# Patient Record
Sex: FEMALE | Race: BLACK OR AFRICAN AMERICAN | NOT HISPANIC OR LATINO | Employment: FULL TIME | URBAN - METROPOLITAN AREA
[De-identification: names, ages, dates, MRNs, and addresses within clinical notes are randomized per-mention and may not be internally consistent; named-entity substitution may affect disease eponyms.]

---

## 2017-01-07 ENCOUNTER — HOSPITAL ENCOUNTER (EMERGENCY)
Facility: HOSPITAL | Age: 42
Discharge: HOME | End: 2017-01-07
Attending: EMERGENCY MEDICINE
Payer: MEDICAID

## 2017-01-07 VITALS
DIASTOLIC BLOOD PRESSURE: 97 MMHG | RESPIRATION RATE: 20 BRPM | HEART RATE: 95 BPM | OXYGEN SATURATION: 98 % | TEMPERATURE: 99 F | SYSTOLIC BLOOD PRESSURE: 154 MMHG

## 2017-01-07 DIAGNOSIS — H66.002 ACUTE SUPPURATIVE OTITIS MEDIA OF LEFT EAR WITHOUT SPONTANEOUS RUPTURE OF TYMPANIC MEMBRANE, RECURRENCE NOT SPECIFIED: Primary | ICD-10-CM

## 2017-01-07 LAB
FLUAV AG SPEC QL IA: NEGATIVE
FLUBV AG SPEC QL IA: NEGATIVE
SPECIMEN SOURCE: NORMAL

## 2017-01-07 PROCEDURE — 25000003 PHARM REV CODE 250: Performed by: EMERGENCY MEDICINE

## 2017-01-07 PROCEDURE — 99284 EMERGENCY DEPT VISIT MOD MDM: CPT

## 2017-01-07 PROCEDURE — 87400 INFLUENZA A/B EACH AG IA: CPT

## 2017-01-07 RX ORDER — IBUPROFEN 200 MG
400 TABLET ORAL EVERY 6 HOURS PRN
Qty: 30 TABLET | Refills: 0
Start: 2017-01-07 | End: 2017-11-03

## 2017-01-07 RX ORDER — AMOXICILLIN AND CLAVULANATE POTASSIUM 875; 125 MG/1; MG/1
1 TABLET, FILM COATED ORAL EVERY 12 HOURS
Qty: 14 TABLET | Refills: 0 | Status: SHIPPED | OUTPATIENT
Start: 2017-01-07 | End: 2017-01-17

## 2017-01-07 RX ORDER — HYDROCODONE BITARTRATE AND ACETAMINOPHEN 5; 325 MG/1; MG/1
1 TABLET ORAL
Status: COMPLETED | OUTPATIENT
Start: 2017-01-07 | End: 2017-01-07

## 2017-01-07 RX ORDER — HYDROCODONE BITARTRATE AND ACETAMINOPHEN 5; 325 MG/1; MG/1
1 TABLET ORAL EVERY 6 HOURS PRN
Qty: 12 TABLET | Refills: 0 | Status: SHIPPED | OUTPATIENT
Start: 2017-01-07 | End: 2017-03-06

## 2017-01-07 RX ADMIN — HYDROCODONE BITARTRATE AND ACETAMINOPHEN 1 TABLET: 5; 325 TABLET ORAL at 12:01

## 2017-01-07 NOTE — DISCHARGE INSTRUCTIONS
Middle Ear Infection (Adult)  You have an infection of the middle ear, the space behind the eardrum. This is also called acute otitis media (AOM). Sometimes it is caused by the common cold. This is because congestion can block the internal passage (eustachian tube) that drains fluid from the middle ear. When the middle ear fills with fluid, bacteria can grow there and cause an infection. Oral antibiotics are used to treat this illness, not ear drops. Symptoms usually start to improve within 1 to 2 days of treatment.    Home care  The following are general care guidelines:  · Finish all of the antibiotic medicine given, even though you may feel better after the first few days.  · You may use over-the-counter medicine, such as acetaminophen or ibuprofen, to control pain and fever, unless something else was prescribed. If you have chronic liver or kidney disease or have ever had a stomach ulcer or gastrointestinal bleeding, talk with your healthcare provider before using these medicines. Do not give aspirin to anyone under 18 years of age who has a fever. It may cause severe illness or death.  Follow-up care  Follow up with your healthcare provider, or as advised, in 2 weeks if all symptoms have not gotten better, or if hearing doesn't go back to normal within 1 month.  When to seek medical advice  Call your healthcare provider right away if any of these occur:  · Ear pain gets worse or does not improve after 3 days of treatment  · Unusual drowsiness or confusion  · Neck pain, stiff neck, or headache  · Fluid or blood draining from the ear canal  · Fever of 100.4°F (38°C) or as advised   · Seizure  © 7501-0043 [employer]. [address] [place] [address], [place] [address]. All rights reserved. This information is not intended as a substitute for professional medical care. Always follow your healthcare professional's instructions.

## 2017-01-07 NOTE — ED PROVIDER NOTES
Encounter Date: 1/7/2017       History     Chief Complaint   Patient presents with    Generalized Body Aches     body aches and chills x 1 day      Review of patient's allergies indicates:   Allergen Reactions    Bactrim [sulfamethoxazole-trimethoprim] Rash     The history is provided by the patient. No  was used.    Patient complains of generalized body aches and chills for one day.  Patient also has some slight nasal congestion.  Patient states it started last night.  Patient also has earache in the right ear which is 8 out of 10.  Patient states the pain is a throbbing type pain.  Patient denies any cough or sore throat nausea vomiting or diarrhea.  Patient denies any fever.  Patient denies any urinary frequency.  She complains of decreased hearing out of the right ear.    Past Medical History   Diagnosis Date    Hypertension      No past medical history pertinent negatives.  Past Surgical History   Procedure Laterality Date    Carotid stent       No family history on file.  Social History   Substance Use Topics    Smoking status: Former Smoker     Packs/day: 1.00     Types: Cigarettes     Quit date: 2/16/2016    Smokeless tobacco: Not on file    Alcohol use Yes      Comment: occassionally     Review of Systems   All other systems reviewed and are negative.      Physical Exam   Initial Vitals   BP Pulse Resp Temp SpO2   01/07/17 1101 01/07/17 1101 01/07/17 1101 01/07/17 1101 01/07/17 1101   154/97 95 20 98.5 °F (36.9 °C) 98 %     Physical Exam    Nursing note and vitals reviewed.  Constitutional: She appears well-developed and well-nourished.   HENT:   Head: Normocephalic and atraumatic.   Right Ear: External ear normal.   Left Ear: External ear normal.   Nose: Nose normal.   Mouth/Throat: Oropharynx is clear and moist.   Right TM is dull and opacified.   Eyes: EOM are normal. Pupils are equal, round, and reactive to light.   Neck: Normal range of motion.   Cardiovascular: Normal rate,  regular rhythm and normal heart sounds. Exam reveals no gallop and no friction rub.    No murmur heard.  Pulmonary/Chest: Breath sounds normal. No respiratory distress. She has no wheezes. She has no rhonchi. She has no rales.   Abdominal: Soft. She exhibits no distension. There is no tenderness.   Musculoskeletal: Normal range of motion.   Neurological: She is alert and oriented to person, place, and time. She has normal strength. No sensory deficit.   Skin: Skin is warm and dry.   Psychiatric: She has a normal mood and affect.         ED Course   Procedures  Labs Reviewed   INFLUENZA A AND B ANTIGEN                               ED Course     Clinical Impression:   The encounter diagnosis was Acute suppurative otitis media of left ear without spontaneous rupture of tympanic membrane, recurrence not specified.          Mark Thompson MD  01/07/17 4063

## 2017-01-07 NOTE — ED AVS SNAPSHOT
[facility]  [address]  [place] [address]               Gina Jenkins   2017 10:58 AM   ED    Description:  Female : 1975   Department:  [facility]           Your Care was Coordinated By:     Provider Role From To    Mark Thompson MD Attending Provider 17 1057 --      Reason for Visit     Generalized Body Aches           Diagnoses this Visit        Comments    Acute suppurative otitis media of left ear without spontaneous rupture of tympanic membrane, recurrence not specified    -  Primary       ED Disposition     ED Disposition Condition Comment    Discharge             To Do List           Follow-up Information     Follow up with Jeffery Humphrey MD In 1 week(s).    Specialty:  Cardiology    Contact information:    [address] [place] [address]  2ND FLOOR  [facility]  [place] [id number] [phone]         These Medications        Disp Refills Start End    amoxicillin-clavulanate 875-125mg (AUGMENTIN) 875-125 mg per tablet 14 tablet 0 2017    Take 1 tablet by mouth every 12 (twelve) hours. - Oral    ibuprofen (ADVIL,MOTRIN) 200 MG tablet 30 tablet 0 2017     Take 2 tablets (400 mg total) by mouth every 6 (six) hours as needed for Pain. - Oral    hydrocodone-acetaminophen 5-325mg (NORCO) 5-325 mg per tablet 12 tablet 0 2017     Take 1 tablet by mouth every 6 (six) hours as needed for Pain. - Oral      [facility]     [facility] On Call Nurse Care Line -  Assistance  Registered nurses in the [facility] On Call Center provide clinical advisement, health education, appointment booking, and other advisory services.  Call for this free service at [phone].             Medications           Message regarding Medications     Verify the changes and/or additions to your medication regime listed below are the same as discussed with your clinician today.  If any of these changes or additions are incorrect,  please notify your healthcare provider.        START taking these NEW medications        Refills    amoxicillin-clavulanate 875-125mg (AUGMENTIN) 875-125 mg per tablet 0    Sig: Take 1 tablet by mouth every 12 (twelve) hours.    Class: Print    Route: Oral    ibuprofen (ADVIL,MOTRIN) 200 MG tablet 0    Sig: Take 2 tablets (400 mg total) by mouth every 6 (six) hours as needed for Pain.    Class: No Print    Route: Oral    hydrocodone-acetaminophen 5-325mg (NORCO) 5-325 mg per tablet 0    Sig: Take 1 tablet by mouth every 6 (six) hours as needed for Pain.    Class: Print    Route: Oral      These medications were administered today        Dose Freq    hydrocodone-acetaminophen 5-325mg per tablet 1 tablet 1 tablet ED 1 Time    Sig: Take 1 tablet by mouth ED 1 Time.    Class: Normal    Route: Oral           Verify that the below list of medications is an accurate representation of the medications you are currently taking.  If none reported, the list may be blank. If incorrect, please contact your healthcare provider. Carry this list with you in case of emergency.           Current Medications     aspirin (ECOTRIN) 81 MG EC tablet Take 81 mg by mouth as needed for Pain.    carvedilol (COREG) 12.5 MG tablet Take 1 tablet (12.5 mg total) by mouth 2 (two) times daily.    lisinopril (PRINIVIL,ZESTRIL) 40 MG tablet Take 1 tablet (40 mg total) by mouth once daily.    ondansetron (ZOFRAN) 4 MG tablet Take 1 tablet (4 mg total) by mouth every 8 (eight) hours as needed for Nausea.    promethazine (PHENERGAN) 25 MG tablet Take 1 tablet (25 mg total) by mouth every 6 (six) hours as needed for Nausea.    amlodipine (NORVASC) 10 MG tablet Take 1 tablet (10 mg total) by mouth once daily.    amoxicillin-clavulanate 875-125mg (AUGMENTIN) 875-125 mg per tablet Take 1 tablet by mouth every 12 (twelve) hours.    hydrochlorothiazide (HYDRODIURIL) 25 MG tablet Take 0.5 tablets (12.5 mg total) by mouth once daily.    hydrocodone-acetaminophen  5-325mg (NORCO) 5-325 mg per tablet Take 1 tablet by mouth every 6 (six) hours as needed for Pain.    hydrocodone-acetaminophen 5-325mg per tablet 1 tablet Take 1 tablet by mouth ED 1 Time.    hydrOXYzine pamoate (VISTARIL) 50 MG Cap Take 1 capsule (50 mg total) by mouth every 6 (six) hours as needed.    ibuprofen (ADVIL,MOTRIN) 200 MG tablet Take 2 tablets (400 mg total) by mouth every 6 (six) hours as needed for Pain.    potassium chloride SA (K-DUR,KLOR-CON) 20 MEQ tablet Take 1 tablet (20 mEq total) by mouth 3 (three) times daily.           Clinical Reference Information           Your Vitals Were     BP Pulse Temp Resp Last Period SpO2    154/97 95 98.5 °F (36.9 °C) (Oral) 20 12/31/2016 98%      Allergies as of 1/7/2017        Reactions    Bactrim [Sulfamethoxazole-trimethoprim] Rash      Immunizations Administered on Date of Encounter - 1/7/2017     None      ED Micro, Lab, POCT     Start Ordered       Status Ordering Provider    01/07/17 1105 01/07/17 1104  Influenza antigen Nasopharyngeal Swab  STAT      Final result       ED Imaging Orders     None        Discharge Instructions           Middle Ear Infection (Adult)  You have an infection of the middle ear, the space behind the eardrum. This is also called acute otitis media (AOM). Sometimes it is caused by the common cold. This is because congestion can block the internal passage (eustachian tube) that drains fluid from the middle ear. When the middle ear fills with fluid, bacteria can grow there and cause an infection. Oral antibiotics are used to treat this illness, not ear drops. Symptoms usually start to improve within 1 to 2 days of treatment.    Home care  The following are general care guidelines:  · Finish all of the antibiotic medicine given, even though you may feel better after the first few days.  · You may use over-the-counter medicine, such as acetaminophen or ibuprofen, to control pain and fever, unless something else was prescribed. If you  have chronic liver or kidney disease or have ever had a stomach ulcer or gastrointestinal bleeding, talk with your healthcare provider before using these medicines. Do not give aspirin to anyone under 18 years of age who has a fever. It may cause severe illness or death.  Follow-up care  Follow up with your healthcare provider, or as advised, in 2 weeks if all symptoms have not gotten better, or if hearing doesn't go back to normal within 1 month.  When to seek medical advice  Call your healthcare provider right away if any of these occur:  · Ear pain gets worse or does not improve after 3 days of treatment  · Unusual drowsiness or confusion  · Neck pain, stiff neck, or headache  · Fluid or blood draining from the ear canal  · Fever of 100.4°F (38°C) or as advised   · Seizure  © 8335-4774 [employer]. [address] [place] [address]. All rights reserved. This information is not intended as a substitute for professional medical care. Always follow your healthcare professional's instructions.          [facility] Sign-Up     Activating your [facility] account is as easy as 1-2-3!     1) Visit [email or web address], select Sign Up Now, enter this activation code and your date of birth, then select Next.  [email or web address]  Expires: 2/21/2017 12:51 PM      2) Create a username and password to use when you visit [facility] in the future and select a security question in case you lose your password and select Next.    3) Enter your e-mail address and click Sign Up!    Additional Information  If you have questions, please e-mail [email or web address] or call [phone] to talk to our [facility] staff. Remember, [facility] is NOT to be used for urgent needs. For medical emergencies, dial 911.         Smoking Cessation     If you would like to quit smoking:   You may be eligible for free services if you are a [place] resident and started smoking cigarettes before September 1, 1988.  Call the Smoking Cessation  Trust ([facility]) toll free at (258) 110-9128 or (330) [phone].   Call 1-800-QUIT-NOW if you do not meet the above criteria.             [facility] complies with applicable Federal civil rights laws and does not discriminate on the basis of race, color, national origin, age, disability, or sex.        Language Assistance Services     ATTENTION: Language assistance services are available, free of charge. Please call [phone].      ATENCIÓN: Si habla aissatou, tiene a barnes disposición servicios gratuitos de asistencia lingüística. Llame al [phone].     BLAYNE Ý: N?u b?n nói Ti?ng Vi?t, có các d?ch v? h? tr? ngôn ng? mi?n phí dành cho b?n. G?i s? [phone].

## 2017-03-06 ENCOUNTER — HOSPITAL ENCOUNTER (EMERGENCY)
Facility: HOSPITAL | Age: 42
Discharge: HOME | End: 2017-03-06
Attending: EMERGENCY MEDICINE
Payer: MEDICAID

## 2017-03-06 VITALS
RESPIRATION RATE: 18 BRPM | WEIGHT: 165 LBS | SYSTOLIC BLOOD PRESSURE: 187 MMHG | HEART RATE: 67 BPM | BODY MASS INDEX: 25.01 KG/M2 | HEIGHT: 68 IN | DIASTOLIC BLOOD PRESSURE: 78 MMHG | OXYGEN SATURATION: 100 % | TEMPERATURE: 99 F

## 2017-03-06 DIAGNOSIS — R55 SYNCOPE, UNSPECIFIED SYNCOPE TYPE: ICD-10-CM

## 2017-03-06 DIAGNOSIS — I10 MALIGNANT HYPERTENSION: ICD-10-CM

## 2017-03-06 DIAGNOSIS — I67.4 HYPERTENSIVE ENCEPHALOPATHY: ICD-10-CM

## 2017-03-06 DIAGNOSIS — I16.0 HYPERTENSIVE URGENCY: Primary | ICD-10-CM

## 2017-03-06 LAB
ALBUMIN SERPL BCP-MCNC: 3.5 G/DL
ALP SERPL-CCNC: 106 U/L
ALT SERPL W/O P-5'-P-CCNC: 19 U/L
ANION GAP SERPL CALC-SCNC: 11 MMOL/L
AST SERPL-CCNC: 23 U/L
B-HCG UR QL: NEGATIVE
BASOPHILS # BLD AUTO: 0.04 K/UL
BASOPHILS NFR BLD: 0.6 %
BILIRUB SERPL-MCNC: 0.3 MG/DL
BNP SERPL-MCNC: 189 PG/ML
BUN SERPL-MCNC: 16 MG/DL
CALCIUM SERPL-MCNC: 9 MG/DL
CHLORIDE SERPL-SCNC: 103 MMOL/L
CO2 SERPL-SCNC: 26 MMOL/L
CREAT SERPL-MCNC: 1.1 MG/DL
CTP QC/QA: YES
DIFFERENTIAL METHOD: NORMAL
EOSINOPHIL # BLD AUTO: 0.4 K/UL
EOSINOPHIL NFR BLD: 5.5 %
ERYTHROCYTE [DISTWIDTH] IN BLOOD BY AUTOMATED COUNT: 13.7 %
EST. GFR  (AFRICAN AMERICAN): >60 ML/MIN/1.73 M^2
EST. GFR  (NON AFRICAN AMERICAN): >60 ML/MIN/1.73 M^2
GLUCOSE SERPL-MCNC: 79 MG/DL (ref 70–110)
GLUCOSE SERPL-MCNC: 93 MG/DL
HCT VFR BLD AUTO: 39.9 %
HGB BLD-MCNC: 13.3 G/DL
INR PPP: 1
LYMPHOCYTES # BLD AUTO: 1.7 K/UL
LYMPHOCYTES NFR BLD: 26 %
MCH RBC QN AUTO: 30.6 PG
MCHC RBC AUTO-ENTMCNC: 33.3 %
MCV RBC AUTO: 92 FL
MONOCYTES # BLD AUTO: 0.6 K/UL
MONOCYTES NFR BLD: 8.7 %
NEUTROPHILS # BLD AUTO: 3.9 K/UL
NEUTROPHILS NFR BLD: 59 %
PLATELET # BLD AUTO: 237 K/UL
PMV BLD AUTO: 11.3 FL
POCT GLUCOSE: 79 MG/DL (ref 70–110)
POTASSIUM SERPL-SCNC: 3.2 MMOL/L
PROT SERPL-MCNC: 7.1 G/DL
PROTHROMBIN TIME: 10.4 SEC
RBC # BLD AUTO: 4.35 M/UL
SODIUM SERPL-SCNC: 140 MMOL/L
TROPONIN I SERPL DL<=0.01 NG/ML-MCNC: 0.01 NG/ML
TSH SERPL DL<=0.005 MIU/L-ACNC: 1.99 UIU/ML
WBC # BLD AUTO: 6.58 K/UL

## 2017-03-06 PROCEDURE — 85025 COMPLETE CBC W/AUTO DIFF WBC: CPT

## 2017-03-06 PROCEDURE — 99285 EMERGENCY DEPT VISIT HI MDM: CPT | Mod: 25

## 2017-03-06 PROCEDURE — 96375 TX/PRO/DX INJ NEW DRUG ADDON: CPT

## 2017-03-06 PROCEDURE — 82962 GLUCOSE BLOOD TEST: CPT

## 2017-03-06 PROCEDURE — 93010 ELECTROCARDIOGRAM REPORT: CPT | Mod: ,,, | Performed by: INTERNAL MEDICINE

## 2017-03-06 PROCEDURE — 99203 OFFICE O/P NEW LOW 30 MIN: CPT | Mod: GT,,, | Performed by: PSYCHIATRY & NEUROLOGY

## 2017-03-06 PROCEDURE — 80053 COMPREHEN METABOLIC PANEL: CPT

## 2017-03-06 PROCEDURE — 83880 ASSAY OF NATRIURETIC PEPTIDE: CPT

## 2017-03-06 PROCEDURE — 85610 PROTHROMBIN TIME: CPT

## 2017-03-06 PROCEDURE — 63600175 PHARM REV CODE 636 W HCPCS: Performed by: EMERGENCY MEDICINE

## 2017-03-06 PROCEDURE — 84484 ASSAY OF TROPONIN QUANT: CPT

## 2017-03-06 PROCEDURE — 25000003 PHARM REV CODE 250: Performed by: EMERGENCY MEDICINE

## 2017-03-06 PROCEDURE — 93005 ELECTROCARDIOGRAM TRACING: CPT

## 2017-03-06 PROCEDURE — 84443 ASSAY THYROID STIM HORMONE: CPT

## 2017-03-06 PROCEDURE — 81025 URINE PREGNANCY TEST: CPT | Performed by: EMERGENCY MEDICINE

## 2017-03-06 PROCEDURE — 96374 THER/PROPH/DIAG INJ IV PUSH: CPT

## 2017-03-06 RX ORDER — PROCHLORPERAZINE EDISYLATE 5 MG/ML
10 INJECTION INTRAMUSCULAR; INTRAVENOUS
Status: COMPLETED | OUTPATIENT
Start: 2017-03-06 | End: 2017-03-06

## 2017-03-06 RX ORDER — LISINOPRIL 10 MG/1
40 TABLET ORAL DAILY
Qty: 30 TABLET | Refills: 0 | Status: SHIPPED | OUTPATIENT
Start: 2017-03-06 | End: 2017-04-02 | Stop reason: SDUPTHER

## 2017-03-06 RX ORDER — AMLODIPINE BESYLATE 10 MG/1
10 TABLET ORAL DAILY
Qty: 30 TABLET | Refills: 0 | Status: SHIPPED | OUTPATIENT
Start: 2017-03-06 | End: 2017-04-02

## 2017-03-06 RX ORDER — CARVEDILOL 12.5 MG/1
12.5 TABLET ORAL
Status: COMPLETED | OUTPATIENT
Start: 2017-03-06 | End: 2017-03-06

## 2017-03-06 RX ORDER — HYDROCHLOROTHIAZIDE 25 MG/1
25 TABLET ORAL DAILY
Qty: 30 TABLET | Refills: 0 | Status: SHIPPED | OUTPATIENT
Start: 2017-03-06 | End: 2017-04-02

## 2017-03-06 RX ORDER — AMLODIPINE BESYLATE 5 MG/1
10 TABLET ORAL
Status: COMPLETED | OUTPATIENT
Start: 2017-03-06 | End: 2017-03-06

## 2017-03-06 RX ORDER — HYDRALAZINE HYDROCHLORIDE 20 MG/ML
10 INJECTION INTRAMUSCULAR; INTRAVENOUS
Status: COMPLETED | OUTPATIENT
Start: 2017-03-06 | End: 2017-03-06

## 2017-03-06 RX ORDER — KETOROLAC TROMETHAMINE 30 MG/ML
15 INJECTION, SOLUTION INTRAMUSCULAR; INTRAVENOUS
Status: COMPLETED | OUTPATIENT
Start: 2017-03-06 | End: 2017-03-06

## 2017-03-06 RX ORDER — ASPIRIN 325 MG
325 TABLET ORAL
Status: COMPLETED | OUTPATIENT
Start: 2017-03-06 | End: 2017-03-06

## 2017-03-06 RX ORDER — POTASSIUM CHLORIDE 20 MEQ/1
40 TABLET, EXTENDED RELEASE ORAL
Status: COMPLETED | OUTPATIENT
Start: 2017-03-06 | End: 2017-03-06

## 2017-03-06 RX ORDER — CARVEDILOL 12.5 MG/1
12.5 TABLET ORAL 2 TIMES DAILY WITH MEALS
Qty: 60 TABLET | Refills: 11 | Status: SHIPPED | OUTPATIENT
Start: 2017-03-06 | End: 2017-04-02

## 2017-03-06 RX ADMIN — PROCHLORPERAZINE EDISYLATE 10 MG: 5 INJECTION INTRAMUSCULAR; INTRAVENOUS at 02:03

## 2017-03-06 RX ADMIN — POTASSIUM CHLORIDE 40 MEQ: 20 TABLET, EXTENDED RELEASE ORAL at 02:03

## 2017-03-06 RX ADMIN — ASPIRIN 325 MG ORAL TABLET 325 MG: 325 PILL ORAL at 01:03

## 2017-03-06 RX ADMIN — HYDRALAZINE HYDROCHLORIDE 10 MG: 20 INJECTION INTRAMUSCULAR; INTRAVENOUS at 12:03

## 2017-03-06 RX ADMIN — AMLODIPINE BESYLATE 10 MG: 5 TABLET ORAL at 01:03

## 2017-03-06 RX ADMIN — CARVEDILOL 12.5 MG: 12.5 TABLET, FILM COATED ORAL at 02:03

## 2017-03-06 RX ADMIN — KETOROLAC TROMETHAMINE 15 MG: 30 INJECTION, SOLUTION INTRAMUSCULAR at 02:03

## 2017-03-06 NOTE — CONSULTS
[facility]  [facility]Consult    Consultation started: 3/6/2017 at 11:54 AM   Consulting Provider:  Prieto  The patient location is [facility]  Spoke hospital nurse at bedside with patient assisting consultant.     Subjective:   Subjective   History of Present Illness:  Gina Jenkins is a 42 y.o. female who presents with HA, dizziness, LOC , triage /133.    At work, felt dizzy, took a seat, and then had LOC, next thing she remembers was her being woken up by coworkers asking her if she was ok. When she woke up she noticed that her head was hurting her and she denies any weakness or numbness anywhere. These symptoms have never happened before. There are no identified triggers or modifying factors. There have been no recurrent events. There are no other associated symptoms.    Off of her blood pressure medications x 3 days, 235/133 triage BP.    Wake up Stroke?: no  Last known normal:     Recent bleeding noted: no  Does the patient take any Blood Thinners? no     H&P was obtained from patient.  Past Medical History: HTN    Medications: No current facility-administered medications for this encounter.     Current Outpatient Prescriptions:     amlodipine (NORVASC) 10 MG tablet, Take 1 tablet (10 mg total) by mouth once daily., Disp: 30 tablet, Rfl: 1    aspirin (ECOTRIN) 81 MG EC tablet, Take 81 mg by mouth as needed for Pain., Disp: , Rfl:     carvedilol (COREG) 12.5 MG tablet, Take 1 tablet (12.5 mg total) by mouth 2 (two) times daily., Disp: 60 tablet, Rfl: 1    hydrochlorothiazide (HYDRODIURIL) 25 MG tablet, Take 0.5 tablets (12.5 mg total) by mouth once daily., Disp: 30 tablet, Rfl: 1    hydrocodone-acetaminophen 5-325mg (NORCO) 5-325 mg per tablet, Take 1 tablet by mouth every 6 (six) hours as needed for Pain., Disp: 12 tablet, Rfl: 0    hydrOXYzine pamoate (VISTARIL) 50 MG Cap, Take 1 capsule (50 mg total) by mouth every 6 (six) hours as needed., Disp: 20  capsule, Rfl: 0    ibuprofen (ADVIL,MOTRIN) 200 MG tablet, Take 2 tablets (400 mg total) by mouth every 6 (six) hours as needed for Pain., Disp: 30 tablet, Rfl: 0    lisinopril (PRINIVIL,ZESTRIL) 40 MG tablet, Take 1 tablet (40 mg total) by mouth once daily., Disp: 30 tablet, Rfl: 1    ondansetron (ZOFRAN) 4 MG tablet, Take 1 tablet (4 mg total) by mouth every 8 (eight) hours as needed for Nausea., Disp: 8 tablet, Rfl: 0    potassium chloride SA (K-DUR,KLOR-CON) 20 MEQ tablet, Take 1 tablet (20 mEq total) by mouth 3 (three) times daily., Disp: 30 tablet, Rfl: 0    promethazine (PHENERGAN) 25 MG tablet, Take 1 tablet (25 mg total) by mouth every 6 (six) hours as needed for Nausea., Disp: 15 tablet, Rfl: 0    Allergies:   Review of patient's allergies indicates:   Allergen Reactions    Bactrim [sulfamethoxazole-trimethoprim] Rash       Objective:     Vitals:   Vitals:    03/06/17 1146   BP: (!) 235/133   Pulse: 66   Resp: 18   Temp: 98.8 °F (37.1 °C)        Objective   Physical Exam:  General: well developed, well nourished   HENT: Head:normocephalic and atraumatic   HENT: Ears: right ear normal and left ear normal  Nose: normal nares and no discharge  Eyes:conjunctivae/corneas clear. PERRL.  Neck: normal, no obvious masses and trachea to midline  Lungs: normal respiratory effort  Cardiovascular: Heart: regular rate and rhythm   Cardiovascular: Extremities: no cyanosis, no edema and no clubbing  Abdomen: appears normal and not distended  Skin:  skin color and texture normal, no rash and no bruises  Musculoskeletal: normal range of motion in all extremities       Imaging Notes: No hemmorhage. No mass effect. No early infarct signs. Impression performed at: 1200    Stroke Scales    Assessment - Diagnosis - Goals:     Plan     Diagnosis/Impression: hypertensive encephalopathy w/ HA, off of BP meds x 3 days; no clear e/o focality to suggest ischemic stroke at this point    TPA Recommendation: TPA not recommended due  to findings more suggestive of hypertensive emergency w/ HA, dizziness; no clear neurovascular syndrome    Recommendation: NPO until after pass dysphagia screen. Diagnostic studies: MRI head without contrast to assess brain parenchyma; recommend mgmt as hypertensive emergency w/ neurological symptoms    Disposition Recommendation:  admit to inpatient       Possible Interventional Revascularization Candidate? No; No significant neurological deficit    Recommended the emergency room physician to have a brief discussion with the patient and/or family if available regarding the risks and benefits of treatment, and to briefly document the occurrence of that discussion in his clinical encounter note.     The attending portion of this evaluation, treatment, and documentation was performed per Mike Warren via audiovisual.      Billing code:  (non-stroke, some mimics)    · This patient has neurological symptom(s)/condition/illness, with minimal potential for morbidity and mortality.  · There is a low probability for acute neurological change leading to clinical and possibly life-threatening deterioration requiring highest level of physician preparedness for urgent intervention.  · Care was coordinated with other physicians involved in the patient's care.  · Radiologic studies and laboratory data were reviewed and interpreted, and plan of care was re-assessed based on the results.  Diagnosis, treatment options and prognosis may have been discussed with the patient and/or family members or caregiver.      Consultation ended: 3/6/2017 at 1210    Lauro Warren MD  Vascular Neurology

## 2017-03-06 NOTE — ED PROVIDER NOTES
"Encounter Date: 3/6/2017       History     Chief Complaint   Patient presents with    Facial Droop     left sided facial droop x 40 min, slurred speech. syncope     Review of patient's allergies indicates:   Allergen Reactions    Bactrim [sulfamethoxazole-trimethoprim] Rash     HPI Comments: 22-year-old female brought to the emergency department by EMS.  Patient reports that she was at work and bent over and felt dizzy, describing "the room spinning around me."  Reports she then lost consciousness, and woke up with people standing around her.  EMS reports the patient is having somewhat slurred speech.  Patient reports feeling "kind out of it."  Reports a mild, generalized, throbbing headache.  Denies any chest pain or shortness of breath.  Admits to not taking her blood pressure medicine because she is out of it.  Eyes any  Visual changes, focal numbness or weakness, fever, chills, chest pain, shorts breath, abdominal pain, constipation, diarrhea, dysuria, hematuria.  Reports resolution of dizziness at this time    The history is provided by the patient and the EMS personnel.     Past Medical History:   Diagnosis Date    Hypertension      Past Surgical History:   Procedure Laterality Date    CAROTID STENT       History reviewed. No pertinent family history.  Social History   Substance Use Topics    Smoking status: Former Smoker     Packs/day: 1.00     Types: Cigarettes     Quit date: 2/16/2016    Smokeless tobacco: None    Alcohol use Yes      Comment: occassionally     Review of Systems   Constitutional: Negative for chills, fatigue and fever.   HENT: Negative for congestion, rhinorrhea, sore throat and voice change.    Eyes: Negative for photophobia, pain and redness.   Respiratory: Negative for cough, choking and shortness of breath.    Cardiovascular: Negative for chest pain, palpitations and leg swelling.   Gastrointestinal: Negative for abdominal pain, diarrhea, nausea and vomiting.   Genitourinary: " Negative for dysuria, frequency and urgency.   Musculoskeletal: Negative for back pain, neck pain and neck stiffness.   Neurological: Positive for dizziness and headaches. Negative for seizures and numbness.   All other systems reviewed and are negative.      Physical Exam   Initial Vitals   BP Pulse Resp Temp SpO2   03/06/17 1146 03/06/17 1146 03/06/17 1146 03/06/17 1146 03/06/17 1146   235/133 66 18 98.8 °F (37.1 °C) 100 %     Physical Exam    Nursing note and vitals reviewed.  Constitutional: She appears well-developed and well-nourished. No distress.   HENT:   Head: Normocephalic and atraumatic.   Mouth/Throat: Oropharynx is clear and moist.   Eyes: Conjunctivae and EOM are normal. Pupils are equal, round, and reactive to light.   Neck: Normal range of motion. Neck supple. No tracheal deviation present.   Cardiovascular: Normal rate, regular rhythm, normal heart sounds and intact distal pulses.   Pulmonary/Chest: Breath sounds normal. No respiratory distress. She has no wheezes. She has no rhonchi. She has no rales.   Abdominal: Soft. Bowel sounds are normal. She exhibits no distension. There is no tenderness. There is no rebound and no guarding.   Musculoskeletal: Normal range of motion. She exhibits no edema or tenderness.   Neurological: She is alert and oriented to person, place, and time. She has normal strength. No cranial nerve deficit or sensory deficit. She displays a negative Romberg sign. Coordination and gait normal. GCS eye subscore is 4. GCS verbal subscore is 5. GCS motor subscore is 6.   NIH stroke scale would be 0 at this time   Skin: Skin is warm and dry.         ED Course   Procedures  Labs Reviewed   COMPREHENSIVE METABOLIC PANEL - Abnormal; Notable for the following:        Result Value    Potassium 3.2 (*)     All other components within normal limits   CBC W/ AUTO DIFFERENTIAL   PROTIME-INR   TSH   TROPONIN I   B-TYPE NATRIURETIC PEPTIDE   POCT GLUCOSE   POCT URINE PREGNANCY   POCT GLUCOSE      EKG Readings: (Independently Interpreted)   Initial Reading: No STEMI. Rhythm: Normal Sinus Rhythm. Heart Rate: 67. Ectopy: No Ectopy. Conduction: Normal. ST Segments: Normal ST Segments. T Waves: Normal. Axis: Normal.       X-Rays:   Independently Interpreted Readings:   Chest X-Ray: Interpreted by radiologist and visualized by me   Head CT: Interpreted by radiologist and visualized by me     Imaging Results         X-Ray Chest AP Portable (Final result) Result time:  03/06/17 13:14:06    Final result by Baron Rich MD (03/06/17 13:14:06)    Impression:        No acute cardiopulmonary disease.      Electronically signed by: BARON RICH MD  Date:     03/06/17  Time:    13:14     Narrative:    History: CVA.    Procedure: Portable chest one view    Findings:    There is again nonspecific elevation of the right hemidiaphragm, without change from the study of 5/11/16.    Cardiomediastinal silhouette is within normal limits there no tracheal abnormalities.  Lungs are clear.  Pulmonary vasculature within normal limits.  There are cardiac monitoring leads over the chest.            CT Head Without Contrast (Final result) Result time:  03/06/17 12:13:54    Final result by Laureano Venegas DO (03/06/17 12:13:54)    Impression:     Unremarkable motion limited noncontrast CT head specifically without evidence for acute intracranial hemorrhage. Further evaluation as warranted clinically.    Incidental moderate patchy paranasal sinus opacities      Electronically signed by: LAUREANO VENEGAS DO  Date:     03/06/17  Time:    12:13     Narrative:    CT brain without contrast.    Comparison: None     Technique: Multiple 5 mm axial images of the head were obtained without intravenous contrast.    Findings: Study is slightly limited by patient motion within limits of the study there is no evidence for acute intracranial hemorrhage or sulcal effacement. The ventricles are normal in size and configuration without evidence for  hydrocephalus.  There is no midline shift or mass effect. There is moderate lobular opacities in the maxillary antra with patchy ethmoid air cell and mild sphenoid sinus opacities.              Medical Decision Making:   Initial Assessment:   42-year-old female presents the ED with reported slurred speech, although her father has arrived afterward and reports her speech is at baseline as well as syncopal episode and hypertension, reports noncompliance with her high blood pressure medicine  Differential Diagnosis:   CVA, ICH, hypertensive encephalopathy versus urgency, ACS  Independently Interpreted Test(s):   I have ordered and independently interpreted X-rays - see prior notes.  I have ordered and independently interpreted EKG Reading(s) - see prior notes  Clinical Tests:   Lab Tests: Reviewed       <> Summary of Lab: Benign  ED Management:  Patient in her baseline per family.  No complaints at this time.  Instructed her on importance of filling the blood pressure medication, which will fill, as well as follow-up with her primary care physician for reevaluation, return to emergency room with recurrent or new symptoms.  Patient expressed good understanding and is comfortable with discharge at this time.                   ED Course     Clinical Impression:   The primary encounter diagnosis was Hypertensive urgency. Diagnoses of Hypertensive encephalopathy, Malignant hypertension, and Syncope, unspecified syncope type were also pertinent to this visit.    Disposition:   Disposition: Discharged  Condition: Stable       Yuriy Moreau MD  03/06/17 1288

## 2017-03-06 NOTE — ED NOTES
Pt states she became dizzy when standing up after bending over today, then had syncopal episode. Hit her head on the ground. Arrives with skin tear to right forehead and abrasion under nose. Pt began having slurred speech, a left facial droop, and a sensation of heaviness to entire body after waking up, about 45 minutes pta. Face is symmetrical when smiling. PERRLA. Skin is warm, dry. Pt is hypertensive, and states she has been out of all of her prescribed bp medications for about 1 week. Denies any chest pain. Normal sinus rhythm on monitor. Respirations unlabored.

## 2017-03-06 NOTE — ED AVS SNAPSHOT
[facility]  [address]  [place] [address]               Gina Jenkins   3/6/2017 11:47 AM   ED    Description:  Female : 1975   Department:  [facility]           Your Care was Coordinated By:     Provider Role From To    Yuriy Moreau MD Attending Provider 17 1147 --      Reason for Visit     Facial Droop           Diagnoses this Visit        Comments    Hypertensive urgency    -  Primary     Hypertensive encephalopathy         Malignant hypertension         Syncope, unspecified syncope type           ED Disposition     None           To Do List           Follow-up Information     Follow up with Jeffery Humphrey MD.    Specialty:  Cardiology    Contact information:    [address]  [address]ND FLOOR  [facility]  [place] [address]  [phone]         These Medications        Disp Refills Start End    lisinopril 10 MG tablet 30 tablet 0 3/6/2017 3/6/2018    Take 4 tablets (40 mg total) by mouth once daily. - Oral    amlodipine (NORVASC) 10 MG tablet 30 tablet 0 3/6/2017 3/6/2018    Take 1 tablet (10 mg total) by mouth once daily. - Oral    carvedilol (COREG) 12.5 MG tablet 60 tablet 11 3/6/2017 3/6/2018    Take 1 tablet (12.5 mg total) by mouth 2 (two) times daily with meals. - Oral    hydrochlorothiazide (HYDRODIURIL) 25 MG tablet 30 tablet 0 3/6/2017 3/6/2018    Take 1 tablet (25 mg total) by mouth once daily. - Oral      [facility] -  Assistance  Registered nurses in the [facility] provide clinical advisement, health education, appointment booking, and other advisory services.  Call for this free service at [phone].             Medications           Message regarding Medications     Verify the changes and/or additions to your medication regime listed below are the same as discussed with your clinician today.  If any of these changes or additions are  incorrect, please notify your healthcare provider.        START taking these NEW medications        Refills    lisinopril 10 MG tablet 0    Sig: Take 4 tablets (40 mg total) by mouth once daily.    Class: Print    Route: Oral    amlodipine (NORVASC) 10 MG tablet 0    Sig: Take 1 tablet (10 mg total) by mouth once daily.    Class: Print    Route: Oral    carvedilol (COREG) 12.5 MG tablet 11    Sig: Take 1 tablet (12.5 mg total) by mouth 2 (two) times daily with meals.    Class: Print    Route: Oral    hydrochlorothiazide (HYDRODIURIL) 25 MG tablet 0    Sig: Take 1 tablet (25 mg total) by mouth once daily.    Class: Print    Route: Oral      These medications were administered today        Dose Freq    hydrALAZINE injection 10 mg 10 mg ED 1 Time    Sig: Inject 0.5 mLs (10 mg total) into the vein ED 1 Time.    Class: Normal    Route: Intravenous    aspirin tablet 325 mg 325 mg ED 1 Time    Sig: Take 1 tablet (325 mg total) by mouth ED 1 Time.    Class: Normal    Route: Oral    amlodipine tablet 10 mg 10 mg ED 1 Time    Sig: Take 2 tablets (10 mg total) by mouth ED 1 Time.    Class: Normal    Route: Oral    carvedilol tablet 12.5 mg 12.5 mg ED 1 Time    Sig: Take 1 tablet (12.5 mg total) by mouth ED 1 Time.    Class: Normal    Route: Oral    ketorolac injection 15 mg 15 mg ED 1 Time    Sig: Inject 15 mg into the vein ED 1 Time.    Class: Normal    Route: Intravenous    prochlorperazine injection Soln 10 mg 10 mg ED 1 Time    Sig: Inject 2 mLs (10 mg total) into the vein ED 1 Time.    Class: Normal    Route: Intravenous    potassium chloride SA CR tablet 40 mEq 40 mEq ED 1 Time    Sig: Take 2 tablets (40 mEq total) by mouth ED 1 Time.    Class: Normal    Route: Oral      STOP taking these medications     hydrocodone-acetaminophen 5-325mg (NORCO) 5-325 mg per tablet Take 1 tablet by mouth every 6 (six) hours as needed for Pain.    hydrOXYzine pamoate (VISTARIL) 50 MG Cap Take 1 capsule (50 mg total) by mouth every 6 (six)  "hours as needed.           Verify that the below list of medications is an accurate representation of the medications you are currently taking.  If none reported, the list may be blank. If incorrect, please contact your healthcare provider. Carry this list with you in case of emergency.           Current Medications     amlodipine (NORVASC) 10 MG tablet Take 1 tablet (10 mg total) by mouth once daily.    aspirin (ECOTRIN) 81 MG EC tablet Take 81 mg by mouth as needed for Pain.    carvedilol (COREG) 12.5 MG tablet Take 1 tablet (12.5 mg total) by mouth 2 (two) times daily.    hydrochlorothiazide (HYDRODIURIL) 25 MG tablet Take 0.5 tablets (12.5 mg total) by mouth once daily.    ibuprofen (ADVIL,MOTRIN) 200 MG tablet Take 2 tablets (400 mg total) by mouth every 6 (six) hours as needed for Pain.    lisinopril (PRINIVIL,ZESTRIL) 40 MG tablet Take 1 tablet (40 mg total) by mouth once daily.    ondansetron (ZOFRAN) 4 MG tablet Take 1 tablet (4 mg total) by mouth every 8 (eight) hours as needed for Nausea.    potassium chloride SA (K-DUR,KLOR-CON) 20 MEQ tablet Take 1 tablet (20 mEq total) by mouth 3 (three) times daily.    promethazine (PHENERGAN) 25 MG tablet Take 1 tablet (25 mg total) by mouth every 6 (six) hours as needed for Nausea.    amlodipine (NORVASC) 10 MG tablet Take 1 tablet (10 mg total) by mouth once daily.    amlodipine tablet 10 mg Take 2 tablets (10 mg total) by mouth ED 1 Time.    carvedilol (COREG) 12.5 MG tablet Take 1 tablet (12.5 mg total) by mouth 2 (two) times daily with meals.    carvedilol tablet 12.5 mg Take 1 tablet (12.5 mg total) by mouth ED 1 Time.    hydrochlorothiazide (HYDRODIURIL) 25 MG tablet Take 1 tablet (25 mg total) by mouth once daily.    lisinopril 10 MG tablet Take 4 tablets (40 mg total) by mouth once daily.           Clinical Reference Information           Your Vitals Were     BP Pulse Temp Resp Height Weight    198/97 76 98.8 °F (37.1 °C) (Oral) 18 5' 8" (1.727 m) 74.8 kg " (165 lb)    SpO2 BMI             100% 25.09 kg/m2         Allergies as of 3/6/2017        Reactions    Bactrim [Sulfamethoxazole-trimethoprim] Rash      Immunizations Administered on Date of Encounter - 3/6/2017     None      ED Micro, Lab, POCT     Start Ordered       Status Ordering Provider    03/06/17 1158 03/06/17 1158  POCT glucose  Once      Final result     03/06/17 1147 03/06/17 1146  Troponin I  Once      Final result     03/06/17 1147 03/06/17 1146  B-Type natriuretic peptide  Once      Final result     03/06/17 1147 03/06/17 1146  POCT urine pregnancy  Once      Final result     03/06/17 1146 03/06/17 1146  CBC W/ AUTO DIFFERENTIAL  Once      Final result     03/06/17 1146 03/06/17 1146  Comprehensive metabolic panel  Once      Final result     03/06/17 1146 03/06/17 1146  Protime-INR  Once      Final result     03/06/17 1146 03/06/17 1146  TSH  Once      In process     03/06/17 1146 03/06/17 1146  POCT glucose  Once      Final result       ED Imaging Orders     Start Ordered       Status Ordering Provider    03/06/17 1146 03/06/17 1146  CT Head Without Contrast  1 time imaging      Final result     03/06/17 1146 03/06/17 1146  X-Ray Chest AP Portable  1 time imaging      Final result       Discharge References/Attachments     BLOOD PRESSURE, DISCHARGE INSTRUCTIONS: TAKING YOUR (ENGLISH)    SYNCOPE, UNK CAUSE (ENGLISH)      [facility] Sign-Up     Activating your [facility] account is as easy as 1-2-3!     1) Visit [email or web address], select Sign Up Now, enter this activation code and your date of birth, then select Next.  [email or web address]  Expires: 4/20/2017  3:41 PM      2) Create a username and password to use when you visit [facility] in the future and select a security question in case you lose your password and select Next.    3) Enter your e-mail address and click Sign Up!    Additional Information  If you have questions, please e-mail [email or web address] or call [phone] to talk to our [facility]  staff. Remember, [facility] is NOT to be used for urgent needs. For medical emergencies, dial 911.         Smoking Cessation     If you would like to quit smoking:   You may be eligible for free services if you are a [place] resident and started smoking cigarettes before September 1, 1988.  Call the [employer]oking [employer]essation [employer] ([employer]) toll free at (130) 824-4005 or (867) 785-1528.   Call 1-800-QUIT-NOW if you do not meet the above criteria.             [facility] complies with applicable Federal civil rights laws and does not discriminate on the basis of race, color, national origin, age, disability, or sex.        Language Assistance Services     ATTENTION: Language assistance services are available, free of charge. Please call [phone].      ATENCIÓN: Si habla español, tiene a barnes disposición servicios gratuitos de asistencia lingüística. Llame al [phone].     CHÚ Ý: N?u b?n nói Ti?ng Vi?t, có các d?ch v? h? tr? ngôn ng? mi?n phí dành cho b?n. G?i s? [phone].

## 2017-03-06 NOTE — ED NOTES
Pt taken to the restroom in a wheelchair. Ambulatory to and from wheelchair with a steady gait. Facial droop is improving

## 2017-04-02 ENCOUNTER — HOSPITAL ENCOUNTER (EMERGENCY)
Facility: HOSPITAL | Age: 42
Discharge: HOME | End: 2017-04-02
Attending: EMERGENCY MEDICINE
Payer: MEDICAID

## 2017-04-02 VITALS
BODY MASS INDEX: 28.52 KG/M2 | OXYGEN SATURATION: 98 % | HEART RATE: 97 BPM | DIASTOLIC BLOOD PRESSURE: 98 MMHG | HEIGHT: 62 IN | WEIGHT: 155 LBS | RESPIRATION RATE: 15 BRPM | TEMPERATURE: 98 F | SYSTOLIC BLOOD PRESSURE: 149 MMHG

## 2017-04-02 DIAGNOSIS — R11.10 VOMITING IN ADULT: Primary | ICD-10-CM

## 2017-04-02 DIAGNOSIS — R42 DIZZY: ICD-10-CM

## 2017-04-02 LAB — B-HCG UR QL: NEGATIVE

## 2017-04-02 PROCEDURE — 25000003 PHARM REV CODE 250: Performed by: EMERGENCY MEDICINE

## 2017-04-02 PROCEDURE — 81025 URINE PREGNANCY TEST: CPT

## 2017-04-02 PROCEDURE — 99284 EMERGENCY DEPT VISIT MOD MDM: CPT | Mod: 25

## 2017-04-02 PROCEDURE — 93005 ELECTROCARDIOGRAM TRACING: CPT

## 2017-04-02 RX ORDER — CARVEDILOL 12.5 MG/1
12.5 TABLET ORAL 2 TIMES DAILY WITH MEALS
Qty: 60 TABLET | Refills: 11 | Status: SHIPPED | OUTPATIENT
Start: 2017-04-02 | End: 2020-05-16

## 2017-04-02 RX ORDER — HYDROCODONE BITARTRATE AND ACETAMINOPHEN 5; 325 MG/1; MG/1
1 TABLET ORAL
Status: COMPLETED | OUTPATIENT
Start: 2017-04-02 | End: 2017-04-02

## 2017-04-02 RX ORDER — HYDROCHLOROTHIAZIDE 25 MG/1
25 TABLET ORAL DAILY
Qty: 30 TABLET | Refills: 0 | Status: ON HOLD | OUTPATIENT
Start: 2017-04-02 | End: 2022-09-02 | Stop reason: HOSPADM

## 2017-04-02 RX ORDER — AMLODIPINE BESYLATE 10 MG/1
10 TABLET ORAL DAILY
Qty: 30 TABLET | Refills: 0 | Status: SHIPPED | OUTPATIENT
Start: 2017-04-02 | End: 2023-11-20

## 2017-04-02 RX ORDER — LISINOPRIL 10 MG/1
10 TABLET ORAL DAILY
Qty: 30 TABLET | Refills: 0 | Status: SHIPPED | OUTPATIENT
Start: 2017-04-02 | End: 2017-11-03 | Stop reason: DRUGHIGH

## 2017-04-02 RX ADMIN — HYDROCODONE BITARTRATE AND ACETAMINOPHEN 1 TABLET: 5; 325 TABLET ORAL at 03:04

## 2017-04-02 NOTE — ED AVS SNAPSHOT
[facility]  [address]  [place] [address]               Gina Jenkins   2017 12:11 PM   ED    Description:  Female : 1975   Department:  [facility]           Your Care was Coordinated By:     Provider Role From To    Yuval Caldwell MD Attending Provider 17 1215 --      Reason for Visit     Headache           Diagnoses this Visit        Comments    Vomiting in adult    -  Primary     Dizzy           ED Disposition     ED Disposition Condition Comment    Discharge             To Do List           Follow-up Information     Follow up with Jeffery Humphrey MD In 3 days.    Specialty:  Cardiology    Contact information:    [address]  2ND FLO[address]  [facility]  [place] [address]  [phone]         These Medications        Disp Refills Start End    lisinopril 10 MG tablet 30 tablet 0 2017    Take 1 tablet (10 mg total) by mouth once daily. - Oral    hydrochlorothiazide (HYDRODIURIL) 25 MG tablet 30 tablet 0 2017    Take 1 tablet (25 mg total) by mouth once daily. - Oral    carvedilol (COREG) 12.5 MG tablet 60 tablet 11 2017    Take 1 tablet (12.5 mg total) by mouth 2 (two) times daily with meals. - Oral    amlodipine (NORVASC) 10 MG tablet 30 tablet 0 2017    Take 1 tablet (10 mg total) by mouth once daily. - Oral      [facility]     [facility] Nurse Care Line -  Assistance  Unless otherwise directed by your provider, please contact [facility] On[facility], our nurse care line that is available for  assistance.     Registered nurses in the [facility] provide: appointment scheduling, clinical advisement, health education, and other advisory services.  Call: [phone] (toll free)               Medications           Message regarding Medications     Verify the changes and/or additions to your medication regime listed below are the same as  discussed with your clinician today.  If any of these changes or additions are incorrect, please notify your healthcare provider.        START taking these NEW medications        Refills    lisinopril 10 MG tablet 0    Sig: Take 1 tablet (10 mg total) by mouth once daily.    Class: Print    Route: Oral    hydrochlorothiazide (HYDRODIURIL) 25 MG tablet 0    Sig: Take 1 tablet (25 mg total) by mouth once daily.    Class: Print    Route: Oral    carvedilol (COREG) 12.5 MG tablet 11    Sig: Take 1 tablet (12.5 mg total) by mouth 2 (two) times daily with meals.    Class: Print    Route: Oral    amlodipine (NORVASC) 10 MG tablet 0    Sig: Take 1 tablet (10 mg total) by mouth once daily.    Class: Print    Route: Oral      These medications were administered today        Dose Freq    hydrocodone-acetaminophen 5-325mg per tablet 1 tablet 1 tablet ED 1 Time    Sig: Take 1 tablet by mouth ED 1 Time.    Class: Normal    Route: Oral           Verify that the below list of medications is an accurate representation of the medications you are currently taking.  If none reported, the list may be blank. If incorrect, please contact your healthcare provider. Carry this list with you in case of emergency.           Current Medications     amlodipine (NORVASC) 10 MG tablet Take 1 tablet (10 mg total) by mouth once daily.    aspirin (ECOTRIN) 81 MG EC tablet Take 81 mg by mouth as needed for Pain.    carvedilol (COREG) 12.5 MG tablet Take 1 tablet (12.5 mg total) by mouth 2 (two) times daily with meals.    hydrochlorothiazide (HYDRODIURIL) 25 MG tablet Take 1 tablet (25 mg total) by mouth once daily.    ibuprofen (ADVIL,MOTRIN) 200 MG tablet Take 2 tablets (400 mg total) by mouth every 6 (six) hours as needed for Pain.    lisinopril 10 MG tablet Take 1 tablet (10 mg total) by mouth once daily.    ondansetron (ZOFRAN) 4 MG tablet Take 1 tablet (4 mg total) by mouth every 8 (eight) hours as needed for Nausea.    potassium chloride SA  "(K-DUR,KLOR-CON) 20 MEQ tablet Take 1 tablet (20 mEq total) by mouth 3 (three) times daily.    promethazine (PHENERGAN) 25 MG tablet Take 1 tablet (25 mg total) by mouth every 6 (six) hours as needed for Nausea.           Clinical Reference Information           Your Vitals Were     BP Pulse Temp Resp Height Weight    149/98 (Patient Position: Standing) 97 98 °F (36.7 °C) (Oral) 15 5' 2" (1.575 m) 70.3 kg (155 lb)    SpO2 BMI             98% 28.35 kg/m2         Allergies as of 4/2/2017        Reactions    Bactrim [Sulfamethoxazole-trimethoprim] Rash      Immunizations Administered on Date of Encounter - 4/2/2017     None      ED Micro, Lab, POCT     Start Ordered       Status Ordering Provider    04/02/17 1606 04/02/17 1605  UPT (Pregnancy, urine rapid)  STAT      Final result     04/02/17 1509 04/02/17 1509  POCT urine pregnancy  Once      Acknowledged       ED Imaging Orders     None        Discharge Instructions       Return if chest discomfort, short of breath, vomiting worsens, other concerns.    Discharge References/Attachments     VOMITING (ADULT) (ENGLISH)      [facility] Sign-Up     Activating your [facility] account is as easy as 1-2-3!     1) Visit [email or web address], select Sign Up Now, enter this activation code and your date of birth, then select Next.  [email or web address]  Expires: 4/20/2017  4:41 PM      2) Create a username and password to use when you visit [facility] in the future and select a security question in case you lose your password and select Next.    3) Enter your e-mail address and click Sign Up!    Additional Information  If you have questions, please e-mail [email or web address] or call [phone] to talk to our [facility] staff. Remember, [facility] is NOT to be used for urgent needs. For medical emergencies, dial 911.          [facility] complies with applicable Federal civil rights laws and does not discriminate on the basis of race, color, national origin, age, " disability, or sex.        Language Assistance Services     ATTENTION: Language assistance services are available, free of charge. Please call [phone].      ATENCIÓN: Si habla marjanañol, tiene a barnes disposición servicios gratuitos de asistencia lingüística. Llame al [phone].     CHÚ Ý: N?u b?n nói Ti?ng Vi?t, có các d?ch v? h? tr? ngôn ng? mi?n phí dành cho b?n. G?i s? [phone].

## 2017-04-15 NOTE — ED PROVIDER NOTES
Encounter Date: 4/2/2017       History     Chief Complaint   Patient presents with    Headache     I woke up with a HA this morning I suffer with high blood pressure i dont have my medicine i ran out two weeks ago.      Review of patient's allergies indicates:   Allergen Reactions    Bactrim [sulfamethoxazole-trimethoprim] Rash     HPI Comments: 42 YOF presents with headache since this am. She states she also ran out of her medications.  The headache was not maximal or exertional at onset. There is no neck stiffness or fever.    Past Medical History:   Diagnosis Date    Hypertension      Past Surgical History:   Procedure Laterality Date    CAROTID STENT       History reviewed. No pertinent family history.  Social History   Substance Use Topics    Smoking status: Former Smoker     Packs/day: 1.00     Types: Cigarettes     Quit date: 2/16/2016    Smokeless tobacco: None    Alcohol use Yes      Comment: occassionally     Review of Systems   negative except as stated below.patient was questions as to the following symptoms:  GEN  appetite change., diaphoresis, fatigue, fever, irritability, unexpected weight change  HEENT, dental problems, drooling, and ear discharge, ear pain, facial swelling, mouth sores, nosebleeds, rhinorrhea, sneezing, sore throat, tinnitus, trouble swallowing, voice change  EYES, itching, pain, redness, photophobia, visual disturbance  CARDIO-chest pain, cyanosis, leg swelling, palpitations  RESPIRATORY-choking, cough, stridor, wheezing  GI-distention, abdominal pain, bleeding, blood in stools, constipation, diarrhea, nausea, rectal pain, vomiting   difficulty urinating, dysuria, enuresis,  flank pain, frequency, genital sores, hematuria, urgency, decreased urination,   MUSC- back pain, gait problem, joint swelling, myalgias, neck pain or stiffness  NEURO-symmetry,seizures, speech difficulty, syncope, tremors, weakness  HEME, bruising, adenopathy  BEH, agitation, sleep  disturbance  SKINchange, pallor, rash, wound    +headache    Physical Exam   Initial Vitals   BP Pulse Resp Temp SpO2   04/02/17 1214 04/02/17 1214 04/02/17 1214 04/02/17 1214 04/02/17 1214   144/96 99 15 98 °F (36.7 °C) 98 %     Physical Exam    Nursing note and vitals reviewed.  Constitutional: appears well-developed and well-nourished.  not diaphoretic. No distress.   Head: Normocephalic and atraumatic.   HEENT:  Right Ear: External ear normal.   Left Ear: External ear normal.   Nose: Nose normal.   Mouth/Throat: Oropharynx is clear and moist. No oropharyngeal exudate.   Eyes: Conjunctivae and EOM are normal. Pupils are equal, round, and reactive to light. Right eye exhibits no discharge. Left eye exhibits no discharge.   Neck: Normal range of motion. Neck supple.   Cardiovascular: Normal rate, regular rhythm, normal heart sounds and intact distal pulses.   Pulmonary/Chest: Breath sounds normal. No respiratory distress.  no wheezes.  no rhonchi.  no rales.no tenderness.   Abdominal: Soft. Bowel sounds are normal.  no distension and no mass. There is no tenderness. There is no rebound and no guarding.   Musculoskeletal: Normal range of motion.   Neurological:  alert and oriented to person, place, and time. motor 5/5, sensory 5/5  Skin: Skin is warm.   Psychiatric: normal mood and affect. Thought content normal.       ED Course   Procedures  Labs Reviewed   PREGNANCY TEST, URINE RAPID        ECG Results         EKG 12-LEAD (Final result) Result time:  04/03/17 12:45:06    Final result by Interface, Lab In [facility] (04/03/17 12:45:06)    Narrative:      Vent. Rate : 083 BPM     Atrial Rate : 083 BPM     P-R Int : 144 ms          QRS Dur : 082 ms      QT Int : 400 ms       P-R-T Axes : 032 008 026 degrees     QTc Int : 470 ms    Normal sinus rhythm  Nonspecific ST abnormality  Prolonged QT  Abnormal ECG  When compared with ECG of 06-MAR-2017 12:03,  Nonspecific T wave abnormality now evident in Inferior leads  T wave  inversion now evident in Anterior-lateral leads  Confirmed by Gurdeep Pena MD ([id number]) on 4/3/2017 12:45:00 PM    Referred By: [email or web address]   SELF           Confirmed By:Gurdeep Pena MD                Feels better.  I don't suspect SAH, meningitis,glaucoma or GCA                 ED Course     Clinical Impression:   The primary encounter diagnosis was Vomiting in adult. A diagnosis of Dizzy was also pertinent to this visit.          Yuval Caldwell MD  04/15/17 3817

## 2017-04-26 ENCOUNTER — HOSPITAL ENCOUNTER (EMERGENCY)
Facility: HOSPITAL | Age: 42
Discharge: LEFT BEFORE DISCHARGE | End: 2017-04-26
Attending: EMERGENCY MEDICINE
Payer: MEDICAID

## 2017-04-26 VITALS
BODY MASS INDEX: 28.52 KG/M2 | OXYGEN SATURATION: 98 % | RESPIRATION RATE: 16 BRPM | HEART RATE: 63 BPM | DIASTOLIC BLOOD PRESSURE: 71 MMHG | HEIGHT: 62 IN | SYSTOLIC BLOOD PRESSURE: 126 MMHG | TEMPERATURE: 99 F | WEIGHT: 155 LBS

## 2017-04-26 DIAGNOSIS — Z53.29 LEFT AGAINST MEDICAL ADVICE: Primary | ICD-10-CM

## 2017-04-26 PROCEDURE — 99282 EMERGENCY DEPT VISIT SF MDM: CPT

## 2017-04-26 NOTE — ED PROVIDER NOTES
Encounter Date: 4/26/2017       History     Chief Complaint   Patient presents with    Weakness     patient arrived ej ems with chief complaint of generalized weakness/ dizziness/ headache that began at 0900. No episodes of n/v no neurological deficits     Review of patient's allergies indicates:   Allergen Reactions    Bactrim [sulfamethoxazole-trimethoprim] Rash     HPI Comments: PATIENT LEFT AMA- I DID NOT EVALUATE OR SEE PATIENT EXCEPT TO ASSESS MEDICAL DECISION MAKING CAPACITY    Past Medical History:   Diagnosis Date    Hypertension      Past Surgical History:   Procedure Laterality Date    CAROTID STENT       History reviewed. No pertinent family history.  Social History   Substance Use Topics    Smoking status: Former Smoker     Packs/day: 1.00     Types: Cigarettes     Quit date: 2/16/2016    Smokeless tobacco: None    Alcohol use Yes      Comment: occassionally     Review of Systems   Reason unable to perform ROS: PATIENT LEFT AMA- I DID NOT EVALUATE OR SEE PATIENT EXCEPT TO ASSESS MEDICAL DECISION MAKING CAPACITY.       Physical Exam   Initial Vitals   BP Pulse Resp Temp SpO2   04/26/17 1208 04/26/17 1208 04/26/17 1208 04/26/17 1208 04/26/17 1208   126/71 63 16 98.6 °F (37 °C) 98 %     Physical Exam    Constitutional:   PATIENT LEFT AMA- I DID NOT EVALUATE OR SEE PATIENT EXCEPT TO ASSESS MEDICAL DECISION MAKING CAPACITY         ED Course   Procedures  Labs Reviewed - No data to display          Medical Decision Making:   Initial Assessment:   PATIENT LEFT AMA- I DID NOT EVALUATE OR SEE PATIENT EXCEPT TO ASSESS MEDICAL DECISION MAKING CAPACITY  Differential Diagnosis:   PATIENT LEFT AMA- I DID NOT EVALUATE OR SEE PATIENT EXCEPT TO ASSESS MEDICAL DECISION MAKING CAPACITY  ED Management:  PATIENT LEFT AMA- I DID NOT EVALUATE OR SEE PATIENT EXCEPT TO ASSESS MEDICAL DECISION MAKING CAPACITY                   ED Course     Clinical Impression:   The encounter diagnosis was Left against medical  advice.          Mary Bush MD  04/26/17 8640

## 2017-04-26 NOTE — ED NOTES
Patient is signing out AMA with Dr Buhs at bed explaining risk and getting AMA paper signed, patient states she feels better and doesn't want treatment; see AMA form in physical file

## 2017-04-26 NOTE — PROVIDER PROGRESS NOTES - EMERGENCY DEPT.
Encounter Date: 4/26/2017    ED Physician Progress Notes         12:29 Patient arrived to the emergency department by EMS, and indicated that in the nurse that she wanted to leave AGAINST MEDICAL ADVICE before medical evaluation.  Her nurse, Subha Dobbins, came to get me to discuss AMA paperwork.  Patient is alert and oriented and capable of refusing care.  She explicitly repeated to me what the possible complications are refusing medical care including death disability a permanent damage.  She continued to wants to leave.  She was there with her father, who also understood the risks.  Patient signed AMA paperwork, and was given reasons to return to the emergency department.

## 2017-04-26 NOTE — ED NOTES
Patient has verified the spelling of their name and  on armband  LOC: The patient is awake, alert, and aware of environment with an appropriate affect, the patient is oriented x 3 and speaking appropriately.   APPEARANCE: Patient resting comfortably and in no acute distress, patient is clean and well groomed, patient's clothing is properly fastened.   SKIN: The skin is warm and dry, color consistent with ethnicity, patient has normal skin turgor and moist mucus membranes, skin intact, no breakdown or bruising noted.   :   Voids without difficulty  MUSCULOSKELETAL: Patient moving all extremities spontaneously, no obvious swelling or deformities noted.   RESPIRATORY: Airway is open and patent, respirations are spontaneous, patient has a normal effort and rate, no accessory muscle use noted, bilateral breath sounds clear, denies SOB   ABDOMEN: Soft and non tender to palpation, no distention noted, normoactive bowel sounds present in all four quadrants.   CARDIAC:  Normal rate and rhythm, no peripheral edema noted, less then 3 second capillary refill, denies chest pain  COMPLAINT: dizziness, headache, generalized weakness all began today at work, denies fall or injury - rates pain 5 out of 10; facial features are symmetrical, no drift to all four limbs when held on own for 10 seconds; denies visual disturbance

## 2017-04-27 ENCOUNTER — HOSPITAL ENCOUNTER (OUTPATIENT)
Dept: RADIOLOGY | Facility: HOSPITAL | Age: 42
Discharge: HOME | End: 2017-04-27
Attending: NURSE PRACTITIONER
Payer: MEDICAID

## 2017-04-27 DIAGNOSIS — M25.512 LEFT SHOULDER PAIN: Primary | ICD-10-CM

## 2017-04-27 DIAGNOSIS — M25.512 LEFT SHOULDER PAIN: ICD-10-CM

## 2017-04-27 PROCEDURE — 73030 X-RAY EXAM OF SHOULDER: CPT | Mod: TC,PO,LT

## 2017-07-10 ENCOUNTER — HOSPITAL ENCOUNTER (EMERGENCY)
Facility: HOSPITAL | Age: 42
Discharge: HOME | End: 2017-07-10
Attending: FAMILY MEDICINE
Payer: MEDICAID

## 2017-07-10 VITALS
WEIGHT: 160 LBS | DIASTOLIC BLOOD PRESSURE: 87 MMHG | RESPIRATION RATE: 20 BRPM | HEIGHT: 62 IN | BODY MASS INDEX: 29.44 KG/M2 | OXYGEN SATURATION: 100 % | HEART RATE: 82 BPM | SYSTOLIC BLOOD PRESSURE: 139 MMHG | TEMPERATURE: 99 F

## 2017-07-10 DIAGNOSIS — M54.2 NECK PAIN: ICD-10-CM

## 2017-07-10 DIAGNOSIS — S13.9XXA NECK SPRAIN, INITIAL ENCOUNTER: Primary | ICD-10-CM

## 2017-07-10 LAB
B-HCG UR QL: NEGATIVE
BILIRUB UR QL STRIP: NEGATIVE
CLARITY UR REFRACT.AUTO: CLEAR
COLOR UR AUTO: YELLOW
GLUCOSE UR QL STRIP: NEGATIVE
HGB UR QL STRIP: ABNORMAL
KETONES UR QL STRIP: NEGATIVE
LEUKOCYTE ESTERASE UR QL STRIP: NEGATIVE
NITRITE UR QL STRIP: NEGATIVE
PH UR STRIP: 8 [PH] (ref 5–8)
PROT UR QL STRIP: NEGATIVE
SP GR UR STRIP: 1.01 (ref 1–1.03)
URN SPEC COLLECT METH UR: ABNORMAL
UROBILINOGEN UR STRIP-ACNC: NEGATIVE EU/DL

## 2017-07-10 PROCEDURE — 81025 URINE PREGNANCY TEST: CPT

## 2017-07-10 PROCEDURE — 63600175 PHARM REV CODE 636 W HCPCS: Performed by: FAMILY MEDICINE

## 2017-07-10 PROCEDURE — 99284 EMERGENCY DEPT VISIT MOD MDM: CPT | Mod: 25

## 2017-07-10 PROCEDURE — 96372 THER/PROPH/DIAG INJ SC/IM: CPT

## 2017-07-10 PROCEDURE — 81003 URINALYSIS AUTO W/O SCOPE: CPT

## 2017-07-10 RX ORDER — KETOROLAC TROMETHAMINE 30 MG/ML
60 INJECTION, SOLUTION INTRAMUSCULAR; INTRAVENOUS
Status: DISCONTINUED | OUTPATIENT
Start: 2017-07-10 | End: 2017-07-10

## 2017-07-10 RX ORDER — HYDROCODONE BITARTRATE AND ACETAMINOPHEN 5; 325 MG/1; MG/1
1 TABLET ORAL EVERY 8 HOURS PRN
Qty: 12 TABLET | Refills: 0 | Status: SHIPPED | OUTPATIENT
Start: 2017-07-10 | End: 2017-11-03

## 2017-07-10 RX ORDER — KETOROLAC TROMETHAMINE 30 MG/ML
60 INJECTION, SOLUTION INTRAMUSCULAR; INTRAVENOUS
Status: COMPLETED | OUTPATIENT
Start: 2017-07-10 | End: 2017-07-10

## 2017-07-10 RX ORDER — CYCLOBENZAPRINE HCL 10 MG
10 TABLET ORAL 3 TIMES DAILY PRN
Qty: 30 TABLET | Refills: 0 | Status: SHIPPED | OUTPATIENT
Start: 2017-07-10 | End: 2017-07-15

## 2017-07-10 RX ADMIN — KETOROLAC TROMETHAMINE 60 MG: 60 INJECTION, SOLUTION INTRAMUSCULAR at 09:07

## 2017-07-10 NOTE — ED PROVIDER NOTES
Encounter Date: 7/10/2017       History     Chief Complaint   Patient presents with    Neck Pain     left neck pain- denies injury for 5 days. denies radiating. Also, i have lower stomach pain. denies vaginal dc/bleeding/ or burning with urination/ vomiting/diarrhea.      Patient complains of neck pain started about 5 days ago.  Since this took ibuprofen yesterday but did not make it any better.  Today she says the pain is radiating down to her left shoulder level.  She has restrictive range of motion woman in her neck due to pain.  She denies any injury.  No fever.,  No nausea on vomiting.  No chest pain or shortness of breath.  No cough or cold.      The history is provided by the patient.     Review of patient's allergies indicates:   Allergen Reactions    Bactrim [sulfamethoxazole-trimethoprim] Rash     Past Medical History:   Diagnosis Date    Hypertension      Past Surgical History:   Procedure Laterality Date    CAROTID STENT       History reviewed. No pertinent family history.  Social History   Substance Use Topics    Smoking status: Current Every Day Smoker     Packs/day: 0.50     Types: Cigarettes     Last attempt to quit: 2/16/2016    Smokeless tobacco: Never Used    Alcohol use Yes      Comment: occassionally     Review of Systems   Constitutional: Negative for activity change, appetite change, chills and fever.   HENT: Negative for congestion, rhinorrhea and sore throat.    Eyes: Negative for pain, discharge, redness and itching.   Respiratory: Negative for choking, chest tightness, shortness of breath and wheezing.    Cardiovascular: Negative for chest pain and palpitations.   Gastrointestinal: Negative for abdominal distention, abdominal pain, nausea and vomiting.   Endocrine: Negative for cold intolerance and heat intolerance.   Genitourinary: Negative for difficulty urinating.   Musculoskeletal: Positive for neck pain. Negative for back pain, gait problem and joint swelling.   Skin: Negative  for rash.   Neurological: Negative for dizziness, light-headedness, numbness and headaches.   All other systems reviewed and are negative.      Physical Exam     Initial Vitals [07/10/17 0848]   BP Pulse Resp Temp SpO2   139/87 82 20 99.2 °F (37.3 °C) 100 %      MAP       104.33         Physical Exam    Nursing note and vitals reviewed.  Constitutional: She appears well-developed and well-nourished.   HENT:   Head: Normocephalic.       Right Ear: External ear normal.   Left Ear: External ear normal.   Mouth/Throat: Oropharynx is clear and moist.   Eyes: Conjunctivae and EOM are normal. Pupils are equal, round, and reactive to light.   Neck: Normal range of motion.   Cardiovascular: Normal rate, regular rhythm, normal heart sounds and intact distal pulses.   Pulmonary/Chest: Breath sounds normal. No respiratory distress. She has no rhonchi. She has no rales. She exhibits no tenderness.   Abdominal: Soft. Bowel sounds are normal.   Musculoskeletal: Normal range of motion.        Cervical back: She exhibits tenderness and pain.   Neurological: She is alert and oriented to person, place, and time. She has normal strength and normal reflexes. No cranial nerve deficit or sensory deficit.   Skin: Skin is warm. Capillary refill takes less than 2 seconds. No rash noted. No erythema.         ED Course   Procedures  Labs Reviewed   URINALYSIS   PREGNANCY TEST, URINE RAPID                               ED Course     Clinical Impression:   The primary encounter diagnosis was Neck sprain, initial encounter. A diagnosis of Neck pain was also pertinent to this visit.                           Deep Gutierrez MD  07/11/17 1074

## 2017-10-01 ENCOUNTER — HOSPITAL ENCOUNTER (EMERGENCY)
Facility: HOSPITAL | Age: 42
End: 2017-10-02
Attending: EMERGENCY MEDICINE
Payer: MEDICAID

## 2017-10-01 DIAGNOSIS — N10 PYELONEPHRITIS, ACUTE: Primary | ICD-10-CM

## 2017-10-01 DIAGNOSIS — E87.6 HYPOKALEMIA: ICD-10-CM

## 2017-10-01 DIAGNOSIS — R07.9 CHEST PAIN: ICD-10-CM

## 2017-10-01 DIAGNOSIS — R11.11 INTRACTABLE VOMITING WITHOUT NAUSEA, UNSPECIFIED VOMITING TYPE: ICD-10-CM

## 2017-10-01 DIAGNOSIS — E83.42 HYPOMAGNESEMIA: ICD-10-CM

## 2017-10-01 LAB
ALBUMIN SERPL BCP-MCNC: 4.3 G/DL
ALP SERPL-CCNC: 101 U/L
ALT SERPL W/O P-5'-P-CCNC: 25 U/L
ANION GAP SERPL CALC-SCNC: 16 MMOL/L
AST SERPL-CCNC: 30 U/L
B-HCG UR QL: NEGATIVE
BACTERIA #/AREA URNS AUTO: ABNORMAL /HPF
BASOPHILS # BLD AUTO: 0.05 K/UL
BASOPHILS NFR BLD: 0.6 %
BILIRUB SERPL-MCNC: 0.6 MG/DL
BILIRUB UR QL STRIP: NEGATIVE
BUN SERPL-MCNC: 12 MG/DL
CALCIUM SERPL-MCNC: 8.9 MG/DL
CHLORIDE SERPL-SCNC: 102 MMOL/L
CLARITY UR REFRACT.AUTO: ABNORMAL
CO2 SERPL-SCNC: 23 MMOL/L
COLOR UR AUTO: YELLOW
CREAT SERPL-MCNC: 0.92 MG/DL
D DIMER PPP FEU-MCNC: 208 NG/ML
DIFFERENTIAL METHOD: NORMAL
EOSINOPHIL # BLD AUTO: 0.3 K/UL
EOSINOPHIL NFR BLD: 3.3 %
ERYTHROCYTE [DISTWIDTH] IN BLOOD BY AUTOMATED COUNT: 13.8 %
EST. GFR  (AFRICAN AMERICAN): >60 ML/MIN/1.73 M^2
EST. GFR  (NON AFRICAN AMERICAN): >60 ML/MIN/1.73 M^2
GLUCOSE SERPL-MCNC: 86 MG/DL
GLUCOSE UR QL STRIP: NEGATIVE
HCT VFR BLD AUTO: 37.8 %
HGB BLD-MCNC: 13.4 G/DL
HGB UR QL STRIP: ABNORMAL
HYALINE CASTS UR QL AUTO: 0 /LPF
KETONES UR QL STRIP: NEGATIVE
LEUKOCYTE ESTERASE UR QL STRIP: ABNORMAL
LYMPHOCYTES # BLD AUTO: 2.6 K/UL
LYMPHOCYTES NFR BLD: 33.1 %
MCH RBC QN AUTO: 30.7 PG
MCHC RBC AUTO-ENTMCNC: 35.4 G/DL
MCV RBC AUTO: 87 FL
MICROSCOPIC COMMENT: ABNORMAL
MONOCYTES # BLD AUTO: 0.8 K/UL
MONOCYTES NFR BLD: 10 %
NEUTROPHILS # BLD AUTO: 4.2 K/UL
NEUTROPHILS NFR BLD: 52.7 %
NITRITE UR QL STRIP: NEGATIVE
NT-PROBNP: 171 PG/ML
PH UR STRIP: 7 [PH] (ref 5–8)
PLATELET # BLD AUTO: 290 K/UL
PMV BLD AUTO: 10.7 FL
POTASSIUM SERPL-SCNC: 2.4 MMOL/L
PROT SERPL-MCNC: 7.7 G/DL
PROT UR QL STRIP: ABNORMAL
RBC # BLD AUTO: 4.37 M/UL
RBC #/AREA URNS AUTO: 2 /HPF (ref 0–4)
SODIUM SERPL-SCNC: 141 MMOL/L
SP GR UR STRIP: 1.01 (ref 1–1.03)
TROPONIN I SERPL DL<=0.01 NG/ML-MCNC: <0.012 NG/ML
URN SPEC COLLECT METH UR: ABNORMAL
UROBILINOGEN UR STRIP-ACNC: NEGATIVE EU/DL
WBC # BLD AUTO: 7.92 K/UL
WBC #/AREA URNS AUTO: 30 /HPF (ref 0–5)

## 2017-10-01 PROCEDURE — 85025 COMPLETE CBC W/AUTO DIFF WBC: CPT

## 2017-10-01 PROCEDURE — 93010 ELECTROCARDIOGRAM REPORT: CPT | Mod: ,,, | Performed by: INTERNAL MEDICINE

## 2017-10-01 PROCEDURE — 96366 THER/PROPH/DIAG IV INF ADDON: CPT

## 2017-10-01 PROCEDURE — 99285 EMERGENCY DEPT VISIT HI MDM: CPT | Mod: 25

## 2017-10-01 PROCEDURE — 81000 URINALYSIS NONAUTO W/SCOPE: CPT

## 2017-10-01 PROCEDURE — 80053 COMPREHEN METABOLIC PANEL: CPT

## 2017-10-01 PROCEDURE — 96375 TX/PRO/DX INJ NEW DRUG ADDON: CPT

## 2017-10-01 PROCEDURE — 63600175 PHARM REV CODE 636 W HCPCS: Performed by: EMERGENCY MEDICINE

## 2017-10-01 PROCEDURE — 93005 ELECTROCARDIOGRAM TRACING: CPT

## 2017-10-01 PROCEDURE — 84484 ASSAY OF TROPONIN QUANT: CPT

## 2017-10-01 PROCEDURE — 96367 TX/PROPH/DG ADDL SEQ IV INF: CPT

## 2017-10-01 PROCEDURE — 83735 ASSAY OF MAGNESIUM: CPT

## 2017-10-01 PROCEDURE — 96368 THER/DIAG CONCURRENT INF: CPT

## 2017-10-01 PROCEDURE — 85379 FIBRIN DEGRADATION QUANT: CPT

## 2017-10-01 PROCEDURE — 96365 THER/PROPH/DIAG IV INF INIT: CPT

## 2017-10-01 PROCEDURE — 81025 URINE PREGNANCY TEST: CPT

## 2017-10-01 PROCEDURE — 25000003 PHARM REV CODE 250: Performed by: EMERGENCY MEDICINE

## 2017-10-01 PROCEDURE — 96376 TX/PRO/DX INJ SAME DRUG ADON: CPT

## 2017-10-01 PROCEDURE — 83880 ASSAY OF NATRIURETIC PEPTIDE: CPT

## 2017-10-01 RX ORDER — ASPIRIN 325 MG
325 TABLET ORAL
Status: COMPLETED | OUTPATIENT
Start: 2017-10-01 | End: 2017-10-01

## 2017-10-01 RX ORDER — ONDANSETRON 2 MG/ML
4 INJECTION INTRAMUSCULAR; INTRAVENOUS
Status: COMPLETED | OUTPATIENT
Start: 2017-10-01 | End: 2017-10-01

## 2017-10-01 RX ORDER — POTASSIUM CHLORIDE 20 MEQ/15ML
60 SOLUTION ORAL
Status: COMPLETED | OUTPATIENT
Start: 2017-10-01 | End: 2017-10-01

## 2017-10-01 RX ORDER — SODIUM CHLORIDE AND POTASSIUM CHLORIDE 150; 900 MG/100ML; MG/100ML
1000 INJECTION, SOLUTION INTRAVENOUS
Status: COMPLETED | OUTPATIENT
Start: 2017-10-01 | End: 2017-10-02

## 2017-10-01 RX ORDER — MORPHINE SULFATE 2 MG/ML
4 INJECTION, SOLUTION INTRAMUSCULAR; INTRAVENOUS
Status: COMPLETED | OUTPATIENT
Start: 2017-10-01 | End: 2017-10-01

## 2017-10-01 RX ADMIN — POTASSIUM CHLORIDE 60 MEQ: 20 SOLUTION ORAL at 11:10

## 2017-10-01 RX ADMIN — CEFTRIAXONE 1 G: 1 INJECTION, SOLUTION INTRAVENOUS at 11:10

## 2017-10-01 RX ADMIN — SODIUM CHLORIDE 1000 ML: 0.9 INJECTION, SOLUTION INTRAVENOUS at 11:10

## 2017-10-01 RX ADMIN — ONDANSETRON 4 MG: 2 INJECTION INTRAMUSCULAR; INTRAVENOUS at 11:10

## 2017-10-01 RX ADMIN — ASPIRIN 325 MG ORAL TABLET 325 MG: 325 PILL ORAL at 10:10

## 2017-10-01 RX ADMIN — MORPHINE SULFATE 4 MG: 2 INJECTION, SOLUTION INTRAMUSCULAR; INTRAVENOUS at 11:10

## 2017-10-02 VITALS
TEMPERATURE: 98 F | HEIGHT: 62 IN | HEART RATE: 88 BPM | WEIGHT: 150 LBS | SYSTOLIC BLOOD PRESSURE: 157 MMHG | DIASTOLIC BLOOD PRESSURE: 86 MMHG | BODY MASS INDEX: 27.6 KG/M2 | RESPIRATION RATE: 20 BRPM | OXYGEN SATURATION: 100 %

## 2017-10-02 LAB
ANION GAP SERPL CALC-SCNC: 9 MMOL/L
BUN SERPL-MCNC: 8 MG/DL
CALCIUM SERPL-MCNC: 7.8 MG/DL
CHLORIDE SERPL-SCNC: 105 MMOL/L
CO2 SERPL-SCNC: 26 MMOL/L
CREAT SERPL-MCNC: 0.78 MG/DL
EST. GFR  (AFRICAN AMERICAN): >60 ML/MIN/1.73 M^2
EST. GFR  (NON AFRICAN AMERICAN): >60 ML/MIN/1.73 M^2
GLUCOSE SERPL-MCNC: 110 MG/DL
LACTATE SERPL-SCNC: 2.1 MMOL/L
MAGNESIUM SERPL-MCNC: 1.7 MG/DL
POTASSIUM SERPL-SCNC: 3 MMOL/L
SODIUM SERPL-SCNC: 140 MMOL/L
TROPONIN I SERPL DL<=0.01 NG/ML-MCNC: <0.012 NG/ML

## 2017-10-02 PROCEDURE — 83605 ASSAY OF LACTIC ACID: CPT

## 2017-10-02 PROCEDURE — 80048 BASIC METABOLIC PNL TOTAL CA: CPT

## 2017-10-02 PROCEDURE — 87040 BLOOD CULTURE FOR BACTERIA: CPT

## 2017-10-02 PROCEDURE — 25000003 PHARM REV CODE 250: Performed by: EMERGENCY MEDICINE

## 2017-10-02 PROCEDURE — 63600175 PHARM REV CODE 636 W HCPCS: Performed by: EMERGENCY MEDICINE

## 2017-10-02 PROCEDURE — 84484 ASSAY OF TROPONIN QUANT: CPT

## 2017-10-02 PROCEDURE — 25500020 PHARM REV CODE 255: Performed by: EMERGENCY MEDICINE

## 2017-10-02 RX ORDER — POTASSIUM CHLORIDE 20 MEQ/15ML
40 SOLUTION ORAL
Status: COMPLETED | OUTPATIENT
Start: 2017-10-02 | End: 2017-10-02

## 2017-10-02 RX ORDER — MAGNESIUM SULFATE HEPTAHYDRATE 40 MG/ML
2 INJECTION, SOLUTION INTRAVENOUS
Status: COMPLETED | OUTPATIENT
Start: 2017-10-02 | End: 2017-10-02

## 2017-10-02 RX ORDER — ASPIRIN 325 MG
325 TABLET ORAL
Status: DISCONTINUED | OUTPATIENT
Start: 2017-10-02 | End: 2017-10-02

## 2017-10-02 RX ORDER — HYDROMORPHONE HYDROCHLORIDE 1 MG/ML
1 INJECTION, SOLUTION INTRAMUSCULAR; INTRAVENOUS; SUBCUTANEOUS
Status: COMPLETED | OUTPATIENT
Start: 2017-10-02 | End: 2017-10-02

## 2017-10-02 RX ORDER — MORPHINE SULFATE 2 MG/ML
4 INJECTION, SOLUTION INTRAMUSCULAR; INTRAVENOUS
Status: COMPLETED | OUTPATIENT
Start: 2017-10-02 | End: 2017-10-02

## 2017-10-02 RX ORDER — HYDRALAZINE HYDROCHLORIDE 20 MG/ML
10 INJECTION INTRAMUSCULAR; INTRAVENOUS
Status: COMPLETED | OUTPATIENT
Start: 2017-10-02 | End: 2017-10-02

## 2017-10-02 RX ADMIN — MAGNESIUM SULFATE IN WATER 2 G: 40 INJECTION, SOLUTION INTRAVENOUS at 01:10

## 2017-10-02 RX ADMIN — POTASSIUM CHLORIDE AND SODIUM CHLORIDE 1000 ML: 900; 150 INJECTION, SOLUTION INTRAVENOUS at 12:10

## 2017-10-02 RX ADMIN — IOHEXOL 100 ML: 350 INJECTION, SOLUTION INTRAVENOUS at 12:10

## 2017-10-02 RX ADMIN — MORPHINE SULFATE 4 MG: 2 INJECTION, SOLUTION INTRAMUSCULAR; INTRAVENOUS at 01:10

## 2017-10-02 RX ADMIN — POTASSIUM CHLORIDE 40 MEQ: 20 SOLUTION ORAL at 06:10

## 2017-10-02 RX ADMIN — HYDROMORPHONE HYDROCHLORIDE 1 MG: 1 INJECTION, SOLUTION INTRAMUSCULAR; INTRAVENOUS; SUBCUTANEOUS at 01:10

## 2017-10-02 RX ADMIN — HYDRALAZINE HYDROCHLORIDE 10 MG: 20 INJECTION INTRAMUSCULAR; INTRAVENOUS at 05:10

## 2017-10-02 NOTE — ED NOTES
Called and spoke with Dr. Teran at [facility] ED, notified of repeat K of 3.0 and that patient was medicated with KCL 40 meq po prior to departing ED.

## 2017-10-02 NOTE — ED PROVIDER NOTES
Encounter Date: 10/1/2017       History     Chief Complaint   Patient presents with    Chest Pain     PT c/o chest pain intermittently for the past few days.  PT began with chest pain again tonight approx 3hrs PTA.  PT locates pain underneath LT breast and radiates into back.  Pt describes pain as sharp.  Pain worse with deep breathing.  PT denies any recent cold symptoms.  PT denies any recent injury.  PT denies any SOB.  Pt had episode of vomiting tonight.  Pt had a few alcoholic beverages tonight.       Pt is a 41 yo woman with a h/o HTN, CAD s/p stenting and CHF who comes to the ED with 2 days of N/V,  left sided chest pain, and right CVA tenderness. Chest pain is sharp 10/10 and exacerbated with breathing. She denies cough or fevers or abdominal pain. She reports nml bowel habits. Pt denies dysuria, vaginal bleeding or discharge.           Review of patient's allergies indicates:   Allergen Reactions    Bactrim [sulfamethoxazole-trimethoprim] Rash     Past Medical History:   Diagnosis Date    CHF (congestive heart failure)     H/O coronary angioplasty     Hypertension      Past Surgical History:   Procedure Laterality Date    CORONARY ANGIOPLASTY WITH STENT PLACEMENT       History reviewed. No pertinent family history.  Social History   Substance Use Topics    Smoking status: Current Every Day Smoker     Packs/day: 0.50     Types: Cigarettes     Last attempt to quit: 2/16/2016    Smokeless tobacco: Never Used    Alcohol use Yes      Comment: occassionally     Review of Systems   Constitutional: Positive for chills and fatigue. Negative for fever.   HENT: Negative for sore throat.    Respiratory: Negative for cough, choking, chest tightness, shortness of breath, wheezing and stridor.    Cardiovascular: Positive for chest pain. Negative for palpitations and leg swelling.   Gastrointestinal: Positive for nausea and vomiting. Negative for abdominal pain and diarrhea.   Genitourinary: Positive for flank  pain. Negative for decreased urine volume, difficulty urinating, dyspareunia, dysuria, pelvic pain, urgency, vaginal bleeding, vaginal discharge and vaginal pain.   Musculoskeletal: Negative for back pain.   Skin: Negative for rash.   Neurological: Negative for weakness.   Hematological: Does not bruise/bleed easily.   All other systems reviewed and are negative.      Physical Exam     Initial Vitals   BP Pulse Resp Temp SpO2   10/01/17 2203 10/01/17 2203 10/01/17 2203 10/02/17 0136 10/01/17 2203   (!) 179/103 85 19 97.7 °F (36.5 °C) 100 %      MAP       10/01/17 2203       128.33         Physical Exam    Nursing note and vitals reviewed.  Constitutional: She appears well-developed and well-nourished. She is not diaphoretic. She appears distressed.   HENT:   Head: Normocephalic and atraumatic.   Eyes: EOM are normal. Pupils are equal, round, and reactive to light.   Neck: Normal range of motion. Neck supple.   Cardiovascular: Normal rate, regular rhythm, normal heart sounds and intact distal pulses.   No murmur heard.  Pulmonary/Chest: No respiratory distress. She has no wheezes. She has no rhonchi. She has no rales. She exhibits no tenderness.   Abdominal: Soft. She exhibits no distension. There is no tenderness. There is no rebound and no guarding.   Musculoskeletal: She exhibits no edema or tenderness.   Neurological: She is alert and oriented to person, place, and time.   Skin: Skin is warm.         ED Course   Procedures  Labs Reviewed   COMPREHENSIVE METABOLIC PANEL - Abnormal; Notable for the following:        Result Value    Potassium 2.4 (*)     All other components within normal limits    Narrative:      K+  critical result(s) called and verbal readback obtained from   tae 10/01/2017 23:21   URINALYSIS - Abnormal; Notable for the following:     Appearance, UA Hazy (*)     Protein, UA 1+ (*)     Occult Blood UA 3+ (*)     Leukocytes, UA 3+ (*)     All other components within normal limits   URINALYSIS  MICROSCOPIC - Abnormal; Notable for the following:     WBC, UA 30 (*)     All other components within normal limits   CULTURE, BLOOD   CULTURE, BLOOD   CBC W/ AUTO DIFFERENTIAL   TROPONIN I   D DIMER   NT-PRO NATRIURETIC PEPTIDE   PREGNANCY TEST, URINE RAPID   MAGNESIUM   MAGNESIUM   LACTIC ACID, PLASMA                             Imaging Results          CTA Chest Non-Coronary (PE Study) (In process)                X-Ray Chest AP Portable (Final result)  Result time 10/01/17 22:41:12    Final result by Amee Ashford MD (10/01/17 22:41:12)                 Impression:      No acute findings.      Electronically signed by: AMEE ASHFORD MD  Date:     10/01/17  Time:    22:41              Narrative:    EXAM: XR CHEST AP PORTABLE    CLINICAL HISTORY: Chest Pain.    COMPARISON STUDIES: March 6, 2017    FINDINGS:  The cardiomediastinal silhouette is within normal limits.  The lungs are clear.  Osseous structures unremarkable.                            Labs Reviewed   COMPREHENSIVE METABOLIC PANEL - Abnormal; Notable for the following:        Result Value    Potassium 2.4 (*)     All other components within normal limits    Narrative:      K+  critical result(s) called and verbal readback obtained from   tae 10/01/2017 23:21   URINALYSIS - Abnormal; Notable for the following:     Appearance, UA Hazy (*)     Protein, UA 1+ (*)     Occult Blood UA 3+ (*)     Leukocytes, UA 3+ (*)     All other components within normal limits   URINALYSIS MICROSCOPIC - Abnormal; Notable for the following:     WBC, UA 30 (*)     All other components within normal limits   CULTURE, BLOOD   CULTURE, BLOOD   CBC W/ AUTO DIFFERENTIAL   TROPONIN I   D DIMER   NT-PRO NATRIURETIC PEPTIDE   PREGNANCY TEST, URINE RAPID   MAGNESIUM   MAGNESIUM   LACTIC ACID, PLASMA          ED Course      Clinical Impression:   The primary encounter diagnosis was Pyelonephritis, acute. Diagnoses of Chest pain, Intractable vomiting without nausea, unspecified  vomiting type, Hypokalemia, and Hypomagnesemia were also pertinent to this visit.                           Bob Hollins MD  10/02/17 0611

## 2017-10-02 NOTE — ED NOTES
Pt accepted to [facility] by Dr Dodd-call report to [phone]-spoke to Krupa at [facility]

## 2017-10-02 NOTE — ED NOTES
Patient medicated as ordered, last set of VS as charted, CM showing SR no ectopy noted.  [facility] at bedside and given report/patient transfer paperwork.  Patient ambulatory to stretcher with steady gait, all questions answered.

## 2017-10-02 NOTE — ED NOTES
Report given to ALEXANDER Ashley at [facility].  All questions answered, [facility] called for transport, awaiting unit for transfer.  Patient axox3, NAD noted, respirations even/unlabored, denies any c/o pain or discomfort at this time.  Remains SR no ectopy noted on monitor, will reassess VS post Hydralazine.

## 2017-10-02 NOTE — ED NOTES
1st encounter, report received from ENRIQUE Hugo RN.  Mag Sulfate finished infusing, restarted KCL infusion, site benign.  Patient states pain is much better, 3/10, appears comfortable, NAD noted, respirations even/unlabored.  Patient notified of plan of care and need to wait for CT results before dispo decision is made.  Patient stated verbal understanding, provided with [employer] phone to make call home.  Will continue to closely monitor.

## 2017-10-02 NOTE — ED NOTES
Patient VSS as charted post Hydralazine; up ambulatory to restroom with steady gait, no c/o at this time.  Remains in SR on CM, no ectopy noted, will continue to closely monitor.

## 2017-10-03 ENCOUNTER — HOSPITAL ENCOUNTER (INPATIENT)
Facility: HOSPITAL | Age: 42
LOS: 2 days | Discharge: HOME | DRG: 065 | End: 2017-10-05
Attending: EMERGENCY MEDICINE | Admitting: ANESTHESIOLOGY
Payer: MEDICAID

## 2017-10-03 DIAGNOSIS — I63.312 THROMBOTIC STROKE INVOLVING LEFT MIDDLE CEREBRAL ARTERY: ICD-10-CM

## 2017-10-03 DIAGNOSIS — I63.512 ACUTE ISCHEMIC LEFT MCA STROKE: Primary | ICD-10-CM

## 2017-10-03 DIAGNOSIS — I10 ESSENTIAL HYPERTENSION: ICD-10-CM

## 2017-10-03 DIAGNOSIS — I50.32 CHRONIC DIASTOLIC HEART FAILURE: ICD-10-CM

## 2017-10-03 DIAGNOSIS — R26.9 GAIT ABNORMALITY: ICD-10-CM

## 2017-10-03 DIAGNOSIS — I67.1 ANEURYSM OF MIDDLE CEREBRAL ARTERY: ICD-10-CM

## 2017-10-03 PROBLEM — E87.6 HYPOKALEMIA: Status: RESOLVED | Noted: 2017-10-03 | Resolved: 2017-10-03

## 2017-10-03 PROBLEM — E87.6 HYPOKALEMIA: Status: ACTIVE | Noted: 2017-10-03

## 2017-10-03 PROBLEM — E04.1 THYROID NODULE: Status: ACTIVE | Noted: 2017-10-03

## 2017-10-03 PROBLEM — E83.51 HYPOCALCEMIA: Status: ACTIVE | Noted: 2017-10-03

## 2017-10-03 PROBLEM — R10.9 FLANK PAIN: Status: ACTIVE | Noted: 2017-10-03

## 2017-10-03 PROBLEM — E83.51 HYPOCALCEMIA: Status: RESOLVED | Noted: 2017-10-03 | Resolved: 2017-10-03

## 2017-10-03 PROBLEM — N39.0 UTI (URINARY TRACT INFECTION): Status: ACTIVE | Noted: 2017-10-03

## 2017-10-03 LAB
ABO + RH BLD: NORMAL
AMPHET+METHAMPHET UR QL: NEGATIVE
B-HCG UR QL: NEGATIVE
BACTERIA #/AREA URNS AUTO: ABNORMAL /HPF
BARBITURATES UR QL SCN>200 NG/ML: NEGATIVE
BASOPHILS # BLD AUTO: 0.04 K/UL
BASOPHILS NFR BLD: 0.5 %
BENZODIAZ UR QL SCN>200 NG/ML: NEGATIVE
BILIRUB UR QL STRIP: NEGATIVE
BLD GP AB SCN CELLS X3 SERPL QL: NORMAL
BZE UR QL SCN: NEGATIVE
CANNABINOIDS UR QL SCN: NEGATIVE
CHOLEST SERPL-MCNC: 187 MG/DL
CHOLEST/HDLC SERPL: 3.2 {RATIO}
CLARITY UR REFRACT.AUTO: ABNORMAL
COLOR UR AUTO: YELLOW
CREAT UR-MCNC: 36 MG/DL
CTP QC/QA: YES
DIFFERENTIAL METHOD: NORMAL
EOSINOPHIL # BLD AUTO: 0.3 K/UL
EOSINOPHIL NFR BLD: 3.4 %
ERYTHROCYTE [DISTWIDTH] IN BLOOD BY AUTOMATED COUNT: 14.5 %
ESTIMATED AVG GLUCOSE: 94 MG/DL
GLUCOSE UR QL STRIP: NEGATIVE
HBA1C MFR BLD HPLC: 4.9 %
HCT VFR BLD AUTO: 37.4 %
HDLC SERPL-MCNC: 58 MG/DL
HDLC SERPL: 31 %
HGB BLD-MCNC: 13.3 G/DL
HGB UR QL STRIP: ABNORMAL
KETONES UR QL STRIP: NEGATIVE
LDLC SERPL CALC-MCNC: 69.4 MG/DL
LEUKOCYTE ESTERASE UR QL STRIP: ABNORMAL
LYMPHOCYTES # BLD AUTO: 2 K/UL
LYMPHOCYTES NFR BLD: 27.9 %
MCH RBC QN AUTO: 31 PG
MCHC RBC AUTO-ENTMCNC: 35.6 G/DL
MCV RBC AUTO: 87 FL
METHADONE UR QL SCN>300 NG/ML: NEGATIVE
MICROSCOPIC COMMENT: ABNORMAL
MONOCYTES # BLD AUTO: 0.5 K/UL
MONOCYTES NFR BLD: 7 %
NEUTROPHILS # BLD AUTO: 4.4 K/UL
NEUTROPHILS NFR BLD: 60.9 %
NITRITE UR QL STRIP: NEGATIVE
NONHDLC SERPL-MCNC: 129 MG/DL
OPIATES UR QL SCN: NEGATIVE
PCP UR QL SCN>25 NG/ML: NEGATIVE
PH UR STRIP: 6 [PH] (ref 5–8)
PLATELET # BLD AUTO: 297 K/UL
PMV BLD AUTO: 11.7 FL
POCT GLUCOSE: 112 MG/DL (ref 70–110)
PROT UR QL STRIP: NEGATIVE
RBC # BLD AUTO: 4.29 M/UL
RBC #/AREA URNS AUTO: 4 /HPF (ref 0–4)
SP GR UR STRIP: 1.02 (ref 1–1.03)
SQUAMOUS #/AREA URNS AUTO: 18 /HPF
TOXICOLOGY INFORMATION: NORMAL
TRIGL SERPL-MCNC: 298 MG/DL
TSH SERPL DL<=0.005 MIU/L-ACNC: 1.58 UIU/ML
URN SPEC COLLECT METH UR: ABNORMAL
UROBILINOGEN UR STRIP-ACNC: NEGATIVE EU/DL
WBC # BLD AUTO: 7.3 K/UL
WBC #/AREA URNS AUTO: 9 /HPF (ref 0–5)

## 2017-10-03 PROCEDURE — G8997 SWALLOW GOAL STATUS: HCPCS | Mod: CH

## 2017-10-03 PROCEDURE — 86901 BLOOD TYPING SEROLOGIC RH(D): CPT

## 2017-10-03 PROCEDURE — 25000003 PHARM REV CODE 250: Performed by: STUDENT IN AN ORGANIZED HEALTH CARE EDUCATION/TRAINING PROGRAM

## 2017-10-03 PROCEDURE — 92610 EVALUATE SWALLOWING FUNCTION: CPT

## 2017-10-03 PROCEDURE — 99223 1ST HOSP IP/OBS HIGH 75: CPT | Mod: ,,, | Performed by: ANESTHESIOLOGY

## 2017-10-03 PROCEDURE — 99285 EMERGENCY DEPT VISIT HI MDM: CPT | Mod: ,,, | Performed by: EMERGENCY MEDICINE

## 2017-10-03 PROCEDURE — 94761 N-INVAS EAR/PLS OXIMETRY MLT: CPT

## 2017-10-03 PROCEDURE — 83036 HEMOGLOBIN GLYCOSYLATED A1C: CPT

## 2017-10-03 PROCEDURE — 20000000 HC ICU ROOM

## 2017-10-03 PROCEDURE — 80307 DRUG TEST PRSMV CHEM ANLYZR: CPT

## 2017-10-03 PROCEDURE — A4216 STERILE WATER/SALINE, 10 ML: HCPCS | Performed by: STUDENT IN AN ORGANIZED HEALTH CARE EDUCATION/TRAINING PROGRAM

## 2017-10-03 PROCEDURE — 81001 URINALYSIS AUTO W/SCOPE: CPT

## 2017-10-03 PROCEDURE — 99285 EMERGENCY DEPT VISIT HI MDM: CPT | Mod: 25

## 2017-10-03 PROCEDURE — 99233 SBSQ HOSP IP/OBS HIGH 50: CPT | Mod: ,,, | Performed by: PSYCHIATRY & NEUROLOGY

## 2017-10-03 PROCEDURE — 85025 COMPLETE CBC W/AUTO DIFF WBC: CPT | Mod: 91

## 2017-10-03 PROCEDURE — 84443 ASSAY THYROID STIM HORMONE: CPT

## 2017-10-03 PROCEDURE — 25500020 PHARM REV CODE 255: Performed by: EMERGENCY MEDICINE

## 2017-10-03 PROCEDURE — 93010 ELECTROCARDIOGRAM REPORT: CPT | Mod: ,,, | Performed by: INTERNAL MEDICINE

## 2017-10-03 PROCEDURE — G8996 SWALLOW CURRENT STATUS: HCPCS | Mod: CH

## 2017-10-03 PROCEDURE — 87086 URINE CULTURE/COLONY COUNT: CPT

## 2017-10-03 PROCEDURE — 63600175 PHARM REV CODE 636 W HCPCS

## 2017-10-03 PROCEDURE — G8998 SWALLOW D/C STATUS: HCPCS | Mod: CH

## 2017-10-03 PROCEDURE — 81025 URINE PREGNANCY TEST: CPT | Performed by: EMERGENCY MEDICINE

## 2017-10-03 PROCEDURE — 86900 BLOOD TYPING SEROLOGIC ABO: CPT

## 2017-10-03 PROCEDURE — 80061 LIPID PANEL: CPT

## 2017-10-03 RX ORDER — SODIUM CHLORIDE 0.9 % (FLUSH) 0.9 %
3 SYRINGE (ML) INJECTION EVERY 8 HOURS
Status: DISCONTINUED | OUTPATIENT
Start: 2017-10-03 | End: 2017-10-05 | Stop reason: HOSPADM

## 2017-10-03 RX ORDER — CARVEDILOL 12.5 MG/1
12.5 TABLET ORAL 2 TIMES DAILY WITH MEALS
Status: DISCONTINUED | OUTPATIENT
Start: 2017-10-03 | End: 2017-10-05 | Stop reason: HOSPADM

## 2017-10-03 RX ORDER — NICARDIPINE HYDROCHLORIDE 0.2 MG/ML
5 INJECTION INTRAVENOUS CONTINUOUS
Status: DISCONTINUED | OUTPATIENT
Start: 2017-10-03 | End: 2017-10-04

## 2017-10-03 RX ORDER — ONDANSETRON 2 MG/ML
4 INJECTION INTRAMUSCULAR; INTRAVENOUS EVERY 6 HOURS PRN
Status: DISCONTINUED | OUTPATIENT
Start: 2017-10-03 | End: 2017-10-05 | Stop reason: HOSPADM

## 2017-10-03 RX ORDER — ONDANSETRON 2 MG/ML
INJECTION INTRAMUSCULAR; INTRAVENOUS
Status: COMPLETED
Start: 2017-10-03 | End: 2017-10-03

## 2017-10-03 RX ORDER — AMLODIPINE BESYLATE 10 MG/1
10 TABLET ORAL DAILY
Status: DISCONTINUED | OUTPATIENT
Start: 2017-10-03 | End: 2017-10-05 | Stop reason: HOSPADM

## 2017-10-03 RX ORDER — ACETAMINOPHEN 325 MG/1
650 TABLET ORAL EVERY 6 HOURS PRN
Status: DISCONTINUED | OUTPATIENT
Start: 2017-10-03 | End: 2017-10-05 | Stop reason: HOSPADM

## 2017-10-03 RX ORDER — LISINOPRIL 10 MG/1
10 TABLET ORAL DAILY
Status: DISCONTINUED | OUTPATIENT
Start: 2017-10-03 | End: 2017-10-05 | Stop reason: HOSPADM

## 2017-10-03 RX ORDER — ATORVASTATIN CALCIUM 20 MG/1
40 TABLET, FILM COATED ORAL DAILY
Status: DISCONTINUED | OUTPATIENT
Start: 2017-10-03 | End: 2017-10-05 | Stop reason: HOSPADM

## 2017-10-03 RX ORDER — HYDROCHLOROTHIAZIDE 25 MG/1
25 TABLET ORAL DAILY
Status: DISCONTINUED | OUTPATIENT
Start: 2017-10-03 | End: 2017-10-05 | Stop reason: HOSPADM

## 2017-10-03 RX ORDER — LABETALOL HYDROCHLORIDE 5 MG/ML
10 INJECTION, SOLUTION INTRAVENOUS
Status: DISCONTINUED | OUTPATIENT
Start: 2017-10-03 | End: 2017-10-05 | Stop reason: HOSPADM

## 2017-10-03 RX ORDER — AMOXICILLIN 250 MG
1 CAPSULE ORAL 2 TIMES DAILY
Status: DISCONTINUED | OUTPATIENT
Start: 2017-10-03 | End: 2017-10-05 | Stop reason: HOSPADM

## 2017-10-03 RX ADMIN — ATORVASTATIN CALCIUM 40 MG: 20 TABLET, FILM COATED ORAL at 01:10

## 2017-10-03 RX ADMIN — ONDANSETRON 4 MG: 2 INJECTION INTRAMUSCULAR; INTRAVENOUS at 06:10

## 2017-10-03 RX ADMIN — ACETAMINOPHEN 650 MG: 325 TABLET ORAL at 05:10

## 2017-10-03 RX ADMIN — HYDROCHLOROTHIAZIDE 25 MG: 25 TABLET ORAL at 01:10

## 2017-10-03 RX ADMIN — AMLODIPINE BESYLATE 10 MG: 10 TABLET ORAL at 01:10

## 2017-10-03 RX ADMIN — LISINOPRIL 10 MG: 10 TABLET ORAL at 01:10

## 2017-10-03 RX ADMIN — Medication 3 ML: at 01:10

## 2017-10-03 RX ADMIN — CARVEDILOL 12.5 MG: 12.5 TABLET, FILM COATED ORAL at 04:10

## 2017-10-03 NOTE — ASSESSMENT & PLAN NOTE
Gina Jenkins is a 42 y.o. female with PMHx of HTN who presented to OSH with RSW and was treated with tPA. CTA negative for LVO, patient will be admitted to neuro ICU for post tPA care.      Antiplatelets: start 24 hours post tPA  Statins: atorvastatin 40  SBP <180 post tPA  DVT ppx: SCDs, start sq heparin 24 hours post tPA  PT/OT and speech to evaluate and treat  Neuro checks q1h    Recommend MRI, echo, A1C, lipid panel, TSH

## 2017-10-03 NOTE — PT/OT/SLP EVAL
"Speech Language Pathology  Evaluation    Gina Jenkins   MRN: [id number]   Admitting Diagnosis: left mca  Diet recommendations: Solid Diet Level: Regular  Liquid Diet Level: Thin     SLP Treatment Date: 10/03/17  Speech Start Time: 1329     Speech Stop Time: 1337     Speech Total (min): 8 min       TREATMENT BILLABLE MINUTES:  Eval Swallow and Oral Function 8    Diagnosis: left mca s/p tPA    Past Medical History:   Diagnosis Date    CHF (congestive heart failure)     H/O coronary angioplasty     Hypertension      Past Surgical History:   Procedure Laterality Date    CORONARY ANGIOPLASTY WITH STENT PLACEMENT         Has the patient been evaluated by SLP for swallowing? : Yes  Keep patient NPO?: No   General Precautions: Standard,          Prior diet: regular/thin      Subjective:  " It all started with a UTI"  Patient goals: to eat    Pain/Comfort  Pain Rating 1: 0/10  Pain Rating Post-Intervention 1: 0/10    Objective:        Oral Musculature Evaluation  Oral Musculature: right weakness  Dentition: present and adequate  Mucosal Quality: good  Mandibular Strength and Mobility: WFL  Oral Labial Strength and Mobility: WFL  Lingual Strength and Mobility: WFL  Buccal Strength and Mobility: WFL  Volitional Cough: strong  Voice Prior to PO Intake: clear     Bedside Swallow Eval:  Consistencies Assessed: Thin liquids 2 cups of water via cup and straw and Solids 2 crackers  Oral Phase: WFL  Pharyngeal Phase: no overt clinical signs/symptoms of pharyngeal dysphagia    Additional Treatment:    Reviewed universal swallowing precautions and s/s of aspiration. Pt expressed understanding of all information.     FIM:  Social Interaction: 7 Complete [place]--The patient interacts appropriately with staff, other patients, and family members (e.g., controls temper, accepts criticism, is aware that words and actions have an impact on others), and does not require medication for                        "     Assessment:  Gina Jenkins is a 42 y.o. female with a medical diagnosis of left MCA and presents with no deficits in oral pharyngeal phase of swallowing.           Discharge recommendations: Discharge Facility/Level Of Care Needs: other (see comments) (tbd)     Goals:    SLP Goals        Problem: SLP Goal    Goal Priority Disciplines Outcome   SLP Goal     SLP                     Plan:   Patient to be seen Therapy Frequency: 5 x/week   Plan of Care expires:    Plan of Care reviewed with: patient                 Sparkle RAFIQ Funes, CCC-SLP  10/03/2017

## 2017-10-03 NOTE — H&P
[facility]  Neurocritical Care  History & Physical    Admit Date: 10/3/2017  Service Date: 10/03/2017  Length of Stay: 0    Subjective:     Chief Complaint: <principal problem not specified>    History of Present Illness: Ms. Jenkins is a 42 y.o. AAF with h/o HTN, CHF, CAD initially presented to [place] with chest pain and flank pain. Admitted for chest pain workup - negative for PE or ACS - and UTI with questionable pyelonephritis. While inpatient, patient developed right-sided weakness and right facial droop around 5:30. Telestroke consulted and administered tPA at 5:45 and infusion at 0855. Patient reported improvement in symptoms and she was transferred to [facility] for further management.    Past Medical History:   Diagnosis Date    CHF (congestive heart failure)     H/O coronary angioplasty     Hypertension      Past Surgical History:   Procedure Laterality Date    CORONARY ANGIOPLASTY WITH STENT PLACEMENT        Current Facility-Administered Medications on File Prior to Encounter   Medication Dose Route Frequency Provider Last Rate Last Dose    [COMPLETED] alteplase (ACTIVASE) 100 mg injection 61 mg  0.81 mg/kg Intravenous ED 1 Time Ventura Mendez MD 61 mL/hr at 10/03/17 0855 61 mg at 10/03/17 0855    [COMPLETED] ALTEPLASE IV BOLUS bolus from vial 7 mg  0.09 mg/kg Intravenous ED 1 Time Ventura Mendez MD   7 mg at 10/03/17 0848    [COMPLETED] calcium gluconate 1g in dextrose 5% 100mL (ready to mix system)  1,000 mg Intravenous Once Rosa Holland  mL/hr at 10/03/17 0203 1,000 mg at 10/03/17 0203    [COMPLETED] niCARdipine (CARDENE) 40 mg/200 mL infusion        5 mg at 10/03/17 0644    [COMPLETED] omnipaque 350 iohexol 100 mL  100 mL Intravenous ONCE PRN Ventura Rouse MD   100 mL at 10/03/17 1043    [COMPLETED] potassium chloride SA CR tablet 40 mEq  40 mEq Oral Q4H Rosa Holland MD   40 mEq at 10/02/17 1358    [COMPLETED] potassium chloride SA CR  tablet 60 mEq  60 mEq Oral Once Rosa Holland MD   60 mEq at 10/02/17 1703    [DISCONTINUED] acetaminophen tablet 650 mg  650 mg Oral Q6H PRN Rosa Holland MD   650 mg at 10/03/17 0643    [DISCONTINUED] alprazolam tablet 0.25 mg  0.25 mg Oral Once Pallavi Sunkara, MD        [DISCONTINUED] alteplase (ACTIVASE) 100 mg injection             [DISCONTINUED] amlodipine tablet 10 mg  10 mg Oral Daily Blake Mantilla MD   10 mg at 10/02/17 0817    [DISCONTINUED] aspirin EC tablet 81 mg  81 mg Oral Daily Rosa Holland MD   81 mg at 10/02/17 0914    [DISCONTINUED] carvedilol tablet 25 mg  25 mg Oral BID WM Rosa Holland MD   25 mg at 10/02/17 1702    [DISCONTINUED] cefTRIAXone (ROCEPHIN) 1 g in dextrose 5 % 50 mL IVPB  1 g Intravenous Q24H Blake Mantilla  mL/hr at 10/02/17 2227 1 g at 10/02/17 2227    [DISCONTINUED] hydrALAZINE injection 10 mg  10 mg Intravenous Q6H PRN Rosa Holland MD   10 mg at 10/03/17 0427    [DISCONTINUED] hydrochlorothiazide tablet 25 mg  25 mg Oral Daily Blake Mantilla MD   25 mg at 10/02/17 0817    [DISCONTINUED] ibuprofen tablet 800 mg  800 mg Oral Q6H PRN Rosa Holland MD   800 mg at 10/03/17 0429    [DISCONTINUED] lisinopril tablet 10 mg  10 mg Oral Daily Blake Mantilla MD   10 mg at 10/02/17 0817    [DISCONTINUED] niCARdipine 40 mg/200 mL infusion  5 mg/hr Intravenous Continuous Ajay Mccloud MD        [DISCONTINUED] niCARdipine 40 mg/200 mL infusion  2 mg/hr Intravenous Continuous Ajay Mccloud MD   Stopped at 10/03/17 0905    [DISCONTINUED] omnipaque 350 iohexol 100 mL  100 mL Intravenous ONCE PRN Pallavi Sunkara, MD        [DISCONTINUED] phenazopyridine tablet 100 mg  100 mg Oral TID WM Pallavi Sunkara, MD   100 mg at 10/02/17 2038     Current Outpatient Prescriptions on File Prior to Encounter   Medication Sig Dispense Refill    amlodipine (NORVASC) 10 MG tablet Take 1 tablet (10 mg total) by mouth once daily. 30 tablet 0    aspirin  (ECOTRIN) 81 MG EC tablet Take 81 mg by mouth as needed for Pain.      carvedilol (COREG) 12.5 MG tablet Take 1 tablet (12.5 mg total) by mouth 2 (two) times daily with meals. 60 tablet 11    hydrochlorothiazide (HYDRODIURIL) 25 MG tablet Take 1 tablet (25 mg total) by mouth once daily. 30 tablet 0    hydrocodone-acetaminophen 5-325mg (NORCO) 5-325 mg per tablet Take 1 tablet by mouth every 8 (eight) hours as needed. (Patient not taking: Reported on 10/1/2017) 12 tablet 0    ibuprofen (ADVIL,MOTRIN) 200 MG tablet Take 2 tablets (400 mg total) by mouth every 6 (six) hours as needed for Pain. 30 tablet 0    lisinopril 10 MG tablet Take 1 tablet (10 mg total) by mouth once daily. (Patient taking differently: Take 10 mg by mouth once daily. ) 30 tablet 0    ondansetron (ZOFRAN) 4 MG tablet Take 1 tablet (4 mg total) by mouth every 8 (eight) hours as needed for Nausea. (Patient not taking: Reported on 10/1/2017) 8 tablet 0    potassium chloride SA (K-DUR,KLOR-CON) 20 MEQ tablet Take 1 tablet (20 mEq total) by mouth 3 (three) times daily. 30 tablet 0    promethazine (PHENERGAN) 25 MG tablet Take 1 tablet (25 mg total) by mouth every 6 (six) hours as needed for Nausea. (Patient not taking: Reported on 10/1/2017) 15 tablet 0      Allergies: Bactrim [sulfamethoxazole-trimethoprim]    No family history on file.  Social History   Substance Use Topics    Smoking status: Current Every Day Smoker     Packs/day: 0.50     Types: Cigarettes     Last attempt to quit: 2/16/2016    Smokeless tobacco: Never Used    Alcohol use Yes      Comment: occassionally     Review of Systems   Constitutional: Negative for chills and fever.   HENT: Negative for sinus pressure and trouble swallowing.    Eyes: Negative for pain and visual disturbance.   Respiratory: Negative for cough, chest tightness and shortness of breath.    Cardiovascular: Negative for chest pain, palpitations and leg swelling.   Gastrointestinal: Negative for  abdominal distention and abdominal pain.   Genitourinary: Positive for flank pain.   Musculoskeletal: Negative for arthralgias and myalgias.   Neurological: Positive for weakness. Negative for facial asymmetry and speech difficulty.   Psychiatric/Behavioral: Negative for confusion.     Objective:     Vitals:  Temp: 98.7 °F (37.1 °C) (10/03/17 1034)  Pulse: 76 (10/03/17 1123)  Resp: 16 (10/03/17 1123)  BP: (!) 148/79 (10/03/17 1123)  SpO2: 100 % (10/03/17 1123)    Temp:  [98.2 °F (36.8 °C)-98.8 °F (37.1 °C)] 98.7 °F (37.1 °C)  Pulse:  [66-90] 76  Resp:  [14-32] 16  SpO2:  [94 %-100 %] 100 %  BP: (123-219)/() 148/79              No intake/output data recorded.    Physical Exam   Constitutional: She appears well-developed and well-nourished.   HENT:   Head: Normocephalic and atraumatic.   Eyes: EOM are normal. Pupils are equal, round, and reactive to light.   Neck: Normal range of motion. Neck supple.   Cardiovascular: Normal rate, regular rhythm, normal heart sounds and intact distal pulses.    No murmur heard.  Pulmonary/Chest: Effort normal and breath sounds normal. No respiratory distress.   Abdominal: Soft. Bowel sounds are normal. She exhibits no distension.   Musculoskeletal: Normal range of motion. She exhibits no edema.   Neurological:   Mental Status: Oriented x3. GCS 15. Following commands  Speech: Intelligible  Cranial nerves: Grossly intact, slight right-sided facial droop  Motor: 4/5 RUE and RLE, 5/5 LUE and LLE  Reflexes: 2+  Sensation: Intact to pinprick and vibration   Skin: Skin is warm and dry.       Today I personally reviewed pertinent medications, imaging, cardiology, lab results, notably:    CTA, electrolytes    Assessment/Plan:     Neuro   Thrombotic stroke involving left middle cerebral artery    S/p tPA, infusion finished at 0855 (10/3)  Antiplatelets: start 24 hours post tPA  Statins: atorvastatin 40  SBP <180 post tPA  DVT ppx: SCDs, start sq heparin 24 hours post tPA  PT/OT and speech  to evaluate and treat  Neuro checks q1h  Imaging: MRI/echo, MRI tomorrow 0600        Cardiac/Vascular   Essential hypertension    Home amlodipine and lisinopril  SBP <180        GI   Flank pain    CT renal stone negative  UA negative  Renal function wnl        Other   Chest pain    CTA negative for PE  Troponin negative  Continue to monitor            Prophylaxis:  Venous Thromboembolism: mechanical  Stress Ulcer: None  Ventilator Pneumonia: not applicable     Activity Orders          None        Full Code    Rom Blair MD  Neurocritical Care  [facility]

## 2017-10-03 NOTE — SUBJECTIVE & OBJECTIVE
Past Medical History:   Diagnosis Date    CHF (congestive heart failure)     H/O coronary angioplasty     Hypertension      Past Surgical History:   Procedure Laterality Date    CORONARY ANGIOPLASTY WITH STENT PLACEMENT       No family history on file.  Social History   Substance Use Topics    Smoking status: Current Every Day Smoker     Packs/day: 0.50     Types: Cigarettes     Last attempt to quit: 2/16/2016    Smokeless tobacco: Never Used    Alcohol use Yes      Comment: occassionally     Review of patient's allergies indicates:   Allergen Reactions    Bactrim [sulfamethoxazole-trimethoprim] Rash     Medications: I have reviewed the current medication administration record.      (Not in a hospital admission)    Review of Systems   Constitutional: Negative for chills and fever.   Eyes: Negative for visual disturbance.   Respiratory: Negative for cough and shortness of breath.    Cardiovascular: Negative for chest pain and palpitations.   Gastrointestinal: Negative for nausea and vomiting.   Skin: Negative for rash.   Neurological: Positive for facial asymmetry, speech difficulty, weakness and numbness.   Psychiatric/Behavioral: Negative for agitation.     Objective:     Vital Signs (Most Recent):  Temp: 98.8 °F (37.1 °C) (10/03/17 1000)  Pulse: 76 (10/03/17 1000)  Resp: 16 (10/03/17 1000)  BP: 123/72 (10/03/17 1000)  SpO2: 100 % (10/03/17 1000)    Vital Signs Range (Last 24H):  Temp:  [98.2 °F (36.8 °C)-98.8 °F (37.1 °C)]   Pulse:  [68-90]   Resp:  [14-32]   BP: (123-219)/()   SpO2:  [94 %-100 %]     Physical Exam   Constitutional: She is oriented to person, place, and time. She appears well-developed and well-nourished. No distress.   HENT:   Head: Normocephalic and atraumatic.   Eyes: EOM are normal. Pupils are equal, round, and reactive to light.   Cardiovascular: Normal rate.    Pulmonary/Chest: Effort normal. No respiratory distress.   Neurological: She is alert and oriented to person,  place, and time.   Skin: Skin is warm and dry.   Vitals reviewed.      Neurological Exam:   LOC: alert and follows requests  Language: No aphasia  Speech: Dysarthria  Orientation: Person, Place, Time  Memory: Recent memory intact, Remote memory intact, Age correct, Month correct  Visual Fields (CN II): Full  EOM (CN III, IV, VI): Full/intact  Pupils (CN III, IV, VI): PERRL  Facial Sensation (CN V): Symmetric  Facial Movement (CN VII): lower weakness right lower  Motor*: Arm Left:  Normal (5/5), Leg Left:   Normal (5/5), Arm Right:   Paretic:  4/5, Leg Right:   Paretic:  4/5  Sensation: mariam-anesthesia right  Tone: Arm-Left: normal; Leg-Left: normal; Arm-Right: normal; Leg-Right: normal    NIH Stroke Scale:  Interval: 1 hour post tPA or endovascular procedure completion  Level of Consciousness: 0 - alert  LOC Questions: 0 - answers both correctly  LOC Commands: 0 - performs both correctly  Best Gaze: 0 - normal  Visual: 0 - no visual loss  Facial Palsy: 2 - partial  Motor Left Arm: 0 - no drift  Motor Right Arm: 1 - drift  Motor Left Le - no drift  Motor Right Le - drift  Limb Ataxia: 0 - absent  Sensory: 0 - normal  Best Language: 0 - no aphasia  Dysarthria: 1 - mild to moderate dysarthria  Extinction and Inattention: 0 - no neglect  NIH Stroke Scale Total: 5  Modified Alfalfa Scale:   Timeline: Prior to symptoms onset  Modified Alfalfa Score: 0 - no symptoms        Laboratory:  CMP:   Recent Labs  Lab 10/03/17  0551   CALCIUM 9.1   ALBUMIN 3.5   PROT 6.5      K 4.0   CO2 25      BUN 9   CREATININE 0.76   ALKPHOS 125   ALT 85*   AST 93*   BILITOT 0.5     CBC:   Recent Labs  Lab 10/03/17  0451   WBC 5.17   RBC 3.96*   HGB 11.9*   HCT 35.6*      MCV 90   MCH 30.1   MCHC 33.4     Lipid Panel: No results for input(s): CHOL, LDLCALC, HDL, TRIG in the last 168 hours.  Coagulation:   Recent Labs  Lab 10/03/17  0551   INR 0.9     Platelet Aggregation Study: No results for input(s): PLTAGG,  PLTAGINTERP, PLTAGREGLACO, ADPPLTAGGREG in the last 168 hours.  Hgb A1C: No results for input(s): HGBA1C in the last 168 hours.  TSH:   Recent Labs  Lab 10/02/17  1518   TSH 3.020       Diagnostic Results:    CTA Multiphase. Date: 10/3/17  -no acute changes  -no LVO

## 2017-10-03 NOTE — NURSING
Pt sitting on side of bed eating dinner, bp 152/87, HR 73, coreg 12.5mg po given as ordered, after dinner pt c/o HA 7/10, tylenol 650mg given, NCC MD notified, no new orders, pt sitting up in bed talking with family, HOB>90 degrees, will recheck pts HA in 30-45 min

## 2017-10-03 NOTE — PROGRESS NOTES
Patient arrived to [facility] from [facility] -> [facility] -> [facility] via [facility]    Type of Stroke: L MCA    TPA start time: 10/3/17 0855 and end time 10/3/17 0958    Patients current symptoms include right facial droop, slurred speech, right sided weakness.

## 2017-10-03 NOTE — ED PROVIDER NOTES
Encounter Date: 10/3/2017       History     Chief Complaint   Patient presents with    TPA Transfer     CVA transfer from [place]; TPA completed PTA     HPI     43 yo woman with HTN and diastolic dysfunction transferred from [place] for acute ischemic stroke with right-sided facial droop and weakness s/p TPA. Patient presented to [facility] overnight with left-sided chest pain and right flank pain, was transferred to [place] for chest pain work-up, while in their ED had new onset right-sided facial droop and weakness at 5:15 am, CT head negative for ICH, TPA administered and transferred to [facility]. While there, CTA PE negative, CT renal stone study negative, troponin negative x 2. At this time, patient complains of right facial droop, RUE and RLE weakness, and bleeding in her mouth. Denies chest pain, SOB, flank pain, back pain.    Review of patient's allergies indicates:   Allergen Reactions    Bactrim [sulfamethoxazole-trimethoprim] Rash     Past Medical History:   Diagnosis Date    CHF (congestive heart failure)     H/O coronary angioplasty     Hypertension      Past Surgical History:   Procedure Laterality Date    CORONARY ANGIOPLASTY WITH STENT PLACEMENT       No family history on file.  Social History   Substance Use Topics    Smoking status: Current Every Day Smoker     Packs/day: 0.50     Types: Cigarettes     Last attempt to quit: 2/16/2016    Smokeless tobacco: Never Used    Alcohol use Yes      Comment: occassionally     Review of Systems   Constitutional: Negative for diaphoresis and fever.   HENT: Negative for drooling and facial swelling.    Eyes: Negative for discharge and redness.   Respiratory: Negative for shortness of breath and stridor.    Cardiovascular: Negative for chest pain and leg swelling.   Gastrointestinal: Negative for abdominal pain, nausea and vomiting.   Genitourinary: Negative for dysuria and hematuria.    Musculoskeletal: Negative for joint swelling and neck stiffness.   Skin: Negative for rash and wound.   Neurological: Positive for facial asymmetry and weakness. Negative for speech difficulty and numbness.   Hematological: Bruises/bleeds easily.   Psychiatric/Behavioral: Negative for agitation and confusion.       Physical Exam     Initial Vitals [10/03/17 1000]   BP Pulse Resp Temp SpO2   123/72 76 16 98.8 °F (37.1 °C) 100 %      MAP       89         Physical Exam    Nursing note and vitals reviewed.  Constitutional: She appears well-developed and well-nourished. She is not diaphoretic. No distress.   HENT:   Head: Normocephalic and atraumatic.   Right Ear: External ear normal.   Left Ear: External ear normal.   Nose: Nose normal.   Venous oozing from right mandibular gums.   Eyes: Conjunctivae are normal. Pupils are equal, round, and reactive to light. Right eye exhibits no discharge. Left eye exhibits no discharge. No scleral icterus.   Neck: Neck supple. No thyromegaly present. No tracheal deviation present. No JVD present.   Cardiovascular: Normal rate, regular rhythm and intact distal pulses. Exam reveals no gallop and no friction rub.    Murmur heard.   Systolic murmur is present with a grade of 1/6   Pulses:       Radial pulses are 2+ on the right side, and 2+ on the left side.        Dorsalis pedis pulses are 2+ on the right side, and 2+ on the left side.   Pulmonary/Chest: Breath sounds normal. No stridor. No respiratory distress. She has no wheezes. She has no rhonchi. She has no rales. She exhibits no tenderness.   Abdominal: Soft. Bowel sounds are normal. She exhibits no distension. There is no tenderness. There is no rebound and no guarding.   Musculoskeletal: Normal range of motion. She exhibits no edema.   Lymphadenopathy:     She has no cervical adenopathy.   Neurological: She is alert and oriented to person, place, and time. A cranial nerve deficit is present. No sensory deficit. She exhibits  abnormal muscle tone.   Right facial droop.  No nystagmus.  Strength: 4/5 RUE , biceps, triceps; 5/5 LUE , biceps, triceps; 4/5 RLE hip flexion, extension, dorsiflexion, plantarflexion; 5/5 LLE hip flexion, extension, dorsiflexion, plantarflexion.   Skin: Skin is warm and dry.   Psychiatric: She has a normal mood and affect. Her behavior is normal.         ED Course   Procedures  Labs Reviewed - No data to display  EKG Readings: (Independently Interpreted)   NSR, rate 80, normal QRS axis, normal intervals, no ischemic ST changes.     HO-III MDM:  Ddx: ischemic stroke, hemorrhagic stroke, STEMI, NSTEMI, unstable angina, UTI, pyelonephritis, nephrolithiasis.    CT stroke multi-phase pending. Consulted vascular neuro and neuro critical care, anticipate admit.    Ventura Rouse, PGY3 10:30 AM 10/03/2017    CT results:  No evidence of ischemic changes, intracranial hemorrhage, or major vascular occlusion.  4mm left ICA aneurysm at the origin of left anterior choroidal artery.  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    Neuro critical care admitted patient.    Ventura Rouse, PGY3                             ED Course      Clinical Impression:   The primary encounter diagnosis was Acute ischemic left MCA stroke. Diagnoses of Chronic diastolic heart failure and Essential hypertension were also pertinent to this visit.    Disposition:   Disposition: Admitted  Condition: Stable                        Ventura Rouse MD  Resident  10/04/17 8058

## 2017-10-03 NOTE — PROGRESS NOTES
Patient transferred for care following left MCA stroke + tPA administration. Not a candidate for DMHFP.    Removed from hf list.

## 2017-10-03 NOTE — HOSPITAL COURSE
10/3: 1-hr post tPA NIHSS 5. Admitted to [facility]. CTA head.  10/4: MRI brain showed no acute infarction. Patient reported she continues to have mild right leg heaviness, but otherwise asymptomatic. Denies chest pain or SOB.  10/5:  Patient continues to have right leg weakness. Ambulation improved today but continues to require walker. Reported flank pain overnight that resolved with oxycodone.

## 2017-10-03 NOTE — SUBJECTIVE & OBJECTIVE
Past Medical History:   Diagnosis Date    CHF (congestive heart failure)     H/O coronary angioplasty     Hypertension      Past Surgical History:   Procedure Laterality Date    CORONARY ANGIOPLASTY WITH STENT PLACEMENT        Current Facility-Administered Medications on File Prior to Encounter   Medication Dose Route Frequency Provider Last Rate Last Dose    [COMPLETED] alteplase (ACTIVASE) 100 mg injection 61 mg  0.81 mg/kg Intravenous ED 1 Time Ventura Mendez MD 61 mL/hr at 10/03/17 0855 61 mg at 10/03/17 0855    [COMPLETED] ALTEPLASE IV BOLUS bolus from vial 7 mg  0.09 mg/kg Intravenous ED 1 Time Ventura Mendez MD   7 mg at 10/03/17 0848    [COMPLETED] calcium gluconate 1g in dextrose 5% 100mL (ready to mix system)  1,000 mg Intravenous Once Rosa Holland  mL/hr at 10/03/17 0203 1,000 mg at 10/03/17 0203    [COMPLETED] niCARdipine (CARDENE) 40 mg/200 mL infusion        5 mg at 10/03/17 0644    [COMPLETED] omnipaque 350 iohexol 100 mL  100 mL Intravenous ONCE PRN Ventura Rouse MD   100 mL at 10/03/17 1043    [COMPLETED] potassium chloride SA CR tablet 40 mEq  40 mEq Oral Q4H Rosa Holland MD   40 mEq at 10/02/17 1358    [COMPLETED] potassium chloride SA CR tablet 60 mEq  60 mEq Oral Once Rosa Holland MD   60 mEq at 10/02/17 1703    [DISCONTINUED] acetaminophen tablet 650 mg  650 mg Oral Q6H PRN Rosa Holland MD   650 mg at 10/03/17 0643    [DISCONTINUED] alprazolam tablet 0.25 mg  0.25 mg Oral Once Pallavi Sunkara, MD        [DISCONTINUED] alteplase (ACTIVASE) 100 mg injection             [DISCONTINUED] amlodipine tablet 10 mg  10 mg Oral Daily Blake Mantilla MD   10 mg at 10/02/17 0817    [DISCONTINUED] aspirin EC tablet 81 mg  81 mg Oral Daily Rosa Holland MD   81 mg at 10/02/17 0914    [DISCONTINUED] carvedilol tablet 25 mg  25 mg Oral BID WM Rosa Holland MD   25 mg at 10/02/17 1702    [DISCONTINUED] cefTRIAXone (ROCEPHIN) 1 g in dextrose 5 %  50 mL IVPB  1 g Intravenous Q24H Blake Mantilla  mL/hr at 10/02/17 2227 1 g at 10/02/17 2227    [DISCONTINUED] hydrALAZINE injection 10 mg  10 mg Intravenous Q6H PRN Rosa Holland MD   10 mg at 10/03/17 0427    [DISCONTINUED] hydrochlorothiazide tablet 25 mg  25 mg Oral Daily Blake Mantilla MD   25 mg at 10/02/17 0817    [DISCONTINUED] ibuprofen tablet 800 mg  800 mg Oral Q6H PRN Rosa Holland MD   800 mg at 10/03/17 0429    [DISCONTINUED] lisinopril tablet 10 mg  10 mg Oral Daily Blake Mantilla MD   10 mg at 10/02/17 0817    [DISCONTINUED] niCARdipine 40 mg/200 mL infusion  5 mg/hr Intravenous Continuous Ajay Mccloud MD        [DISCONTINUED] niCARdipine 40 mg/200 mL infusion  2 mg/hr Intravenous Continuous Ajay Mccloud MD   Stopped at 10/03/17 0905    [DISCONTINUED] omnipaque 350 iohexol 100 mL  100 mL Intravenous ONCE PRN Pallavi Sunkara, MD        [DISCONTINUED] phenazopyridine tablet 100 mg  100 mg Oral TID WM Pallavi Sunkara, MD   100 mg at 10/02/17 2038     Current Outpatient Prescriptions on File Prior to Encounter   Medication Sig Dispense Refill    amlodipine (NORVASC) 10 MG tablet Take 1 tablet (10 mg total) by mouth once daily. 30 tablet 0    aspirin (ECOTRIN) 81 MG EC tablet Take 81 mg by mouth as needed for Pain.      carvedilol (COREG) 12.5 MG tablet Take 1 tablet (12.5 mg total) by mouth 2 (two) times daily with meals. 60 tablet 11    hydrochlorothiazide (HYDRODIURIL) 25 MG tablet Take 1 tablet (25 mg total) by mouth once daily. 30 tablet 0    hydrocodone-acetaminophen 5-325mg (NORCO) 5-325 mg per tablet Take 1 tablet by mouth every 8 (eight) hours as needed. (Patient not taking: Reported on 10/1/2017) 12 tablet 0    ibuprofen (ADVIL,MOTRIN) 200 MG tablet Take 2 tablets (400 mg total) by mouth every 6 (six) hours as needed for Pain. 30 tablet 0    lisinopril 10 MG tablet Take 1 tablet (10 mg total) by mouth once daily. (Patient taking differently: Take 10 mg  by mouth once daily. ) 30 tablet 0    ondansetron (ZOFRAN) 4 MG tablet Take 1 tablet (4 mg total) by mouth every 8 (eight) hours as needed for Nausea. (Patient not taking: Reported on 10/1/2017) 8 tablet 0    potassium chloride SA (K-DUR,KLOR-CON) 20 MEQ tablet Take 1 tablet (20 mEq total) by mouth 3 (three) times daily. 30 tablet 0    promethazine (PHENERGAN) 25 MG tablet Take 1 tablet (25 mg total) by mouth every 6 (six) hours as needed for Nausea. (Patient not taking: Reported on 10/1/2017) 15 tablet 0      Allergies: Bactrim [sulfamethoxazole-trimethoprim]    No family history on file.  Social History   Substance Use Topics    Smoking status: Current Every Day Smoker     Packs/day: 0.50     Types: Cigarettes     Last attempt to quit: 2/16/2016    Smokeless tobacco: Never Used    Alcohol use Yes      Comment: occassionally     Review of Systems   Constitutional: Negative for chills and fever.   HENT: Negative for sinus pressure and trouble swallowing.    Eyes: Negative for pain and visual disturbance.   Respiratory: Negative for cough, chest tightness and shortness of breath.    Cardiovascular: Negative for chest pain, palpitations and leg swelling.   Gastrointestinal: Negative for abdominal distention and abdominal pain.   Genitourinary: Positive for flank pain.   Musculoskeletal: Negative for arthralgias and myalgias.   Neurological: Positive for weakness. Negative for facial asymmetry and speech difficulty.   Psychiatric/Behavioral: Negative for confusion.     Objective:     Vitals:  Temp: 98.7 °F (37.1 °C) (10/03/17 1034)  Pulse: 76 (10/03/17 1123)  Resp: 16 (10/03/17 1123)  BP: (!) 148/79 (10/03/17 1123)  SpO2: 100 % (10/03/17 1123)    Temp:  [98.2 °F (36.8 °C)-98.8 °F (37.1 °C)] 98.7 °F (37.1 °C)  Pulse:  [66-90] 76  Resp:  [14-32] 16  SpO2:  [94 %-100 %] 100 %  BP: (123-219)/() 148/79              No intake/output data recorded.    Physical Exam   Constitutional: She appears well-developed and  well-nourished.   HENT:   Head: Normocephalic and atraumatic.   Eyes: EOM are normal. Pupils are equal, round, and reactive to light.   Neck: Normal range of motion. Neck supple.   Cardiovascular: Normal rate, regular rhythm, normal heart sounds and intact distal pulses.    No murmur heard.  Pulmonary/Chest: Effort normal and breath sounds normal. No respiratory distress.   Abdominal: Soft. Bowel sounds are normal. She exhibits no distension.   Musculoskeletal: Normal range of motion. She exhibits no edema.   Neurological:   Mental Status: Oriented x3. GCS 15. Following commands  Speech: Intelligible  Cranial nerves: Grossly intact, slight right-sided facial droop  Motor: 4/5 RUE and RLE, 5/5 LUE and LLE  Reflexes: 2+  Sensation: Intact to pinprick and vibration   Skin: Skin is warm and dry.       Today I personally reviewed pertinent medications, imaging, cardiology, lab results, notably:    CTA, electrolytes

## 2017-10-03 NOTE — ASSESSMENT & PLAN NOTE
S/p tPA, infusion finished at 0855 (10/3)  Antiplatelets: start 24 hours post tPA  Statins: atorvastatin 40  SBP <180 post tPA  DVT ppx: SCDs, start sq heparin 24 hours post tPA  PT/OT and speech to evaluate and treat  Neuro checks q1h  Imaging: MRI/echo, MRI tomorrow 0600

## 2017-10-03 NOTE — PLAN OF CARE
PLAN OF CARE: reviewed w/pt & pts , stroke education packet given, keep SBP <180, pt evaluated by speech & diet advanced to cardiac 2gm Na, pt tolerates liquids well, HOB>90 degrees with po intake, BSC ordered, see assess. For more info

## 2017-10-03 NOTE — CONSULTS
[facility]  Vascular Neurology  Comprehensive Stroke Center  Consult Note    Inpatient consult to Vascular (Stroke) Neurology  Consult performed by: PREETI SHEPARD  Consult ordered by: ALIYA DEL CASTILLO        Assessment/Plan:     Patient is a 42 y.o. year old female with:    Thrombotic stroke involving left middle cerebral artery    Gina Jenkins is a 42 y.o. female with PMHx of HTN who presented to OSH with RSW and was treated with tPA. CTA negative for LVO, patient will be admitted to neuro ICU for post tPA care.      Antiplatelets: start 24 hours post tPA  Statins: atorvastatin 40  SBP <180 post tPA  DVT ppx: SCDs, start sq heparin 24 hours post tPA  PT/OT and speech to evaluate and treat  Neuro checks q1h    Recommend MRI, echo, A1C, lipid panel, TSH                                            Essential hypertension    Stroke risk factor  SBP <180 post tPA  Management per primary team            Thrombolysis Candidate? Yes. administered at OSH via telemedicine    Interventional Revascularization Candidate?  No; No large vessel occlusion    Research Candidate? Yes:  Other: Stroke Navigator    Subjective:     History of Present Illness:  Gina Jenkins is a 42 y.o. female with PMHx of HTN who presented to [facility] with CP and flank pain. Patient was admitted to OSH hospital medicine service for further workup. Found to have UTI with ?pyelonephritis. In addition, patient had hypocalcemia and hypokalemia on admission. Around 0530, the patient developed RSW with facial droop. She was seen via telemedicine and treated with tPA. Patient was transferred to [facility] [place] for further care. On arrival, patient reports improvement in her RSW but endorses that her R arm and leg still feel heavy.         Past Medical History:   Diagnosis Date    CHF (congestive heart failure)     H/O coronary angioplasty     Hypertension      Past Surgical History:   Procedure Laterality Date    CORONARY  ANGIOPLASTY WITH STENT PLACEMENT       No family history on file.  Social History   Substance Use Topics    Smoking status: Current Every Day Smoker     Packs/day: 0.50     Types: Cigarettes     Last attempt to quit: 2/16/2016    Smokeless tobacco: Never Used    Alcohol use Yes      Comment: occassionally     Review of patient's allergies indicates:   Allergen Reactions    Bactrim [sulfamethoxazole-trimethoprim] Rash     Medications: I have reviewed the current medication administration record.      (Not in a hospital admission)    Review of Systems   Constitutional: Negative for chills and fever.   Eyes: Negative for visual disturbance.   Respiratory: Negative for cough and shortness of breath.    Cardiovascular: Negative for chest pain and palpitations.   Gastrointestinal: Negative for nausea and vomiting.   Skin: Negative for rash.   Neurological: Positive for facial asymmetry, speech difficulty, weakness and numbness.   Psychiatric/Behavioral: Negative for agitation.     Objective:     Vital Signs (Most Recent):  Temp: 98.8 °F (37.1 °C) (10/03/17 1000)  Pulse: 76 (10/03/17 1000)  Resp: 16 (10/03/17 1000)  BP: 123/72 (10/03/17 1000)  SpO2: 100 % (10/03/17 1000)    Vital Signs Range (Last 24H):  Temp:  [98.2 °F (36.8 °C)-98.8 °F (37.1 °C)]   Pulse:  [68-90]   Resp:  [14-32]   BP: (123-219)/()   SpO2:  [94 %-100 %]     Physical Exam   Constitutional: She is oriented to person, place, and time. She appears well-developed and well-nourished. No distress.   HENT:   Head: Normocephalic and atraumatic.   Eyes: EOM are normal. Pupils are equal, round, and reactive to light.   Cardiovascular: Normal rate.    Pulmonary/Chest: Effort normal. No respiratory distress.   Neurological: She is alert and oriented to person, place, and time.   Skin: Skin is warm and dry.   Vitals reviewed.      Neurological Exam:   LOC: alert and follows requests  Language: No aphasia  Speech: Dysarthria  Orientation: Person, Place,  Time  Memory: Recent memory intact, Remote memory intact, Age correct, Month correct  Visual Fields (CN II): Full  EOM (CN III, IV, VI): Full/intact  Pupils (CN III, IV, VI): PERRL  Facial Sensation (CN V): Symmetric  Facial Movement (CN VII): lower weakness right lower  Motor*: Arm Left:  Normal (5/5), Leg Left:   Normal (5/5), Arm Right:   Paretic:  4/5, Leg Right:   Paretic:  4/5  Sensation: mariam-anesthesia right  Tone: Arm-Left: normal; Leg-Left: normal; Arm-Right: normal; Leg-Right: normal    NIH Stroke Scale:  Interval: 1 hour post tPA or endovascular procedure completion  Level of Consciousness: 0 - alert  LOC Questions: 0 - answers both correctly  LOC Commands: 0 - performs both correctly  Best Gaze: 0 - normal  Visual: 0 - no visual loss  Facial Palsy: 2 - partial  Motor Left Arm: 0 - no drift  Motor Right Arm: 1 - drift  Motor Left Le - no drift  Motor Right Le - drift  Limb Ataxia: 0 - absent  Sensory: 0 - normal  Best Language: 0 - no aphasia  Dysarthria: 1 - mild to moderate dysarthria  Extinction and Inattention: 0 - no neglect  NIH Stroke Scale Total: 5  Modified [place] Scale:   Timeline: Prior to symptoms onset  Modified [place] Score: 0 - no symptoms        Laboratory:  CMP:   Recent Labs  Lab 10/03/17  0551   CALCIUM 9.1   ALBUMIN 3.5   PROT 6.5      K 4.0   CO2 25      BUN 9   CREATININE 0.76   ALKPHOS 125   ALT 85*   AST 93*   BILITOT 0.5     CBC:   Recent Labs  Lab 10/03/17  0451   WBC 5.17   RBC 3.96*   HGB 11.9*   HCT 35.6*      MCV 90   MCH 30.1   MCHC 33.4     Lipid Panel: No results for input(s): CHOL, LDLCALC, HDL, TRIG in the last 168 hours.  Coagulation:   Recent Labs  Lab 10/03/17  0551   INR 0.9     Platelet Aggregation Study: No results for input(s): PLTAGG, PLTAGINTERP, PLTAGREGLACO, ADPPLTAGGREG in the last 168 hours.  Hgb A1C: No results for input(s): HGBA1C in the last 168 hours.  TSH:   Recent Labs  Lab 10/02/17  1518   TSH 3.020       Diagnostic  Results:    CTA Multiphase. Date: 10/3/17  -no acute changes  -no LVO              Lilli Olmos PA-C  Comprehensive Stroke Center  Department of Vascular Neurology   [facility]

## 2017-10-03 NOTE — ED NOTES
Bed: 04  Expected date: 10/3/17  Expected time: 9:17 AM  Means of arrival:   Comments:  TPA TRANSFER

## 2017-10-03 NOTE — ED NOTES
Patient identifiers verified and correct for Gina Jenkins.    LOC: The patient is awake, alert and aware of environment with an appropriate affect, the patient is oriented x 3.  APPEARANCE: Patient resting comfortably and in no acute distress, patient is clean and well groomed, patient's clothing is properly fastened.  SKIN: The skin is warm and dry, color consistent with ethnicity, patient has normal skin turgor and moist mucus membranes, skin intact, no breakdown or bruising noted.  MUSCULOSKELETAL: Patient moving all extremities spontaneously, no obvious swelling or deformities noted.  RESPIRATORY: Airway is open and patent, respirations are spontaneous, patient has a normal effort and rate, no accessory muscle use noted, clear bilateral breath sounds noted through out the chest.  CARDIAC: Patient has a normal rate and regular rhythm, no periphreal edema noted, capillary refill < 3 seconds.  ABDOMEN: Soft and non tender to palpation, no distention noted, normoactive bowel sounds present in all four quadrants.

## 2017-10-03 NOTE — CONSULTS
Inpatient consult to Physical Medicine Rehab  Consult performed by: JC FRANCOIS  Consult ordered by: ANNE JIMÉNEZ  Reason for consult: assess rehab needs      Reviewed patient history and current admission.  Rehab team following.  Full consult to follow.    MAYTE Jacinto, FNP-C  Physical Medicine & Rehabilitation   10/03/2017  Spectralink: [id number]

## 2017-10-03 NOTE — HPI
Gina Jenkins is a 42 y.o. female with PMHx of HTN who presented to [facility] with CP and flank pain. Patient was admitted to [facility] hospital medicine service for further workup. Found to have UTI at OSH with ?pyelonephritis and received one dose of rocephin.  Underwent CTA chest and CT renal stone study that were negative for acute abnormalities.In addition, patient had hypocalcemia and hypokalemia on admission. Around 0530, the patient developed RSW with facial droop. She was seen via telemedicine and treated with tPA. Patient was transferred to [facility] [place] 10/3 for further care. On arrival, patient reports improvement in her RSW but endorses that her R arm and leg still feel heavy.    Upon admission to [facility] [place], CTA without evidence of ischemic changes, intracranial hemorrhage, or major vascular occlusion.  MRI also negative for acute pathology.     [place] mother and 3 children in a single story home without steps to enter.  Prior to admission, she was independent with ADLs, including driving, and mobility.  She is right handed.

## 2017-10-03 NOTE — HPI
Ms. Jenkins is a 42 y.o. AAF with h/o HTN, CHF, CAD initially presented to [place] with chest pain and flank pain. Admitted for chest pain workup - negative for PE or ACS - and UTI with questionable pyelonephritis. While inpatient, patient developed right-sided weakness and right facial droop around 5:30. Telestroke consulted and administered tPA at 5:45 and infusion at 0855. Patient reported improvement in symptoms and she was transferred to [facility] for further management.

## 2017-10-04 PROBLEM — R10.9 FLANK PAIN: Status: RESOLVED | Noted: 2017-10-03 | Resolved: 2017-10-04

## 2017-10-04 PROBLEM — Z72.0 NICOTINE ABUSE: Status: ACTIVE | Noted: 2017-10-04

## 2017-10-04 PROBLEM — I67.1 ANEURYSM OF MIDDLE CEREBRAL ARTERY: Status: ACTIVE | Noted: 2017-10-04

## 2017-10-04 LAB
ALBUMIN SERPL BCP-MCNC: 2.8 G/DL
ALP SERPL-CCNC: 124 U/L
ALT SERPL W/O P-5'-P-CCNC: 48 U/L
ANION GAP SERPL CALC-SCNC: 13 MMOL/L
AST SERPL-CCNC: 35 U/L
BACTERIA UR CULT: NORMAL
BACTERIA UR CULT: NORMAL
BASOPHILS # BLD AUTO: 0.04 K/UL
BASOPHILS NFR BLD: 0.6 %
BILIRUB SERPL-MCNC: 0.3 MG/DL
BUN SERPL-MCNC: 13 MG/DL
CALCIUM SERPL-MCNC: 8.7 MG/DL
CHLORIDE SERPL-SCNC: 104 MMOL/L
CO2 SERPL-SCNC: 22 MMOL/L
CREAT SERPL-MCNC: 1 MG/DL
DIFFERENTIAL METHOD: ABNORMAL
EOSINOPHIL # BLD AUTO: 0.3 K/UL
EOSINOPHIL NFR BLD: 4.2 %
ERYTHROCYTE [DISTWIDTH] IN BLOOD BY AUTOMATED COUNT: 14 %
EST. GFR  (AFRICAN AMERICAN): >60 ML/MIN/1.73 M^2
EST. GFR  (NON AFRICAN AMERICAN): >60 ML/MIN/1.73 M^2
GLUCOSE SERPL-MCNC: 113 MG/DL
HCT VFR BLD AUTO: 36.5 %
HGB BLD-MCNC: 12.4 G/DL
LYMPHOCYTES # BLD AUTO: 1.9 K/UL
LYMPHOCYTES NFR BLD: 30.1 %
MAGNESIUM SERPL-MCNC: 1.7 MG/DL
MCH RBC QN AUTO: 30.2 PG
MCHC RBC AUTO-ENTMCNC: 34 G/DL
MCV RBC AUTO: 89 FL
MONOCYTES # BLD AUTO: 0.5 K/UL
MONOCYTES NFR BLD: 8.7 %
NEUTROPHILS # BLD AUTO: 3.5 K/UL
NEUTROPHILS NFR BLD: 56.1 %
PHOSPHATE SERPL-MCNC: 3 MG/DL
PLATELET # BLD AUTO: 225 K/UL
PMV BLD AUTO: 10.7 FL
POCT GLUCOSE: 110 MG/DL (ref 70–110)
POTASSIUM SERPL-SCNC: 3.3 MMOL/L
PROT SERPL-MCNC: 6.4 G/DL
RBC # BLD AUTO: 4.11 M/UL
SODIUM SERPL-SCNC: 139 MMOL/L
WBC # BLD AUTO: 6.22 K/UL

## 2017-10-04 PROCEDURE — 83735 ASSAY OF MAGNESIUM: CPT

## 2017-10-04 PROCEDURE — 80053 COMPREHEN METABOLIC PANEL: CPT

## 2017-10-04 PROCEDURE — 99233 SBSQ HOSP IP/OBS HIGH 50: CPT | Mod: ,,, | Performed by: PSYCHIATRY & NEUROLOGY

## 2017-10-04 PROCEDURE — 97530 THERAPEUTIC ACTIVITIES: CPT

## 2017-10-04 PROCEDURE — A4216 STERILE WATER/SALINE, 10 ML: HCPCS | Performed by: STUDENT IN AN ORGANIZED HEALTH CARE EDUCATION/TRAINING PROGRAM

## 2017-10-04 PROCEDURE — A9585 GADOBUTROL INJECTION: HCPCS | Performed by: ANESTHESIOLOGY

## 2017-10-04 PROCEDURE — 97802 MEDICAL NUTRITION INDIV IN: CPT

## 2017-10-04 PROCEDURE — 25000003 PHARM REV CODE 250: Performed by: STUDENT IN AN ORGANIZED HEALTH CARE EDUCATION/TRAINING PROGRAM

## 2017-10-04 PROCEDURE — 25500020 PHARM REV CODE 255: Performed by: ANESTHESIOLOGY

## 2017-10-04 PROCEDURE — 99232 SBSQ HOSP IP/OBS MODERATE 35: CPT | Mod: ,,, | Performed by: NURSE PRACTITIONER

## 2017-10-04 PROCEDURE — 81001 URINALYSIS AUTO W/SCOPE: CPT

## 2017-10-04 PROCEDURE — 97161 PT EVAL LOW COMPLEX 20 MIN: CPT

## 2017-10-04 PROCEDURE — 97166 OT EVAL MOD COMPLEX 45 MIN: CPT

## 2017-10-04 PROCEDURE — 84100 ASSAY OF PHOSPHORUS: CPT

## 2017-10-04 PROCEDURE — 92523 SPEECH SOUND LANG COMPREHEN: CPT

## 2017-10-04 PROCEDURE — 20000000 HC ICU ROOM

## 2017-10-04 PROCEDURE — 85025 COMPLETE CBC W/AUTO DIFF WBC: CPT

## 2017-10-04 PROCEDURE — 63600175 PHARM REV CODE 636 W HCPCS: Performed by: PSYCHIATRY & NEUROLOGY

## 2017-10-04 RX ORDER — SODIUM,POTASSIUM PHOSPHATES 280-250MG
2 POWDER IN PACKET (EA) ORAL
Status: DISCONTINUED | OUTPATIENT
Start: 2017-10-04 | End: 2017-10-04

## 2017-10-04 RX ORDER — SODIUM CHLORIDE 9 MG/ML
INJECTION, SOLUTION INTRAVENOUS CONTINUOUS
Status: DISCONTINUED | OUTPATIENT
Start: 2017-10-04 | End: 2017-10-04

## 2017-10-04 RX ORDER — POTASSIUM CHLORIDE 20 MEQ/15ML
40 SOLUTION ORAL
Status: DISCONTINUED | OUTPATIENT
Start: 2017-10-04 | End: 2017-10-04

## 2017-10-04 RX ORDER — HEPARIN SODIUM 5000 [USP'U]/ML
5000 INJECTION, SOLUTION INTRAVENOUS; SUBCUTANEOUS EVERY 8 HOURS
Status: DISCONTINUED | OUTPATIENT
Start: 2017-10-04 | End: 2017-10-05 | Stop reason: HOSPADM

## 2017-10-04 RX ORDER — OXYCODONE HYDROCHLORIDE 5 MG/1
5 TABLET ORAL ONCE
Status: COMPLETED | OUTPATIENT
Start: 2017-10-04 | End: 2017-10-05

## 2017-10-04 RX ORDER — NAPROXEN SODIUM 220 MG/1
81 TABLET, FILM COATED ORAL DAILY
Status: DISCONTINUED | OUTPATIENT
Start: 2017-10-04 | End: 2017-10-05 | Stop reason: HOSPADM

## 2017-10-04 RX ORDER — LANOLIN ALCOHOL/MO/W.PET/CERES
800 CREAM (GRAM) TOPICAL
Status: DISCONTINUED | OUTPATIENT
Start: 2017-10-04 | End: 2017-10-04

## 2017-10-04 RX ORDER — POTASSIUM CHLORIDE 20 MEQ/15ML
60 SOLUTION ORAL
Status: DISCONTINUED | OUTPATIENT
Start: 2017-10-04 | End: 2017-10-04

## 2017-10-04 RX ORDER — GADOBUTROL 604.72 MG/ML
7 INJECTION INTRAVENOUS
Status: COMPLETED | OUTPATIENT
Start: 2017-10-04 | End: 2017-10-04

## 2017-10-04 RX ADMIN — CARVEDILOL 12.5 MG: 12.5 TABLET, FILM COATED ORAL at 06:10

## 2017-10-04 RX ADMIN — ACETAMINOPHEN 650 MG: 325 TABLET ORAL at 09:10

## 2017-10-04 RX ADMIN — AMLODIPINE BESYLATE 10 MG: 10 TABLET ORAL at 08:10

## 2017-10-04 RX ADMIN — Medication 3 ML: at 01:10

## 2017-10-04 RX ADMIN — Medication 3 ML: at 09:10

## 2017-10-04 RX ADMIN — GADOBUTROL 7 ML: 604.72 INJECTION INTRAVENOUS at 05:10

## 2017-10-04 RX ADMIN — HEPARIN SODIUM 5000 UNITS: 5000 INJECTION, SOLUTION INTRAVENOUS; SUBCUTANEOUS at 09:10

## 2017-10-04 RX ADMIN — STANDARDIZED SENNA CONCENTRATE AND DOCUSATE SODIUM 1 TABLET: 8.6; 5 TABLET, FILM COATED ORAL at 09:10

## 2017-10-04 RX ADMIN — ASPIRIN 81 MG CHEWABLE TABLET 81 MG: 81 TABLET CHEWABLE at 04:10

## 2017-10-04 RX ADMIN — HEPARIN SODIUM 5000 UNITS: 5000 INJECTION, SOLUTION INTRAVENOUS; SUBCUTANEOUS at 01:10

## 2017-10-04 RX ADMIN — CARVEDILOL 12.5 MG: 12.5 TABLET, FILM COATED ORAL at 08:10

## 2017-10-04 RX ADMIN — HYDROCHLOROTHIAZIDE 25 MG: 25 TABLET ORAL at 08:10

## 2017-10-04 RX ADMIN — LISINOPRIL 10 MG: 10 TABLET ORAL at 08:10

## 2017-10-04 RX ADMIN — ATORVASTATIN CALCIUM 40 MG: 20 TABLET, FILM COATED ORAL at 08:10

## 2017-10-04 NOTE — PLAN OF CARE
Problem: SLP Goal  Goal: SLP Goal  Pt at baseline for speech,language and cognition. No further speech tx recommended.     RAFIQ Taylor, CCC-SLP  10/4/2017

## 2017-10-04 NOTE — PLAN OF CARE
Problem: Physical Therapy Goal  Goal: Physical Therapy Goal  Goals to be met by: 10/20/2017     Patient will increase functional independence with mobility by performin. Supine to sit with Stand-by Assistance  2. Sit to supine with Stand-by Assistance  3. Sit to stand transfer with Stand-by Assistance using RW as needed  4. Bed to chair transfer with Stand-by Assistance using RW as needed   5. Gait  x 100 feet with Stand-by Assistance using Rolling Walker.     Outcome: Ongoing (interventions implemented as appropriate)  Initial eval completed. Results, POC, and goals discussed with patient.      Pt safe to transfer with nursing assist, min assist for balance.  Gait with therapy only at this time.  Complete eval to follow.     Chrystal Mcintosh, PT  10/4/2017  [phone] (pager)

## 2017-10-04 NOTE — PT/OT/SLP EVAL
Occupational Therapy  Evaluation    Gina Jenkins   MRN: [id number]   Admitting Diagnosis: left MCA embolic stroke s/p tPA    OT Date of Treatment: 10/04/17   OT Start Time: 0755  OT Stop Time: 0826  OT Total Time (min): 31 min    Billable Minutes:  Evaluation 20  Therapeutic Activity 11    Extensive chart review & review of social history & PLOF, multiple tests performed, deficits in multiple areas (see problem list in assessment)    Diagnosis:  left MCA embolic stroke s/p tPA      Past Medical History:   Diagnosis Date    CHF (congestive heart failure)     H/O coronary angioplasty     Hypertension       Past Surgical History:   Procedure Laterality Date    CORONARY ANGIOPLASTY WITH STENT PLACEMENT         Referring physician: Rom Blair MD  Date referred to OT: 10/3/17    General Precautions: Standard, aspiration, fall (cardiac)    Do you have any cultural, spiritual, Mormonism conflicts, given your current situation?: Voodoo     Patient History:  Living Environment  Living Environment Comment: Pt resides with her mother, father & 3 sons (9, 6, & 5 y.o.) in 1 story house with ramp access in [facility].  Pt assists her mother with the care of her father who has had a stroke in the past.  Pt performs cooking, cleaning, assisting with the care of her father, taking care of her sons and all their needs, & paying bills.  Pt has no DME currently.    Prior level of function:   Bed Mobility/Transfers: independent  Grooming: independent  Bathing: independent  Upper Body Dressing: independent  Lower Body Dressing: independent  Toileting: independent  Home Management Skills: independent  Driving License: Yes  Occupation: Unemployed  Type of Occupation: housekeeping  Leisure and Hobbies: being around boyfriend  IADL Comments: Pt has a bathtub for bathing.     Dominant hand: right    Subjective:  Communicated with RN prior to session.  Pt reported that she is nervous about standing because she tried to walk to the  bathroom by herself earlier & was unable to physically.  Chief Complaint: weakness  Patient/Family stated goals: to get stronger    Pain/Comfort  Pain Rating 1: 0/10  Pain Rating Post-Intervention 1: 0/10    Objective:  Patient found with: blood pressure cuff, telemetry, pulse ox (continuous)    Cognitive Exam:  Oriented to: Person, Place, Time and Situation  Follows Commands/attention: Follows multistep  commands  Communication: clear/fluent  Memory:  No Deficits noted  Safety awareness/insight to disability: intact  Coping skills/emotional control: Appropriate to situation    Visual/perceptual:  Intact    Physical Exam:  Postural examination/scapula alignment: Rounded shoulder  Skin integrity: Visible skin intact  Edema: None noted     Sensation:   Impaired  light/touch RUE, intact for proprioception for RUE    Upper Extremity Range of Motion:  Right Upper Extremity: WFL except 130* shoulder flexion, 50* wrist extension  Left Upper Extremity: WFL    Upper Extremity Strength:  Right Upper Extremity: 3-/5 shoulder flexion, 3-/5 wrist extension, all others 3+/5  Left Upper Extremity: WFL   Strength: WFL LUE, moderate RUE, good  for holding pen    Fine motor coordination:   Intact  Left hand thumb/finger opposition skills and Right hand thumb/finger opposition skills    Gross motor coordination: WFL    Functional Mobility:  Bed Mobility:       Transfers:  Sit <> Stand Assistance: Minimum Assistance (from EOB; Min (A) with taking steps forward & backward with pt noted to hold onto table for support)  Sit <> Stand Assistive Device: No Assistive Device    Activities of Daily Living:     UE Dressing Level of Assistance: Stand by assistance (donning gown around back while seated EOB)  LE Dressing Level of Assistance: Stand by assistance (donning socks while seated EOB)  Grooming Position: Standing  Grooming Level of Assistance: Minimum assistance     Therapeutic Activities and Exercises:  Pt performed clock drawing  "assessment with no impairments noted.  Pt performed handwriting & reading assessments with no deficits noted.  Provided education on role of OT, POC, signs of stroke, discharge recommendations, safety, need for (A) with OOB activities, sensory deficits, & strength deficits with pt verbalizing understanding.  Pt had no further questions & when asked whether there were any concerns pt reported none.      AM-PAC 6 CLICK ADL  How much help from another person does this patient currently need?  1 = Unable, Total/Dependent Assistance  2 = A lot, Maximum/Moderate Assistance  3 = A little, Minimum/Contact Guard/Supervision  4 = None, Modified [place]/Independent    Putting on and taking off regular lower body clothing? : 3  Bathing (including washing, rinsing, drying)?: 3  Toileting, which includes using toilet, bedpan, or urinal? : 3  Putting on and taking off regular upper body clothing?: 3  Taking care of personal grooming such as brushing teeth?: 3  Eating meals?: 3  Total Score: 18    AM-PAC Raw Score CMS "G-Code Modifier Level of Impairment Assistance   6 % Total / Unable   7 - 9 CM 80 - 100% Maximal Assist   10-14 CL 60 - 80% Moderate Assist   15 - 19 CK 40 - 60% Moderate Assist   20 - 22 CJ 20 - 40% Minimal Assist   23 CI 1-20% SBA / CGA   24 CH 0% Independent/ Mod I       Patient left seated on EOB with all lines intact, call button in reach, RN notified, boyfriend present and white board updated.    Assessment:  Gina Jenkins is a 42 y.o. female with a medical diagnosis of left MCA embolic stroke s/p tPA and presents with below mentioned deficits affecting ability to perform ADL's, IADL's, roles & responsibilities.  Pt would benefit from OT to address independence with ADL's.    Rehab identified problem list/impairments: Rehab identified problem list/impairments: weakness, impaired sensation, impaired self care skills, impaired functional mobilty, decreased coordination, impaired balance, decreased " upper extremity function, decreased lower extremity function    Rehab potential is good.    Activity tolerance: Good    Discharge recommendations: Discharge Facility/Level Of Care Needs: home health OT     GOALS:    Occupational Therapy Goals        Problem: Occupational Therapy Goal    Goal Priority Disciplines Outcome Interventions   Occupational Therapy Goal     OT, PT/OT Ongoing (interventions implemented as appropriate)    Description:  Goals to be met by: 10/11/17     Patient will increase functional independence with ADLs by performing:    UE Dressing with Modified [place].  LE Dressing with Modified [place].  Grooming while standing with Modified [place].  Toileting from toilet with Modified [place] for hygiene and clothing management.   Rolling to Bilateral with Modified [place].   Supine to sit with Modified [place].  Stand pivot transfers with Modified [place].                      PLAN:  Patient to be seen 5 x/week to address the above listed problems via self-care/home management, therapeutic activities, therapeutic exercises, neuromuscular re-education, cognitive retraining, sensory integration  Plan of Care expires: 11/03/17  Plan of Care reviewed with: patient         TRINO Velázquez  10/04/2017

## 2017-10-04 NOTE — PT/OT/SLP EVAL
"Speech Language Pathology Evaluation/Discharge Note    Gina Jenkins   MRN: [id number]   Admitting Diagnosis: stroke  Diet recommendations: Solid Diet Level: Regular  Liquid Diet Level: Thin     SLP Treatment Date: 10/04/17  Speech Start Time: 0940     Speech Stop Time: 0956     Speech Total (min): 16 min       TREATMENT BILLABLE MINUTES:  Eval 16     Diagnosis: stroke    Past Medical History:   Diagnosis Date    CHF (congestive heart failure)     H/O coronary angioplasty     Hypertension      Past Surgical History:   Procedure Laterality Date    CORONARY ANGIOPLASTY WITH STENT PLACEMENT         Has the patient been evaluated by SLP for swallowing? : Yes  Keep patient NPO?: No   General Precautions: Standard,            Social Hx: Patient lives with her , children and mother    Occupational/hobbies/homemaking:     Subjective:  "I feel great"  Patient goals: go home    Pain/Comfort  Pain Rating 1: 0/10  Pain Rating Post-Intervention 1: 0/10     Objective:        Oral Musculature Evaluation  Oral Musculature: right weakness  Dentition: present and adequate  Mucosal Quality: good  Mandibular Strength and Mobility: WFL  Oral Labial Strength and Mobility: WFL  Lingual Strength and Mobility: WFL  Buccal Strength and Mobility: WFL  Volitional Cough: strong  Voice Prior to PO Intake: clear     Cognitive Status:  Behavioral Observations: alert and appropriate-  Memory and Orientation: ox4; good recall of recent/remote information; she recalled up to 5 digits/words; after a delay, she recalled 2/3 objects and with cues 3/3  Attention: good  Problem Solving: wfl and able to generate multiple solutions; she compared/contrasted items and completed categorizational task with 100% acc. Sequenced up to 4 items. She named 17 items during word fluency which is wnl  Pragmatics: wfl  Executive Function: wfl    Language: yes    Auditory Comprehension: wfl for complex questions/commands    Verbal Expression: wfl; " named 6/6 pictured objects    Motor Speech: wfl    Voice: wfl    Augmentative Alternative Communication: no    Reading: wfl    Writing: wfl    Visual-Spatial: no deficits    Additional Treatment:    White board updated. Pt at baseline    FIM:  Social Interaction: 7 Complete Yates--The patient interacts appropriately with staff, other patients, and family members (e.g., controls temper, accepts criticism, is aware that words and actions have an impact on others), and does not require medication for   Problem Solvin Complete Yates--The patient consistently recognizes problems when present, makes appropriate decisions, initiates and carries out a sequence of steps to solve complex problems until the task is completed, and self-corrects if errors are made.   Comprehension: 7 Complete Yates--The patient understand complex or abstract directions and conversation, and understand either spoken or written language (not necessarily English).   Expression: 7 Complete Yates--The patient expresses complex or abstract ideas clearly and fluently (not necessarily in English).   Memory: 7 Complete Yates--The patient recognizes people frequently encountered, remebers daily routines, and executes requests of others without need for repetition.     Assessment:  Gina Jenkins is a 42 y.o. female with a diagnosis of stroke. She present with no deficits in speech,lang, or cognition. No further speech tx needed.          Discharge recommendations: Discharge Facility/Level Of Care Needs: home     Goals:    SLP Goals        Problem: SLP Goal    Goal Priority Disciplines Outcome   SLP Goal     SLP                     Plan:   Patient to be seen Therapy Frequency: 5 x/week   Plan of Care expires:    Plan of Care reviewed with: patient  SLP Follow-up?: No              RAFIQ Taylor, CCC-SLP  10/04/2017

## 2017-10-04 NOTE — PLAN OF CARE
Problem: Patient Care Overview  Goal: Plan of Care Review  Outcome: Ongoing (interventions implemented as appropriate)  POC reviewed with pt and family at 1400. Pt verbalized understanding. Questions and concerns addressed. No acute events today. Pt progressing toward goals. Will continue to monitor. See flowsheets for full assessment and VS info. Patient to be transferred to stepdown unit once bed comes available.

## 2017-10-04 NOTE — PLAN OF CARE
SW reviewed chart for discharge planning needs. Pt has Medicaid. Therapy recs are [facility] vs. HH with DME. He cannot have HH due to the Medicaid coverage, only outPt therapy. He would only get SNF at [facility]. EH will discuss this option with the Pt and family at bedside.    Maria Teresa Reyes, LEIF  Neurocritical Care   [facility]  [id number]

## 2017-10-04 NOTE — ASSESSMENT & PLAN NOTE
Stroke risk factor  Goal SBP <140 (normal)  -coreg 12.5mg BID  -lisinopril 10mg qd  -Amlodipine 10mg qd  -HCTZ 25mg qd

## 2017-10-04 NOTE — ASSESSMENT & PLAN NOTE
S/p tPA, infusion finished at 0855 (10/3)  Antiplatelets: start aspirin 81mg   Statins: atorvastatin 40  SBP <180 post tPA  DVT ppx: SCDs, sq heparin started 24 hours post tPA  PT/OT and speech to evaluate and treat  Neuro checks q1h while in ICU  Imaging: MRI brain 10/4 negative for acute infarction.   Dispo: boarded for stroke service

## 2017-10-04 NOTE — SUBJECTIVE & OBJECTIVE
Interval History:   MRI brain showed no acute infarction. Patient reported she continues to have mild right leg heaviness, but denied right arm weakness, otherwise asymptomatic. Denies chest pain or SOB.    Review of Systems    Review of symptoms  Constitutional: Denies fevers or chills.  Pulmonary: Denies shortness of breath or cough.  Cardiology: Denies chest pain or palpitations.  GI: Denies abdominal pain or constipation.  Neurologic: Denies new weakness,  headache, or paresthesias.    Objective:     Vitals:  Temp: 98.5 °F (36.9 °C) (10/04/17 0702)  Pulse: 73 (10/04/17 1000)  Resp: (!) 26 (10/04/17 1000)  BP: (!) 143/81 (10/04/17 1000)  SpO2: 100 % (10/04/17 1000)    Temp:  [98.5 °F (36.9 °C)-99 °F (37.2 °C)] 98.5 °F (36.9 °C)  Pulse:  [66-82] 73  Resp:  [15-32] 26  SpO2:  [98 %-100 %] 100 %  BP: (123-165)/() 143/81        10/03 0701 - 10/04 0700  In: 800 [P.O.:800]  Out: 1000 [Urine:1000]    Physical Exam     General appearance: Not in acute distress  HENT: Normocephalic, atraumatic, tracheal midline, nasopharynx clear.   Nose: Nares normal, septum midline, mucosa normal, no drainage or sinus tenderness   Resp: CTA bilaterally. No rales, or rhonci  CVS: RRR. Normal S1/S2. No murmurs, rubs or gallops.  Abd: Soft, ND,NT,BS + in all 4 quadrants.  MS: no joint tenderness, deformity or swelling. Full ROM   Skin: normal coloration and turgor, no rashes, no suspicious skin lesions noted.  Psychiatric: Has a normal mood and affect. Behavior is normal.   Extremities: warm and well perfused    Neurological Exam   Mental Status:  Alert and oriented to self, place, and time.     Language:  No aphasia, no dysarthria.     Cranial Nerves:  Visual fields were intact to confrontation, no RAPD, no anisocoria, EOM intact bilaterally, V1-V3 with good sensation to light touch, no facial asymmetry, hearing grossly intact, palate, and tongue midline. Good shoulder rug.     Motor:  Strength: 4/5 in RLE and R write extension;   otherwise 5/5  Tone: Good muscle tone.    No pronator drift    DTRs:  Symmetric and 2+ through out.     Sensation:  Intact to light touch through out.    Cerebellar:  Good finger to nose.  Good heal to shin    Gait and Stand:  deferred    Medications:  Continuous  sodium chloride 0.9%   Scheduled  amlodipine 10 mg Daily   aspirin 81 mg Daily   atorvastatin 40 mg Daily   carvedilol 12.5 mg BID WM   heparin (porcine) 5,000 Units Q8H   hydrochlorothiazide 25 mg Daily   lisinopril 10 mg Daily   senna-docusate 8.6-50 mg 1 tablet BID   sodium chloride 0.9% 3 mL Q8H   PRN  acetaminophen 650 mg Q6H PRN   influenza 0.5 mL vaccine x 1 dose   labetalol 10 mg Q15 Min PRN   magnesium oxide 800 mg PRN   magnesium oxide 800 mg PRN   ondansetron 4 mg Q6H PRN   potassium chloride 10% 40 mEq PRN   potassium chloride 10% 40 mEq PRN   potassium chloride 10% 60 mEq PRN   potassium, sodium phosphates 2 packet PRN   potassium, sodium phosphates 2 packet PRN   potassium, sodium phosphates 2 packet PRN     Labs:  Component      Latest Ref Rng & Units 10/4/2017   WBC      3.90 - 12.70 K/uL 6.22   RBC      4.00 - 5.40 M/uL 4.11   Hemoglobin      12.0 - 16.0 g/dL 12.4   Hematocrit      37.0 - 48.5 % 36.5 (L)   MCV      82 - 98 fL 89   MCH      27.0 - 31.0 pg 30.2   MCHC      32.0 - 36.0 g/dL 34.0   RDW      11.5 - 14.5 % 14.0   Platelets      150 - 350 K/uL 225   MPV      9.2 - 12.9 fL 10.7   Gran #      1.8 - 7.7 K/uL 3.5   Lymph #      1.0 - 4.8 K/uL 1.9   Mono #      0.3 - 1.0 K/uL 0.5   Eos #      0.0 - 0.5 K/uL 0.3   Baso #      0.00 - 0.20 K/uL 0.04   Gran%      38.0 - 73.0 % 56.1   Lymph%      18.0 - 48.0 % 30.1   Mono%      4.0 - 15.0 % 8.7   Eosinophil%      0.0 - 8.0 % 4.2   Basophil%      0.0 - 1.9 % 0.6   Differential Method       Automated   Sodium      136 - 145 mmol/L 139   Potassium      3.5 - 5.1 mmol/L 3.3 (L)   Chloride      95 - 110 mmol/L 104   CO2      23 - 29 mmol/L 22 (L)   Glucose      70 - 110 mg/dL 113 (H)   BUN,  Bld      6 - 20 mg/dL 13   Creatinine      0.5 - 1.4 mg/dL 1.0   Calcium      8.7 - 10.5 mg/dL 8.7   Total Protein      6.0 - 8.4 g/dL 6.4   Albumin      3.5 - 5.2 g/dL 2.8 (L)   Total Bilirubin      0.1 - 1.0 mg/dL 0.3   Alkaline Phosphatase      55 - 135 U/L 124   AST      10 - 40 U/L 35   ALT      10 - 44 U/L 48 (H)   Anion Gap      8 - 16 mmol/L 13   eGFR if African American      >60 mL/min/1.73 m:2 >60.0   eGFR if non African American      >60 mL/min/1.73 m:2 >60.0   Magnesium      1.6 - 2.6 mg/dL 1.7   Phosphorus      2.7 - 4.5 mg/dL 3.0     Imaging:     MRI brain w/ and w/o contrast 10/4:  1. No evidence of abnormal restricted diffusion to suggest acute infarct.    2. Few scattered punctate foci of supratentorial T2/FLAIR hyperintensity. These lesions are nonspecific in appearance and may be seen with accelerated small vessel ischemic disease, vasculopathies, migraine headaches, and as the residua from inflammatory and traumatic insults to the brain.    3. Small 4-mm outpouching arising from the proximal left MCA corresponding small aneurysm better evaluated on prior dedicated CT examination.    4. Mucous retention cyst or polyps within the bilateral maxillary sinuses.

## 2017-10-04 NOTE — ASSESSMENT & PLAN NOTE
Gina Jenkins is a 42 y.o. female with PMHx of HTN who presented to OSH with right sided numbness. s/p tPA and transferred to [facility] for post tPA monitoring. CTA negative for LVO. Imaging negative for acute infarct. ECHO unremarkable for LAE or wall motion abnormalities. Symptoms improving post tpa. Possible tpa averted stroke.     Antiplatelets: aspirin 81mg qd  Statins: Not indicated. Untreated LDL 67.   SBP <180   DVT ppx: SCDs, heparin ppx  OT recommending home health. PT recommending home with 24 hour assist and home health  Neuro checks q1h

## 2017-10-04 NOTE — ASSESSMENT & PLAN NOTE
-  While in OSH, patient developed right sided weakness and right facial droop.  Tele-stroke consult completed.  CTH without acute pathology.  S/p tPA.  -  CTA without evidence of ischemic changes, intracranial hemorrhage, or major vascular occlusion.    -  MRI also negative for acute pathology.    Functional status: see hospital course  Cognitive/Speech/Language status:  SLP evaluation pending  Nutrition/Swallow Status:  Passed bedside swallow evaluation.  SLP recommending regular diet and thin liquids.    Recommendations  -  Encourage mobility, OOB in chair at least 3 hours per day, and early ambulation as appropriate   -  PT/OT evaluate and treat  -  SLP speech and cognitive evaluate and treat  -  Monitor sleep disturbances and establish consistent sleep-wake cycle  -  Monitor for bowel and bladder dysfunction  -  Monitor for shoulder pain and subluxation  -  Monitor for spasticity  -  Monitor for and prevent skin breakdown and pressure ulcers  · Early mobility, repositioning/weight shifting every 20-30 minutes when sitting, turn patient every 2 hours, proper mattress/overlay and chair cushioning, pressure relief/heel protector boots  -  DVT prophylaxis:  HAIM, SCD  -  Reviewed discharge options (IP rehab, SNF, HH therapy, and OP therapy)

## 2017-10-04 NOTE — PLAN OF CARE
EH met with Pt and Pt family member at bedside. Discussed discharge planning and therapy recs for SS[facility] vs OutPt therapy (OutPt due to insurance). She is fine with OSNF if it is needed or OutPt therapy.     EH sent Facesheet to OS[facility] via [employer].    Maria Teresa Reyes, LEIF  Neurocritical Care   [facility]  [phone]

## 2017-10-04 NOTE — CONSULTS
[facility]  Physical Medicine & Rehab  Consult Note    Patient Name: Gina Jenkins  MRN: [id number]  Admission Date: 10/3/2017  Hospital Length of Stay: 1 days  Attending Physician: Brian Shaikh MD   Primary Care Provider: Jeffery Humphrey MD     Inpatient consult to Physical Medicine & Rehabilitation  Consult performed by: Karla Harvey NP  Consult requested by:  Brian Shaikh MD    Collaborating Physician: Rosio Noel MD  Reason for Consult:  assess rehabilitation needs    Consults  Subjective:     Principal Problem: Right sided weakness    HPI: Gina Jenkins is a 42-year-old female with PMHx of HTN, CAD s/p angioplasty, CHF, and tobacco use.  Patient presented to [facility] on 10/2/17 with left chest pain and flank pain worsened by breathing x 3 days with associated N/V and dysuria.  CTA chest and renal CT unremarkable.  Troponin x 3 negative.  Started on Rocephin for UTI with possible pyelonephritis.  She was then transferred to [facility] for further evaluation and care.  Following admission, patient developed right sided weakness and right facial droop.  Tele-stroke consult completed.  CTH without acute pathology, and IV tPA administered OR ; however, not tPA candidate 2/2 outside of treatment window.  Transferred to [facility] on 10/3/17 for further evaluation and management.  Upon admission, CTA without evidence of ischemic changes, intracranial hemorrhage, or major vascular occlusion.  MRI also negative for acute pathology.    Functional History: [facility] [place] and 3 children in a single story home without steps to enter.  Prior to admission, she was independent with ADLs, including driving, and mobility.  She is right handed.  DME: none.    Hospital Course: 10/3/17:  Passed bedside swallow evaluation.  SLP recommending regular diet and thin liquids.  PT and OT evaluations pending.     Past Medical History:   Diagnosis Date     CHF (congestive heart failure)     H/O coronary angioplasty     Hypertension      Past Surgical History:   Procedure Laterality Date    CORONARY ANGIOPLASTY WITH STENT PLACEMENT       Review of patient's allergies indicates:   Allergen Reactions    Bactrim [sulfamethoxazole-trimethoprim] Rash       Scheduled Medications:    amlodipine  10 mg Oral Daily    atorvastatin  40 mg Oral Daily    carvedilol  12.5 mg Oral BID WM    heparin (porcine)  5,000 Units Subcutaneous Q8H    hydrochlorothiazide  25 mg Oral Daily    lisinopril  10 mg Oral Daily    senna-docusate 8.6-50 mg  1 tablet Oral BID    sodium chloride 0.9%  3 mL Intravenous Q8H       PRN Medications: acetaminophen, labetalol, magnesium oxide, magnesium oxide, ondansetron, potassium chloride 10%, potassium chloride 10%, potassium chloride 10%, potassium, sodium phosphates, potassium, sodium phosphates, potassium, sodium phosphates    Family History     None        Social History Main Topics    Smoking status: Current Every Day Smoker     Packs/day: 0.50     Types: Cigarettes     Last attempt to quit: 2/16/2016    Smokeless tobacco: Never Used    Alcohol use Yes      Comment: occassionally    Drug use: No    Sexual activity: Yes     Partners: Male     Birth control/ protection: None     Review of Systems   Constitutional: Negative for chills, fatigue and fever.   HENT: Negative for drooling, hearing loss, trouble swallowing and voice change.    Eyes: Negative for pain and visual disturbance.   Respiratory: Negative for cough, shortness of breath and wheezing.    Cardiovascular: Negative for chest pain and palpitations.   Gastrointestinal: Negative for abdominal distention, nausea and vomiting.   Genitourinary: Negative for difficulty urinating and flank pain.   Musculoskeletal: Positive for gait problem. Negative for arthralgias, back pain, myalgias and neck pain.   Skin: Negative for rash and wound.   Neurological: Positive for weakness and  numbness. Negative for dizziness and headaches.   Psychiatric/Behavioral: Negative for agitation and hallucinations. The patient is not nervous/anxious.      Objective:     Vital Signs (Most Recent):  Temp: 98.5 °F (36.9 °C) (10/04/17 0702)  Pulse: 73 (10/04/17 1000)  Resp: (!) 26 (10/04/17 1000)  BP: (!) 143/81 (10/04/17 1000)  SpO2: 100 % (10/04/17 1000)    Vital Signs (24h Range):  Temp:  [98.5 °F (36.9 °C)-99 °F (37.2 °C)] 98.5 °F (36.9 °C)  Pulse:  [66-82] 73  Resp:  [15-32] 26  SpO2:  [98 %-100 %] 100 %  BP: (122-165)/() 143/81     Body mass index is 24.95 kg/m².    Physical Exam   Constitutional: She is oriented to person, place, and time. She appears well-developed and well-nourished. No distress.   HENT:   Head: Normocephalic and atraumatic.   Right Ear: External ear normal.   Left Ear: External ear normal.   Nose: Nose normal.   Eyes: Right eye exhibits no discharge. Left eye exhibits no discharge. No scleral icterus.   Neck: Normal range of motion.   Cardiovascular: Normal rate, regular rhythm and intact distal pulses.    Pulmonary/Chest: Effort normal. No respiratory distress. She has no wheezes.   Abdominal: Soft. She exhibits no distension. There is no tenderness.   Musculoskeletal: Normal range of motion. She exhibits no edema or tenderness.   Neurological: She is alert and oriented to person, place, and time.   -  Mental Status:  AAOx3.  Follows commands.  Answers correct age and .  Recent and remote memory intact.  Recalls 3/3 objects.  No neglect.    -  Speech and language:  no aphasia or dysarthria.    -  Facial movement (CN VII): mild facial droop  -  Coordination:  Finger to nose exam:  RUE normal, LUE normal.  -  Motor:  Mild R pronator drift. RUE: 4/5.  LUE: 5/5.  RLE: 4/5.  LLE: 5/5.  -  Tone:  Normal.   -  Sensory:  Intact to light touch and pin prick on left, decreased on right.   Skin: Skin is warm and dry. No rash noted.   Psychiatric: She has a normal mood and affect. Her  behavior is normal. Thought content normal.   Vitals reviewed.    Diagnostic Results:   Labs: Reviewed  X-Ray: Reviewed  CT: Reviewed  MRI: Reviewed  CTA: Reviewed    Assessment/Plan:     Acute ischemic left MCA stroke    -  While in OSH, patient developed right sided weakness and right facial droop.  Tele-stroke consult completed.  CTH without acute pathology.  S/p tPA.  -  CTA without evidence of ischemic changes, intracranial hemorrhage, or major vascular occlusion.    -  MRI also negative for acute pathology.    Functional status: see hospital course  Cognitive/Speech/Language status:  SLP evaluation pending  Nutrition/Swallow Status:  Passed bedside swallow evaluation.  SLP recommending regular diet and thin liquids.    Recommendations  -  Encourage mobility, OOB in chair at least 3 hours per day, and early ambulation as appropriate   -  PT/OT evaluate and treat  -  SLP speech and cognitive evaluate and treat  -  Monitor sleep disturbances and establish consistent sleep-wake cycle  -  Monitor for bowel and bladder dysfunction  -  Monitor for shoulder pain and subluxation  -  Monitor for spasticity  -  Monitor for and prevent skin breakdown and pressure ulcers  · Early mobility, repositioning/weight shifting every 20-30 minutes when sitting, turn patient every 2 hours, proper mattress/overlay and chair cushioning, pressure relief/heel protector boots  -  DVT prophylaxis:  HAIM, SCD  -  Reviewed discharge options (IP rehab, SNF, HH therapy, and OP therapy)        Therapy evaluations pending.  Imaging negative for stroke.  Will follow and discuss with rehab team for final rehab recommendation.    Thank you for your consult.    JOSE LUIS Jacinto  Department of Physical Medicine & Rehab  [facility]

## 2017-10-04 NOTE — PROGRESS NOTES
[facility]  Vascular Neurology  Comprehensive Stroke Center  Progress Note    Assessment/Plan:     * Thrombotic stroke involving left middle cerebral artery    Gina Jenkins is a 42 y.o. female with PMHx of HTN who presented to OSH with right sided numbness. s/p tPA and transferred to [facility] for post tPA monitoring. CTA negative for LVO. Imaging negative for acute infarct. ECHO unremarkable for LAE or wall motion abnormalities. Symptoms improving post tpa. Possible tpa averted or MRI negative stroke.     Antiplatelets: aspirin 81mg qd  Statins: Not indicated. Untreated LDL 67.   SBP <180   DVT ppx: SCDs, heparin ppx  OT recommending home health. PT recommending home with 24 hour assist and home health  Neuro checks q1h          Nicotine abuse    Stroke risk factor  - smoking cessation advised.         Aneurysm of left middle cerebral artery    Follow up with Dr. Jeffries in NSGY clinic on discharge        Essential hypertension    Stroke risk factor  Goal SBP <140 (normal)  -coreg 12.5mg BID  -lisinopril 10mg qd  -Amlodipine 10mg qd  -HCTZ 25mg qd          Dispo: PT recommending home with 24 hr assist; discussed with family and at this time do not have 24 hour assistance if discharged today. Family states will arrange for this. Will continue to monitor progress with PT.     Stepdown to floor today    Neurologic Chief Complaint: right sided numbness    Subjective:     Interval History: Patient is seen for follow-up neurological assessment and treatment recommendations:   No acute events overnight.     HPI, Past Medical, Family, and Social History remains the same as documented in the initial encounter.     Review of Systems   Constitutional: Negative for fatigue.   Eyes: Negative for visual disturbance.   Neurological: Negative for weakness and numbness.     Scheduled Meds:   amlodipine  10 mg Oral Daily    aspirin  81 mg Oral Daily    atorvastatin  40 mg Oral Daily    carvedilol  12.5 mg Oral  BID WM    heparin (porcine)  5,000 Units Subcutaneous Q8H    hydrochlorothiazide  25 mg Oral Daily    lisinopril  10 mg Oral Daily    senna-docusate 8.6-50 mg  1 tablet Oral BID    sodium chloride 0.9%  3 mL Intravenous Q8H     Continuous Infusions:   sodium chloride 0.9%       PRN Meds:acetaminophen, influenza, labetalol, magnesium oxide, magnesium oxide, ondansetron, potassium chloride 10%, potassium chloride 10%, potassium chloride 10%, potassium, sodium phosphates, potassium, sodium phosphates, potassium, sodium phosphates    Objective:     Vital Signs (Most Recent):  Temp: 98.6 °F (37 °C) (10/04/17 1500)  Pulse: 71 (10/04/17 1600)  Resp: 16 (10/04/17 1600)  BP: 128/81 (10/04/17 1600)  SpO2: 100 % (10/04/17 1600)  BP Location: Left arm    Vital Signs Range (Last 24H):  Temp:  [98.5 °F (36.9 °C)-98.6 °F (37 °C)]   Pulse:  [64-82]   Resp:  [15-32]   BP: (123-165)/()   SpO2:  [96 %-100 %]   BP Location: Left arm    Physical Exam   Constitutional: She appears well-developed and well-nourished.   HENT:   Head: Normocephalic and atraumatic.   Eyes: EOM are normal. Pupils are equal, round, and reactive to light.   Cardiovascular: Normal rate.    Pulmonary/Chest: Effort normal.     Neurological Exam:   LOC: alert and follows requests  Language: No aphasia  Speech: No dysarthria  EOM (CN III, IV, VI): Full/intact  Pupils (CN III, IV, VI): PERRL  Facial Sensation (CN V): Symmetric  Facial Movement (CN VII): symmetric facial expression  Shoulder/Neck (CN XI): SCM-Left: Normal ; SCM-Right: Normal ; Shoulder Shrug: Normal/Symetric  Tongue (CN XII): to midline  Motor*: Arm Left:  Normal (5/5), Leg Left:   Normal (5/5), Arm Right:   Normal (5/5), Leg Right:   Normal (5/5)  Cerebellar*: Normal limb  Sensation: intact to light touch, temperature and vibration  Tone: Arm-Left: normal; Leg-Left: normal; Arm-Right: normal; Leg-Right: normal    NIH Stroke Scale:    Level of Consciousness: 0 - alert  LOC Questions: 0 -  answers both correctly  LOC Commands: 0 - performs both correctly  Best Gaze: 0 - normal  Visual: 0 - no visual loss  Facial Palsy: 0 - normal  Motor Left Arm: 0 - no drift  Motor Right Arm: 0 - no drift  Motor Left Le - no drift  Motor Right Le - no drift  Limb Ataxia: 0 - absent  Sensory: 0 - normal  Best Language: 0 - no aphasia  Dysarthria: 0 - normal articulation  Extinction and Inattention: 0 - no neglect  NIH Stroke Scale Total: 0      Laboratory:  CMP:   Recent Labs  Lab 10/04/17  0359   CALCIUM 8.7   ALBUMIN 2.8*   PROT 6.4      K 3.3*   CO2 22*      BUN 13   CREATININE 1.0   ALKPHOS 124   ALT 48*   AST 35   BILITOT 0.3     BMP:   Recent Labs  Lab 10/04/17  0359      K 3.3*      CO2 22*   BUN 13   CREATININE 1.0   CALCIUM 8.7     CBC:   Recent Labs  Lab 10/04/17  0245   WBC 6.22   RBC 4.11   HGB 12.4   HCT 36.5*      MCV 89   MCH 30.2   MCHC 34.0     Lipid Panel:   Recent Labs  Lab 10/03/17  1230   CHOL 187   LDLCALC 69.4   HDL 58   TRIG 298*     Coagulation:   Recent Labs  Lab 10/03/17  0551   INR 0.9     Platelet Aggregation Study: No results for input(s): PLTAGG, PLTAGINTERP, PLTAGREGLACO, ADPPLTAGGREG in the last 168 hours.  Hgb A1C:   Recent Labs  Lab 10/03/17  1230   HGBA1C 4.9     TSH:   Recent Labs  Lab 10/03/17  1230   TSH 1.576       Diagnostic Results:  I have personally reviewed:   Findings:     MRI Brain W WO contrast (10/4/2017)  No acute intracranial abnormality.     CTA MP (10/3/2017)  4mm left ICA aneurysm at the origin of left anterior choroidal artery   1-2mm left MCA bifurcation aneurysm  No evidence of LVO      Adriel Beth MD  [facility] St[facility]  Department of Vascular Neurology   [facility]

## 2017-10-04 NOTE — ASSESSMENT & PLAN NOTE
MRI and CTA showed 4mm left MCA aneurysm, asymptomatic.   - continue to monitor clinically  - current BP goal < 180, consider gradually reducing to < 140

## 2017-10-04 NOTE — CONSULTS
"  [facility]  Adult Nutrition  Consult Note    SUMMARY     Recommendations    Recommendation/Intervention:   1. Continue Cardiac diet with texture changes per SLP recommendations.   2. Encourage and promote po intake for optimal nutrition.     RD to monitor.      Goals: Pt to continue tolerating >75% of meals.  Nutrition Goal Status: new  Communication of RD Recs: reviewed with RN    Reason for Assessment    Reason for Assessment: physician consult  Diagnosis: stroke/CVA  Relevent Medical History: HTN, CHF, CAD         General Information Comments: Pt's diet advanced per SLP recommendations.    Nutrition Discharge Planning: adequate po intake for optimal nutrition    Nutrition Prescription Ordered    Current Diet Order: Cardiac, Low Sodium     Evaluation of Received Nutrients/Fluid Intake        I/O: -I/O       % Intake of Estimated Energy Needs: 75 - 100 %  % Meal Intake: 100%     Nutrition Risk Screen          Nutrition/Diet History       Typical Food/Fluid Intake: Pt consuming 100% of meals. Weight stable.  Food Preferences: No Anabaptist/cultural preferences identified at this time.        Factors Affecting Nutritional Intake: other (see comments) (None at this time.)                Labs/Tests/Procedures/Meds       Pertinent Labs Reviewed: reviewed  Pertinent Labs Comments: HgbA1c 4.9, K 3.3  Pertinent Medications Reviewed: reviewed  Pertinent Medications Comments: statin, IVF, cardene    Physical Findings    Overall Physical Appearance: nourished, overweight        Skin: intact    Anthropometrics    Temp: 98.5 °F (36.9 °C)     Height: 5' 2" (157.5 cm)  Weight Method: Stated  Weight: 70.3 kg (155 lb)     Ideal Body Weight (IBW), Female: 110 lb     % Ideal Body Weight, Female (lb): 140.91 lb  BMI (Calculated): 28.4  BMI Grade: 25 - 29.9 - overweight                            Estimated/Assessed Needs    Weight Used For Calorie Calculations: 70.3 kg (154 lb 15.7 oz)      Energy Calorie " Requirements (kcal): 1651  Energy Need Method: Big Stone-St Jeor (PAL 1.25)        RMR (Big Stone-St. Jeor Equation): 1321.25        Weight Used For Protein Calculations: 70.3 kg (154 lb 15.7 oz)  Protein Requirements: 70-84g (1.0-1.2g/kg)    Fluid Requirements (mL): 1mL/kcal or per MD  RDA Method (mL): 1651               Assessment and Plan    No nutrition diagnosis at this time.      Monitor and Evaluation    Food and Nutrient Intake: energy intake, food and beverage intake  Food and Nutrient Adminstration: diet order        Anthropometric Measurements: weight, weight change, body mass index  Biochemical Data, Medical Tests and Procedures: electrolyte and renal panel, gastrointestinal profile, glucose/endocrine profile, inflammatory profile, lipid profile  Nutrition-Focused Physical Findings: overall appearance    Nutrition Risk    Level of Risk: other (see comments) (f/u 1x/week)    Nutrition Follow-Up    RD Follow-up?: Yes

## 2017-10-04 NOTE — PROGRESS NOTES
Pt transported to MRI via wheelchair on continuous monitoring. Scan completed without complication. Returned to room and placed back on room monitor. Will continue to monitor pt.

## 2017-10-04 NOTE — HPI
Gina Jenkins is a 42-year-old female with PMHx of HTN, CAD s/p angioplasty, CHF, and tobacco use.  Patient presented to [facility] on 10/2/17 with left chest pain and flank pain worsened by breathing x 3 days with associated N/V and dysuria.  CTA chest and renal CT unremarkable.  Troponin x 3 negative.  Started on Rocephin for UTI with possible pyelonephritis.  She was then transferred to [facility] for further evaluation and care.  Following admission, patient developed right sided weakness and right facial droop.  Tele-stroke consult completed.  CTH without acute pathology, and IV tPA administered.  Transferred to [facility] [place] 10/3/17 for further evaluation and management.  Upon admission, CTA revealed small left ICA & MCA aneurysms with no evidence of LVO.  MRI also negative for acute pathology.  Per VN, etiology of stroke may be from tPA aborted stroke or MRI negative stroke.     Functional History: [facility] and 3 children in a single story home without steps to enter.  Prior to admission, she was independent with ADLs, including driving, and mobility.  She is right handed.  DME: none.

## 2017-10-04 NOTE — PROGRESS NOTES
[facility]  Neurocritical Care  Progress Note    Admit Date: 10/3/2017  Service Date: 10/04/2017  Length of Stay: 1    Subjective:     Chief Complaint: Thrombotic stroke involving left middle cerebral artery    History of Present Illness: Ms. Jenkins is a 42 y.o. AAF with h/o HTN, CHF, CAD initially presented to [place] with chest pain and flank pain. Admitted for chest pain workup - negative for PE or ACS - and UTI with questionable pyelonephritis. While inpatient, patient developed right-sided weakness and right facial droop around 5:30. Telestroke consulted and administered tPA at 5:45 and infusion at 0855. Patient reported improvement in symptoms and she was transferred to [facility] for further management.    Hospital Course: 10/3: 1-hr post tPA NIHSS 5. Admitted to [facility]. CTA head.  10/4: MRI brain showed no acute infarction. Patient reported she continues to have mild right leg heaviness, but otherwise asymptomatic. Denies chest pain or SOB.    Interval History:   MRI brain showed no acute infarction. Patient reported she continues to have mild right leg heaviness, but denied right arm weakness, otherwise asymptomatic. Denies chest pain or SOB.    Review of Systems    Review of symptoms  Constitutional: Denies fevers or chills.  Pulmonary: Denies shortness of breath or cough.  Cardiology: Denies chest pain or palpitations.  GI: Denies abdominal pain or constipation.  Neurologic: Denies new weakness,  headache, or paresthesias.    Objective:     Vitals:  Temp: 98.5 °F (36.9 °C) (10/04/17 0702)  Pulse: 73 (10/04/17 1000)  Resp: (!) 26 (10/04/17 1000)  BP: (!) 143/81 (10/04/17 1000)  SpO2: 100 % (10/04/17 1000)    Temp:  [98.5 °F (36.9 °C)-99 °F (37.2 °C)] 98.5 °F (36.9 °C)  Pulse:  [66-82] 73  Resp:  [15-32] 26  SpO2:  [98 %-100 %] 100 %  BP: (123-165)/() 143/81        10/03 0701 - 10/04 0700  In: 800 [P.O.:800]  Out: 1000 [Urine:1000]    Physical Exam     General appearance: Not in  acute distress  HENT: Normocephalic, atraumatic, tracheal midline, nasopharynx clear.   Nose: Nares normal, septum midline, mucosa normal, no drainage or sinus tenderness   Resp: CTA bilaterally. No rales, or rhonci  CVS: RRR. Normal S1/S2. No murmurs, rubs or gallops.  Abd: Soft, ND,NT,BS + in all 4 quadrants.  MS: no joint tenderness, deformity or swelling. Full ROM   Skin: normal coloration and turgor, no rashes, no suspicious skin lesions noted.  Psychiatric: Has a normal mood and affect. Behavior is normal.   Extremities: warm and well perfused    Neurological Exam   Mental Status:  Alert and oriented to self, place, and time.     Language:  No aphasia, no dysarthria.     Cranial Nerves:  Visual fields were intact to confrontation, no RAPD, no anisocoria, EOM intact bilaterally, V1-V3 with good sensation to light touch, no facial asymmetry, hearing grossly intact, palate, and tongue midline. Good shoulder rug.     Motor:  Strength: 4/5 in RLE and R write extension;  otherwise 5/5  Tone: Good muscle tone.    No pronator drift    DTRs:  Symmetric and 2+ through out.     Sensation:  Intact to light touch through out.    Cerebellar:  Good finger to nose.  Good heal to shin    Gait and Stand:  deferred    Medications:  Continuous  sodium chloride 0.9%   Scheduled  amlodipine 10 mg Daily   aspirin 81 mg Daily   atorvastatin 40 mg Daily   carvedilol 12.5 mg BID WM   heparin (porcine) 5,000 Units Q8H   hydrochlorothiazide 25 mg Daily   lisinopril 10 mg Daily   senna-docusate 8.6-50 mg 1 tablet BID   sodium chloride 0.9% 3 mL Q8H   PRN  acetaminophen 650 mg Q6H PRN   influenza 0.5 mL vaccine x 1 dose   labetalol 10 mg Q15 Min PRN   magnesium oxide 800 mg PRN   magnesium oxide 800 mg PRN   ondansetron 4 mg Q6H PRN   potassium chloride 10% 40 mEq PRN   potassium chloride 10% 40 mEq PRN   potassium chloride 10% 60 mEq PRN   potassium, sodium phosphates 2 packet PRN   potassium, sodium phosphates 2 packet PRN   potassium,  sodium phosphates 2 packet PRN     Labs:  Component      Latest Ref Rng & Units 10/4/2017   WBC      3.90 - 12.70 K/uL 6.22   RBC      4.00 - 5.40 M/uL 4.11   Hemoglobin      12.0 - 16.0 g/dL 12.4   Hematocrit      37.0 - 48.5 % 36.5 (L)   MCV      82 - 98 fL 89   MCH      27.0 - 31.0 pg 30.2   MCHC      32.0 - 36.0 g/dL 34.0   RDW      11.5 - 14.5 % 14.0   Platelets      150 - 350 K/uL 225   MPV      9.2 - 12.9 fL 10.7   Gran #      1.8 - 7.7 K/uL 3.5   Lymph #      1.0 - 4.8 K/uL 1.9   Mono #      0.3 - 1.0 K/uL 0.5   Eos #      0.0 - 0.5 K/uL 0.3   Baso #      0.00 - 0.20 K/uL 0.04   Gran%      38.0 - 73.0 % 56.1   Lymph%      18.0 - 48.0 % 30.1   Mono%      4.0 - 15.0 % 8.7   Eosinophil%      0.0 - 8.0 % 4.2   Basophil%      0.0 - 1.9 % 0.6   Differential Method       Automated   Sodium      136 - 145 mmol/L 139   Potassium      3.5 - 5.1 mmol/L 3.3 (L)   Chloride      95 - 110 mmol/L 104   CO2      23 - 29 mmol/L 22 (L)   Glucose      70 - 110 mg/dL 113 (H)   BUN, Bld      6 - 20 mg/dL 13   Creatinine      0.5 - 1.4 mg/dL 1.0   Calcium      8.7 - 10.5 mg/dL 8.7   Total Protein      6.0 - 8.4 g/dL 6.4   Albumin      3.5 - 5.2 g/dL 2.8 (L)   Total Bilirubin      0.1 - 1.0 mg/dL 0.3   Alkaline Phosphatase      55 - 135 U/L 124   AST      10 - 40 U/L 35   ALT      10 - 44 U/L 48 (H)   Anion Gap      8 - 16 mmol/L 13   eGFR if African American      >60 mL/min/1.73 m:2 >60.0   eGFR if non African American      >60 mL/min/1.73 m:2 >60.0   Magnesium      1.6 - 2.6 mg/dL 1.7   Phosphorus      2.7 - 4.5 mg/dL 3.0     Imaging:     MRI brain w/ and w/o contrast 10/4:  1. No evidence of abnormal restricted diffusion to suggest acute infarct.    2. Few scattered punctate foci of supratentorial T2/FLAIR hyperintensity. These lesions are nonspecific in appearance and may be seen with accelerated small vessel ischemic disease, vasculopathies, migraine headaches, and as the residua from inflammatory and traumatic insults to the  brain.    3. Small 4-mm outpouching arising from the proximal left MCA corresponding small aneurysm better evaluated on prior dedicated CT examination.    4. Mucous retention cyst or polyps within the bilateral maxillary sinuses.    Assessment/Plan:     Neuro   Aneurysm of left middle cerebral artery    MRI and CTA showed 4mm left MCA aneurysm, asymptomatic.   - continue to monitor clinically  - current BP goal < 180, consider gradually reducing to < 140        Thrombotic stroke involving left middle cerebral artery    S/p tPA, infusion finished at 0855 (10/3)  Antiplatelets: start aspirin 81mg   Statins: atorvastatin 40  SBP <180 post tPA  DVT ppx: SCDs, sq heparin started 24 hours post tPA  PT/OT and speech to evaluate and treat  Neuro checks q1h while in ICU  Imaging: MRI brain 10/4 negative for acute infarction.   Dispo: boarded for stroke service        Acute ischemic left MCA stroke    S/p TPA 10/3 0900; MRI brain 10/4 negative for acute infarction. Continues to have residual RLE weakness  - start aspirin 81mg daily        Cardiac/Vascular   Essential hypertension    Home amlodipine and lisinopril  SBP <180        GI   Flank pain-resolved as of 10/4/2017    CT renal stone negative  UA negative  Renal function wnl        Other   Chest pain-resolved as of 10/4/2017    CTA negative for PE,Troponin negative. Chest pain resolved.  -Continue to monitor            Prophylaxis:  Venous Thromboembolism: chemical  Stress Ulcer: None  Ventilator Pneumonia: not applicable     Activity Orders          None        Full Code    Christy Cárdenas MD  Neurocritical Care  [facility]

## 2017-10-04 NOTE — PT/OT/SLP EVAL
"Physical Therapy  Evaluation and Treatment     Gina Jenkins   MRN: [id number]   Admitting Diagnosis:  L MCA stroke s/p tPA  PT Received On: 10/04/17  PT Start Time: 1009     PT Stop Time: 1033    PT Total Time (min): 24 min       Billable Minutes:  Evaluation 15 and Therapeutic Activity 9    Diagnosis: L MCA stroke  Comorbidities and personal factors that affect the PT plan of care or the patient's ability to participate or progress with therapy:  1. CHF  2. Elevated troponin    Clinical Presentation: stable and/or uncomplicated  Level of Complexity:   Low Complexity  · No personal factors or comorbidities that impact the plan of care  · Examination addressing 1-2 body structures and functions, activity limitations, and/or participation restrictions  · Clinical presentation with stable or uncomplicated characteristics    Past Medical History:   Diagnosis Date    CHF (congestive heart failure)     H/O coronary angioplasty     Hypertension       Past Surgical History:   Procedure Laterality Date    CORONARY ANGIOPLASTY WITH STENT PLACEMENT         Referring physician: Rom Taylor MD  Date referred to PT: 10/3/2017    General Precautions: Standard, aspiration, fall    Patient History:  Living Environment Comment: Patient lives with her mother and father and 3 small children in a 1 story home with threshold step (ramp in place).  She helped her mother care for her father who has had a stroke.  Patient was completely independent prior to admit and driving.  She has ramp entrance into home and access to her father's shower seat, otherwise no personal DME.    Equipment Currently Used at Home: none    Subjective:  Communicated with RN prior to session.  "Am I going to be able to run in the yard and play with my kinds again?  I didn't know this could happen."  Chief Complaint: fear of walking  Patient goals: be able to walk and run in order to play with children    Pain/Comfort  Pain Rating 1: 0/10  Pain Rating " Post-Intervention 1: 0/10      Objective:   Patient found with: pulse ox (continuous), telemetry, blood pressure cuff   Patient found sitting up in chair in NAD with no family present. She agreed to therapy evaluation.     EXAMINATION    Cognitive Function:  Oriented to: Person, Place, Time and Situation  Level of Alertness: awake and alert  Follows Commands/attention: Follows multistep  commands  Communication: clear/fluent  Safety awareness/insight to disability: intact    Musculoskeletal System  Lower Extremities:  Range of Motion:  Right Lower Extremity: WFL  Left Lower Extremity: WFL    Strength:  Right Lower Extremity: hip flexion 3/5, knee ext 3/5, knee flexion 3/5, ankle DF 3/5  Left Lower Extremity: grossly 5/5 in all major muscle groups     Muscular Tone: normal      Integumentary System:  Skin integrity: Visible skin intact    Cardiopulmonary System:  Edema: None noted     Neuromuscular System:  Sensation:   Light Touch (LT): impaired; diminished R cheek, R UE and R LE  Deep Pressure (DP): intact   Proprioception: NT    Coordination: intact heel shin and finger to nose    Balance:   Static Sit: GOOD: Takes MODERATE challenges from all directions; good midline orientation   Dynamic Sit: GOOD: Maintains balance through MODERATE excursions of active trunk movement;   Static Stand: POOR: Needs MODERATE assist to maintain; without AD  Dynamic stand: POOR: N/A; and uses AD    Posture and gross symmetry: Rounded shoulder and Head forward     FUNCTIONAL MOBILITY ASSESSMENT:  Bed Mobility:  NT     Transfers:  Sit to stand transfer: min assist with poor initial standing balance, improved with RW   Bed to chair transfer: min assist with RW    Gait:   Patient ambulated 25 feet min assist with RW and significant gait deviations.   ·  Patient demonstrated very slow gait speed, step to gait pattern, forward flexed posture 2* R hip weakness, tremulous R knee in stance with no buckling present .   ·  Therapist provided  skilled assistance for balance, safety, sequencing, posture and encouragement.     THERAPEUTIC ACTIVITIES AND EXERCISES:  Therapist educated patient on the following:  · Role of PT, POC  · Stroke education   · Therapy recs  · Importance of OOBTC  · R LE hip flexion and knee extension 10x each, TID to improve functional strength  Therapist answered many questions from the patient regarding mobility, level of assistance, expected progress and return to activity.     AM-PAC 6 CLICK MOBILITY  How much help from another person does this patient currently need?   1 = Unable, Total/Dependent Assistance  2 = A lot, Maximum/Moderate Assistance  3 = A little, Minimum/Contact Guard/Supervision  4 = None, Modified [place]/Independent    Turning over in bed (including adjusting bedclothes, sheets and blankets)?: 3  Sitting down on and standing up from a chair with arms (e.g., wheelchair, bedside commode, etc.): 3  Moving from lying on back to sitting on the side of the bed?: 3  Moving to and from a bed to a chair (including a wheelchair)?: 3  Need to walk in hospital room?: 3  Climbing 3-5 steps with a railing?: 2  Total Score: 17     AM-PAC Raw Score CMS G-Code Modifier Level of Impairment Assistance   6 % Total / Unable   7 - 9 CM 80 - 100% Maximal Assist   10 - 14 CL 60 - 80% Moderate Assist   15 - 19 CK 40 - 60% Moderate Assist   20 - 22 CJ 20 - 40% Minimal Assist   23 CI 1-20% SBA / CGA   24 CH 0% Independent/ Mod I     Patient left up in chair with all lines intact, call button in reach and rn notified.    Assessment:   Gina Jenkins is a 42 y.o. female with a medical diagnosis of L MCA stroke.  Patient was completely independent prior to admit and helped care for her father who is disabled from a stroke.  She now requires assistance with bed mobility, transfers, standing balance and gait.  Pt demonstrates decreased strength, impaired sensation, decreased functional mobility and below deficits.  Patient  appears very motivated to progress and return to caring for her 3 children.  Patient is interested in discharging home.  She will require continued skilled therapy services to progress mobility and gait prior to returning home.     Rehab identified problem list/impairments: Rehab identified problem list/impairments: weakness, gait instability, decreased upper extremity function, decreased lower extremity function, impaired balance, impaired sensation, impaired self care skills, impaired functional mobilty    Rehab potential is good.    Activity tolerance: Good    Discharge recommendations: Discharge Facility/Level Of Care Needs:  (SS SNF vs home with 24 hour assist and HH pending progress with gait. )     Barriers to discharge: Barriers to Discharge: None    Equipment recommendations: Equipment Needed After Discharge:  (RW)     GOALS:    Physical Therapy Goals        Problem: Physical Therapy Goal    Goal Priority Disciplines Outcome Goal Variances Interventions   Physical Therapy Goal     PT/OT, PT Ongoing (interventions implemented as appropriate)     Description:  Goals to be met by: 10/20/2017     Patient will increase functional independence with mobility by performin. Supine to sit with Stand-by Assistance  2. Sit to supine with Stand-by Assistance  3. Sit to stand transfer with Stand-by Assistance using RW as needed  4. Bed to chair transfer with Stand-by Assistance using RW as needed   5. Gait  x 100 feet with Stand-by Assistance using Rolling Walker.                       PLAN:    Patient to be seen 6 x/week to address the above listed problems via gait training, therapeutic activities, therapeutic exercises, neuromuscular re-education  Plan of Care expires: 17  Plan of Care reviewed with: patient          Chrystal Mcintosh, PT  10/04/2017

## 2017-10-04 NOTE — HOSPITAL COURSE
10/3/17:  Passed bedside swallow evaluation.  SLP recommending regular diet and thin liquids.  PT and OT evaluations pending.   10/4/17: Evaluated by therapy.  Sit to stand Princess & RW.  Ambulated 25 ft. Princess & RW with significant gait deviations.  UBD SBA and LBD SBA.  Grooming: Princess.

## 2017-10-04 NOTE — PLAN OF CARE
[id number] - [place], [place] - [address] AT [facility] [address]  [address]  [place] [address]  Phone: [phone] Fax: [phone]    This Cm spoke with patient at bedside.       10/04/17 1348   Discharge Assessment   Assessment Type Discharge Planning Assessment   Confirmed/corrected address and phone number on facesheet? Yes   Assessment information obtained from? Patient   Expected Length of Stay (days) 3   Communicated expected length of stay with patient/caregiver yes   Prior to hospitilization cognitive status: Alert/Oriented   Prior to hospitalization functional status: Independent   Current cognitive status: Alert/Oriented   Current Functional Status: Independent   Facility Arrived From: (via ambulance from [facility])   Lives With parent(s)   Able to Return to Prior Arrangements yes   Is patient able to care for self after discharge? Yes   Who are your caregiver(s) and their phone number(s)? (mother Miladys Davies [phone])   Patient's perception of discharge disposition home or selfcare   Readmission Within The Last 30 Days no previous admission in last 30 days   Patient currently being followed by outpatient case management? No   Patient currently receives any other outside agency services? No   Equipment Currently Used at Home none   Do you have any problems affording any of your prescribed medications? No   Is the patient taking medications as prescribed? yes   Does the patient have transportation home? Yes   Transportation Available family or friend will provide   Does the patient receive services at the [facility]? No   Discharge Plan A Home with family   Discharge Plan B Home   Patient/Family In Agreement With Plan yes   Does the patient have transportation to healthcare appointments? Yes   Readmission Questionnaire   Have you felt down, depressed, or hopeless? 1   Have you felt little interest or pleasure in doing things? 0        Nicole Peterson RN/DARREL  [phone]  [facility]

## 2017-10-04 NOTE — PLAN OF CARE
Problem: Occupational Therapy Goal  Goal: Occupational Therapy Goal  Goals to be met by: 10/11/17     Patient will increase functional independence with ADLs by performing:    UE Dressing with Modified [place].  LE Dressing with Modified [place].  Grooming while standing with Modified [place].  Toileting from toilet with Modified [place] for hygiene and clothing management.   Rolling to Bilateral with Modified [place].   Supine to sit with Modified [place].  Stand pivot transfers with Modified [place].    Outcome: Ongoing (interventions implemented as appropriate)  OT eval completed.

## 2017-10-04 NOTE — SUBJECTIVE & OBJECTIVE
Past Medical History:   Diagnosis Date    CHF (congestive heart failure)     H/O coronary angioplasty     Hypertension      Past Surgical History:   Procedure Laterality Date    CORONARY ANGIOPLASTY WITH STENT PLACEMENT       Review of patient's allergies indicates:   Allergen Reactions    Bactrim [sulfamethoxazole-trimethoprim] Rash       Scheduled Medications:    amlodipine  10 mg Oral Daily    atorvastatin  40 mg Oral Daily    carvedilol  12.5 mg Oral BID WM    heparin (porcine)  5,000 Units Subcutaneous Q8H    hydrochlorothiazide  25 mg Oral Daily    lisinopril  10 mg Oral Daily    senna-docusate 8.6-50 mg  1 tablet Oral BID    sodium chloride 0.9%  3 mL Intravenous Q8H       PRN Medications: acetaminophen, labetalol, magnesium oxide, magnesium oxide, ondansetron, potassium chloride 10%, potassium chloride 10%, potassium chloride 10%, potassium, sodium phosphates, potassium, sodium phosphates, potassium, sodium phosphates    Family History     None        Social History Main Topics    Smoking status: Current Every Day Smoker     Packs/day: 0.50     Types: Cigarettes     Last attempt to quit: 2/16/2016    Smokeless tobacco: Never Used    Alcohol use Yes      Comment: occassionally    Drug use: No    Sexual activity: Yes     Partners: Male     Birth control/ protection: None     Review of Systems   Constitutional: Negative for chills, fatigue and fever.   HENT: Negative for drooling, hearing loss, trouble swallowing and voice change.    Eyes: Negative for pain and visual disturbance.   Respiratory: Negative for cough, shortness of breath and wheezing.    Cardiovascular: Negative for chest pain and palpitations.   Gastrointestinal: Negative for abdominal distention, nausea and vomiting.   Genitourinary: Negative for difficulty urinating and flank pain.   Musculoskeletal: Positive for gait problem. Negative for arthralgias, back pain, myalgias and neck pain.   Skin: Negative for rash and wound.    Neurological: Positive for weakness and numbness. Negative for dizziness and headaches.   Psychiatric/Behavioral: Negative for agitation and hallucinations. The patient is not nervous/anxious.      Objective:     Vital Signs (Most Recent):  Temp: 98.5 °F (36.9 °C) (10/04/17 0702)  Pulse: 73 (10/04/17 1000)  Resp: (!) 26 (10/04/17 1000)  BP: (!) 143/81 (10/04/17 1000)  SpO2: 100 % (10/04/17 1000)    Vital Signs (24h Range):  Temp:  [98.5 °F (36.9 °C)-99 °F (37.2 °C)] 98.5 °F (36.9 °C)  Pulse:  [66-82] 73  Resp:  [15-32] 26  SpO2:  [98 %-100 %] 100 %  BP: (122-165)/() 143/81     Body mass index is 24.95 kg/m².    Physical Exam   Constitutional: She is oriented to person, place, and time. She appears well-developed and well-nourished. No distress.   HENT:   Head: Normocephalic and atraumatic.   Right Ear: External ear normal.   Left Ear: External ear normal.   Nose: Nose normal.   Eyes: Right eye exhibits no discharge. Left eye exhibits no discharge. No scleral icterus.   Neck: Normal range of motion.   Cardiovascular: Normal rate, regular rhythm and intact distal pulses.    Pulmonary/Chest: Effort normal. No respiratory distress. She has no wheezes.   Abdominal: Soft. She exhibits no distension. There is no tenderness.   Musculoskeletal: Normal range of motion. She exhibits no edema or tenderness.   Neurological: She is alert and oriented to person, place, and time.   -  Mental Status:  AAOx3.  Follows commands.  Answers correct age and .  Recent and remote memory intact.  Recalls 3/3 objects.  No neglect.    -  Speech and language:  no aphasia or dysarthria.    -  Facial movement (CN VII): mild facial droop  -  Coordination:  Finger to nose exam:  RUE normal, LUE normal.  -  Motor:  Mild R pronator drift. RUE: 4/5.  LUE: 5/5.  RLE: 4/5.  LLE: 5/5.  -  Tone:  Normal.   -  Sensory:  Intact to light touch and pin prick on left, decreased on right.   Skin: Skin is warm and dry. No rash noted.   Psychiatric:  She has a normal mood and affect. Her behavior is normal. Thought content normal.   Vitals reviewed.    NEUROLOGICAL EXAMINATION:     MENTAL STATUS   Oriented to person, place, and time.       Diagnostic Results:   Labs: Reviewed  X-Ray: Reviewed  CT: Reviewed  MRI: Reviewed  CTA: Reviewed

## 2017-10-04 NOTE — ASSESSMENT & PLAN NOTE
S/p TPA 10/3 0900; MRI brain 10/4 negative for acute infarction. Continues to have residual RLE weakness  - start aspirin 81mg daily

## 2017-10-04 NOTE — PLAN OF CARE
Problem: Patient Care Overview  Goal: Plan of Care Review  Outcome: Ongoing (interventions implemented as appropriate)  POC reviewed with pt and  at 0430. Pt and  verbalized understanding. Questions and concerns addressed. No acute events overnight. Post tPA MRI complete. Pt progressing toward goals. Will continue to monitor. See flowsheets for full assessment and VS info

## 2017-10-04 NOTE — PLAN OF CARE
Problem: Patient Care Overview  Goal: Plan of Care Review  Outcome: Ongoing (interventions implemented as appropriate)  Nutrition assessment completed. Please see RD note for details.

## 2017-10-05 VITALS
SYSTOLIC BLOOD PRESSURE: 111 MMHG | WEIGHT: 136.38 LBS | HEART RATE: 75 BPM | HEIGHT: 62 IN | RESPIRATION RATE: 18 BRPM | BODY MASS INDEX: 25.1 KG/M2 | DIASTOLIC BLOOD PRESSURE: 75 MMHG | TEMPERATURE: 98 F | OXYGEN SATURATION: 100 %

## 2017-10-05 PROBLEM — R30.0 DYSURIA: Status: ACTIVE | Noted: 2017-10-05

## 2017-10-05 PROBLEM — I50.32 CHRONIC DIASTOLIC HEART FAILURE: Chronic | Status: ACTIVE | Noted: 2017-10-03

## 2017-10-05 PROBLEM — R26.9 GAIT ABNORMALITY: Status: ACTIVE | Noted: 2017-10-05

## 2017-10-05 LAB
ALBUMIN SERPL BCP-MCNC: 3 G/DL
ALP SERPL-CCNC: 121 U/L
ALT SERPL W/O P-5'-P-CCNC: 47 U/L
AMPHETAMINES SERPL QL: NEGATIVE
ANION GAP SERPL CALC-SCNC: 11 MMOL/L
ANISOCYTOSIS BLD QL SMEAR: SLIGHT
AST SERPL-CCNC: 41 U/L
BARBITURATES SERPL QL SCN: NEGATIVE
BASOPHILS # BLD AUTO: 0.05 K/UL
BASOPHILS NFR BLD: 0.9 %
BENZODIAZ SERPL QL: NEGATIVE
BILIRUB SERPL-MCNC: 0.2 MG/DL
BILIRUB UR QL STRIP: NEGATIVE
BUN SERPL-MCNC: 13 MG/DL
CALCIUM SERPL-MCNC: 9.6 MG/DL
CANNABINOIDS SERPL QL: NEGATIVE
CHLORIDE SERPL-SCNC: 102 MMOL/L
CLARITY UR REFRACT.AUTO: ABNORMAL
CO2 SERPL-SCNC: 27 MMOL/L
COCAINE, BLOOD: NEGATIVE
COLOR UR AUTO: YELLOW
CREAT SERPL-MCNC: 0.9 MG/DL
DIFFERENTIAL METHOD: NORMAL
EOSINOPHIL # BLD AUTO: 0.3 K/UL
EOSINOPHIL NFR BLD: 4.5 %
ERYTHROCYTE [DISTWIDTH] IN BLOOD BY AUTOMATED COUNT: 14 %
EST. GFR  (AFRICAN AMERICAN): >60 ML/MIN/1.73 M^2
EST. GFR  (NON AFRICAN AMERICAN): >60 ML/MIN/1.73 M^2
ETHANOL SERPL-MCNC: NEGATIVE MG/DL
GLUCOSE SERPL-MCNC: 120 MG/DL
GLUCOSE UR QL STRIP: NEGATIVE
HCT VFR BLD AUTO: 37.9 %
HGB BLD-MCNC: 13 G/DL
HGB UR QL STRIP: ABNORMAL
HYPOCHROMIA BLD QL SMEAR: NORMAL
KETONES UR QL STRIP: NEGATIVE
LEUKOCYTE ESTERASE UR QL STRIP: ABNORMAL
LYMPHOCYTES # BLD AUTO: 2 K/UL
LYMPHOCYTES NFR BLD: 34.4 %
MAGNESIUM SERPL-MCNC: 1.5 MG/DL
MCH RBC QN AUTO: 30.4 PG
MCHC RBC AUTO-ENTMCNC: 34.3 G/DL
MCV RBC AUTO: 89 FL
METHADONE SERPL QL SCN: NEGATIVE
MICROSCOPIC COMMENT: ABNORMAL
MONOCYTES # BLD AUTO: 0.6 K/UL
MONOCYTES NFR BLD: 10.1 %
NEUTROPHILS # BLD AUTO: 2.9 K/UL
NEUTROPHILS NFR BLD: 50.1 %
NITRITE UR QL STRIP: NEGATIVE
OPIATES SERPL QL SCN: NEGATIVE
OVALOCYTES BLD QL SMEAR: NORMAL
PCP SERPL QL SCN: NEGATIVE
PH UR STRIP: 6 [PH] (ref 5–8)
PHOSPHATE SERPL-MCNC: 4.2 MG/DL
PLATELET # BLD AUTO: 220 K/UL
PMV BLD AUTO: 10.7 FL
POIKILOCYTOSIS BLD QL SMEAR: SLIGHT
POLYCHROMASIA BLD QL SMEAR: NORMAL
POTASSIUM SERPL-SCNC: 3.2 MMOL/L
PROPOXYPH SERPL QL: NEGATIVE
PROT SERPL-MCNC: 6.7 G/DL
PROT UR QL STRIP: NEGATIVE
RBC # BLD AUTO: 4.27 M/UL
RBC #/AREA URNS AUTO: 2 /HPF (ref 0–4)
SODIUM SERPL-SCNC: 140 MMOL/L
SP GR UR STRIP: 1.01 (ref 1–1.03)
SQUAMOUS #/AREA URNS AUTO: 9 /HPF
URN SPEC COLLECT METH UR: ABNORMAL
UROBILINOGEN UR STRIP-ACNC: NEGATIVE EU/DL
WBC # BLD AUTO: 5.82 K/UL
WBC #/AREA URNS AUTO: 13 /HPF (ref 0–5)

## 2017-10-05 PROCEDURE — 99232 SBSQ HOSP IP/OBS MODERATE 35: CPT | Mod: ,,, | Performed by: NURSE PRACTITIONER

## 2017-10-05 PROCEDURE — 80053 COMPREHEN METABOLIC PANEL: CPT

## 2017-10-05 PROCEDURE — 97116 GAIT TRAINING THERAPY: CPT

## 2017-10-05 PROCEDURE — 83735 ASSAY OF MAGNESIUM: CPT

## 2017-10-05 PROCEDURE — 25000003 PHARM REV CODE 250: Performed by: STUDENT IN AN ORGANIZED HEALTH CARE EDUCATION/TRAINING PROGRAM

## 2017-10-05 PROCEDURE — 90686 IIV4 VACC NO PRSV 0.5 ML IM: CPT | Performed by: PSYCHIATRY & NEUROLOGY

## 2017-10-05 PROCEDURE — 63600175 PHARM REV CODE 636 W HCPCS: Performed by: PSYCHIATRY & NEUROLOGY

## 2017-10-05 PROCEDURE — 25500020 PHARM REV CODE 255: Performed by: ANESTHESIOLOGY

## 2017-10-05 PROCEDURE — 85025 COMPLETE CBC W/AUTO DIFF WBC: CPT

## 2017-10-05 PROCEDURE — 63600175 PHARM REV CODE 636 W HCPCS: Performed by: STUDENT IN AN ORGANIZED HEALTH CARE EDUCATION/TRAINING PROGRAM

## 2017-10-05 PROCEDURE — 99233 SBSQ HOSP IP/OBS HIGH 50: CPT | Mod: ,,, | Performed by: PSYCHIATRY & NEUROLOGY

## 2017-10-05 PROCEDURE — A9585 GADOBUTROL INJECTION: HCPCS | Performed by: ANESTHESIOLOGY

## 2017-10-05 PROCEDURE — 25000003 PHARM REV CODE 250: Performed by: PHYSICIAN ASSISTANT

## 2017-10-05 PROCEDURE — 84100 ASSAY OF PHOSPHORUS: CPT

## 2017-10-05 PROCEDURE — 90471 IMMUNIZATION ADMIN: CPT | Performed by: PSYCHIATRY & NEUROLOGY

## 2017-10-05 PROCEDURE — 97535 SELF CARE MNGMENT TRAINING: CPT

## 2017-10-05 PROCEDURE — 3E0234Z INTRODUCTION OF SERUM, TOXOID AND VACCINE INTO MUSCLE, PERCUTANEOUS APPROACH: ICD-10-PCS | Performed by: ANESTHESIOLOGY

## 2017-10-05 PROCEDURE — A4216 STERILE WATER/SALINE, 10 ML: HCPCS | Performed by: STUDENT IN AN ORGANIZED HEALTH CARE EDUCATION/TRAINING PROGRAM

## 2017-10-05 PROCEDURE — 97110 THERAPEUTIC EXERCISES: CPT

## 2017-10-05 PROCEDURE — 97530 THERAPEUTIC ACTIVITIES: CPT

## 2017-10-05 RX ORDER — POTASSIUM CHLORIDE 20 MEQ/1
40 TABLET, EXTENDED RELEASE ORAL 2 TIMES DAILY
Status: DISCONTINUED | OUTPATIENT
Start: 2017-10-05 | End: 2017-10-05 | Stop reason: HOSPADM

## 2017-10-05 RX ORDER — ATORVASTATIN CALCIUM 40 MG/1
40 TABLET, FILM COATED ORAL DAILY
Qty: 90 TABLET | Refills: 3 | Status: SHIPPED | OUTPATIENT
Start: 2017-10-06 | End: 2023-11-20

## 2017-10-05 RX ORDER — CIPROFLOXACIN 250 MG/1
250 TABLET, FILM COATED ORAL EVERY 12 HOURS
Status: DISCONTINUED | OUTPATIENT
Start: 2017-10-05 | End: 2017-10-05 | Stop reason: HOSPADM

## 2017-10-05 RX ORDER — MAGNESIUM SULFATE HEPTAHYDRATE 40 MG/ML
2 INJECTION, SOLUTION INTRAVENOUS ONCE
Status: COMPLETED | OUTPATIENT
Start: 2017-10-05 | End: 2017-10-05

## 2017-10-05 RX ORDER — CIPROFLOXACIN 250 MG/1
250 TABLET, FILM COATED ORAL EVERY 12 HOURS
Qty: 6 TABLET | Refills: 0 | Status: SHIPPED | OUTPATIENT
Start: 2017-10-05 | End: 2017-11-03

## 2017-10-05 RX ORDER — GADOBUTROL 604.72 MG/ML
6 INJECTION INTRAVENOUS
Status: COMPLETED | OUTPATIENT
Start: 2017-10-05 | End: 2017-10-05

## 2017-10-05 RX ADMIN — CIPROFLOXACIN HYDROCHLORIDE 250 MG: 250 TABLET, FILM COATED ORAL at 01:10

## 2017-10-05 RX ADMIN — ASPIRIN 81 MG CHEWABLE TABLET 81 MG: 81 TABLET CHEWABLE at 08:10

## 2017-10-05 RX ADMIN — AMLODIPINE BESYLATE 10 MG: 10 TABLET ORAL at 08:10

## 2017-10-05 RX ADMIN — OXYCODONE HYDROCHLORIDE 5 MG: 5 TABLET ORAL at 12:10

## 2017-10-05 RX ADMIN — STANDARDIZED SENNA CONCENTRATE AND DOCUSATE SODIUM 1 TABLET: 8.6; 5 TABLET, FILM COATED ORAL at 08:10

## 2017-10-05 RX ADMIN — HEPARIN SODIUM 5000 UNITS: 5000 INJECTION, SOLUTION INTRAVENOUS; SUBCUTANEOUS at 01:10

## 2017-10-05 RX ADMIN — POTASSIUM CHLORIDE 40 MEQ: 1500 TABLET, EXTENDED RELEASE ORAL at 08:10

## 2017-10-05 RX ADMIN — ATORVASTATIN CALCIUM 40 MG: 20 TABLET, FILM COATED ORAL at 08:10

## 2017-10-05 RX ADMIN — INFLUENZA A VIRUS A/MICHIGAN/45/2015 X-275 (H1N1) ANTIGEN (FORMALDEHYDE INACTIVATED), INFLUENZA A VIRUS A/HONG KONG/4801/2014 X-263B (H3N2) ANTIGEN (FORMALDEHYDE INACTIVATED), INFLUENZA B VIRUS B/PHUKET/3073/2013 ANTIGEN (FORMALDEHYDE INACTIVATED), AND INFLUENZA B VIRUS B/BRISBANE/60/2008 ANTIGEN (FORMALDEHYDE INACTIVATED) 0.5 ML: 15; 15; 15; 15 INJECTION, SUSPENSION INTRAMUSCULAR at 01:10

## 2017-10-05 RX ADMIN — LISINOPRIL 10 MG: 10 TABLET ORAL at 08:10

## 2017-10-05 RX ADMIN — Medication 3 ML: at 05:10

## 2017-10-05 RX ADMIN — GADOBUTROL 6 ML: 604.72 INJECTION INTRAVENOUS at 12:10

## 2017-10-05 RX ADMIN — ACETAMINOPHEN 650 MG: 325 TABLET ORAL at 08:10

## 2017-10-05 RX ADMIN — CARVEDILOL 12.5 MG: 12.5 TABLET, FILM COATED ORAL at 08:10

## 2017-10-05 RX ADMIN — MAGNESIUM SULFATE IN WATER 2 G: 40 INJECTION, SOLUTION INTRAVENOUS at 09:10

## 2017-10-05 RX ADMIN — HYDROCHLOROTHIAZIDE 25 MG: 25 TABLET ORAL at 08:10

## 2017-10-05 RX ADMIN — HEPARIN SODIUM 5000 UNITS: 5000 INJECTION, SOLUTION INTRAVENOUS; SUBCUTANEOUS at 05:10

## 2017-10-05 NOTE — PT/OT/SLP PROGRESS
Occupational Therapy  Treatment    Gina Jenkins   MRN: [id number]   Admitting Diagnosis: Thrombotic stroke involving left middle cerebral artery    OT Date of Treatment: 10/05/17   OT Start Time: 0806  OT Stop Time: 0849  OT Total Time (min): 43 min    Billable Minutes:  Self Care/Home Management 15, Therapeutic Activity 15 and Therapeutic Exercise 13    General Precautions: Standard, aspiration, fall (cardiac)    Do you have any cultural, spiritual, Taoist conflicts, given your current situation?: Mandaeism    Subjective:  Communicated with RN prior to session.  Pt reported that she did not realize that her face looked so different.  Pain/Comfort  Pain Rating 1: 0/10  Pain Rating Post-Intervention 1: 0/10    Objective:  Patient found with: telemetry     Functional Mobility:  Bed Mobility:  Supine to Sit: Modified Independent    Transfers:   Sit <> Stand Assistance:  (SBA-CGA from EOB & toilet)  Sit <> Stand Assistive Device: Rolling Walker  Bed <> Chair Technique: Stand Pivot  Bed <> Chair Transfer Assistance: Contact Guard Assistance  Bed <> Chair Assistive Device: Rolling Walker  Toilet Transfer Technique: Stand Pivot  Toilet Transfer Assistance: Stand By Assistance  Toilet Transfer Assistive Device: Rolling Walker    Functional Ambulation: Pt required SBA-Min (A) with functional mobility to bathroom & back using RW (pt required increased (A) when emotionally upset by how her face looked (droop).).    Activities of Daily Living:     UE Dressing Level of Assistance: Stand by assistance (donning gown around back while seated EOB)  LE Dressing Level of Assistance: Stand by assistance (donning socks seated EOB)  Grooming Position: Standing at sink  Grooming Level of Assistance: Stand by assistance  Toileting Where Assessed: Toilet  Toileting Level of Assistance: Stand by assistance     Therapeutic Activities and Exercises:  Pt performed RUE AROM in all planes x 10 reps each while seated EOB.  Provided education  "regarding facial exercises with pt performing suggested exercises during session.  Provided education regarding neuromuscular re-education in relation to face, UE & LE with pt verbalizing understanding.  Provided encouragement due to pt emotionally tearful on this date after seeing her facial droop for the first time on this date.  Provided education on need for (A) with ambulation and bathing upon discharge home as well as education via both verbal & demonstrated instruction on proper handling of RW during transfers with pt verbalizing understanding. Pt had no further questions & when asked whether there were any concerns pt reported none.      AM-PAC 6 CLICK ADL   How much help from another person does this patient currently need?   1 = Unable, Total/Dependent Assistance  2 = A lot, Maximum/Moderate Assistance  3 = A little, Minimum/Contact Guard/Supervision  4 = None, Modified [place]/Independent    Putting on and taking off regular lower body clothing? : 3  Bathing (including washing, rinsing, drying)?: 3  Toileting, which includes using toilet, bedpan, or urinal? : 3  Putting on and taking off regular upper body clothing?: 3  Taking care of personal grooming such as brushing teeth?: 3  Eating meals?: 3  Total Score: 18     AM-PAC Raw Score CMS "G-Code Modifier Level of Impairment Assistance   6 % Total / Unable   7 - 8 CM 80 - 100% Maximal Assist   9-13 CL 60 - 80% Moderate Assist   14 - 19 CK 40 - 60% Moderate Assist   20 - 22 CJ 20 - 40% Minimal Assist   23 CI 1-20% SBA / CGA   24 CH 0% Independent/ Mod I       Patient left up in chair with all lines intact, call button in reach, RN notified and white board updated.    ASSESSMENT:  Gina Jenkins is a 42 y.o. female with a medical diagnosis of Thrombotic stroke involving left middle cerebral artery and presents with good participation and motivation.  Pt continues to require (A) with activities in standing & safety.    Rehab identified problem " list/impairments: Rehab identified problem list/impairments: weakness, impaired sensation, impaired self care skills, impaired functional mobilty, impaired balance, decreased upper extremity function, decreased lower extremity function    Rehab potential is good.    Activity tolerance: Good    Discharge recommendations: Discharge Facility/Level Of Care Needs: home health OT     GOALS:    Occupational Therapy Goals        Problem: Occupational Therapy Goal    Goal Priority Disciplines Outcome Interventions   Occupational Therapy Goal     OT, PT/OT Ongoing (interventions implemented as appropriate)    Description:  Goals to be met by: 10/11/17     Patient will increase functional independence with ADLs by performing:    UE Dressing with Modified [place].  LE Dressing with Modified [place].  Grooming while standing with Modified [place].  Toileting from toilet with Modified [place] for hygiene and clothing management.   Rolling to Bilateral with Modified [place].   Supine to sit with Modified [place].  Stand pivot transfers with Modified [place].                      Plan:  Patient to be seen 5 x/week to address the above listed problems via self-care/home management, neuromuscular re-education, cognitive retraining, sensory integration, therapeutic activities, therapeutic exercises  Plan of Care expires: 11/03/17  Plan of Care reviewed with: patient         TRINO Velázquez  10/05/2017

## 2017-10-05 NOTE — NURSING
Patient has discharge orders. Per case management, patient is to stay until walker is available for patient to take home.

## 2017-10-05 NOTE — HOSPITAL COURSE
10/3/17:  Passed bedside swallow evaluation.  SLP recommending regular diet and thin liquids.    10/4/17: Imaging remains negative  10/5/17:  Pt reports her right sided numbness has resolved. Complaining of dysuria, frequency, and L sided flank pain this AM. Started 3 day course of cipro. PT/OT recommending discharge home. Pt ios ready for discharge home today.

## 2017-10-05 NOTE — PLAN OF CARE
10/05/2017      Gina Jenkins  [address] [address]          [facility] Medicine Dept.  [facility] [address] [place] [address]  [phone] after hours  [phone] fax Diagnosis:  Thrombotic stroke involving left middle cerebral artery                         Debility    PT/OT/SLP treat and evaluate         __________________________  Marva Damon MD  10/05/2017

## 2017-10-05 NOTE — PROGRESS NOTES
[facility]  Physical Medicine & Rehab  Progress Note    Patient Name: Gina Jenkins  MRN: [id number]  Admission Date: 10/3/2017  Length of Stay: 2 days  Attending Physician: Brian Shaikh MD  Primary Care Provider: Jeffery Humphrey MD    Subjective:     Principal Problem:Thrombotic stroke involving left middle cerebral artery      (10/05/2017): Patient is seen for follow-up rehab evaluation and recommendations.  No acute events over night.  Participating with therapy.     HPI, Past Medical, Surgical, Family, and Social History remains the same as documented in the initial encounter.      Hospital Course:   10/3/17:  Passed bedside swallow evaluation.  SLP recommending regular diet and thin liquids.  PT and OT evaluations pending.   10/4/17: Evaluated by therapy.  Sit to stand Princess & RW.  Ambulated 25 ft. Princess & RW with significant gait deviations.  UBD SBA and LBD SBA.  Grooming: Princess.       Past Medical History:   Diagnosis Date    CHF (congestive heart failure)     H/O coronary angioplasty     Hypertension      Past Surgical History:   Procedure Laterality Date    CORONARY ANGIOPLASTY WITH STENT PLACEMENT       Review of patient's allergies indicates:   Allergen Reactions    Bactrim [sulfamethoxazole-trimethoprim] Rash       Scheduled Medications:    amlodipine  10 mg Oral Daily    aspirin  81 mg Oral Daily    atorvastatin  40 mg Oral Daily    carvedilol  12.5 mg Oral BID WM    ciprofloxacin HCl  250 mg Oral Q12H    heparin (porcine)  5,000 Units Subcutaneous Q8H    hydrochlorothiazide  25 mg Oral Daily    lisinopril  10 mg Oral Daily    potassium chloride  40 mEq Oral BID    senna-docusate 8.6-50 mg  1 tablet Oral BID    sodium chloride 0.9%  3 mL Intravenous Q8H       PRN Medications: acetaminophen, labetalol, ondansetron    Family History     None        Social History Main Topics    Smoking status: Current Every Day Smoker     Packs/day: 0.50     Types: Cigarettes      Last attempt to quit: 2/16/2016    Smokeless tobacco: Never Used    Alcohol use Yes      Comment: occassionally    Drug use: No    Sexual activity: Yes     Partners: Male     Birth control/ protection: None     Review of Systems   Constitutional: Negative for chills, fatigue and fever.   HENT: Negative for trouble swallowing and voice change.    Eyes: Negative for pain and visual disturbance.   Respiratory: Negative for cough, shortness of breath and wheezing.    Cardiovascular: Negative for chest pain and palpitations.   Gastrointestinal: Negative for abdominal distention and nausea.   Genitourinary: Negative for difficulty urinating and flank pain.   Musculoskeletal: Positive for gait problem. Negative for arthralgias, back pain and neck pain.   Skin: Negative for rash and wound.   Neurological: Positive for weakness and numbness. Negative for dizziness and headaches.   Psychiatric/Behavioral: Negative for agitation and hallucinations.     Objective:     Vital Signs (Most Recent):  Temp: 98 °F (36.7 °C) (10/05/17 1100)  Pulse: 74 (10/05/17 1100)  Resp: (!) 22 (10/05/17 1100)  BP: (!) 145/98 (10/05/17 1100)  SpO2: 100 % (10/05/17 1100)    Vital Signs (24h Range):  Temp:  [98 °F (36.7 °C)-98.6 °F (37 °C)] 98 °F (36.7 °C)  Pulse:  [63-75] 74  Resp:  [15-32] 22  SpO2:  [100 %] 100 %  BP: (127-161)/(74-98) 145/98     Body mass index is 24.95 kg/m².    Physical Exam   Constitutional: She is oriented to person, place, and time. She appears well-developed and well-nourished. No distress.   HENT:   Head: Normocephalic and atraumatic.   Eyes: Right eye exhibits no discharge. Left eye exhibits no discharge. No scleral icterus.   Neck: Normal range of motion.   Cardiovascular: Normal rate, regular rhythm and intact distal pulses.    Pulmonary/Chest: Effort normal. No respiratory distress. She has no wheezes.   Abdominal: Soft. She exhibits no distension. There is no tenderness.   Musculoskeletal: Normal range of motion.  She exhibits no edema or tenderness.   Neurological: She is alert and oriented to person, place, and time.   -  Mental Status:  AAOx3.  Follows commands.  Answers correct age and .  Recent and remote memory intact.  Recalls 3/3 objects.  No neglect.    -  Speech and language:  no aphasia or dysarthria.    -  Facial movement (CN VII): mild facial droop  -  Coordination:  Finger to nose exam:  RUE normal, LUE normal.  -  Motor:  Mild R pronator drift. RUE: 4/5.  LUE: 5/5.  RLE: 4/5.  LLE: 5/5.  -  Tone:  Normal.   -  Sensory:  Intact to light touch and pin prick on left, decreased on right.   Skin: Skin is warm and dry. No rash noted.   Psychiatric: She has a normal mood and affect. Her behavior is normal. Thought content normal.   Vitals reviewed.        Diagnostic Results:   Labs: Reviewed  US: Reviewed  CT: Reviewed  MRI: Reviewed    Assessment/Plan:      Gait abnormality    Recommendations  -  Encourage mobility, OOB in chair at least 3 hours per day, and early ambulation as appropriate  -  PT/OT evaluate and treat  -  Pain management  -  Monitor for and prevent skin breakdown and pressure ulcers  · Early mobility, repositioning/weight shifting every 20-30 minutes when sitting, turn patient every 2 hours, proper mattress/overlay and chair cushioning, pressure relief/heel protector boots  -  DVT prophylaxis:  [facility]  -  Reviewed discharge options (IP rehab, SNF, HH therapy, and OP therapy)          Aneurysm of left middle cerebral artery    -seen on CTA        Acute ischemic left MCA stroke    -  While in OSH, patient developed right sided weakness and right facial droop.  Tele-stroke consult completed.  CTH without acute pathology.  S/p tPA.  -  CTA without evidence of ischemic changes, intracranial hemorrhage, or major vascular occlusion.    -  MRI also negative for acute pathology.    Functional status: see hospital course  Cognitive/Speech/Language status:  SLP evaluation pending  Nutrition/Swallow Status:   Passed bedside swallow evaluation.  SLP recommending regular diet and thin liquids.    Recommendations  -  Encourage mobility, OOB in chair at least 3 hours per day, and early ambulation as appropriate   -  PT/OT evaluate and treat  -  SLP speech and cognitive evaluate and treat  -  Monitor sleep disturbances and establish consistent sleep-wake cycle  -  Monitor for bowel and bladder dysfunction  -  Monitor for shoulder pain and subluxation  -  Monitor for spasticity  -  Monitor for and prevent skin breakdown and pressure ulcers  · Early mobility, repositioning/weight shifting every 20-30 minutes when sitting, turn patient every 2 hours, proper mattress/overlay and chair cushioning, pressure relief/heel protector boots  -  DVT prophylaxis:  HAIM, SCD  -  Reviewed discharge options (IP rehab, SNF, HH therapy, and OP therapy)        Patient is progressing well with therapy. Recommend OTP therapy. Will sign off.  Please call with questions/concerns or re-consult if situation changes.      Bernadine Hoyt NP  Department of Physical Medicine & Rehab   [facility]

## 2017-10-05 NOTE — PT/OT/SLP PROGRESS
"Physical Therapy  Treatment    Gina Jenkins   MRN: [id number]   Admitting Diagnosis: Thrombotic stroke involving left middle cerebral artery    PT Received On: 10/05/17  PT Start Time: 1010     PT Stop Time: 1035    PT Total Time (min): 25 min       Billable Minutes:  Gait [id number]    Treatment Type: Treatment  PT/PTA: PT             General Precautions: Standard, aspiration, fall       Subjective:  Communicated with RN prior to session.  "You said I could go home.  Why are they telling me I have to go to a NH?"    Pain/Comfort  Pain Rating 1: 0/10  Pain Rating Post-Intervention 1: 0/10    Objective:   Patient found with: peripheral IV, telemetry  Patient found sitting up in chair with resident present.  She was visibly upset about confusion with d/c plan.  Therapist listened to patient's concerns, reminded her of conversation yesterday about the need for additional gait training, and redirected her to participate in therapy in order to expedite d.c plan.  Therapist educated patient on proper hand placement on RW during transfers. Gait training performed with RW multiple times to maximize retention of learning.      Functional Mobility:  Gait:    Patient ambulated 15 feet x 2 trials f/b 30 feet x 3 trials with RW and contact guard assist.    · Multiple trials performed in order for patient to practice managing RW when changing directions  · She demonstrated slow gait pattern with improved R knee control and no buckling.  R quad showing sings of fatigue toward the end of the gait trial but patient remained safe with AD.    · Therapist educated patient on curbs, ramps, and car transfers.     Therapeutic Activities and Exercises:  Therapist educated patient on d/c rec for OP PT, need for RW, car transfers, and curb training with RW.       AM-PAC 6 CLICK MOBILITY  How much help from another person does this patient currently need?   1 = Unable, Total/Dependent Assistance  2 = A lot, Maximum/Moderate Assistance  3 = A " little, Minimum/Contact Guard/Supervision  4 = None, Modified [place]/Independent    Turning over in bed (including adjusting bedclothes, sheets and blankets)?: 4  Sitting down on and standing up from a chair with arms (e.g., wheelchair, bedside commode, etc.): 4  Moving from lying on back to sitting on the side of the bed?: 4  Moving to and from a bed to a chair (including a wheelchair)?: 3  Need to walk in hospital room?: 3  Climbing 3-5 steps with a railing?: 3  Total Score: 21    AM-PAC Raw Score CMS G-Code Modifier Level of Impairment Assistance   6 % Total / Unable   7 - 9 CM 80 - 100% Maximal Assist   10 - 14 CL 60 - 80% Moderate Assist   15 - 19 CK 40 - 60% Moderate Assist   20 - 22 CJ 20 - 40% Minimal Assist   23 CI 1-20% SBA / CGA   24 CH 0% Independent/ Mod I     Patient left sitting up on couch with family present with all lines intact and rn present.    Assessment:  Gina Jenkins is a 42 y.o. female with a medical diagnosis of Thrombotic stroke involving left middle cerebral artery and presents with significant progress with gait.  She performed multiple trials of gait with RW and contact guard assist.  She demonstrated safety with balance, gait, and RW management.  Pt is safe to d/c home using RW and f/u with OP therapy. She has family support to assist her at time of d/c.     Rehab identified problem list/impairments: Rehab identified problem list/impairments: weakness, impaired self care skills, impaired functional mobilty, gait instability, decreased lower extremity function, impaired sensation, impaired balance    Rehab potential is excellent.    Activity tolerance: Excellent    Discharge recommendations: Discharge Facility/Level Of Care Needs:  (OP PT )     Barriers to discharge: Barriers to Discharge: None    Equipment recommendations: Equipment Needed After Discharge:  (RW)     GOALS:    Physical Therapy Goals        Problem: Physical Therapy Goal    Goal Priority Disciplines  Outcome Goal Variances Interventions   Physical Therapy Goal     PT/OT, PT Ongoing (interventions implemented as appropriate)     Description:  Goals to be met by: 10/20/2017     Patient will increase functional independence with mobility by performin. Supine to sit with Stand-by Assistance  2. Sit to supine with Stand-by Assistance  3. Sit to stand transfer with Stand-by Assistance using RW as needed - MET  4. Bed to chair transfer with Stand-by Assistance using RW as needed   5. Gait  x 100 feet with Stand-by Assistance using Rolling Walker.                        PLAN:    Patient to be seen 6 x/week  to address the above listed problems via gait training, therapeutic activities, therapeutic exercises, neuromuscular re-education  Plan of Care expires: 17  Plan of Care reviewed with: patient         Chrystal Mcintosh, PT  10/05/2017

## 2017-10-05 NOTE — ASSESSMENT & PLAN NOTE
Gina Jenkins is a 42 y.o. female with PMHx of HTN who presented to OSH with right sided numbness. s/p tPA and transferred to [facility] for post tPA monitoring. CTA negative for LVO. Imaging negative for acute infarct. ECHO unremarkable for LAE or wall motion abnormalities. Symptoms now resolved post tpa. Possible tpa averted or MRI negative stroke.     Antiplatelets: aspirin 81mg qd  Statins:lipitor 40 mg, LDL 67.   SBP <160  DVT ppx: SCDs, heparin ppx  OT and PT recommending discharge home  SLP - cardiac diet

## 2017-10-05 NOTE — PLAN OF CARE
Problem: Physical Therapy Goal  Goal: Physical Therapy Goal  Goals to be met by: 10/20/2017     Patient will increase functional independence with mobility by performin. Supine to sit with Stand-by Assistance  2. Sit to supine with Stand-by Assistance  3. Sit to stand transfer with Stand-by Assistance using RW as needed - MET  4. Bed to chair transfer with Stand-by Assistance using RW as needed   5. Gait  x 100 feet with Stand-by Assistance using Rolling Walker.      Outcome: Ongoing (interventions implemented as appropriate)  Patient progressing well.  She has met 1/5 initial PT goals.      Patient safe to d/c home with RW and f/u OP PT.     Chrystal Mcintosh, PT  10/5/2017  [phone] (pager)

## 2017-10-05 NOTE — ASSESSMENT & PLAN NOTE
Recommendations  -  Encourage mobility, OOB in chair at least 3 hours per day, and early ambulation as appropriate  -  PT/OT evaluate and treat  -  Pain management  -  Monitor for and prevent skin breakdown and pressure ulcers  · Early mobility, repositioning/weight shifting every 20-30 minutes when sitting, turn patient every 2 hours, proper mattress/overlay and chair cushioning, pressure relief/heel protector boots  -  DVT prophylaxis:  [facility]  -  Reviewed discharge options (IP rehab, SNF, HH therapy, and OP therapy)

## 2017-10-05 NOTE — PLAN OF CARE
Problem: Occupational Therapy Goal  Goal: Occupational Therapy Goal  Goals to be met by: 10/11/17     Patient will increase functional independence with ADLs by performing:    UE Dressing with Modified [place].  LE Dressing with Modified [place].  Grooming while standing with Modified [place].  Toileting from toilet with Modified [place] for hygiene and clothing management.   Rolling to Bilateral with Modified [place].   Supine to sit with Modified [place].  Stand pivot transfers with Modified [place].     Outcome: Ongoing (interventions implemented as appropriate)  Goals remain appropriate

## 2017-10-05 NOTE — ASSESSMENT & PLAN NOTE
S/p TPA 10/3 0900; MRI brain 10/4 negative for acute infarction. Continues to have residual RLE weakness  - continue aspirin 81mg daily

## 2017-10-05 NOTE — DISCHARGE SUMMARY
[facility]  Vascular Neurology  Comprehensive Stroke Center  Discharge Summary     Summary:     Admit Date: 10/3/2017 10:27 AM    Discharge Date and Time:  10/05/2017 11:16 AM    Attending Physician: Brian Shaikh MD     Discharge Provider: Marva Damon MD    History of Present Illness: Gina Jenkins is a 42 y.o. female with PMHx of HTN who presented to [facility] with CP and flank pain. Patient was admitted to [facility] hospital medicine service for further workup. Found to have UTI at OSH with ?pyelonephritis and received one dose of rocephin.  Underwent CTA chest and CT renal stone study that were negative for acute abnormalities.In addition, patient had hypocalcemia and hypokalemia on admission. Around 0530, the patient developed RSW with facial droop. She was seen via telemedicine and treated with tPA. Patient was transferred to [facility] 10/3 for further care. On arrival, patient reports improvement in her RSW but endorses that her R arm and leg still feel heavy.    Upon admission to [facility], CTA without evidence of ischemic changes, intracranial hemorrhage, or major vascular occlusion.  MRI also negative for acute pathology.     [place] mother and 3 children in a single story home without steps to enter.  Prior to admission, she was independent with ADLs, including driving, and mobility.  She is right handed.     Hospital Course (synopsis of major diagnoses, care, treatment, and services provided during the course of the hospital stay): 10/3/17:  Passed bedside swallow evaluation.  SLP recommending regular diet and thin liquids.    10/4/17: Imaging remains negative  10/5/17:  Pt reports her right sided numbness has resolved. Complaining of dysuria, frequency, and L sided flank pain this AM. Started 3 day course of cipro. PT/OT recommending discharge home. Pt ios ready for discharge home today.    Modified [place] Scale:   Timeline: At discharge  Modified  Grady Score: 0 - no symptoms        NIH Stroke Scale  Interval: Discharge  Level of Consciousness: 0 - alert  LOC Questions: 0 - answers both correctly  LOC Commands: 0 - performs both correctly  Best Gaze: 0 - normal  Visual: 0 - no visual loss  Facial Palsy: 0 - partial  Motor Left Arm: 0 - no drift  Motor Right Arm: 1 - drift  Motor Left Le - no drift  Motor Right Le - drift  Limb Ataxia: 0 - absent  Sensory: 0 - normal  Best Language: 0 - no aphasia  Dysarthria: 0 - mild to moderate dysarthria  Extinction and Inattention: 0 - no neglect  NIH Stroke Scale Total: 0    Assessment/Plan:     Interventions: IV t-PA    Complications: None    Neurological deficit at discharge: None     Disposition: Home or Self Care    Final Active Diagnoses:    Diagnosis Date Noted POA    PRINCIPAL PROBLEM:  Thrombotic stroke involving left middle cerebral artery [I63.312] 10/03/2017 Unknown    Dysuria [R30.0] 10/05/2017 Unknown    Aneurysm of left middle cerebral artery [I67.1] 10/04/2017 Unknown    Nicotine abuse [Z72.0] 10/04/2017 Unknown    Acute ischemic left MCA stroke [I63.512] 10/03/2017 Yes    Essential hypertension [I10] 2016 Yes      Problems Resolved During this Admission:    Diagnosis Date Noted Date Resolved POA    Flank pain [R10.9] 10/03/2017 10/04/2017 Unknown    Chest pain [R07.9] 2016 10/04/2017 Yes     * Thrombotic stroke involving left middle cerebral artery    Gina Jenkins is a 42 y.o. female with PMHx of HTN who presented to OSH with right sided numbness. s/p tPA and transferred to [facility] for post tPA monitoring. CTA negative for LVO. Imaging negative for acute infarct. ECHO unremarkable for LAE or wall motion abnormalities. Symptoms now resolved post tpa. Possible tpa averted or MRI negative stroke.     Antiplatelets: aspirin 81mg qd  Statins:lipitor 40 mg, LDL 67.   SBP <140  DVT ppx: SCDs, heparin ppx  OT and PT recommending discharge home  SLP - cardiac diet        Dysuria    -pt  treated for UTI with 1 day of rocephin at OSH  -pt today complaining of dysuria, frequncy, and L sided flank pain  -UA 13 WBC, 2+ leuk, UCx ordered  -cipro         Nicotine abuse    Stroke risk factor  - smoking cessation advised.         Aneurysm of left middle cerebral artery    Follow up with Dr. Jeffries in NSGY clinic on discharge        Essential hypertension    Stroke risk factor  Goal SBP <140 (normal)  -coreg 12.5mg BID  -lisinopril 10 mg qd  -Amlodipine 10mg qd  -HCTZ 25mg qd            Recommendations:     Post-discharge complication risks: Falls    Stroke Education given to: patient      Follow Up:  Follow-up Information     Jose G Spears MD In 1 month.    Specialty:  Neurosurgery  Contact information:  [facility] [address] [phone]                 Patient Instructions:     Ambulatory Referral to Physical/Occupational Therapy   Referral Priority: Routine Referral Type: Physical Medicine   Referral Reason: Specialty Services Required    Number of Visits Requested: 1        Medications:  Reconciled Home Medications:   Current Discharge Medication List      START taking these medications    Details   atorvastatin (LIPITOR) 40 MG tablet Take 1 tablet (40 mg total) by mouth once daily.  Qty: 90 tablet, Refills: 3      ciprofloxacin HCl (CIPRO) 250 MG tablet Take 1 tablet (250 mg total) by mouth every 12 (twelve) hours.  Qty: 6 tablet, Refills: 0         CONTINUE these medications which have NOT CHANGED    Details   amlodipine (NORVASC) 10 MG tablet Take 1 tablet (10 mg total) by mouth once daily.  Qty: 30 tablet, Refills: 0      aspirin (ECOTRIN) 81 MG EC tablet Take 81 mg by mouth as needed for Pain.      carvedilol (COREG) 12.5 MG tablet Take 1 tablet (12.5 mg total) by mouth 2 (two) times daily with meals.  Qty: 60 tablet, Refills: 11      hydrochlorothiazide (HYDRODIURIL) 25 MG tablet Take 1 tablet (25 mg total) by mouth once daily.  Qty: 30 tablet, Refills: 0       hydrocodone-acetaminophen 5-325mg (NORCO) 5-325 mg per tablet Take 1 tablet by mouth every 8 (eight) hours as needed.  Qty: 12 tablet, Refills: 0      ibuprofen (ADVIL,MOTRIN) 200 MG tablet Take 2 tablets (400 mg total) by mouth every 6 (six) hours as needed for Pain.  Qty: 30 tablet, Refills: 0      lisinopril 10 MG tablet Take 1 tablet (10 mg total) by mouth once daily.  Qty: 30 tablet, Refills: 0      ondansetron (ZOFRAN) 4 MG tablet Take 1 tablet (4 mg total) by mouth every 8 (eight) hours as needed for Nausea.  Qty: 8 tablet, Refills: 0      potassium chloride SA (K-DUR,KLOR-CON) 20 MEQ tablet Take 1 tablet (20 mEq total) by mouth 3 (three) times daily.  Qty: 30 tablet, Refills: 0      promethazine (PHENERGAN) 25 MG tablet Take 1 tablet (25 mg total) by mouth every 6 (six) hours as needed for Nausea.  Qty: 15 tablet, Refills: 0             Marva Damon MD  Comprehensive Stroke Center  Department of Vascular Neurology   [facility]

## 2017-10-05 NOTE — SUBJECTIVE & OBJECTIVE
Past Medical History:   Diagnosis Date    CHF (congestive heart failure)     H/O coronary angioplasty     Hypertension      Past Surgical History:   Procedure Laterality Date    CORONARY ANGIOPLASTY WITH STENT PLACEMENT       Review of patient's allergies indicates:   Allergen Reactions    Bactrim [sulfamethoxazole-trimethoprim] Rash       Scheduled Medications:    amlodipine  10 mg Oral Daily    aspirin  81 mg Oral Daily    atorvastatin  40 mg Oral Daily    carvedilol  12.5 mg Oral BID WM    ciprofloxacin HCl  250 mg Oral Q12H    heparin (porcine)  5,000 Units Subcutaneous Q8H    hydrochlorothiazide  25 mg Oral Daily    lisinopril  10 mg Oral Daily    potassium chloride  40 mEq Oral BID    senna-docusate 8.6-50 mg  1 tablet Oral BID    sodium chloride 0.9%  3 mL Intravenous Q8H       PRN Medications: acetaminophen, labetalol, ondansetron    Family History     None        Social History Main Topics    Smoking status: Current Every Day Smoker     Packs/day: 0.50     Types: Cigarettes     Last attempt to quit: 2/16/2016    Smokeless tobacco: Never Used    Alcohol use Yes      Comment: occassionally    Drug use: No    Sexual activity: Yes     Partners: Male     Birth control/ protection: None     Review of Systems   Constitutional: Negative for chills, fatigue and fever.   HENT: Negative for trouble swallowing and voice change.    Eyes: Negative for pain and visual disturbance.   Respiratory: Negative for cough, shortness of breath and wheezing.    Cardiovascular: Negative for chest pain and palpitations.   Gastrointestinal: Negative for abdominal distention and nausea.   Genitourinary: Negative for difficulty urinating and flank pain.   Musculoskeletal: Positive for gait problem. Negative for arthralgias, back pain and neck pain.   Skin: Negative for rash and wound.   Neurological: Positive for weakness and numbness. Negative for dizziness and headaches.   Psychiatric/Behavioral: Negative for  agitation and hallucinations.     Objective:     Vital Signs (Most Recent):  Temp: 98 °F (36.7 °C) (10/05/17 1100)  Pulse: 74 (10/05/17 1100)  Resp: (!) 22 (10/05/17 1100)  BP: (!) 145/98 (10/05/17 1100)  SpO2: 100 % (10/05/17 1100)    Vital Signs (24h Range):  Temp:  [98 °F (36.7 °C)-98.6 °F (37 °C)] 98 °F (36.7 °C)  Pulse:  [63-75] 74  Resp:  [15-32] 22  SpO2:  [100 %] 100 %  BP: (127-161)/(74-98) 145/98     Body mass index is 24.95 kg/m².    Physical Exam   Constitutional: She is oriented to person, place, and time. She appears well-developed and well-nourished. No distress.   HENT:   Head: Normocephalic and atraumatic.   Right Ear: External ear normal.   Left Ear: External ear normal.   Nose: Nose normal.   Eyes: Right eye exhibits no discharge. Left eye exhibits no discharge. No scleral icterus.   Neck: Normal range of motion.   Cardiovascular: Normal rate, regular rhythm and intact distal pulses.    Pulmonary/Chest: Effort normal. No respiratory distress. She has no wheezes.   Abdominal: Soft. She exhibits no distension. There is no tenderness.   Musculoskeletal: Normal range of motion. She exhibits no edema or tenderness.   Neurological: She is alert and oriented to person, place, and time.   -  Mental Status:  AAOx3.  Follows commands.  Answers correct age and .  Recent and remote memory intact.  Recalls 3/3 objects.  No neglect.    -  Speech and language:  no aphasia or dysarthria.    -  Facial movement (CN VII): mild facial droop  -  Coordination:  Finger to nose exam:  RUE normal, LUE normal.  -  Motor:  Mild R pronator drift. RUE: 4/5.  LUE: 5/5.  RLE: 4/5.  LLE: 5/5.  -  Tone:  Normal.   -  Sensory:  Intact to light touch and pin prick on left, decreased on right.   Skin: Skin is warm and dry. No rash noted.   Psychiatric: She has a normal mood and affect. Her behavior is normal. Thought content normal.   Vitals reviewed.    NEUROLOGICAL EXAMINATION:     MENTAL STATUS   Oriented to person, place, and  time.       Diagnostic Results:   Labs: Reviewed  US: Reviewed  CT: Reviewed  MRI: Reviewed

## 2017-10-05 NOTE — ASSESSMENT & PLAN NOTE
Continues to have RLE weakness and unable to ambulate without assistance 2 days s/p TPA and MRI brain showed no acute infarction. Residual RLE weakness concerning for spinal cord abnormality e.g. Demyelination.  - MRI cervical and thoracic spine with and without contrast

## 2017-10-05 NOTE — PLAN OF CARE
Problem: Patient Care Overview  Goal: Plan of Care Review  Outcome: Ongoing (interventions implemented as appropriate)  POC reviewed with pt at 0500. Pt verbalized understanding. Questions and concerns addressed. No acute events overnight. Awaiting transfer to step down. Pt progressing toward goals. Will continue to monitor. See flowsheets for full assessment and VS info

## 2017-10-05 NOTE — PLAN OF CARE
SW advised by PT that she is changing the rec to Outpt PT with a RW. SW cancelled referral to OSNF with note via [employer].    Maria Teresa Reyes, LEIF  Neurocritical Care   [facility]  [id number]

## 2017-10-05 NOTE — ASSESSMENT & PLAN NOTE
Stroke risk factor  Goal SBP <140 (normal)  -coreg 12.5mg BID  -lisinopril 10 mg qd  -Amlodipine 10mg qd  -HCTZ 25mg qd

## 2017-10-05 NOTE — SUBJECTIVE & OBJECTIVE
Past Medical History:   Diagnosis Date    CHF (congestive heart failure)     H/O coronary angioplasty     Hypertension      Past Surgical History:   Procedure Laterality Date    CORONARY ANGIOPLASTY WITH STENT PLACEMENT       History reviewed. No pertinent family history.  Social History   Substance Use Topics    Smoking status: Current Every Day Smoker     Packs/day: 0.50     Types: Cigarettes     Last attempt to quit: 2/16/2016    Smokeless tobacco: Never Used    Alcohol use Yes      Comment: occassionally     Review of patient's allergies indicates:   Allergen Reactions    Bactrim [sulfamethoxazole-trimethoprim] Rash     Medications: I have reviewed the current medication administration record.    Prescriptions Prior to Admission   Medication Sig Dispense Refill Last Dose    amlodipine (NORVASC) 10 MG tablet Take 1 tablet (10 mg total) by mouth once daily. 30 tablet 0     aspirin (ECOTRIN) 81 MG EC tablet Take 81 mg by mouth as needed for Pain.       carvedilol (COREG) 12.5 MG tablet Take 1 tablet (12.5 mg total) by mouth 2 (two) times daily with meals. 60 tablet 11     hydrochlorothiazide (HYDRODIURIL) 25 MG tablet Take 1 tablet (25 mg total) by mouth once daily. 30 tablet 0     hydrocodone-acetaminophen 5-325mg (NORCO) 5-325 mg per tablet Take 1 tablet by mouth every 8 (eight) hours as needed. (Patient not taking: Reported on 10/1/2017) 12 tablet 0 Not Taking at Unknown time    ibuprofen (ADVIL,MOTRIN) 200 MG tablet Take 2 tablets (400 mg total) by mouth every 6 (six) hours as needed for Pain. 30 tablet 0     lisinopril 10 MG tablet Take 1 tablet (10 mg total) by mouth once daily. (Patient taking differently: Take 10 mg by mouth once daily. ) 30 tablet 0     ondansetron (ZOFRAN) 4 MG tablet Take 1 tablet (4 mg total) by mouth every 8 (eight) hours as needed for Nausea. (Patient not taking: Reported on 10/1/2017) 8 tablet 0 Not Taking at Unknown time    potassium chloride SA  (K-DUR,KLOR-CON) 20 MEQ tablet Take 1 tablet (20 mEq total) by mouth 3 (three) times daily. 30 tablet 0     promethazine (PHENERGAN) 25 MG tablet Take 1 tablet (25 mg total) by mouth every 6 (six) hours as needed for Nausea. (Patient not taking: Reported on 10/1/2017) 15 tablet 0 Not Taking at Unknown time       Review of Systems   Constitutional: Negative for chills and fever.   Eyes: Negative for visual disturbance.   Respiratory: Negative for cough and shortness of breath.    Cardiovascular: Negative for chest pain and palpitations.   Gastrointestinal: Negative for nausea and vomiting.   Skin: Negative for rash.   Neurological: Negative for facial asymmetry, speech difficulty, weakness and numbness.   Psychiatric/Behavioral: Negative for agitation.     Objective:     Vital Signs (Most Recent):  Temp: 98.6 °F (37 °C) (10/05/17 0700)  Pulse: 67 (10/05/17 0700)  Resp: 20 (10/05/17 0700)  BP: (!) 160/84 (10/05/17 0700)  SpO2: 100 % (10/05/17 0700)    Vital Signs Range (Last 24H):  Temp:  [98.5 °F (36.9 °C)-98.6 °F (37 °C)]   Pulse:  [63-78]   Resp:  [15-32]   BP: (127-161)/(74-93)   SpO2:  [96 %-100 %]     Physical Exam   Constitutional: She is oriented to person, place, and time. She appears well-developed and well-nourished. No distress.   HENT:   Head: Normocephalic and atraumatic.   Eyes: EOM are normal. Pupils are equal, round, and reactive to light.   Cardiovascular: Normal rate.    Pulmonary/Chest: Effort normal. No respiratory distress.   Neurological: She is alert and oriented to person, place, and time.   Skin: Skin is warm and dry.   Vitals reviewed.      Neurological Exam:   LOC: alert and follows requests  Language: No aphasia  Speech: Dysarthria  Orientation: Person, Place, Time  Memory: Recent memory intact, Remote memory intact, Age correct, Month correct  Visual Fields (CN II): Full  EOM (CN III, IV, VI): Full/intact  Pupils (CN III, IV, VI): PERRL  Facial Sensation (CN V): Symmetric  Facial  Movement (CN VII): No weakness, Symmetric  Motor*: Arm Left:  Normal (5/5), Leg Left:   Normal (5/5), Arm Right:   Paretic:  5/5, Leg Right:   Paretic:  5/5  Sensation: mariam-anesthesia right  Tone: Arm-Left: normal; Leg-Left: normal; Arm-Right: normal; Leg-Right: normal    NIH Stroke Scale:  Interval: 1 hour post tPA or endovascular procedure completion  Level of Consciousness: 0 - alert  LOC Questions: 0 - answers both correctly  LOC Commands: 0 - performs both correctly  Best Gaze: 0 - normal  Visual: 0 - no visual loss  Facial Palsy: 2 - partial  Motor Left Arm: 0 - no drift  Motor Right Arm: 1 - drift  Motor Left Le - no drift  Motor Right Le - drift  Limb Ataxia: 0 - absent  Sensory: 0 - normal  Best Language: 0 - no aphasia  Dysarthria: 1 - mild to moderate dysarthria  Extinction and Inattention: 0 - no neglect  NIH Stroke Scale Total: 5  Modified [place] Scale:   Timeline: Prior to symptoms onset  Modified [place]re Score: 0 - no symptoms        Laboratory:  CMP:     Recent Labs  Lab 10/05/17  0508   CALCIUM 9.6   ALBUMIN 3.0*   PROT 6.7      K 3.2*   CO2 27      BUN 13   CREATININE 0.9   ALKPHOS 121   ALT 47*   AST 41*   BILITOT 0.2     CBC:     Recent Labs  Lab 10/05/17  0508   WBC 5.82   RBC 4.27   HGB 13.0   HCT 37.9      MCV 89   MCH 30.4   MCHC 34.3     Lipid Panel:     Recent Labs  Lab 10/03/17  1230   CHOL 187   LDLCALC 69.4   HDL 58   TRIG 298*     Coagulation:     Recent Labs  Lab 10/03/17  0551   INR 0.9     Platelet Aggregation Study: No results for input(s): PLTAGG, PLTAGINTERP, PLTAGREGLACO, ADPPLTAGGREG in the last 168 hours.  Hgb A1C:     Recent Labs  Lab 10/03/17  1230   HGBA1C 4.9     TSH:     Recent Labs  Lab 10/03/17  1230   TSH 1.576       Diagnostic Results:    CTA Multiphase. Date: 10/3/17  -no acute changes  -no LVO

## 2017-10-05 NOTE — PROGRESS NOTES
[facility]  Vascular Neurology  Comprehensive Stroke Center  Progress Note    Assessment/Plan:     10/3/17:  Passed bedside swallow evaluation.  SLP recommending regular diet and thin liquids.    10/4/17: Imaging remains negative  10/5/17:  Pt reports her right sided numbness has resolved. Complaining of dysuria, frequency, and L sided flank pain this AM. PT/OT recommending discharge home    * Thrombotic stroke involving left middle cerebral artery    Gina Jenkins is a 42 y.o. female with PMHx of HTN who presented to OSH with right sided numbness. s/p tPA and transferred to [facility] for post tPA monitoring. CTA negative for LVO. Imaging negative for acute infarct. ECHO unremarkable for LAE or wall motion abnormalities. Symptoms now resolved post tpa. Possible tpa averted or MRI negative stroke.     Antiplatelets: aspirin 81mg qd  Statins:lipitor 40 mg, LDL 67.   SBP <160  DVT ppx: SCDs, heparin ppx  OT and PT recommending discharge home  SLP - cardiac diet        Dysuria    -pt treated for UTI with 1 day of rocephin at OSH  -pt today complaining of dysuria, frequncy, and L sided flank pain  -UA 13 WBC, 2+ leuk, UCx ordered  -cipro         Nicotine abuse    Stroke risk factor  - smoking cessation advised.         Aneurysm of left middle cerebral artery    Follow up with Dr. Jeffries in NSGY clinic on discharge        Essential hypertension    Stroke risk factor  Goal SBP <140 (normal)  -coreg 12.5mg BID  -lisinopril 10mg qd  -Amlodipine 10mg qd  -HCTZ 25mg qd            Past Medical History:   Diagnosis Date    CHF (congestive heart failure)     H/O coronary angioplasty     Hypertension      Past Surgical History:   Procedure Laterality Date    CORONARY ANGIOPLASTY WITH STENT PLACEMENT       History reviewed. No pertinent family history.  Social History   Substance Use Topics    Smoking status: Current Every Day Smoker     Packs/day: 0.50     Types: Cigarettes     Last attempt to quit: 2/16/2016     Smokeless tobacco: Never Used    Alcohol use Yes      Comment: occassionally     Review of patient's allergies indicates:   Allergen Reactions    Bactrim [sulfamethoxazole-trimethoprim] Rash     Medications: I have reviewed the current medication administration record.    Prescriptions Prior to Admission   Medication Sig Dispense Refill Last Dose    amlodipine (NORVASC) 10 MG tablet Take 1 tablet (10 mg total) by mouth once daily. 30 tablet 0     aspirin (ECOTRIN) 81 MG EC tablet Take 81 mg by mouth as needed for Pain.       carvedilol (COREG) 12.5 MG tablet Take 1 tablet (12.5 mg total) by mouth 2 (two) times daily with meals. 60 tablet 11     hydrochlorothiazide (HYDRODIURIL) 25 MG tablet Take 1 tablet (25 mg total) by mouth once daily. 30 tablet 0     hydrocodone-acetaminophen 5-325mg (NORCO) 5-325 mg per tablet Take 1 tablet by mouth every 8 (eight) hours as needed. (Patient not taking: Reported on 10/1/2017) 12 tablet 0 Not Taking at Unknown time    ibuprofen (ADVIL,MOTRIN) 200 MG tablet Take 2 tablets (400 mg total) by mouth every 6 (six) hours as needed for Pain. 30 tablet 0     lisinopril 10 MG tablet Take 1 tablet (10 mg total) by mouth once daily. (Patient taking differently: Take 10 mg by mouth once daily. ) 30 tablet 0     ondansetron (ZOFRAN) 4 MG tablet Take 1 tablet (4 mg total) by mouth every 8 (eight) hours as needed for Nausea. (Patient not taking: Reported on 10/1/2017) 8 tablet 0 Not Taking at Unknown time    potassium chloride SA (K-DUR,KLOR-CON) 20 MEQ tablet Take 1 tablet (20 mEq total) by mouth 3 (three) times daily. 30 tablet 0     promethazine (PHENERGAN) 25 MG tablet Take 1 tablet (25 mg total) by mouth every 6 (six) hours as needed for Nausea. (Patient not taking: Reported on 10/1/2017) 15 tablet 0 Not Taking at Unknown time       Review of Systems   Constitutional: Negative for chills and fever.   Eyes: Negative for visual disturbance.   Respiratory: Negative for cough  and shortness of breath.    Cardiovascular: Negative for chest pain and palpitations.   Gastrointestinal: Negative for nausea and vomiting.   Skin: Negative for rash.   Neurological: Negative for facial asymmetry, speech difficulty, weakness and numbness.   Psychiatric/Behavioral: Negative for agitation.     Objective:     Vital Signs (Most Recent):  Temp: 98.6 °F (37 °C) (10/05/17 0700)  Pulse: 67 (10/05/17 0700)  Resp: 20 (10/05/17 0700)  BP: (!) 160/84 (10/05/17 0700)  SpO2: 100 % (10/05/17 0700)    Vital Signs Range (Last 24H):  Temp:  [98.5 °F (36.9 °C)-98.6 °F (37 °C)]   Pulse:  [63-78]   Resp:  [15-32]   BP: (127-161)/(74-93)   SpO2:  [96 %-100 %]     Physical Exam   Constitutional: She is oriented to person, place, and time. She appears well-developed and well-nourished. No distress.   HENT:   Head: Normocephalic and atraumatic.   Eyes: EOM are normal. Pupils are equal, round, and reactive to light.   Cardiovascular: Normal rate.    Pulmonary/Chest: Effort normal. No respiratory distress.   Neurological: She is alert and oriented to person, place, and time.   Skin: Skin is warm and dry.   Vitals reviewed.      Neurological Exam:   LOC: alert and follows requests  Language: No aphasia  Speech: Dysarthria  Orientation: Person, Place, Time  Memory: Recent memory intact, Remote memory intact, Age correct, Month correct  Visual Fields (CN II): Full  EOM (CN III, IV, VI): Full/intact  Pupils (CN III, IV, VI): PERRL  Facial Sensation (CN V): Symmetric  Facial Movement (CN VII): No weakness, Symmetric  Motor*: Arm Left:  Normal (5/5), Leg Left:   Normal (5/5), Arm Right:   Paretic:  5/5, Leg Right:   Paretic:  5/5  Sensation: mariam-anesthesia right  Tone: Arm-Left: normal; Leg-Left: normal; Arm-Right: normal; Leg-Right: normal    NIH Stroke Scale:  Interval: 1 hour post tPA or endovascular procedure completion  Level of Consciousness: 0 - alert  LOC Questions: 0 - answers both correctly  LOC Commands: 0 - performs  both correctly  Best Gaze: 0 - normal  Visual: 0 - no visual loss  Facial Palsy: 2 - partial  Motor Left Arm: 0 - no drift  Motor Right Arm: 1 - drift  Motor Left Le - no drift  Motor Right Le - drift  Limb Ataxia: 0 - absent  Sensory: 0 - normal  Best Language: 0 - no aphasia  Dysarthria: 1 - mild to moderate dysarthria  Extinction and Inattention: 0 - no neglect  NIH Stroke Scale Total: 5  Modified [place] Scale:   Timeline: Prior to symptoms onset  Modified Erika Score: 0 - no symptoms        Laboratory:  CMP:     Recent Labs  Lab 10/05/17  0508   CALCIUM 9.6   ALBUMIN 3.0*   PROT 6.7      K 3.2*   CO2 27      BUN 13   CREATININE 0.9   ALKPHOS 121   ALT 47*   AST 41*   BILITOT 0.2     CBC:     Recent Labs  Lab 10/05/17  0508   WBC 5.82   RBC 4.27   HGB 13.0   HCT 37.9      MCV 89   MCH 30.4   MCHC 34.3     Lipid Panel:     Recent Labs  Lab 10/03/17  1230   CHOL 187   LDLCALC 69.4   HDL 58   TRIG 298*     Coagulation:     Recent Labs  Lab 10/03/17  0551   INR 0.9     Platelet Aggregation Study: No results for input(s): PLTAGG, PLTAGINTERP, PLTAGREGLACO, ADPPLTAGGREG in the last 168 hours.  Hgb A1C:     Recent Labs  Lab 10/03/17  1230   HGBA1C 4.9     TSH:     Recent Labs  Lab 10/03/17  1230   TSH 1.576       Diagnostic Results:    CTA Multiphase. Date: 10/3/17  -no acute changes  -no LVO          Marva Damon MD  Comprehensive Stroke Center  Department of Vascular Neurology   [facility]

## 2017-10-05 NOTE — ASSESSMENT & PLAN NOTE
-pt treated for UTI with 1 day of rocephin at OSH  -pt today complaining of dysuria, frequncy, and L sided flank pain  -UA 13 WBC, 2+ leuk, UCx ordered  -cipro

## 2017-10-05 NOTE — SUBJECTIVE & OBJECTIVE
Interval History:  No acute events. Patient continues to have right leg weakness. Ambulation improved today. Reported flank pain overnight that resolved with oxycodone.    Review of Systems    Review of symptoms  Constitutional: Denies fevers or chills.  Pulmonary: Denies shortness of breath or cough.  Cardiology: Denies chest pain or palpitations.  GI: Denies abdominal pain or constipation.  Neurologic: Denies new weakness,  headache, or paresthesias.    Objective:     Vitals:  Temp: 98.6 °F (37 °C) (10/05/17 0700)  Pulse: 74 (10/05/17 1100)  Resp: 20 (10/05/17 0700)  BP: (!) 160/84 (10/05/17 0700)  SpO2: 100 % (10/05/17 0700)    Temp:  [98.5 °F (36.9 °C)-98.6 °F (37 °C)] 98.6 °F (37 °C)  Pulse:  [63-78] 74  Resp:  [15-32] 20  SpO2:  [96 %-100 %] 100 %  BP: (127-161)/(74-93) 160/84              10/04 0701 - 10/05 0700  In: -   Out: 1400 [Urine:1400]    Physical Exam  General appearance: Not in acute distress  HENT: Normocephalic, atraumatic, tracheal midline, nasopharynx clear.   Nose: Nares normal, septum midline, mucosa normal, no drainage or sinus tenderness   Resp: CTA bilaterally. No rales, or rhonci  CVS: RRR. Normal S1/S2.   Abd: Soft, ND,NT,BS + in all 4 quadrants.  MS: no joint tenderness, deformity or swelling. Full ROM   Skin: normal coloration and turgor, no rashes, no suspicious skin lesions noted.  Extremities: warm and well perfused     Neurological Exam   Mental Status:  Alert and oriented to self, place, and time.      Language:  No aphasia, no dysarthria.      Cranial Nerves:  Visual fields were intact to confrontation, no RAPD, no anisocoria, EOM intact bilaterally, V1-V3 with good sensation to light touch, no facial asymmetry, hearing grossly intact, palate, and tongue midline. Good shoulder rug.      Motor:  Strength: 4/5 in RLE and R write extension;  otherwise 5/5  Tone: Good muscle tone.    No pronator drift     DTRs:  Symmetric and 2+ through out.      Sensation:  Intact to light touch  through out.     Cerebellar:  Good finger to nose.  Good heal to shin     Gait and Stand:  Requires walker to ambulate.    Medications:  Continuous Scheduled  amlodipine 10 mg Daily   aspirin 81 mg Daily   atorvastatin 40 mg Daily   carvedilol 12.5 mg BID WM   ciprofloxacin HCl 250 mg Q12H   heparin (porcine) 5,000 Units Q8H   hydrochlorothiazide 25 mg Daily   lisinopril 10 mg Daily   potassium chloride 40 mEq BID   senna-docusate 8.6-50 mg 1 tablet BID   sodium chloride 0.9% 3 mL Q8H   PRN  acetaminophen 650 mg Q6H PRN   influenza 0.5 mL vaccine x 1 dose   labetalol 10 mg Q15 Min PRN   ondansetron 4 mg Q6H PRN     Labs:    Component      Latest Ref Rng & Units 10/5/2017   WBC      3.90 - 12.70 K/uL 5.82   RBC      4.00 - 5.40 M/uL 4.27   Hemoglobin      12.0 - 16.0 g/dL 13.0   Hematocrit      37.0 - 48.5 % 37.9   MCV      82 - 98 fL 89   MCH      27.0 - 31.0 pg 30.4   MCHC      32.0 - 36.0 g/dL 34.3   RDW      11.5 - 14.5 % 14.0   Platelets      150 - 350 K/uL 220   MPV      9.2 - 12.9 fL 10.7   Gran #      1.8 - 7.7 K/uL 2.9   Lymph #      1.0 - 4.8 K/uL 2.0   Mono #      0.3 - 1.0 K/uL 0.6   Eos #      0.0 - 0.5 K/uL 0.3   Baso #      0.00 - 0.20 K/uL 0.05   Gran%      38.0 - 73.0 % 50.1   Lymph%      18.0 - 48.0 % 34.4   Mono%      4.0 - 15.0 % 10.1   Eosinophil%      0.0 - 8.0 % 4.5   Basophil%      0.0 - 1.9 % 0.9   Aniso       Slight   Poik       Slight   Poly       Occasional   Hypo       Occasional   Ovalocytes       Occasional   Differential Method       Automated   Sodium      136 - 145 mmol/L 140   Potassium      3.5 - 5.1 mmol/L 3.2 (L)   Chloride      95 - 110 mmol/L 102   CO2      23 - 29 mmol/L 27   Glucose      70 - 110 mg/dL 120 (H)   BUN, Bld      6 - 20 mg/dL 13   Creatinine      0.5 - 1.4 mg/dL 0.9   Calcium      8.7 - 10.5 mg/dL 9.6   Total Protein      6.0 - 8.4 g/dL 6.7   Albumin      3.5 - 5.2 g/dL 3.0 (L)   Total Bilirubin      0.1 - 1.0 mg/dL 0.2   Alkaline Phosphatase      55 - 135 U/L  121   AST      10 - 40 U/L 41 (H)   ALT      10 - 44 U/L 47 (H)   Anion Gap      8 - 16 mmol/L 11   eGFR if African American      >60 mL/min/1.73 m:2 >60.0   eGFR if non African American      >60 mL/min/1.73 m:2 >60.0   Magnesium      1.6 - 2.6 mg/dL 1.5 (L)   Phosphorus      2.7 - 4.5 mg/dL 4.2

## 2017-10-05 NOTE — PROGRESS NOTES
[facility]  Neurocritical Care  Progress Note    Admit Date: 10/3/2017  Service Date: 10/05/2017  Length of Stay: 2    Subjective:     Chief Complaint: Thrombotic stroke involving left middle cerebral artery    History of Present Illness: Ms. Jenkins is a 42 y.o. AAF with h/o HTN, CHF, CAD initially presented to [facility] with chest pain and flank pain. Admitted for chest pain workup - negative for PE or ACS - and UTI with questionable pyelonephritis. While inpatient, patient developed right-sided weakness and right facial droop around 5:30. Telestroke consulted and administered tPA at 5:45 and infusion at 0855. Patient reported improvement in symptoms and she was transferred to [facility] for further management.    Hospital Course: 10/3: 1-hr post tPA NIHSS 5. Admitted to [facility]. CTA head.  10/4: MRI brain showed no acute infarction. Patient reported she continues to have mild right leg heaviness, but otherwise asymptomatic. Denies chest pain or SOB.  10/5:  Patient continues to have right leg weakness. Ambulation improved today but continues to require walker. Reported flank pain overnight that resolved with oxycodone.    Interval History:  No acute events. Patient continues to have right leg weakness. Ambulation improved today. Reported flank pain overnight that resolved with oxycodone.    Review of Systems    Review of symptoms  Constitutional: Denies fevers or chills.  Pulmonary: Denies shortness of breath or cough.  Cardiology: Denies chest pain or palpitations.  GI: Denies abdominal pain or constipation.  Neurologic: Denies new weakness,  headache, or paresthesias.    Objective:     Vitals:  Temp: 98.6 °F (37 °C) (10/05/17 0700)  Pulse: 74 (10/05/17 1100)  Resp: 20 (10/05/17 0700)  BP: (!) 160/84 (10/05/17 0700)  SpO2: 100 % (10/05/17 0700)    Temp:  [98.5 °F (36.9 °C)-98.6 °F (37 °C)] 98.6 °F (37 °C)  Pulse:  [63-78] 74  Resp:  [15-32] 20  SpO2:  [96 %-100 %] 100 %  BP: (127-161)/(74-93)  160/84              10/04 0701 - 10/05 0700  In: -   Out: 1400 [Urine:1400]    Physical Exam  General appearance: Not in acute distress  HENT: Normocephalic, atraumatic, tracheal midline, nasopharynx clear.   Nose: Nares normal, septum midline, mucosa normal, no drainage or sinus tenderness   Resp: CTA bilaterally. No rales, or rhonci  CVS: RRR. Normal S1/S2.   Abd: Soft, ND,NT,BS + in all 4 quadrants.  MS: no joint tenderness, deformity or swelling. Full ROM   Skin: normal coloration and turgor, no rashes, no suspicious skin lesions noted.  Extremities: warm and well perfused     Neurological Exam   Mental Status:  Alert and oriented to self, place, and time.      Language:  No aphasia, no dysarthria.      Cranial Nerves:  Visual fields were intact to confrontation, no RAPD, no anisocoria, EOM intact bilaterally, V1-V3 with good sensation to light touch, no facial asymmetry, hearing grossly intact, palate, and tongue midline. Good shoulder rug.      Motor:  Strength: 4/5 in RLE and R write extension;  otherwise 5/5  Tone: Good muscle tone.    No pronator drift     DTRs:  Symmetric and 2+ through out.      Sensation:  Intact to light touch through out.     Cerebellar:  Good finger to nose.  Good heal to shin     Gait and Stand:  Requires walker to ambulate.    Medications:  Continuous Scheduled  amlodipine 10 mg Daily   aspirin 81 mg Daily   atorvastatin 40 mg Daily   carvedilol 12.5 mg BID WM   ciprofloxacin HCl 250 mg Q12H   heparin (porcine) 5,000 Units Q8H   hydrochlorothiazide 25 mg Daily   lisinopril 10 mg Daily   potassium chloride 40 mEq BID   senna-docusate 8.6-50 mg 1 tablet BID   sodium chloride 0.9% 3 mL Q8H   PRN  acetaminophen 650 mg Q6H PRN   influenza 0.5 mL vaccine x 1 dose   labetalol 10 mg Q15 Min PRN   ondansetron 4 mg Q6H PRN     Labs:    Component      Latest Ref Rng & Units 10/5/2017   WBC      3.90 - 12.70 K/uL 5.82   RBC      4.00 - 5.40 M/uL 4.27   Hemoglobin      12.0 - 16.0 g/dL 13.0    Hematocrit      37.0 - 48.5 % 37.9   MCV      82 - 98 fL 89   MCH      27.0 - 31.0 pg 30.4   MCHC      32.0 - 36.0 g/dL 34.3   RDW      11.5 - 14.5 % 14.0   Platelets      150 - 350 K/uL 220   MPV      9.2 - 12.9 fL 10.7   Gran #      1.8 - 7.7 K/uL 2.9   Lymph #      1.0 - 4.8 K/uL 2.0   Mono #      0.3 - 1.0 K/uL 0.6   Eos #      0.0 - 0.5 K/uL 0.3   Baso #      0.00 - 0.20 K/uL 0.05   Gran%      38.0 - 73.0 % 50.1   Lymph%      18.0 - 48.0 % 34.4   Mono%      4.0 - 15.0 % 10.1   Eosinophil%      0.0 - 8.0 % 4.5   Basophil%      0.0 - 1.9 % 0.9   Aniso       Slight   Poik       Slight   Poly       Occasional   Hypo       Occasional   Ovalocytes       Occasional   Differential Method       Automated   Sodium      136 - 145 mmol/L 140   Potassium      3.5 - 5.1 mmol/L 3.2 (L)   Chloride      95 - 110 mmol/L 102   CO2      23 - 29 mmol/L 27   Glucose      70 - 110 mg/dL 120 (H)   BUN, Bld      6 - 20 mg/dL 13   Creatinine      0.5 - 1.4 mg/dL 0.9   Calcium      8.7 - 10.5 mg/dL 9.6   Total Protein      6.0 - 8.4 g/dL 6.7   Albumin      3.5 - 5.2 g/dL 3.0 (L)   Total Bilirubin      0.1 - 1.0 mg/dL 0.2   Alkaline Phosphatase      55 - 135 U/L 121   AST      10 - 40 U/L 41 (H)   ALT      10 - 44 U/L 47 (H)   Anion Gap      8 - 16 mmol/L 11   eGFR if African American      >60 mL/min/1.73 m:2 >60.0   eGFR if non African American      >60 mL/min/1.73 m:2 >60.0   Magnesium      1.6 - 2.6 mg/dL 1.5 (L)   Phosphorus      2.7 - 4.5 mg/dL 4.2     Assessment/Plan:     Neuro   * Thrombotic stroke involving left middle cerebral artery    S/p tPA, infusion finished at 0855 (10/3)  Antiplatelets: start aspirin 81mg   Statins: atorvastatin 40  SBP <180 post tPA  DVT ppx: SCDs, sq heparin started 24 hours post tPA  PT/OT and speech to evaluate and treat  Neuro checks q1h while in ICU  Imaging: MRI brain 10/4 negative for acute infarction.   Dispo: boarded for stroke service        Aneurysm of left middle cerebral artery    MRI and  CTA showed 4mm left MCA aneurysm, asymptomatic.   - continue to monitor clinically  - current BP goal < 180, consider gradually reducing to < 140        Acute ischemic left MCA stroke    S/p TPA 10/3 0900; MRI brain 10/4 negative for acute infarction. Continues to have residual RLE weakness  - continue aspirin 81mg daily        Cardiac/Vascular   Essential hypertension    Home amlodipine and lisinopril  SBP <180        Other   Gait abnormality    Continues to have RLE weakness and unable to ambulate without assistance 2 days s/p TPA and MRI brain showed no acute infarction. Residual RLE weakness concerning for spinal cord abnormality e.g. Demyelination.  - MRI cervical and thoracic spine with and without contrast            Prophylaxis:  Venous Thromboembolism: chemical  Stress Ulcer: None  Ventilator Pneumonia: not applicable     Activity Orders          None        Full Code    Christy Cárdenas MD  Neurocritical Care  [facility]

## 2017-10-05 NOTE — PLAN OF CARE
Problem: Patient Care Overview  Goal: Plan of Care Review  Outcome: Ongoing (interventions implemented as appropriate)  POC reviewed with Gina Jenkins and family at 1500. Pt verbalized understanding. AVS, medications, and follow up appointments reviewed with patient. Questions and concerns addressed. MRI obtained. Walker obtained for discharge. Pt received flu vaccine. Will continue to monitor. See flowsheets for full assessment and VS info.

## 2017-10-05 NOTE — NURSING TRANSFER
Nursing Transfer Note      10/5/2017     Pt discharged    Transfer via wheelchair     Transfer with belongings, walker, AVS, and physical therapy orders.    Transported by patient escort and spouse    Medicines sent: none.

## 2017-10-05 NOTE — ASSESSMENT & PLAN NOTE
Gina Jenkins is a 42 y.o. female with PMHx of HTN who presented to OSH with right sided numbness. s/p tPA and transferred to [facility] for post tPA monitoring. CTA negative for LVO. Imaging negative for acute infarct. ECHO unremarkable for LAE or wall motion abnormalities. Symptoms now resolved post tpa. Possible tpa averted or MRI negative stroke.     Antiplatelets: aspirin 81mg qd  Statins:lipitor 40 mg, LDL 67.   SBP <140  DVT ppx: SCDs, heparin ppx  OT and PT recommending discharge home  SLP - cardiac diet

## 2017-10-06 ENCOUNTER — TELEPHONE (OUTPATIENT)
Dept: NEUROSURGERY | Facility: HOSPITAL | Age: 42
End: 2017-10-06

## 2017-10-06 ENCOUNTER — PATIENT OUTREACH (OUTPATIENT)
Dept: ADMINISTRATIVE | Facility: CLINIC | Age: 42
End: 2017-10-06

## 2017-10-06 LAB — POCT GLUCOSE: 100 MG/DL (ref 70–110)

## 2017-10-06 NOTE — SIGNIFICANT EVENT
7:57 PM    Ms Jenkins called to notify our team that she did not receive any of her blood pressure refills as promised when she went to her pharmacy.     I contacted the pharmacy and refilled the amlodipine, coreg, lisinopril, hctz.   I contacted her to let her know that they would be ready for her and reviewed that per the discharge summary by Dr. Damon, that she should be taking otc Aspirin 81 mg. Also informed her that an abx was prescribed as well.     Patient stated understanding. All questions answered.       Graciela Meade PA-C  Vascular Neurology  [phone]

## 2017-10-06 NOTE — PLAN OF CARE
Patient discharge home with family        10/06/17 0655   Final Note   Assessment Type Final Discharge Note   Discharge Disposition Home   What phone number can be called within the next 1-3 days to see how you are doing after discharge? [phone]   Hospital Follow Up  Appt(s) scheduled? Yes   Discharge plans and expectations educations in teach back method with documentation complete? Yes   Right Care Referral Info   Post Acute Recommendation No Care     Nicole Peterson RN/DARREL  [phone]  [facility]

## 2017-10-06 NOTE — PLAN OF CARE
Problem: Occupational Therapy Goal  Goal: Occupational Therapy Goal  Goals to be met by: 10/11/17     Patient will increase functional independence with ADLs by performing:    UE Dressing with Modified [place]. - not met  LE Dressing with Modified [place]. - not met  Grooming while standing with Modified [place]. - not met  Toileting from toilet with Modified [place] for hygiene and clothing management. - not met  Rolling to Bilateral with Modified [place]. - not met  Supine to sit with Modified [place]. - not met  Stand pivot transfers with Modified [place]. - not met     Outcome: Outcome(s) achieved Date Met: 10/06/17  No goals met.  Hospital discharge.

## 2017-10-06 NOTE — PT/OT/SLP DISCHARGE
Occupational Therapy Discharge Summary    Gina Jenkins  MRN: [id number]   Thrombotic stroke involving left middle cerebral artery   Patient Discharged from acute Occupational Therapy on 10/6/17.  Please refer to prior OT note dated on 10/5/17 for functional status.     Assessment:   Patient has not met goals.  GOALS:    Occupational Therapy Goals     Not on file          Multidisciplinary Problems (Resolved)        Problem: Occupational Therapy Goal    Goal Priority Disciplines Outcome Interventions   Occupational Therapy Goal   (Resolved)     OT, PT/OT Outcome(s) achieved    Description:  Goals to be met by: 10/11/17     Patient will increase functional independence with ADLs by performing:    UE Dressing with Modified [place]. - not met  LE Dressing with Modified [place]. - not met  Grooming while standing with Modified [place]. - not met  Toileting from toilet with Modified [place] for hygiene and clothing management. - not met  Rolling to Bilateral with Modified [place]. - not met  Supine to sit with Modified [place]. - not met  Stand pivot transfers with Modified [place]. - not met                     Reasons for Discontinuation of Therapy Services  Transfer to alternate level of care.      Plan:  Patient Discharged to: Home with Home Health Service.    TRINO Velázquez 10/6/2017

## 2017-10-06 NOTE — PATIENT INSTRUCTIONS
Discharge Instructions for Stroke  You have been diagnosed with a stroke, or with a TIA (transient ischemic attack). Or you have been identified as having a high risk for stroke. During a stroke, blood stops flowing to part of your brain. This can damage areas in the brain that control other parts of the body. Symptoms after a stroke depend on which part of the brain has been affected.  Stroke risk factors  Once youve had a stroke, youre at greater risk for another one. Listed below are some other factors that can increase your risk for a stroke:  · High blood pressure  · High cholesterol  · Cigarette or cigar smoking  · Diabetes  · Carotid or other artery disease  · Atrial fibrillation, atrial flutter, or other heart disease  · Not being physically active  · Obesity  · Certain blood disorders (such as sickle cell anemia)  · Excessive alcohol use  · Abuse of street drugs  · Race  · Gender  · Family history of stroke  · Diet high in salty, fried, or greasy foods  Changes in daily living  Doing your regular tasks may be difficult after youve had a stroke, but you can learn new ways to manage your daily activities. In fact, doing daily activities may help you to regain muscle strength and bring back function to affected limbs. Be patient, give yourself time to adjust, and appreciate the progress you make.  Daily activities  You may be at risk of falling. Make changes to your home to help you walk more easily. A therapist will decide if you need an assistive device to walk safely.  You may need to see an occupational therapist or physical therapist to learn new ways of doing things. For example, you may need to make adjustments when bathing or dressing:  Tips for showering or bathing  · Test the water temperature with a hand or foot that was not affected by the stroke.  · Use grab bars, a shower seat, a hand-held showerhead, and a long-handled brush.  Tips for getting dressed  · Dress while sitting, starting with  the affected side or limb.  · Wear shirts that pull easily over your head. Wear pants or skirts with elastic waistbands.  · Use zippers with loops attached to the pull tabs.  Lifestyle changes  · Take your medicines exactly as directed. Dont skip doses.  · Begin an exercise program. Ask your provider how to get started. Also ask how much activity you should try to get on a daily or weekly basis. You can benefit from simple activities such as walking or gardening.  · Limit alcohol intake. Men should have no more than 2 alcoholic drinks a day. Women should limit themselves to 1 alcoholic drink per day.  · Know your cholesterol level. Follow your providers recommendations about how to keep cholesterol under control.  · If you are a smoker, quit now. Join a stop-smoking program to improve your chances of success. Ask your provider about medicines or other methods to help you quit.  · Learn stress management techniques to help you deal with stress in your home and work life.  Diet  Your healthcare provider will give you information on dietary changes that you may need to make, based on your situation. Your provider may recommend that you see a registered dietitian for help with diet changes. Changes may include:  · Reducing fat and cholesterol intake  · Reducing salt (sodium) intake, especially if you have high blood pressure  · Eating more fresh vegetables and fruits  · Eating more lean proteins, such as fish, poultry, and beans and peas (legumes)  · Eating less red meat and processed meats  · Using low-fat dairy products  · Limiting vegetable oils and nut oils  · Limiting sweets and processed foods such as chips, cookies, and baked goods  Follow-up care  · Keep your medical appointments. Close follow-up is important to stroke rehabilitation and recovery.  · Some medicines require blood tests to check for progress or problems. Keep follow-up appointments for any blood tests ordered by your providers.  When to call  911  Call 911 right away if you have any of the following symptoms of stroke:  · Weakness, tingling, or loss of feeling on one side of your face or body  · Sudden double vision or trouble seeing in one or both eyes  · Sudden trouble talking or slurred speech  · Trouble understanding others  · Sudden, severe headache  · Dizziness, loss of balance, or a sense of falling  · Blackouts or seizures      F.A.S.T. is an easy way to remember the signs of stroke. When you see these signs, you know that you need to call 911 fast.  F.A.S.T. stands for:  · F is for face drooping. One side of the face is drooping or numb. When the person smiles, the smile is uneven.  · A is for arm weakness. One arm is weak or numb. When the person lifts both arms at the same time, one arm may drift downward.  · S is for speech difficulty. You may notice slurred speech or trouble speaking. The person can't repeat a simple sentence correctly when asked.  · T is for time to call 911. If someone shows any of these symptoms, even if they go away, call 911 immediately. Make note of the time the symptoms first appeared.  Date Last Reviewed: 8/26/2015 © 2000-2017 [employer]. [address] [place] [address], [place] [address]. All rights reserved. This information is not intended as a substitute for professional medical care. Always follow your healthcare professional's instructions.

## 2017-10-07 LAB
BACTERIA BLD CULT: NORMAL
BACTERIA BLD CULT: NORMAL

## 2017-10-09 DIAGNOSIS — E07.9 THYROID MASS: Primary | ICD-10-CM

## 2017-10-11 ENCOUNTER — HOSPITAL ENCOUNTER (OUTPATIENT)
Dept: RADIOLOGY | Facility: HOSPITAL | Age: 42
Discharge: HOME | End: 2017-10-11
Attending: NURSE PRACTITIONER
Payer: MEDICAID

## 2017-10-11 DIAGNOSIS — E07.9 THYROID MASS: ICD-10-CM

## 2017-10-11 PROCEDURE — 76536 US EXAM OF HEAD AND NECK: CPT | Mod: TC,PO

## 2017-10-12 NOTE — PT/OT/SLP DISCHARGE
Physical Therapy Discharge Summary    Gina Jenkins  MRN: [id number]   Thrombotic stroke involving left middle cerebral artery     Patient Discharged from acute Physical Therapy on 10/5/2017.  Please refer to prior PT noted date on 10/7/2017 for functional status.     Assessment:   Patient appropriate for care in another setting.  GOALS:    Physical Therapy Goals        Problem: Physical Therapy Goal    Goal Priority Disciplines Outcome Goal Variances Interventions   Physical Therapy Goal     PT/OT, PT Ongoing (interventions implemented as appropriate)     Description:  Goals to be met by: 10/20/2017     Patient will increase functional independence with mobility by performin. Supine to sit with Stand-by Assistance  2. Sit to supine with Stand-by Assistance  3. Sit to stand transfer with Stand-by Assistance using RW as needed - MET  4. Bed to chair transfer with Stand-by Assistance using RW as needed   5. Gait  x 100 feet with Stand-by Assistance using Rolling Walker.                      Reasons for Discontinuation of Therapy Services  Transfer to alternate level of care.      Plan:  Patient Discharged to: Outpatient Therapy Services.    Chrystal Mcintosh, PT  10/12/2017  [phone] (pager)

## 2017-10-25 ENCOUNTER — HOSPITAL ENCOUNTER (EMERGENCY)
Facility: HOSPITAL | Age: 42
Discharge: HOME | End: 2017-10-26
Attending: EMERGENCY MEDICINE
Payer: MEDICAID

## 2017-10-25 DIAGNOSIS — R51.9 NONINTRACTABLE HEADACHE, UNSPECIFIED CHRONICITY PATTERN, UNSPECIFIED HEADACHE TYPE: Primary | ICD-10-CM

## 2017-10-25 LAB
ANION GAP SERPL CALC-SCNC: 10 MMOL/L
BASOPHILS # BLD AUTO: 0.06 K/UL
BASOPHILS NFR BLD: 0.9 %
BUN SERPL-MCNC: 18 MG/DL
CALCIUM SERPL-MCNC: 9 MG/DL
CHLORIDE SERPL-SCNC: 101 MMOL/L
CO2 SERPL-SCNC: 26 MMOL/L
CREAT SERPL-MCNC: 0.98 MG/DL
DIFFERENTIAL METHOD: NORMAL
EOSINOPHIL # BLD AUTO: 0.4 K/UL
EOSINOPHIL NFR BLD: 6 %
ERYTHROCYTE [DISTWIDTH] IN BLOOD BY AUTOMATED COUNT: 13.5 %
EST. GFR  (AFRICAN AMERICAN): >60 ML/MIN/1.73 M^2
EST. GFR  (NON AFRICAN AMERICAN): >60 ML/MIN/1.73 M^2
GLUCOSE SERPL-MCNC: 104 MG/DL
HCT VFR BLD AUTO: 37 %
HGB BLD-MCNC: 12.7 G/DL
LYMPHOCYTES # BLD AUTO: 2 K/UL
LYMPHOCYTES NFR BLD: 30.8 %
MAGNESIUM SERPL-MCNC: 1.7 MG/DL
MCH RBC QN AUTO: 30.4 PG
MCHC RBC AUTO-ENTMCNC: 34.3 G/DL
MCV RBC AUTO: 89 FL
MONOCYTES # BLD AUTO: 0.8 K/UL
MONOCYTES NFR BLD: 12.7 %
NEUTROPHILS # BLD AUTO: 3.3 K/UL
NEUTROPHILS NFR BLD: 49.3 %
PLATELET # BLD AUTO: 331 K/UL
PMV BLD AUTO: 10.3 FL
POTASSIUM SERPL-SCNC: 3.7 MMOL/L
RBC # BLD AUTO: 4.18 M/UL
SODIUM SERPL-SCNC: 137 MMOL/L
WBC # BLD AUTO: 6.62 K/UL

## 2017-10-25 PROCEDURE — 80048 BASIC METABOLIC PNL TOTAL CA: CPT

## 2017-10-25 PROCEDURE — 83735 ASSAY OF MAGNESIUM: CPT

## 2017-10-25 PROCEDURE — 85025 COMPLETE CBC W/AUTO DIFF WBC: CPT

## 2017-10-25 PROCEDURE — 99284 EMERGENCY DEPT VISIT MOD MDM: CPT | Mod: 25

## 2017-10-25 PROCEDURE — 99214 OFFICE O/P EST MOD 30 MIN: CPT | Mod: GT,,, | Performed by: PSYCHIATRY & NEUROLOGY

## 2017-10-25 PROCEDURE — 62270 DX LMBR SPI PNXR: CPT

## 2017-10-26 VITALS
RESPIRATION RATE: 18 BRPM | DIASTOLIC BLOOD PRESSURE: 93 MMHG | SYSTOLIC BLOOD PRESSURE: 144 MMHG | OXYGEN SATURATION: 99 % | BODY MASS INDEX: 27.6 KG/M2 | TEMPERATURE: 98 F | HEART RATE: 84 BPM | HEIGHT: 62 IN | WEIGHT: 150 LBS

## 2017-10-26 LAB
CLARITY CSF: CLEAR
CLARITY CSF: CLEAR
COLOR CSF: COLORLESS
COLOR CSF: COLORLESS
CSF, COMMENT: NORMAL
CSF, COMMENT: NORMAL
GLUCOSE CSF-MCNC: 60 MG/DL
PROT CSF-MCNC: 32 MG/DL
RBC # CSF: 0 /CU MM
RBC # CSF: 0 /CU MM
SPECIMEN VOL CSF: 2 ML
SPECIMEN VOL CSF: 2 ML
WBC # CSF: 2 /CU MM
WBC # CSF: 3 /CU MM

## 2017-10-26 PROCEDURE — 84157 ASSAY OF PROTEIN OTHER: CPT

## 2017-10-26 PROCEDURE — 82945 GLUCOSE OTHER FLUID: CPT

## 2017-10-26 PROCEDURE — 87205 SMEAR GRAM STAIN: CPT

## 2017-10-26 PROCEDURE — 87070 CULTURE OTHR SPECIMN AEROBIC: CPT

## 2017-10-26 PROCEDURE — 89051 BODY FLUID CELL COUNT: CPT

## 2017-10-26 PROCEDURE — 25000003 PHARM REV CODE 250: Performed by: EMERGENCY MEDICINE

## 2017-10-26 RX ORDER — BUTALBITAL, ACETAMINOPHEN AND CAFFEINE 50; 325; 40 MG/1; MG/1; MG/1
1 TABLET ORAL
Status: COMPLETED | OUTPATIENT
Start: 2017-10-26 | End: 2017-10-26

## 2017-10-26 RX ORDER — BUTALBITAL, ACETAMINOPHEN AND CAFFEINE 50; 325; 40 MG/1; MG/1; MG/1
1 TABLET ORAL EVERY 6 HOURS PRN
Qty: 12 TABLET | Refills: 0 | Status: SHIPPED | OUTPATIENT
Start: 2017-10-26 | End: 2017-11-03 | Stop reason: SDUPTHER

## 2017-10-26 RX ADMIN — BUTALBITAL, ACETAMINOPHEN, AND CAFFEINE 1 TABLET: 50; 325; 40 TABLET ORAL at 12:10

## 2017-10-26 NOTE — ASSESSMENT & PLAN NOTE
Patient describes as sudden maximal intensity 10/10 but that this is typical of her daily headaches. The only difference is that this headache is not clearing like it typically does. Despite her having 10/10 worst headache of her life, her blood pressure and heart rate are within normal limits and she appears quite comfortable during exam and interview and does not appear in distress or to have any photophobia. 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 She has been very compliant with her blood pressure medications which has been an issue in the past and she has stopped smoking as well.     Given the known aneurysm presence and the headache, would move to lumbar puncture to definitively rule out subarachnoid hemorrhage. If lumbar puncture unavailable, MRI w/ FLAIR imaging with high sensitivity for subarachnoid blood can also be used. If transfer needed to complete workup, or if lumbar puncture reveals findings consistent with SAH, please transfer to main campus to [facility] w/ NSG notification.

## 2017-10-26 NOTE — CONSULTS
[facility]  Vascular Neurology  Comprehensive Stroke Center  Tele-Consultation Note    Consult Start Time: 10/25/2017 21:45  Consult End Time: 10/25/2017 10:15  CT READ TIME: 22:01       Inpatient consult to Stroke Telemedicine  Consult performed by: LILI MORENO  Consult ordered by: TAHIRA FIGUEREDO  Reason for consult: headache          Consulting Provider: Janel Physician:: jes    Patient Location: [facility]  Spoke hospital nurse at bedside with patient assisting consultant.         Patient information was obtained from patient.       Subjective:     History of Present Illness:  42F w/ severe headache started this AM, blood pressure was normal all day despite it being out of control in the past. Headache is behind her right eye. Took ibuprofen and tylenol without much relief. This is the worst headache she has ever had because of the duration of her headache. She has has daily headaches that start as a 10/10 maximal intensity, but what is different about this headache is that is is not going away.She denies any symptoms such as weakness, numbness or slurring of her speech.    Consult Start Time: 10/25/2017 21:45    CT READ TIME: 22:01        Woke up with symptoms?: no  Last known normal:  1000  Is the most recent last known normal correct? Yes      Does the patient take any Blood Thinners? 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   HENT: Negative for  "congestion.    Eyes: Negative for discharge.   Respiratory: Negative for shortness of breath.    Cardiovascular: Negative for chest pain.   Gastrointestinal: Negative for abdominal pain.   Endocrine: Negative for cold intolerance.   Genitourinary: Negative for difficulty urinating.   Musculoskeletal: Negative for joint swelling.   Skin: Negative for color change.   Neurological: Positive for headaches.     Objective:   Vitals: Blood pressure 131/68, pulse 83, temperature 98.4 °F (36.9 °C), temperature source Oral, resp. rate 18, height 5' 2" (1.575 m), weight 68 kg (150 lb), last menstrual period 2017, SpO2 97 %, not currently breastfeeding.     CT READ: Yes  No hemmorhage. No mass effect. No early infarct signs.     Physical Exam   Constitutional: No distress.   HENT:   Head: Normocephalic.   Right Ear: External ear normal.   Left Ear: External ear normal.   Eyes: Conjunctivae are normal.   Neck: Normal range of motion.   Pulmonary/Chest: Effort normal. No stridor.   Abdominal: There is no tenderness.   Skin: Skin is dry. No rash noted.       NIH Stroke Scale:  Interval: baseline (upon arrival/admit)  Level of Consciousness: 0 - alert  LOC Questions: 0 - answers both correctly  LOC Commands: 0 - performs both correctly  Best Gaze: 0 - normal  Visual: 0 - no visual loss  Facial Palsy: 1 - minor (left decreased NLF)  Motor Left Arm: 0 - no drift  Motor Right Arm: 0 - no drift  Motor Left Le - no drift  Motor Right Le - no drift  Limb Ataxia: 0 - absent  Sensory: 0 - normal  Best Language: 0 - no aphasia  Dysarthria: 0 - normal articulation  Extinction and Inattention: 0 - no neglect  NIH Stroke Scale Total: 1          Assessment/Plan:     Diagnoses:   Severe headache    Patient describes as sudden maximal intensity 10/10 but that this is typical of her daily headaches. The only difference is that this headache is not clearing like it typically does. Despite her having 10/10 worst headache of her life, " "her blood pressure and heart rate are within normal limits and she appears quite comfortable during exam and interview and does not appear in distress or to have any photophobia. 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 She has been very compliant with her blood pressure medications which has been an issue in the past and she has stopped smoking as well.     Given the known aneurysm presence and the headache, would move to lumbar puncture to definitively rule out subarachnoid hemorrhage. If lumbar puncture unavailable, MRI w/ FLAIR imaging with high sensitivity for subarachnoid blood can also be used. If transfer needed to complete workup, or if lumbar puncture reveals findings consistent with SAH, please transfer to main campus to [facility] w/ NSG notification.            Blood pressure 131/68, pulse 83, temperature 98.4 °F (36.9 °C), temperature source Oral, resp. rate 18, height 5' 2" (1.575 m), weight 68 kg (150 lb), last menstrual period 09/25/2017, SpO2 97 %, not currently breastfeeding.  Alteplase Eligible?: No  Alteplase Recommendation: Alteplase not recommended due to Suspicion of subarachnoid hemorrhage and no focal neurological deficit / no concern for ischemic stroke  Possible Interventional Revascularization Candidate? No; No significant neurological deficit    Disposition Recommendation: pending availability of workup at [facility] ED; currently planning on LP to r/o presence of SAH despite negative CT (sypmtoms >12 hours)    Recommended the emergency room physician to have a brief discussion with the patient and/or family if available regarding the risks and benefits of treatment, and to briefly document the occurrence of that discussion in his clinical " encounter note.     The attending portion of this evaluation, treatment, and documentation was performed per Mike Warren MD via audiovisual.    Billing code:  (moderate to severe stroke, large areas of edema, some mimics)    · This patient has a critical neurological condition/illness, with high morbidity and mortality.  · There is a high probability for acute neurological change leading to clinical and possibly life-threatening deterioration requiring highest level of physician preparedness for urgent intervention.  · Care was coordinated with other physicians involved in the patient's care.  · Radiologic studies and laboratory data were reviewed and interpreted, and plan of care was re-assessed based on the results.  · Diagnosis, treatment options and prognosis may have been discussed with the patient and/or family members or caregiver.  · Further advanced medical management and further evaluation is warranted for his care.        Mike Warren MD  [facility]  Vascular Neurology   [facility]

## 2017-10-26 NOTE — ED PROVIDER NOTES
Encounter Date: 10/25/2017       History     Chief Complaint   Patient presents with    Headache     headache started today states sharp pain behind her eyes     This patient is a 42-year-old female.  Presents to the emergency department complaining of a headache.  She had a stroke at the beginning of this month.  Apparently she was admitted to the hospital with chest pain, abdominal pain, suspected pyelonephritis.  While she was in the hospital at [place], she was given hydralazine for hypertension, and then developed right-sided weakness.  She was evaluated by neurology, received tPA, and and was treated at [facility] main [facility].  She underwent an initial CT scan prior to TPA administration, that was unremarkable.  Subsequently MRI did not demonstrate an acute territorial CVA, but she was noted to have 2 cerebral aneurysms.    After the stroke, she was left with right-sided facial arm and leg weakness, which have progressively been improving.  She has been ambulating with a walker.    Today she developed a sudden onset of a severe right-sided headache.  Associated with nausea but no vomiting.  She had mild photophobia.  The weakness in the right side is no worse than her baseline, and she does not have any other acute focal neurologic symptoms.  The patient reports that she has not had problems with headaches in the past.          Review of patient's allergies indicates:   Allergen Reactions    Bactrim [sulfamethoxazole-trimethoprim] Rash     Past Medical History:   Diagnosis Date    CHF (congestive heart failure)     H/O coronary angioplasty     Hypertension      Past Surgical History:   Procedure Laterality Date    CORONARY ANGIOPLASTY WITH STENT PLACEMENT       History reviewed. No pertinent family history.  Social History   Substance Use Topics    Smoking status: Former Smoker     Packs/day: 0.50     Quit date: 10/4/2017    Smokeless tobacco: Never Used    Alcohol use Yes      Comment: occassionally      Review of Systems   Constitutional: Negative for chills and fever.   HENT: Negative for congestion, rhinorrhea and sore throat.    Eyes: Positive for photophobia.   Respiratory: Negative for cough and wheezing.    Cardiovascular: Positive for palpitations (had palpitations yesterday). Negative for chest pain and leg swelling.        Chronic chest pain, none in the last few days   Gastrointestinal: Positive for nausea. Negative for abdominal pain, diarrhea and vomiting.   Endocrine: Negative for polyuria.        No history of diabetes   Genitourinary: Negative for difficulty urinating and dysuria.   Musculoskeletal: Negative for arthralgias and myalgias.   Skin: Negative for rash.   Allergic/Immunologic: Negative for immunocompromised state.   Neurological: Positive for weakness and headaches. Negative for seizures, syncope and numbness.   Hematological: Does not bruise/bleed easily.        No history of malignancy DVT or PE   Psychiatric/Behavioral: Negative for confusion.       Physical Exam     Initial Vitals [10/25/17 2029]   BP Pulse Resp Temp SpO2   131/68 83 18 98.4 °F (36.9 °C) 97 %      MAP       89         Physical Exam    Nursing note and vitals reviewed.  Constitutional: She appears well-developed and well-nourished. She is not diaphoretic. No distress.   HENT:   Head: Normocephalic and atraumatic.   Right Ear: External ear normal.   Left Ear: External ear normal.   Nose: Nose normal.   Mouth/Throat: Oropharynx is clear and moist.   Eyes: Conjunctivae and EOM are normal. Pupils are equal, round, and reactive to light.   Neck: No JVD present.   Cardiovascular: Normal rate, regular rhythm, normal heart sounds and intact distal pulses.   No murmur heard.  Pulmonary/Chest: Breath sounds normal. No stridor. No respiratory distress. She has no wheezes.   Abdominal: Soft. She exhibits no distension. There is no tenderness.   Musculoskeletal: She exhibits no edema or tenderness.   Neurological: She is alert  and oriented to person, place, and time. No sensory deficit.   Minor right-sided facial droop at rest, but symmetric smile, rest of the cranial nerves are intact.  Mild decreased  strength on the right compared to the left, and slight weakness in the right leg compared to the left   Skin: Skin is dry. No rash noted.   Psychiatric: She has a normal mood and affect.         ED Course   Lumbar Puncture  Date/Time: 10/25/2017 11:55 PM  Performed by: TAHIRA FIGUEREDO  Authorized by: TAHIRA FIGUEREDO   Consent Done: Yes  Indications: evaluation for subarachnoid hemorrhage  Anesthesia: local infiltration    Anesthesia:  Local Anesthetic: lidocaine 1% with epinephrine  Anesthetic total: 3 mL  Patient sedated: no  Preparation: Patient was prepped and draped in the usual sterile fashion.  Lumbar space: L4-L5 interspace  Patient's position: sitting  Needle gauge: 20  Needle type: spinal needle - Quincke tip  Needle length: 3.5 in  Number of attempts: 1  Fluid appearance: clear  Tubes of fluid: 4  Total volume: 8 ml  Post-procedure: site cleaned and adhesive bandage applied  Complications: No  Patient tolerance: Patient tolerated the procedure well with no immediate complications        Labs Reviewed   BASIC METABOLIC PANEL - Abnormal; Notable for the following:        Result Value    BUN, Bld 18 (*)     All other components within normal limits   GRAM STAIN   CULTURE, CSF  (INCLUDES STAIN)   CBC W/ AUTO DIFFERENTIAL   MAGNESIUM   GLUCOSE, CSF   PROTEIN, CSF    Narrative:     Specimen Tube Number->1   CSF CELL COUNT WITH DIFFERENTIAL   CSF CELL COUNT WITH DIFFERENTIAL        Results for orders placed or performed during the hospital encounter of 10/25/17   CBC auto differential   Result Value Ref Range    WBC 6.62 3.90 - 12.70 K/uL    RBC 4.18 4.00 - 5.40 M/uL    Hemoglobin 12.7 12.0 - 16.0 g/dL    Hematocrit 37.0 37.0 - 48.5 %    MCV 89 82 - 98 fL    MCH 30.4 27.0 - 31.0 pg    MCHC 34.3 32.0 - 36.0 g/dL    RDW 13.5  11.5 - 14.5 %    Platelets 331 150 - 350 K/uL    MPV 10.3 9.2 - 12.9 fL    Gran # 3.3 1.8 - 7.7 K/uL    Lymph # 2.0 1.0 - 4.8 K/uL    Mono # 0.8 0.3 - 1.0 K/uL    Eos # 0.4 0.0 - 0.5 K/uL    Baso # 0.06 0.00 - 0.20 K/uL    Gran% 49.3 38.0 - 73.0 %    Lymph% 30.8 18.0 - 48.0 %    Mono% 12.7 4.0 - 15.0 %    Eosinophil% 6.0 0.0 - 8.0 %    Basophil% 0.9 0.0 - 1.9 %    Differential Method Automated    Basic metabolic panel   Result Value Ref Range    Sodium 137 136 - 145 mmol/L    Potassium 3.7 3.5 - 5.1 mmol/L    Chloride 101 95 - 110 mmol/L    CO2 26 23 - 29 mmol/L    Glucose 104 70 - 110 mg/dL    BUN, Bld 18 (H) 7 - 17 mg/dL    Creatinine 0.98 0.50 - 1.40 mg/dL    Calcium 9.0 8.7 - 10.5 mg/dL    Anion Gap 10 8 - 16 mmol/L    eGFR if African American >60.0 >60 mL/min/1.73 m^2    eGFR if non African American >60.0 >60 mL/min/1.73 m^2   Magnesium   Result Value Ref Range    Magnesium 1.7 1.6 - 2.6 mg/dL   Glucose, CSF (Tube 1)   Result Value Ref Range    Glucose, CSF 60 40 - 70 mg/dL   Protein, CSF (Tube 1)   Result Value Ref Range    Protein, CSF 32 12 - 60 mg/dL   CSF cell count with differential (Tube 4)   Result Value Ref Range    Heme Aliquot 2.0 mL    Appearance, CSF Clear Clear    Color, CSF Colorless Colorless    WBC, CSF 2 0 - 5 /cu mm    RBC, CSF 0 0 /cu mm    CSF, Comment Diff not performed    CSF cell count with differential   Result Value Ref Range    Heme Aliquot 2.0 mL    Appearance, CSF Clear Clear    Color, CSF Colorless Colorless    WBC, CSF 3 0 - 5 /cu mm    RBC, CSF 0 0 /cu mm    CSF, Comment Diff not performed      Imaging Results          CT Head Without Contrast (Final result)  Result time 10/25/17 22:05:37    Final result by Anthony Quinones MD (10/25/17 22:05:37)                 Impression:         Normal CT of the head.  No aneurysm identified.  No evidence of intracerebral hemorrhage.      All CT scans at this facility use dose modulation, iterative reconstruction, and/or weight based dosing when  appropriate to reduce radiation dose to as low as reasonably achievable.       Electronically signed by: ELIAS OSORIO MD  Date:     10/25/17  Time:    22:05              Narrative:    Exam: CT scan of the head without contrast    Clinical History:  recent cva, severe headache, prior mri showed aneurysms.      Findings:    No hydrocephalus, midline shift, mass effect, or acute intracranial hemorrhage. The gray-white matter differentiation is normal. CSF spaces are normal. The visualized sinuses and mastoid air cells are clear. No osseous abnormality.                               Medical Decision Making:   Initial Assessment:   Headache.  The patient recently suffered a neurologic event that his left her with right sided weakness in the arm and leg.  She did receive TPA for that event, but MRI did not demonstrate a territorial infarct.  She does have 2 cerebral aneurysms previously demonstrated on MRI, which raises the question of whether she may have had a subarachnoid hemorrhage today.  CT scan today showed no bleeding, so this study was followed by lumbar puncture, which shows no red cells or xanthochromia, so subarachnoid hemorrhage is very unlikely.  She was given Fioricet, and her headache is improved.  I will place her on Fioricet and she should follow-up with her primary care physician if not better in a week.                   ED Course      Clinical Impression:   The encounter diagnosis was Nonintractable headache, unspecified chronicity pattern, unspecified headache type.    Disposition:   Disposition: Discharged  Condition: Stable                        Jose Melgar MD  10/26/17 0150

## 2017-10-26 NOTE — ED NOTES
"Pt assisted into bed and pt identifiers verified.     Appearance: Pt is calm and cooperative, clothing fastened appropriately, no distress noted.   Neuro: Pt is A&Ox3. Speech is clear. Pt able to move all extremities freely. Weakness noted to right leg and arm. Slight right sided facial droop noted. Pt states symptoms are no worse today than usual.   Respiratory: Airway is open and patent. Respirations are even and unlabored on room air. No accessory muscle use noted.   Cardiac: Peripheral pulses palpated +2 in all extremities. No peripheral edema noted. Cap refill is <3 seconds.   HEENT: Eyes are clear of drainage with no visual loss per pt. No drainage noted to ears or nose. Mouth and teeth are intact with no abnormalities noted. Throat and neck appear normal with no abnormalities noted.   Gastrointestinal: Pt denies abdominal pain, states BM today and "normal".   : Pt denies burning, pain, frequency, or rentention with urination. Denies vaginal/penile discharge.   Skin: Skin is intact.   Musculoskeletal: No abnormalities noted.     "

## 2017-10-26 NOTE — HPI
42F w/ severe headache started this AM, blood pressure was normal all day despite it being out of control in the past. Headache is behind her right eye. Took ibuprofen and tylenol without much relief. This is the worst headache she has ever had because of the duration of her headache. She has has daily headaches that start as a 10/10 maximal intensity, but what is different about this headache is that is is not going away.She denies any symptoms such as weakness, numbness or slurring of her speech.

## 2017-10-26 NOTE — ED NOTES
"Pt sitting up in bed quietly. Respirations even and unlabored. Skin warm and dry. Pt reports pain remains unchanged. Pt denies needs at this time, states "I think the medication just needs more time to kick in." Pt instructed to notify staff should need arise, understanding verbalized.  "

## 2017-10-26 NOTE — SUBJECTIVE & OBJECTIVE
"Consult Start Time: 10/25/2017 21:45    CT READ TIME: 22:01        Woke up with symptoms?: no  Last known normal:  1000  Is the most recent last known normal correct? Yes      Does the patient take any Blood Thinners? 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   HENT: Negative for congestion.    Eyes: Negative for discharge.   Respiratory: Negative for shortness of breath.    Cardiovascular: Negative for chest pain.   Gastrointestinal: Negative for abdominal pain.   Endocrine: Negative for cold intolerance.   Genitourinary: Negative for difficulty urinating.   Musculoskeletal: Negative for joint swelling.   Skin: Negative for color change.   Neurological: Positive for headaches.     Objective:   Vitals: Blood pressure 131/68, pulse 83, temperature 98.4 °F (36.9 °C), temperature source Oral, resp. rate 18, height 5' 2" (1.575 m), weight 68 kg (150 lb), last menstrual period 09/25/2017, SpO2 97 %, not currently breastfeeding.     CT READ: Yes  No hemmorhage. No mass effect. No early infarct signs.     Physical Exam   Constitutional: No distress.   HENT:   Head: Normocephalic.   Right Ear: External ear normal.   Left Ear: External ear normal.   Eyes: Conjunctivae are normal.   Neck: Normal range of motion.   Pulmonary/Chest: Effort normal. No stridor.   Abdominal: There is no tenderness.   Skin: Skin is dry. No rash noted.       NIH Stroke Scale:  Interval: baseline (upon arrival/admit)  Level of Consciousness: 0 - alert  LOC Questions: 0 - answers " both correctly  LOC Commands: 0 - performs both correctly  Best Gaze: 0 - normal  Visual: 0 - no visual loss  Facial Palsy: 1 - minor (left decreased NLF)  Motor Left Arm: 0 - no drift  Motor Right Arm: 0 - no drift  Motor Left Le - no drift  Motor Right Le - no drift  Limb Ataxia: 0 - absent  Sensory: 0 - normal  Best Language: 0 - no aphasia  Dysarthria: 0 - normal articulation  Extinction and Inattention: 0 - no neglect  NIH Stroke Scale Total: 1

## 2017-10-31 LAB
CMV SPEC QL SHELL VIAL CULT: NO GROWTH
GRAM STN SPEC: NORMAL

## 2017-11-02 ENCOUNTER — LAB VISIT (OUTPATIENT)
Dept: LAB | Facility: HOSPITAL | Age: 42
End: 2017-11-02
Attending: NURSE PRACTITIONER
Payer: MEDICAID

## 2017-11-02 DIAGNOSIS — E07.9 DISORDER OF THYROID GLAND: Primary | ICD-10-CM

## 2017-11-02 LAB
ANION GAP SERPL CALC-SCNC: 9 MMOL/L
BUN SERPL-MCNC: 19 MG/DL
CALCIUM SERPL-MCNC: 8.7 MG/DL
CHLORIDE SERPL-SCNC: 103 MMOL/L
CO2 SERPL-SCNC: 29 MMOL/L
CREAT SERPL-MCNC: 0.99 MG/DL
EST. GFR  (AFRICAN AMERICAN): >60 ML/MIN/1.73 M^2
EST. GFR  (NON AFRICAN AMERICAN): >60 ML/MIN/1.73 M^2
GLUCOSE SERPL-MCNC: 86 MG/DL
POTASSIUM SERPL-SCNC: 3.7 MMOL/L
SODIUM SERPL-SCNC: 141 MMOL/L
T3FREE SERPL-MCNC: 3.1 PG/ML
T4 SERPL-MCNC: 7.1 UG/DL
TSH SERPL DL<=0.005 MIU/L-ACNC: 3.52 UIU/ML

## 2017-11-02 PROCEDURE — 84481 FREE ASSAY (FT-3): CPT | Mod: PO

## 2017-11-02 PROCEDURE — 84443 ASSAY THYROID STIM HORMONE: CPT | Mod: PO

## 2017-11-02 PROCEDURE — 80048 BASIC METABOLIC PNL TOTAL CA: CPT | Mod: PO

## 2017-11-02 PROCEDURE — 36415 COLL VENOUS BLD VENIPUNCTURE: CPT | Mod: PO

## 2017-11-02 PROCEDURE — 84436 ASSAY OF TOTAL THYROXINE: CPT

## 2017-11-03 ENCOUNTER — OFFICE VISIT (OUTPATIENT)
Dept: SURGERY | Facility: CLINIC | Age: 42
End: 2017-11-03
Payer: MEDICAID

## 2017-11-03 VITALS
HEART RATE: 83 BPM | HEIGHT: 62 IN | TEMPERATURE: 99 F | DIASTOLIC BLOOD PRESSURE: 82 MMHG | WEIGHT: 182.38 LBS | BODY MASS INDEX: 33.56 KG/M2 | OXYGEN SATURATION: 98 % | SYSTOLIC BLOOD PRESSURE: 130 MMHG

## 2017-11-03 DIAGNOSIS — Z86.73 H/O: CVA (CEREBROVASCULAR ACCIDENT): ICD-10-CM

## 2017-11-03 DIAGNOSIS — E66.9 OBESITY (BMI 30.0-34.9): ICD-10-CM

## 2017-11-03 DIAGNOSIS — E04.1 THYROID CYST: Primary | ICD-10-CM

## 2017-11-03 DIAGNOSIS — I10 MALIGNANT HYPERTENSION: ICD-10-CM

## 2017-11-03 PROCEDURE — 99204 OFFICE O/P NEW MOD 45 MIN: CPT | Mod: S$GLB,,, | Performed by: SURGERY

## 2017-11-03 RX ORDER — LISINOPRIL 40 MG/1
TABLET ORAL
Refills: 5 | COMMUNITY
Start: 2017-10-31 | End: 2018-03-15

## 2017-11-03 RX ORDER — TRAMADOL HYDROCHLORIDE 50 MG/1
50 TABLET ORAL EVERY 8 HOURS PRN
Refills: 0 | COMMUNITY
Start: 2017-10-09 | End: 2017-11-03

## 2017-11-03 RX ORDER — BUTALBITAL, ACETAMINOPHEN, CAFFEINE AND CODEINE PHOSPHATE 50; 325; 40; 30 MG/1; MG/1; MG/1; MG/1
CAPSULE ORAL
Refills: 0 | COMMUNITY
Start: 2017-10-31 | End: 2018-07-16

## 2017-11-03 RX ORDER — MIRTAZAPINE 15 MG/1
TABLET, FILM COATED ORAL
Refills: 1 | COMMUNITY
Start: 2017-10-09 | End: 2017-11-03

## 2017-11-03 NOTE — PROGRESS NOTES
Subjective:      Patient ID: Gina Jenkins is a 42 y.o. female.    Chief Complaint: Thyroid Problem  Pt with recent complicated PMH, had CVA in early October 2017, transferred from [facility] to [facility]. Deficits include right-sided weakness (improving), uses walker now for safety. Deficits are improving. She also c/o waking in the am with a sore throat. U/S of the neck was ordered by her PCP which reviewed several cysts.  She occasionally c/o superior ant neck pain, which began several months ago. No dysphagia. No change in voice. No stridor.  No FH of thyroid CA.  No radiation exposure. Takes a low salt diet.  She presents alone today. No other c/o.    Past Medical History:   Diagnosis Date    Brain aneurysm     CHF (congestive heart failure)     H/O coronary angioplasty     Hypercholesteremia     Hypertension     Stroke 10/2017     Past Surgical History:   Procedure Laterality Date    CORONARY ANGIOPLASTY WITH STENT PLACEMENT       History reviewed. No pertinent family history.  Social History     Social History    Marital status:      Spouse name: N/A    Number of children: N/A    Years of education: N/A     Social History Main Topics    Smoking status: Former Smoker     Packs/day: 0.50     Quit date: 10/4/2017    Smokeless tobacco: Never Used    Alcohol use Yes      Comment: occassionally    Drug use: No    Sexual activity: Yes     Partners: Male     Birth control/ protection: None     Other Topics Concern    None     Social History Narrative    None       Current Outpatient Prescriptions   Medication Sig Dispense Refill    amlodipine (NORVASC) 10 MG tablet Take 1 tablet (10 mg total) by mouth once daily. 30 tablet 0    aspirin (ECOTRIN) 81 MG EC tablet Take 81 mg by mouth as needed for Pain.      atorvastatin (LIPITOR) 40 MG tablet Take 1 tablet (40 mg total) by mouth once daily. 90 tablet 3    butalbital-acetaminop-caf-cod -50-30 mg Cap TK ONE C PO BID PRF HA  0     "carvedilol (COREG) 12.5 MG tablet Take 1 tablet (12.5 mg total) by mouth 2 (two) times daily with meals. 60 tablet 11    hydrochlorothiazide (HYDRODIURIL) 25 MG tablet Take 1 tablet (25 mg total) by mouth once daily. 30 tablet 0    lisinopril (PRINIVIL,ZESTRIL) 40 MG tablet TK 1 T PO D  5     No current facility-administered medications for this visit.      Review of patient's allergies indicates:   Allergen Reactions    Bactrim [sulfamethoxazole-trimethoprim] Rash       ROS:  All systems were reviewed and are negative, except that mentioned in the HPI.    Objective:     Vitals:    11/03/17 1045   BP: 130/82   BP Location: Left arm   Patient Position: Sitting   BP Method: Large (Manual)   Pulse: 83   Temp: 98.6 °F (37 °C)   SpO2: 98%   Weight: 82.7 kg (182 lb 6.4 oz)   Height: 5' 2" (1.575 m)     Physical Exam   Constitutional: She is oriented to person, place, and time. She appears well-developed and well-nourished. No distress.   HENT:   Head: Normocephalic and atraumatic.   Eyes: EOM are normal. Pupils are equal, round, and reactive to light. No scleral icterus.   Neck: Normal range of motion. Neck supple. No JVD present. No tracheal deviation present. No thyromegaly present.   Cardiovascular: Normal rate and regular rhythm.    Pulmonary/Chest: Effort normal and breath sounds normal. No stridor. No respiratory distress.   Abdominal: Soft. She exhibits no distension.   Musculoskeletal: Normal range of motion.   Lymphadenopathy:     She has no cervical adenopathy.   Neurological: She is alert and oriented to person, place, and time.   Right sided weakness   Skin: Skin is warm and dry. She is not diaphoretic.   Psychiatric: She has a normal mood and affect.       Lab Review: CBC:   Lab Results   Component Value Date    WBC 6.62 10/25/2017    RBC 4.18 10/25/2017    HGB 12.7 10/25/2017    HCT 37.0 10/25/2017     10/25/2017     BMP:   Lab Results   Component Value Date    GLU 86 11/02/2017     " 11/02/2017    K 3.7 11/02/2017     11/02/2017    CO2 29 11/02/2017    BUN 19 (H) 11/02/2017    CREATININE 0.99 11/02/2017    CALCIUM 8.7 11/02/2017     Lab Results   Component Value Date    ALT 47 (H) 10/05/2017    AST 41 (H) 10/05/2017    ALKPHOS 121 10/05/2017    BILITOT 0.2 10/05/2017     T4 - 7.1  TSH - 3.5  T3 - 3.1    Diagnostics Review:  U/S 10/11/17 images and reports were personally reviewed.  The report states:  The right lobe of the thyroid measures 4.4 x 2.0 x 1.9 cm.  The left lobe of the thyroid measures 4.3 x 1.2 x 1.1 cm.  The isthmus measures 4 mm in thickness. There are 2 colloid cysts in the mid right thyroid lobe measuring 2.6 cm and 0.7 cm. There is a 1.2 cm colloid cyst in the right isthmus.  There is a 5 mm hypoechoic nodule in the mid left thyroid lobe.  No other thyroid cysts or nodules identified    Assessment:     1. Thyroid cyst    2. H/O: CVA (cerebrovascular accident)    3. Obesity (BMI 30.0-34.9)    4. Malignant hypertension      Plan:   I do not think that the patient's superior anterior neck pain and morning sore throat are related to her thyroid cysts.  I will review this study with IR to see if any cysts meet criteria for FNA at this time.  Repeat U/S and f/u appt in 6mos was ordered. Thyroid function tests were w/in normal limits. Cont medical management of her htn and CVA.  Thyroid handout explained and given to pt. All questions were answered.

## 2017-11-06 ENCOUNTER — TELEPHONE (OUTPATIENT)
Dept: SURGERY | Facility: CLINIC | Age: 42
End: 2017-11-06

## 2017-11-06 NOTE — TELEPHONE ENCOUNTER
----- Message from Katie Vega LPN sent at 11/6/2017 10:33 AM CST -----      ----- Message -----  From: Donna Daly DO  Sent: 11/6/2017  10:13 AM  To: Katie Vega LPN    Please let pt know that I have reviewed her U/S with radiology, no biopsy is indicated at this time of her cysts. We will see her after he repeat U/S in 6 months, or sooner with any concerns. Thx    11/06/2017 @ 1038    Per Dr. Daly.Patient notified above results and recommendations. Patient voice understanding. Will schedule in [place] per patients request.

## 2017-11-08 ENCOUNTER — CLINICAL SUPPORT (OUTPATIENT)
Dept: REHABILITATION | Facility: HOSPITAL | Age: 42
End: 2017-11-08
Payer: MEDICAID

## 2017-11-08 DIAGNOSIS — R29.898 WEAKNESS OF RIGHT LOWER EXTREMITY: ICD-10-CM

## 2017-11-08 DIAGNOSIS — I69.398 IMPAIRED BALANCE AS LATE EFFECT OF CEREBROVASCULAR ACCIDENT: ICD-10-CM

## 2017-11-08 DIAGNOSIS — I69.398 ABNORMALITY OF GAIT AS LATE EFFECT OF CEREBROVASCULAR ACCIDENT (CVA): ICD-10-CM

## 2017-11-08 DIAGNOSIS — R26.89 DECREASED FUNCTIONAL MOBILITY: ICD-10-CM

## 2017-11-08 DIAGNOSIS — R26.9 ABNORMALITY OF GAIT AS LATE EFFECT OF CEREBROVASCULAR ACCIDENT (CVA): ICD-10-CM

## 2017-11-08 DIAGNOSIS — R26.89 IMPAIRED BALANCE AS LATE EFFECT OF CEREBROVASCULAR ACCIDENT: ICD-10-CM

## 2017-11-08 PROCEDURE — 97162 PT EVAL MOD COMPLEX 30 MIN: CPT

## 2017-11-14 ENCOUNTER — CLINICAL SUPPORT (OUTPATIENT)
Dept: REHABILITATION | Facility: HOSPITAL | Age: 42
End: 2017-11-14
Payer: MEDICAID

## 2017-11-14 DIAGNOSIS — R29.898 WEAKNESS OF RIGHT UPPER EXTREMITY: ICD-10-CM

## 2017-11-14 DIAGNOSIS — R29.898 DECREASED GRIP STRENGTH OF RIGHT HAND: ICD-10-CM

## 2017-11-14 DIAGNOSIS — M25.611 DECREASED RANGE OF MOTION OF RIGHT SHOULDER: ICD-10-CM

## 2017-11-14 PROCEDURE — 97166 OT EVAL MOD COMPLEX 45 MIN: CPT

## 2017-11-14 NOTE — PLAN OF CARE
TIME RECORD    Date: 11/14/2017    Start Time:  9:55  Stop Time:  10:55  Visit: 1    PROCEDURES:    TIMED  Procedure Min.                 UNTIMED  Procedure Min.   IE 60         Total Timed Minutes:  0  Total Timed Units:  0  Total Untimed Units:  1  Charges Billed/# of units:  1IE    OCCUPATIONAL THERAPY INITIAL EVALUATION & PLAN OF TREATMENT    Patient Name: Gina Jenkins  Physician Name:  Clair Johnson NP  Primary Diagnosis:  CVA  Treatment Diagnosis:  R UE weakness,  weakness, decreased pinch strength, impaired FM coordination, decreased shoulder ROM  Onset Date:  10/03/17  Eval Date:  11/14/17  Certification Period:  11/14/17 to 1/14/18  Past Medical History:   Past Medical History:   Diagnosis Date    Brain aneurysm     CHF (congestive heart failure)     H/O coronary angioplasty     Hypercholesteremia     Hypertension     Stroke 10/2017     Precautions:  Standard, fall risk  Prior Therapy:  Acute OT, PT, ST after CVA, no prior therapy  Signs of Abuse: no  Medications: Gina Jenkins has a current medication list which includes the following prescription(s): amlodipine, aspirin, atorvastatin, butalbital-acetaminop-caf-cod, carvedilol, hydrochlorothiazide, and lisinopril.  Nutrition:  WDWN  Prior Level of Function: Independent  Social History:  Pt reports she had been driving prior to CVA. Pt moved in with her mother after the CVA, but would like to go back home. She was living alone. Pt was working full time in housekeeping.  Pt has 3 boys (9, 6, and 4 y/o).   Place of Residence (steps/adaptations/DME):  Ramp to enter current house, 1 story house, tub shower with grab bars, owns [facility] but does not use anymore; ambulates with RW  Functional Deficits Leading to Referral/Nature of Injury:  CVA  Patient Therapy Goals:  To be able to hold on to things, and to stop dropping things  Hand dominance: Right  X-Rays/Tests:   CTA on 10/3/17 indicates:4mm left ICA aneurysm at the origin of left anterior  "choroidal artery   1-2mm left MCA bifurcation aneurysm     MRI without contrast on 10/3/17 indicates: 1. No evidence of abnormal restricted diffusion to suggest acute infarct.  2. Few scattered punctate foci of supratentorial T2/FLAIR hyperintensity. These lesions are nonspecific in appearance and may be seen with accelerated small vessel ischemic disease, vasculopathies, migraine headaches, and as the residua from inflammatory and traumatic insults to the brain.  3. Small 4-mm outpouching arising from the proximal left MCA corresponding small aneurysm better evaluated on prior dedicated CT examination.  4. Mucous retention cyst or polyps within the bilateral maxillary sinuses.    Subjective:  Pt states "I drop things all the time. I try to pour things for the boys, or make some oatmeal but then I drop it. My arm is weak, and my shoulder feels tight."    Pain:  During no work: 010  While working: reported mild pain with shoulder movement  Sleepin/10  Location of pain: R shoulder    Objective:    Cognitive Exam  Oriented: Person, Place, Time and Situation  Behaviors: Follows two-step commands  Follows Commands/attention: Follows two-step commands  Communication: mild slurring, word finding difficulties at times  Memory: reports impaired STM  Safety awareness/insight to disability: good  Coping skills/emotional control: Appropriate to situation    Visual/perceptual:  Tracking: intact  Saccades: intact  Acuity: impaired, reports has difficulty reading thing, and that she may need glasses  R/L discrimination: intact  Visual field: intact  Motor Planning Praxis: intact  Comments:     Physical Exam:    Postural examination/scapula alignment: Affected scapula depressed, Affected scapula abducted and Slouched posture  Joint integrity: no subluxation noted  Skin integrity: warm, intact  Edema: no edema noted    Palpation: denied pain upon palpation    Sensation: Gina denies numbness and tingling; sensation grossly " "intact    Range of Motion:   Shoulder Right  Left Pain/Dysfunction with Movement    AROM MMT AROM    flexion 135 3/5 WFL Mild pain at end range   extension 50 3/5 WFL    abduction 100 3-/5 WFL Pain at end range   adduction WFL NT WFL    Internal rotation To L2 3/5 WFL    ER at 90° abd WFL 3+/5 WFL    ER at 0° abd WFL 3+/5 WFL    Elbow Flex WFL 3+/5 WFL    Elbow Ext WFL 4/5 WFL    Pronation WFL 4/5 WFL    Supination WFL 5/5 WFL    Wrist Flex WFL 3+/5 WFL    Wrist Ext WFL 3/5 WFL    RD WFL 5/5 WFL    UD WFL 5/5 WFL      ROM Comments:   Pain at end range with FF/ABD    Fist: WFL  Opposition: WFL     Strength in lbs (position II):  R) 25  L) 45    Pinch strength in psi (R/L):  Lateral Pinch strength: 5 / 14  3 point pinch strength: 6 /17  Tip pinch strength: 10 / 9    Fine motor coordination: able to button buttons, tie shoes; delayed with opposition  9 hole peg test:  (all in hand)  R) 59" with 3 drops   L) 35" with 0 drops    Gross motor coordination: delayed on R side; delayed finger to nose test     Tone:  Modified Shayy Scale:   1-  Slight increase in muscle tone, manifested by a catch and release or by minimal resistance at the end of the range of motino when the affected part(s) is moved in flexion or extension; in elbow      Functional Mobility:  Supervision/mod I with RW    ADL's:  Feeding: set-up, min spillage  Grooming: I  Hygiene: I  UB Dressing: Mod I  LB Dressing: Mod I  Toileting: Mod I  Bathing: Mod I    IADL's:  Homecare: D  Cooking: D  Laundry: D  Yard work: D  Use of telephone: I  Money management: I  Medication management: I  Comments: pt reports her mother does not let her perform some of the home care activities she would like to assist with.    Test: Patient scored 77% limitation on FOTO demonstrating Pt's functional ability with upper extremity.     Treatment included: OT evaluation, the following exercises (HEP) were instructed and Gina was able to demonstrate them prior to the end " of the session. HEP are as follows: see patient instructions. Pt given wall climbs, supine FF with dowel, shoulder shrugs and rotations, scapular retractions, hand and finger strengthening with red theraputty. Pt given handouts and red theraputty for HEP.       Assessment  This 42 y.o. female referred to Outpatient Occupational Therapy with diagnosis of CVA  Encounter Diagnoses   Name Primary?    Decreased  strength of right hand     Weakness of right upper extremity     Lack of coordination due to stroke     Decreased range of motion of right shoulder     presents with limitations as described in problem list. Patient can benefit from Occupational Therapy services for Ultrasound, moist heat, PROM, AAROM, AROM, Theraputic exercises, joint mobs, home exercise program provied with written instructions, ice, Ice massage, strengthening, Theraband Ex, UBE and pulley ex, and neuro re-ed in order to maximize painfree functional use of  right UE. . The following goals were discussed with the patient and she is in agreement with them as to be addressed in the treatment plan.     Problem list:   Decreased shoulder ROM, pain, decreased independence with ADLs/IADLs, UE weakness, decreased  and pinch strength, and impaired FM and gross motor coordination      History Examination Decision Making Complexity Score   Occupational Profile: Did an expanded chart review. Pt is staying with mother and 3 young children (9, 6, and 6 y/o boys). She typically lives alone with the boys. Typically drives, but not since CVA. Pt was working full time as a . She is R hand dominant        Medical and Therapy History:     Comorbidities that may affect POC include: HTN, decreased mobility, memory issues    Prior OT, PT, and ST in acute care    Moderate   Performance Deficits   Dominant UE affected    Physical  Decreased function of Right UE, Decreased ROM, Increased pain, Decreased strength, Inability to perform work/tasks,  Inability to perform leisure activiites and Inability to perform self care tasks     Cognitive  Mild memory issues, difficulty with some word finding      Psychosocial:    Pt unable to perform the following : housecare, cooking, assisting with her sons, all of which were a part of pt's habits, roles, routines, interpersonal interactions prior to injury     Moderate Foto score:77% limitation    Pt has several treatment options including IASTM, cupping, soft tissue mobs, joint mobs, therex, therapeutic activity, education, endurance training, modalities, neuro re-ed, etc     Discussed goals and Pt in agreement with goals.    Issued HEP at eval and pt able to perform all with minimal verbal and tactile cues provided by OT. Pt required min assist during eval. Pt able to complete all without c/o and demonstrating good technique    Moderate Moderate      Rehab Potential: excellent    Goals to be met in 4 weeks: (12/14/17)  1) Initiate Hep   2) Pt will demonstrate improved R Ue strength by at least 1/2 grade MMT for improved function during IADLs  3) Pt will demonstrate improved R shoulder ROM by at least 5-10* for improved reach of items  4) Pt will demonstrate increased  strength by at least 3-5# for activities such as cooking or holding a gallon of juice.    Goals to be met by discharge:  1) Independent with HEP  2) Pt will demonstrate R UE strength WFL for increase   3) Pt will require no more than min A for house work  4) Pt will require no more than supervision with  Cooking  5) Pt will be Independent with feeding self  6) Patient will be able to achieve less than or equal to 45% limitation on FOTO  demonstrating overall improved functional ability with upper extremity.     Plan  Recommended Treatment Plan (2 times per week for 8 weeks): Therapeutic Exercise, Functional Activities, Patient Education, Home Exercise Program, ADL Training, Paraffin, Ultrasound/Phonophoresis, Edema Control, Electrical  Stimulation/TENS/Interferential, Moist Heat/Ice, Sensory/Neuromuscular Reeducation and Manual Therapy  Other Recommendations:  KT tape, ISMAEL prn    Therapist's Name: TRINO Boyce   Date: 11/14/2017    I CERTIFY THE NEED FOR THESE SERVICES FURNISHED UNDER THIS PLAN OF TREATMENT AND WHILE UNDER MY CARE    Physician's comments: ________________________________________________________________________________________________________________________________________________      Physician's Name: ___________________________________

## 2017-11-14 NOTE — PATIENT INSTRUCTIONS
"Walk Up Exercise (Active/Assistive)        With elbow straight, use fingers to "crawl" up wall or door frame as far as possible. Hold 5 seconds.  Repeat _10_ times. Do _2_ sessions per day.    Copyright © I. All rights reserved.     Elevation (Active)        Shrug shoulders up, breathing in. Relax, breathing out.  Repeat _10_ times. Do _2_ sessions per day.    Copyright © I. All rights reserved.     Scapular Retraction: Elbow Flexion (Standing)        With elbows bent to 90°, pinch shoulder blades together and rotate arms out, keeping elbows bent.  Repeat _10_ times per set. Do _2_ sets per session. Do _2_ sessions per day.     [email or web address]     Copyright © I. All rights reserved.      Strengthening (Resistive Putty)        Squeeze putty using thumb and all fingers.  Repeat __10__ times. Do _2___ sessions per day.    Copyright © I. All rights reserved.   Lateral Pinch Strengthening (Resistive Putty)        Squeeze between thumb and side of each finger in turn.  Repeat __10__ times. Do __2__ sessions per day.    Copyright © I. All rights reserved.   Palmar Pinch Strengthening (Resistive Putty)        Pinch putty between thumb and each fingertip in turn.  Repeat __10__ times. Do __2__ sessions per day.    Copyright © I. All rights reserved.   Three Jaw Mendoza Pinch Strengthening (Resistive Putty)        Pull putty, using thumb, index and middle fingers.  Repeat __10__ times. Do __2__ sessions per day.    Copyright © I. All rights reserved.     "

## 2017-11-28 ENCOUNTER — CLINICAL SUPPORT (OUTPATIENT)
Dept: REHABILITATION | Facility: HOSPITAL | Age: 42
End: 2017-11-28
Payer: MEDICAID

## 2017-11-28 DIAGNOSIS — M25.611 DECREASED RANGE OF MOTION OF RIGHT SHOULDER: ICD-10-CM

## 2017-11-28 DIAGNOSIS — R29.898 WEAKNESS OF RIGHT UPPER EXTREMITY: ICD-10-CM

## 2017-11-28 DIAGNOSIS — R41.841 COGNITIVE COMMUNICATION DEFICIT: ICD-10-CM

## 2017-11-28 DIAGNOSIS — R47.1 DYSARTHRIA: ICD-10-CM

## 2017-11-28 DIAGNOSIS — R29.898 DECREASED GRIP STRENGTH OF RIGHT HAND: ICD-10-CM

## 2017-11-28 PROCEDURE — 97530 THERAPEUTIC ACTIVITIES: CPT | Mod: PO

## 2017-11-28 PROCEDURE — 92523 SPEECH SOUND LANG COMPREHEN: CPT | Mod: PO

## 2017-11-28 NOTE — PROGRESS NOTES
"TIME RECORD    Date:  11/28/2017    Start Time:  9:00  Stop Time:  10:00  Visit: 2/17, expires 1/31/18    PROCEDURES:    TIMED  Procedure Min.   TA 60                     UNTIMED  Procedure Min.             Total Timed Minutes:  60  Total Timed Units:  4  Total Untimed Units:  0  Charges Billed/# of units:  4TA      Progress/Current Status    Subjective:     Patient ID: Gina Jenkins is a 42 y.o. female.  Diagnosis:   1. Decreased  strength of right hand     2. Weakness of right upper extremity     3. Lack of coordination due to stroke     4. Decreased range of motion of right shoulder       Pain: 0 /10  Pt states "my arm has been doing a little better. I've been using the putty a lot. My arm is still weak though." Pt reports compliance with HEP.     Objective:     Pt seen by OT this session. Treatment consisted of the following:     Date: 11/28/17    Visit: 2/17; expires 1/31/18   Pulleys, FF 3'   UBE, L2.5 2' forward, 2' back   Supine dowel FF 10 reps   Snow angels 15 reps   theraputty - red    -molding 2 min   -gross  10 reps   -log rolls with 3 point pinch 2 sets   -lateral key pinch 10 reps   Wrist dexerciser 6 reps   9 hole pegs, addressing FM coordination and in-hand manipulation 3 min   Grooved pegboard; addressing FM skills, in-hand manipulation, manual dexterity 5 min   PHG, 25# 12 reps         Pt performed neuromuscular WB activity while standing, with R UE in WB while multidirectional reaching for objects x 10 min.    Assessment:     Pt tolerated treatment fairly well this date. Pt c/o fatigue in R UE after tx.  Improved FM coordination noted today with 9 hole pegs and grooved pegboard.  Pt with good participation and motivation. Pt reports she wants to become independent again.  Pt cont to present with R UE weakness, decreased ROM, R shoulder stiffness, impaired gross motor and FM coordination, and decreased independence with ADLs/IADLs.  Pt progressing fairly well towards goals.  OT services " are medically necessary to improve deficits and maximize functional independence.    Patient Education/Response:     Cont HEP and functional FM coordination activities. Pt educated on supine shoulder FF dowel ex for at home. Pt verbalized understanding of education.    Plans and Goals:     Cont OT poc 2x/week for 8 weeks during certification period 11/14/17 to 1/14/18 in pursuit of established goals.    Goals to be met in 4 weeks: (12/14/17)  1) Initiate Hep   2) Pt will demonstrate improved R Ue strength by at least 1/2 grade MMT for improved function during IADLs  3) Pt will demonstrate improved R shoulder ROM by at least 5-10* for improved reach of items  4) Pt will demonstrate increased  strength by at least 3-5# for activities such as cooking or holding a gallon of juice.     Goals to be met by discharge:  1) Independent with HEP  2) Pt will demonstrate R UE strength WFL for increase   3) Pt will require no more than min A for house work  4) Pt will require no more than supervision with  Cooking  5) Pt will be Independent with feeding self  6) Patient will be able to achieve less than or equal to 45% limitation on TRINO [place]

## 2017-11-29 DIAGNOSIS — E04.1 THYROID CYST: Primary | ICD-10-CM

## 2017-11-29 NOTE — ASSESSMENT & PLAN NOTE
From: Aparna Garay  To: Felix Wilde MD  Sent: 11/29/2017 9:19 AM CST  Subject: PSA blood test    I have not received the test results from my PSA blood test. I have received the other test results from my blood test. Can you please send me the results from the PSA blood test. THANK YOU Aparna Garay   MRI and CTA showed 4mm left MCA aneurysm, asymptomatic.   - continue to monitor clinically  - current BP goal < 180, consider gradually reducing to < 140

## 2017-11-29 NOTE — PLAN OF CARE
Date: 11/28/2017     Start Time:  1000  Stop Time:  1100      OUTPATIENT NEUROLOGICAL REHABILITATION  SPEECH THERAPY EVALUATION    Subjective/History  Onset Date:  10/03/2017  Primary Diagnosis:  CVA  Treatment Diagnosis:    1. Dysarthria     2. Cognitive communication deficit        Referring Provider:  Clair Johnson NP  Orders:  for evaluation and treat  Current Medical History:  Gina Jenkins presents to the [facility] secondary to the diagnosis of CVA.   1. Dysarthria     2. Cognitive communication deficit        Past Medical History:   Past Medical History:   Diagnosis Date    Brain aneurysm     CHF (congestive heart failure)     H/O coronary angioplasty     Hypercholesteremia     Hypertension     Stroke 10/2017     Precautions:  General  Prior Therapy:  Pt seen for speech therapy while admitted in [facility] following CVA.  Pain: 0 /10  Nutrition:  Regular diet, thin liquids  Environmental Concerns/Cultural/Spiritual/Developmental/Educational Needs: None  Prior Level of Function: Independent  Social History:  Pt lives with her mom and was a  for [employer] prior to CVA.  Signs of Abuse: No  Functional Deficits Leading to Referral/Nature of Injury:  High Level Cognitive Deficits and moderate dysarthria.   Patient Therapy Goals:  To improve muscle strength due to right facial droop.     Objective   Scales of Cognitive and Communicative Ability for Neurorehabilitation (SCCAN) was administered to assess cognitive and communicative functioning.        Raw Scores  Percentage Score  Oral Expression (OE)  14/19   74   Orientation (OR)   12/12   100  Memory (ME)    10/19   53  Speech Comprehension (SP)  11/13   85  Reading Comprehension (RD)  12/12   100  Writing (WR)    6/7   86  Attention (AT)    12/16   75  Problem Solving (PS)   18/23   78    Total Raw Score 74 %ile Rank <1 SCCAN Index 50 Degree of Severity Mild Impairment    Auditory Comprehension:   Pt was able to to follow simple and moderate level commands, however, demonstrated difficulty with complex higher level commands. Pt answered simple, moderate, and complex yes/no questions with 100% accuracy.     Reading Comprehension: Pt completed simple, moderate, and complex reading comprehension tasks with 100% accuracy. [facility]    Verbal Expression: Pt demonstrated difficulty with word finding by naming 11 items in a concrete category given 60 seconds (the norm is 15) and 11 items in an abstract category given 60 seconds (norm is 15). Pt had mild difficulty naming items given characteristics. Pt labeled common objects with 100% accuracy. Pt was able to explain how items were similar, but demonstrated difficulty explaining how items were different. Pt verbally generated an appropriate sentence regarding a picture scene and sequenced a functional scenario with 100% accuracy.     Written Expression:  Pt completed simple, moderate, and complex writing tasks with 86% accuracy. [facility]    Cognition: Pt was oriented x4. Pt exhibited difficulty with short term memory tasks such as immediate and delayed memory activities (recalling short message, recalling medications to take, recalling words given a delay). Increased difficulty noted with delayed memory skills. Pt's attention skills were mildly delayed at the divided and selective level. Pt sustained attention during evaluation without any redirections needed. Occasionally, pt would require a repetition of instruction or prompt. Pt's complex problem solving and reasoning skills were also mildly impaired. She demonstrated difficulty explaining how items are different.     Motor Speech/Fluency/Voice: Oral motor exam revealed:  Diadochokinetic (DDK) rates for rapid repetition of 1 - 3 syllable utterances was not WNL. DDK rates were slow and labored. Pt presented with a right facial droop c/b weakness of right orofacial muscles. Pt's tongue strength appeared to be adequate. Pt's  speech was 80% intelligible during unknown conversations and 90% intelligible during known conversations with unfamiliar listener.     Swallowing: Pt reported no difficulty with swallowing at home. No s/s of aspiration present during trials of water during therapy.     Hearing: Pt's hearing judged to WF.     Assessment/Impressions  Gina Jenkins presented to the [facility] due to diagnosis of CVA. Her deficits were characterized by high level cognitive deficits such as, moderate immediate and delayed memory skills, mild problem solving skills, and mild attention skills. Pt also presents with moderate dysarthria c/b slurred speech, right orofacial droop, and imprecise consonants.   Patient will benefit from outpatient neurological rehabilitation speech therapy.    Functional Communication Measure (FCM):   Severity Modifier for Medicare G-Code:  Memory  Current status: FCM:  - CJ CJ at least 20% < 40% impaired, limited or restricted  Projected status:  FCM:   CI CI at least 1% but less than 20% impaired, limited or restricted  Discharge status:  FCM: n/a    Spoken Language Expression  Current status: FCM:     -  CI CI at least 1% but less than 20% impaired, limited or restricted  Projected status:  FCM:     -  CH CH 0% impaired, limited or restricted    Motor Speech  Current status: FCM:     - CI CI at least 1% but less than 20% impaired, limited or restricted  Projected status:  FCM:     - CH CH 0% impaired, limited or restricted    Rehab Potential: good    Short Term Goals (4 weeks):   1. Pt will participate in oral motor exercises 15x each 2-3x per session/per day as part of home program ind'ly to improve oral motor strength and coordination.   2. Pt will repeat 5-7 word sentences with 90% accuracy across 3 consecutive sessions.   3. Pt will recall clear speech strategies ind'ly across 3 consecutive sessions.   4. Pt will complete short term  memory tasks with 90% accuracy ind'ly across 3 consecutive sessions.   5. Pt will provide differences between two words with 85% accuracy given min cues across 3 consecutive sessions.   6. Pt will complete convergent naming tasks with 85% accuracy given min cues across 3 consecutive sessions.   7. Pt will complete verbal expression tasks with 90% accuracy ind'ly across 3 consecutive sessions.   8. Pt will complete complex reasoning and problem solving skills with 90% accuracy ind'ly across 3 consecutive sessions.   9. Pt will complete divided and selective attention tasks with 90% accuracy ind'ly across 3 consecutive sessions.     Long Term Goals (8 weeks):   1. Pt will use appropriate memory strategies to schedule and recall weekly activities, express needs and recall names to maintain safety and participate socially in functional living environment.   2. Pt will develop functional cognitive-linguistic based skills and utilize compensatory strategies to communicate wants and needs effectively to different conversational partners, maintain safety and participate socially in functional living environment.    3. Client will develop functional and intelligible speech and utilize compensatory strategies through the use of adequate labial and lingual function, increased articulatory precision and speech prosody.    Education: Patient was educated on the plan of care, therapy recommendations, and assessment results. No family present for education. Pt verbalized and demonstrated understanding and agreed with the plan of care.     Plan  Certification Period: 11/27/2017 to 12/31/2017  Plan of Care Certification Period: 11/27/2017- 01/22/2018    Recommended Treatment Plan:  Patient will participate in the [facility] neurological rehabilitation program for speech therapy # times per week to address her cognitive/speech deficits, educate patient/family, and home exercise program.    Other Recommendations: None at this  time.      Therapist's Name: TYSON Rodriguez, CCC-SLP  Speech-Language Pathologist     Date: 11/28/2017    I certify the need for these services furnished under this plan of treatment and while under my care.      ____________________________________   _________________________  Physician/Referring Practitioner      Date of Signature

## 2017-11-30 ENCOUNTER — CLINICAL SUPPORT (OUTPATIENT)
Dept: REHABILITATION | Facility: HOSPITAL | Age: 42
End: 2017-11-30
Payer: MEDICAID

## 2017-11-30 ENCOUNTER — DOCUMENTATION ONLY (OUTPATIENT)
Dept: REHABILITATION | Facility: HOSPITAL | Age: 42
End: 2017-11-30

## 2017-11-30 DIAGNOSIS — R29.898 WEAKNESS OF RIGHT UPPER EXTREMITY: ICD-10-CM

## 2017-11-30 DIAGNOSIS — R29.898 WEAKNESS OF RIGHT LOWER EXTREMITY: ICD-10-CM

## 2017-11-30 DIAGNOSIS — I69.398 IMPAIRED BALANCE AS LATE EFFECT OF CEREBROVASCULAR ACCIDENT: ICD-10-CM

## 2017-11-30 DIAGNOSIS — R26.9 ABNORMALITY OF GAIT AS LATE EFFECT OF CEREBROVASCULAR ACCIDENT (CVA): ICD-10-CM

## 2017-11-30 DIAGNOSIS — R26.89 IMPAIRED BALANCE AS LATE EFFECT OF CEREBROVASCULAR ACCIDENT: ICD-10-CM

## 2017-11-30 DIAGNOSIS — R26.89 DECREASED FUNCTIONAL MOBILITY: ICD-10-CM

## 2017-11-30 DIAGNOSIS — M25.611 DECREASED RANGE OF MOTION OF RIGHT SHOULDER: ICD-10-CM

## 2017-11-30 DIAGNOSIS — I69.398 ABNORMALITY OF GAIT AS LATE EFFECT OF CEREBROVASCULAR ACCIDENT (CVA): ICD-10-CM

## 2017-11-30 DIAGNOSIS — R29.898 DECREASED GRIP STRENGTH OF RIGHT HAND: ICD-10-CM

## 2017-11-30 PROCEDURE — 97110 THERAPEUTIC EXERCISES: CPT | Mod: PO

## 2017-11-30 NOTE — PROGRESS NOTES
"TIME RECORD    Date:  11/30/2017    Start Time:  9:00  Stop Time:  10:00  Visit: 2/17, expires 1/31/18    PROCEDURES:    TIMED  Procedure Min.   TA 60                     UNTIMED  Procedure Min.             Total Timed Minutes:  60  Total Timed Units:  4  Total Untimed Units:  0  Charges Billed/# of units:  4TA      Progress/Current Status    Subjective:     Patient ID: Gina Jenkins is a 42 y.o. female.  Diagnosis:   1. Decreased  strength of right hand     2. Weakness of right upper extremity     3. Lack of coordination due to stroke     4. Decreased range of motion of right shoulder       Pain: 0 /10  Pt states "I'm tired." Pt reports compliance with HEP.     Objective:     Pt seen by OT this session. Treatment consisted of the following:     Date: 11/30/17    Visit: 3/17; expires 1/31/18   Pulleys, FF 3'   UBE, L3 2' forward, 2' back   Supine dowel FF 10 reps   Snow angels 15 reps   theraputty - red    -molding 2 min   -gross  10 reps   -log rolls with 3 point pinch 2 sets   -lateral key pinch 10 reps   Wrist dexerciser 6 reps   9 hole pegs, addressing FM coordination and in-hand manipulation 3 min   Grooved pegboard; addressing FM skills, in-hand manipulation, manual dexterity 5 min   PHG, 25# 12 reps         Pt performed neuromuscular WB activity while standing, with R UE in WB while multidirectional reaching for objects x 10 min.    Assessment:     Pt tolerated treatment fairly well this date. Pt c/o fatigue in R UE after tx.  Improved FM coordination noted today with 9 hole pegs and grooved pegboard.  Pt with good participation and motivation. Pt reports she wants to become independent again.  Pt cont to present with R UE weakness, decreased ROM, R shoulder stiffness, impaired gross motor and FM coordination, and decreased independence with ADLs/IADLs.  Pt progressing fairly well towards goals.  OT services are medically necessary to improve deficits and maximize functional " independence.    Patient Education/Response:     Cont HEP and functional FM coordination activities. Pt educated on supine shoulder FF dowel ex for at home. Pt verbalized understanding of education.    Plans and Goals:     Cont OT poc 2x/week for 8 weeks during certification period 11/14/17 to 1/14/18 in pursuit of established goals.    Goals to be met in 4 weeks: (12/14/17)  1) Initiate Hep   2) Pt will demonstrate improved R Ue strength by at least 1/2 grade MMT for improved function during IADLs  3) Pt will demonstrate improved R shoulder ROM by at least 5-10* for improved reach of items  4) Pt will demonstrate increased  strength by at least 3-5# for activities such as cooking or holding a gallon of juice.     Goals to be met by discharge:  1) Independent with HEP  2) Pt will demonstrate R UE strength WFL for increase   3) Pt will require no more than min A for house work  4) Pt will require no more than supervision with  Cooking  5) Pt will be Independent with feeding self  6) Patient will be able to achieve less than or equal to 45% limitation on TRINO [place]

## 2017-11-30 NOTE — PROGRESS NOTES
"TIME RECORD    Date:  11/30/2017    Start Time:  10:00  Stop Time:  10:45    PROCEDURES:    TIMED  Procedure Min.   TE 45                     UNTIMED  Procedure Min.             Total Timed Minutes:  45  Total Timed Units:  3  Total Untimed Units:  0  Charges Billed/# of units:  3 (TE-3)      Progress/Current Status    Subjective:     Patient ID: Gina Jenkins is a 42 y.o. female.  Diagnosis:   1. Weakness of right lower extremity     2. Abnormality of gait as late effect of cerebrovascular accident (CVA)     3. Impaired balance as late effect of cerebrovascular accident     4. Decreased functional mobility       Pain: 0 /10  Patient reports she really doesn't have any pain.    Objective:     Patient was educated and performed therapeutic exercises as per log below 1:1 with PTA x 45 minutes to improve ROM, flexibility, strength, gait and balance.  Patient declined a cold pack at the end of today's session.    Date  11/30/17   VISIT  INS. AUTH. EXP. 2/17 01/31/18   FOTO 2/5   POC EXP. DATE 01/08/18   FACE-TO-FACE 12/08/17       TABLE:    HSS w/ strap 10 x 10"   Piriformis stretch 5 x 10"   TA's 10 x 5"   Glut Sets 10 x 5"   Bridge w/TA 1 x 10   LTR --   Marching  1 x 10   Quad sets 10 x 5"   SLR 1 x 10   Hip abduction - supine 1 x 10 YTB   Hip adduction - supine 10 x 3" w/ ball   SAQ --   Heel Slides --   SEATED:    LAQs --   HS curls --   Seated Hip Flex --   STANDING:    Wall slides --   Hip Abd --   Hip Flex --   Hip Ext --   HS Curls --   Step Ups --           Initials GWA 1/6       Assessment:     Patient requires AA with SLR and with bending her right leg.    Patient Education/Response:     Patient was given a written copy of today's exercises for her HEP and instructed to perform to her tolerance.  Patient verbalized understanding instructions.     Plans and Goals:     Continue with Plan Of Care and progress toward PT goals as patient tolerates.    Short Term Goals (4 Weeks):   1. This patient will be " independent with a basic HEP.  2. This patient will increase R LE strength 1 grade in order to be able to perform tub transfer independently.  3. This patient will be able to ambulate greater than 200 feet with least restrictive AD on even ground.  4. This patient will score less than 25 seconds on TUG decreasing her fall risk with household ambulation.   5. Patient will be able to achieve greater than or equal to 40 on the FOTO CVA placing patient in 60%<80% impaired, limited, or restricted category demonstrating overall improved functional ability with lower extremity.     Long Term Goals (8 Weeks):   1. This patient will be independent with an updated HEP.  2. This patient will increase R LE strength to 5/5 in order to be able to stand unsupported for greater than 5 minutes.   3. This patient will be able to ambulate greater than 400 feet with least restrictive AD on even ground.  4. This patient will score less than 15 seconds on TUG decreasing her fall risk with community ambulation.   5. Patient will be able to achieve greater than or equal to 60 on the FOTO CVA placing patient in 40%<60% impaired, limited, or restricted category demonstrating overall improved functional ability with lower extremity.

## 2017-11-30 NOTE — PROGRESS NOTES
OT Not Seen Note    Date: 11/30/17    Pt attempted OT this date, beginning with UBE on L3 for 4 min. However, pt requested to cancel OT today due to fatigue. Pt reported that she was too tired after PT the hour before, and that she wanted to go home to rest.  Cancelled OT session this date.  Will encourage activity as tolerated at next scheduled OT session.    TRINO Boyce

## 2017-12-12 ENCOUNTER — TELEPHONE (OUTPATIENT)
Dept: REHABILITATION | Facility: HOSPITAL | Age: 42
End: 2017-12-12

## 2018-01-03 ENCOUNTER — TELEPHONE (OUTPATIENT)
Dept: ADMINISTRATIVE | Facility: OTHER | Age: 43
End: 2018-01-03

## 2018-02-08 ENCOUNTER — DOCUMENTATION ONLY (OUTPATIENT)
Dept: REHABILITATION | Facility: HOSPITAL | Age: 43
End: 2018-02-08

## 2018-02-08 NOTE — PROGRESS NOTES
REHAB SERVICES OUTPATIENT DISCHARGE SUMMARY  Speech Therapy      Name:  Gina Jenkins  Date:  02/08/218  Date of Evaluation:  11/28/2017  Physician:  Clair Johnson NP  Total # Of Visits:  1  Cancelled:  0  No Shows:  0  Diagnosis:  Dysarthria, Cognitive Communication deficit  Physical/Functional Status:  Unknown due to pt not coming for therapy for f/u sessions after evaluation.     The patient is to be discharged from our Therapy service for the following reason(s):  Patient has not attended therapy since 11/28/2017.    Degree of Goal Achievement:  Patient has not met goals secondary to:  Not returning for f/u visits after evaluation. SLP attempted to call pt to establish appts, however, message was unable to be left.     Patient Education:  Pt educated day of evaluation on plan of care.     Discharge Plan:  D/C due to pt not reutnring for f/u visits.     JOELLEN Mosley., CCC-SLP  Speech-Language Pathologist  02/08/2018

## 2018-02-09 ENCOUNTER — HOSPITAL ENCOUNTER (EMERGENCY)
Facility: HOSPITAL | Age: 43
Discharge: HOME | End: 2018-02-10
Attending: EMERGENCY MEDICINE
Payer: MEDICAID

## 2018-02-09 DIAGNOSIS — I10 UNCONTROLLED HYPERTENSION: ICD-10-CM

## 2018-02-09 DIAGNOSIS — G44.1 OTHER VASCULAR HEADACHE: Primary | ICD-10-CM

## 2018-02-09 DIAGNOSIS — I67.1 CEREBRAL ANEURYSM WITHOUT RUPTURE: ICD-10-CM

## 2018-02-09 DIAGNOSIS — N39.0 URINARY TRACT INFECTION WITHOUT HEMATURIA, SITE UNSPECIFIED: ICD-10-CM

## 2018-02-09 DIAGNOSIS — Z91.119 NONCOMPLIANCE WITH DIETARY RESTRICTION: ICD-10-CM

## 2018-02-09 LAB
ALBUMIN SERPL BCP-MCNC: 3.7 G/DL
ALP SERPL-CCNC: 94 U/L
ALT SERPL W/O P-5'-P-CCNC: 21 U/L
ANION GAP SERPL CALC-SCNC: 10 MMOL/L
AST SERPL-CCNC: 23 U/L
B-HCG UR QL: NEGATIVE
BACTERIA #/AREA URNS AUTO: ABNORMAL /HPF
BASOPHILS # BLD AUTO: 0.04 K/UL
BASOPHILS NFR BLD: 0.7 %
BILIRUB SERPL-MCNC: 0.1 MG/DL
BILIRUB UR QL STRIP: ABNORMAL
BUN SERPL-MCNC: 10 MG/DL
CALCIUM SERPL-MCNC: 9.1 MG/DL
CAOX CRY UR QL COMP ASSIST: ABNORMAL
CHLORIDE SERPL-SCNC: 105 MMOL/L
CLARITY UR REFRACT.AUTO: ABNORMAL
CO2 SERPL-SCNC: 28 MMOL/L
COLOR UR AUTO: ABNORMAL
CREAT SERPL-MCNC: 0.98 MG/DL
DIFFERENTIAL METHOD: NORMAL
EOSINOPHIL # BLD AUTO: 0.4 K/UL
EOSINOPHIL NFR BLD: 6.4 %
ERYTHROCYTE [DISTWIDTH] IN BLOOD BY AUTOMATED COUNT: 13.8 %
EST. GFR  (AFRICAN AMERICAN): >60 ML/MIN/1.73 M^2
EST. GFR  (NON AFRICAN AMERICAN): >60 ML/MIN/1.73 M^2
GLUCOSE SERPL-MCNC: 108 MG/DL
GLUCOSE UR QL STRIP: NEGATIVE
HCT VFR BLD AUTO: 37.8 %
HGB BLD-MCNC: 12.8 G/DL
HGB UR QL STRIP: ABNORMAL
HYALINE CASTS UR QL AUTO: 0 /LPF
INR PPP: 1.1
KETONES UR QL STRIP: ABNORMAL
LEUKOCYTE ESTERASE UR QL STRIP: ABNORMAL
LYMPHOCYTES # BLD AUTO: 1.7 K/UL
LYMPHOCYTES NFR BLD: 28.3 %
MCH RBC QN AUTO: 30.6 PG
MCHC RBC AUTO-ENTMCNC: 33.9 G/DL
MCV RBC AUTO: 90 FL
MICROSCOPIC COMMENT: ABNORMAL
MONOCYTES # BLD AUTO: 0.7 K/UL
MONOCYTES NFR BLD: 11.2 %
NEUTROPHILS # BLD AUTO: 3.2 K/UL
NEUTROPHILS NFR BLD: 53.4 %
NITRITE UR QL STRIP: NEGATIVE
PH UR STRIP: 5 [PH] (ref 5–8)
PLATELET # BLD AUTO: 246 K/UL
PMV BLD AUTO: 11.2 FL
POTASSIUM SERPL-SCNC: 3.1 MMOL/L
PROT SERPL-MCNC: 6.7 G/DL
PROT UR QL STRIP: ABNORMAL
PROTHROMBIN TIME: 11.8 SEC
RBC # BLD AUTO: 4.18 M/UL
RBC #/AREA URNS AUTO: 2 /HPF (ref 0–4)
SODIUM SERPL-SCNC: 143 MMOL/L
SP GR UR STRIP: 1.02 (ref 1–1.03)
URN SPEC COLLECT METH UR: ABNORMAL
UROBILINOGEN UR STRIP-ACNC: ABNORMAL EU/DL
WBC # BLD AUTO: 5.98 K/UL
WBC #/AREA URNS AUTO: 5 /HPF (ref 0–5)
WBC CLUMPS UR QL AUTO: ABNORMAL

## 2018-02-09 PROCEDURE — 96374 THER/PROPH/DIAG INJ IV PUSH: CPT | Mod: 59

## 2018-02-09 PROCEDURE — 96372 THER/PROPH/DIAG INJ SC/IM: CPT | Mod: 59

## 2018-02-09 PROCEDURE — 63600175 PHARM REV CODE 636 W HCPCS: Performed by: EMERGENCY MEDICINE

## 2018-02-09 PROCEDURE — 85025 COMPLETE CBC W/AUTO DIFF WBC: CPT

## 2018-02-09 PROCEDURE — 25000003 PHARM REV CODE 250: Performed by: EMERGENCY MEDICINE

## 2018-02-09 PROCEDURE — 96375 TX/PRO/DX INJ NEW DRUG ADDON: CPT

## 2018-02-09 PROCEDURE — 85610 PROTHROMBIN TIME: CPT

## 2018-02-09 PROCEDURE — 80053 COMPREHEN METABOLIC PANEL: CPT

## 2018-02-09 PROCEDURE — 81025 URINE PREGNANCY TEST: CPT

## 2018-02-09 PROCEDURE — 25500020 PHARM REV CODE 255: Performed by: EMERGENCY MEDICINE

## 2018-02-09 PROCEDURE — 81000 URINALYSIS NONAUTO W/SCOPE: CPT

## 2018-02-09 PROCEDURE — 99284 EMERGENCY DEPT VISIT MOD MDM: CPT | Mod: 25

## 2018-02-09 RX ORDER — LABETALOL HYDROCHLORIDE 5 MG/ML
10 INJECTION, SOLUTION INTRAVENOUS ONCE
Status: COMPLETED | OUTPATIENT
Start: 2018-02-09 | End: 2018-02-09

## 2018-02-09 RX ORDER — KETOROLAC TROMETHAMINE 30 MG/ML
60 INJECTION, SOLUTION INTRAMUSCULAR; INTRAVENOUS
Status: COMPLETED | OUTPATIENT
Start: 2018-02-09 | End: 2018-02-09

## 2018-02-09 RX ORDER — CLONIDINE HYDROCHLORIDE 0.2 MG/1
0.2 TABLET ORAL
Status: COMPLETED | OUTPATIENT
Start: 2018-02-09 | End: 2018-02-09

## 2018-02-09 RX ADMIN — KETOROLAC TROMETHAMINE 60 MG: 30 INJECTION, SOLUTION INTRAMUSCULAR at 08:02

## 2018-02-09 RX ADMIN — IOHEXOL 100 ML: 350 INJECTION, SOLUTION INTRAVENOUS at 10:02

## 2018-02-09 RX ADMIN — CLONIDINE HYDROCHLORIDE 0.2 MG: 0.2 TABLET ORAL at 10:02

## 2018-02-09 RX ADMIN — LABETALOL HYDROCHLORIDE 10 MG: 5 INJECTION, SOLUTION INTRAVENOUS at 09:02

## 2018-02-10 VITALS
HEART RATE: 87 BPM | DIASTOLIC BLOOD PRESSURE: 75 MMHG | OXYGEN SATURATION: 100 % | BODY MASS INDEX: 30.18 KG/M2 | WEIGHT: 165 LBS | RESPIRATION RATE: 21 BRPM | SYSTOLIC BLOOD PRESSURE: 135 MMHG | TEMPERATURE: 99 F

## 2018-02-10 PROCEDURE — 25000003 PHARM REV CODE 250: Performed by: EMERGENCY MEDICINE

## 2018-02-10 PROCEDURE — 63600175 PHARM REV CODE 636 W HCPCS: Performed by: EMERGENCY MEDICINE

## 2018-02-10 RX ORDER — POTASSIUM CHLORIDE 20 MEQ/1
40 TABLET, EXTENDED RELEASE ORAL
Status: COMPLETED | OUTPATIENT
Start: 2018-02-10 | End: 2018-02-10

## 2018-02-10 RX ORDER — CEFUROXIME AXETIL 250 MG/1
250 TABLET ORAL EVERY 12 HOURS
Qty: 14 TABLET | Refills: 0 | Status: SHIPPED | OUTPATIENT
Start: 2018-02-10 | End: 2018-02-17

## 2018-02-10 RX ORDER — CEFTRIAXONE 1 G/1
1 INJECTION, POWDER, FOR SOLUTION INTRAMUSCULAR; INTRAVENOUS
Status: COMPLETED | OUTPATIENT
Start: 2018-02-10 | End: 2018-02-10

## 2018-02-10 RX ORDER — HYDRALAZINE HYDROCHLORIDE 20 MG/ML
20 INJECTION INTRAMUSCULAR; INTRAVENOUS
Status: COMPLETED | OUTPATIENT
Start: 2018-02-10 | End: 2018-02-10

## 2018-02-10 RX ADMIN — POTASSIUM CHLORIDE 40 MEQ: 20 TABLET, EXTENDED RELEASE ORAL at 12:02

## 2018-02-10 RX ADMIN — CEFTRIAXONE SODIUM 1 G: 1 INJECTION, POWDER, FOR SOLUTION INTRAMUSCULAR; INTRAVENOUS at 12:02

## 2018-02-10 RX ADMIN — HYDRALAZINE HYDROCHLORIDE 20 MG: 20 INJECTION INTRAMUSCULAR; INTRAVENOUS at 12:02

## 2018-02-10 NOTE — ED PROVIDER NOTES
Encounter Date: 2/9/2018       History     Chief Complaint   Patient presents with    Headache     headache and back pain for 2 days    Back Pain     Headache and back pain for 2 days that feels similar to the headache she had when she suffered a stoke from a cerebral aneurysm.  No visual changes, no neurological complaints               Review of patient's allergies indicates:   Allergen Reactions    Bactrim [sulfamethoxazole-trimethoprim] Rash     Past Medical History:   Diagnosis Date    Brain aneurysm     CHF (congestive heart failure)     H/O coronary angioplasty     Hypercholesteremia     Hypertension     Stroke 10/2017     Past Surgical History:   Procedure Laterality Date    CORONARY ANGIOPLASTY WITH STENT PLACEMENT       History reviewed. No pertinent family history.  Social History   Substance Use Topics    Smoking status: Former Smoker     Packs/day: 0.50     Quit date: 10/4/2017    Smokeless tobacco: Never Used    Alcohol use Yes      Comment: occassionally     Review of Systems   Constitutional: Negative for fever.   HENT: Negative for sore throat.    Respiratory: Negative for shortness of breath.    Cardiovascular: Negative for chest pain.   Gastrointestinal: Negative for nausea.   Genitourinary: Negative for dysuria.   Musculoskeletal: Negative for back pain.   Skin: Negative for rash.   Neurological: Negative for weakness.   Hematological: Does not bruise/bleed easily.   All other systems reviewed and are negative.      Physical Exam     Initial Vitals [02/09/18 2012]   BP Pulse Resp Temp SpO2   (!) 178/87 72 20 98.9 °F (37.2 °C) 100 %      MAP       117.33         Physical Exam    Nursing note and vitals reviewed.  Constitutional: Vital signs are normal. She appears well-developed and well-nourished. She is cooperative.  Non-toxic appearance.   HENT:   Head: Normocephalic and atraumatic.   Eyes: Conjunctivae, EOM and lids are normal.   Neck: Trachea normal and normal range of motion.  Neck supple. No stridor present. No tracheal deviation present. No neck rigidity. No Brudzinski's sign and no Kernig's sign noted.   Cardiovascular: Normal rate, regular rhythm, normal heart sounds and normal pulses.   Abdominal: Soft. Normal appearance and bowel sounds are normal. She exhibits no abdominal bruit. There is no tenderness. There is no rebound.   Neurological: She is alert and oriented to person, place, and time. She has normal strength. GCS eye subscore is 4. GCS verbal subscore is 5. GCS motor subscore is 6.   Skin: Skin is warm, dry and intact. Capillary refill takes less than 2 seconds.   Psychiatric: She has a normal mood and affect. Her behavior is normal. Judgment normal. Thought content is not delusional. She expresses no homicidal and no suicidal ideation.         ED Course   Procedures  Labs Reviewed   URINALYSIS - Abnormal; Notable for the following:        Result Value    Appearance, UA Hazy (*)     Protein, UA 1+ (*)     Ketones, UA 1+ (*)     Bilirubin (UA) 2+ (*)     Occult Blood UA 1+ (*)     Urobilinogen, UA 4.0-6.0 (*)     Leukocytes, UA 3+ (*)     All other components within normal limits   COMPREHENSIVE METABOLIC PANEL - Abnormal; Notable for the following:     Potassium 3.1 (*)     All other components within normal limits   URINALYSIS MICROSCOPIC - Abnormal; Notable for the following:     WBC Clumps, UA Occasional (*)     Ca Oxalate [place], UA Many (*)     All other components within normal limits   PREGNANCY TEST, URINE RAPID   CBC W/ AUTO DIFFERENTIAL   PROTIME-INR              - none    Vitals:    02/09/18 2012 02/09/18 2132 02/09/18 2149 02/09/18 2203   BP: (!) 178/87 (!) 178/85  (!) 184/88   Pulse: 72 71 60 74   Resp: 20  (!) 21    Temp: 98.9 °F (37.2 °C)      TempSrc: Oral      SpO2: 100% 100% 100% 100%   Weight: 74.8 kg (165 lb)       02/09/18 2240 02/09/18 2245 02/09/18 2301 02/09/18 2354   BP:  (!) 204/95 (!) 198/97 (!) 192/92   Pulse:  65 63 63   Resp:       Temp: 98.7 °F  (37.1 °C)      TempSrc:       SpO2:  100% 100% 100%   Weight:           Results for orders placed or performed during the hospital encounter of 02/09/18   Urinalysis   Result Value Ref Range    Specimen UA Urine, Clean Catch     Color, UA Anne Yellow, Straw, Anne    Appearance, UA Hazy (A) Clear    pH, UA 5.0 5.0 - 8.0    Specific Gravity, UA 1.020 1.005 - 1.030    Protein, UA 1+ (A) Negative    Glucose, UA Negative Negative    Ketones, UA 1+ (A) Negative    Bilirubin (UA) 2+ (A) Negative    Occult Blood UA 1+ (A) Negative    Nitrite, UA Negative Negative    Urobilinogen, UA 4.0-6.0 (A) <2.0 EU/dL    Leukocytes, UA 3+ (A) Negative   UPT (Pregnancy, urine rapid)   Result Value Ref Range    Preg Test, Ur Negative    Comprehensive metabolic panel   Result Value Ref Range    Sodium 143 136 - 145 mmol/L    Potassium 3.1 (L) 3.5 - 5.1 mmol/L    Chloride 105 95 - 110 mmol/L    CO2 28 23 - 29 mmol/L    Glucose 108 70 - 110 mg/dL    BUN, Bld 10 7 - 17 mg/dL    Creatinine 0.98 0.50 - 1.40 mg/dL    Calcium 9.1 8.7 - 10.5 mg/dL    Total Protein 6.7 6.0 - 8.4 g/dL    Albumin 3.7 3.5 - 5.2 g/dL    Total Bilirubin 0.1 0.1 - 1.0 mg/dL    Alkaline Phosphatase 94 38 - 126 U/L    AST 23 15 - 46 U/L    ALT 21 10 - 44 U/L    Anion Gap 10 8 - 16 mmol/L    eGFR if African American >60.0 >60 mL/min/1.73 m^2    eGFR if non African American >60.0 >60 mL/min/1.73 m^2   CBC auto differential   Result Value Ref Range    WBC 5.98 3.90 - 12.70 K/uL    RBC 4.18 4.00 - 5.40 M/uL    Hemoglobin 12.8 12.0 - 16.0 g/dL    Hematocrit 37.8 37.0 - 48.5 %    MCV 90 82 - 98 fL    MCH 30.6 27.0 - 31.0 pg    MCHC 33.9 32.0 - 36.0 g/dL    RDW 13.8 11.5 - 14.5 %    Platelets 246 150 - 350 K/uL    MPV 11.2 9.2 - 12.9 fL    Gran # (ANC) 3.2 1.8 - 7.7 K/uL    Lymph # 1.7 1.0 - 4.8 K/uL    Mono # 0.7 0.3 - 1.0 K/uL    Eos # 0.4 0.0 - 0.5 K/uL    Baso # 0.04 0.00 - 0.20 K/uL    Gran% 53.4 38.0 - 73.0 %    Lymph% 28.3 18.0 - 48.0 %    Mono% 11.2 4.0 - 15.0 %     Eosinophil% 6.4 0.0 - 8.0 %    Basophil% 0.7 0.0 - 1.9 %    Differential Method Automated    Protime-INR   Result Value Ref Range    Prothrombin Time 11.8 9.0 - 12.5 sec    INR 1.1 0.8 - 1.2   Urinalysis Microscopic   Result Value Ref Range    RBC, UA 2 0 - 4 /hpf    WBC, UA 5 0 - 5 /hpf    WBC Clumps, UA Occasional (A) None-Rare    Bacteria, UA Occasional None-Occ /hpf    Hyaline Casts, UA 0 0-1/lpf /lpf    Ca Oxalate [place], UA Many (A) None-Moderate    Microscopic Comment SEE COMMENT         Imaging Results          CTA Brain (Final result)  Result time 02/09/18 23:07:12    Final result by Amee Hurley Jr., MD (02/09/18 23:07:12)                 Impression:        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  Previously mentioned 1-2 mm left MCA bifurcation aneurysm is not visualized.  No evidence of a stroke or intracranial hemorrhage.      All CT scan at this facility use dose modulation, iterative reconstruction, and/or weight base dosing when appropriate to reduce radiation dose to as low as reasonably achievable.      Electronically signed by: AMEE HURLEY  Date:     02/09/18  Time:    23:07              Narrative:    CT ANGIOGRAM OF THE HEAD:    Technique: Initial noncontrast head CT with axial sagittal and coronal reformats at 5-mm increments was performed.  Following bolus injection of 100 mL Omnipaque 350 IV contrast from the head was scanned.  Axial, sagittal and coronal MIP and MPR images were produced from original data.    Comparison: 10/25/2017.    Findings:   No evidence of occlusion.  The visualized internal carotid arteries at the base of the skull and intracranially are patent.  There is a stable 4 mm aneurysm near the origin of the left MCA projecting from the internal carotid artery.  Previously mentioned small aneurysm at the bifurcation the left MCA is not visualized.  The anterior, middle and posterior cerebral arteries are patent.  Basal artery  is patent.    Noncontrast images of the head demonstrate maintenance of gray white matter differentiation throughout the brain without evidence for infarction or hemorrhage.  There is no enhancing parenchymal or extra-axial mass.  The ventricles are normal in size and configuration and there are no findings to indicate extra-axial hemorrhage.  The pituitary gland is unremarkable.  Visualized paranasal sinuses and mastoid air cells are clear and the orbits are unremarkable.                                 The above test results and vital signs have been reviewed by the physician.      Patient's headache is either consistent with previous headache and/or lacks features concerning for emergent or life threatening condition.  I do not suspect SAH, meningitis, increased IC pressure, infectious, toxic, vascular hemorrhage, CNS, or other EMC.  I have discussed this at length with patient and/or family/caretaker.  She is planning for aneurysm repair in the next few months.  She is not yet scheduled for the procedure.  She reports significant non-compliance with her diet.                      ED Course      Clinical Impression:   The primary encounter diagnosis was Other vascular headache. Diagnoses of Cerebral aneurysm without rupture, Uncontrolled hypertension, Urinary tract infection without hematuria, site unspecified, and Noncompliance with dietary restriction were also pertinent to this visit.    Disposition:   Disposition: Discharged  Condition: Stable                        Rashad Garcias MD  02/10/18 0028

## 2018-02-10 NOTE — ED NOTES
Pt sitting up in bed  watching TV, AAOX4, NADN.  Pt denies any further needs at this time.  Will continue to monitor.

## 2018-03-15 ENCOUNTER — HOSPITAL ENCOUNTER (EMERGENCY)
Facility: HOSPITAL | Age: 43
Discharge: HOME | End: 2018-03-15
Attending: FAMILY MEDICINE
Payer: MEDICAID

## 2018-03-15 VITALS
DIASTOLIC BLOOD PRESSURE: 98 MMHG | SYSTOLIC BLOOD PRESSURE: 170 MMHG | HEIGHT: 62 IN | OXYGEN SATURATION: 98 % | RESPIRATION RATE: 18 BRPM | WEIGHT: 175 LBS | HEART RATE: 75 BPM | BODY MASS INDEX: 32.2 KG/M2 | TEMPERATURE: 98 F

## 2018-03-15 DIAGNOSIS — R22.0 FACIAL SWELLING: Primary | ICD-10-CM

## 2018-03-15 PROCEDURE — 63600175 PHARM REV CODE 636 W HCPCS: Performed by: FAMILY MEDICINE

## 2018-03-15 PROCEDURE — 25000003 PHARM REV CODE 250: Performed by: FAMILY MEDICINE

## 2018-03-15 PROCEDURE — 99283 EMERGENCY DEPT VISIT LOW MDM: CPT

## 2018-03-15 RX ORDER — PREDNISONE 20 MG/1
40 TABLET ORAL DAILY
Qty: 10 TABLET | Refills: 0 | Status: SHIPPED | OUTPATIENT
Start: 2018-03-15 | End: 2018-03-20

## 2018-03-15 RX ORDER — DIPHENHYDRAMINE HCL 25 MG
25 CAPSULE ORAL
Status: COMPLETED | OUTPATIENT
Start: 2018-03-15 | End: 2018-03-15

## 2018-03-15 RX ORDER — PREDNISONE 20 MG/1
60 TABLET ORAL
Status: COMPLETED | OUTPATIENT
Start: 2018-03-15 | End: 2018-03-15

## 2018-03-15 RX ORDER — FAMOTIDINE 20 MG/1
40 TABLET, FILM COATED ORAL
Status: COMPLETED | OUTPATIENT
Start: 2018-03-15 | End: 2018-03-15

## 2018-03-15 RX ORDER — DIPHENHYDRAMINE HCL 25 MG
25 CAPSULE ORAL EVERY 6 HOURS PRN
Qty: 20 CAPSULE | Refills: 0 | Status: ON HOLD | COMMUNITY
Start: 2018-03-15 | End: 2022-09-02 | Stop reason: HOSPADM

## 2018-03-15 RX ADMIN — FAMOTIDINE 40 MG: 20 TABLET ORAL at 02:03

## 2018-03-15 RX ADMIN — PREDNISONE 60 MG: 20 TABLET ORAL at 02:03

## 2018-03-15 RX ADMIN — DIPHENHYDRAMINE HYDROCHLORIDE 25 MG: 25 CAPSULE ORAL at 02:03

## 2018-03-15 NOTE — ED PROVIDER NOTES
Encounter Date: 3/15/2018       History     Chief Complaint   Patient presents with    Facial Swelling     Pt states woke up yesterday from nap with swelling to lips and face, pt states took benadryl and swelling was better, states woke up this am with facial swelling, denies SOB.       43-year-old female presents with chief complaint of facial swelling.  Patient reports woke up yesterday from a nap at which time noted swelling to her lips and face.  Patient took Benadryl and swelling was better this morning woke up and noted that the swelling was actually worse is currently on lisinopril which is been on for quite some time.  Still admits to smoking a half a pack of cigarettes daily.          Review of patient's allergies indicates:   Allergen Reactions    Bactrim [sulfamethoxazole-trimethoprim] Rash     Past Medical History:   Diagnosis Date    Brain aneurysm     CHF (congestive heart failure)     H/O coronary angioplasty     Hypercholesteremia     Hypertension     Stroke 10/2017     Past Surgical History:   Procedure Laterality Date    CORONARY ANGIOPLASTY WITH STENT PLACEMENT       History reviewed. No pertinent family history.  Social History   Substance Use Topics    Smoking status: Former Smoker     Packs/day: 0.50     Quit date: 10/4/2017    Smokeless tobacco: Never Used    Alcohol use Yes      Comment: occassionally     Review of Systems   Constitutional: Negative for chills and fever.   Respiratory: Negative for chest tightness and shortness of breath.    Cardiovascular: Negative for leg swelling.   All other systems reviewed and are negative.      Physical Exam     Initial Vitals [03/15/18 1341]   BP Pulse Resp Temp SpO2   (!) 170/98 98 18 98.2 °F (36.8 °C) 100 %      MAP       122         Physical Exam    Nursing note and vitals reviewed.  Constitutional: She appears well-developed and well-nourished.   HENT:   Head: Normocephalic and atraumatic.   Nose: No rhinorrhea. No epistaxis.   Facial  swelling noted to the upper lip, no uvular swelling noted   Eyes: EOM are normal. Pupils are equal, round, and reactive to light.   Neck: Normal range of motion. Neck supple.   Cardiovascular: Normal rate, regular rhythm and normal heart sounds.   Pulmonary/Chest: Breath sounds normal.   Abdominal: Soft.   Musculoskeletal: Normal range of motion.   Neurological: She is alert and oriented to person, place, and time. She has normal reflexes.   Skin: Skin is warm. Capillary refill takes less than 2 seconds.   Psychiatric: She has a normal mood and affect. Her behavior is normal.         ED Course   Procedures  Labs Reviewed - No data to display                               Clinical Impression:   The encounter diagnosis was Facial swelling.                           Gilberto Buchanan MD  03/15/18 2809

## 2018-04-09 ENCOUNTER — HOSPITAL ENCOUNTER (OUTPATIENT)
Facility: HOSPITAL | Age: 43
Discharge: HOME | End: 2018-04-10
Attending: SURGERY | Admitting: FAMILY MEDICINE
Payer: MEDICAID

## 2018-04-09 DIAGNOSIS — I69.398 ABNORMALITY OF GAIT AS LATE EFFECT OF CEREBROVASCULAR ACCIDENT (CVA): ICD-10-CM

## 2018-04-09 DIAGNOSIS — R26.9 GAIT ABNORMALITY: ICD-10-CM

## 2018-04-09 DIAGNOSIS — R53.1 WEAKNESS GENERALIZED: ICD-10-CM

## 2018-04-09 DIAGNOSIS — E87.6 HYPOKALEMIA: ICD-10-CM

## 2018-04-09 DIAGNOSIS — E83.51 HYPOCALCEMIA: ICD-10-CM

## 2018-04-09 DIAGNOSIS — G45.9 TIA (TRANSIENT ISCHEMIC ATTACK): ICD-10-CM

## 2018-04-09 DIAGNOSIS — I63.9 CEREBROVASCULAR ACCIDENT (CVA), UNSPECIFIED MECHANISM: Primary | ICD-10-CM

## 2018-04-09 DIAGNOSIS — I10 ESSENTIAL HYPERTENSION: ICD-10-CM

## 2018-04-09 DIAGNOSIS — Z86.73 H/O: CVA (CEREBROVASCULAR ACCIDENT): ICD-10-CM

## 2018-04-09 DIAGNOSIS — M25.611 DECREASED RANGE OF MOTION OF RIGHT SHOULDER: ICD-10-CM

## 2018-04-09 DIAGNOSIS — I63.9 STROKE: ICD-10-CM

## 2018-04-09 DIAGNOSIS — R26.9 ABNORMALITY OF GAIT AS LATE EFFECT OF CEREBROVASCULAR ACCIDENT (CVA): ICD-10-CM

## 2018-04-09 LAB
ALBUMIN SERPL BCP-MCNC: 3.9 G/DL
ALP SERPL-CCNC: 107 U/L
ALT SERPL W/O P-5'-P-CCNC: 37 U/L
ANION GAP SERPL CALC-SCNC: 12 MMOL/L
APTT BLDCRRT: 25.4 SEC
AST SERPL-CCNC: 41 U/L
B-HCG UR QL: NEGATIVE
BACTERIA #/AREA URNS HPF: ABNORMAL /HPF
BASOPHILS # BLD AUTO: 0.05 K/UL
BASOPHILS NFR BLD: 0.7 %
BILIRUB SERPL-MCNC: 0.2 MG/DL
BILIRUB UR QL STRIP: NEGATIVE
BUN SERPL-MCNC: 16 MG/DL
CALCIUM SERPL-MCNC: 7.7 MG/DL
CHLORIDE SERPL-SCNC: 103 MMOL/L
CHOLEST SERPL-MCNC: 148 MG/DL
CHOLEST/HDLC SERPL: 2.6 {RATIO}
CLARITY UR: CLEAR
CO2 SERPL-SCNC: 27 MMOL/L
COLOR UR: YELLOW
CREAT SERPL-MCNC: 0.95 MG/DL
DIFFERENTIAL METHOD: NORMAL
EOSINOPHIL # BLD AUTO: 0.3 K/UL
EOSINOPHIL NFR BLD: 4.6 %
ERYTHROCYTE [DISTWIDTH] IN BLOOD BY AUTOMATED COUNT: 13.5 %
EST. GFR  (AFRICAN AMERICAN): >60 ML/MIN/1.73 M^2
EST. GFR  (NON AFRICAN AMERICAN): >60 ML/MIN/1.73 M^2
ESTIMATED AVG GLUCOSE: 100 MG/DL
GLUCOSE SERPL-MCNC: 92 MG/DL
GLUCOSE UR QL STRIP: NEGATIVE
HBA1C MFR BLD HPLC: 5.1 %
HCT VFR BLD AUTO: 37.3 %
HDLC SERPL-MCNC: 57 MG/DL
HDLC SERPL: 38.5 %
HGB BLD-MCNC: 12.8 G/DL
HGB UR QL STRIP: NEGATIVE
INR PPP: 1.1
KETONES UR QL STRIP: NEGATIVE
LDLC SERPL CALC-MCNC: 68 MG/DL
LEUKOCYTE ESTERASE UR QL STRIP: ABNORMAL
LYMPHOCYTES # BLD AUTO: 1.7 K/UL
LYMPHOCYTES NFR BLD: 24.4 %
MCH RBC QN AUTO: 30.4 PG
MCHC RBC AUTO-ENTMCNC: 34.3 G/DL
MCV RBC AUTO: 89 FL
MICROSCOPIC COMMENT: ABNORMAL
MONOCYTES # BLD AUTO: 0.6 K/UL
MONOCYTES NFR BLD: 7.8 %
NEUTROPHILS # BLD AUTO: 4.4 K/UL
NEUTROPHILS NFR BLD: 62.4 %
NITRITE UR QL STRIP: NEGATIVE
NONHDLC SERPL-MCNC: 91 MG/DL
PH UR STRIP: 6 [PH] (ref 5–8)
PLATELET # BLD AUTO: 310 K/UL
PMV BLD AUTO: 10.5 FL
POTASSIUM SERPL-SCNC: 3.1 MMOL/L
PROT SERPL-MCNC: 6.9 G/DL
PROT UR QL STRIP: NEGATIVE
PROTHROMBIN TIME: 11.7 SEC
RBC # BLD AUTO: 4.21 M/UL
RBC #/AREA URNS HPF: 2 /HPF (ref 0–4)
SODIUM SERPL-SCNC: 142 MMOL/L
SP GR UR STRIP: 1.01 (ref 1–1.03)
SQUAMOUS #/AREA URNS HPF: 2 /HPF
TRIGL SERPL-MCNC: 115 MG/DL
TSH SERPL DL<=0.005 MIU/L-ACNC: 1.05 UIU/ML
URN SPEC COLLECT METH UR: ABNORMAL
UROBILINOGEN UR STRIP-ACNC: NEGATIVE EU/DL
WBC # BLD AUTO: 7.01 K/UL
WBC #/AREA URNS HPF: 7 /HPF (ref 0–5)

## 2018-04-09 PROCEDURE — 94761 N-INVAS EAR/PLS OXIMETRY MLT: CPT

## 2018-04-09 PROCEDURE — 96376 TX/PRO/DX INJ SAME DRUG ADON: CPT

## 2018-04-09 PROCEDURE — 80061 LIPID PANEL: CPT

## 2018-04-09 PROCEDURE — 81025 URINE PREGNANCY TEST: CPT

## 2018-04-09 PROCEDURE — 25000003 PHARM REV CODE 250: Performed by: SURGERY

## 2018-04-09 PROCEDURE — 86703 HIV-1/HIV-2 1 RESULT ANTBDY: CPT

## 2018-04-09 PROCEDURE — 25000003 PHARM REV CODE 250: Performed by: STUDENT IN AN ORGANIZED HEALTH CARE EDUCATION/TRAINING PROGRAM

## 2018-04-09 PROCEDURE — 83970 ASSAY OF PARATHORMONE: CPT

## 2018-04-09 PROCEDURE — 82746 ASSAY OF FOLIC ACID SERUM: CPT

## 2018-04-09 PROCEDURE — 99285 EMERGENCY DEPT VISIT HI MDM: CPT | Mod: 25

## 2018-04-09 PROCEDURE — 93010 ELECTROCARDIOGRAM REPORT: CPT | Mod: ,,, | Performed by: INTERNAL MEDICINE

## 2018-04-09 PROCEDURE — 85730 THROMBOPLASTIN TIME PARTIAL: CPT

## 2018-04-09 PROCEDURE — 96374 THER/PROPH/DIAG INJ IV PUSH: CPT

## 2018-04-09 PROCEDURE — 80074 ACUTE HEPATITIS PANEL: CPT

## 2018-04-09 PROCEDURE — 80053 COMPREHEN METABOLIC PANEL: CPT

## 2018-04-09 PROCEDURE — 83036 HEMOGLOBIN GLYCOSYLATED A1C: CPT

## 2018-04-09 PROCEDURE — 86592 SYPHILIS TEST NON-TREP QUAL: CPT

## 2018-04-09 PROCEDURE — 85610 PROTHROMBIN TIME: CPT

## 2018-04-09 PROCEDURE — 81000 URINALYSIS NONAUTO W/SCOPE: CPT

## 2018-04-09 PROCEDURE — 93005 ELECTROCARDIOGRAM TRACING: CPT

## 2018-04-09 PROCEDURE — 36415 COLL VENOUS BLD VENIPUNCTURE: CPT

## 2018-04-09 PROCEDURE — 85025 COMPLETE CBC W/AUTO DIFF WBC: CPT

## 2018-04-09 PROCEDURE — G0378 HOSPITAL OBSERVATION PER HR: HCPCS

## 2018-04-09 PROCEDURE — 84443 ASSAY THYROID STIM HORMONE: CPT

## 2018-04-09 PROCEDURE — 99214 OFFICE O/P EST MOD 30 MIN: CPT | Mod: GT,,, | Performed by: PSYCHIATRY & NEUROLOGY

## 2018-04-09 PROCEDURE — 96375 TX/PRO/DX INJ NEW DRUG ADDON: CPT

## 2018-04-09 PROCEDURE — 63600175 PHARM REV CODE 636 W HCPCS: Performed by: SURGERY

## 2018-04-09 PROCEDURE — 82306 VITAMIN D 25 HYDROXY: CPT

## 2018-04-09 RX ORDER — FAMOTIDINE 20 MG/1
20 TABLET, FILM COATED ORAL 2 TIMES DAILY
Status: DISCONTINUED | OUTPATIENT
Start: 2018-04-09 | End: 2018-04-10 | Stop reason: HOSPADM

## 2018-04-09 RX ORDER — SODIUM CHLORIDE 9 MG/ML
INJECTION, SOLUTION INTRAVENOUS CONTINUOUS
Status: DISCONTINUED | OUTPATIENT
Start: 2018-04-09 | End: 2018-04-10 | Stop reason: HOSPADM

## 2018-04-09 RX ORDER — POTASSIUM CHLORIDE 20 MEQ/15ML
50 SOLUTION ORAL ONCE
Status: COMPLETED | OUTPATIENT
Start: 2018-04-09 | End: 2018-04-09

## 2018-04-09 RX ORDER — CLOPIDOGREL BISULFATE 75 MG/1
75 TABLET ORAL DAILY
Status: DISCONTINUED | OUTPATIENT
Start: 2018-04-09 | End: 2018-04-10 | Stop reason: HOSPADM

## 2018-04-09 RX ORDER — ATORVASTATIN CALCIUM 40 MG/1
40 TABLET, FILM COATED ORAL DAILY
Status: DISCONTINUED | OUTPATIENT
Start: 2018-04-10 | End: 2018-04-10 | Stop reason: HOSPADM

## 2018-04-09 RX ORDER — SODIUM CHLORIDE 0.9 % (FLUSH) 0.9 %
3 SYRINGE (ML) INJECTION EVERY 8 HOURS
Status: DISCONTINUED | OUTPATIENT
Start: 2018-04-09 | End: 2018-04-10 | Stop reason: HOSPADM

## 2018-04-09 RX ORDER — ASPIRIN 81 MG/1
81 TABLET ORAL
Status: DISCONTINUED | OUTPATIENT
Start: 2018-04-09 | End: 2018-04-10

## 2018-04-09 RX ORDER — HYDRALAZINE HYDROCHLORIDE 20 MG/ML
10 INJECTION INTRAMUSCULAR; INTRAVENOUS
Status: COMPLETED | OUTPATIENT
Start: 2018-04-09 | End: 2018-04-09

## 2018-04-09 RX ORDER — ATORVASTATIN CALCIUM 40 MG/1
40 TABLET, FILM COATED ORAL DAILY
Status: DISCONTINUED | OUTPATIENT
Start: 2018-04-10 | End: 2018-04-09

## 2018-04-09 RX ADMIN — NITROGLYCERIN 2 INCH: 20 OINTMENT TOPICAL at 06:04

## 2018-04-09 RX ADMIN — SODIUM CHLORIDE: 0.9 INJECTION, SOLUTION INTRAVENOUS at 08:04

## 2018-04-09 RX ADMIN — HYDRALAZINE HYDROCHLORIDE 10 MG: 20 INJECTION INTRAMUSCULAR; INTRAVENOUS at 05:04

## 2018-04-09 RX ADMIN — FAMOTIDINE 20 MG: 20 TABLET, FILM COATED ORAL at 08:04

## 2018-04-09 RX ADMIN — CLOPIDOGREL BISULFATE 75 MG: 75 TABLET ORAL at 08:04

## 2018-04-09 RX ADMIN — POTASSIUM CHLORIDE 50 MEQ: 20 SOLUTION ORAL at 10:04

## 2018-04-09 RX ADMIN — ASPIRIN 81 MG: 81 TABLET, COATED ORAL at 09:04

## 2018-04-09 NOTE — CONSULTS
"      [facility]  Vascular Neurology  Comprehensive Stroke Center  Tele-Consultation Note      Consults    Consulting Provider: Spoke Physician:: DR DE SOUZA  Current Providers  No providers found    Patient Location: [facility]  Spoke hospital nurse at bedside with patient assisting consultant.     Patient information was obtained from patient and ED staff.     Subjective:     History of Present Illness: 44 y/o with HTN, CAD s/p stenting, CHF, brain aneurysm, presents with complains of slurred speech, left sided weakness, left face droop. Verito had similar symptoms before.  Said that she hasn't been feeling well the whole day and then this afternoon was noted to have slurred speech with left face droop as well as weakness left side.  Symptoms improving but stated that " I can not think" and then started to cry during my assessment.  No notes on file      Woke up with symptoms?: no  Last known normal: Last Known Normal Date: 04/09/18 Last Known Normal Time: 1545    Does the patient take any Blood Thinners? no  Recent bleeding noted: no  Medications: ASA      Past Medical History: no relevant history, hypertension, MI/CAD, CHF and brain aneurysm    Past Surgical History: no major surgeries within the last 2 weeks    Family History: no relevant history    Social History: no smoking, no drinking, no drugs    Allergies: Bactrim [Sulfamethoxazole-Trimethoprim] No known drug allergies    Review of Systems   Constitutional: Negative for chills, diaphoresis and fever.   HENT: Negative for hearing loss, tinnitus and trouble swallowing.    Eyes: Negative for photophobia and visual disturbance.   Respiratory: Negative for chest tightness and shortness of breath.    Cardiovascular: Negative for chest pain and palpitations.   Gastrointestinal: Negative for abdominal pain and vomiting.   Endocrine: Negative for cold intolerance and polyuria.   Genitourinary: " "Negative for hematuria.   Musculoskeletal: Negative for neck pain and neck stiffness.   Allergic/Immunologic: Negative for immunocompromised state.   Neurological: Positive for facial asymmetry, speech difficulty and weakness. Negative for dizziness, numbness and headaches.   Hematological: Does not bruise/bleed easily.   Psychiatric/Behavioral: Negative for agitation, behavioral problems and confusion.     Objective:   Vitals: Blood pressure (!) 167/96, pulse 99, temperature 98.6 °F (37 °C), temperature source Oral, resp. rate 18, height 5' 2" (1.575 m), weight 68 kg (150 lb), SpO2 100 %, not currently breastfeeding. BP: 167/96, Respiratory Rate: 16 and Heart Rate: 118    CT READ: Yes  No hemmorhage. No mass effect. No early infarct signs.  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   HENT:   Head: Normocephalic and atraumatic.   Eyes: EOM are normal. Pupils are equal, round, and reactive to light.   Neck: Normal range of motion. Neck supple.   Cardiovascular: Normal rate and regular rhythm.    Pulmonary/Chest: Effort normal. No respiratory distress.   Abdominal: Soft. She exhibits no mass.   Genitourinary:   Genitourinary Comments: No performed     Musculoskeletal: Normal range of motion. She exhibits no edema or deformity.   Neurological: She is alert. A cranial nerve deficit is present. No sensory deficit. She exhibits normal muscle tone. Coordination normal.   Disoriented to year-month   Skin: No rash noted. No erythema.   Psychiatric: She has a normal mood and affect. Her behavior is normal.             Assessment/Plan:   42 y/o with HTN, CAD s/p stenting, CHF, brain aneurysm, presents with complains of slurred speech, left sided weakness, left face droop.  NIHSS 5, CTH without acute abnormality.  Very emotional and crying in the ED.  Has several risk factors for stroke and therefore an acute right subcortical infarct is likely. " However, she has known brain aneurysm and thus will not administer iv alteplase. Doubt LVO.  Admit to the hospital and get MRI/MRA brain, CUS,TTE-Bubble, lipid profile.  ASA. PT/OT/speech and neurology consults.        STROKE DOCUMENTATION     Acute Stroke Times:   Acute Stroke Times   Last Known Normal Date: 04/09/18  Last Known Normal Time: 1545  Symptom Onset Date: 04/09/18  Symptom Onset Time: 1545  Stroke Team Called Date: 04/09/18  Stroke Team Called Time: 1735  Stroke Team Arrival Date: 04/09/18  Stroke Team Arrival Time: 1735  CT Interpretation Time: 1735  Decision to Treat Time for Alteplase:  (No iv alteplase candidate)  Decision to Treat Time for IR:  (No IR candidate)    NIH Scale:  Interval: baseline (upon arrival/admit)  1a. Level Of Consciousness: 0-->Alert: keenly responsive  1b. LOC Questions: 1-->Answers one question correctly  1c. LOC Commands: 0-->Performs both tasks correctly  2. Best Gaze: 0-->Normal  3. Visual: 0-->No visual loss  4. Facial Palsy: 1-->Minor paralysis (flattened nasolabial fold, asymmetry on smiling)  5a. Motor Arm, Left: 1-->Drift: limb holds 90 (or 45) degrees, but drifts down before full 10 seconds: does not hit bed or other support  5b. Motor Arm, Right: 0-->No drift: limb holds 90 (or 45) degrees for full 10 secs  6a. Motor Leg, Left: 0-->No drift: leg holds 30 degree position for full 5 secs  6b. Motor Leg, Right: 1-->Drift: leg falls by the end of the 5-sec period but does not hit bed  7. Limb Ataxia: 0-->Absent  8. Sensory: 0-->Normal: no sensory loss  9. Best Language: 0-->No aphasia: normal  10. Dysarthria: 1-->Mild-to-moderate dysarthria: patient slurs at least some words and, at worst, can be understood with some difficulty  11. Extinction and Inattention (formerly Neglect): 0-->No abnormality  Total (NIH Stroke Scale): 5     Modified Erika Score: 0  Danielle Coma Scale:15   ABCD2 Score:    [id number] Score:   HAS -BLED Score:   ICH Score:   [employer]  "Classification:       Diagnoses: acute right subcortical infarct.  No new Assessment & Plan notes have been filed under this hospital service since the last note was generated.  Service: Vascular Neurology      Blood pressure (!) 155/90, pulse 108, temperature 98.6 °F (37 °C), temperature source Oral, resp. rate 20, height 5' 2" (1.575 m), weight 68 kg (150 lb), SpO2 100 %, not currently breastfeeding.  Alteplase Eligible?: No  Alteplase Recommendation: Alteplase not recommended due to brain aneurysm. Some inconsistencies on exam  Possible Interventional Revascularization Candidate? No; No significant neurological deficit    Disposition Recommendation: admit to inpatient    Recommended the emergency room physician to have a brief discussion with the patient and/or family if available regarding the risks and benefits of treatment, and to briefly document the occurrence of that discussion in his clinical encounter note.     The attending portion of this evaluation, treatment, and documentation was performed per Deangelo Darby MD via audiovisual.    Billing code:  (non-intervention mild to moderate stroke, TIA, some mimics)    · This patient has a critical neurological condition/illness, with some potential for high morbidity and mortality.  · There is a moderate probability for acute neurological change leading to clinical and possibly life-threatening deterioration requiring highest level of physician preparedness for urgent intervention.  · Care was coordinated with other physicians involved in the patient's care.  · Radiologic studies and laboratory data were reviewed and interpreted, and plan of care was re-assessed based on the results.  · Diagnosis, treatment options and prognosis may have been discussed with the patient and/or family members or caregiver.      Consult End Time: 5:50 pm    Deangelo Darby MD  [facility]  Vascular Neurology   [facility]  "

## 2018-04-09 NOTE — SUBJECTIVE & OBJECTIVE
"  Woke up with symptoms?: no  Last known normal: Last Known Normal Date: 04/09/18 Last Known Normal Time: 1545    Does the patient take any Blood Thinners? no  Recent bleeding noted: no  Medications: ASA      Past Medical History: no relevant history, hypertension, MI/CAD, CHF and brain aneurysm    Past Surgical History: no major surgeries within the last 2 weeks    Family History: no relevant history    Social History: no smoking, no drinking, no drugs    Allergies: Bactrim [Sulfamethoxazole-Trimethoprim] No known drug allergies    Review of Systems   Constitutional: Negative for chills, diaphoresis and fever.   HENT: Negative for hearing loss, tinnitus and trouble swallowing.    Eyes: Negative for photophobia and visual disturbance.   Respiratory: Negative for chest tightness and shortness of breath.    Cardiovascular: Negative for chest pain and palpitations.   Gastrointestinal: Negative for abdominal pain and vomiting.   Endocrine: Negative for cold intolerance and polyuria.   Genitourinary: Negative for hematuria.   Musculoskeletal: Negative for neck pain and neck stiffness.   Allergic/Immunologic: Negative for immunocompromised state.   Neurological: Positive for facial asymmetry, speech difficulty and weakness. Negative for dizziness, numbness and headaches.   Hematological: Does not bruise/bleed easily.   Psychiatric/Behavioral: Negative for agitation, behavioral problems and confusion.     Objective:   Vitals: Blood pressure (!) 167/96, pulse 99, temperature 98.6 °F (37 °C), temperature source Oral, resp. rate 18, height 5' 2" (1.575 m), weight 68 kg (150 lb), SpO2 100 %, not currently breastfeeding. BP: 167/96, Respiratory Rate: 16 and Heart Rate: 118    CT READ: Yes  No hemmorhage. No mass effect. No early infarct signs.  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   HENT:   Head: Normocephalic and atraumatic.   Eyes: EOM are " normal. Pupils are equal, round, and reactive to light.   Neck: Normal range of motion. Neck supple.   Cardiovascular: Normal rate and regular rhythm.    Pulmonary/Chest: Effort normal. No respiratory distress.   Abdominal: Soft. She exhibits no mass.   Genitourinary:   Genitourinary Comments: No performed     Musculoskeletal: Normal range of motion. She exhibits no edema or deformity.   Neurological: She is alert. A cranial nerve deficit is present. No sensory deficit. She exhibits normal muscle tone. Coordination normal.   Disoriented to year-month   Skin: No rash noted. No erythema.   Psychiatric: She has a normal mood and affect. Her behavior is normal.

## 2018-04-09 NOTE — ED PROVIDER NOTES
Encounter Date: 4/9/2018       History     Chief Complaint   Patient presents with    Cerebrovascular Accident     Around 1545, slurred speech, left sided weakness and left sided facial.     Stroke protocol activated.  Patient started at 3:45 PM with onset of slurred speech left-sided facial weakness and left sided hemiparesis.  She has a history of a 4 mm left internal carotid artery aneurysm she has a history of coronary artery disease as well as hypertension her blood pressure was 160/110, arrival she was sent to CAT scan on physical examination her tongue deviates to the right      The history is provided by the patient.   Neurologic Problem   The primary symptoms include focal weakness and speech change. Primary symptoms do not include headaches, loss of consciousness, altered mental status, seizures, visual change, paresthesias, memory loss, fever or nausea. The symptoms began just prior to arrival. The symptoms are unchanged. The neurological symptoms are focal.   Weakness began 1 - 3 hours ago. The weakness is unchanged. There is impairment of the following actions: articulating words and holding things.     Review of patient's allergies indicates:   Allergen Reactions    Bactrim [sulfamethoxazole-trimethoprim] Rash     Past Medical History:   Diagnosis Date    Brain aneurysm     CHF (congestive heart failure)     H/O coronary angioplasty     Hypercholesteremia     Hypertension     Stroke 10/2017     Past Surgical History:   Procedure Laterality Date    CORONARY ANGIOPLASTY WITH STENT PLACEMENT       History reviewed. No pertinent family history.  Social History   Substance Use Topics    Smoking status: Former Smoker     Packs/day: 0.50     Quit date: 10/4/2017    Smokeless tobacco: Never Used    Alcohol use Yes      Comment: occassionally     Review of Systems   Constitutional: Negative.  Negative for fever.   Eyes: Negative.    Respiratory: Negative.    Cardiovascular: Negative.     Gastrointestinal: Negative.  Negative for nausea.   Endocrine: Negative.    Genitourinary: Negative.    Musculoskeletal: Negative.    Skin: Negative.    Allergic/Immunologic: Negative.    Neurological: Positive for speech change, focal weakness, facial asymmetry and speech difficulty. Negative for seizures, loss of consciousness, headaches and paresthesias.   Hematological: Negative.    Psychiatric/Behavioral: Negative.  Negative for memory loss.       Physical Exam     Initial Vitals   BP Pulse Resp Temp SpO2   -- -- -- -- --      MAP       --         Physical Exam    Nursing note and vitals reviewed.  Constitutional: She appears well-developed and well-nourished.   HENT:   Left facial swelling   Eyes: Conjunctivae are normal.   Neck: Normal range of motion. Neck supple.   Cardiovascular: Normal rate and regular rhythm.   Pulmonary/Chest: Breath sounds normal.   Abdominal: Soft.   Neurological: She displays normal reflexes. No cranial nerve deficit or sensory deficit.   Dysarthria left sided hemiparesis tongue deviation to right left facial droop   Skin: Skin is warm and dry.         ED Course   Procedures  Labs Reviewed   CBC W/ AUTO DIFFERENTIAL   COMPREHENSIVE METABOLIC PANEL   PROTIME-INR   APTT     EKG Readings: (Independently Interpreted)   Rhythm: Normal Sinus Rhythm. Clinical Impression: Non-specific ST Depression   Prolonged QT no acute ischemic changes          Medical Decision Making:   Initial Assessment:   Acute stroke protocol activated for left-sided weakness  ED Management:  Discussion with Dr. Rowell who did the telemetry stroke evaluation he recommended MRI and outpatient observation at the hospital he did not recommend TPA secondary to presence of a cerebral artery aneurysm                      Clinical Impression:   The primary encounter diagnosis was Cerebrovascular accident (CVA), unspecified mechanism. Diagnoses of Stroke and Essential hypertension were also pertinent to this  visit.    Disposition:   Disposition: Admitted                        SNEHA Simmons III, MD  04/09/18 0867

## 2018-04-10 VITALS
HEART RATE: 83 BPM | RESPIRATION RATE: 18 BRPM | DIASTOLIC BLOOD PRESSURE: 78 MMHG | WEIGHT: 173.88 LBS | OXYGEN SATURATION: 100 % | HEIGHT: 62 IN | TEMPERATURE: 99 F | BODY MASS INDEX: 32 KG/M2 | SYSTOLIC BLOOD PRESSURE: 140 MMHG

## 2018-04-10 LAB
25(OH)D3+25(OH)D2 SERPL-MCNC: 12 NG/ML
ALBUMIN SERPL BCP-MCNC: 3.2 G/DL
ALP SERPL-CCNC: 87 U/L
ALT SERPL W/O P-5'-P-CCNC: 21 U/L
ANION GAP SERPL CALC-SCNC: 11 MMOL/L
APTT BLDCRRT: 25.9 SEC
AST SERPL-CCNC: 26 U/L
BASOPHILS # BLD AUTO: 0.04 K/UL
BASOPHILS NFR BLD: 0.6 %
BILIRUB SERPL-MCNC: 0.3 MG/DL
BUN SERPL-MCNC: 9 MG/DL
CALCIUM SERPL-MCNC: 8 MG/DL
CHLORIDE SERPL-SCNC: 106 MMOL/L
CK MB SERPL-MCNC: 0.8 NG/ML
CK MB SERPL-RTO: 0.7 %
CK SERPL-CCNC: 116 U/L
CO2 SERPL-SCNC: 24 MMOL/L
CREAT SERPL-MCNC: 0.8 MG/DL
DIASTOLIC DYSFUNCTION: NO
DIFFERENTIAL METHOD: ABNORMAL
EOSINOPHIL # BLD AUTO: 0.3 K/UL
EOSINOPHIL NFR BLD: 3.7 %
ERYTHROCYTE [DISTWIDTH] IN BLOOD BY AUTOMATED COUNT: 13.6 %
EST. GFR  (AFRICAN AMERICAN): >60 ML/MIN/1.73 M^2
EST. GFR  (NON AFRICAN AMERICAN): >60 ML/MIN/1.73 M^2
ESTIMATED PA SYSTOLIC PRESSURE: 26.01
FOLATE SERPL-MCNC: 12.9 NG/ML
GLUCOSE SERPL-MCNC: 99 MG/DL
HAV IGM SERPL QL IA: NEGATIVE
HBV CORE IGM SERPL QL IA: NEGATIVE
HBV SURFACE AG SERPL QL IA: NEGATIVE
HCT VFR BLD AUTO: 34.8 %
HCV AB SERPL QL IA: NEGATIVE
HGB BLD-MCNC: 11.9 G/DL
HIV 1+2 AB+HIV1 P24 AG SERPL QL IA: NEGATIVE
INR PPP: 1
LYMPHOCYTES # BLD AUTO: 1.8 K/UL
LYMPHOCYTES NFR BLD: 26.3 %
MAGNESIUM SERPL-MCNC: 1.1 MG/DL
MCH RBC QN AUTO: 29.8 PG
MCHC RBC AUTO-ENTMCNC: 34.2 G/DL
MCV RBC AUTO: 87 FL
MITRAL VALVE MOBILITY: NORMAL
MONOCYTES # BLD AUTO: 0.6 K/UL
MONOCYTES NFR BLD: 8.2 %
NEUTROPHILS # BLD AUTO: 4.2 K/UL
NEUTROPHILS NFR BLD: 61.1 %
PHOSPHATE SERPL-MCNC: 1.9 MG/DL
PLATELET # BLD AUTO: 287 K/UL
PMV BLD AUTO: 10.5 FL
POTASSIUM SERPL-SCNC: 3.1 MMOL/L
PROT SERPL-MCNC: 6.5 G/DL
PROTHROMBIN TIME: 10.3 SEC
PTH-INTACT SERPL-MCNC: 116 PG/ML
RBC # BLD AUTO: 3.99 M/UL
RETIRED EF AND QEF - SEE NOTES: 65 (ref 55–65)
RPR SER QL: NORMAL
SODIUM SERPL-SCNC: 141 MMOL/L
TRICUSPID VALVE REGURGITATION: NORMAL
TROPONIN I SERPL DL<=0.01 NG/ML-MCNC: 0.01 NG/ML
VIT B12 SERPL-MCNC: 343 PG/ML
WBC # BLD AUTO: 6.81 K/UL

## 2018-04-10 PROCEDURE — G8979 MOBILITY GOAL STATUS: HCPCS | Mod: CJ

## 2018-04-10 PROCEDURE — 84425 ASSAY OF VITAMIN B-1: CPT

## 2018-04-10 PROCEDURE — 97530 THERAPEUTIC ACTIVITIES: CPT

## 2018-04-10 PROCEDURE — 99900039 HC SLP GENERIC THERAPY SCREENING (STAT)

## 2018-04-10 PROCEDURE — 82553 CREATINE MB FRACTION: CPT

## 2018-04-10 PROCEDURE — A4216 STERILE WATER/SALINE, 10 ML: HCPCS | Performed by: STUDENT IN AN ORGANIZED HEALTH CARE EDUCATION/TRAINING PROGRAM

## 2018-04-10 PROCEDURE — 97116 GAIT TRAINING THERAPY: CPT

## 2018-04-10 PROCEDURE — 85610 PROTHROMBIN TIME: CPT

## 2018-04-10 PROCEDURE — 94761 N-INVAS EAR/PLS OXIMETRY MLT: CPT

## 2018-04-10 PROCEDURE — 80053 COMPREHEN METABOLIC PANEL: CPT

## 2018-04-10 PROCEDURE — 84484 ASSAY OF TROPONIN QUANT: CPT

## 2018-04-10 PROCEDURE — 96374 THER/PROPH/DIAG INJ IV PUSH: CPT

## 2018-04-10 PROCEDURE — 25000003 PHARM REV CODE 250: Performed by: STUDENT IN AN ORGANIZED HEALTH CARE EDUCATION/TRAINING PROGRAM

## 2018-04-10 PROCEDURE — 82550 ASSAY OF CK (CPK): CPT

## 2018-04-10 PROCEDURE — G9163 LANG EXPRESS GOAL STATUS: HCPCS | Mod: CH

## 2018-04-10 PROCEDURE — 97162 PT EVAL MOD COMPLEX 30 MIN: CPT

## 2018-04-10 PROCEDURE — 83735 ASSAY OF MAGNESIUM: CPT

## 2018-04-10 PROCEDURE — 97166 OT EVAL MOD COMPLEX 45 MIN: CPT

## 2018-04-10 PROCEDURE — 92610 EVALUATE SWALLOWING FUNCTION: CPT

## 2018-04-10 PROCEDURE — 36415 COLL VENOUS BLD VENIPUNCTURE: CPT

## 2018-04-10 PROCEDURE — 96375 TX/PRO/DX INJ NEW DRUG ADDON: CPT | Mod: 59

## 2018-04-10 PROCEDURE — G9162 LANG EXPRESS CURRENT STATUS: HCPCS | Mod: CH

## 2018-04-10 PROCEDURE — 84100 ASSAY OF PHOSPHORUS: CPT

## 2018-04-10 PROCEDURE — G8978 MOBILITY CURRENT STATUS: HCPCS | Mod: CK

## 2018-04-10 PROCEDURE — G9164 LANG EXPRESS D/C STATUS: HCPCS | Mod: CH

## 2018-04-10 PROCEDURE — 93306 TTE W/DOPPLER COMPLETE: CPT | Mod: 26,,, | Performed by: INTERNAL MEDICINE

## 2018-04-10 PROCEDURE — G0378 HOSPITAL OBSERVATION PER HR: HCPCS

## 2018-04-10 PROCEDURE — 85730 THROMBOPLASTIN TIME PARTIAL: CPT

## 2018-04-10 PROCEDURE — 82607 VITAMIN B-12: CPT

## 2018-04-10 PROCEDURE — 85025 COMPLETE CBC W/AUTO DIFF WBC: CPT

## 2018-04-10 PROCEDURE — G8988 SELF CARE GOAL STATUS: HCPCS | Mod: CI

## 2018-04-10 PROCEDURE — 63600175 PHARM REV CODE 636 W HCPCS: Performed by: FAMILY MEDICINE

## 2018-04-10 PROCEDURE — G8987 SELF CARE CURRENT STATUS: HCPCS | Mod: CK

## 2018-04-10 RX ORDER — LANOLIN ALCOHOL/MO/W.PET/CERES
1000 CREAM (GRAM) TOPICAL DAILY
Status: ON HOLD | COMMUNITY
Start: 2018-04-10 | End: 2022-09-02 | Stop reason: HOSPADM

## 2018-04-10 RX ORDER — ERGOCALCIFEROL 1.25 MG/1
50000 CAPSULE ORAL
Status: DISCONTINUED | OUTPATIENT
Start: 2018-04-10 | End: 2018-04-10 | Stop reason: HOSPADM

## 2018-04-10 RX ORDER — NAPROXEN SODIUM 220 MG/1
81 TABLET, FILM COATED ORAL DAILY
Status: DISCONTINUED | OUTPATIENT
Start: 2018-04-10 | End: 2018-04-10

## 2018-04-10 RX ORDER — CYANOCOBALAMIN (VITAMIN B-12) 250 MCG
500 TABLET ORAL DAILY
Status: DISCONTINUED | OUTPATIENT
Start: 2018-04-10 | End: 2018-04-10

## 2018-04-10 RX ORDER — LANOLIN ALCOHOL/MO/W.PET/CERES
1000 CREAM (GRAM) TOPICAL DAILY
Status: DISCONTINUED | OUTPATIENT
Start: 2018-04-10 | End: 2018-04-10 | Stop reason: HOSPADM

## 2018-04-10 RX ORDER — ERGOCALCIFEROL 1.25 MG/1
50000 CAPSULE ORAL
Qty: 4 CAPSULE | Refills: 1 | Status: SHIPPED | OUTPATIENT
Start: 2018-04-10 | End: 2018-06-05

## 2018-04-10 RX ORDER — POTASSIUM CHLORIDE 7.45 MG/ML
10 INJECTION INTRAVENOUS
Status: DISCONTINUED | OUTPATIENT
Start: 2018-04-10 | End: 2018-04-10

## 2018-04-10 RX ORDER — NAPROXEN SODIUM 220 MG/1
81 TABLET, FILM COATED ORAL DAILY
Refills: 0 | COMMUNITY
Start: 2018-04-10 | End: 2022-07-20

## 2018-04-10 RX ORDER — NAPROXEN SODIUM 220 MG/1
81 TABLET, FILM COATED ORAL DAILY
Status: DISCONTINUED | OUTPATIENT
Start: 2018-04-10 | End: 2018-04-10 | Stop reason: HOSPADM

## 2018-04-10 RX ORDER — MAGNESIUM SULFATE 1 G/100ML
1 INJECTION INTRAVENOUS
Status: COMPLETED | OUTPATIENT
Start: 2018-04-10 | End: 2018-04-10

## 2018-04-10 RX ADMIN — ERGOCALCIFEROL 50000 UNITS: 1.25 CAPSULE ORAL at 03:04

## 2018-04-10 RX ADMIN — ATORVASTATIN CALCIUM 40 MG: 40 TABLET, FILM COATED ORAL at 11:04

## 2018-04-10 RX ADMIN — SODIUM CHLORIDE, PRESERVATIVE FREE 3 ML: 5 INJECTION INTRAVENOUS at 05:04

## 2018-04-10 RX ADMIN — MAGNESIUM SULFATE 1 G: 1 INJECTION INTRAVENOUS at 11:04

## 2018-04-10 RX ADMIN — CYANOCOBALAMIN TAB 1000 MCG 1000 MCG: 1000 TAB at 03:04

## 2018-04-10 RX ADMIN — FAMOTIDINE 20 MG: 20 TABLET, FILM COATED ORAL at 11:04

## 2018-04-10 RX ADMIN — MAGNESIUM SULFATE 1 G: 1 INJECTION INTRAVENOUS at 05:04

## 2018-04-10 RX ADMIN — ASPIRIN 81 MG 81 MG: 81 TABLET ORAL at 03:04

## 2018-04-10 RX ADMIN — CLOPIDOGREL BISULFATE 75 MG: 75 TABLET ORAL at 11:04

## 2018-04-10 NOTE — PLAN OF CARE
Problem: Patient Care Overview  Goal: Plan of Care Review  Outcome: Ongoing (interventions implemented as appropriate)  Plan of care discussed with patient. Safety measures maintained. Call bell in reach and instructed to call for assist. Patient remains with right arm and right leg weakness but severity has improved throughout shift. Patient no longer has any right face tingling Gait remains unsteady when ambulating to bedside commode, patient compliant with call for assistance. Patient passed ANAIS screen but NPO since midnight, AM MRI of brain scheduled. NS at 75cc/hr.  VSS. NSR on tele with no ectopy. Will continue to monitor

## 2018-04-10 NOTE — PLAN OF CARE
Future Appointments  Date Time Provider Department Center   4/17/2018 11:00 AM Vianney Pedraza MD [facility]   5/30/2018 9:45 AM [facility]     Patient Independent with ADL's.   Has rolling walker but doesn't currently use.    Lives with  and he helps her at home. Patient states she currently drives.    This  put name on white board and explained blue discharge folder to patient. Discharge planning brochure and/or business card given to card to patient. Patient verbalized understanding.           04/10/18 1056   Discharge Assessment   Assessment Type Discharge Planning Assessment   Confirmed/corrected address and phone number on facesheet? Yes   Assessment information obtained from? Patient   Communicated expected length of stay with patient/caregiver yes   Prior to hospitilization cognitive status: Alert/Oriented   Prior to hospitalization functional status: Independent   Current cognitive status: Alert/Oriented   Current Functional Status: Independent   Lives With spouse   Able to Return to Prior Arrangements yes   Is patient able to care for self after discharge? Yes   Readmission Within The Last 30 Days no previous admission in last 30 days   Patient currently being followed by outpatient case management? No   Equipment Currently Used at Home walker, rolling   Do you have any problems affording any of your prescribed medications? No   Is the patient taking medications as prescribed? yes   Does the patient have transportation home? Yes   Transportation Available family or friend will provide   Does the patient receive services at the [facility]? No   Discharge Plan A Home;Home with family   Discharge Plan B Home;Home with family   Patient/Family In Agreement With Plan yes     Jenny German RN, CCM, CMSRN  RN Transition Navigator  [phone]

## 2018-04-10 NOTE — PLAN OF CARE
met with patient at bedside to assess for discharge needs. Patient stated her father would be providing transportation at discharge.  informed patient's nurse will go over d/c summary and encouraged to ask her nurse if she had questions concerning her care.     provided patient with her business card and contact info on white board.

## 2018-04-10 NOTE — PT/OT/SLP EVAL
Occupational Therapy   Evaluation    Name: Gina Jenkins  MRN: [id number]  Admitting Diagnosis:  Cerebrovascular accident (CVA)      Recommendations:     Discharge Recommendations: outpatient OT, outpatient PT  Discharge Equipment Recommendations:   (use current DME)  Barriers to discharge:  None    History:     Occupational Profile:  Living Environment: w/ mult fly members in [place] w/ 0 MARLENE; has WIS w/ built in seat  Previous level of function: (I) w/o DME   Roles and Routines: IADLs & driving  Equipment Owned:  bedside commode, walker, rolling, grab bar (built in shower seat)  Assistance upon Discharge: 24 hr S/A    Past Medical History:   Diagnosis Date    Brain aneurysm     CHF (congestive heart failure)     H/O coronary angioplasty     Hypercholesteremia     Hypertension     Stroke 10/2017       Past Surgical History:   Procedure Laterality Date    CORONARY ANGIOPLASTY WITH STENT PLACEMENT         Subjective     Chief Complaint: decreased sensation & increased weakness on R  Patient/Family stated goals: return home  Communicated with: nsg prior to session.  Pain/Comfort:  · Pain Rating 1: 0/10  · Pain Rating Post-Intervention 1: 0/10    Patients cultural, spiritual, Orthodoxy conflicts given the current situation:      Objective:     Patient found with: telemetry, bed alarm    General Precautions: Standard, fall   Orthopedic Precautions:N/A   Braces: N/A     Occupational Performance:    Bed Mobility:    · Patient completed Supine to Sit with stand by assistance  · Patient completed Sit to Supine with stand by assistance    Functional Mobility/Transfers:  · Patient completed Sit <> Stand Transfer with contact guard assistance  with  rolling walker   · Functional Mobility: via RW around room for attempted BSC t/f w/ CGA & ataxic like R LE mvmts/imbalance    Activities of Daily Living:  · LB Dressing: stand by assistance don/doff socks via Webymaster at EOB    Cognitive/Visual  Perceptual:  A+Ox4    Physical Exam:  L UE WFL at 4+/5  R UE WFL at 4/5 w/ decreased G/FMC & proprioception    Static stand w/o DME: F to F+  Dyn stand w/ RW: F to F+    Patient left HOB elevated with all lines intact, call button in reach, bed alarm on and MD present    AMPA 6 Click:  [facility] Total Score: 18    Treatment & Education:  Pt agreeable to OT eval this date. Pt reports living w/ mult fly members in [facility] w/ 0 MARLENE. Repts  Performing all I/BADLs (I)ly w/o DME & driving. Currently pt presents w/ decreased coordination/sensation at R L>UE; EOB-->sup w/ SBA; don/doff socks via LE cross tech at EOB; stand via RW w/ CGA; amb via RW short dist around room for attempted BSC t/f, however pt refused & quickly pivoted to return to EOB. Noted decreased safety & LE ataxic mvmts during amb. Returned BTB w/ SBA. Pt's became more introverted & ceased eye contact after amb, but kept stating that she was fine. Edu/tx re: general safety techs, DME use & HEP. Pt verbalized understanding.      Education:    Assessment:   Pt presents w/ decreased overall endurance/conditioning, balance/mobility & coordination/sensation w/ subsequent decline in (I)/safety w/ BADLs, fxnl mobility & fxnl t/f's. OT 5x/wk to increase phys/fxnl status & maximize potential to achieve established goals for d/c home w/ out pt OT svcs w/ use of currently owned DME.    Gina Jenkins is a 43 y.o. female with a medical diagnosis of Cerebrovascular accident (CVA).  She presents with ..  Performance deficits affecting function are weakness, impaired endurance, impaired sensation, gait instability, impaired functional mobilty, impaired self care skills, impaired balance, decreased lower extremity function, decreased upper extremity function, decreased coordination, decreased safety awareness, impaired fine motor.      Rehab Prognosis:  good; patient would benefit from acute skilled OT services to address these deficits and reach maximum level of function.    "      Clinical Decision Makin.  OT Mod:  "Pt evaluation falls under moderate complexity for evaluation coding due to identification of 3-5 performance deficits noted as stated above. Eval required Min/Mod assistance to complete on this date and detailed assessment(s) were utilized. Moreover, an expanded review of history and occupational profile obtained with additional review of cognitive, physical and psychosocial hx."     Plan:     Patient to be seen 5 x/week to address the above listed problems via self-care/home management, therapeutic activities, therapeutic exercises, neuromuscular re-education  · Plan of Care Expires: 04/10/18  · Plan of Care Reviewed with: patient    This Plan of care has been discussed with the patient who was involved in its development and understands and is in agreement with the identified goals and treatment plan    GOALS:    Occupational Therapy Goals        Problem: Occupational Therapy Goal    Goal Priority Disciplines Outcome Interventions   Occupational Therapy Goal     OT, PT/OT Ongoing (interventions implemented as appropriate)    Description:  Goals to be met by: 05/10/18     Patient will increase functional independence with ADLs by performing:    UE Dressing with Modified [place].  LE Dressing with Modified [place].  Grooming while standing at sink with Stand-by Assistance.  Toileting from toilet with Supervision for hygiene and clothing management.   Toilet transfer to toilet with Supervision.  Increased functional strength to WFL for ADLs.                      Time Tracking:     OT Date of Treatment: 04/10/18  OT Start Time: 1203  OT Stop Time: 1220  OT Total Time (min): 17 min    Billable Minutes:Evaluation 17    TRINO Stanton  4/10/2018    "

## 2018-04-10 NOTE — DISCHARGE INSTRUCTIONS
Stroke, Effects on the Brain and Body (English)      Stroke, What is Ischemic (English)      Heart Failure, Discharge Instructions for (English)      Heart Failure: Tracking Your Weight (English)      Stroke, Preparing for Home Care After: For Caregivers (English)      Stroke, Preventing Recurrent: Eating Healthy (English)

## 2018-04-10 NOTE — H&P
[facility] Medicine  History & Physical    Patient Name: Gina Jenkins  MRN: [id number]  Admission Date: 4/9/2018  Attending Physician: Dr. Sumit Davies III  Primary Care Provider: Clair Johnson NP         Patient information was obtained from patient, EMS personnel, past medical records and ER records.     Subjective:     Principal Problem:Cerebrovascular accident (CVA)    Chief Complaint:   Chief Complaint   Patient presents with    Cerebrovascular Accident     Around 1545, slurred speech, left sided weakness and left sided facial.        HPI: 42 y/o with HTN, CHF, 0 hx of seizure, brain aneurysm (not coiled), CVA (oct 2017, risidual right side weakness, walker, slight aphasia, crooked mouth), Said that she hasn't been feeling well the whole day and then this afternoon 4:30pm she started to have some mouth crooked chronic, whole body feel weak. The symptom are about the same. No LOS, no loss of memory. Feel tired throughout the whole day. No body jerking. She is only on aspirin. She said her generalized weakness remains and not improving. See a neurologist at [facility].             Past Medical History:   Diagnosis Date    Brain aneurysm     CHF (congestive heart failure)     H/O coronary angioplasty     Hypercholesteremia     Hypertension     Stroke 10/2017       Past Surgical History:   Procedure Laterality Date    CORONARY ANGIOPLASTY WITH STENT PLACEMENT         Review of patient's allergies indicates:   Allergen Reactions    Lisinopril Swelling    Bactrim [sulfamethoxazole-trimethoprim] Rash       No current facility-administered medications on file prior to encounter.      Current Outpatient Prescriptions on File Prior to Encounter   Medication Sig    amlodipine (NORVASC) 10 MG tablet Take 1 tablet (10 mg total) by mouth once daily.    aspirin (ECOTRIN) 81 MG EC tablet Take 81 mg by mouth as needed for Pain.    atorvastatin (LIPITOR) 40 MG tablet Take 1 tablet (40 mg  total) by mouth once daily.    hydrochlorothiazide (HYDRODIURIL) 25 MG tablet Take 1 tablet (25 mg total) by mouth once daily.    losartan (COZAAR) 100 MG Tab Take 1 tablet (100 mg total) by mouth once daily.    butalbital-acetaminop-caf-cod -91-30 mg Cap TK ONE C PO BID PRF HA    carvedilol (COREG) 12.5 MG tablet Take 1 tablet (12.5 mg total) by mouth 2 (two) times daily with meals.    diphenhydrAMINE (BENADRYL) 25 mg capsule Take 1 each (25 mg total) by mouth every 6 (six) hours as needed for Itching or Allergies.    ranitidine (ZANTAC) 150 MG capsule Take 1 capsule (150 mg total) by mouth once daily.     Family History     None        Social History Main Topics    Smoking status: Former Smoker     Packs/day: 0.50     Quit date: 10/4/2017    Smokeless tobacco: Never Used    Alcohol use Yes      Comment: occassionally    Drug use: No    Sexual activity: Yes     Partners: Male     Birth control/ protection: None     Review of Systems   Constitutional: Positive for activity change and fatigue. Negative for appetite change, chills, diaphoresis and fever.   HENT: Negative for congestion, hearing loss, sinus pain, sinus pressure and sneezing.    Eyes: Negative for visual disturbance.   Respiratory: Negative for apnea, cough, choking, chest tightness, shortness of breath and wheezing.    Cardiovascular: Negative for chest pain, palpitations and leg swelling.   Gastrointestinal: Negative for abdominal distention, abdominal pain, anal bleeding, blood in stool, constipation, diarrhea, nausea and vomiting.   Neurological: Positive for dizziness and weakness. Negative for speech difficulty and headaches.   Psychiatric/Behavioral: Negative for confusion and decreased concentration.     Objective:     Vital Signs (Most Recent):  Temp: 99 °F (37.2 °C) (04/09/18 2030)  Pulse: 99 (04/09/18 2030)  Resp: 18 (04/09/18 2030)  BP: (!) 144/88 (04/09/18 2030)  SpO2: 99 % (04/09/18 2051) Vital Signs (24h Range):  Temp:   [98.6 °F (37 °C)-99 °F (37.2 °C)] 99 °F (37.2 °C)  Pulse:  [] 99  Resp:  [18-20] 18  SpO2:  [99 %-100 %] 99 %  BP: (138-183)/() 144/88     Weight: 78.9 kg (173 lb 14.4 oz)  Body mass index is 31.81 kg/m².    Physical Exam   Constitutional: She is oriented to person, place, and time. She appears well-developed and well-nourished. No distress.   HENT:   Head: Normocephalic and atraumatic.   Nose: Nose normal.   Eyes: Conjunctivae and EOM are normal. Right eye exhibits no discharge. Left eye exhibits no discharge. No scleral icterus.   Neck: Normal range of motion. Neck supple. No JVD present.   Cardiovascular: Normal rate and intact distal pulses.    Pulmonary/Chest: Effort normal and breath sounds normal. No respiratory distress. She has no wheezes. She has no rales. She exhibits no tenderness.   Abdominal: Soft. Bowel sounds are normal. She exhibits no distension and no mass. There is no tenderness. There is no rebound and no guarding.   Musculoskeletal: She exhibits no edema, tenderness or deformity.   Right sided weakness, chronic due to residual stroke  In the past.  Gripping strength is dec on the right side.  Mouth is slightly crooked to right, but also chronic     Neurological: She is alert and oriented to person, place, and time. Coordination abnormal.   AOx4  Bedside romberg +  Pronator neg  No ocular dysmetria   Skin: Skin is warm. No rash noted. She is not diaphoretic. No erythema. No pallor.   Psychiatric: She has a normal mood and affect. Her behavior is normal. Judgment and thought content normal.   Nursing note and vitals reviewed.        CRANIAL NERVES     CN III, IV, VI   Extraocular motions are normal.       Recent Labs      04/09/18   1707   WBC  7.01   HGB  12.8   HCT  37.3   PLT  310   MCV  89   RDW  13.5       Recent Labs      04/09/18   1707   NA  142   K  3.1*   CL  103   CO2  27   GLU  92   BUN  16   CREATININE  0.95   CALCIUM  7.7*   PROT  6.9   ALBUMIN  3.9   BILITOT  0.2    ALKPHOS  107   AST  41   ALT  37   ANIONGAP  12   ESTGFRAFRICA  >60.0   EGFRNONAA  >60.0       No results for input(s): MG, PHOS in the last 72 hours.    Coags  Lab Results   Component Value Date    INR 1.1 04/09/2018    INR 1.1 02/09/2018    INR 0.9 10/03/2017    APTT 25.4 04/09/2018    APTT 29.9 03/02/2016       Recent Labs  Lab 04/09/18  1707   INR 1.1   APTT 25.4       A1c:   Lab Results   Component Value Date    HGBA1C 4.9 10/03/2017   , Last Gluc: No results for input(s): POCTGLUCOSE in the last 168 hours.    TSH:   Lab Results   Component Value Date    TSH 3.520 11/02/2017         Cardiac Enzymes  No results for input(s): TROPONINI, CPKMB, CPK in the last 72 hours.      Urinalysis  Urinalysis    Recent Labs  Lab 04/09/18 2029   COLORU Yellow   SPECGRAV 1.010   PHUR 6.0   PROTEINUA Negative   BACTERIA Occasional   NITRITE Negative   LEUKOCYTESUR Trace*   UROBILINOGEN Negative       Recent Labs  Lab 04/09/18 2029   COLORU Yellow   SPECGRAV 1.010   PHUR 6.0   PROTEINUA Negative   BACTERIA Occasional       Micro  Microbiology Results (last 7 days)     ** No results found for the last 168 hours. **             ABG  No results for input(s): PH, PCO2, PO2, HCO3, POCSATURATED, BE in the last 168 hours.      Imaging   Imaging Results          CT Head Without Contrast (Final result)  Result time 04/09/18 17:14:33    Final result by Richardson Troncoso MD (Timothy) (04/09/18 17:14:33)                 Impression:         Normal study    All Ct exams at this facility use dose modulation, iterative reconstruction, and weight based dosing to reduce radiation dose to low as reasonably achievable.      Electronically signed by: RICHARDSON TRONCOSO MD  Date:     04/09/18  Time:    17:14              Narrative:    Exam: Noncontrast CT head    History:    Focal neural deficit new    Findings: No intracranial acute hemorrhage or acute focal brain parenchymal abnormality is identified.  Calvarium is intact.   Comparison 02/09/2018.                                 Assessment/Plan:   *CVA   CVA or TIA  Symptoms seem resolving  CT head neg in Ed, could be manifestation of old stroke 2/2 metabolic imbalance (hypoK and hypoCa)  MRI brain am, 2DE with bubble, carotid u/s  Folate, b12, rpr, hiv, hep panel, vit d., tsh  NPOm, aspiration precaution, on tele, high-intensity statin, dual-antiplatelet  Will consult Neuro am, pt/ot on board.    HTN  Holding bp meds  Allowing for permissive HTN  Will monitor    Hx of CHF  2DE, strict in and out  No sign of acute exacerbation at the present.  Will monitor.    Hypokemia  k 3.1, will replete with 50meq kcl    HypoCalcemia  Corrected Ca 7.8  Will order PTH and vit D      Ppx: famotidine and SCD    Dispo: f/u labs, MRI brain, carotid u/s edis, 2DE w/ bubble, neurology am    Ana Maria Baez MD  Department of Hospital Medicine   [facility]

## 2018-04-10 NOTE — PT/OT/SLP EVAL
Speech Language Pathology Evaluation  Bedside Swallow and Speech Screening    Patient Name:  Gina Jenkins   MRN:  [id number]  Admitting Diagnosis: Cerebrovascular accident (CVA)    Recommendations:                 General Recommendations:  Follow-up not indicated  Diet recommendations:  Regular, Thin   Aspiration Precautions: 1 bite/sip at a time, Alternating bites/sips, Eliminate distractions, Feed only when awake/alert, Frequent oral care, HOB to 90 degrees, Meds whole 1 at a time, Remain upright 30 minutes post meal and Small bites/sips   General Precautions: Standard, fall  Communication strategies:  none    History:     Past Medical History:   Diagnosis Date    Brain aneurysm     CHF (congestive heart failure)     H/O coronary angioplasty     Hypercholesteremia     Hypertension     Stroke 10/2017       Past Surgical History:   Procedure Laterality Date    CORONARY ANGIOPLASTY WITH STENT PLACEMENT       HPI: 42 y/o with HTN, CHF, 0 hx of seizure, brain aneurysm (not coiled), CVA (oct 2017, risidual right side weakness, walker, slight aphasia, crooked mouth), Said that she hasn't been feeling well the whole day and then this afternoon 4:30pm she started to have some mouth crooked chronic, whole body feel weak. The symptom are about the same. No LOS, no loss of memory. Feel tired throughout the whole day. No body jerking. She is only on aspirin. She said her generalized weakness remains and not improving. [facility] a neurologist at [facility].     Social History: Patient lives with spouse at home. Pt's mother also lives with them.    Prior Intubation HX:  N/A    Modified Barium Swallow: None on file    Chest X-Rays: None on file    MRI Results: No acute intracranial abnormality.  No new abnormality.    Prior diet: Per pt, regular/thin liquids.    Subjective     SLP consulted for clinical swallow eval and eval of cognitive-linguistic skills. SLP confirmed with RN prior to entry. Pt was found in bed awake and alert upon  "SLP entry. Pt oriented x4 and agreed to participate in evaluations.     Patient goals: "I'm hungry, I've been waiting to see you!" per pt       Objective:     Oral Musculature Evaluation  · Oral Musculature: WFL  · Dentition: present and adequate  · Mucosal Quality: good  · Mandibular Strength and Mobility: WFL  · Oral Labial Strength and Mobility: WFL  · Lingual Strength and Mobility: WFL  · Buccal Strength and Mobility: WFL  · Volitional Swallow:  (timely upon palpation)  · Voice Prior to PO Intake:  (clear)    Bedside Swallow Eval:   Pt participated in clinical swallow eval and speech screening this AM. Pt tolerated thin liquids, puree, and hard solid textures with no overt s/s of aspiration, at bedside.     Consistencies Assessed:  · Thin liquids -via cup x4, straw x4  · Puree -(pudding) x2  · Solids -(karo cracker) x2     Oral Phase:   · WFL    Pharyngeal Phase:   · no overt clinical signs/symptoms of aspiration  · no overt clinical signs/symptoms of pharyngeal dysphagia    Compensatory Strategies  · None    Speech-Language Screening:  -Pt oriented to name, , situation and time  -Pt demonstrated good STM skills, as pt able to independently recall 3/3 unrelated words given by SLP with 2 min delay during delayed memory recall task  -Pt demonstrated receptive and expressive language skills to be judged WFL-- pt able to follow 2-3 step commands, identify items within room, and demonstrated appropriate sentence formulation during conversations.  -Pt also able to name 10/10 items within in room and given body parts  -Pt able to recall PmHx and state reasoning for current admission here at [facility]  -Pt also participated in diadochokinetics task (/pa/, /ta/, /ka/) and demonstrated motor speech skills to be judged WFL   -Overall, pt demonstrates baseline speech-language and cognitive skills.      Treatment: SLP educated pt on role of SLP, BSS, diet recs/swallow precautions, speech screening/results, and POC. Pt " acknowledged and verbally confirmed understanding.       Assessment:     Gina Jenkins is a 43 y.o. female admitted with a CVA.  She presents with no oropharyngeal dysphagia. Pt also presents with baseline cognitive-linguisitc skills. SLP recs: pt is safe for a regular/thin liquids po diet with universal swallow precautions. ST services no longer required as pt has met all ST goals. SLP notified ALEXANDER Russo and MD team of results/recs. Please re-consult should pt experience any change in status.    Goals:    SLP Goals     Not on file          Multidisciplinary Problems (Resolved)        Problem: SLP Goal    Goal Priority Disciplines Outcome   SLP Goal   (Resolved)     SLP Outcome(s) achieved   Description:  Short Term Goals:  1. Pt will participate in BSS to determine least restrictive diet.- MET 4/10  2. Pt will participate in speech screening to determine if further evaluation is required to r/o cognitive-linguistic deficits.- MET 4/10                      Plan:     · Plan of Care expires:  05/10/18  · Plan of Care reviewed with:  patient (ALEXANDER Russo and MD team)   · SLP Follow-Up:  No       Discharge recommendations:   (Outpatient OT/PT services. However, ST services are no longer required.)   Barriers to Discharge:  None    Time Tracking:     SLP Treatment Date:   04/10/18  Speech Start Time:  1144  Speech Stop Time:  1200     Speech Total Time (min):  16 min    Billable Minutes: Eval Swallow and Oral Function and Speech Screening 8    JOELLEN Guzmán., CF-SLP  Speech-Language Pathologist   4/10/2018

## 2018-04-10 NOTE — PLAN OF CARE
Problem: Patient Care Overview  Goal: Plan of Care Review  Outcome: Ongoing (interventions implemented as appropriate)  Plan of care reviewed with patient. Patient verbalized understanding. Fall precautions maintained. Bed in lowest position, call light within reach, 2x bed rails, pt wearing slip resistant socks. Patient notified to ask staff for assistance and pt verbalized complete understanding. Pt on telemetry with no ectopy noted. Will continue to monitor.

## 2018-04-10 NOTE — PT/OT/SLP EVAL
Physical Therapy Evaluation    Patient Name:  Gina Jenkins   MRN:  [id number]    Recommendations:     Discharge Recommendations:  outpatient OT, outpatient PT   Discharge Equipment Recommendations:  (return to using home equipment such as Rolling Walker w mobility)   Barriers to discharge: None    Assessment:     Gina Jenkins is a 43 y.o. female admitted with a medical diagnosis of Cerebrovascular accident (CVA).  She presents with the following impairments/functional limitations:  weakness, impaired endurance, impaired sensation, gait instability, impaired functional mobilty, impaired self care skills, impaired balance, decreased lower extremity function, decreased upper extremity function, decreased coordination, decreased safety awareness, impaired fine motor.   Recommend continued acute therapy and outpatient PT and OT upon discharge from hospital.  Pt requires min assist with RW for safety with balance during functional mobility activities.  Recommend pt use her Rolling Walker at home for safety and have 24 hr assistance with mobility at this time.    Rehab Prognosis:  excellent; patient would benefit from acute skilled PT services to address these deficits and reach maximum level of function.      Recent Surgery: * No surgery found *      Plan:     During this hospitalization, patient to be seen 6 x/week to address the above listed problems via gait training, therapeutic activities, therapeutic exercises, neuromuscular re-education  · Plan of Care Expires:  05/10/18   Plan of Care Reviewed with: patient    Subjective     Initial attempt to see pt was was missed as pt off floor in MRI.  Upon return later in morning, Communicated with nsg prior to session.  Patient found supine upon PT entry to room, agreeable to evaluation.      Chief Complaint: Pt reports no nausea or pain.  Reports no dizziness at this time.  Patient comments/goals: return to home and PLOF   Pt expressing that she is upset to have  balance and mobility issues as she has recently completed therapy and was showing significant improvement and no longer needing to use her AD.  Ed for importance of RW use despite her frustration with situation.    Pain/Comfort:  · Pain Rating 1: 0/10  · Pain Rating Post-Intervention 1: 0/10    Patients cultural, spiritual, Holiness conflicts given the current situation: none reported    Living Environment:  Pt lives with her  and 3 sons and her sister.  She reports her mother stayed with her last time when she required assistance.  Pt lives in [facility] with no [place]E and was independent with ADL and ambulation without AD use during past month after OP therapy course following stroke in October 2017.    Patient has the following equipment: walker, rolling, grab bar, bedside commode (pt reporting she was able to stop using her RW ~1 month ago).   Upon discharge, patient will have assistance from family (, sister, mother).    Objective:     Patient found with: telemetry, bed alarm     General Precautions: Standard, fall   Orthopedic Precautions:N/A   Braces: N/A     Exams:  · Cognitive:  A & O x 3            Safety awareness deficits as pt reporting a lack of current limitation awareness in her determination not to return to using her RW after progressing to not needing it anymore approx 1 month ago.  · Coordination:  Fine motor and Gross motor deficits demo  RLE and RUE     Ataxic movements with open chain activity demo'd and with F to nose and heel to shin testing  · BUE ROM and strength - see OT detailed report  · LLE AROM WFL and MMT 5/5  · RLE AROM WFL  And MMT ankle df 4+/5, knee ext and flex 3+/5, hip flex 3+/5, abd/add 4-/5  · Sensory:  Pt reports tingling B sides of face/cheeks and had R hand numbness but it has been resolving since yesterday    Functional Mobility:  · Bed mobility:  SBA with increased time and effort required and UE assist for abd of RLE to exit bed  · Transfers:  Sup<>sit SBA with  increased time and effort, sit<>stand with min assist with RW and VC;      · Gait:  amb ~8' x 2 reps with RW with min/mod assist req'd for balance with ataxic like movement and decreased strength, motor control and balance demo'd  · Static sit = Fair        Dynamic sit = Fair-      Static stance = Poor          Dynamic Stance = Poor    AM-PAC 6 CLICK MOBILITY  Total Score:15       Therapeutic Activities and Exercises:   safety ed provided, txf training, bed mobility training, RW ed use, gait training, sit posture re ed and balance, stance balance, toileting safety min assist BSC ed, MD present during part of session and viewed pt's balance deficits and safety issues, bedding assist    Patient left supine with all lines intact, call button in reach, bed alarm on and notified notified.    GOALS:    Physical Therapy Goals        Problem: Physical Therapy Goal    Goal Priority Disciplines Outcome Goal Variances Interventions   Physical Therapy Goal     PT/OT, PT Ongoing (interventions implemented as appropriate)     Description:  Goals to be met by: 18     Patient will increase functional independence with mobility by performin. Supine to sit with Modified [place]  2. Sit to supine with Modified [place]  3. Sit to stand transfer with Stand-by Assistance  4. Bed to chair transfer with Contact Guard Assistance using Rolling Walker  5. Gait  x 150 feet with Contact Guard Assistance using Rolling Walker.   6. Lower extremity exercise program x15 reps                       History:     Past Medical History:   Diagnosis Date    Brain aneurysm     CHF (congestive heart failure)     H/O coronary angioplasty     Hypercholesteremia     Hypertension     Stroke 10/2017       Past Surgical History:   Procedure Laterality Date    CORONARY ANGIOPLASTY WITH STENT PLACEMENT         Clinical Decision Making:     History  Co-morbidities and personal factors that may impact the plan of care Examination  Body  Structures and Functions, activity limitations and participation restrictions that may impact the plan of care Clinical Presentation   Decision Making/ Complexity Score   Co-morbidities:   [] Time since onset of injury / illness / exacerbation  [x] Status of current condition  [x]Patient's cognitive status and safety concerns    [x] Multiple Medical Problems (see med hx)  Personal Factors:   [] Patient's age  [x] Prior Level of function   [] Patient's home situation (environment and family support)  [] Patient's level of motivation  [] Expected progression of patient      HISTORY:(criteria)    [] 14108 - no personal factors/history    [x] 73740 - has 1-2 personal factor/comorbidity     [] 71962 - has >3 personal factor/comorbidity     Body Regions:  [] Objective examination findings  [] Head     []  Neck  [x] Trunk   [x] Upper Extremity  [x] Lower Extremity    Body Systems:  [x] For communication ability, affect, cognition, language, and learning style: the assessment of the ability to make needs known, consciousness, orientation (person, place, and time), expected emotional /behavioral responses, and learning preferences (eg, learning barriers, education  needs)  [x] For the neuromuscular system: a general assessment of gross coordinated movement (eg, balance, gait, locomotion, transfers, and transitions) and motor function  (motor control and motor learning)  [x] For the musculoskeletal system: the assessment of gross symmetry, gross range of motion, gross strength, height, and weight  [] For the integumentary system: the assessment of pliability(texture), presence of scar formation, skin color, and skin integrity  [] For cardiovascular/pulmonary system: the assessment of heart rate, respiratory rate, blood pressure, and edema     Activity limitations:    [x] Patient's cognitive status and saf ety concerns          [x] Status of current condition      [] Weight bearing restriction  [] Cardiopulmunary  Restriction    Participation Restrictions:   [] Goals and goal agreement with the patient     [] Rehab potential (prognosis) and probable outcome      Examination of Body System: (criteria)    [] 71069 - addressing 1-2 elements    [x] 82688 - addressing a total of 3 or more elements     [] 72401 -  Addressing a total of 4 or more elements         Clinical Presentation: (criteria)  Evolving - 71854     On examination of body system using standardized tests and measures patient presents with 3 or more elements from any of the following: body structures and functions, activity limitations, and/or participation restrictions.  Leading to a clinical presentation that is considered evolving with changing characteristics                              Clinical Decision Making  (Eval Complexity):  Choose One     Time Tracking:     PT Received On: 04/10/18  PT Start Time: 1055     PT Stop Time: 1150  PT Total Time (min): 55 min     Billable Minutes: Evaluation 15, Gait Training 15 and Therapeutic Activity 25      Sheeba Camargo, BOOGIE  04/10/2018

## 2018-04-10 NOTE — PLAN OF CARE
Problem: SLP Goal  Goal: SLP Goal  Short Term Goals:  1. Pt will participate in BSS to determine least restrictive diet.- MET 4/10  2. Pt will participate in speech screening to determine if further evaluation is required to r/o cognitive-linguistic deficits.- MET 4/10    Outcome: Outcome(s) achieved Date Met: 04/10/18  4/10:  Pt participated in clinical swallow eval and speech screening this AM. Pt tolerated thin liquids, puree, and hard solid textures with no overt s/s of aspiration, at bedside. Pt also demonstrated baseline cognitive-linguistic skills during speech screening. See note for details. SLP recs: pt is safe for a regular/thin liquids po diet with universal swallow precautions. ST services no longer required as pt has met all ST goals. SLP notified ALEXANDER Russo and MD team of results/recs. Please re-consult should pt experience any change in status.   JOELLEN Guzmán., CF-SLP  Speech-Language Pathologist  b1

## 2018-04-10 NOTE — PROGRESS NOTES
"DAILY PROGRESS NOTE  Admission Date: 4/9/2018    Subjective:    The patient was seen and examined this morning at the bedside. Patient reports no acute issues overnight. Patient reports that pain is adequately controlled. Patient is getting MRI this morning and will see neurology    Review of Systems   Cardiovascular: Negative for chest pain.   Respiratory: Negative for shortness of breath. Negative for cough .    Gastrointestinal: Negative for nausea and vomiting.    Objective:   /76 (BP Location: Left arm, Patient Position: Lying)   Pulse 89   Temp 98.5 °F (36.9 °C) (Oral)   Resp 20   Ht 5' 2" (1.575 m)   Wt 78.9 kg (173 lb 14.4 oz)   LMP  (LMP Unknown)   SpO2 96%   Breastfeeding? No   BMI 31.81 kg/m²   No intake/output data recorded.    Physical Exam     GEN - AAOx4, NAD  Oral - MMM; no exudate  CHEST - CTA B ;equal expansion  HEART - rrr; no m/r/s3/s4  ABD - soft; nonttp  EXTR - warm; no edema  NEURO - right sided weakness, right  decreased, right side of mouth weaker.   SKIN - no obvious skin breakdown or rash    I/O:    Intake/Output Summary (Last 24 hours) at 04/10/18 1053  Last data filed at 04/10/18 0619   Gross per 24 hour   Intake          1103.75 ml   Output             1719 ml   Net          -615.25 ml       04/09 0701 - 04/10 0700  In: 1103.8 [P.O.:410; I.V.:693.8]  Out: 1719 [Urine:1719]   Labs    Recent Labs  Lab 04/09/18  1707 04/10/18  0330   WBC 7.01 6.81   HGB 12.8 11.9*   HCT 37.3 34.8*    287       Recent Labs  Lab 04/09/18  1707 04/10/18  0329    141   K 3.1* 3.1*    106   CO2 27 24   BUN 16 9   CREATININE 0.95 0.8   CALCIUM 7.7* 8.0*   PROT 6.9 6.5   BILITOT 0.2 0.3   ALKPHOS 107 87   ALT 37 21   AST 41 26     No results for input(s): POCTGLUCOSE in the last 168 hours.    Recent Labs  Lab 04/10/18  0329   TROPONINI 0.013     Lab Results   Component Value Date    TSH 1.050 04/09/2018    TSH 3.520 11/02/2017    TSH 1.576 10/03/2017 "     @BNP@      Medications  Scheduled Meds:   atorvastatin  40 mg Oral Daily    clopidogrel  75 mg Oral Daily    famotidine  20 mg Oral BID    magnesium sulfate IVPB  1 g Intravenous Q2H    sodium chloride 0.9%  3 mL Intravenous Q8H     Continuous Infusions:   sodium chloride 0.9% 75 mL/hr at 04/09/18 2020     PRN Meds:.aspirin  Assessment:  Gina Jenkins is a 43 y.o. female with PMH: who was admitted on 4/9/2018 for Cerebrovascular accident (CVA)   ?  Plan:  CVA or TIA  Symptoms seem resolving  CT head neg in Ed, could be manifestation of old stroke 2/2 metabolic imbalance (hypoK and hypoCa)  MRI brain am, 2DE with bubble, carotid u/s  Folate, b12, rpr, hiv, hep panel, vit d., tsh  NPOm, aspiration precaution, on tele, high-intensity statin, dual-antiplatelet  Neuro contacted , pt/ot on board.     HTN  Holding bp meds  Allowing for permissive HTN  Will monitor     Hx of CHF  2DE, strict in and out  No sign of acute exacerbation at the present.  Will monitor.       Ppx: famotidine and SCD     Dispo: f/u labs, MRI brain, carotid u/s edis, 2DE w/ bubble, neurology am    Consults: Neuro   Follow up:       ?  Patient discussed with team on rounds.  William Rosales MD  4/10/2018

## 2018-04-10 NOTE — PLAN OF CARE
Problem: Occupational Therapy Goal  Goal: Occupational Therapy Goal  Goals to be met by: 05/10/18     Patient will increase functional independence with ADLs by performing:    UE Dressing with Modified [place].  LE Dressing with Modified [place].  Grooming while standing at sink with Stand-by Assistance.  Toileting from toilet with Supervision for hygiene and clothing management.   Toilet transfer to toilet with Supervision.  Increased functional strength to WFL for ADLs.    Outcome: Ongoing (interventions implemented as appropriate)  Pt agreeable to OT eval this date. Pt reports living w/ mult fly members in [facility] w/ 0 MARLENE. Repts  Performing all I/BADLs (I)ly w/o DME & driving. Currently pt presents w/ decreased coordination/sensation at R L>UE; EOB-->sup w/ SBA; don/doff socks via LE cross tech at EOB; stand via RW w/ CGA; amb via RW short dist around room for attempted BSC t/f, however pt refused & quickly pivoted to return to EOB. Noted decreased safety & LE ataxic mvmts during amb. Returned BTB w/ SBA. Pt's became more introverted & ceased eye contact after amb, but kept stating that she was fine. Edu/tx re: general safety techs, DME use & HEP. Pt verbalized understanding.    Pt presents w/ decreased overall endurance/conditioning, balance/mobility & coordination/sensation w/ subsequent decline in (I)/safety w/ BADLs, fxnl mobility & fxnl t/f's. OT 5x/wk to increase phys/fxnl status & maximize potential to achieve established goals for d/c home w/ out pt OT svcs w/ use of currently owned DME.

## 2018-04-10 NOTE — PLAN OF CARE
Future Appointments  Date Time Provider Department Center   4/17/2018 11:00 AM Vianney Pedraza MD [facility] RAM[facility] [place]   5[facility]30/2018 9:45 [facility]     followup with Dr. Fonseca on 8/7 11am. They will call patient if earlier appointment becomes available.    Called [facility] outpatient therapy regarding referral for PT- they will call patient to schedule.    Patient already has rolling walker. [facility]- patient would like it to be delivered to her home tomorrow. Patient understands shower chair may not be covered by insurance.    [facility] Informed patient's nurse to coordinate.       04/10/18 2668   Final Note   Assessment Type Final Discharge Note   Discharge Disposition Home   Hospital Follow Up  Appt(s) scheduled? Yes   Discharge plans and expectations educations in teach back method with documentation complete? Yes   Right Care Referral Info   Post Acute Recommendation No Care     Jenny German, RN, [facility], CMSRN  RN Transition Navigator  [phone]

## 2018-04-10 NOTE — PLAN OF CARE
Patient arrived to unit via stretcher with EMS. Assisted into bed. Fully aaox4. Patient crying and stated that she is scared. Assured patient that she will be taken care of.  Tele monitor placed on patient. VSS.  [place] screening preformed.  NS infusing at 75cc/hr.  Neuro assessment preformed. Patient with slight right facial droop and right side facial tingling.  Right upper and lower extremity weaker than left. Able to follow commands to participate in neuro assessment. Safety measures initiated. Call bell in reach and instructed to call for assistance.

## 2018-04-10 NOTE — CONSULTS
"NEUROLOGY FLOOR CONSULT    Assessment/Plan:     43 y.o. year old right handed female with PMHx of brain aneurysm, CVA 10/2017 s/p TPA with residual right sided weakness, and right sided facial droop, that was admitted due to worsening right sided weakness and RT facial droop, and new left sided weakness. Patient currently back to her baseline, MRI without acute ischemia, CTA head showed known aneurysm but no hemorrhage, and carotid US was WNL. Symptoms could have been secondary to re-expression of old ischemia vs unknown transient neurological deficit.     Transient Neurological Deficit  - Currently back to baseline  - As above negative work up for acute ischemia.   - Recommend continuing ASA 81 daily and Atorvastatin 40 for secondary stroke prevention.  - B12 in 300, recommend start supplementation for goal of >400.   - Vit D deficient, recommend replacement accordingly   - PT/OT/ST efforts.     Left ICA and MCA aneurysm  - No acute issues.   - Continue BP control  - Will need to follow up with neurology as an outpatient at least once a year  - Avoid DATP or anticoagulation.     Case discussed with Dr. Fonseca    Appreciate the consult. Neurology signs off.     R. Kishore Mccloud MD  [employer] - Neurology PGY3  Pager: [phone]       Reason for consult:  Facial droop  CC:  "Facial droop, worsening weakness"    HPI:   Gina Jenkins is a 43 y.o. year old right handed female with PMHx of brain aneurysm, CVA 10/2017 s/p TPA with residual right sided weakness, and right sided facial droop. Patient came to the ED yesterday after noticing worsening of her right sided weakness, new onset left sided weakness, and worsening of right sided facial droop, she was evaluated via tele-stroke however due to history of aneurysm TPA was not given, she was admitted, MRI didn't show acute ischemic stroke, and MRA didn't show any LVO, CTA confirmed the presence of aneurysms.Patient admits compliance with ASA, and BP " medications. Currently patient feels much better and reports she is almost back to her baseline.     ROS:   Pertinent as above.     Histories:     Allergies:  Lisinopril and Bactrim [sulfamethoxazole-trimethoprim]    Current Medications:    Current Facility-Administered Medications   Medication Dose Route Frequency Provider Last Rate Last Dose    0.9%  NaCl infusion   Intravenous Continuous SNEHA Simmons III, MD 75 mL/hr at 04/09/18 2020      aspirin EC tablet 81 mg  81 mg Oral PRN Melodie Baez MD   81 mg at 04/09/18 2157    atorvastatin tablet 40 mg  40 mg Oral Daily Melodie Baez MD   40 mg at 04/10/18 1109    clopidogrel tablet 75 mg  75 mg Oral Daily Melodie Baez MD   75 mg at 04/10/18 1108    famotidine tablet 20 mg  20 mg Oral BID Melodie Baez MD   20 mg at 04/10/18 1109    magnesium sulfate in dextrose IVPB (premix) 1 g  1 g Intravenous Q2H Zulema Decker  mL/hr at 04/10/18 1148 1 g at 04/10/18 1148    sodium chloride 0.9% flush 3 mL  3 mL Intravenous Q8H Melodie Baez MD           Past Medical/Surgical/Family History:  Medical:   Past Medical History:   Diagnosis Date    Brain aneurysm     CHF (congestive heart failure)     H/O coronary angioplasty     Hypercholesteremia     Hypertension     Stroke 10/2017      Surgeries:   Past Surgical History:   Procedure Laterality Date    CORONARY ANGIOPLASTY WITH STENT PLACEMENT        Family: History reviewed. No pertinent family history.      Current Evaluation:     Vital Signs:   Vitals:    04/10/18 1239   BP: (!) 154/86   Pulse: 101   Resp: 20   Temp: 98.5 °F (36.9 °C)      NIH Stroke Scale     1a. Level of consciousness 0=alert; keenly responsive   1b. LOC questions 0=Answers both tasks correctly   1c. LOC commands 0=Answers both tasks correctly   2. Best gaze 0=normal   3. Visual 0=No visual loss   4.  Facial palsy 1=Minor paralysis (flattened nasolabial fold, asymmetric on smiling)   5.  Motor left arm  0=No drift, limb holds 90 (or 45) degrees for full 10 seconds   5b. Motor right arm 0=No drift, limb holds 90 (or 45) degrees for full 10 seconds   6a. Motor left leg 0=No drift, limb holds 90 (or 45) degrees for full 10 seconds   6b. Motor right leg 1=Drift, limb holds 90 (or 45) degrees but drifts down before full 10 seconds: does not hit bed   7. Limb ataxia 0=Absent   8. Sensory 0=Normal; no sensory loss   9. Best language 0=No aphasia, normal   10. Dysarthria 0=Normal   11.  Extinction and inattention 0=No abnormality                                TOTAL: 2             Neurological Exam   Mental Status:  Alert and oriented to self, place, and time.     Language:  No aphasia, no dysarthria.     Cranial Nerves:  Visual fields were intact to confrontation, no RAPD, no anisocoria, EOM intact bilaterally, V1-V3 with good sensation to light touch, right facial droop, hearing grossly intact, palate, and tongue midline. Good shoulder rug.     Motor:  Normal tone and bulk  4/5 on RLE, rest of muscle groups 5/5    DTRs:  Symmetric and 2+ through out.     Sensation:  Intact to light touch through out.  Vibration and proprioception intact    Cerebellar:  Good finger to nose.  Good heal to shin    Gait and Stand:  Deferred gait and stand        LABORATORY STUDIES:  Recent Results (from the past 24 hour(s))   CBC auto differential    Collection Time: 04/09/18  5:07 PM   Result Value Ref Range    WBC 7.01 3.90 - 12.70 K/uL    RBC 4.21 4.00 - 5.40 M/uL    Hemoglobin 12.8 12.0 - 16.0 g/dL    Hematocrit 37.3 37.0 - 48.5 %    MCV 89 82 - 98 fL    MCH 30.4 27.0 - 31.0 pg    MCHC 34.3 32.0 - 36.0 g/dL    RDW 13.5 11.5 - 14.5 %    Platelets 310 150 - 350 K/uL    MPV 10.5 9.2 - 12.9 fL    Gran # (ANC) 4.4 1.8 - 7.7 K/uL    Lymph # 1.7 1.0 - 4.8 K/uL    Mono # 0.6 0.3 - 1.0 K/uL    Eos # 0.3 0.0 - 0.5 K/uL    Baso # 0.05 0.00 - 0.20 K/uL    Gran% 62.4 38.0 - 73.0 %    Lymph% 24.4 18.0 - 48.0 %    Mono% 7.8 4.0 - 15.0 %    Eosinophil%  4.6 0.0 - 8.0 %    Basophil% 0.7 0.0 - 1.9 %    Differential Method Automated    Comprehensive metabolic panel    Collection Time: 04/09/18  5:07 PM   Result Value Ref Range    Sodium 142 136 - 145 mmol/L    Potassium 3.1 (L) 3.5 - 5.1 mmol/L    Chloride 103 95 - 110 mmol/L    CO2 27 23 - 29 mmol/L    Glucose 92 70 - 110 mg/dL    BUN, Bld 16 7 - 17 mg/dL    Creatinine 0.95 0.50 - 1.40 mg/dL    Calcium 7.7 (L) 8.7 - 10.5 mg/dL    Total Protein 6.9 6.0 - 8.4 g/dL    Albumin 3.9 3.5 - 5.2 g/dL    Total Bilirubin 0.2 0.1 - 1.0 mg/dL    Alkaline Phosphatase 107 38 - 126 U/L    AST 41 15 - 46 U/L    ALT 37 10 - 44 U/L    Anion Gap 12 8 - 16 mmol/L    eGFR if African American >60.0 >60 mL/min/1.73 m^2    eGFR if non African American >60.0 >60 mL/min/1.73 m^2   Protime-INR    Collection Time: 04/09/18  5:07 PM   Result Value Ref Range    Prothrombin Time 11.7 9.0 - 12.5 sec    INR 1.1 0.8 - 1.2   APTT    Collection Time: 04/09/18  5:07 PM   Result Value Ref Range    aPTT 25.4 21.0 - 32.0 sec   Urinalysis    Collection Time: 04/09/18  8:29 PM   Result Value Ref Range    Specimen UA Urine, Clean Catch     Color, UA Yellow Yellow, Straw, Anne    Appearance, UA Clear Clear    pH, UA 6.0 5.0 - 8.0    Specific Gravity, UA 1.010 1.005 - 1.030    Protein, UA Negative Negative    Glucose, UA Negative Negative    Ketones, UA Negative Negative    Bilirubin (UA) Negative Negative    Occult Blood UA Negative Negative    Nitrite, UA Negative Negative    Urobilinogen, UA Negative <2.0 EU/dL    Leukocytes, UA Trace (A) Negative   UPT (Pregnancy, urine rapid)    Collection Time: 04/09/18  8:29 PM   Result Value Ref Range    Preg Test, Ur Negative    Urinalysis Microscopic    Collection Time: 04/09/18  8:29 PM   Result Value Ref Range    RBC, UA 2 0 - 4 /hpf    WBC, UA 7 (H) 0 - 5 /hpf    Bacteria, UA Occasional None-Occ /hpf    Squam Epithel, UA 2 /hpf    Microscopic Comment SEE COMMENT    Lipid panel    Collection Time: 04/09/18 10:02 PM    Result Value Ref Range    Cholesterol 148 120 - 199 mg/dL    Triglycerides 115 30 - 150 mg/dL    HDL 57 40 - 75 mg/dL    LDL Cholesterol 68.0 63.0 - 159.0 mg/dL    HDL/Chol Ratio 38.5 20.0 - 50.0 %    Total Cholesterol/HDL Ratio 2.6 2.0 - 5.0    Non-HDL Cholesterol 91 mg/dL   Hemoglobin A1c    Collection Time: 04/09/18 10:02 PM   Result Value Ref Range    Hemoglobin A1C 5.1 4.0 - 5.6 %    Estimated Avg Glucose 100 68 - 131 mg/dL   TSH    Collection Time: 04/09/18 10:02 PM   Result Value Ref Range    TSH 1.050 0.400 - 4.000 uIU/mL   Folate    Collection Time: 04/09/18 10:02 PM   Result Value Ref Range    Folate 12.9 4.0 - 24.0 ng/mL   Vitamin D 25 hydroxy    Collection Time: 04/09/18 10:02 PM   Result Value Ref Range    Vit D, 25-Hydroxy 12 (L) 30 - 96 ng/mL   RPR    Collection Time: 04/09/18 10:02 PM   Result Value Ref Range    RPR Non-reactive Non-reactive   HIV-1 and HIV-2 antibodies    Collection Time: 04/09/18 10:02 PM   Result Value Ref Range    HIV 1/2 Ag/Ab Negative Negative   Hepatitis panel, acute    Collection Time: 04/09/18 10:02 PM   Result Value Ref Range    Hepatitis B Surface Ag Negative     Hep B C IgM Negative     Hep A IgM Negative     Hepatitis C Ab Negative    PTH, intact    Collection Time: 04/09/18 11:53 PM   Result Value Ref Range    PTH, Intact 116.0 (H) 9.0 - 77.0 pg/mL   Comprehensive metabolic panel    Collection Time: 04/10/18  3:29 AM   Result Value Ref Range    Sodium 141 136 - 145 mmol/L    Potassium 3.1 (L) 3.5 - 5.1 mmol/L    Chloride 106 95 - 110 mmol/L    CO2 24 23 - 29 mmol/L    Glucose 99 70 - 110 mg/dL    BUN, Bld 9 6 - 20 mg/dL    Creatinine 0.8 0.5 - 1.4 mg/dL    Calcium 8.0 (L) 8.7 - 10.5 mg/dL    Total Protein 6.5 6.0 - 8.4 g/dL    Albumin 3.2 (L) 3.5 - 5.2 g/dL    Total Bilirubin 0.3 0.1 - 1.0 mg/dL    Alkaline Phosphatase 87 55 - 135 U/L    AST 26 10 - 40 U/L    ALT 21 10 - 44 U/L    Anion Gap 11 8 - 16 mmol/L    eGFR if African American >60 >60 mL/min/1.73 m^2    eGFR  if non African American >60 >60 mL/min/1.73 m^2   Magnesium    Collection Time: 04/10/18  3:29 AM   Result Value Ref Range    Magnesium 1.1 (L) 1.6 - 2.6 mg/dL   Phosphorus    Collection Time: 04/10/18  3:29 AM   Result Value Ref Range    Phosphorus 1.9 (L) 2.7 - 4.5 mg/dL   Troponin I    Collection Time: 04/10/18  3:29 AM   Result Value Ref Range    Troponin I 0.013 0.000 - 0.026 ng/mL   CK-MB    Collection Time: 04/10/18  3:29 AM   Result Value Ref Range     20 - 180 U/L    CPK MB 0.8 0.1 - 6.5 ng/mL    MB% 0.7 0.0 - 5.0 %   CBC auto differential    Collection Time: 04/10/18  3:30 AM   Result Value Ref Range    WBC 6.81 3.90 - 12.70 K/uL    RBC 3.99 (L) 4.00 - 5.40 M/uL    Hemoglobin 11.9 (L) 12.0 - 16.0 g/dL    Hematocrit 34.8 (L) 37.0 - 48.5 %    MCV 87 82 - 98 fL    MCH 29.8 27.0 - 31.0 pg    MCHC 34.2 32.0 - 36.0 g/dL    RDW 13.6 11.5 - 14.5 %    Platelets 287 150 - 350 K/uL    MPV 10.5 9.2 - 12.9 fL    Gran # (ANC) 4.2 1.8 - 7.7 K/uL    Lymph # 1.8 1.0 - 4.8 K/uL    Mono # 0.6 0.3 - 1.0 K/uL    Eos # 0.3 0.0 - 0.5 K/uL    Baso # 0.04 0.00 - 0.20 K/uL    Gran% 61.1 38.0 - 73.0 %    Lymph% 26.3 18.0 - 48.0 %    Mono% 8.2 4.0 - 15.0 %    Eosinophil% 3.7 0.0 - 8.0 %    Basophil% 0.6 0.0 - 1.9 %    Differential Method Automated    APTT    Collection Time: 04/10/18  3:30 AM   Result Value Ref Range    aPTT 25.9 21.0 - 32.0 sec   Protime-INR    Collection Time: 04/10/18  3:30 AM   Result Value Ref Range    Prothrombin Time 10.3 9.0 - 12.5 sec    INR 1.0 0.8 - 1.2   Vitamin B12    Collection Time: 04/10/18  3:30 AM   Result Value Ref Range    Vitamin B-12 343 210 - 950 pg/mL   2D echo with color flow doppler and bubble contrast    Collection Time: 04/10/18 10:00 AM   Result Value Ref Range    EF 65 55 - 65    Diastolic Dysfunction No     Est. PA Systolic Pressure 26.01     Mitral Valve Mobility NORMAL     Tricuspid Valve Regurgitation TRIVIAL        RADIOLOGY STUDIES:  I have personally reviewed the images  performed.     Brain MRI:  No acute intracranial abnormality.  No new abnormality.    CTA head:  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  Previously mentioned 1-2 mm left MCA bifurcation aneurysm is not visualized.  No evidence of a stroke or intracranial hemorrhage    Carotid US  Normal

## 2018-04-10 NOTE — PLAN OF CARE
Problem: Physical Therapy Goal  Goal: Physical Therapy Goal  Goals to be met by: 18     Patient will increase functional independence with mobility by performin. Supine to sit with Modified [place]  2. Sit to supine with Modified [place]  3. Sit to stand transfer with Stand-by Assistance  4. Bed to chair transfer with Contact Guard Assistance using Rolling Walker  5. Gait  x 150 feet with Contact Guard Assistance using Rolling Walker.   6. Lower extremity exercise program x15 reps     Outcome: Ongoing (interventions implemented as appropriate)  PT eval performed.  Recommend continued acute therapy and outpatient PT and OT upon discharge from hospital.  Pt requires min assist with RW for safety with balance during functional mobility activities.  Recommend pt use her Rolling Walker at home for safety and have 24 hr assistance with mobility at this time.

## 2018-04-10 NOTE — DISCHARGE SUMMARY
[facility]  Discharge Summary     Patient ID:  Gina Jenkins  [id number]  43 y.o.  1975    Admit date: 4/9/2018    Discharge Date and Time:  04/10/2018 4:39 PM    Admitting Physician: Sumit Davies III, MD     Discharge Provider: William Rosales    Reason for Admission: Stroke [I63.9]  Essential hypertension [I10]  Cerebrovascular accident (CVA), unspecified mechanism [I63.9]  Cerebrovascular accident (CVA), unspecified mechanism [I63.9]    Admission Condition: stable    Procedures Performed: * No surgery found *    Admission HPI 44 y/o with HTN, CAD s/p stenting, CHF, 0 hx of seizure, brain aneurysm (not coiled), CVA (oct 2017, risidual right side weakness, walker, slight aphasia, crooked mouth), Said that she hasn't been feeling well the whole day and then this afternoon 4:30pm she started to have some mouth crooked chronic, whole body feel weak. The symptom are about the same. No LOS, no loss of memory. Feel tired throughout the whole day. No body jerking. She is only on aspirin. She said her generalized weakness remains and not improving. See a neurologist at [facility].     Hospital Course CT head neg in ED, noting that symptoms could be manifestation of old stroke 2/2 metabolic imbalance (hypoK and hypoCa). Vascular Neuro consulted. No alteplase administered due to known brain aneurysm. Admit to [facility] for management. Vasc rec MRI/MRA brain, Carotid US,TTE-Bubble, lipid profile. Pt NPO for aspiration precaution, BP meds held, on tele, high-intensity statin, dual-antiplatelet. PT/OT/speech and neurology consulted. Brain MRI showed no acute abnormalities. Carotid US had no abnormalities. 2D echo normal. Negative for acute ischemia work up. Pts symptoms continued to improve throughout stay and returned to baseline. Pt also noted to be Vit D deficient. Labs corrected within acceptable limits prior to discharge. Neuro signed off with instruction to f/u as oupt and continue ASA 81 daily  and Atorvastatin 40 for secondary stroke prevention. PT/OT cleared with recs for outpt PT/OT. Cleared by Speech. Pt medically cleared for discharge.     Consults: Vascular Neurology and Neurology    Significant Diagnostic Studies:     As discussed in Hospital Course    CT HEAD WITHOUT CONTRAST  Impression        Normal study     BRAIN MRI  Impression:       No acute intracranial abnormality.  No new abnormality.     US CAROTID BILATERAL  Impression     Unremarkable carotid ultrasound.     2D ECHO WITH COLOR FLOW DOPPLER  CONCLUSIONS     1 - Normal left ventricular systolic function (EF 60-65%).     2 - Normal left ventricular diastolic function.     3 - Normal right ventricular systolic function .     4 - The estimated PA systolic pressure is greater than 26 mmHg.     5 - No evidence of intracardiac shunt.    Final Diagnoses:    Principal Problem: Cerebrovascular accident (CVA)   Secondary Diagnoses:   Active Hospital Problems    Diagnosis  POA    *Cerebrovascular accident (CVA) [I63.9]  Yes    Aneurysm of left middle cerebral artery [I67.1]  Yes    Chronic diastolic heart failure [I50.32]  Yes     Chronic    Essential hypertension [I10]  Yes      Resolved Hospital Problems    Diagnosis Date Resolved POA   No resolved problems to display.       Discharged Condition: stable    Discharge Exam:  GEN - AAOx4, NAD  Oral - MMM; no exudate  CHEST - CTA B ;equal expansion  HEART - rrr; no m/r/s3/s4  ABD - soft; nonttp  EXTR - warm; no edema  NEURO - baseline: right sided weakness, right  decreased, right side of mouth weaker.   SKIN - no obvious skin breakdown or rash    Disposition: Home or Self Care    Follow Up/Patient Instructions: Neuro f/u as oupt and continuing ASA 81 daily and Atorvastatin 40 for secondary stroke prevention. PT/OT cleared with recs for outpt PT/OT. Close f/u with PCP    Medications:  Reconciled Home Medications:      Medication List      START taking these medications    aspirin 81 MG  "Chew  Take 1 tablet (81 mg total) by mouth once daily.  Replaces:  aspirin 81 MG EC tablet     cyanocobalamin 1000 MCG tablet  Commonly known as:  VITAMIN B-12  Take 1 tablet (1,000 mcg total) by mouth once daily.     ergocalciferol 50,000 unit Cap  Commonly known as:  ERGOCALCIFEROL  Take 1 capsule (50,000 Units total) by mouth every 7 days.        CONTINUE taking these medications    amLODIPine 10 MG tablet  Commonly known as:  NORVASC  Take 1 tablet (10 mg total) by mouth once daily.     atorvastatin 40 MG tablet  Commonly known as:  LIPITOR  Take 1 tablet (40 mg total) by mouth once daily.     butalbital-acetaminop-caf-cod -78-30 mg Cap  TK ONE C PO BID PRF HA     carvedilol 12.5 MG tablet  Commonly known as:  COREG  Take 1 tablet (12.5 mg total) by mouth 2 (two) times daily with meals.     diphenhydrAMINE 25 mg capsule  Commonly known as:  BENADRYL  Take 1 each (25 mg total) by mouth every 6 (six) hours as needed for Itching or Allergies.     hydroCHLOROthiazide 25 MG tablet  Commonly known as:  HYDRODIURIL  Take 1 tablet (25 mg total) by mouth once daily.     INV losartan 100 MG Tab  Commonly known as:  COZAAR  Take 1 tablet (100 mg total) by mouth once daily.     ranitidine 150 MG capsule  Commonly known as:  ZANTAC  Take 1 capsule (150 mg total) by mouth once daily.        STOP taking these medications    aspirin 81 MG EC tablet  Commonly known as:  ECOTRIN  Replaced by:  aspirin 81 MG Chew            Discharge Procedure Orders  WALKER FOR HOME USE   Order Specific Question Answer Comments   Type of Walker: Adult (5'4"-6'6")    With wheels? Yes    Height: 5' 2" (1.575 m)    Weight: 78.9 kg (173 lb 14.4 oz)    Length of need (1-99 months): 99    Does patient have medical equipment at home? bedside commode built in shower seat / built in shower seat / built in shower seat   Does patient have medical equipment at home? walker, rolling    Does patient have medical equipment at home? grab bar    Please " "check all that apply: Patient's condition impairs ambulation.      3 IN 1 COMMODE FOR HOME USE   Order Specific Question Answer Comments   Type: Standard    Height: 5' 2" (1.575 m)    Weight: 78.9 kg (173 lb 14.4 oz)    Does patient have medical equipment at home? bedside commode built in shower seat / built in shower seat / built in shower seat   Does patient have medical equipment at home? walker, rolling    Does patient have medical equipment at home? grab bar    Length of need (1-99 months): 99      BATH/SHOWER CHAIR FOR HOME USE   Order Specific Question Answer Comments   Height: 5' 2" (1.575 m)    Weight: 78.9 kg (173 lb 14.4 oz)    Does patient have medical equipment at home? bedside commode built in shower seat / built in shower seat / built in shower seat   Does patient have medical equipment at home? walker, rolling    Does patient have medical equipment at home? grab bar    Length of need (1-99 months): 99    Type: With back      Ambulatory Referral to Physical/Occupational Therapy   Referral Priority: Routine Referral Type: Physical Medicine   Referral Reason: Specialty Services Required    Number of Visits Requested: 1      Activity as tolerated       Follow-up Information     Clair Johnson NP.    Specialty:  Family Medicine  Contact information:  [address]  [address]  [facility]  [facility] [address] [phone]             Laura Fonseca MD In 1 week.    Specialty:  Neurology  Contact information:  [address]  [address]  [facility] [address] [phone]                 Activity: activity as tolerated  Diet: cardiac diet  Wound Care: keep wound clean and dry and none needed    Time spent on discharge summary greater than 30 min    Signed:  William Rosales  4/10/2018  4:39 PM  "

## 2018-04-16 LAB — VIT B1 SERPL-MCNC: 34 UG/L (ref 38–122)

## 2018-04-19 ENCOUNTER — CLINICAL SUPPORT (OUTPATIENT)
Dept: REHABILITATION | Facility: HOSPITAL | Age: 43
End: 2018-04-19
Payer: MEDICAID

## 2018-04-19 DIAGNOSIS — R29.898 WEAKNESS OF BOTH LOWER EXTREMITIES: ICD-10-CM

## 2018-04-19 DIAGNOSIS — I69.398 ABNORMALITY OF GAIT AS LATE EFFECT OF CEREBROVASCULAR ACCIDENT (CVA): ICD-10-CM

## 2018-04-19 DIAGNOSIS — R26.89 IMPAIRED BALANCE AS LATE EFFECT OF CEREBROVASCULAR ACCIDENT: ICD-10-CM

## 2018-04-19 DIAGNOSIS — R26.9 ABNORMALITY OF GAIT AS LATE EFFECT OF CEREBROVASCULAR ACCIDENT (CVA): ICD-10-CM

## 2018-04-19 DIAGNOSIS — I69.398 IMPAIRED BALANCE AS LATE EFFECT OF CEREBROVASCULAR ACCIDENT: ICD-10-CM

## 2018-04-19 PROCEDURE — 97162 PT EVAL MOD COMPLEX 30 MIN: CPT | Mod: PO

## 2018-04-19 PROCEDURE — 97110 THERAPEUTIC EXERCISES: CPT | Mod: PO

## 2018-04-19 NOTE — PLAN OF CARE
TIME RECORD    Date: 4/19/2018    Start Time:  10:08  Stop Time:  11:00    PROCEDURES:    TIMED  Procedure Min.   TE 10                     UNTIMED  Procedure Min.   PT eval 42         Total Timed Minutes:  0  Total Timed Units:  0  Total Untimed Units:  1  Charges Billed/# of units:  1    [facility]    PHYSICAL THERAPY EVALUATION  Onset Date: two weeks ago  Primary Diagnosis: weakness, abnormality of gait, balance issues  Treatment Diagnosis:   1. Weakness of both lower extremities     2. Impaired balance as late effect of cerebrovascular accident     3. Abnormality of gait as late effect of cerebrovascular accident (CVA)       Past Medical History:   Diagnosis Date    Brain aneurysm     CHF (congestive heart failure)     H/O coronary angioplasty     Hypercholesteremia     Hypertension     Stroke 10/2017     Precautions: Standard, falls  Prior Therapy: Patient previously seen in this facility for outpatient therapy, inpatient rehab following CVA  Medications: Gina Jenkins has a current medication list which includes the following prescription(s): amlodipine, aspirin, atorvastatin, butalbital-acetaminop-caf-cod, carvedilol, cyanocobalamin, diphenhydramine, ergocalciferol, hydrochlorothiazide, inv losartan, and ranitidine.  Nutrition:  Overweight  History of Present Illness: CVA two weeks ago  Prior Level of Function: Independent  Social History: Lives with mother and children  Place of Residence (Steps/Adaptations): single story home, no steps has a ramp  Functional Deficits Leading to Referral/Nature of Injury: difficulty walking without AD, vehicle transfers, dressing lower body, shower transfers, transfers from low surfaces, prolonged standing, prolonged ambulation, ascending/descending ramp  Patient Therapy Goals: Be able to walk on my own    Subjective     Gina Jenkins states prior to her second stroke two weeks ago she was doing everything for herself and was not using the  walker. She reports having a fall on Tuesday when she tried to go without it. She lost her balance when she reached back to try to close a door. She fell on her R shoulder and it has been hurting every since. She is going to her doctor later to get it checked out. Since her most recent stroke she is having trouble getting into a high vehicle, putting on socks, has to sit to shower as she gets tired easily with standing. She is able to transfer on/off her BSC and shower chair, but requires assistance stepping into the shower due to her balance. She has trouble getting up from low surfaces, playing with kids, cooking, and has difficulty walking more than just inside the home due to fatigue. When climbing up/down the ramp at home she gets tired easily.      Pain:  Location: R shoulder   Description: Throbbing  Activities Which Increase Pain: it just hurts, nothing in particular  Activities Which Decrease Pain: nothing  Pain Scale: 7/10 at best 8/10 now  10/10 at worst    Objective     Posture: forward shoulders R arm adduct across lap, B knees in hyperextension in standing  Palpation: diffuse TTP to R shoulder  Sensation: decreased sensation R LE  DTRs: absent R LE, 2+ L LE  Range of Motion/Strength:     L/E MMT Right Left Pain/Dysfunction with Movement   Hip Flexion 3-/5 3+/5    Hip Extension NT NT Unable to lie prone   Hip Abduction 3-/5 4/5    Knee Flexion 3/5 5/5    Knee Extension 3-/5 5/5    Ankle DF 3-/5 5/5    Ankle PF 3/5 5/5      Flexibility: SLR L 65*, R 60*  Gait: With AD.  Device Used -  Rolling walker  Analysis: Assistance none, decreased heel strike and toe off R LE, decreased lam, wide CHERELLE  Bed Mobility:Independent  Transfers: Independent  Special Tests: TUG 33.01 seconds  Other: no clonus R LE    Functional Limitation Reports: G codes  Tool: FOTO CVA SURVEY  Category: Mobility ()  Limitation: 66%  Current: CL = least 60% but < 80% impaired, limited or restricted  Goal: CJ = at least 20% but <  40% impaired, limited or restricted    TREATMENT     Time In: 10:50  Time Out: 11:00    PT Evaluation Completed? Yes  Discussed Plan of Care with patient: Yes    Gina received 10 minutes of therapeutic exercise & instruction including:  Seated knee ext, seated hip flexion, ankle DF, PF; Kinesiotape applied to improve ankle DF  Patient was screened for use of kinesiotape and was cleared for use.  1. Has the patient ever had a reaction to the adhesive in bandaids? No  2. Has the patient ever uses athletic/kinesiotape in the past? No  3. Is the patient hemodynamically impaired (PE, DVT, CHF, Kidney failure)? No  4. Can the PT/PTA apply the tape to your skin? Yes    Patient was instructed on duration to wear the tape, proper drying techniques for the tape, and to remove the tape if he/she had any issues with it.    Patient verbalized understanding of these instructions.    Gina received 0 minutes of manual therapy including:      Written Home Exercises Provided: Handouts of seated hip flexion, knee ext, ankle DF, PF  Gina demo good understanding of the education provided. Patient demo good return demo of skill of exercises.    See EMR in Patient Instructions for HEP given: Yes      Assessment       Initial Assessment (Pertinent finding, problem list and factors affecting outcome): Ms. Jenkins is a 43 year old female, who presents to the clinic with complaints of LE weakness, difficulty walking, and balance issues s/p CVA. She demonstrates AROM of B LE WNL, but has B LE weakness R>L that limtis her ability to perform her usual ADLs, ambulate with a normal gait pattern, and balance. When ambulating with the RW she has decreased heel strike and toe off on R LE, decreased lam, and a wide CHERELLE. Her score on TUG indicates she is at risk for falls. Her current score on FOTO CVA places her in the 60%<80% impaired, limited, or restricted category. She would benefit from physical therapy to improve her strength,  balance, and gait in order to improve her functional mobility.     History  Co-morbidities and personal factors that may impact the plan of care Examination  Body Structures and Functions, activity limitations and participation restrictions that may impact the plan of care    Clinical Presentation   Co-morbidities:   level of undertstanding of current condition, poor medication/medical compliance and prior CVA        Personal Factors:   no deficits Body Regions:   lower extremities    Body Systems:    strength  balance  gait  transfers            Participation Restrictions:   Difficulty with transfers from low surfaces, vehicle transfers, shower transfer, ambulating without an AD, prolonged standing, prolonged walking.     Activity limitations:   Learning and applying knowledge  solving problems    General Tasks and Commands  no deficits    Communication  no deficits    Mobility  lifting and carrying objects  walking  using transportation (bus, train, plane, car)    Self care  dressing  shower transfers    Domestic Life  shopping  cooking  doing house work (cleaning house, washing dishes, laundry)    Interactions/Relationships  no deficits    Life Areas  no deficits    Community and Social Life  recreation and leisure         evolving clinical presentation with changing clinical characteristics                      moderate   high  moderate Decision Making/ Complexity Score:  FOTO 66%       Rehab Potiential: fair  Spiritual/Cultural Needs: None  Barriers to Rehab: None  Short Term Goals (4 Weeks):   1. This patient will be independent with a basic HEP.  2. This patient will increase B LE strength by 1 grade in order to be able to ambulate with a normal gait pattern.  3. This patient will be able to ambulate to greater than 100 feet with a normal gait pattern on even surfaces with no LOB.  4. This patient will score less than or equal to 20 seconds on TUG in order to decrease her fall risk with ADLs.  5. Patient will  be able to achieve greater than or equal to 50 on the FOTO CVA placing patient in 40%<60% impaired, limited, or restricted category demonstrating overall improved functional ability with lower extremity.   Long Term Goals (8 Weeks):   1. This patient will be independent with an updated HEP.  2. This patient will increase B LE strength to 5/5 in order to be able to perform sit to stand from low surfaces with no difficulty.  3. This patient will be able to ambulate to greater than 300 feet with a normal gait pattern on even surfaces with no LOB and no AD.  4. This patient will score less than or equal to 11 seconds on TUG in order to decrease her fall risk with ADLs.  5. Patient will be able to achieve greater than or equal to 65 on the FOTO CVA placing patient in 20%<40% impaired, limited, or restricted category demonstrating overall improved functional ability with lower extremity.       Plan     Certification Period: 04/19/18 to 06/19/18  Recommended Treatment Plan: 2 times per week for 8 weeks: Gait Training, Group Therapy, Manual Therapy, Moist Heat/ Ice, Neuromuscular Re-ed, Patient Education and Therapeutic Exercise  Other Recommendations: modalities prn, IASTM prn, kinesiotape prn, Functional Dry Needling prn       Sharon Sanon, PT  4/19/2018      I CERTIFY THE NEED FOR THESE SERVICES FURNISHED UNDER THIS PLAN OF TREATMENT AND WHILE UNDER MY CARE    Physician's comments: ________________________________________________________________________________________________________________________________________________      Physician's Name: ___________________________________

## 2018-04-19 NOTE — PATIENT INSTRUCTIONS
Sitting Extension        Straighten knee as far as possible. Slowly bend it slightly, and then bring it back up to full extension. Repeat with other leg.  Repeat 10 times. Do 2 sessions per day.     [email or web address]     Copyright © [employer]. All rights reserved.   Sitting Chair Flexion        Bring knee up toward chest. Repeat with other leg.  Repeat 10 times. Do 2 sessions per day.     [email or web address]     Copyright © [employer]. All rights reserved.   Ankle Bend (Dorsiflexion and Plantar Flexion)        Sitting or lying down, point toes up, keeping both heels on floor. Then press toes to floor, raising heels.  Repeat 10 times. Do 2 sessions per day.     [email or web address]     Copyright © [employer]. All rights reserved.

## 2018-04-22 PROBLEM — I69.398 ABNORMALITY OF GAIT AS LATE EFFECT OF CEREBROVASCULAR ACCIDENT (CVA): Status: ACTIVE | Noted: 2018-04-22

## 2018-04-22 PROBLEM — R26.89 IMPAIRED BALANCE AS LATE EFFECT OF CEREBROVASCULAR ACCIDENT: Status: ACTIVE | Noted: 2018-04-22

## 2018-04-22 PROBLEM — R26.9 ABNORMALITY OF GAIT AS LATE EFFECT OF CEREBROVASCULAR ACCIDENT (CVA): Status: ACTIVE | Noted: 2018-04-22

## 2018-04-22 PROBLEM — I69.398 IMPAIRED BALANCE AS LATE EFFECT OF CEREBROVASCULAR ACCIDENT: Status: ACTIVE | Noted: 2018-04-22

## 2018-04-22 PROBLEM — R29.898 WEAKNESS OF BOTH LOWER EXTREMITIES: Status: ACTIVE | Noted: 2018-04-22

## 2018-04-24 ENCOUNTER — TELEPHONE (OUTPATIENT)
Dept: REHABILITATION | Facility: HOSPITAL | Age: 43
End: 2018-04-24

## 2018-04-25 PROBLEM — R53.1 WEAKNESS GENERALIZED: Status: ACTIVE | Noted: 2018-04-25

## 2018-04-26 ENCOUNTER — CLINICAL SUPPORT (OUTPATIENT)
Dept: REHABILITATION | Facility: HOSPITAL | Age: 43
End: 2018-04-26
Payer: MEDICAID

## 2018-04-26 DIAGNOSIS — I69.398 IMPAIRED BALANCE AS LATE EFFECT OF CEREBROVASCULAR ACCIDENT: ICD-10-CM

## 2018-04-26 DIAGNOSIS — R26.9 ABNORMALITY OF GAIT AS LATE EFFECT OF CEREBROVASCULAR ACCIDENT (CVA): ICD-10-CM

## 2018-04-26 DIAGNOSIS — R29.898 WEAKNESS OF BOTH LOWER EXTREMITIES: ICD-10-CM

## 2018-04-26 DIAGNOSIS — R26.89 IMPAIRED BALANCE AS LATE EFFECT OF CEREBROVASCULAR ACCIDENT: ICD-10-CM

## 2018-04-26 DIAGNOSIS — I69.398 ABNORMALITY OF GAIT AS LATE EFFECT OF CEREBROVASCULAR ACCIDENT (CVA): ICD-10-CM

## 2018-04-26 PROCEDURE — 97110 THERAPEUTIC EXERCISES: CPT | Mod: PO

## 2018-04-26 NOTE — PROGRESS NOTES
"DAILY TREATMENT NOTE    DATE: 4/26/2018    Start Time:  11:00  Stop Time:  11:45    PROCEDURES:    TIMED  Procedure Min.   TE 45                     UNTIMED  Procedure Min.             Total Timed Minutes:  45  Total Timed Units:  3  Total Untimed Units:  0  Charges Billed/# of units:  3      Progress/Current Status    Subjective:     Patient ID: Gina Jenkins is a 43 y.o. female.  Diagnosis:   1. Weakness of both lower extremities     2. Impaired balance as late effect of cerebrovascular accident     3. Abnormality of gait as late effect of cerebrovascular accident (CVA)       Pain: 0 /10  Patient reports no pain this morning, she just stays tired..     Objective:     Patient ambulated into the clinic with RW, decreased toe off and heel strike on R LE. She was educated and performed therapeutic exercises as per log, 1:1 with PT x 45 minutes to improve her strength, gait, and balance. She declined a cold pack at the end of the session.     Date  04/26/18   VISIT 2/20   FTF 05/19/18   FOTO 2/5   POC exp 06/19/18   MT --           Ankle  DF  PF  Inv  Ever   1 x 10 RTB  1 x 10 RTB  1 x 10 RTB  1 x 10 RTB       SAQ 2 x 5   SLR 2 x 5   Hip Abd 1 x 10 RTB   Heel Slides 1 x 10   Jazmin Hip Add 10 x 3" w/ ball   LAQs 2 x 5   Seated Hip Flex Next?   Seated DF 2 x 5   TKE --   Hip Abd --   Hip Flex --   Hip Ext --   HS Curls --       Step Ups --   Step Downs --   Gait --   CP declined   Initials DG       Assessment:     Patient was able to begin making progress towards her goals as she was able to perform all of today's activities with no increase in symptoms prior to leaving the clinic. She required tactile cues to move through her full ROM with all exercises and to rest when fatigued. She would benefit from continued physical therapy to improve her strength, gait, and balance.     Patient Education/Response:     Patient given handouts of today's new exercises and red resistance band for HEP. She was instructed to perform " her HEP twice a day to her tolerance. She verbalized understanding instructions.     Plans and Goals:     Continue with the plan of care and progress as the patient tolerates.     Short Term Goals (4 Weeks):   1. This patient will be independent with a basic HEP.  2. This patient will increase B LE strength by 1 grade in order to be able to ambulate with a normal gait pattern.  3. This patient will be able to ambulate to greater than 100 feet with a normal gait pattern on even surfaces with no LOB.  4. This patient will score less than or equal to 20 seconds on TUG in order to decrease her fall risk with ADLs.  5. Patient will be able to achieve greater than or equal to 50 on the FOTO CVA placing patient in 40%<60% impaired, limited, or restricted category demonstrating overall improved functional ability with lower extremity.   Long Term Goals (8 Weeks):   1. This patient will be independent with an updated HEP.  2. This patient will increase B LE strength to 5/5 in order to be able to perform sit to stand from low surfaces with no difficulty.  3. This patient will be able to ambulate to greater than 300 feet with a normal gait pattern on even surfaces with no LOB and no AD.  4. This patient will score less than or equal to 11 seconds on TUG in order to decrease her fall risk with ADLs.  5. Patient will be able to achieve greater than or equal to 65 on the FOTO CVA placing patient in 20%<40% impaired, limited, or restricted category demonstrating overall improved functional ability with lower extremity.

## 2018-04-26 NOTE — PATIENT INSTRUCTIONS
Dorsiflexion: Resisted        Facing anchor, tubing around left foot, pull toward face.   Repeat 10 times per set. Do 3 sets per session. Do 2 sessions per day.     [email or web address]     Copyright © [employer]. All rights reserved.   Plantar Flexion: Resisted        [place] behind, tubing around left foot, press down.  Repeat 10 times per set. Do 3 sets per session. Do 2 sessions per day.      [email or web address]     Copyright © [employer]. All rights reserved.   Inversion: Resisted        Cross legs with right leg underneath, foot in tubing loop. Hold tubing around other foot to resist and turn foot in.  Repeat 10 times per set. Do 3 sets per session. Do 2 sessions per day.     [email or web address]     Copyright © [employer]. All rights reserved.   Eversion: Resisted        With right foot in tubing loop, hold tubing around other foot to resist and turn foot out.  Repeat 10 times per set. Do 3 sets per session. Do 2 sessions per day.     [email or web address]     Copyright © [employer]. All rights reserved.     EXTENSION: Over Bolster (Active)        Lie on back, legs over bolster. Straighten right knee. Complete 3 sets of 10 repetitions. Perform 2 sessions per day.    Copyright © [employer]. All rights reserved.   Heel Slide        Bend right knee and pull heel toward buttocks.  Repeat 10 times. Do 2 sessions per day.     [email or web address]     Copyright © [employer]. All rights reserved.   Strengthening: Hip Adduction - Isometric        With ball or folded pillow between knees, squeeze knees together. Hold 3 seconds.  Repeat 10 times per set. Do 3 sets per session. Do 2 sessions per day.     [email or web address]     Copyright © [employer]. All rights reserved.   Strengthening: Hip Abductor - Resisted        With band looped around both legs above knees, push thighs apart.  Repeat 10 times per set. Do 3 sets per session. Do 2 sessions per day.     [email or web address]     Copyright © [employer]. All rights reserved.   Strengthening: Straight  Leg Raise (Phase 1)        Tighten muscles on front of right thigh, then lift leg no higher than opposite knee, keeping knee locked.   Repeat 10 times per set. Do 3 sets per session. Do 2 sessions per day.      [email or web address]     Copyright © [employer]. All rights reserved.

## 2018-05-01 ENCOUNTER — CLINICAL SUPPORT (OUTPATIENT)
Dept: REHABILITATION | Facility: HOSPITAL | Age: 43
End: 2018-05-01
Payer: MEDICAID

## 2018-05-01 DIAGNOSIS — R29.898 WEAKNESS OF BOTH LOWER EXTREMITIES: ICD-10-CM

## 2018-05-01 DIAGNOSIS — R26.9 ABNORMALITY OF GAIT AS LATE EFFECT OF CEREBROVASCULAR ACCIDENT (CVA): ICD-10-CM

## 2018-05-01 DIAGNOSIS — I69.398 ABNORMALITY OF GAIT AS LATE EFFECT OF CEREBROVASCULAR ACCIDENT (CVA): ICD-10-CM

## 2018-05-01 DIAGNOSIS — R26.89 IMPAIRED BALANCE AS LATE EFFECT OF CEREBROVASCULAR ACCIDENT: ICD-10-CM

## 2018-05-01 DIAGNOSIS — I69.398 IMPAIRED BALANCE AS LATE EFFECT OF CEREBROVASCULAR ACCIDENT: ICD-10-CM

## 2018-05-01 PROCEDURE — 97110 THERAPEUTIC EXERCISES: CPT | Mod: PO

## 2018-05-01 NOTE — PROGRESS NOTES
"DAILY TREATMENT NOTE    DATE: 5/1/2018    Start Time:  10:00  Stop Time:  10:50    PROCEDURES:    TIMED  Procedure Min.   TE 25   TE sup 25 NC                 UNTIMED  Procedure Min.             Total Timed Minutes:  25  Total Timed Units:  2  Total Untimed Units:  0  Charges Billed/# of units:  2 (TE-2)      Progress/Current Status    Subjective:     Patient ID: Gina Jenkins is a 43 y.o. female.  Diagnosis:   1. Weakness of both lower extremities     2. Impaired balance as late effect of cerebrovascular accident     3. Abnormality of gait as late effect of cerebrovascular accident (CVA)       Pain: 0 /10  Patient reports having no pain.    Objective:     Patient ambulated into the clinic with RW, decreased toe off and heel strike on R LE.  She was educated and performed therapeutic exercises as per log below, along with new exercise added and today's progressions, 1:1 with PTA x 25 minutes and supervilsed exercises by PTA x 25 minutes to improve her strength, gait, and balance. She declined a cold pack at the end of the session.     Date  05/01/18 04/26/18   VISIT 3/20 2/20   FTF 05/19/18 05/19/18   FOTO 3/5 2/5   POC exp 06/19/18 06/19/18             Ankle  DF  PF  Inv  Ever   2 x 10 RTB  2 x 10 RTB  2 x 10 RTB  2 x 10 RTB   1 x 10 RTB  1 x 10 RTB  1 x 10 RTB  1 x 10 RTB        SAQ 3 x 5 2 x 5   SLR 3 x 5 2 x 5   Hip Abd 3 x 5 RTB 1 x 10 RTB   Heel Slides 1 x 10 1 x 10   Jazmin Hip Add 10 x 3" w/ ball 10 x 3" w/ ball   LAQs 3 x 5 2 x 5   Seated Hip Flex 1 x 5 Next?   Seated DF 2 x 5 2 x 5   TKE -- --   Hip Abd -- --   Hip Flex -- --   Hip Ext -- --   HS Curls -- --        Step Ups -- --   Step Downs -- --   Gait -- --   CP Declined declined   Initials GWA 1/6 DG       Assessment:     Patient required tactile cues to move through her full ROM with all exercises and to rest when fatigued.  Patient completed her therapy along with new exercise added and today's progressions, with no increase in symptoms prior to " leaving the clinic.  She would benefit from continued physical therapy to improve her strength, gait, and balance.     Patient Education/Response:     Patient given handouts of today's new exercise for HEP.  She was instructed to perform her HEP twice a day to her tolerance. She verbalized understanding instructions.     Plans and Goals:     Continue with the plan of care and progress as the patient tolerates.     Short Term Goals (4 Weeks):   1. This patient will be independent with a basic HEP.  2. This patient will increase B LE strength by 1 grade in order to be able to ambulate with a normal gait pattern.  3. This patient will be able to ambulate to greater than 100 feet with a normal gait pattern on even surfaces with no LOB.  4. This patient will score less than or equal to 20 seconds on TUG in order to decrease her fall risk with ADLs.  5. Patient will be able to achieve greater than or equal to 50 on the FOTO CVA placing patient in 40%<60% impaired, limited, or restricted category demonstrating overall improved functional ability with lower extremity.     Long Term Goals (8 Weeks):   1. This patient will be independent with an updated HEP.  2. This patient will increase B LE strength to 5/5 in order to be able to perform sit to stand from low surfaces with no difficulty.  3. This patient will be able to ambulate to greater than 300 feet with a normal gait pattern on even surfaces with no LOB and no AD.  4. This patient will score less than or equal to 11 seconds on TUG in order to decrease her fall risk with ADLs.  5. Patient will be able to achieve greater than or equal to 65 on the FOTO CVA placing patient in 20%<40% impaired, limited, or restricted category demonstrating overall improved functional ability with lower extremity.

## 2018-05-03 ENCOUNTER — CLINICAL SUPPORT (OUTPATIENT)
Dept: REHABILITATION | Facility: HOSPITAL | Age: 43
End: 2018-05-03
Payer: MEDICAID

## 2018-05-03 DIAGNOSIS — R26.89 IMPAIRED BALANCE AS LATE EFFECT OF CEREBROVASCULAR ACCIDENT: ICD-10-CM

## 2018-05-03 DIAGNOSIS — R26.9 ABNORMALITY OF GAIT AS LATE EFFECT OF CEREBROVASCULAR ACCIDENT (CVA): ICD-10-CM

## 2018-05-03 DIAGNOSIS — I69.398 IMPAIRED BALANCE AS LATE EFFECT OF CEREBROVASCULAR ACCIDENT: ICD-10-CM

## 2018-05-03 DIAGNOSIS — R29.898 WEAKNESS OF BOTH LOWER EXTREMITIES: ICD-10-CM

## 2018-05-03 DIAGNOSIS — I69.398 ABNORMALITY OF GAIT AS LATE EFFECT OF CEREBROVASCULAR ACCIDENT (CVA): ICD-10-CM

## 2018-05-03 PROCEDURE — 97110 THERAPEUTIC EXERCISES: CPT | Mod: PO

## 2018-05-03 NOTE — PROGRESS NOTES
"DAILY TREATMENT NOTE    DATE: 5/3/2018    Start Time:  10:00  Stop Time:  10:55    PROCEDURES:    TIMED  Procedure Min.   TE 55                     UNTIMED  Procedure Min.             Total Timed Minutes:  55  Total Timed Units:  4  Total Untimed Units:  0  Charges Billed/# of units:  4 (TE-4)      Progress/Current Status    Subjective:     Patient ID: Gina Jenkins is a 43 y.o. female.  Diagnosis:   1. Weakness of both lower extremities     2. Impaired balance as late effect of cerebrovascular accident     3. Abnormality of gait as late effect of cerebrovascular accident (CVA)       Pain: 0 /10  Patient reports having no pain but was very tired after her last visit.     Objective:     Patient ambulated into the clinic with RW, decreased toe off and heel strike on R LE.  She was educated and performed therapeutic exercises as per log below 1:1 with PTA x 55 minutes to improve her strength, gait, and balance. She declined a cold pack at the end of the session.     Date  05/03/18 05/01/18 04/26/18   VISIT 4/20 3/20 2/20   FTF 05/19/18 05/19/18 05/19/18   FOTO 4/5 3/5 2/5   POC exp 06/19/18 06/19/18 06/19/18               Ankle  DF  PF  Inv  Ever   2 x 10 RTB  2 x 10 RTB  2 x 10 RTB  2 x 10 RTB    2 x 10 RTB  2 x 10 RTB  2 x 10 RTB  2 x 10 RTB   1 x 10 RTB  1 x 10 RTB  1 x 10 RTB  1 x 10 RTB         SAQ - AA 3 x 5 3 x 5 2 x 5   SLR - AA 3 x 5 3 x 5 2 x 5   Hip Abd 3 x 5 RTB 3 x 5 RTB 1 x 10 RTB   Heel Slides 2 x 5 1 x 10 1 x 10   Jazmin Hip Add 10 x 3" w/ ball 10 x 3" w/ ball 10 x 3" w/ ball   LAQs - AA 3 x 5 3 x 5 2 x 5   Seated Hip Flex - AA 1 x 5 1 x 5 Next?   Seated DF 2 x 5 2 x 5 2 x 5   TKE -- -- --   Hip Abd -- -- --   Hip Flex -- -- --   Hip Ext -- -- --   HS Curls -- -- --         Step Ups -- -- --   Step Downs -- -- --   Gait -- -- --   CP declined Declined declined         Initials GWA 2/6 GWA 1/6 DG       Assessment:     Patient required tactile cues to move through her full ROM with all exercises and to " rest when fatigued.  Patient requires AA with SAQ, SLR, LAQ along with very minimal AA with heel slides and seated hip flex.  Patient completed her therapy with no increase in pain although patient reported feeling very fatigued.  She would benefit from continued physical therapy to improve her strength, gait, and balance.     Patient Education/Response:     Patient was instructed to continue with her HEP.  She was instructed to perform her HEP twice a day to her tolerance. She verbalized understanding instructions.     Plans and Goals:     Continue with the plan of care and progress as the patient tolerates.     Short Term Goals (4 Weeks):   1. This patient will be independent with a basic HEP.  2. This patient will increase B LE strength by 1 grade in order to be able to ambulate with a normal gait pattern.  3. This patient will be able to ambulate to greater than 100 feet with a normal gait pattern on even surfaces with no LOB.  4. This patient will score less than or equal to 20 seconds on TUG in order to decrease her fall risk with ADLs.  5. Patient will be able to achieve greater than or equal to 50 on the FOTO CVA placing patient in 40%<60% impaired, limited, or restricted category demonstrating overall improved functional ability with lower extremity.     Long Term Goals (8 Weeks):   1. This patient will be independent with an updated HEP.  2. This patient will increase B LE strength to 5/5 in order to be able to perform sit to stand from low surfaces with no difficulty.  3. This patient will be able to ambulate to greater than 300 feet with a normal gait pattern on even surfaces with no LOB and no AD.  4. This patient will score less than or equal to 11 seconds on TUG in order to decrease her fall risk with ADLs.  5. Patient will be able to achieve greater than or equal to 65 on the FOTO CVA placing patient in 20%<40% impaired, limited, or restricted category demonstrating overall improved functional  ability with lower extremity.

## 2018-05-07 ENCOUNTER — TELEPHONE (OUTPATIENT)
Dept: REHABILITATION | Facility: HOSPITAL | Age: 43
End: 2018-05-07

## 2018-05-10 ENCOUNTER — CLINICAL SUPPORT (OUTPATIENT)
Dept: REHABILITATION | Facility: HOSPITAL | Age: 43
End: 2018-05-10
Payer: MEDICAID

## 2018-05-10 DIAGNOSIS — I69.398 IMPAIRED BALANCE AS LATE EFFECT OF CEREBROVASCULAR ACCIDENT: ICD-10-CM

## 2018-05-10 DIAGNOSIS — R29.898 WEAKNESS OF BOTH LOWER EXTREMITIES: ICD-10-CM

## 2018-05-10 DIAGNOSIS — R26.9 ABNORMALITY OF GAIT AS LATE EFFECT OF CEREBROVASCULAR ACCIDENT (CVA): ICD-10-CM

## 2018-05-10 DIAGNOSIS — I69.398 ABNORMALITY OF GAIT AS LATE EFFECT OF CEREBROVASCULAR ACCIDENT (CVA): ICD-10-CM

## 2018-05-10 DIAGNOSIS — R26.89 IMPAIRED BALANCE AS LATE EFFECT OF CEREBROVASCULAR ACCIDENT: ICD-10-CM

## 2018-05-10 PROCEDURE — 97110 THERAPEUTIC EXERCISES: CPT | Mod: PO

## 2018-05-10 NOTE — PROGRESS NOTES
DAILY TREATMENT NOTE    DATE: 5/10/2018    Start Time:  10:00  Stop Time:  10:30    PROCEDURES:    TIMED  Procedure Min.   TE 15   TE sup 15 NC                 UNTIMED  Procedure Min.             Total Timed Minutes:  15  Total Timed Units:  1  Total Untimed Units:  0  Charges Billed/# of units:  1 (TE-1)      Progress/Current Status    Subjective:     Patient ID: Gina Jenkins is a 43 y.o. female.  Diagnosis:   1. Weakness of both lower extremities     2. Impaired balance as late effect of cerebrovascular accident     3. Abnormality of gait as late effect of cerebrovascular accident (CVA)       Pain: 8 /10  Patient reports hurting a lot today in her right arm and leg.  Patient stopped her therapy today due to complaint of increased pain and stated that she was going to the emergency room, her pain was too much to deal with.    Objective:     Patient ambulated into the clinic with RW, decreased toe off and heel strike on R LE.  She was educated and performed therapeutic exercises as per log below 1:1 with PTA x 15 minutes and supervised exercises by PTA x 15 minutes to improve her strength, gait, and balance. She declined a cold pack at the end of the session.   Patient stopped her therapy today due to complaint of increased pain and stated that she was going to the emergency room, her pain was too much to deal with.    Date  05/10/18 05/03/18 05/01/18 04/26/18   VISIT 5/20 4/20 3/20 2/20   FTF 05/19/18 05/19/18 05/19/18 05/19/18   FOTO 5/5 4/5 3/5 2/5   POC exp 06/19/18 06/19/18 06/19/18 06/19/18                 Ankle  DF  PF  Inv  Ever   2 x 10 RTB  2 x 10 RTB  2 x 10 RTB  2 x 10 RTB    2 x 10 RTB  2 x 10 RTB  2 x 10 RTB  2 x 10 RTB    2 x 10 RTB  2 x 10 RTB  2 x 10 RTB  2 x 10 RTB   1 x 10 RTB  1 x 10 RTB  1 x 10 RTB  1 x 10 RTB          SAQ - AA 3 x 5 3 x 5 3 x 5 2 x 5   SLR - AA Not today 3 x 5 3 x 5 2 x 5   Hip Abd Not today 3 x 5 RTB 3 x 5 RTB 1 x 10 RTB   Heel Slides Not today 2 x 5 1 x 10 1 x 10   Jazmin  "Hip Add Not today 10 x 3" w/ ball 10 x 3" w/ ball 10 x 3" w/ ball   LAQs - AA Not today 3 x 5 3 x 5 2 x 5   Seated Hip Flex - AA Not today 1 x 5 1 x 5 Next?   Seated DF Not today 2 x 5 2 x 5 2 x 5   TKE -- -- -- --   Hip Abd -- -- -- --   Hip Flex -- -- -- --   Hip Ext -- -- -- --   HS Curls -- -- -- --    --      Step Ups -- -- -- --   Step Downs -- -- -- --   Gait -- -- -- --   CP declined declined Declined declined          Initials GWA 3/6 GWA 2/6 GWA 1/6 DG       Assessment:      Patient stopped her therapy today due to complaint of increased pain and stated that she was going to the emergency room, her pain was too much to deal with.      Patient Education/Response:     Patient was instructed to continue with her HEP.  She was instructed to perform her HEP twice a day to her tolerance. She verbalized understanding instructions.     Plans and Goals:     Continue with the plan of care and progress as the patient tolerates.     Short Term Goals (4 Weeks):   1. This patient will be independent with a basic HEP.  2. This patient will increase B LE strength by 1 grade in order to be able to ambulate with a normal gait pattern.  3. This patient will be able to ambulate to greater than 100 feet with a normal gait pattern on even surfaces with no LOB.  4. This patient will score less than or equal to 20 seconds on TUG in order to decrease her fall risk with ADLs.  5. Patient will be able to achieve greater than or equal to 50 on the FOTO CVA placing patient in 40%<60% impaired, limited, or restricted category demonstrating overall improved functional ability with lower extremity.     Long Term Goals (8 Weeks):   1. This patient will be independent with an updated HEP.  2. This patient will increase B LE strength to 5/5 in order to be able to perform sit to stand from low surfaces with no difficulty.  3. This patient will be able to ambulate to greater than 300 feet with a normal gait pattern on even surfaces with no " LOB and no AD.  4. This patient will score less than or equal to 11 seconds on TUG in order to decrease her fall risk with ADLs.  5. Patient will be able to achieve greater than or equal to 65 on the FOTO CVA placing patient in 20%<40% impaired, limited, or restricted category demonstrating overall improved functional ability with lower extremity.

## 2018-06-06 ENCOUNTER — PATIENT MESSAGE (OUTPATIENT)
Dept: SURGERY | Facility: CLINIC | Age: 43
End: 2018-06-06

## 2018-07-16 ENCOUNTER — HOSPITAL ENCOUNTER (EMERGENCY)
Facility: HOSPITAL | Age: 43
Discharge: HOME | End: 2018-07-16
Attending: EMERGENCY MEDICINE
Payer: MEDICAID

## 2018-07-16 VITALS
BODY MASS INDEX: 34.04 KG/M2 | TEMPERATURE: 98 F | HEIGHT: 62 IN | RESPIRATION RATE: 47 BRPM | HEART RATE: 100 BPM | OXYGEN SATURATION: 100 % | DIASTOLIC BLOOD PRESSURE: 76 MMHG | SYSTOLIC BLOOD PRESSURE: 147 MMHG | WEIGHT: 185 LBS

## 2018-07-16 DIAGNOSIS — R51.9 ACUTE NONINTRACTABLE HEADACHE, UNSPECIFIED HEADACHE TYPE: Primary | ICD-10-CM

## 2018-07-16 PROCEDURE — 96372 THER/PROPH/DIAG INJ SC/IM: CPT

## 2018-07-16 PROCEDURE — 63600175 PHARM REV CODE 636 W HCPCS: Performed by: EMERGENCY MEDICINE

## 2018-07-16 PROCEDURE — 99284 EMERGENCY DEPT VISIT MOD MDM: CPT | Mod: 25

## 2018-07-16 RX ORDER — METOCLOPRAMIDE HYDROCHLORIDE 5 MG/ML
10 INJECTION INTRAMUSCULAR; INTRAVENOUS
Status: COMPLETED | OUTPATIENT
Start: 2018-07-16 | End: 2018-07-16

## 2018-07-16 RX ORDER — DEXAMETHASONE SODIUM PHOSPHATE 4 MG/ML
8 INJECTION, SOLUTION INTRA-ARTICULAR; INTRALESIONAL; INTRAMUSCULAR; INTRAVENOUS; SOFT TISSUE
Status: COMPLETED | OUTPATIENT
Start: 2018-07-16 | End: 2018-07-16

## 2018-07-16 RX ORDER — KETOROLAC TROMETHAMINE 30 MG/ML
60 INJECTION, SOLUTION INTRAMUSCULAR; INTRAVENOUS
Status: COMPLETED | OUTPATIENT
Start: 2018-07-16 | End: 2018-07-16

## 2018-07-16 RX ORDER — BUTALBITAL, ACETAMINOPHEN AND CAFFEINE 50; 325; 40 MG/1; MG/1; MG/1
1 TABLET ORAL EVERY 4 HOURS PRN
Qty: 20 TABLET | Refills: 2 | Status: SHIPPED | OUTPATIENT
Start: 2018-07-16 | End: 2018-08-15

## 2018-07-16 RX ADMIN — METOCLOPRAMIDE 10 MG: 5 INJECTION, SOLUTION INTRAMUSCULAR; INTRAVENOUS at 04:07

## 2018-07-16 RX ADMIN — DEXAMETHASONE SODIUM PHOSPHATE 8 MG: 4 INJECTION, SOLUTION INTRAMUSCULAR; INTRAVENOUS at 04:07

## 2018-07-16 RX ADMIN — KETOROLAC TROMETHAMINE 60 MG: 30 INJECTION, SOLUTION INTRAMUSCULAR at 04:07

## 2018-07-16 NOTE — ED PROVIDER NOTES
Encounter Date: 7/16/2018       History     Chief Complaint   Patient presents with    Dizziness     Patient stated symptoms started 15 min PTA. Patient complaining of Temporal Headache on both right and left sides, Dizziness, nausea, and SOB.      The history is provided by the patient.   Headache    This is a new problem. The current episode started today. The problem occurs constantly. The problem has been unchanged. The pain is located in the bilateral, temporal and parietal region. The pain does not radiate. The pain quality is not similar to prior headaches. The quality of the pain is described as throbbing. The pain is at a severity of 10/10. Associated symptoms include dizziness and nausea. Pertinent negatives include no abdominal pain, back pain, blurred vision, eye redness, eye watering, facial sweating, fever, neck pain, photophobia, rhinorrhea, scalp tenderness, seizures or sinus pressure. Nothing aggravates the symptoms. She has tried nothing for the symptoms.     Review of patient's allergies indicates:   Allergen Reactions    Lisinopril Swelling    Bactrim [sulfamethoxazole-trimethoprim] Rash     Past Medical History:   Diagnosis Date    Brain aneurysm     CHF (congestive heart failure)     H/O coronary angioplasty     Hypercholesteremia     Hypertension     Stroke 10/2017     Past Surgical History:   Procedure Laterality Date    CORONARY ANGIOPLASTY WITH STENT PLACEMENT       No family history on file.  Social History   Substance Use Topics    Smoking status: Former Smoker     Packs/day: 0.50     Quit date: 10/4/2017    Smokeless tobacco: Never Used    Alcohol use Yes      Comment: occassionally     Review of Systems   Constitutional: Negative for fever.   HENT: Negative for rhinorrhea and sinus pressure.    Eyes: Negative for blurred vision, photophobia and redness.   Gastrointestinal: Positive for nausea. Negative for abdominal pain.   Musculoskeletal: Negative for back pain and neck  pain.   Neurological: Positive for dizziness and headaches. Negative for seizures.   All other systems reviewed and are negative.      Physical Exam     Initial Vitals [07/16/18 0323]   BP Pulse Resp Temp SpO2   (!) 85/50 93 (!) 22 98 °F (36.7 °C) 100 %      MAP       --         Physical Exam    Nursing note and vitals reviewed.  Constitutional: She appears well-developed and well-nourished.   HENT:   Head: Normocephalic and atraumatic.   Eyes: Conjunctivae and EOM are normal.   Neck: Normal range of motion. Neck supple.   Cardiovascular: Normal rate, regular rhythm and normal heart sounds.   Pulmonary/Chest: Breath sounds normal. She has no wheezes. She has no rhonchi. She has no rales.   Abdominal: Soft. There is no tenderness. There is no rebound and no guarding.   Musculoskeletal: Normal range of motion.   Neurological: She is alert and oriented to person, place, and time.   Skin: Skin is warm and dry. Capillary refill takes less than 2 seconds.   Psychiatric: She has a normal mood and affect. Her behavior is normal. Judgment and thought content normal.         ED Course   Procedures  Labs Reviewed - No data to display       Imaging Results          CT Head Without Contrast (In process)               X-Rays:   Independently Interpreted Readings:   Head CT: No hemorrhage.  No skull fracture.  No acute stroke.     Medical Decision Making:   Clinical Tests:   Radiological Study: Ordered and Reviewed                      Clinical Impression:   The encounter diagnosis was Acute nonintractable headache, unspecified headache type.      Disposition:   Disposition: Discharged  Condition: Stable                        Holly Pham MD  07/16/18 2396

## 2018-09-20 ENCOUNTER — HOSPITAL ENCOUNTER (EMERGENCY)
Facility: HOSPITAL | Age: 43
Discharge: HOME | End: 2018-09-20
Attending: SURGERY
Payer: MEDICAID

## 2018-09-20 VITALS
OXYGEN SATURATION: 98 % | WEIGHT: 185 LBS | TEMPERATURE: 98 F | DIASTOLIC BLOOD PRESSURE: 96 MMHG | BODY MASS INDEX: 33.84 KG/M2 | HEART RATE: 89 BPM | RESPIRATION RATE: 20 BRPM | SYSTOLIC BLOOD PRESSURE: 176 MMHG

## 2018-09-20 DIAGNOSIS — H65.192 ACUTE MIDDLE EAR EFFUSION, LEFT: Primary | ICD-10-CM

## 2018-09-20 DIAGNOSIS — N30.91 CYSTITIS WITH HEMATURIA: ICD-10-CM

## 2018-09-20 LAB
B-HCG UR QL: NEGATIVE
BACTERIA #/AREA URNS AUTO: ABNORMAL /HPF
BILIRUB UR QL STRIP: NEGATIVE
CLARITY UR REFRACT.AUTO: ABNORMAL
COLOR UR AUTO: YELLOW
GLUCOSE UR QL STRIP: NEGATIVE
HGB UR QL STRIP: ABNORMAL
KETONES UR QL STRIP: NEGATIVE
LEUKOCYTE ESTERASE UR QL STRIP: ABNORMAL
MICROSCOPIC COMMENT: ABNORMAL
NITRITE UR QL STRIP: NEGATIVE
PH UR STRIP: 5 [PH] (ref 5–8)
PROT UR QL STRIP: ABNORMAL
RBC #/AREA URNS AUTO: 70 /HPF (ref 0–4)
SP GR UR STRIP: 1.02 (ref 1–1.03)
URN SPEC COLLECT METH UR: ABNORMAL
UROBILINOGEN UR STRIP-ACNC: NEGATIVE EU/DL
WBC #/AREA URNS AUTO: 10 /HPF (ref 0–5)

## 2018-09-20 PROCEDURE — 81000 URINALYSIS NONAUTO W/SCOPE: CPT

## 2018-09-20 PROCEDURE — 25000003 PHARM REV CODE 250: Performed by: SURGERY

## 2018-09-20 PROCEDURE — 81025 URINE PREGNANCY TEST: CPT

## 2018-09-20 PROCEDURE — 99284 EMERGENCY DEPT VISIT MOD MDM: CPT

## 2018-09-20 RX ORDER — NEOMYCIN SULFATE, POLYMYXIN B SULFATE AND HYDROCORTISONE 10; 3.5; 1 MG/ML; MG/ML; [USP'U]/ML
4 SUSPENSION/ DROPS AURICULAR (OTIC) 3 TIMES DAILY
Qty: 10 ML | Refills: 0 | Status: ON HOLD | OUTPATIENT
Start: 2018-09-20 | End: 2022-07-20 | Stop reason: HOSPADM

## 2018-09-20 RX ORDER — OXYCODONE AND ACETAMINOPHEN 5; 325 MG/1; MG/1
2 TABLET ORAL
Status: COMPLETED | OUTPATIENT
Start: 2018-09-20 | End: 2018-09-20

## 2018-09-20 RX ORDER — CIPROFLOXACIN 500 MG/1
500 TABLET ORAL 2 TIMES DAILY
Qty: 10 TABLET | Refills: 0 | Status: SHIPPED | OUTPATIENT
Start: 2018-09-20 | End: 2018-09-30

## 2018-09-20 RX ORDER — CIPROFLOXACIN 500 MG/1
500 TABLET ORAL 2 TIMES DAILY
Qty: 10 TABLET | Refills: 0 | Status: SHIPPED | OUTPATIENT
Start: 2018-09-20 | End: 2018-09-20 | Stop reason: SDUPTHER

## 2018-09-20 RX ORDER — TRAMADOL HYDROCHLORIDE 50 MG/1
50 TABLET ORAL EVERY 6 HOURS PRN
Qty: 12 TABLET | Refills: 0 | Status: SHIPPED | OUTPATIENT
Start: 2018-09-20 | End: 2018-09-30

## 2018-09-20 RX ADMIN — OXYCODONE HYDROCHLORIDE AND ACETAMINOPHEN 2 TABLET: 5; 325 TABLET ORAL at 09:09

## 2018-09-20 NOTE — ED PROVIDER NOTES
Encounter Date: 2018       History     Chief Complaint   Patient presents with    Back Pain     lower back pain and left ear pain that started last night    Otalgia     Patient complains of left earache since last night lower back pain for several days and mild dysuria      The history is provided by the patient.   Otalgia   This is a new problem. The current episode started yesterday. There is pain in the left ear. The problem occurs 2 - 4 times per day. The problem has been unchanged. The pain is at a severity of 10/10. Pertinent negatives include no ear discharge and no headaches.     Review of patient's allergies indicates:   Allergen Reactions    Lisinopril Swelling    Bactrim [sulfamethoxazole-trimethoprim] Rash     Past Medical History:   Diagnosis Date    Brain aneurysm     CHF (congestive heart failure)     H/O coronary angioplasty     Hypercholesteremia     Hypertension     Stroke 10/2017     Past Surgical History:   Procedure Laterality Date    CORONARY ANGIOPLASTY WITH STENT PLACEMENT       History reviewed. No pertinent family history.  Social History     Tobacco Use    Smoking status: Former Smoker     Packs/day: 0.50     Last attempt to quit: 10/4/2017     Years since quittin.9    Smokeless tobacco: Never Used   Substance Use Topics    Alcohol use: Yes     Comment: occassionally    Drug use: No     Review of Systems   Constitutional: Negative.    HENT: Positive for ear pain. Negative for ear discharge.    Eyes: Negative.    Respiratory: Negative.    Cardiovascular: Negative.    Gastrointestinal: Negative.    Endocrine: Negative.    Genitourinary: Negative.    Musculoskeletal: Positive for back pain.   Skin: Negative.    Allergic/Immunologic: Negative.    Neurological: Negative.  Negative for headaches.   Hematological: Negative.    Psychiatric/Behavioral: Negative.        Physical Exam     Initial Vitals [18 0815]   BP Pulse Resp Temp SpO2   (!) 163/102 91 18 98 °F (36.7  °C) 100 %      MAP       --         Physical Exam    Nursing note and vitals reviewed.  Constitutional: She appears well-developed and well-nourished.   HENT:   Head: Normocephalic.   Left middle ear effusion   Eyes: Conjunctivae are normal.   Neck: Normal range of motion.   Cardiovascular: Normal rate, regular rhythm and normal heart sounds.   Pulmonary/Chest: Breath sounds normal.   Abdominal: Soft.   Musculoskeletal: Normal range of motion.        Arms:  Neurological: She is alert.   Psychiatric: She has a normal mood and affect.         ED Course   Procedures  Labs Reviewed   URINALYSIS   PREGNANCY TEST, URINE RAPID          Imaging Results    None          Medical Decision Making:   Initial Assessment:   0talgia left ear with effusion and back pain withUTI  ED Management:  Will treat antibiotic ear drops for left-sided otalgia and short course of antibiotics for UTI                      Clinical Impression:   The primary encounter diagnosis was Acute middle ear effusion, left. A diagnosis of Cystitis with hematuria was also pertinent to this visit.                             SNEHA Simmons III, MD  09/20/18 0916

## 2018-09-24 ENCOUNTER — HOSPITAL ENCOUNTER (EMERGENCY)
Facility: HOSPITAL | Age: 43
Discharge: HOME | End: 2018-09-25
Attending: EMERGENCY MEDICINE
Payer: MEDICAID

## 2018-09-24 VITALS
BODY MASS INDEX: 34.04 KG/M2 | HEART RATE: 86 BPM | OXYGEN SATURATION: 100 % | HEIGHT: 62 IN | SYSTOLIC BLOOD PRESSURE: 168 MMHG | WEIGHT: 185 LBS | RESPIRATION RATE: 18 BRPM | TEMPERATURE: 98 F | DIASTOLIC BLOOD PRESSURE: 86 MMHG

## 2018-09-24 DIAGNOSIS — R51.9 NONINTRACTABLE EPISODIC HEADACHE, UNSPECIFIED HEADACHE TYPE: Primary | ICD-10-CM

## 2018-09-24 LAB
ANION GAP SERPL CALC-SCNC: 10 MMOL/L
B-HCG UR QL: NEGATIVE
BUN SERPL-MCNC: 24 MG/DL
CALCIUM SERPL-MCNC: 9.4 MG/DL
CHLORIDE SERPL-SCNC: 101 MMOL/L
CO2 SERPL-SCNC: 29 MMOL/L
CREAT SERPL-MCNC: 1.3 MG/DL
EST. GFR  (AFRICAN AMERICAN): 58.5 ML/MIN/1.73 M^2
EST. GFR  (NON AFRICAN AMERICAN): 50.7 ML/MIN/1.73 M^2
GLUCOSE SERPL-MCNC: 94 MG/DL
POTASSIUM SERPL-SCNC: 2.9 MMOL/L
SODIUM SERPL-SCNC: 140 MMOL/L

## 2018-09-24 PROCEDURE — 99284 EMERGENCY DEPT VISIT MOD MDM: CPT | Mod: 25

## 2018-09-24 PROCEDURE — 25000003 PHARM REV CODE 250: Performed by: NURSE PRACTITIONER

## 2018-09-24 PROCEDURE — 96360 HYDRATION IV INFUSION INIT: CPT

## 2018-09-24 PROCEDURE — 80048 BASIC METABOLIC PNL TOTAL CA: CPT

## 2018-09-24 PROCEDURE — 81025 URINE PREGNANCY TEST: CPT

## 2018-09-24 RX ORDER — POTASSIUM CHLORIDE 20 MEQ/15ML
40 SOLUTION ORAL
Status: COMPLETED | OUTPATIENT
Start: 2018-09-24 | End: 2018-09-24

## 2018-09-24 RX ORDER — HYDROCODONE BITARTRATE AND ACETAMINOPHEN 5; 325 MG/1; MG/1
2 TABLET ORAL
Status: COMPLETED | OUTPATIENT
Start: 2018-09-24 | End: 2018-09-24

## 2018-09-24 RX ADMIN — POTASSIUM CHLORIDE 40 MEQ: 20 SOLUTION ORAL at 10:09

## 2018-09-24 RX ADMIN — HYDROCODONE BITARTRATE AND ACETAMINOPHEN 2 TABLET: 5; 325 TABLET ORAL at 09:09

## 2018-09-24 RX ADMIN — SODIUM CHLORIDE 1000 ML: 0.9 INJECTION, SOLUTION INTRAVENOUS at 10:09

## 2018-09-25 RX ORDER — PREDNISONE 20 MG/1
40 TABLET ORAL DAILY
Qty: 10 TABLET | Refills: 0 | Status: SHIPPED | OUTPATIENT
Start: 2018-09-25 | End: 2018-09-30

## 2018-09-25 RX ORDER — AMOXICILLIN 500 MG/1
500 CAPSULE ORAL 3 TIMES DAILY
Qty: 21 CAPSULE | Refills: 0 | Status: SHIPPED | OUTPATIENT
Start: 2018-09-25 | End: 2018-10-02

## 2018-09-25 RX ORDER — BUTALBITAL, ACETAMINOPHEN AND CAFFEINE 50; 325; 40 MG/1; MG/1; MG/1
1 TABLET ORAL EVERY 4 HOURS PRN
Qty: 20 TABLET | Refills: 0 | Status: SHIPPED | OUTPATIENT
Start: 2018-09-25 | End: 2018-10-25

## 2018-09-25 NOTE — ED PROVIDER NOTES
Encounter Date: 2018       History     Chief Complaint   Patient presents with    Otalgia     L ear pain and HA x 4 days; reports being seen here for same on 18 and dx with ear infection; states pain meds ineffective; denies vision changes    Headache     43-year-old female here today complaining of left ear pain as well as headache x4 days.  Patient was seen here recently for same complaint and diagnosed with otitis externa and started on Cortisporin otic. Patient reports that she has been using her drops as prescribed as well as taking Aleve, Tylenol, and tramadol without any symptom relief.  Patient reports that she is concerned since she has a history of cerebral aneurysms as well as hemorrhagic stroke which occurred earlier this year.  Patient reports that she never received any neurology follow-up after stroke.  She reports right-sided weakness/deficits from CVA.          Review of patient's allergies indicates:   Allergen Reactions    Lisinopril Swelling    Bactrim [sulfamethoxazole-trimethoprim] Rash     Past Medical History:   Diagnosis Date    Brain aneurysm     CHF (congestive heart failure)     H/O coronary angioplasty     Hypercholesteremia     Hypertension     Stroke 10/2017     Past Surgical History:   Procedure Laterality Date    CORONARY ANGIOPLASTY WITH STENT PLACEMENT       History reviewed. No pertinent family history.  Social History     Tobacco Use    Smoking status: Former Smoker     Packs/day: 0.50     Last attempt to quit: 10/4/2017     Years since quittin.9    Smokeless tobacco: Never Used   Substance Use Topics    Alcohol use: Yes     Comment: occassionally    Drug use: No     Review of Systems   Constitutional: Negative for chills and fever.   HENT: Positive for ear pain. Negative for congestion, ear discharge, sinus pressure, sinus pain and sore throat.    Eyes: Negative for photophobia and visual disturbance.   Respiratory: Negative for shortness of breath.     Cardiovascular: Negative for chest pain.   Gastrointestinal: Negative for abdominal pain and vomiting.   Neurological: Positive for weakness ( Positive for right-sided deficit related to previous CVA) and headaches.   All other systems reviewed and are negative.      Physical Exam     Initial Vitals [09/24/18 2002]   BP Pulse Resp Temp SpO2   (!) 168/86 86 18 98.2 °F (36.8 °C) 100 %      MAP       --         Physical Exam    Nursing note and vitals reviewed.  Constitutional: She appears well-developed and well-nourished. She is not diaphoretic. No distress.   Uncomfortable appearing.   HENT:   Head: Normocephalic and atraumatic.   Right Ear: External ear normal.   Left Ear: External ear normal.   Nose: Nose normal.   Mouth/Throat: Oropharynx is clear and moist.   Bilateral TMs with effusion but no erythema.  Bilateral canals clear.  Right-sided facial droop.   Eyes: Conjunctivae and EOM are normal. Pupils are equal, round, and reactive to light.   Neck: Normal range of motion. Neck supple.   Cardiovascular: Normal rate, regular rhythm and normal heart sounds. Exam reveals no gallop and no friction rub.    No murmur heard.  Pulmonary/Chest: Breath sounds normal. No respiratory distress. She has no wheezes. She has no rhonchi. She has no rales.   Musculoskeletal:   No gross/acute abnormalities.   Neurological: She is alert and oriented to person, place, and time. GCS score is 15. GCS eye subscore is 4. GCS verbal subscore is 5. GCS motor subscore is 6.   Mild right-sided weakness-no changes from baseline.   Skin: Skin is warm and dry.   Psychiatric: She has a normal mood and affect.         ED Course    Care of patient transferred to Dr. Pham at 0000 9/25/18 due change of shift. CT of head pending.                                Heavenly Wesley NP  09/24/18 5629

## 2018-12-27 ENCOUNTER — HOSPITAL ENCOUNTER (EMERGENCY)
Facility: HOSPITAL | Age: 43
Discharge: HOME | End: 2018-12-27
Attending: EMERGENCY MEDICINE
Payer: MEDICAID

## 2018-12-27 VITALS
SYSTOLIC BLOOD PRESSURE: 164 MMHG | HEART RATE: 84 BPM | TEMPERATURE: 98 F | RESPIRATION RATE: 18 BRPM | OXYGEN SATURATION: 100 % | BODY MASS INDEX: 33.13 KG/M2 | DIASTOLIC BLOOD PRESSURE: 97 MMHG | HEIGHT: 62 IN | WEIGHT: 180 LBS

## 2018-12-27 DIAGNOSIS — K04.7 DENTAL INFECTION: Primary | ICD-10-CM

## 2018-12-27 PROCEDURE — 99284 EMERGENCY DEPT VISIT MOD MDM: CPT

## 2018-12-27 PROCEDURE — 25000003 PHARM REV CODE 250: Performed by: PHYSICIAN ASSISTANT

## 2018-12-27 RX ORDER — AMOXICILLIN AND CLAVULANATE POTASSIUM 875; 125 MG/1; MG/1
1 TABLET, FILM COATED ORAL 2 TIMES DAILY
Qty: 14 TABLET | Refills: 0 | OUTPATIENT
Start: 2018-12-27 | End: 2021-05-03

## 2018-12-27 RX ORDER — HYDROCODONE BITARTRATE AND ACETAMINOPHEN 5; 325 MG/1; MG/1
1 TABLET ORAL EVERY 4 HOURS PRN
Qty: 10 TABLET | Refills: 0 | OUTPATIENT
Start: 2018-12-27 | End: 2021-05-03

## 2018-12-27 RX ORDER — OXYCODONE AND ACETAMINOPHEN 5; 325 MG/1; MG/1
2 TABLET ORAL
Status: COMPLETED | OUTPATIENT
Start: 2018-12-27 | End: 2018-12-27

## 2018-12-27 RX ADMIN — OXYCODONE HYDROCHLORIDE AND ACETAMINOPHEN 2 TABLET: 5; 325 TABLET ORAL at 06:12

## 2018-12-28 DIAGNOSIS — E04.1 THYROID CYST: Primary | ICD-10-CM

## 2018-12-28 NOTE — ED PROVIDER NOTES
Encounter Date: 2018       History     Chief Complaint   Patient presents with    Dental Pain     left side, bottom jaw pain started at 7am. +facial swelling and left ear dicomfort.      Patient presents with constant severe throbbing pain in the left lower jaw secondary to a dental infection that began today.  The pain radiates to the ear.  No facial swelling fever or chills.  She tried to call and get in with her dentist but could not get an appointment until after the 1st of the year.  She has been taking over-the-counter medications without improvement          Review of patient's allergies indicates:   Allergen Reactions    Lisinopril Swelling    Bactrim [sulfamethoxazole-trimethoprim] Rash     Past Medical History:   Diagnosis Date    Brain aneurysm     CHF (congestive heart failure)     H/O coronary angioplasty     Hypercholesteremia     Hypertension     Migraine headache     Stroke 10/2017     Past Surgical History:   Procedure Laterality Date    CORONARY ANGIOPLASTY WITH STENT PLACEMENT       History reviewed. No pertinent family history.  Social History     Tobacco Use    Smoking status: Former Smoker     Packs/day: 0.50     Last attempt to quit: 10/4/2017     Years since quittin.2    Smokeless tobacco: Never Used   Substance Use Topics    Alcohol use: Yes     Comment: occassionally    Drug use: No     Review of Systems   Constitutional: Negative for activity change, appetite change, fatigue and fever.   HENT: Positive for dental problem. Negative for congestion, ear pain, sore throat, trouble swallowing and voice change.    Respiratory: Negative for cough, shortness of breath and wheezing.    Cardiovascular: Negative for chest pain, palpitations and leg swelling.   Musculoskeletal: Negative for back pain, neck pain and neck stiffness.   Skin: Negative for rash.   Neurological: Negative for dizziness, weakness, numbness and headaches.   All other systems reviewed and are  negative.      Physical Exam     Initial Vitals [12/27/18 1753]   BP Pulse Resp Temp SpO2   (!) 164/97 84 18 97.8 °F (36.6 °C) 100 %      MAP       --         Physical Exam    Nursing note and vitals reviewed.  Constitutional: She appears well-developed and well-nourished. She appears distressed (Uncomfortable).   HENT:   Head: Normocephalic and atraumatic.   Nose: Nose normal.   Mouth/Throat: Oropharynx is clear and moist.   Multiple teeth are carious.  Mild swelling behind the last molar on the left lower jaw and tenderness to palpation.  No purulent drainage. No facial swelling   Eyes: Conjunctivae and EOM are normal. Pupils are equal, round, and reactive to light.   Neck: Normal range of motion. Neck supple.   Cardiovascular: Normal rate, regular rhythm, normal heart sounds and intact distal pulses.   Pulmonary/Chest: Breath sounds normal. No respiratory distress.   Lymphadenopathy:     She has no cervical adenopathy.   Neurological: She is alert and oriented to person, place, and time.   Skin: Skin is warm and dry. No rash noted.   Psychiatric: She has a normal mood and affect. Her behavior is normal. Judgment and thought content normal.         ED Course   Procedures  Labs Reviewed - No data to display       Imaging Results    None                               Clinical Impression:   The encounter diagnosis was Dental infection.      Disposition:   Disposition: Discharged                        SARAVANAN Marinelli  12/27/18 4962

## 2018-12-28 NOTE — ED NOTES
Severe dental pain - attempted to make dental appointment but can not get an appointment until after the 1st on [address]. Pain is 10/10

## 2018-12-28 NOTE — DISCHARGE INSTRUCTIONS
Follow-up with your dentist as scheduled.  Return to the ED for severe pain, fever, facial swelling or worsen anyway

## 2019-01-06 ENCOUNTER — HOSPITAL ENCOUNTER (EMERGENCY)
Facility: HOSPITAL | Age: 44
Discharge: HOME | End: 2019-01-07
Attending: EMERGENCY MEDICINE
Payer: MEDICAID

## 2019-01-06 DIAGNOSIS — S86.812A STRAIN OF CALF MUSCLE, LEFT, INITIAL ENCOUNTER: Primary | ICD-10-CM

## 2019-01-06 PROCEDURE — 96372 THER/PROPH/DIAG INJ SC/IM: CPT | Mod: ER

## 2019-01-06 PROCEDURE — 99284 EMERGENCY DEPT VISIT MOD MDM: CPT | Mod: 25,ER

## 2019-01-07 VITALS
DIASTOLIC BLOOD PRESSURE: 89 MMHG | TEMPERATURE: 98 F | RESPIRATION RATE: 18 BRPM | HEART RATE: 92 BPM | BODY MASS INDEX: 34.04 KG/M2 | HEIGHT: 62 IN | OXYGEN SATURATION: 100 % | WEIGHT: 185 LBS | SYSTOLIC BLOOD PRESSURE: 175 MMHG

## 2019-01-07 PROCEDURE — 63600175 PHARM REV CODE 636 W HCPCS: Mod: ER | Performed by: EMERGENCY MEDICINE

## 2019-01-07 RX ORDER — TRAMADOL HYDROCHLORIDE AND ACETAMINOPHEN 37.5; 325 MG/1; MG/1
1 TABLET, FILM COATED ORAL EVERY 4 HOURS PRN
Qty: 18 TABLET | Refills: 0 | Status: SHIPPED | OUTPATIENT
Start: 2019-01-07 | End: 2021-01-13 | Stop reason: CLARIF

## 2019-01-07 RX ORDER — KETOROLAC TROMETHAMINE 30 MG/ML
60 INJECTION, SOLUTION INTRAMUSCULAR; INTRAVENOUS
Status: COMPLETED | OUTPATIENT
Start: 2019-01-07 | End: 2019-01-07

## 2019-01-07 RX ORDER — CYCLOBENZAPRINE HCL 5 MG
5 TABLET ORAL 3 TIMES DAILY PRN
Qty: 20 TABLET | Refills: 0 | Status: SHIPPED | OUTPATIENT
Start: 2019-01-07 | End: 2019-01-12

## 2019-01-07 RX ADMIN — KETOROLAC TROMETHAMINE 60 MG: 30 INJECTION, SOLUTION INTRAMUSCULAR at 01:01
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
## 2019-01-16 DIAGNOSIS — E04.9 THYROID ENLARGED: Primary | ICD-10-CM

## 2019-01-16 DIAGNOSIS — M79.605 LEFT LEG PAIN: ICD-10-CM

## 2019-01-23 ENCOUNTER — HOSPITAL ENCOUNTER (OUTPATIENT)
Dept: RADIOLOGY | Facility: HOSPITAL | Age: 44
Discharge: HOME | End: 2019-01-23
Attending: NURSE PRACTITIONER
Payer: MEDICAID

## 2019-01-23 DIAGNOSIS — E04.9 THYROID ENLARGED: ICD-10-CM

## 2019-01-23 DIAGNOSIS — M79.605 LEFT LEG PAIN: ICD-10-CM

## 2019-01-23 PROCEDURE — 76536 US EXAM OF HEAD AND NECK: CPT | Mod: TC,PO,ER

## 2019-01-23 PROCEDURE — 76882 US LMTD JT/FCL EVL NVASC XTR: CPT | Mod: TC,PO,LT,ER

## 2019-01-31 ENCOUNTER — TELEPHONE (OUTPATIENT)
Dept: OBSTETRICS AND GYNECOLOGY | Facility: CLINIC | Age: 44
End: 2019-01-31

## 2019-02-14 ENCOUNTER — HOSPITAL ENCOUNTER (EMERGENCY)
Facility: HOSPITAL | Age: 44
Discharge: HOME | End: 2019-02-14
Attending: EMERGENCY MEDICINE
Payer: MEDICAID

## 2019-02-14 VITALS
SYSTOLIC BLOOD PRESSURE: 118 MMHG | RESPIRATION RATE: 20 BRPM | OXYGEN SATURATION: 100 % | HEIGHT: 62 IN | HEART RATE: 90 BPM | TEMPERATURE: 102 F | WEIGHT: 191 LBS | BODY MASS INDEX: 35.15 KG/M2 | DIASTOLIC BLOOD PRESSURE: 74 MMHG

## 2019-02-14 DIAGNOSIS — R50.9 FEVER, UNSPECIFIED FEVER CAUSE: ICD-10-CM

## 2019-02-14 DIAGNOSIS — J10.1 INFLUENZA A: Primary | ICD-10-CM

## 2019-02-14 LAB
DEPRECATED S PYO AG THROAT QL EIA: NEGATIVE
INFLUENZA A, MOLECULAR: POSITIVE
INFLUENZA B, MOLECULAR: NEGATIVE
SPECIMEN SOURCE: ABNORMAL

## 2019-02-14 PROCEDURE — 99283 EMERGENCY DEPT VISIT LOW MDM: CPT | Mod: ER

## 2019-02-14 PROCEDURE — 87081 CULTURE SCREEN ONLY: CPT | Mod: ER

## 2019-02-14 PROCEDURE — 87502 INFLUENZA DNA AMP PROBE: CPT | Mod: ER

## 2019-02-14 PROCEDURE — 87880 STREP A ASSAY W/OPTIC: CPT | Mod: ER

## 2019-02-14 RX ORDER — OSELTAMIVIR PHOSPHATE 75 MG/1
75 CAPSULE ORAL 2 TIMES DAILY
Qty: 10 CAPSULE | Refills: 0 | Status: SHIPPED | OUTPATIENT
Start: 2019-02-14 | End: 2019-02-19

## 2019-02-14 NOTE — LETTER
[address] [place] [address]  Phone: [phone]  Fax: [phone] February 14, 2019    Patient: Gina Jenkins   Patient ID [id number]   YOB: 1975   Date of Visit: 2/14/2019       To Whom It May Concern:    Gina Jenkins was seen and treated in our emergency department on 2/14/2019. She may return to work on 2/20/19.    Sincerely,       Tiago Toussaint MD

## 2019-02-15 NOTE — ED PROVIDER NOTES
Encounter Date: 2019       History     Chief Complaint   Patient presents with    Fever     c/o body aches with fever and HA today; son with same symptoms    Headache    Generalized Body Aches     Patient currently presents with concerns regarding flulike symptoms.  Symptoms include nasal congestion, fever, and myalgias.  Symptoms reportedly began yesterday.  Patient denies vomiting. Patient denies diarrhea.  There has been no relief with over-the-counter medications at home.  Patient reports recent ill contacts describing her son is here with similar symptoms.            Review of patient's allergies indicates:   Allergen Reactions    Lisinopril Swelling    Bactrim [sulfamethoxazole-trimethoprim] Rash     Past Medical History:   Diagnosis Date    Brain aneurysm     CHF (congestive heart failure)     H/O coronary angioplasty     Hypercholesteremia     Hypertension     Migraine headache     Stroke 10/2017     Past Surgical History:   Procedure Laterality Date    CORONARY ANGIOPLASTY WITH STENT PLACEMENT       History reviewed. No pertinent family history.  Social History     Tobacco Use    Smoking status: Former Smoker     Packs/day: 0.50     Last attempt to quit: 10/4/2017     Years since quittin.3    Smokeless tobacco: Never Used   Substance Use Topics    Alcohol use: Yes     Comment: occassionally    Drug use: No     Review of Systems   Constitutional: Positive for appetite change, chills, fatigue and fever.   HENT: Positive for congestion, postnasal drip and rhinorrhea.    Eyes: Negative for discharge and redness.   Respiratory: Positive for cough. Negative for chest tightness, shortness of breath and wheezing.    Cardiovascular: Negative for chest pain, palpitations and leg swelling.   Gastrointestinal: Negative for abdominal pain, constipation, diarrhea, nausea and vomiting.   Genitourinary: Negative for dysuria, frequency, urgency, vaginal bleeding and vaginal discharge.  "  Musculoskeletal: Positive for myalgias.   Skin: Negative for color change and rash.   Allergic/Immunologic: Negative for immunocompromised state.   Neurological: Negative for dizziness, weakness and numbness.   Hematological: Negative for adenopathy. Does not bruise/bleed easily.   All other systems reviewed and are negative.      Physical Exam     Initial Vitals [02/14/19 2102]   BP Pulse Resp Temp SpO2   130/66 95 18 (!) 102.2 °F (39 °C) 100 %      MAP       --         Vitals:    02/14/19 2102 02/14/19 2147   BP: 130/66 118/74   Pulse: 95 90   Resp: 18 20   Temp: (!) 102.2 °F (39 °C) (!) 102.2 °F (39 °C)   TempSrc: Oral Oral   SpO2: 100% 100%   Weight: 86.6 kg (191 lb)    Height: 5' 2" (1.575 m)          Physical Exam    Nursing note and vitals reviewed.  Constitutional: She appears well-developed and well-nourished. She is not diaphoretic. No distress.   HENT:   Head: Normocephalic and atraumatic.   Right Ear: External ear normal.   Left Ear: External ear normal.   Nose: Mucosal edema and rhinorrhea present.   Mouth/Throat: Oropharynx is clear and moist.   Eyes: Conjunctivae and EOM are normal. Pupils are equal, round, and reactive to light. No scleral icterus.   Neck: Neck supple. No tracheal deviation present. No JVD present.   Cardiovascular: Normal rate, regular rhythm, normal heart sounds and intact distal pulses. Exam reveals no gallop and no friction rub.    No murmur heard.  Pulmonary/Chest: Breath sounds normal. No respiratory distress. She has no wheezes. She has no rhonchi. She has no rales.   Abdominal: Soft. Bowel sounds are normal. She exhibits no distension. There is no tenderness.   Musculoskeletal: Normal range of motion. She exhibits no edema.   Neurological: She is alert and oriented to person, place, and time. She has normal strength. No cranial nerve deficit or sensory deficit.   Skin: Skin is warm and dry. No rash noted.   Psychiatric: She has a normal mood and affect. Her behavior is " normal.         ED Course   Procedures  Labs Reviewed   INFLUENZA A & B BY MOLECULAR - Abnormal; Notable for the following components:       Result Value    Influenza A, Molecular Positive (*)     All other components within normal limits   THROAT SCREEN, RAPID   CULTURE, STREP A,  THROAT          Imaging Results    None          Medical Decision Making:   ED Management:  All findings were reviewed with the patient/family in detail along with the diagnosis of confirmed influenza.  Patient has been advised of the indications for prompt Tamiflu treatment  I see no indication of an emergent process beyond that addressed during our encounter but have duly counseled the patient/family regarding the need for prompt follow-up as well as the indications that should prompt immediate return to the emergency room should new or worrisome developments occur.  The patient/family communicates understanding of all this information and all remaining questions and concerns were addressed at this time.                        Clinical Impression:   The primary encounter diagnosis was Influenza A. A diagnosis of Fever, unspecified fever cause was also pertinent to this visit.                             Tiago Toussaint MD  02/15/19 0566

## 2019-02-18 LAB — BACTERIA THROAT CULT: NORMAL

## 2019-08-06 ENCOUNTER — HOSPITAL ENCOUNTER (EMERGENCY)
Facility: HOSPITAL | Age: 44
Discharge: LEFT BEFORE DISCHARGE | End: 2019-08-06
Attending: SURGERY
Payer: MEDICAID

## 2019-08-06 VITALS
TEMPERATURE: 100 F | SYSTOLIC BLOOD PRESSURE: 152 MMHG | HEART RATE: 106 BPM | OXYGEN SATURATION: 96 % | HEIGHT: 62 IN | BODY MASS INDEX: 36.31 KG/M2 | RESPIRATION RATE: 22 BRPM | DIASTOLIC BLOOD PRESSURE: 87 MMHG | WEIGHT: 197.31 LBS

## 2019-08-06 DIAGNOSIS — B34.9 VIRAL SYNDROME: Primary | ICD-10-CM

## 2019-08-06 DIAGNOSIS — I63.9 STROKE: ICD-10-CM

## 2019-08-06 DIAGNOSIS — R11.0 NAUSEA: ICD-10-CM

## 2019-08-06 DIAGNOSIS — E87.6 HYPOKALEMIA: ICD-10-CM

## 2019-08-06 DIAGNOSIS — G43.409 HEMIPLEGIC MIGRAINE WITHOUT STATUS MIGRAINOSUS, NOT INTRACTABLE: ICD-10-CM

## 2019-08-06 LAB
ALBUMIN SERPL BCP-MCNC: 3.9 G/DL (ref 3.5–5.2)
ALP SERPL-CCNC: 107 U/L (ref 38–126)
ALT SERPL W/O P-5'-P-CCNC: 23 U/L (ref 10–44)
ANION GAP SERPL CALC-SCNC: 8 MMOL/L (ref 8–16)
AST SERPL-CCNC: 37 U/L (ref 15–46)
BASOPHILS # BLD AUTO: 0.01 K/UL (ref 0–0.2)
BASOPHILS NFR BLD: 0.1 % (ref 0–1.9)
BILIRUB SERPL-MCNC: 0.5 MG/DL (ref 0.1–1)
BUN SERPL-MCNC: 14 MG/DL (ref 7–17)
CALCIUM SERPL-MCNC: 8.3 MG/DL (ref 8.7–10.5)
CHLORIDE SERPL-SCNC: 103 MMOL/L (ref 95–110)
CHOLEST SERPL-MCNC: 102 MG/DL (ref 120–199)
CHOLEST/HDLC SERPL: 2.7 {RATIO} (ref 2–5)
CO2 SERPL-SCNC: 26 MMOL/L (ref 23–29)
CREAT SERPL-MCNC: 1.05 MG/DL (ref 0.5–1.4)
DIFFERENTIAL METHOD: ABNORMAL
EOSINOPHIL # BLD AUTO: 0.3 K/UL (ref 0–0.5)
EOSINOPHIL NFR BLD: 3.1 % (ref 0–8)
ERYTHROCYTE [DISTWIDTH] IN BLOOD BY AUTOMATED COUNT: 13.8 % (ref 11.5–14.5)
EST. GFR  (AFRICAN AMERICAN): >60 ML/MIN/1.73 M^2
EST. GFR  (NON AFRICAN AMERICAN): >60 ML/MIN/1.73 M^2
GLUCOSE SERPL-MCNC: 131 MG/DL (ref 70–110)
HCT VFR BLD AUTO: 36.9 % (ref 37–48.5)
HDLC SERPL-MCNC: 38 MG/DL (ref 40–75)
HDLC SERPL: 37.3 % (ref 20–50)
HGB BLD-MCNC: 12.5 G/DL (ref 12–16)
INR PPP: 1.1 (ref 0.8–1.2)
LDLC SERPL CALC-MCNC: 29.6 MG/DL (ref 63–159)
LYMPHOCYTES # BLD AUTO: 0.9 K/UL (ref 1–4.8)
LYMPHOCYTES NFR BLD: 11.5 % (ref 18–48)
MCH RBC QN AUTO: 29.6 PG (ref 27–31)
MCHC RBC AUTO-ENTMCNC: 33.9 G/DL (ref 32–36)
MCV RBC AUTO: 87 FL (ref 82–98)
MONOCYTES # BLD AUTO: 0.3 K/UL (ref 0.3–1)
MONOCYTES NFR BLD: 3.7 % (ref 4–15)
NEUTROPHILS # BLD AUTO: 6.7 K/UL (ref 1.8–7.7)
NEUTROPHILS NFR BLD: 81.6 % (ref 38–73)
NONHDLC SERPL-MCNC: 64 MG/DL
PLATELET # BLD AUTO: 251 K/UL (ref 150–350)
PMV BLD AUTO: 11 FL (ref 9.2–12.9)
POCT GLUCOSE: 144 MG/DL (ref 70–110)
POTASSIUM SERPL-SCNC: 2.9 MMOL/L (ref 3.5–5.1)
PROT SERPL-MCNC: 7.2 G/DL (ref 6–8.4)
PROTHROMBIN TIME: 11.4 SEC (ref 9–12.5)
RBC # BLD AUTO: 4.22 M/UL (ref 4–5.4)
SODIUM SERPL-SCNC: 137 MMOL/L (ref 136–145)
TRIGL SERPL-MCNC: 172 MG/DL (ref 30–150)
TROPONIN I SERPL DL<=0.01 NG/ML-MCNC: <0.012 NG/ML (ref 0.01–0.03)
TSH SERPL DL<=0.005 MIU/L-ACNC: 1.77 UIU/ML (ref 0.4–4)
WBC # BLD AUTO: 8.18 K/UL (ref 3.9–12.7)

## 2019-08-06 PROCEDURE — 80053 COMPREHEN METABOLIC PANEL: CPT | Mod: ER

## 2019-08-06 PROCEDURE — 94760 N-INVAS EAR/PLS OXIMETRY 1: CPT | Mod: ER

## 2019-08-06 PROCEDURE — 84443 ASSAY THYROID STIM HORMONE: CPT | Mod: ER

## 2019-08-06 PROCEDURE — 85610 PROTHROMBIN TIME: CPT | Mod: ER

## 2019-08-06 PROCEDURE — 99214 PR OFFICE/OUTPT VISIT, EST, LEVL IV, 30-39 MIN: ICD-10-PCS | Mod: 95,,, | Performed by: PSYCHIATRY & NEUROLOGY

## 2019-08-06 PROCEDURE — 25000003 PHARM REV CODE 250: Mod: ER | Performed by: EMERGENCY MEDICINE

## 2019-08-06 PROCEDURE — 93005 ELECTROCARDIOGRAM TRACING: CPT | Mod: ER

## 2019-08-06 PROCEDURE — 80061 LIPID PANEL: CPT

## 2019-08-06 PROCEDURE — 85025 COMPLETE CBC W/AUTO DIFF WBC: CPT | Mod: ER

## 2019-08-06 PROCEDURE — 84484 ASSAY OF TROPONIN QUANT: CPT | Mod: ER

## 2019-08-06 PROCEDURE — 99285 EMERGENCY DEPT VISIT HI MDM: CPT | Mod: 25,ER

## 2019-08-06 PROCEDURE — 36000 PLACE NEEDLE IN VEIN: CPT | Mod: ER

## 2019-08-06 PROCEDURE — 99214 OFFICE O/P EST MOD 30 MIN: CPT | Mod: 95,,, | Performed by: PSYCHIATRY & NEUROLOGY

## 2019-08-06 PROCEDURE — 93010 EKG 12-LEAD: ICD-10-PCS | Mod: ,,, | Performed by: INTERNAL MEDICINE

## 2019-08-06 PROCEDURE — 25500020 PHARM REV CODE 255: Mod: ER | Performed by: SURGERY

## 2019-08-06 PROCEDURE — 93010 ELECTROCARDIOGRAM REPORT: CPT | Mod: ,,, | Performed by: INTERNAL MEDICINE

## 2019-08-06 RX ORDER — BUTALBITAL, ACETAMINOPHEN AND CAFFEINE 50; 325; 40 MG/1; MG/1; MG/1
1 TABLET ORAL EVERY 6 HOURS PRN
Qty: 20 TABLET | Refills: 0 | OUTPATIENT
Start: 2019-08-06 | End: 2021-05-03

## 2019-08-06 RX ORDER — BUTALBITAL, ACETAMINOPHEN AND CAFFEINE 50; 325; 40 MG/1; MG/1; MG/1
1 TABLET ORAL
Status: COMPLETED | OUTPATIENT
Start: 2019-08-06 | End: 2019-08-06

## 2019-08-06 RX ORDER — POTASSIUM CHLORIDE 20 MEQ/15ML
40 SOLUTION ORAL
Status: COMPLETED | OUTPATIENT
Start: 2019-08-06 | End: 2019-08-06

## 2019-08-06 RX ADMIN — IOHEXOL 100 ML: 350 INJECTION, SOLUTION INTRAVENOUS at 06:08

## 2019-08-06 RX ADMIN — POTASSIUM CHLORIDE 40 MEQ: 20 SOLUTION ORAL at 06:08

## 2019-08-06 RX ADMIN — BUTALBITAL, ACETAMINOPHEN, AND CAFFEINE 1 TABLET: 50; 325; 40 TABLET ORAL at 06:08

## 2019-08-06 NOTE — ED PROVIDER NOTES
Encounter Date: 2019       History     Chief Complaint   Patient presents with    Fatigue     Pt states she was getting her hair done when she became weak all over and started with a really bad headache. Pt reports numbness and tingling to left arm, intermittent. Pt has hx of stroke and brain aneurysm.      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  The onset of symptoms was 2:00 p.m. she says that the left arm tingling is better but she feels generalized fatigue weakness of all her extremities as well as a severe headache The code stroke was called by the nurses and the Patient was in CT scan when I went in the room and did not do a complete exam    The history is provided by the patient.   Fatigue   This is a new problem. The current episode started less than 1 hour ago. The problem occurs constantly. The problem has not changed since onset.Associated symptoms include headaches. Nothing aggravates the symptoms. Nothing relieves the symptoms.     Review of patient's allergies indicates:   Allergen Reactions    Lisinopril Swelling    Bactrim [sulfamethoxazole-trimethoprim] Rash     Past Medical History:   Diagnosis Date    Brain aneurysm     CHF (congestive heart failure)     H/O coronary angioplasty     Hypercholesteremia     Hypertension     Migraine headache     Stroke 10/2017     Past Surgical History:   Procedure Laterality Date    CORONARY ANGIOPLASTY WITH STENT PLACEMENT       No family history on file.  Social History     Tobacco Use    Smoking status: Former Smoker     Packs/day: 0.50     Last attempt to quit: 10/4/2017     Years since quittin.8    Smokeless tobacco: Never Used   Substance Use Topics    Alcohol use: Yes      Comment: occassionally    Drug use: No     Review of Systems   Constitutional: Positive for fatigue.   HENT: Negative.    Eyes: Negative.    Respiratory: Negative.    Cardiovascular: Negative.    Gastrointestinal: Negative.    Endocrine: Negative.    Genitourinary: Negative.    Musculoskeletal: Negative.    Skin: Negative.    Allergic/Immunologic: Negative.    Neurological: Positive for headaches.   Hematological: Negative.        Physical Exam     Initial Vitals [08/06/19 1739]   BP Pulse Resp Temp SpO2   (!) 142/86 96 18 (!) 100.4 °F (38 °C) 98 %      MAP       --         Physical Exam    Nursing note and vitals reviewed.  Constitutional: She appears well-developed and well-nourished.   HENT:   Head: Normocephalic.   Neck: Normal range of motion.   Cardiovascular: Normal rate.   Pulmonary/Chest: Breath sounds normal.   Abdominal: Soft.   Neurological: She is alert and oriented to person, place, and time.   No aphasia or dysarthria.  Visual fields intact noaniscoria good shoulder shrug  NIH stroke scale 2   Skin: Skin is warm.         ED Course   Procedures  Labs Reviewed   CBC W/ AUTO DIFFERENTIAL - Abnormal; Notable for the following components:       Result Value    Hematocrit 36.9 (*)     Lymph # 0.9 (*)     Gran% 81.6 (*)     Lymph% 11.5 (*)     Mono% 3.7 (*)     All other components within normal limits   COMPREHENSIVE METABOLIC PANEL - Abnormal; Notable for the following components:    Potassium 2.9 (*)     Glucose 131 (*)     Calcium 8.3 (*)     All other components within normal limits   LIPID PANEL - Abnormal; Notable for the following components:    Cholesterol 102 (*)     Triglycerides 172 (*)     HDL 38 (*)     LDL Cholesterol 29.6 (*)     All other components within normal limits   POCT GLUCOSE - Abnormal; Notable for the following components:    POCT Glucose 144 (*)     All other components within normal limits   PROTIME-INR   TSH   TROPONIN I     Results for orders placed or performed during the  hospital encounter of 08/06/19   CBC W/ AUTO DIFFERENTIAL   Result Value Ref Range    WBC 8.18 3.90 - 12.70 K/uL    RBC 4.22 4.00 - 5.40 M/uL    Hemoglobin 12.5 12.0 - 16.0 g/dL    Hematocrit 36.9 (L) 37.0 - 48.5 %    Mean Corpuscular Volume 87 82 - 98 fL    Mean Corpuscular Hemoglobin 29.6 27.0 - 31.0 pg    Mean Corpuscular Hemoglobin Conc 33.9 32.0 - 36.0 g/dL    RDW 13.8 11.5 - 14.5 %    Platelets 251 150 - 350 K/uL    MPV 11.0 9.2 - 12.9 fL    Gran # (ANC) 6.7 1.8 - 7.7 K/uL    Lymph # 0.9 (L) 1.0 - 4.8 K/uL    Mono # 0.3 0.3 - 1.0 K/uL    Eos # 0.3 0.0 - 0.5 K/uL    Baso # 0.01 0.00 - 0.20 K/uL    Gran% 81.6 (H) 38.0 - 73.0 %    Lymph% 11.5 (L) 18.0 - 48.0 %    Mono% 3.7 (L) 4.0 - 15.0 %    Eosinophil% 3.1 0.0 - 8.0 %    Basophil% 0.1 0.0 - 1.9 %    Differential Method Automated    Comprehensive metabolic panel   Result Value Ref Range    Sodium 137 136 - 145 mmol/L    Potassium 2.9 (L) 3.5 - 5.1 mmol/L    Chloride 103 95 - 110 mmol/L    CO2 26 23 - 29 mmol/L    Glucose 131 (H) 70 - 110 mg/dL    BUN, Bld 14 7 - 17 mg/dL    Creatinine 1.05 0.50 - 1.40 mg/dL    Calcium 8.3 (L) 8.7 - 10.5 mg/dL    Total Protein 7.2 6.0 - 8.4 g/dL    Albumin 3.9 3.5 - 5.2 g/dL    Total Bilirubin 0.5 0.1 - 1.0 mg/dL    Alkaline Phosphatase 107 38 - 126 U/L    AST 37 15 - 46 U/L    ALT 23 10 - 44 U/L    Anion Gap 8 8 - 16 mmol/L    eGFR if African American >60.0 >60 mL/min/1.73 m^2    eGFR if non African American >60.0 >60 mL/min/1.73 m^2   Protime-INR   Result Value Ref Range    Prothrombin Time 11.4 9.0 - 12.5 sec    INR 1.1 0.8 - 1.2   TSH   Result Value Ref Range    TSH 1.770 0.400 - 4.000 uIU/mL   LDL - Lipid Panel   Result Value Ref Range    Cholesterol 102 (L) 120 - 199 mg/dL    Triglycerides 172 (H) 30 - 150 mg/dL    HDL 38 (L) 40 - 75 mg/dL    LDL Cholesterol 29.6 (L) 63.0 - 159.0 mg/dL    Hdl/Cholesterol Ratio 37.3 20.0 - 50.0 %    Total Cholesterol/HDL Ratio 2.7 2.0 - 5.0    Non-HDL Cholesterol 64 mg/dL   Troponin I    Result Value Ref Range    Troponin I <0.012 0.012 - 0.034 ng/mL   POCT glucose   Result Value Ref Range    POCT Glucose 144 (H) 70 - 110 mg/dL       EKG Readings: (Independently Interpreted)   Rhythm: Normal Sinus Rhythm. Heart Rate: 89. Ectopy: No Ectopy. Conduction: Normal. ST Segments: Non-Specific ST Segment Depression. T Waves: Normal. Axis: Normal. Other Impression: Abnormal EKG   Anterior infarct       Imaging Results          CTA Brain (In process)                X-Ray Chest AP Portable (Final result)  Result time 08/06/19 18:30:19    Final result by Sheeba Israel MD (08/06/19 18:30:19)                 Impression:      No cardiopulmonary process noted.      Electronically signed by: Sheeba Israel  Date:    08/06/2019  Time:    18:30             Narrative:    EXAMINATION:  XR CHEST AP PORTABLE    CLINICAL HISTORY:  Stroke;    COMPARISON:  December 2017    FINDINGS:  No infiltrate or effusion identified.  Cardiomediastinal silhouette is within normal limits.                              X-Rays:   Independently Interpreted Readings:   Other Readings:  Chest x-ray no acute      Medical Decision Making:   Other:   I have discussed this case with another health care provider.       <> Summary of the Discussion: Tele neurology Dr. Cage who felt patient was not having an acute stroke or aneurysm problem.  Dr. Cage advise treat patient for headache and send her home to follow up with PCP.  Dr. Cage felt symptoms were from hemiplegic migraine            Patient was informed of Dr. Cage is findings and she seemed upset with the normal results the Dr. Cage had concluded.  Patient wanted to know why she had of facial droop and the patient was informed that Dr Cage felt it was a hemiplegic migraine.  Patient also demanded to know why she was hurting so much and I informed her that she probably had a viral illness since the white cell count was normal and she had a low-grade fever.  Patient  had mistakenly thought the nurse told her she had a temperature of a 104° F but was and reality 100.4° F.  patient demanded to sign out and be discharged.  Patient had an episode of nausea/vomiting afterwards but refused to let me treat her.  Patient insisted on leaving immediately.           Clinical Impression:       ICD-10-CM ICD-9-CM   1. Viral syndrome B34.9 079.99   2. Stroke I63.9 434.91   3. Nausea R11.0 787.02   4. Hypokalemia E87.6 276.8   5. Hemiplegic migraine without status migrainosus, not intractable G43.409 346.30                                Jeff Bauman MD  08/07/19 0129

## 2019-08-06 NOTE — SUBJECTIVE & OBJECTIVE
"  Woke up with symptoms?: no    Recent bleeding noted: no  Does the patient take any Blood Thinners? no  Medications: No relevant medications      Past Medical History: hypertension and cerebral aneurysm    Past Surgical History: no major surgeries within the last 2 weeks    Family History: no relevant history    Social History: smoker (active)    Allergies: Lisinopril  Bactrim [Sulfamethoxazole-Trimethoprim] No known drug allergies    Review of Systems   Neurological: Positive for facial asymmetry, speech difficulty, weakness and numbness. Negative for headaches.   All other systems reviewed and are negative.    Objective:   Vitals: Blood pressure (!) 142/86, pulse 96, temperature (!) 100.4 °F (38 °C), temperature source Oral, resp. rate 18, height 5' 2" (1.575 m), weight 89.5 kg (197 lb 5 oz), SpO2 98 %.     CT READ: Yes  No hemmorhage. No mass effect. No early infarct signs.     Physical Exam   Constitutional: She is oriented to person, place, and time. She appears well-developed and well-nourished.   HENT:   Head: Normocephalic and atraumatic.   Eyes: Pupils are equal, round, and reactive to light. EOM are normal.   Cardiovascular: Normal rate and regular rhythm.   Pulmonary/Chest: Effort normal.   Neurological: She is alert and oriented to person, place, and time.   Vitals reviewed.        "

## 2019-08-07 NOTE — CONSULTS
"      [facility]  Vascular Neurology  Comprehensive Stroke Center  Tele-Consultation Note      Consults    Consulting Provider: SNEHA DE SOUZA III  Current Providers  No providers found    Patient Location:  [facility] EMERGENCY DEPARTMENT Emergency Department  Spoke hospital nurse at bedside with patient assisting consultant.     Patient information was obtained from patient and past medical records.         Assessment/Plan:     STROKE DOCUMENTATION     Acute Stroke Times:   Acute Stroke Times   Last Known Normal Date: 08/06/19  Last Known Normal Time: 1400  Symptom Onset Date: 08/06/19  Symptom Onset Time: 1400  Stroke Team Called Date: 08/06/19  Stroke Team Called Time: 1747  Stroke Team Arrival Date: 08/06/19  Stroke Team Arrival Time: 1750  CT Interpretation Time: 1813    NIH Scale:        Modified [place] Score: 0  Danielle Coma Scale:    ABCD2 Score:    [id number] Score:   HAS -BLED Score:   ICH Score:   Hunt & Britton Classification:       Diagnoses:   Hemiplegic migraine without status migrainosus, not intractable  Hemiplegic migraine without status migrainosus, not intractable      Blood pressure (!) 152/87, pulse 106, temperature (!) 100.4 °F (38 °C), temperature source Oral, resp. rate (!) 22, height 5' 2" (1.575 m), weight 89.5 kg (197 lb 5 oz), SpO2 96 %.  Alteplase Eligible?: No  Alteplase Recommendation: Alteplase not recommended due to Suspected stroke mimic   Possible Interventional Revascularization Candidate? No; No ischemic penumbra    Disposition Recommendation: pending further studies  discharge from ED    Subjective:     History of Present Illness:  43 y/o female LKN 1400 who c/o severe HA with  right facial weakness and numbness.      Woke up with symptoms?: no    Recent bleeding noted: no  Does the patient take any Blood Thinners? no  Medications: No relevant medications      Past Medical History: hypertension, MI/CAD, CHF, stroke and cerebral aneurysms    Past " "Surgical History: no major surgeries within the last 2 weeks    Family History: no relevant history    Social History: no smoking, no drinking, no drugs    Allergies: Lisinopril  Bactrim [Sulfamethoxazole-Trimethoprim]     Review of Systems   Neurological: Positive for speech difficulty, weakness, numbness and headaches.   All other systems reviewed and are negative.    Objective:   Vitals: Blood pressure (!) 152/87, pulse 106, temperature (!) 100.4 °F (38 °C), temperature source Oral, resp. rate (!) 22, height 5' 2" (1.575 m), weight 89.5 kg (197 lb 5 oz), SpO2 96 %.     CT READ: Yes  No hemmorhage. No mass effect. No early infarct signs.     Physical Exam   Constitutional: She is oriented to person, place, and time. She appears well-developed and well-nourished.   HENT:   Head: Normocephalic and atraumatic.   Eyes: Pupils are equal, round, and reactive to light. EOM are normal.   Cardiovascular: Normal rate and regular rhythm.   Pulmonary/Chest: Effort normal.   Neurological: She is alert and oriented to person, place, and time. A sensory deficit is present.   Vitals reviewed.            Recommended the emergency room physician to have a brief discussion with the patient and/or family if available regarding the risks and benefits of treatment, and to briefly document the occurrence of that discussion in his clinical encounter note.     The attending portion of this evaluation, treatment, and documentation was performed per Eliz Cage MD via audiovisual.    Billing code:  (non-intervention mild to moderate stroke, TIA, some mimics)    · This patient has a critical neurological condition/illness, with some potential for high morbidity and mortality.  · There is a moderate probability for acute neurological change leading to clinical and possibly life-threatening deterioration requiring highest level of physician preparedness for urgent intervention.  · Care was coordinated with other physicians involved " in the patient's care.  · Radiologic studies and laboratory data were reviewed and interpreted, and plan of care was re-assessed based on the results.  · Diagnosis, treatment options and prognosis may have been discussed with the patient and/or family members or caregiver.      In your opinion, this was a: Tier 1 Van Negative    Consult End Time: 1530     Eliz Cage MD  [facility] Stroke Center  Vascular Neurology   [facility]

## 2019-08-07 NOTE — ED NOTES
Pt brought to car via wheelchair. Informed pt that I was sorry for all the trouble. Pt states that she was going to another hospital to be seen. Pt stated that she did not want her discharge papers or her prescriptions before leaving.

## 2019-08-07 NOTE — CONSULTS
[facility]  Vascular Neurology  Comprehensive Stroke Center  Tele-Consultation Note      Consults    Consulting Provider: SNEHA DE SOUZA III  Current Providers  No providers found    Patient Location:  [facility] EMERGENCY DEPARTMENT Emergency Department  Spoke hospital nurse at bedside with patient assisting consultant.     Patient information was obtained from patient.         Assessment/Plan:     STROKE DOCUMENTATION     Acute Stroke Times:   Acute Stroke Times   Last Known Normal Date: 08/06/19  Last Known Normal Time: 1400  Symptom Onset Date: 08/06/19  Symptom Onset Time: 1400  Stroke Team Called Date: 08/06/19  Stroke Team Called Time: 1747  Stroke Team Arrival Date: 08/06/19  Stroke Team Arrival Time: 1750  CT Interpretation Time: 1813    NIH Scale:  Interval: baseline  1a. Level of Consciousness: 0-->Alert, keenly responsive  1b. LOC Questions: 0-->Answers both questions correctly  1c. LOC Commands: 0-->Performs both tasks correctly  2. Best Gaze: 0-->Normal  3. Visual: 0-->No visual loss  4. Facial Palsy: 0-->Normal symmetrical movements  5a. Motor Arm, Left: 0-->No drift, limb holds 90 (or 45) degrees for full 10 secs  5b. Motor Arm, Right: 0-->No drift, limb holds 90 (or 45) degrees for full 10 secs  6a. Motor Leg, Left: 0-->No drift, leg holds 30 degree position for full 5 secs  6b. Motor Leg, Right: 0-->No drift, leg holds 30 degree position for full 5 secs  7. Limb Ataxia: 0-->Absent  8. Sensory: 0-->Normal, no sensory loss  9. Best Language: 0-->No aphasia, normal  10. Dysarthria: 0-->Normal  11. Extinction and Inattention (formerly Neglect): 0-->No abnormality  Total (NIH Stroke Scale): 0     Modified [place] Score: 0  Danielle Coma Scale:    ABCD2 Score:    [id number] Score:   HAS -BLED Score:   ICH Score:   Hunt & Britton Classification:       Diagnoses:   Hemiplegic migraine without status migrainosus, not intractable  Hemiplegic migraine without status  "migrainosus, not intractable        Blood pressure (!) 142/86, pulse 96, temperature (!) 100.4 °F (38 °C), temperature source Oral, resp. rate 18, height 5' 2" (1.575 m), weight 89.5 kg (197 lb 5 oz), SpO2 98 %.  Alteplase Eligible?: No  Alteplase Recommendation: Alteplase not recommended due to Suspected stroke mimic   Possible Interventional Revascularization Candidate? No; No large vessel occlusion    Disposition Recommendation: pending further studies  discharge from ED    Subjective:     History of Present Illness:  45 y/o female LKN 1400 who c/o severe HA with  right facial weakness and numbness.      Woke up with symptoms?: no    Recent bleeding noted: no  Does the patient take any Blood Thinners? no  Medications: No relevant medications      Past Medical History: hypertension and cerebral aneurysm    Past Surgical History: no major surgeries within the last 2 weeks    Family History: no relevant history    Social History: smoker (active)    Allergies: Lisinopril  Bactrim [Sulfamethoxazole-Trimethoprim] No known drug allergies    Review of Systems   Neurological: Positive for facial asymmetry, speech difficulty, weakness and numbness. Negative for headaches.   All other systems reviewed and are negative.    Objective:   Vitals: Blood pressure (!) 142/86, pulse 96, temperature (!) 100.4 °F (38 °C), temperature source Oral, resp. rate 18, height 5' 2" (1.575 m), weight 89.5 kg (197 lb 5 oz), SpO2 98 %.     CT READ: Yes  No hemmorhage. No mass effect. No early infarct signs.     Physical Exam   Constitutional: She is oriented to person, place, and time. She appears well-developed and well-nourished.   HENT:   Head: Normocephalic and atraumatic.   Eyes: Pupils are equal, round, and reactive to light. EOM are normal.   Cardiovascular: Normal rate and regular rhythm.   Pulmonary/Chest: Effort normal.   Neurological: She is alert and oriented to person, place, and time.   Vitals reviewed.            Recommended the " emergency room physician to have a brief discussion with the patient and/or family if available regarding the risks and benefits of treatment, and to briefly document the occurrence of that discussion in his clinical encounter note.     The attending portion of this evaluation, treatment, and documentation was performed per Eliz Cage MD via audiovisual.    Billing code:  (non-stroke, some mimics)    · This patient has neurological symptom(s)/condition/illness, with minimal potential for morbidity and mortality.  · There is a low probability for acute neurological change leading to clinical and possibly life-threatening deterioration requiring highest level of physician preparedness for urgent intervention.  · Care was coordinated with other physicians involved in the patient's care.  · Radiologic studies and laboratory data were reviewed and interpreted, and plan of care was re-assessed based on the results.  · Diagnosis, treatment options and prognosis may have been discussed with the patient and/or family members or caregiver.      In your opinion, this was a: Tier 2 Van Negative    Consult End Time: 1818     Eliz Cage MD  [facility] Stroke Center  Vascular Neurology   [facility]

## 2019-08-07 NOTE — ED NOTES
Pt is actively vomiting and states that she has diarrhea. Dr. Bauman arrived at the bedside. States that he could give the pt medicine for nausea. Pt getting out of bed and refusing medication for vomiting. Asked pt if she was sure that she wanted to leave and pt urged that she did want to leave.

## 2019-08-07 NOTE — ED NOTES
Pt is even more upset because she feels like her symptoms were not properly addressed. Asked if pt wuld at least like me to give her the potassium and fircet that was ordered. Pt agreed. Stated that I would seek out the physician again.

## 2019-08-07 NOTE — ED NOTES
"Pt is upset because she feels like something is still wrong. Pt states "I feel like I have a virus or something because my whole body is weak and I know my body." I think I am going to go somewhere else because something is wrong." Informed pt that I agree with her concerns and would inform the physician.  "

## 2019-08-07 NOTE — ED NOTES
Pt presents to the ED with c/o generalized weakness and headache. Pt activated as a code stroke due to right sided facial droop. Pt states that she has had a stroke before which caused her to have which caused her to have some weakness while walking. Pt states that her symptoms started around 1400 today when she was at the [employer] shop with her children. Pt states that she fell asleep and when she woke up her left arm felt like it was asleep and her face felt weak. Pt presents with some slurring of speech. Pt was ambulatory to the room with a little unsteady gait. Pt states with her last stroke she was unable to walk until he had a lot of therapy. Asked pt was her gait worse and she states that it is the same as it has always been. Pt very anxious at this time.

## 2020-01-10 ENCOUNTER — HOSPITAL ENCOUNTER (OUTPATIENT)
Dept: RADIOLOGY | Facility: HOSPITAL | Age: 45
Discharge: HOME | End: 2020-01-10
Attending: NURSE PRACTITIONER
Payer: MEDICAID

## 2020-01-10 DIAGNOSIS — M25.512 LEFT SHOULDER PAIN: ICD-10-CM

## 2020-01-10 PROCEDURE — 73030 X-RAY EXAM OF SHOULDER: CPT | Mod: TC,FY,PO,LT

## 2020-02-06 DIAGNOSIS — M25.512 LEFT SHOULDER PAIN: Primary | ICD-10-CM

## 2020-02-17 ENCOUNTER — OFFICE VISIT (OUTPATIENT)
Dept: OBSTETRICS AND GYNECOLOGY | Facility: CLINIC | Age: 45
End: 2020-02-17
Payer: MEDICAID

## 2020-02-17 VITALS — BODY MASS INDEX: 36.29 KG/M2 | WEIGHT: 198.38 LBS

## 2020-02-17 DIAGNOSIS — Z01.419 WELL WOMAN EXAM: Primary | ICD-10-CM

## 2020-02-17 DIAGNOSIS — D21.9 FIBROIDS: ICD-10-CM

## 2020-02-17 DIAGNOSIS — N93.9 ABNORMAL UTERINE BLEEDING (AUB): ICD-10-CM

## 2020-02-17 DIAGNOSIS — Z12.31 BREAST CANCER SCREENING BY MAMMOGRAM: ICD-10-CM

## 2020-02-17 PROCEDURE — 88175 CYTOPATH C/V AUTO FLUID REDO: CPT

## 2020-02-17 PROCEDURE — 99999 PR PBB SHADOW E&M-EST. PATIENT-LVL III: ICD-10-PCS | Mod: PBBFAC,,, | Performed by: OBSTETRICS & GYNECOLOGY

## 2020-02-17 PROCEDURE — 99213 OFFICE O/P EST LOW 20 MIN: CPT | Mod: PBBFAC,PN | Performed by: OBSTETRICS & GYNECOLOGY

## 2020-02-17 PROCEDURE — 99386 PR PREVENTIVE VISIT,NEW,40-64: ICD-10-PCS | Mod: S$PBB,,, | Performed by: OBSTETRICS & GYNECOLOGY

## 2020-02-17 PROCEDURE — 99999 PR PBB SHADOW E&M-EST. PATIENT-LVL III: CPT | Mod: PBBFAC,,, | Performed by: OBSTETRICS & GYNECOLOGY

## 2020-02-17 PROCEDURE — 99386 PREV VISIT NEW AGE 40-64: CPT | Mod: S$PBB,,, | Performed by: OBSTETRICS & GYNECOLOGY

## 2020-02-17 NOTE — PROGRESS NOTES
CC: Annual check-up    SUBJECTIVE:   45 y.o. female   for annual routine Pap and checkup. Patient's last menstrual period was 2019..  She complains of AUB.        Past Medical History:   Diagnosis Date    Brain aneurysm     CHF (congestive heart failure)     H/O coronary angioplasty     Hypercholesteremia     Hypertension     Migraine headache     Stroke 10/2017     Past Surgical History:   Procedure Laterality Date    CORONARY ANGIOPLASTY WITH STENT PLACEMENT       Social History     Socioeconomic History    Marital status:      Spouse name: Not on file    Number of children: Not on file    Years of education: Not on file    Highest education level: Not on file   Occupational History    Not on file   Social Needs    Financial resource strain: Not on file    Food insecurity:     Worry: Not on file     Inability: Not on file    Transportation needs:     Medical: Not on file     Non-medical: Not on file   Tobacco Use    Smoking status: Current Every Day Smoker     Packs/day: 0.50    Smokeless tobacco: Never Used   Substance and Sexual Activity    Alcohol use: Yes     Comment: occassionally    Drug use: No    Sexual activity: Not Currently     Partners: Male     Birth control/protection: None   Lifestyle    Physical activity:     Days per week: Not on file     Minutes per session: Not on file    Stress: Not on file   Relationships    Social connections:     Talks on phone: Not on file     Gets together: Not on file     Attends Oriental orthodox service: Not on file     Active member of club or organization: Not on file     Attends meetings of clubs or organizations: Not on file     Relationship status: Not on file   Other Topics Concern    Not on file   Social History Narrative    Not on file     History reviewed. No pertinent family history.  OB History    Para Term  AB Living   5 3 2 1 2 3   SAB TAB Ectopic Multiple Live Births   1 1            # Outcome Date GA Lbr  Robson/2nd Weight Sex Delivery Anes PTL Lv   5 TAB            4 SAB            3             2 Term            1 Term                  Current Outpatient Medications   Medication Sig Dispense Refill    butalbital-acetaminophen-caffeine -40 mg (FIORICET, ESGIC) -40 mg per tablet Take 1 tablet by mouth every 6 (six) hours as needed for Headaches. Label with sedative precautions 20 tablet 0    cyanocobalamin (VITAMIN B-12) 1000 MCG tablet Take 1 tablet (1,000 mcg total) by mouth once daily.      diphenhydrAMINE (BENADRYL) 25 mg capsule Take 1 each (25 mg total) by mouth every 6 (six) hours as needed for Itching or Allergies. 20 capsule 0    HYDROcodone-acetaminophen (NORCO) 5-325 mg per tablet Take 1 tablet by mouth every 4 (four) hours as needed for Pain. 10 tablet 0    tramadol-acetaminophen 37.5-325 mg (ULTRACET) 37.5-325 mg Tab Take 1 tablet by mouth every 4 (four) hours as needed for Pain. 18 tablet 0    amlodipine (NORVASC) 10 MG tablet Take 1 tablet (10 mg total) by mouth once daily. 30 tablet 0    amoxicillin-clavulanate 875-125mg (AUGMENTIN) 875-125 mg per tablet Take 1 tablet by mouth 2 (two) times daily. (Patient not taking: Reported on 2020) 14 tablet 0    aspirin 81 MG Chew Take 1 tablet (81 mg total) by mouth once daily.  0    atorvastatin (LIPITOR) 40 MG tablet Take 1 tablet (40 mg total) by mouth once daily. 90 tablet 3    carvedilol (COREG) 12.5 MG tablet Take 1 tablet (12.5 mg total) by mouth 2 (two) times daily with meals. 60 tablet 11    hydrochlorothiazide (HYDRODIURIL) 25 MG tablet Take 1 tablet (25 mg total) by mouth once daily. 30 tablet 0    losartan (COZAAR) 100 MG Tab Take 1 tablet (100 mg total) by mouth once daily. 30 tablet 0    neomycin-polymyxin-hydrocortisone (CORTISPORIN) 3.5-10,000-1 mg/mL-unit/mL-% otic suspension Place 4 drops into the left ear 3 (three) times daily. (Patient not taking: Reported on 2020) 10 mL 0    ranitidine (ZANTAC) 150 MG  capsule Take 1 capsule (150 mg total) by mouth once daily. 15 capsule 0     No current facility-administered medications for this visit.      Allergies: Lisinopril and Bactrim [sulfamethoxazole-trimethoprim]     ROS:  Constitutional: no weight loss, weight gain, fever, fatigue  Eyes:  No vision changes, glasses/contacts  ENT/Mouth: No ulcers, sinus problems, ears ringing, headache  Cardiovascular: No inability to lie flat, chest pain, exercise intolerance, swelling, heart palpitations  Respiratory: No wheezing, coughing blood, shortness of breath, or cough  Gastrointestinal: No diarrhea, bloody stool, nausea/vomiting, constipation, gas, hemorrhoids  Genitourinary: No blood in urine, painful urination, urgency of urination, frequency of urination, incomplete emptying, incontinence, abnormal bleeding, painful periods, heavy periods, vaginal discharge, vaginal odor, painful intercourse, sexual problems, bleeding after intercourse.  Musculoskeletal: No muscle weakness  Skin/Breast: No painful breasts, nipple discharge, masses, rash, ulcers  Neurological: No passing out, seizures, numbness, headache  Endocrine: No diabetes, hypothyroid, hyperthyroid, hot flashes, hair loss, abnormal hair growth, ance  Psychiatric: No depression, crying  Hematologic: No bruises, bleeding, swollen lymph nodes, anemia.      OBJECTIVE:   The patient appears well, alert, oriented x 3, in no distress.  Wt 90 kg (198 lb 6.4 oz)   LMP 12/01/2019   BMI 36.29 kg/m²   NECK: no thyromegaly, trachea midline  SKIN: no acne, striae, hirsutism  BREAST EXAM: breasts appear normal, no suspicious masses, no skin or nipple changes or axillary nodes  ABDOMEN: no hernias, masses, or hepatosplenomegaly  GENITALIA: normal external genitalia, no erythema, no discharge  URETHRA: normal urethra, normal urethral meatus  VAGINA: Normal  CERVIX: no lesions or cervical motion tenderness  UTERUS: enlarged.   ADNEXA: no mass, fullness, tenderness      ASSESSMENT:    well woman  1. Well woman exam    2. Breast cancer screening by mammogram    3. Fibroids    4. Abnormal uterine bleeding (AUB)        PLAN:   mammogram  pap smear  Transvag US for uterine fibroids.   RTC after transvag US w/ possible EMBX      Orders Placed This Encounter    Mammo Digital Screening Bilat    US Transvaginal Non OB    CBC auto differential    TSH    Liquid-Based Pap Smear, Screening

## 2020-02-17 NOTE — LETTER
February 17, 2020      No Recipients           [facility]  [address], [address]  [place] [address]  Phone: [phone]  Fax: [phone]          Patient: Gina Jenkins   MR Number: [id number]   YOB: 1975   Date of Visit: 2/17/2020       Dear :    Thank you for referring Gina Jenkins to me for evaluation. Attached you will find relevant portions of my assessment and plan of care.    If you have questions, please do not hesitate to call me. I look forward to following Gina Jenkins along with you.    Sincerely,    Richard Cotto MD    Enclosure  CC:  No Recipients    If you would like to receive this communication electronically, please contact [email or web address] or [phone] to request more information on [employer] access.    For providers and/or their staff who would like to refer a patient to [facility], please contact us through our one-stop-shop provider referral line, [facility], at [phone].    If you feel you have received this communication in error or would no longer like to receive these types of communications, please e-mail [email or web address]

## 2020-02-20 ENCOUNTER — HOSPITAL ENCOUNTER (OUTPATIENT)
Dept: RADIOLOGY | Facility: HOSPITAL | Age: 45
Discharge: HOME | End: 2020-02-20
Attending: OBSTETRICS & GYNECOLOGY
Payer: MEDICAID

## 2020-02-20 DIAGNOSIS — N93.9 ABNORMAL UTERINE BLEEDING (AUB): ICD-10-CM

## 2020-02-20 DIAGNOSIS — Z12.31 BREAST CANCER SCREENING BY MAMMOGRAM: ICD-10-CM

## 2020-02-20 DIAGNOSIS — D21.9 FIBROIDS: ICD-10-CM

## 2020-02-20 PROCEDURE — 77067 SCR MAMMO BI INCL CAD: CPT | Mod: TC,PO

## 2020-02-20 PROCEDURE — 76830 TRANSVAGINAL US NON-OB: CPT | Mod: TC,PO

## 2020-02-24 ENCOUNTER — TELEPHONE (OUTPATIENT)
Dept: OBSTETRICS AND GYNECOLOGY | Facility: CLINIC | Age: 45
End: 2020-02-24

## 2020-02-27 ENCOUNTER — TELEPHONE (OUTPATIENT)
Dept: OBSTETRICS AND GYNECOLOGY | Facility: CLINIC | Age: 45
End: 2020-02-27

## 2020-02-27 NOTE — TELEPHONE ENCOUNTER
Called patient, no answer, left messaging informing patient I see she had an appointment on 02/24/2020 at the [place] office that she missed and I was calling to see if patient wanted to come to the [place] office on 02/28/2020.

## 2020-03-20 ENCOUNTER — TELEPHONE (OUTPATIENT)
Dept: OBSTETRICS AND GYNECOLOGY | Facility: CLINIC | Age: 45
End: 2020-03-20

## 2020-03-20 LAB
FINAL PATHOLOGIC DIAGNOSIS: NORMAL
Lab: NORMAL

## 2020-03-20 RX ORDER — METRONIDAZOLE 500 MG/1
TABLET ORAL
Qty: 4 TABLET | Refills: 1 | OUTPATIENT
Start: 2020-03-20 | End: 2021-05-03

## 2020-03-20 NOTE — TELEPHONE ENCOUNTER
Notify pt has trich on pap & that needs to take Flagyl as prescribed and f/u in 3 weeks to recheck if STD is resolved. Partner needs to get Tx'd for Trich. And abstain from intercourse until infxn tx'd

## 2020-05-16 ENCOUNTER — HOSPITAL ENCOUNTER (EMERGENCY)
Facility: HOSPITAL | Age: 45
Discharge: HOME | End: 2020-05-16
Attending: EMERGENCY MEDICINE
Payer: MEDICAID

## 2020-05-16 VITALS
RESPIRATION RATE: 16 BRPM | HEART RATE: 85 BPM | SYSTOLIC BLOOD PRESSURE: 149 MMHG | DIASTOLIC BLOOD PRESSURE: 85 MMHG | WEIGHT: 180 LBS | OXYGEN SATURATION: 99 % | BODY MASS INDEX: 33.13 KG/M2 | TEMPERATURE: 98 F | HEIGHT: 62 IN

## 2020-05-16 DIAGNOSIS — E87.6 HYPOKALEMIA: ICD-10-CM

## 2020-05-16 DIAGNOSIS — N30.01 ACUTE CYSTITIS WITH HEMATURIA: Primary | ICD-10-CM

## 2020-05-16 DIAGNOSIS — R06.02 SHORTNESS OF BREATH: ICD-10-CM

## 2020-05-16 DIAGNOSIS — A59.9 TRICHOMONAS INFECTION: ICD-10-CM

## 2020-05-16 LAB
ALBUMIN SERPL BCP-MCNC: 4.1 G/DL (ref 3.5–5.2)
ALP SERPL-CCNC: 106 U/L (ref 38–126)
ALT SERPL W/O P-5'-P-CCNC: 21 U/L (ref 10–44)
ANION GAP SERPL CALC-SCNC: 9 MMOL/L (ref 8–16)
AST SERPL-CCNC: 32 U/L (ref 15–46)
B-HCG UR QL: NEGATIVE
BASOPHILS # BLD AUTO: 0.07 K/UL (ref 0–0.2)
BASOPHILS NFR BLD: 0.8 % (ref 0–1.9)
BILIRUB SERPL-MCNC: 0.4 MG/DL (ref 0.1–1)
BILIRUB UR QL STRIP: NEGATIVE
BUN SERPL-MCNC: 13 MG/DL (ref 7–17)
CALCIUM SERPL-MCNC: 9.5 MG/DL (ref 8.7–10.5)
CHLORIDE SERPL-SCNC: 101 MMOL/L (ref 95–110)
CLARITY UR REFRACT.AUTO: CLEAR
CO2 SERPL-SCNC: 29 MMOL/L (ref 23–29)
COLOR UR AUTO: YELLOW
CREAT SERPL-MCNC: 0.82 MG/DL (ref 0.5–1.4)
DIFFERENTIAL METHOD: ABNORMAL
EOSINOPHIL # BLD AUTO: 0.6 K/UL (ref 0–0.5)
EOSINOPHIL NFR BLD: 7 % (ref 0–8)
ERYTHROCYTE [DISTWIDTH] IN BLOOD BY AUTOMATED COUNT: 14.8 % (ref 11.5–14.5)
EST. GFR  (AFRICAN AMERICAN): >60 ML/MIN/1.73 M^2
EST. GFR  (NON AFRICAN AMERICAN): >60 ML/MIN/1.73 M^2
GLUCOSE SERPL-MCNC: 130 MG/DL (ref 70–110)
GLUCOSE UR QL STRIP: NEGATIVE
HCT VFR BLD AUTO: 37.9 % (ref 37–48.5)
HGB BLD-MCNC: 12.7 G/DL (ref 12–16)
HGB UR QL STRIP: ABNORMAL
IMM GRANULOCYTES # BLD AUTO: 0.04 K/UL (ref 0–0.04)
IMM GRANULOCYTES NFR BLD AUTO: 0.5 % (ref 0–0.5)
INR PPP: 1 (ref 0.8–1.2)
KETONES UR QL STRIP: NEGATIVE
LEUKOCYTE ESTERASE UR QL STRIP: ABNORMAL
LYMPHOCYTES # BLD AUTO: 2.3 K/UL (ref 1–4.8)
LYMPHOCYTES NFR BLD: 27.6 % (ref 18–48)
MCH RBC QN AUTO: 29 PG (ref 27–31)
MCHC RBC AUTO-ENTMCNC: 33.5 G/DL (ref 32–36)
MCV RBC AUTO: 87 FL (ref 82–98)
MICROSCOPIC COMMENT: ABNORMAL
MONOCYTES # BLD AUTO: 0.6 K/UL (ref 0.3–1)
MONOCYTES NFR BLD: 7.3 % (ref 4–15)
NEUTROPHILS # BLD AUTO: 4.7 K/UL (ref 1.8–7.7)
NEUTROPHILS NFR BLD: 56.8 % (ref 38–73)
NITRITE UR QL STRIP: NEGATIVE
NRBC BLD-RTO: 0 /100 WBC
NT-PROBNP: 184 PG/ML (ref 5–450)
PH UR STRIP: 6 [PH] (ref 5–8)
PLATELET # BLD AUTO: 314 K/UL (ref 150–350)
PMV BLD AUTO: 10.8 FL (ref 9.2–12.9)
POTASSIUM SERPL-SCNC: 3 MMOL/L (ref 3.5–5.1)
PROT SERPL-MCNC: 7.4 G/DL (ref 6–8.4)
PROT UR QL STRIP: NEGATIVE
PROTHROMBIN TIME: 11.2 SEC (ref 9–12.5)
RBC # BLD AUTO: 4.38 M/UL (ref 4–5.4)
RBC #/AREA URNS AUTO: 4 /HPF (ref 0–4)
SARS-COV-2 RDRP RESP QL NAA+PROBE: NEGATIVE
SODIUM SERPL-SCNC: 139 MMOL/L (ref 136–145)
SP GR UR STRIP: 1.01 (ref 1–1.03)
TRICHOMONAS UR QL COMP ASSIST: ABNORMAL
TROPONIN I SERPL DL<=0.01 NG/ML-MCNC: <0.012 NG/ML (ref 0.01–0.03)
URN SPEC COLLECT METH UR: ABNORMAL
UROBILINOGEN UR STRIP-ACNC: NEGATIVE EU/DL
WBC # BLD AUTO: 8.3 K/UL (ref 3.9–12.7)
WBC #/AREA URNS AUTO: 35 /HPF (ref 0–5)

## 2020-05-16 PROCEDURE — 80053 COMPREHEN METABOLIC PANEL: CPT | Mod: ER

## 2020-05-16 PROCEDURE — U0002 COVID-19 LAB TEST NON-CDC: HCPCS | Mod: ER

## 2020-05-16 PROCEDURE — 83880 ASSAY OF NATRIURETIC PEPTIDE: CPT | Mod: ER

## 2020-05-16 PROCEDURE — 93010 EKG 12-LEAD: ICD-10-PCS | Mod: ,,, | Performed by: INTERNAL MEDICINE

## 2020-05-16 PROCEDURE — 85610 PROTHROMBIN TIME: CPT | Mod: ER

## 2020-05-16 PROCEDURE — 99284 EMERGENCY DEPT VISIT MOD MDM: CPT | Mod: 25,ER

## 2020-05-16 PROCEDURE — 25000003 PHARM REV CODE 250: Mod: ER | Performed by: FAMILY MEDICINE

## 2020-05-16 PROCEDURE — 84484 ASSAY OF TROPONIN QUANT: CPT | Mod: ER

## 2020-05-16 PROCEDURE — 93005 ELECTROCARDIOGRAM TRACING: CPT | Mod: ER

## 2020-05-16 PROCEDURE — 85025 COMPLETE CBC W/AUTO DIFF WBC: CPT | Mod: ER

## 2020-05-16 PROCEDURE — 87086 URINE CULTURE/COLONY COUNT: CPT | Mod: ER

## 2020-05-16 PROCEDURE — 81000 URINALYSIS NONAUTO W/SCOPE: CPT | Mod: ER

## 2020-05-16 PROCEDURE — 93010 ELECTROCARDIOGRAM REPORT: CPT | Mod: ,,, | Performed by: INTERNAL MEDICINE

## 2020-05-16 PROCEDURE — 81025 URINE PREGNANCY TEST: CPT | Mod: ER

## 2020-05-16 RX ORDER — METRONIDAZOLE 500 MG/1
500 TABLET ORAL 3 TIMES DAILY
Qty: 21 TABLET | Refills: 0 | Status: SHIPPED | OUTPATIENT
Start: 2020-05-16 | End: 2020-05-23

## 2020-05-16 RX ORDER — CIPROFLOXACIN 500 MG/1
500 TABLET ORAL
Status: COMPLETED | OUTPATIENT
Start: 2020-05-16 | End: 2020-05-16

## 2020-05-16 RX ORDER — CIPROFLOXACIN 500 MG/1
500 TABLET ORAL 2 TIMES DAILY
Qty: 20 TABLET | Refills: 0 | Status: SHIPPED | OUTPATIENT
Start: 2020-05-16 | End: 2020-05-26

## 2020-05-16 RX ORDER — METRONIDAZOLE 500 MG/1
500 TABLET ORAL
Status: COMPLETED | OUTPATIENT
Start: 2020-05-16 | End: 2020-05-16

## 2020-05-16 RX ORDER — FUROSEMIDE 20 MG/1
40 TABLET ORAL
Status: COMPLETED | OUTPATIENT
Start: 2020-05-16 | End: 2020-05-16

## 2020-05-16 RX ORDER — POTASSIUM CHLORIDE 20 MEQ/1
40 TABLET, EXTENDED RELEASE ORAL
Status: COMPLETED | OUTPATIENT
Start: 2020-05-16 | End: 2020-05-16

## 2020-05-16 RX ADMIN — METRONIDAZOLE 500 MG: 500 TABLET, FILM COATED ORAL at 06:05

## 2020-05-16 RX ADMIN — FUROSEMIDE 40 MG: 20 TABLET ORAL at 06:05

## 2020-05-16 RX ADMIN — POTASSIUM CHLORIDE 40 MEQ: 1500 TABLET, EXTENDED RELEASE ORAL at 06:05

## 2020-05-16 RX ADMIN — CIPROFLOXACIN 500 MG: 500 TABLET, FILM COATED ORAL at 06:05

## 2020-05-16 NOTE — ED PROVIDER NOTES
Encounter Date: 5/16/2020       History     Chief Complaint   Patient presents with    Weakness     Pt reports weakness and dyspnea on exertion since Wednesday. PA speaking to pt. Denies cough, fever, NVD, pain or chest pain.     Shortness of Breath     Patient is a 45 year old female with history of HTN, CHF, aneurysm and CVA. She is presenting with complaint of generalized weakness and shortness of breath for 3 days. She has been taking all medications as prescribed including potassium. She states she thinks her potassium is low. No edema. She denies cough, chest pain, fever, chills, abdominal pain, nausea, vomiting or diarrhea. No known exposure to illness.         Review of patient's allergies indicates:   Allergen Reactions    Lisinopril Swelling    Bactrim [sulfamethoxazole-trimethoprim] Rash     Past Medical History:   Diagnosis Date    Brain aneurysm     CHF (congestive heart failure)     H/O coronary angioplasty     Hypercholesteremia     Hypertension     Migraine headache     Stroke 10/2017     Past Surgical History:   Procedure Laterality Date    CORONARY ANGIOPLASTY WITH STENT PLACEMENT       History reviewed. No pertinent family history.  Social History     Tobacco Use    Smoking status: Current Every Day Smoker     Packs/day: 1.00    Smokeless tobacco: Never Used   Substance Use Topics    Alcohol use: Yes     Comment: occassionally    Drug use: No     Review of Systems   Constitutional: Negative for activity change, appetite change, chills, fatigue and fever.        + generalized weakness   HENT: Negative for congestion, ear pain, rhinorrhea, sore throat and voice change.    Respiratory: Positive for shortness of breath. Negative for cough and wheezing.    Cardiovascular: Negative for chest pain, palpitations and leg swelling.   Gastrointestinal: Negative for abdominal pain, diarrhea, nausea and vomiting.   Genitourinary: Negative for dysuria, flank pain and frequency.    Musculoskeletal: Negative for back pain, neck pain and neck stiffness.   Skin: Negative for rash.   Neurological: Negative for dizziness, weakness, numbness and headaches.   All other systems reviewed and are negative.      Physical Exam     Initial Vitals [05/16/20 1705]   BP Pulse Resp Temp SpO2   121/83 84 18 98.2 °F (36.8 °C) 99 %      MAP       --         Physical Exam    Nursing note and vitals reviewed.  Constitutional: She appears well-developed and well-nourished. She appears distressed (malaise).   Non-toxic appearing   HENT:   Head: Normocephalic and atraumatic.   Nose: Nose normal.   Mouth/Throat: Oropharynx is clear and moist.   Eyes: Conjunctivae and EOM are normal. Pupils are equal, round, and reactive to light.   Neck: Normal range of motion. Neck supple.   Cardiovascular: Normal rate, regular rhythm, normal heart sounds and intact distal pulses.   Pulmonary/Chest: Breath sounds normal. No respiratory distress.   Abdominal: Soft. Bowel sounds are normal. There is no tenderness.   Musculoskeletal: Normal range of motion. She exhibits no edema.   No posterior calf tenderness bilaterally.    Lymphadenopathy:     She has no cervical adenopathy.   Neurological: She is alert and oriented to person, place, and time.   Skin: Skin is warm and dry. No rash noted.   Psychiatric: She has a normal mood and affect. Her behavior is normal. Judgment and thought content normal.         ED Course   Procedures  Labs Reviewed   CBC W/ AUTO DIFFERENTIAL   COMPREHENSIVE METABOLIC PANEL   TROPONIN I   B-TYPE NATRIURETIC PEPTIDE   PROTIME-INR   SARS-COV-2 RNA AMPLIFICATION, QUAL   PREGNANCY TEST, URINE RAPID   URINALYSIS, REFLEX TO URINE CULTURE     EKG Readings: (Independently Interpreted)   Initial Reading: No STEMI. Rhythm: Normal Sinus Rhythm. Heart Rate: 79.       Imaging Results          X-Ray Chest AP Portable (Final result)  Result time 05/16/20 17:22:41    Final result by Richard Hurley Jr., MD (05/16/20  17:22:41)                 Impression:      No acute cardiopulmonary disease.      Electronically signed by: Richard Hurley Jr., MD  Date:    05/16/2020  Time:    17:22             Narrative:    EXAMINATION:  XR CHEST AP PORTABLE    CLINICAL HISTORY:  CHF;    COMPARISON:  08/06/2019    FINDINGS:  The lungs are clear. The cardiac silhouette and mediastinum are within normal limits. The remaining osseous structures and soft tissues are within normal limits.                                 Medical Decision Making:   Clinical Tests:   Radiological Study: Reviewed and Ordered  Medical Tests: Ordered and Reviewed                   ED Course as of May 17 1433   Sat May 16, 2020   1834 No obvious shortnessOf breath.  Patient is comfortable in bed without any oxygen and saturations around 98% on room air.  Bilateral good air entry and expansion.  Patient says she is short of breath only with exertion.  She has been taking her medications.  She has been given Lasix p.o. 40 mg. advised to continue home medication.  Chest x-ray is normal.  COVID-19 is negative.  Mild urinary tract infection along with Trichomonas treated with Cipro and Flagyl.  Patient has history of chronic hypokalemia for which she has been given potassium 40 mEq in ER and advised to continue home medications.  Follow-up ED with any worsening symptoms.    [SP]      ED Course User Index  [SP] Deep Gutierrez MD                Clinical Impression:       ICD-10-CM ICD-9-CM   1. Acute cystitis with hematuria N30.01 595.0   2. Shortness of breath R06.02 786.05   3. Trichomonas infection A59.9 131.9   4. Hypokalemia E87.6 276.8         Disposition:   Disposition: Discharged                        SARAVANAN Marinelli  05/17/20 1433

## 2020-05-19 LAB
BACTERIA UR CULT: NORMAL
BACTERIA UR CULT: NORMAL

## 2020-06-30 ENCOUNTER — LAB VISIT (OUTPATIENT)
Dept: LAB | Facility: HOSPITAL | Age: 45
End: 2020-06-30
Attending: NURSE PRACTITIONER
Payer: MEDICAID

## 2020-06-30 DIAGNOSIS — D64.9 ANEMIA, UNSPECIFIED: Primary | ICD-10-CM

## 2020-06-30 DIAGNOSIS — I10 ESSENTIAL (PRIMARY) HYPERTENSION: ICD-10-CM

## 2020-06-30 DIAGNOSIS — E07.9 DISORDER OF THYROID, UNSPECIFIED: ICD-10-CM

## 2020-06-30 DIAGNOSIS — E55.9 VITAMIN D DEFICIENCY, UNSPECIFIED: ICD-10-CM

## 2020-06-30 LAB
25(OH)D3+25(OH)D2 SERPL-MCNC: 24 NG/ML (ref 30–96)
ALBUMIN SERPL BCP-MCNC: 3.8 G/DL (ref 3.5–5.2)
ALP SERPL-CCNC: 133 U/L (ref 38–126)
ALT SERPL W/O P-5'-P-CCNC: 23 U/L (ref 10–44)
ANION GAP SERPL CALC-SCNC: 12 MMOL/L (ref 8–16)
AST SERPL-CCNC: 27 U/L (ref 15–46)
BASOPHILS # BLD AUTO: 0.06 K/UL (ref 0–0.2)
BASOPHILS NFR BLD: 0.7 % (ref 0–1.9)
BILIRUB SERPL-MCNC: 0.1 MG/DL (ref 0.1–1)
BUN SERPL-MCNC: 18 MG/DL (ref 7–17)
CALCIUM SERPL-MCNC: 9.5 MG/DL (ref 8.7–10.5)
CHLORIDE SERPL-SCNC: 102 MMOL/L (ref 95–110)
CO2 SERPL-SCNC: 27 MMOL/L (ref 23–29)
CREAT SERPL-MCNC: 0.96 MG/DL (ref 0.5–1.4)
DIFFERENTIAL METHOD: ABNORMAL
EOSINOPHIL # BLD AUTO: 0.5 K/UL (ref 0–0.5)
EOSINOPHIL NFR BLD: 6.4 % (ref 0–8)
ERYTHROCYTE [DISTWIDTH] IN BLOOD BY AUTOMATED COUNT: 13.7 % (ref 11.5–14.5)
EST. GFR  (AFRICAN AMERICAN): >60 ML/MIN/1.73 M^2
EST. GFR  (NON AFRICAN AMERICAN): >60 ML/MIN/1.73 M^2
ESTIMATED AVG GLUCOSE: 108 MG/DL (ref 68–131)
FERRITIN SERPL-MCNC: 63 NG/ML (ref 20–300)
GLUCOSE SERPL-MCNC: 165 MG/DL (ref 70–110)
HBA1C MFR BLD HPLC: 5.4 % (ref 4–5.6)
HCT VFR BLD AUTO: 36.2 % (ref 37–48.5)
HGB BLD-MCNC: 12.4 G/DL (ref 12–16)
IMM GRANULOCYTES # BLD AUTO: 0.03 K/UL (ref 0–0.04)
IMM GRANULOCYTES NFR BLD AUTO: 0.4 % (ref 0–0.5)
IRON SERPL-MCNC: 52 UG/DL (ref 30–160)
LYMPHOCYTES # BLD AUTO: 2.7 K/UL (ref 1–4.8)
LYMPHOCYTES NFR BLD: 32.8 % (ref 18–48)
MCH RBC QN AUTO: 30.1 PG (ref 27–31)
MCHC RBC AUTO-ENTMCNC: 34.3 G/DL (ref 32–36)
MCV RBC AUTO: 88 FL (ref 82–98)
MONOCYTES # BLD AUTO: 0.7 K/UL (ref 0.3–1)
MONOCYTES NFR BLD: 8 % (ref 4–15)
NEUTROPHILS # BLD AUTO: 4.2 K/UL (ref 1.8–7.7)
NEUTROPHILS NFR BLD: 51.7 % (ref 38–73)
NRBC BLD-RTO: 0 /100 WBC
PLATELET # BLD AUTO: 345 K/UL (ref 150–350)
PMV BLD AUTO: 10.7 FL (ref 9.2–12.9)
POTASSIUM SERPL-SCNC: 2.7 MMOL/L (ref 3.5–5.1)
PROT SERPL-MCNC: 7.1 G/DL (ref 6–8.4)
RBC # BLD AUTO: 4.12 M/UL (ref 4–5.4)
SATURATED IRON: 17 % (ref 20–50)
SODIUM SERPL-SCNC: 141 MMOL/L (ref 136–145)
T3FREE SERPL-MCNC: 3.6 PG/ML (ref 2.3–4.2)
T4 FREE SERPL-MCNC: 0.89 NG/DL (ref 0.71–1.51)
TOTAL IRON BINDING CAPACITY: 314 UG/DL (ref 250–450)
TRANSFERRIN SERPL-MCNC: 212 MG/DL (ref 200–375)
TSH SERPL DL<=0.005 MIU/L-ACNC: 3.7 UIU/ML (ref 0.4–4)
WBC # BLD AUTO: 8.17 K/UL (ref 3.9–12.7)

## 2020-06-30 PROCEDURE — 85025 COMPLETE CBC W/AUTO DIFF WBC: CPT | Mod: PO

## 2020-06-30 PROCEDURE — 82728 ASSAY OF FERRITIN: CPT

## 2020-06-30 PROCEDURE — 36415 COLL VENOUS BLD VENIPUNCTURE: CPT | Mod: PO

## 2020-06-30 PROCEDURE — 83036 HEMOGLOBIN GLYCOSYLATED A1C: CPT

## 2020-06-30 PROCEDURE — 80053 COMPREHEN METABOLIC PANEL: CPT | Mod: PO

## 2020-06-30 PROCEDURE — 83540 ASSAY OF IRON: CPT | Mod: PO

## 2020-06-30 PROCEDURE — 82306 VITAMIN D 25 HYDROXY: CPT | Mod: PO

## 2020-06-30 PROCEDURE — 84443 ASSAY THYROID STIM HORMONE: CPT | Mod: PO

## 2020-06-30 PROCEDURE — 84481 FREE ASSAY (FT-3): CPT | Mod: PO

## 2020-06-30 PROCEDURE — 84439 ASSAY OF FREE THYROXINE: CPT

## 2021-01-07 ENCOUNTER — HOSPITAL ENCOUNTER (EMERGENCY)
Facility: HOSPITAL | Age: 46
Discharge: HOME | End: 2021-01-07
Attending: FAMILY MEDICINE
Payer: MEDICAID

## 2021-01-07 VITALS
HEART RATE: 91 BPM | RESPIRATION RATE: 18 BRPM | BODY MASS INDEX: 37.73 KG/M2 | HEIGHT: 62 IN | DIASTOLIC BLOOD PRESSURE: 93 MMHG | OXYGEN SATURATION: 100 % | SYSTOLIC BLOOD PRESSURE: 131 MMHG | TEMPERATURE: 99 F | WEIGHT: 205 LBS

## 2021-01-07 DIAGNOSIS — R05.9 COUGH: ICD-10-CM

## 2021-01-07 DIAGNOSIS — N30.00 ACUTE CYSTITIS WITHOUT HEMATURIA: ICD-10-CM

## 2021-01-07 DIAGNOSIS — J18.9 PNEUMONIA OF LEFT LOWER LOBE DUE TO INFECTIOUS ORGANISM: Primary | ICD-10-CM

## 2021-01-07 LAB
B-HCG UR QL: NEGATIVE
BACTERIA #/AREA URNS AUTO: ABNORMAL /HPF
BILIRUB UR QL STRIP: NEGATIVE
CLARITY UR REFRACT.AUTO: ABNORMAL
COLOR UR AUTO: YELLOW
GLUCOSE UR QL STRIP: NEGATIVE
GROUP A STREP, MOLECULAR: NEGATIVE
HGB UR QL STRIP: ABNORMAL
HYALINE CASTS UR QL AUTO: 0 /LPF
INFLUENZA A, MOLECULAR: NEGATIVE
INFLUENZA B, MOLECULAR: NEGATIVE
KETONES UR QL STRIP: ABNORMAL
LEUKOCYTE ESTERASE UR QL STRIP: ABNORMAL
MICROSCOPIC COMMENT: ABNORMAL
NITRITE UR QL STRIP: NEGATIVE
PH UR STRIP: 5 [PH] (ref 5–8)
PROT UR QL STRIP: ABNORMAL
RBC #/AREA URNS AUTO: 4 /HPF (ref 0–4)
SARS-COV-2 RDRP RESP QL NAA+PROBE: NEGATIVE
SP GR UR STRIP: 1.02 (ref 1–1.03)
SPECIMEN SOURCE: NORMAL
URN SPEC COLLECT METH UR: ABNORMAL
UROBILINOGEN UR STRIP-ACNC: NEGATIVE EU/DL
WBC #/AREA URNS AUTO: 12 /HPF (ref 0–5)
YEAST UR QL AUTO: ABNORMAL

## 2021-01-07 PROCEDURE — U0002 COVID-19 LAB TEST NON-CDC: HCPCS | Mod: ER

## 2021-01-07 PROCEDURE — 81000 URINALYSIS NONAUTO W/SCOPE: CPT | Mod: ER

## 2021-01-07 PROCEDURE — 99283 EMERGENCY DEPT VISIT LOW MDM: CPT | Mod: 25,ER

## 2021-01-07 PROCEDURE — 87502 INFLUENZA DNA AMP PROBE: CPT | Mod: ER

## 2021-01-07 PROCEDURE — 25000003 PHARM REV CODE 250: Mod: ER | Performed by: PHYSICIAN ASSISTANT

## 2021-01-07 PROCEDURE — 81025 URINE PREGNANCY TEST: CPT | Mod: ER

## 2021-01-07 PROCEDURE — 87086 URINE CULTURE/COLONY COUNT: CPT | Mod: ER

## 2021-01-07 PROCEDURE — 87651 STREP A DNA AMP PROBE: CPT | Mod: ER

## 2021-01-07 RX ORDER — LEVOFLOXACIN 750 MG/1
750 TABLET ORAL
Status: COMPLETED | OUTPATIENT
Start: 2021-01-07 | End: 2021-01-07

## 2021-01-07 RX ORDER — LEVOFLOXACIN 250 MG/1
750 TABLET ORAL DAILY
Qty: 21 TABLET | Refills: 0 | Status: SHIPPED | OUTPATIENT
Start: 2021-01-07 | End: 2021-01-14

## 2021-01-07 RX ORDER — FLUCONAZOLE 150 MG/1
150 TABLET ORAL
Status: DISCONTINUED | OUTPATIENT
Start: 2021-01-07 | End: 2021-01-07

## 2021-01-07 RX ADMIN — LEVOFLOXACIN 750 MG: 750 TABLET, FILM COATED ORAL at 02:01

## 2021-01-09 LAB
BACTERIA UR CULT: NORMAL
BACTERIA UR CULT: NORMAL

## 2021-01-13 ENCOUNTER — HOSPITAL ENCOUNTER (EMERGENCY)
Facility: HOSPITAL | Age: 46
Discharge: HOME | End: 2021-01-13
Attending: EMERGENCY MEDICINE
Payer: MEDICAID

## 2021-01-13 VITALS
OXYGEN SATURATION: 99 % | SYSTOLIC BLOOD PRESSURE: 127 MMHG | RESPIRATION RATE: 16 BRPM | TEMPERATURE: 98 F | WEIGHT: 200 LBS | HEIGHT: 61 IN | HEART RATE: 81 BPM | BODY MASS INDEX: 37.76 KG/M2 | DIASTOLIC BLOOD PRESSURE: 70 MMHG

## 2021-01-13 DIAGNOSIS — R11.10 VOMITING: ICD-10-CM

## 2021-01-13 DIAGNOSIS — B34.9 VIRAL SYNDROME: Primary | ICD-10-CM

## 2021-01-13 DIAGNOSIS — K59.00 CONSTIPATION: ICD-10-CM

## 2021-01-13 LAB
ALBUMIN SERPL BCP-MCNC: 3.8 G/DL (ref 3.5–5.2)
ALP SERPL-CCNC: 103 U/L (ref 38–126)
ALT SERPL W/O P-5'-P-CCNC: 19 U/L (ref 10–44)
ANION GAP SERPL CALC-SCNC: 5 MMOL/L (ref 8–16)
AST SERPL-CCNC: 27 U/L (ref 15–46)
B-HCG UR QL: NEGATIVE
BACTERIA #/AREA URNS AUTO: ABNORMAL /HPF
BASOPHILS # BLD AUTO: 0.07 K/UL (ref 0–0.2)
BASOPHILS NFR BLD: 0.7 % (ref 0–1.9)
BILIRUB SERPL-MCNC: 0.4 MG/DL (ref 0.1–1)
BILIRUB UR QL STRIP: NEGATIVE
CALCIUM SERPL-MCNC: 9.4 MG/DL (ref 8.7–10.5)
CHLORIDE SERPL-SCNC: 101 MMOL/L (ref 95–110)
CLARITY UR REFRACT.AUTO: CLEAR
CO2 SERPL-SCNC: 34 MMOL/L (ref 23–29)
COLOR UR AUTO: YELLOW
CREAT SERPL-MCNC: 1.17 MG/DL (ref 0.5–1.4)
DIFFERENTIAL METHOD: ABNORMAL
EOSINOPHIL # BLD AUTO: 0.5 K/UL (ref 0–0.5)
EOSINOPHIL NFR BLD: 5.2 % (ref 0–8)
ERYTHROCYTE [DISTWIDTH] IN BLOOD BY AUTOMATED COUNT: 14.2 % (ref 11.5–14.5)
EST. GFR  (AFRICAN AMERICAN): >60 ML/MIN/1.73 M^2
EST. GFR  (NON AFRICAN AMERICAN): 56.4 ML/MIN/1.73 M^2
GLUCOSE SERPL-MCNC: 94 MG/DL (ref 70–110)
GLUCOSE UR QL STRIP: NEGATIVE
HCT VFR BLD AUTO: 36.9 % (ref 37–48.5)
HGB BLD-MCNC: 12.2 G/DL (ref 12–16)
HGB UR QL STRIP: NEGATIVE
IMM GRANULOCYTES # BLD AUTO: 0.04 K/UL (ref 0–0.04)
IMM GRANULOCYTES NFR BLD AUTO: 0.4 % (ref 0–0.5)
KETONES UR QL STRIP: NEGATIVE
LEUKOCYTE ESTERASE UR QL STRIP: ABNORMAL
LIPASE SERPL-CCNC: 48 U/L (ref 23–300)
LYMPHOCYTES # BLD AUTO: 2 K/UL (ref 1–4.8)
LYMPHOCYTES NFR BLD: 21.5 % (ref 18–48)
MCH RBC QN AUTO: 29 PG (ref 27–31)
MCHC RBC AUTO-ENTMCNC: 33.1 G/DL (ref 32–36)
MCV RBC AUTO: 88 FL (ref 82–98)
MICROSCOPIC COMMENT: ABNORMAL
MONOCYTES # BLD AUTO: 0.8 K/UL (ref 0.3–1)
MONOCYTES NFR BLD: 8.8 % (ref 4–15)
NEUTROPHILS # BLD AUTO: 6 K/UL (ref 1.8–7.7)
NEUTROPHILS NFR BLD: 63.4 % (ref 38–73)
NITRITE UR QL STRIP: NEGATIVE
NRBC BLD-RTO: 0 /100 WBC
PH UR STRIP: 8 [PH] (ref 5–8)
PLATELET # BLD AUTO: 316 K/UL (ref 150–350)
PMV BLD AUTO: 10.4 FL (ref 9.2–12.9)
POTASSIUM SERPL-SCNC: 3.8 MMOL/L (ref 3.5–5.1)
PROT SERPL-MCNC: 7.2 G/DL (ref 6–8.4)
PROT UR QL STRIP: NEGATIVE
RBC # BLD AUTO: 4.21 M/UL (ref 4–5.4)
RBC #/AREA URNS AUTO: 0 /HPF (ref 0–4)
SARS-COV-2 RDRP RESP QL NAA+PROBE: NEGATIVE
SODIUM SERPL-SCNC: 140 MMOL/L (ref 136–145)
SP GR UR STRIP: 1.01 (ref 1–1.03)
SQUAMOUS #/AREA URNS AUTO: 6 /HPF
TROPONIN I SERPL-MCNC: <0.012 NG/ML (ref 0.01–0.03)
URN SPEC COLLECT METH UR: ABNORMAL
UROBILINOGEN UR STRIP-ACNC: NEGATIVE EU/DL
UUN UR-MCNC: 16 MG/DL (ref 7–17)
WBC # BLD AUTO: 9.42 K/UL (ref 3.9–12.7)
WBC #/AREA URNS AUTO: 1 /HPF (ref 0–5)
YEAST UR QL AUTO: ABNORMAL

## 2021-01-13 PROCEDURE — U0002 COVID-19 LAB TEST NON-CDC: HCPCS | Mod: ER

## 2021-01-13 PROCEDURE — 84484 ASSAY OF TROPONIN QUANT: CPT | Mod: ER

## 2021-01-13 PROCEDURE — 99285 EMERGENCY DEPT VISIT HI MDM: CPT | Mod: 25,ER

## 2021-01-13 PROCEDURE — 85025 COMPLETE CBC W/AUTO DIFF WBC: CPT | Mod: ER

## 2021-01-13 PROCEDURE — 81025 URINE PREGNANCY TEST: CPT | Mod: ER

## 2021-01-13 PROCEDURE — 93010 ELECTROCARDIOGRAM REPORT: CPT | Mod: ,,, | Performed by: INTERNAL MEDICINE

## 2021-01-13 PROCEDURE — 81000 URINALYSIS NONAUTO W/SCOPE: CPT | Mod: ER

## 2021-01-13 PROCEDURE — 25000003 PHARM REV CODE 250: Mod: ER | Performed by: PHYSICIAN ASSISTANT

## 2021-01-13 PROCEDURE — 93005 ELECTROCARDIOGRAM TRACING: CPT | Mod: ER

## 2021-01-13 PROCEDURE — 83690 ASSAY OF LIPASE: CPT | Mod: ER

## 2021-01-13 PROCEDURE — 80053 COMPREHEN METABOLIC PANEL: CPT | Mod: ER

## 2021-01-13 PROCEDURE — 93010 EKG 12-LEAD: ICD-10-PCS | Mod: ,,, | Performed by: INTERNAL MEDICINE

## 2021-01-13 PROCEDURE — 96360 HYDRATION IV INFUSION INIT: CPT | Mod: ER

## 2021-01-13 RX ORDER — DOCUSATE SODIUM 100 MG/1
100 CAPSULE, LIQUID FILLED ORAL 2 TIMES DAILY PRN
Qty: 30 CAPSULE | Refills: 0 | Status: SHIPPED | OUTPATIENT
Start: 2021-01-13 | End: 2023-02-07

## 2021-01-13 RX ORDER — ONDANSETRON 4 MG/1
4 TABLET, ORALLY DISINTEGRATING ORAL EVERY 8 HOURS PRN
Qty: 12 TABLET | Refills: 0 | Status: SHIPPED | OUTPATIENT
Start: 2021-01-13 | End: 2021-01-17

## 2021-01-13 RX ORDER — AMITRIPTYLINE HYDROCHLORIDE 75 MG/1
75 TABLET ORAL NIGHTLY
COMMUNITY

## 2021-01-13 RX ADMIN — SODIUM CHLORIDE 1000 ML: 0.9 INJECTION, SOLUTION INTRAVENOUS at 03:01

## 2021-05-03 ENCOUNTER — HOSPITAL ENCOUNTER (EMERGENCY)
Facility: HOSPITAL | Age: 46
Discharge: HOME | End: 2021-05-03
Attending: EMERGENCY MEDICINE
Payer: MEDICAID

## 2021-05-03 VITALS
OXYGEN SATURATION: 100 % | DIASTOLIC BLOOD PRESSURE: 71 MMHG | SYSTOLIC BLOOD PRESSURE: 137 MMHG | BODY MASS INDEX: 38.46 KG/M2 | RESPIRATION RATE: 19 BRPM | HEART RATE: 85 BPM | TEMPERATURE: 99 F | HEIGHT: 62 IN | WEIGHT: 209 LBS

## 2021-05-03 DIAGNOSIS — S99.921A RIGHT FOOT INJURY: ICD-10-CM

## 2021-05-03 DIAGNOSIS — M77.31 HEEL SPUR, RIGHT: Primary | ICD-10-CM

## 2021-05-03 PROCEDURE — 99283 EMERGENCY DEPT VISIT LOW MDM: CPT | Mod: 25,ER

## 2021-05-03 PROCEDURE — 25000003 PHARM REV CODE 250: Mod: ER | Performed by: PHYSICIAN ASSISTANT

## 2021-05-03 RX ORDER — DICLOFENAC SODIUM 25 MG/1
25 TABLET, DELAYED RELEASE ORAL 3 TIMES DAILY PRN
Qty: 15 TABLET | Refills: 0 | Status: ON HOLD | OUTPATIENT
Start: 2021-05-03 | End: 2022-07-20 | Stop reason: HOSPADM

## 2021-05-03 RX ORDER — KETOROLAC TROMETHAMINE 10 MG/1
10 TABLET, FILM COATED ORAL
Status: COMPLETED | OUTPATIENT
Start: 2021-05-03 | End: 2021-05-03

## 2021-05-03 RX ADMIN — KETOROLAC TROMETHAMINE 10 MG: 10 TABLET, FILM COATED ORAL at 03:05

## 2021-08-09 ENCOUNTER — HOSPITAL ENCOUNTER (OUTPATIENT)
Dept: RADIOLOGY | Facility: HOSPITAL | Age: 46
Discharge: HOME | End: 2021-08-09
Attending: NURSE PRACTITIONER
Payer: MEDICAID

## 2021-08-09 DIAGNOSIS — Z12.31 ENCOUNTER FOR SCREENING MAMMOGRAM FOR MALIGNANT NEOPLASM OF BREAST: ICD-10-CM

## 2021-08-09 PROCEDURE — 77067 SCR MAMMO BI INCL CAD: CPT | Mod: TC,PO

## 2021-08-12 ENCOUNTER — HOSPITAL ENCOUNTER (EMERGENCY)
Facility: HOSPITAL | Age: 46
Discharge: HOME | End: 2021-08-12
Attending: EMERGENCY MEDICINE
Payer: MEDICAID

## 2021-08-12 VITALS
OXYGEN SATURATION: 98 % | HEART RATE: 86 BPM | RESPIRATION RATE: 18 BRPM | HEIGHT: 62 IN | DIASTOLIC BLOOD PRESSURE: 63 MMHG | TEMPERATURE: 99 F | SYSTOLIC BLOOD PRESSURE: 115 MMHG | WEIGHT: 189 LBS | BODY MASS INDEX: 34.78 KG/M2

## 2021-08-12 DIAGNOSIS — B34.9 VIRAL SYNDROME: Primary | ICD-10-CM

## 2021-08-12 LAB
CTP QC/QA: YES
SARS-COV-2 RDRP RESP QL NAA+PROBE: NEGATIVE

## 2021-08-12 PROCEDURE — U0002 COVID-19 LAB TEST NON-CDC: HCPCS | Mod: ER | Performed by: EMERGENCY MEDICINE

## 2021-08-12 PROCEDURE — 99283 EMERGENCY DEPT VISIT LOW MDM: CPT | Mod: ER

## 2021-08-12 RX ORDER — PROMETHAZINE HYDROCHLORIDE AND DEXTROMETHORPHAN HYDROBROMIDE 6.25; 15 MG/5ML; MG/5ML
5 SYRUP ORAL 3 TIMES DAILY PRN
Qty: 118 ML | Refills: 0 | Status: SHIPPED | OUTPATIENT
Start: 2021-08-12 | End: 2021-08-22

## 2021-08-20 ENCOUNTER — HOSPITAL ENCOUNTER (EMERGENCY)
Facility: HOSPITAL | Age: 46
Discharge: HOME | End: 2021-08-20
Attending: EMERGENCY MEDICINE
Payer: MEDICAID

## 2021-08-20 VITALS
OXYGEN SATURATION: 98 % | WEIGHT: 185 LBS | BODY MASS INDEX: 34.04 KG/M2 | SYSTOLIC BLOOD PRESSURE: 130 MMHG | DIASTOLIC BLOOD PRESSURE: 82 MMHG | HEART RATE: 83 BPM | RESPIRATION RATE: 21 BRPM | HEIGHT: 62 IN | TEMPERATURE: 98 F

## 2021-08-20 DIAGNOSIS — U07.1 COVID-19: Primary | ICD-10-CM

## 2021-08-20 LAB — SARS-COV-2 RDRP RESP QL NAA+PROBE: POSITIVE

## 2021-08-20 PROCEDURE — 99282 EMERGENCY DEPT VISIT SF MDM: CPT | Mod: ER

## 2021-08-20 PROCEDURE — U0002 COVID-19 LAB TEST NON-CDC: HCPCS | Mod: ER | Performed by: EMERGENCY MEDICINE

## 2021-08-21 ENCOUNTER — PATIENT MESSAGE (OUTPATIENT)
Dept: ADMINISTRATIVE | Facility: CLINIC | Age: 46
End: 2021-08-21

## 2021-08-22 ENCOUNTER — INFUSION (OUTPATIENT)
Dept: INFECTIOUS DISEASES | Facility: HOSPITAL | Age: 46
End: 2021-08-22
Attending: EMERGENCY MEDICINE
Payer: MEDICAID

## 2021-08-22 ENCOUNTER — TELEPHONE (OUTPATIENT)
Dept: ADMINISTRATIVE | Facility: OTHER | Age: 46
End: 2021-08-22

## 2021-08-22 ENCOUNTER — PATIENT MESSAGE (OUTPATIENT)
Dept: ADMINISTRATIVE | Facility: CLINIC | Age: 46
End: 2021-08-22

## 2021-08-22 ENCOUNTER — TELEPHONE (OUTPATIENT)
Dept: ADMINISTRATIVE | Facility: CLINIC | Age: 46
End: 2021-08-22

## 2021-08-22 VITALS
DIASTOLIC BLOOD PRESSURE: 78 MMHG | BODY MASS INDEX: 34.04 KG/M2 | RESPIRATION RATE: 16 BRPM | HEART RATE: 92 BPM | SYSTOLIC BLOOD PRESSURE: 147 MMHG | HEIGHT: 62 IN | TEMPERATURE: 99 F | WEIGHT: 185 LBS | OXYGEN SATURATION: 95 %

## 2021-08-22 DIAGNOSIS — U07.1 COVID-19: Primary | ICD-10-CM

## 2021-08-22 PROCEDURE — 25000003 PHARM REV CODE 250: Performed by: INTERNAL MEDICINE

## 2021-08-22 PROCEDURE — 63600175 PHARM REV CODE 636 W HCPCS: Performed by: INTERNAL MEDICINE

## 2021-08-22 PROCEDURE — M0243 CASIRIVI AND IMDEVI INFUSION: HCPCS | Performed by: INTERNAL MEDICINE

## 2021-08-22 RX ORDER — DIPHENHYDRAMINE HYDROCHLORIDE 50 MG/ML
25 INJECTION INTRAMUSCULAR; INTRAVENOUS ONCE AS NEEDED
Status: DISCONTINUED | OUTPATIENT
Start: 2021-08-22 | End: 2022-07-20 | Stop reason: HOSPADM

## 2021-08-22 RX ORDER — ACETAMINOPHEN 325 MG/1
650 TABLET ORAL ONCE AS NEEDED
Status: DISCONTINUED | OUTPATIENT
Start: 2021-08-22 | End: 2022-07-20 | Stop reason: HOSPADM

## 2021-08-22 RX ORDER — ONDANSETRON 4 MG/1
4 TABLET, ORALLY DISINTEGRATING ORAL ONCE AS NEEDED
Status: DISCONTINUED | OUTPATIENT
Start: 2021-08-22 | End: 2022-07-20 | Stop reason: HOSPADM

## 2021-08-22 RX ORDER — SODIUM CHLORIDE 0.9 % (FLUSH) 0.9 %
10 SYRINGE (ML) INJECTION
Status: DISCONTINUED | OUTPATIENT
Start: 2021-08-22 | End: 2022-07-20 | Stop reason: HOSPADM

## 2021-08-22 RX ORDER — EPINEPHRINE 0.3 MG/.3ML
0.3 INJECTION SUBCUTANEOUS
Status: DISCONTINUED | OUTPATIENT
Start: 2021-08-22 | End: 2022-07-20 | Stop reason: HOSPADM

## 2021-08-22 RX ORDER — ALBUTEROL SULFATE 90 UG/1
2 AEROSOL, METERED RESPIRATORY (INHALATION)
Status: DISCONTINUED | OUTPATIENT
Start: 2021-08-22 | End: 2023-02-07

## 2021-08-22 RX ADMIN — CASIRIVIMAB AND IMDEVIMAB 600 MG: 600; 600 INJECTION, SOLUTION, CONCENTRATE INTRAVENOUS at 11:08

## 2021-11-03 ENCOUNTER — HOSPITAL ENCOUNTER (EMERGENCY)
Facility: HOSPITAL | Age: 46
Discharge: HOME | End: 2021-11-03
Attending: EMERGENCY MEDICINE
Payer: MEDICAID

## 2021-11-03 VITALS
DIASTOLIC BLOOD PRESSURE: 91 MMHG | WEIGHT: 160 LBS | TEMPERATURE: 98 F | SYSTOLIC BLOOD PRESSURE: 151 MMHG | BODY MASS INDEX: 29.26 KG/M2 | OXYGEN SATURATION: 99 % | RESPIRATION RATE: 17 BRPM | HEART RATE: 78 BPM

## 2021-11-03 DIAGNOSIS — I10 UNCONTROLLED HYPERTENSION: ICD-10-CM

## 2021-11-03 DIAGNOSIS — G44.89 OTHER HEADACHE SYNDROME: Primary | ICD-10-CM

## 2021-11-03 LAB
ALBUMIN SERPL BCP-MCNC: 4.4 G/DL (ref 3.5–5.2)
ALP SERPL-CCNC: 141 U/L (ref 38–126)
ALT SERPL W/O P-5'-P-CCNC: 30 U/L (ref 10–44)
ANION GAP SERPL CALC-SCNC: 13 MMOL/L (ref 8–16)
AST SERPL-CCNC: 35 U/L (ref 15–46)
B-HCG UR QL: NEGATIVE
BASOPHILS # BLD AUTO: 0.08 K/UL (ref 0–0.2)
BASOPHILS NFR BLD: 1 % (ref 0–1.9)
BILIRUB SERPL-MCNC: 0.5 MG/DL (ref 0.1–1)
CALCIUM SERPL-MCNC: 9.3 MG/DL (ref 8.7–10.5)
CHLORIDE SERPL-SCNC: 103 MMOL/L (ref 95–110)
CO2 SERPL-SCNC: 26 MMOL/L (ref 23–29)
CREAT SERPL-MCNC: 1.04 MG/DL (ref 0.5–1.4)
DIFFERENTIAL METHOD: NORMAL
EOSINOPHIL # BLD AUTO: 0.4 K/UL (ref 0–0.5)
EOSINOPHIL NFR BLD: 4.9 % (ref 0–8)
ERYTHROCYTE [DISTWIDTH] IN BLOOD BY AUTOMATED COUNT: 14.2 % (ref 11.5–14.5)
EST. GFR  (AFRICAN AMERICAN): >60 ML/MIN/1.73 M^2
EST. GFR  (NON AFRICAN AMERICAN): >60 ML/MIN/1.73 M^2
GLUCOSE SERPL-MCNC: 141 MG/DL (ref 70–110)
HCT VFR BLD AUTO: 40 % (ref 37–48.5)
HGB BLD-MCNC: 13.5 G/DL (ref 12–16)
IMM GRANULOCYTES # BLD AUTO: 0.03 K/UL (ref 0–0.04)
IMM GRANULOCYTES NFR BLD AUTO: 0.4 % (ref 0–0.5)
LYMPHOCYTES # BLD AUTO: 2.4 K/UL (ref 1–4.8)
LYMPHOCYTES NFR BLD: 30.3 % (ref 18–48)
MCH RBC QN AUTO: 29.5 PG (ref 27–31)
MCHC RBC AUTO-ENTMCNC: 33.8 G/DL (ref 32–36)
MCV RBC AUTO: 87 FL (ref 82–98)
MONOCYTES # BLD AUTO: 0.6 K/UL (ref 0.3–1)
MONOCYTES NFR BLD: 7.2 % (ref 4–15)
NEUTROPHILS # BLD AUTO: 4.4 K/UL (ref 1.8–7.7)
NEUTROPHILS NFR BLD: 56.2 % (ref 38–73)
NRBC BLD-RTO: 0 /100 WBC
PLATELET # BLD AUTO: 362 K/UL (ref 150–450)
PMV BLD AUTO: 10.3 FL (ref 9.2–12.9)
POTASSIUM SERPL-SCNC: 3.1 MMOL/L (ref 3.5–5.1)
PROT SERPL-MCNC: 8 G/DL (ref 6–8.4)
RBC # BLD AUTO: 4.58 M/UL (ref 4–5.4)
SODIUM SERPL-SCNC: 142 MMOL/L (ref 136–145)
UUN UR-MCNC: 13 MG/DL (ref 7–17)
WBC # BLD AUTO: 7.89 K/UL (ref 3.9–12.7)

## 2021-11-03 PROCEDURE — 25000003 PHARM REV CODE 250: Mod: ER | Performed by: EMERGENCY MEDICINE

## 2021-11-03 PROCEDURE — 25500020 PHARM REV CODE 255: Mod: ER | Performed by: EMERGENCY MEDICINE

## 2021-11-03 PROCEDURE — 96360 HYDRATION IV INFUSION INIT: CPT | Mod: ER

## 2021-11-03 PROCEDURE — 99285 EMERGENCY DEPT VISIT HI MDM: CPT | Mod: 25,ER

## 2021-11-03 PROCEDURE — 81025 URINE PREGNANCY TEST: CPT | Mod: ER | Performed by: EMERGENCY MEDICINE

## 2021-11-03 PROCEDURE — 85025 COMPLETE CBC W/AUTO DIFF WBC: CPT | Mod: ER | Performed by: EMERGENCY MEDICINE

## 2021-11-03 PROCEDURE — 80053 COMPREHEN METABOLIC PANEL: CPT | Mod: ER | Performed by: EMERGENCY MEDICINE

## 2021-11-03 PROCEDURE — 96361 HYDRATE IV INFUSION ADD-ON: CPT | Mod: ER

## 2021-11-03 RX ORDER — BUTALBITAL, ACETAMINOPHEN AND CAFFEINE 50; 325; 40 MG/1; MG/1; MG/1
2 TABLET ORAL
Status: COMPLETED | OUTPATIENT
Start: 2021-11-03 | End: 2021-11-03

## 2021-11-03 RX ORDER — SODIUM CHLORIDE 9 MG/ML
INJECTION, SOLUTION INTRAVENOUS
Status: COMPLETED | OUTPATIENT
Start: 2021-11-03 | End: 2021-11-03

## 2021-11-03 RX ORDER — BUTALBITAL, ACETAMINOPHEN AND CAFFEINE 50; 325; 40 MG/1; MG/1; MG/1
1 TABLET ORAL EVERY 4 HOURS PRN
Qty: 20 TABLET | Refills: 0 | Status: SHIPPED | OUTPATIENT
Start: 2021-11-03 | End: 2021-12-03

## 2021-11-03 RX ORDER — CLONIDINE HYDROCHLORIDE 0.1 MG/1
0.1 TABLET ORAL
Status: COMPLETED | OUTPATIENT
Start: 2021-11-03 | End: 2021-11-03

## 2021-11-03 RX ADMIN — BUTALBITAL, ACETAMINOPHEN, AND CAFFEINE 2 TABLET: 50; 325; 40 TABLET ORAL at 06:11

## 2021-11-03 RX ADMIN — SODIUM CHLORIDE: 0.9 INJECTION, SOLUTION INTRAVENOUS at 07:11

## 2021-11-03 RX ADMIN — CLONIDINE HYDROCHLORIDE 0.1 MG: 0.1 TABLET ORAL at 06:11

## 2021-11-03 RX ADMIN — IOHEXOL 100 ML: 350 INJECTION, SOLUTION INTRAVENOUS at 08:11

## 2022-02-28 ENCOUNTER — TELEPHONE (OUTPATIENT)
Dept: NEUROSURGERY | Facility: CLINIC | Age: 47
End: 2022-02-28
Payer: MEDICAID

## 2022-02-28 NOTE — TELEPHONE ENCOUNTER
Spoke with patient. Pt understands the need to reschedule appointment. Will call back to reschedule soon.

## 2022-02-28 NOTE — TELEPHONE ENCOUNTER
Attempted to call patient to informed her the need to cancel and reschedule her appointment. Pt did not answer but did leave voicemail.

## 2022-03-07 ENCOUNTER — TELEPHONE (OUTPATIENT)
Dept: NEUROSURGERY | Facility: CLINIC | Age: 47
End: 2022-03-07
Payer: MEDICAID

## 2022-03-07 ENCOUNTER — OFFICE VISIT (OUTPATIENT)
Dept: NEUROSURGERY | Facility: CLINIC | Age: 47
End: 2022-03-07
Payer: MEDICAID

## 2022-03-07 ENCOUNTER — TELEPHONE (OUTPATIENT)
Dept: NEUROSURGERY | Facility: CLINIC | Age: 47
End: 2022-03-07

## 2022-03-07 VITALS
BODY MASS INDEX: 29.44 KG/M2 | SYSTOLIC BLOOD PRESSURE: 128 MMHG | HEIGHT: 62 IN | WEIGHT: 160 LBS | DIASTOLIC BLOOD PRESSURE: 86 MMHG | TEMPERATURE: 98 F | HEART RATE: 65 BPM

## 2022-03-07 DIAGNOSIS — I60.8 OTHER NONTRAUMATIC SUBARACHNOID HEMORRHAGE: ICD-10-CM

## 2022-03-07 DIAGNOSIS — I67.1 CEREBRAL ANEURYSM: ICD-10-CM

## 2022-03-07 DIAGNOSIS — I67.1 ANEURYSM OF MIDDLE CEREBRAL ARTERY: Primary | ICD-10-CM

## 2022-03-07 PROCEDURE — 3008F PR BODY MASS INDEX (BMI) DOCUMENTED: ICD-10-PCS | Mod: CPTII,,, | Performed by: NEUROLOGICAL SURGERY

## 2022-03-07 PROCEDURE — 3079F DIAST BP 80-89 MM HG: CPT | Mod: CPTII,,, | Performed by: NEUROLOGICAL SURGERY

## 2022-03-07 PROCEDURE — 1160F PR REVIEW ALL MEDS BY PRESCRIBER/CLIN PHARMACIST DOCUMENTED: ICD-10-PCS | Mod: CPTII,,, | Performed by: NEUROLOGICAL SURGERY

## 2022-03-07 PROCEDURE — 1160F RVW MEDS BY RX/DR IN RCRD: CPT | Mod: CPTII,,, | Performed by: NEUROLOGICAL SURGERY

## 2022-03-07 PROCEDURE — 3079F PR MOST RECENT DIASTOLIC BLOOD PRESSURE 80-89 MM HG: ICD-10-PCS | Mod: CPTII,,, | Performed by: NEUROLOGICAL SURGERY

## 2022-03-07 PROCEDURE — 99999 PR PBB SHADOW E&M-EST. PATIENT-LVL IV: ICD-10-PCS | Mod: PBBFAC,,, | Performed by: NEUROLOGICAL SURGERY

## 2022-03-07 PROCEDURE — 99205 PR OFFICE/OUTPT VISIT, NEW, LEVL V, 60-74 MIN: ICD-10-PCS | Mod: S$PBB,,, | Performed by: NEUROLOGICAL SURGERY

## 2022-03-07 PROCEDURE — 99999 PR PBB SHADOW E&M-EST. PATIENT-LVL IV: CPT | Mod: PBBFAC,,, | Performed by: NEUROLOGICAL SURGERY

## 2022-03-07 PROCEDURE — 1159F PR MEDICATION LIST DOCUMENTED IN MEDICAL RECORD: ICD-10-PCS | Mod: CPTII,,, | Performed by: NEUROLOGICAL SURGERY

## 2022-03-07 PROCEDURE — 3074F PR MOST RECENT SYSTOLIC BLOOD PRESSURE < 130 MM HG: ICD-10-PCS | Mod: CPTII,,, | Performed by: NEUROLOGICAL SURGERY

## 2022-03-07 PROCEDURE — 3008F BODY MASS INDEX DOCD: CPT | Mod: CPTII,,, | Performed by: NEUROLOGICAL SURGERY

## 2022-03-07 PROCEDURE — 3074F SYST BP LT 130 MM HG: CPT | Mod: CPTII,,, | Performed by: NEUROLOGICAL SURGERY

## 2022-03-07 PROCEDURE — 1159F MED LIST DOCD IN RCRD: CPT | Mod: CPTII,,, | Performed by: NEUROLOGICAL SURGERY

## 2022-03-07 PROCEDURE — 99214 OFFICE O/P EST MOD 30 MIN: CPT | Mod: PBBFAC | Performed by: NEUROLOGICAL SURGERY

## 2022-03-07 PROCEDURE — 99205 OFFICE O/P NEW HI 60 MIN: CPT | Mod: S$PBB,,, | Performed by: NEUROLOGICAL SURGERY

## 2022-03-07 RX ORDER — CARVEDILOL 25 MG/1
37.5 TABLET ORAL 2 TIMES DAILY
COMMUNITY
Start: 2022-01-18

## 2022-03-07 RX ORDER — TRAMADOL HYDROCHLORIDE 50 MG/1
50 TABLET ORAL 2 TIMES DAILY
Status: ON HOLD | COMMUNITY
Start: 2022-02-28 | End: 2022-07-20 | Stop reason: HOSPADM

## 2022-03-07 RX ORDER — MELOXICAM 7.5 MG/1
7.5 TABLET ORAL 2 TIMES DAILY
Status: ON HOLD | COMMUNITY
Start: 2021-12-24 | End: 2022-07-20 | Stop reason: HOSPADM

## 2022-03-07 RX ORDER — TOPIRAMATE 50 MG/1
50 TABLET, FILM COATED ORAL 2 TIMES DAILY
COMMUNITY
Start: 2022-02-07 | End: 2022-07-14 | Stop reason: CLARIF

## 2022-03-07 NOTE — PROGRESS NOTES
Neurosurgery  Established Patient    SUBJECTIVE:     History of Present Illness:  Ms. Jenkins, is a 47-year-old female who previously presented with intermittent headaches.  She describes the headaches as abrupt, sharp excruciating and lasting for very short period time 1 5 minutes.  She has past medical history of congestive heart failure, history of coronary angioplasty, hypercholesterolemia, hypertension and stroke in 2017. She had a known the single intracranial aneurysm, when previously she presented to the emergency department after another headache which she found concerns for an additional 2 cerebral aneurysms in the left ICA distribution.  She currently denies any new headache, nausea, vomiting.  She notes she has a 20 pack-year history of smoking.  No known family history of aneurysmal rupture.  She has previous surgery.  She does note she has allergies to Bactrim and lisinopril.  She had a CTA of the head in November 2021. She is accompanied by her mother as well.        Review of patient's allergies indicates:   Allergen Reactions    Lisinopril Swelling    Bactrim [sulfamethoxazole-trimethoprim] Rash       Current Outpatient Medications   Medication Sig Dispense Refill    amitriptyline (ELAVIL) 75 MG tablet Take 75 mg by mouth every evening.      amlodipine (NORVASC) 10 MG tablet Take 1 tablet (10 mg total) by mouth once daily. 30 tablet 0    aspirin 81 MG Chew Take 1 tablet (81 mg total) by mouth once daily.  0    atorvastatin (LIPITOR) 40 MG tablet Take 1 tablet (40 mg total) by mouth once daily. 90 tablet 3    carvedilol (COREG) 12.5 MG tablet Take 1 tablet (12.5 mg total) by mouth 2 (two) times daily with meals. 60 tablet 11    cyanocobalamin (VITAMIN B-12) 1000 MCG tablet Take 1 tablet (1,000 mcg total) by mouth once daily. (Patient not taking: Reported on 3/7/2022)      diclofenac (VOLTAREN) 25 MG TbEC Take 1 tablet (25 mg total) by mouth 3 (three) times daily as needed (pain). (Patient not  taking: Reported on 3/7/2022) 15 tablet 0    diphenhydrAMINE (BENADRYL) 25 mg capsule Take 1 each (25 mg total) by mouth every 6 (six) hours as needed for Itching or Allergies. (Patient not taking: Reported on 3/7/2022) 20 capsule 0    docusate sodium (COLACE) 100 MG capsule Take 1 capsule (100 mg total) by mouth 2 (two) times daily as needed for Constipation. (Patient not taking: Reported on 3/7/2022) 30 capsule 0    hydrochlorothiazide (HYDRODIURIL) 25 MG tablet Take 1 tablet (25 mg total) by mouth once daily. 30 tablet 0    losartan (COZAAR) 100 MG Tab Take 1 tablet (100 mg total) by mouth once daily. 30 tablet 0    neomycin-polymyxin-hydrocortisone (CORTISPORIN) 3.5-10,000-1 mg/mL-unit/mL-% otic suspension Place 4 drops into the left ear 3 (three) times daily. (Patient not taking: No sig reported) 10 mL 0    ranitidine (ZANTAC) 150 MG capsule Take 1 capsule (150 mg total) by mouth once daily. 15 capsule 0     Current Facility-Administered Medications   Medication Dose Route Frequency Provider Last Rate Last Admin    acetaminophen tablet 650 mg  650 mg Oral Once PRN Bonifacio Ramirez MD        albuterol inhaler 2 puff  2 puff Inhalation Q20 Min PRN Bonifacio Ramirez MD        diphenhydrAMINE injection 25 mg  25 mg Intravenous Once PRN Bonifacio Ramirez MD        EPINEPHrine (EPIPEN) 0.3 mg/0.3 mL pen injection 0.3 mg  0.3 mg Intramuscular PRN Bonifacio Ramirez MD        methylPREDNISolone sodium succinate injection 40 mg  40 mg Intravenous Once PRN Bonifacio Ramirez MD        ondansetron disintegrating tablet 4 mg  4 mg Oral Once PRN Bonifacio Ramirez MD        sodium chloride 0.9% 500 mL flush bag   Intravenous PRN Bonifacio Ramirez MD        sodium chloride 0.9% flush 10 mL  10 mL Intravenous PRN Bonifacio Ramirez MD           Past Medical History:   Diagnosis Date    Brain aneurysm     CHF (congestive heart failure)     H/O coronary angioplasty     Hypercholesteremia     Hypertension  "    Migraine headache     Stroke 10/2017     Past Surgical History:   Procedure Laterality Date    CORONARY ANGIOPLASTY WITH STENT PLACEMENT       Family History    None       Social History     Socioeconomic History    Marital status:    Tobacco Use    Smoking status: Current Every Day Smoker     Packs/day: 0.50     Types: Cigarettes    Smokeless tobacco: Never Used   Substance and Sexual Activity    Alcohol use: Yes     Comment: occassionally    Drug use: No    Sexual activity: Not Currently     Partners: Male     Birth control/protection: None       Review of Systems    OBJECTIVE:     Vital Signs  Temp: 98.3 °F (36.8 °C)  Pulse: 65  BP: 128/86  Pain Score: 10-Worst pain ever  Height: 5' 2" (157.5 cm)  Weight: 72.6 kg (160 lb)  Body mass index is 29.26 kg/m².    Neurosurgery Physical Exam  General: no acute distress  Head: Non-traumatic, normocephalic  Eyes: Pupils equal, EOMI  Neck: Supple, normal ROM, no tenderness to palpation  CVS: Normal rate and rhythm, distal pulses present  Pulm: Symmetric expansion, no respiratory distress  GI: Abdomen nondistended, nontender    MSK: Moves all extremities without restriction, atraumatic  Skin: Dry, intact  Psych: Normal thought content and cognition    Neuro:  Alert, awake, oriented, to self, place, time  Language:  Speech is fluent, goal directed without any noted dysarthria or aphasia    Cranial nerves:    CNII-XII: Intact on fine exam,   Pupils equal round react to light,   Extraocular muscles are intact  V1 to V3 is intact to light touch,   no facial asymmetry,   Hearing is intact to finger rub and voice  tongue/uvula/palate midline,   shoulder shrug equal,     No pronator drift    Extremities:  Motor:  Upper Extremity    Deltoid Triceps Biceps Wrist  Extension Wrist  Flexion Interosseous   R 5/5 5/5 5/5 5/5 5/5 5/5   L 5/5 5/5 5/5 5/5 5/5 5/5       Thumb   Abduction Thumb  ADDuction Finger  Flexion Finger  Extension     R 5/5 5/5 5/5 5/5     L 5/5 5/5 " 5/5 5/5        Lower Extremity     Iliopsoas Quadriceps Hamstring Plantarflexion Dorsiflexion EHL   R 5/5 5/5 5/5 5/5 5/5 5/5   L 5/5 5/5 5/5 5/5 5/5 5/5       Reflexes:     DTR: 2+ biceps    2+ triceps   2+ brachioradialis   2+ patellar  2+ Achilles     Sarabia's: Negative     Babinski's: Negative     Clonus: Negative     Sensory:      Sensation intact to light touch, temperature sensation, vibration, pinprick     Coordination:      Coordination intact throughout, no dysmetria, normal rapid alternating movements, no dysdiadochokinesia  Cerebellar:  Normal finger-to-nose, normal heel-to-shin    2+ radial artery pulse on the right.    Diagnostic Results:  I personally reviewed patient's Diagnostic Imaging.  She has multiple small aneurysms in the left ICA distribution.  She has a 3 mm supraclinoid elongated appearing aneurysm in the region of the posterior communicating artery.  She has the 3-4 mm left M1 aneurysm.  In a 2 mm left M2 aneurysm.    ASSESSMENT/PLAN:     47-year-old female, history of hypertension, 20 pack-year history of smoking history of headaches 3 aneurysms in the left ICA distribution.    1. Patient with no focal neurologic deficits on examination here in clinic  2. CTA of the head with a 3 mm left PCOM aneurysm, 3-4 mm left M1 aneurysm, and a 2 mm left M2 aneurysm.  3. Had long discussion with the patient, the patient's family.  With respect to natural history, and concern for possible rupture of multiple intracranial aneurysms.  I did note that given the patient has a 20+ year pack history of smoking, advanced imaging would be recommended.  I do recommend a diagnostic cerebral angiogram, as well as MRA with vessel wall imaging.  I discussed with the patient as well as the patient's family the risks, benefits, alternatives to the procedure including not limited to heart attack coma, stroke, infection, bleeding, paralysis, even death.  Informed consent was obtained and secured in chart after the  patient family voiced understanding of these risks and decided proceed with the procedure.  Will proceed with a diagnostic cerebral angiogram.  Did note to the patient we will plan for transradial approach and proceed with transfemoral if necessary.  Answered all the patient's questions well as patient's family's questions or satisfaction.    Thank you so very much for allowing me to participate in the care of this patient.  Please feel free to call any questions, comments, concerns.    Timothy Diaz MD,MSc  Department of Neurosurgery   Department of Radiology  Department of Neurology  [facility]    [facility]   [employer] [facility] [employer] [facility] [employer]    Total time spent in counseling and discussion about further management options including relevant lab work, treatment,  prognosis, medications and intended side effects was more than 60 minutes. More than 50 % of the time was spent in counseling and coordination of care.  I spent a total of 60 minutes on the day of the visit.This includes face to face time and non-face to face time preparing to see the patient (eg, review of tests), Obtaining and/or reviewing separately obtained history, Documenting clinical information in the electronic or other health record, Independently interpreting resultsand communicating results to the patient/family/caregiver, or Care coordination        Note dictated with voice recognition software, please excuse any grammatical errors.

## 2022-03-09 ENCOUNTER — TELEPHONE (OUTPATIENT)
Dept: NEUROSURGERY | Facility: CLINIC | Age: 47
End: 2022-03-09
Payer: MEDICAID

## 2022-03-21 RX ORDER — VERAPAMIL HYDROCHLORIDE 2.5 MG/ML
10 INJECTION, SOLUTION INTRAVENOUS ONCE
Status: CANCELLED | OUTPATIENT
Start: 2022-03-21 | End: 2022-03-21

## 2022-03-21 RX ORDER — FENTANYL CITRATE 50 UG/ML
50 INJECTION, SOLUTION INTRAMUSCULAR; INTRAVENOUS
Status: CANCELLED | OUTPATIENT
Start: 2022-03-21

## 2022-03-21 RX ORDER — MIDAZOLAM HYDROCHLORIDE 1 MG/ML
1 INJECTION INTRAMUSCULAR; INTRAVENOUS
Status: CANCELLED | OUTPATIENT
Start: 2022-03-21

## 2022-03-21 RX ORDER — HEPARIN SODIUM 1000 [USP'U]/ML
5000 INJECTION, SOLUTION INTRAVENOUS; SUBCUTANEOUS ONCE
Status: CANCELLED | OUTPATIENT
Start: 2022-03-21 | End: 2022-03-21

## 2022-03-23 ENCOUNTER — HOSPITAL ENCOUNTER (OUTPATIENT)
Dept: RADIOLOGY | Facility: HOSPITAL | Age: 47
Discharge: HOME | End: 2022-03-23
Attending: NEUROLOGICAL SURGERY
Payer: MEDICAID

## 2022-03-23 DIAGNOSIS — I60.8 OTHER NONTRAUMATIC SUBARACHNOID HEMORRHAGE: ICD-10-CM

## 2022-03-23 PROCEDURE — 70546 MR ANGIOGRAPH HEAD W/O&W/DYE: CPT | Mod: 26,,, | Performed by: RADIOLOGY

## 2022-03-23 PROCEDURE — 25500020 PHARM REV CODE 255: Performed by: NEUROLOGICAL SURGERY

## 2022-03-23 PROCEDURE — A9585 GADOBUTROL INJECTION: HCPCS | Performed by: NEUROLOGICAL SURGERY

## 2022-03-23 PROCEDURE — 70546 MRA BRAIN WITH AND WITHOUT: ICD-10-PCS | Mod: 26,,, | Performed by: RADIOLOGY

## 2022-03-23 PROCEDURE — 70546 MR ANGIOGRAPH HEAD W/O&W/DYE: CPT | Mod: TC

## 2022-03-23 RX ORDER — GADOBUTROL 604.72 MG/ML
8 INJECTION INTRAVENOUS
Status: COMPLETED | OUTPATIENT
Start: 2022-03-23 | End: 2022-03-23

## 2022-03-23 RX ADMIN — GADOBUTROL 8 ML: 604.72 INJECTION INTRAVENOUS at 08:03

## 2022-03-25 ENCOUNTER — TELEPHONE (OUTPATIENT)
Dept: INTERVENTIONAL RADIOLOGY/VASCULAR | Facility: HOSPITAL | Age: 47
End: 2022-03-25
Payer: MEDICAID

## 2022-03-28 ENCOUNTER — HOSPITAL ENCOUNTER (OUTPATIENT)
Dept: INTERVENTIONAL RADIOLOGY/VASCULAR | Facility: HOSPITAL | Age: 47
Discharge: HOME | End: 2022-03-28
Attending: NEUROLOGICAL SURGERY
Payer: MEDICAID

## 2022-03-28 VITALS
HEIGHT: 62 IN | TEMPERATURE: 98 F | OXYGEN SATURATION: 100 % | WEIGHT: 189 LBS | RESPIRATION RATE: 14 BRPM | DIASTOLIC BLOOD PRESSURE: 66 MMHG | SYSTOLIC BLOOD PRESSURE: 124 MMHG | HEART RATE: 68 BPM | BODY MASS INDEX: 34.78 KG/M2

## 2022-03-28 DIAGNOSIS — I60.9 SAH (SUBARACHNOID HEMORRHAGE): ICD-10-CM

## 2022-03-28 DIAGNOSIS — I60.8 OTHER NONTRAUMATIC SUBARACHNOID HEMORRHAGE: ICD-10-CM

## 2022-03-28 DIAGNOSIS — R07.9 CHEST PAIN: ICD-10-CM

## 2022-03-28 LAB
B-HCG UR QL: NEGATIVE
CTP QC/QA: YES

## 2022-03-28 PROCEDURE — A4550 SURGICAL TRAYS: HCPCS

## 2022-03-28 PROCEDURE — C1760 CLOSURE DEV, VASC: HCPCS

## 2022-03-28 PROCEDURE — 81025 URINE PREGNANCY TEST: CPT | Performed by: PHYSICIAN ASSISTANT

## 2022-03-28 PROCEDURE — 25000003 PHARM REV CODE 250: Performed by: PHYSICIAN ASSISTANT

## 2022-03-28 PROCEDURE — 36226 PLACE CATH VERTEBRAL ART: CPT | Mod: 51,RT,, | Performed by: NEUROLOGICAL SURGERY

## 2022-03-28 PROCEDURE — 93010 ELECTROCARDIOGRAM REPORT: CPT | Mod: ,,, | Performed by: INTERNAL MEDICINE

## 2022-03-28 PROCEDURE — 36224 IR ANGIOGRAM CAROTID INTERNAL INC ARCH AND CEREBRAL BILAT: ICD-10-PCS | Mod: 50,,, | Performed by: NEUROLOGICAL SURGERY

## 2022-03-28 PROCEDURE — 76377 3D RENDER W/INTRP POSTPROCES: CPT | Mod: TC | Performed by: NEUROLOGICAL SURGERY

## 2022-03-28 PROCEDURE — 63600175 PHARM REV CODE 636 W HCPCS

## 2022-03-28 PROCEDURE — 76377 3D RENDER W/INTRP POSTPROCES: CPT | Mod: 26,,, | Performed by: NEUROLOGICAL SURGERY

## 2022-03-28 PROCEDURE — 63600175 PHARM REV CODE 636 W HCPCS: Performed by: NEUROLOGICAL SURGERY

## 2022-03-28 PROCEDURE — 36226 PR ANGIO VERTEBRAL ARTERY +/- CERVIOCEREBRAL ARCH, VERTEBRAL ART, SELECTV CATH,S&I: ICD-10-PCS | Mod: 51,RT,, | Performed by: NEUROLOGICAL SURGERY

## 2022-03-28 PROCEDURE — 36224 PLACE CATH CAROTD ART: CPT | Mod: 50,,, | Performed by: NEUROLOGICAL SURGERY

## 2022-03-28 PROCEDURE — 76377 PR  3D RENDERING W/ IMAGE POSTPROCESS: ICD-10-PCS | Mod: 26,,, | Performed by: NEUROLOGICAL SURGERY

## 2022-03-28 PROCEDURE — 93005 ELECTROCARDIOGRAM TRACING: CPT | Mod: 59

## 2022-03-28 PROCEDURE — 93010 EKG 12-LEAD: ICD-10-PCS | Mod: ,,, | Performed by: INTERNAL MEDICINE

## 2022-03-28 PROCEDURE — 36226 PLACE CATH VERTEBRAL ART: CPT | Mod: RT | Performed by: NEUROLOGICAL SURGERY

## 2022-03-28 PROCEDURE — 36224 PLACE CATH CAROTD ART: CPT | Mod: 50 | Performed by: NEUROLOGICAL SURGERY

## 2022-03-28 PROCEDURE — 25000003 PHARM REV CODE 250: Performed by: NEUROLOGICAL SURGERY

## 2022-03-28 RX ORDER — MORPHINE SULFATE 2 MG/ML
INJECTION, SOLUTION INTRAMUSCULAR; INTRAVENOUS
Status: COMPLETED
Start: 2022-03-28 | End: 2022-03-28

## 2022-03-28 RX ORDER — HEPARIN SODIUM 1000 [USP'U]/ML
INJECTION, SOLUTION INTRAVENOUS; SUBCUTANEOUS CODE/TRAUMA/SEDATION MEDICATION
Status: COMPLETED | OUTPATIENT
Start: 2022-03-28 | End: 2022-03-28

## 2022-03-28 RX ORDER — FENTANYL CITRATE 50 UG/ML
INJECTION, SOLUTION INTRAMUSCULAR; INTRAVENOUS CODE/TRAUMA/SEDATION MEDICATION
Status: COMPLETED | OUTPATIENT
Start: 2022-03-28 | End: 2022-03-28

## 2022-03-28 RX ORDER — MORPHINE SULFATE 2 MG/ML
1 INJECTION, SOLUTION INTRAMUSCULAR; INTRAVENOUS EVERY 6 HOURS PRN
Status: DISCONTINUED | OUTPATIENT
Start: 2022-03-28 | End: 2022-03-29 | Stop reason: HOSPADM

## 2022-03-28 RX ORDER — SODIUM CHLORIDE 9 MG/ML
INJECTION, SOLUTION INTRAVENOUS CONTINUOUS
Status: DISCONTINUED | OUTPATIENT
Start: 2022-03-28 | End: 2022-03-29 | Stop reason: HOSPADM

## 2022-03-28 RX ORDER — LIDOCAINE AND PRILOCAINE 25; 25 MG/G; MG/G
CREAM TOPICAL ONCE
Status: COMPLETED | OUTPATIENT
Start: 2022-03-28 | End: 2022-03-28

## 2022-03-28 RX ORDER — MIDAZOLAM HYDROCHLORIDE 1 MG/ML
INJECTION INTRAMUSCULAR; INTRAVENOUS CODE/TRAUMA/SEDATION MEDICATION
Status: COMPLETED | OUTPATIENT
Start: 2022-03-28 | End: 2022-03-28

## 2022-03-28 RX ORDER — LIDOCAINE HYDROCHLORIDE 10 MG/ML
INJECTION INFILTRATION; PERINEURAL CODE/TRAUMA/SEDATION MEDICATION
Status: COMPLETED | OUTPATIENT
Start: 2022-03-28 | End: 2022-03-28

## 2022-03-28 RX ORDER — ACETAMINOPHEN 325 MG/1
650 TABLET ORAL EVERY 6 HOURS PRN
Status: DISCONTINUED | OUTPATIENT
Start: 2022-03-28 | End: 2022-03-29 | Stop reason: HOSPADM

## 2022-03-28 RX ORDER — VERAPAMIL HYDROCHLORIDE 2.5 MG/ML
INJECTION, SOLUTION INTRAVENOUS CODE/TRAUMA/SEDATION MEDICATION
Status: COMPLETED | OUTPATIENT
Start: 2022-03-28 | End: 2022-03-28

## 2022-03-28 RX ADMIN — HEPARIN SODIUM 2000 UNITS: 1000 INJECTION, SOLUTION INTRAVENOUS; SUBCUTANEOUS at 10:03

## 2022-03-28 RX ADMIN — LIDOCAINE HYDROCHLORIDE 3 ML: 10 INJECTION, SOLUTION INFILTRATION; PERINEURAL at 10:03

## 2022-03-28 RX ADMIN — MIDAZOLAM HYDROCHLORIDE 1 MG: 1 INJECTION INTRAMUSCULAR; INTRAVENOUS at 10:03

## 2022-03-28 RX ADMIN — FENTANYL CITRATE 100 MCG: 50 INJECTION, SOLUTION INTRAMUSCULAR; INTRAVENOUS at 10:03

## 2022-03-28 RX ADMIN — MORPHINE SULFATE 0.5 MG: 2 INJECTION, SOLUTION INTRAMUSCULAR; INTRAVENOUS at 02:03

## 2022-03-28 RX ADMIN — VERAPAMIL HYDROCHLORIDE 10 MG: 2.5 INJECTION, SOLUTION INTRAVENOUS at 10:03

## 2022-03-28 RX ADMIN — HEPARIN SODIUM 10000 UNITS: 1000 INJECTION, SOLUTION INTRAVENOUS; SUBCUTANEOUS at 10:03

## 2022-03-28 RX ADMIN — LIDOCAINE AND PRILOCAINE: 25; 25 CREAM TOPICAL at 10:03

## 2022-03-28 NOTE — PLAN OF CARE
Pt ambulated on unit this morning with mother at her side.  Denies pain or SOB.  Verbalized an understanding of procedure.  Oriented to unit and call bell provided.  Will continue to monitor.

## 2022-03-28 NOTE — OP NOTE
OPERATIVE REPORT       PROCEDURE DATE:   03/28/2022       ATTENDING:   Timothy Diaz MD,MSc       FELLOW(S) / Resident(s):   None        PREOPERATIVE DIAGNOSES:  Left MCA aneurysm, left anterior choroidal artery aneurysm, left posterior communicating artery aneurysm    POSTOPERATIVE DIAGNOSES:      Left MCA aneurysm, left anterior choroidal artery aneurysm, left posterior communicating artery aneurysm    PROCEDURES:  1. Diagnostic cerebral angiogram under conscious sedation  2. Insertion of 5 sheath in the right radial artery  3. Selective catheterization of the left common carotid, left internal carotid arteries, right common carotid artery, right internal carotid artery, right vertebral artery, right radial artery  4. Interpretation of films  6. Closure with wrist closure device       ANESTHESIA:   Fentanyl, Versed, and local anesthesia given by Endovascular Team          DEVICES USED:  5 Sudanese sheath, 0.035 Glidewire, micropuncture kit, simmons2 catheter, 2000 units of heparin, 10 mg of verapamil    INDICATIONS FOR PROCEDURE:  Gina Jenkins is a 47 y.o. 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 Hence, plans for diagnostic cerebral angiogram for characterization of intracranial aneurysms & rest of the vasculature.  The patient was made aware of the risks, benefits, alternatives to procedure including not limited to heart attack coma, stroke, infection, bleeding, paralysis, even death.  Informed consent was obtained and secured in chart after the patient voiced understanding of these risks and decided proceed with the procedure.       DESCRIPTION OF THE PROCEDURE:   On 03/28/2022, the patient was properly identified, and a written consent was verified.  The patient was brought to the Angiographic Suite.  The patient was positioned, prepped, and draped in the  usual sterile fashion on the angio table.  After the administration of sedatives, the right wrist was infiltrated with local anesthetic.  A 5 Norwegian micropuncture kit was used to access the right radial artery, under ultrasound guidance.  Modified Seldinger technique was used to place 5-Norwegian slender sheath.  A right radial  artery run was obtained to ensure proper placement of the sheath.    The Barahona 2 diagnostic catheter was introduced over the 0.035 Glidewire, and the system was advanced over the aortic arch via the subclavian artery.  The left common carotid, left internal carotid right common carotid artery, right internal carotid artery, right subclavian, right vertebral arteries were then selectively catheterized.  Multiple cervical and cranial injections were then performed in both the AP and lateral views.  A 3D rotational spin angiography with interpretation reconstruction a separate workstation by the physician of the left ICA was done as well.      The right radial arteriotomy site was then closed using radial artery compression closure device, inflated to 15 mL.  There were no signs of hematoma, hemorrhage, or loss of distal pulses.  The patient was transferred out of the Angiographic Suite at their pre-procedural neurological baseline status.  Dr. Diaz was present for the entirety of the procedure, and he, himself, interpreted the films.       INTERPRETATION OF FILMS:    1. Right radial artery injection: injection from the right radial artery shows the sheath placement to  be in the vessel. There is no evidence of intimal injury or dissection.     2. Left common carotid artery cervical injection in AP and lateral views demonstrates the carotid artery bifurcation to be at the C4 level.  There is patency of the external and internal carotid arteries with good filling of the external branches.  There is no evidence of AV fistula or AV malformation.  There is no evidence of flow-limiting stenosis  stenosis of the ICA.    3. Left internal carotid artery injection, AP lateral projection, cranial view.  This demonstrates good flow through the distal cervical, petrous, cavernous, clinoidal, and communicating segment of the ICA.  Good flow to the A1 and AUGIE distribution, good flow to the M1 MCA distribution. The stated arteries are normal in their caliber and course.  The capillary and venous phases are unremarkable. There is no evidence of AV fistula, AV malformation.  There appears to be multiple intracranial aneurysms, this left ICA injection.  At the left MCA bifurcation, and the left ICA 2 aneurysms appear to be present.  One at the area of the anterior choroidal artery, and 1 in the region of the posterior communicating artery branch.  The MCA aneurysm appears to be 2 mm x 3 mm in size.  The left anterior choroidal artery aneurysm is 3.5 mm x 2 mm elongated and irregular in shape.  The left posterior communicating artery aneurysm is 4 x 3 mm also regular in shape as well.    4. Left internal carotid artery injection, AP lateral projection, 3D rotational spin angiography with interpretation reconstruction a separate workstation by the physician.  This further elucidate the angiographic architecture of the left MCA, left anterior choroidal, left posterior communicating artery aneurysms.  Again the left anterior choroidal, and the left posterior communicating are dysplastic and irregular in their size.      4. Right vertebral artery, cervical injection, AP and lateral views demonstrates good filling of the vessel. The artery is normal in its caliber and course.  No dissection is seen. No stenosis is seen at the origin.    5. Right vertebral artery, cranial injection, in AP and lateral views demonstrates good filling of bilateral PCAs, bilateral SCAs, bilateral AICAs, and right PICA.  There is flash filling of the left PICA.  The stated arteries are normal in their caliber and course.  The capillary and venous  phases are unremarkable. There is no evidence of AV fistula, AV malformation, or aneurysm.    6. Right common carotid artery cervical injection in AP and lateral views demonstrates the carotid artery bifurcation to be at the C4 level.  There is patency of the external and internal carotid arteries with good filling of the external branches.  There is no evidence of AV fistula or AV malformation.  There is no hemodynamically significant stenosis of the ICA.      7. Right internal carotid artery injection, AP lateral projection, cranial view.  This demonstrates good flow through the distal cervical, petrous, cavernous, clinoidal, and communicating segment of the ICA.  There is good flow into the A1 and AUGIE distribution, and M1 and MCA distribution.  The stated arteries are normal in their caliber and course.  The capillary and venous phases are unremarkable. There is no evidence of AV fistula, AV malformation, or aneurysm.      SUMMARY:   1. Multiple aneurysms of the left ICA distribution.  A 2 mm x 3 mm left MCA bifurcation aneurysm, a 3.5 x 2 mm elongated and irregular appearing left anterior choroidal artery aneurysm, and a 4 mm x 3 mm, irregular appearing left posterior communicating artery aneurysm.

## 2022-03-28 NOTE — H&P
Radiology History & Physical      SUBJECTIVE:     Chief Complaint: Intracranial aneursym     History of Present Illness:  Gina Jenkins is a 47 y.o. 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 Hence, plans for diagnostic cerebral angiogram for characterization of intracranial aneurysms & rest of the vasculature     Past Medical History:   Diagnosis Date    Brain aneurysm     CHF (congestive heart failure)     H/O coronary angioplasty     Hypercholesteremia     Hypertension     Migraine headache     Stroke 10/2017     Past Surgical History:   Procedure Laterality Date    CORONARY ANGIOPLASTY WITH STENT PLACEMENT         Home Meds:   Prior to Admission medications    Medication Sig Start Date End Date Taking? Authorizing Provider   amitriptyline (ELAVIL) 75 MG tablet Take 75 mg by mouth every evening.    Historical Provider   amlodipine (NORVASC) 10 MG tablet Take 1 tablet (10 mg total) by mouth once daily. 4/2/17 1/13/21  Yuval Caldwell MD   aspirin 81 MG Chew Take 1 tablet (81 mg total) by mouth once daily. 4/10/18 1/13/21  William Rosales MD   atorvastatin (LIPITOR) 40 MG tablet Take 1 tablet (40 mg total) by mouth once daily. 10/6/17 1/13/21  Marva Damon MD   carvediloL (COREG) 25 MG tablet Take 25 mg by mouth 2 (two) times daily. 1/18/22   Historical Provider   cyanocobalamin (VITAMIN B-12) 1000 MCG tablet Take 1 tablet (1,000 mcg total) by mouth once daily.  Patient not taking: Reported on 3/7/2022 4/10/18   William Rosales MD   diclofenac (VOLTAREN) 25 MG TbEC Take 1 tablet (25 mg total) by mouth 3 (three) times daily as needed (pain).  Patient not taking: Reported on 3/7/2022 5/3/21   SARAVANAN Marinelli   diphenhydrAMINE (BENADRYL) 25 mg capsule Take 1 each (25 mg total) by mouth every 6 (six) hours as needed for Itching  or Allergies.  Patient not taking: Reported on 3/7/2022 3/15/18   Gilberto Buchanan MD   docusate sodium (COLACE) 100 MG capsule Take 1 capsule (100 mg total) by mouth 2 (two) times daily as needed for Constipation.  Patient not taking: Reported on 3/7/2022 1/13/21   Iqra Hamm PA-C   hydrochlorothiazide (HYDRODIURIL) 25 MG tablet Take 1 tablet (25 mg total) by mouth once daily. 4/2/17 1/13/21  Yuval Caldwell MD   losartan (COZAAR) 100 MG Tab Take 1 tablet (100 mg total) by mouth once daily. 3/15/18 1/13/21  Gilberto Buchanan MD   meloxicam (MOBIC) 7.5 MG tablet Take 7.5 mg by mouth 2 (two) times daily. 12/24/21   Historical Provider   neomycin-polymyxin-hydrocortisone (CORTISPORIN) 3.5-10,000-1 mg/mL-unit/mL-% otic suspension Place 4 drops into the left ear 3 (three) times daily.  Patient not taking: No sig reported 9/20/18   SNEHA Simmons III, MD   ranitidine (ZANTAC) 150 MG capsule Take 1 capsule (150 mg total) by mouth once daily. 3/15/18 3/15/19  Gilberto Buchanan MD   topiramate (TOPAMAX) 50 MG tablet Take 50 mg by mouth 2 (two) times daily. 2/7/22   Historical Provider   traMADoL (ULTRAM) 50 mg tablet Take 50 mg by mouth 2 (two) times daily. 2/28/22   Historical Provider     Anticoagulants/Antiplatelets: no anticoagulation    Allergies:   Review of patient's allergies indicates:   Allergen Reactions    Lisinopril Swelling    Bactrim [sulfamethoxazole-trimethoprim] Rash     Sedation History:  no adverse reactions    Review of Systems:   Hematological: no known coagulopathies  Respiratory: no shortness of breath  Cardiovascular: no chest pain  Gastrointestinal: no abdominal pain  Genito-Urinary: no dysuria  Musculoskeletal: negative  Neurological: no TIA or stroke symptoms         OBJECTIVE:     Vital Signs (Most Recent)  Temp: 98.1 °F (36.7 °C) (03/28/22 0725)  Pulse: 66 (03/28/22 0725)  Resp: 17 (03/28/22 0725)  BP: 124/69 (03/28/22 0729)  SpO2: 98 % (03/28/22 0725)    Physical Exam:  ASA:  3  Mallampati: 2    General: no acute distress  Mental Status: alert and oriented to person, place and time  HEENT: normocephalic, atraumatic  Chest: unlabored breathing  Heart: regular heart rate  Abdomen: nondistended  Extremity: moves all extremities    Laboratory  Lab Results   Component Value Date    INR 1.0 05/16/2020       Lab Results   Component Value Date    WBC 7.89 11/03/2021    HGB 13.5 11/03/2021    HCT 40.0 11/03/2021    MCV 87 11/03/2021     11/03/2021      Lab Results   Component Value Date     (H) 03/23/2022     03/23/2022    K 2.9 (L) 03/23/2022     03/23/2022    CO2 29 03/23/2022    BUN 14 03/23/2022    CREATININE 1.0 03/23/2022    CALCIUM 10.0 03/23/2022    MG 1.1 (L) 04/10/2018    ALT 30 11/03/2021    AST 35 11/03/2021    ALBUMIN 4.4 11/03/2021    BILITOT 0.5 11/03/2021       ASSESSMENT/PLAN:     Sedation Plan: Up to moderate     Patient will undergo: diagnostic cerebral angiogram for characterization of intracranial aneurysms & rest of the vasculature         Fozia Mccrary MD     Neuro Endovascular Surgery Fellow   [facility]

## 2022-03-28 NOTE — DISCHARGE INSTRUCTIONS
The following are instructions your doctor would like you to follow regarding your activity, diet, and follow up care.    Your procedure was done by making a small puncture in your wrist. You must observe that area for bleeding and swelling once you are discharged from the hospital. Be careful not to over-stimulate the affected wrist for 24 hours.    Do not strenuously flex the wrist for 24 hours.  Occlusive bandage (Tegaderm) may be removed after 24 hours.  At 24 hours after your procedure, remove the Tagaderm bandage and leave puncture site open to air. If minor oozing, apply adhesive bandage (Band-Aid) and remove after 12 hours.  No driving for 24 hours.  No soaking wrist for 2 days. Gently wash site with soap and water. Dry well. Do not apply powders, lotions or ointments. No tub baths, whirlpool or swimming for 48 hours.  No lifting more than 3-5 pounds with affected wrist for 7 days.  Restart your usual diet and medications with appropriate changes as dictated by your doctor.  For any bleeding, hold pressure with thumb against puncture site and finger against back of wrist.  If you see fresh bleeding, bright red or a lump that is getting bigger at the puncture site, apply pressure to the area as instructed above and call [phone] between the hours of 8:00am-5:00pm or [phone] after hours and ask to speak to the Interventional Radiology Resident on-call. If you are unable to control the bleeding, call [phone].   Call the Interventional Radiology Resident on-call at [phone] with any concerns or any of the following:  Swelling, redness, discharge for the site, large bruising or increasing pain, numbness or tingling at the site  Temperature of 101 F or higher  Shortness of breath     [facility] 8 AM- 5PM [phone]. Radiology resident on call [phone].

## 2022-03-28 NOTE — PLAN OF CARE
Pt arrived to room 4 for diagnostic cerebral angiogram. Pt oriented to unit and staff. Plan of care reviewed with patient, patient verbalizes understanding. Comfort measures utilized. Pt safely transferred from stretcher to procedural table. Fall risk reviewed with patient, fall risk interventions maintained with arm boards and direct observation/attendance.  positioner pillows utilized to minimize pressure points. Blankets applied. Pt prepped and draped utilizing standard sterile technique. Patient placed on continuous monitoring, as required by sedation policy. Timeouts completed utilizing standard universal time-out, per department and facility policy. RN to remain at bedside, continuous monitoring maintained. Pt resting comfortably. Denies pain/discomfort. Will continue to monitor. See flow sheets for monitoring, medication administration, and updates.

## 2022-03-28 NOTE — NURSING
Diagnostic cerebral angiogram complete. tolerated well. VSS. No signs or symptoms of distress noted.  T-R band to right wrist. Hemostasis 1120. Pt to have TR band removal per protocol with 15 cc inflation amount. Pt will be transferred to ROCU bed escorted by this RN and report to RN on arrival

## 2022-03-28 NOTE — BRIEF OP NOTE
Misael Schmitt   Interv Radiology / Endovascular Neurosurgery - [facility]  Brief Operative Note    SUMMARY     Surgery Date:  03/28/2022    Surgeon: Timothy Diaz MD,MSc    Assisting Surgeon:  None    Pre-op Diagnosis:  Left MCA aneurysm, left anterior choroidal artery aneurysm, left posterior communicating artery aneurysm    Post-op Diagnosis:  Left MCA aneurysm, left anterior choroidal artery aneurysm, left posterior communicating artery aneurysm       Procedure:   Right Trans radial cerebral angiogram, Selected RIGHT radial artery, RIGHT CCA, RIGHT ICA, LEFT CCA, LEFT ICA, RIGHT subclavian LEFT ICA, LEFT ICA 3D rotational spin angiography    Anesthesia: Sedation, by IR team     Operative Findings:  2 x 3 mm left MCA bifurcation aneurysm, irregularly shaped, elongated 2 x 3 mm left anterior choroidal artery aneurysm, 4 x 3 mm irregularly-shaped left posterior communicating artery aneurysm    Estimated Blood Loss:  Minimal     Arterial Access:  Right radial artery, 5 Wolof slender sheath    Arterial closure:  Radial artery compression band, inflated to 15 mL     Complications:  None     Estimated Blood Loss has been documented.         Specimens: None   Specimen (24h ago, onward)            None        Disposition: PACU    Timothy Diaz MD,MSc  Department of Neurosurgery

## 2022-03-28 NOTE — PROGRESS NOTES
Cerebral Angio recovery is now complete. Pt tolerated well. Discharge instructions and handouts provided. Pt verbalized understanding. Pt escorted to lobby via wheelchair. Pt discharged

## 2022-03-30 ENCOUNTER — TELEPHONE (OUTPATIENT)
Dept: NEUROSURGERY | Facility: CLINIC | Age: 47
End: 2022-03-30
Payer: MEDICAID

## 2022-03-30 DIAGNOSIS — I67.1 ANEURYSM OF MIDDLE CEREBRAL ARTERY: Primary | ICD-10-CM

## 2022-04-12 ENCOUNTER — OFFICE VISIT (OUTPATIENT)
Dept: NEUROSURGERY | Facility: CLINIC | Age: 47
End: 2022-04-12
Payer: MEDICAID

## 2022-04-12 VITALS
TEMPERATURE: 98 F | WEIGHT: 189 LBS | HEART RATE: 68 BPM | SYSTOLIC BLOOD PRESSURE: 119 MMHG | DIASTOLIC BLOOD PRESSURE: 80 MMHG | HEIGHT: 62 IN | BODY MASS INDEX: 34.78 KG/M2

## 2022-04-12 DIAGNOSIS — I67.1 CEREBRAL ANEURYSM: Primary | ICD-10-CM

## 2022-04-12 PROCEDURE — 1160F PR REVIEW ALL MEDS BY PRESCRIBER/CLIN PHARMACIST DOCUMENTED: ICD-10-PCS | Mod: CPTII,,, | Performed by: NEUROLOGICAL SURGERY

## 2022-04-12 PROCEDURE — 1159F PR MEDICATION LIST DOCUMENTED IN MEDICAL RECORD: ICD-10-PCS | Mod: CPTII,,, | Performed by: NEUROLOGICAL SURGERY

## 2022-04-12 PROCEDURE — 3008F BODY MASS INDEX DOCD: CPT | Mod: CPTII,,, | Performed by: NEUROLOGICAL SURGERY

## 2022-04-12 PROCEDURE — 99214 OFFICE O/P EST MOD 30 MIN: CPT | Mod: PBBFAC | Performed by: NEUROLOGICAL SURGERY

## 2022-04-12 PROCEDURE — 1159F MED LIST DOCD IN RCRD: CPT | Mod: CPTII,,, | Performed by: NEUROLOGICAL SURGERY

## 2022-04-12 PROCEDURE — 3079F PR MOST RECENT DIASTOLIC BLOOD PRESSURE 80-89 MM HG: ICD-10-PCS | Mod: CPTII,,, | Performed by: NEUROLOGICAL SURGERY

## 2022-04-12 PROCEDURE — 99999 PR PBB SHADOW E&M-EST. PATIENT-LVL IV: CPT | Mod: PBBFAC,,, | Performed by: NEUROLOGICAL SURGERY

## 2022-04-12 PROCEDURE — 99999 PR PBB SHADOW E&M-EST. PATIENT-LVL IV: ICD-10-PCS | Mod: PBBFAC,,, | Performed by: NEUROLOGICAL SURGERY

## 2022-04-12 PROCEDURE — 3079F DIAST BP 80-89 MM HG: CPT | Mod: CPTII,,, | Performed by: NEUROLOGICAL SURGERY

## 2022-04-12 PROCEDURE — 99215 OFFICE O/P EST HI 40 MIN: CPT | Mod: S$PBB,,, | Performed by: NEUROLOGICAL SURGERY

## 2022-04-12 PROCEDURE — 1160F RVW MEDS BY RX/DR IN RCRD: CPT | Mod: CPTII,,, | Performed by: NEUROLOGICAL SURGERY

## 2022-04-12 PROCEDURE — 99215 PR OFFICE/OUTPT VISIT, EST, LEVL V, 40-54 MIN: ICD-10-PCS | Mod: S$PBB,,, | Performed by: NEUROLOGICAL SURGERY

## 2022-04-12 PROCEDURE — 3008F PR BODY MASS INDEX (BMI) DOCUMENTED: ICD-10-PCS | Mod: CPTII,,, | Performed by: NEUROLOGICAL SURGERY

## 2022-04-12 PROCEDURE — 3074F SYST BP LT 130 MM HG: CPT | Mod: CPTII,,, | Performed by: NEUROLOGICAL SURGERY

## 2022-04-12 PROCEDURE — 3074F PR MOST RECENT SYSTOLIC BLOOD PRESSURE < 130 MM HG: ICD-10-PCS | Mod: CPTII,,, | Performed by: NEUROLOGICAL SURGERY

## 2022-04-12 NOTE — PROGRESS NOTES
Neurosurgery  Established Patient    SUBJECTIVE:     History of Present Illness:  Ms. Jenkins, is a 47-year-old female who previously presented with intermittent headaches.  She describes the headaches as abrupt, sharp excruciating and lasting for very short period time 1 5 minutes.  She has past medical history of congestive heart failure, history of coronary angioplasty, hypercholesterolemia, hypertension and stroke in 2017. She had a known the single intracranial aneurysm, when previously she presented to the emergency department after another headache which she found concerns for an additional 2 cerebral aneurysms in the left ICA distribution.  She currently denies any new headache, nausea, vomiting.  She notes she has a 20 pack-year history of smoking.  No known family history of aneurysmal rupture.  She has previous surgery.  She does note she has allergies to Bactrim and lisinopril.  She had a CTA of the head in November 2021. She is accompanied by her mother as well.    Interval history:  Patient most recently underwent diagnostic cerebral angiogram.  She denies any new headache, nausea, vomiting.  She denies any paresthesias or tingling in her hand.  She denies any phoebe weakness.  Denies any pain at the right radial arteriotomy site.    Review of patient's allergies indicates:   Allergen Reactions    Lisinopril Swelling    Bactrim [sulfamethoxazole-trimethoprim] Rash       Current Outpatient Medications   Medication Sig Dispense Refill    amitriptyline (ELAVIL) 75 MG tablet Take 75 mg by mouth every evening.      carvediloL (COREG) 25 MG tablet Take 25 mg by mouth 2 (two) times daily.      diphenhydrAMINE (BENADRYL) 25 mg capsule Take 1 each (25 mg total) by mouth every 6 (six) hours as needed for Itching or Allergies. 20 capsule 0    meloxicam (MOBIC) 7.5 MG tablet Take 7.5 mg by mouth 2 (two) times daily.      topiramate (TOPAMAX) 50 MG tablet Take 50 mg by mouth 2 (two) times daily.      traMADoL  (ULTRAM) 50 mg tablet Take 50 mg by mouth 2 (two) times daily.      amlodipine (NORVASC) 10 MG tablet Take 1 tablet (10 mg total) by mouth once daily. 30 tablet 0    aspirin 81 MG Chew Take 1 tablet (81 mg total) by mouth once daily.  0    atorvastatin (LIPITOR) 40 MG tablet Take 1 tablet (40 mg total) by mouth once daily. 90 tablet 3    cyanocobalamin (VITAMIN B-12) 1000 MCG tablet Take 1 tablet (1,000 mcg total) by mouth once daily. (Patient not taking: No sig reported)      diclofenac (VOLTAREN) 25 MG TbEC Take 1 tablet (25 mg total) by mouth 3 (three) times daily as needed (pain). (Patient not taking: No sig reported) 15 tablet 0    docusate sodium (COLACE) 100 MG capsule Take 1 capsule (100 mg total) by mouth 2 (two) times daily as needed for Constipation. (Patient not taking: No sig reported) 30 capsule 0    hydrochlorothiazide (HYDRODIURIL) 25 MG tablet Take 1 tablet (25 mg total) by mouth once daily. 30 tablet 0    losartan (COZAAR) 100 MG Tab Take 1 tablet (100 mg total) by mouth once daily. 30 tablet 0    neomycin-polymyxin-hydrocortisone (CORTISPORIN) 3.5-10,000-1 mg/mL-unit/mL-% otic suspension Place 4 drops into the left ear 3 (three) times daily. (Patient not taking: No sig reported) 10 mL 0    ranitidine (ZANTAC) 150 MG capsule Take 1 capsule (150 mg total) by mouth once daily. 15 capsule 0     Current Facility-Administered Medications   Medication Dose Route Frequency Provider Last Rate Last Admin    acetaminophen tablet 650 mg  650 mg Oral Once PRN Bonifacio Ramirez MD        albuterol inhaler 2 puff  2 puff Inhalation Q20 Min PRN Bonifacio Ramirez MD        diphenhydrAMINE injection 25 mg  25 mg Intravenous Once PRN Bonifacio Ramirez MD        EPINEPHrine (EPIPEN) 0.3 mg/0.3 mL pen injection 0.3 mg  0.3 mg Intramuscular PRN Bonifacio Ramirez MD        methylPREDNISolone sodium succinate injection 40 mg  40 mg Intravenous Once PRN Bonifacio Ramirez MD        ondansetron  "disintegrating tablet 4 mg  4 mg Oral Once PRN Bonifacio Ramirez MD        sodium chloride 0.9% 500 mL flush bag   Intravenous PRN Bonifacio Ramirez MD        sodium chloride 0.9% flush 10 mL  10 mL Intravenous PRN Bonifacio Ramirez MD           Past Medical History:   Diagnosis Date    Brain aneurysm     CHF (congestive heart failure)     H/O coronary angioplasty     Hypercholesteremia     Hypertension     Migraine headache     Stroke 10/2017     Past Surgical History:   Procedure Laterality Date    CORONARY ANGIOPLASTY WITH STENT PLACEMENT       Family History    None       Social History     Socioeconomic History    Marital status:    Tobacco Use    Smoking status: Current Every Day Smoker     Packs/day: 0.50     Types: Cigarettes    Smokeless tobacco: Never Used   Substance and Sexual Activity    Alcohol use: Yes     Comment: occassionally    Drug use: No    Sexual activity: Not Currently     Partners: Male     Birth control/protection: None       Review of Systems    OBJECTIVE:     Vital Signs  Temp: 98.3 °F (36.8 °C)  Pulse: 68  BP: 119/80  Pain Score:   3  Height: 5' 2" (157.5 cm)  Weight: 85.7 kg (189 lb)  Body mass index is 34.57 kg/m².    Neurosurgery Physical Exam  General: no acute distress  Head: Non-traumatic, normocephalic  Eyes: Pupils equal, EOMI  Neck: Supple, normal ROM, no tenderness to palpation  CVS: Normal rate and rhythm, distal pulses present  Pulm: Symmetric expansion, no respiratory distress  GI: Abdomen nondistended, nontender     MSK: Moves all extremities without restriction, atraumatic  Skin: Dry, intact  Psych: Normal thought content and cognition     Neuro:  Alert, awake, oriented, to self, place, time  Language:  Speech is fluent, goal directed without any noted dysarthria or aphasia     Cranial nerves:    CNII-XII: Intact on fine exam,   Pupils equal round react to light,   Extraocular muscles are intact  V1 to V3 is intact to light touch,   no facial " asymmetry,   Hearing is intact to finger rub and voice  tongue/uvula/palate midline,   shoulder shrug equal,      No pronator drift     Extremities:  Motor:           Upper Extremity     Deltoid Triceps Biceps Wrist  Extension Wrist  Flexion Interosseous   R 5/5 5/5 5/5 5/5 5/5 5/5   L 5/5 5/5 5/5 5/5 5/5 5/5         Thumb   Abduction Thumb  ADDuction Finger  Flexion Finger  Extension       R 5/5 5/5 5/5 5/5       L 5/5 5/5 5/5 5/5           Lower Extremity      Iliopsoas Quadriceps Hamstring Plantarflexion Dorsiflexion EHL   R 5/5 5/5 5/5 5/5 5/5 5/5   L 5/5 5/5 5/5 5/5 5/5 5/5         Reflexes:     DTR:    2+ biceps                       2+ triceps              2+ brachioradialis              2+ patellar  2+ Achilles     Sarabia's: Negative     Babinski's: Negative     Clonus: Negative     Sensory:      Sensation intact to light touch, temperature sensation, vibration, pinprick     Coordination:      Coordination intact throughout, no dysmetria, normal rapid alternating movements, no dysdiadochokinesia  Cerebellar:  Normal finger-to-nose, normal heel-to-shin       Diagnostic Results:  I personally reviewed patient's Diagnostic Imaging.  We also reviewed patient's 3D model of her left ICA 3D injection, with our 3D team. Multiple aneurysms of the left ICA distribution.  A 2 mm x 3 mm left MCA bifurcation aneurysm, a 3.5 x 2 mm elongated and irregular appearing left anterior choroidal artery aneurysm, and a 4 mm x 3 mm, irregular appearing left posterior communicating artery aneurysm.      Vessel wall enhancement indicating enhancement of the vessel wall at the ICA terminus, likely choroidal artery aneurysm, verses possible PCOM      ASSESSMENT/PLAN:     47-year-old female with a history of congestive heart failure, coronary angioplasty, hypertension stroke and a 20 pack-year history of smoking.  With 3 intracerebral aneurysms.  Left posterior communicating, left anterior choroidal, and left MCA.    1. No significant  change in patient's clinical exam  2. Cerebral angiogram notes a 2 x 3 mm left MCA bifurcation aneurysm, a 3.5 x 2 mm elongated in the regular appearing left anterior choroidal artery aneurysm, and a 4 x 3 mm also irregular appearing left posterior communicating artery aneurysm.  Previous MRA with vessel wall imaging showed avid enhancement likely of the anterior choroidal artery aneurysm.  3. Had very long discussion with the patient and the patient's family.  I did note that given the multiplicity of the aneurysms, patient's 20+ pack-year history of smoking, history of hypertension that she has the elevated risk of aneurysmal formation and rupture.  Coupled with the enhancement on vessel wall imaging I do recommend treatment of the aneurysms.  We discussed both microsurgical clip ligation as well as endovascular treatment of her aneurysms.  I did note that she may have somewhat elevated risk for endovascular treatment particularly of the small choroidal aneurysm with high risk of intra procedure rupture given size.  Also may have slightly elevated risk treating 2 aneurysms in a single session.  I did note that I did believe that as far as durability, microsurgical clip ligation would offer the most optimal treatment durability.  I did note however, that given her history of congestive heart failure, angioplasty and hypertension that she may be of elevated risk.  I would recommend that she obtain preoperative risk stratification and clearance from her primary care physician, as well as likely her cardiologist given history of angioplasty and heart failure.  If she is of acceptable risk, then I would recommend microsurgical clip ligation.  Otherwise, staged endovascular treatment, which would most certainly exclude the MCA bifurcation aneurysm.  Answered all the patient's questions as well as the family's to their satisfaction.    Thank you so very much for allowing me to participate in the care of this patient.   Please feel free to call any questions, comments, concerns.    Timothy Diaz MD,MSc  Department of Neurosurgery   Department of Radiology  Department of Neurology  [facility]    [facility]   [employer] [facility] [employer] [facility]    Total time spent in counseling and discussion about further management options including relevant lab work, treatment,  prognosis, medications and intended side effects was more than 60 minutes. More than 50 % of the time was spent in counseling and coordination of care.  I spent a total of 60 minutes on the day of the visit.This includes face to face time and non-face to face time preparing to see the patient (eg, review of tests), Obtaining and/or reviewing separately obtained history, Documenting clinical information in the electronic or other health record, Independently interpreting resultsand communicating results to the patient/family/caregiver, or Care coordination      Note dictated with voice recognition software, please excuse any grammatical errors.

## 2022-04-19 ENCOUNTER — HOSPITAL ENCOUNTER (OUTPATIENT)
Dept: RADIOLOGY | Facility: HOSPITAL | Age: 47
Discharge: HOME | End: 2022-04-19
Attending: INTERNAL MEDICINE
Payer: MEDICAID

## 2022-04-19 ENCOUNTER — HOSPITAL ENCOUNTER (OUTPATIENT)
Dept: CARDIOLOGY | Facility: HOSPITAL | Age: 47
Discharge: HOME | End: 2022-04-19
Attending: INTERNAL MEDICINE
Payer: MEDICAID

## 2022-04-19 DIAGNOSIS — Z01.818 OTHER SPECIFIED PRE-OPERATIVE EXAMINATION: ICD-10-CM

## 2022-04-19 PROCEDURE — 71046 X-RAY EXAM CHEST 2 VIEWS: CPT | Mod: TC,FY,PO

## 2022-04-19 PROCEDURE — 93010 ELECTROCARDIOGRAM REPORT: CPT | Mod: ,,, | Performed by: INTERNAL MEDICINE

## 2022-04-19 PROCEDURE — 93010 EKG 12-LEAD: ICD-10-PCS | Mod: ,,, | Performed by: INTERNAL MEDICINE

## 2022-04-19 PROCEDURE — 93005 ELECTROCARDIOGRAM TRACING: CPT | Mod: PO

## 2022-04-25 ENCOUNTER — LAB VISIT (OUTPATIENT)
Dept: LAB | Facility: HOSPITAL | Age: 47
End: 2022-04-25
Attending: INTERNAL MEDICINE
Payer: MEDICAID

## 2022-04-25 DIAGNOSIS — E87.6 HYPOKALEMIA: Primary | ICD-10-CM

## 2022-04-25 LAB
ANION GAP SERPL CALC-SCNC: 14 MMOL/L (ref 8–16)
CALCIUM SERPL-MCNC: 9.2 MG/DL (ref 8.7–10.5)
CHLORIDE SERPL-SCNC: 106 MMOL/L (ref 95–110)
CO2 SERPL-SCNC: 24 MMOL/L (ref 23–29)
CREAT SERPL-MCNC: 1.02 MG/DL (ref 0.5–1.4)
EST. GFR  (AFRICAN AMERICAN): >60 ML/MIN/1.73 M^2
EST. GFR  (NON AFRICAN AMERICAN): >60 ML/MIN/1.73 M^2
GLUCOSE SERPL-MCNC: 92 MG/DL (ref 70–110)
MAGNESIUM SERPL-MCNC: 1.8 MG/DL (ref 1.6–2.6)
POTASSIUM SERPL-SCNC: 3.1 MMOL/L (ref 3.5–5.1)
SODIUM SERPL-SCNC: 144 MMOL/L (ref 136–145)
UUN UR-MCNC: 14 MG/DL (ref 7–17)

## 2022-04-25 PROCEDURE — 80048 BASIC METABOLIC PNL TOTAL CA: CPT | Mod: PO | Performed by: INTERNAL MEDICINE

## 2022-04-25 PROCEDURE — 36415 COLL VENOUS BLD VENIPUNCTURE: CPT | Mod: PO | Performed by: INTERNAL MEDICINE

## 2022-04-25 PROCEDURE — 83735 ASSAY OF MAGNESIUM: CPT | Mod: PO | Performed by: INTERNAL MEDICINE

## 2022-05-02 ENCOUNTER — LAB VISIT (OUTPATIENT)
Dept: LAB | Facility: HOSPITAL | Age: 47
End: 2022-05-02
Attending: INTERNAL MEDICINE
Payer: MEDICAID

## 2022-05-02 DIAGNOSIS — E87.6 HYPOKALEMIA: Primary | ICD-10-CM

## 2022-05-02 LAB
ANION GAP SERPL CALC-SCNC: 10 MMOL/L (ref 8–16)
CALCIUM SERPL-MCNC: 9.5 MG/DL (ref 8.7–10.5)
CHLORIDE SERPL-SCNC: 109 MMOL/L (ref 95–110)
CO2 SERPL-SCNC: 25 MMOL/L (ref 23–29)
CREAT SERPL-MCNC: 1.22 MG/DL (ref 0.5–1.4)
EST. GFR  (AFRICAN AMERICAN): >60 ML/MIN/1.73 M^2
EST. GFR  (NON AFRICAN AMERICAN): 53.3 ML/MIN/1.73 M^2
GLUCOSE SERPL-MCNC: 82 MG/DL (ref 70–110)
POTASSIUM SERPL-SCNC: 4.9 MMOL/L (ref 3.5–5.1)
SODIUM SERPL-SCNC: 144 MMOL/L (ref 136–145)
UUN UR-MCNC: 9 MG/DL (ref 7–17)

## 2022-05-02 PROCEDURE — 80048 BASIC METABOLIC PNL TOTAL CA: CPT | Mod: PO | Performed by: INTERNAL MEDICINE

## 2022-05-02 PROCEDURE — 36415 COLL VENOUS BLD VENIPUNCTURE: CPT | Mod: PO | Performed by: INTERNAL MEDICINE

## 2022-05-11 ENCOUNTER — TELEPHONE (OUTPATIENT)
Dept: NEUROSURGERY | Facility: CLINIC | Age: 47
End: 2022-05-11
Payer: MEDICAID

## 2022-05-11 NOTE — TELEPHONE ENCOUNTER
Spoke with patient. Confirmed we did receive her clearance for surgery. Informed her we will get the surgery scheduled. Once I have more concrete details, I will confirm them with the patient. Pt verbalizes understanding and agreement.     ----- Message from Phyllis Chappell sent at 5/11/2022  2:36 PM CDT -----  Regarding: speak with nurse  Contact: patient  [phone]     please call patient checking on paper work to be cleared for surgery did the office receive it waiting on a call back thanks.

## 2022-05-20 ENCOUNTER — TELEPHONE (OUTPATIENT)
Dept: NEUROSURGERY | Facility: CLINIC | Age: 47
End: 2022-05-20
Payer: MEDICAID

## 2022-05-20 DIAGNOSIS — Z01.818 PRE-OPERATIVE CLEARANCE: Primary | ICD-10-CM

## 2022-06-07 ENCOUNTER — PATIENT MESSAGE (OUTPATIENT)
Dept: ADMINISTRATIVE | Facility: OTHER | Age: 47
End: 2022-06-07
Payer: MEDICAID

## 2022-06-07 ENCOUNTER — OFFICE VISIT (OUTPATIENT)
Dept: CARDIOLOGY | Facility: CLINIC | Age: 47
End: 2022-06-07
Payer: MEDICAID

## 2022-06-07 VITALS
SYSTOLIC BLOOD PRESSURE: 128 MMHG | HEIGHT: 62 IN | DIASTOLIC BLOOD PRESSURE: 76 MMHG | BODY MASS INDEX: 36.93 KG/M2 | WEIGHT: 200.69 LBS

## 2022-06-07 DIAGNOSIS — Z01.810 PREOP CARDIOVASCULAR EXAM: Primary | ICD-10-CM

## 2022-06-07 DIAGNOSIS — I10 ESSENTIAL HYPERTENSION: ICD-10-CM

## 2022-06-07 DIAGNOSIS — I63.312 THROMBOTIC STROKE INVOLVING LEFT MIDDLE CEREBRAL ARTERY: ICD-10-CM

## 2022-06-07 DIAGNOSIS — Z72.0 TOBACCO ABUSE: ICD-10-CM

## 2022-06-07 DIAGNOSIS — E78.2 MIXED HYPERLIPIDEMIA: ICD-10-CM

## 2022-06-07 DIAGNOSIS — Z01.818 PRE-OPERATIVE CLEARANCE: ICD-10-CM

## 2022-06-07 DIAGNOSIS — I50.32 CHRONIC DIASTOLIC HEART FAILURE: Chronic | ICD-10-CM

## 2022-06-07 DIAGNOSIS — I67.1 ANEURYSM OF MIDDLE CEREBRAL ARTERY: ICD-10-CM

## 2022-06-07 PROCEDURE — 93010 ELECTROCARDIOGRAM REPORT: CPT | Mod: S$PBB,,, | Performed by: INTERNAL MEDICINE

## 2022-06-07 PROCEDURE — 93010 EKG 12-LEAD: ICD-10-PCS | Mod: S$PBB,,, | Performed by: INTERNAL MEDICINE

## 2022-06-07 PROCEDURE — 1160F PR REVIEW ALL MEDS BY PRESCRIBER/CLIN PHARMACIST DOCUMENTED: ICD-10-PCS | Mod: CPTII,,, | Performed by: INTERNAL MEDICINE

## 2022-06-07 PROCEDURE — 99214 OFFICE O/P EST MOD 30 MIN: CPT | Mod: PBBFAC,PN | Performed by: INTERNAL MEDICINE

## 2022-06-07 PROCEDURE — 3074F SYST BP LT 130 MM HG: CPT | Mod: CPTII,,, | Performed by: INTERNAL MEDICINE

## 2022-06-07 PROCEDURE — 3078F PR MOST RECENT DIASTOLIC BLOOD PRESSURE < 80 MM HG: ICD-10-PCS | Mod: CPTII,,, | Performed by: INTERNAL MEDICINE

## 2022-06-07 PROCEDURE — 1159F MED LIST DOCD IN RCRD: CPT | Mod: CPTII,,, | Performed by: INTERNAL MEDICINE

## 2022-06-07 PROCEDURE — 99999 PR PBB SHADOW E&M-EST. PATIENT-LVL IV: CPT | Mod: PBBFAC,,, | Performed by: INTERNAL MEDICINE

## 2022-06-07 PROCEDURE — 99999 PR PBB SHADOW E&M-EST. PATIENT-LVL IV: ICD-10-PCS | Mod: PBBFAC,,, | Performed by: INTERNAL MEDICINE

## 2022-06-07 PROCEDURE — 3078F DIAST BP <80 MM HG: CPT | Mod: CPTII,,, | Performed by: INTERNAL MEDICINE

## 2022-06-07 PROCEDURE — 1159F PR MEDICATION LIST DOCUMENTED IN MEDICAL RECORD: ICD-10-PCS | Mod: CPTII,,, | Performed by: INTERNAL MEDICINE

## 2022-06-07 PROCEDURE — 3008F BODY MASS INDEX DOCD: CPT | Mod: CPTII,,, | Performed by: INTERNAL MEDICINE

## 2022-06-07 PROCEDURE — 99204 OFFICE O/P NEW MOD 45 MIN: CPT | Mod: S$PBB,25,, | Performed by: INTERNAL MEDICINE

## 2022-06-07 PROCEDURE — 99204 PR OFFICE/OUTPT VISIT, NEW, LEVL IV, 45-59 MIN: ICD-10-PCS | Mod: S$PBB,25,, | Performed by: INTERNAL MEDICINE

## 2022-06-07 PROCEDURE — 1160F RVW MEDS BY RX/DR IN RCRD: CPT | Mod: CPTII,,, | Performed by: INTERNAL MEDICINE

## 2022-06-07 PROCEDURE — 3008F PR BODY MASS INDEX (BMI) DOCUMENTED: ICD-10-PCS | Mod: CPTII,,, | Performed by: INTERNAL MEDICINE

## 2022-06-07 PROCEDURE — 3074F PR MOST RECENT SYSTOLIC BLOOD PRESSURE < 130 MM HG: ICD-10-PCS | Mod: CPTII,,, | Performed by: INTERNAL MEDICINE

## 2022-06-07 PROCEDURE — 93005 ELECTROCARDIOGRAM TRACING: CPT | Mod: PBBFAC,PN | Performed by: INTERNAL MEDICINE

## 2022-06-07 NOTE — ASSESSMENT & PLAN NOTE
RCRI=0 .  Pt is a low risk pt, planned for an elevated risk surgery.  She is able to perform > 4 METS of activity w/o limitations.    - no further cardiac w/u required prior to surgery  - ok to hold ASA or continue per neurosurgery and resume following surgery when hemostasis achieved

## 2022-06-07 NOTE — ASSESSMENT & PLAN NOTE
Pt aware of health complications a/w smoking and desires to quit.    - cessation course referral placed  - cessation counseling provided

## 2022-06-07 NOTE — PROGRESS NOTES
Subjective:    Patient ID:  Gina Jenkins is a 47 y.o. female who presents for evaluation of Pre-op Exam      PCP: Clair Johnson, RUSSELL     HPI  Pt is a 48 yo F w/ PMH of CVA 2018, HLD, HTN, Diastolic CHF, tobacco abuse (1 PPD, 28 pk/yrs), and L MCA bifurcation aneurysm 3.5x2mm who presents for preop evaluation prior to neurosurgery.  She is planned for surgical clipping 6/8/2022.  She denies cp, sob, edema, orthopnea, PND, presyncope, LOC, palpitations, and claudication.  She notes compliance w/ meds and denies side effects.  She monitors her BP at home and states that it runs 120-160s/90s.  She does not exercise however is active w/ taking care of her children.      Past Medical History:   Diagnosis Date    Brain aneurysm     CHF (congestive heart failure)     H/O coronary angioplasty     Hypercholesteremia     Hypertension     Migraine headache     Stroke 10/2017     Past Surgical History:   Procedure Laterality Date    CORONARY ANGIOPLASTY WITH STENT PLACEMENT       Social History     Socioeconomic History    Marital status:    Tobacco Use    Smoking status: Current Every Day Smoker     Packs/day: 0.50     Types: Cigarettes    Smokeless tobacco: Never Used   Substance and Sexual Activity    Alcohol use: Yes     Comment: occassionally    Drug use: No    Sexual activity: Not Currently     Partners: Male     Birth control/protection: None     History reviewed. No pertinent family history.    Review of patient's allergies indicates:   Allergen Reactions    Lisinopril Swelling    Bactrim [sulfamethoxazole-trimethoprim] Rash       Medication List with Changes/Refills   Current Medications    AMITRIPTYLINE (ELAVIL) 75 MG TABLET    Take 75 mg by mouth every evening.    AMLODIPINE (NORVASC) 10 MG TABLET    Take 1 tablet (10 mg total) by mouth once daily.    ASPIRIN 81 MG CHEW    Take 1 tablet (81 mg total) by mouth once daily.    ATORVASTATIN (LIPITOR) 40 MG TABLET    Take 1 tablet (40 mg total)  by mouth once daily.    CARVEDILOL (COREG) 25 MG TABLET    Take 25 mg by mouth 2 (two) times daily.    CYANOCOBALAMIN (VITAMIN B-12) 1000 MCG TABLET    Take 1 tablet (1,000 mcg total) by mouth once daily.    DICLOFENAC (VOLTAREN) 25 MG TBEC    Take 1 tablet (25 mg total) by mouth 3 (three) times daily as needed (pain).    DIPHENHYDRAMINE (BENADRYL) 25 MG CAPSULE    Take 1 each (25 mg total) by mouth every 6 (six) hours as needed for Itching or Allergies.    DOCUSATE SODIUM (COLACE) 100 MG CAPSULE    Take 1 capsule (100 mg total) by mouth 2 (two) times daily as needed for Constipation.    HYDROCHLOROTHIAZIDE (HYDRODIURIL) 25 MG TABLET    Take 1 tablet (25 mg total) by mouth once daily.    MELOXICAM (MOBIC) 7.5 MG TABLET    Take 7.5 mg by mouth 2 (two) times daily.    NEOMYCIN-POLYMYXIN-HYDROCORTISONE (CORTISPORIN) 3.5-10,000-1 MG/ML-UNIT/ML-% OTIC SUSPENSION    Place 4 drops into the left ear 3 (three) times daily.    RANITIDINE (ZANTAC) 150 MG CAPSULE    Take 1 capsule (150 mg total) by mouth once daily.    TOPIRAMATE (TOPAMAX) 50 MG TABLET    Take 50 mg by mouth 2 (two) times daily.    TRAMADOL (ULTRAM) 50 MG TABLET    Take 50 mg by mouth 2 (two) times daily.   Discontinued Medications    LOSARTAN (COZAAR) 100 MG TAB    Take 1 tablet (100 mg total) by mouth once daily.       Review of Systems   Constitutional: Negative for diaphoresis and fever.   HENT: Negative for congestion and hearing loss.    Eyes: Negative for blurred vision and pain.   Cardiovascular: Negative for chest pain, claudication, dyspnea on exertion, leg swelling, near-syncope, palpitations and syncope.   Respiratory: Negative for shortness of breath and sleep disturbances due to breathing.    Hematologic/Lymphatic: Negative for bleeding problem. Does not bruise/bleed easily.   Skin: Negative for color change and poor wound healing.   Gastrointestinal: Negative for abdominal pain and nausea.   Genitourinary: Negative for bladder incontinence and  "flank pain.   Neurological: Negative for focal weakness and light-headedness.        Objective:   /76   Ht 5' 2" (1.575 m)   Wt 91 kg (200 lb 11.2 oz)   BMI 36.71 kg/m²    Physical Exam  Constitutional:       Appearance: She is well-developed. She is not diaphoretic.   HENT:      Head: Normocephalic and atraumatic.   Eyes:      General: No scleral icterus.     Pupils: Pupils are equal, round, and reactive to light.   Neck:      Vascular: No JVD.   Cardiovascular:      Rate and Rhythm: Normal rate and regular rhythm.      Pulses: Intact distal pulses.      Heart sounds: S1 normal and S2 normal. No murmur heard.    No friction rub. No gallop.   Pulmonary:      Effort: Pulmonary effort is normal. No respiratory distress.      Breath sounds: Normal breath sounds. No wheezing or rales.   Chest:      Chest wall: No tenderness.   Abdominal:      General: Bowel sounds are normal. There is no distension.      Palpations: Abdomen is soft. There is no mass.      Tenderness: There is no abdominal tenderness. There is no rebound.   Musculoskeletal:         General: No tenderness. Normal range of motion.      Cervical back: Normal range of motion and neck supple.   Skin:     General: Skin is warm and dry.      Coloration: Skin is not pale.   Neurological:      Mental Status: She is alert and oriented to person, place, and time.      Coordination: Coordination normal.      Deep Tendon Reflexes: Reflexes normal.   Psychiatric:         Behavior: Behavior normal.         Judgment: Judgment normal.           EC2022- reviewed.  NSR, NL ECG.      Echo: 4/10/2018- reviewed.    CONCLUSIONS     1 - Normal left ventricular systolic function (EF 60-65%).     2 - Normal left ventricular diastolic function.     3 - Normal right ventricular systolic function .     4 - The estimated PA systolic pressure is greater than 26 mmHg.     5 - No evidence of intracardiac shunt.      Assessment:       1. Preop cardiovascular exam    2. " Pre-operative clearance    3. Essential hypertension    4. Aneurysm of left middle cerebral artery    5. Thrombotic stroke involving left middle cerebral artery    6. Chronic diastolic heart failure    7. Mixed hyperlipidemia    8. Tobacco abuse         Plan:         Aneurysm of left middle cerebral artery  Followed by Neurosurgery and planned to have a surgical clipping 6/8/2022.    Thrombotic stroke involving left middle cerebral artery  Continue medical therapy.      Essential hypertension  Goal BP < 130/80.  Compliant w/ meds.    - continue medical therapy  - enroll in digital medicine prgm  - encouraged lifestyle modifications     Chronic diastolic heart failure  Compensated.  Continue medical therapy.      Mixed hyperlipidemia  Last LDL 62 4/2022, goal LDL < 70. Continue medical therapy.      Tobacco abuse  Pt aware of health complications a/w smoking and desires to quit.    - cessation course referral placed  - cessation counseling provided    Preop cardiovascular exam  RCRI=0 .  Pt is a low risk pt, planned for an elevated risk surgery.  She is able to perform > 4 METS of activity w/o limitations.    - no further cardiac w/u required prior to surgery  - ok to hold ASA or continue per neurosurgery and resume following surgery when hemostasis achieved       Total duration of face to face visit time 30 minutes.  Total time spent counseling greater than fifty percent of total visit time.  Counseling included discussion regarding imaging findings, diagnosis, possibilities, treatment options, risks and benefits.  The patient had many questions regarding the options and long-term effects      Mil Adam M.D.  Interventional Cardiology                 no petechia/no chills/no inflammation/no itching/no decreased eating/drinking/no fever/no vomiting

## 2022-06-07 NOTE — ASSESSMENT & PLAN NOTE
Goal BP < 130/80.  Compliant w/ meds.    - continue medical therapy  - enroll in digital medicine prgm  - encouraged lifestyle modifications

## 2022-06-15 ENCOUNTER — TELEPHONE (OUTPATIENT)
Dept: NEUROSURGERY | Facility: CLINIC | Age: 47
End: 2022-06-15
Payer: MEDICAID

## 2022-06-15 DIAGNOSIS — I67.1 CEREBRAL ANEURYSM: Primary | ICD-10-CM

## 2022-06-20 ENCOUNTER — TELEPHONE (OUTPATIENT)
Dept: NEUROSURGERY | Facility: CLINIC | Age: 47
End: 2022-06-20
Payer: MEDICAID

## 2022-06-20 NOTE — TELEPHONE ENCOUNTER
----- Message from Nelda Christianson sent at 6/20/2022  3:05 PM CDT -----  Contact: Patient  Patient requesting call back in regards to surgery date.       Patient @[phone] ([place])

## 2022-06-20 NOTE — TELEPHONE ENCOUNTER
Called and spoke with pt. Pt cleared by cardiology. Instructed surgery set for 07/15/22 at 7am. Instructed to bathe with dial antibacterial soap night before and morning of before going to hospital. Arrive at 5am to the same day surgery. Pt verbalized understanding.

## 2022-06-30 ENCOUNTER — TELEPHONE (OUTPATIENT)
Dept: SMOKING CESSATION | Facility: CLINIC | Age: 47
End: 2022-06-30
Payer: MEDICAID

## 2022-07-14 ENCOUNTER — ANESTHESIA EVENT (OUTPATIENT)
Dept: SURGERY | Facility: HOSPITAL | Age: 47
DRG: 025 | End: 2022-07-14
Payer: MEDICAID

## 2022-07-14 NOTE — ANESTHESIA PREPROCEDURE EVALUATION
[facility]  Anesthesia Pre-Operative Evaluation         Patient Name: Gina Jenkins  YOB: 1975  MRN: [id number]    SUBJECTIVE:     Pre-operative evaluation for Procedure(s) (LRB):  CRANIOTOMY, WITH ANEURYSM CLIPPING (N/A)     07/14/2022    Gina Jenkins is a 47 y.o. female w/ a significant PMHx of CVA (2018), HLD, HTN, Diastolic CHF (compensated), tobacco abuse (1 PPD, 28 pk/yrs), and L MCA bifurcation aneurysm (3.5x2mm).    Patient now presents for the above procedure(s).    TTE (04/09/2018):  CONCLUSIONS     1 - Normal left ventricular systolic function (EF 60-65%).     2 - Normal left ventricular diastolic function.     3 - Normal right ventricular systolic function .     4 - The estimated PA systolic pressure is greater than 26 mmHg.     5 - No evidence of intracardiac shunt.     LDA:       Peripheral IV - Single Lumen 03/28/22 0753 20 G Left;Posterior Hand (Active)   Number of days: 108       Prev airway: None documented.    Drips: None documented.      Patient Active Problem List   Diagnosis    Calculus of gallbladder without cholecystitis without obstruction    Essential hypertension    Left upper quadrant pain    Right upper quadrant pain    Hypertensive encephalopathy    Malignant hypertension    Hypokalemia    Hypocalcemia    Thyroid nodule    UTI (urinary tract infection)    Chronic diastolic heart failure    Acute ischemic left MCA stroke    Thrombotic stroke involving left middle cerebral artery    Aneurysm of left middle cerebral artery    Tobacco abuse    Dysuria    Gait abnormality    Severe headache    Decreased  strength of right hand    Weakness of right upper extremity    Lack of coordination due to stroke    Decreased range of motion of right shoulder    Dysarthria    Cognitive communication deficit    Cerebrovascular accident (CVA)    Weakness of both lower extremities    Impaired balance as late effect of cerebrovascular  accident    Abnormality of gait as late effect of cerebrovascular accident (CVA)    Weakness generalized    H/O: CVA (cerebrovascular accident)    Hemiplegic migraine without status migrainosus, not intractable    Mixed hyperlipidemia    Preop cardiovascular exam       Review of patient's allergies indicates:   Allergen Reactions    Lisinopril Swelling    Bactrim [sulfamethoxazole-trimethoprim] Rash       Current Inpatient Medications:      Current Facility-Administered Medications on File Prior to Encounter   Medication Dose Route Frequency Provider Last Rate Last Admin    acetaminophen tablet 650 mg  650 mg Oral Once PRN Bonifacio Ramirez MD        albuterol inhaler 2 puff  2 puff Inhalation Q20 Min PRN Bonifacio Ramirez MD        diphenhydrAMINE injection 25 mg  25 mg Intravenous Once PRN Bonifacio Ramirez MD        EPINEPHrine (EPIPEN) 0.3 mg/0.3 mL pen injection 0.3 mg  0.3 mg Intramuscular PRN Bonifacio Ramirez MD        methylPREDNISolone sodium succinate injection 40 mg  40 mg Intravenous Once PRN Bonifacio Ramirez MD        ondansetron disintegrating tablet 4 mg  4 mg Oral Once PRN Bonifacio Ramirez MD        sodium chloride 0.9% 500 mL flush bag   Intravenous PRN Bonifacio Ramirez MD        sodium chloride 0.9% flush 10 mL  10 mL Intravenous PRN Bonifacio Ramirez MD         Current Outpatient Medications on File Prior to Encounter   Medication Sig Dispense Refill    amitriptyline (ELAVIL) 75 MG tablet Take 75 mg by mouth every evening.      amlodipine (NORVASC) 10 MG tablet Take 1 tablet (10 mg total) by mouth once daily. 30 tablet 0    aspirin 81 MG Chew Take 1 tablet (81 mg total) by mouth once daily.  0    atorvastatin (LIPITOR) 40 MG tablet Take 1 tablet (40 mg total) by mouth once daily. 90 tablet 3    carvediloL (COREG) 25 MG tablet Take 25 mg by mouth 2 (two) times daily.      cyanocobalamin (VITAMIN B-12) 1000 MCG tablet Take 1 tablet (1,000 mcg total) by mouth once  daily. (Patient not taking: No sig reported)      diclofenac (VOLTAREN) 25 MG TbEC Take 1 tablet (25 mg total) by mouth 3 (three) times daily as needed (pain). (Patient not taking: No sig reported) 15 tablet 0    diphenhydrAMINE (BENADRYL) 25 mg capsule Take 1 each (25 mg total) by mouth every 6 (six) hours as needed for Itching or Allergies. 20 capsule 0    docusate sodium (COLACE) 100 MG capsule Take 1 capsule (100 mg total) by mouth 2 (two) times daily as needed for Constipation. (Patient not taking: No sig reported) 30 capsule 0    hydrochlorothiazide (HYDRODIURIL) 25 MG tablet Take 1 tablet (25 mg total) by mouth once daily. 30 tablet 0    meloxicam (MOBIC) 7.5 MG tablet Take 7.5 mg by mouth 2 (two) times daily.      neomycin-polymyxin-hydrocortisone (CORTISPORIN) 3.5-10,000-1 mg/mL-unit/mL-% otic suspension Place 4 drops into the left ear 3 (three) times daily. (Patient not taking: No sig reported) 10 mL 0    ranitidine (ZANTAC) 150 MG capsule Take 1 capsule (150 mg total) by mouth once daily. 15 capsule 0    topiramate (TOPAMAX) 50 MG tablet Take 50 mg by mouth 2 (two) times daily.      traMADoL (ULTRAM) 50 mg tablet Take 50 mg by mouth 2 (two) times daily.         Past Surgical History:   Procedure Laterality Date    CORONARY ANGIOPLASTY WITH STENT PLACEMENT         Social History     Socioeconomic History    Marital status:    Tobacco Use    Smoking status: Current Every Day Smoker     Packs/day: 0.50     Types: Cigarettes    Smokeless tobacco: Never Used   Substance and Sexual Activity    Alcohol use: Yes     Comment: occassionally    Drug use: No    Sexual activity: Not Currently     Partners: Male     Birth control/protection: None       OBJECTIVE:     Vital Signs Range (Last 24H):         Significant Labs:  Lab Results   Component Value Date    WBC 8.25 04/19/2022    HGB 12.5 04/19/2022    HCT 36.9 (L) 04/19/2022     04/19/2022    CHOL 147 04/19/2022    TRIG 273 (H)  04/19/2022    HDL 30 (L) 04/19/2022    ALT 19 04/19/2022    AST 24 04/19/2022     05/02/2022    K 4.9 05/02/2022     05/02/2022    CREATININE 1.22 05/02/2022    BUN 9 05/02/2022    CO2 25 05/02/2022    TSH 2.840 04/19/2022    INR 1.0 04/19/2022    HGBA1C 5.4 06/30/2020       Diagnostic Studies: No relevant studies.    EKG:   Results for orders placed or performed in visit on 06/07/22   IN OFFICE EKG 12-LEAD (to [place])    Collection Time: 06/07/22  1:30 PM    Narrative    Test Reason : Z01.810,    Vent. Rate : 067 BPM     Atrial Rate : 067 BPM     P-R Int : 180 ms          QRS Dur : 092 ms      QT Int : 420 ms       P-R-T Axes : 041 012 046 degrees     QTc Int : 443 ms    Normal sinus rhythm  Nonspecific T wave abnormality  Abnormal ECG  When compared with ECG of 19-APR-2022 13:40,  No significant change was found  Confirmed by Jair GALLAGHER MD, Mil CORRAL ([id number]) on 6/7/2022 4:36:36 PM    Referred By: JANIS BUTLER           Confirmed By:Mil Adam III, MD       2D ECHO:  TTE:  No results found for this or any previous visit.    SANDRITA:  No results found for this or any previous visit.    ASSESSMENT/PLAN:       Pre-op Assessment    I have reviewed the Patient Summary Reports.     I have reviewed the Nursing Notes. I have reviewed the NPO Status.   I have reviewed the Medications.     Review of Systems  Anesthesia Hx:  No problems with previous Anesthesia  History of prior surgery of interest to airway management or planning: Denies Family Hx of Anesthesia complications.   Denies Personal Hx of Anesthesia complications.   Social:  Smoker    Hematology/Oncology:  Hematology Normal        EENT/Dental:EENT/Dental Normal   Cardiovascular:   Hypertension CAD  CABG/stent   Denies Angina. CHF hyperlipidemia ECG has been reviewed.    Pulmonary:   Denies COPD.  Denies Asthma.  Denies Shortness of breath.  Denies Sleep Apnea. smoker   Renal/:  Renal/ Normal     Hepatic/GI:   GERD, well controlled     Neurological:   CVA, no residual symptoms Headaches L MCA aneurysm    Endocrine:  Endocrine Normal Denies Diabetes.    Psych:  Psychiatric Normal           Physical Exam  General: Well nourished, Cooperative, Alert and Oriented    Airway:  Mallampati: II / I  Mouth Opening: Normal  TM Distance: Normal  Tongue: Normal  Neck ROM: Normal ROM    Dental:  Intact, Periodontal disease        Anesthesia Plan  Type of Anesthesia, risks & benefits discussed:    Anesthesia Type: Gen ETT  Intra-op Monitoring Plan: Standard ASA Monitors and Art Line  Post Op Pain Control Plan: multimodal analgesia  Induction:  IV  Airway Plan: Video and Direct, Post-Induction  Informed Consent: Informed consent signed with the Patient and all parties understand the risks and agree with anesthesia plan.  All questions answered. Patient consented to blood products? Yes  ASA Score: 3  Day of Surgery Review of History & Physical: H&P Update referred to the surgeon/provider.  Anesthesia Plan Notes: TIVA    Ready For Surgery From Anesthesia Perspective.     .

## 2022-07-14 NOTE — PRE-PROCEDURE INSTRUCTIONS
PreOp Instructions given:   - Verbal medication information (what to hold and what to take)   - NPO guidelines 2300  - Arrival place directions given; time to be given the day before procedure by the   Surgeon's Office 0500 DOSC  - Bathing with antibacterial soap   - Don't wear any jewelry or bring any valuables AM of surgery   - No makeup or moisturizer to face   - No perfume/cologne, powder, lotions or aftershave   Pt. verbalized understanding.   Pt denies any h/o Anesthesia/Sedation complications or side effects.

## 2022-07-15 ENCOUNTER — ANESTHESIA (OUTPATIENT)
Dept: SURGERY | Facility: HOSPITAL | Age: 47
DRG: 025 | End: 2022-07-15
Payer: MEDICAID

## 2022-07-15 ENCOUNTER — TELEPHONE (OUTPATIENT)
Dept: NEUROSURGERY | Facility: CLINIC | Age: 47
End: 2022-07-15
Payer: MEDICAID

## 2022-07-15 ENCOUNTER — HOSPITAL ENCOUNTER (INPATIENT)
Facility: HOSPITAL | Age: 47
LOS: 5 days | Discharge: HOME HEALTH | DRG: 025 | End: 2022-07-20
Attending: NEUROLOGICAL SURGERY | Admitting: NEUROLOGICAL SURGERY
Payer: MEDICAID

## 2022-07-15 DIAGNOSIS — J96.01 ACUTE RESPIRATORY FAILURE WITH HYPOXIA: ICD-10-CM

## 2022-07-15 DIAGNOSIS — I67.1 ANEURYSM OF MIDDLE CEREBRAL ARTERY: Primary | ICD-10-CM

## 2022-07-15 DIAGNOSIS — I67.1 ANEURYSM OF MIDDLE CEREBRAL ARTERY: ICD-10-CM

## 2022-07-15 DIAGNOSIS — I67.1 CEREBRAL ANEURYSM, NONRUPTURED: ICD-10-CM

## 2022-07-15 DIAGNOSIS — I67.1 INTRACRANIAL ANEURYSM: ICD-10-CM

## 2022-07-15 PROBLEM — J96.02 ACUTE RESPIRATORY FAILURE WITH HYPOXIA AND HYPERCAPNIA: Status: ACTIVE | Noted: 2022-07-15

## 2022-07-15 LAB
ABO + RH BLD: NORMAL
ALBUMIN SERPL BCP-MCNC: 2.8 G/DL (ref 3.5–5.2)
ALBUMIN SERPL BCP-MCNC: 3.4 G/DL (ref 3.5–5.2)
ALLENS TEST: ABNORMAL
ALP SERPL-CCNC: 110 U/L (ref 55–135)
ALP SERPL-CCNC: 158 U/L (ref 55–135)
ALT SERPL W/O P-5'-P-CCNC: 154 U/L (ref 10–44)
ALT SERPL W/O P-5'-P-CCNC: 26 U/L (ref 10–44)
ANION GAP SERPL CALC-SCNC: 11 MMOL/L (ref 8–16)
ANION GAP SERPL CALC-SCNC: 12 MMOL/L (ref 8–16)
APTT BLDCRRT: 23.2 SEC (ref 21–32)
AST SERPL-CCNC: 32 U/L (ref 10–40)
AST SERPL-CCNC: 322 U/L (ref 10–40)
BASOPHILS # BLD AUTO: 0.02 K/UL (ref 0–0.2)
BASOPHILS # BLD AUTO: 0.05 K/UL (ref 0–0.2)
BASOPHILS NFR BLD: 0.3 % (ref 0–1.9)
BASOPHILS NFR BLD: 1.1 % (ref 0–1.9)
BILIRUB SERPL-MCNC: 0.3 MG/DL (ref 0.1–1)
BILIRUB SERPL-MCNC: 0.4 MG/DL (ref 0.1–1)
BLD GP AB SCN CELLS X3 SERPL QL: NORMAL
BUN SERPL-MCNC: 13 MG/DL (ref 6–20)
BUN SERPL-MCNC: 16 MG/DL (ref 6–20)
CALCIUM SERPL-MCNC: 8.4 MG/DL (ref 8.7–10.5)
CALCIUM SERPL-MCNC: 8.4 MG/DL (ref 8.7–10.5)
CHLORIDE SERPL-SCNC: 103 MMOL/L (ref 95–110)
CHLORIDE SERPL-SCNC: 106 MMOL/L (ref 95–110)
CO2 SERPL-SCNC: 21 MMOL/L (ref 23–29)
CO2 SERPL-SCNC: 22 MMOL/L (ref 23–29)
CREAT SERPL-MCNC: 0.9 MG/DL (ref 0.5–1.4)
CREAT SERPL-MCNC: 1 MG/DL (ref 0.5–1.4)
CTP QC/QA: YES
DELSYS: ABNORMAL
DIFFERENTIAL METHOD: ABNORMAL
DIFFERENTIAL METHOD: ABNORMAL
EOSINOPHIL # BLD AUTO: 0 K/UL (ref 0–0.5)
EOSINOPHIL # BLD AUTO: 0.3 K/UL (ref 0–0.5)
EOSINOPHIL NFR BLD: 0.3 % (ref 0–8)
EOSINOPHIL NFR BLD: 6 % (ref 0–8)
ERYTHROCYTE [DISTWIDTH] IN BLOOD BY AUTOMATED COUNT: 14.4 % (ref 11.5–14.5)
ERYTHROCYTE [DISTWIDTH] IN BLOOD BY AUTOMATED COUNT: 14.4 % (ref 11.5–14.5)
EST. GFR  (AFRICAN AMERICAN): >60 ML/MIN/1.73 M^2
EST. GFR  (AFRICAN AMERICAN): >60 ML/MIN/1.73 M^2
EST. GFR  (NON AFRICAN AMERICAN): >60 ML/MIN/1.73 M^2
EST. GFR  (NON AFRICAN AMERICAN): >60 ML/MIN/1.73 M^2
FLOW: 8
GLUCOSE SERPL-MCNC: 133 MG/DL (ref 70–110)
GLUCOSE SERPL-MCNC: 137 MG/DL (ref 70–110)
GLUCOSE SERPL-MCNC: 140 MG/DL (ref 70–110)
GLUCOSE SERPL-MCNC: 150 MG/DL (ref 70–110)
GLUCOSE SERPL-MCNC: 217 MG/DL (ref 70–110)
HCO3 UR-SCNC: 20.2 MMOL/L (ref 24–28)
HCO3 UR-SCNC: 22.1 MMOL/L (ref 24–28)
HCO3 UR-SCNC: 22.4 MMOL/L (ref 24–28)
HCO3 UR-SCNC: 23.4 MMOL/L (ref 24–28)
HCT VFR BLD AUTO: 30.7 % (ref 37–48.5)
HCT VFR BLD AUTO: 34.2 % (ref 37–48.5)
HCT VFR BLD CALC: 30 %PCV (ref 36–54)
HGB BLD-MCNC: 10.7 G/DL (ref 12–16)
HGB BLD-MCNC: 11.8 G/DL (ref 12–16)
IMM GRANULOCYTES # BLD AUTO: 0.01 K/UL (ref 0–0.04)
IMM GRANULOCYTES # BLD AUTO: 0.03 K/UL (ref 0–0.04)
IMM GRANULOCYTES NFR BLD AUTO: 0.2 % (ref 0–0.5)
IMM GRANULOCYTES NFR BLD AUTO: 0.4 % (ref 0–0.5)
INR PPP: 1 (ref 0.8–1.2)
LYMPHOCYTES # BLD AUTO: 0.9 K/UL (ref 1–4.8)
LYMPHOCYTES # BLD AUTO: 1.6 K/UL (ref 1–4.8)
LYMPHOCYTES NFR BLD: 12.1 % (ref 18–48)
LYMPHOCYTES NFR BLD: 35.4 % (ref 18–48)
MCH RBC QN AUTO: 28.3 PG (ref 27–31)
MCH RBC QN AUTO: 29.3 PG (ref 27–31)
MCHC RBC AUTO-ENTMCNC: 34.5 G/DL (ref 32–36)
MCHC RBC AUTO-ENTMCNC: 34.9 G/DL (ref 32–36)
MCV RBC AUTO: 82 FL (ref 82–98)
MCV RBC AUTO: 84 FL (ref 82–98)
MODE: ABNORMAL
MONOCYTES # BLD AUTO: 0.2 K/UL (ref 0.3–1)
MONOCYTES # BLD AUTO: 0.4 K/UL (ref 0.3–1)
MONOCYTES NFR BLD: 2.3 % (ref 4–15)
MONOCYTES NFR BLD: 9.1 % (ref 4–15)
NEUTROPHILS # BLD AUTO: 2.2 K/UL (ref 1.8–7.7)
NEUTROPHILS # BLD AUTO: 6 K/UL (ref 1.8–7.7)
NEUTROPHILS NFR BLD: 48.2 % (ref 38–73)
NEUTROPHILS NFR BLD: 84.6 % (ref 38–73)
NRBC BLD-RTO: 0 /100 WBC
NRBC BLD-RTO: 0 /100 WBC
PCO2 BLDA: 28.6 MMHG (ref 35–45)
PCO2 BLDA: 35.1 MMHG (ref 35–45)
PCO2 BLDA: 35.6 MMHG (ref 35–45)
PCO2 BLDA: 41 MMHG (ref 35–45)
PH SMN: 7.37 [PH] (ref 7.35–7.45)
PH SMN: 7.4 [PH] (ref 7.35–7.45)
PH SMN: 7.41 [PH] (ref 7.35–7.45)
PH SMN: 7.46 [PH] (ref 7.35–7.45)
PLATELET # BLD AUTO: 230 K/UL (ref 150–450)
PLATELET # BLD AUTO: 256 K/UL (ref 150–450)
PMV BLD AUTO: 11.1 FL (ref 9.2–12.9)
PMV BLD AUTO: 11.7 FL (ref 9.2–12.9)
PO2 BLDA: 117 MMHG (ref 80–100)
PO2 BLDA: 232 MMHG (ref 80–100)
PO2 BLDA: 70 MMHG (ref 80–100)
PO2 BLDA: 89 MMHG (ref 80–100)
POC BE: -2 MMOL/L
POC BE: -2 MMOL/L
POC BE: -3 MMOL/L
POC BE: -4 MMOL/L
POC IONIZED CALCIUM: 1.11 MMOL/L (ref 1.06–1.42)
POC IONIZED CALCIUM: 1.15 MMOL/L (ref 1.06–1.42)
POC IONIZED CALCIUM: 1.2 MMOL/L (ref 1.06–1.42)
POC SATURATED O2: 100 % (ref 95–100)
POC SATURATED O2: 95 % (ref 95–100)
POC SATURATED O2: 97 % (ref 95–100)
POC SATURATED O2: 98 % (ref 95–100)
POC TCO2: 21 MMOL/L (ref 23–27)
POC TCO2: 23 MMOL/L (ref 23–27)
POC TCO2: 23 MMOL/L (ref 23–27)
POC TCO2: 25 MMOL/L (ref 23–27)
POCT GLUCOSE: 173 MG/DL (ref 70–110)
POCT GLUCOSE: 209 MG/DL (ref 70–110)
POTASSIUM BLD-SCNC: 2.7 MMOL/L (ref 3.5–5.1)
POTASSIUM BLD-SCNC: 2.8 MMOL/L (ref 3.5–5.1)
POTASSIUM BLD-SCNC: 3.7 MMOL/L (ref 3.5–5.1)
POTASSIUM SERPL-SCNC: 2.7 MMOL/L (ref 3.5–5.1)
POTASSIUM SERPL-SCNC: 3.1 MMOL/L (ref 3.5–5.1)
PROT SERPL-MCNC: 6.1 G/DL (ref 6–8.4)
PROT SERPL-MCNC: 6.8 G/DL (ref 6–8.4)
PROTHROMBIN TIME: 10.8 SEC (ref 9–12.5)
RBC # BLD AUTO: 3.65 M/UL (ref 4–5.4)
RBC # BLD AUTO: 4.17 M/UL (ref 4–5.4)
SAMPLE: ABNORMAL
SARS-COV-2 AG RESP QL IA.RAPID: NEGATIVE
SITE: ABNORMAL
SODIUM BLD-SCNC: 136 MMOL/L (ref 136–145)
SODIUM BLD-SCNC: 139 MMOL/L (ref 136–145)
SODIUM BLD-SCNC: 141 MMOL/L (ref 136–145)
SODIUM SERPL-SCNC: 135 MMOL/L (ref 136–145)
SODIUM SERPL-SCNC: 140 MMOL/L (ref 136–145)
WBC # BLD AUTO: 4.63 K/UL (ref 3.9–12.7)
WBC # BLD AUTO: 7.08 K/UL (ref 3.9–12.7)

## 2022-07-15 PROCEDURE — 85730 THROMBOPLASTIN TIME PARTIAL: CPT | Performed by: STUDENT IN AN ORGANIZED HEALTH CARE EDUCATION/TRAINING PROGRAM

## 2022-07-15 PROCEDURE — 25000003 PHARM REV CODE 250: Performed by: STUDENT IN AN ORGANIZED HEALTH CARE EDUCATION/TRAINING PROGRAM

## 2022-07-15 PROCEDURE — 63600175 PHARM REV CODE 636 W HCPCS: Performed by: STUDENT IN AN ORGANIZED HEALTH CARE EDUCATION/TRAINING PROGRAM

## 2022-07-15 PROCEDURE — 94761 N-INVAS EAR/PLS OXIMETRY MLT: CPT

## 2022-07-15 PROCEDURE — 27800903 OPTIME MED/SURG SUP & DEVICES OTHER IMPLANTS: Performed by: NEUROLOGICAL SURGERY

## 2022-07-15 PROCEDURE — 25000242 PHARM REV CODE 250 ALT 637 W/ HCPCS: Performed by: STUDENT IN AN ORGANIZED HEALTH CARE EDUCATION/TRAINING PROGRAM

## 2022-07-15 PROCEDURE — C1729 CATH, DRAINAGE: HCPCS | Performed by: NEUROLOGICAL SURGERY

## 2022-07-15 PROCEDURE — 80053 COMPREHEN METABOLIC PANEL: CPT | Mod: 91 | Performed by: NURSE PRACTITIONER

## 2022-07-15 PROCEDURE — 36000711: Performed by: NEUROLOGICAL SURGERY

## 2022-07-15 PROCEDURE — 86901 BLOOD TYPING SEROLOGIC RH(D): CPT | Performed by: STUDENT IN AN ORGANIZED HEALTH CARE EDUCATION/TRAINING PROGRAM

## 2022-07-15 PROCEDURE — 85610 PROTHROMBIN TIME: CPT | Performed by: STUDENT IN AN ORGANIZED HEALTH CARE EDUCATION/TRAINING PROGRAM

## 2022-07-15 PROCEDURE — 25000003 PHARM REV CODE 250: Performed by: NEUROLOGICAL SURGERY

## 2022-07-15 PROCEDURE — D9220A PRA ANESTHESIA: Mod: ,,, | Performed by: ANESTHESIOLOGY

## 2022-07-15 PROCEDURE — 61697 BRAIN ANEURYSM REPR COMPLX: CPT | Mod: 62,,, | Performed by: NEUROLOGICAL SURGERY

## 2022-07-15 PROCEDURE — D9220A PRA ANESTHESIA: ICD-10-PCS | Mod: ,,, | Performed by: ANESTHESIOLOGY

## 2022-07-15 PROCEDURE — 61697 PR COMPLEX ANEURYSM SURG/CAROTID CIRC: ICD-10-PCS | Mod: 62,,, | Performed by: NEUROLOGICAL SURGERY

## 2022-07-15 PROCEDURE — 20000000 HC ICU ROOM

## 2022-07-15 PROCEDURE — 99291 PR CRITICAL CARE, E/M 30-74 MINUTES: ICD-10-PCS | Mod: ,,, | Performed by: NURSE PRACTITIONER

## 2022-07-15 PROCEDURE — 37000008 HC ANESTHESIA 1ST 15 MINUTES: Performed by: NEUROLOGICAL SURGERY

## 2022-07-15 PROCEDURE — 86900 BLOOD TYPING SEROLOGIC ABO: CPT | Performed by: STUDENT IN AN ORGANIZED HEALTH CARE EDUCATION/TRAINING PROGRAM

## 2022-07-15 PROCEDURE — 37000009 HC ANESTHESIA EA ADD 15 MINS: Performed by: NEUROLOGICAL SURGERY

## 2022-07-15 PROCEDURE — 69990 MICROSURGERY ADD-ON: CPT | Mod: ,,, | Performed by: NEUROLOGICAL SURGERY

## 2022-07-15 PROCEDURE — 63600175 PHARM REV CODE 636 W HCPCS: Performed by: NURSE PRACTITIONER

## 2022-07-15 PROCEDURE — C1762 CONN TISS, HUMAN(INC FASCIA): HCPCS | Performed by: NEUROLOGICAL SURGERY

## 2022-07-15 PROCEDURE — 37799 UNLISTED PX VASCULAR SURGERY: CPT

## 2022-07-15 PROCEDURE — 99900035 HC TECH TIME PER 15 MIN (STAT)

## 2022-07-15 PROCEDURE — 85025 COMPLETE CBC W/AUTO DIFF WBC: CPT | Performed by: STUDENT IN AN ORGANIZED HEALTH CARE EDUCATION/TRAINING PROGRAM

## 2022-07-15 PROCEDURE — 36000710: Performed by: NEUROLOGICAL SURGERY

## 2022-07-15 PROCEDURE — 36620 INSERTION CATHETER ARTERY: CPT | Mod: 59,,, | Performed by: ANESTHESIOLOGY

## 2022-07-15 PROCEDURE — 80053 COMPREHEN METABOLIC PANEL: CPT | Performed by: STUDENT IN AN ORGANIZED HEALTH CARE EDUCATION/TRAINING PROGRAM

## 2022-07-15 PROCEDURE — 99291 CRITICAL CARE FIRST HOUR: CPT | Mod: ,,, | Performed by: NURSE PRACTITIONER

## 2022-07-15 PROCEDURE — 85025 COMPLETE CBC W/AUTO DIFF WBC: CPT | Mod: 91 | Performed by: NURSE PRACTITIONER

## 2022-07-15 PROCEDURE — 27201423 OPTIME MED/SURG SUP & DEVICES STERILE SUPPLY: Performed by: NEUROLOGICAL SURGERY

## 2022-07-15 PROCEDURE — 63600175 PHARM REV CODE 636 W HCPCS: Performed by: NEUROLOGICAL SURGERY

## 2022-07-15 PROCEDURE — 82803 BLOOD GASES ANY COMBINATION: CPT

## 2022-07-15 PROCEDURE — 27000221 HC OXYGEN, UP TO 24 HOURS

## 2022-07-15 PROCEDURE — C1713 ANCHOR/SCREW BN/BN,TIS/BN: HCPCS | Performed by: NEUROLOGICAL SURGERY

## 2022-07-15 PROCEDURE — 25500020 PHARM REV CODE 255: Performed by: NEUROLOGICAL SURGERY

## 2022-07-15 PROCEDURE — 36620 ARTERIAL: ICD-10-PCS | Mod: 59,,, | Performed by: ANESTHESIOLOGY

## 2022-07-15 PROCEDURE — 27201037 HC PRESSURE MONITORING SET UP

## 2022-07-15 PROCEDURE — 69990 PR MICROSURG TECHNIQUES,REQ OPER MICROSCOPE: ICD-10-PCS | Mod: ,,, | Performed by: NEUROLOGICAL SURGERY

## 2022-07-15 PROCEDURE — 86920 COMPATIBILITY TEST SPIN: CPT | Performed by: NEUROLOGICAL SURGERY

## 2022-07-15 DEVICE — PLATE BONE BUR HOLE COVER 10MM: Type: IMPLANTABLE DEVICE | Site: CRANIAL | Status: FUNCTIONAL

## 2022-07-15 DEVICE — CLIP MINI TI PERM STR 7MM: Type: IMPLANTABLE DEVICE | Site: BRAIN | Status: FUNCTIONAL

## 2022-07-15 DEVICE — DURA MATRIX ONLAY PLUS 3X3: Type: IMPLANTABLE DEVICE | Site: EPIDURAL SPACE | Status: FUNCTIONAL

## 2022-07-15 DEVICE — SCREW UN3 AXS SD 1.5X4MM: Type: IMPLANTABLE DEVICE | Site: CRANIAL | Status: FUNCTIONAL

## 2022-07-15 DEVICE — PLATE CRANIAL UN3 BOX LG 2X2: Type: IMPLANTABLE DEVICE | Site: CRANIAL | Status: FUNCTIONAL

## 2022-07-15 DEVICE — CLIP T2 MINI 8.0 STRAIGHT 8MM: Type: IMPLANTABLE DEVICE | Site: BRAIN | Status: FUNCTIONAL

## 2022-07-15 RX ORDER — ROCURONIUM BROMIDE 10 MG/ML
INJECTION, SOLUTION INTRAVENOUS
Status: DISCONTINUED | OUTPATIENT
Start: 2022-07-15 | End: 2022-07-15

## 2022-07-15 RX ORDER — INDOCYANINE GREEN AND WATER 25 MG
KIT INJECTION
Status: DISCONTINUED | OUTPATIENT
Start: 2022-07-15 | End: 2022-07-15

## 2022-07-15 RX ORDER — ATORVASTATIN CALCIUM 20 MG/1
40 TABLET, FILM COATED ORAL DAILY
Status: DISCONTINUED | OUTPATIENT
Start: 2022-07-15 | End: 2022-07-20 | Stop reason: HOSPADM

## 2022-07-15 RX ORDER — ACETAMINOPHEN 500 MG
TABLET ORAL
Status: CANCELLED | OUTPATIENT
Start: 2022-07-15

## 2022-07-15 RX ORDER — ESCITALOPRAM OXALATE 10 MG/1
10 TABLET ORAL DAILY
Status: DISCONTINUED | OUTPATIENT
Start: 2022-07-16 | End: 2022-07-20 | Stop reason: HOSPADM

## 2022-07-15 RX ORDER — LEVETIRACETAM 500 MG/1
500 TABLET ORAL 2 TIMES DAILY
Status: DISCONTINUED | OUTPATIENT
Start: 2022-07-15 | End: 2022-07-19

## 2022-07-15 RX ORDER — DOCUSATE SODIUM 100 MG/1
100 CAPSULE, LIQUID FILLED ORAL 2 TIMES DAILY PRN
Status: DISCONTINUED | OUTPATIENT
Start: 2022-07-15 | End: 2022-07-18

## 2022-07-15 RX ORDER — SUCCINYLCHOLINE CHLORIDE 20 MG/ML
INJECTION INTRAMUSCULAR; INTRAVENOUS
Status: DISCONTINUED | OUTPATIENT
Start: 2022-07-15 | End: 2022-07-15

## 2022-07-15 RX ORDER — ONDANSETRON 8 MG/1
8 TABLET, ORALLY DISINTEGRATING ORAL EVERY 8 HOURS PRN
Status: DISCONTINUED | OUTPATIENT
Start: 2022-07-15 | End: 2022-07-20 | Stop reason: HOSPADM

## 2022-07-15 RX ORDER — SODIUM CHLORIDE 9 MG/ML
INJECTION, SOLUTION INTRAVENOUS CONTINUOUS PRN
Status: DISCONTINUED | OUTPATIENT
Start: 2022-07-15 | End: 2022-07-15

## 2022-07-15 RX ORDER — PROPOFOL 10 MG/ML
VIAL (ML) INTRAVENOUS CONTINUOUS PRN
Status: DISCONTINUED | OUTPATIENT
Start: 2022-07-15 | End: 2022-07-15

## 2022-07-15 RX ORDER — HALOPERIDOL 5 MG/ML
0.5 INJECTION INTRAMUSCULAR EVERY 10 MIN PRN
Status: CANCELLED | OUTPATIENT
Start: 2022-07-15

## 2022-07-15 RX ORDER — ONDANSETRON 2 MG/ML
INJECTION INTRAMUSCULAR; INTRAVENOUS
Status: DISCONTINUED | OUTPATIENT
Start: 2022-07-15 | End: 2022-07-15

## 2022-07-15 RX ORDER — GLUCAGON 1 MG
1 KIT INJECTION
Status: DISCONTINUED | OUTPATIENT
Start: 2022-07-15 | End: 2022-07-20 | Stop reason: HOSPADM

## 2022-07-15 RX ORDER — 3% SODIUM CHLORIDE 3 G/100ML
250 INJECTION, SOLUTION INTRAVENOUS CONTINUOUS
Status: DISPENSED | OUTPATIENT
Start: 2022-07-15 | End: 2022-07-15

## 2022-07-15 RX ORDER — POTASSIUM CHLORIDE 7.45 MG/ML
60 INJECTION INTRAVENOUS
Status: DISCONTINUED | OUTPATIENT
Start: 2022-07-15 | End: 2022-07-17

## 2022-07-15 RX ORDER — HYDROMORPHONE HYDROCHLORIDE 1 MG/ML
0.2 INJECTION, SOLUTION INTRAMUSCULAR; INTRAVENOUS; SUBCUTANEOUS EVERY 5 MIN PRN
Status: CANCELLED | OUTPATIENT
Start: 2022-07-15

## 2022-07-15 RX ORDER — POTASSIUM CHLORIDE 7.45 MG/ML
80 INJECTION INTRAVENOUS
Status: DISCONTINUED | OUTPATIENT
Start: 2022-07-15 | End: 2022-07-17

## 2022-07-15 RX ORDER — MANNITOL 250 MG/ML
INJECTION, SOLUTION INTRAVENOUS
Status: DISCONTINUED | OUTPATIENT
Start: 2022-07-15 | End: 2022-07-15

## 2022-07-15 RX ORDER — FENTANYL CITRATE 50 UG/ML
INJECTION, SOLUTION INTRAMUSCULAR; INTRAVENOUS
Status: DISCONTINUED | OUTPATIENT
Start: 2022-07-15 | End: 2022-07-15

## 2022-07-15 RX ORDER — SODIUM CHLORIDE 0.9 % (FLUSH) 0.9 %
10 SYRINGE (ML) INJECTION
Status: DISCONTINUED | OUTPATIENT
Start: 2022-07-15 | End: 2022-07-19

## 2022-07-15 RX ORDER — MUPIROCIN 20 MG/G
OINTMENT TOPICAL 2 TIMES DAILY
Status: DISCONTINUED | OUTPATIENT
Start: 2022-07-15 | End: 2022-07-20

## 2022-07-15 RX ORDER — LIDOCAINE HYDROCHLORIDE 20 MG/ML
INJECTION INTRAVENOUS
Status: DISCONTINUED | OUTPATIENT
Start: 2022-07-15 | End: 2022-07-15

## 2022-07-15 RX ORDER — MAGNESIUM SULFATE HEPTAHYDRATE 40 MG/ML
4 INJECTION, SOLUTION INTRAVENOUS
Status: DISCONTINUED | OUTPATIENT
Start: 2022-07-15 | End: 2022-07-17

## 2022-07-15 RX ORDER — AMLODIPINE BESYLATE 10 MG/1
10 TABLET ORAL DAILY
Status: DISCONTINUED | OUTPATIENT
Start: 2022-07-15 | End: 2022-07-20 | Stop reason: HOSPADM

## 2022-07-15 RX ORDER — DOCUSATE SODIUM 50 MG/5ML
100 LIQUID ORAL DAILY
Status: DISCONTINUED | OUTPATIENT
Start: 2022-07-15 | End: 2022-07-15

## 2022-07-15 RX ORDER — HYDROCODONE BITARTRATE AND ACETAMINOPHEN 5; 325 MG/1; MG/1
1 TABLET ORAL EVERY 4 HOURS PRN
Status: DISCONTINUED | OUTPATIENT
Start: 2022-07-15 | End: 2022-07-20 | Stop reason: HOSPADM

## 2022-07-15 RX ORDER — ACETAMINOPHEN 500 MG
1000 TABLET ORAL
Status: COMPLETED | OUTPATIENT
Start: 2022-07-15 | End: 2022-07-15

## 2022-07-15 RX ORDER — FUROSEMIDE 10 MG/ML
INJECTION INTRAMUSCULAR; INTRAVENOUS
Status: DISCONTINUED | OUTPATIENT
Start: 2022-07-15 | End: 2022-07-15

## 2022-07-15 RX ORDER — IBUPROFEN 200 MG
24 TABLET ORAL
Status: DISCONTINUED | OUTPATIENT
Start: 2022-07-15 | End: 2022-07-20 | Stop reason: HOSPADM

## 2022-07-15 RX ORDER — PROPOFOL 10 MG/ML
VIAL (ML) INTRAVENOUS
Status: DISCONTINUED | OUTPATIENT
Start: 2022-07-15 | End: 2022-07-15

## 2022-07-15 RX ORDER — ALBUTEROL SULFATE 90 UG/1
AEROSOL, METERED RESPIRATORY (INHALATION)
Status: DISCONTINUED | OUTPATIENT
Start: 2022-07-15 | End: 2022-07-15

## 2022-07-15 RX ORDER — FAMOTIDINE 20 MG/1
20 TABLET, FILM COATED ORAL 2 TIMES DAILY
Status: DISCONTINUED | OUTPATIENT
Start: 2022-07-15 | End: 2022-07-20 | Stop reason: HOSPADM

## 2022-07-15 RX ORDER — INSULIN ASPART 100 [IU]/ML
0-5 INJECTION, SOLUTION INTRAVENOUS; SUBCUTANEOUS
Status: DISCONTINUED | OUTPATIENT
Start: 2022-07-15 | End: 2022-07-20 | Stop reason: HOSPADM

## 2022-07-15 RX ORDER — IBUPROFEN 200 MG
16 TABLET ORAL
Status: DISCONTINUED | OUTPATIENT
Start: 2022-07-15 | End: 2022-07-20 | Stop reason: HOSPADM

## 2022-07-15 RX ORDER — PHENYLEPHRINE HYDROCHLORIDE 10 MG/ML
INJECTION INTRAVENOUS
Status: DISCONTINUED | OUTPATIENT
Start: 2022-07-15 | End: 2022-07-15

## 2022-07-15 RX ORDER — MIDAZOLAM HYDROCHLORIDE 1 MG/ML
INJECTION INTRAMUSCULAR; INTRAVENOUS
Status: DISCONTINUED | OUTPATIENT
Start: 2022-07-15 | End: 2022-07-15

## 2022-07-15 RX ORDER — SODIUM CHLORIDE 9 MG/ML
INJECTION, SOLUTION INTRAVENOUS CONTINUOUS
Status: DISCONTINUED | OUTPATIENT
Start: 2022-07-15 | End: 2022-07-15

## 2022-07-15 RX ORDER — CEFAZOLIN SODIUM/D5W 2 G/100 ML
2 PLASTIC BAG, INJECTION (ML) INTRAVENOUS
Status: DISCONTINUED | OUTPATIENT
Start: 2022-07-15 | End: 2022-07-20 | Stop reason: HOSPADM

## 2022-07-15 RX ORDER — NICARDIPINE HYDROCHLORIDE 0.2 MG/ML
0-15 INJECTION INTRAVENOUS CONTINUOUS
Status: DISCONTINUED | OUTPATIENT
Start: 2022-07-15 | End: 2022-07-16

## 2022-07-15 RX ORDER — POTASSIUM CHLORIDE 20 MEQ/1
20 TABLET, EXTENDED RELEASE ORAL 2 TIMES DAILY
COMMUNITY
Start: 2022-06-26 | End: 2023-09-07

## 2022-07-15 RX ORDER — BUTALBITAL, ACETAMINOPHEN AND CAFFEINE 50; 325; 40 MG/1; MG/1; MG/1
1 TABLET ORAL EVERY 4 HOURS PRN
COMMUNITY
Start: 2022-05-01 | End: 2023-02-07

## 2022-07-15 RX ORDER — LIDOCAINE HYDROCHLORIDE AND EPINEPHRINE 15; 5 MG/ML; UG/ML
INJECTION, SOLUTION EPIDURAL
Status: DISCONTINUED | OUTPATIENT
Start: 2022-07-15 | End: 2022-07-15 | Stop reason: HOSPADM

## 2022-07-15 RX ORDER — ESCITALOPRAM OXALATE 10 MG/1
10 TABLET ORAL DAILY
COMMUNITY
Start: 2022-07-05

## 2022-07-15 RX ORDER — DOCUSATE SODIUM 100 MG/1
100 CAPSULE, LIQUID FILLED ORAL DAILY
Status: DISCONTINUED | OUTPATIENT
Start: 2022-07-16 | End: 2022-07-20 | Stop reason: HOSPADM

## 2022-07-15 RX ORDER — LANOLIN ALCOHOL/MO/W.PET/CERES
1 CREAM (GRAM) TOPICAL 2 TIMES DAILY
COMMUNITY
Start: 2022-06-19

## 2022-07-15 RX ORDER — ACETAMINOPHEN 325 MG/1
650 TABLET ORAL EVERY 4 HOURS PRN
Status: DISCONTINUED | OUTPATIENT
Start: 2022-07-15 | End: 2022-07-20 | Stop reason: HOSPADM

## 2022-07-15 RX ORDER — DEXAMETHASONE SODIUM PHOSPHATE 4 MG/ML
INJECTION, SOLUTION INTRA-ARTICULAR; INTRALESIONAL; INTRAMUSCULAR; INTRAVENOUS; SOFT TISSUE
Status: DISCONTINUED | OUTPATIENT
Start: 2022-07-15 | End: 2022-07-15

## 2022-07-15 RX ORDER — LEVETIRACETAM 500 MG/5ML
INJECTION, SOLUTION, CONCENTRATE INTRAVENOUS
Status: DISCONTINUED | OUTPATIENT
Start: 2022-07-15 | End: 2022-07-15

## 2022-07-15 RX ORDER — POTASSIUM CHLORIDE 7.45 MG/ML
40 INJECTION INTRAVENOUS
Status: DISCONTINUED | OUTPATIENT
Start: 2022-07-15 | End: 2022-07-17

## 2022-07-15 RX ORDER — ACETAMINOPHEN 500 MG
TABLET ORAL
Status: DISPENSED
Start: 2022-07-15 | End: 2022-07-15

## 2022-07-15 RX ORDER — HYDROMORPHONE HYDROCHLORIDE 1 MG/ML
1 INJECTION, SOLUTION INTRAMUSCULAR; INTRAVENOUS; SUBCUTANEOUS EVERY 4 HOURS PRN
Status: DISCONTINUED | OUTPATIENT
Start: 2022-07-15 | End: 2022-07-19

## 2022-07-15 RX ORDER — DEXAMETHASONE SODIUM PHOSPHATE 4 MG/ML
4 INJECTION, SOLUTION INTRA-ARTICULAR; INTRALESIONAL; INTRAMUSCULAR; INTRAVENOUS; SOFT TISSUE EVERY 6 HOURS
Status: DISCONTINUED | OUTPATIENT
Start: 2022-07-15 | End: 2022-07-17

## 2022-07-15 RX ORDER — POTASSIUM CHLORIDE 14.9 MG/ML
INJECTION INTRAVENOUS CONTINUOUS PRN
Status: DISCONTINUED | OUTPATIENT
Start: 2022-07-15 | End: 2022-07-15

## 2022-07-15 RX ORDER — MAGNESIUM SULFATE HEPTAHYDRATE 40 MG/ML
2 INJECTION, SOLUTION INTRAVENOUS
Status: DISCONTINUED | OUTPATIENT
Start: 2022-07-15 | End: 2022-07-17

## 2022-07-15 RX ADMIN — LEVETIRACETAM 1000 MG: 100 INJECTION, SOLUTION, CONCENTRATE INTRAVENOUS at 08:07

## 2022-07-15 RX ADMIN — DEXAMETHASONE SODIUM PHOSPHATE 4 MG: 4 INJECTION INTRA-ARTICULAR; INTRALESIONAL; INTRAMUSCULAR; INTRAVENOUS; SOFT TISSUE at 05:07

## 2022-07-15 RX ADMIN — ONDANSETRON 8 MG: 8 TABLET, ORALLY DISINTEGRATING ORAL at 09:07

## 2022-07-15 RX ADMIN — POTASSIUM CHLORIDE 60 MEQ: 7.46 INJECTION, SOLUTION INTRAVENOUS at 06:07

## 2022-07-15 RX ADMIN — ALBUTEROL SULFATE 2 PUFF: 108 INHALANT RESPIRATORY (INHALATION) at 07:07

## 2022-07-15 RX ADMIN — SODIUM CHLORIDE, SODIUM GLUCONATE, SODIUM ACETATE, POTASSIUM CHLORIDE, MAGNESIUM CHLORIDE, SODIUM PHOSPHATE, DIBASIC, AND POTASSIUM PHOSPHATE: .53; .5; .37; .037; .03; .012; .00082 INJECTION, SOLUTION INTRAVENOUS at 11:07

## 2022-07-15 RX ADMIN — FENTANYL CITRATE 25 MCG: 50 INJECTION, SOLUTION INTRAMUSCULAR; INTRAVENOUS at 02:07

## 2022-07-15 RX ADMIN — ONDANSETRON 4 MG: 2 INJECTION INTRAMUSCULAR; INTRAVENOUS at 01:07

## 2022-07-15 RX ADMIN — PROPOFOL 30 MG: 10 INJECTION, EMULSION INTRAVENOUS at 08:07

## 2022-07-15 RX ADMIN — NICARDIPINE HYDROCHLORIDE 5 MG/HR: 0.2 INJECTION, SOLUTION INTRAVENOUS at 07:07

## 2022-07-15 RX ADMIN — PROPOFOL 20 MG: 10 INJECTION, EMULSION INTRAVENOUS at 08:07

## 2022-07-15 RX ADMIN — CEFTRIAXONE SODIUM 2 G: 1 INJECTION, SOLUTION INTRAVENOUS at 09:07

## 2022-07-15 RX ADMIN — FENTANYL CITRATE 25 MCG: 50 INJECTION, SOLUTION INTRAMUSCULAR; INTRAVENOUS at 01:07

## 2022-07-15 RX ADMIN — PROPOFOL 50 MG: 10 INJECTION, EMULSION INTRAVENOUS at 08:07

## 2022-07-15 RX ADMIN — NICARDIPINE HYDROCHLORIDE 5 MG/HR: 0.2 INJECTION, SOLUTION INTRAVENOUS at 11:07

## 2022-07-15 RX ADMIN — MANNITOL 12.5 G: 12.5 INJECTION, SOLUTION INTRAVENOUS at 09:07

## 2022-07-15 RX ADMIN — Medication 2 G: at 04:07

## 2022-07-15 RX ADMIN — IOHEXOL 100 ML: 350 INJECTION, SOLUTION INTRAVENOUS at 04:07

## 2022-07-15 RX ADMIN — DEXAMETHASONE SODIUM PHOSPHATE 12 MG: 4 INJECTION, SOLUTION INTRAMUSCULAR; INTRAVENOUS at 08:07

## 2022-07-15 RX ADMIN — PROPOFOL 10 MG: 10 INJECTION, EMULSION INTRAVENOUS at 08:07

## 2022-07-15 RX ADMIN — SODIUM CHLORIDE: 0.9 INJECTION, SOLUTION INTRAVENOUS at 06:07

## 2022-07-15 RX ADMIN — PROPOFOL 150 MG: 10 INJECTION, EMULSION INTRAVENOUS at 08:07

## 2022-07-15 RX ADMIN — FAMOTIDINE 20 MG: 20 TABLET ORAL at 09:07

## 2022-07-15 RX ADMIN — POTASSIUM CHLORIDE: 14.9 INJECTION, SOLUTION INTRAVENOUS at 10:07

## 2022-07-15 RX ADMIN — LEVETIRACETAM 1000 MG: 100 INJECTION, SOLUTION, CONCENTRATE INTRAVENOUS at 11:07

## 2022-07-15 RX ADMIN — SUCCINYLCHOLINE CHLORIDE 120 MG: 20 INJECTION, SOLUTION INTRAMUSCULAR; INTRAVENOUS at 08:07

## 2022-07-15 RX ADMIN — FUROSEMIDE 20 MG: 10 INJECTION, SOLUTION INTRAMUSCULAR; INTRAVENOUS at 09:07

## 2022-07-15 RX ADMIN — LEVETIRACETAM 500 MG: 500 TABLET, FILM COATED ORAL at 09:07

## 2022-07-15 RX ADMIN — SODIUM CHLORIDE: 3 INJECTION, SOLUTION INTRAVENOUS at 10:07

## 2022-07-15 RX ADMIN — HYDROMORPHONE HYDROCHLORIDE 1 MG: 1 INJECTION, SOLUTION INTRAMUSCULAR; INTRAVENOUS; SUBCUTANEOUS at 05:07

## 2022-07-15 RX ADMIN — MUPIROCIN: 20 OINTMENT TOPICAL at 09:07

## 2022-07-15 RX ADMIN — REMIFENTANIL HYDROCHLORIDE 0.2 MCG/KG/MIN: 1 INJECTION, POWDER, LYOPHILIZED, FOR SOLUTION INTRAVENOUS at 08:07

## 2022-07-15 RX ADMIN — HYDROCODONE BITARTRATE AND ACETAMINOPHEN 1 TABLET: 5; 325 TABLET ORAL at 09:07

## 2022-07-15 RX ADMIN — SODIUM CHLORIDE 0.2 MCG/KG/MIN: 9 INJECTION, SOLUTION INTRAVENOUS at 08:07

## 2022-07-15 RX ADMIN — ROCURONIUM BROMIDE 10 MG: 10 INJECTION, SOLUTION INTRAVENOUS at 08:07

## 2022-07-15 RX ADMIN — CARVEDILOL 37.5 MG: 25 TABLET, FILM COATED ORAL at 09:07

## 2022-07-15 RX ADMIN — PROPOFOL 150 MCG/KG/MIN: 10 INJECTION, EMULSION INTRAVENOUS at 08:07

## 2022-07-15 RX ADMIN — AMITRIPTYLINE HYDROCHLORIDE 75 MG: 25 TABLET, FILM COATED ORAL at 09:07

## 2022-07-15 RX ADMIN — ACETAMINOPHEN 1000 MG: 500 TABLET ORAL at 06:07

## 2022-07-15 RX ADMIN — MIDAZOLAM HYDROCHLORIDE 2 MG: 1 INJECTION, SOLUTION INTRAMUSCULAR; INTRAVENOUS at 07:07

## 2022-07-15 RX ADMIN — INDOCYANINE GREEN AND WATER 12.5 MG: KIT at 11:07

## 2022-07-15 RX ADMIN — ACETAMINOPHEN 650 MG: 325 TABLET ORAL at 09:07

## 2022-07-15 RX ADMIN — SODIUM CHLORIDE, SODIUM GLUCONATE, SODIUM ACETATE, POTASSIUM CHLORIDE, MAGNESIUM CHLORIDE, SODIUM PHOSPHATE, DIBASIC, AND POTASSIUM PHOSPHATE: .53; .5; .37; .037; .03; .012; .00082 INJECTION, SOLUTION INTRAVENOUS at 08:07

## 2022-07-15 RX ADMIN — PROPOFOL 40 MG: 10 INJECTION, EMULSION INTRAVENOUS at 08:07

## 2022-07-15 RX ADMIN — Medication 2 G: at 10:07

## 2022-07-15 RX ADMIN — FENTANYL CITRATE 100 MCG: 50 INJECTION, SOLUTION INTRAMUSCULAR; INTRAVENOUS at 08:07

## 2022-07-15 RX ADMIN — LIDOCAINE HYDROCHLORIDE 75 MG: 20 INJECTION, SOLUTION INTRAVENOUS at 08:07

## 2022-07-15 RX ADMIN — PHENYLEPHRINE HYDROCHLORIDE 100 MCG: 10 INJECTION INTRAVENOUS at 01:07

## 2022-07-15 NOTE — ANESTHESIA PROCEDURE NOTES
Arterial    Diagnosis: Cerebral aneurysm    Patient location during procedure: done in OR  Procedure start time: 7/15/2022 8:22 AM  Timeout: 7/15/2022 8:22 AM  Procedure end time: 7/15/2022 8:25 AM    Staffing  Authorizing Provider: Lionel Law MD  Performing Provider: Ned Reid MD    Anesthesiologist was present at the time of the procedure.    Preanesthetic Checklist  Completed: patient identified, IV checked, site marked, risks and benefits discussed, surgical consent, monitors and equipment checked, pre-op evaluation, timeout performed and anesthesia consent givenArterial  Skin Prep: chlorhexidine gluconate  Orientation: right  Location: radial    Catheter Size: 20 G  Catheter placement by Anatomical landmarks. Heme positive aspiration all ports. Insertion Attempts: 1  Assessment  Dressing: secured with tape and tegaderm  Patient: Tolerated well

## 2022-07-15 NOTE — H&P
[facility] - Surgery ([facility])  Neuorsurgery  History and Physical     Patient Name: Gina Jenkins  MRN: [id number]  Admission Date: 7/15/2022  Attending Physician: Timothy Diaz MD   Primary Care Physician: Clair Johnson NP    Patient information was obtained from patient.     Subjective:     Chief Complaint/Reason for Admission: Intracranial aneurysms     HPI:  47F w/ PMH HTN, smoking, CHF and multiple intracranial aneurysms who [place] [facility] [place] for clipping.  In clinic she described having abrupt headache, sharp excruciating and lasting for very short period time 1 5 minutes.  She has past medical history of congestive heart failure, history of coronary angioplasty, hypercholesterolemia, hypertension and stroke in 2017. She had a known the single intracranial aneurysm, when previously she presented to the emergency department after another headache which she found concerns for an additional 2 cerebral aneurysms in the left ICA distribution.  She currently denies any new headache, nausea, vomiting.  She notes she has a 20 pack-year history of smoking.  No known family history of aneurysmal rupture.  She has previous surgery.  She does note she has allergies to Bactrim and lisinopril.  She had a CTA of the head in November 2021. She is accompanied by her mother as well.      Facility-Administered Medications Prior to Admission   Medication Dose Route Frequency Provider Last Rate Last Admin    acetaminophen tablet 650 mg  650 mg Oral Once PRN Bonifacio Ramirez MD        albuterol inhaler 2 puff  2 puff Inhalation Q20 Min PRN Bonifacio Ramirez MD        diphenhydrAMINE injection 25 mg  25 mg Intravenous Once PRN Bonifacio Ramirez MD        EPINEPHrine (EPIPEN) 0.3 mg/0.3 mL pen injection 0.3 mg  0.3 mg Intramuscular PRN Bonifacio Ramirez MD        methylPREDNISolone sodium succinate injection 40 mg  40 mg Intravenous Once PRN Bonifacio Ramirez MD        ondansetron  disintegrating tablet 4 mg  4 mg Oral Once PRN Bonifacio Ramirez MD        sodium chloride 0.9% 500 mL flush bag   Intravenous PRN Bonifacio Ramirez MD        sodium chloride 0.9% flush 10 mL  10 mL Intravenous PRN Bonifacio Ramirez MD         Medications Prior to Admission   Medication Sig Dispense Refill Last Dose    amitriptyline (ELAVIL) 75 MG tablet Take 75 mg by mouth every evening.   7/13/2022 at Unknown time    amlodipine (NORVASC) 10 MG tablet Take 1 tablet (10 mg total) by mouth once daily. 30 tablet 0 7/15/2022 at 0320    aspirin 81 MG Chew Take 1 tablet (81 mg total) by mouth once daily.  0 Past Month at Unknown time    atorvastatin (LIPITOR) 40 MG tablet Take 1 tablet (40 mg total) by mouth once daily. 90 tablet 3 7/14/2022 at 0800    carvediloL (COREG) 25 MG tablet Take 37.5 mg by mouth 2 (two) times daily.   7/15/2022 at 0320    traMADoL (ULTRAM) 50 mg tablet Take 50 mg by mouth 2 (two) times daily.   Past Month at Unknown time    cyanocobalamin (VITAMIN B-12) 1000 MCG tablet Take 1 tablet (1,000 mcg total) by mouth once daily. (Patient not taking: No sig reported)       diclofenac (VOLTAREN) 25 MG TbEC Take 1 tablet (25 mg total) by mouth 3 (three) times daily as needed (pain). (Patient not taking: No sig reported) 15 tablet 0     diphenhydrAMINE (BENADRYL) 25 mg capsule Take 1 each (25 mg total) by mouth every 6 (six) hours as needed for Itching or Allergies. 20 capsule 0     docusate sodium (COLACE) 100 MG capsule Take 1 capsule (100 mg total) by mouth 2 (two) times daily as needed for Constipation. (Patient not taking: No sig reported) 30 capsule 0     hydrochlorothiazide (HYDRODIURIL) 25 MG tablet Take 1 tablet (25 mg total) by mouth once daily. 30 tablet 0     meloxicam (MOBIC) 7.5 MG tablet Take 7.5 mg by mouth 2 (two) times daily.   More than a month at Unknown time    neomycin-polymyxin-hydrocortisone (CORTISPORIN) 3.5-10,000-1 mg/mL-unit/mL-% otic suspension Place 4 drops  into the left ear 3 (three) times daily. (Patient not taking: No sig reported) 10 mL 0     ranitidine (ZANTAC) 150 MG capsule Take 1 capsule (150 mg total) by mouth once daily. 15 capsule 0        Review of patient's allergies indicates:   Allergen Reactions    Lisinopril Swelling    Bactrim [sulfamethoxazole-trimethoprim] Rash       Past Medical History:   Diagnosis Date    Brain aneurysm     CHF (congestive heart failure)     H/O coronary angioplasty     Hypercholesteremia     Hypertension     Migraine headache     Stroke 10/2017     Past Surgical History:   Procedure Laterality Date    CARDIAC CATHETERIZATION      CEREBRAL ANGIOGRAM       Family History    None       Tobacco Use    Smoking status: Current Every Day Smoker     Packs/day: 0.50     Types: Cigarettes    Smokeless tobacco: Never Used   Substance and Sexual Activity    Alcohol use: Yes     Comment: occassionally    Drug use: No    Sexual activity: Not Currently     Partners: Male     Birth control/protection: None     Review of Systems   Constitutional:  Negative for activity change, appetite change, chills, diaphoresis, fatigue, fever and unexpected weight change.   HENT:  Negative for tinnitus, trouble swallowing and voice change.    Eyes:  Negative for photophobia, pain and visual disturbance.   Respiratory:  Negative for apnea, cough, chest tightness, shortness of breath, wheezing and stridor.    Cardiovascular:  Negative for chest pain, palpitations and leg swelling.   Gastrointestinal:  Negative for abdominal distention, abdominal pain, anal bleeding, constipation, diarrhea, nausea and vomiting.   Endocrine: Negative for cold intolerance, heat intolerance, polydipsia, polyphagia and polyuria.   Genitourinary:  Negative for difficulty urinating, dysuria, flank pain, frequency, hematuria and urgency.   Musculoskeletal:  Negative for back pain, gait problem, neck pain and neck stiffness.   Skin:  Negative for pallor, rash and wound.    Neurological:  Positive for headaches. Negative for dizziness, tremors, seizures, syncope, facial asymmetry, speech difficulty, weakness, light-headedness and numbness.   Psychiatric/Behavioral:  Negative for agitation, behavioral problems and confusion.    Objective:     Weight: 93 kg (205 lb)  Body mass index is 37.49 kg/m².  Vital Signs (Most Recent):  Temp: 98.1 °F (36.7 °C) (07/15/22 0559)  Pulse: 70 (07/15/22 0559)  Resp: 18 (07/15/22 0559)  BP: (!) 140/83 (07/15/22 0559)  SpO2: 100 % (07/15/22 0559)   Vital Signs (24h Range):  Temp:  [98.1 °F (36.7 °C)] 98.1 °F (36.7 °C)  Pulse:  [70] 70  Resp:  [18] 18  SpO2:  [100 %] 100 %  BP: (140)/(83) 140/83     Physical Exam  Neurosurgery Physical Exam  General: no acute distress  Head: Non-traumatic, normocephalic  Eyes: Pupils equal, EOMI  Neck: Supple, normal ROM, no tenderness to palpation  CVS: Normal rate and rhythm, distal pulses present  Pulm: Symmetric expansion, no respiratory distress  GI: Abdomen nondistended, nontender     MSK: Moves all extremities without restriction, atraumatic  Skin: Dry, intact  Psych: Normal thought content and cognition     Neuro:  Alert, awake, oriented, to self, place, time  Language:  Speech is fluent, goal directed without any noted dysarthria or aphasia     Cranial nerves:    CNII-XII: Intact on fine exam,   Pupils equal round react to light,   Extraocular muscles are intact  V1 to V3 is intact to light touch,   no facial asymmetry,   Hearing is intact to finger rub and voice  tongue/uvula/palate midline,   shoulder shrug equal,      No pronator drift     Extremities:  Motor:                  Upper Extremity     Deltoid Triceps Biceps Wrist  Extension Wrist  Flexion Interosseous   R 5/5 5/5 5/5 5/5 5/5 5/5   L 5/5 5/5 5/5 5/5 5/5 5/5         Thumb   Abduction Thumb  ADDuction Finger  Flexion Finger  Extension       R 5/5 5/5 5/5 5/5       L 5/5 5/5 5/5 5/5           Lower Extremity      Iliopsoas Quadriceps Hamstring  Plantarflexion Dorsiflexion EHL   R 5/5 5/5 5/5 5/5 5/5 5/5   L 5/5 5/5 5/5 5/5 5/5 5/5         Reflexes:     DTR:    2+ biceps                       2+ triceps              2+ brachioradialis              2+ patellar  2+ Achilles     Sarabia's: Negative     Babinski's: Negative     Clonus: Negative     Sensory:      Sensation intact to light touch, temperature sensation, vibration, pinprick     Coordination:      Coordination intact throughout, no dysmetria, normal rapid alternating movements, no dysdiadochokinesia  Cerebellar:  Normal finger-to-nose, normal heel-to-shin      Significant Labs:  No results for input(s): GLU, NA, K, CL, CO2, BUN, CREATININE, CALCIUM, MG in the last 48 hours.  No results for input(s): WBC, HGB, HCT, PLT in the last 48 hours.  No results for input(s): LABPT, INR, APTT in the last 48 hours.  Microbiology Results (last 7 days)       ** No results found for the last 168 hours. **          ABGs: No results for input(s): PH, PCO2, PO2, HCO3, POCSATURATED, BE in the last 48 hours.  Cardiac markers: No results for input(s): CKMB, CPKMB, TROPONINT, TROPONINI, MYOGLOBIN in the last 48 hours.  CMP: No results for input(s): GLU, CALCIUM, ALBUMIN, PROT, NA, K, CO2, CL, BUN, CREATININE, ALKPHOS, ALT, AST, BILITOT in the last 48 hours.  CRP: No results for input(s): CRP in the last 48 hours.  ESR: No results for input(s): POCESR, ERYTHROCYTES in the last 48 hours.  LFTs: No results for input(s): ALT, AST, ALKPHOS, BILITOT, PROT, ALBUMIN in the last 48 hours.  Procalcitonin: No results for input(s): PROCAL in the last 48 hours.    Significant Diagnostics:  I have reviewed all pertinent imaging results/findings within the past 24 hours.    Assessment and Plan:     Aneurysm of left middle cerebral artery  47F w/ PMH HTN, smoking, CHF and multiple intracranial aneurysms who presents to [facility] for elective L-sided craniotomy for clipping:    --Patient evaluated prior to procedure  --All diagnostics and  imaging reviewed  --Patient NPO since MN  --No anti-coag/plt medication in the last 72h  --Patient consented and all questions answered  --Further reccs to follow procedure        Gerard Shultz MD  Neurosurgery  [facility] - Surgery (2nd Fl)

## 2022-07-15 NOTE — ANESTHESIA PROCEDURE NOTES
Intubation    Date/Time: 7/15/2022 8:12 AM  Performed by: Ned Reid MD  Authorized by: Lionel Law MD     Intubation:     Induction:  Intravenous    Intubated:  Postinduction    Mask Ventilation:  Easy mask    Attempts:  1    Attempted By:  Resident anesthesiologist    Method of Intubation:  Video laryngoscopy    Blade:  Brody 3    Laryngeal View Grade: Grade I - full view of cords      Difficult Airway Encountered?: No      Complications:  None    Airway Device:  Oral endotracheal tube    Airway Device Size:  7.0    Style/Cuff Inflation:  Cuffed (inflated to minimal occlusive pressure)    Inflation Amount (mL):  7    Tube secured:  21    Secured at:  The lips    Placement Verified By:  Capnometry    Complicating Factors:  Obesity, short neck and small mouth    Findings Post-Intubation:  BS equal bilateral and atraumatic/condition of teeth unchanged

## 2022-07-15 NOTE — SUBJECTIVE & OBJECTIVE
Past Medical History:   Diagnosis Date    Brain aneurysm     CHF (congestive heart failure)     H/O coronary angioplasty     Hypercholesteremia     Hypertension     Migraine headache     Stroke 10/2017     Past Surgical History:   Procedure Laterality Date    CARDIAC CATHETERIZATION      CEREBRAL ANGIOGRAM         No current facility-administered medications on file prior to encounter.     Current Outpatient Medications on File Prior to Encounter   Medication Sig Dispense Refill    amitriptyline (ELAVIL) 75 MG tablet Take 75 mg by mouth every evening.      amlodipine (NORVASC) 10 MG tablet Take 1 tablet (10 mg total) by mouth once daily. 30 tablet 0    aspirin 81 MG Chew Take 1 tablet (81 mg total) by mouth once daily.  0    atorvastatin (LIPITOR) 40 MG tablet Take 1 tablet (40 mg total) by mouth once daily. 90 tablet 3    carvediloL (COREG) 25 MG tablet Take 37.5 mg by mouth 2 (two) times daily.      traMADoL (ULTRAM) 50 mg tablet Take 50 mg by mouth 2 (two) times daily.      butalbital-acetaminophen-caffeine -40 mg (FIORICET, ESGIC) -40 mg per tablet Take 1 tablet by mouth every 4 (four) hours as needed.      cyanocobalamin (VITAMIN B-12) 1000 MCG tablet Take 1 tablet (1,000 mcg total) by mouth once daily. (Patient not taking: No sig reported)      diclofenac (VOLTAREN) 25 MG TbEC Take 1 tablet (25 mg total) by mouth 3 (three) times daily as needed (pain). (Patient not taking: No sig reported) 15 tablet 0    diphenhydrAMINE (BENADRYL) 25 mg capsule Take 1 each (25 mg total) by mouth every 6 (six) hours as needed for Itching or Allergies. 20 capsule 0    docusate sodium (COLACE) 100 MG capsule Take 1 capsule (100 mg total) by mouth 2 (two) times daily as needed for Constipation. (Patient not taking: No sig reported) 30 capsule 0    EScitalopram oxalate (LEXAPRO) 10 MG tablet Take 10 mg by mouth once daily.      hydrochlorothiazide (HYDRODIURIL) 25 MG tablet Take 1 tablet (25 mg total) by mouth once  daily. 30 tablet 0    magnesium oxide (MAG-OX) 400 mg (241.3 mg magnesium) tablet Take 1 tablet by mouth 2 (two) times daily.      meloxicam (MOBIC) 7.5 MG tablet Take 7.5 mg by mouth 2 (two) times daily.      neomycin-polymyxin-hydrocortisone (CORTISPORIN) 3.5-10,000-1 mg/mL-unit/mL-% otic suspension Place 4 drops into the left ear 3 (three) times daily. (Patient not taking: No sig reported) 10 mL 0    potassium chloride SA (K-DUR,KLOR-CON) 20 MEQ tablet Take 20 mEq by mouth 2 (two) times daily.      [DISCONTINUED] ranitidine (ZANTAC) 150 MG capsule Take 1 capsule (150 mg total) by mouth once daily. 15 capsule 0     Allergies: Lisinopril and Bactrim [sulfamethoxazole-trimethoprim]    History reviewed. No pertinent family history.    Social History     Tobacco Use    Smoking status: Current Every Day Smoker     Packs/day: 0.50     Types: Cigarettes    Smokeless tobacco: Never Used   Substance Use Topics    Alcohol use: Yes     Comment: occassionally    Drug use: No      Review of Systems: Unable to obtain a complete ROS due to level of consciousness.     Vitals:   Temp: 98.1 °F (36.7 °C)  Pulse: (!) 53  BP: (!) 140/83  MAP (mmHg): 106  Resp: (!) 25  SpO2: 96 %  O2 Device (Oxygen Therapy): Simple Face Mask    Temp  Min: 98.1 °F (36.7 °C)  Max: 98.1 °F (36.7 °C)  Pulse  Min: 53  Max: 70  BP  Min: 140/83  Max: 140/83  MAP (mmHg)  Min: 106  Max: 106  Resp  Min: 18  Max: 25  SpO2  Min: 96 %  Max: 100 %    No intake/output data recorded.         Examination:   Constitutional: Well-nourished and -developed. No apparent distress.   Eyes: Conjunctiva clear, anicteric. Lids no lesions.  Head/Ears/Nose/Mouth/Throat/Neck: Moist mucous membranes. External ears, nose atraumatic.   Cardiovascular: Regular rhythm. No murmurs. No leg edema.  Respiratory: Comfortable respirations. Clear to auscultation.  Gastrointestinal: No hernia. Soft, nondistended, nontender. + bowel sounds.    Neurologic:   -E 3 V 1 M 5  -Lethargic. Eyes open to  noxious stimuli. Unable to follow commands.  -Cranial nerves: EOM intact, PERRL 3mm, no facial droop, + Cough  -Strength: Moves all extremities spontaneously, antigravity  Unable to test orientation, language, memory, judgment, insight, shoulder shrug, tongue protrusion, coordination, gait due to level of consciousness.    Today I independently reviewed pertinent medications, lines/drains/airways, imaging, cardiology results, laboratory results, microbiology results, notably:   CTA head and neck pending

## 2022-07-15 NOTE — ASSESSMENT & PLAN NOTE
Patient with Hypercapnic and Hypoxic Respiratory failure which is Acute.  she is not on home oxygen. Supplemental oxygen was provided and noted-  .   Signs/symptoms of respiratory failure include- lethargy. Contributing diagnoses includes - COPD Labs and images were reviewed. Patient Has recent ABG, which has been reviewed. Will treat underlying causes and adjust management of respiratory failure as follows-     -ABG post op to eval hypercapnia  -On nasal cannula  -Continuous pulse oximetry  -Likely 2/2 underlying COPD and ANIA  -Art line in place for hemodynamic monitoring

## 2022-07-15 NOTE — PLAN OF CARE
[facility] - Neuro Critical Care  Initial Discharge Assessment       Primary Care Provider: Clair Johnson NP    Admission Diagnosis: Cerebral aneurysm [I67.1]  Intracranial aneurysm [I67.1]    Admission Date: 7/15/2022  Expected Discharge Date: 7/18/2022    Discharge Barriers Identified: None    Payor: MEDICAID / Plan: [place] [employer] / Product Type: Managed Medicaid /     Extended Emergency Contact Information  Primary Emergency Contact: Miladys Davies  Address: [address] [place] [address] [place]  Mobile Phone: [phone]  Relation: Mother  Secondary Emergency Contact: Haresh Holloway   [facility]  Home Phone: [phone]  Relation: Spouse    Discharge Plan A: Home with family  Discharge Plan B: [id number] - [place], [place] - [address] AT [facility] [address]  [address]  [place] [address]  Phone: [phone] Fax: [phone]         Transferred from: home    Past Medical History:   Diagnosis Date    Brain aneurysm     CHF (congestive heart failure)     H/O coronary angioplasty     Hypercholesteremia     Hypertension     Malignant hypertension     Migraine headache     Stroke 10/2017         CM met with patient and Haresh Holloway ([facility]) [phone] (at bedside) in room for Discharge Planning Assessment.  Patient is post op and unable to answer questions.  Per jyoti, the pateint lives with her mother, Miladys Davies,  in a mobile home with 5 step(s) to enter and B/L rails.   Per jyoti, the patient was independent with ADLS and used no dme for ambulation.  Patient will have assistance from her mother upon discharge.   Discharge Planning Booklet given to patient/family and discussed.  All questions addressed.  CM will follow for needs.      Initial Assessment (most recent)     Adult Discharge Assessment - 07/15/22 3082        Discharge Assessment    Assessment Type Discharge Planning  Assessment     Confirmed/corrected address, phone number and insurance Yes     Confirmed Demographics Correct on Facesheet     Source of Information unable to respond     If unable to respond/provide information was family/caregiver contacted? Yes     Contact Name/Number Haresh Holloway (jyoti) [phone] (at bedside)     Communicated CADY with patient/caregiver Date not available/Unable to determine     Reason For Admission Aneurysm of left middle cerebral artery     Lives With parent(s)     Facility Arrived From: home     Do you expect to return to your current living situation? Yes     Do you have help at home or someone to help you manage your care at home? Yes     Who are your caregiver(s) and their phone number(s)? Miladys Davies (mother) [phone]     Prior to hospitilization cognitive status: Alert/Oriented     Current cognitive status: Unable to Assess   post op    Walking or Climbing Stairs Difficulty none     Dressing/Bathing Difficulty none     Home Accessibility stairs to enter home     Number of Stairs, Main Entrance five     Stair Railings, Main Entrance railings on both sides of stairs     Home Layout Able to live on 1st floor     Equipment Currently Used at Home none     Readmission within 30 days? No     Patient currently being followed by outpatient case management? No     Do you currently have service(s) that help you manage your care at home? No     Do you take prescription medications? Yes     Do you have prescription coverage? Yes     Coverage Medicaid     Do you have any problems affording any of your prescribed medications? No     Is the patient taking medications as prescribed? yes     Who is going to help you get home at discharge? Haresh Holloway (jyoti) [phone] (at bedside)     How do you get to doctors appointments? family or friend will provide     Are you on dialysis? No     Do you take coumadin? No     Discharge Plan A Home with family     Discharge Plan B Home Health      DME Needed Upon Discharge  none     Discharge Plan discussed with: Spouse/sig other     Name(s) and Number(s) Haresh Holloway ([facility]) [phone] (at bedside)     Discharge Barriers Identified None        Relationship/Environment    Name(s) of Who Lives With Patient Miladys Davies (mother)                    Melinda Swanson RN, CCRN-K, [facility]  Neuro-Critical Care   [phone]

## 2022-07-15 NOTE — BRIEF OP NOTE
[facility] - Surgery ([facility])  Brief Operative Note    SUMMARY     Surgery Date: 7/15/2022     Surgeon(s) and Role:     * Timothy Diaz MD - Primary     * Abebe Mendez MD    Assisting Surgeon: Gerard Shultz MD - Resident Assisting    Pre-op Diagnosis:  Cerebral aneurysm [I67.1]    Post-op Diagnosis:  Post-Op Diagnosis Codes:     * Cerebral aneurysm [I67.1]    Procedure(s) (LRB):  CRANIOTOMY, WITH ANEURYSM CLIPPING (N/A)    Anesthesia: General    Operative Findings: L pterional craniotomy for clip ligation of L ant. Choroidal & L pCommA aneurysms    Estimated Blood Loss: 200cc         Specimens:   Specimen (24h ago, onward)            None          [id number]

## 2022-07-15 NOTE — PT/OT/SLP PROGRESS
Speech Language Pathology      Gina Jenkins  MRN: [id number]    SLP Swallow Evaluation orders received and reviewed.  Patient not seen today secondary to Patient off the floor for surgery/in OR.  ST will follow-up to re-attempt 7/16/22.     7/15/2022

## 2022-07-15 NOTE — CARE UPDATE
Patient arrived to [facility] from OR by Stepdown bed    Report received from: MD Reid    Type of stroke/diagnosis: Crani w/ clipping    Current symptoms: headache, lethargic    Skin assessment done: Y  Wounds noted:    *If wounds noted, was Wound Care consulted? Y    Philip Completed? N, LMA removed at bedside    Patient Belongings on Admit: none    NCC notified: RUSSELL Villarreal

## 2022-07-15 NOTE — H&P
[facility] - Neuro Critical Care  Neurocritical Care  History & Physical    Admit Date: 7/15/2022  Service Date: 07/15/2022  Length of Stay: 0    Subjective:     Chief Complaint: Aneurysm of middle cerebral artery    History of Present Illness: Gina Jenkins 47 yr old female w/ PMH HTN, smoking (20 pack year history), CHF and multiple intracranial aneurysms who is s/p elective L-sided craniotomy for aneurysm clipping. She had a known single intracranial aneurysm previously, but presented to the emergency department after another headache and an additional 2 cerebral aneurysms were found in the left ICA distribution. Will admit to [facility] post-op.    Past Medical History:   Diagnosis Date    Brain aneurysm     CHF (congestive heart failure)     H/O coronary angioplasty     Hypercholesteremia     Hypertension     Migraine headache     Stroke 10/2017     Past Surgical History:   Procedure Laterality Date    CARDIAC CATHETERIZATION      CEREBRAL ANGIOGRAM         No current facility-administered medications on file prior to encounter.     Current Outpatient Medications on File Prior to Encounter   Medication Sig Dispense Refill    amitriptyline (ELAVIL) 75 MG tablet Take 75 mg by mouth every evening.      amlodipine (NORVASC) 10 MG tablet Take 1 tablet (10 mg total) by mouth once daily. 30 tablet 0    aspirin 81 MG Chew Take 1 tablet (81 mg total) by mouth once daily.  0    atorvastatin (LIPITOR) 40 MG tablet Take 1 tablet (40 mg total) by mouth once daily. 90 tablet 3    carvediloL (COREG) 25 MG tablet Take 37.5 mg by mouth 2 (two) times daily.      traMADoL (ULTRAM) 50 mg tablet Take 50 mg by mouth 2 (two) times daily.      butalbital-acetaminophen-caffeine -40 mg (FIORICET, ESGIC) -40 mg per tablet Take 1 tablet by mouth every 4 (four) hours as needed.      cyanocobalamin (VITAMIN B-12) 1000 MCG tablet Take 1 tablet (1,000 mcg total) by mouth once daily. (Patient not taking: No sig reported)       diclofenac (VOLTAREN) 25 MG TbEC Take 1 tablet (25 mg total) by mouth 3 (three) times daily as needed (pain). (Patient not taking: No sig reported) 15 tablet 0    diphenhydrAMINE (BENADRYL) 25 mg capsule Take 1 each (25 mg total) by mouth every 6 (six) hours as needed for Itching or Allergies. 20 capsule 0    docusate sodium (COLACE) 100 MG capsule Take 1 capsule (100 mg total) by mouth 2 (two) times daily as needed for Constipation. (Patient not taking: No sig reported) 30 capsule 0    EScitalopram oxalate (LEXAPRO) 10 MG tablet Take 10 mg by mouth once daily.      hydrochlorothiazide (HYDRODIURIL) 25 MG tablet Take 1 tablet (25 mg total) by mouth once daily. 30 tablet 0    magnesium oxide (MAG-OX) 400 mg (241.3 mg magnesium) tablet Take 1 tablet by mouth 2 (two) times daily.      meloxicam (MOBIC) 7.5 MG tablet Take 7.5 mg by mouth 2 (two) times daily.      neomycin-polymyxin-hydrocortisone (CORTISPORIN) 3.5-10,000-1 mg/mL-unit/mL-% otic suspension Place 4 drops into the left ear 3 (three) times daily. (Patient not taking: No sig reported) 10 mL 0    potassium chloride SA (K-DUR,KLOR-CON) 20 MEQ tablet Take 20 mEq by mouth 2 (two) times daily.      [DISCONTINUED] ranitidine (ZANTAC) 150 MG capsule Take 1 capsule (150 mg total) by mouth once daily. 15 capsule 0     Allergies: Lisinopril and Bactrim [sulfamethoxazole-trimethoprim]    History reviewed. No pertinent family history.    Social History     Tobacco Use    Smoking status: Current Every Day Smoker     Packs/day: 0.50     Types: Cigarettes    Smokeless tobacco: Never Used   Substance Use Topics    Alcohol use: Yes     Comment: occassionally    Drug use: No      Review of Systems: Unable to obtain a complete ROS due to level of consciousness.     Vitals:   Temp: 98.1 °F (36.7 °C)  Pulse: (!) 53  BP: (!) 140/83  MAP (mmHg): 106  Resp: (!) 25  SpO2: 96 %  O2 Device (Oxygen Therapy): Simple Face Mask    Temp  Min: 98.1 °F (36.7 °C)  Max: 98.1  °F (36.7 °C)  Pulse  Min: 53  Max: 70  BP  Min: 140/83  Max: 140/83  MAP (mmHg)  Min: 106  Max: 106  Resp  Min: 18  Max: 25  SpO2  Min: 96 %  Max: 100 %    No intake/output data recorded.         Examination:   Constitutional: Well-nourished and -developed. No apparent distress.   Eyes: Conjunctiva clear, anicteric. Lids no lesions.  Head/Ears/Nose/Mouth/Throat/Neck: Moist mucous membranes. External ears, nose atraumatic.   Cardiovascular: Regular rhythm. No murmurs. No leg edema.  Respiratory: Comfortable respirations. Clear to auscultation.  Gastrointestinal: No hernia. Soft, nondistended, nontender. + bowel sounds.    Neurologic:   -E 3 V 1 M 5  -Lethargic. Eyes open to noxious stimuli. Unable to follow commands.  -Cranial nerves: EOM intact, PERRL 3mm, no facial droop, + Cough  -Strength: Moves all extremities spontaneously, antigravity  Unable to test orientation, language, memory, judgment, insight, shoulder shrug, tongue protrusion, coordination, gait due to level of consciousness.    Today I independently reviewed pertinent medications, lines/drains/airways, imaging, cardiology results, laboratory results, microbiology results, notably:   CTA head and neck pending      Assessment/Plan:     Neuro  * Aneurysm of left middle cerebral artery  S/p L pterional craniotomy for clip ligation of L ant. Choroidal & L pCommA aneurysms 7/15    -Admit to NCC  -Post op CTA head/neck pending  -Neuro checks/VS q1hr  -SBP goal < 140  -NSGY following  -PT/OT/SLP  -NPO until awake enough to pass swallow study  -Post-op angiogram pending  -Post-op labs pending  -VTE prophylaxis: mechanical SCDs, holding subq heparin in the post op period    Pulmonary  Acute respiratory failure with hypoxia and hypercapnia  Patient with Hypercapnic and Hypoxic Respiratory failure which is Acute.  she is not on home oxygen. Supplemental oxygen was provided and noted-  .   Signs/symptoms of respiratory failure include- lethargy. Contributing  diagnoses includes - COPD Labs and images were reviewed. Patient Has recent ABG, which has been reviewed. Will treat underlying causes and adjust management of respiratory failure as follows-     -ABG post op to eval hypercapnia  -On nasal cannula  -Continuous pulse oximetry  -Likely 2/2 underlying COPD and ANIA  -Art line in place for hemodynamic monitoring    Cardiac/Vascular  Chronic diastolic heart failure  D/c IVF  Strict I/Os  Last TTE 2018 EF 60%  Repeat Echo    Essential hypertension  SBP goal < 140  Restart home BP meds when appropriate    Other  Tobacco abuse  Nicotine patch if needed    The patient is being Prophylaxed for:  Venous Thromboembolism with: Mechanical  Stress Ulcer with: Not Applicable   Ventilator Pneumonia with: not applicable    Activity Orders          Diet Adult Regular (IDDSI Level 7): Regular starting at 07/15 1251        FULL    Critical condition in that Patient has a condition that poses threat to life and bodily function: L MCA aneurysm, s/p clipping, respiratory failure     37 minutes of Critical care time was spent personally by me on the following activities: development of treatment plan with patient or surrogate and bedside caregivers, discussions with consultants, evaluation of patient's response to treatment, examination of patient, ordering and performing treatments and interventions, ordering and review of laboratory studies, ordering and review of radiographic studies, pulse oximetry, antibiotic titration if applicable, vasopressor titration if applicable, re-evaluation of patient's condition. This critical care time did not overlap with that of any other provider or involve time for any procedures. There is high probability for acute neurological change leading to clinical and possibly life-threatening deterioration requiring highest level of physician preparedness for urgent intervention.    Cherelle Proctor NP  Neurocritical Care  [facility] - Neuro Critical Care

## 2022-07-15 NOTE — TRANSFER OF CARE
"Anesthesia Transfer of Care Note    Patient: Gina Jenkins    Procedure(s) Performed: Procedure(s) (LRB):  CRANIOTOMY, WITH ANEURYSM CLIPPING (N/A)    Patient location: ICU    Anesthesia Type: general    Transport from OR: Transported from OR intubated on 100% O2 by AMBU with assisted ventilation. Continuous ECG monitoring in transport. Continuos invasive BP monitoring in transport    Post pain: adequate analgesia    Post assessment: no apparent anesthetic complications    Post vital signs: stable    Level of consciousness: awake and responds to stimulation    Nausea/Vomiting: no nausea/vomiting    Complications: none    Transfer of care protocol was followed      Last vitals:   Visit Vitals  BP (!) 140/83 (BP Location: Right arm, Patient Position: Lying)   Pulse (!) 53   Temp 36.7 °C (98.1 °F) (Oral)   Resp (!) 25   Ht 5' 2" (1.575 m)   Wt 93 kg (205 lb)   LMP 07/01/2020 (Approximate)   SpO2 96%   Breastfeeding No   BMI 37.49 kg/m²     "

## 2022-07-15 NOTE — SUBJECTIVE & OBJECTIVE
Facility-Administered Medications Prior to Admission   Medication Dose Route Frequency Provider Last Rate Last Admin    acetaminophen tablet 650 mg  650 mg Oral Once PRN Bonifacio Ramirez MD        albuterol inhaler 2 puff  2 puff Inhalation Q20 Min PRN Bonifacio Ramirez MD        diphenhydrAMINE injection 25 mg  25 mg Intravenous Once PRN Bonifacio Ramirez MD        EPINEPHrine (EPIPEN) 0.3 mg/0.3 mL pen injection 0.3 mg  0.3 mg Intramuscular PRN Bonifacio Ramirez MD        methylPREDNISolone sodium succinate injection 40 mg  40 mg Intravenous Once PRN Bonifacio Ramirez MD        ondansetron disintegrating tablet 4 mg  4 mg Oral Once PRN Bonifacio Ramirez MD        sodium chloride 0.9% 500 mL flush bag   Intravenous PRN Bonifacio Ramirez MD        sodium chloride 0.9% flush 10 mL  10 mL Intravenous PRN Bonifacio Ramirez MD         Medications Prior to Admission   Medication Sig Dispense Refill Last Dose    amitriptyline (ELAVIL) 75 MG tablet Take 75 mg by mouth every evening.   7/13/2022 at Unknown time    amlodipine (NORVASC) 10 MG tablet Take 1 tablet (10 mg total) by mouth once daily. 30 tablet 0 7/15/2022 at 0320    aspirin 81 MG Chew Take 1 tablet (81 mg total) by mouth once daily.  0 Past Month at Unknown time    atorvastatin (LIPITOR) 40 MG tablet Take 1 tablet (40 mg total) by mouth once daily. 90 tablet 3 7/14/2022 at 0800    carvediloL (COREG) 25 MG tablet Take 37.5 mg by mouth 2 (two) times daily.   7/15/2022 at 0320    traMADoL (ULTRAM) 50 mg tablet Take 50 mg by mouth 2 (two) times daily.   Past Month at Unknown time    cyanocobalamin (VITAMIN B-12) 1000 MCG tablet Take 1 tablet (1,000 mcg total) by mouth once daily. (Patient not taking: No sig reported)       diclofenac (VOLTAREN) 25 MG TbEC Take 1 tablet (25 mg total) by mouth 3 (three) times daily as needed (pain). (Patient not taking: No sig reported) 15 tablet 0     diphenhydrAMINE (BENADRYL) 25 mg capsule Take 1 each (25 mg total) by  mouth every 6 (six) hours as needed for Itching or Allergies. 20 capsule 0     docusate sodium (COLACE) 100 MG capsule Take 1 capsule (100 mg total) by mouth 2 (two) times daily as needed for Constipation. (Patient not taking: No sig reported) 30 capsule 0     hydrochlorothiazide (HYDRODIURIL) 25 MG tablet Take 1 tablet (25 mg total) by mouth once daily. 30 tablet 0     meloxicam (MOBIC) 7.5 MG tablet Take 7.5 mg by mouth 2 (two) times daily.   More than a month at Unknown time    neomycin-polymyxin-hydrocortisone (CORTISPORIN) 3.5-10,000-1 mg/mL-unit/mL-% otic suspension Place 4 drops into the left ear 3 (three) times daily. (Patient not taking: No sig reported) 10 mL 0     ranitidine (ZANTAC) 150 MG capsule Take 1 capsule (150 mg total) by mouth once daily. 15 capsule 0        Review of patient's allergies indicates:   Allergen Reactions    Lisinopril Swelling    Bactrim [sulfamethoxazole-trimethoprim] Rash       Past Medical History:   Diagnosis Date    Brain aneurysm     CHF (congestive heart failure)     H/O coronary angioplasty     Hypercholesteremia     Hypertension     Migraine headache     Stroke 10/2017     Past Surgical History:   Procedure Laterality Date    CARDIAC CATHETERIZATION      CEREBRAL ANGIOGRAM       Family History    None       Tobacco Use    Smoking status: Current Every Day Smoker     Packs/day: 0.50     Types: Cigarettes    Smokeless tobacco: Never Used   Substance and Sexual Activity    Alcohol use: Yes     Comment: occassionally    Drug use: No    Sexual activity: Not Currently     Partners: Male     Birth control/protection: None     Review of Systems   Constitutional:  Negative for activity change, appetite change, chills, diaphoresis, fatigue, fever and unexpected weight change.   HENT:  Negative for tinnitus, trouble swallowing and voice change.    Eyes:  Negative for photophobia, pain and visual disturbance.   Respiratory:  Negative for apnea, cough, chest tightness, shortness of  breath, wheezing and stridor.    Cardiovascular:  Negative for chest pain, palpitations and leg swelling.   Gastrointestinal:  Negative for abdominal distention, abdominal pain, anal bleeding, constipation, diarrhea, nausea and vomiting.   Endocrine: Negative for cold intolerance, heat intolerance, polydipsia, polyphagia and polyuria.   Genitourinary:  Negative for difficulty urinating, dysuria, flank pain, frequency, hematuria and urgency.   Musculoskeletal:  Negative for back pain, gait problem, neck pain and neck stiffness.   Skin:  Negative for pallor, rash and wound.   Neurological:  Positive for headaches. Negative for dizziness, tremors, seizures, syncope, facial asymmetry, speech difficulty, weakness, light-headedness and numbness.   Psychiatric/Behavioral:  Negative for agitation, behavioral problems and confusion.    Objective:     Weight: 93 kg (205 lb)  Body mass index is 37.49 kg/m².  Vital Signs (Most Recent):  Temp: 98.1 °F (36.7 °C) (07/15/22 0559)  Pulse: 70 (07/15/22 0559)  Resp: 18 (07/15/22 0559)  BP: (!) 140/83 (07/15/22 0559)  SpO2: 100 % (07/15/22 0559)   Vital Signs (24h Range):  Temp:  [98.1 °F (36.7 °C)] 98.1 °F (36.7 °C)  Pulse:  [70] 70  Resp:  [18] 18  SpO2:  [100 %] 100 %  BP: (140)/(83) 140/83     Physical Exam  Neurosurgery Physical Exam  General: no acute distress  Head: Non-traumatic, normocephalic  Eyes: Pupils equal, EOMI  Neck: Supple, normal ROM, no tenderness to palpation  CVS: Normal rate and rhythm, distal pulses present  Pulm: Symmetric expansion, no respiratory distress  GI: Abdomen nondistended, nontender     MSK: Moves all extremities without restriction, atraumatic  Skin: Dry, intact  Psych: Normal thought content and cognition     Neuro:  Alert, awake, oriented, to self, place, time  Language:  Speech is fluent, goal directed without any noted dysarthria or aphasia     Cranial nerves:    CNII-XII: Intact on fine exam,   Pupils equal round react to light,   Extraocular  muscles are intact  V1 to V3 is intact to light touch,   no facial asymmetry,   Hearing is intact to finger rub and voice  tongue/uvula/palate midline,   shoulder shrug equal,      No pronator drift     Extremities:  Motor:                  Upper Extremity     Deltoid Triceps Biceps Wrist  Extension Wrist  Flexion Interosseous   R 5/5 5/5 5/5 5/5 5/5 5/5   L 5/5 5/5 5/5 5/5 5/5 5/5         Thumb   Abduction Thumb  ADDuction Finger  Flexion Finger  Extension       R 5/5 5/5 5/5 5/5       L 5/5 5/5 5/5 5/5           Lower Extremity      Iliopsoas Quadriceps Hamstring Plantarflexion Dorsiflexion EHL   R 5/5 5/5 5/5 5/5 5/5 5/5   L 5/5 5/5 5/5 5/5 5/5 5/5         Reflexes:     DTR:    2+ biceps                       2+ triceps              2+ brachioradialis              2+ patellar  2+ Achilles     Sarabia's: Negative     Babinski's: Negative     Clonus: Negative     Sensory:      Sensation intact to light touch, temperature sensation, vibration, pinprick     Coordination:      Coordination intact throughout, no dysmetria, normal rapid alternating movements, no dysdiadochokinesia  Cerebellar:  Normal finger-to-nose, normal heel-to-shin      Significant Labs:  No results for input(s): GLU, NA, K, CL, CO2, BUN, CREATININE, CALCIUM, MG in the last 48 hours.  No results for input(s): WBC, HGB, HCT, PLT in the last 48 hours.  No results for input(s): LABPT, INR, APTT in the last 48 hours.  Microbiology Results (last 7 days)       ** No results found for the last 168 hours. **          ABGs: No results for input(s): PH, PCO2, PO2, HCO3, POCSATURATED, BE in the last 48 hours.  Cardiac markers: No results for input(s): CKMB, CPKMB, TROPONINT, TROPONINI, MYOGLOBIN in the last 48 hours.  CMP: No results for input(s): GLU, CALCIUM, ALBUMIN, PROT, NA, K, CO2, CL, BUN, CREATININE, ALKPHOS, ALT, AST, BILITOT in the last 48 hours.  CRP: No results for input(s): CRP in the last 48 hours.  ESR: No results for input(s): POCESR,  ERYTHROCYTES in the last 48 hours.  LFTs: No results for input(s): ALT, AST, ALKPHOS, BILITOT, PROT, ALBUMIN in the last 48 hours.  Procalcitonin: No results for input(s): PROCAL in the last 48 hours.    Significant Diagnostics:  I have reviewed all pertinent imaging results/findings within the past 24 hours.

## 2022-07-15 NOTE — ASSESSMENT & PLAN NOTE
47F w/ PMH HTN, smoking, CHF and multiple intracranial aneurysms who presents [facility] for elective L-sided craniotomy for clipping:    --Patient evaluated prior to procedure  --All diagnostics and imaging reviewed  --Patient NPO since MN  --No anti-coag/plt medication in the last 72h  --Patient consented and all questions answered  --Further reccs to follow procedure

## 2022-07-15 NOTE — ASSESSMENT & PLAN NOTE
S/p L pterional craniotomy for clip ligation of L ant. Choroidal & L pCommA aneurysms 7/15    -Admit to NCC  -Post op CTA head/neck pending  -Neuro checks/VS q1hr  -SBP goal < 140  -NSGY following  -PT/OT/SLP  -NPO until awake enough to pass swallow study  -Post-op angiogram pending  -Post-op labs pending  -VTE prophylaxis: mechanical SCDs, holding subq heparin in the post op period

## 2022-07-15 NOTE — NURSING
Pt transferred to and from CT via bed with RN and PCT. Portable monitoring and O2 connected. PRN dilaudid given during scan for pain. Upon return, bed locked and in lowest position. VSS.

## 2022-07-15 NOTE — HPI
47F w/ PMH HTN, smoking, CHF and multiple intracranial aneurysms who presents to [facility] for elective L-sided craniotomy for clipping.  In clinic she described having abrupt headache, sharp excruciating and lasting for very short period time 1 5 minutes.  She has past medical history of congestive heart failure, history of coronary angioplasty, hypercholesterolemia, hypertension and stroke in 2017. She had a known the single intracranial aneurysm, when previously she presented to the emergency department after another headache which she found concerns for an additional 2 cerebral aneurysms in the left ICA distribution.  She currently denies any new headache, nausea, vomiting.  She notes she has a 20 pack-year history of smoking.  No known family history of aneurysmal rupture.  She has previous surgery.  She does note she has allergies to Bactrim and lisinopril.  She had a CTA of the head in November 2021. She is accompanied by her mother as well.

## 2022-07-15 NOTE — HPI
Gina Jenkins 47 yr old female w/ PMH HTN, smoking (20 pack year history), CHF and multiple intracranial aneurysms who is s/p elective L-sided craniotomy for aneurysm clipping. She had a known single intracranial aneurysm previously, but presented to the emergency department after another headache and an additional 2 cerebral aneurysms were found in the left ICA distribution. Will admit to [facility] post-op.

## 2022-07-16 LAB
ALBUMIN SERPL BCP-MCNC: 3.2 G/DL (ref 3.5–5.2)
ALP SERPL-CCNC: 128 U/L (ref 55–135)
ALT SERPL W/O P-5'-P-CCNC: 79 U/L (ref 10–44)
ANION GAP SERPL CALC-SCNC: 10 MMOL/L (ref 8–16)
ANION GAP SERPL CALC-SCNC: 11 MMOL/L (ref 8–16)
ASCENDING AORTA: 2.81 CM
AST SERPL-CCNC: 58 U/L (ref 10–40)
AV INDEX (PROSTH): 0.69
AV MEAN GRADIENT: 7 MMHG
AV PEAK GRADIENT: 15 MMHG
AV VALVE AREA: 2.08 CM2
AV VELOCITY RATIO: 0.7
BASOPHILS # BLD AUTO: 0.01 K/UL (ref 0–0.2)
BASOPHILS NFR BLD: 0.1 % (ref 0–1.9)
BILIRUB SERPL-MCNC: 0.3 MG/DL (ref 0.1–1)
BLD PROD TYP BPU: NORMAL
BLD PROD TYP BPU: NORMAL
BLOOD UNIT EXPIRATION DATE: NORMAL
BLOOD UNIT EXPIRATION DATE: NORMAL
BLOOD UNIT TYPE CODE: 5100
BLOOD UNIT TYPE CODE: 5100
BLOOD UNIT TYPE: NORMAL
BLOOD UNIT TYPE: NORMAL
BSA FOR ECHO PROCEDURE: 2.02 M2
BUN SERPL-MCNC: 11 MG/DL (ref 6–20)
BUN SERPL-MCNC: 14 MG/DL (ref 6–20)
CALCIUM SERPL-MCNC: 8.9 MG/DL (ref 8.7–10.5)
CALCIUM SERPL-MCNC: 9.1 MG/DL (ref 8.7–10.5)
CHLORIDE SERPL-SCNC: 110 MMOL/L (ref 95–110)
CHLORIDE SERPL-SCNC: 110 MMOL/L (ref 95–110)
CO2 SERPL-SCNC: 21 MMOL/L (ref 23–29)
CO2 SERPL-SCNC: 23 MMOL/L (ref 23–29)
CODING SYSTEM: NORMAL
CODING SYSTEM: NORMAL
CREAT SERPL-MCNC: 1 MG/DL (ref 0.5–1.4)
CREAT SERPL-MCNC: 1.1 MG/DL (ref 0.5–1.4)
CV ECHO LV RWT: 0.34 CM
DIFFERENTIAL METHOD: ABNORMAL
DISPENSE STATUS: NORMAL
DISPENSE STATUS: NORMAL
DOP CALC AO PEAK VEL: 1.95 M/S
DOP CALC AO VTI: 40.02 CM
DOP CALC LVOT AREA: 3 CM2
DOP CALC LVOT DIAMETER: 1.96 CM
DOP CALC LVOT PEAK VEL: 1.37 M/S
DOP CALC LVOT STROKE VOLUME: 83.38 CM3
DOP CALCLVOT PEAK VEL VTI: 27.65 CM
E WAVE DECELERATION TIME: 120.29 MSEC
E/A RATIO: 1.52
E/E' RATIO: 10.43 M/S
ECHO LV POSTERIOR WALL: 0.65 CM (ref 0.6–1.1)
EJECTION FRACTION: 65 %
EOSINOPHIL # BLD AUTO: 0 K/UL (ref 0–0.5)
EOSINOPHIL NFR BLD: 0 % (ref 0–8)
ERYTHROCYTE [DISTWIDTH] IN BLOOD BY AUTOMATED COUNT: 14.6 % (ref 11.5–14.5)
EST. GFR  (AFRICAN AMERICAN): >60 ML/MIN/1.73 M^2
EST. GFR  (AFRICAN AMERICAN): >60 ML/MIN/1.73 M^2
EST. GFR  (NON AFRICAN AMERICAN): >60 ML/MIN/1.73 M^2
EST. GFR  (NON AFRICAN AMERICAN): >60 ML/MIN/1.73 M^2
ESTIMATED AVG GLUCOSE: 120 MG/DL (ref 68–131)
FRACTIONAL SHORTENING: 28 % (ref 28–44)
GLUCOSE SERPL-MCNC: 125 MG/DL (ref 70–110)
GLUCOSE SERPL-MCNC: 150 MG/DL (ref 70–110)
HBA1C MFR BLD: 5.8 % (ref 4–5.6)
HCT VFR BLD AUTO: 34.2 % (ref 37–48.5)
HGB BLD-MCNC: 11.9 G/DL (ref 12–16)
IMM GRANULOCYTES # BLD AUTO: 0.06 K/UL (ref 0–0.04)
IMM GRANULOCYTES NFR BLD AUTO: 0.5 % (ref 0–0.5)
INTERVENTRICULAR SEPTUM: 0.74 CM (ref 0.6–1.1)
IVRT: 82.78 MSEC
LA MAJOR: 5.51 CM
LA MINOR: 5.53 CM
LA WIDTH: 4 CM
LEFT ATRIUM SIZE: 4.12 CM
LEFT ATRIUM VOLUME INDEX MOD: 22 ML/M2
LEFT ATRIUM VOLUME INDEX: 40.1 ML/M2
LEFT ATRIUM VOLUME MOD: 42.37 CM3
LEFT ATRIUM VOLUME: 77.32 CM3
LEFT INTERNAL DIMENSION IN SYSTOLE: 2.77 CM (ref 2.1–4)
LEFT VENTRICLE DIASTOLIC VOLUME INDEX: 32.87 ML/M2
LEFT VENTRICLE DIASTOLIC VOLUME: 63.44 ML
LEFT VENTRICLE MASS INDEX: 38 G/M2
LEFT VENTRICLE SYSTOLIC VOLUME INDEX: 14.9 ML/M2
LEFT VENTRICLE SYSTOLIC VOLUME: 28.75 ML
LEFT VENTRICULAR INTERNAL DIMENSION IN DIASTOLE: 3.84 CM (ref 3.5–6)
LEFT VENTRICULAR MASS: 72.51 G
LV LATERAL E/E' RATIO: 10 M/S
LV SEPTAL E/E' RATIO: 10.91 M/S
LYMPHOCYTES # BLD AUTO: 0.8 K/UL (ref 1–4.8)
LYMPHOCYTES NFR BLD: 7 % (ref 18–48)
MCH RBC QN AUTO: 28.6 PG (ref 27–31)
MCHC RBC AUTO-ENTMCNC: 34.8 G/DL (ref 32–36)
MCV RBC AUTO: 82 FL (ref 82–98)
MONOCYTES # BLD AUTO: 0.3 K/UL (ref 0.3–1)
MONOCYTES NFR BLD: 2.1 % (ref 4–15)
MV PEAK A VEL: 0.79 M/S
MV PEAK E VEL: 1.2 M/S
MV STENOSIS PRESSURE HALF TIME: 34.89 MS
MV VALVE AREA P 1/2 METHOD: 6.31 CM2
NEUTROPHILS # BLD AUTO: 10.8 K/UL (ref 1.8–7.7)
NEUTROPHILS NFR BLD: 90.3 % (ref 38–73)
NRBC BLD-RTO: 0 /100 WBC
NUM UNITS TRANS PACKED RBC: NORMAL
NUM UNITS TRANS PACKED RBC: NORMAL
PISA TR MAX VEL: 2.6 M/S
PLATELET # BLD AUTO: 283 K/UL (ref 150–450)
PMV BLD AUTO: 11.5 FL (ref 9.2–12.9)
POCT GLUCOSE: 171 MG/DL (ref 70–110)
POTASSIUM SERPL-SCNC: 3.2 MMOL/L (ref 3.5–5.1)
POTASSIUM SERPL-SCNC: 3.7 MMOL/L (ref 3.5–5.1)
PROT SERPL-MCNC: 6.5 G/DL (ref 6–8.4)
RA MAJOR: 4.62 CM
RA PRESSURE: 3 MMHG
RA WIDTH: 3.22 CM
RBC # BLD AUTO: 4.16 M/UL (ref 4–5.4)
RIGHT VENTRICULAR END-DIASTOLIC DIMENSION: 2.84 CM
SINUS: 2.63 CM
SODIUM SERPL-SCNC: 142 MMOL/L (ref 136–145)
SODIUM SERPL-SCNC: 143 MMOL/L (ref 136–145)
STJ: 2.36 CM
TDI LATERAL: 0.12 M/S
TDI SEPTAL: 0.11 M/S
TDI: 0.12 M/S
TR MAX PG: 27 MMHG
TRICUSPID ANNULAR PLANE SYSTOLIC EXCURSION: 3.18 CM
TV REST PULMONARY ARTERY PRESSURE: 30 MMHG
WBC # BLD AUTO: 11.92 K/UL (ref 3.9–12.7)

## 2022-07-16 PROCEDURE — 99233 SBSQ HOSP IP/OBS HIGH 50: CPT | Mod: ,,, | Performed by: PSYCHIATRY & NEUROLOGY

## 2022-07-16 PROCEDURE — 63600175 PHARM REV CODE 636 W HCPCS: Performed by: STUDENT IN AN ORGANIZED HEALTH CARE EDUCATION/TRAINING PROGRAM

## 2022-07-16 PROCEDURE — 94761 N-INVAS EAR/PLS OXIMETRY MLT: CPT

## 2022-07-16 PROCEDURE — 63600175 PHARM REV CODE 636 W HCPCS: Performed by: NURSE PRACTITIONER

## 2022-07-16 PROCEDURE — 80053 COMPREHEN METABOLIC PANEL: CPT | Performed by: NURSE PRACTITIONER

## 2022-07-16 PROCEDURE — 99024 POSTOP FOLLOW-UP VISIT: CPT | Mod: ,,, | Performed by: NEUROLOGICAL SURGERY

## 2022-07-16 PROCEDURE — 20000000 HC ICU ROOM

## 2022-07-16 PROCEDURE — 25500020 PHARM REV CODE 255: Performed by: NEUROLOGICAL SURGERY

## 2022-07-16 PROCEDURE — 85025 COMPLETE CBC W/AUTO DIFF WBC: CPT | Performed by: STUDENT IN AN ORGANIZED HEALTH CARE EDUCATION/TRAINING PROGRAM

## 2022-07-16 PROCEDURE — 99233 PR SUBSEQUENT HOSPITAL CARE,LEVL III: ICD-10-PCS | Mod: ,,, | Performed by: PSYCHIATRY & NEUROLOGY

## 2022-07-16 PROCEDURE — 80048 BASIC METABOLIC PNL TOTAL CA: CPT | Mod: XB | Performed by: STUDENT IN AN ORGANIZED HEALTH CARE EDUCATION/TRAINING PROGRAM

## 2022-07-16 PROCEDURE — 25000003 PHARM REV CODE 250: Performed by: NURSE PRACTITIONER

## 2022-07-16 PROCEDURE — 27000221 HC OXYGEN, UP TO 24 HOURS

## 2022-07-16 PROCEDURE — 25000003 PHARM REV CODE 250: Performed by: NEUROLOGICAL SURGERY

## 2022-07-16 PROCEDURE — 97530 THERAPEUTIC ACTIVITIES: CPT

## 2022-07-16 PROCEDURE — 25000003 PHARM REV CODE 250: Performed by: STUDENT IN AN ORGANIZED HEALTH CARE EDUCATION/TRAINING PROGRAM

## 2022-07-16 PROCEDURE — 97165 OT EVAL LOW COMPLEX 30 MIN: CPT

## 2022-07-16 PROCEDURE — 83036 HEMOGLOBIN GLYCOSYLATED A1C: CPT | Performed by: PHYSICIAN ASSISTANT

## 2022-07-16 PROCEDURE — 99024 PR POST-OP FOLLOW-UP VISIT: ICD-10-PCS | Mod: ,,, | Performed by: NEUROLOGICAL SURGERY

## 2022-07-16 RX ORDER — IODIXANOL 320 MG/ML
200 INJECTION, SOLUTION INTRAVASCULAR
Status: COMPLETED | OUTPATIENT
Start: 2022-07-16 | End: 2022-07-16

## 2022-07-16 RX ORDER — HYDRALAZINE HYDROCHLORIDE 20 MG/ML
10 INJECTION INTRAMUSCULAR; INTRAVENOUS EVERY 4 HOURS PRN
Status: DISCONTINUED | OUTPATIENT
Start: 2022-07-16 | End: 2022-07-20 | Stop reason: HOSPADM

## 2022-07-16 RX ORDER — SODIUM CHLORIDE 0.9 % (FLUSH) 0.9 %
10 SYRINGE (ML) INJECTION
Status: DISCONTINUED | OUTPATIENT
Start: 2022-07-16 | End: 2022-07-19

## 2022-07-16 RX ORDER — HEPARIN SODIUM 5000 [USP'U]/ML
5000 INJECTION, SOLUTION INTRAVENOUS; SUBCUTANEOUS EVERY 8 HOURS
Status: DISCONTINUED | OUTPATIENT
Start: 2022-07-17 | End: 2022-07-20 | Stop reason: HOSPADM

## 2022-07-16 RX ORDER — SODIUM CHLORIDE, SODIUM LACTATE, POTASSIUM CHLORIDE, CALCIUM CHLORIDE 600; 310; 30; 20 MG/100ML; MG/100ML; MG/100ML; MG/100ML
INJECTION, SOLUTION INTRAVENOUS CONTINUOUS
Status: DISCONTINUED | OUTPATIENT
Start: 2022-07-16 | End: 2022-07-17

## 2022-07-16 RX ORDER — FENTANYL CITRATE 50 UG/ML
INJECTION, SOLUTION INTRAMUSCULAR; INTRAVENOUS CODE/TRAUMA/SEDATION MEDICATION
Status: COMPLETED | OUTPATIENT
Start: 2022-07-16 | End: 2022-07-16

## 2022-07-16 RX ORDER — NICARDIPINE HYDROCHLORIDE 0.2 MG/ML
0-15 INJECTION INTRAVENOUS CONTINUOUS
Status: DISCONTINUED | OUTPATIENT
Start: 2022-07-16 | End: 2022-07-17

## 2022-07-16 RX ORDER — CARVEDILOL 25 MG/1
50 TABLET ORAL 2 TIMES DAILY
Status: DISCONTINUED | OUTPATIENT
Start: 2022-07-16 | End: 2022-07-17

## 2022-07-16 RX ADMIN — CARVEDILOL 50 MG: 25 TABLET, FILM COATED ORAL at 08:07

## 2022-07-16 RX ADMIN — MUPIROCIN: 20 OINTMENT TOPICAL at 08:07

## 2022-07-16 RX ADMIN — FAMOTIDINE 20 MG: 20 TABLET ORAL at 08:07

## 2022-07-16 RX ADMIN — DEXAMETHASONE SODIUM PHOSPHATE 4 MG: 4 INJECTION INTRA-ARTICULAR; INTRALESIONAL; INTRAMUSCULAR; INTRAVENOUS; SOFT TISSUE at 05:07

## 2022-07-16 RX ADMIN — CARVEDILOL 37.5 MG: 25 TABLET, FILM COATED ORAL at 11:07

## 2022-07-16 RX ADMIN — Medication 2 G: at 09:07

## 2022-07-16 RX ADMIN — ESCITALOPRAM OXALATE 10 MG: 10 TABLET ORAL at 11:07

## 2022-07-16 RX ADMIN — Medication 2 G: at 02:07

## 2022-07-16 RX ADMIN — NICARDIPINE HYDROCHLORIDE 10 MG/HR: 0.2 INJECTION, SOLUTION INTRAVENOUS at 06:07

## 2022-07-16 RX ADMIN — FENTANYL CITRATE 50 MCG: 50 INJECTION, SOLUTION INTRAMUSCULAR; INTRAVENOUS at 08:07

## 2022-07-16 RX ADMIN — ACETAMINOPHEN 650 MG: 325 TABLET ORAL at 05:07

## 2022-07-16 RX ADMIN — HUMAN ALBUMIN MICROSPHERES AND PERFLUTREN 0.11 MG: 10; .22 INJECTION, SOLUTION INTRAVENOUS at 03:07

## 2022-07-16 RX ADMIN — ACETAMINOPHEN 650 MG: 325 TABLET ORAL at 07:07

## 2022-07-16 RX ADMIN — HYDROCODONE BITARTRATE AND ACETAMINOPHEN 1 TABLET: 5; 325 TABLET ORAL at 08:07

## 2022-07-16 RX ADMIN — LEVETIRACETAM 500 MG: 500 TABLET, FILM COATED ORAL at 11:07

## 2022-07-16 RX ADMIN — ACETAMINOPHEN 650 MG: 325 TABLET ORAL at 04:07

## 2022-07-16 RX ADMIN — DOCUSATE SODIUM 100 MG: 100 CAPSULE, LIQUID FILLED ORAL at 11:07

## 2022-07-16 RX ADMIN — AMLODIPINE BESYLATE 10 MG: 10 TABLET ORAL at 11:07

## 2022-07-16 RX ADMIN — Medication 2 G: at 06:07

## 2022-07-16 RX ADMIN — HYDROCODONE BITARTRATE AND ACETAMINOPHEN 1 TABLET: 5; 325 TABLET ORAL at 01:07

## 2022-07-16 RX ADMIN — ATORVASTATIN CALCIUM 40 MG: 20 TABLET, FILM COATED ORAL at 11:07

## 2022-07-16 RX ADMIN — DEXAMETHASONE SODIUM PHOSPHATE 4 MG: 4 INJECTION INTRA-ARTICULAR; INTRALESIONAL; INTRAMUSCULAR; INTRAVENOUS; SOFT TISSUE at 12:07

## 2022-07-16 RX ADMIN — SODIUM CHLORIDE, SODIUM LACTATE, POTASSIUM CHLORIDE, AND CALCIUM CHLORIDE: .6; .31; .03; .02 INJECTION, SOLUTION INTRAVENOUS at 12:07

## 2022-07-16 RX ADMIN — LEVETIRACETAM 500 MG: 500 TABLET, FILM COATED ORAL at 08:07

## 2022-07-16 RX ADMIN — HYDROCODONE BITARTRATE AND ACETAMINOPHEN 1 TABLET: 5; 325 TABLET ORAL at 05:07

## 2022-07-16 RX ADMIN — IODIXANOL 30 ML: 320 INJECTION, SOLUTION INTRAVASCULAR at 09:07

## 2022-07-16 RX ADMIN — DEXAMETHASONE SODIUM PHOSPHATE 4 MG: 4 INJECTION INTRA-ARTICULAR; INTRALESIONAL; INTRAMUSCULAR; INTRAVENOUS; SOFT TISSUE at 06:07

## 2022-07-16 RX ADMIN — AMITRIPTYLINE HYDROCHLORIDE 75 MG: 25 TABLET, FILM COATED ORAL at 09:07

## 2022-07-16 RX ADMIN — NICARDIPINE HYDROCHLORIDE 5 MG/HR: 0.2 INJECTION, SOLUTION INTRAVENOUS at 11:07

## 2022-07-16 RX ADMIN — FAMOTIDINE 20 MG: 20 TABLET ORAL at 11:07

## 2022-07-16 NOTE — PT/OT/SLP PROGRESS
Physical Therapy      Patient Name:  Gina Jenkins   MRN:  [id number]    Patient not seen today secondary to Nurse/ ROSIE hold (AM attempt - bed rest after angiogram; PM attempt nurse hold due to patient pain and dizziness). Will follow-up 7/17.

## 2022-07-16 NOTE — NURSING
MD Joe and MD Janine at bedside. Given verbal orders to change postangio monitoring order to Q 15 mins x 1 hr, then Q 30 mins x 1 hr.     Post angio monitoring complete. Ok for head of bed to raise.

## 2022-07-16 NOTE — ASSESSMENT & PLAN NOTE
47F w/ PMH HTN, smoking, CHF and multiple intracranial aneurysms, now s/p elective L-sided craniotomy for clipping.     POD 1.     --Admitted to neuro ICU  --Continue ICU care post op day 1  --SBP < 140  --Keppra   --Pain control  --Plan for DSA today for further assessment, keep NPO  --Post op CTA reviewed.   --Continue HV drain, continue prophylactic abx

## 2022-07-16 NOTE — SUBJECTIVE & OBJECTIVE
Interval History:   7/16: POD 1 s/p Ant Choroidal/Pcomm aneurysm clipping.  150cc in HV drain; CTA with expected post operative change without hemorrhage or vessel occlusion.     Medications:  Continuous Infusions:   niCARdipine 7.5 mg/hr (07/16/22 0707)     Scheduled Meds:   amitriptyline  75 mg Oral QHS    amLODIPine  10 mg Oral Daily    atorvastatin  40 mg Oral Daily    carvediloL  37.5 mg Oral BID    ceFAZolin (ANCEF) IVPB  2 g Intravenous Q8H    dexamethasone  4 mg Intravenous Q6H    docusate sodium  100 mg Oral Daily    EScitalopram oxalate  10 mg Oral Daily    famotidine  20 mg Oral BID    levETIRAcetam  500 mg Oral BID    mupirocin   Nasal BID     PRN Meds:acetaminophen, dextrose 10%, dextrose 10%, docusate sodium, glucagon (human recombinant), glucose, glucose, HYDROcodone-acetaminophen, HYDROmorphone, insulin aspart U-100, magnesium sulfate IVPB, magnesium sulfate IVPB, ondansetron, potassium chloride **AND** potassium chloride **AND** potassium chloride, sodium chloride 0.9%, sodium chloride 0.9%, sodium phosphate IVPB, sodium phosphate IVPB, sodium phosphate IVPB     Review of Systems  Objective:     Weight: 93 kg (205 lb)  Body mass index is 37.49 kg/m².  Vital Signs (Most Recent):  Temp: 98.1 °F (36.7 °C) (07/16/22 0400)  Pulse: 87 (07/16/22 0721)  Resp: (!) 21 (07/16/22 0721)  BP: (!) 156/74 (07/16/22 0700)  SpO2: (!) 91 % (07/16/22 0721)   Vital Signs (24h Range):  Temp:  [97.7 °F (36.5 °C)-98.2 °F (36.8 °C)] 98.1 °F (36.7 °C)  Pulse:  [53-87] 87  Resp:  [6-129] 21  SpO2:  [89 %-100 %] 91 %  BP: (113-163)/(60-82) 156/74  Arterial Line BP: (106-143)/(49-75) 113/66     Date 07/16/22 0700 - 07/17/22 0659   Shift 8565-1636 5906-8319 9037-8682 24 Hour Total   INTAKE   I.V.(mL/kg) 129(1.4)   129(1.4)   IV Piggyback 94.3   94.3   Shift Total(mL/kg) 223.3(2.4)   223.3(2.4)   OUTPUT   Urine(mL/kg/hr) 250   250   Drains 90   90   Shift Total(mL/kg) 340(3.7)   340(3.7)   Weight (kg) 93 93 93 93                "         Closed/Suction Drain 07/15/22 1310 Left  Accordion 10 Fr. (Active)   Site Description Unable to view 07/16/22 0401   Dressing Type Gauze 07/16/22 0401   Dressing Status Dry;Clean;Intact 07/16/22 0401   Dressing Intervention Integrity maintained 07/16/22 0401   Drainage Bloody 07/16/22 0401   Status To bulb suction 07/16/22 0401   Output (mL) 90 mL 07/16/22 0700            Urethral Catheter 07/15/22 0843 Non-latex;Straight-tip;Silicone 16 Fr. (Active)   Site Assessment Clean;Intact 07/16/22 0401   Collection Container Urimeter 07/16/22 0401   Securement Method secured to top of thigh w/ adhesive device 07/16/22 0401   Catheter Care Performed yes 07/16/22 0401   Reason for Continuing Urinary Catheterization Critically ill in ICU and requiring hourly monitoring of intake/output 07/16/22 0401   CAUTI Prevention Bundle Securement Device in place with 1" slack;Intact seal between catheter & drainage tubing;Drainage bag/urimeter off the floor;Sheeting clip in use;Drainage bag/urimeter not overfilled (<2/3 full);No dependent loops or kinks;Drainage bag/urimeter below bladder 07/15/22 2001   Output (mL) 250 mL 07/16/22 0700       Physical Exam    Neurosurgery Physical Exam  No acute distress  [id number]: Drowsy, easily arousable. opens eyes to voice.   Follows commands well x 4 antigravity  PERRL, EOMI, no facial droop, tongue midline  Incision bandage dry, drain secured.         Significant Labs:  Recent Labs   Lab 07/15/22  1020 07/15/22  1704 07/16/22  0323   * 217* 125*   * 140 143   K 2.7* 3.1* 3.7    106 110   CO2 21* 22* 23   BUN 16 13 11   CREATININE 0.9 1.0 1.1   CALCIUM 8.4* 8.4* 9.1     Recent Labs   Lab 07/15/22  1020 07/15/22  1105 07/15/22  1704 07/16/22  0323   WBC 4.63  --  7.08 11.92   HGB 10.7*  --  11.8* 11.9*   HCT 30.7* 30* 34.2* 34.2*     --  256 283     Recent Labs   Lab 07/15/22  1020   INR 1.0   APTT 23.2     Microbiology Results (last 7 days)       ** No results found " for the last 168 hours. **          All pertinent labs from the last 24 hours have been reviewed.    Significant Diagnostics:  I have reviewed all pertinent imaging results/findings within the past 24 hours.

## 2022-07-16 NOTE — PT/OT/SLP EVAL
Occupational Therapy   Evaluation    Name: Gina Jenkins  MRN: [id number]  Admitting Diagnosis:  Aneurysm of middle cerebral artery  Recent Surgery: Procedure(s) (LRB):  CRANIOTOMY, WITH ANEURYSM CLIPPING (N/A) 1 Day Post-Op    Recommendations:     Discharge Recommendations: home health OT  Discharge Equipment Recommendations:   (TBD)  Barriers to discharge:  None    Assessment:     Gina Jenkins is a 47 y.o. female with a medical diagnosis of Aneurysm of middle cerebral artery.  She presents with decreased independence with ADL's.  Session limited by dizziness while seated EOB. Performance deficits affecting function: weakness, impaired endurance, impaired self care skills, impaired functional mobility, impaired balance, decreased safety awareness, decreased lower extremity function, decreased upper extremity function.      Rehab Prognosis: Good; patient would benefit from acute skilled OT services to address these deficits and reach maximum level of function.       Plan:     Patient to be seen 3 x/week to address the above listed problems via self-care/home management, therapeutic activities, therapeutic exercises, neuromuscular re-education  · Plan of Care Expires: 08/15/22  · Plan of Care Reviewed with: patient    Subjective     Chief Complaint: dizziness while seated EOB  Patient/Family Comments/goals: to get better    Occupational Profile:  Living Environment: Pt resides with mother in mobile home with 5 steps (B) rails to enter.  Pt was independent with ADL's & ambulation.  Pt is an active  & normally works as a  however has not worked the last 1-2 months.  Pt enjoys hanging out with her kids.  Equipment Used at Home:  none  Assistance upon Discharge: unknown    Pain/Comfort:  · Pain Rating 1: 0/10  · Pain Rating Post-Intervention 1: 0/10    Patients cultural, spiritual, Orthodox conflicts given the current situation: no    Objective:     Communicated with: RN prior to session.  Patient  found supine with arterial line, pulse ox (continuous), telemetry, blood pressure cuff, peripheral IV, hemovac, leal catheter (fiance present at start of session only) upon OT entry to room.    General Precautions: Standard, fall, NPO   Orthopedic Precautions:N/A   Braces: N/A  Respiratory Status: Room air    Occupational Performance:    Bed Mobility:    · Patient completed Scooting/Bridging with minimum assistance  · Patient completed Supine to Sit with minimum assistance  · Patient completed Sit to Supine with minimum assistance    Functional Mobility/Transfers:  · NT due to pt dizzy while seated EOB with no improvements over time    Activities of Daily Living:  · Grooming: stand by assistance seated EOB (based on mobility observed)    Cognitive/Visual Perceptual:  Cognitive/Psychosocial Skills:     -       Oriented to: Person, Place, Time and Situation   -       Follows Commands/attention:Follows two-step commands  -       Safety awareness/insight to disability: intact     Physical Exam:  Sensation:    -       Intact  Dominant hand:    -       right  Upper Extremity Range of Motion:  BUE WFL except 90* shoulder flexion LUE  Upper Extremity Strength: WFL except 3-/5 (L) shoulder flexion   Strength: BUE WFL    AMPAC 6 Click ADL:  AMPAC Total Score: 12    Treatment & Education:  Pt required SBA for postural control while seated EOB.  Provided verbal & physical cues to facilitate postural control. Pt with consistent dizziness while seated EOB therefore returned to supine.  Pt with reported improvements in dizziness once supine & no dizziness by time OT left room.  Provided education on potential reasons for dizziness post op.  Notified RN of pt dizziness. Provided education regarding role of OT, POC, & discharge recommendations with pt verbalizing understanding.  Provided education on safety & no OOB without staff due to dizziness with EOB activities. Pt had no further questions & when asked whether there were  any concerns pt reported none.  Education:    Patient left supine with all lines intact, call button in reach, RN notified and RN present    GOALS:   Multidisciplinary Problems     Occupational Therapy Goals        Problem: Occupational Therapy    Goal Priority Disciplines Outcome Interventions   Occupational Therapy Goal     OT, PT/OT Ongoing, Progressing    Description: Goals to be met by: 7/28     Patient will increase functional independence with ADLs by performing:    UE Dressing with Modified [place].  LE Dressing with Modified [place].  Grooming while standing with Modified [place].  Toileting from toilet with Modified [place] for hygiene and clothing management.   Supine to sit with Modified [place].  Step transfer with Modified [place]                     History:     Past Medical History:   Diagnosis Date    Brain aneurysm     CHF (congestive heart failure)     H/O coronary angioplasty     Hypercholesteremia     Hypertension     Malignant hypertension     Migraine headache     Stroke 10/2017       Past Surgical History:   Procedure Laterality Date    CARDIAC CATHETERIZATION      CEREBRAL ANGIOGRAM         Time Tracking:     OT Date of Treatment: 07/16/22  OT Start Time: 1211  OT Stop Time: 1227  OT Total Time (min): 16 min    Billable Minutes:Evaluation 8  Therapeutic Activity 8    7/16/2022

## 2022-07-16 NOTE — PLAN OF CARE
Pt completed cerebral angiogram without adverse event. 5F Mynx deployed to right groin successfully with hemostasis achieved at 0900. Pt to remain flat for two hours until 1100. Report given at bedside to Madhav MUNOZ.

## 2022-07-16 NOTE — HOSPITAL COURSE
7/16: POD 1 s/p Ant Choroidal/Pcomm aneurysm clipping.  150cc in HV drain; CTA with expected post operative change without hemorrhage or vessel occlusion.   7/18: NAEON. AFVSS. Drain 120cc, moved to gravity today  7/19: NAEON. AFVSS. Drain with 180 cc, to gravity. Keep drain. Neuro stable. Denies headaches or vision changes. Pain well controlled. Motivated to work with therapy.

## 2022-07-16 NOTE — H&P
Inpatient Radiology Pre-procedure Note    History of Present Illness:  Gina Jenkins is a 47 y.o. female status post elective left anterior choroidal and left posterior communicating aneurysm surgical clipping. Plans for cerebral angiogram to characterize the treated intracranial aneurysms for any residual filling.      Admission H&P reviewed.    Past Medical History:   Diagnosis Date    Brain aneurysm     CHF (congestive heart failure)     H/O coronary angioplasty     Hypercholesteremia     Hypertension     Malignant hypertension     Migraine headache     Stroke 10/2017     Past Surgical History:   Procedure Laterality Date    CARDIAC CATHETERIZATION      CEREBRAL ANGIOGRAM         Review of Systems:   As documented in primary team H&P    Home Meds:   Prior to Admission medications    Medication Sig Start Date End Date Taking? Authorizing Provider   amitriptyline (ELAVIL) 75 MG tablet Take 75 mg by mouth every evening.   Yes Historical Provider   amlodipine (NORVASC) 10 MG tablet Take 1 tablet (10 mg total) by mouth once daily. 4/2/17 7/15/22 Yes Yuval Caldwell MD   aspirin 81 MG Chew Take 1 tablet (81 mg total) by mouth once daily. 4/10/18 7/15/22 Yes William Rosales MD   atorvastatin (LIPITOR) 40 MG tablet Take 1 tablet (40 mg total) by mouth once daily. 10/6/17 7/15/22 Yes Marva Damon MD   carvediloL (COREG) 25 MG tablet Take 37.5 mg by mouth 2 (two) times daily. 1/18/22  Yes Historical Provider   traMADoL (ULTRAM) 50 mg tablet Take 50 mg by mouth 2 (two) times daily. 2/28/22  Yes Historical Provider   butalbital-acetaminophen-caffeine -40 mg (FIORICET, ESGIC) -40 mg per tablet Take 1 tablet by mouth every 4 (four) hours as needed. 5/1/22   Historical Provider   cyanocobalamin (VITAMIN B-12) 1000 MCG tablet Take 1 tablet (1,000 mcg total) by mouth once daily.  Patient not taking: No sig reported 4/10/18   William Rosales MD   diclofenac (VOLTAREN) 25 MG TbEC  Take 1 tablet (25 mg total) by mouth 3 (three) times daily as needed (pain).  Patient not taking: No sig reported 5/3/21   SARAVANAN Marinelli   diphenhydrAMINE (BENADRYL) 25 mg capsule Take 1 each (25 mg total) by mouth every 6 (six) hours as needed for Itching or Allergies. 3/15/18   Gilberto Buchanan MD   docusate sodium (COLACE) 100 MG capsule Take 1 capsule (100 mg total) by mouth 2 (two) times daily as needed for Constipation.  Patient not taking: No sig reported 1/13/21   SARAVANAN Lazo-DI   EScitalopram oxalate (LEXAPRO) 10 MG tablet Take 10 mg by mouth once daily. 7/5/22   Historical Provider   hydrochlorothiazide (HYDRODIURIL) 25 MG tablet Take 1 tablet (25 mg total) by mouth once daily. 4/2/17 1/13/21  Yuval Caldwell MD   magnesium oxide (MAG-OX) 400 mg (241.3 mg magnesium) tablet Take 1 tablet by mouth 2 (two) times daily. 6/19/22   Historical Provider   meloxicam (MOBIC) 7.5 MG tablet Take 7.5 mg by mouth 2 (two) times daily. 12/24/21   Historical Provider   neomycin-polymyxin-hydrocortisone (CORTISPORIN) 3.5-10,000-1 mg/mL-unit/mL-% otic suspension Place 4 drops into the left ear 3 (three) times daily.  Patient not taking: No sig reported 9/20/18   SNEHA Simmons III, MD   potassium chloride SA (K-DUR,KLOR-CON) 20 MEQ tablet Take 20 mEq by mouth 2 (two) times daily. 6/26/22   Historical Provider     Scheduled Meds:    amitriptyline  75 mg Oral QHS    amLODIPine  10 mg Oral Daily    atorvastatin  40 mg Oral Daily    carvediloL  37.5 mg Oral BID    ceFAZolin (ANCEF) IVPB  2 g Intravenous Q8H    dexamethasone  4 mg Intravenous Q6H    docusate sodium  100 mg Oral Daily    EScitalopram oxalate  10 mg Oral Daily    famotidine  20 mg Oral BID    levETIRAcetam  500 mg Oral BID    mupirocin   Nasal BID     Continuous Infusions:    niCARdipine 7.5 mg/hr (07/16/22 0707)     PRN Meds:acetaminophen, dextrose 10%, dextrose 10%, docusate sodium, glucagon (human recombinant), glucose,  glucose, HYDROcodone-acetaminophen, HYDROmorphone, insulin aspart U-100, magnesium sulfate IVPB, magnesium sulfate IVPB, ondansetron, potassium chloride **AND** potassium chloride **AND** potassium chloride, sodium chloride 0.9%, sodium phosphate IVPB, sodium phosphate IVPB, sodium phosphate IVPB  Anticoagulants/Antiplatelets: no anticoagulation    Allergies:   Review of patient's allergies indicates:   Allergen Reactions    Lisinopril Swelling    Bactrim [sulfamethoxazole-trimethoprim] Rash     Sedation Hx: have not been any systemic reactions    Labs:  Recent Labs   Lab 07/15/22  1020   INR 1.0       Recent Labs   Lab 07/16/22  0323   WBC 11.92   HGB 11.9*   HCT 34.2*   MCV 82         Recent Labs   Lab 07/15/22  1704 07/16/22  0323   * 125*    143   K 3.1* 3.7    110   CO2 22* 23   BUN 13 11   CREATININE 1.0 1.1   CALCIUM 8.4* 9.1   *  --    *  --    ALBUMIN 3.4*  --    BILITOT 0.4  --          Vitals:  Temp: 98.1 °F (36.7 °C) (07/16/22 0400)  Pulse: 87 (07/16/22 0721)  Resp: (!) 21 (07/16/22 0721)  BP: (!) 156/74 (07/16/22 0700)  SpO2: (!) 91 % (07/16/22 0721)     Physical Exam:  ASA: 3  Mallampati: 2    General: no acute distress  Mental Status: alert and oriented to person, place and time  HEENT: normocephalic, atraumatic  Chest: unlabored breathing  Heart: regular heart rate  Abdomen: nondistended  Extremity: moves all extremities      Plan:  Sedation Plan: Up to moderate   Patient will undergo: cerebral angiogram to characterize the treated intracranial aneurysms for any residual filling.          Fozia Mccrary MD     Neuro Endovascular Surgery Fellow   [facility]

## 2022-07-16 NOTE — PLAN OF CARE
Problem: Occupational Therapy  Goal: Occupational Therapy Goal  Description: Goals to be met by: 7/28     Patient will increase functional independence with ADLs by performing:    UE Dressing with Modified [place].  LE Dressing with Modified [place].  Grooming while standing with Modified [place].  Toileting from toilet with Modified [place] for hygiene and clothing management.   Supine to sit with Modified [place].  Step transfer with Modified [place]    Outcome: Ongoing, Progressing     OT eval completed.

## 2022-07-16 NOTE — PLAN OF CARE
[facility] Care Plan    POC reviewed with Gina Jenkins and family at 1400. Pt verbalized understanding. Questions and concerns addressed. No acute events today. Pt progressing toward goals. Will continue to monitor. See below and flowsheets for full assessment and VS info.   -Post op CT complete  -NPO- pending angio tomorrow   -drain output- 110   -dilaudid given x 1 for pain   -K+ replacing   -Cardene gtt to maintain SBP < 140     Is this a stroke patient? no    Neuro:  [place] Coma Scale  Best Eye Response: 3-->(E3) to speech  Best Motor Response: 6-->(M6) obeys commands  Best Verbal Response: 5-->(V5) oriented  Danielle Coma Scale Score: 14  Assessment Qualifiers: no eye obstruction present  Pupil PERRLA: yes     24 hr Temp:  [97.7 °F (36.5 °C)-98.1 °F (36.7 °C)]     CV:   Rhythm: normal sinus rhythm  BP goals:   SBP < 140  MAP > 65    Resp:   O2 Device (Oxygen Therapy): Simple Face Mask       Plan: N/A    GI/:                Intake/Output Summary (Last 24 hours) at 7/15/2022 1915  Last data filed at 7/15/2022 1801  Gross per 24 hour   Intake 4537.76 ml   Output 4295 ml   Net 242.76 ml          Labs/Accuchecks:  Recent Labs   Lab 07/15/22  1704   WBC 7.08   RBC 4.17   HGB 11.8*   HCT 34.2*         Recent Labs   Lab 07/15/22  1704      K 3.1*   CO2 22*      BUN 13   CREATININE 1.0   ALKPHOS 158*   *   *   BILITOT 0.4      Recent Labs   Lab 07/15/22  1020   INR 1.0   APTT 23.2    No results for input(s): CPK, CPKMB, TROPONINI, MB in the last 168 hours.    Electrolytes: Electrolytes replaced  Accuchecks: none    Gtts:   niCARdipine 5 mg/hr (07/15/22 1914)       LDA/Wounds:  Lines/Drains/Airways       Drain  Duration                  Closed/Suction Drain 07/15/22 1310 Left  Accordion 10 Fr. <1 day         Urethral Catheter 07/15/22 0843 Non-latex;Straight-tip;Silicone 16 Fr. <1 day              Arterial Line  Duration             Arterial Line 07/15/22 0822 Right Radial <1 day               Peripheral Intravenous Line  Duration                  Midline Catheter Insertion/Assessment  - Single Lumen (RETIRED) Left basilic vein (medial side of arm) 20g x 8cm -- days         Midline Catheter Insertion/Assessment  - Single Lumen (RETIRED) Right basilic vein (medial side of arm) 20g x 8cm -- days         Peripheral IV - Single Lumen 18 G Left Hand -- days         Peripheral IV - Single Lumen 20 G Right Hand -- days         Peripheral IV - Single Lumen 07/15/22 0601 Posterior;Right Hand <1 day                  Wounds: No  Wound care consulted: No

## 2022-07-16 NOTE — NURSING
Pt transferred to and from IR with RN. Portable monitoring and O2 connected. Hemostasis time 0900. Upon return, bed locked and in lowest position. Pt tolerated well, VSS. Wall stay flat until 1100. Post procedure monitoring per order.

## 2022-07-16 NOTE — PT/OT/SLP PROGRESS
Speech Language Pathology  Evaluation Attempt      Gina Jenkins  MRN: [id number]    Patient not seen secondary to (Pt unavailable upon attempt this am -- off the floor for angiogram. Per chart review, pt to remain with HOB flat until 11:00am). Writing SLP unable to return for second attempt in pm this date. SLP services will follow up for initiation of clinical swallow evaluation as able and appropriate.   7/16/2022

## 2022-07-16 NOTE — PROGRESS NOTES
[facility] - Neuro Critical Care  Neurosurgery  Progress Note    Subjective:     History of Present Illness: 47F w/ PMH HTN, smoking, CHF and multiple intracranial aneurysms [place] [facility] [place] for [place].  In clinic she described having abrupt headache, sharp excruciating and lasting for very short period time 1 5 minutes.  She has past medical history of congestive heart failure, history of coronary angioplasty, hypercholesterolemia, hypertension and stroke in 2017. She had a known the single intracranial aneurysm, when previously she presented to the emergency department after another headache which she found concerns for an additional 2 cerebral aneurysms in the left ICA distribution.  She currently denies any new headache, nausea, vomiting.  She notes she has a 20 pack-year history of smoking.  No known family history of aneurysmal rupture.  She has previous surgery.  She does note she has allergies to Bactrim and lisinopril.  She had a CTA of the head in November 2021. She is accompanied by her mother as well.      Post-Op Info:  Procedure(s) (LRB):  CRANIOTOMY, WITH ANEURYSM CLIPPING (N/A)   1 Day Post-Op     Interval History:   7/16: POD 1 s/p Ant Choroidal/Pcomm aneurysm clipping.  150cc in HV drain; CTA with expected post operative change without hemorrhage or vessel occlusion.     Medications:  Continuous Infusions:   niCARdipine 7.5 mg/hr (07/16/22 0707)     Scheduled Meds:   amitriptyline  75 mg Oral QHS    amLODIPine  10 mg Oral Daily    atorvastatin  40 mg Oral Daily    carvediloL  37.5 mg Oral BID    ceFAZolin (ANCEF) IVPB  2 g Intravenous Q8H    dexamethasone  4 mg Intravenous Q6H    docusate sodium  100 mg Oral Daily    EScitalopram oxalate  10 mg Oral Daily    famotidine  20 mg Oral BID    levETIRAcetam  500 mg Oral BID    mupirocin   Nasal BID     PRN Meds:acetaminophen, dextrose 10%, dextrose 10%, docusate sodium, glucagon (human recombinant), glucose,  glucose, HYDROcodone-acetaminophen, HYDROmorphone, insulin aspart U-100, magnesium sulfate IVPB, magnesium sulfate IVPB, ondansetron, potassium chloride **AND** potassium chloride **AND** potassium chloride, sodium chloride 0.9%, sodium chloride 0.9%, sodium phosphate IVPB, sodium phosphate IVPB, sodium phosphate IVPB     Review of Systems  Objective:     Weight: 93 kg (205 lb)  Body mass index is 37.49 kg/m².  Vital Signs (Most Recent):  Temp: 98.1 °F (36.7 °C) (07/16/22 0400)  Pulse: 87 (07/16/22 0721)  Resp: (!) 21 (07/16/22 0721)  BP: (!) 156/74 (07/16/22 0700)  SpO2: (!) 91 % (07/16/22 0721)   Vital Signs (24h Range):  Temp:  [97.7 °F (36.5 °C)-98.2 °F (36.8 °C)] 98.1 °F (36.7 °C)  Pulse:  [53-87] 87  Resp:  [6-129] 21  SpO2:  [89 %-100 %] 91 %  BP: (113-163)/(60-82) 156/74  Arterial Line BP: (106-143)/(49-75) 113/66     Date 07/16/22 0700 - 07/17/22 0659   Shift 2918-4523 7662-9195 4330-4806 24 Hour Total   INTAKE   I.V.(mL/kg) 129(1.4)   129(1.4)   IV Piggyback 94.3   94.3   Shift Total(mL/kg) 223.3(2.4)   223.3(2.4)   OUTPUT   Urine(mL/kg/hr) 250   250   Drains 90   90   Shift Total(mL/kg) 340(3.7)   340(3.7)   Weight (kg) 93 93 93 93                        Closed/Suction Drain 07/15/22 1310 Left  Accordion 10 Fr. (Active)   Site Description Unable to view 07/16/22 0401   Dressing Type Gauze 07/16/22 0401   Dressing Status Dry;Clean;Intact 07/16/22 0401   Dressing Intervention Integrity maintained 07/16/22 0401   Drainage Bloody 07/16/22 0401   Status To bulb suction 07/16/22 0401   Output (mL) 90 mL 07/16/22 0700            Urethral Catheter 07/15/22 0843 Non-latex;Straight-tip;Silicone 16 Fr. (Active)   Site Assessment Clean;Intact 07/16/22 0401   Collection Container Urimeter 07/16/22 0401   Securement Method secured to top of thigh w/ adhesive device 07/16/22 0401   Catheter Care Performed yes 07/16/22 0401   Reason for Continuing Urinary Catheterization Critically ill in ICU and requiring hourly  "monitoring of intake/output 07/16/22 0401   CAUTI Prevention Bundle Securement Device in place with 1" slack;Intact seal between catheter & drainage tubing;Drainage bag/urimeter off the floor;Sheeting clip in use;Drainage bag/urimeter not overfilled (<2/3 full);No dependent loops or kinks;Drainage bag/urimeter below bladder 07/15/22 2001   Output (mL) 250 mL 07/16/22 0700       Physical Exam    Neurosurgery Physical Exam  No acute distress  E3v4M6: Drowsy, easily arousable. opens eyes to voice.   Follows commands well x 4 antigravity  PERRL, EOMI, no facial droop, tongue midline  Incision bandage dry, drain secured.         Significant Labs:  Recent Labs   Lab 07/15/22  1020 07/15/22  1704 07/16/22  0323   * 217* 125*   * 140 143   K 2.7* 3.1* 3.7    106 110   CO2 21* 22* 23   BUN 16 13 11   CREATININE 0.9 1.0 1.1   CALCIUM 8.4* 8.4* 9.1     Recent Labs   Lab 07/15/22  1020 07/15/22  1105 07/15/22  1704 07/16/22  0323   WBC 4.63  --  7.08 11.92   HGB 10.7*  --  11.8* 11.9*   HCT 30.7* 30* 34.2* 34.2*     --  256 283     Recent Labs   Lab 07/15/22  1020   INR 1.0   APTT 23.2     Microbiology Results (last 7 days)       ** No results found for the last 168 hours. **          All pertinent labs from the last 24 hours have been reviewed.    Significant Diagnostics:  I have reviewed all pertinent imaging results/findings within the past 24 hours.    Assessment/Plan:     * Aneurysm of left middle cerebral artery  47F w/ PMH HTN, smoking, CHF and multiple intracranial aneurysms, now s/p elective L-sided craniotomy for clipping.     POD 1.     --Admitted to neuro ICU  --Continue ICU care post op day 1  --SBP < 140  --Keppra   --Pain control  --Plan for DSA today for further assessment, keep NPO  --Post op CTA reviewed.   --Continue HV drain, continue prophylactic abx            Escobar Uribe MD  Neurosurgery  [facility] - Neuro Critical Care  "

## 2022-07-16 NOTE — OP NOTE
Craniotomy for MCA Aneurysm Clipping       DATE OF SURGERY: 7/15/22  SURGEON: Timothy Diaz MD,MSc  CO-SURGEON: Abebe Mendez MD   ASSISTANT: Gerard Shultz MD     PREOPERATIVE DIAGNOSIS:   Anterior choroidal artery aneurysm, posterior communicating artery aneurysm, middle cerebral aneurysm     POSTOPERATIVE DIAGNOSIS:   Anterior choroidal artery aneurysm, posterior communicating artery aneurysm, middle cerebral aneurysm    PROCEDURES PERFORMED:  1. Left fronto-temporal craniotomy for clipping of posterior communicating artery, and anterior choroidal artery aneurysms.  2. Intraoperative use of microscope for microdissection.  3. Intraoperative electrophysiological monitoring with motor-evoked and somatosensory-evoked potentials.  4. Cranioplasty greater than 5 cm.  5. Duraplasty.  6. Intraoperative micro angiography    CPT Coding   86376 Surgery of complex intracranial aneurysm, intracranial approach; carotid circulation   49102 Microsurgical techniques, requiring use of operating microscope     ANESTHESIA: GETA.  ESTIMATED BLOOD LOSS: 150cc.  FINDINGS:    Posterior communicating artery aneurysm, anterior choroidal artery aneurysm.  Significant adhesion, and adherence of the posterior communicating artery aneurysm to the oculomotor nerve.  Significant adherence of the anterior choroidal artery aneurysm to the overlying cortex..    DRAINS:  Subgaleal Hemovac drain.     COMPLEXITY:  Multiple intracranial aneurysms, multiple lesions to the underlying cranial nerves as well as the cerebral cortex.  High risk for intraoperative rupture, as well as intraoperative vessel occlusion.  This necessitated the need for additional surgeons there was no qualified resident available..     COMPLICATIONS: None.  DISPOSITION: Stable to the PACU.    INDICATIONS FOR THE PROCEDURE  Ms. Jenkins, is a 47-year-old female with a past medical history of hypertension, 30 year pack history of smoking, CHF and multiple intracranial aneurysm.   She initially presented with a sharp abrupt headache which lasted for approximately 15 minutes.  Given history of congestive heart failure as well as coronary angioplasty, hypercholesterolemia hypertension and stroke in 2017. She had a known single intracranial aneurysm and so she went to the emergency department.  There she was noted to have multiple intracranial aneurysms in the left ICA distribution.  She also underwent MRA a with vessel wall imaging which showed avid enhancement of the posterior communicating as well as the anterior choroidal artery aneurysms.  Given her long history of hypertension, smoking and avid enhancement on vessel wall imaging decision was made for craniotomy and microsurgical clip ligation of the aneurysms. The patient and family were well apprised of all objectives, benefits, risks and potential complications of the procedure, including but not limited to: worsening of current status, the possible need for further procedures, the risk of infection, headaches, CSF leak, seizures, hemorrhage, stroke, loss of language function, paralysis, coma and even death.Informed consent was obtained and secured in the chart after the patient and family voiced understanding of these risks and decided to proceed with the operation.       DESCRIPTION OF THE PROCEDURE  The patient was transferred to the operating room.  She was given preoperative prophylactic IV antibiotics, antiepileptic medication, and intravenous hypertonic solution.  The patient was sedated and intubated without difficulty by the anesthesia service. Eyes were taped shut after ointment was applied to prevent corneal abrasion. A Steph Hugger was placed over the exposed lower body to maintain control of core body temperature. A Rodríguez catheter was inserted.   A Whitehead head clamp was applied. The patient was positioned supine with the head turned approximately 25 degrees to the right, with very mild extension. All pressure points were  carefully padded. The hair was clipped over the area in which she was to undergo incision. Pre-prepping was done with alcohol). The electrophysiology monitoring team inserted needles in their proper locations and baseline SSEPs and motor evoked potentials were obtained.      The patient was prepped and draped in standard sterile fashion. Local anesthesic was infiltrated along the line of planned and marked skin incision which was subsequently opened sharply with a # 10 scalpel blade to the level of the periosteum. Subcutaneous dissection was performed sharply with Metzenbaum scissors, preserving the superficial temporal artery and its branches.  A myocutaneous flap was turned along with the superficial temporalis muscle fascia to preserve the frontalis branch of the facial nerve. The skin flap was reflected anteriorly, held in place utilizing fishhooks.. The temporalis muscle and periosteum were sharply dissected off the bone and elevated from inferior to superior with minimal bleeding. These structures were reflected anterolaterally and held with fish hooks. Using a high speed pneumatic drill, radha holes were placed with a match stick bit in the frontal-temporal regions. With the B1 bit and footplate, the radha holes were interconnected and a craniotomy completed. The bone flap was freed from the dura with #3 Penfields and removed. The dura was visualized intact. Hemostasis was achieved utilizing a combination of bipolar electrocautery and absorbable gelatin compressed sponge (Gelfoam). The wound was irrigated until clear. At this point, utilizing a 4 mm match stick drill bit, the lateral wing of the sphenoid was drilled lateral to medial. Hemostasis was again achieved.  She lateral sphenoid wing was drilled flat.  The dura was opened with a # 15 scalpel blade and Metzenbaum scissors in a (C-shaped, S-shaped) fashion. The dural flap was opened and secured out of the field with (X)-0 non-absorbable braided polyamide  suture (Nurolon).  The operating microscope was draped with a sterile drape and brought into the operative field.  We 1st dissected along the orbital roof down to the optico/carotid cistern to release CSF, and Martins proximal arterial control.  Microdissection was carried out from distal to proximal to open the Sylvian fissure, widely expose the proximal MCA, down to the ICA terminus and the intracranial ICA and obtain proximal control of the aneurysm.  The dome of the posterior communicating artery, as well as the anterior choroidal artery aneurysm was noted to be tightly adherent to the adjacent oculomotor nerve, as well as to the temporal lobe and the surrounding brain tissue.      Meticulous microdissection was required to free the aneurysm from the surrounding brain structures/multiple adhesions in order to free arterial branches arising from the the aneurysm.  Temporary clips were applied to gain proximal and distal control. Clamp time was monitored. Carefully, the PCOM aneurysm at the communicating segment of the ICA was dissected free and the neck was completely visualized. A permanent clip was applied to the neck of the aneurysm and adjusted to a satisfactory position.  The same technique was used for the anterior choroidal artery aneurysm, which was much more tightly adherent to the underlying cortex.  The temporary clips were removed uneventfully. Total clamp time was 8 minutes.  No filling was visualized to fill the aneurysm.    Intraoperative doppler ultrasound was utilized to assess flow of the distal continuation of the ICA.  We further investigated the remainder of the arterial tree along the MCA candelabra.  The small bulbous aneurysm that was noted on cerebral angiogram was not optimally visualized, and given the lack of enhancement on MRA vessel wall we elected to not address.  We then performed micro angiography utilizing indocyanine green confirmed with the microscope.  The images were reviewed  and showed the clipping of the previously described posterior communicating, as well as the anterior choroidal artery aneurysms..  Hemostasis was obtained utilizing a combination of bipolar electrocautery and absorbable gelatin compressed sponge (Gelfoam), and Surgicel. The wound was irrigated until clear. The cranial cavity was irrigated full, until perfectly clear.  Gelfoam soaked pledgets with papaverine was placed near the manipulated vessels to prevent vasospasm.  The dura was reapproximated utilizing interrupted 4-0 non-absorbable braided polyamide suture (Nurolon) and synthetic duraplasty overlaid on the dural defect. The bone flap was secured utilizing miniplates and 4mm titanium screws from [employer]. The temporalis muscle was reapproximated utilizing 2-0 polyglactin synthetic absorbable suture (Vicryl). The wound was again irrigated with antibiotic solution until clear. A Hemovac drain was left in place in the subgaleal space and suction was applied. The galea was closed with inverted interrupted stitches of 2-0 polyglactin synthetic absorbable suture (Vicryl). The skin was then approximated utilizing surgical staples. A sterile head dressing was placed over the closed wound.  All needle counts, sponge counts and instrument counts were correct at the end of the case times two. The patient tolerated the procedure well and was transferred to the recovery room in stable condition. Electrophysiological monitoring remained stable from baseline through the end of the procedure. Dr. Diaz  was present during the entirety of this case.  Patient was transferred to the neuro critical care ICU in stable neurologic condition.

## 2022-07-16 NOTE — PROCEDURES
Radiology Post-Procedure Note    Pre Op Diagnosis: Left ICA intracranial aneurysms     Post Op Diagnosis: See section below     Procedure: Cerebral angiogram    Procedure performed by: DEBBIE MEDINA    Written Informed Consent Obtained: Yes    Specimen Removed: NO    Estimated Blood Loss: Minimal    Procedure report:     A 5F sheath was placed into the right femoral artery and a 5F Kedar catheter was advanced into the aortic arch.  The left CCA, ICA arteries were subselected and angiography of the brain was performed after injection into each of these vessels.    Preliminary interpretation: Prior treated, surgically clipped - left ICA- anterior choroidal and communicating artery aneurysms were 3D characterized. No residual filling noted..  Please see Imaging report for full details.    A right femoral artery angiogram was performed, the sheath removed and hemostasis achieved using mynx.  No hematoma was present at the time of hemostasis.    The patient tolerated the procedure well.         Fozia Mccrary MD     Neuro Endovascular Surgery Fellow   [facility]

## 2022-07-17 PROBLEM — J43.8 OTHER EMPHYSEMA: Status: ACTIVE | Noted: 2022-07-17

## 2022-07-17 LAB
ALBUMIN SERPL BCP-MCNC: 3.1 G/DL (ref 3.5–5.2)
ALP SERPL-CCNC: 123 U/L (ref 55–135)
ALT SERPL W/O P-5'-P-CCNC: 56 U/L (ref 10–44)
ANION GAP SERPL CALC-SCNC: 11 MMOL/L (ref 8–16)
AST SERPL-CCNC: 35 U/L (ref 10–40)
BASOPHILS # BLD AUTO: 0.01 K/UL (ref 0–0.2)
BASOPHILS NFR BLD: 0.1 % (ref 0–1.9)
BILIRUB SERPL-MCNC: 0.2 MG/DL (ref 0.1–1)
BUN SERPL-MCNC: 19 MG/DL (ref 6–20)
CALCIUM SERPL-MCNC: 8.9 MG/DL (ref 8.7–10.5)
CHLORIDE SERPL-SCNC: 109 MMOL/L (ref 95–110)
CO2 SERPL-SCNC: 23 MMOL/L (ref 23–29)
CREAT SERPL-MCNC: 1.1 MG/DL (ref 0.5–1.4)
DIFFERENTIAL METHOD: ABNORMAL
EOSINOPHIL # BLD AUTO: 0 K/UL (ref 0–0.5)
EOSINOPHIL NFR BLD: 0 % (ref 0–8)
ERYTHROCYTE [DISTWIDTH] IN BLOOD BY AUTOMATED COUNT: 14.8 % (ref 11.5–14.5)
EST. GFR  (AFRICAN AMERICAN): >60 ML/MIN/1.73 M^2
EST. GFR  (NON AFRICAN AMERICAN): >60 ML/MIN/1.73 M^2
GLUCOSE SERPL-MCNC: 171 MG/DL (ref 70–110)
HCT VFR BLD AUTO: 30.4 % (ref 37–48.5)
HGB BLD-MCNC: 10.4 G/DL (ref 12–16)
IMM GRANULOCYTES # BLD AUTO: 0.2 K/UL (ref 0–0.04)
IMM GRANULOCYTES NFR BLD AUTO: 1.5 % (ref 0–0.5)
LYMPHOCYTES # BLD AUTO: 1 K/UL (ref 1–4.8)
LYMPHOCYTES NFR BLD: 7.6 % (ref 18–48)
MAGNESIUM SERPL-MCNC: 2 MG/DL (ref 1.6–2.6)
MCH RBC QN AUTO: 28.6 PG (ref 27–31)
MCHC RBC AUTO-ENTMCNC: 34.2 G/DL (ref 32–36)
MCV RBC AUTO: 84 FL (ref 82–98)
MONOCYTES # BLD AUTO: 0.8 K/UL (ref 0.3–1)
MONOCYTES NFR BLD: 5.6 % (ref 4–15)
NEUTROPHILS # BLD AUTO: 11.5 K/UL (ref 1.8–7.7)
NEUTROPHILS NFR BLD: 85.2 % (ref 38–73)
NRBC BLD-RTO: 0 /100 WBC
PHOSPHATE SERPL-MCNC: 1.6 MG/DL (ref 2.7–4.5)
PLATELET # BLD AUTO: 246 K/UL (ref 150–450)
PMV BLD AUTO: 11.6 FL (ref 9.2–12.9)
POCT GLUCOSE: 138 MG/DL (ref 70–110)
POCT GLUCOSE: 163 MG/DL (ref 70–110)
POCT GLUCOSE: 172 MG/DL (ref 70–110)
POCT GLUCOSE: 193 MG/DL (ref 70–110)
POTASSIUM SERPL-SCNC: 3.4 MMOL/L (ref 3.5–5.1)
PROT SERPL-MCNC: 6.3 G/DL (ref 6–8.4)
RBC # BLD AUTO: 3.64 M/UL (ref 4–5.4)
SODIUM SERPL-SCNC: 143 MMOL/L (ref 136–145)
WBC # BLD AUTO: 13.49 K/UL (ref 3.9–12.7)

## 2022-07-17 PROCEDURE — 99024 PR POST-OP FOLLOW-UP VISIT: ICD-10-PCS | Mod: ,,, | Performed by: NEUROLOGICAL SURGERY

## 2022-07-17 PROCEDURE — 83735 ASSAY OF MAGNESIUM: CPT | Performed by: PHYSICIAN ASSISTANT

## 2022-07-17 PROCEDURE — 63600175 PHARM REV CODE 636 W HCPCS: Performed by: STUDENT IN AN ORGANIZED HEALTH CARE EDUCATION/TRAINING PROGRAM

## 2022-07-17 PROCEDURE — 25000003 PHARM REV CODE 250: Performed by: NURSE PRACTITIONER

## 2022-07-17 PROCEDURE — 25000003 PHARM REV CODE 250: Performed by: PHYSICIAN ASSISTANT

## 2022-07-17 PROCEDURE — 99233 SBSQ HOSP IP/OBS HIGH 50: CPT | Mod: ,,, | Performed by: PSYCHIATRY & NEUROLOGY

## 2022-07-17 PROCEDURE — 99024 POSTOP FOLLOW-UP VISIT: CPT | Mod: ,,, | Performed by: NEUROLOGICAL SURGERY

## 2022-07-17 PROCEDURE — 97162 PT EVAL MOD COMPLEX 30 MIN: CPT

## 2022-07-17 PROCEDURE — 99900035 HC TECH TIME PER 15 MIN (STAT)

## 2022-07-17 PROCEDURE — 84100 ASSAY OF PHOSPHORUS: CPT | Performed by: PHYSICIAN ASSISTANT

## 2022-07-17 PROCEDURE — 80053 COMPREHEN METABOLIC PANEL: CPT | Performed by: PHYSICIAN ASSISTANT

## 2022-07-17 PROCEDURE — 63600175 PHARM REV CODE 636 W HCPCS: Performed by: NURSE PRACTITIONER

## 2022-07-17 PROCEDURE — 85025 COMPLETE CBC W/AUTO DIFF WBC: CPT | Performed by: PHYSICIAN ASSISTANT

## 2022-07-17 PROCEDURE — 25000003 PHARM REV CODE 250: Performed by: NEUROLOGICAL SURGERY

## 2022-07-17 PROCEDURE — 94761 N-INVAS EAR/PLS OXIMETRY MLT: CPT

## 2022-07-17 PROCEDURE — 97530 THERAPEUTIC ACTIVITIES: CPT

## 2022-07-17 PROCEDURE — 20000000 HC ICU ROOM

## 2022-07-17 PROCEDURE — 92610 EVALUATE SWALLOWING FUNCTION: CPT

## 2022-07-17 PROCEDURE — 25000003 PHARM REV CODE 250: Performed by: PSYCHIATRY & NEUROLOGY

## 2022-07-17 PROCEDURE — 25000003 PHARM REV CODE 250: Performed by: STUDENT IN AN ORGANIZED HEALTH CARE EDUCATION/TRAINING PROGRAM

## 2022-07-17 PROCEDURE — 99233 PR SUBSEQUENT HOSPITAL CARE,LEVL III: ICD-10-PCS | Mod: ,,, | Performed by: PSYCHIATRY & NEUROLOGY

## 2022-07-17 PROCEDURE — 63600175 PHARM REV CODE 636 W HCPCS: Performed by: PHYSICIAN ASSISTANT

## 2022-07-17 RX ORDER — HYDROCHLOROTHIAZIDE 25 MG/1
25 TABLET ORAL DAILY
Status: DISCONTINUED | OUTPATIENT
Start: 2022-07-17 | End: 2022-07-20 | Stop reason: HOSPADM

## 2022-07-17 RX ORDER — LANOLIN ALCOHOL/MO/W.PET/CERES
800 CREAM (GRAM) TOPICAL
Status: DISCONTINUED | OUTPATIENT
Start: 2022-07-17 | End: 2022-07-19

## 2022-07-17 RX ORDER — SODIUM,POTASSIUM PHOSPHATES 280-250MG
2 POWDER IN PACKET (EA) ORAL
Status: DISCONTINUED | OUTPATIENT
Start: 2022-07-17 | End: 2022-07-19

## 2022-07-17 RX ORDER — DEXAMETHASONE SODIUM PHOSPHATE 4 MG/ML
4 INJECTION, SOLUTION INTRA-ARTICULAR; INTRALESIONAL; INTRAMUSCULAR; INTRAVENOUS; SOFT TISSUE EVERY 8 HOURS
Status: DISCONTINUED | OUTPATIENT
Start: 2022-07-17 | End: 2022-07-19

## 2022-07-17 RX ORDER — POLYETHYLENE GLYCOL 3350 17 G/17G
17 POWDER, FOR SOLUTION ORAL 2 TIMES DAILY
Status: DISCONTINUED | OUTPATIENT
Start: 2022-07-17 | End: 2022-07-20 | Stop reason: HOSPADM

## 2022-07-17 RX ADMIN — MUPIROCIN: 20 OINTMENT TOPICAL at 08:07

## 2022-07-17 RX ADMIN — POTASSIUM & SODIUM PHOSPHATES POWDER PACK 280-160-250 MG 2 PACKET: 280-160-250 PACK at 10:07

## 2022-07-17 RX ADMIN — LEVETIRACETAM 500 MG: 500 TABLET, FILM COATED ORAL at 09:07

## 2022-07-17 RX ADMIN — DEXAMETHASONE SODIUM PHOSPHATE 4 MG: 4 INJECTION INTRA-ARTICULAR; INTRALESIONAL; INTRAMUSCULAR; INTRAVENOUS; SOFT TISSUE at 12:07

## 2022-07-17 RX ADMIN — HEPARIN SODIUM 5000 UNITS: 5000 INJECTION INTRAVENOUS; SUBCUTANEOUS at 06:07

## 2022-07-17 RX ADMIN — ESCITALOPRAM OXALATE 10 MG: 10 TABLET ORAL at 08:07

## 2022-07-17 RX ADMIN — CARVEDILOL 50 MG: 25 TABLET, FILM COATED ORAL at 08:07

## 2022-07-17 RX ADMIN — FAMOTIDINE 20 MG: 20 TABLET ORAL at 09:07

## 2022-07-17 RX ADMIN — HEPARIN SODIUM 5000 UNITS: 5000 INJECTION INTRAVENOUS; SUBCUTANEOUS at 09:07

## 2022-07-17 RX ADMIN — Medication 2 G: at 06:07

## 2022-07-17 RX ADMIN — HYDROCODONE BITARTRATE AND ACETAMINOPHEN 1 TABLET: 5; 325 TABLET ORAL at 10:07

## 2022-07-17 RX ADMIN — AMLODIPINE BESYLATE 10 MG: 10 TABLET ORAL at 08:07

## 2022-07-17 RX ADMIN — SODIUM CHLORIDE, SODIUM LACTATE, POTASSIUM CHLORIDE, AND CALCIUM CHLORIDE: .6; .31; .03; .02 INJECTION, SOLUTION INTRAVENOUS at 06:07

## 2022-07-17 RX ADMIN — POLYETHYLENE GLYCOL 3350 17 G: 17 POWDER, FOR SOLUTION ORAL at 09:07

## 2022-07-17 RX ADMIN — MUPIROCIN: 20 OINTMENT TOPICAL at 09:07

## 2022-07-17 RX ADMIN — POLYETHYLENE GLYCOL 3350 17 G: 17 POWDER, FOR SOLUTION ORAL at 01:07

## 2022-07-17 RX ADMIN — FAMOTIDINE 20 MG: 20 TABLET ORAL at 08:07

## 2022-07-17 RX ADMIN — POTASSIUM BICARBONATE 35 MEQ: 391 TABLET, EFFERVESCENT ORAL at 06:07

## 2022-07-17 RX ADMIN — DOCUSATE SODIUM 100 MG: 100 CAPSULE, LIQUID FILLED ORAL at 08:07

## 2022-07-17 RX ADMIN — INSULIN ASPART 1 UNITS: 100 INJECTION, SOLUTION INTRAVENOUS; SUBCUTANEOUS at 10:07

## 2022-07-17 RX ADMIN — DEXAMETHASONE SODIUM PHOSPHATE 4 MG: 4 INJECTION INTRA-ARTICULAR; INTRALESIONAL; INTRAMUSCULAR; INTRAVENOUS; SOFT TISSUE at 06:07

## 2022-07-17 RX ADMIN — POTASSIUM & SODIUM PHOSPHATES POWDER PACK 280-160-250 MG 2 PACKET: 280-160-250 PACK at 04:07

## 2022-07-17 RX ADMIN — HYDROCHLOROTHIAZIDE 25 MG: 25 TABLET ORAL at 01:07

## 2022-07-17 RX ADMIN — Medication 2 G: at 03:07

## 2022-07-17 RX ADMIN — DEXAMETHASONE SODIUM PHOSPHATE 4 MG: 4 INJECTION INTRA-ARTICULAR; INTRALESIONAL; INTRAMUSCULAR; INTRAVENOUS; SOFT TISSUE at 09:07

## 2022-07-17 RX ADMIN — AMITRIPTYLINE HYDROCHLORIDE 75 MG: 25 TABLET, FILM COATED ORAL at 09:07

## 2022-07-17 RX ADMIN — INSULIN ASPART 1 UNITS: 100 INJECTION, SOLUTION INTRAVENOUS; SUBCUTANEOUS at 04:07

## 2022-07-17 RX ADMIN — ATORVASTATIN CALCIUM 40 MG: 20 TABLET, FILM COATED ORAL at 08:07

## 2022-07-17 RX ADMIN — HEPARIN SODIUM 5000 UNITS: 5000 INJECTION INTRAVENOUS; SUBCUTANEOUS at 01:07

## 2022-07-17 RX ADMIN — DEXAMETHASONE SODIUM PHOSPHATE 4 MG: 4 INJECTION INTRA-ARTICULAR; INTRALESIONAL; INTRAMUSCULAR; INTRAVENOUS; SOFT TISSUE at 01:07

## 2022-07-17 RX ADMIN — Medication 2 G: at 09:07

## 2022-07-17 RX ADMIN — CARVEDILOL 37.5 MG: 25 TABLET, FILM COATED ORAL at 04:07

## 2022-07-17 RX ADMIN — LEVETIRACETAM 500 MG: 500 TABLET, FILM COATED ORAL at 08:07

## 2022-07-17 NOTE — ASSESSMENT & PLAN NOTE
2/2 active tobacco abuse w/ 30 pk yr hx    - Some issues w/ oxygenation in OR/immediately post-op; resolved s/p pt recovering from anesthesia  - Sating well on RA  - Goal SpO2>88%  - Encourage IS as tolerated

## 2022-07-17 NOTE — ASSESSMENT & PLAN NOTE
47F w/ PMH HTN, smoking, CHF and multiple intracranial aneurysms, now s/p elective L-sided craniotomy for clipping 7/15.     --Admitted to neuro ICU   -q1h neurochecks   -Continue ICU care through 7/18  --All labs  & diagnostics reviewed   -CTA 7/15: Expected post-op changes, no strokes or LVOs   -DSA 7/16: Good clip placement on Ant. Choroidal & PcommA, persistent MCA aneurysm  --SBP <140 (cardene ggt; hydralazine & labetalol PRN; transition to home meds when appropriate)  --Na >135  --Dex 4q8  --Keppra 500 BID  --HOB >30  --Drains to remain at this time on full suction; please record hourly outputs  --PT/OT/OOB  --ADAT  --Pain control  --PUD PPx while steroid treatment ongoing   --TEDs/SCDs/SQH  --Continue to monitor clinically, notify NSGY immediately with any changes in neuro status    Dispo: Ongoing

## 2022-07-17 NOTE — ASSESSMENT & PLAN NOTE
- Liberalize SBP goal -> <160  - Wean cardene gtt as able   - C/w home amlodipine 10 mg qday, coreg 37.5 mg BID  - Hold home HCTZ

## 2022-07-17 NOTE — SUBJECTIVE & OBJECTIVE
Interval History: 7/17: POD 2 s/p Ant Choroidal/Pcomm aneurysm clipping. NAEON, AFVSS, Exam stable. DSA w/ good clip coverage of Ant Choroidal/Pcomm aneurysms, MCA aneurysm still present as expected. Drain to remain for output. Will remain in unit overnight, TTF in AM    Medications:  Continuous Infusions:   lactated ringers 50 mL/hr at 07/17/22 0900     Scheduled Meds:   amitriptyline  75 mg Oral QHS    amLODIPine  10 mg Oral Daily    atorvastatin  40 mg Oral Daily    carvediloL  50 mg Oral BID    ceFAZolin (ANCEF) IVPB  2 g Intravenous Q8H    dexamethasone  4 mg Intravenous Q6H    docusate sodium  100 mg Oral Daily    EScitalopram oxalate  10 mg Oral Daily    famotidine  20 mg Oral BID    heparin (porcine)  5,000 Units Subcutaneous Q8H    levETIRAcetam  500 mg Oral BID    mupirocin   Nasal BID     PRN Meds:acetaminophen, dextrose 10%, dextrose 10%, docusate sodium, glucagon (human recombinant), glucose, glucose, hydrALAZINE, HYDROcodone-acetaminophen, HYDROmorphone, insulin aspart U-100, magnesium oxide, magnesium oxide, ondansetron, potassium bicarbonate, potassium bicarbonate, potassium bicarbonate, potassium, sodium phosphates, potassium, sodium phosphates, potassium, sodium phosphates, sodium chloride 0.9%, sodium chloride 0.9%     Review of Systems  Objective:     Weight: 93 kg (205 lb)  Body mass index is 37.49 kg/m².  Vital Signs (Most Recent):  Temp: 98.2 °F (36.8 °C) (07/17/22 0700)  Pulse: 75 (07/17/22 1000)  Resp: (!) 23 (07/17/22 1000)  BP: 139/65 (07/17/22 1000)  SpO2: (!) 94 % (07/17/22 1000)   Vital Signs (24h Range):  Temp:  [98.1 °F (36.7 °C)-98.8 °F (37.1 °C)] 98.2 °F (36.8 °C)  Pulse:  [73-90] 75  Resp:  [16-42] 23  SpO2:  [89 %-97 %] 94 %  BP: (129-159)/(59-82) 139/65  Arterial Line BP: (112-137)/(53-72) 126/67     Date 07/17/22 0700 - 07/18/22 0659   Shift 2598-9135 9260-9744 2657-9195 24 Hour Total   INTAKE   I.V.(mL/kg) 122.5(1.3)   122.5(1.3)   IV Piggyback 93.3   93.3   Shift Total(mL/kg)  "215.7(2.3)   215.7(2.3)   OUTPUT   Urine(mL/kg/hr) 900   900   Shift Total(mL/kg) 900(9.7)   900(9.7)   Weight (kg) 93 93 93 93                          Closed/Suction Drain 07/15/22 1310 Left  Accordion 10 Fr. (Active)   Site Description Unable to view 07/16/22 0401   Dressing Type Gauze 07/16/22 0401   Dressing Status Dry;Clean;Intact 07/16/22 0401   Dressing Intervention Integrity maintained 07/16/22 0401   Drainage Bloody 07/16/22 0401   Status To bulb suction 07/16/22 0401   Output (mL) 90 mL 07/16/22 0700            Urethral Catheter 07/15/22 0843 Non-latex;Straight-tip;Silicone 16 Fr. (Active)   Site Assessment Clean;Intact 07/16/22 0401   Collection Container Urimeter 07/16/22 0401   Securement Method secured to top of thigh w/ adhesive device 07/16/22 0401   Catheter Care Performed yes 07/16/22 0401   Reason for Continuing Urinary Catheterization Critically ill in ICU and requiring hourly monitoring of intake/output 07/16/22 0401   CAUTI Prevention Bundle Securement Device in place with 1" slack;Intact seal between catheter & drainage tubing;Drainage bag/urimeter off the floor;Sheeting clip in use;Drainage bag/urimeter not overfilled (<2/3 full);No dependent loops or kinks;Drainage bag/urimeter below bladder 07/15/22 2001   Output (mL) 250 mL 07/16/22 0700       Physical Exam    Neurosurgery Physical Exam  No acute distress  E3v4M6: Drowsy, easily arousable. opens eyes to voice.   Follows commands well x 4 antigravity  PERRL, EOMI, no facial droop, tongue midline    Incision bandage dry, drain secured.         Significant Labs:  Recent Labs   Lab 07/16/22  0323 07/16/22  1256 07/17/22  0142   * 150* 171*    142 143   K 3.7 3.2* 3.4*    110 109   CO2 23 21* 23   BUN 11 14 19   CREATININE 1.1 1.0 1.1   CALCIUM 9.1 8.9 8.9   MG  --   --  2.0       Recent Labs   Lab 07/15/22  1704 07/16/22  0323 07/17/22  0142   WBC 7.08 11.92 13.49*   HGB 11.8* 11.9* 10.4*   HCT 34.2* 34.2* 30.4*    " 283 246       No results for input(s): LABPT, INR, APTT in the last 48 hours.    Microbiology Results (last 7 days)       ** No results found for the last 168 hours. **          All pertinent labs from the last 24 hours have been reviewed.    Significant Diagnostics:  I have reviewed all pertinent imaging results/findings within the past 24 hours.

## 2022-07-17 NOTE — PLAN OF CARE
Bedside swallow assessment completed. Recommend continue regular texture diet with thin liquids. ST to f/u to ensure tolerance and to complete cognitive-linguistic evaluation. Gisela Spencer CCC-SLP 7/17/2022 10:59 AM

## 2022-07-17 NOTE — ASSESSMENT & PLAN NOTE
TTE w/ EF 65%    - Gentle IVFs @ 50 cc/hr given repeated contrast loads over last 24 hrs  - Goal euvolemia, monitor closely  - D/c elvis

## 2022-07-17 NOTE — PLAN OF CARE
[facility] Care Plan    POC reviewed with Gina Jenkins and family at 1400. Pt verbalized understanding. Questions and concerns addressed. No acute events today. Pt progressing toward goals. Will continue to monitor. See below and flowsheets for full assessment and VS info.     Patient to stepdown tomorrow 7/18 per NSGY  Ambulated to bathroom and chair with PT  Continuing to monitor sx site and drain       Is this a stroke patient? no    Neuro:  Lucas Coma Scale  Best Eye Response: 4-->(E4) spontaneous  Best Motor Response: 6-->(M6) obeys commands  Best Verbal Response: 5-->(V5) oriented  Lucas Coma Scale Score: 15  Assessment Qualifiers: no eye obstruction present  Pupil PERRLA: yes     24 hr Temp:  [98.1 °F (36.7 °C)-98.5 °F (36.9 °C)]     CV:   Rhythm: normal sinus rhythm  BP goals:   SBP < 160  MAP > 65    Resp:   O2 Device (Oxygen Therapy): room air       Plan: N/A    GI/:     Diet/Nutrition Received: regular  Last Bowel Movement: 07/17/22  Voiding Characteristics: external catheter    Intake/Output Summary (Last 24 hours) at 7/17/2022 1606  Last data filed at 7/17/2022 1400  Gross per 24 hour   Intake 1361.91 ml   Output 2595 ml   Net -1233.09 ml     Unmeasured Output  Urine Occurrence: 1  Stool Occurrence: 1    Labs/Accuchecks:  Recent Labs   Lab 07/17/22  0142   WBC 13.49*   RBC 3.64*   HGB 10.4*   HCT 30.4*         Recent Labs   Lab 07/17/22  0142      K 3.4*   CO2 23      BUN 19   CREATININE 1.1   ALKPHOS 123   ALT 56*   AST 35   BILITOT 0.2      Recent Labs   Lab 07/15/22  1020   INR 1.0   APTT 23.2    No results for input(s): CPK, CPKMB, TROPONINI, MB in the last 168 hours.    Electrolytes: Electrolytes replaced  Accuchecks: ACHS    Gtts:      LDA/Wounds:  Lines/Drains/Airways       Drain  Duration                  Closed/Suction Drain 07/15/22 1310 Left  Accordion 10 Fr. 2 days              Peripheral Intravenous Line  Duration                  Peripheral IV - Single Lumen 18 G  Left Hand -- days         Peripheral IV - Single Lumen 07/17/22 1532 18 G Posterior;Right Hand <1 day                  Wounds: No  Wound care consulted: No

## 2022-07-17 NOTE — SUBJECTIVE & OBJECTIVE
Interval History:  Please see hospital course above for details    Review of Systems   Constitutional: Negative.    Respiratory: Negative.     Cardiovascular: Negative.    Gastrointestinal: Negative.    Neurological:  Positive for numbness. Negative for seizures, speech difficulty, weakness, light-headedness and headaches.     Objective:     Vitals:  Temp: 98.3 °F (36.8 °C)  Pulse: 72  Rhythm: normal sinus rhythm  BP: (!) 152/67  MAP (mmHg): 96  Resp: 19  SpO2: (!) 91 %  O2 Device (Oxygen Therapy): room air    Temp  Min: 98.1 °F (36.7 °C)  Max: 98.5 °F (36.9 °C)  Pulse  Min: 72  Max: 90  BP  Min: 129/61  Max: 153/72  MAP (mmHg)  Min: 87  Max: 110  Resp  Min: 15  Max: 35  SpO2  Min: 89 %  Max: 97 %    07/16 0701 - 07/17 0700  In: 1899.7 [P.O.:240; I.V.:1246.5]  Out: 2550 [Urine:2325; Drains:225]   Unmeasured Output  Stool Occurrence: 0       Physical Exam  General Appearance: Not in acute hemodynamic distress  Mental Status Exam: awake, alert, aware, oriented, no aphasia, no dysarthria  Cranial Nerves: VFF, EOM full, pupils are equal and reactive to light bilaterally, symmetric facial sensory, symmetric facial motor, hearing grossly normal,   Motor: no drift in the UE/LE. Power is 5/5 in both UE/LE. Normal tone.  Sensory: Symmetric to LT in all 4 limbs without extinction  Coordination: FTN without dysmetria, fine motor regular and fast  Vascular: S1/S2 of normal intensity, no S3/S4 appreciated, no murmurs appreciated  Lungs: CTA bilaterally without wheezing  Abdomen: Soft, non-distended, non-tender, BS +      Medications:  Continuous Scheduledamitriptyline, 75 mg, QHS  amLODIPine, 10 mg, Daily  atorvastatin, 40 mg, Daily  carvediloL, 37.5 mg, BID WM  ceFAZolin (ANCEF) IVPB, 2 g, Q8H  dexamethasone, 4 mg, Q8H  docusate sodium, 100 mg, Daily  EScitalopram oxalate, 10 mg, Daily  famotidine, 20 mg, BID  heparin (porcine), 5,000 Units, Q8H  hydroCHLOROthiazide, 25 mg, Daily  levETIRAcetam, 500 mg, BID  mupirocin, ,  BID  polyethylene glycol, 17 g, BID    PRNacetaminophen, 650 mg, Q4H PRN  dextrose 10%, 12.5 g, PRN  dextrose 10%, 25 g, PRN  docusate sodium, 100 mg, BID PRN  glucagon (human recombinant), 1 mg, PRN  glucose, 16 g, PRN  glucose, 24 g, PRN  hydrALAZINE, 10 mg, Q4H PRN  HYDROcodone-acetaminophen, 1 tablet, Q4H PRN  HYDROmorphone, 1 mg, Q4H PRN  insulin aspart U-100, 0-5 Units, QID (AC + HS) PRN  magnesium oxide, 800 mg, PRN  magnesium oxide, 800 mg, PRN  ondansetron, 8 mg, Q8H PRN  potassium bicarbonate, 35 mEq, PRN  potassium bicarbonate, 50 mEq, PRN  potassium bicarbonate, 60 mEq, PRN  potassium, sodium phosphates, 2 packet, PRN  potassium, sodium phosphates, 2 packet, PRN  potassium, sodium phosphates, 2 packet, PRN  sodium chloride 0.9%, 10 mL, PRN  sodium chloride 0.9%, 10 mL, PRN      Today I personally reviewed pertinent laboratory results, notably: CBC w/ mild leukocytosis to 13.49, pt afebrile. Hb/platelets stable. CMP w/ K 3.4, phos 1.6 -> repleted. LFTs downtrending    Diet  Diet Adult Regular (IDDSI Level 7) Thin  Diet Adult Regular (IDDSI Level 7) Thin

## 2022-07-17 NOTE — PLAN OF CARE
[facility] Care Plan    POC reviewed with Gina Jenkins and family at 1400. Pt verbalized understanding. Questions and concerns addressed. No acute events today. Pt progressing toward goals. Will continue to monitor. See below and flowsheets for full assessment and VS info.   -Angiogram complete   -Neuro exam intact   -SBP < 160 maintained  -Rodríguez removed   -LR @ 50   -Drain output 160 for shift   -oxy x 1, tylenol x 1 given for pain       Is this a stroke patient? no    Neuro:  Danielle Coma Scale  Best Eye Response: 4-->(E4) spontaneous  Best Motor Response: 6-->(M6) obeys commands  Best Verbal Response: 5-->(V5) oriented  Danielle Coma Scale Score: 15  Assessment Qualifiers: no eye obstruction present  Pupil PERRLA: yes     24 hr Temp:  [98 °F (36.7 °C)-98.8 °F (37.1 °C)]     CV:   Rhythm: normal sinus rhythm  BP goals:   SBP < 160  MAP > 65    Resp:   O2 Device (Oxygen Therapy): nasal cannula       Plan: N/A    GI/:     Diet/Nutrition Received: regular  Last Bowel Movement: 07/14/22  Voiding Characteristics: urethral catheter (bladder)    Intake/Output Summary (Last 24 hours) at 7/16/2022 1912  Last data filed at 7/16/2022 1800  Gross per 24 hour   Intake 1850.59 ml   Output 3155 ml   Net -1304.41 ml     Unmeasured Output  Stool Occurrence: 0    Labs/Accuchecks:  Recent Labs   Lab 07/16/22  0323   WBC 11.92   RBC 4.16   HGB 11.9*   HCT 34.2*         Recent Labs   Lab 07/16/22  1256      K 3.2*   CO2 21*      BUN 14   CREATININE 1.0   ALKPHOS 128   ALT 79*   AST 58*   BILITOT 0.3      Recent Labs   Lab 07/15/22  1020   INR 1.0   APTT 23.2    No results for input(s): CPK, CPKMB, TROPONINI, MB in the last 168 hours.    Electrolytes: Electrolytes replaced  Accuchecks: ACHS    Gtts:   lactated ringers 50 mL/hr at 07/16/22 1701    niCARdipine Stopped (07/16/22 1405)       LDA/Wounds:  Lines/Drains/Airways       Drain  Duration                  Closed/Suction Drain 07/15/22 1310 Left  Accordion 10 Fr. 1  day              Arterial Line  Duration             Arterial Line 07/15/22 0822 Right Radial 1 day              Peripheral Intravenous Line  Duration                  Midline Catheter Insertion/Assessment  - Single Lumen (RETIRED) Left basilic vein (medial side of arm) 20g x 8cm -- days         Peripheral IV - Single Lumen 18 G Left Hand -- days         Peripheral IV - Single Lumen 20 G Right Hand -- days                  Wounds: No  Wound care consulted: No

## 2022-07-17 NOTE — ASSESSMENT & PLAN NOTE
S/p L pterional craniotomy for clip ligation of L ant. Choroidal & L pCommA aneurysms 7/15    - Post-op CTA and DSA w/o residual filling  - Drain in place, management per NSGY   - C/w ancef ppx while drain in place  - Neuro checks/VS q1hr  - NSGY following  - PT/OT/SLP

## 2022-07-17 NOTE — PROGRESS NOTES
[facility] - Neuro Critical Care  Neurocritical Care  Progress Note    Admit Date: 7/15/2022  Service Date: 07/16/2022  Length of Stay: 1    Subjective:     Chief Complaint: Aneurysm of middle cerebral artery    History of Present Illness: Gina Jenkins 47 yr old female w/ PMH HTN, smoking (20 pack year history), CHF and multiple intracranial aneurysms who is s/p elective L-sided craniotomy for aneurysm clipping. She had a known single intracranial aneurysm previously, but presented to the emergency department after another headache and an additional 2 cerebral aneurysms were found in the left ICA distribution. Will admit to [facility] post-op.      Hospital Course: 07/16/2022: Post-op CT/CTA w/ expected post-op changes, no residual fillings of L anterior choriodal/Pcomm aneurysms s/p clipping. Post-op DSA done this AM, no residual filling. Pt c/o L sided jaw paresthesias, neuro exam intact.       Interval History:  Please see hospital course above for details    Review of Systems   Respiratory: Negative.     Cardiovascular: Negative.    Gastrointestinal: Negative.    Neurological:  Positive for light-headedness, numbness and headaches. Negative for seizures, speech difficulty and weakness.     Objective:     Vitals:  Temp: 98.3 °F (36.8 °C)  Pulse: 74  Rhythm: normal sinus rhythm  BP: (!) 149/72  MAP (mmHg): 104  Resp: 15  SpO2: (!) 92 %  O2 Device (Oxygen Therapy): ventilator    Temp  Min: 98.1 °F (36.7 °C)  Max: 98.5 °F (36.9 °C)  Pulse  Min: 73  Max: 90  BP  Min: 129/61  Max: 153/72  MAP (mmHg)  Min: 87  Max: 110  Resp  Min: 15  Max: 35  SpO2  Min: 89 %  Max: 97 %    07/16 0701 - 07/17 0700  In: 1899.7 [P.O.:240; I.V.:1246.5]  Out: 2550 [Urine:2325; Drains:225]   Unmeasured Output  Stool Occurrence: 0       Physical Exam  General Appearance: Not in acute hemodynamic distress  Mental Status Exam: awake, alert, aware, oriented, no aphasia, mild dysarthria  Cranial Nerves: VFF, EOM full, pupils are equal and reactive to  light bilaterally, subjective decrease in sensation over L jaw, non-reproducible on exam. Mild L facial swelling noted  Motor: no drift in the UE/LE. Power is 5/5 in both UE/LE. Normal tone.  Sensory: Symmetric to LT in all 4 limbs without extinction  Coordination: FTN without dysmetria, fine motor regular and fast  Vascular: S1/S2 of normal intensity, no S3/S4 appreciated, no murmurs appreciated  Lungs: CTA bilaterally without wheezing  Abdomen: Soft, non-distended, non-tender, BS +      Medications:  Continuous Scheduledamitriptyline, 75 mg, QHS  amLODIPine, 10 mg, Daily  atorvastatin, 40 mg, Daily  carvediloL, 50 mg, BID  ceFAZolin (ANCEF) IVPB, 2 g, Q8H  dexamethasone, 4 mg, Q8H  docusate sodium, 100 mg, Daily  EScitalopram oxalate, 10 mg, Daily  famotidine, 20 mg, BID  heparin (porcine), 5,000 Units, Q8H  levETIRAcetam, 500 mg, BID  mupirocin, , BID  polyethylene glycol, 17 g, BID    PRNacetaminophen, 650 mg, Q4H PRN  dextrose 10%, 12.5 g, PRN  dextrose 10%, 25 g, PRN  docusate sodium, 100 mg, BID PRN  glucagon (human recombinant), 1 mg, PRN  glucose, 16 g, PRN  glucose, 24 g, PRN  hydrALAZINE, 10 mg, Q4H PRN  HYDROcodone-acetaminophen, 1 tablet, Q4H PRN  HYDROmorphone, 1 mg, Q4H PRN  insulin aspart U-100, 0-5 Units, QID (AC + HS) PRN  magnesium oxide, 800 mg, PRN  magnesium oxide, 800 mg, PRN  ondansetron, 8 mg, Q8H PRN  potassium bicarbonate, 35 mEq, PRN  potassium bicarbonate, 50 mEq, PRN  potassium bicarbonate, 60 mEq, PRN  potassium, sodium phosphates, 2 packet, PRN  potassium, sodium phosphates, 2 packet, PRN  potassium, sodium phosphates, 2 packet, PRN  sodium chloride 0.9%, 10 mL, PRN  sodium chloride 0.9%, 10 mL, PRN      Today I personally reviewed pertinent imaging, laboratory results, notably: CT/CTA w/ expected post-op findings, no significant pneumocephalus/hemorrhage. DSA w/o evidence of residual filling. CBC stable, no leukocytosis. CMP w/ hypokalemia to 3.2 (repleted), creatinine 1.0, LFTs  downtrending.     Diet  Diet Adult Regular (IDDSI Level 7) Thin  Diet Adult Regular (IDDSI Level 7) Thin        Assessment/Plan:     Neuro  * Aneurysm of left middle cerebral artery  S/p L pterional craniotomy for clip ligation of L ant. Choroidal & L pCommA aneurysms 7/15    - Post-op CTA and DSA w/o residual filling  - Drain in place, management per NSGY   - C/w ancef ppx while drain in place  - Neuro checks/VS q1hr  - NSGY following  - PT/OT/SLP    Pulmonary  Other emphysema  2/2 active tobacco abuse w/ 30 pk yr hx    - Some issues w/ oxygenation in OR/immediately post-op; resolved s/p pt recovering from anesthesia  - Sating well on RA  - Goal SpO2>88%  - Encourage IS as tolerated    Cardiac/Vascular  Chronic diastolic heart failure  TTE w/ EF 65%    - Gentle IVFs @ 50 cc/hr given repeated contrast loads over last 24 hrs  - Goal euvolemia, monitor closely  - D/c elvis     Essential hypertension    - Liberalize SBP goal -> <160  - Wean cardene gtt as able   - C/w home amlodipine 10 mg qday, coreg 37.5 mg BID  - Hold home HCTZ    Other  Tobacco abuse  Nicotine patch if needed          The patient is being Prophylaxed for:  Venous Thromboembolism with: Mechanical  Stress Ulcer with: H2B  Ventilator Pneumonia with: not applicable    Activity Orders          Turn patient every 2 hours starting at 07/17 1000    Progressive Mobility Protocol (mobilize patient to their highest level of functioning at least twice daily) starting at 07/16 2000    Diet Adult Regular (IDDSI Level 7) Thin: Regular starting at 07/16 1250        Full Code     Level III visit    Arcelia Foster MD  Neurocritical Care  [facility] - Neuro Critical Care

## 2022-07-17 NOTE — HOSPITAL COURSE
07/16/2022: Post-op CT/CTA w/ expected post-op changes, no residual fillings of L anterior choriodal/Pcomm aneurysms s/p clipping. Post-op DSA done this AM, no residual filling. Pt c/o L sided jaw paresthesias, neuro exam intact.   07/17/2022: NAEON. BP control improved, araceli removed.   07/18/2022: NAEON. Stable to SD to NSGY team today.

## 2022-07-17 NOTE — PT/OT/SLP EVAL
Speech Language Pathology Evaluation  Bedside Swallow    Patient Name:  Gina Jenkins   MRN:  [id number]  Admitting Diagnosis: Aneurysm of middle cerebral artery    Recommendations:                 General Recommendations:  Cognitive-linguistic evaluation and assess diet tolerance  Diet recommendations:  Regular, Thin   Aspiration Precautions: 1 bite/sip at a time, Eliminate distractions, Frequent oral care, HOB to 90 degrees, Meds whole 1 at a time, Monitor for s/s of aspiration, Remain upright 30 minutes post meal, Small bites/sips and Standard aspiration precautions   General Precautions: Standard, aspiration, fall  Communication strategies:  provide increased time to answer    History:     Past Medical History:   Diagnosis Date    Brain aneurysm     CHF (congestive heart failure)     H/O coronary angioplasty     Hypercholesteremia     Hypertension     Malignant hypertension     Migraine headache     Stroke 10/2017       Past Surgical History:   Procedure Laterality Date    CARDIAC CATHETERIZATION      CEREBRAL ANGIOGRAM         Social History: Patient lives with mother in mobile home.    Prior Intubation HX:  7/15/22    Modified Barium Swallow: none this admission    Chest X-Rays: none this admission    Prior diet: regular/thin    Occupation/hobbies/homemaking: works as a , but has not worked in several months    Subjective     Spoke with RN prior to entering pt's room . Pt seen bedside for session. Alert and agreeable to ST.     Pain/Comfort:  · Pain Rating 1: 0/10  · Pain Rating Post-Intervention 1: 0/10    Respiratory Status: Room air    Objective:     Oral Musculature Evaluation  · Oral Musculature: WFL  · Dentition: present and adequate  · Secretion Management: adequate  · Mucosal Quality: adequate  · Volitional Cough: elicited  · Volitional Swallow: elicited  · Voice Prior to PO Intake: clear    Bedside Swallow Eval:   Consistencies Assessed:  · Thin liquids via straw  · Puree  grits  · Soft solids scrambled eggs  · Solids soni     Oral Phase:   · WFL    Pharyngeal Phase:   · no overt clinical signs/symptoms of aspiration  · no overt clinical signs/symptoms of pharyngeal dysphagia    Compensatory Strategies  · None    Treatment: Provided education to pt re: role of ST,  POC, swallow precautions, recommendations to continue regular consistency diet with thin liquids, and plan to f/u to ensure tolerance and complete cognitive-linguistic evaluation. She verbalized understanding. White board updated. RN and MD notified of results and recs.       Assessment:     Gina Jenkins is a 47 y.o. female with an SLP diagnosis of functional oropharyngeal swallow. SLP to f/u to ensure diet tolerance and to complete cognitive-linguisitc evaluation.     Goals:   Multidisciplinary Problems     SLP Goals        Problem: SLP    Goal Priority Disciplines Outcome   SLP Goal     SLP    Description: Goals expected to be met by 7/24:  1. Pt will tolerate least restrictive diet without overt s/sx airway threat.   2. Pt will participate in cognitive-linguistic evaluation to further develop POC.                        Plan:     · Patient to be seen:  4 x/week   · Plan of Care expires:     · Plan of Care reviewed with:  patient   · SLP Follow-Up:  Yes       Discharge recommendations:   (tbd)   Barriers to Discharge:  Level of Skilled Assistance Needed .    Time Tracking:     SLP Treatment Date:   07/17/22  Speech Start Time:  0757  Speech Stop Time:  0807     Speech Total Time (min):  10 min    Billable Minutes: Eval Swallow and Oral Function 10    07/17/2022

## 2022-07-17 NOTE — PT/OT/SLP EVAL
Physical Therapy Evaluation and Treatment    Patient Name:  Gina Jenkins   MRN:  [id number]  Admit Date: 7/15/2022  Admitting Diagnosis:  Aneurysm of middle cerebral artery  Recent Surgery: Procedure(s) (LRB):  CRANIOTOMY, WITH ANEURYSM CLIPPING (N/A) 2 Days Post-Op  Length of Stay: 2 days    Recommendations:     Discharge Recommendations:  rehabilitation facility   Discharge Equipment Recommendations:  (TBD)   Barriers to discharge: Inaccessible home (5 MARLEN[address] home), decreased caregiver support (patient lives with teenage children; mother lives next door) and decline in functional mobility (patient requires 2 person assist to ambulate)    Assessment:     Gina Jenkins is a 47 y.o. female admitted to [facility] [place] on 7/15/2022 with a medical diagnosis of Aneurysm of middle cerebral artery. Patient is POD 2 s/p craniotomy with aneurysm clipping. Today, she requires minimal assistance with bed mobility, minimal assistance with transfers, and minimal assistance of 2 people to walk in room. Her gait is unsteady throughout; thus, patient is a high fall risk.  She presents with the following impairments/functional limitations: impaired functional mobility, decreased safety awareness, impaired coordination, gait instability, impaired balance, impaired self care skills, decreased lower extremity function. Patient is far from her independent PLOF. Patient will benefit from skilled acute physical therapy services to address the mentioned deficits in order to increase safety and independence with functional mobility.     Rehab Prognosis: Good     Plan:     During this hospitalization, patient to be seen 4 x/week to address the identified rehab impairments via gait training, therapeutic activities, therapeutic exercises, neuromuscular re-education and progress towards the established goals.    · Plan of Care Expires:  08/16/22    Social History   Living Environment:   Pt lives with children (ages 14, 11, and 10). Patient's mother lives  "nextdoor    Pt lives in a mobile home with 5 steps to enter with bilateral handrails    Pt has a tub/shower combo    Prior Level of Function:    independent with mobility and ADLs    DME used: none   Patient reports 0 falls.      Roles/Repsonsibilities:    Works: was working as a  until 2 months ago.    Drives: yes.    Managing Medicines/Managing Home: yes.     Upon discharge, patient will have assistance from: family    Subjective   Communicated with RN prior to session.  Patient found resting with HOB elevated,  bed alarm, blood pressure cuff, hemovac, PureWick, peripheral IV, pulse ox (continuous), telemetry, SCD upon PT entry to room.  Patient's niece present during session.  Pt is agreeable to evaluation.     Additional staff present:RN       Pt's subjective: "I know you." Patient confuses PT with OT she saw yesterday.     Pain/Comfort:  · Pain Rating 1: 6/10  · Location - Side 1: Left  · Location 1: head  · Pain Addressed 1: Reposition, Distraction  · Pain Rating Post-Intervention 1:  (patient does not rate pain. RN is present and attending to patient)      Objective:   General Precautions: Standard, aspiration, fall   Orthopedic Precautions:N/A   Braces: N/A   Oxygen Device: Room Air  Vitals: /60 (BP Location: Right leg, Patient Position: Lying)   Pulse 77   Temp 98.3 °F (36.8 °C) (Oral)   Resp (!) 32   Ht 5' 2" (1.575 m)   Wt 93 kg (205 lb)   LMP 07/01/2020 (Approximate)   SpO2 (!) 94%   Breastfeeding No   BMI 37.49 kg/m²     Exams:  · Cognition:   · Alert, Cooperative and mild impulsivity  · AxOx4 with increased time to report year  · Command following: Follows multistep  commands  · Fluency: clear/fluent  · Skin Integrity: Intact dressing present left head with hemovac intact  · Sensation: light touch intact BLEs  · Gross Motor Coordination: Impaired  · Range of Motion:  · RLE: WFL  · LLE: WFL  · Strength Exam:  · RLE Strength:   · Hip Flexion: 5/5  · Knee Flexion: " 5/5  · Knee Extension: 5/5  · Ankle Dorsiflexion: 5/5  · Ankle Plantarflexion: 5/5  · LLE Strength:   · Hip Flexion: 5/5  · Knee Flexion: 5/5  · Knee Extension: 5/5  · Ankle Dorsiflexion: 5/5  · Ankle Plantarflexion: 5/5    Outcome Measures:   AM-PAC 6 CLICK MOBILITY:    Turning over in bed (including adjusting bedclothes, sheets and blankets)?: 3   Sitting down on and standing up from a chair with arms (e.g., wheelchair, bedside commode, etc.): 3   Moving from lying on back to sitting on the side of the bed?: 3   Moving to and from a bed to a chair (including a wheelchair)?: 3   Need to walk in hospital room?: 2   Climbing 3-5 steps with a railing?: 2   Basic Mobility Total Score: 16     Functional Mobility:    Bed Mobility:     Rolling Left:  minimum assistance    Rolling Right: minimum assistance    Supine to Sit: minimum assistance for trunk management with HOB elevated to 30 degrees    Transfers:     Sit to Stand:  minimum assistance    Stand to Sit: minimum assistance    Toilet Transfer:  minimum assistance  using Step Transfer    Gait:    Patient ambulates 20 ft x 2 reps     Patient uses:  hand-held assist x 2 people   Patient requires: minimal to moderate assist of 2 persons   Gait Deviation(s): unsteady gait, decreased step length, wide base of support and decreased lam   Impairments due to: impaired balance   Comments: Patient has multiple losses of balance in multidirections    Therapeutic Activities:   While sitting on the toilet, patient performs perineal hygiene and PT provides minimal assistance for patient's dynamic sitting balance.    While standing at the sink, patient washes her hands and PT provides minimal to moderate assistance for static standing balance without UE support.     Patient Education:   Patient educated on PT POC and role of PT in acute care.   Patient educated on the importance of early mobility to prevent functional decline during hospital  stay   Patient was instructed to utilize staff assistance for mobility/transfers.   Patient is appropriate to transfer with minimal assistance of 2 people with RN/PCT   White board updated to include patient's safest level of mobility with staff assistance   Patient and niece educated on bed mobility training, Fall risk, gait training, home safety, plan of care and transfer training by explanation.  Patient was receptive to education and needs further education.       Patient left sitting in chair with all lines intact, call button in reach and patient's niece and RN present.      GOALS:   Multidisciplinary Problems     Physical Therapy Goals     Not on file                History:     Past Medical History:   Diagnosis Date    Brain aneurysm     CHF (congestive heart failure)     H/O coronary angioplasty     Hypercholesteremia     Hypertension     Malignant hypertension     Migraine headache     Stroke 10/2017       Past Surgical History:   Procedure Laterality Date    CARDIAC CATHETERIZATION      CEREBRAL ANGIOGRAM         Time Tracking:     PT Received On: 07/17/22  PT Start Time: 1444     PT Stop Time: 1511  PT Total Time (min): 27 min     Billable Minutes: Evaluation 1 procedure and Therapeutic Activity 10 mins

## 2022-07-17 NOTE — PROGRESS NOTES
[facility] Neuro Critical Care  Neurocritical Care  Progress Note    Admit Date: 7/15/2022  Service Date: 07/17/2022  Length of Stay: 2    Subjective:     Chief Complaint: Aneurysm of middle cerebral artery    History of Present Illness: Gina Jenkins 47 yr old female w/ PMH HTN, smoking (20 pack year history), CHF and multiple intracranial aneurysms who is s/p elective L-sided craniotomy for aneurysm clipping. She had a known single intracranial aneurysm previously, but presented to the emergency department after another headache and an additional 2 cerebral aneurysms were found in the left ICA distribution. Will admit to [facility] post-op.      Hospital Course: 07/16/2022: Post-op CT/CTA w/ expected post-op changes, no residual fillings of L anterior choriodal/Pcomm aneurysms s/p clipping. Post-op DSA done this AM, no residual filling. Pt c/o L sided jaw paresthesias, neuro exam intact.   07/17/2022: NAEON. BP control improved, araceli removed.       Interval History:  Please see hospital course above for details    Review of Systems   Constitutional: Negative.    Respiratory: Negative.     Cardiovascular: Negative.    Gastrointestinal: Negative.    Neurological:  Positive for numbness. Negative for seizures, speech difficulty, weakness, light-headedness and headaches.     Objective:     Vitals:  Temp: 98.3 °F (36.8 °C)  Pulse: 72  Rhythm: normal sinus rhythm  BP: (!) 152/67  MAP (mmHg): 96  Resp: 19  SpO2: (!) 91 %  O2 Device (Oxygen Therapy): room air    Temp  Min: 98.1 °F (36.7 °C)  Max: 98.5 °F (36.9 °C)  Pulse  Min: 72  Max: 90  BP  Min: 129/61  Max: 153/72  MAP (mmHg)  Min: 87  Max: 110  Resp  Min: 15  Max: 35  SpO2  Min: 89 %  Max: 97 %    07/16 0701 - 07/17 0700  In: 1899.7 [P.O.:240; I.V.:1246.5]  Out: 2550 [Urine:2325; Drains:225]   Unmeasured Output  Stool Occurrence: 0       Physical Exam  General Appearance: Not in acute hemodynamic distress  Mental Status Exam: awake, alert, aware, oriented, no aphasia,  no dysarthria  Cranial Nerves: VFF, EOM full, pupils are equal and reactive to light bilaterally, symmetric facial sensory, symmetric facial motor, hearing grossly normal,   Motor: no drift in the UE/LE. Power is 5/5 in both UE/LE. Normal tone.  Sensory: Symmetric to LT in all 4 limbs without extinction  Coordination: FTN without dysmetria, fine motor regular and fast  Vascular: S1/S2 of normal intensity, no S3/S4 appreciated, no murmurs appreciated  Lungs: CTA bilaterally without wheezing  Abdomen: Soft, non-distended, non-tender, BS +      Medications:  Continuous Scheduledamitriptyline, 75 mg, QHS  amLODIPine, 10 mg, Daily  atorvastatin, 40 mg, Daily  carvediloL, 37.5 mg, BID WM  ceFAZolin (ANCEF) IVPB, 2 g, Q8H  dexamethasone, 4 mg, Q8H  docusate sodium, 100 mg, Daily  EScitalopram oxalate, 10 mg, Daily  famotidine, 20 mg, BID  heparin (porcine), 5,000 Units, Q8H  hydroCHLOROthiazide, 25 mg, Daily  levETIRAcetam, 500 mg, BID  mupirocin, , BID  polyethylene glycol, 17 g, BID    PRNacetaminophen, 650 mg, Q4H PRN  dextrose 10%, 12.5 g, PRN  dextrose 10%, 25 g, PRN  docusate sodium, 100 mg, BID PRN  glucagon (human recombinant), 1 mg, PRN  glucose, 16 g, PRN  glucose, 24 g, PRN  hydrALAZINE, 10 mg, Q4H PRN  HYDROcodone-acetaminophen, 1 tablet, Q4H PRN  HYDROmorphone, 1 mg, Q4H PRN  insulin aspart U-100, 0-5 Units, QID (AC + HS) PRN  magnesium oxide, 800 mg, PRN  magnesium oxide, 800 mg, PRN  ondansetron, 8 mg, Q8H PRN  potassium bicarbonate, 35 mEq, PRN  potassium bicarbonate, 50 mEq, PRN  potassium bicarbonate, 60 mEq, PRN  potassium, sodium phosphates, 2 packet, PRN  potassium, sodium phosphates, 2 packet, PRN  potassium, sodium phosphates, 2 packet, PRN  sodium chloride 0.9%, 10 mL, PRN  sodium chloride 0.9%, 10 mL, PRN      Today I personally reviewed pertinent laboratory results, notably: CBC w/ mild leukocytosis to 13.49, pt afebrile. Hb/platelets stable. CMP w/ K 3.4, phos 1.6 -> repleted. LFTs  downtrending    Diet  Diet Adult Regular (IDDSI Level 7) Thin  Diet Adult Regular (IDDSI Level 7) Thin        Assessment/Plan:     Neuro  * Aneurysm of left middle cerebral artery  S/p L pterional craniotomy for clip ligation of L ant. Choroidal & L pCommA aneurysms 7/15    - Post-op CTA and DSA w/o residual filling  - Drain in place, management per NSGY   - C/w ancef ppx while drain in place  - C/w dexamethasone, taper per NSGY  - Keppra 500 mg BID x7 days for post-op ppx  - Neuro checks/VS q1hr  - NSGY following  - PT/OT/SLP    Pulmonary  Other emphysema  2/2 active tobacco abuse w/ 30 pk yr hx    - Some issues w/ oxygenation in OR/immediately post-op; resolved s/p pt recovering from anesthesia  - Sating well on RA  - Goal SpO2>88%  - Encourage IS as tolerated    Cardiac/Vascular  Chronic diastolic heart failure  TTE w/ EF 65%    -D/c IVFs  - Goal euvolemia, monitor closely      Essential hypertension    - Goal SBP<160  - Off cardene gtt this AM   - C/w home amlodipine 10 mg qday, coreg 37.5 mg BID  - Restart home HCTZ 25 mg qday    Other  Tobacco abuse  Nicotine patch if needed          The patient is being Prophylaxed for:  Venous Thromboembolism with: Mechanical or Chemical  Stress Ulcer with: H2B  Ventilator Pneumonia with: not applicable    Activity Orders          Turn patient every 2 hours starting at 07/17 1000    Progressive Mobility Protocol (mobilize patient to their highest level of functioning at least twice daily) starting at 07/16 2000    Diet Adult Regular (IDDSI Level 7) Thin: Regular starting at 07/16 1250        Full Code     Level III visit    Anticipate transfer to floor in AM    Arcelia Foster MD  Neurocritical Care  [facility] - Neuro Critical Care

## 2022-07-17 NOTE — ASSESSMENT & PLAN NOTE
S/p L pterional craniotomy for clip ligation of L ant. Choroidal & L pCommA aneurysms 7/15    - Post-op CTA and DSA w/o residual filling  - Drain in place, management per NSGY   - C/w ancef ppx while drain in place  - C/w dexamethasone, taper per NSGY  - Keppra 500 mg BID x7 days for post-op ppx  - Neuro checks/VS q1hr  - NSGY following  - PT/OT/SLP

## 2022-07-17 NOTE — SUBJECTIVE & OBJECTIVE
Interval History:  Please see hospital course above for details    Review of Systems   Respiratory: Negative.     Cardiovascular: Negative.    Gastrointestinal: Negative.    Neurological:  Positive for light-headedness, numbness and headaches. Negative for seizures, speech difficulty and weakness.     Objective:     Vitals:  Temp: 98.3 °F (36.8 °C)  Pulse: 74  Rhythm: normal sinus rhythm  BP: (!) 149/72  MAP (mmHg): 104  Resp: 15  SpO2: (!) 92 %  O2 Device (Oxygen Therapy): ventilator    Temp  Min: 98.1 °F (36.7 °C)  Max: 98.5 °F (36.9 °C)  Pulse  Min: 73  Max: 90  BP  Min: 129/61  Max: 153/72  MAP (mmHg)  Min: 87  Max: 110  Resp  Min: 15  Max: 35  SpO2  Min: 89 %  Max: 97 %    07/16 0701 - 07/17 0700  In: 1899.7 [P.O.:240; I.V.:1246.5]  Out: 2550 [Urine:2325; Drains:225]   Unmeasured Output  Stool Occurrence: 0       Physical Exam  General Appearance: Not in acute hemodynamic distress  Mental Status Exam: awake, alert, aware, oriented, no aphasia, mild dysarthria  Cranial Nerves: VFF, EOM full, pupils are equal and reactive to light bilaterally, subjective decrease in sensation over L jaw, non-reproducible on exam. Mild L facial swelling noted  Motor: no drift in the UE/LE. Power is 5/5 in both UE/LE. Normal tone.  Sensory: Symmetric to LT in all 4 limbs without extinction  Coordination: FTN without dysmetria, fine motor regular and fast  Vascular: S1/S2 of normal intensity, no S3/S4 appreciated, no murmurs appreciated  Lungs: CTA bilaterally without wheezing  Abdomen: Soft, non-distended, non-tender, BS +      Medications:  Continuous Scheduledamitriptyline, 75 mg, QHS  amLODIPine, 10 mg, Daily  atorvastatin, 40 mg, Daily  carvediloL, 50 mg, BID  ceFAZolin (ANCEF) IVPB, 2 g, Q8H  dexamethasone, 4 mg, Q8H  docusate sodium, 100 mg, Daily  EScitalopram oxalate, 10 mg, Daily  famotidine, 20 mg, BID  heparin (porcine), 5,000 Units, Q8H  levETIRAcetam, 500 mg, BID  mupirocin, , BID  polyethylene glycol, 17 g,  BID    PRNacetaminophen, 650 mg, Q4H PRN  dextrose 10%, 12.5 g, PRN  dextrose 10%, 25 g, PRN  docusate sodium, 100 mg, BID PRN  glucagon (human recombinant), 1 mg, PRN  glucose, 16 g, PRN  glucose, 24 g, PRN  hydrALAZINE, 10 mg, Q4H PRN  HYDROcodone-acetaminophen, 1 tablet, Q4H PRN  HYDROmorphone, 1 mg, Q4H PRN  insulin aspart U-100, 0-5 Units, QID (AC + HS) PRN  magnesium oxide, 800 mg, PRN  magnesium oxide, 800 mg, PRN  ondansetron, 8 mg, Q8H PRN  potassium bicarbonate, 35 mEq, PRN  potassium bicarbonate, 50 mEq, PRN  potassium bicarbonate, 60 mEq, PRN  potassium, sodium phosphates, 2 packet, PRN  potassium, sodium phosphates, 2 packet, PRN  potassium, sodium phosphates, 2 packet, PRN  sodium chloride 0.9%, 10 mL, PRN  sodium chloride 0.9%, 10 mL, PRN      Today I personally reviewed pertinent imaging, laboratory results, notably: CT/CTA w/ expected post-op findings, no significant pneumocephalus/hemorrhage. DSA w/o evidence of residual filling. CBC stable, no leukocytosis. CMP w/ hypokalemia to 3.2 (repleted), creatinine 1.0, LFTs downtrending.     Diet  Diet Adult Regular (IDDSI Level 7) Thin  Diet Adult Regular (IDDSI Level 7) Thin

## 2022-07-17 NOTE — PROGRESS NOTES
[facility] - Neuro Critical Care  Neurosurgery  Progress Note    Subjective:     History of Present Illness: 47F w/ PMH HTN, smoking, CHF and multiple intracranial aneurysms who presents to [facility] [place] for elective L-sided craniotomy for clipping.  In clinic she described having abrupt headache, sharp excruciating and lasting for very short period time 1 5 minutes.  She has past medical history of congestive heart failure, history of coronary angioplasty, hypercholesterolemia, hypertension and stroke in 2017. She had a known the single intracranial aneurysm, when previously she presented to the emergency department after another headache which she found concerns for an additional 2 cerebral aneurysms in the left ICA distribution.  She currently denies any new headache, nausea, vomiting.  She notes she has a 20 pack-year history of smoking.  No known family history of aneurysmal rupture.  She has previous surgery.  She does note she has allergies to Bactrim and lisinopril.  She had a CTA of the head in November 2021. She is accompanied by her mother as well.      Post-Op Info:  Procedure(s) (LRB):  CRANIOTOMY, WITH ANEURYSM CLIPPING (N/A)   2 Days Post-Op     Interval History: 7/17: POD 2 s/p Ant Choroidal/Pcomm aneurysm clipping. NAEON, AFVSS, Exam stable. DSA w/ good clip coverage of Ant Choroidal/Pcomm aneurysms, MCA aneurysm still present as expected. Drain to remain for output. Will remain in unit overnight, TTF in AM    Medications:  Continuous Infusions:   lactated ringers 50 mL/hr at 07/17/22 0900     Scheduled Meds:   amitriptyline  75 mg Oral QHS    amLODIPine  10 mg Oral Daily    atorvastatin  40 mg Oral Daily    carvediloL  50 mg Oral BID    ceFAZolin (ANCEF) IVPB  2 g Intravenous Q8H    dexamethasone  4 mg Intravenous Q6H    docusate sodium  100 mg Oral Daily    EScitalopram oxalate  10 mg Oral Daily    famotidine  20 mg Oral BID    heparin (porcine)  5,000 Units Subcutaneous Q8H    levETIRAcetam  500  mg Oral BID    mupirocin   Nasal BID     PRN Meds:acetaminophen, dextrose 10%, dextrose 10%, docusate sodium, glucagon (human recombinant), glucose, glucose, hydrALAZINE, HYDROcodone-acetaminophen, HYDROmorphone, insulin aspart U-100, magnesium oxide, magnesium oxide, ondansetron, potassium bicarbonate, potassium bicarbonate, potassium bicarbonate, potassium, sodium phosphates, potassium, sodium phosphates, potassium, sodium phosphates, sodium chloride 0.9%, sodium chloride 0.9%     Review of Systems  Objective:     Weight: 93 kg (205 lb)  Body mass index is 37.49 kg/m².  Vital Signs (Most Recent):  Temp: 98.2 °F (36.8 °C) (07/17/22 0700)  Pulse: 75 (07/17/22 1000)  Resp: (!) 23 (07/17/22 1000)  BP: 139/65 (07/17/22 1000)  SpO2: (!) 94 % (07/17/22 1000)   Vital Signs (24h Range):  Temp:  [98.1 °F (36.7 °C)-98.8 °F (37.1 °C)] 98.2 °F (36.8 °C)  Pulse:  [73-90] 75  Resp:  [16-42] 23  SpO2:  [89 %-97 %] 94 %  BP: (129-159)/(59-82) 139/65  Arterial Line BP: (112-137)/(53-72) 126/67     Date 07/17/22 0700 - 07/18/22 0659   Shift 4202-6546 2909-9510 0079-0301 24 Hour Total   INTAKE   I.V.(mL/kg) 122.5(1.3)   122.5(1.3)   IV Piggyback 93.3   93.3   Shift Total(mL/kg) 215.7(2.3)   215.7(2.3)   OUTPUT   Urine(mL/kg/hr) 900   900   Shift Total(mL/kg) 900(9.7)   900(9.7)   Weight (kg) 93 93 93 93                          Closed/Suction Drain 07/15/22 1310 Left  Accordion 10 Fr. (Active)   Site Description Unable to view 07/16/22 0401   Dressing Type Gauze 07/16/22 0401   Dressing Status Dry;Clean;Intact 07/16/22 0401   Dressing Intervention Integrity maintained 07/16/22 0401   Drainage Bloody 07/16/22 0401   Status To bulb suction 07/16/22 0401   Output (mL) 90 mL 07/16/22 0700            Urethral Catheter 07/15/22 0843 Non-latex;Straight-tip;Silicone 16 Fr. (Active)   Site Assessment Clean;Intact 07/16/22 0401   Collection Container Urimeter 07/16/22 0401   Securement Method secured to top of thigh w/ adhesive device  "07/16/22 0401   Catheter Care Performed yes 07/16/22 0401   Reason for Continuing Urinary Catheterization Critically ill in ICU and requiring hourly monitoring of intake/output 07/16/22 0401   CAUTI Prevention Bundle Securement Device in place with 1" slack;Intact seal between catheter & drainage tubing;Drainage bag/urimeter off the floor;Sheeting clip in use;Drainage bag/urimeter not overfilled (<2/3 full);No dependent loops or kinks;Drainage bag/urimeter below bladder 07/15/22 2001   Output (mL) 250 mL 07/16/22 0700       Physical Exam    Neurosurgery Physical Exam  No acute distress  [id number]: Drowsy, easily arousable. opens eyes to voice.   Follows commands well x 4 antigravity  PERRL, EOMI, no facial droop, tongue midline    Incision bandage dry, drain secured.         Significant Labs:  Recent Labs   Lab 07/16/22  0323 07/16/22  1256 07/17/22  0142   * 150* 171*    142 143   K 3.7 3.2* 3.4*    110 109   CO2 23 21* 23   BUN 11 14 19   CREATININE 1.1 1.0 1.1   CALCIUM 9.1 8.9 8.9   MG  --   --  2.0       Recent Labs   Lab 07/15/22  1704 07/16/22  0323 07/17/22  0142   WBC 7.08 11.92 13.49*   HGB 11.8* 11.9* 10.4*   HCT 34.2* 34.2* 30.4*    283 246       No results for input(s): LABPT, INR, APTT in the last 48 hours.    Microbiology Results (last 7 days)       ** No results found for the last 168 hours. **          All pertinent labs from the last 24 hours have been reviewed.    Significant Diagnostics:  I have reviewed all pertinent imaging results/findings within the past 24 hours.    Assessment/Plan:     * Aneurysm of left middle cerebral artery  47F w/ PMH HTN, smoking, CHF and multiple intracranial aneurysms, now s/p elective L-sided craniotomy for clipping 7/15.     --Admitted to neuro ICU   -q1h neurochecks   -Continue ICU care through 7/18  --All labs  & diagnostics reviewed   -CTA 7/15: Expected post-op changes, no strokes or LVOs   -DSA 7/16: Good clip placement on Ant. Choroidal " & PcommA, persistent MCA aneurysm  --SBP <140 (cardene ggt; hydralazine & labetalol PRN; transition to home meds when appropriate)  --Na >135  --Dex 4q8  --Keppra 500 BID  --HOB >30  --Drains to remain at this time on full suction; please record hourly outputs  --PT/OT/OOB  --ADAT  --Pain control  --PUD PPx while steroid treatment ongoing   --TEDs/SCDs/SQH  --Continue to monitor clinically, notify NSGY immediately with any changes in neuro status    Dispo: Ongoing            Gerard Shultz MD  Neurosurgery  [facility] - Neuro Critical Care

## 2022-07-17 NOTE — ASSESSMENT & PLAN NOTE
- Goal SBP<160  - Off cardene gtt this AM   - C/w home amlodipine 10 mg qday, coreg 37.5 mg BID  - Restart home HCTZ 25 mg qday

## 2022-07-18 PROBLEM — J96.02 ACUTE RESPIRATORY FAILURE WITH HYPOXIA AND HYPERCAPNIA: Status: RESOLVED | Noted: 2022-07-15 | Resolved: 2022-07-18

## 2022-07-18 PROBLEM — J96.01 ACUTE RESPIRATORY FAILURE WITH HYPOXIA AND HYPERCAPNIA: Status: RESOLVED | Noted: 2022-07-15 | Resolved: 2022-07-18

## 2022-07-18 LAB
ALBUMIN SERPL BCP-MCNC: 3.4 G/DL (ref 3.5–5.2)
ALP SERPL-CCNC: 120 U/L (ref 55–135)
ALT SERPL W/O P-5'-P-CCNC: 48 U/L (ref 10–44)
ANION GAP SERPL CALC-SCNC: 14 MMOL/L (ref 8–16)
AST SERPL-CCNC: 37 U/L (ref 10–40)
BASOPHILS # BLD AUTO: 0.01 K/UL (ref 0–0.2)
BASOPHILS NFR BLD: 0.1 % (ref 0–1.9)
BILIRUB SERPL-MCNC: 0.3 MG/DL (ref 0.1–1)
BUN SERPL-MCNC: 18 MG/DL (ref 6–20)
CALCIUM SERPL-MCNC: 9.5 MG/DL (ref 8.7–10.5)
CHLORIDE SERPL-SCNC: 102 MMOL/L (ref 95–110)
CO2 SERPL-SCNC: 26 MMOL/L (ref 23–29)
CREAT SERPL-MCNC: 0.9 MG/DL (ref 0.5–1.4)
DIFFERENTIAL METHOD: ABNORMAL
EOSINOPHIL # BLD AUTO: 0 K/UL (ref 0–0.5)
EOSINOPHIL NFR BLD: 0 % (ref 0–8)
ERYTHROCYTE [DISTWIDTH] IN BLOOD BY AUTOMATED COUNT: 14.7 % (ref 11.5–14.5)
EST. GFR  (AFRICAN AMERICAN): >60 ML/MIN/1.73 M^2
EST. GFR  (NON AFRICAN AMERICAN): >60 ML/MIN/1.73 M^2
GLUCOSE SERPL-MCNC: 156 MG/DL (ref 70–110)
HCT VFR BLD AUTO: 32.9 % (ref 37–48.5)
HGB BLD-MCNC: 11.1 G/DL (ref 12–16)
IMM GRANULOCYTES # BLD AUTO: 0.23 K/UL (ref 0–0.04)
IMM GRANULOCYTES NFR BLD AUTO: 1.7 % (ref 0–0.5)
LYMPHOCYTES # BLD AUTO: 1.2 K/UL (ref 1–4.8)
LYMPHOCYTES NFR BLD: 9 % (ref 18–48)
MAGNESIUM SERPL-MCNC: 1.9 MG/DL (ref 1.6–2.6)
MCH RBC QN AUTO: 28.6 PG (ref 27–31)
MCHC RBC AUTO-ENTMCNC: 33.7 G/DL (ref 32–36)
MCV RBC AUTO: 85 FL (ref 82–98)
MONOCYTES # BLD AUTO: 0.6 K/UL (ref 0.3–1)
MONOCYTES NFR BLD: 4.7 % (ref 4–15)
NEUTROPHILS # BLD AUTO: 11.4 K/UL (ref 1.8–7.7)
NEUTROPHILS NFR BLD: 84.5 % (ref 38–73)
NRBC BLD-RTO: 0 /100 WBC
PHOSPHATE SERPL-MCNC: 2.7 MG/DL (ref 2.7–4.5)
PLATELET # BLD AUTO: 263 K/UL (ref 150–450)
PMV BLD AUTO: 11.9 FL (ref 9.2–12.9)
POCT GLUCOSE: 125 MG/DL (ref 70–110)
POCT GLUCOSE: 149 MG/DL (ref 70–110)
POCT GLUCOSE: 163 MG/DL (ref 70–110)
POCT GLUCOSE: 179 MG/DL (ref 70–110)
POTASSIUM SERPL-SCNC: 3.4 MMOL/L (ref 3.5–5.1)
PROT SERPL-MCNC: 7.1 G/DL (ref 6–8.4)
RBC # BLD AUTO: 3.88 M/UL (ref 4–5.4)
SODIUM SERPL-SCNC: 142 MMOL/L (ref 136–145)
WBC # BLD AUTO: 13.43 K/UL (ref 3.9–12.7)

## 2022-07-18 PROCEDURE — 25000003 PHARM REV CODE 250: Performed by: STUDENT IN AN ORGANIZED HEALTH CARE EDUCATION/TRAINING PROGRAM

## 2022-07-18 PROCEDURE — 80053 COMPREHEN METABOLIC PANEL: CPT | Performed by: PHYSICIAN ASSISTANT

## 2022-07-18 PROCEDURE — 25000003 PHARM REV CODE 250: Performed by: NURSE PRACTITIONER

## 2022-07-18 PROCEDURE — 99233 PR SUBSEQUENT HOSPITAL CARE,LEVL III: ICD-10-PCS | Mod: ,,, | Performed by: PSYCHIATRY & NEUROLOGY

## 2022-07-18 PROCEDURE — 25000003 PHARM REV CODE 250: Performed by: PHYSICIAN ASSISTANT

## 2022-07-18 PROCEDURE — 25000003 PHARM REV CODE 250: Performed by: PSYCHIATRY & NEUROLOGY

## 2022-07-18 PROCEDURE — 92526 ORAL FUNCTION THERAPY: CPT

## 2022-07-18 PROCEDURE — 63600175 PHARM REV CODE 636 W HCPCS: Performed by: STUDENT IN AN ORGANIZED HEALTH CARE EDUCATION/TRAINING PROGRAM

## 2022-07-18 PROCEDURE — 99024 POSTOP FOLLOW-UP VISIT: CPT | Mod: ,,, | Performed by: NEUROLOGICAL SURGERY

## 2022-07-18 PROCEDURE — 94761 N-INVAS EAR/PLS OXIMETRY MLT: CPT

## 2022-07-18 PROCEDURE — 99233 SBSQ HOSP IP/OBS HIGH 50: CPT | Mod: ,,, | Performed by: PSYCHIATRY & NEUROLOGY

## 2022-07-18 PROCEDURE — 83735 ASSAY OF MAGNESIUM: CPT | Performed by: PHYSICIAN ASSISTANT

## 2022-07-18 PROCEDURE — 99024 PR POST-OP FOLLOW-UP VISIT: ICD-10-PCS | Mod: ,,, | Performed by: NEUROLOGICAL SURGERY

## 2022-07-18 PROCEDURE — 85025 COMPLETE CBC W/AUTO DIFF WBC: CPT | Performed by: PHYSICIAN ASSISTANT

## 2022-07-18 PROCEDURE — 25000003 PHARM REV CODE 250: Performed by: NEUROLOGICAL SURGERY

## 2022-07-18 PROCEDURE — 92523 SPEECH SOUND LANG COMPREHEN: CPT

## 2022-07-18 PROCEDURE — 84100 ASSAY OF PHOSPHORUS: CPT | Performed by: PHYSICIAN ASSISTANT

## 2022-07-18 PROCEDURE — 20000000 HC ICU ROOM

## 2022-07-18 RX ADMIN — POTASSIUM BICARBONATE 35 MEQ: 391 TABLET, EFFERVESCENT ORAL at 04:07

## 2022-07-18 RX ADMIN — DEXAMETHASONE SODIUM PHOSPHATE 4 MG: 4 INJECTION INTRA-ARTICULAR; INTRALESIONAL; INTRAMUSCULAR; INTRAVENOUS; SOFT TISSUE at 06:07

## 2022-07-18 RX ADMIN — FAMOTIDINE 20 MG: 20 TABLET ORAL at 08:07

## 2022-07-18 RX ADMIN — HEPARIN SODIUM 5000 UNITS: 5000 INJECTION INTRAVENOUS; SUBCUTANEOUS at 01:07

## 2022-07-18 RX ADMIN — LEVETIRACETAM 500 MG: 500 TABLET, FILM COATED ORAL at 08:07

## 2022-07-18 RX ADMIN — Medication 2 G: at 06:07

## 2022-07-18 RX ADMIN — HEPARIN SODIUM 5000 UNITS: 5000 INJECTION INTRAVENOUS; SUBCUTANEOUS at 06:07

## 2022-07-18 RX ADMIN — Medication 2 G: at 10:07

## 2022-07-18 RX ADMIN — DOCUSATE SODIUM 100 MG: 100 CAPSULE, LIQUID FILLED ORAL at 08:07

## 2022-07-18 RX ADMIN — DEXAMETHASONE SODIUM PHOSPHATE 4 MG: 4 INJECTION INTRA-ARTICULAR; INTRALESIONAL; INTRAMUSCULAR; INTRAVENOUS; SOFT TISSUE at 10:07

## 2022-07-18 RX ADMIN — ATORVASTATIN CALCIUM 40 MG: 20 TABLET, FILM COATED ORAL at 08:07

## 2022-07-18 RX ADMIN — AMLODIPINE BESYLATE 10 MG: 10 TABLET ORAL at 08:07

## 2022-07-18 RX ADMIN — ESCITALOPRAM OXALATE 10 MG: 10 TABLET ORAL at 08:07

## 2022-07-18 RX ADMIN — Medication 2 G: at 01:07

## 2022-07-18 RX ADMIN — CARVEDILOL 37.5 MG: 25 TABLET, FILM COATED ORAL at 04:07

## 2022-07-18 RX ADMIN — MUPIROCIN: 20 OINTMENT TOPICAL at 09:07

## 2022-07-18 RX ADMIN — POTASSIUM BICARBONATE 35 MEQ: 391 TABLET, EFFERVESCENT ORAL at 10:07

## 2022-07-18 RX ADMIN — AMITRIPTYLINE HYDROCHLORIDE 75 MG: 25 TABLET, FILM COATED ORAL at 08:07

## 2022-07-18 RX ADMIN — CARVEDILOL 37.5 MG: 25 TABLET, FILM COATED ORAL at 08:07

## 2022-07-18 RX ADMIN — DEXAMETHASONE SODIUM PHOSPHATE 4 MG: 4 INJECTION INTRA-ARTICULAR; INTRALESIONAL; INTRAMUSCULAR; INTRAVENOUS; SOFT TISSUE at 01:07

## 2022-07-18 RX ADMIN — HEPARIN SODIUM 5000 UNITS: 5000 INJECTION INTRAVENOUS; SUBCUTANEOUS at 10:07

## 2022-07-18 RX ADMIN — HYDROCHLOROTHIAZIDE 25 MG: 25 TABLET ORAL at 08:07

## 2022-07-18 NOTE — PLAN OF CARE
[facility] - Neuro Critical Care  Discharge Reassessment    Primary Care Provider: Clair Johnson NP    Expected Discharge Date: 7/21/2022\    Patient to step down to the Neurosurgery floor.  Therapy is recommending Inpatient Rehab.  SW Provided list for choices.       Reassessment (most recent)     Discharge Reassessment - 07/18/22 1635        Discharge Reassessment    Assessment Type Discharge Planning Reassessment     Did the patient's condition or plan change since previous assessment? Yes     Communicated CADY with patient/caregiver Yes     Discharge Plan A Rehab     Discharge Plan B Home Health     DME Needed Upon Discharge  none     Discharge Barriers Identified None     Why the patient remains in the hospital Requires continued medical care               Melinda Swanson RN, CCRN-K, [facility]  Neuro-Critical Care   [phone]

## 2022-07-18 NOTE — PROGRESS NOTES
[facility] - Neuro Critical Care  Neurocritical Care  Progress Note    Admit Date: 7/15/2022  Service Date: 07/18/2022  Length of Stay: 3    Subjective:     Chief Complaint: Aneurysm of middle cerebral artery    History of Present Illness: Gina Jenkins 47 yr old female w/ PMH HTN, smoking (20 pack year history), CHF and multiple intracranial aneurysms who is s/p elective L-sided craniotomy for aneurysm clipping. She had a known single intracranial aneurysm previously, but presented to the emergency department after another headache and an additional 2 cerebral aneurysms were found in the left ICA distribution. Will admit to [facility] post-op.    Hospital Course: 07/16/2022: Post-op CT/CTA w/ expected post-op changes, no residual fillings of L anterior choriodal/Pcomm aneurysms s/p clipping. Post-op DSA done this AM, no residual filling. Pt c/o L sided jaw paresthesias, neuro exam intact.   07/17/2022: NAEON. BP control improved, araceli removed.   07/18/2022: NAEON. Stable to SD to NSGY team today.    Interval History: Neuro exam intact, BP controlled    Review of Systems: Denies HA, SOB, chest pain, N/V    Vitals:   Temp: 98 °F (36.7 °C)  Pulse: 69  Rhythm: normal sinus rhythm  BP: 133/80  MAP (mmHg): 101  Resp: 20  SpO2: 96 %  O2 Device (Oxygen Therapy): room air    Temp  Min: 98 °F (36.7 °C)  Max: 98.3 °F (36.8 °C)  Pulse  Min: 64  Max: 72  BP  Min: 111/59  Max: 147/77  MAP (mmHg)  Min: 78  Max: 107  Resp  Min: 15  Max: 33  SpO2  Min: 91 %  Max: 97 %    07/17 0701 - 07/18 0700  In: 485.9 [P.O.:240; I.V.:192.4]  Out: 1800 [Urine:1500; Drains:300]   Unmeasured Output  Urine Occurrence: 1  Stool Occurrence: 1     Examination:   Constitutional: Obese. Well-developed. No apparent distress.   Eyes: Conjunctiva clear, anicteric. Lids no lesions.  Head/Ears/Nose/Mouth/Throat/Neck: Hemovac drain. Dressing CDI. Moist mucous membranes. External ears, nose atraumatic.   Cardiovascular: Regular rhythm. No leg edema.  Respiratory:  Comfortable respirations. Clear to auscultation.  Gastrointestinal: No hernia. Soft, nondistended, nontender. + bowel sounds.    Neurologic:  -GCS E 4 V 5 M 6  -Alert. Oriented to person, place, and time. Speech fluent. Follows commands.  -Cranial nerves: EOM intact, PERRL, R facial droop, + cough  -Motor: Moves all extremities spontaneously, antigravity  -Sensation: Intact to light touch    Medications:   Continuous Scheduledamitriptyline, 75 mg, QHS  amLODIPine, 10 mg, Daily  atorvastatin, 40 mg, Daily  carvediloL, 37.5 mg, BID WM  ceFAZolin (ANCEF) IVPB, 2 g, Q8H  dexamethasone, 4 mg, Q8H  docusate sodium, 100 mg, Daily  EScitalopram oxalate, 10 mg, Daily  famotidine, 20 mg, BID  heparin (porcine), 5,000 Units, Q8H  hydroCHLOROthiazide, 25 mg, Daily  levETIRAcetam, 500 mg, BID  mupirocin, , BID  polyethylene glycol, 17 g, BID    PRNacetaminophen, 650 mg, Q4H PRN  dextrose 10%, 12.5 g, PRN  dextrose 10%, 25 g, PRN  glucagon (human recombinant), 1 mg, PRN  glucose, 16 g, PRN  glucose, 24 g, PRN  hydrALAZINE, 10 mg, Q4H PRN  HYDROcodone-acetaminophen, 1 tablet, Q4H PRN  HYDROmorphone, 1 mg, Q4H PRN  insulin aspart U-100, 0-5 Units, QID (AC + HS) PRN  magnesium oxide, 800 mg, PRN  magnesium oxide, 800 mg, PRN  ondansetron, 8 mg, Q8H PRN  potassium bicarbonate, 35 mEq, PRN  potassium bicarbonate, 50 mEq, PRN  potassium bicarbonate, 60 mEq, PRN  potassium, sodium phosphates, 2 packet, PRN  potassium, sodium phosphates, 2 packet, PRN  potassium, sodium phosphates, 2 packet, PRN  sodium chloride 0.9%, 10 mL, PRN  sodium chloride 0.9%, 10 mL, PRN       Today I independently reviewed pertinent medications, lines/drains/airways, imaging, cardiology results, laboratory results, notably:     ISTAT: No results for input(s): PH, PCO2, PO2, POCSATURATED, HCO3, BE, POCNA, POCK, POCTCO2, POCGLU, POCICA, POCLAC, SAMPLE in the last 24 hours.   Chem:   Recent Labs   Lab 07/18/22  0156      K 3.4*      CO2 26   *    BUN 18   CREATININE 0.9   ESTGFRAFRICA >60.0   EGFRNONAA >60.0   CALCIUM 9.5   MG 1.9   PHOS 2.7   ANIONGAP 14   PROT 7.1   ALBUMIN 3.4*   BILITOT 0.3   ALKPHOS 120   AST 37   ALT 48*     Heme:   Recent Labs   Lab 07/18/22  0156   WBC 13.43*   HGB 11.1*   HCT 32.9*        Endo:   Recent Labs   Lab 07/17/22  2128 07/18/22  0835 07/18/22  1311   POCTGLUCOSE 138* 149* 125*          Assessment/Plan:     Neuro  * Aneurysm of left middle cerebral artery  S/p L pterional craniotomy for clip ligation of L ant. Choroidal & L pCommA aneurysms 7/15    - Post-op CTA and DSA w/o residual filling  - Drain in place, management per NSGY   - C/w ancef ppx while drain in place  - C/w dexamethasone, taper per NSGY  - Keppra 500 mg BID x7 days for post-op ppx  - Neuro checks/VS q1hr  - NSGY following  - PT/OT/SLP- recommending rehab  - Dispo: Patient stable to SD to NSGY team    Pulmonary  Other emphysema  2/2 active tobacco abuse w/ 30 pk yr hx    - Some issues w/ oxygenation in OR/immediately post-op; resolved s/p pt recovering from anesthesia  - Sating well on RA   - Goal SpO2>88%  - Encourage IS as tolerated    Cardiac/Vascular  Chronic diastolic heart failure  TTE w/ EF 65%    - Goal euvolemia    Essential hypertension  - Goal SBP<160  - C/w home amlodipine 10 mg qday, coreg 37.5 mg BID, HCTZ 25 mg qday    Other  Tobacco abuse  Nicotine patch if needed    The patient is being Prophylaxed for:  Venous Thromboembolism with: Mechanical or Chemical  Stress Ulcer with: H2B  Ventilator Pneumonia with: not applicable    Activity Orders          Turn patient every 2 hours starting at 07/17 1000    Progressive Mobility Protocol (mobilize patient to their highest level of functioning at least twice daily) starting at 07/16 2000    Diet Adult Regular (IDDSI Level 7) Thin: Regular starting at 07/16 1250        Full Code     Level III    Cherelle Proctor NP  Neurocritical Care  [facility] - Neuro Critical Care

## 2022-07-18 NOTE — PLAN OF CARE
[facility] Care Plan    POC reviewed with Gina Gregory and family at 1400. Pt verbalized understanding. Questions and concerns addressed. No acute events today. Pt progressing toward goals. Will continue to monitor. See below and flowsheets for full assessment and VS info.     -SG drain to gravity  -TTF orders placed              Is this a stroke patient? no    Neuro:  Cotati Coma Scale  Best Eye Response: 4-->(E4) spontaneous  Best Motor Response: 6-->(M6) obeys commands  Best Verbal Response: 5-->(V5) oriented  Cotati Coma Scale Score: 15  Assessment Qualifiers: patient not sedated/intubated, no eye obstruction present  Pupil PERRLA: yes     24 hr Temp:  [98 °F (36.7 °C)-98.3 °F (36.8 °C)]     CV:   Rhythm: normal sinus rhythm  BP goals:   SBP < 160  MAP > 65    Resp:   O2 Device (Oxygen Therapy): room air       Plan: N/A    GI/:     Diet/Nutrition Received: regular  Last Bowel Movement: 07/17/22  Voiding Characteristics: voids spontaneously without difficulty    Intake/Output Summary (Last 24 hours) at 7/18/2022 1407  Last data filed at 7/18/2022 1406  Gross per 24 hour   Intake 637.15 ml   Output 1410 ml   Net -772.85 ml     Unmeasured Output  Urine Occurrence: 1  Stool Occurrence: 1    Labs/Accuchecks:  Recent Labs   Lab 07/18/22  0156   WBC 13.43*   RBC 3.88*   HGB 11.1*   HCT 32.9*         Recent Labs   Lab 07/18/22  0156      K 3.4*   CO2 26      BUN 18   CREATININE 0.9   ALKPHOS 120   ALT 48*   AST 37   BILITOT 0.3      Recent Labs   Lab 07/15/22  1020   INR 1.0   APTT 23.2    No results for input(s): CPK, CPKMB, TROPONINI, MB in the last 168 hours.    Electrolytes: Electrolytes replaced  Accuchecks: ACHS    Gtts:      LDA/Wounds:  Lines/Drains/Airways       Drain  Duration                  Closed/Suction Drain 07/15/22 1310 Left  Accordion 10 Fr. 3 days    Female External Urinary Catheter 07/17/22 2130 <1 day              Peripheral Intravenous Line  Duration                   Peripheral IV - Single Lumen 18 G Left Hand -- days         Peripheral IV - Single Lumen 07/17/22 1532 18 G Posterior;Right Hand <1 day                  Wounds: Yes  Wound care consulted: No

## 2022-07-18 NOTE — ASSESSMENT & PLAN NOTE
47F w/ PMH HTN, smoking, CHF and multiple intracranial aneurysms, now s/p elective L-sided craniotomy for clipping 7/15.     --Admitted to neuro ICU   -q1h neurochecks   -Continue ICU care through 7/18  --All labs  & diagnostics reviewed   -CTA 7/15: Expected post-op changes, no strokes or LVOs   -DSA 7/16: Good clip placement on Ant. Choroidal & PcommA, persistent MCA aneurysm  --SBP <140 (cardene ggt; hydralazine & labetalol PRN; transition to home meds when appropriate)  --Na >135  --Dex 4q8  --Keppra 500 BID  --HOB >30  --Drain to gravity; please record hourly outputs, likely pull tomorrow  --PT/OT/OOB  --ADAT  --Pain control  --PUD PPx while steroid treatment ongoing   --TEDs/SCDs/SQH  --Continue to monitor clinically, notify NSGY immediately with any changes in neuro status    Dispo: Ongoing

## 2022-07-18 NOTE — PLAN OF CARE
07/18/22 1617   Post-Acute Status   Post-Acute Authorization Placement   Post-Acute Placement Status Referrals Sent  (rehab)     SW met with Pt at bedside. Discussed therapy recs for rehab and provided list. Addressed questions and reported need to send referrals to obtain accepting options. Pt agreeable and will review the list for preferences asap.     SW sent referrals via [facility] to KELSIE Irwin, and Lian.    Maria Teresa Reyes LCSW  Neurocritical Care   [facility]  [id number]

## 2022-07-18 NOTE — SUBJECTIVE & OBJECTIVE
Interval History: 7/18: BONNY LAURENVSS. Drain 120cc, moved to gravity today    Medications:  Continuous Infusions:  Scheduled Meds:   amitriptyline  75 mg Oral QHS    amLODIPine  10 mg Oral Daily    atorvastatin  40 mg Oral Daily    carvediloL  37.5 mg Oral BID WM    ceFAZolin (ANCEF) IVPB  2 g Intravenous Q8H    dexamethasone  4 mg Intravenous Q8H    docusate sodium  100 mg Oral Daily    EScitalopram oxalate  10 mg Oral Daily    famotidine  20 mg Oral BID    heparin (porcine)  5,000 Units Subcutaneous Q8H    hydroCHLOROthiazide  25 mg Oral Daily    levETIRAcetam  500 mg Oral BID    mupirocin   Nasal BID    polyethylene glycol  17 g Oral BID     PRN Meds:acetaminophen, dextrose 10%, dextrose 10%, glucagon (human recombinant), glucose, glucose, hydrALAZINE, HYDROcodone-acetaminophen, HYDROmorphone, insulin aspart U-100, magnesium oxide, magnesium oxide, ondansetron, potassium bicarbonate, potassium bicarbonate, potassium bicarbonate, potassium, sodium phosphates, potassium, sodium phosphates, potassium, sodium phosphates, sodium chloride 0.9%, sodium chloride 0.9%     Review of Systems  Objective:     Weight: 93 kg (205 lb 0.4 oz)  Body mass index is 37.5 kg/m².  Vital Signs (Most Recent):  Temp: 98 °F (36.7 °C) (07/18/22 1501)  Pulse: 69 (07/18/22 1501)  Resp: 20 (07/18/22 1501)  BP: 133/80 (07/18/22 1501)  SpO2: 96 % (07/18/22 1501) Vital Signs (24h Range):  Temp:  [98 °F (36.7 °C)-98.3 °F (36.8 °C)] 98 °F (36.7 °C)  Pulse:  [64-72] 69  Resp:  [15-33] 20  SpO2:  [91 %-97 %] 96 %  BP: (111-147)/(59-83) 133/80     Date 07/18/22 0700 - 07/19/22 0659   Shift 0098-1537 3066-6608 2206-3894 24 Hour Total   INTAKE   P.O. 50   50   IV Piggyback 297   297   Shift Total(mL/kg) 347(3.7)   347(3.7)   OUTPUT   Urine(mL/kg/hr) 300(0.4) 300  600   Shift Total(mL/kg) 300(3.2) 300(3.2)  600(6.5)   Weight (kg) 93 93 93 93                        Closed/Suction Drain 07/15/22 1310 Left  Accordion 10 Fr. (Active)   Site Description Unable  to view 07/18/22 1501   Dressing Type Biopatch in place;Gauze 07/18/22 1501   Dressing Status Clean;Dry;Intact 07/18/22 1501   Dressing Intervention Integrity maintained 07/18/22 1501   Drainage Bloody 07/18/22 1501   Status Other (Comment) 07/18/22 1501   Output (mL) 180 mL 07/18/22 0605       Female External Urinary Catheter 07/17/22 2130 (Active)   Skin no redness;no breakdown;perineum cleansed w/ soap and water 07/18/22 1501   Tolerance no signs/symptoms of discomfort 07/18/22 1501   Suction Continuous suction at 50 mmHg 07/18/22 1501   Date of last wick change 07/18/22 07/18/22 1501   Time of last wick change 1501 07/18/22 1501   Output (mL) 300 mL 07/18/22 1501       Physical Exam    Neurosurgery Physical Exam    E3v4M6: Drowsy, easily arousable. opens eyes to voice.   Follows commands well x 4 antigravity  PERRL, EOMI, no facial droop, tongue midline     Incision bandage dry, drain secured.     Significant Labs:  Recent Labs   Lab 07/17/22  0142 07/18/22  0156   * 156*    142   K 3.4* 3.4*    102   CO2 23 26   BUN 19 18   CREATININE 1.1 0.9   CALCIUM 8.9 9.5   MG 2.0 1.9     Recent Labs   Lab 07/17/22  0142 07/18/22  0156   WBC 13.49* 13.43*   HGB 10.4* 11.1*   HCT 30.4* 32.9*    263     No results for input(s): LABPT, INR, APTT in the last 48 hours.  Microbiology Results (last 7 days)       ** No results found for the last 168 hours. **          All pertinent labs from the last 24 hours have been reviewed.    Significant Diagnostics:  I have reviewed and interpreted all pertinent imaging results/findings within the past 24 hours.  No results found in the last 24 hours.

## 2022-07-18 NOTE — ASSESSMENT & PLAN NOTE
S/p L pterional craniotomy for clip ligation of L ant. Choroidal & L pCommA aneurysms 7/15    - Post-op CTA and DSA w/o residual filling  - Drain in place, management per NSGY   - C/w ancef ppx while drain in place  - C/w dexamethasone, taper per NSGY  - Keppra 500 mg BID x7 days for post-op ppx  - Neuro checks/VS q1hr  - NSGY following  - PT/OT/SLP- recommending rehab  - Dispo: Patient stable to SD to NSGY team

## 2022-07-18 NOTE — PT/OT/SLP PROGRESS
Occupational Therapy      Patient Name:  Gina Jenkins   MRN:  [id number]    Patient not seen today secondary to  (treatment deferred, remains appropriate for OT.). Will follow-up 07/19/22.    7/18/2022

## 2022-07-18 NOTE — PT/OT/SLP EVAL
Speech Language Pathology Evaluation  Cognitive Communication    Patient Name:  Gina Jenkins   MRN:  [id number]  Admitting Diagnosis: Aneurysm of middle cerebral artery    Recommendations:     Recommendations:                General Recommendations:  Dysphagia therapy  Diet recommendations:  Regular, Thin   Aspiration Precautions: Standard aspiration precautions   General Precautions: Standard, aspiration, fall  Communication strategies:  none    History:     Past Medical History:   Diagnosis Date    Brain aneurysm     CHF (congestive heart failure)     H/O coronary angioplasty     Hypercholesteremia     Hypertension     Malignant hypertension     Migraine headache     Stroke 10/2017       Past Surgical History:   Procedure Laterality Date    CARDIAC CATHETERIZATION      CEREBRAL ANGIOGRAM      CLIP LIGATION OF INTRACRANIAL ANEURYSM BY CRANIOTOMY N/A 7/15/2022    Procedure: CRANIOTOMY, WITH ANEURYSM CLIPPING;  Surgeon: Timothy Diaz MD;  Location: [facility] OR [address] [place] [address];  Service: Neurosurgery;  Laterality: N/A;  PTERIONAL CRANIOTOMY WITH CLIP LIGATION OF L PCOMM, L ANTERIOR CHOROIDAL, L MCA  ANEURYSM, ANESTHESIA: GENERAL, BLOOD: TYPE&CROSS 2 UNITS, NEUROMONITORING: SEP, MEP, EEG, RADIOLOGY: C-ARM, POSITION: SUPINE, ANNA, CO-SURGERON: DR. LEXI DOMÍNGUEZ.       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 Pt reports she is independent with all activities and fiances for her household. Pt also reports she completed the 11th grade and memory at baseline has always been troublesome.   Subjective     Pt awake and alert eating AM meal upon arrival     Pain/Comfort:  Pain Rating 1: 2/10  Location - Orientation 1: generalized  Location 1: head  Pain Rating Post-Intervention 1: 2/10    Respiratory Status: Room air    Objective:   Cognitive Status:    Arousal/Alertness Appropriate response to  stimuli  Attention No obvious deficits observed    Orientation Oriented x4  Memory Immediate Recall 3/3 and Delayed1/3 increased to 3/3 with SLP verbal cueing ; pt reports memory issues at baseline and frequently makes lists for grocery store and keeps a calendar for appointments   Problem Solving Conclusions WFL, Categories avg 9 items per catergory (standard adult avg 15-20) , Sequencing not yet assessed and Compare/contrast warranted moderate cueing to complete with 70% acc       Receptive Language:   Comprehension:      WFL    Pragmatics:    WFL    Expressive Language:  Verbal:    Verbal language skills were wfl with no evidence of aphasia.  Pt. Expressed their thoughts coherently in conversation with no evidence of confusion or word finding deficits       Motor Speech:  WFL    Voice:   WFL    Visual-Spatial:  WFL pt completed symmetric clock drawing independently     Reading:   not yet assessed      Written Expression:   WFL  Dominant hand: Right    Treatment: Pt observed consuming regular AM meal upon arrival. Pt tolerating without difficulty. Pt observed to consume regular solids x6 and single sips of thin liquids across 4oz without overt clinical signs of aspiration. SLP discussed continue current diet however speech to continue to follow for subtle cognitive-linguistic impairments.     Assessment:   Gina Jenkins is a 47 y.o. female with an SLP diagnosis of mild Cognitive-Linguistic Impairment.      Goals:   Multidisciplinary Problems     SLP Goals        Problem: SLP    Goal Priority Disciplines Outcome   SLP Goal     SLP    Description: Goals expected to be met by 7/24:  1. Pt will tolerate least restrictive diet without overt s/sx airway threat.   2. Pt will participate in mental flexibility with 80% acc with min cues   3. Pt will participate in delayed recall tasks with 80% with min cues                         Plan:   · Patient to be seen:  4 x/week   · Plan of Care expires:     · Plan of Care  reviewed with:  patient   · SLP Follow-Up:  Yes       Discharge recommendations:  Discharge Facility/Level of Care Needs: rehabilitation facility   Barriers to Discharge:  None    Time Tracking:   SLP Treatment Date:   07/18/22  Speech Start Time:  0838  Speech Stop Time:  0850     Speech Total Time (min):  12 min    Billable Minutes: Eval 6  and Treatment Swallowing Dysfunction 6    07/18/2022

## 2022-07-18 NOTE — SUBJECTIVE & OBJECTIVE
Interval History: Neuro exam intact, BP controlled    Review of Systems: Denies HA, SOB, chest pain, N/V    Vitals:   Temp: 98 °F (36.7 °C)  Pulse: 69  Rhythm: normal sinus rhythm  BP: 133/80  MAP (mmHg): 101  Resp: 20  SpO2: 96 %  O2 Device (Oxygen Therapy): room air    Temp  Min: 98 °F (36.7 °C)  Max: 98.3 °F (36.8 °C)  Pulse  Min: 64  Max: 72  BP  Min: 111/59  Max: 147/77  MAP (mmHg)  Min: 78  Max: 107  Resp  Min: 15  Max: 33  SpO2  Min: 91 %  Max: 97 %    07/17 0701 - 07/18 0700  In: 485.9 [P.O.:240; I.V.:192.4]  Out: 1800 [Urine:1500; Drains:300]   Unmeasured Output  Urine Occurrence: 1  Stool Occurrence: 1     Examination:   Constitutional: Obese. Well-developed. No apparent distress.   Eyes: Conjunctiva clear, anicteric. Lids no lesions.  Head/Ears/Nose/Mouth/Throat/Neck: Hemovac drain. Dressing CDI. Moist mucous membranes. External ears, nose atraumatic.   Cardiovascular: Regular rhythm. No leg edema.  Respiratory: Comfortable respirations. Clear to auscultation.  Gastrointestinal: No hernia. Soft, nondistended, nontender. + bowel sounds.    Neurologic:  -GCS E 4 V 5 M 6  -Alert. Oriented to person, place, and time. Speech fluent. Follows commands.  -Cranial nerves: EOM intact, PERRL, R facial droop, + cough  -Motor: Moves all extremities spontaneously, antigravity  -Sensation: Intact to light touch    Medications:   Continuous Scheduledamitriptyline, 75 mg, QHS  amLODIPine, 10 mg, Daily  atorvastatin, 40 mg, Daily  carvediloL, 37.5 mg, BID WM  ceFAZolin (ANCEF) IVPB, 2 g, Q8H  dexamethasone, 4 mg, Q8H  docusate sodium, 100 mg, Daily  EScitalopram oxalate, 10 mg, Daily  famotidine, 20 mg, BID  heparin (porcine), 5,000 Units, Q8H  hydroCHLOROthiazide, 25 mg, Daily  levETIRAcetam, 500 mg, BID  mupirocin, , BID  polyethylene glycol, 17 g, BID    PRNacetaminophen, 650 mg, Q4H PRN  dextrose 10%, 12.5 g, PRN  dextrose 10%, 25 g, PRN  glucagon (human recombinant), 1 mg, PRN  glucose, 16 g, PRN  glucose, 24 g,  PRN  hydrALAZINE, 10 mg, Q4H PRN  HYDROcodone-acetaminophen, 1 tablet, Q4H PRN  HYDROmorphone, 1 mg, Q4H PRN  insulin aspart U-100, 0-5 Units, QID (AC + HS) PRN  magnesium oxide, 800 mg, PRN  magnesium oxide, 800 mg, PRN  ondansetron, 8 mg, Q8H PRN  potassium bicarbonate, 35 mEq, PRN  potassium bicarbonate, 50 mEq, PRN  potassium bicarbonate, 60 mEq, PRN  potassium, sodium phosphates, 2 packet, PRN  potassium, sodium phosphates, 2 packet, PRN  potassium, sodium phosphates, 2 packet, PRN  sodium chloride 0.9%, 10 mL, PRN  sodium chloride 0.9%, 10 mL, PRN       Today I independently reviewed pertinent medications, lines/drains/airways, imaging, cardiology results, laboratory results, notably:     ISTAT: No results for input(s): PH, PCO2, PO2, POCSATURATED, HCO3, BE, POCNA, POCK, POCTCO2, POCGLU, POCICA, POCLAC, SAMPLE in the last 24 hours.   Chem:   Recent Labs   Lab 07/18/22  0156      K 3.4*      CO2 26   *   BUN 18   CREATININE 0.9   ESTGFRAFRICA >60.0   EGFRNONAA >60.0   CALCIUM 9.5   MG 1.9   PHOS 2.7   ANIONGAP 14   PROT 7.1   ALBUMIN 3.4*   BILITOT 0.3   ALKPHOS 120   AST 37   ALT 48*     Heme:   Recent Labs   Lab 07/18/22  0156   WBC 13.43*   HGB 11.1*   HCT 32.9*        Endo:   Recent Labs   Lab 07/17/22  2128 07/18/22  0835 07/18/22  1311   POCTGLUCOSE 138* 149* 125*

## 2022-07-18 NOTE — PROGRESS NOTES
[facility] - Neuro Critical Care  Neurosurgery  Progress Note    Subjective:     History of Present Illness: 47F w/ PMH HTN, smoking, CHF and multiple intracranial aneurysms who presents to [facility] for elective L-sided craniotomy for clipping.  In clinic she described having abrupt headache, sharp excruciating and lasting for very short period time 1 5 minutes.  She has past medical history of congestive heart failure, history of coronary angioplasty, hypercholesterolemia, hypertension and stroke in 2017. She had a known the single intracranial aneurysm, when previously she presented to the emergency department after another headache which she found concerns for an additional 2 cerebral aneurysms in the left ICA distribution.  She currently denies any new headache, nausea, vomiting.  She notes she has a 20 pack-year history of smoking.  No known family history of aneurysmal rupture.  She has previous surgery.  She does note she has allergies to Bactrim and lisinopril.  She had a CTA of the head in November 2021. She is accompanied by her mother as well.      Post-Op Info:  Procedure(s) (LRB):  CRANIOTOMY, WITH ANEURYSM CLIPPING (N/A)   3 Days Post-Op     Interval History: 7/18: BONNY ESTES. Drain 120cc, moved to gravity today    Medications:  Continuous Infusions:  Scheduled Meds:   amitriptyline  75 mg Oral QHS    amLODIPine  10 mg Oral Daily    atorvastatin  40 mg Oral Daily    carvediloL  37.5 mg Oral BID WM    ceFAZolin (ANCEF) IVPB  2 g Intravenous Q8H    dexamethasone  4 mg Intravenous Q8H    docusate sodium  100 mg Oral Daily    EScitalopram oxalate  10 mg Oral Daily    famotidine  20 mg Oral BID    heparin (porcine)  5,000 Units Subcutaneous Q8H    hydroCHLOROthiazide  25 mg Oral Daily    levETIRAcetam  500 mg Oral BID    mupirocin   Nasal BID    polyethylene glycol  17 g Oral BID     PRN Meds:acetaminophen, dextrose 10%, dextrose 10%, glucagon (human recombinant), glucose, glucose, hydrALAZINE,  HYDROcodone-acetaminophen, HYDROmorphone, insulin aspart U-100, magnesium oxide, magnesium oxide, ondansetron, potassium bicarbonate, potassium bicarbonate, potassium bicarbonate, potassium, sodium phosphates, potassium, sodium phosphates, potassium, sodium phosphates, sodium chloride 0.9%, sodium chloride 0.9%     Review of Systems  Objective:     Weight: 93 kg (205 lb 0.4 oz)  Body mass index is 37.5 kg/m².  Vital Signs (Most Recent):  Temp: 98 °F (36.7 °C) (07/18/22 1501)  Pulse: 69 (07/18/22 1501)  Resp: 20 (07/18/22 1501)  BP: 133/80 (07/18/22 1501)  SpO2: 96 % (07/18/22 1501) Vital Signs (24h Range):  Temp:  [98 °F (36.7 °C)-98.3 °F (36.8 °C)] 98 °F (36.7 °C)  Pulse:  [64-72] 69  Resp:  [15-33] 20  SpO2:  [91 %-97 %] 96 %  BP: (111-147)/(59-83) 133/80     Date 07/18/22 0700 - 07/19/22 0659   Shift 8036-9956 6195-7165 2283-4876 24 Hour Total   INTAKE   P.O. 50   50   IV Piggyback 297   297   Shift Total(mL/kg) 347(3.7)   347(3.7)   OUTPUT   Urine(mL/kg/hr) 300(0.4) 300  600   Shift Total(mL/kg) 300(3.2) 300(3.2)  600(6.5)   Weight (kg) 93 93 93 93                        Closed/Suction Drain 07/15/22 1310 Left  Accordion 10 Fr. (Active)   Site Description Unable to view 07/18/22 1501   Dressing Type Biopatch in place;Gauze 07/18/22 1501   Dressing Status Clean;Dry;Intact 07/18/22 1501   Dressing Intervention Integrity maintained 07/18/22 1501   Drainage Bloody 07/18/22 1501   Status Other (Comment) 07/18/22 1501   Output (mL) 180 mL 07/18/22 0605       Female External Urinary Catheter 07/17/22 2130 (Active)   Skin no redness;no breakdown;perineum cleansed w/ soap and water 07/18/22 1501   Tolerance no signs/symptoms of discomfort 07/18/22 1501   Suction Continuous suction at 50 mmHg 07/18/22 1501   Date of last wick change 07/18/22 07/18/22 1501   Time of last wick change 1501 07/18/22 1501   Output (mL) 300 mL 07/18/22 1501       Physical Exam    Neurosurgery Physical Exam    [id number]: Drowsy, easily arousable.  opens eyes to voice.   Follows commands well x 4 antigravity  PERRL, EOMI, no facial droop, tongue midline     Incision bandage dry, drain secured.     Significant Labs:  Recent Labs   Lab 07/17/22  0142 07/18/22  0156   * 156*    142   K 3.4* 3.4*    102   CO2 23 26   BUN 19 18   CREATININE 1.1 0.9   CALCIUM 8.9 9.5   MG 2.0 1.9     Recent Labs   Lab 07/17/22  0142 07/18/22  0156   WBC 13.49* 13.43*   HGB 10.4* 11.1*   HCT 30.4* 32.9*    263     No results for input(s): LABPT, INR, APTT in the last 48 hours.  Microbiology Results (last 7 days)       ** No results found for the last 168 hours. **          All pertinent labs from the last 24 hours have been reviewed.    Significant Diagnostics:  I have reviewed and interpreted all pertinent imaging results/findings within the past 24 hours.  No results found in the last 24 hours.      Assessment/Plan:     * Aneurysm of left middle cerebral artery  47F w/ PMH HTN, smoking, CHF and multiple intracranial aneurysms, now s/p elective L-sided craniotomy for clipping 7/15.     --Admitted to neuro ICU   -q1h neurochecks   -Continue ICU care through 7/18  --All labs  & diagnostics reviewed   -CTA 7/15: Expected post-op changes, no strokes or LVOs   -DSA 7/16: Good clip placement on Ant. Choroidal & PcommA, persistent MCA aneurysm  --SBP <140 (cardene ggt; hydralazine & labetalol PRN; transition to home meds when appropriate)  --Na >135  --Dex 4q8  --Keppra 500 BID  --HOB >30  --Drain to gravity; please record hourly outputs, likely pull tomorrow  --PT/OT/OOB  --ADAT  --Pain control  --PUD PPx while steroid treatment ongoing   --TEDs/SCDs/SQH  --Continue to monitor clinically, notify NSGY immediately with any changes in neuro status    Dispo: Ongoing            Jeremi Yu MD  Neurosurgery  [facility] - Neuro Critical Care

## 2022-07-18 NOTE — PLAN OF CARE
[facility] Care Plan    POC reviewed with Gina Jenkins and family at 0300. Pt verbalized understanding. Questions and concerns addressed. No acute events overnight. Pt progressing toward goals. Will continue to monitor. See below and flowsheets for full assessment and VS info.     - Neuro exam unchanged overnight  - Drain to full suction; output of 180 cc    Is this a stroke patient? no    Neuro:  Danielle Coma Scale  Best Eye Response: 4-->(E4) spontaneous  Best Motor Response: 6-->(M6) obeys commands  Best Verbal Response: 5-->(V5) oriented  [place] Coma Scale Score: 15  Assessment Qualifiers: no eye obstruction present  Pupil PERRLA: yes     24hr Temp:  [98.2 °F (36.8 °C)-98.3 °F (36.8 °C)]     CV:   Rhythm: normal sinus rhythm  BP goals:   SBP < 160  MAP > 65    Resp:   O2 Device (Oxygen Therapy): room air       Plan: N/A    GI/:     Diet/Nutrition Received: regular  Last Bowel Movement: 07/17/22  Voiding Characteristics: voids spontaneously without difficulty    Intake/Output Summary (Last 24 hours) at 7/18/2022 0633  Last data filed at 7/18/2022 0605  Gross per 24 hour   Intake 598.37 ml   Output 2200 ml   Net -1601.63 ml     Unmeasured Output  Urine Occurrence: 1  Stool Occurrence: 1    Labs/Accuchecks:  Recent Labs   Lab 07/18/22  0156   WBC 13.43*   RBC 3.88*   HGB 11.1*   HCT 32.9*         Recent Labs   Lab 07/18/22  0156      K 3.4*   CO2 26      BUN 18   CREATININE 0.9   ALKPHOS 120   ALT 48*   AST 37   BILITOT 0.3      Recent Labs   Lab 07/15/22  1020   INR 1.0   APTT 23.2    No results for input(s): CPK, CPKMB, TROPONINI, MB in the last 168 hours.    Electrolytes: Electrolytes replaced  Accuchecks: ACHS    Gtts:      LDA/Wounds:  Lines/Drains/Airways       Drain  Duration                  Closed/Suction Drain 07/15/22 1310 Left  Accordion 10 Fr. 2 days    Female External Urinary Catheter 07/17/22 2130 <1 day              Peripheral Intravenous Line  Duration                   Peripheral IV - Single Lumen 18 G Left Hand -- days         Peripheral IV - Single Lumen 07/17/22 1532 18 G Posterior;Right Hand <1 day                  Wounds: No  Wound care consulted: No

## 2022-07-19 LAB
ABO + RH BLD: NORMAL
ALBUMIN SERPL BCP-MCNC: 3.2 G/DL (ref 3.5–5.2)
ALP SERPL-CCNC: 123 U/L (ref 55–135)
ALT SERPL W/O P-5'-P-CCNC: 52 U/L (ref 10–44)
ANION GAP SERPL CALC-SCNC: 12 MMOL/L (ref 8–16)
AST SERPL-CCNC: 41 U/L (ref 10–40)
BASOPHILS # BLD AUTO: 0.02 K/UL (ref 0–0.2)
BASOPHILS NFR BLD: 0.2 % (ref 0–1.9)
BILIRUB SERPL-MCNC: 0.3 MG/DL (ref 0.1–1)
BLD GP AB SCN CELLS X3 SERPL QL: NORMAL
BUN SERPL-MCNC: 27 MG/DL (ref 6–20)
CALCIUM SERPL-MCNC: 9.5 MG/DL (ref 8.7–10.5)
CHLORIDE SERPL-SCNC: 103 MMOL/L (ref 95–110)
CO2 SERPL-SCNC: 27 MMOL/L (ref 23–29)
CREAT SERPL-MCNC: 1.1 MG/DL (ref 0.5–1.4)
DIFFERENTIAL METHOD: ABNORMAL
EOSINOPHIL # BLD AUTO: 0 K/UL (ref 0–0.5)
EOSINOPHIL NFR BLD: 0 % (ref 0–8)
ERYTHROCYTE [DISTWIDTH] IN BLOOD BY AUTOMATED COUNT: 14.1 % (ref 11.5–14.5)
EST. GFR  (AFRICAN AMERICAN): >60 ML/MIN/1.73 M^2
EST. GFR  (NON AFRICAN AMERICAN): >60 ML/MIN/1.73 M^2
GLUCOSE SERPL-MCNC: 162 MG/DL (ref 70–110)
HCT VFR BLD AUTO: 37.8 % (ref 37–48.5)
HGB BLD-MCNC: 12.6 G/DL (ref 12–16)
IMM GRANULOCYTES # BLD AUTO: 0.25 K/UL (ref 0–0.04)
IMM GRANULOCYTES NFR BLD AUTO: 2.1 % (ref 0–0.5)
LYMPHOCYTES # BLD AUTO: 1.3 K/UL (ref 1–4.8)
LYMPHOCYTES NFR BLD: 10.9 % (ref 18–48)
MAGNESIUM SERPL-MCNC: 2.1 MG/DL (ref 1.6–2.6)
MCH RBC QN AUTO: 28.8 PG (ref 27–31)
MCHC RBC AUTO-ENTMCNC: 33.3 G/DL (ref 32–36)
MCV RBC AUTO: 87 FL (ref 82–98)
MONOCYTES # BLD AUTO: 0.6 K/UL (ref 0.3–1)
MONOCYTES NFR BLD: 5.1 % (ref 4–15)
NEUTROPHILS # BLD AUTO: 9.6 K/UL (ref 1.8–7.7)
NEUTROPHILS NFR BLD: 81.7 % (ref 38–73)
NRBC BLD-RTO: 1 /100 WBC
PHOSPHATE SERPL-MCNC: 3.1 MG/DL (ref 2.7–4.5)
PLATELET # BLD AUTO: 304 K/UL (ref 150–450)
PMV BLD AUTO: 12.2 FL (ref 9.2–12.9)
POCT GLUCOSE: 133 MG/DL (ref 70–110)
POCT GLUCOSE: 147 MG/DL (ref 70–110)
POCT GLUCOSE: 225 MG/DL (ref 70–110)
POTASSIUM SERPL-SCNC: 3.6 MMOL/L (ref 3.5–5.1)
PROT SERPL-MCNC: 7.4 G/DL (ref 6–8.4)
RBC # BLD AUTO: 4.37 M/UL (ref 4–5.4)
SODIUM SERPL-SCNC: 142 MMOL/L (ref 136–145)
WBC # BLD AUTO: 11.76 K/UL (ref 3.9–12.7)

## 2022-07-19 PROCEDURE — 85025 COMPLETE CBC W/AUTO DIFF WBC: CPT

## 2022-07-19 PROCEDURE — 11000001 HC ACUTE MED/SURG PRIVATE ROOM

## 2022-07-19 PROCEDURE — 25000003 PHARM REV CODE 250: Performed by: NEUROLOGICAL SURGERY

## 2022-07-19 PROCEDURE — 97116 GAIT TRAINING THERAPY: CPT

## 2022-07-19 PROCEDURE — 92507 TX SP LANG VOICE COMM INDIV: CPT

## 2022-07-19 PROCEDURE — 99024 PR POST-OP FOLLOW-UP VISIT: ICD-10-PCS | Mod: ,,, | Performed by: NEUROLOGICAL SURGERY

## 2022-07-19 PROCEDURE — 99024 POSTOP FOLLOW-UP VISIT: CPT | Mod: ,,, | Performed by: PHYSICIAN ASSISTANT

## 2022-07-19 PROCEDURE — 25000003 PHARM REV CODE 250: Performed by: PSYCHIATRY & NEUROLOGY

## 2022-07-19 PROCEDURE — 25000003 PHARM REV CODE 250: Performed by: NURSE PRACTITIONER

## 2022-07-19 PROCEDURE — 97535 SELF CARE MNGMENT TRAINING: CPT

## 2022-07-19 PROCEDURE — 63600175 PHARM REV CODE 636 W HCPCS: Performed by: PHYSICIAN ASSISTANT

## 2022-07-19 PROCEDURE — 25000003 PHARM REV CODE 250: Performed by: STUDENT IN AN ORGANIZED HEALTH CARE EDUCATION/TRAINING PROGRAM

## 2022-07-19 PROCEDURE — 99024 PR POST-OP FOLLOW-UP VISIT: ICD-10-PCS | Mod: ,,, | Performed by: PHYSICIAN ASSISTANT

## 2022-07-19 PROCEDURE — 63600175 PHARM REV CODE 636 W HCPCS: Performed by: STUDENT IN AN ORGANIZED HEALTH CARE EDUCATION/TRAINING PROGRAM

## 2022-07-19 PROCEDURE — 84100 ASSAY OF PHOSPHORUS: CPT

## 2022-07-19 PROCEDURE — 99024 POSTOP FOLLOW-UP VISIT: CPT | Mod: ,,, | Performed by: NEUROLOGICAL SURGERY

## 2022-07-19 PROCEDURE — 80053 COMPREHEN METABOLIC PANEL: CPT

## 2022-07-19 PROCEDURE — 36415 COLL VENOUS BLD VENIPUNCTURE: CPT

## 2022-07-19 PROCEDURE — 86901 BLOOD TYPING SEROLOGIC RH(D): CPT | Performed by: PSYCHIATRY & NEUROLOGY

## 2022-07-19 PROCEDURE — 25000003 PHARM REV CODE 250: Performed by: PHYSICIAN ASSISTANT

## 2022-07-19 PROCEDURE — 83735 ASSAY OF MAGNESIUM: CPT

## 2022-07-19 RX ORDER — DEXAMETHASONE 4 MG/1
4 TABLET ORAL EVERY 12 HOURS
Status: DISCONTINUED | OUTPATIENT
Start: 2022-07-20 | End: 2022-07-20 | Stop reason: HOSPADM

## 2022-07-19 RX ORDER — DEXAMETHASONE SODIUM PHOSPHATE 4 MG/ML
4 INJECTION, SOLUTION INTRA-ARTICULAR; INTRALESIONAL; INTRAMUSCULAR; INTRAVENOUS; SOFT TISSUE EVERY 8 HOURS
Status: COMPLETED | OUTPATIENT
Start: 2022-07-19 | End: 2022-07-19

## 2022-07-19 RX ORDER — DEXAMETHASONE 1 MG/1
2 TABLET ORAL EVERY 12 HOURS
Status: DISCONTINUED | OUTPATIENT
Start: 2022-07-26 | End: 2022-07-20 | Stop reason: HOSPADM

## 2022-07-19 RX ORDER — HYDROCODONE BITARTRATE AND ACETAMINOPHEN 10; 325 MG/1; MG/1
1 TABLET ORAL EVERY 6 HOURS PRN
Status: DISCONTINUED | OUTPATIENT
Start: 2022-07-19 | End: 2022-07-20 | Stop reason: HOSPADM

## 2022-07-19 RX ORDER — DEXAMETHASONE 1 MG/1
2 TABLET ORAL DAILY
Status: DISCONTINUED | OUTPATIENT
Start: 2022-07-28 | End: 2022-07-20 | Stop reason: HOSPADM

## 2022-07-19 RX ORDER — DEXAMETHASONE 1 MG/1
2 TABLET ORAL EVERY 8 HOURS
Status: DISCONTINUED | OUTPATIENT
Start: 2022-07-24 | End: 2022-07-20 | Stop reason: HOSPADM

## 2022-07-19 RX ORDER — DEXAMETHASONE 1 MG/1
2 TABLET ORAL EVERY 6 HOURS
Status: DISCONTINUED | OUTPATIENT
Start: 2022-07-22 | End: 2022-07-20 | Stop reason: HOSPADM

## 2022-07-19 RX ORDER — LEVETIRACETAM 500 MG/1
500 TABLET ORAL 2 TIMES DAILY
Status: DISCONTINUED | OUTPATIENT
Start: 2022-07-19 | End: 2022-07-20 | Stop reason: HOSPADM

## 2022-07-19 RX ADMIN — LEVETIRACETAM 500 MG: 500 TABLET ORAL at 09:07

## 2022-07-19 RX ADMIN — MUPIROCIN: 20 OINTMENT TOPICAL at 09:07

## 2022-07-19 RX ADMIN — HYDROCHLOROTHIAZIDE 25 MG: 25 TABLET ORAL at 09:07

## 2022-07-19 RX ADMIN — DEXAMETHASONE SODIUM PHOSPHATE 4 MG: 4 INJECTION INTRA-ARTICULAR; INTRALESIONAL; INTRAMUSCULAR; INTRAVENOUS; SOFT TISSUE at 05:07

## 2022-07-19 RX ADMIN — FAMOTIDINE 20 MG: 20 TABLET ORAL at 09:07

## 2022-07-19 RX ADMIN — ESCITALOPRAM OXALATE 10 MG: 10 TABLET ORAL at 09:07

## 2022-07-19 RX ADMIN — Medication 2 G: at 02:07

## 2022-07-19 RX ADMIN — CARVEDILOL 37.5 MG: 25 TABLET, FILM COATED ORAL at 04:07

## 2022-07-19 RX ADMIN — AMITRIPTYLINE HYDROCHLORIDE 75 MG: 25 TABLET, FILM COATED ORAL at 09:07

## 2022-07-19 RX ADMIN — CARVEDILOL 37.5 MG: 25 TABLET, FILM COATED ORAL at 09:07

## 2022-07-19 RX ADMIN — ATORVASTATIN CALCIUM 40 MG: 20 TABLET, FILM COATED ORAL at 09:07

## 2022-07-19 RX ADMIN — INSULIN ASPART 1 UNITS: 100 INJECTION, SOLUTION INTRAVENOUS; SUBCUTANEOUS at 09:07

## 2022-07-19 RX ADMIN — HEPARIN SODIUM 5000 UNITS: 5000 INJECTION INTRAVENOUS; SUBCUTANEOUS at 05:07

## 2022-07-19 RX ADMIN — DEXAMETHASONE SODIUM PHOSPHATE 4 MG: 4 INJECTION INTRA-ARTICULAR; INTRALESIONAL; INTRAMUSCULAR; INTRAVENOUS; SOFT TISSUE at 10:07

## 2022-07-19 RX ADMIN — DEXAMETHASONE SODIUM PHOSPHATE 4 MG: 4 INJECTION INTRA-ARTICULAR; INTRALESIONAL; INTRAMUSCULAR; INTRAVENOUS; SOFT TISSUE at 02:07

## 2022-07-19 RX ADMIN — Medication 2 G: at 11:07

## 2022-07-19 RX ADMIN — POLYETHYLENE GLYCOL 3350 17 G: 17 POWDER, FOR SOLUTION ORAL at 09:07

## 2022-07-19 RX ADMIN — HYDROCODONE BITARTRATE AND ACETAMINOPHEN 1 TABLET: 10; 325 TABLET ORAL at 04:07

## 2022-07-19 RX ADMIN — LEVETIRACETAM 500 MG: 500 TABLET, FILM COATED ORAL at 09:07

## 2022-07-19 RX ADMIN — Medication 2 G: at 05:07

## 2022-07-19 RX ADMIN — DOCUSATE SODIUM 100 MG: 100 CAPSULE, LIQUID FILLED ORAL at 09:07

## 2022-07-19 RX ADMIN — AMLODIPINE BESYLATE 10 MG: 10 TABLET ORAL at 09:07

## 2022-07-19 RX ADMIN — HEPARIN SODIUM 5000 UNITS: 5000 INJECTION INTRAVENOUS; SUBCUTANEOUS at 09:07

## 2022-07-19 RX ADMIN — HEPARIN SODIUM 5000 UNITS: 5000 INJECTION INTRAVENOUS; SUBCUTANEOUS at 02:07

## 2022-07-19 NOTE — PLAN OF CARE
Problem: Physical Therapy  Goal: Physical Therapy Goal  Description: Goals to be met by: 22     Patient will increase functional independence with mobility by performin. Supine to sit with Modified [place]  2. Sit to supine with Modified [place]  3. Sit to stand transfer with Modified [place]  4. Bed to chair transfer with Modified [place] using LRAD if needed  5. Gait  x 500 feet with Modified [place] using LRAD if needed.   6. Ascend/descend 10 stair with bilateral Handrails with Supervision  7. Lower extremity exercise program x15 reps per handout, with assistance as needed    Outcome: Ongoing, Progressing

## 2022-07-19 NOTE — PROGRESS NOTES
M[facility]ael Schmitt - Neurosurgery ([facility])  Neurosurgery  Progress Note    Subjective:     History of Present Illness: 47F w/ PMH HTN, smoking, CHF and multiple intracranial aneurysms who presents to [facility] for elective L-sided craniotomy for clipping.  In clinic she described having abrupt headache, sharp excruciating and lasting for very short period time 1 5 minutes.  She has past medical history of congestive heart failure, history of coronary angioplasty, hypercholesterolemia, hypertension and stroke in 2017. She had a known the single intracranial aneurysm, when previously she presented to the emergency department after another headache which she found concerns for an additional 2 cerebral aneurysms in the left ICA distribution.  She currently denies any new headache, nausea, vomiting.  She notes she has a 20 pack-year history of smoking.  No known family history of aneurysmal rupture.  She has previous surgery.  She does note she has allergies to Bactrim and lisinopril.  She had a CTA of the head in November 2021. She is accompanied by her mother as well.      Post-Op Info:  Procedure(s) (LRB):  CRANIOTOMY, WITH ANEURYSM CLIPPING (N/A)   4 Days Post-Op     Interval History: NAEON. AFVSS. Drain with 180 cc, to gravity. Keep drain. Neuro stable. Denies headaches or vision changes. Pain well controlled. Motivated to work with therapy.     Medications:  Continuous Infusions:  Scheduled Meds:   amitriptyline  75 mg Oral QHS    amLODIPine  10 mg Oral Daily    atorvastatin  40 mg Oral Daily    carvediloL  37.5 mg Oral BID WM    ceFAZolin (ANCEF) IVPB  2 g Intravenous Q8H    dexamethasone  4 mg Intravenous Q8H    docusate sodium  100 mg Oral Daily    EScitalopram oxalate  10 mg Oral Daily    famotidine  20 mg Oral BID    heparin (porcine)  5,000 Units Subcutaneous Q8H    hydroCHLOROthiazide  25 mg Oral Daily    levETIRAcetam  500 mg Oral BID    mupirocin   Nasal BID    polyethylene glycol  17 g Oral BID     PRN  Meds:acetaminophen, dextrose 10%, dextrose 10%, glucagon (human recombinant), glucose, glucose, hydrALAZINE, HYDROcodone-acetaminophen, HYDROcodone-acetaminophen, insulin aspart U-100, ondansetron     Review of Systems  Objective:     Weight: 93 kg (205 lb 0.4 oz)  Body mass index is 37.5 kg/m².  Vital Signs (Most Recent):  Temp: 98.1 °F (36.7 °C) (07/19/22 1157)  Pulse: 68 (07/19/22 1157)  Resp: 18 (07/19/22 1157)  BP: 135/76 (07/19/22 1157)  SpO2: 96 % (07/19/22 1157) Vital Signs (24h Range):  Temp:  [97 °F (36.1 °C)-98.1 °F (36.7 °C)] 98.1 °F (36.7 °C)  Pulse:  [54-70] 68  Resp:  [10-31] 18  SpO2:  [94 %-97 %] 96 %  BP: (123-168)/(63-90) 135/76                          Closed/Suction Drain 07/15/22 1310 Left  Accordion 10 Fr. (Active)   Site Description Unable to view 07/19/22 0258   Dressing Type Biopatch in place 07/19/22 0258   Dressing Status Clean;Dry;Intact 07/19/22 0258   Dressing Intervention Integrity maintained 07/19/22 0258   Drainage Bloody 07/19/22 0258   Status To bulb suction 07/19/22 0258   Output (mL) 0 mL 07/19/22 0600       Female External Urinary Catheter 07/17/22 2130 (Active)   Skin no redness;no breakdown 07/18/22 2305   Tolerance no signs/symptoms of discomfort 07/18/22 2305   Suction Continuous suction at 60 mmHg 07/18/22 1905   Date of last wick change 07/18/22 07/18/22 1905   Time of last wick change 1905 07/18/22 1905   Output (mL) 100 mL 07/18/22 1801     Neurosurgery Physical Exam  General: well developed, well nourished, no distress.   Head: normocephalic, atraumatic  Neck: No tracheal deviation. No palpable masses. Full ROM.   Neurologic: Alert and oriented. Thought content appropriate.  GCS: E4 V5 M6. GCS Total: 15  Mental Status: Awake, Alert, Oriented x 4  Language: No aphasia  Speech: No dysarthria  Cranial nerves: slight right facial droop, o/w CN II-XII grossly intact.   Eyes: pupils equal, round, reactive to light with accomodation, EOMI.   Pulmonary: normal respirations, no  signs of respiratory distress  Abdomen: soft, non-distended, not tender to palpation    Sensory: intact to light touch throughout  Motor Strength: Moves all extremities spontaneously with good tone.  Grossly full strength upper and lower extremities. No abnormal movements seen.       Vascular: No LE edema.   Skin: Skin is warm, dry and intact.       Cerebellar:   Finger-to-nose: intact bilaterally   Pronator drift: absent bilaterally      Incision: c/d/i with skin edges well approximated with staples. No surrounding erythema or edema. No drainage from incision. No palpable hematoma or underlying fluid collection.      Significant Labs:  Recent Labs   Lab 07/18/22  0156 07/19/22  0540   * 162*    142   K 3.4* 3.6    103   CO2 26 27   BUN 18 27*   CREATININE 0.9 1.1   CALCIUM 9.5 9.5   MG 1.9 2.1     Recent Labs   Lab 07/18/22  0156 07/19/22  0540   WBC 13.43* 11.76   HGB 11.1* 12.6   HCT 32.9* 37.8    304     No results for input(s): LABPT, INR, APTT in the last 48 hours.  Microbiology Results (last 7 days)       ** No results found for the last 168 hours. **          All pertinent labs from the last 24 hours have been reviewed.    Significant Diagnostics:  I have reviewed all pertinent imaging results/findings within the past 24 hours.    Assessment/Plan:     * Aneurysm of left middle cerebral artery  47F w/ PMH HTN, smoking, CHF and multiple intracranial aneurysms, now s/p elective L-sided craniotomy for clipping 7/15.     --Admitted to Neurosurgery   --q4h Neuro checks  --All labs  & diagnostics personally reviewed   -CTA 7/15: Expected post-op changes, no strokes or LVOs   -DSA 7/16: Good clip placement on Ant. Choroidal & PcommA, persistent MCA aneurysm  --HTN: SBP <140 (cardene ggt; hydralazine & labetalol PRN; transition to home meds when appropriate)  --Na >135  --Dex 4q8, start wean over 1 week to off.  --Seizure ppx: Keppra 500 BID x7 days  --HOB >30  --Drain in place: Maintain to  gravity. Keep. Most likely remove tomorrow pending output. Continue IV abx.   --Activity: PT/OT/OOB  --Diet: Regular  --Hyperglycemia: MDSSI, diabetic diet   -Pre-diabetes: Recent A1C 5.8  --PUD PPx while steroid treatment ongoing   --DVT ppx: TEDs/SCDs/SQH  --Continue to monitor clinically, notify NSGY immediately with any changes in neuro status    Dispo: Rehab pending drain removal  Discussed with Dr. Joe Turner PA-C  Neurosurgery  M[facility]ael Schmitt - Neurosurgery ([facility])

## 2022-07-19 NOTE — PT/OT/SLP PROGRESS
Physical Therapy Treatment    Patient Name:  Gina Jenkins   MRN:  [id number]    Recent Surgery: Procedure(s) (LRB):  CRANIOTOMY, WITH ANEURYSM CLIPPING (N/A) 4 Days Post-Op    Recommendations:     Discharge Recommendations:  home health PT w/ 24 hr assist from family  Discharge Equipment Recommendations: none   Barriers to discharge: None    Highest Level of Mobility: Navigated 10 stairs  Assistance Required: CGA w/ B HRs    Assessment:     Gina Jenkins is a 47 y.o. female admitted with a medical diagnosis of Aneurysm of middle cerebral artery.    Pt met with HOB elevated and agreeable to PT treatment. Today's PT treatment focus was on gait training to improve activity tolerance and gait stability. Pt has progressed significantly today as compared to previous session, able to ambulate 500' with SBA and navigate 10 steps with CGA. Pt continues to be limited by decreased safety awareness and will require 24-hr assist from family members for a safe discharge.     Pt is progressing towards acute PT goals appropriately and continues to benefit from acute PT sessions. After hospital discharge, pt would benefit from HHPT w/ assist from family to maximize rehab potential.    Rehab Prognosis: Good; patient would benefit from acute skilled PT services to address these deficits and reach maximum level of function.      Plan:     During this hospitalization, patient to be seen 3 x/week to address the identified rehab impairments via gait training, therapeutic activities, therapeutic exercises, neuromuscular re-education and progress toward the following goals:    · Plan of Care Expires:  08/16/22    This plan of care has been discussed with the patient/caregiver, who was included in its development and is in agreement with the identified goals and treatment plan.     Subjective     Communicated with RN prior to session.  Patient agreeable to participate.     Pain/Comfort:  · Pain Rating 1: 8/10  · Location - Side 1:  "Left  · Location - Orientation 1: generalized  · Location 1: head  · Pain Addressed 1: Reposition  · Pain Rating Post-Intervention 1: 8/10    Chief Complaint: Head pain  Patient/Family Comments/goals: "You made my day better. It was so nice to get out of bed"      Objective:     Patient found HOB elevated with bed alarm, hemovac (avasys camera)  upon PT entry to room.    General Precautions: Standard, aspiration, fall   Orthopedic Precautions:N/A   Braces: N/A         Exams:     Cognition:  o Patient is oriented to Person, Place, Time, Situation, follows commands 100% of the time  - Patient has poor safety awareness and impaired short-term memory      Functional Mobility:    Bed Mobility:  · Supine to Sit: Supervision or Set-up Assistance on L side of bed  · Sit to Supine: Supervision or Set-up Assistance  · Scooting anteriorly to EOB to plant feet on floor: Supervision or Set-up Assistance  · Scooting/Bridging in supine to HOB: Supervision or Set-up Assistance    Transfers:   · Sit to Stand Transfer: Supervision or Set-up Assistance  from EOB with no AD   · Bed to Chair: Activity did not occur due to safety concerns. Pt with multiple attempts to get OOB without assistance             Gait:  · Patient received gait training in hallway ~500 feet with Stand-by Assistance and no AD  · Gait Assessment: occasional unsteady gait, decreased step length and decreased lam  · Gait Pattern Observed: Step-through reciprocal gait  · Comments: All lines remained intact throughout ambulation trial, gait belt utilized. Pt with minimal lateral postural sway, but no overt LOB. Pt distracted by hallway obstacles and requires redirection from therapist for attention to task.    Stair Navigation:    Pt ascended/descended 10 stair(s) with bilateral handrails and Contact Guard Assistance.    Step-to pattern    Balance:  · Static Sit:   · Supervision at EOB   · Normal: Patient able to maintain steady balance without handhold " support.  · Static Stand:   · Supervision with no AD  · Normal: Patient able to maintain steady balance without handhold support.  · Dynamic Stand:  · Stand-By Assist with no AD      Therapeutic Activities/Exercises      Patient assisted with functional mobility as noted above   Patient educated on the importance of early mobility to prevent functional decline during hospital stay   Patient was instructed to utilize staff assistance for mobility/transfers.  o Patient is appropriate to transfer with SBA and RN/PCT assist   Patient educated on PT POC and role of PT in acute care   White board updated regarding patient's safest level of mobility with staff assistance, RN also updated.     AM-PAC 6 CLICK MOBILITY  Turning over in bed (including adjusting bedclothes, sheets and blankets)?: 4  Sitting down on and standing up from a chair with arms (e.g., wheelchair, bedside commode, etc.): 3  Moving from lying on back to sitting on the side of the bed?: 3  Moving to and from a bed to a chair (including a wheelchair)?: 3  Need to walk in hospital room?: 3  Climbing 3-5 steps with a railing?: 3  Basic Mobility Total Score: 19     Patient left HOB elevated with all lines intact, call button in reach, bed alarm on and RN notified.        History/Goals:     PAST MEDICAL HISTORY:  Past Medical History:   Diagnosis Date    Brain aneurysm     CHF (congestive heart failure)     H/O coronary angioplasty     Hypercholesteremia     Hypertension     Malignant hypertension     Migraine headache     Stroke 10/2017       Past Surgical History:   Procedure Laterality Date    CARDIAC CATHETERIZATION      CEREBRAL ANGIOGRAM      CLIP LIGATION OF INTRACRANIAL ANEURYSM BY CRANIOTOMY N/A 7/15/2022    Procedure: CRANIOTOMY, WITH ANEURYSM CLIPPING;  Surgeon: Timothy Diaz MD;  Location: [facility] OR [address] [place] [address];  Service: Neurosurgery;  Laterality: N/A;  PTERIONAL CRANIOTOMY WITH CLIP LIGATION OF L PCOMM, L ANTERIOR CHOROIDAL, L MCA   ANEURYSM, ANESTHESIA: GENERAL, BLOOD: TYPE&CROSS 2 UNITS, NEUROMONITORING: SEP, MEP, EEG, RADIOLOGY: C-ARM, POSITION: SUPINE, ANNA, CO-SURGERON: DR. LEXI DOMÍNGUEZ.       GOALS:   Multidisciplinary Problems     Physical Therapy Goals        Problem: Physical Therapy    Goal Priority Disciplines Outcome Goal Variances Interventions   Physical Therapy Goal     PT, PT/OT Ongoing, Progressing     Description: Goals to be met by: 22     Patient will increase functional independence with mobility by performin. Supine to sit with Modified [place]  2. Sit to supine with Modified [place]  3. Sit to stand transfer with Modified [place]  4. Bed to chair transfer with Modified [place] using LRAD if needed  5. Gait  x 500 feet with Modified [place] using LRAD if needed.   6. Ascend/descend 10 stair with bilateral Handrails with Supervision  7. Lower extremity exercise program x15 reps per handout, with assistance as needed                     Time Tracking:     PT Received On: 22  PT Start Time: 0946     PT Stop Time: 1003  PT Total Time (min): 17 min     Billable Minutes: Gait Training 17      Zoey Penn, PT  2022  Pager# [phone]

## 2022-07-19 NOTE — SUBJECTIVE & OBJECTIVE
Interval History: NAEON. AFVSS. Drain with 180 cc, to gravity. Keep drain. Neuro stable. Denies headaches or vision changes. Pain well controlled. Motivated to work with therapy.     Medications:  Continuous Infusions:  Scheduled Meds:   amitriptyline  75 mg Oral QHS    amLODIPine  10 mg Oral Daily    atorvastatin  40 mg Oral Daily    carvediloL  37.5 mg Oral BID WM    ceFAZolin (ANCEF) IVPB  2 g Intravenous Q8H    dexamethasone  4 mg Intravenous Q8H    docusate sodium  100 mg Oral Daily    EScitalopram oxalate  10 mg Oral Daily    famotidine  20 mg Oral BID    heparin (porcine)  5,000 Units Subcutaneous Q8H    hydroCHLOROthiazide  25 mg Oral Daily    levETIRAcetam  500 mg Oral BID    mupirocin   Nasal BID    polyethylene glycol  17 g Oral BID     PRN Meds:acetaminophen, dextrose 10%, dextrose 10%, glucagon (human recombinant), glucose, glucose, hydrALAZINE, HYDROcodone-acetaminophen, HYDROcodone-acetaminophen, insulin aspart U-100, ondansetron     Review of Systems  Objective:     Weight: 93 kg (205 lb 0.4 oz)  Body mass index is 37.5 kg/m².  Vital Signs (Most Recent):  Temp: 98.1 °F (36.7 °C) (07/19/22 1157)  Pulse: 68 (07/19/22 1157)  Resp: 18 (07/19/22 1157)  BP: 135/76 (07/19/22 1157)  SpO2: 96 % (07/19/22 1157) Vital Signs (24h Range):  Temp:  [97 °F (36.1 °C)-98.1 °F (36.7 °C)] 98.1 °F (36.7 °C)  Pulse:  [54-70] 68  Resp:  [10-31] 18  SpO2:  [94 %-97 %] 96 %  BP: (123-168)/(63-90) 135/76                          Closed/Suction Drain 07/15/22 1310 Left  Accordion 10 Fr. (Active)   Site Description Unable to view 07/19/22 0258   Dressing Type Biopatch in place 07/19/22 0258   Dressing Status Clean;Dry;Intact 07/19/22 0258   Dressing Intervention Integrity maintained 07/19/22 0258   Drainage Bloody 07/19/22 0258   Status To bulb suction 07/19/22 0258   Output (mL) 0 mL 07/19/22 0600       Female External Urinary Catheter 07/17/22 3010 (Active)   Skin no redness;no breakdown 07/18/22 2305   Tolerance no  signs/symptoms of discomfort 07/18/22 2305   Suction Continuous suction at 60 mmHg 07/18/22 1905   Date of last wick change 07/18/22 07/18/22 1905   Time of last wick change 1905 07/18/22 1905   Output (mL) 100 mL 07/18/22 1801     Neurosurgery Physical Exam  General: well developed, well nourished, no distress.   Head: normocephalic, atraumatic  Neck: No tracheal deviation. No palpable masses. Full ROM.   Neurologic: Alert and oriented. Thought content appropriate.  GCS: E4 V5 M6. GCS Total: 15  Mental Status: Awake, Alert, Oriented x 4  Language: No aphasia  Speech: No dysarthria  Cranial nerves: slight right facial droop, o/w CN II-XII grossly intact.   Eyes: pupils equal, round, reactive to light with accomodation, EOMI.   Pulmonary: normal respirations, no signs of respiratory distress  Abdomen: soft, non-distended, not tender to palpation    Sensory: intact to light touch throughout  Motor Strength: Moves all extremities spontaneously with good tone.  Grossly full strength upper and lower extremities. No abnormal movements seen.       Vascular: No LE edema.   Skin: Skin is warm, dry and intact.       Cerebellar:   Finger-to-nose: intact bilaterally   Pronator drift: absent bilaterally      Incision: c/d/i with skin edges well approximated with staples. No surrounding erythema or edema. No drainage from incision. No palpable hematoma or underlying fluid collection.      Significant Labs:  Recent Labs   Lab 07/18/22  0156 07/19/22  0540   * 162*    142   K 3.4* 3.6    103   CO2 26 27   BUN 18 27*   CREATININE 0.9 1.1   CALCIUM 9.5 9.5   MG 1.9 2.1     Recent Labs   Lab 07/18/22  0156 07/19/22  0540   WBC 13.43* 11.76   HGB 11.1* 12.6   HCT 32.9* 37.8    304     No results for input(s): LABPT, INR, APTT in the last 48 hours.  Microbiology Results (last 7 days)       ** No results found for the last 168 hours. **          All pertinent labs from the last 24 hours have been  reviewed.    Significant Diagnostics:  I have reviewed all pertinent imaging results/findings within the past 24 hours.

## 2022-07-19 NOTE — ASSESSMENT & PLAN NOTE
47F w/ PMH HTN, smoking, CHF and multiple intracranial aneurysms, now s/p elective L-sided craniotomy for clipping 7/15.     --Admitted to Neurosurgery   --q4h Neuro checks  --All labs  & diagnostics personally reviewed   -CTA 7/15: Expected post-op changes, no strokes or LVOs   -DSA 7/16: Good clip placement on Ant. Choroidal & PcommA, persistent MCA aneurysm  --HTN: SBP <140 (cardene ggt; hydralazine & labetalol PRN; transition to home meds when appropriate)  --Na >135  --Dex 4q8, start wean over 1 week to off.  --Seizure ppx: Keppra 500 BID x7 days  --HOB >30  --Drain in place: Maintain to gravity. Keep. Most likely remove tomorrow pending output. Continue IV abx.   --Activity: PT/OT/OOB  --Diet: Regular  --Hyperglycemia: MDSSI, diabetic diet   -Pre-diabetes: Recent A1C 5.8  --PUD PPx while steroid treatment ongoing   --DVT ppx: TEDs/SCDs/SQH  --Continue to monitor clinically, notify NSGY immediately with any changes in neuro status    Dispo: Rehab pending drain removal  Discussed with Dr. Diaz

## 2022-07-19 NOTE — PLAN OF CARE
Problem: Adult Inpatient Plan of Care  Goal: Plan of Care Review  Outcome: Ongoing, Progressing  Goal: Absence of Hospital-Acquired Illness or Injury  Outcome: Ongoing, Progressing  Intervention: Prevent Infection  Flowsheets (Taken 7/19/2022 0437)  Infection Prevention: hand hygiene promoted     Problem: Fall Injury Risk  Goal: Absence of Fall and Fall-Related Injury  Outcome: Ongoing, Progressing  Intervention: Promote Injury-Free Environment  Flowsheets (Taken 7/19/2022 0437)  Safety Promotion/Fall Prevention:   assistive device/personal item within reach   bed alarm set   Fall Risk reviewed with patient/family   medications reviewed   nonskid shoes/socks when out of bed   side rails raised x 3   instructed to call staff for mobility

## 2022-07-19 NOTE — PLAN OF CARE
Goals remain appropriate.  Problem: Occupational Therapy  Goal: Occupational Therapy Goal  Description: Goals to be met by: 7/28     Patient will increase functional independence with ADLs by performing:    UE Dressing with Modified [place].  LE Dressing with Modified [place].  Grooming while standing with Modified [place].  Toileting from toilet with Modified [place] for hygiene and clothing management.   Supine to sit with Modified [place].  Step transfer with Modified [place]    Outcome: Ongoing, Progressing

## 2022-07-19 NOTE — PLAN OF CARE
POC discussed with pt with understanding verbalized. Pt awake,alert,oriented,verbally responsive. No distress noted,breathing unlabored. Denies any pain or discomfort at this time. No injuries noted this shift. Bed in low position,call light in reach,side rails up x 2. Hemovac drain still in place and output was 150cc of ss drainage. Will continue to monitor.

## 2022-07-19 NOTE — NURSING
Nursing Transfer Note       Transfer 922    Transfer via bed    Transfer with cardiac monitoring    Transported by Derrick Lawton    Medicines sent: yes    Chart sent with patient: Yes    Belongings sent with patient:yes     Bedside Neuro assessment performed: Yes    Bedside Handoff given to: Tricia     Upon arrival to floor: cardiac monitor applied, patient oriented to room, call bell in reach and bed in lowest position

## 2022-07-19 NOTE — PT/OT/SLP PROGRESS
Speech Language Pathology Treatment    Patient Name:  Gina Jenkins   MRN:  [id number]  Admitting Diagnosis: Aneurysm of middle cerebral artery    Recommendations:                 General Recommendations:  Cognitive-linguistic therapy  Diet recommendations:  Regular, Liquid Diet Level: Thin   Aspiration Precautions: Standard aspiration precautions   General Precautions: Standard, aspiration, fall  Communication strategies:  none    Subjective     Pt awake and alert     Pain/Comfort:  ·   no pain pre/post     Respiratory Status: Room air    Objective:     Has the patient been evaluated by SLP for swallowing?   Yes  Keep patient NPO? No   Current Respiratory Status:        Pt seen for ongoing cognitive treatment. Pt presenting with improved delayed recall compared to previous date recalling 2/3 items independently and increased to 3/3 with SLP verbal and visual cueing. Pt provided an avg of 7 items in a concrete category independently and able to generate 3+ additional items with SLP verbal cues. Pt with difficulty completing compare/contrast tasks without SLP max cues. Pt remained confused by taks and SLP rationale despite review and explanation. SLP discussed speech service will continue to follow for ongoing support for mental flexibility tasks. Should pt discharge home ongoing home speech treatment or outpatient therapies recommended.     Assessment:     Gina Jenkins is a 47 y.o. female with an SLP diagnosis of Cognitive-Linguistic Impairment.        Goals:   Multidisciplinary Problems     SLP Goals        Problem: SLP    Goal Priority Disciplines Outcome   SLP Goal     SLP    Description: Goals expected to be met by 7/24:  1. Pt will tolerate least restrictive diet without overt s/sx airway threat.   2. Pt will participate in mental flexibility with 80% acc with min cues   3. Pt will participate in delayed recall tasks with 80% with min cues                         Plan:     · Patient to be seen:  3 x/week    · Plan of Care expires:     · Plan of Care reviewed with:  patient   · SLP Follow-Up:  Yes       Discharge recommendations:  outpatient speech therapy   Barriers to Discharge:  TBd    Time Tracking:     SLP Treatment Date:   07/19/22  Speech Start Time:  0931  Speech Stop Time:  0942     Speech Total Time (min):  11 min    Billable Minutes: Speech Therapy Individual 11    07/19/2022

## 2022-07-19 NOTE — PT/OT/SLP PROGRESS
"Occupational Therapy   Treatment    Name: Gina Jenkins  MRN: [id number]  Admitting Diagnosis:  Aneurysm of middle cerebral artery  4 Days Post-Op    Recommendations:     Discharge Recommendations: home  Discharge Equipment Recommendations:  none  Barriers to discharge:  None    Assessment:     Gina Jenkins is a 47 y.o. female with a medical diagnosis of Aneurysm of middle cerebral artery.  She presents with performance deficits affecting function are weakness, impaired self care skills, impaired functional mobility.     Rehab Prognosis:  Good; patient would benefit from acute skilled OT services to address these deficits and reach maximum level of function.       Plan:     Patient to be seen 3 x/week to address the above listed problems via self-care/home management, therapeutic activities, therapeutic exercises, neuromuscular re-education  · Plan of Care Expires: 08/15/22  · Plan of Care Reviewed with: patient    Subjective   Patient:  "There is nothing I am worried about being able to do when I get home."  "I have 3 boys at home ages 14, 11 and 10"   Pain/Comfort:  · Pain Rating 1: 8/10  · Location 1: head  · Pain Addressed 1: Reposition  · Pain Rating Post-Intervention 1: 8/10    Objective:     Communicated with: Nurse prior to session.  Patient found supine with bed alarm, hemovac, peripheral IV upon OT entry to room.    General Precautions: Standard, aspiration, fall   Orthopedic Precautions:N/A   Braces: N/A  Respiratory Status: Room air     Occupational Performance:     Bed Mobility:    · Patient completed Rolling/Turning to Left with  modified independence  · Patient completed Rolling/Turning to Right with modified independence  · Patient completed Supine to Sit with modified independence  · Patient completed Sit to Supine with modified independence     Functional Mobility/Transfers:  · Patient completed Sit <> Stand Transfer with modified independence  with  no assistive device   · Functional Mobility: " Modified independent with stand pivot transfers    Activities of Daily Living:  · Grooming: modified independence while standing  · Upper Body Dressing: modified independence while standing  · Lower Body Dressing: modified independence      [facility] 6 Click ADL: 24    Treatment & Education:  Patient alert and oriented x 3; able to follow 4/4 one step commands.  Patient attentive and interactive throughout the session.     Patient left supine with all lines intact, call button in reach and bed alarm onEducation:      GOALS:   Multidisciplinary Problems     Occupational Therapy Goals        Problem: Occupational Therapy    Goal Priority Disciplines Outcome Interventions   Occupational Therapy Goal     OT, PT/OT Ongoing, Progressing    Description: Goals to be met by: 7/28     Patient will increase functional independence with ADLs by performing:    UE Dressing with Modified [place].  LE Dressing with Modified [place].  Grooming while standing with Modified [place].  Toileting from toilet with Modified [place] for hygiene and clothing management.   Supine to sit with Modified [place].  Step transfer with Modified [place]                     Time Tracking:     OT Date of Treatment: 07/19/22  OT Start Time: 0623  OT Stop Time: 0646  OT Total Time (min): 23 min    Billable Minutes:Self Care/Home Management 23    OT/LUKE: OT          7/19/2022

## 2022-07-19 NOTE — NURSING
Pt arrived to room 922 from [facility] w/ previous RN Eliazar @ bedside. POC reviewed with pt and pt verbalized understanding. Questions/Concerns addressed. A&Ox3. Calm/cooperative. NADN. Respirations equal and unlabored. Bed in low position, side rails up x3.  Safety/Fall precautions in place per facility protocol for safety. Instructed pt to press call bell for assistance if needed/for ambulation pt verbalized understanding. VS stable. Neuro assessment completed see assessment. Will continue to monitor closely throughout this 6306-5433 nightshift Safety maintained. Call bell in reach. Bed alarm activated for safety. Oriented pt to unit/room/call bell pt verbalized understanding. Per report from previous RN pt ambulates w/x 1 assistance. Takes medications whole. Continent of B/B.     Skin assessment completed with Kelton MUNOZ skin warm/dry pt has incision to left lateral head from Craniotomy with HV drain intact and puncture w/drsg C/D/I to right groin.

## 2022-07-20 VITALS
RESPIRATION RATE: 17 BRPM | WEIGHT: 205 LBS | SYSTOLIC BLOOD PRESSURE: 121 MMHG | OXYGEN SATURATION: 100 % | TEMPERATURE: 99 F | BODY MASS INDEX: 37.73 KG/M2 | HEART RATE: 63 BPM | DIASTOLIC BLOOD PRESSURE: 66 MMHG | HEIGHT: 62 IN

## 2022-07-20 LAB — POCT GLUCOSE: 126 MG/DL (ref 70–110)

## 2022-07-20 PROCEDURE — 25000003 PHARM REV CODE 250: Performed by: STUDENT IN AN ORGANIZED HEALTH CARE EDUCATION/TRAINING PROGRAM

## 2022-07-20 PROCEDURE — 63600175 PHARM REV CODE 636 W HCPCS: Performed by: PHYSICIAN ASSISTANT

## 2022-07-20 PROCEDURE — 25000003 PHARM REV CODE 250: Performed by: NURSE PRACTITIONER

## 2022-07-20 PROCEDURE — 94761 N-INVAS EAR/PLS OXIMETRY MLT: CPT

## 2022-07-20 PROCEDURE — 25000003 PHARM REV CODE 250: Performed by: PSYCHIATRY & NEUROLOGY

## 2022-07-20 PROCEDURE — 99024 POSTOP FOLLOW-UP VISIT: CPT | Mod: ,,, | Performed by: PHYSICIAN ASSISTANT

## 2022-07-20 PROCEDURE — 63600175 PHARM REV CODE 636 W HCPCS: Performed by: STUDENT IN AN ORGANIZED HEALTH CARE EDUCATION/TRAINING PROGRAM

## 2022-07-20 PROCEDURE — 99024 PR POST-OP FOLLOW-UP VISIT: ICD-10-PCS | Mod: ,,, | Performed by: PHYSICIAN ASSISTANT

## 2022-07-20 PROCEDURE — 25000003 PHARM REV CODE 250: Performed by: PHYSICIAN ASSISTANT

## 2022-07-20 PROCEDURE — 25000003 PHARM REV CODE 250: Performed by: NEUROLOGICAL SURGERY

## 2022-07-20 RX ORDER — HYDROCODONE BITARTRATE AND ACETAMINOPHEN 5; 325 MG/1; MG/1
1 TABLET ORAL EVERY 6 HOURS PRN
Qty: 56 TABLET | Refills: 0 | Status: SHIPPED | OUTPATIENT
Start: 2022-07-20 | End: 2022-08-03

## 2022-07-20 RX ORDER — FAMOTIDINE 20 MG/1
20 TABLET, FILM COATED ORAL 2 TIMES DAILY
Qty: 60 TABLET | Refills: 11 | Status: ON HOLD | OUTPATIENT
Start: 2022-07-20 | End: 2022-09-02 | Stop reason: HOSPADM

## 2022-07-20 RX ORDER — LEVETIRACETAM 500 MG/1
500 TABLET ORAL 2 TIMES DAILY
Qty: 60 TABLET | Refills: 11 | Status: SHIPPED | OUTPATIENT
Start: 2022-07-20 | End: 2022-07-20 | Stop reason: SDUPTHER

## 2022-07-20 RX ORDER — DEXAMETHASONE 2 MG/1
TABLET ORAL
Qty: 22 TABLET | Refills: 0 | Status: ON HOLD | OUTPATIENT
Start: 2022-07-20 | End: 2022-08-23

## 2022-07-20 RX ORDER — LEVETIRACETAM 500 MG/1
500 TABLET ORAL 2 TIMES DAILY
Qty: 10 TABLET | Refills: 0 | Status: ON HOLD | OUTPATIENT
Start: 2022-07-20 | End: 2022-09-02 | Stop reason: HOSPADM

## 2022-07-20 RX ADMIN — FAMOTIDINE 20 MG: 20 TABLET ORAL at 08:07

## 2022-07-20 RX ADMIN — ATORVASTATIN CALCIUM 40 MG: 20 TABLET, FILM COATED ORAL at 08:07

## 2022-07-20 RX ADMIN — DOCUSATE SODIUM 100 MG: 100 CAPSULE, LIQUID FILLED ORAL at 09:07

## 2022-07-20 RX ADMIN — HEPARIN SODIUM 5000 UNITS: 5000 INJECTION INTRAVENOUS; SUBCUTANEOUS at 05:07

## 2022-07-20 RX ADMIN — AMLODIPINE BESYLATE 10 MG: 10 TABLET ORAL at 08:07

## 2022-07-20 RX ADMIN — ESCITALOPRAM OXALATE 10 MG: 10 TABLET ORAL at 08:07

## 2022-07-20 RX ADMIN — Medication 2 G: at 05:07

## 2022-07-20 RX ADMIN — HYDROCHLOROTHIAZIDE 25 MG: 25 TABLET ORAL at 08:07

## 2022-07-20 RX ADMIN — DEXAMETHASONE 4 MG: 4 TABLET ORAL at 08:07

## 2022-07-20 RX ADMIN — CARVEDILOL 37.5 MG: 25 TABLET, FILM COATED ORAL at 08:07

## 2022-07-20 RX ADMIN — MUPIROCIN: 20 OINTMENT TOPICAL at 09:07

## 2022-07-20 RX ADMIN — LEVETIRACETAM 500 MG: 500 TABLET ORAL at 08:07

## 2022-07-20 NOTE — DISCHARGE SUMMARY
Misael Schmitt - Neurosurgery ([facility])  Neurosurgery  Discharge Summary      Patient Name: Gina Jenkins  MRN: [id number]  Admission Date: 7/15/2022  Hospital Length of Stay: 5 days  Discharge Date and Time: 7/20/2022  Attending Physician: Timothy Diaz MD   Discharging Provider: Areli Turner PA-C  Primary Care Provider: Clair Johnson NP    HPI:   47F w/ PMH HTN, smoking, CHF and multiple intracranial aneurysms [place] [facility] for cl[place].  In clinic she described having abrupt headache, sharp excruciating and lasting for very short period time 1 5 minutes.  She has past medical history of congestive heart failure, history of coronary angioplasty, hypercholesterolemia, hypertension and stroke in 2017. She had a known the single intracranial aneurysm, when previously she presented to the emergency department after another headache which she found concerns for an additional 2 cerebral aneurysms in the left ICA distribution.  She currently denies any new headache, nausea, vomiting.  She notes she has a 20 pack-year history of smoking.  No known family history of aneurysmal rupture.  She has previous surgery.  She does note she has allergies to Bactrim and lisinopril.  She had a CTA of the head in November 2021. She is accompanied by her mother as well.      Procedure(s) (LRB):  CRANIOTOMY, WITH ANEURYSM CLIPPING (N/A)     Hospital Course: 7/16: POD 1 s/p Ant Choroidal/Pcomm aneurysm clipping.  150cc in HV drain; CTA with expected post operative change without hemorrhage or vessel occlusion.   7/17: POD 2 s/p Ant Choroidal/Pcomm aneurysm clipping. NAEON, AFVSS, Exam stable. DSA w/ good clip coverage of Ant Choroidal/Pcomm aneurysms, MCA aneurysm still present as expected. Drain to remain for output. Will remain in unit overnight, TTF in AM  7/18: NAEON. AFVSS. Drain 120cc, moved to gravity today  7/19: NAEON. AFVSS. Drain with 180 cc, to gravity. Keep drain. Neuro stable. Denies  headaches or vision changes. Pain well controlled. Motivated to work with therapy.   7/20: NAEON. Patient denies headaches, pain, or vision changes. Voiding spontaneously and recent bowel movement. Drain output clear, discontinued with staple placed. Patient tolerated procedure well. Medically stable for discharge to home with . Incision care and activity recommendations reviewed. Plan of care discussed with patient and mother and they voiced understanding. Their questions were all answered.  OK to restart ASA 2 weeks from surgery. Follow-up in Neurosurgery clinic arranged.       Goals of Care Treatment Preferences:  Code Status: Full Code      Consults: PT/OT    Significant Diagnostic Studies: Labs:   BMP:   Recent Labs   Lab 07/19/22  0540   *      K 3.6      CO2 27   BUN 27*   CREATININE 1.1   CALCIUM 9.5   MG 2.1   , CMP   Recent Labs   Lab 07/19/22  0540      K 3.6      CO2 27   *   BUN 27*   CREATININE 1.1   CALCIUM 9.5   PROT 7.4   ALBUMIN 3.2*   BILITOT 0.3   ALKPHOS 123   AST 41*   ALT 52*   ANIONGAP 12   ESTGFRAFRICA >60.0   EGFRNONAA >60.0    and CBC   Recent Labs   Lab 07/19/22  0540   WBC 11.76   HGB 12.6   HCT 37.8          Radiology:  CTA head and neck, CXR  Neurosurgery Physical Exam  General: well developed, well nourished, no distress.   Head: normocephalic, atraumatic  Neck: No tracheal deviation. No palpable masses. Full ROM.   Neurologic: Alert and oriented. Thought content appropriate.  GCS: E4 V5 M6. GCS Total: 15  Mental Status: Awake, Alert, Oriented x 4  Language: No aphasia  Speech: No dysarthria  Cranial nerves: slight right facial droop, o/w CN II-XII grossly intact.   Eyes: pupils equal, round, reactive to light with accomodation, EOMI.   Pulmonary: normal respirations, no signs of respiratory distress  Abdomen: soft, non-distended, not tender to palpation     Sensory: intact to light touch throughout  Motor Strength: Moves all extremities  spontaneously with good tone.  Grossly full strength upper and lower extremities. No abnormal movements seen.         Vascular: No LE edema.   Skin: Skin is warm, dry and intact.        Cerebellar:   Finger-to-nose: intact bilaterally   Pronator drift: absent bilaterally        Incision: c/d/i with skin edges well approximated with staples. No surrounding erythema or edema. No drainage from incision. No palpable hematoma or underlying fluid collection.       Pending Diagnostic Studies:     Procedure Component Value Units Date/Time    SURG FL Surgery Fluoro Usage [[id number]]     Order Status: Sent Lab Status: No result         Final Active Diagnoses:    Diagnosis Date Noted POA    PRINCIPAL PROBLEM:  Aneurysm of left middle cerebral artery [I67.1] 10/04/2017 Yes    Other emphysema [J43.8] 07/17/2022 Yes    Tobacco abuse [Z72.0] 10/04/2017 Yes    Chronic diastolic heart failure [I50.32] 10/03/2017 Yes     Chronic    Essential hypertension [I10] 03/05/2016 Yes      Problems Resolved During this Admission:    Diagnosis Date Noted Date Resolved POA    Acute respiratory failure with hypoxia and hypercapnia [J96.01, J96.02] 07/15/2022 07/18/2022 No      Discharged Condition: good     [place] [facility] [place]    Follow Up:   Follow-up Information     Misael Silvestre - Neurosurgery [facility] Follow up on 7/29/2022.    Specialty: Neurosurgery  Why: For wound check and staple removal  Contact information:  [address]  [facility] [address] [phone]  Additional information:  8th Floor [facility]   Please park in [facility].   Check in desk is located in the lobby. Please take the C elevator to 8th floor which opens to the lobby.           Timothy Diaz MD Follow up on 8/23/2022.    Specialty: Neurosurgery  Why: Post-op follow up  Contact information:  [address]  [facility] [id number] [phone]                       Patient Instructions:      Notify your health care provider if  you experience any of the following:  increased confusion or weakness     Notify your health care provider if you experience any of the following:  persistent dizziness, light-headedness, or visual disturbances     Notify your health care provider if you experience any of the following:  worsening rash     Notify your health care provider if you experience any of the following:  severe persistent headache     Notify your health care provider if you experience any of the following:  difficulty breathing or increased cough     Notify your health care provider if you experience any of the following:  redness, tenderness, or signs of infection (pain, swelling, redness, odor or green/yellow discharge around incision site)     Notify your health care provider if you experience any of the following:  severe uncontrolled pain     Notify your health care provider if you experience any of the following:  persistent nausea and vomiting or diarrhea     Notify your health care provider if you experience any of the following:  temperature >100.4     Activity as tolerated     Medications:  Reconciled Home Medications:      Medication List      START taking these medications    dexAMETHasone 2 MG tablet  Commonly known as: DECADRON  Take 2 (4mg) tabs every 8 hours for 1 day, then 2 (4mg) tabs every 12 hours for 2 days, then 1 (2mg) tab every 12 hours for 2 days, then 1 (2mg) tab daily for 2 days, then OFF.     famotidine 20 MG tablet  Commonly known as: PEPCID  Take 1 tablet (20 mg total) by mouth 2 (two) times daily.     HYDROcodone-acetaminophen 5-325 mg per tablet  Commonly known as: NORCO  Take 1 tablet by mouth every 6 (six) hours as needed for Pain.     levETIRAcetam 500 MG Tab  Commonly known as: KEPPRA  Take 1 tablet (500 mg total) by mouth 2 (two) times daily. for 5 days        CONTINUE taking these medications    amitriptyline 75 MG tablet  Commonly known as: ELAVIL  Take 75 mg by mouth every evening.     amLODIPine 10 MG  tablet  Commonly known as: NORVASC  Take 1 tablet (10 mg total) by mouth once daily.     atorvastatin 40 MG tablet  Commonly known as: LIPITOR  Take 1 tablet (40 mg total) by mouth once daily.     butalbital-acetaminophen-caffeine -40 mg -40 mg per tablet  Commonly known as: FIORICET, ESGIC  Take 1 tablet by mouth every 4 (four) hours as needed.     carvediloL 25 MG tablet  Commonly known as: COREG  Take 37.5 mg by mouth 2 (two) times daily.     cyanocobalamin 1000 MCG tablet  Commonly known as: VITAMIN B-12  Take 1 tablet (1,000 mcg total) by mouth once daily.     diphenhydrAMINE 25 mg capsule  Commonly known as: BENADRYL  Take 1 each (25 mg total) by mouth every 6 (six) hours as needed for Itching or Allergies.     docusate sodium 100 MG capsule  Commonly known as: COLACE  Take 1 capsule (100 mg total) by mouth 2 (two) times daily as needed for Constipation.     EScitalopram oxalate 10 MG tablet  Commonly known as: LEXAPRO  Take 10 mg by mouth once daily.     hydroCHLOROthiazide 25 MG tablet  Commonly known as: HYDRODIURIL  Take 1 tablet (25 mg total) by mouth once daily.     magnesium oxide 400 mg (241.3 mg magnesium) tablet  Commonly known as: MAG-OX  Take 1 tablet by mouth 2 (two) times daily.     potassium chloride SA 20 MEQ tablet  Commonly known as: K-DUR,KLOR-CON  Take 20 mEq by mouth 2 (two) times daily.        STOP taking these medications    aspirin 81 MG Chew     diclofenac 25 MG Tbec  Commonly known as: VOLTAREN     meloxicam 7.5 MG tablet  Commonly known as: MOBIC     neomycin-polymyxin-hydrocortisone 3.5-10,000-1 mg/mL-unit/mL-% otic suspension  Commonly known as: CORTISPORIN     traMADoL 50 mg tablet  Commonly known as: NICOLE Turner PA-C  Neurosurgery  [facility] - Neurosurgery [facility])

## 2022-07-20 NOTE — PLAN OF CARE
Misael Schmitt - Neurosurgery ([facility])      HOME HEALTH ORDERS  FACE TO FACE ENCOUNTER    Patient Name: Gina Jenkins  YOB: 1975    PCP: Clair Johnson NP   PCP Address: [address] [place] [address] [address] [place] [address] PRIMARY CARE / LESTER *  PCP Phone Number: [phone]  PCP Fax: [phone]    Encounter Date: 6/16/22    Admit to Home Health    Diagnoses:  Active Hospital Problems    Diagnosis  POA    *Aneurysm of left middle cerebral artery [I67.1]  Yes    Other emphysema [J43.8]  Yes    Tobacco abuse [Z72.0]  Yes    Chronic diastolic heart failure [I50.32]  Yes     Chronic    Essential hypertension [I10]  Yes      Resolved Hospital Problems    Diagnosis Date Resolved POA    Acute respiratory failure with hypoxia and hypercapnia [J96.01, J96.02] 07/18/2022 No       Follow Up Appointments:  Future Appointments   Date Time Provider Department Center   7/29/2022 10:20 AM RN, NEUROSURGERY [facility] NEUROS Misael nadir   8/4/2022  2:15 PM Richard Cotto MD [facility] OBGYN [place]   8/23/2022  8:30 AM [facility] OI-CT1 500 LB LIMIT [facility] IC Imaging Ctr   8/23/2022 10:00 AM Timothy Diaz MD [facility] NEUROS Misael nadir   12/8/2022  2:40 PM Mil Adam III, MD [facility] CARDIO [place]       Allergies:  Review of patient's allergies indicates:   Allergen Reactions    Lisinopril Swelling    Bactrim [sulfamethoxazole-trimethoprim] Rash       Medications: Review discharge medications with patient and family and provide education.    Current Facility-Administered Medications   Medication Dose Route Frequency Provider Last Rate Last Admin    acetaminophen tablet 650 mg  650 mg Oral Q4H PRN Gerard Shultz MD   650 mg at 07/16/22 1932    amitriptyline tablet 75 mg  75 mg Oral QHS Gerard Shultz MD   75 mg at 07/19/22 2101    amLODIPine tablet 10 mg  10 mg Oral Daily Gerard Shultz MD   10 mg at 07/20/22 0833    atorvastatin tablet 40 mg  40 mg Oral Daily Gerard Shultz MD   40 mg at 07/20/22 0833    carvediloL  tablet 37.5 mg  37.5 mg Oral BID  Arcelia Foster MD   37.5 mg at 07/20/22 0833    ceFAZolin 2 gram in dextrose 5% 100 mL IVPB (premix)  2 g Intravenous Q8H Gerard Shultz MD   Stopped at 07/20/22 0628    dexAMETHasone tablet 4 mg  4 mg Oral Q12H Areli Turner PA-C   4 mg at 07/20/22 0833    Followed by    [START ON 7/22/2022] dexAMETHasone tablet 2 mg  2 mg Oral Q6H Areli Turner PA-C        Followed by    [START ON 7/24/2022] dexAMETHasone tablet 2 mg  2 mg Oral Q8H Areli Turner PA-C        Followed by    [START ON 7/26/2022] dexAMETHasone tablet 2 mg  2 mg Oral Q12H Areli Turner PA-C        Followed by    [START ON 7/28/2022] dexAMETHasone tablet 2 mg  2 mg Oral Daily Areli Turner PA-C        dextrose 10% bolus 125 mL  12.5 g Intravenous PRN Gwen Lange PA-C        dextrose 10% bolus 250 mL  25 g Intravenous PRN Gwen Lange PA-C        docusate sodium capsule 100 mg  100 mg Oral Daily Timothy Diaz MD   100 mg at 07/20/22 0900    EScitalopram oxalate tablet 10 mg  10 mg Oral Daily Cherelle Proctor NP   10 mg at 07/20/22 0833    famotidine tablet 20 mg  20 mg Oral BID Gerard Shultz MD   20 mg at 07/20/22 0833    glucagon (human recombinant) injection 1 mg  1 mg Intramuscular PRN Gwen Lange PA-C        glucose chewable tablet 16 g  16 g Oral PRN Gwen Lange PA-C        glucose chewable tablet 24 g  24 g Oral PRN Gwen Lange PA-C        heparin (porcine) injection 5,000 Units  5,000 Units Subcutaneous Q8H Gerard Shultz MD   5,000 Units at 07/20/22 0557    hydrALAZINE injection 10 mg  10 mg Intravenous Q4H PRN Soheila Pepper NP        hydroCHLOROthiazide tablet 25 mg  25 mg Oral Daily Arcelia Foster MD   25 mg at 07/20/22 0834    HYDROcodone-acetaminophen  mg per tablet 1 tablet  1 tablet Oral Q6H PRN Areli Turner PA-C   1 tablet at 07/19/22 1655    HYDROcodone-acetaminophen 5-325 mg per tablet 1 tablet  1 tablet  Oral Q4H PRN Gerard Shultz MD   1 tablet at 07/17/22 1031    insulin aspart U-100 pen 0-5 Units  0-5 Units Subcutaneous QID (AC + HS) PRN Gwen Lange PA-C   1 Units at 07/19/22 2111    levETIRAcetam tablet 500 mg  500 mg Oral BID Areli Turner PA-C   500 mg at 07/20/22 0833    ondansetron disintegrating tablet 8 mg  8 mg Oral Q8H PRN Gerard Shultz MD   8 mg at 07/15/22 2131    polyethylene glycol packet 17 g  17 g Oral BID Soheila Pepper NP   17 g at 07/19/22 0918     Current Discharge Medication List      START taking these medications    Details   dexAMETHasone (DECADRON) 2 MG tablet Take 4mg every 8h for 1 day, then 4mg every 12h for 2 days, then 2mg every 12h for 2 days, then 2mg daily for 2 days, then OFF. Tablets: 22  Qty: 22 tablet, Refills: 0      famotidine (PEPCID) 20 MG tablet Take 1 tablet (20 mg total) by mouth 2 (two) times daily.  Qty: 60 tablet, Refills: 11      HYDROcodone-acetaminophen (NORCO) 5-325 mg per tablet Take 1 tablet by mouth every 6 (six) hours as needed for Pain.  Qty: 56 tablet, Refills: 0    Comments: Quantity prescribed more than 7 day supply? Yes, quantity medically necessary      levETIRAcetam (KEPPRA) 500 MG Tab Take 1 tablet (500 mg total) by mouth 2 (two) times daily.  Qty: 60 tablet, Refills: 11         CONTINUE these medications which have NOT CHANGED    Details   amitriptyline (ELAVIL) 75 MG tablet Take 75 mg by mouth every evening.      amlodipine (NORVASC) 10 MG tablet Take 1 tablet (10 mg total) by mouth once daily.  Qty: 30 tablet, Refills: 0      aspirin 81 MG Chew Take 1 tablet (81 mg total) by mouth once daily.  Refills: 0      atorvastatin (LIPITOR) 40 MG tablet Take 1 tablet (40 mg total) by mouth once daily.  Qty: 90 tablet, Refills: 3      carvediloL (COREG) 25 MG tablet Take 37.5 mg by mouth 2 (two) times daily.      butalbital-acetaminophen-caffeine -40 mg (FIORICET, ESGIC) -40 mg per tablet Take 1 tablet by mouth every 4 (four)  hours as needed.      cyanocobalamin (VITAMIN B-12) 1000 MCG tablet Take 1 tablet (1,000 mcg total) by mouth once daily.      diphenhydrAMINE (BENADRYL) 25 mg capsule Take 1 each (25 mg total) by mouth every 6 (six) hours as needed for Itching or Allergies.  Qty: 20 capsule, Refills: 0      docusate sodium (COLACE) 100 MG capsule Take 1 capsule (100 mg total) by mouth 2 (two) times daily as needed for Constipation.  Qty: 30 capsule, Refills: 0      EScitalopram oxalate (LEXAPRO) 10 MG tablet Take 10 mg by mouth once daily.      hydrochlorothiazide (HYDRODIURIL) 25 MG tablet Take 1 tablet (25 mg total) by mouth once daily.  Qty: 30 tablet, Refills: 0      magnesium oxide (MAG-OX) 400 mg (241.3 mg magnesium) tablet Take 1 tablet by mouth 2 (two) times daily.      potassium chloride SA (K-DUR,KLOR-CON) 20 MEQ tablet Take 20 mEq by mouth 2 (two) times daily.         STOP taking these medications       traMADoL (ULTRAM) 50 mg tablet Comments:   Reason for Stopping:         diclofenac (VOLTAREN) 25 MG TbEC Comments:   Reason for Stopping:         meloxicam (MOBIC) 7.5 MG tablet Comments:   Reason for Stopping:         neomycin-polymyxin-hydrocortisone (CORTISPORIN) 3.5-10,000-1 mg/mL-unit/mL-% otic suspension Comments:   Reason for Stopping:                 I have seen and examined this patient within the last 30 days. My clinical findings that support the need for the home health skilled services and home bound status are the following:no   Weakness/numbness causing balance and gait disturbance due to Surgery making it taxing to leave home.     Diet:   regular diet    Labs:  n/a    Referrals/ Consults  Physical Therapy to evaluate and treat. Evaluate for home safety and equipment needs; Establish/upgrade home exercise program. Perform / instruct on therapeutic exercises, gait training, transfer training, and Range of Motion.  Occupational Therapy to evaluate and treat. Evaluate home environment for safety and equipment  needs. Perform/Instruct on transfers, ADL training, ROM, and therapeutic exercises.  Speech Therapy  to evaluate and treat for  Language and Cognition.    Activities:   activity as tolerated    Nursing:   Agency to admit patient within 24 hours of hospital discharge unless specified on physician order or at patient request    SN to complete comprehensive assessment including routine vital signs. Instruct on disease process and s/s of complications to report to MD. Review/verify medication list sent home with the patient at time of discharge  and instruct patient/caregiver as needed. Frequency may be adjusted depending on start of care date.     Skilled nurse to perform up to 3 visits PRN for symptoms related to diagnosis    Notify MD if SBP > 160 or < 90; DBP > 90 or < 50; HR > 120 or < 50; Temp > 101; O2 < 88%; Other:       Ok to schedule additional visits based on staff availability and patient request on consecutive days within the home health episode.    When multiple disciplines ordered:    Start of Care occurs on Sunday - Wednesday schedule remaining discipline evaluations as ordered on separate consecutive days following the start of care.    Thursday SOC -schedule subsequent evaluations Friday and Monday the following week.     Friday - Saturday SOC - schedule subsequent discipline evaluations on consecutive days starting Monday of the following week.    For all post-discharge communication and subsequent orders please contact patient's primary care physician.      Home Health Aide:  Physical Therapy Three times weekly, Occupational Therapy Three times weekly and Speech Language Pathology Three times weekly    Wound Care Orders  Bacitracin to incision twice daily, end date 7/29.    I certify that this patient is confined to her home and needs physical therapy, speech therapy and occupational therapy.

## 2022-07-20 NOTE — PLAN OF CARE
Misael Schmitt - Neurosurgery (Hospital)  Discharge Final Note    Primary Care Provider: Clair Johnson NP  Expected Discharge Date: 7/20/2022    Patient medically ready for discharge to Home with  (referral to [facility]).  Nurse can call report to .  Transportation provided per family.    Is family/patient aware of discharge yes - patient.  Hospital follow up scheduled per Previously scheduled.  Vascular Neurology to schedule follow up if necessary.    Final Discharge Note (most recent)     Final Note - 07/20/22 1102        Final Note    Assessment Type Final Discharge Note     Anticipated Discharge Disposition Home-Health Care [facility]     Hospital Resources/Appts/Education Provided Appointments scheduled and added to AVS        Post-Acute Status    Post-Acute Authorization Home Health     Post-Acute Placement Status Discharge Plan Changed     Home Health Status Referrals Sent     Discharge Delays None known at this time                 Important Message from Medicare         Referral Info (most recent)     Referral Info    No documentation.               Contact Info     Misael Schmitt - Neurosurgery 8th Fl   Specialty: Neurosurgery    [address] [facility] [address]   Phone: [phone]       Next Steps: Follow up on 7/29/2022    Instructions: For wound check and staple removal    Timothy Diaz MD   Specialty: Neurosurgery    [address] [facility] [address]   Phone: [phone]       Next Steps: Follow up on 8/23/2022    Instructions: Post-op follow up        Future Appointments   Date Time Provider Department Center   7/29/2022 10:20 AM RN, NEUROSURGERY [facility] NEUROS8 Misael nadir   8/4/2022  2:15 PM Richard Cotto MD [facility] OBGYN [place]   8/23/2022  8:30 AM [facility] OIC-CT1 500 LB LIMIT [facility] IC Imaging Ctr   8/23/2022 10:00 AM Timothy Diaz MD [facility] NEUROS8 Misael nadir   12/8/2022  2:40 PM Mil Adam III, MD [facility] CARDIO Janet Garcia, ALEXANDER  Case Management  Ext:  [phone]  07/20/2022

## 2022-07-20 NOTE — PLAN OF CARE
Problem: Adult Inpatient Plan of Care  Goal: Plan of Care Review  Outcome: Ongoing, Progressing  Flowsheets (Taken 7/20/2022 0342)  Plan of Care Reviewed With: patient     Problem: Infection  Goal: Absence of Infection Signs and Symptoms  Outcome: Ongoing, Progressing  Intervention: Prevent or Manage Infection  Flowsheets (Taken 7/20/2022 0342)  Fever Reduction/Comfort Measures: lightweight bedding  Infection Management: aseptic technique maintained  Isolation Precautions: protective     Problem: Fall Injury Risk  Goal: Absence of Fall and Fall-Related Injury  Outcome: Ongoing, Progressing  Intervention: Promote Injury-Free Environment  Flowsheets (Taken 7/20/2022 0342)  Safety Promotion/Fall Prevention:   bed alarm set   nonskid shoes/socks when out of bed   supervised activity   /camera at bedside   Fall Risk reviewed with patient/family   Fall Risk signage in place   instructed to call staff for mobility       POC reviewed with pt at the bedside.  Pt verbalized understanding.  No neuro changes.  Questions addressed.  Incision site intact.  Remains afebrile, see VS in the flowsheet.  Blood glucose monitored, covered.  Pt progressing toward her goal.  Instructed to call for assistance, bed low, locked, on telesitter, call light within reach.

## 2022-07-20 NOTE — DISCHARGE INSTRUCTIONS
Neurosurgery Patient Information  -Return to work will be determined on an individual basis.  -No driving until released by your medical provider or while taking narcotics  -Do not take any OTC products containing acetaminophen at the same time as you take your narcotic pain medication. Medications that may contain acetaminophen include but are not limited to: Excedrin and other headache medications, arthritis medications, cold and sinus medications, etc. Please review the list of active ingredients in any OTC medication prior to taking it.  -Do not take any Aspirin or Aspirin-containing products for 2 weeks after surgery. You may resume your home aspirin on 7/29/22.  -Do not take any Aleve, Naprosyn, Naproxen, Ibuprofen, Advil or any other nonsteroidal anti-inflammatory drug (NSAID) for 2 weeks after surgery.  -Do not take any herbal supplements for 2 weeks after surgery.   -Do not consume any alcoholic beverages until released by your neurosurgeon  -Do not perform any excessive bending over or leaning forward as this is a fall hazard.  -Do not perform any heavy lifting or lifting more than 5-10 lbs from the ground level as this is a fall hazard.  -Slowly increase your ambulation [walking] over the next 2 weeks as tolerated. The goal is to be walking 1-2 miles by the time of your post op appointment.   -Walk on paved surfaces only. It is okay to walk up and down stairs while holding onto a side rail.      Contact the Neurosurgery Office immediately if:  If you begin to notice any neurologic changes such as:           -Sudden onset of lethargy or sleepiness           -Sudden confusion, trouble speaking, or understanding            -Sudden trouble seeing in one or both eyes            -Sudden trouble walking, dizziness, loss of coordination            -Sudden severe headache with no known cause            -Sudden onset of numbness or weakness           -Pulsating at the groin site, bleeding from the groin site,  swelling at the groin site or leg     Wound Care:    You have staples in place. They will be removed about 2 weeks after your surgery.  Apply Bacitracin ointment to your incision twice daily with a clean, dry cotton swab until your staples are removed.   Keep your incision open to air.   You may shower on the 2nd day after your surgery. Keep the incision clean and dry at all times. Do not allow the force of water to hit the incision. If the incision gets damp, pat it dry. Do not rub or scrub the incision. Do not take a bath/swim/submerge the incision until 8 weeks after surgery.    Call your doctor or go to the Emergency Room for any signs of infection including: increased redness, drainage, pain or fever (temperature greater than or equal to 101.4).       Miscellaneous:   -Follow up with Neurosurgery RN on 7/29/22 and with Dr. Diaz on 8/23/22. Appointment will be mailed to you.    Please call our office with any questions or concerns.    [facility] Neurosurgery Office: [phone]

## 2022-07-20 NOTE — NURSING
Pt discharged to home with family. VSS at time of departure, denies pain, NADN.  Departure from unit provided by wheelchair via [employer] transporter.  Meds sent home with patient; Dexamethasone taper instructions reviewed with patient and family.

## 2022-07-21 ENCOUNTER — PATIENT OUTREACH (OUTPATIENT)
Dept: ADMINISTRATIVE | Facility: CLINIC | Age: 47
End: 2022-07-21
Payer: MEDICAID

## 2022-07-21 DIAGNOSIS — I67.1 ANEURYSM OF MIDDLE CEREBRAL ARTERY: Primary | ICD-10-CM

## 2022-07-21 NOTE — PROGRESS NOTES
C3 nurse spoke with Gina Jenkins for a TCC post hospital discharge follow up call. The patient has a scheduled post op appointment with Neurosurgery on 7/29/2022 @ 1020. Patient PCP not within [place]. NP @ home referral placed.

## 2022-07-22 ENCOUNTER — TELEPHONE (OUTPATIENT)
Dept: NEUROSURGERY | Facility: CLINIC | Age: 47
End: 2022-07-22
Payer: MEDICAID

## 2022-07-22 PROCEDURE — G0180 PR HOME HEALTH MD CERTIFICATION: ICD-10-PCS | Mod: ,,, | Performed by: NEUROLOGICAL SURGERY

## 2022-07-22 PROCEDURE — G0180 MD CERTIFICATION HHA PATIENT: HCPCS | Mod: ,,, | Performed by: NEUROLOGICAL SURGERY

## 2022-07-22 NOTE — TELEPHONE ENCOUNTER
Called and spoke with pt. She stated the home health nurse just left. She does not have any issues at the moment and is very glad to have the home health.

## 2022-07-22 NOTE — TELEPHONE ENCOUNTER
----- Message from Brown Arenas MA sent at 7/21/2022  6:09 PM CDT -----  Regarding: FW: pateint questions    ----- Message -----  From: Gina Miramontes RN  Sent: 7/21/2022   4:54 PM CDT  To: Joe MONTANO Staff  Subject: pateint questions                                Please forward this important TCC information to your provider in order to maximize the post discharge care delivery of this patient.    C3 nurse spoke with Gina Jenkins for a TCC post hospital discharge follow up call. The patient has a scheduled post op appointment with Neurosurgery on  7/29/2022 @ 1020.    Patient states that she is having trouble finding words and feels awkward.  This message is just to inform of patient's concern. Patient denies any other neurologic symptoms.  Please contact patient to discuss symptoms directly as needed.    Respectfully,  Gina Miramontes RN  Care Coordination Center C3    [email or web address]       Please do not reply to this message, as this inbox is not routinely monitored.

## 2022-07-22 NOTE — TELEPHONE ENCOUNTER
----- Message from Brown Arenas MA sent at 7/21/2022  6:09 PM CDT -----  Regarding: FW: pateint questions    ----- Message -----  From: Gina Miramontes RN  Sent: 7/21/2022   4:54 PM CDT  To: Joe MONTANO Staff  Subject: pateint questions                                Please forward this important TCC information to your provider in order to maximize the post discharge care delivery of this patient.    C3 nurse spoke with Gina Jenkins for a TCC post hospital discharge follow up call. The patient has a scheduled post op appointment with Neurosurgery on  7/29/2022 @ 1020.    Patient states that she is having trouble finding words and feels awkward.  This message is just to inform of patient's concern. Patient denies any other neurologic symptoms.  Please contact patient to discuss symptoms directly as needed.    Respectfully,  Gina Miramontes RN  Care Coordination Center C3    [email or web address]       Please do not reply to this message, as this inbox is not routinely monitored.

## 2022-07-25 NOTE — ANESTHESIA POSTPROCEDURE EVALUATION
Anesthesia Post Evaluation    Patient: Gina Jenkins    Procedure(s) Performed: Procedure(s) (LRB):  CRANIOTOMY, WITH ANEURYSM CLIPPING (N/A)    Final Anesthesia Type: general      Patient location during evaluation: ICU  Patient participation: Yes- Able to Participate  Level of consciousness: awake and alert and oriented  Post-procedure vital signs: reviewed and stable  Pain management: adequate  Airway patency: patent    PONV status at discharge: No PONV  Anesthetic complications: no      Cardiovascular status: hemodynamically stable  Respiratory status: unassisted, spontaneous ventilation and face mask  Hydration status: euvolemic  Follow-up not needed.          Vitals Value Taken Time   /66 07/20/22 1156   Temp 36.9 °C (98.5 °F) 07/20/22 1156   Pulse 63 07/20/22 1156   Resp 17 07/20/22 1156   SpO2 100 % 07/20/22 1156         No case tracking events are documented in the log.      Pain/James Score: No data recorded

## 2022-07-26 NOTE — PHYSICIAN QUERY
PT Name: Gina Jenkins  MR #: [id number]     DOCUMENTATION CLARIFICATION     CDS/:  Hermelindo Benitez RN, CDS                   Contact Information:  [email or web address]    This form is a permanent document in the medical record.     Query Date: July 26, 2022    By submitting this query, we are merely seeking further clarification of documentation.  Please utilize your independent clinical judgment when addressing the question(s) below.  The Medical Record contains the following   Indicators   Supporting Clinical Findings Location in Medical Record   X Documentation of Respiratory Failure, ARDS Acute respiratory failure with hypoxia and hypercapnia NCC H&P 7/15      SOB, HERNANDEZ, Wheezing, Productive Cough, Use of Accessory Muscles, etc.     X RR     ABGs     O2 sat RR 25  O2 sat 94% on 8L on Simple Face Mask    RR 22  O2 sat 98% on 5L NC    RR 23  O2 sat 89% on Room Air    ABGs:    POC PH: 7.365  POC PCO2: 41.0  POC PO2: 117  POC HCO3: 23.4  POC SATURATED O2: 98  POC BE: -2  POC TCO2: 25  Flow: 8  Sample: ARTERIAL  DelSys: Aero Mask  Allens Test: N/A  Site: [place]  Mode: SPONT       VS FS 7/15 1427    VS FS 7/15 2005    VS FS 7/16 0701        Labs 7/15 1542      Hypoxia/Hypercapnia      BiPAP/Intubation/Mechanical Ventilation     X Supplemental O2 2-8L O2 VS FS 7/15-7/20      Home O2, Oxygen Dependence      Respiratory distress      X Radiology findings heart size accentuated by portable technique and diminished inspiration, likely not significantly changed compared to prior imaging. No significant pleural effusion is present on this single view. There is mild atelectasis at the left lung base. There is no focal consolidation   CXR 7/15   X Acute/Chronic Illness Aneurysm of left middle cerebral artery   Other emphysema   Chronic diastolic heart failure   Essential hypertension   Tobacco abuse     Acute respiratory failure with hypoxia and hypercapnia   NCC PN 7/18            NCC H&P 7/15    Treatment      X Other Some issues w/ oxygenation in OR/immediately post-op; resolved s/p pt recovering from anesthesia    Other emphysema  2/2 active tobacco abuse w/ 30 pk yr hx   NCC PN 7/18       The noted clinical guidelines following a diagnosis are only system guidelines and do not replace the providers clinical judgment.    Provider, please clarify the diagnosis of Acute respiratory failure with hypoxia and hypercapnia:    [    ] Acute Respiratory Failure with Hypoxia and Hypercapnia (Hypoxia: ABG pO2 < 60 mmHg or O2 sat of <91% on room air and Hypercapnia: pCO2 > 50 mmHg with pH < 7.35 and respiratory symptoms documented) diagnosis is confirmed and additional clinical support/decision-making indicators for the diagnosis include (please specify): _______________________________________________     [x] Acute Respiratory Failure with Hypoxia Only (ABG pO2 < 60 mmHg or O2 sat of <91% on room air and respiratory symptoms documented)      [    ] Above stated diagnosis is not confirmed and/or it has been ruled out     [    ] Other clarification (please specify): ___________________     [   ] Clinically Undetermined         Please document in your progress notes daily for the duration of treatment until resolved and include in your discharge summary.     Reference:    DIA Arceo MD. (2020, March 13). Acute respiratory distress syndrome: Clinical features, diagnosis, and complications in adults ([id number] CHRIS Olmedo MD & [id number] RADHA Amaral MD, Eds.). Retrieved November 13, 2020, from [email or web address]-respiratory-distress-[email or web address]-features-diagnosis-and-complications-[email or web address]-adults?search=ards&source=search_result&selectedTitle=1~150&usage_type=default&display_rank=1  Form No. [id number]

## 2022-07-26 NOTE — OP NOTE
Date of Operation: 7/15/2022.    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    Postoperative Diagnosis:  Left internal carotid artery and middle cerebral artery anerysms.    Procedure:  Left frontotemporal craniotomy, clipping of left ICA-posterior communicating artery and left ICA anterior choroidal artery aneurysms with microsurgery.  Intraoperative indocyanine green angiography.  Intraoperative neuro monitoring.  Intraoperative Doppler ultrasound.    Surgeon:  Abebe Mendez MD    Co Surgeon:  Timothy Diaz MD    Assistant:  Gerard Shultz MD (Resident in Neurosurgery)    Anesthesia:  General Endotracheal.    Estimated Blood Loss:  150 ml.    Drains:  Hemovac Subgaleal.    Condition at End of Procedure:  Stable.    Brief History:  This 47-year-old lady has complained of severe intermittent headaches.  CTA of the head had been done on 10/03/2017 showing a 4 mm aneurysm on the left internal carotid artery and a small aneurysm at the left MCA bifurcation.  Headaches increased and CTA repeated on 11/03/2021 now demonstrated 3 aneurysms on the left internal carotid and middle cerebral arteries.  She underwent cerebral angiography on 03/28/2022 better demonstrating these aneurysms.  After discussion with her she wished to proceed with craniotomy and clipping of the aneurysms.  Past medical history also includes previous congestive heart failure, hypertension, smoking history, coronary artery disease and probable stroke in 2017.    Procedure in Detail:  The patient was brought to the operating room and in the supine position on the operating table under premedication intubated and induced with general anesthesia.  A cannula was placed in the right radial artery for continuous blood pressure monitoring, a Rodríguez catheter inserted, sequential compression devices applied to the legs and various intravenous  lines started.  The head of the bed was somewhat elevated and the  head turned to the right and immobilized with the Whitehead 3 point skeletal fixation device.  Neuro monitoring was set up.  A small amount of hair in the left frontotemporal area was shaved and this portion of the scalp prepped with Betadine and draped in a sterile fashion.  A curvilinear incision beginning at the zygoma and coming up behind the hairline to midline was outlined this was injected with 1% xylocaine and epinephrine.  The incision was carried through the skin and galea bleeding controlled on the skin flap with Beulah clips.  The skin was undermined a short distance and the pericranium temporalis muscle and fascia cut with the Bovie cutting current and the soft tissue elevated is a single flap and retracted inferiorly with fishhook retractors.  Bur holes were made on the zygomatic process of the frontal bone and on the temporal squamous and a standard frontotemporoparietal craniotomy cut with a high-speed drill, elevated and removed from the operative field.  The sphenoid ridge was drilled down flat to expose the sylvian fissure area.  Drill holes were made in the bone margin the dura tacked up to these with 4-0 Nurolon sutures.  Clean towels were placed around the craniotomy opening the operating microscope brought into the field.    The dura was over the curvilinear incision and retracted inferiorly and anteriorly with 4-0 Nurolon sutures.  The brain was somewhat full and she was given mannitol.  The frontal lobe was then gently retracted to expose the chiasmatic and carotid cisterns and these were opened with release of cerebrospinal fluid.  With this the brain became more relaxed.  Dissection was then begun in the sylvian fissure in the fissure open down to the anterior clinoid process and over the proximal middle cerebral artery.  A rather wide necked aneurysm projected down toward the posterior clinoid process.  With microdissection the posterior communicating artery could be visualized and  dissected free of the aneurysm neck.  With this rather wide neck the carotid artery was temporarily occluded with clips for a short time with no change in neuro monitoring.  A 9 mm straight Mizuho clip was placed across the neck of the aneurysm and with adjustment seemed to give good occlusion of the lesion.  Attention was then turned more distal on the internal carotid artery where the aneurysm at the origin of the anterior choroidal artery projected somewhat more anteriorly quite close to the internal carotid artery bifurcation.  The neck of this aneurysm was freed of arachnoid and a 2nd 9 mm Mizuho clip placed across this with good occlusion.  Doppler ultrasound showed good patency of all vessels.  ICG angiography also showed the primary vessels with no evidence for aneurysm filling.  The middle cerebral artery was then followed out past the bifurcation with no definite aneurysm seen.  The apparent MCA aneurysm is only about 2 mm and may represent a bend in the artery.  It was not felt that additional dissection in the left sylvian fissure was warranted for this.  Papaverine was then placed on the vessels and the wound thoroughly irrigated.  The dura was closed with interrupted 4-0 Nurolon sutures and a piece of DuraGen placed over this.  The bone flap returned to place using the Scopial Fashion microplate system.  Temporalis muscle and fascia were reapproximated with interrupted 2-0 Vicryl sutures.  A Hemovac drain was placed in the subgaleal space and brought through a separate stab incision posterior to the primary incision.  The galea was closed with inverted interrupted 2-0 Vicryl sutures and the skin closed with skin staples.  A Telfa bacitracin dressing was placed over the wound held in place with tape.  The patient's head was released from 3 point skeletal fixation device and the head turned to the full upright position.  The patient was allowed to awaken from anesthesia, extubated, and taken to the recovery area  in satisfactory condition.  She received 2 g of Rocephin, 12 mg of Decadron, 1000 mg of Keppra at the beginning of the procedure and Rocephin containing antibiotic irrigating solutions were used throughout.

## 2022-07-27 ENCOUNTER — PES CALL (OUTPATIENT)
Dept: ADMINISTRATIVE | Facility: CLINIC | Age: 47
End: 2022-07-27
Payer: MEDICAID

## 2022-07-28 ENCOUNTER — PES CALL (OUTPATIENT)
Dept: ADMINISTRATIVE | Facility: CLINIC | Age: 47
End: 2022-07-28
Payer: MEDICAID

## 2022-07-29 ENCOUNTER — PES CALL (OUTPATIENT)
Dept: ADMINISTRATIVE | Facility: CLINIC | Age: 47
End: 2022-07-29
Payer: MEDICAID

## 2022-07-29 ENCOUNTER — CLINICAL SUPPORT (OUTPATIENT)
Dept: NEUROSURGERY | Facility: CLINIC | Age: 47
End: 2022-07-29
Payer: MEDICAID

## 2022-07-29 VITALS — SYSTOLIC BLOOD PRESSURE: 118 MMHG | HEART RATE: 88 BPM | DIASTOLIC BLOOD PRESSURE: 79 MMHG | TEMPERATURE: 99 F

## 2022-07-29 PROCEDURE — 99999 PR PBB SHADOW E&M-EST. PATIENT-LVL III: ICD-10-PCS | Mod: PBBFAC,,,

## 2022-07-29 PROCEDURE — 99999 PR PBB SHADOW E&M-EST. PATIENT-LVL III: CPT | Mod: PBBFAC,,,

## 2022-07-29 PROCEDURE — 99213 OFFICE O/P EST LOW 20 MIN: CPT | Mod: PBBFAC

## 2022-07-29 NOTE — PROGRESS NOTES
Gina Jenkins is a 47 y.o. female here for 2 week post op wound check.     Surgery: crani with aneurysm clippings    Symptoms: pre: headache and passed out, post: slight headache much better    DME: none     Brace: none    Pain: 7/10    Medication Refills: none           Incision with staples, LUKE, well approximated, no redness,purulent drainage. Due to swelling at site under incision, holding taking staples out. Return post op appt in one week for staple removal. Salina Rodríguez RN asked to come in and observe incision, instructed to leave staples in one more week. She assisted pt to apply pressure scarf.  Instructed patient to keep incision LUKE, no lotions, creams or bandages. OK to shower without water pressure to area. PostOP written instructions given to patient.     Patient to follow up with RN for staple removal on 08/05/22 at 1020.    Patient verbalizes understanding. Patient to followup with Dr. Diaz for 6 week followup with imaging.    Future Appointments   Date Time Provider Department Center   8/4/2022  2:15 PM Richard Cotto MD [facility] OBGYN [place]   8/5/2022 10:20 AM RN, NEUROSURGERY [facility] NEUROS8 Misael Schmitt   8/23/2022  8:30 AM [facility] OIC-CT1 500 LB LIMIT [facility] IC Imaging Ctr   8/23/2022 10:00 AM Timothy Diaz MD [facility] NEUROS8 Misael Schmitt   12/8/2022  2:40 PM Mil Adam III, MD [facility] CARDIO [place]

## 2022-08-02 ENCOUNTER — HOSPITAL ENCOUNTER (EMERGENCY)
Facility: HOSPITAL | Age: 47
Discharge: HOME | End: 2022-08-02
Attending: EMERGENCY MEDICINE
Payer: MEDICAID

## 2022-08-02 VITALS
OXYGEN SATURATION: 100 % | SYSTOLIC BLOOD PRESSURE: 165 MMHG | HEART RATE: 83 BPM | DIASTOLIC BLOOD PRESSURE: 95 MMHG | RESPIRATION RATE: 18 BRPM | TEMPERATURE: 98 F

## 2022-08-02 DIAGNOSIS — R51.9 HEADACHE: ICD-10-CM

## 2022-08-02 DIAGNOSIS — Z48.89 ENCOUNTER FOR POST SURGICAL WOUND CHECK: Primary | ICD-10-CM

## 2022-08-02 LAB
BILIRUB UR QL STRIP: NEGATIVE
CLARITY UR REFRACT.AUTO: CLEAR
COLOR UR AUTO: YELLOW
GLUCOSE UR QL STRIP: ABNORMAL
HGB UR QL STRIP: ABNORMAL
KETONES UR QL STRIP: NEGATIVE
LEUKOCYTE ESTERASE UR QL STRIP: NEGATIVE
MICROSCOPIC COMMENT: NORMAL
NITRITE UR QL STRIP: NEGATIVE
PH UR STRIP: 6 [PH] (ref 5–8)
PROT UR QL STRIP: NEGATIVE
RBC #/AREA URNS AUTO: 2 /HPF (ref 0–4)
SP GR UR STRIP: 1.02 (ref 1–1.03)
URN SPEC COLLECT METH UR: ABNORMAL
UROBILINOGEN UR STRIP-ACNC: NEGATIVE EU/DL

## 2022-08-02 PROCEDURE — 99284 EMERGENCY DEPT VISIT MOD MDM: CPT | Mod: 25,ER

## 2022-08-02 PROCEDURE — 81000 URINALYSIS NONAUTO W/SCOPE: CPT | Mod: ER | Performed by: EMERGENCY MEDICINE

## 2022-08-03 ENCOUNTER — TELEPHONE (OUTPATIENT)
Dept: NEUROSURGERY | Facility: CLINIC | Age: 47
End: 2022-08-03
Payer: MEDICAID

## 2022-08-03 NOTE — TELEPHONE ENCOUNTER
----- Message from Hipolito Darling MD sent at 8/2/2022  6:01 PM CDT -----  Hi there.    This patient is scheduled to see Dr. Moffett on 8/5/22 for wound check follow up after a craniotomy after aneurysm clipping. A home health told me she has some increased swelling of her scalp on the side of her craniotomy. Could you please order a CT head without contrast prior to her clinic visit that day to work it up as well as a CBC? Jackie told her that if she has any worsening or further issues to come to the ED for evaluation.     Thanks!  Hipolito

## 2022-08-03 NOTE — ED PROVIDER NOTES
Encounter Date: 8/2/2022       History     Chief Complaint   Patient presents with    Post-op Problem    Hypertension     Pt reports to ER via EMS with c/o HTN and post op swelling after brain aneurysm at [facility] [place]. /95     Chief complaint:  Swelling to forehead  This is a 47-year-old who is twelve days status post triple aneurysm clipping.  Home Health saw her and requested that she come to the ER secondary to swelling around the incision site on her left forehead.  Patient said this is a first-time home health saw her and the swelling has been present but has not gotten larger.  She denies pain.  She does report a fever but only at night.  No visual change.  No vomiting.  She does have frequency of urination but no dysuria.  She denies other URI symptoms.  She says that she did not want to come to the hospital.  She has an appointment with her neurosurgeon in 3 days        Review of patient's allergies indicates:   Allergen Reactions    Lisinopril Swelling    Bactrim [sulfamethoxazole-trimethoprim] Rash     Past Medical History:   Diagnosis Date    Brain aneurysm     CHF (congestive heart failure)     H/O coronary angioplasty     Hypercholesteremia     Hypertension     Malignant hypertension     Migraine headache     Stroke 10/2017     Past Surgical History:   Procedure Laterality Date    CARDIAC CATHETERIZATION      CEREBRAL ANGIOGRAM      CLIP LIGATION OF INTRACRANIAL ANEURYSM BY CRANIOTOMY N/A 7/15/2022    Procedure: CRANIOTOMY, WITH ANEURYSM CLIPPING;  Surgeon: Timothy Diaz MD;  Location: [facility] OR [address] [place] [address];  Service: Neurosurgery;  Laterality: N/A;  PTERIONAL CRANIOTOMY WITH CLIP LIGATION OF L PCOMM, L ANTERIOR CHOROIDAL, L MCA  ANEURYSM, ANESTHESIA: GENERAL, BLOOD: TYPE&CROSS 2 UNITS, NEUROMONITORING: SEP, MEP, EEG, RADIOLOGY: C-ARM, POSITION: SUPINE, ANNA CO-SURGERON: DR. LEXI DOMÍNGUEZ.     No family history on file.  Social History     Tobacco Use    Smoking status: Current  Every Day Smoker     Packs/day: 0.50     Types: Cigarettes    Smokeless tobacco: Never Used   Substance Use Topics    Alcohol use: Yes     Comment: occassionally    Drug use: No     Review of Systems   Constitutional: Positive for fever ( only at night).   HENT: Positive for facial swelling (Left forehead).    Eyes: Negative for visual disturbance.   Genitourinary: Positive for frequency.   Neurological: Positive for headaches.   All other systems reviewed and are negative.      Physical Exam     Initial Vitals [08/02/22 1849]   BP Pulse Resp Temp SpO2   (!) 165/95 83 18 98.2 °F (36.8 °C) 100 %      MAP       --         Physical Exam    Nursing note and vitals reviewed.  Constitutional: She appears well-developed and well-nourished.   HENT:   Head: Normocephalic.       Eyes: Conjunctivae and EOM are normal. Pupils are equal, round, and reactive to light.   Neck: Neck supple.   Normal range of motion.  Cardiovascular: Normal rate and regular rhythm.   Pulmonary/Chest: Breath sounds normal.   Abdominal: Abdomen is soft. There is no abdominal tenderness. There is no rebound and no guarding.   Musculoskeletal:         General: Normal range of motion.      Cervical back: Normal range of motion and neck supple.     Neurological: She is alert and oriented to person, place, and time. She has normal strength. GCS score is 15. GCS eye subscore is 4. GCS verbal subscore is 5. GCS motor subscore is 6.   Skin: Skin is warm and dry.   Psychiatric: She has a normal mood and affect.         ED Course   Procedures  Labs Reviewed   URINALYSIS, REFLEX TO URINE CULTURE - Abnormal; Notable for the following components:       Result Value    Glucose, UA 2+ (*)     Occult Blood UA 1+ (*)     All other components within normal limits    Narrative:     Preferred Collection Type->Urine, Clean Catch  Specimen Source->Urine  Collection Type->Urine, Clean Catch   URINALYSIS MICROSCOPIC    Narrative:     Preferred Collection Type->Urine,  Clean Catch  Specimen Source->Urine  Collection Type->Urine, Clean Catch          Imaging Results          CT Head Without Contrast (Final result)  Result time 08/02/22 20:10:05    Final result by Lulu Bojorquez MD (08/02/22 20:10:05)                 Impression:      Postsurgical changes with left frontal temporal craniectomy with probable scalp flap and  extra-axial fluid collection in the left frontotemporal lobe measuring up to 8 mm in width, which is decreased in extent of pneumocephalus compared to prior exam.  Recommend clinical correlation and follow-up.    All CT scans   are performed using dose optimization techniques including the following: automated exposure control; adjustment of the mA and/or kV; use of iterative reconstruction technique.  Dose modulation was employed for ALARA by means of: Automated exposure control; adjustment of the mA and/or kV according to patient size (this includes techniques or standardized protocols for targeted exams where dose is matched to indication/reason for exam; i.e. extremities or head); and/or use of iterative reconstructive technique.      Electronically signed by: Reno Lawson  Date:    08/02/2022  Time:    20:10             Narrative:    EXAMINATION:  CT HEAD WITHOUT CONTRAST    CLINICAL HISTORY:  Headache, chronic, new features or increased frequency; Headache, unspecified    TECHNIQUE:  Low dose axial CT images obtained throughout the head without intravenous contrast. Sagittal and coronal reconstructions were performed.    COMPARISON:  None.    FINDINGS:  Postsurgical changes of left frontal temporal craniectomy.  Skin staples noted.  Postsurgical fatty flap involving the left scalp.  Postsurgical collection/subdural involving the left frontal temporal lobe adjacent to the craniectomy measuring 6 mm in with and 7 cm in length and extending to the temporal lobe/middle cranial fossa.  No significant midline shift.  Otherwise no evidence for hemorrhage midline  shift.  Foci of gas with extra-axial collection extending to the middle cranial fossa.                                 Medications - No data to display  Medical Decision Making:   Initial Assessment:   47-year-old presents for evaluation of her postoperative wound.  Patient does have soft swelling to the area on her left forehead without fluctuance or erythema.  It is nontender  ED Management:  CT of the head was done.  This does show a fluid collection but no evidence of abscess.  Patient does not have a UTI.  She has an appointment with her surgeon in 2 days.  She was instructed to keep this appointment and given return precautions.                      Clinical Impression:   Final diagnoses:  [R51.9] Headache  [Z48.89] Encounter for post surgical wound check (Primary)          ED Disposition Condition    Discharge Stable        ED Prescriptions     None        Follow-up Information     Follow up With Specialties Details Why Contact Info    Timothy Diaz MD Neurosurgery On 8/5/2022 As scheduled [facility] [address]  [phone]      EMERGENCY - [facility]   If symptoms worsen [address] [facility] [id number]           Saloni Wright MD  08/03/22 0229

## 2022-08-05 ENCOUNTER — TELEPHONE (OUTPATIENT)
Dept: NEUROSURGERY | Facility: CLINIC | Age: 47
End: 2022-08-05
Payer: MEDICAID

## 2022-08-05 ENCOUNTER — LAB VISIT (OUTPATIENT)
Dept: LAB | Facility: HOSPITAL | Age: 47
End: 2022-08-05
Attending: NEUROLOGICAL SURGERY
Payer: MEDICAID

## 2022-08-05 ENCOUNTER — CLINICAL SUPPORT (OUTPATIENT)
Dept: NEUROSURGERY | Facility: CLINIC | Age: 47
End: 2022-08-05
Payer: MEDICAID

## 2022-08-05 VITALS — DIASTOLIC BLOOD PRESSURE: 86 MMHG | TEMPERATURE: 98 F | SYSTOLIC BLOOD PRESSURE: 132 MMHG | HEART RATE: 82 BPM

## 2022-08-05 DIAGNOSIS — I60.8 OTHER NONTRAUMATIC SUBARACHNOID HEMORRHAGE: ICD-10-CM

## 2022-08-05 DIAGNOSIS — I67.1 CEREBRAL ANEURYSM, NONRUPTURED: ICD-10-CM

## 2022-08-05 DIAGNOSIS — I67.1 CEREBRAL ANEURYSM, NONRUPTURED: Primary | ICD-10-CM

## 2022-08-05 DIAGNOSIS — I67.1 CEREBRAL ANEURYSM: Primary | ICD-10-CM

## 2022-08-05 LAB
BASOPHILS # BLD AUTO: 0.03 K/UL (ref 0–0.2)
BASOPHILS NFR BLD: 0.5 % (ref 0–1.9)
CRP SERPL-MCNC: 45.5 MG/L (ref 0–8.2)
DIFFERENTIAL METHOD: ABNORMAL
EOSINOPHIL # BLD AUTO: 0.3 K/UL (ref 0–0.5)
EOSINOPHIL NFR BLD: 4.9 % (ref 0–8)
ERYTHROCYTE [DISTWIDTH] IN BLOOD BY AUTOMATED COUNT: 15.7 % (ref 11.5–14.5)
ERYTHROCYTE [SEDIMENTATION RATE] IN BLOOD BY PHOTOMETRIC METHOD: 73 MM/HR (ref 0–36)
HCT VFR BLD AUTO: 34.5 % (ref 37–48.5)
HGB BLD-MCNC: 11.6 G/DL (ref 12–16)
IMM GRANULOCYTES # BLD AUTO: 0.02 K/UL (ref 0–0.04)
IMM GRANULOCYTES NFR BLD AUTO: 0.3 % (ref 0–0.5)
LYMPHOCYTES # BLD AUTO: 1 K/UL (ref 1–4.8)
LYMPHOCYTES NFR BLD: 16.6 % (ref 18–48)
MCH RBC QN AUTO: 28.7 PG (ref 27–31)
MCHC RBC AUTO-ENTMCNC: 33.6 G/DL (ref 32–36)
MCV RBC AUTO: 85 FL (ref 82–98)
MONOCYTES # BLD AUTO: 0.4 K/UL (ref 0.3–1)
MONOCYTES NFR BLD: 6.5 % (ref 4–15)
NEUTROPHILS # BLD AUTO: 4.2 K/UL (ref 1.8–7.7)
NEUTROPHILS NFR BLD: 71.2 % (ref 38–73)
NRBC BLD-RTO: 0 /100 WBC
PLATELET # BLD AUTO: 201 K/UL (ref 150–450)
PMV BLD AUTO: 10 FL (ref 9.2–12.9)
PROCALCITONIN SERPL IA-MCNC: 0.04 NG/ML
RBC # BLD AUTO: 4.04 M/UL (ref 4–5.4)
WBC # BLD AUTO: 5.96 K/UL (ref 3.9–12.7)

## 2022-08-05 PROCEDURE — 99213 OFFICE O/P EST LOW 20 MIN: CPT | Mod: PBBFAC

## 2022-08-05 PROCEDURE — 84145 PROCALCITONIN (PCT): CPT | Performed by: NEUROLOGICAL SURGERY

## 2022-08-05 PROCEDURE — 99999 PR PBB SHADOW E&M-EST. PATIENT-LVL III: ICD-10-PCS | Mod: PBBFAC,,,

## 2022-08-05 PROCEDURE — 85025 COMPLETE CBC W/AUTO DIFF WBC: CPT | Performed by: NEUROLOGICAL SURGERY

## 2022-08-05 PROCEDURE — 86140 C-REACTIVE PROTEIN: CPT | Performed by: NEUROLOGICAL SURGERY

## 2022-08-05 PROCEDURE — 85652 RBC SED RATE AUTOMATED: CPT | Performed by: NEUROLOGICAL SURGERY

## 2022-08-05 PROCEDURE — 36415 COLL VENOUS BLD VENIPUNCTURE: CPT | Performed by: NEUROLOGICAL SURGERY

## 2022-08-05 PROCEDURE — 99999 PR PBB SHADOW E&M-EST. PATIENT-LVL III: CPT | Mod: PBBFAC,,,

## 2022-08-05 RX ORDER — CEPHALEXIN 500 MG/1
500 CAPSULE ORAL 4 TIMES DAILY
Qty: 28 CAPSULE | Refills: 0 | Status: SHIPPED | OUTPATIENT
Start: 2022-08-05 | End: 2022-08-19

## 2022-08-05 NOTE — PROGRESS NOTES
Gina Jenkins is a 47 y.o. female here for 2 week post op wound check. In wheelchair accompanied by her mother.     Surgery: Crani with clipping    Symptoms: none    DME:none    Brace: none    Pain: none    Medication Refills: none         Incision with staples, LUKE, well approximated, no redness, swelling or purulent drainage. Staples removed. Instructed patient to keep incision LUKE, no lotions, creams or bandages. OK to shower without water pressure to area. PostOP written instructions given to patient. Pt still with swelling noted below level of incision. Dr. Diaz observed received orders for labs and oral antibiotic. Instructed pt to have labs done today and scheduled.     Patient verbalizes understanding. Patient to followup with Dr. Diaz for 6 week followup with/without imaging.    Future Appointments   Date Time Provider Department Center   8/5/2022 11:55 AM LAB, APPOINTMENT [facility]   8/23/2022  8:30 AM [facility] OIC-CT1 500 LB LIMIT [facility] IC Imaging Ctr   8/23/2022 10:00 AM Timothy Diaz MD [facility] NEUROS8 [facility]   12/8/2022  2:40 PM Mil Adam III, MD [facility] CARDIO [place]

## 2022-08-12 ENCOUNTER — EXTERNAL HOME HEALTH (OUTPATIENT)
Dept: HOME HEALTH SERVICES | Facility: HOSPITAL | Age: 47
End: 2022-08-12
Payer: MEDICAID

## 2022-08-12 ENCOUNTER — DOCUMENT SCAN (OUTPATIENT)
Dept: HOME HEALTH SERVICES | Facility: HOSPITAL | Age: 47
End: 2022-08-12
Payer: MEDICAID

## 2022-08-17 ENCOUNTER — HOSPITAL ENCOUNTER (EMERGENCY)
Facility: HOSPITAL | Age: 47
Discharge: HOME | End: 2022-08-17
Attending: EMERGENCY MEDICINE
Payer: MEDICAID

## 2022-08-17 ENCOUNTER — TELEPHONE (OUTPATIENT)
Dept: NEUROSURGERY | Facility: CLINIC | Age: 47
End: 2022-08-17
Payer: MEDICAID

## 2022-08-17 VITALS
BODY MASS INDEX: 35.67 KG/M2 | SYSTOLIC BLOOD PRESSURE: 136 MMHG | WEIGHT: 195 LBS | DIASTOLIC BLOOD PRESSURE: 92 MMHG | RESPIRATION RATE: 17 BRPM | OXYGEN SATURATION: 98 % | TEMPERATURE: 99 F | HEART RATE: 78 BPM

## 2022-08-17 DIAGNOSIS — Z98.890 S/P CRANIOTOMY: Primary | ICD-10-CM

## 2022-08-17 DIAGNOSIS — R22.0 LEFT FACIAL SWELLING: ICD-10-CM

## 2022-08-17 PROCEDURE — 99284 EMERGENCY DEPT VISIT MOD MDM: CPT | Mod: 25,ER

## 2022-08-17 NOTE — ED PROVIDER NOTES
Encounter Date: 8/17/2022       History     Chief Complaint   Patient presents with    Post-op Problem     Patient had neuro surgery on 07/15/22. Patient is now experiencing edema to left forehead and left face that began yesterday. Patient reports having fever 3 days ago.      47-year-old female with history of CHF, hypertension, high cholesterol, brain aneurysm status post left fronto-temporal craniotomy presents ER for evaluation of facial swelling.  Patient underwent surgery on 07/15/2022.  States she has been recovering well.  Initially noticed some swelling to the left frontal/temporal region about 10 days ago.  States that the swelling is now down to patient's left cheek and eye.  She states that the size of the initial swelling has remained the same since surgery.  She was just concerned because the swelling has now developed near her eyes and face.  Denies any new trauma or fall.  No blurred vision or visual disturbance.  Has not had any fevers or chills.  Has been doing well since the surgery.    The history is provided by the patient.     Review of patient's allergies indicates:   Allergen Reactions    Lisinopril Swelling    Bactrim [sulfamethoxazole-trimethoprim] Rash     Past Medical History:   Diagnosis Date    Brain aneurysm     CHF (congestive heart failure)     H/O coronary angioplasty     Hypercholesteremia     Hypertension     Malignant hypertension     Migraine headache     Stroke 10/2017     Past Surgical History:   Procedure Laterality Date    CARDIAC CATHETERIZATION      CEREBRAL ANGIOGRAM      CLIP LIGATION OF INTRACRANIAL ANEURYSM BY CRANIOTOMY N/A 7/15/2022    Procedure: CRANIOTOMY, WITH ANEURYSM CLIPPING;  Surgeon: Timothy Diaz MD;  Location: [facility] OR [address] [place] [address];  Service: Neurosurgery;  Laterality: N/A;  PTERIONAL CRANIOTOMY WITH CLIP LIGATION OF L PCOMM, L ANTERIOR CHOROIDAL, L MCA  ANEURYSM, ANESTHESIA: GENERAL, BLOOD: TYPE&CROSS 2 UNITS, NEUROMONITORING: SEP, MEP, EEG,  RADIOLOGY: C-ARM, POSITION: SUPINE, SHELLY CASTILLORON: DR. LEXI DOMÍNGUEZ.     No family history on file.  Social History     Tobacco Use    Smoking status: Current Every Day Smoker     Packs/day: 0.50     Types: Cigarettes    Smokeless tobacco: Never Used   Substance Use Topics    Alcohol use: Yes     Comment: occassionally    Drug use: No     Review of Systems   Constitutional: Negative for chills and fever.   HENT: Negative for congestion.    Eyes: Negative for visual disturbance.   Respiratory: Negative for shortness of breath.    Cardiovascular: Negative for chest pain.   Gastrointestinal: Negative for nausea and vomiting.   Genitourinary: Negative for dysuria and flank pain.   Musculoskeletal: Negative for myalgias.   Skin: Negative for rash and wound.   Allergic/Immunologic: Negative for immunocompromised state.   Neurological: Negative for weakness and numbness.   Hematological: Does not bruise/bleed easily.   Psychiatric/Behavioral: Negative for confusion.       Physical Exam     Initial Vitals [08/17/22 1725]   BP Pulse Resp Temp SpO2   (!) 138/94 82 20 98.8 °F (37.1 °C) 100 %      MAP       --         Physical Exam    Vitals reviewed.  Constitutional: She appears well-developed and well-nourished. She is not diaphoretic. No distress.   HENT:   Head: Atraumatic.    Left fronto-temporal craniotomy site noted. Surgical site healed well. Area of swelling noted, slight extension to the left cheek and face, non tender, no erythema.    Eyes: Conjunctivae and EOM are normal. Pupils are equal, round, and reactive to light.   Neck: Neck supple.   Cardiovascular: Normal rate, regular rhythm, normal heart sounds and intact distal pulses.   Pulmonary/Chest: Breath sounds normal. No respiratory distress.   Musculoskeletal:         General: Normal range of motion.      Cervical back: Neck supple.     Neurological: She is alert and oriented to person, place, and time.   Skin: Skin is warm.         ED Course    Procedures  Labs Reviewed - No data to display       Imaging Results          CT Head Without Contrast (Final result)  Result time 08/17/22 18:41:41    Final result by Lulu Bojorquez MD (08/17/22 18:41:41)                 Impression:      No evidence for intracranial hemorrhage.  Extensive postoperative changes of left frontal temporal craniectomy with large extracranial flap with new foci of hyperdensity may relate to internal hemorrhage or more likely metallic artifacts.  Recommend clinical correlation and consultation with surgery if clinically indicated.  The size of the left frontotemporal extra-axial intracranial collection does not appear significantly changed since the prior exam consistent with postoperative  construct.    All CT scans   are performed using dose optimization techniques including the following: automated exposure control; adjustment of the mA and/or kV; use of iterative reconstruction technique.  Dose modulation was employed for ALARA by means of: Automated exposure control; adjustment of the mA and/or kV according to patient size (this includes techniques or standardized protocols for targeted exams where dose is matched to indication/reason for exam; i.e. extremities or head); and/or use of iterative reconstructive technique.      Electronically signed by: Reno Lawson  Date:    08/17/2022  Time:    18:41             Narrative:    EXAMINATION:  CT HEAD WITHOUT CONTRAST    CLINICAL HISTORY:  Cerebral aneurysm, follow-up;s/p crainiotomy 1 month- worsening swelling- to left temporal lobe;    TECHNIQUE:  Low dose axial CT images obtained throughout the head without intravenous contrast. Sagittal and coronal reconstructions were performed.    COMPARISON:  Prior    FINDINGS:  Essentially no significant change since the prior examination.  Postsurgical changes of left frontotemporal craniectomy large left frontotemporal extracranial collection with metallic artifacts.  There are foci of  hyperdensity in the extracranial left frontal postsurgical flap likely related to artifact though foci of hemorrhage cannot be excluded in the extracranial flat persistent smaller subjacent intracranial extra-axial collection.  Overall similar appearance to the side compared to prior examination.    Otherwise no evidence for intracranial hemorrhage.                                 Medications - No data to display        APC / Resident Notes:   Patient in the ER promptly upon arrival.  She is afebrile, no acute distress.  Physical examination consistent with postoperative craniotomy changes.  No secondary signs of infection.  There is no erythema or drainage at the site.  Surgical wound has healed well.  There is some swelling noted to the periorbital and left side of the face.  Oropharynx is patent.  Uvula is midline.  No submental edema.  No trismus noted.  There is no tenderness over the face.    Chart review:  Aneurysm of left middle cerebral artery  S/p L pterional craniotomy for clip ligation of L ant. Choroidal & L pCommA aneurysms 7/15  Left fronto-temporal craniotomy for clipping of posterior communicating artery, and anterior choroidal artery aneurysms.  2. Intraoperative use of microscope for microdissection.  3. Intraoperative electrophysiological monitoring with motor-evoked and somatosensory-evoked potentials.  4. Cranioplasty greater than 5 cm.  5. Duraplasty.  6. Intraoperative micro angiography    CT of the head was obtained.  No acute intracranial hemorrhage.  There is extensive postoperative changes to the left frontal temporal craniotomy with large extracranial flap with new foci of hyperdensity may be related to internal hemorrhage or more likely artifact.  Given this finding transfer center was contacted to speak with Neurosurgery on-call.        ED Course as of 08/17/22 1941   Wed Aug 17, 2022   1915 CT reviewed by Dr. Steven via transfer center.  Do not feel the findings were consistent with a  new hemorrhage.  [place] the patient can be followed up on an outpatient basis.   [AJ]   1940 Patient was informed of the recommendation by Dr. Steven.  Patient reports she has an appointment with her neurosurgeon on Tuesday which I advised her to keep.  Advised to watch for worsening signs including worsening swelling or development of fever.  Given strict return precautions ED which was agreeable to.  She is otherwise stable for discharge and close follow-up at this time.    The care of this patient was overseen by attending physician who agrees with treatment, plan, and disposition.    Disclaimer: This note has been generated using voice-recognition software. There may be typographical errors that have been missed during proof-reading.     [AJ]      ED Course User Index  [AJ] Sonia Harris PA-C             Clinical Impression:   Final diagnoses:  [Z98.890] S/P craniotomy (Primary)  [R22.0] Left facial swelling          ED Disposition Condition    Discharge Stable        ED Prescriptions     None        Follow-up Information     Follow up With Specialties Details Why Contact Info    Clair Johnson NP Family Medicine   [address] [place] [address]  [address]  [facility] [place] [address]  [phone]             Sonia Harris PA-C  08/17/22 1941

## 2022-08-17 NOTE — ED TRIAGE NOTES
Pt arrives with increased swelling in her forehead since her craniotomy approx 1 month ago. Denies pain, fever

## 2022-08-17 NOTE — TELEPHONE ENCOUNTER
Called and spoke to pt. She stated she still has the swelling to her forehead and now her eye is swollen.. Instructed to go to ED to have someone look at it. Pt verbalized understanding. She stated it does not hurt and has not had any fevers.

## 2022-08-17 NOTE — TELEPHONE ENCOUNTER
----- Message from Leila Dwyer sent at 8/17/2022  4:13 PM CDT -----  Regarding: Post op swelling  Contact: [phone]  Calling in regards to post op swelling to eye, no pain. Please call and advise.

## 2022-08-18 ENCOUNTER — TELEPHONE (OUTPATIENT)
Dept: NEUROSURGERY | Facility: CLINIC | Age: 47
End: 2022-08-18
Payer: MEDICAID

## 2022-08-18 NOTE — TELEPHONE ENCOUNTER
Called pt and mom numbers. Left  scheduled appt to come in and see PA for swelling and f/u ED visit on 08/17/22. Left clinic number on vm.

## 2022-08-19 ENCOUNTER — LAB VISIT (OUTPATIENT)
Dept: LAB | Facility: HOSPITAL | Age: 47
End: 2022-08-19
Payer: MEDICAID

## 2022-08-19 ENCOUNTER — OFFICE VISIT (OUTPATIENT)
Dept: NEUROSURGERY | Facility: CLINIC | Age: 47
End: 2022-08-19
Payer: MEDICAID

## 2022-08-19 VITALS
BODY MASS INDEX: 35.91 KG/M2 | HEART RATE: 71 BPM | DIASTOLIC BLOOD PRESSURE: 80 MMHG | WEIGHT: 195.13 LBS | SYSTOLIC BLOOD PRESSURE: 125 MMHG | HEIGHT: 62 IN

## 2022-08-19 DIAGNOSIS — I67.1 ANEURYSM OF MIDDLE CEREBRAL ARTERY: Primary | ICD-10-CM

## 2022-08-19 DIAGNOSIS — I67.1 ANEURYSM OF MIDDLE CEREBRAL ARTERY: ICD-10-CM

## 2022-08-19 LAB
BASOPHILS # BLD AUTO: 0.06 K/UL (ref 0–0.2)
BASOPHILS NFR BLD: 0.8 % (ref 0–1.9)
CREAT SERPL-MCNC: 1 MG/DL (ref 0.5–1.4)
CRP SERPL-MCNC: 28.5 MG/L (ref 0–8.2)
DIFFERENTIAL METHOD: ABNORMAL
EOSINOPHIL # BLD AUTO: 0.1 K/UL (ref 0–0.5)
EOSINOPHIL NFR BLD: 1.8 % (ref 0–8)
ERYTHROCYTE [DISTWIDTH] IN BLOOD BY AUTOMATED COUNT: 13.6 % (ref 11.5–14.5)
ERYTHROCYTE [SEDIMENTATION RATE] IN BLOOD BY PHOTOMETRIC METHOD: 30 MM/HR (ref 0–36)
EST. GFR  (NO RACE VARIABLE): >60 ML/MIN/1.73 M^2
HCT VFR BLD AUTO: 39.8 % (ref 37–48.5)
HGB BLD-MCNC: 13.7 G/DL (ref 12–16)
IMM GRANULOCYTES # BLD AUTO: 0.12 K/UL (ref 0–0.04)
IMM GRANULOCYTES NFR BLD AUTO: 1.6 % (ref 0–0.5)
LYMPHOCYTES # BLD AUTO: 1.6 K/UL (ref 1–4.8)
LYMPHOCYTES NFR BLD: 22 % (ref 18–48)
MCH RBC QN AUTO: 28.9 PG (ref 27–31)
MCHC RBC AUTO-ENTMCNC: 34.4 G/DL (ref 32–36)
MCV RBC AUTO: 84 FL (ref 82–98)
MONOCYTES # BLD AUTO: 0.6 K/UL (ref 0.3–1)
MONOCYTES NFR BLD: 7.8 % (ref 4–15)
NEUTROPHILS # BLD AUTO: 4.9 K/UL (ref 1.8–7.7)
NEUTROPHILS NFR BLD: 66 % (ref 38–73)
NRBC BLD-RTO: 0 /100 WBC
PLATELET # BLD AUTO: 471 K/UL (ref 150–450)
PMV BLD AUTO: 10.4 FL (ref 9.2–12.9)
PROCALCITONIN SERPL IA-MCNC: 0.03 NG/ML
RBC # BLD AUTO: 4.74 M/UL (ref 4–5.4)
WBC # BLD AUTO: 7.41 K/UL (ref 3.9–12.7)

## 2022-08-19 PROCEDURE — 84145 PROCALCITONIN (PCT): CPT | Performed by: PHYSICIAN ASSISTANT

## 2022-08-19 PROCEDURE — 87040 BLOOD CULTURE FOR BACTERIA: CPT | Performed by: PHYSICIAN ASSISTANT

## 2022-08-19 PROCEDURE — 85025 COMPLETE CBC W/AUTO DIFF WBC: CPT | Performed by: PHYSICIAN ASSISTANT

## 2022-08-19 PROCEDURE — 3008F PR BODY MASS INDEX (BMI) DOCUMENTED: ICD-10-PCS | Mod: CPTII,,, | Performed by: PHYSICIAN ASSISTANT

## 2022-08-19 PROCEDURE — 99024 POSTOP FOLLOW-UP VISIT: CPT | Mod: S$PBB,,, | Performed by: PHYSICIAN ASSISTANT

## 2022-08-19 PROCEDURE — 82565 ASSAY OF CREATININE: CPT | Performed by: PHYSICIAN ASSISTANT

## 2022-08-19 PROCEDURE — 99213 OFFICE O/P EST LOW 20 MIN: CPT | Mod: PBBFAC | Performed by: PHYSICIAN ASSISTANT

## 2022-08-19 PROCEDURE — 3079F DIAST BP 80-89 MM HG: CPT | Mod: CPTII,,, | Performed by: PHYSICIAN ASSISTANT

## 2022-08-19 PROCEDURE — 99999 PR PBB SHADOW E&M-EST. PATIENT-LVL III: CPT | Mod: PBBFAC,,, | Performed by: PHYSICIAN ASSISTANT

## 2022-08-19 PROCEDURE — 36415 COLL VENOUS BLD VENIPUNCTURE: CPT | Performed by: PHYSICIAN ASSISTANT

## 2022-08-19 PROCEDURE — 99024 PR POST-OP FOLLOW-UP VISIT: ICD-10-PCS | Mod: S$PBB,,, | Performed by: PHYSICIAN ASSISTANT

## 2022-08-19 PROCEDURE — 3044F HG A1C LEVEL LT 7.0%: CPT | Mod: CPTII,,, | Performed by: PHYSICIAN ASSISTANT

## 2022-08-19 PROCEDURE — 3074F SYST BP LT 130 MM HG: CPT | Mod: CPTII,,, | Performed by: PHYSICIAN ASSISTANT

## 2022-08-19 PROCEDURE — 1159F PR MEDICATION LIST DOCUMENTED IN MEDICAL RECORD: ICD-10-PCS | Mod: CPTII,,, | Performed by: PHYSICIAN ASSISTANT

## 2022-08-19 PROCEDURE — 3074F PR MOST RECENT SYSTOLIC BLOOD PRESSURE < 130 MM HG: ICD-10-PCS | Mod: CPTII,,, | Performed by: PHYSICIAN ASSISTANT

## 2022-08-19 PROCEDURE — 86140 C-REACTIVE PROTEIN: CPT | Performed by: PHYSICIAN ASSISTANT

## 2022-08-19 PROCEDURE — 3008F BODY MASS INDEX DOCD: CPT | Mod: CPTII,,, | Performed by: PHYSICIAN ASSISTANT

## 2022-08-19 PROCEDURE — 85652 RBC SED RATE AUTOMATED: CPT | Performed by: PHYSICIAN ASSISTANT

## 2022-08-19 PROCEDURE — 3079F PR MOST RECENT DIASTOLIC BLOOD PRESSURE 80-89 MM HG: ICD-10-PCS | Mod: CPTII,,, | Performed by: PHYSICIAN ASSISTANT

## 2022-08-19 PROCEDURE — 99999 PR PBB SHADOW E&M-EST. PATIENT-LVL III: ICD-10-PCS | Mod: PBBFAC,,, | Performed by: PHYSICIAN ASSISTANT

## 2022-08-19 PROCEDURE — 1159F MED LIST DOCD IN RCRD: CPT | Mod: CPTII,,, | Performed by: PHYSICIAN ASSISTANT

## 2022-08-19 PROCEDURE — 3044F PR MOST RECENT HEMOGLOBIN A1C LEVEL <7.0%: ICD-10-PCS | Mod: CPTII,,, | Performed by: PHYSICIAN ASSISTANT

## 2022-08-19 RX ORDER — CEPHALEXIN 500 MG/1
500 CAPSULE ORAL EVERY 6 HOURS
Qty: 56 CAPSULE | Refills: 0 | Status: ON HOLD | OUTPATIENT
Start: 2022-08-19 | End: 2022-08-23

## 2022-08-19 RX ORDER — HYDROCODONE BITARTRATE AND ACETAMINOPHEN 5; 325 MG/1; MG/1
1 TABLET ORAL EVERY 6 HOURS PRN
Qty: 56 TABLET | Refills: 0 | Status: ON HOLD | OUTPATIENT
Start: 2022-08-19 | End: 2022-08-23

## 2022-08-19 NOTE — PROGRESS NOTES
"Neurosurgery  Established Patient    SUBJECTIVE:     Chief complaint: new left sided facial edema    History of Present Illness:  Gina Jenkins is a 47 y.o. female who presents in wheelchair accompanined by her mother, with a past medical history significant for CHF, HTN, tobacco use, CAD, and probable stroke in 2017, she is here for follow up of left sided facial swelling and concern for post op wound infection. 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 Diaz. Per op note, "Stignificant adhesion, and adherence of the posterior communicaing artery aneurysm to the oculomotor nerve.  Significant adherence of the anterior choroidal artery aneurysm to the overlying cortex."    She was seen in our clinic on 8/5 for staple removal. Inflammatory labs were obtained at that time. Procalcitonin and WBC were normal. She had elevated ESR (73) and CRP (45.5).    Patient recently visited [facility] ED on 8/17 for facial edema, afebrile at time of visit. Cranial edema was present at discharge and has been stable, however; over the previous 10 days she noted non-resolving facial and dylan-orbital swelling. Denies worsening visual disturbances since discharge, seizures, drainage from incision, nausea, or vomiting. Reports chills and fever on 8/13, Tmax 102. Symptoms are only present at night. She has been taking Tylenol for pain and fever. She was instructed to follow up in the Neurosurgery clinic upon discharge from ED.     Review of patient's allergies indicates:   Allergen Reactions    Lisinopril Swelling    Bactrim [sulfamethoxazole-trimethoprim] Rash       Current Outpatient Medications   Medication Sig Dispense Refill    amitriptyline (ELAVIL) 75 MG tablet Take 75 mg by mouth every evening.      butalbital-acetaminophen-caffeine -40 mg (FIORICET, ESGIC) -40 mg per tablet Take 1 tablet by mouth " every 4 (four) hours as needed.      carvediloL (COREG) 25 MG tablet Take 37.5 mg by mouth 2 (two) times daily.      dexAMETHasone (DECADRON) 2 MG tablet Take 2 tablets (4 mg) by mouth every 8 hours for 1 day, THEN 2 tablets (4 mg) every 12 hours for 2 days, THEN 1 tablet (2 mg) every 12 hours for 2 days, THEN 1 tablet (2 mg) daily for 2 days, then STOP. 22 tablet 0    docusate sodium (COLACE) 100 MG capsule Take 1 capsule (100 mg total) by mouth 2 (two) times daily as needed for Constipation. 30 capsule 0    EScitalopram oxalate (LEXAPRO) 10 MG tablet Take 10 mg by mouth once daily.      potassium chloride SA (K-DUR,KLOR-CON) 20 MEQ tablet Take 20 mEq by mouth 2 (two) times daily.      amlodipine (NORVASC) 10 MG tablet Take 1 tablet (10 mg total) by mouth once daily. 30 tablet 0    atorvastatin (LIPITOR) 40 MG tablet Take 1 tablet (40 mg total) by mouth once daily. 90 tablet 3    cephALEXin (KEFLEX) 500 MG capsule Take 1 capsule (500 mg total) by mouth every 6 (six) hours. for 14 days 56 capsule 0    cyanocobalamin (VITAMIN B-12) 1000 MCG tablet Take 1 tablet (1,000 mcg total) by mouth once daily. (Patient not taking: No sig reported)      diphenhydrAMINE (BENADRYL) 25 mg capsule Take 1 each (25 mg total) by mouth every 6 (six) hours as needed for Itching or Allergies. (Patient not taking: No sig reported) 20 capsule 0    famotidine (PEPCID) 20 MG tablet Take 1 tablet (20 mg total) by mouth 2 (two) times daily. (Patient not taking: No sig reported) 60 tablet 11    hydrochlorothiazide (HYDRODIURIL) 25 MG tablet Take 1 tablet (25 mg total) by mouth once daily. 30 tablet 0    HYDROcodone-acetaminophen (NORCO) 5-325 mg per tablet Take 1 tablet by mouth every 6 (six) hours as needed for Pain. 56 tablet 0    levETIRAcetam (KEPPRA) 500 MG Tab Take 1 tablet (500 mg total) by mouth 2 (two) times daily for 5 days (Patient not taking: Reported on 7/29/2022) 10 tablet 0    magnesium oxide (MAG-OX) 400 mg (241.3  "mg magnesium) tablet Take 1 tablet by mouth 2 (two) times daily.       Current Facility-Administered Medications   Medication Dose Route Frequency Provider Last Rate Last Admin    albuterol inhaler 2 puff  2 puff Inhalation Q20 Min PRN Bonifacio Ramirez MD           Past Medical History:   Diagnosis Date    Brain aneurysm     CHF (congestive heart failure)     H/O coronary angioplasty     Hypercholesteremia     Hypertension     Malignant hypertension     Migraine headache     Stroke 10/2017     Past Surgical History:   Procedure Laterality Date    CARDIAC CATHETERIZATION      CEREBRAL ANGIOGRAM      CLIP LIGATION OF INTRACRANIAL ANEURYSM BY CRANIOTOMY N/A 7/15/2022    Procedure: CRANIOTOMY, WITH ANEURYSM CLIPPING;  Surgeon: Timothy Diaz MD;  Location: [facility] OR [address] [place] [address];  Service: Neurosurgery;  Laterality: N/A;  PTERIONAL CRANIOTOMY WITH CLIP LIGATION OF L PCOMM, L ANTERIOR CHOROIDAL, L MCA  ANEURYSM, ANESTHESIA: GENERAL, BLOOD: TYPE&CROSS 2 UNITS, NEUROMONITORING: SEP, MEP, EEG, RADIOLOGY: C-ARM, POSITION: SUPINE, CASTILLO, CO-SURGERON: DR. LEXI DOMÍNGUEZ.     Family History    None       Social History     Socioeconomic History    Marital status:    Tobacco Use    Smoking status: Current Every Day Smoker     Packs/day: 0.50     Types: Cigarettes    Smokeless tobacco: Never Used   Substance and Sexual Activity    Alcohol use: Yes     Comment: occassionally    Drug use: No    Sexual activity: Not Currently     Partners: Male     Birth control/protection: None       Review of Systems  Positive per HPI, otherwise a pertinent ROS was performed and was negative.        OBJECTIVE:     Vital Signs  Pulse: 71  BP: 125/80  Pain Score:   7  Height: 5' 2" (157.5 cm)  Weight: 88.5 kg (195 lb 1.7 oz)  Body mass index is 35.69 kg/m².      Neurosurgery Physical Exam  General: well developed, well nourished, no distress.   Head: Left sided cranial, facial, and maxillary edema  Neck: No tracheal deviation. " No palpable masses. Full ROM.   Neurologic: Alert and oriented. Thought content appropriate.  Mental Status: Awake, Alert, Oriented x 4  Language: No aphasia  Speech: slight dysarthria  Cranial nerves:  tongue midline, CN II-XII grossly intact.   Eyes: pupils equal, round, EOMI.   Pulmonary: normal respirations, no signs of respiratory distress  Abdomen: soft, non-distended, not tender to palpation    Sensory: intact to light touch throughout  Motor Strength: Moves all extremities spontaneously with good tone.  Grossly full strength upper extremities, 4/5 BLE. No abnormal movements seen.     Vascular: No LE edema.   Skin: Skin is warm, dry and intact.      Cerebellar:   Finger-to-nose: intact bilaterally   Pronator drift: absent bilaterally    Incision: c/d/i with skin edges well approximated except for scabbing at inferior portion of incision at left ear. TTP at apex of cranial swelling and inferior portion of incision. No drainage from incision. Palpable underlying fluid collection.        Diagnostic Results:  The following diagnostics personally reviewed along with associated radiology report:   CTH 8/17: stable post op extracranial fluid collection. Without evidence for intracranial bleed    ASSESSMENT/PLAN:   Gina Jenkins is a 47 y.o. 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 Diaz who presents to clinic for concern for post op wound infection in the setting of new left sided facial swelling.    She is neurologically stable on exam and has been afebrile the past two days. As the left sided cranial swelling has persisted with new facial swelling, I think it is reasonable to obtain MRI brain w/wo contrast to better characterize the fluid collection.  We also discussed obtaining repeat inflammatory markers (procal, ESR, CRP, CBC, and blood cultures) to trend. I have provided her a 14 day regimen of  antibiotics (Keflex) and refilled her pain medications.    She has follow up scheduled with Dr. Diaz on 8/23, she is aware of the importance of maintaining this follow up. Will discuss possible surgical vs conservative management of fluid collect at that time.    Patient is in agreement with the plan and voiced understanding. She is encouraged to call the clinic with any questions, concerns, or adverse clinical change.       Areli Turner PA-C  Neurosurgery  [facility]    Time spent on this encounter: 160 minutes. This includes face to face time and non-face to face time preparing to see the patient (eg, review of tests), obtaining and/or reviewing separately obtained history, documenting clinical information in the electronic or other health record, independently interpreting results and communicating results to the patient/family/caregiver, or care coordinator.

## 2022-08-21 ENCOUNTER — HOSPITAL ENCOUNTER (INPATIENT)
Facility: HOSPITAL | Age: 47
LOS: 12 days | Discharge: HOME HEALTH | DRG: 856 | End: 2022-09-02
Attending: EMERGENCY MEDICINE | Admitting: NEUROLOGICAL SURGERY
Payer: MEDICAID

## 2022-08-21 DIAGNOSIS — U07.1 COVID-19: ICD-10-CM

## 2022-08-21 DIAGNOSIS — R56.9 FOCAL SEIZURE: ICD-10-CM

## 2022-08-21 DIAGNOSIS — G93.5 BRAIN COMPRESSION: ICD-10-CM

## 2022-08-21 DIAGNOSIS — E11.9 NEW ONSET TYPE 2 DIABETES MELLITUS: ICD-10-CM

## 2022-08-21 DIAGNOSIS — T88.8XXA FLUID COLLECTION AT SURGICAL SITE: ICD-10-CM

## 2022-08-21 DIAGNOSIS — I63.9 STROKE: ICD-10-CM

## 2022-08-21 DIAGNOSIS — R29.818 TRANSIENT NEUROLOGICAL SYMPTOMS: ICD-10-CM

## 2022-08-21 DIAGNOSIS — I10 ESSENTIAL HYPERTENSION: ICD-10-CM

## 2022-08-21 DIAGNOSIS — T88.8XXS FLUID COLLECTION AT SURGICAL SITE, SEQUELA: ICD-10-CM

## 2022-08-21 DIAGNOSIS — T81.41XA INFECTION OF SUPERFICIAL INCISIONAL SURGICAL SITE AFTER PROCEDURE, INITIAL ENCOUNTER: Primary | ICD-10-CM

## 2022-08-21 DIAGNOSIS — T81.40XD POSTOPERATIVE INFECTION, UNSPECIFIED TYPE, SUBSEQUENT ENCOUNTER: ICD-10-CM

## 2022-08-21 DIAGNOSIS — T88.8XXD FLUID COLLECTION AT SURGICAL SITE, SUBSEQUENT ENCOUNTER: ICD-10-CM

## 2022-08-21 DIAGNOSIS — T81.40XA POSTOPERATIVE INFECTION, UNSPECIFIED TYPE, INITIAL ENCOUNTER: ICD-10-CM

## 2022-08-21 DIAGNOSIS — T88.8XXA FLUID COLLECTION AT SURGICAL SITE, INITIAL ENCOUNTER: ICD-10-CM

## 2022-08-21 DIAGNOSIS — E78.2 MIXED HYPERLIPIDEMIA: ICD-10-CM

## 2022-08-21 LAB
ALBUMIN SERPL BCP-MCNC: 3.4 G/DL (ref 3.5–5.2)
ALLENS TEST: ABNORMAL
ALP SERPL-CCNC: 204 U/L (ref 55–135)
ALT SERPL W/O P-5'-P-CCNC: 15 U/L (ref 10–44)
ANION GAP SERPL CALC-SCNC: 15 MMOL/L (ref 8–16)
AST SERPL-CCNC: 13 U/L (ref 10–40)
B-HCG UR QL: NEGATIVE
BACTERIA #/AREA URNS AUTO: NORMAL /HPF
BASOPHILS # BLD AUTO: 0.06 K/UL (ref 0–0.2)
BASOPHILS NFR BLD: 0.7 % (ref 0–1.9)
BILIRUB SERPL-MCNC: 0.3 MG/DL (ref 0.1–1)
BILIRUB UR QL STRIP: NEGATIVE
BUN SERPL-MCNC: 16 MG/DL (ref 6–20)
CALCIUM SERPL-MCNC: 9.7 MG/DL (ref 8.7–10.5)
CHLORIDE SERPL-SCNC: 97 MMOL/L (ref 95–110)
CLARITY UR REFRACT.AUTO: CLEAR
CO2 SERPL-SCNC: 23 MMOL/L (ref 23–29)
COLOR UR AUTO: COLORLESS
CREAT SERPL-MCNC: 0.8 MG/DL (ref 0.5–1.4)
CREAT SERPL-MCNC: 1.2 MG/DL (ref 0.5–1.4)
CRP SERPL-MCNC: 26 MG/L (ref 0–8.2)
CTP QC/QA: YES
DELSYS: ABNORMAL
DIFFERENTIAL METHOD: ABNORMAL
EOSINOPHIL # BLD AUTO: 0.1 K/UL (ref 0–0.5)
EOSINOPHIL NFR BLD: 1.4 % (ref 0–8)
ERYTHROCYTE [DISTWIDTH] IN BLOOD BY AUTOMATED COUNT: 13.7 % (ref 11.5–14.5)
ERYTHROCYTE [SEDIMENTATION RATE] IN BLOOD BY PHOTOMETRIC METHOD: 46 MM/HR (ref 0–36)
EST. GFR  (NO RACE VARIABLE): 56.5 ML/MIN/1.73 M^2
GLUCOSE SERPL-MCNC: 472 MG/DL (ref 70–110)
GLUCOSE UR QL STRIP: ABNORMAL
HCO3 UR-SCNC: 26.6 MMOL/L (ref 24–28)
HCT VFR BLD AUTO: 35.5 % (ref 37–48.5)
HGB BLD-MCNC: 12.4 G/DL (ref 12–16)
HGB UR QL STRIP: NEGATIVE
IMM GRANULOCYTES # BLD AUTO: 0.08 K/UL (ref 0–0.04)
IMM GRANULOCYTES NFR BLD AUTO: 0.9 % (ref 0–0.5)
INR PPP: 1 (ref 0.8–1.2)
KETONES UR QL STRIP: NEGATIVE
LEUKOCYTE ESTERASE UR QL STRIP: NEGATIVE
LYMPHOCYTES # BLD AUTO: 1.8 K/UL (ref 1–4.8)
LYMPHOCYTES NFR BLD: 20.3 % (ref 18–48)
MCH RBC QN AUTO: 29.2 PG (ref 27–31)
MCHC RBC AUTO-ENTMCNC: 34.9 G/DL (ref 32–36)
MCV RBC AUTO: 84 FL (ref 82–98)
MICROSCOPIC COMMENT: NORMAL
MODE: ABNORMAL
MONOCYTES # BLD AUTO: 0.8 K/UL (ref 0.3–1)
MONOCYTES NFR BLD: 9.3 % (ref 4–15)
NEUTROPHILS # BLD AUTO: 5.9 K/UL (ref 1.8–7.7)
NEUTROPHILS NFR BLD: 67.4 % (ref 38–73)
NITRITE UR QL STRIP: NEGATIVE
NRBC BLD-RTO: 0 /100 WBC
PCO2 BLDA: 41.1 MMHG (ref 35–45)
PH SMN: 7.42 [PH] (ref 7.35–7.45)
PH UR STRIP: 6 [PH] (ref 5–8)
PLATELET # BLD AUTO: 417 K/UL (ref 150–450)
PMV BLD AUTO: 10.9 FL (ref 9.2–12.9)
PO2 BLDA: 36 MMHG (ref 40–60)
POC BE: 2 MMOL/L
POC PTINR: 0.9 (ref 0.9–1.2)
POC PTWBT: 11.3 SEC (ref 9.7–14.3)
POC SATURATED O2: 70 % (ref 95–100)
POC TCO2: 28 MMOL/L (ref 24–29)
POCT GLUCOSE: 422 MG/DL (ref 70–110)
POTASSIUM SERPL-SCNC: 3.1 MMOL/L (ref 3.5–5.1)
PROT SERPL-MCNC: 7.4 G/DL (ref 6–8.4)
PROT UR QL STRIP: NEGATIVE
PROTHROMBIN TIME: 10.4 SEC (ref 9–12.5)
RBC # BLD AUTO: 4.25 M/UL (ref 4–5.4)
RBC #/AREA URNS AUTO: 1 /HPF (ref 0–4)
SAMPLE: ABNORMAL
SAMPLE: NORMAL
SAMPLE: NORMAL
SITE: ABNORMAL
SODIUM SERPL-SCNC: 135 MMOL/L (ref 136–145)
SP GR UR STRIP: 1.02 (ref 1–1.03)
SQUAMOUS #/AREA URNS AUTO: 3 /HPF
TSH SERPL DL<=0.005 MIU/L-ACNC: 1.37 UIU/ML (ref 0.4–4)
URN SPEC COLLECT METH UR: ABNORMAL
WBC # BLD AUTO: 8.72 K/UL (ref 3.9–12.7)
WBC #/AREA URNS AUTO: 1 /HPF (ref 0–5)
YEAST UR QL AUTO: NORMAL

## 2022-08-21 PROCEDURE — 93005 ELECTROCARDIOGRAM TRACING: CPT

## 2022-08-21 PROCEDURE — 87389 HIV-1 AG W/HIV-1&-2 AB AG IA: CPT | Performed by: PHYSICIAN ASSISTANT

## 2022-08-21 PROCEDURE — 99291 CRITICAL CARE FIRST HOUR: CPT | Mod: CS,,, | Performed by: EMERGENCY MEDICINE

## 2022-08-21 PROCEDURE — 84443 ASSAY THYROID STIM HORMONE: CPT | Performed by: EMERGENCY MEDICINE

## 2022-08-21 PROCEDURE — 93010 ELECTROCARDIOGRAM REPORT: CPT | Mod: ,,, | Performed by: INTERNAL MEDICINE

## 2022-08-21 PROCEDURE — 87040 BLOOD CULTURE FOR BACTERIA: CPT | Performed by: STUDENT IN AN ORGANIZED HEALTH CARE EDUCATION/TRAINING PROGRAM

## 2022-08-21 PROCEDURE — 81025 URINE PREGNANCY TEST: CPT | Performed by: EMERGENCY MEDICINE

## 2022-08-21 PROCEDURE — 85652 RBC SED RATE AUTOMATED: CPT | Performed by: STUDENT IN AN ORGANIZED HEALTH CARE EDUCATION/TRAINING PROGRAM

## 2022-08-21 PROCEDURE — 99900035 HC TECH TIME PER 15 MIN (STAT)

## 2022-08-21 PROCEDURE — 85025 COMPLETE CBC W/AUTO DIFF WBC: CPT | Performed by: EMERGENCY MEDICINE

## 2022-08-21 PROCEDURE — 96375 TX/PRO/DX INJ NEW DRUG ADDON: CPT

## 2022-08-21 PROCEDURE — 82803 BLOOD GASES ANY COMBINATION: CPT

## 2022-08-21 PROCEDURE — 82565 ASSAY OF CREATININE: CPT

## 2022-08-21 PROCEDURE — 85610 PROTHROMBIN TIME: CPT

## 2022-08-21 PROCEDURE — 86140 C-REACTIVE PROTEIN: CPT | Performed by: STUDENT IN AN ORGANIZED HEALTH CARE EDUCATION/TRAINING PROGRAM

## 2022-08-21 PROCEDURE — 93010 EKG 12-LEAD: ICD-10-PCS | Mod: ,,, | Performed by: INTERNAL MEDICINE

## 2022-08-21 PROCEDURE — 80053 COMPREHEN METABOLIC PANEL: CPT | Performed by: EMERGENCY MEDICINE

## 2022-08-21 PROCEDURE — 85610 PROTHROMBIN TIME: CPT | Performed by: EMERGENCY MEDICINE

## 2022-08-21 PROCEDURE — 25500020 PHARM REV CODE 255: Performed by: EMERGENCY MEDICINE

## 2022-08-21 PROCEDURE — 99291 PR CRITICAL CARE, E/M 30-74 MINUTES: ICD-10-PCS | Mod: CS,,, | Performed by: EMERGENCY MEDICINE

## 2022-08-21 PROCEDURE — 82962 GLUCOSE BLOOD TEST: CPT

## 2022-08-21 PROCEDURE — 12000002 HC ACUTE/MED SURGE SEMI-PRIVATE ROOM

## 2022-08-21 PROCEDURE — 86803 HEPATITIS C AB TEST: CPT | Performed by: PHYSICIAN ASSISTANT

## 2022-08-21 PROCEDURE — 81001 URINALYSIS AUTO W/SCOPE: CPT | Performed by: EMERGENCY MEDICINE

## 2022-08-21 RX ORDER — GADOBUTROL 604.72 MG/ML
10 INJECTION INTRAVENOUS
Status: COMPLETED | OUTPATIENT
Start: 2022-08-22 | End: 2022-08-21

## 2022-08-21 RX ORDER — LEVETIRACETAM 500 MG/5ML
1000 INJECTION, SOLUTION, CONCENTRATE INTRAVENOUS EVERY 12 HOURS
Status: DISCONTINUED | OUTPATIENT
Start: 2022-08-22 | End: 2022-08-31

## 2022-08-21 RX ORDER — LEVETIRACETAM 500 MG/5ML
1500 INJECTION, SOLUTION, CONCENTRATE INTRAVENOUS ONCE
Status: COMPLETED | OUTPATIENT
Start: 2022-08-22 | End: 2022-08-21

## 2022-08-21 RX ORDER — MIDAZOLAM HYDROCHLORIDE 1 MG/ML
INJECTION INTRAMUSCULAR; INTRAVENOUS
Status: DISCONTINUED
Start: 2022-08-21 | End: 2022-08-21 | Stop reason: WASHOUT

## 2022-08-21 RX ADMIN — GADOBUTROL 10 ML: 604.72 INJECTION INTRAVENOUS at 11:08

## 2022-08-21 RX ADMIN — LEVETIRACETAM 1500 MG: 100 INJECTION, SOLUTION INTRAVENOUS at 11:08

## 2022-08-21 RX ADMIN — IOHEXOL 75 ML: 350 INJECTION, SOLUTION INTRAVENOUS at 09:08

## 2022-08-22 PROBLEM — R29.818 TRANSIENT NEUROLOGICAL SYMPTOMS: Status: ACTIVE | Noted: 2022-08-22

## 2022-08-22 PROBLEM — E87.6 HYPOKALEMIA: Status: RESOLVED | Noted: 2017-10-03 | Resolved: 2022-08-22

## 2022-08-22 PROBLEM — T81.40XA POST OP INFECTION: Status: ACTIVE | Noted: 2022-08-22

## 2022-08-22 PROBLEM — R26.9 ABNORMALITY OF GAIT AS LATE EFFECT OF CEREBROVASCULAR ACCIDENT (CVA): Status: RESOLVED | Noted: 2018-04-22 | Resolved: 2022-08-22

## 2022-08-22 PROBLEM — R51.9 SEVERE HEADACHE: Status: RESOLVED | Noted: 2017-10-25 | Resolved: 2022-08-22

## 2022-08-22 PROBLEM — Z01.810 PREOP CARDIOVASCULAR EXAM: Status: RESOLVED | Noted: 2022-06-07 | Resolved: 2022-08-22

## 2022-08-22 PROBLEM — G43.409 HEMIPLEGIC MIGRAINE WITHOUT STATUS MIGRAINOSUS, NOT INTRACTABLE: Status: RESOLVED | Noted: 2019-08-06 | Resolved: 2022-08-22

## 2022-08-22 PROBLEM — R53.1 WEAKNESS GENERALIZED: Status: RESOLVED | Noted: 2018-04-25 | Resolved: 2022-08-22

## 2022-08-22 PROBLEM — E83.51 HYPOCALCEMIA: Status: RESOLVED | Noted: 2017-10-03 | Resolved: 2022-08-22

## 2022-08-22 PROBLEM — N39.0 UTI (URINARY TRACT INFECTION): Status: RESOLVED | Noted: 2017-10-03 | Resolved: 2022-08-22

## 2022-08-22 PROBLEM — R29.898 WEAKNESS OF BOTH LOWER EXTREMITIES: Status: RESOLVED | Noted: 2018-04-22 | Resolved: 2022-08-22

## 2022-08-22 PROBLEM — R26.89 IMPAIRED BALANCE AS LATE EFFECT OF CEREBROVASCULAR ACCIDENT: Status: RESOLVED | Noted: 2018-04-22 | Resolved: 2022-08-22

## 2022-08-22 PROBLEM — R30.0 DYSURIA: Status: RESOLVED | Noted: 2017-10-05 | Resolved: 2022-08-22

## 2022-08-22 PROBLEM — I69.398 ABNORMALITY OF GAIT AS LATE EFFECT OF CEREBROVASCULAR ACCIDENT (CVA): Status: RESOLVED | Noted: 2018-04-22 | Resolved: 2022-08-22

## 2022-08-22 PROBLEM — I69.398 IMPAIRED BALANCE AS LATE EFFECT OF CEREBROVASCULAR ACCIDENT: Status: RESOLVED | Noted: 2018-04-22 | Resolved: 2022-08-22

## 2022-08-22 LAB
ANION GAP SERPL CALC-SCNC: 12 MMOL/L (ref 8–16)
APTT BLDCRRT: 24 SEC (ref 21–32)
BASOPHILS # BLD AUTO: 0.05 K/UL (ref 0–0.2)
BASOPHILS NFR BLD: 0.7 % (ref 0–1.9)
BUN SERPL-MCNC: 9 MG/DL (ref 6–20)
CALCIUM SERPL-MCNC: 9.2 MG/DL (ref 8.7–10.5)
CHLORIDE SERPL-SCNC: 99 MMOL/L (ref 95–110)
CO2 SERPL-SCNC: 23 MMOL/L (ref 23–29)
CREAT SERPL-MCNC: 0.9 MG/DL (ref 0.5–1.4)
DIFFERENTIAL METHOD: ABNORMAL
EOSINOPHIL # BLD AUTO: 0.1 K/UL (ref 0–0.5)
EOSINOPHIL NFR BLD: 1.6 % (ref 0–8)
ERYTHROCYTE [DISTWIDTH] IN BLOOD BY AUTOMATED COUNT: 13.6 % (ref 11.5–14.5)
EST. GFR  (NO RACE VARIABLE): >60 ML/MIN/1.73 M^2
ESTIMATED AVG GLUCOSE: 192 MG/DL (ref 68–131)
GLUCOSE SERPL-MCNC: 337 MG/DL (ref 70–110)
HBA1C MFR BLD: 8.3 % (ref 4–5.6)
HCT VFR BLD AUTO: 33.4 % (ref 37–48.5)
HCV AB SERPL QL IA: NEGATIVE
HGB BLD-MCNC: 11.9 G/DL (ref 12–16)
HIV 1+2 AB+HIV1 P24 AG SERPL QL IA: NEGATIVE
IMM GRANULOCYTES # BLD AUTO: 0.1 K/UL (ref 0–0.04)
IMM GRANULOCYTES NFR BLD AUTO: 1.4 % (ref 0–0.5)
LYMPHOCYTES # BLD AUTO: 1.7 K/UL (ref 1–4.8)
LYMPHOCYTES NFR BLD: 24.5 % (ref 18–48)
MCH RBC QN AUTO: 28.7 PG (ref 27–31)
MCHC RBC AUTO-ENTMCNC: 35.6 G/DL (ref 32–36)
MCV RBC AUTO: 81 FL (ref 82–98)
MONOCYTES # BLD AUTO: 0.7 K/UL (ref 0.3–1)
MONOCYTES NFR BLD: 9.3 % (ref 4–15)
NEUTROPHILS # BLD AUTO: 4.4 K/UL (ref 1.8–7.7)
NEUTROPHILS NFR BLD: 62.5 % (ref 38–73)
NRBC BLD-RTO: 0 /100 WBC
PLATELET # BLD AUTO: 348 K/UL (ref 150–450)
PMV BLD AUTO: 10.9 FL (ref 9.2–12.9)
POCT GLUCOSE: 274 MG/DL (ref 70–110)
POCT GLUCOSE: 299 MG/DL (ref 70–110)
POCT GLUCOSE: 305 MG/DL (ref 70–110)
POCT GLUCOSE: 347 MG/DL (ref 70–110)
POCT GLUCOSE: 353 MG/DL (ref 70–110)
POCT GLUCOSE: 392 MG/DL (ref 70–110)
POTASSIUM SERPL-SCNC: 2.8 MMOL/L (ref 3.5–5.1)
RBC # BLD AUTO: 4.15 M/UL (ref 4–5.4)
SARS-COV-2 RDRP RESP QL NAA+PROBE: NEGATIVE
SODIUM SERPL-SCNC: 134 MMOL/L (ref 136–145)
VANCOMYCIN SERPL-MCNC: 13.9 UG/ML
WBC # BLD AUTO: 7.07 K/UL (ref 3.9–12.7)

## 2022-08-22 PROCEDURE — 99223 PR INITIAL HOSPITAL CARE,LEVL III: ICD-10-PCS | Mod: ,,, | Performed by: REGISTERED NURSE

## 2022-08-22 PROCEDURE — 87206 SMEAR FLUORESCENT/ACID STAI: CPT | Performed by: NEUROLOGICAL SURGERY

## 2022-08-22 PROCEDURE — A9585 GADOBUTROL INJECTION: HCPCS | Performed by: EMERGENCY MEDICINE

## 2022-08-22 PROCEDURE — 87102 FUNGUS ISOLATION CULTURE: CPT | Performed by: NEUROLOGICAL SURGERY

## 2022-08-22 PROCEDURE — 63600175 PHARM REV CODE 636 W HCPCS: Performed by: STUDENT IN AN ORGANIZED HEALTH CARE EDUCATION/TRAINING PROGRAM

## 2022-08-22 PROCEDURE — 25000003 PHARM REV CODE 250: Performed by: NEUROLOGICAL SURGERY

## 2022-08-22 PROCEDURE — 11000001 HC ACUTE MED/SURG PRIVATE ROOM

## 2022-08-22 PROCEDURE — 63600175 PHARM REV CODE 636 W HCPCS: Performed by: PHYSICIAN ASSISTANT

## 2022-08-22 PROCEDURE — 85025 COMPLETE CBC W/AUTO DIFF WBC: CPT | Performed by: STUDENT IN AN ORGANIZED HEALTH CARE EDUCATION/TRAINING PROGRAM

## 2022-08-22 PROCEDURE — 80048 BASIC METABOLIC PNL TOTAL CA: CPT | Performed by: STUDENT IN AN ORGANIZED HEALTH CARE EDUCATION/TRAINING PROGRAM

## 2022-08-22 PROCEDURE — 80202 ASSAY OF VANCOMYCIN: CPT | Performed by: STUDENT IN AN ORGANIZED HEALTH CARE EDUCATION/TRAINING PROGRAM

## 2022-08-22 PROCEDURE — 83036 HEMOGLOBIN GLYCOSYLATED A1C: CPT | Performed by: PHYSICIAN ASSISTANT

## 2022-08-22 PROCEDURE — 99024 POSTOP FOLLOW-UP VISIT: CPT | Mod: ,,, | Performed by: NEUROLOGICAL SURGERY

## 2022-08-22 PROCEDURE — 99223 PR INITIAL HOSPITAL CARE,LEVL III: ICD-10-PCS | Mod: ,,, | Performed by: PSYCHIATRY & NEUROLOGY

## 2022-08-22 PROCEDURE — 87075 CULTR BACTERIA EXCEPT BLOOD: CPT | Performed by: NEUROLOGICAL SURGERY

## 2022-08-22 PROCEDURE — 99024 PR POST-OP FOLLOW-UP VISIT: ICD-10-PCS | Mod: ,,, | Performed by: NEUROLOGICAL SURGERY

## 2022-08-22 PROCEDURE — 87116 MYCOBACTERIA CULTURE: CPT | Performed by: NEUROLOGICAL SURGERY

## 2022-08-22 PROCEDURE — 87205 SMEAR GRAM STAIN: CPT | Performed by: NEUROLOGICAL SURGERY

## 2022-08-22 PROCEDURE — 25000003 PHARM REV CODE 250: Performed by: STUDENT IN AN ORGANIZED HEALTH CARE EDUCATION/TRAINING PROGRAM

## 2022-08-22 PROCEDURE — 99223 1ST HOSP IP/OBS HIGH 75: CPT | Mod: ,,, | Performed by: PSYCHIATRY & NEUROLOGY

## 2022-08-22 PROCEDURE — 85730 THROMBOPLASTIN TIME PARTIAL: CPT | Performed by: PHYSICIAN ASSISTANT

## 2022-08-22 PROCEDURE — 36415 COLL VENOUS BLD VENIPUNCTURE: CPT | Performed by: PHYSICIAN ASSISTANT

## 2022-08-22 PROCEDURE — 87070 CULTURE OTHR SPECIMN AEROBIC: CPT | Performed by: NEUROLOGICAL SURGERY

## 2022-08-22 PROCEDURE — 96374 THER/PROPH/DIAG INJ IV PUSH: CPT

## 2022-08-22 PROCEDURE — 99223 1ST HOSP IP/OBS HIGH 75: CPT | Mod: ,,, | Performed by: REGISTERED NURSE

## 2022-08-22 PROCEDURE — U0002 COVID-19 LAB TEST NON-CDC: HCPCS | Performed by: EMERGENCY MEDICINE

## 2022-08-22 PROCEDURE — 99291 CRITICAL CARE FIRST HOUR: CPT | Mod: 25

## 2022-08-22 PROCEDURE — 25500020 PHARM REV CODE 255: Performed by: EMERGENCY MEDICINE

## 2022-08-22 PROCEDURE — 63600175 PHARM REV CODE 636 W HCPCS: Performed by: NEUROLOGICAL SURGERY

## 2022-08-22 RX ORDER — SODIUM CHLORIDE 9 MG/ML
INJECTION, SOLUTION INTRAVENOUS CONTINUOUS
Status: DISCONTINUED | OUTPATIENT
Start: 2022-08-22 | End: 2022-08-22

## 2022-08-22 RX ORDER — TALC
6 POWDER (GRAM) TOPICAL NIGHTLY PRN
Status: DISCONTINUED | OUTPATIENT
Start: 2022-08-22 | End: 2022-09-02 | Stop reason: HOSPADM

## 2022-08-22 RX ORDER — IBUPROFEN 200 MG
16 TABLET ORAL
Status: DISCONTINUED | OUTPATIENT
Start: 2022-08-22 | End: 2022-08-23

## 2022-08-22 RX ORDER — INSULIN ASPART 100 [IU]/ML
1-10 INJECTION, SOLUTION INTRAVENOUS; SUBCUTANEOUS EVERY 6 HOURS PRN
Status: DISCONTINUED | OUTPATIENT
Start: 2022-08-22 | End: 2022-08-22

## 2022-08-22 RX ORDER — AMLODIPINE BESYLATE 10 MG/1
10 TABLET ORAL DAILY
Status: DISCONTINUED | OUTPATIENT
Start: 2022-08-22 | End: 2022-08-27

## 2022-08-22 RX ORDER — ESCITALOPRAM OXALATE 10 MG/1
10 TABLET ORAL DAILY
Status: DISCONTINUED | OUTPATIENT
Start: 2022-08-22 | End: 2022-09-02 | Stop reason: HOSPADM

## 2022-08-22 RX ORDER — POTASSIUM CHLORIDE 7.45 MG/ML
10 INJECTION INTRAVENOUS
Status: DISCONTINUED | OUTPATIENT
Start: 2022-08-22 | End: 2022-08-22

## 2022-08-22 RX ORDER — INSULIN ASPART 100 [IU]/ML
0-5 INJECTION, SOLUTION INTRAVENOUS; SUBCUTANEOUS
Status: DISCONTINUED | OUTPATIENT
Start: 2022-08-22 | End: 2022-08-22

## 2022-08-22 RX ORDER — IBUPROFEN 200 MG
24 TABLET ORAL
Status: DISCONTINUED | OUTPATIENT
Start: 2022-08-22 | End: 2022-08-22

## 2022-08-22 RX ORDER — ACETAMINOPHEN 325 MG/1
650 TABLET ORAL EVERY 6 HOURS PRN
Status: DISCONTINUED | OUTPATIENT
Start: 2022-08-22 | End: 2022-09-02 | Stop reason: HOSPADM

## 2022-08-22 RX ORDER — GLUCAGON 1 MG
1 KIT INJECTION
Status: DISCONTINUED | OUTPATIENT
Start: 2022-08-22 | End: 2022-08-23

## 2022-08-22 RX ORDER — IBUPROFEN 200 MG
24 TABLET ORAL
Status: DISCONTINUED | OUTPATIENT
Start: 2022-08-22 | End: 2022-08-23

## 2022-08-22 RX ORDER — LIDOCAINE HYDROCHLORIDE 10 MG/ML
1 INJECTION INFILTRATION; PERINEURAL ONCE
Status: COMPLETED | OUTPATIENT
Start: 2022-08-22 | End: 2022-08-22

## 2022-08-22 RX ORDER — ATORVASTATIN CALCIUM 40 MG/1
40 TABLET, FILM COATED ORAL DAILY
Status: DISCONTINUED | OUTPATIENT
Start: 2022-08-22 | End: 2022-09-02 | Stop reason: HOSPADM

## 2022-08-22 RX ORDER — CEFEPIME HYDROCHLORIDE 1 G/50ML
2 INJECTION, SOLUTION INTRAVENOUS
Status: DISCONTINUED | OUTPATIENT
Start: 2022-08-22 | End: 2022-08-29

## 2022-08-22 RX ORDER — IBUPROFEN 200 MG
16 TABLET ORAL
Status: DISCONTINUED | OUTPATIENT
Start: 2022-08-22 | End: 2022-08-22

## 2022-08-22 RX ORDER — POTASSIUM CHLORIDE 7.45 MG/ML
10 INJECTION INTRAVENOUS
Status: COMPLETED | OUTPATIENT
Start: 2022-08-22 | End: 2022-08-22

## 2022-08-22 RX ORDER — INSULIN ASPART 100 [IU]/ML
1-10 INJECTION, SOLUTION INTRAVENOUS; SUBCUTANEOUS
Status: DISCONTINUED | OUTPATIENT
Start: 2022-08-22 | End: 2022-08-23

## 2022-08-22 RX ORDER — VANCOMYCIN HCL IN 5 % DEXTROSE 1G/250ML
1000 PLASTIC BAG, INJECTION (ML) INTRAVENOUS
Status: DISCONTINUED | OUTPATIENT
Start: 2022-08-22 | End: 2022-08-25

## 2022-08-22 RX ADMIN — POTASSIUM CHLORIDE 10 MEQ: 7.46 INJECTION, SOLUTION INTRAVENOUS at 02:08

## 2022-08-22 RX ADMIN — CARVEDILOL 37.5 MG: 25 TABLET, FILM COATED ORAL at 09:08

## 2022-08-22 RX ADMIN — PIPERACILLIN SODIUM AND TAZOBACTAM SODIUM 4.5 G: 4; .5 INJECTION, POWDER, LYOPHILIZED, FOR SOLUTION INTRAVENOUS at 05:08

## 2022-08-22 RX ADMIN — INSULIN ASPART 6 UNITS: 100 INJECTION, SOLUTION INTRAVENOUS; SUBCUTANEOUS at 10:08

## 2022-08-22 RX ADMIN — ACETAMINOPHEN 650 MG: 325 TABLET ORAL at 09:08

## 2022-08-22 RX ADMIN — CEFEPIME 2 G: 2 INJECTION, POWDER, FOR SOLUTION INTRAVENOUS at 09:08

## 2022-08-22 RX ADMIN — INSULIN ASPART 5 UNITS: 100 INJECTION, SOLUTION INTRAVENOUS; SUBCUTANEOUS at 09:08

## 2022-08-22 RX ADMIN — VANCOMYCIN HYDROCHLORIDE 1750 MG: 500 INJECTION, POWDER, LYOPHILIZED, FOR SOLUTION INTRAVENOUS at 02:08

## 2022-08-22 RX ADMIN — INSULIN ASPART 8 UNITS: 100 INJECTION, SOLUTION INTRAVENOUS; SUBCUTANEOUS at 06:08

## 2022-08-22 RX ADMIN — PIPERACILLIN SODIUM AND TAZOBACTAM SODIUM 4.5 G: 4; .5 INJECTION, POWDER, LYOPHILIZED, FOR SOLUTION INTRAVENOUS at 01:08

## 2022-08-22 RX ADMIN — LEVETIRACETAM 1000 MG: 100 INJECTION, SOLUTION INTRAVENOUS at 08:08

## 2022-08-22 RX ADMIN — VANCOMYCIN HYDROCHLORIDE 1000 MG: 1 INJECTION, POWDER, LYOPHILIZED, FOR SOLUTION INTRAVENOUS at 06:08

## 2022-08-22 RX ADMIN — AMLODIPINE BESYLATE 10 MG: 10 TABLET ORAL at 08:08

## 2022-08-22 RX ADMIN — AMITRIPTYLINE HYDROCHLORIDE 75 MG: 50 TABLET, FILM COATED ORAL at 09:08

## 2022-08-22 RX ADMIN — LIDOCAINE HYDROCHLORIDE 1 ML: 10 INJECTION, SOLUTION INFILTRATION; PERINEURAL at 04:08

## 2022-08-22 RX ADMIN — PIPERACILLIN SODIUM AND TAZOBACTAM SODIUM 4.5 G: 4; .5 INJECTION, POWDER, LYOPHILIZED, FOR SOLUTION INTRAVENOUS at 08:08

## 2022-08-22 RX ADMIN — POTASSIUM CHLORIDE 10 MEQ: 7.46 INJECTION, SOLUTION INTRAVENOUS at 12:08

## 2022-08-22 RX ADMIN — POTASSIUM CHLORIDE 10 MEQ: 7.46 INJECTION, SOLUTION INTRAVENOUS at 04:08

## 2022-08-22 RX ADMIN — LEVETIRACETAM 1000 MG: 100 INJECTION, SOLUTION INTRAVENOUS at 09:08

## 2022-08-22 RX ADMIN — ESCITALOPRAM OXALATE 10 MG: 10 TABLET ORAL at 08:08

## 2022-08-22 RX ADMIN — CARVEDILOL 37.5 MG: 25 TABLET, FILM COATED ORAL at 08:08

## 2022-08-22 RX ADMIN — ATORVASTATIN CALCIUM 40 MG: 40 TABLET, FILM COATED ORAL at 08:08

## 2022-08-22 RX ADMIN — SODIUM CHLORIDE: 0.9 INJECTION, SOLUTION INTRAVENOUS at 07:08

## 2022-08-22 NOTE — CONSULTS
Misael Schmitt - Neurosurgery ([facility])  Infectious Disease  Consult Note    Patient Name: Gina Jenkins  MRN: [id number]  Admission Date: 8/21/2022  Hospital Length of Stay: 0 days  Attending Physician: Timothy Diaz MD  Primary Care Provider: Clair Johnson NP     Isolation Status: No active isolations    Patient information was obtained from patient, past medical records and ER records.      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 Diaz who presented with sudden onset of symptoms concerning for focal seizure. See HPI for more detail.      Without leukocytosis. sed rate 46 and CRP 26. Blood cultures NGTD. MRI brain w wo c/f enhancement of the sub dural and scalp complex fluid collections, potentially representing granulation tissue or infection hematoma. MRI wo c/f stable subdural and subq fluid collections surrounding cranitomy defect on the left, potentially appearing somewhat loculated. CTA head with no large vessel occulsion, stenosis, or vascular malformation.      Per chart review, NSGY plans to potentially tap scalp collection.      ID was consulted for mgmt recommendations. Pt is currently on vancomycin and piperacillin tazobactam.     Recommendations:   - Continue Vancomycin IV, inpatient pharmacy to manage dosing. Trough goal 15-20.   - Discontinue piperacillin-tazobactam. Start Cefepime 2g IV q8hr in the setting of recent operation.   - If taken for a cranial tap, please send specimen for path, gram stain, aerobic, anaerobic, AFB and fungal cultures.   - Will monitor pt culture data and tailor abx as needed.   - Plan reviewed with ID staff, Dr. Pineda. ID will follow.           Thank you  for your consult. I will follow-up with patient. Please contact us if you have any additional questions.    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 Diaz who presented with sudden onset of symptoms concerning for focal seizure. Pt states she was in her yard with her son on Sunday sharing a snack when she suddenly became aphasic and had RLE weakness. Pt states occurred for about 5 mins and then self resolved. Pt reported to the ED immediately and reports the episode occurred for a 2nd time in the ER, again self resolving. Pt denies drainage, pain, or swelling from her craniotomy incision site. Denies current weakness, paralysis. Denies change of vision, headaches. Denies loss of control from bowels, urine. Denies fevers, body aches, chills. Denies concerns/complications postoperatively until on Sunday when the symptoms started.     Since admission pt remains without leukocytosis. Pt with slightly increased inflammatory markers, sed rate 46 and CRP 26. Blood cultures NGTD. MRI brain w wo c/f enhancement of the sub dural and scalp complex fluid collections, potentially representing granulation tissue or infection hematoma. MRI wo c/f stable subdural and subq fluid collections surrounding cranitomy defect on the left, potentially appearing somewhat loculated. CTA head with no large vessel occulsion, stenosis, or vascular malformation.     ID was consulted for mgmt recommendations. Pt is currently on vancomycin and piperacillin tazobactam.         Past Medical History:   Diagnosis Date    Brain aneurysm     CHF (congestive heart failure)     H/O coronary angioplasty     Hypercholesteremia     Hypertension     Malignant  hypertension     Migraine headache     Stroke 10/2017       Past Surgical History:   Procedure Laterality Date    CARDIAC CATHETERIZATION      CEREBRAL ANGIOGRAM      CLIP LIGATION OF INTRACRANIAL ANEURYSM BY CRANIOTOMY N/A 7/15/2022    Procedure: CRANIOTOMY, WITH ANEURYSM CLIPPING;  Surgeon: Timothy Diaz MD;  Location: [facility] OR [address] [place] [address];  Service: Neurosurgery;  Laterality: N/A;  PTERIONAL CRANIOTOMY WITH CLIP LIGATION OF L PCOMM, L ANTERIOR CHOROIDAL, L MCA  ANEURYSM, ANESTHESIA: GENERAL, BLOOD: TYPE&CROSS 2 UNITS, NEUROMONITORING: SEP, MEP, EEG, RADIOLOGY: C-ARM, POSITION: SUPINE, TONEY CASTILLO-SURGERON: DR. LEXI DOMÍNGUEZ.       Review of patient's allergies indicates:   Allergen Reactions    Lisinopril Swelling    Bactrim [sulfamethoxazole-trimethoprim] Rash       Medications:  Facility-Administered Medications Prior to Admission   Medication    albuterol inhaler 2 puff     Medications Prior to Admission   Medication Sig    amitriptyline (ELAVIL) 75 MG tablet Take 75 mg by mouth every evening.    amlodipine (NORVASC) 10 MG tablet Take 1 tablet (10 mg total) by mouth once daily.    atorvastatin (LIPITOR) 40 MG tablet Take 1 tablet (40 mg total) by mouth once daily.    butalbital-acetaminophen-caffeine -40 mg (FIORICET, ESGIC) -40 mg per tablet Take 1 tablet by mouth every 4 (four) hours as needed.    carvediloL (COREG) 25 MG tablet Take 37.5 mg by mouth 2 (two) times daily.    cephALEXin (KEFLEX) 500 MG capsule Take 1 capsule (500 mg total) by mouth every 6 (six) hours. for 14 days    cyanocobalamin (VITAMIN B-12) 1000 MCG tablet Take 1 tablet (1,000 mcg total) by mouth once daily. (Patient not taking: No sig reported)    dexAMETHasone (DECADRON) 2 MG tablet Take 2 tablets (4 mg) by mouth every 8 hours for 1 day, THEN 2 tablets (4 mg) every 12 hours for 2 days, THEN 1 tablet (2 mg) every 12 hours for 2 days, THEN 1 tablet (2 mg) daily for 2 days, then STOP.    diphenhydrAMINE  "(BENADRYL) 25 mg capsule Take 1 each (25 mg total) by mouth every 6 (six) hours as needed for Itching or Allergies. (Patient not taking: No sig reported)    docusate sodium (COLACE) 100 MG capsule Take 1 capsule (100 mg total) by mouth 2 (two) times daily as needed for Constipation.    EScitalopram oxalate (LEXAPRO) 10 MG tablet Take 10 mg by mouth once daily.    famotidine (PEPCID) 20 MG tablet Take 1 tablet (20 mg total) by mouth 2 (two) times daily. (Patient not taking: No sig reported)    hydrochlorothiazide (HYDRODIURIL) 25 MG tablet Take 1 tablet (25 mg total) by mouth once daily.    HYDROcodone-acetaminophen (NORCO) 5-325 mg per tablet Take 1 tablet by mouth every 6 (six) hours as needed for Pain.    levETIRAcetam (KEPPRA) 500 MG Tab Take 1 tablet (500 mg total) by mouth 2 (two) times daily for 5 days (Patient not taking: Reported on 7/29/2022)    magnesium oxide (MAG-OX) 400 mg (241.3 mg magnesium) tablet Take 1 tablet by mouth 2 (two) times daily.    potassium chloride SA (K-DUR,KLOR-CON) 20 MEQ tablet Take 20 mEq by mouth 2 (two) times daily.     Antibiotics (From admission, onward)                Start     Stop Route Frequency Ordered    08/22/22 1600  vancomycin in dextrose 5 % 1 gram/250 mL IVPB 1,000 mg         -- IV Every 12 hours (non-standard times) 08/22/22 1506    08/22/22 0212  vancomycin - pharmacy to dose  (vancomycin IVPB)        "And" Linked Group Details    -- IV pharmacy to manage frequency 08/22/22 0112    08/22/22 0200  piperacillin-tazobactam 4.5 g in sodium chloride 0.9% 100 mL IVPB (ready to mix system)         -- IV Every 8 hours (non-standard times) 08/22/22 0123          Antifungals (From admission, onward)                None          Antivirals (From admission, onward)      None             Immunization History   Administered Date(s) Administered    Influenza - Quadrivalent - PF *Preferred* (6 months and older) 10/05/2017       Family History    None       Social History "     Socioeconomic History    Marital status:    Tobacco Use    Smoking status: Current Every Day Smoker     Packs/day: 0.50     Types: Cigarettes    Smokeless tobacco: Never Used   Substance and Sexual Activity    Alcohol use: Yes     Comment: occassionally    Drug use: No    Sexual activity: Not Currently     Partners: Male     Birth control/protection: None     Review of Systems   Constitutional:  Negative for appetite change, chills, diaphoresis, fatigue and fever.   Respiratory:  Negative for cough and shortness of breath.    Cardiovascular:  Negative for chest pain, palpitations and leg swelling.   Gastrointestinal:  Negative for abdominal distention, abdominal pain, constipation, diarrhea, nausea and vomiting.   Genitourinary:  Negative for difficulty urinating and dyspareunia.   Musculoskeletal:  Negative for arthralgias, joint swelling and myalgias.   Skin:  Negative for color change, rash and wound.   Neurological:  Negative for dizziness, weakness and numbness.   Psychiatric/Behavioral:  Negative for agitation and confusion. The patient is not nervous/anxious.    Objective:     Vital Signs (Most Recent):  Temp: 98.2 °F (36.8 °C) (08/22/22 1230)  Pulse: 72 (08/22/22 1230)  Resp: 18 (08/22/22 1230)  BP: (!) 140/72 (08/22/22 1230)  SpO2: (!) 93 % (08/22/22 1230)   Vital Signs (24h Range):  Temp:  [97.8 °F (36.6 °C)-98.6 °F (37 °C)] 98.2 °F (36.8 °C)  Pulse:  [72-92] 72  Resp:  [18-20] 18  SpO2:  [93 %-100 %] 93 %  BP: (135-174)/() 140/72     Weight: 88.5 kg (195 lb 1.8 oz)  Body mass index is 35.69 kg/m².    Estimated Creatinine Clearance: 80.8 mL/min (based on SCr of 0.9 mg/dL).    Physical Exam  Vitals and nursing note reviewed.   Constitutional:       General: She is not in acute distress.     Appearance: Normal appearance. She is well-developed and normal weight. She is not ill-appearing, toxic-appearing or diaphoretic.   HENT:      Head: Normocephalic and atraumatic.      Nose: Nose  normal.      Mouth/Throat:      Dentition: Normal dentition. Does not have dentures. No dental caries or dental abscesses.   Eyes:      General: No scleral icterus.     Conjunctiva/sclera: Conjunctivae normal.   Cardiovascular:      Rate and Rhythm: Normal rate and regular rhythm.      Heart sounds: Normal heart sounds. No murmur heard.  Pulmonary:      Effort: Pulmonary effort is normal. No respiratory distress.      Breath sounds: Normal breath sounds. No wheezing or rales.   Abdominal:      General: Bowel sounds are normal. There is no distension.      Palpations: Abdomen is soft.      Tenderness: There is no abdominal tenderness.   Musculoskeletal:         General: Normal range of motion.      Cervical back: Normal range of motion.      Right lower leg: No edema.      Left lower leg: No edema.   Skin:     General: Skin is warm and dry.      Findings: No erythema or rash.      Comments: Healing left craniotomy site. See photo. Small about of swelling noted. No drainage, erythema, warmth noted.     Neurological:      General: No focal deficit present.      Mental Status: She is alert and oriented to person, place, and time. Mental status is at baseline.      Motor: No weakness.      Gait: Gait normal.   Psychiatric:         Mood and Affect: Mood normal.         Behavior: Behavior normal.         Thought Content: Thought content normal.         Judgment: Judgment normal.       Significant Labs: All pertinent labs within the past 24 hours have been reviewed.    Significant Imaging: I have reviewed all pertinent imaging results/findings within the past 24 hours.

## 2022-08-22 NOTE — SUBJECTIVE & OBJECTIVE
Past Medical History:   Diagnosis Date    Brain aneurysm     CHF (congestive heart failure)     H/O coronary angioplasty     Hypercholesteremia     Hypertension     Malignant hypertension     Migraine headache     Stroke 10/2017     Past Surgical History:   Procedure Laterality Date    CARDIAC CATHETERIZATION      CEREBRAL ANGIOGRAM      CLIP LIGATION OF INTRACRANIAL ANEURYSM BY CRANIOTOMY N/A 7/15/2022    Procedure: CRANIOTOMY, WITH ANEURYSM CLIPPING;  Surgeon: Timothy Diaz MD;  Location: [facility] OR [address] [place] [address];  Service: Neurosurgery;  Laterality: N/A;  PTERIONAL CRANIOTOMY WITH CLIP LIGATION OF L PCOMM, L ANTERIOR CHOROIDAL, L MCA  ANEURYSM, ANESTHESIA: GENERAL, BLOOD: TYPE&CROSS 2 UNITS, NEUROMONITORING: SEP, MEP, EEG, RADIOLOGY: C-ARM, POSITION: SUPINE, ANNA, CO-SURGERON: DR. LEXI DOMÍNGUEZ.     No family history on file.  Social History     Tobacco Use    Smoking status: Current Every Day Smoker     Packs/day: 0.50     Types: Cigarettes    Smokeless tobacco: Never Used   Substance Use Topics    Alcohol use: Yes     Comment: occassionally    Drug use: No     Review of patient's allergies indicates:   Allergen Reactions    Lisinopril Swelling    Bactrim [sulfamethoxazole-trimethoprim] Rash       Medications: I have reviewed the current medication administration record.    (Not in a hospital admission)      Review of Systems   Constitutional:  Negative for chills and fever.   HENT:  Negative for ear pain and facial swelling.    Eyes:  Negative for pain and itching.   Respiratory:  Negative for cough and shortness of breath.    Cardiovascular:  Negative for chest pain and leg swelling.   Gastrointestinal:  Negative for abdominal distention and abdominal pain.   Endocrine: Negative for polyphagia and polyuria.   Genitourinary:  Negative for difficulty urinating and dyspareunia.   Musculoskeletal:  Positive for arthralgias and back pain.   Skin:  Positive for wound.   Neurological:  Positive for speech difficulty  and weakness.   Psychiatric/Behavioral:  Negative for agitation and confusion.    Objective:     Vital Signs (Most Recent):  Temp: 98.6 °F (37 °C) (08/21/22 1956)  Pulse: 85 (08/22/22 0100)  Resp: 19 (08/22/22 0030)  BP: (!) 174/78 (08/22/22 0100)  SpO2: 99 % (08/22/22 0100)    Vital Signs Range (Last 24H):  Temp:  [98.6 °F (37 °C)]   Pulse:  [84-92]   Resp:  [19-20]   BP: (135-174)/()   SpO2:  [94 %-100 %]     Physical Exam  Vitals and nursing note reviewed.   Constitutional:       Appearance: Normal appearance. She is obese.   HENT:      Head: Normocephalic and atraumatic.   Eyes:      Extraocular Movements: Extraocular movements intact.      Pupils: Pupils are equal, round, and reactive to light.   Cardiovascular:      Rate and Rhythm: Normal rate and regular rhythm.   Pulmonary:      Effort: Pulmonary effort is normal.      Breath sounds: Normal breath sounds.   Abdominal:      General: Abdomen is flat. Bowel sounds are normal.      Palpations: Abdomen is soft.   Musculoskeletal:         General: Normal range of motion.      Cervical back: Normal range of motion.   Skin:     Comments: Incision to scalp   Neurological:      Mental Status: She is alert and oriented to person, place, and time.      Motor: Weakness present.       Neurological Exam:   LOC: alert  Attention Span: Good   Language: No aphasia  Articulation: No dysarthria  Orientation: Person, Place, Time   Visual Fields: Full  EOM (CN III, IV, VI): Full/intact  Pupils (CN II, III): PERRL  Facial Sensation (CN V): Normal  Facial Movement (CN VII): Symmetric facial expression    Gag Reflex: present  Reflexes: flexor plantar responses bilaterally  Motor: Arm left  Normal 5/5  Leg left  Normal 5/5  Arm right  Paresis: 3/5  Leg right Paresis: 3/5  Cerebellum: No evidence of appendicular or axial ataxia  Sensation: Intact to light touch, temperature and vibration  Tone: Normal tone throughout      Laboratory:  CMP:   Recent Labs   Lab 08/21/22  7370    CALCIUM 9.7   ALBUMIN 3.4*   PROT 7.4   *   K 3.1*   CO2 23   CL 97   BUN 16   CREATININE 1.2   ALKPHOS 204*   ALT 15   AST 13   BILITOT 0.3     CBC:   Recent Labs   Lab 08/21/22 2101   WBC 8.72   RBC 4.25   HGB 12.4   HCT 35.5*      MCV 84   MCH 29.2   MCHC 34.9     Lipid Panel: No results for input(s): CHOL, LDLCALC, HDL, TRIG in the last 168 hours.  Coagulation:   Recent Labs   Lab 08/21/22 2101   INR 1.0     Hgb A1C: No results for input(s): HGBA1C in the last 168 hours.  TSH:   Recent Labs   Lab 08/21/22 2101   TSH 1.371       Diagnostic Results:      Brain imaging:  MRI Brain w/o contrast 8-21-22 results:  Stable subdural and subcutaneous fluid collection surrounding the patient's craniotomy defect on the left.  These appear somewhat loculated.  Correlate for subdural and scalp hematoma.  Versus signs of infection.     Postoperative changes of aneurysm clipping with no evidence of acute intracranial hemorrhage, mass or infarction.    Vessel Imaging:   CTA head and neck multiphase 8-21-22 results:  CTA head: No large vessel occlusion, high grade stenosis, or vascular malformation identified.  Stable postsurgical changes of left PCOM/anterior choroidal aneurysm clipping.  Unchanged small aneurysm at the left M2 bifurcation.     CT head: No evidence for acute intracranial hemorrhage or major vascular distribution infarct.  Stable postsurgical changes of left frontotemporal craniotomy for aneurysm clipping.  Small subdural collection underlying the craniotomy site, similar to prior exam.  Soft tissue collection overlying the craniotomy site is similar in size to prior exam.    Cardiac Evaluation:   2D Echo 7-16-22 results:  The left ventricle is normal in size with normal systolic function. The estimated ejection fraction is 65%.  Normal left ventricular diastolic function.  Normal right ventricular size with normal right ventricular systolic function.  Mild left atrial enlargement.  Mild mitral  regurgitation.  Mild tricuspid regurgitation.  The estimated PA systolic pressure is 30 mmHg.  Normal central venous pressure (3 mmHg).

## 2022-08-22 NOTE — ED PROVIDER NOTES
Encounter Date: 8/21/2022       History     Chief Complaint   Patient presents with    OTHER     Pt arrives EMS from home. EMS was activated due to pt having aphasia and R sided paralysis that resolved prior to EMS arrival. LKN was 1845 and EMS reports they arrived at 1854. Pt is AAOx4, no slurred speech noted, no weakness noted on arrival.      Patient is a 47-year-old female with past medical history of multiple intracranial aneurysms s/p elective L-sided craniotomy for clipping brain aneurysm 7/15/22, CHF, hypertension, hypercholesterolemia, migraine headaches, stroke who presents with 5 minutes of right-sided weakness and aphasia that started while at rest just prior to arrival. Pt states she was eating crackers and salami and sharing with her child and suddenly could not hand him the cracker. She also could not speak. She was alert, no bowel or bladder incontinence. She has never has this before.  She denies jerking.  She was on steroids and keppra for 5 days post surgery, none currently.  Patient is a smoker.    The history is provided by the patient and medical records.     Review of patient's allergies indicates:   Allergen Reactions    Lisinopril Swelling    Bactrim [sulfamethoxazole-trimethoprim] Rash     Past Medical History:   Diagnosis Date    Brain aneurysm     CHF (congestive heart failure)     H/O coronary angioplasty     Hypercholesteremia     Hypertension     Malignant hypertension     Migraine headache     Stroke 10/2017     Past Surgical History:   Procedure Laterality Date    CARDIAC CATHETERIZATION      CEREBRAL ANGIOGRAM      CLIP LIGATION OF INTRACRANIAL ANEURYSM BY CRANIOTOMY N/A 7/15/2022    Procedure: CRANIOTOMY, WITH ANEURYSM CLIPPING;  Surgeon: Timothy Diaz MD;  Location: [facility] OR [address] [place] [address];  Service: Neurosurgery;  Laterality: N/A;  PTERIONAL CRANIOTOMY WITH CLIP LIGATION OF L PCOMM, L ANTERIOR CHOROIDAL, L MCA  ANEURYSM, ANESTHESIA: GENERAL, BLOOD: TYPE&CROSS 2 UNITS,  NEUROMONITORING: SEP, MEP, EEG, RADIOLOGY: C-ARM, POSITION: SUPINE, JESSE CASTILLOSURGERON: DR. LEXI DOMÍNGUEZ.     No family history on file.  Social History     Tobacco Use    Smoking status: Current Every Day Smoker     Packs/day: 0.50     Types: Cigarettes    Smokeless tobacco: Never Used   Substance Use Topics    Alcohol use: Yes     Comment: occassionally    Drug use: No     Review of Systems   Constitutional: Negative for chills and fever.   HENT: Positive for congestion.    Eyes: Negative for visual disturbance.   Respiratory: Negative for shortness of breath.    Cardiovascular: Negative for chest pain.   Gastrointestinal: Negative for abdominal pain, nausea and vomiting.   Endocrine: Negative for polyuria.   Genitourinary: Negative for difficulty urinating.   Musculoskeletal: Negative for neck pain and neck stiffness.   Skin: Negative for rash.   Neurological: Positive for weakness. Negative for seizures, facial asymmetry and headaches.   Psychiatric/Behavioral: Negative for suicidal ideas.       Physical Exam     Initial Vitals [08/21/22 1956]   BP Pulse Resp Temp SpO2   (!) 135/104 88 20 98.6 °F (37 °C) 97 %      MAP       --         Physical Exam    Nursing note and vitals reviewed.  Constitutional: She appears well-developed and well-nourished. She is not diaphoretic. No distress.   HENT:   Head: Normocephalic.   Large surgical wound to left scalp and some left sided swelling  No redness, no drainage, no tendernss   Eyes: Conjunctivae and EOM are normal. Pupils are equal, round, and reactive to light.   Visual fields intact   Neck: Neck supple.   Normal range of motion.  Cardiovascular: Normal rate and regular rhythm.   Pulmonary/Chest: Breath sounds normal. No respiratory distress.   Abdominal: Abdomen is soft.   Musculoskeletal:         General: Normal range of motion.      Cervical back: Normal range of motion and neck supple.     Neurological: She is alert and oriented to person, place, and time. She  has normal strength. No sensory deficit. GCS score is 15. GCS eye subscore is 4. GCS verbal subscore is 5. GCS motor subscore is 6.   Able to identify objects, follow commands  No facial droop, tongue is midline  Normal rapid alternating movements   Skin: Skin is warm and dry.   Psychiatric: She has a normal mood and affect. Her behavior is normal. Judgment and thought content normal.         ED Course   Procedures  Labs Reviewed   CBC W/ AUTO DIFFERENTIAL - Abnormal; Notable for the following components:       Result Value    Hematocrit 35.5 (*)     Immature Granulocytes 0.9 (*)     Immature Grans (Abs) 0.08 (*)     All other components within normal limits   COMPREHENSIVE METABOLIC PANEL - Abnormal; Notable for the following components:    Sodium 135 (*)     Potassium 3.1 (*)     Glucose 472 (*)     Albumin 3.4 (*)     Alkaline Phosphatase 204 (*)     eGFR 56.5 (*)     All other components within normal limits   URINALYSIS, REFLEX TO URINE CULTURE - Abnormal; Notable for the following components:    Color, UA Colorless (*)     Glucose, UA 4+ (*)     All other components within normal limits    Narrative:     Specimen Source->Urine   SEDIMENTATION RATE - Abnormal; Notable for the following components:    Sed Rate 46 (*)     All other components within normal limits   C-REACTIVE PROTEIN - Abnormal; Notable for the following components:    CRP 26.0 (*)     All other components within normal limits   POCT URINE PREGNANCY - Abnormal   POCT GLUCOSE - Abnormal; Notable for the following components:    POCT Glucose 422 (*)     All other components within normal limits   ISTAT PROCEDURE - Abnormal; Notable for the following components:    POC PO2 36 (*)     POC SATURATED O2 70 (*)     All other components within normal limits   POCT GLUCOSE - Abnormal; Notable for the following components:    POCT Glucose 392 (*)     All other components within normal limits   PROTIME-INR   TSH   URINALYSIS MICROSCOPIC    Narrative:      Specimen Source->Urine   SARS-COV-2 RNA AMPLIFICATION, QUAL   ISTAT PROCEDURE   ISTAT CREATININE     EKG Readings: (Independently Interpreted)   Initial Reading: No STEMI. Previous EKG: Compared with most recent EKG Previous EKG Date: 6/7/22.   NSR rate of 92 normal axis, poor R wave progression     ECG Results          ECG 12 lead (Final result)  Result time 08/22/22 09:07:36    Final result by Interface, Lab In [facility] (08/22/22 09:07:36)                 Narrative:    Test Reason : I63.9,    Vent. Rate : 092 BPM     Atrial Rate : 092 BPM     P-R Int : 170 ms          QRS Dur : 106 ms      QT Int : 376 ms       P-R-T Axes : 058 004 098 degrees     QTc Int : 464 ms    Normal sinus rhythm  Low anterior forces and R wave progression  Abnormal ECG  When compared with ECG of 07-JUN-2022 13:30,  No significant change was found  Confirmed by Nathan GARZA MD ([id number]) on 8/22/2022 9:07:28 AM    Referred By: [email or web address]   SELF           Confirmed By:Nathan GARZA MD                            Imaging Results          MRI Brain W WO Contrast (Final result)  Result time 08/21/22 23:31:21   Procedure changed from MRI Brain With Contrast     Final result by Anthony Jones MD (08/21/22 23:31:21)                 Impression:      Peripheral rim enhancement of the sub dural and scalp complex fluid collections.  While this could represent enhancing granulation tissue, rim enhancement of infected hematoma is difficult to exclude.    Enhancement extending into the TMJ and lateral upper facial region on the left.  Correlate for cellulitis.    No evidence of enhancement of the brain parenchyma to suggest cerebritis.      Electronically signed by: Anthony Jones  Date:    08/21/2022  Time:    23:31             Narrative:    EXAMINATION:  MRI BRAIN W WO CONTRAST    CLINICAL HISTORY:  rule out infection;    TECHNIQUE:  Multiplanar multisequence MR imaging of the brain was performed before and after the administration of 10 mL Gadavist  intravenous contrast.    COMPARISON:  MRI of the brain, earlier same day previous CT scans    FINDINGS:  Complex subdural and scalp fluid collections demonstrate peripheral rim enhancement with internal septa mainly in the scalp complex mass and fluid collection.  The scalp changes and enhancement extend into the TMJ region and pre-auricular region on the left.  There is no abnormal enhancement of the brain parenchyma to suggest cerebritis.                               MRI Brain Without Contrast (Final result)  Result time 08/21/22 22:24:25    Final result by Anthony Jones MD (08/21/22 22:24:25)                 Impression:      Stable subdural and subcutaneous fluid collection surrounding the patient's craniotomy defect on the left.  These appear somewhat loculated.  Correlate for subdural and scalp hematoma.  Versus signs of infection.    Postoperative changes of aneurysm clipping with no evidence of acute intracranial hemorrhage, mass or infarction.      Electronically signed by: Anthony Jones  Date:    08/21/2022  Time:    22:24             Narrative:    EXAMINATION:  MRI BRAIN WITHOUT CONTRAST    CLINICAL HISTORY:  Headache, new or worsening, neuro deficit (Age 19-49y);    TECHNIQUE:  Multiplanar multisequence MR imaging of the brain was performed without contrast.    COMPARISON:  CT, 08/21/2022, CT brain, 08/17/2022    FINDINGS:  There is no restricted diffusion.  Patchy punctate foci of gliotic signal intensity throughout the deep and subcortical white matter is again noted.  The left craniotomy and subdural and scalp complex fluid collections with proteinaceous content are noted.  No significant mass effect is evident.  Postoperative changes aneurysm clipping in the sylvian fissure near the ofmbpx-br-Kdrumt on the left is noted.  There is no evidence midline shift or mass effect or new pathologic fluid collection within the brain parenchyma.  There is no hydrocephalus.  Empty sella configuration is.   Bilateral sinus disease is present.  The orbits and orbital contents appear unremarkable.                               X-Ray Chest AP Portable (Final result)  Result time 08/21/22 21:40:18    Final result by Mario Michaud MD (08/21/22 21:40:18)                 Impression:      Hypoventilatory examination.  No convincing radiographic evidence of acute intrathoracic process on this single view.      Electronically signed by: Mario Michaud MD  Date:    08/21/2022  Time:    21:40             Narrative:    EXAMINATION:  XR CHEST AP PORTABLE    CLINICAL HISTORY:  Stroke;    TECHNIQUE:  Single frontal view of the chest was performed.    COMPARISON:  07/15/2022    FINDINGS:  Cardiac monitoring leads overlie the chest.  Cardiac silhouette is stable in size.  Lung volumes are diminished with resultant bronchovascular crowding.  No large confluent airspace consolidation appreciated.  No significant volume of pleural fluid or pneumothorax identified.  Osseous structures demonstrate mild degenerative changes.                               CTA STROKE MULTI-PHASE (Final result)  Result time 08/21/22 22:21:22    Final result by Flaquito Burciaga MD (08/21/22 22:21:22)                 Impression:      CTA head: No large vessel occlusion, high grade stenosis, or vascular malformation identified.  Stable postsurgical changes of left PCOM/anterior choroidal aneurysm clipping.  Unchanged small aneurysm at the left M2 bifurcation.    CT head: No evidence for acute intracranial hemorrhage or major vascular distribution infarct.  Stable postsurgical changes of left frontotemporal craniotomy for aneurysm clipping.  Small subdural collection underlying the craniotomy site, similar to prior exam.  Soft tissue collection overlying the craniotomy site is similar in size to prior exam.    Electronically signed by resident: Bianca Davis  Date:    08/21/2022  Time:    21:38    Electronically signed by: Flaquito Burciaga  MD  Date:    08/21/2022  Time:    22:21             Narrative:    EXAMINATION:  CTA STROKE MULTI-PHASE    CLINICAL HISTORY:  Transient ischemic attack (TIA);    TECHNIQUE:  Axial CT images obtained throughout the before and after the administration of intravenous contrast.    Multiphase CT angiogram was then performed from the top of aortic arch to the vertex during bolus administration of 75 mL of Omnipaque 350 intravenous contrast.  Scan through the head and neck was performed in arterial phase.  Axial, sagittal and coronal reconstructions, including maximum intensity projection reconstructions were performed.    CT source data was analyzed using artificial intelligence software for detection of a large vessel occlusions (LVO) in order to enable computer assisted triage notification and aid clinical stroke decision making.    COMPARISON:  CT head 08/17/2022.  08/02/2022.    CTA head neck 07/15/2022.    FINDINGS:  The ventricles are normal in size without evidence of hydrocephalus.    Empty sella configuration..  No parenchymal mass, hemorrhage, edema or major vascular distribution infarct.    Stable postsurgical changes of left frontotemporal craniotomy for left ICA aneurysm clipping.  Redemonstration of mixed density fluid collection underlying the craniotomy site that measures approximately 0.9 cm with mild mass effect on the underlying brain parenchyma.  Heterogeneous fluid collection underlying the surgical flap external to the craniotomy site, similar to prior exam.    Mucosal thickening the right maxillary sinus.  Remaining paranasal sinuses and mastoid air cells are essentially clear.    CTA:    Left-sided aortic arch with common origin of the brachiocephalic and left common carotid arteries.  With no significant atherosclerosis.    The common and internal carotid arteries are normal in course and caliber. No significant stenosis in either carotid bifurcation.    The vertebral origins are patent. The  cervical vertebral arteries are normal in course and caliber.  Left vertebral artery is dominant.  Vertebrobasilar system is within normal limits without focal abnormality.    Postsurgical changes of left PCOM and anterior choroidal artery aneurysm clipping.  Fetal origin of the left PCA.  The anterior cerebral arteries and anterior communicating complex are within normal limits.  The right middle cerebral artery is within normal limits.  Previous left proximal M1 3 mm aneurysm is difficult to discern and may be obscured by artifact from the prior clip in.  There is a stable 3 mm aneurysm in the left M2 bifurcation.    The dural venous sinuses are patent.    The soft tissues of the neck are within normal limits.  There is no evidence of lymphadenopathy in the neck.  The parapharyngeal fat planes are present.  The trachea is within normal limits.  The visualized lung apices demonstrate mild dependent atelectasis..  The cervical spine is unremarkable.                                 Medications   levETIRAcetam injection 1,000 mg (1,000 mg Intravenous Given 8/22/22 0851)   vancomycin - pharmacy to dose (has no administration in time range)   glucose chewable tablet 16 g (has no administration in time range)   glucose chewable tablet 16 g (has no administration in time range)   glucose chewable tablet 24 g (has no administration in time range)   glucagon (human recombinant) injection 1 mg (has no administration in time range)   dextrose 10% bolus 125 mL (has no administration in time range)   dextrose 10% bolus 250 mL (has no administration in time range)   acetaminophen tablet 650 mg (has no administration in time range)   melatonin tablet 6 mg (has no administration in time range)   amitriptyline tablet 75 mg (has no administration in time range)   amLODIPine tablet 10 mg (10 mg Oral Given 8/22/22 0850)   atorvastatin tablet 40 mg (40 mg Oral Given 8/22/22 0849)   carvediloL tablet 37.5 mg (37.5 mg Oral Given 8/22/22  0849)   EScitalopram oxalate tablet 10 mg (10 mg Oral Given 8/22/22 0850)   glucagon (human recombinant) injection 1 mg (has no administration in time range)   vancomycin in dextrose 5 % 1 gram/250 mL IVPB 1,000 mg (1,000 mg Intravenous Trough Due As Scheduled Before Dose 8/23/22 1500)   insulin aspart U-100 pen 1-10 Units (8 Units Subcutaneous Given 8/22/22 1815)   cefepime in dextrose 5 % IVPB 2 g (has no administration in time range)   iohexoL (OMNIPAQUE 350) injection 75 mL (75 mLs Intravenous Given 8/21/22 2120)   levETIRAcetam injection 1,500 mg (1,500 mg Intravenous Given 8/21/22 2332)   gadobutroL (GADAVIST) injection 10 mL (10 mLs Intravenous Given 8/21/22 2306)   vancomycin 1.75 g in 5 % dextrose 500 mL IVPB (0 mg Intravenous Stopped 8/22/22 0431)   potassium chloride 10 mEq in 100 mL IVPB (10 mEq Intravenous New Bag 8/22/22 1600)   LIDOcaine HCL 10 mg/ml (1%) injection 1 mL (1 mL Other Given 8/22/22 1601)     Medical Decision Making:   History:   Old Medical Records: I decided to obtain old medical records.  Old Records Summarized: records from clinic visits and records from previous admission(s).       <> Summary of Records: CT Head 8/17/22  Impression:  No evidence for intracranial hemorrhage.  Extensive postoperative changes of left frontal temporal craniectomy with large extracranial flap with new foci of hyperdensity may relate to internal hemorrhage or more likely metallic artifacts.  Recommend clinical correlation and consultation with surgery if clinically indicated.  The size of the left frontotemporal extra-axial intracranial collection does not appear significantly changed since the prior exam consistent with postoperative  construct.  All CT scans   are performed using dose optimization techniques including the following: automated exposure control; adjustment of the mA and/or kV; use of iterative reconstruction technique.  Dose modulation was employed for LUIS by means of: Automated exposure  control; adjustment of the mA and/or kV according to patient size (this includes techniques or standardized protocols for targeted exams where dose is matched to indication/reason for exam; i.e. extremities or head); and/or use of iterative reconstructive technique.   Electronically signed by: Reno Lawson  Date:                                            08/17/2022  Time:                                           18:41  Differential Diagnosis:   Stroke, seizure, infection, ICH, electrolyte abnormality  Clinical Tests:   Lab Tests: Reviewed and Ordered  Radiological Study: Ordered and Reviewed  Medical Tests: Ordered and Reviewed  ED Management:  Pt had a repeat episode where she became aphasic, R sided weakness  This time there was some jerking of her left arm associated,   Code stroke was called and there is a fluid collection on MRI concerning for an infection  Vascular neurology recommended neurosurgical consult  Neurosurgery was consulted and they are suspicious for intracranial infection and that patient is having seizures based on location   They plan to admit  Other:   I have discussed this case with another health care provider.       <> Summary of the Discussion: See above            Attending Attestation:         Attending Critical Care:   Critical Care Times:   ==============================================================  · Total Critical Care Time - exclusive of procedural time: 45 minutes.  ==============================================================  Critical care was time spent personally by me on the following activities: obtaining history from patient or relative, examination of patient, review of old charts, ordering lab, x-rays, and/or EKG, development of treatment plan with patient or relative, ordering and performing treatments and interventions, evaluation of patient's response to treatment, discussion with consultants and re-evaluation of patient's conition.   Critical Care Condition:  potentially life-threatening           ED Course as of 08/22/22 2027   Sun Aug 21, 2022   2100 Discussed with Melody from vascular surgery and will go ahead with CTA head now, she is signing out but oncoming provider will come evaluate the patient [GK]   2149 Pt going to MRI  Back to neuro intact [GK]      ED Course User Index  [GK] Sylvia Huerta MD             Clinical Impression:   Final diagnoses:  [I63.9] Stroke  [R56.9] Focal seizure          ED Disposition Condition    Admit               Sylvia Huerta MD  08/22/22 2027

## 2022-08-22 NOTE — ED NOTES
Bed: [facility]  Expected date:   Expected time:   Means of arrival:   Comments:  kerri

## 2022-08-22 NOTE — ASSESSMENT & PLAN NOTE
48 y/o female with 2 episodes of right sided weakness and aphasia,  Recent craniotomy for aneurysm clipping on 7-15-22. Symptoms could be related to seizure activity as off of AED's vs infection     MRI brain with no acute process seen

## 2022-08-22 NOTE — SUBJECTIVE & OBJECTIVE
Past Medical History:   Diagnosis Date    Brain aneurysm     CHF (congestive heart failure)     H/O coronary angioplasty     Hypercholesteremia     Hypertension     Malignant hypertension     Migraine headache     Stroke 10/2017       Past Surgical History:   Procedure Laterality Date    CARDIAC CATHETERIZATION      CEREBRAL ANGIOGRAM      CLIP LIGATION OF INTRACRANIAL ANEURYSM BY CRANIOTOMY N/A 7/15/2022    Procedure: CRANIOTOMY, WITH ANEURYSM CLIPPING;  Surgeon: Timothy Diaz MD;  Location: [facility] OR [address] [place] [address];  Service: Neurosurgery;  Laterality: N/A;  PTERIONAL CRANIOTOMY WITH CLIP LIGATION OF L PCOMM, L ANTERIOR CHOROIDAL, L MCA  ANEURYSM, ANESTHESIA: GENERAL, BLOOD: TYPE&CROSS 2 UNITS, NEUROMONITORING: SEP, MEP, EEG, RADIOLOGY: C-ARM, POSITION: SUPINE, TONEY CASTILLO-SURGERON: DR. LEXI DOMÍNGUEZ.       Review of patient's allergies indicates:   Allergen Reactions    Lisinopril Swelling    Bactrim [sulfamethoxazole-trimethoprim] Rash       Medications:  Facility-Administered Medications Prior to Admission   Medication    albuterol inhaler 2 puff     Medications Prior to Admission   Medication Sig    amitriptyline (ELAVIL) 75 MG tablet Take 75 mg by mouth every evening.    amlodipine (NORVASC) 10 MG tablet Take 1 tablet (10 mg total) by mouth once daily.    atorvastatin (LIPITOR) 40 MG tablet Take 1 tablet (40 mg total) by mouth once daily.    butalbital-acetaminophen-caffeine -40 mg (FIORICET, ESGIC) -40 mg per tablet Take 1 tablet by mouth every 4 (four) hours as needed.    carvediloL (COREG) 25 MG tablet Take 37.5 mg by mouth 2 (two) times daily.    cephALEXin (KEFLEX) 500 MG capsule Take 1 capsule (500 mg total) by mouth every 6 (six) hours. for 14 days    cyanocobalamin (VITAMIN B-12) 1000 MCG tablet Take 1 tablet (1,000 mcg total) by mouth once daily. (Patient not taking: No sig reported)    dexAMETHasone (DECADRON) 2 MG tablet Take 2 tablets (4 mg) by mouth every 8 hours for 1 day, THEN 2 tablets (4  "mg) every 12 hours for 2 days, THEN 1 tablet (2 mg) every 12 hours for 2 days, THEN 1 tablet (2 mg) daily for 2 days, then STOP.    diphenhydrAMINE (BENADRYL) 25 mg capsule Take 1 each (25 mg total) by mouth every 6 (six) hours as needed for Itching or Allergies. (Patient not taking: No sig reported)    docusate sodium (COLACE) 100 MG capsule Take 1 capsule (100 mg total) by mouth 2 (two) times daily as needed for Constipation.    EScitalopram oxalate (LEXAPRO) 10 MG tablet Take 10 mg by mouth once daily.    famotidine (PEPCID) 20 MG tablet Take 1 tablet (20 mg total) by mouth 2 (two) times daily. (Patient not taking: No sig reported)    hydrochlorothiazide (HYDRODIURIL) 25 MG tablet Take 1 tablet (25 mg total) by mouth once daily.    HYDROcodone-acetaminophen (NORCO) 5-325 mg per tablet Take 1 tablet by mouth every 6 (six) hours as needed for Pain.    levETIRAcetam (KEPPRA) 500 MG Tab Take 1 tablet (500 mg total) by mouth 2 (two) times daily for 5 days (Patient not taking: Reported on 7/29/2022)    magnesium oxide (MAG-OX) 400 mg (241.3 mg magnesium) tablet Take 1 tablet by mouth 2 (two) times daily.    potassium chloride SA (K-DUR,KLOR-CON) 20 MEQ tablet Take 20 mEq by mouth 2 (two) times daily.     Antibiotics (From admission, onward)                Start     Stop Route Frequency Ordered    08/22/22 1600  vancomycin in dextrose 5 % 1 gram/250 mL IVPB 1,000 mg         -- IV Every 12 hours (non-standard times) 08/22/22 1506    08/22/22 0212  vancomycin - pharmacy to dose  (vancomycin IVPB)        "And" Linked Group Details    -- IV pharmacy to manage frequency 08/22/22 0112    08/22/22 0200  piperacillin-tazobactam 4.5 g in sodium chloride 0.9% 100 mL IVPB (ready to mix system)         -- IV Every 8 hours (non-standard times) 08/22/22 0123          Antifungals (From admission, onward)                None          Antivirals (From admission, onward)      None             Immunization History   Administered Date(s) " Administered    Influenza - Quadrivalent - PF *Preferred* (6 months and older) 10/05/2017       Family History    None       Social History     Socioeconomic History    Marital status:    Tobacco Use    Smoking status: Current Every Day Smoker     Packs/day: 0.50     Types: Cigarettes    Smokeless tobacco: Never Used   Substance and Sexual Activity    Alcohol use: Yes     Comment: occassionally    Drug use: No    Sexual activity: Not Currently     Partners: Male     Birth control/protection: None     Review of Systems   Constitutional:  Negative for appetite change, chills, diaphoresis, fatigue and fever.   Respiratory:  Negative for cough and shortness of breath.    Cardiovascular:  Negative for chest pain, palpitations and leg swelling.   Gastrointestinal:  Negative for abdominal distention, abdominal pain, constipation, diarrhea, nausea and vomiting.   Genitourinary:  Negative for difficulty urinating and dyspareunia.   Musculoskeletal:  Negative for arthralgias, joint swelling and myalgias.   Skin:  Negative for color change, rash and wound.   Neurological:  Negative for dizziness, weakness and numbness.   Psychiatric/Behavioral:  Negative for agitation and confusion. The patient is not nervous/anxious.    Objective:     Vital Signs (Most Recent):  Temp: 98.2 °F (36.8 °C) (08/22/22 1230)  Pulse: 72 (08/22/22 1230)  Resp: 18 (08/22/22 1230)  BP: (!) 140/72 (08/22/22 1230)  SpO2: (!) 93 % (08/22/22 1230)   Vital Signs (24h Range):  Temp:  [97.8 °F (36.6 °C)-98.6 °F (37 °C)] 98.2 °F (36.8 °C)  Pulse:  [72-92] 72  Resp:  [18-20] 18  SpO2:  [93 %-100 %] 93 %  BP: (135-174)/() 140/72     Weight: 88.5 kg (195 lb 1.8 oz)  Body mass index is 35.69 kg/m².    Estimated Creatinine Clearance: 80.8 mL/min (based on SCr of 0.9 mg/dL).    Physical Exam  Vitals and nursing note reviewed.   Constitutional:       General: She is not in acute distress.     Appearance: Normal appearance. She is well-developed and normal  weight. She is not ill-appearing, toxic-appearing or diaphoretic.   HENT:      Head: Normocephalic and atraumatic.      Nose: Nose normal.      Mouth/Throat:      Dentition: Normal dentition. Does not have dentures. No dental caries or dental abscesses.   Eyes:      General: No scleral icterus.     Conjunctiva/sclera: Conjunctivae normal.   Cardiovascular:      Rate and Rhythm: Normal rate and regular rhythm.      Heart sounds: Normal heart sounds. No murmur heard.  Pulmonary:      Effort: Pulmonary effort is normal. No respiratory distress.      Breath sounds: Normal breath sounds. No wheezing or rales.   Abdominal:      General: Bowel sounds are normal. There is no distension.      Palpations: Abdomen is soft.      Tenderness: There is no abdominal tenderness.   Musculoskeletal:         General: Normal range of motion.      Cervical back: Normal range of motion.      Right lower leg: No edema.      Left lower leg: No edema.   Skin:     General: Skin is warm and dry.      Findings: No erythema or rash.      Comments: Healing left craniotomy site. See photo. Small about of swelling noted. No drainage, erythema, warmth noted.     Neurological:      General: No focal deficit present.      Mental Status: She is alert and oriented to person, place, and time. Mental status is at baseline.      Motor: No weakness.      Gait: Gait normal.   Psychiatric:         Mood and Affect: Mood normal.         Behavior: Behavior normal.         Thought Content: Thought content normal.         Judgment: Judgment normal.       Significant Labs: All pertinent labs within the past 24 hours have been reviewed.    Significant Imaging: I have reviewed all pertinent imaging results/findings within the past 24 hours.

## 2022-08-22 NOTE — SUBJECTIVE & OBJECTIVE
(Not in a hospital admission)      Review of patient's allergies indicates:   Allergen Reactions    Lisinopril Swelling    Bactrim [sulfamethoxazole-trimethoprim] Rash       Past Medical History:   Diagnosis Date    Brain aneurysm     CHF (congestive heart failure)     H/O coronary angioplasty     Hypercholesteremia     Hypertension     Malignant hypertension     Migraine headache     Stroke 10/2017     Past Surgical History:   Procedure Laterality Date    CARDIAC CATHETERIZATION      CEREBRAL ANGIOGRAM      CLIP LIGATION OF INTRACRANIAL ANEURYSM BY CRANIOTOMY N/A 7/15/2022    Procedure: CRANIOTOMY, WITH ANEURYSM CLIPPING;  Surgeon: Timothy Diaz MD;  Location: [facility] OR [address] [place] [address];  Service: Neurosurgery;  Laterality: N/A;  PTERIONAL CRANIOTOMY WITH CLIP LIGATION OF L PCOMM, L ANTERIOR CHOROIDAL, L MCA  ANEURYSM, ANESTHESIA: GENERAL, BLOOD: TYPE&CROSS 2 UNITS, NEUROMONITORING: SEP, MEP, EEG, RADIOLOGY: C-ARM, POSITION: SUPINE, CASTILLO, CO-SURGERON: DR. LEXI DOMÍNGUEZ.     Family History    None       Tobacco Use    Smoking status: Current Every Day Smoker     Packs/day: 0.50     Types: Cigarettes    Smokeless tobacco: Never Used   Substance and Sexual Activity    Alcohol use: Yes     Comment: occassionally    Drug use: No    Sexual activity: Not Currently     Partners: Male     Birth control/protection: None     Review of Systems   Neurological:  Positive for seizures and weakness.   All other systems reviewed and are negative.  Objective:     Weight: 88.5 kg (195 lb 1.8 oz)  Body mass index is 35.69 kg/m².  Vital Signs (Most Recent):  Temp: 98.6 °F (37 °C) (08/21/22 1956)  Pulse: 88 (08/22/22 0030)  Resp: 19 (08/22/22 0030)  BP: (!) 151/82 (08/22/22 0030)  SpO2: 97 % (08/22/22 0030)   Vital Signs (24h Range):  Temp:  [98.6 °F (37 °C)] 98.6 °F (37 °C)  Pulse:  [84-92] 88  Resp:  [19-20] 19  SpO2:  [94 %-100 %] 97 %  BP: (135-167)/() 151/82                     Female External Urinary Catheter 08/21/22 1433  (Active)       Physical Exam  Constitutional:       Appearance: She is well-developed and well-nourished.   Eyes:      Extraocular Movements: EOM normal.      Conjunctiva/sclera: Conjunctivae normal.      Pupils: Pupils are equal, round, and reactive to light.   Cardiovascular:      Pulses: Normal pulses.   Abdominal:      Palpations: Abdomen is soft.   Neurological:      Mental Status: She is alert and oriented to person, place, and time.      Comments: AAOx4  PERRL  Subtle word finding difficulties, names 3/3 objects  CN intact  FC x4  BUE 5/5  BLE 5/5  SILT    Inferior aspect of incision with uneven edges, delayed healing, no leakage  Anterior scalp swelling, no erythema, but tender to palpation   Psychiatric:         Mood and Affect: Mood and affect normal.       Physical Exam:    Constitutional: She appears well-developed and well-nourished.     Eyes: Pupils are equal, round, and reactive to light. Conjunctivae and EOM are normal.     Cardiovascular: Normal pulses.     Abdominal: Soft.     Psych/Behavior: She is alert. She is oriented to person, place, and time. She has a normal mood and affect.     Neurological:   AAOx4  PERRL  Subtle word finding difficulties, names 3/3 objects  CN intact  FC x4  BUE 5/5  BLE 5/5  SILT    Inferior aspect of incision with uneven edges, delayed healing, no leakage  Anterior scalp swelling, no erythema, but tender to palpation     Significant Labs:  Recent Labs   Lab 08/21/22 2101   *   *   K 3.1*   CL 97   CO2 23   BUN 16   CREATININE 1.2   CALCIUM 9.7     Recent Labs   Lab 08/21/22 2101   WBC 8.72   HGB 12.4   HCT 35.5*        Recent Labs   Lab 08/21/22 2101   INR 1.0     Microbiology Results (last 7 days)       Procedure Component Value Units Date/Time    Blood culture [[id number]] Collected: 08/21/22 2325    Order Status: Sent Specimen: Blood from Peripheral, Antecubital, Right Updated: 08/21/22 2329          All pertinent labs from the last 24 hours  have been reviewed.    Significant Diagnostics:  I have reviewed all pertinent imaging results/findings within the past 24 hours.

## 2022-08-22 NOTE — ED NOTES
This nurse finished drawing patient blood and had walked to the next bed in telles from patient. Within 2 minutes this nurse noted pt to be waving right hand in air attempting to get nurses attention. Upon assessment it was noted pt could not talk and pt tongue and mouth drawn up. PT alert and following commands during this episode. Dr Huerta notified and to bed. At this time Code stroke inititiated. This nurse escorted pt to CT scan. Symptoms resolved prior to entering CT.

## 2022-08-22 NOTE — ED NOTES
Pt care assumed at this time, Pt AAOX4, speaks in clear sentences, able to articulate clearly, no slurring of speech. Able to read appropriately, recognize objects. No facial droop or vision loss at this time. PERRLA. Denies any numbess/tingling of extremities. Moves all extremities with full ROM, strength equal b/l UE/LE, no weakness noted, no ataxia or drift noted. RR even unlabored, NSR on monitor, skin warm, dry, appropriate color for ethnicity.

## 2022-08-22 NOTE — ASSESSMENT & PLAN NOTE
46 yo F with PMH of HTN, HLD, smoking and recent L Pcomm and L anterior choroidal artery aneuryms craniotomy for clipping on 7/15/22 with Dr. Diaz who presents with sudden onset of symptoms concerning for focal seizure.    - admit to NSGY  - all labs and diagnostics reviewed   - CTH largely stable from prior with epidural fluid collection and extracranial fluid collection stable in size   - MRI brain w wo concerning for infection of epidural and scalp hematoma vs fluid collection   - EEG cap in ED negative for seizures  - infectious workup pending   - afebrile, no leukocytosis   - blood cx pending   - ESR/CRP downtrending   - patient has been taking keflex since last week   - broad spec abx started  - keppra loaded, continue 1g bid  - Na > 135  - SBP <160  - NPO midnight  - further plan pending further discussion with staff, will consider tapping scalp collection

## 2022-08-22 NOTE — PROGRESS NOTES
Pharmacokinetic Initial Assessment: IV Vancomycin    Assessment/Plan:    Initiate intravenous vancomycin with loading dose of 1750 mg once with subsequent doses when random concentrations are less than 20 mcg/mL  Desired empiric serum trough concentration is 10 to 20 mcg/mL  Draw vancomycin random level on 8/22 at 1400.  Pharmacy will continue to follow and monitor vancomycin.      Please contact pharmacy at extension [phone] with any questions regarding this assessment.     Thank you for the consult,   Lilli Easton       Patient brief summary:  Gina Jenkins is a 47 y.o. female initiated on antimicrobial therapy with IV Vancomycin for treatment of suspected skin & soft tissue infection    Drug Allergies:   Review of patient's allergies indicates:   Allergen Reactions    Lisinopril Swelling    Bactrim [sulfamethoxazole-trimethoprim] Rash       Actual Body Weight:   88.5 kg    Renal Function:   Estimated Creatinine Clearance: 60.6 mL/min (based on SCr of 1.2 mg/dL).     Dialysis Method (if applicable):  N/A    CBC (last 72 hours):  Recent Labs   Lab Result Units 08/19/22  1141 08/21/22  2101   WBC K/uL 7.41 8.72   Hemoglobin g/dL 13.7 12.4   Hematocrit % 39.8 35.5*   Platelets K/uL 471* 417   Gran % % 66.0 67.4   Lymph % % 22.0 20.3   Mono % % 7.8 9.3   Eosinophil % % 1.8 1.4   Basophil % % 0.8 0.7   Differential Method  Automated Automated       Metabolic Panel (last 72 hours):  Recent Labs   Lab Result Units 08/19/22  1141 08/21/22  2100 08/21/22  2101   Sodium mmol/L  --   --  135*   Potassium mmol/L  --   --  3.1*   Chloride mmol/L  --   --  97   CO2 mmol/L  --   --  23   Glucose mg/dL  --   --  472*   Glucose, UA   --  4+*  --    BUN mg/dL  --   --  16   Creatinine mg/dL 1.0  --  1.2   Albumin g/dL  --   --  3.4*   Total Bilirubin mg/dL  --   --  0.3   Alkaline Phosphatase U/L  --   --  204*   AST U/L  --   --  13   ALT U/L  --   --  15       Drug levels (last 3 results):  No results for input(s):  VANCOMYCINRA, VANCORANDOM, VANCOMYCINPE, VANCOPEAK, VANCOMYCINTR, VANCOTROUGH in the last 72 hours.    Microbiologic Results:  Microbiology Results (last 7 days)     Procedure Component Value Units Date/Time    Blood culture [[id number]] Collected: 08/21/22 4845    Order Status: Sent Specimen: Blood from Peripheral, Antecubital, Right Updated: 08/21/22 9212

## 2022-08-22 NOTE — PROGRESS NOTES
Notified Neuro Surgery on call team MD Aguiar pt BG of 392. NAD.  Awaiting new orders. Will continue to monitor pt.

## 2022-08-22 NOTE — PLAN OF CARE
[facility] - Neurosurgery (Hospital)  Initial Discharge Assessment       Primary Care Provider: Clair Johnson NP    Admission Diagnosis: Stroke [I63.9]    Admission Date: 8/21/2022  Expected Discharge Date: 8/25/2022    Discharge Barriers Identified: None    Payor: MEDICAID / Plan: [place] [employer] [place] / Product Type: Managed Medicaid /     Extended Emergency Contact Information  Primary Emergency Contact: Miladys Davies  Address: [address] [place] [address] [place]  Mobile Phone: [phone]  Relation: Mother  Secondary Emergency Contact: De LeonRolyNetta  Mobile Phone: [phone]  Relation: Sister  Preferred language: English    Discharge Plan A: Home with family, Home Health  Discharge Plan B: Home with family      [id number] - [place] - [address] AT [facility] [address]  [address]  [place] [address]  Phone: [phone] Fax: [phone]    CM obtained discharge planning assessment with patient.  Patient provided with discharge planning booklet.    Initial Assessment (most recent)     Adult Discharge Assessment - 08/22/22 1118        Discharge Assessment    Assessment Type Discharge Planning Assessment     Confirmed/corrected address, phone number and insurance Yes     Confirmed Demographics Correct on Facesheet     Source of Information patient     Communicated CADY with patient/caregiver Date not available/Unable to determine     Reason For Admission post op infection     Lives With child(precious), dependent     Do you expect to return to your current living situation? Yes     Do you have help at home or someone to help you manage your care at home? Yes     Who are your caregiver(s) and their phone number(s)? Miladys Davies - mother [phone]     Prior to hospitilization cognitive status: Alert/Oriented     Current cognitive status: Alert/Oriented     Walking or Climbing Stairs Difficulty ambulation difficulty, requires  equipment     Mobility Management rolling walker     Equipment Currently Used at Home walker, rolling     Readmission within 30 days? No     Patient currently being followed by outpatient case management? No     Do you currently have service(s) that help you manage your care at home? Yes     Name and Contact number of agency [facility]     Is the pt/caregiver preference to resume services with current agency Yes     Do you take prescription medications? Yes     Do you have prescription coverage? Yes     Coverage MEDICAID - [place] [facility] CONNECT     Do you have any problems affording any of your prescribed medications? No     Is the patient taking medications as prescribed? yes     Who is going to help you get home at discharge? family     How do you get to doctors appointments? family or friend will provide     Are you on dialysis? No     Do you take coumadin? No     Discharge Plan A Home with family;Home Health     Discharge Plan B Home with family     DME Needed Upon Discharge  none     Discharge Plan discussed with: Patient     Discharge Barriers Identified None        Relationship/Environment    Name(s) of Who Lives With Patient dep children

## 2022-08-22 NOTE — ED NOTES
Pt asleep, easily aroused with verbal stimulation, follows commands, moves all extremities full ROM. IV noted to have tegaderm removed. In place and patent. Reinforced at this time with tegaderm, tape, and coban.

## 2022-08-22 NOTE — PROVIDER PROGRESS NOTES - EMERGENCY DEPT.
Signout Note    I received signout from the previous provider.     Chief complaint:  OTHER (Pt arrives EMS from home. EMS was activated due to pt having aphasia and R sided paralysis that resolved prior to EMS arrival. LKN was 1845 and EMS reports they arrived at 1854. Pt is AAOx4, no slurred speech noted, no weakness noted on arrival. )      Pertinent history &exam:  Gina Jenkins is a 47 y.o. female with pertinent PMH of recent aneurysm clipping with neuro surgery who presented to emergency department with complaint of  Right-sided weakness and aphasia.  Also with possible seizure-like activity in the ER.  Vascular Neurology has seen the patient and does not feel this is consistent with stroke.  Also not thinking this is consistent with TIA.  Signed out pending MRI and neurosurgery recommendations for final disposition.    Vitals:    08/22/22 0300   BP: (!) 140/80   Pulse: 86   Resp:    Temp:        Imaging Studies:    MRI Brain W WO Contrast   Final Result      Peripheral rim enhancement of the sub dural and scalp complex fluid collections.  While this could represent enhancing granulation tissue, rim enhancement of infected hematoma is difficult to exclude.      Enhancement extending into the TMJ and lateral upper facial region on the left.  Correlate for cellulitis.      No evidence of enhancement of the brain parenchyma to suggest cerebritis.         Electronically signed by: Anthony Jones   Date:    08/21/2022   Time:    23:31      MRI Brain Without Contrast   Final Result      Stable subdural and subcutaneous fluid collection surrounding the patient's craniotomy defect on the left.  These appear somewhat loculated.  Correlate for subdural and scalp hematoma.  Versus signs of infection.      Postoperative changes of aneurysm clipping with no evidence of acute intracranial hemorrhage, mass or infarction.         Electronically signed by: Anthony Jones   Date:    08/21/2022   Time:    22:24      X-Ray  Chest AP Portable   Final Result      Hypoventilatory examination.  No convincing radiographic evidence of acute intrathoracic process on this single view.         Electronically signed by: Mario Michaud MD   Date:    08/21/2022   Time:    21:40      CTA STROKE MULTI-PHASE   Final Result      CTA head: No large vessel occlusion, high grade stenosis, or vascular malformation identified.  Stable postsurgical changes of left PCOM/anterior choroidal aneurysm clipping.  Unchanged small aneurysm at the left M2 bifurcation.      CT head: No evidence for acute intracranial hemorrhage or major vascular distribution infarct.  Stable postsurgical changes of left frontotemporal craniotomy for aneurysm clipping.  Small subdural collection underlying the craniotomy site, similar to prior exam.  Soft tissue collection overlying the craniotomy site is similar in size to prior exam.      Electronically signed by resident: Bianca Davis   Date:    08/21/2022   Time:    21:38      Electronically signed by: Flaquito Burciaga MD   Date:    08/21/2022   Time:    22:21          Medications Given:  Medications   levETIRAcetam injection 1,000 mg (has no administration in time range)   vancomycin - pharmacy to dose (has no administration in time range)   piperacillin-tazobactam 4.5 g in sodium chloride 0.9% 100 mL IVPB (ready to mix system) (0 g Intravenous Stopped 8/22/22 0307)   vancomycin 1.75 g in 5 % dextrose 500 mL IVPB (1,750 mg Intravenous New Bag 8/22/22 0231)   glucose chewable tablet 16 g (has no administration in time range)   glucose chewable tablet 16 g (has no administration in time range)   glucose chewable tablet 24 g (has no administration in time range)   glucagon (human recombinant) injection 1 mg (has no administration in time range)   dextrose 10% bolus 125 mL (has no administration in time range)   dextrose 10% bolus 250 mL (has no administration in time range)   insulin aspart U-100 pen 0-5 Units (has no administration  in time range)   acetaminophen tablet 650 mg (has no administration in time range)   melatonin tablet 6 mg (has no administration in time range)   iohexoL (OMNIPAQUE 350) injection 75 mL (75 mLs Intravenous Given 8/21/22 2120)   levETIRAcetam injection 1,500 mg (1,500 mg Intravenous Given 8/21/22 2332)   gadobutroL (GADAVIST) injection 10 mL (10 mLs Intravenous Given 8/21/22 2306)       Pending Items/ MDM:  47 y.o. female with above complaint.  MRI reviewed with Neurosurgery.  Admitted to their service.  She was Keppra loaded in the ER with concern for seizures.    Diagnostic Impression:    1. Stroke         ED Disposition Condition    Admit                Tricia Morris MD  Emergency Medicine

## 2022-08-22 NOTE — ED NOTES
Pt AAOX4, speaks in clear sentences, able to articulate clearly, no slurring of speech. Able to read appropriately, recognize objects. No facial droop or vision loss at this time. PERRLA. Denies any numbess/tingling of extremities. Moves all extremities with full ROM, strength equal b/l UE/LE, no weakness noted, no ataxia or drift noted. RR even unlabored, NSR on monitor, skin warm, dry, appropriate color for ethnicity.

## 2022-08-22 NOTE — PROGRESS NOTES
Pharmacokinetic Assessment Follow Up: IV Vancomycin    Vancomycin serum concentration assessment(s) and plan:  - Vancomycin random resulted as 13.9 mcg/mL today (~12 hrs hr post dose); Desired empiric serum trough concentration is 10 to 20 mcg/mL.  - Scr 1.2 > 0.9 mg/dL - trending down  - Schedule Vancomycin 1000 mg IV Q12h  - Draw trough level 60 min prior to 3rd dose @1500 on 8/23/2022     Drug levels (last 3 results):  Recent Labs   Lab Result Units 08/22/22  1420   Vancomycin, Random ug/mL 13.9       Pharmacy will continue to follow and monitor vancomycin.    Please contact pharmacy at extension [phone] for questions regarding this assessment.    Thank you for the consult,   Anne Mckeon       Patient brief summary:  Gina Jenkins is a 47 y.o. female initiated on antimicrobial therapy with IV Vancomycin for treatment of skin & soft tissue infection    Drug Allergies:   Review of patient's allergies indicates:   Allergen Reactions    Lisinopril Swelling    Bactrim [sulfamethoxazole-trimethoprim] Rash       Actual Body Weight:   88.5 kg    Renal Function:   Estimated Creatinine Clearance: 80.8 mL/min (based on SCr of 0.9 mg/dL).,     Dialysis Method (if applicable):  N/A    CBC (last 72 hours):  Recent Labs   Lab Result Units 08/21/22 2101 08/22/22  0600 08/22/22  1420   WBC K/uL 8.72 7.07  --    Hemoglobin g/dL 12.4 11.9*  --    Hemoglobin A1C %  --   --  8.3*   Hematocrit % 35.5* 33.4*  --    Platelets K/uL 417 348  --    Gran % % 67.4 62.5  --    Lymph % % 20.3 24.5  --    Mono % % 9.3 9.3  --    Eosinophil % % 1.4 1.6  --    Basophil % % 0.7 0.7  --    Differential Method  Automated Automated  --        Metabolic Panel (last 72 hours):  Recent Labs   Lab Result Units 08/21/22  2100 08/21/22 2101 08/22/22  0600   Sodium mmol/L  --  135* 134*   Potassium mmol/L  --  3.1* 2.8*   Chloride mmol/L  --  97 99   CO2 mmol/L  --  23 23   Glucose mg/dL  --  472* 337*   Glucose, UA  4+*  --   --    BUN mg/dL  --   16 9   Creatinine mg/dL  --  1.2 0.9   Albumin g/dL  --  3.4*  --    Total Bilirubin mg/dL  --  0.3  --    Alkaline Phosphatase U/L  --  204*  --    AST U/L  --  13  --    ALT U/L  --  15  --        Vancomycin Administrations:  vancomycin given in the last 96 hours                   vancomycin 1.75 g in 5 % dextrose 500 mL IVPB (mg) 1,750 mg New Bag 08/22/22 0231                Microbiologic Results:  Microbiology Results (last 7 days)     Procedure Component Value Units Date/Time    Blood culture [[id number]] Collected: 08/21/22 2617    Order Status: Completed Specimen: Blood from Peripheral, Antecubital, Right Updated: 08/22/22 0715     Blood Culture, Routine No Growth to date

## 2022-08-22 NOTE — PLAN OF CARE
Please see consult note performed by Sissy Batista NP completed earlier today for complete details. MRI negative for acute infarct. Symptoms possibly related to seizure activity as she is off of her AEDs or possble infection. Recommend further work up into these etiologies. No further recommendations at this time from the stroke team. Will sign off. Please do not hesitate to contact our service for any additional questions or concerns.     Hema Jacob PA-C  Vascular Neurology   [phone]

## 2022-08-22 NOTE — HPI
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 Diaz who presented with sudden onset of symptoms concerning for focal seizure. Pt states she was in her yard with her son on Sunday sharing a snack when she suddenly became aphasic and had RLE weakness. Pt states occurred for about 5 mins and then self resolved. Pt reported to the ED immediately and reports the episode occurred for a 2nd time in the ER, again self resolving. Pt denies drainage, pain, or swelling from her craniotomy incision site. Denies current weakness, paralysis. Denies change of vision, headaches. Denies loss of control from bowels, urine. Denies fevers, body aches, chills. Denies concerns/complications postoperatively until on Sunday when the symptoms started.     Since admission pt remains without leukocytosis. Pt with slightly increased inflammatory markers, sed rate 46 and CRP 26. Blood cultures NGTD. MRI brain w wo c/f enhancement of the sub dural and scalp complex fluid collections, potentially representing granulation tissue or infection hematoma. MRI wo c/f stable subdural and subq fluid collections surrounding cranitomy defect on the left, potentially appearing somewhat loculated. CTA head with no large vessel occulsion, stenosis, or vascular malformation.     ID was consulted for mgmt recommendations. Pt is currently on vancomycin and piperacillin tazobactam.

## 2022-08-22 NOTE — PROGRESS NOTES
Pharmacist Renal Dose Adjustment Note    Gina Jenkins is a 47 y.o. female being treated with the medication piperacillin-tazobactam    Patient Data:    Vital Signs (Most Recent):  Temp: 98.6 °F (37 °C) (08/21/22 1956)  Pulse: 85 (08/22/22 0100)  Resp: 19 (08/22/22 0030)  BP: (!) 174/78 (08/22/22 0100)  SpO2: 99 % (08/22/22 0100)   Vital Signs (72h Range):  Temp:  [98.6 °F (37 °C)]   Pulse:  [84-92]   Resp:  [19-20]   BP: (135-174)/()   SpO2:  [94 %-100 %]      Recent Labs   Lab 08/19/22  1141 08/21/22 2101   CREATININE 1.0 1.2     Serum creatinine: 1.2 mg/dL 08/21/22 2101  Estimated creatinine clearance: 60.6 mL/min    Medication: piperacillin-tazobactam IV 3.375 g every 8 hours will be changed to piperacillin-tazobactam IV 4.5 grams every 8 hours    James AlvarengaD

## 2022-08-22 NOTE — HPI
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 Diaz who presents with sudden onset of symptoms concerning for focal seizure. Patient was in her yard today playing with her son when her R arm became weak suddenly followed by the inability to speak and RLE weakness. This went on for a couple minutes and self resolved; patient said she had some lasting heaviness on her R side after. She came to the ED immediately.    Here in the ED she had another episode that resolved on its own. NSGY consulted.

## 2022-08-22 NOTE — CONSULTS
[facility] - Emergency Dept  Vascular Neurology  [facility]  Consult Note    Inpatient consult to Vascular (Stroke) Neurology  Consult performed by: Sissy Batista NP  Consult ordered by: Sylvia Huerta MD        Assessment/Plan:     Patient is a 47 y.o. year old female with:    * Post op infection  ABX usage per neurosurgery    Transient neurological symptoms  46 y/o female with 2 episodes of right sided weakness and aphasia,  Recent craniotomy for aneurysm clipping on 7-15-22. Symptoms could be related to seizure activity as off of AED's vs infection     MRI brain with no acute process seen    Mixed hyperlipidemia  Stroke risk factor  Continue home Lipitor 40 mg daily    Chronic diastolic heart failure  Stroke risk factor  Home meds    Essential hypertension  Stroke risk factor  normotensive        STROKE DOCUMENTATION     Acute Stroke Times   Last Known Normal Date: 08/21/22  Last Known Normal Time: 1900  Symptom Onset Date: 08/21/22  Symptom Onset Time: 2100  Unknown Symptom Onset Time: Unknown Time  Stroke Team Called Date: 08/21/22  Stroke Team Called Time: 2114  Stroke Team Arrival Date: 08/21/22  Stroke Team Arrival Time: 2117    NIH Scale:       Modified Vinton Score: 0  Danielle Coma Scale:15   ABCD2 Score:    [id number] Score:   HAS -BLED Score:   ICH Score:   Hunt & Britton Classification:       Thrombolysis Candidate? No, Strong suspicion for stroke mimic or alternative diagnosis , rapid resolving symptoms    Delays to Thrombolysis?  Not Applicable    Interventional Revascularization Candidate?   Is the patient eligible for mechanical endovascular reperfusion (BECKY)?  No; no significant neurologic deficit (NIHSS <6)  and No; significant pre-stroke disability     Delays to Thrombectomy? Not Applicable    Hemorrhagic change of an Ischemic Stroke: Does this patient have an ischemic stroke with hemorrhagic changes? No     Subjective:     History of Present Illness:  Patient is a  47-year-old female with past medical history of multiple intracranial aneurysms s/p elective L-sided craniotomy for clipping brain aneurysm 7/15/22, CHF, hypertension, hypercholesterolemia, migraine headaches, stroke who presents with 5 minutes of right-sided weakness and aphasia that started while at rest just prior to arrival.Patient presented with EMS LKN 1845 EMS arrived 1854 and symptoms had resolved.  While in the ED symptoms started again.  Difficulty getting IV access so delay in getting CTA head and neck multiphase.   CTA head and neck no acute process seen.    Symptoms may be related to seizure activity as patient has been off AED's as she was only on them for a few days post discharge from recent craniotomy and aneurysm clipping on 7-15-22.    MRI brain w/o contrast no acute process seen but concerning for infection of epidural and scalp hematoma vs fluid.     Patient to be admitted to Neurosurgery.           Past Medical History:   Diagnosis Date    Brain aneurysm     CHF (congestive heart failure)     H/O coronary angioplasty     Hypercholesteremia     Hypertension     Malignant hypertension     Migraine headache     Stroke 10/2017     Past Surgical History:   Procedure Laterality Date    CARDIAC CATHETERIZATION      CEREBRAL ANGIOGRAM      CLIP LIGATION OF INTRACRANIAL ANEURYSM BY CRANIOTOMY N/A 7/15/2022    Procedure: CRANIOTOMY, WITH ANEURYSM CLIPPING;  Surgeon: Timothy Diaz MD;  Location: [facility] OR [address] [place] [address];  Service: Neurosurgery;  Laterality: N/A;  PTERIONAL CRANIOTOMY WITH CLIP LIGATION OF L PCOMM, L ANTERIOR CHOROIDAL, L MCA  ANEURYSM, ANESTHESIA: GENERAL, BLOOD: TYPE&CROSS 2 UNITS, NEUROMONITORING: SEP, MEP, EEG, RADIOLOGY: C-ARM, POSITION: SUPINE, ANNA, CO-SURGERON: DR. LEXI DOMÍNGUEZ.     No family history on file.  Social History     Tobacco Use    Smoking status: Current Every Day Smoker     Packs/day: 0.50     Types: Cigarettes    Smokeless tobacco: Never Used   Substance  Use Topics    Alcohol use: Yes     Comment: occassionally    Drug use: No     Review of patient's allergies indicates:   Allergen Reactions    Lisinopril Swelling    Bactrim [sulfamethoxazole-trimethoprim] Rash       Medications: I have reviewed the current medication administration record.    (Not in a hospital admission)      Review of Systems   Constitutional:  Negative for chills and fever.   HENT:  Negative for ear pain and facial swelling.    Eyes:  Negative for pain and itching.   Respiratory:  Negative for cough and shortness of breath.    Cardiovascular:  Negative for chest pain and leg swelling.   Gastrointestinal:  Negative for abdominal distention and abdominal pain.   Endocrine: Negative for polyphagia and polyuria.   Genitourinary:  Negative for difficulty urinating and dyspareunia.   Musculoskeletal:  Positive for arthralgias and back pain.   Skin:  Positive for wound.   Neurological:  Positive for speech difficulty and weakness.   Psychiatric/Behavioral:  Negative for agitation and confusion.    Objective:     Vital Signs (Most Recent):  Temp: 98.6 °F (37 °C) (08/21/22 1956)  Pulse: 85 (08/22/22 0100)  Resp: 19 (08/22/22 0030)  BP: (!) 174/78 (08/22/22 0100)  SpO2: 99 % (08/22/22 0100)    Vital Signs Range (Last 24H):  Temp:  [98.6 °F (37 °C)]   Pulse:  [84-92]   Resp:  [19-20]   BP: (135-174)/()   SpO2:  [94 %-100 %]     Physical Exam  Vitals and nursing note reviewed.   Constitutional:       Appearance: Normal appearance. She is obese.   HENT:      Head: Normocephalic and atraumatic.   Eyes:      Extraocular Movements: Extraocular movements intact.      Pupils: Pupils are equal, round, and reactive to light.   Cardiovascular:      Rate and Rhythm: Normal rate and regular rhythm.   Pulmonary:      Effort: Pulmonary effort is normal.      Breath sounds: Normal breath sounds.   Abdominal:      General: Abdomen is flat. Bowel sounds are normal.      Palpations: Abdomen is soft.    Musculoskeletal:         General: Normal range of motion.      Cervical back: Normal range of motion.   Skin:     Comments: Incision to scalp   Neurological:      Mental Status: She is alert and oriented to person, place, and time.      Motor: Weakness present.       Neurological Exam:   LOC: alert  Attention Span: Good   Language: No aphasia  Articulation: No dysarthria  Orientation: Person, Place, Time   Visual Fields: Full  EOM (CN III, IV, VI): Full/intact  Pupils (CN II, III): PERRL  Facial Sensation (CN V): Normal  Facial Movement (CN VII): Symmetric facial expression    Gag Reflex: present  Reflexes: flexor plantar responses bilaterally  Motor: Arm left  Normal 5/5  Leg left  Normal 5/5  Arm right  Paresis: 3/5  Leg right Paresis: 3/5  Cerebellum: No evidence of appendicular or axial ataxia  Sensation: Intact to light touch, temperature and vibration  Tone: Normal tone throughout      Laboratory:  CMP:   Recent Labs   Lab 08/21/22 2101   CALCIUM 9.7   ALBUMIN 3.4*   PROT 7.4   *   K 3.1*   CO2 23   CL 97   BUN 16   CREATININE 1.2   ALKPHOS 204*   ALT 15   AST 13   BILITOT 0.3     CBC:   Recent Labs   Lab 08/21/22 2101   WBC 8.72   RBC 4.25   HGB 12.4   HCT 35.5*      MCV 84   MCH 29.2   MCHC 34.9     Lipid Panel: No results for input(s): CHOL, LDLCALC, HDL, TRIG in the last 168 hours.  Coagulation:   Recent Labs   Lab 08/21/22 2101   INR 1.0     Hgb A1C: No results for input(s): HGBA1C in the last 168 hours.  TSH:   Recent Labs   Lab 08/21/22 2101   TSH 1.371       Diagnostic Results:      Brain imaging:  MRI Brain w/o contrast 8-21-22 results:  Stable subdural and subcutaneous fluid collection surrounding the patient's craniotomy defect on the left.  These appear somewhat loculated.  Correlate for subdural and scalp hematoma.  Versus signs of infection.     Postoperative changes of aneurysm clipping with no evidence of acute intracranial hemorrhage, mass or infarction.    Vessel Imaging:    CTA head and neck multiphase 8-21-22 results:  CTA head: No large vessel occlusion, high grade stenosis, or vascular malformation identified.  Stable postsurgical changes of left PCOM/anterior choroidal aneurysm clipping.  Unchanged small aneurysm at the left M2 bifurcation.     CT head: No evidence for acute intracranial hemorrhage or major vascular distribution infarct.  Stable postsurgical changes of left frontotemporal craniotomy for aneurysm clipping.  Small subdural collection underlying the craniotomy site, similar to prior exam.  Soft tissue collection overlying the craniotomy site is similar in size to prior exam.    Cardiac Evaluation:   2D Echo 7-16-22 results:  · The left ventricle is normal in size with normal systolic function. The estimated ejection fraction is 65%.  · Normal left ventricular diastolic function.  · Normal right ventricular size with normal right ventricular systolic function.  · Mild left atrial enlargement.  · Mild mitral regurgitation.  · Mild tricuspid regurgitation.  · The estimated PA systolic pressure is 30 mmHg.  · Normal central venous pressure (3 mmHg).          Sissy Batista NP  [facility]  Department of Vascular Neurology   [facility] - Emergency Dept

## 2022-08-22 NOTE — ASSESSMENT & PLAN NOTE
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 Diaz who presented with sudden onset of symptoms concerning for focal seizure. See HPI for more detail.      Without leukocytosis. sed rate 46 and CRP 26. Blood cultures NGTD. MRI brain w wo c/f enhancement of the sub dural and scalp complex fluid collections, potentially representing granulation tissue or infection hematoma. MRI wo c/f stable subdural and subq fluid collections surrounding cranitomy defect on the left, potentially appearing somewhat loculated. CTA head with no large vessel occulsion, stenosis, or vascular malformation.      Per chart review, NSGY plans to potentially tap scalp collection.      ID was consulted for mgmt recommendations. Pt is currently on vancomycin and piperacillin tazobactam.     Recommendations:   - Continue Vancomycin IV, inpatient pharmacy to manage dosing. Trough goal 15-20.   - Discontinue piperacillin-tazobactam. Start Cefepime 2g IV q8hr in the setting of recent operation.   - If taken for a cranial tap, please send specimen for path, gram stain, aerobic, anaerobic, AFB and fungal cultures.   - Will monitor pt culture data and tailor abx as needed.   - Plan reviewed with ID staff, Dr. Pineda. ID will follow.

## 2022-08-22 NOTE — HPI
Patient is a 47-year-old female with past medical history of multiple intracranial aneurysms s/p elective L-sided craniotomy for clipping brain aneurysm 7/15/22, CHF, hypertension, hypercholesterolemia, migraine headaches, stroke who presents with 5 minutes of right-sided weakness and aphasia that started while at rest just prior to arrival.Patient presented with EMS LKN 1845 EMS arrived 1854 and symptoms had resolved.  While in the ED symptoms started again.  Difficulty getting IV access so delay in getting CTA head and neck multiphase.   CTA head and neck no acute process seen.    Symptoms may be related to seizure activity as patient has been off AED's as she was only on them for a few days post discharge from recent craniotomy and aneurysm clipping on 7-15-22.    MRI brain w/o contrast no acute process seen but concerning for infection of epidural and scalp hematoma vs fluid.     Patient to be admitted to Neurosurgery.

## 2022-08-22 NOTE — CONSULTS
[facility] - [facility] Dept  Neurosurgery  Consult Note    Inpatient consult to Neurosurgery  Consult performed by: Hipolito Darling MD  Consult ordered by: Sylvia Huerta MD        Subjective:     Chief Complaint/Reason for Admission: R sided weakness suddenly     History of Present Illness: 48 yo F with PMH of HTN, HLD, smoking and recent L Pcomm and L anterior choroidal artery aneuryms craniotomy for clipping on 7/15/22 with Dr. Diaz who presents with sudden onset of symptoms concerning for focal seizure. Patient was in her yard today playing with her son when her R arm became weak suddenly followed by the inability to speak and RLE weakness. This went on for a couple minutes and self resolved; patient said she had some lasting heaviness on her R side after. She came to the ED immediately.    Here in the ED she had another episode that resolved on its own. NSGY consulted.       (Not in a hospital admission)      Review of patient's allergies indicates:   Allergen Reactions    Lisinopril Swelling    Bactrim [sulfamethoxazole-trimethoprim] Rash       Past Medical History:   Diagnosis Date    Brain aneurysm     CHF (congestive heart failure)     H/O coronary angioplasty     Hypercholesteremia     Hypertension     Malignant hypertension     Migraine headache     Stroke 10/2017     Past Surgical History:   Procedure Laterality Date    CARDIAC CATHETERIZATION      CEREBRAL ANGIOGRAM      CLIP LIGATION OF INTRACRANIAL ANEURYSM BY CRANIOTOMY N/A 7/15/2022    Procedure: CRANIOTOMY, WITH ANEURYSM CLIPPING;  Surgeon: Timothy Diaz MD;  Location: [facility] OR [address] [place] [address];  Service: Neurosurgery;  Laterality: N/A;  PTERIONAL CRANIOTOMY WITH CLIP LIGATION OF L PCOMM, L ANTERIOR CHOROIDAL, L MCA  ANEURYSM, ANESTHESIA: GENERAL, BLOOD: TYPE&CROSS 2 UNITS, NEUROMONITORING: SEP, MEP, EEG, RADIOLOGY: C-ARM, POSITION: SUPINE, TONEY CASTILLO-SURGERON: DR. LEXI DOMÍNGUEZ.     Family History    None       Tobacco Use     Smoking status: Current Every Day Smoker     Packs/day: 0.50     Types: Cigarettes    Smokeless tobacco: Never Used   Substance and Sexual Activity    Alcohol use: Yes     Comment: occassionally    Drug use: No    Sexual activity: Not Currently     Partners: Male     Birth control/protection: None     Review of Systems   Neurological:  Positive for seizures and weakness.   All other systems reviewed and are negative.  Objective:     Weight: 88.5 kg (195 lb 1.8 oz)  Body mass index is 35.69 kg/m².  Vital Signs (Most Recent):  Temp: 98.6 °F (37 °C) (08/21/22 1956)  Pulse: 88 (08/22/22 0030)  Resp: 19 (08/22/22 0030)  BP: (!) 151/82 (08/22/22 0030)  SpO2: 97 % (08/22/22 0030)   Vital Signs (24h Range):  Temp:  [98.6 °F (37 °C)] 98.6 °F (37 °C)  Pulse:  [84-92] 88  Resp:  [19-20] 19  SpO2:  [94 %-100 %] 97 %  BP: (135-167)/() 151/82                     Female External Urinary Catheter 08/21/22 2221 (Active)       Physical Exam  Constitutional:       Appearance: She is well-developed and well-nourished.   Eyes:      Extraocular Movements: EOM normal.      Conjunctiva/sclera: Conjunctivae normal.      Pupils: Pupils are equal, round, and reactive to light.   Cardiovascular:      Pulses: Normal pulses.   Abdominal:      Palpations: Abdomen is soft.   Neurological:      Mental Status: She is alert and oriented to person, place, and time.      Comments: AAOx4  PERRL  Subtle word finding difficulties, names 3/3 objects  CN intact  FC x4  BUE 5/5  BLE 5/5  SILT    Inferior aspect of incision with uneven edges, delayed healing, no leakage  Anterior scalp swelling, no erythema, but tender to palpation   Psychiatric:         Mood and Affect: Mood and affect normal.       Physical Exam:    Constitutional: She appears well-developed and well-nourished.     Eyes: Pupils are equal, round, and reactive to light. Conjunctivae and EOM are normal.     Cardiovascular: Normal pulses.     Abdominal: Soft.     Psych/Behavior:  She is alert. She is oriented to person, place, and time. She has a normal mood and affect.     Neurological:   AAOx4  PERRL  Subtle word finding difficulties, names 3/3 objects  CN intact  FC x4  BUE 5/5  BLE 5/5  SILT    Inferior aspect of incision with uneven edges, delayed healing, no leakage  Anterior scalp swelling, no erythema, but tender to palpation     Significant Labs:  Recent Labs   Lab 08/21/22 2101   *   *   K 3.1*   CL 97   CO2 23   BUN 16   CREATININE 1.2   CALCIUM 9.7     Recent Labs   Lab 08/21/22 2101   WBC 8.72   HGB 12.4   HCT 35.5*        Recent Labs   Lab 08/21/22 2101   INR 1.0     Microbiology Results (last 7 days)       Procedure Component Value Units Date/Time    Blood culture [[id number]] Collected: 08/21/22 2325    Order Status: Sent Specimen: Blood from Peripheral, Antecubital, Right Updated: 08/21/22 2329          All pertinent labs from the last 24 hours have been reviewed.    Significant Diagnostics:  I have reviewed all pertinent imaging results/findings within the past 24 hours.    Assessment/Plan:     Post op infection  46 yo F with PMH of HTN, HLD, smoking and recent L Pcomm and L anterior choroidal artery aneuryms craniotomy for clipping on 7/15/22 with Dr. Diaz who presents with sudden onset of symptoms concerning for focal seizure.    - admit to NSGY  - all labs and diagnostics reviewed   - CTH largely stable from prior with epidural fluid collection and extracranial fluid collection stable in size   - MRI brain w wo concerning for infection of epidural and scalp hematoma vs fluid collection   - EEG cap in ED negative for seizures  - infectious workup pending   - afebrile, no leukocytosis   - blood cx pending   - ESR/CRP downtrending   - patient has been taking keflex since last week   - broad spec abx started  - keppra loaded, continue 1g bid  - Na > 135  - SBP <160  - NPO midnight  - further plan pending further discussion with staff, will consider  tapping scalp collection         Thank you for your consult. I will follow-up with patient. Please contact us if you have any additional questions.    Hipolito Darling MD  Neurosurgery  [facility] - [facility] Dept

## 2022-08-23 ENCOUNTER — ANESTHESIA EVENT (OUTPATIENT)
Dept: SURGERY | Facility: HOSPITAL | Age: 47
DRG: 856 | End: 2022-08-23
Payer: MEDICAID

## 2022-08-23 PROBLEM — E11.9 NEW ONSET TYPE 2 DIABETES MELLITUS: Status: ACTIVE | Noted: 2022-08-23

## 2022-08-23 LAB
ABO + RH BLD: NORMAL
ANION GAP SERPL CALC-SCNC: 10 MMOL/L (ref 8–16)
ANION GAP SERPL CALC-SCNC: 11 MMOL/L (ref 8–16)
BASOPHILS # BLD AUTO: 0.04 K/UL (ref 0–0.2)
BASOPHILS NFR BLD: 0.5 % (ref 0–1.9)
BLD GP AB SCN CELLS X3 SERPL QL: NORMAL
BUN SERPL-MCNC: 8 MG/DL (ref 6–20)
BUN SERPL-MCNC: 8 MG/DL (ref 6–20)
CALCIUM SERPL-MCNC: 9.1 MG/DL (ref 8.7–10.5)
CALCIUM SERPL-MCNC: 9.3 MG/DL (ref 8.7–10.5)
CHLORIDE SERPL-SCNC: 102 MMOL/L (ref 95–110)
CHLORIDE SERPL-SCNC: 104 MMOL/L (ref 95–110)
CO2 SERPL-SCNC: 24 MMOL/L (ref 23–29)
CO2 SERPL-SCNC: 24 MMOL/L (ref 23–29)
CREAT SERPL-MCNC: 0.9 MG/DL (ref 0.5–1.4)
CREAT SERPL-MCNC: 1 MG/DL (ref 0.5–1.4)
DIFFERENTIAL METHOD: ABNORMAL
EOSINOPHIL # BLD AUTO: 0.1 K/UL (ref 0–0.5)
EOSINOPHIL NFR BLD: 1.3 % (ref 0–8)
ERYTHROCYTE [DISTWIDTH] IN BLOOD BY AUTOMATED COUNT: 13.6 % (ref 11.5–14.5)
EST. GFR  (NO RACE VARIABLE): >60 ML/MIN/1.73 M^2
EST. GFR  (NO RACE VARIABLE): >60 ML/MIN/1.73 M^2
GLUCOSE SERPL-MCNC: 335 MG/DL (ref 70–110)
GLUCOSE SERPL-MCNC: 481 MG/DL (ref 70–110)
HCT VFR BLD AUTO: 32.6 % (ref 37–48.5)
HGB BLD-MCNC: 11.7 G/DL (ref 12–16)
IMM GRANULOCYTES # BLD AUTO: 0.12 K/UL (ref 0–0.04)
IMM GRANULOCYTES NFR BLD AUTO: 1.6 % (ref 0–0.5)
LYMPHOCYTES # BLD AUTO: 1.9 K/UL (ref 1–4.8)
LYMPHOCYTES NFR BLD: 24.3 % (ref 18–48)
MAGNESIUM SERPL-MCNC: 1.5 MG/DL (ref 1.6–2.6)
MCH RBC QN AUTO: 29.4 PG (ref 27–31)
MCHC RBC AUTO-ENTMCNC: 35.9 G/DL (ref 32–36)
MCV RBC AUTO: 82 FL (ref 82–98)
MONOCYTES # BLD AUTO: 0.7 K/UL (ref 0.3–1)
MONOCYTES NFR BLD: 9.1 % (ref 4–15)
NEUTROPHILS # BLD AUTO: 4.9 K/UL (ref 1.8–7.7)
NEUTROPHILS NFR BLD: 63.2 % (ref 38–73)
NRBC BLD-RTO: 0 /100 WBC
PLATELET # BLD AUTO: 322 K/UL (ref 150–450)
PMV BLD AUTO: 10.9 FL (ref 9.2–12.9)
POCT GLUCOSE: 231 MG/DL (ref 70–110)
POCT GLUCOSE: 265 MG/DL (ref 70–110)
POCT GLUCOSE: 330 MG/DL (ref 70–110)
POCT GLUCOSE: 442 MG/DL (ref 70–110)
POCT GLUCOSE: 458 MG/DL (ref 70–110)
POCT GLUCOSE: 471 MG/DL (ref 70–110)
POCT GLUCOSE: 478 MG/DL (ref 70–110)
POCT GLUCOSE: >500 MG/DL (ref 70–110)
POCT GLUCOSE: >500 MG/DL (ref 70–110)
POTASSIUM SERPL-SCNC: 2.9 MMOL/L (ref 3.5–5.1)
POTASSIUM SERPL-SCNC: 3.2 MMOL/L (ref 3.5–5.1)
RBC # BLD AUTO: 3.98 M/UL (ref 4–5.4)
SODIUM SERPL-SCNC: 137 MMOL/L (ref 136–145)
SODIUM SERPL-SCNC: 138 MMOL/L (ref 136–145)
VANCOMYCIN TROUGH SERPL-MCNC: 15 UG/ML (ref 10–22)
WBC # BLD AUTO: 7.68 K/UL (ref 3.9–12.7)

## 2022-08-23 PROCEDURE — 36415 COLL VENOUS BLD VENIPUNCTURE: CPT | Performed by: NEUROLOGICAL SURGERY

## 2022-08-23 PROCEDURE — 99233 PR SUBSEQUENT HOSPITAL CARE,LEVL III: ICD-10-PCS | Mod: ,,, | Performed by: REGISTERED NURSE

## 2022-08-23 PROCEDURE — 95816 EEG AWAKE AND DROWSY: CPT | Mod: 26,,, | Performed by: PSYCHIATRY & NEUROLOGY

## 2022-08-23 PROCEDURE — 36415 COLL VENOUS BLD VENIPUNCTURE: CPT | Performed by: NURSE PRACTITIONER

## 2022-08-23 PROCEDURE — 99233 SBSQ HOSP IP/OBS HIGH 50: CPT | Mod: ,,, | Performed by: REGISTERED NURSE

## 2022-08-23 PROCEDURE — 80202 ASSAY OF VANCOMYCIN: CPT | Performed by: NEUROLOGICAL SURGERY

## 2022-08-23 PROCEDURE — 95816 EEG AWAKE AND DROWSY: CPT

## 2022-08-23 PROCEDURE — 25000003 PHARM REV CODE 250: Performed by: STUDENT IN AN ORGANIZED HEALTH CARE EDUCATION/TRAINING PROGRAM

## 2022-08-23 PROCEDURE — 25000003 PHARM REV CODE 250: Performed by: NURSE PRACTITIONER

## 2022-08-23 PROCEDURE — 95816 PR EEG,W/AWAKE & DROWSY RECORD: ICD-10-PCS | Mod: 26,,, | Performed by: PSYCHIATRY & NEUROLOGY

## 2022-08-23 PROCEDURE — 25000003 PHARM REV CODE 250: Performed by: NEUROLOGICAL SURGERY

## 2022-08-23 PROCEDURE — 83735 ASSAY OF MAGNESIUM: CPT | Performed by: PHYSICIAN ASSISTANT

## 2022-08-23 PROCEDURE — 85025 COMPLETE CBC W/AUTO DIFF WBC: CPT | Performed by: STUDENT IN AN ORGANIZED HEALTH CARE EDUCATION/TRAINING PROGRAM

## 2022-08-23 PROCEDURE — 99024 POSTOP FOLLOW-UP VISIT: CPT | Mod: ,,, | Performed by: PHYSICIAN ASSISTANT

## 2022-08-23 PROCEDURE — 99223 PR INITIAL HOSPITAL CARE,LEVL III: ICD-10-PCS | Mod: ,,, | Performed by: NURSE PRACTITIONER

## 2022-08-23 PROCEDURE — 86850 RBC ANTIBODY SCREEN: CPT | Performed by: PHYSICIAN ASSISTANT

## 2022-08-23 PROCEDURE — 63600175 PHARM REV CODE 636 W HCPCS: Performed by: PHYSICIAN ASSISTANT

## 2022-08-23 PROCEDURE — 63600175 PHARM REV CODE 636 W HCPCS: Performed by: NURSE PRACTITIONER

## 2022-08-23 PROCEDURE — 63600175 PHARM REV CODE 636 W HCPCS: Performed by: NEUROLOGICAL SURGERY

## 2022-08-23 PROCEDURE — 99024 PR POST-OP FOLLOW-UP VISIT: ICD-10-PCS | Mod: ,,, | Performed by: PHYSICIAN ASSISTANT

## 2022-08-23 PROCEDURE — 99223 1ST HOSP IP/OBS HIGH 75: CPT | Mod: ,,, | Performed by: NURSE PRACTITIONER

## 2022-08-23 PROCEDURE — 36415 COLL VENOUS BLD VENIPUNCTURE: CPT | Performed by: STUDENT IN AN ORGANIZED HEALTH CARE EDUCATION/TRAINING PROGRAM

## 2022-08-23 PROCEDURE — 25000003 PHARM REV CODE 250: Performed by: PHYSICIAN ASSISTANT

## 2022-08-23 PROCEDURE — 36415 COLL VENOUS BLD VENIPUNCTURE: CPT | Performed by: PHYSICIAN ASSISTANT

## 2022-08-23 PROCEDURE — 80048 BASIC METABOLIC PNL TOTAL CA: CPT | Performed by: STUDENT IN AN ORGANIZED HEALTH CARE EDUCATION/TRAINING PROGRAM

## 2022-08-23 PROCEDURE — 11000001 HC ACUTE MED/SURG PRIVATE ROOM

## 2022-08-23 PROCEDURE — 63600175 PHARM REV CODE 636 W HCPCS: Performed by: STUDENT IN AN ORGANIZED HEALTH CARE EDUCATION/TRAINING PROGRAM

## 2022-08-23 PROCEDURE — 80048 BASIC METABOLIC PNL TOTAL CA: CPT | Mod: 91 | Performed by: NURSE PRACTITIONER

## 2022-08-23 RX ORDER — LANOLIN ALCOHOL/MO/W.PET/CERES
400 CREAM (GRAM) TOPICAL 2 TIMES DAILY
Status: DISCONTINUED | OUTPATIENT
Start: 2022-08-23 | End: 2022-08-23

## 2022-08-23 RX ORDER — POTASSIUM CHLORIDE 7.45 MG/ML
10 INJECTION INTRAVENOUS
Status: COMPLETED | OUTPATIENT
Start: 2022-08-23 | End: 2022-08-23

## 2022-08-23 RX ORDER — POTASSIUM CHLORIDE 7.45 MG/ML
10 INJECTION INTRAVENOUS
Status: DISCONTINUED | OUTPATIENT
Start: 2022-08-23 | End: 2022-08-23

## 2022-08-23 RX ORDER — IBUPROFEN 200 MG
24 TABLET ORAL
Status: DISCONTINUED | OUTPATIENT
Start: 2022-08-23 | End: 2022-08-23

## 2022-08-23 RX ORDER — MAGNESIUM SULFATE HEPTAHYDRATE 40 MG/ML
2 INJECTION, SOLUTION INTRAVENOUS ONCE
Status: COMPLETED | OUTPATIENT
Start: 2022-08-23 | End: 2022-08-23

## 2022-08-23 RX ORDER — INSULIN ASPART 100 [IU]/ML
1-10 INJECTION, SOLUTION INTRAVENOUS; SUBCUTANEOUS
Status: DISCONTINUED | OUTPATIENT
Start: 2022-08-23 | End: 2022-08-23

## 2022-08-23 RX ORDER — IBUPROFEN 200 MG
16 TABLET ORAL
Status: DISCONTINUED | OUTPATIENT
Start: 2022-08-23 | End: 2022-08-23

## 2022-08-23 RX ORDER — INSULIN ASPART 100 [IU]/ML
10 INJECTION, SOLUTION INTRAVENOUS; SUBCUTANEOUS ONCE
Status: COMPLETED | OUTPATIENT
Start: 2022-08-23 | End: 2022-08-23

## 2022-08-23 RX ORDER — GLUCAGON 1 MG
1 KIT INJECTION
Status: DISCONTINUED | OUTPATIENT
Start: 2022-08-23 | End: 2022-08-23

## 2022-08-23 RX ORDER — ALBUTEROL SULFATE 90 UG/1
2 AEROSOL, METERED RESPIRATORY (INHALATION)
Status: DISCONTINUED | OUTPATIENT
Start: 2022-08-23 | End: 2022-09-02 | Stop reason: HOSPADM

## 2022-08-23 RX ORDER — LANOLIN ALCOHOL/MO/W.PET/CERES
400 CREAM (GRAM) TOPICAL 2 TIMES DAILY
Status: DISCONTINUED | OUTPATIENT
Start: 2022-08-24 | End: 2022-09-02 | Stop reason: HOSPADM

## 2022-08-23 RX ORDER — INSULIN ASPART 100 [IU]/ML
5 INJECTION, SOLUTION INTRAVENOUS; SUBCUTANEOUS ONCE
Status: COMPLETED | OUTPATIENT
Start: 2022-08-23 | End: 2022-08-23

## 2022-08-23 RX ORDER — POTASSIUM CHLORIDE 20 MEQ/1
20 TABLET, EXTENDED RELEASE ORAL 2 TIMES DAILY
Status: DISCONTINUED | OUTPATIENT
Start: 2022-08-23 | End: 2022-08-23

## 2022-08-23 RX ORDER — GLUCAGON 1 MG
1 KIT INJECTION
Status: DISCONTINUED | OUTPATIENT
Start: 2022-08-23 | End: 2022-08-24

## 2022-08-23 RX ORDER — INSULIN ASPART 100 [IU]/ML
7 INJECTION, SOLUTION INTRAVENOUS; SUBCUTANEOUS
Status: DISCONTINUED | OUTPATIENT
Start: 2022-08-23 | End: 2022-08-23

## 2022-08-23 RX ORDER — POTASSIUM CHLORIDE 20 MEQ/1
40 TABLET, EXTENDED RELEASE ORAL ONCE
Status: COMPLETED | OUTPATIENT
Start: 2022-08-23 | End: 2022-08-23

## 2022-08-23 RX ORDER — POTASSIUM CHLORIDE 20 MEQ/1
20 TABLET, EXTENDED RELEASE ORAL 2 TIMES DAILY
Status: DISCONTINUED | OUTPATIENT
Start: 2022-08-24 | End: 2022-08-29

## 2022-08-23 RX ORDER — INSULIN ASPART 100 [IU]/ML
6 INJECTION, SOLUTION INTRAVENOUS; SUBCUTANEOUS
Status: DISCONTINUED | OUTPATIENT
Start: 2022-08-23 | End: 2022-08-23

## 2022-08-23 RX ORDER — INSULIN ASPART 100 [IU]/ML
1-10 INJECTION, SOLUTION INTRAVENOUS; SUBCUTANEOUS
Status: DISCONTINUED | OUTPATIENT
Start: 2022-08-23 | End: 2022-08-24

## 2022-08-23 RX ADMIN — INSULIN DETEMIR 20 UNITS: 100 INJECTION, SOLUTION SUBCUTANEOUS at 01:08

## 2022-08-23 RX ADMIN — LEVETIRACETAM 1000 MG: 100 INJECTION, SOLUTION INTRAVENOUS at 08:08

## 2022-08-23 RX ADMIN — ESCITALOPRAM OXALATE 10 MG: 10 TABLET ORAL at 08:08

## 2022-08-23 RX ADMIN — CARVEDILOL 37.5 MG: 25 TABLET, FILM COATED ORAL at 08:08

## 2022-08-23 RX ADMIN — POTASSIUM CHLORIDE 40 MEQ: 1500 TABLET, EXTENDED RELEASE ORAL at 03:08

## 2022-08-23 RX ADMIN — INSULIN ASPART 10 UNITS: 100 INJECTION, SOLUTION INTRAVENOUS; SUBCUTANEOUS at 06:08

## 2022-08-23 RX ADMIN — VANCOMYCIN HYDROCHLORIDE 1000 MG: 1 INJECTION, POWDER, LYOPHILIZED, FOR SOLUTION INTRAVENOUS at 06:08

## 2022-08-23 RX ADMIN — INSULIN ASPART 5 UNITS: 100 INJECTION, SOLUTION INTRAVENOUS; SUBCUTANEOUS at 10:08

## 2022-08-23 RX ADMIN — MAGNESIUM SULFATE 2 G: 2 INJECTION INTRAVENOUS at 02:08

## 2022-08-23 RX ADMIN — INSULIN ASPART 7 UNITS: 100 INJECTION, SOLUTION INTRAVENOUS; SUBCUTANEOUS at 04:08

## 2022-08-23 RX ADMIN — AMLODIPINE BESYLATE 10 MG: 10 TABLET ORAL at 08:08

## 2022-08-23 RX ADMIN — ATORVASTATIN CALCIUM 40 MG: 40 TABLET, FILM COATED ORAL at 08:08

## 2022-08-23 RX ADMIN — AMITRIPTYLINE HYDROCHLORIDE 75 MG: 50 TABLET, FILM COATED ORAL at 08:08

## 2022-08-23 RX ADMIN — POTASSIUM CHLORIDE 10 MEQ: 7.46 INJECTION, SOLUTION INTRAVENOUS at 12:08

## 2022-08-23 RX ADMIN — INSULIN ASPART 10 UNITS: 100 INJECTION, SOLUTION INTRAVENOUS; SUBCUTANEOUS at 08:08

## 2022-08-23 RX ADMIN — CEFEPIME 2 G: 2 INJECTION, POWDER, FOR SOLUTION INTRAVENOUS at 04:08

## 2022-08-23 RX ADMIN — CEFEPIME 2 G: 2 INJECTION, POWDER, FOR SOLUTION INTRAVENOUS at 05:08

## 2022-08-23 RX ADMIN — POTASSIUM CHLORIDE 10 MEQ: 7.46 INJECTION, SOLUTION INTRAVENOUS at 09:08

## 2022-08-23 RX ADMIN — CEFEPIME 2 G: 2 INJECTION, POWDER, FOR SOLUTION INTRAVENOUS at 10:08

## 2022-08-23 RX ADMIN — VANCOMYCIN HYDROCHLORIDE 1000 MG: 1 INJECTION, POWDER, LYOPHILIZED, FOR SOLUTION INTRAVENOUS at 05:08

## 2022-08-23 RX ADMIN — POTASSIUM CHLORIDE 10 MEQ: 10 INJECTION INTRAVENOUS at 08:08

## 2022-08-23 RX ADMIN — POTASSIUM CHLORIDE 10 MEQ: 10 INJECTION INTRAVENOUS at 09:08

## 2022-08-23 RX ADMIN — POTASSIUM CHLORIDE 10 MEQ: 10 INJECTION INTRAVENOUS at 10:08

## 2022-08-23 RX ADMIN — ACETAMINOPHEN 650 MG: 325 TABLET ORAL at 08:08

## 2022-08-23 NOTE — SUBJECTIVE & OBJECTIVE
Interval HPI:   Overnight events:   BG above goal at time of consult. Endocrinology will continue to follow, and manage glycemic control while inpatient.   Diet diabetic [facility];  Calorie       Eatin% - 100%  Nausea: No  Hypoglycemia and intervention: No  Fever: No  TPN and/or TF: No  If yes, type of TF/TPN and rate: None    PMH, PSH, FH, SH updated and reviewed     Review of Systems   Constitutional: Negative for weight changes.  Eyes: Positive for visual disturbance.  Respiratory: Negative for cough.   Cardiovascular: Negative for chest pain.  Gastrointestinal: Negative for nausea.  Endocrine: Positive for polyuria, polydipsia.  Musculoskeletal: Negative for back pain.  Skin: Negative for rash.  Neurological: Negative for syncope.  Psychiatric/Behavioral: Negative for depression.      Current Medications and/or Treatments Impacting Glycemic Control  Immunotherapy:    Immunosuppressants       None          Steroids:   Hormones (From admission, onward)                Start     Stop Route Frequency Ordered    22 0313  melatonin tablet 6 mg         -- Oral Nightly PRN 22 0213          Pressors:    Autonomic Drugs (From admission, onward)                None          Hyperglycemia/Diabetes Medications:   Antihyperglycemics (From admission, onward)                Start     Stop Route Frequency Ordered    22 1145  insulin aspart U-100 pen 7 Units         -- SubQ 3 times daily with meals 22 1038    22 1137  insulin aspart U-100 pen 1-10 Units         -- SubQ Before meals & nightly PRN 22 1038    22 1045  insulin detemir U-100 pen 20 Units         -- SubQ Daily 22 1038             PHYSICAL EXAMINATION:  Vitals:    22 0815   BP: (!) 155/88   Pulse: 85   Resp: 17   Temp: 98.5 °F (36.9 °C)     Body mass index is 35.69 kg/m².    Physical Exam   Constitutional: Well developed, well nourished, NAD.  ENT: External ears no masses with nose patent; normal  hearing.  Neck: Supple; trachea midline.  Cardiovascular: Normal heart sounds, no LE edema. DP +2 bilaterally.  Lungs: Normal effort; lungs anterior bilaterally clear to auscultation.  Abdomen: Soft, no masses, no hernias.  MS: No clubbing or cyanosis of nails noted; unable to assess gait.  Skin: No rashes, lesions, or ulcers; no nodules.   Psychiatric: Good judgement and insight; normal mood and affect.  Neurological: Cranial nerves are grossly intact. Normal sensation in the bilateral lower extremities.  Foot: Nails in good condition, no amputations noted.

## 2022-08-23 NOTE — CONSULTS
[facility] - Neurosurgery ([facility])  Endocrinology  Diabetes Consult Note    Consult Requested by: Timothy Diaz MD   Reason for admit: Post op infection    HISTORY OF PRESENT ILLNESS:  Reason for Consult: Management of T2DM (NEW ONSET), Hyperglycemia     Surgical Procedure and Date: L Pcomm and L anterior choroidal artery aneuryms craniotomy for clipping on 7/15/22 with Dr. Diaz    Diabetes diagnosis year:     Home Diabetes Medications:  No current home medications.     How often checking glucose at home?  Not previously monitoring blood sugar.    BG readings on regimen: unknown  Hypoglycemia on the regimen?  No  Missed doses on regimen?  No    Diabetes Complications include:     Hyperglycemia    Complicating diabetes co morbidities:   CHF and History of CVA, HTN, HLD      HPI:   Patient is a 47 y.o. 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 Diaz who presents with sudden onset of symptoms concerning for focal seizure. Patient was in her yard today playing with her son when her R arm became weak suddenly followed by the inability to speak and RLE weakness. This went on for a couple minutes and self resolved; patient said she had some lasting heaviness on her R side after. Endocrinology consulted on 2022 to manage glycemic control.     Lab Results   Component Value Date    HGBA1C 8.3 (H) 2022             Interval HPI:   Overnight events:   BG above goal at time of consult. Endocrinology will continue to follow, and manage glycemic control while inpatient.   Diet diabetic [facility];  Calorie       Eatin% - 100%  Nausea: No  Hypoglycemia and intervention: No  Fever: No  TPN and/or TF: No  If yes, type of TF/TPN and rate: None    PMH, PSH, FH, SH updated and reviewed     Review of Systems   Constitutional: Negative for weight changes.  Eyes: Positive for visual  disturbance.  Respiratory: Negative for cough.   Cardiovascular: Negative for chest pain.  Gastrointestinal: Negative for nausea.  Endocrine: Positive for polyuria, polydipsia.  Musculoskeletal: Negative for back pain.  Skin: Negative for rash.  Neurological: Negative for syncope.  Psychiatric/Behavioral: Negative for depression.      Current Medications and/or Treatments Impacting Glycemic Control  Immunotherapy:    Immunosuppressants       None          Steroids:   Hormones (From admission, onward)                Start     Stop Route Frequency Ordered    08/22/22 0313  melatonin tablet 6 mg         -- Oral Nightly PRN 08/22/22 0213          Pressors:    Autonomic Drugs (From admission, onward)                None          Hyperglycemia/Diabetes Medications:   Antihyperglycemics (From admission, onward)                Start     Stop Route Frequency Ordered    08/23/22 1145  insulin aspart U-100 pen 7 Units         -- SubQ 3 times daily with meals 08/23/22 1038    08/23/22 1137  insulin aspart U-100 pen 1-10 Units         -- SubQ Before meals & nightly PRN 08/23/22 1038    08/23/22 1045  insulin detemir U-100 pen 20 Units         -- SubQ Daily 08/23/22 1038             PHYSICAL EXAMINATION:  Vitals:    08/23/22 0815   BP: (!) 155/88   Pulse: 85   Resp: 17   Temp: 98.5 °F (36.9 °C)     Body mass index is 35.69 kg/m².    Physical Exam   Constitutional: Well developed, well nourished, NAD.  ENT: External ears no masses with nose patent; normal hearing.  Neck: Supple; trachea midline.  Cardiovascular: Normal heart sounds, no LE edema. DP +2 bilaterally.  Lungs: Normal effort; lungs anterior bilaterally clear to auscultation.  Abdomen: Soft, no masses, no hernias.  MS: No clubbing or cyanosis of nails noted; unable to assess gait.  Skin: No rashes, lesions, or ulcers; no nodules.   Psychiatric: Good judgement and insight; normal mood and affect.  Neurological: Cranial nerves are grossly intact. Normal sensation in the  bilateral lower extremities.  Foot: Nails in good condition, no amputations noted.         Labs Reviewed and Include   Recent Labs   Lab 08/23/22  0422   *   CALCIUM 9.3      K 2.9*   CO2 24      BUN 8   CREATININE 0.9     Lab Results   Component Value Date    WBC 7.68 08/23/2022    HGB 11.7 (L) 08/23/2022    HCT 32.6 (L) 08/23/2022    MCV 82 08/23/2022     08/23/2022     Recent Labs   Lab 08/21/22  2101   TSH 1.371     Lab Results   Component Value Date    HGBA1C 8.3 (H) 08/22/2022       Nutritional status:   Body mass index is 35.69 kg/m².  Lab Results   Component Value Date    ALBUMIN 3.4 (L) 08/21/2022    ALBUMIN 3.2 (L) 07/19/2022    ALBUMIN 3.4 (L) 07/18/2022     No results found for: PREALBUMIN    Estimated Creatinine Clearance: 80.8 mL/min (based on SCr of 0.9 mg/dL).    Accu-Checks  Recent Labs     08/21/22  2134 08/22/22  0433 08/22/22  1003 08/22/22  1227 08/22/22  1436 08/22/22  1738 08/22/22  2100 08/23/22  0803 08/23/22  1251   POCTGLUCOSE 422* 392* 299* 274* 347* 305* 353* >500* 471*        ASSESSMENT and PLAN    * Post op infection  Managed per primary team  Avoid hypoglycemia        New onset type 2 diabetes mellitus  BG goal 140 - 180     - Start Levemir 20 untis daily First dose this Am. 0.5 u/kg/d dosing.   - Start Novolog 7 units TIDWM. Basal/Prandial physiologic matching.    - Moderate Dose SQ Insulin Correction Scale.  - BG Monitoring AC/HS     ** Please call Endocrine for any BG related issues **  ** Please notify Endocrine for any change and/or advance in diet**    Lab Results   Component Value Date    HGBA1C 8.3 (H) 08/22/2022       Discharge Planning:   TBD. Please notify endocrinology prior to discharge.           Mixed hyperlipidemia  Managed per primary team  Condition may cause insulin resistance         Essential hypertension  Managed per primary team  Condition may cause insulin resistance                  Julio Lewis, NP  Endocrinology  [facility] -  Neurosurgery ([facility])

## 2022-08-23 NOTE — PROGRESS NOTES
[facility] - Neurosurgery ([facility])  Neurosurgery  Progress Note    Subjective:     History of Present Illness: 48 yo F with PMH of HTN, HLD, smoking and recent L Pcomm and L anterior choroidal artery aneuryms craniotomy for clipping on 7/15/22 with Dr. Diaz who presents with sudden onset of symptoms concerning for focal seizure. Patient was in her yard today playing with her son when her R arm became weak suddenly followed by the inability to speak and RLE weakness. This went on for a couple minutes and self resolved; patient said she had some lasting heaviness on her R side after. She came to the ED immediately.    Here in the ED she had another episode that resolved on its own. NSGY consulted.       Post-Op Info:  Procedure(s) (LRB):  LEFT Cranial wound debridement and washout (Left)         Interval History: NAEON. AFVSS. Pt reports mild HA and tenderness over L scalp swelling, o/w no complaints. Denies any further seizure-like episodes since admission. Neuro stable. Plan for OR tomorrow for cranial wound washout. ID consulted, on Vanc/Cefepime. SubQ cranial fluid collection tapped yesterday, Cx's pending/NGTD. Endocrinology consulted for hyperglycemia/DM.     Medications:  Continuous Infusions:  Scheduled Meds:   amitriptyline  75 mg Oral QHS    amLODIPine  10 mg Oral Daily    atorvastatin  40 mg Oral Daily    carvediloL  37.5 mg Oral BID    ceFEPime (MAXIPIME) IVPB  2 g Intravenous Q8H    EScitalopram oxalate  10 mg Oral Daily    insulin aspart U-100  7 Units Subcutaneous TIDWM    insulin detemir U-100  20 Units Subcutaneous Daily    levetiracetam IV  1,000 mg Intravenous Q12H    [START ON 8/24/2022] magnesium oxide  400 mg Oral BID    magnesium sulfate IVPB  2 g Intravenous Once    [START ON 8/24/2022] potassium chloride SA  20 mEq Oral BID    potassium chloride  40 mEq Oral Once    vancomycin (VANCOCIN) IVPB  1,000 mg Intravenous Q12H     PRN Meds:acetaminophen, albuterol, dextrose 10%,  dextrose 10%, glucagon (human recombinant), glucose, glucose, insulin aspart U-100, melatonin, Pharmacy to dose Vancomycin consult **AND** vancomycin - pharmacy to dose     Review of Systems   Constitutional:  Negative for chills and fever.   Eyes:  Negative for photophobia and visual disturbance.   Respiratory:  Negative for shortness of breath.    Gastrointestinal:  Negative for nausea and vomiting.   Neurological:  Positive for headaches. Negative for speech difficulty, weakness and numbness.   Psychiatric/Behavioral:  Negative for agitation and confusion.    Objective:     Weight: 88.5 kg (195 lb 1.8 oz)  Body mass index is 35.69 kg/m².  Vital Signs (Most Recent):  Temp: 98.5 °F (36.9 °C) (08/23/22 1148)  Pulse: 81 (08/23/22 1148)  Resp: 17 (08/23/22 1148)  BP: 125/67 (08/23/22 1148)  SpO2: 98 % (08/23/22 1148) Vital Signs (24h Range):  Temp:  [98 °F (36.7 °C)-98.9 °F (37.2 °C)] 98.5 °F (36.9 °C)  Pulse:  [77-85] 81  Resp:  [17-18] 17  SpO2:  [94 %-100 %] 98 %  BP: (125-155)/(64-88) 125/67     Date 08/23/22 0700 - 08/24/22 0659   Shift 0504-5610 5853-4283 6604-7569 24 Hour Total   INTAKE   Shift Total(mL/kg)       OUTPUT   Urine(mL/kg/hr) 2000 2000   Shift Total(mL/kg) 2000(22.6)   2000(22.6)   Weight (kg) 88.5 88.5 88.5 88.5                   Female External Urinary Catheter 08/21/22 2221 (Active)   Skin no redness;no breakdown 08/23/22 0800   Tolerance no signs/symptoms of discomfort 08/23/22 0800   Suction Continuous suction at 60 mmHg 08/23/22 0800   Date of last wick change 08/23/22 08/23/22 0800   Time of last wick change 0800 08/23/22 0800   Output (mL) 1000 mL 08/23/22 0800       Physical Exam    Neurosurgery Physical Exam    General: well developed, well nourished, no distress.   Head: normocephalic  Neck: No tracheal deviation. No palpable masses. Full ROM.   GCS: E4 V5 M6; Total: 15  Mental Status: Awake, Alert, Oriented x 4  Language: No aphasia  Speech: No dysarthria  Cranial nerves: face  symmetric, tongue midline, CN II-XII grossly intact.   Eyes: pupils equal, round, reactive to light with accomodation, EOMI.   Ears: No drainage.   Pulmonary: normal respirations, no signs of respiratory distress  Abdomen: soft, non-distended, not tender to palpation  Skin: Skin is warm, dry and intact.    Sensory: intact to light touch throughout  Motor Strength: Moves all extremities spontaneously with good tone.  Full strength upper and lower extremities. No abnormal movements seen.     Finger-to-nose: intact bilaterally   Pronator drift: absent bilaterally    Left Cranial Incision: Inferior aspect of incision with uneven edges, delayed healing, no leakage. Anterior left scalp swelling, no erythema, but tender to palpation.       Significant Labs:  Recent Labs   Lab 08/21/22 2101 08/22/22  0600 08/23/22  0422 08/23/22  0844   * 337* 335*  --    * 134* 138  --    K 3.1* 2.8* 2.9*  --    CL 97 99 104  --    CO2 23 23 24  --    BUN 16 9 8  --    CREATININE 1.2 0.9 0.9  --    CALCIUM 9.7 9.2 9.3  --    MG  --   --   --  1.5*     Recent Labs   Lab 08/21/22 2101 08/22/22  0600 08/23/22  0422   WBC 8.72 7.07 7.68   HGB 12.4 11.9* 11.7*   HCT 35.5* 33.4* 32.6*    348 322     Recent Labs   Lab 08/21/22 2101 08/22/22  1420   INR 1.0  --    APTT  --  24.0     Microbiology Results (last 7 days)       Procedure Component Value Units Date/Time    AFB Culture & Smear [[id number]] Collected: 08/22/22 1550    Order Status: Completed Specimen: Incision site from Head Updated: 08/23/22 1439     AFB CULTURE STAIN No acid fast bacilli seen.    Fungus culture [[id number]] Collected: 08/22/22 1550    Order Status: No result Specimen: Incision site from Head Updated: 08/23/22 0930    Culture, Anaerobic [[id number]] Collected: 08/22/22 1550    Order Status: No result Specimen: Incision site from Head Updated: 08/23/22 0928    Aerobic culture [[id number]] Collected: 08/22/22 1550    Order Status: No result Specimen:  Incision site from Head Updated: 08/23/22 0927    Aerobic culture [[id number]]     Order Status: Completed Specimen: Incision site from Head     Culture, Anaerobe [[id number]]     Order Status: Completed Specimen: Body Fluid from Head     Fungus culture [[id number]]     Order Status: Completed Specimen: Body Fluid from Head     Blood culture [[id number]] Collected: 08/21/22 2325    Order Status: Completed Specimen: Blood from Peripheral, Antecubital, Right Updated: 08/23/22 0613     Blood Culture, Routine No Growth to date      No Growth to date    Fungus culture [[id number]]     Order Status: Canceled Specimen: Body Fluid from Head     Culture, Anaerobe [[id number]]     Order Status: Canceled Specimen: Body Fluid from Head     Aerobic culture [[id number]]     Order Status: Canceled Specimen: Incision site from Head     AFB Culture & Smear [[id number]]     Order Status: Canceled Specimen: Body Fluid from Head           All pertinent labs from the last 24 hours have been reviewed.    Significant Diagnostics:  I have reviewed and interpreted all pertinent imaging results/findings within the past 24 hours.    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 Diaz who presented after episode of sudden onset R-sided weakness/tremors and aphasia for about 5 minutes, resolved prior to EMS arrival, concerning for focal seizure, then with second episode while in the ED. MRI brain w/wo concerning for scalp and epidural infection.    - Admitted to NSGY   - Neuro checks q4h  - all labs and diagnostics reviewed   - CTA 8/21: largely stable from prior with epidural fluid collection and extracranial fluid collection stable in size; stable postsurgical changes of L PCOM/anterior choroidal aneurysm clipping, stable small left MCA bifurcation aneurysm   - MRI brain w wo 8/21: peripheral rim enhancement  of subdural and scalp complex fluid collections, concerning for infection; no evidence of enhancement of brain parenchyma  - Infectious workup:   - afebrile, no leukocytosis   - blood cx 8/21 NGTD   - ESR 30-->46, CRP 28.5-->26   - Subcutaneous scalp fluid collection tapped 8/22, sent for Cx's - NGTD   - ID consulted, recommend Vanc/Cefepime, continue to follow Cx's  - Plan for OR 8/24 for cranial wound washout   - NPO at midnight   - Seizures: Episodes concerning for focal seizures with R-sided tremors and weakness. Not on AEDs prior to admission.   - Keppra loaded on admission, continue 1g bid   - EEG pending  - New Onset T2DM: No h/o DM. Hyperglycemic on admission. HbA1C 8.3. Endocrinology consulted. Goal -180.   - Start Levemir 20 untis daily First dose this Am. 0.5 u/kg/d dosing.    - Start Novolog 7 units TIDWM. Basal/Prandial physiologic matching.     - Moderate Dose SQ Insulin Correction Scale.   - BG Monitoring AC/HS   - HTN: SBP <160. Continue home meds.  - Hypokalemia, Hypomagnesemia: Chronic, on home supplements daily.   - Resume home potassium and Mg supplements   - Replete PRN, daily labs    Dispo: ongoing        Marlene Robison PA-C  Neurosurgery  [facility] - Neurosurgery ([facility])

## 2022-08-23 NOTE — PLAN OF CARE
Problem: Adult Inpatient Plan of Care  Goal: Plan of Care Review  Outcome: Ongoing, Progressing  Goal: Absence of Hospital-Acquired Illness or Injury  Outcome: Ongoing, Progressing  Intervention: Prevent Infection  Flowsheets (Taken 8/23/2022 0355)  Infection Prevention: hand hygiene promoted  Goal: Optimal Comfort and Wellbeing  Outcome: Ongoing, Progressing  Intervention: Monitor Pain and Promote Comfort  Flowsheets (Taken 8/23/2022 0355)  Pain Management Interventions: awakened for pain meds per patient request  Intervention: Provide Person-Centered Care  Flowsheets (Taken 8/23/2022 0355)  Trust Relationship/Rapport:   care explained   empathic listening provided   questions answered   questions encouraged   thoughts/feelings acknowledged

## 2022-08-23 NOTE — ANESTHESIA PREPROCEDURE EVALUATION
"[facility]  Anesthesia Pre-Operative Evaluation         Patient Name: Gina Jenkins  YOB: 1975  MRN: [id number]    SUBJECTIVE:     Pre-operative evaluation for Procedure(s) (LRB):  LEFT Cranial wound debridement and washout (Left)     08/23/2022    Gina Jenkins is a 47 y.o. female w/ a significant PMHx of HTN/HLD, current smoker, h/o thrombotic CVA with residual effects. Presented for concern of focal seizure and imaging positive for findings concerning for " infection of epidural and scalp hematoma vs fluid collection."     Patient now presents for the above procedure(s).    TTE 7/22  Summary    · The left ventricle is normal in size with normal systolic function. The estimated ejection fraction is 65%.  · Normal left ventricular diastolic function.  · Normal right ventricular size with normal right ventricular systolic function.  · Mild left atrial enlargement.  · Mild mitral regurgitation.  · Mild tricuspid regurgitation.  · The estimated PA systolic pressure is 30 mmHg.  · Normal central venous pressure (3 mmHg).      LDA:        Peripheral IV - Single Lumen 08/22/22 0459 22 G Left Hand (Active)   Site Assessment Clean;Dry;Intact 08/22/22 2000   Extremity Assessment Distal to IV No abnormal discoloration 08/22/22 2000   Line Status Infusing 08/22/22 2000   Dressing Status Clean;Dry;Intact 08/22/22 2000   Dressing Intervention Integrity maintained 08/22/22 2000   Number of days: 1            Peripheral IV - Single Lumen 08/22/22 1654 20 G;1 3/4 in Anterior;Right Forearm (Active)   Site Assessment Clean;Dry;Intact 08/22/22 2000   Extremity Assessment Distal to IV No abnormal discoloration 08/22/22 2000   Line Status Saline locked 08/22/22 2000   Dressing Status Clean;Dry;Intact 08/22/22 2000   Dressing Intervention Integrity maintained 08/22/22 2000   Dressing Change Due 08/26/22 08/22/22 1700   Site Change Due 08/26/22 08/22/22 1700   Reason Not Rotated Not due 08/22/22 1700 "   Number of days: 0       Female External Urinary Catheter 08/21/22 2221 (Active)   Skin no redness;no breakdown 08/22/22 2000   Tolerance no signs/symptoms of discomfort 08/22/22 2000   Suction Continuous suction at 70 mmHg 08/22/22 2000   Date of last wick change 08/22/22 08/22/22 2000   Time of last wick change 0800 08/22/22 0800   Output (mL) 375 mL 08/22/22 0800   Number of days: 1       Prev airway:   Intubation     Date/Time: 7/15/2022 8:12 AM  Performed by: Ned Reid MD  Authorized by: Lionel Law MD      Intubation:     Induction:  Intravenous    Intubated:  Postinduction    Mask Ventilation:  Easy mask    Attempts:  1    Attempted By:  Resident anesthesiologist    Method of Intubation:  Video laryngoscopy    Blade:  Brody 3    Laryngeal View Grade: Grade I - full view of cords      Difficult Airway Encountered?: No      Complications:  None    Airway Device:  Oral endotracheal tube    Airway Device Size:  7.0    Style/Cuff Inflation:  Cuffed (inflated to minimal occlusive pressure)    Inflation Amount (mL):  7    Tube secured:  21    Secured at:  The lips    Placement Verified By:  Capnometry    Complicating Factors:  Obesity, short neck and small mouth    Findings Post-Intubation:  BS equal bilateral and atraumatic/condition of teeth unchanged    Drips: None documented.      Patient Active Problem List   Diagnosis    Calculus of gallbladder without cholecystitis without obstruction    Essential hypertension    Thyroid nodule    Chronic diastolic heart failure    Acute ischemic left MCA stroke    Thrombotic stroke involving left middle cerebral artery    Aneurysm of left middle cerebral artery    Tobacco abuse    Gait abnormality    Decreased  strength of right hand    Weakness of right upper extremity    Lack of coordination due to stroke    Decreased range of motion of right shoulder    Dysarthria    Cognitive communication deficit    Cerebrovascular accident (CVA)    H/O:  CVA (cerebrovascular accident)    Mixed hyperlipidemia    Other emphysema    Post op infection    Transient neurological symptoms       Review of patient's allergies indicates:   Allergen Reactions    Lisinopril Swelling    Bactrim [sulfamethoxazole-trimethoprim] Rash       Current Inpatient Medications:   amitriptyline  75 mg Oral QHS    amLODIPine  10 mg Oral Daily    atorvastatin  40 mg Oral Daily    carvediloL  37.5 mg Oral BID    ceFEPime (MAXIPIME) IVPB  2 g Intravenous Q8H    EScitalopram oxalate  10 mg Oral Daily    insulin aspart U-100  6 Units Subcutaneous TIDWM    levetiracetam IV  1,000 mg Intravenous Q12H    potassium chloride  10 mEq Intravenous Q1H    vancomycin (VANCOCIN) IVPB  1,000 mg Intravenous Q12H       No current facility-administered medications on file prior to encounter.     Current Outpatient Medications on File Prior to Encounter   Medication Sig Dispense Refill    amitriptyline (ELAVIL) 75 MG tablet Take 75 mg by mouth every evening.      amlodipine (NORVASC) 10 MG tablet Take 1 tablet (10 mg total) by mouth once daily. 30 tablet 0    atorvastatin (LIPITOR) 40 MG tablet Take 1 tablet (40 mg total) by mouth once daily. 90 tablet 3    butalbital-acetaminophen-caffeine -40 mg (FIORICET, ESGIC) -40 mg per tablet Take 1 tablet by mouth every 4 (four) hours as needed.      carvediloL (COREG) 25 MG tablet Take 37.5 mg by mouth 2 (two) times daily.      cephALEXin (KEFLEX) 500 MG capsule Take 1 capsule (500 mg total) by mouth every 6 (six) hours. for 14 days 56 capsule 0    cyanocobalamin (VITAMIN B-12) 1000 MCG tablet Take 1 tablet (1,000 mcg total) by mouth once daily. (Patient not taking: No sig reported)      dexAMETHasone (DECADRON) 2 MG tablet Take 2 tablets (4 mg) by mouth every 8 hours for 1 day, THEN 2 tablets (4 mg) every 12 hours for 2 days, THEN 1 tablet (2 mg) every 12 hours for 2 days, THEN 1 tablet (2 mg) daily for 2 days, then STOP. 22 tablet 0     diphenhydrAMINE (BENADRYL) 25 mg capsule Take 1 each (25 mg total) by mouth every 6 (six) hours as needed for Itching or Allergies. (Patient not taking: No sig reported) 20 capsule 0    docusate sodium (COLACE) 100 MG capsule Take 1 capsule (100 mg total) by mouth 2 (two) times daily as needed for Constipation. 30 capsule 0    EScitalopram oxalate (LEXAPRO) 10 MG tablet Take 10 mg by mouth once daily.      famotidine (PEPCID) 20 MG tablet Take 1 tablet (20 mg total) by mouth 2 (two) times daily. (Patient not taking: No sig reported) 60 tablet 11    hydrochlorothiazide (HYDRODIURIL) 25 MG tablet Take 1 tablet (25 mg total) by mouth once daily. 30 tablet 0    HYDROcodone-acetaminophen (NORCO) 5-325 mg per tablet Take 1 tablet by mouth every 6 (six) hours as needed for Pain. 56 tablet 0    levETIRAcetam (KEPPRA) 500 MG Tab Take 1 tablet (500 mg total) by mouth 2 (two) times daily for 5 days (Patient not taking: Reported on 7/29/2022) 10 tablet 0    magnesium oxide (MAG-OX) 400 mg (241.3 mg magnesium) tablet Take 1 tablet by mouth 2 (two) times daily.      potassium chloride SA (K-DUR,KLOR-CON) 20 MEQ tablet Take 20 mEq by mouth 2 (two) times daily.      [DISCONTINUED] aspirin 81 MG Chew Take 1 tablet (81 mg total) by mouth once daily.  0       Past Surgical History:   Procedure Laterality Date    CARDIAC CATHETERIZATION      CEREBRAL ANGIOGRAM      CLIP LIGATION OF INTRACRANIAL ANEURYSM BY CRANIOTOMY N/A 7/15/2022    Procedure: CRANIOTOMY, WITH ANEURYSM CLIPPING;  Surgeon: Timothy Diaz MD;  Location: [facility] OR [address] [place] [address];  Service: Neurosurgery;  Laterality: N/A;  PTERIONAL CRANIOTOMY WITH CLIP LIGATION OF L PCOMM, L ANTERIOR CHOROIDAL, L MCA  ANEURYSM, ANESTHESIA: GENERAL, BLOOD: TYPE&CROSS 2 UNITS, NEUROMONITORING: SEP, MEP, EEG, RADIOLOGY: C-ARM, POSITION: SUPINE, ANNA, CO-SURGERON: DR. LEXI DOMÍNGUEZ.       Social History     Socioeconomic History    Marital status:    Tobacco Use     Smoking status: Current Every Day Smoker     Packs/day: 0.50     Types: Cigarettes    Smokeless tobacco: Never Used   Substance and Sexual Activity    Alcohol use: Yes     Comment: occassionally    Drug use: No    Sexual activity: Not Currently     Partners: Male     Birth control/protection: None       OBJECTIVE:     Vital Signs Range (Last 24H):  Temp:  [36.7 °C (98 °F)-37.2 °C (98.9 °F)]   Pulse:  [72-85]   Resp:  [17-18]   BP: (130-155)/(64-88)   SpO2:  [93 %-100 %]       Significant Labs:  Lab Results   Component Value Date    WBC 7.68 08/23/2022    HGB 11.7 (L) 08/23/2022    HCT 32.6 (L) 08/23/2022     08/23/2022    CHOL 147 04/19/2022    TRIG 273 (H) 04/19/2022    HDL 30 (L) 04/19/2022    ALT 15 08/21/2022    AST 13 08/21/2022     08/23/2022    K 2.9 (L) 08/23/2022     08/23/2022    CREATININE 0.9 08/23/2022    BUN 8 08/23/2022    CO2 24 08/23/2022    TSH 1.371 08/21/2022    INR 1.0 08/21/2022    HGBA1C 8.3 (H) 08/22/2022       Diagnostic Studies: No relevant studies.    EKG:   Results for orders placed or performed during the hospital encounter of 08/21/22   ECG 12 lead    Collection Time: 08/21/22  9:34 PM    Narrative    Test Reason : I63.9,    Vent. Rate : 092 BPM     Atrial Rate : 092 BPM     P-R Int : 170 ms          QRS Dur : 106 ms      QT Int : 376 ms       P-R-T Axes : 058 004 098 degrees     QTc Int : 464 ms    Normal sinus rhythm  Low anterior forces and R wave progression  Abnormal ECG  When compared with ECG of 07-JUN-2022 13:30,  No significant change was found  Confirmed by Nathan GARZA MD ([id number]) on 8/22/2022 9:07:28 AM    Referred By: [email or web address]   SELF           Confirmed By:Nathan GARZA MD       2D ECHO:  TTE:  Results for orders placed or performed during the hospital encounter of 07/15/22   Echo   Result Value Ref Range    Ascending aorta 2.81 cm    STJ 2.36 cm    AV mean gradient 7 mmHg    Ao peak farzaneh 1.95 m/s    Ao VTI 40.02 cm    IVRT 82.78 msec    IVS 0.74 0.6 - 1.1  cm    LA size 4.12 cm    Left Atrium Major Axis 5.51 cm    Left Atrium Minor Axis 5.53 cm    LVIDd 3.84 3.5 - 6.0 cm    LVIDs 2.77 2.1 - 4.0 cm    LVOT diameter 1.96 cm    LVOT peak VTI 27.65 cm    Posterior Wall 0.65 0.6 - 1.1 cm    MV Peak A Jacob 0.79 m/s    E wave deceleration time 120.29 msec    MV Peak E Jacob 1.20 m/s    RA Major Axis 4.62 cm    RA Width 3.22 cm    RVDD 2.84 cm    Sinus 2.63 cm    TAPSE 3.18 cm    TR Max Jacob 2.60 m/s    TDI LATERAL 0.12 m/s    TDI SEPTAL 0.11 m/s    LA WIDTH 4.00 cm    MV stenosis pressure 1/2 time 34.89 ms    LV Diastolic Volume 63.44 mL    LV Systolic Volume 28.75 mL    LVOT peak jacob 1.37 m/s    LA volume (mod) 42.37 cm3    LV LATERAL E/E' RATIO 10.00 m/s    LV SEPTAL E/E' RATIO 10.91 m/s    FS 28 %    LA volume 77.32 cm3    LV mass 72.51 g    Left Ventricle Relative Wall Thickness 0.34 cm    AV valve area 2.08 cm2    AV Velocity Ratio 0.70     AV index (prosthetic) 0.69     MV valve area p 1/2 method 6.31 cm2    E/A ratio 1.52     Mean e' 0.12 m/s    LVOT area 3.0 cm2    LVOT stroke volume 83.38 cm3    AV peak gradient 15 mmHg    E/E' ratio 10.43 m/s    LV Systolic Volume Index 14.9 mL/m2    LV Diastolic Volume Index 32.87 mL/m2    LA Volume Index 40.1 mL/m2    LV Mass Index 38 g/m2    Triscuspid Valve Regurgitation Peak Gradient 27 mmHg    LA Volume Index (Mod) 22.0 mL/m2    BSA 2.02 m2    Right Atrial Pressure (from IVC) 3 mmHg    EF 65 %    TV rest pulmonary artery pressure 30 mmHg    Narrative    · The left ventricle is normal in size with normal systolic function. The   estimated ejection fraction is 65%.  · Normal left ventricular diastolic function.  · Normal right ventricular size with normal right ventricular systolic   function.  · Mild left atrial enlargement.  · Mild mitral regurgitation.  · Mild tricuspid regurgitation.  · The estimated PA systolic pressure is 30 mmHg.  · Normal central venous pressure (3 mmHg).          SANDRITA:  No results found for this or any  previous visit.    ASSESSMENT/PLAN:                                                                                                                  08/23/2022  Gina Jenkins is a 47 y.o., female.      Pre-op Assessment    I have reviewed the Patient Summary Reports.     I have reviewed the Nursing Notes. I have reviewed the NPO Status.   I have reviewed the Medications.     Review of Systems  Anesthesia Hx:  No problems with previous Anesthesia  History of prior surgery of interest to airway management or planning: Denies Family Hx of Anesthesia complications.   Denies Personal Hx of Anesthesia complications.   Social:  Smoker    Hematology/Oncology:  Hematology Normal        EENT/Dental:EENT/Dental Normal   Cardiovascular:   Hypertension CAD  CABG/stent   Denies Angina. CHF hyperlipidemia ECG has been reviewed.    Pulmonary:   Denies COPD.  Denies Asthma.  Denies Shortness of breath.  Denies Sleep Apnea. smoker   Renal/:  Renal/ Normal     Hepatic/GI:   GERD, well controlled    Neurological:   CVA, no residual symptoms Headaches L MCA aneurysm    Endocrine:  Endocrine Normal Denies Diabetes.    Psych:  Psychiatric Normal           Physical Exam  General: Well nourished, Cooperative, Alert and Oriented    Airway:  Mallampati: II / I  Mouth Opening: Normal  TM Distance: Normal  Tongue: Normal  Neck ROM: Normal ROM    Dental:  Periodontal disease        Anesthesia Plan  Type of Anesthesia, risks & benefits discussed:    Anesthesia Type: Gen ETT  Intra-op Monitoring Plan: Standard ASA Monitors  Post Op Pain Control Plan: multimodal analgesia  Induction:  IV  Airway Plan: Direct, Post-Induction  Informed Consent: Informed consent signed with the Patient and all parties understand the risks and agree with anesthesia plan.  All questions answered.   ASA Score: 3  Day of Surgery Review of History & Physical: H&P Update referred to the surgeon/provider.    Ready For Surgery From Anesthesia Perspective.     .

## 2022-08-23 NOTE — HOSPITAL COURSE
8/23: NAEON. AFVSS. Pt reports mild HA and tenderness over L scalp swelling, o/w no complaints. Denies any further seizure-like episodes since admission. Neuro stable. Plan for OR tomorrow for cranial wound washout. ID consulted, on Vanc/Cefepime. SubQ cranial fluid collection tapped yesterday, Cx's pending/NGTD. Endocrinology consulted for hyperglycemia/DM.   8/24: NAEON. Pt with ongoing mild HA, no other complaints. Cranial fluid and blood Cx's NGTD. EEG yesterday negative for seizures. Endocrine following, started on insulin gtt yesterday for persistent hyperglycemia, improved today to 200's. Plan for OR today for washout pending OR availability. NPO since midnight.  8/25: NAEON. Washout rescheduled to tomorrow due to OR availability.  Persistent headache. Glucose improving. NPO at midnight. Denies fevers or chills.   8/26: NAEON. AFVSS. Pt neurologically stable. Reports tenderness to L side of scalp, denies any severe HA or recurrence of tremors/seizure-like activity. Denies fever/chill, N/V. Pending OR today for wound washout and evacuation of fluid collection.  8/28: POD 1 s/p L crani revision for washout. NAEON. AFVSS. Exam stable. Pain controlled. Tolerating PO.   8/29: neuro stable, taken for CTH later this morning for concern for word finding difficulties, CTH showed worsening epidural and subcutaneous fluid collection from prior. HV drain with no output  8/30: NAEON. AFVSS. Aspirated ~60cc fluid from drain yesterday, working properly afterwards. CTH this AM showed significantly improved extracranial collection with epidural collection still present. Will leave drain in one more day  8/31: NAEON. AFVSS. Exam stable. Pain controlled. Pending PICC.   9/1: resolution of aphasia overnight, neurointact this AM  9/2: NAEON. AFVSS. Stepped down to floor. Pt reports mild pain around incision, o/w no complaints. Denies any episodes of aphasia or seizure-like activity. OR Cx's remain no growth. PICC placed for IV  Ghassan. PT/OT recs for rehab, however pt would like to go home with HH and assistance from family.

## 2022-08-23 NOTE — ASSESSMENT & PLAN NOTE
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 Diaz who presented after episode of sudden onset R-sided weakness/tremors and aphasia for about 5 minutes, resolved prior to EMS arrival, concerning for focal seizure, then with second episode while in the ED. MRI brain w/wo concerning for scalp and epidural infection.    - Admitted to NSGY   - Neuro checks q4h  - all labs and diagnostics reviewed   - CTA 8/21: largely stable from prior with epidural fluid collection and extracranial fluid collection stable in size; stable postsurgical changes of L PCOM/anterior choroidal aneurysm clipping, stable small left MCA bifurcation aneurysm   - MRI brain w wo 8/21: peripheral rim enhancement of subdural and scalp complex fluid collections, concerning for infection; no evidence of enhancement of brain parenchyma  - Infectious workup:   - afebrile, no leukocytosis   - blood cx 8/21 NGTD   - ESR 30-->46, CRP 28.5-->26   - Subcutaneous scalp fluid collection tapped 8/22, sent for Cx's - NGTD   - ID consulted, recommend Vanc/Cefepime, continue to follow Cx's  - Plan for OR 8/24 for cranial wound washout   - NPO at midnight   - Seizures: Episodes concerning for focal seizures with R-sided tremors and weakness. Not on AEDs prior to admission.   - Keppra loaded on admission, continue 1g bid   - EEG pending  - New Onset T2DM: No h/o DM. Hyperglycemic on admission. HbA1C 8.3. Endocrinology consulted. Goal -180.   - Start Levemir 20 untis daily First dose this Am. 0.5 u/kg/d dosing.    - Start Novolog 7 units TIDWM. Basal/Prandial physiologic matching.     - Moderate Dose SQ Insulin Correction Scale.   - BG Monitoring AC/HS   - HTN: SBP <160. Continue home meds.  - Hypokalemia, Hypomagnesemia: Chronic, on home supplements daily.   - Resume home potassium and Mg supplements   - Replete PRN, daily labs    Dispo: ongoing

## 2022-08-23 NOTE — ASSESSMENT & PLAN NOTE
BG goal 140 - 180     - Start Levemir 20 untis daily First dose this Am. 0.5 u/kg/d dosing.   - Start Novolog 7 units TIDWM. Basal/Prandial physiologic matching.    - Moderate Dose SQ Insulin Correction Scale.  - BG Monitoring AC/HS     ** Please call Endocrine for any BG related issues **  ** Please notify Endocrine for any change and/or advance in diet**    Lab Results   Component Value Date    HGBA1C 8.3 (H) 08/22/2022       Discharge Planning:   TBD. Please notify endocrinology prior to discharge.

## 2022-08-23 NOTE — HPI
Reason for Consult: Management of T2DM (NEW ONSET), Hyperglycemia     Surgical Procedure and Date: L Pcomm and L anterior choroidal artery aneuryms craniotomy for clipping on 7/15/22 with Dr. Diaz    Diabetes diagnosis year: 2022    Home Diabetes Medications:  No current home medications.     How often checking glucose at home?  Not previously monitoring blood sugar.    BG readings on regimen: unknown  Hypoglycemia on the regimen?  No  Missed doses on regimen?  No    Diabetes Complications include:     Hyperglycemia    Complicating diabetes co morbidities:   CHF and History of CVA, HTN, HLD      HPI:   Patient is a 47 y.o. 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 Diaz who presents with sudden onset of symptoms concerning for focal seizure. Patient was in her yard today playing with her son when her R arm became weak suddenly followed by the inability to speak and RLE weakness. This went on for a couple minutes and self resolved; patient said she had some lasting heaviness on her R side after. Endocrinology consulted on 08/23/2022 to manage glycemic control.     Lab Results   Component Value Date    HGBA1C 8.3 (H) 08/22/2022

## 2022-08-23 NOTE — NURSING
OOD=046 see MAR administered insulin per SS order.       POC reviewed with pt and pt verbalized understanding. Questions/Concerns addressed. A&Ox3. Calm/cooperative. NADN. Respirations equal and unlabored. Bed in low position, side rails up x3.  Safety/Fall precautions in place per facility protocol for safety. Instructed pt to press call bell for assistance if needed pt verbalized understanding. VS stable. Neuro assessment completed see assessment. Will continue to monitor closely throughout this [id number] nightshift Safety maintained. Call bell in reach. Bed alarm activated for safety.Took night medications swallows without difficulty. Continent of B/B has on perwick to suction per facility protocol no complications. Ambulates w/assistance. Telemetry monitor intact for monitoring. Family visited this evening.

## 2022-08-23 NOTE — SUBJECTIVE & OBJECTIVE
Interval History: NAEON. AFVSS. Pt reports mild HA and tenderness over L scalp swelling, o/w no complaints. Denies any further seizure-like episodes since admission. Neuro stable. Plan for OR tomorrow for cranial wound washout. ID consulted, on Vanc/Cefepime. SubQ cranial fluid collection tapped yesterday, Cx's pending/NGTD. Endocrinology consulted for hyperglycemia/DM.     Medications:  Continuous Infusions:  Scheduled Meds:   amitriptyline  75 mg Oral QHS    amLODIPine  10 mg Oral Daily    atorvastatin  40 mg Oral Daily    carvediloL  37.5 mg Oral BID    ceFEPime (MAXIPIME) IVPB  2 g Intravenous Q8H    EScitalopram oxalate  10 mg Oral Daily    insulin aspart U-100  7 Units Subcutaneous TIDWM    insulin detemir U-100  20 Units Subcutaneous Daily    levetiracetam IV  1,000 mg Intravenous Q12H    [START ON 8/24/2022] magnesium oxide  400 mg Oral BID    magnesium sulfate IVPB  2 g Intravenous Once    [START ON 8/24/2022] potassium chloride SA  20 mEq Oral BID    potassium chloride  40 mEq Oral Once    vancomycin (VANCOCIN) IVPB  1,000 mg Intravenous Q12H     PRN Meds:acetaminophen, albuterol, dextrose 10%, dextrose 10%, glucagon (human recombinant), glucose, glucose, insulin aspart U-100, melatonin, Pharmacy to dose Vancomycin consult **AND** vancomycin - pharmacy to dose     Review of Systems   Constitutional:  Negative for chills and fever.   Eyes:  Negative for photophobia and visual disturbance.   Respiratory:  Negative for shortness of breath.    Gastrointestinal:  Negative for nausea and vomiting.   Neurological:  Positive for headaches. Negative for speech difficulty, weakness and numbness.   Psychiatric/Behavioral:  Negative for agitation and confusion.    Objective:     Weight: 88.5 kg (195 lb 1.8 oz)  Body mass index is 35.69 kg/m².  Vital Signs (Most Recent):  Temp: 98.5 °F (36.9 °C) (08/23/22 1148)  Pulse: 81 (08/23/22 1148)  Resp: 17 (08/23/22 1148)  BP: 125/67 (08/23/22 1148)  SpO2: 98 % (08/23/22 1148)  Vital Signs (24h Range):  Temp:  [98 °F (36.7 °C)-98.9 °F (37.2 °C)] 98.5 °F (36.9 °C)  Pulse:  [77-85] 81  Resp:  [17-18] 17  SpO2:  [94 %-100 %] 98 %  BP: (125-155)/(64-88) 125/67     Date 08/23/22 0700 - 08/24/22 0659   Shift 9161-9632 7635-6862 7094-0137 24 Hour Total   INTAKE   Shift Total(mL/kg)       OUTPUT   Urine(mL/kg/hr) 2000 2000   Shift Total(mL/kg) 2000(22.6)   2000(22.6)   Weight (kg) 88.5 88.5 88.5 88.5                   Female External Urinary Catheter 08/21/22 2221 (Active)   Skin no redness;no breakdown 08/23/22 0800   Tolerance no signs/symptoms of discomfort 08/23/22 0800   Suction Continuous suction at 60 mmHg 08/23/22 0800   Date of last wick change 08/23/22 08/23/22 0800   Time of last wick change 0800 08/23/22 0800   Output (mL) 1000 mL 08/23/22 0800       Physical Exam    Neurosurgery Physical Exam    General: well developed, well nourished, no distress.   Head: normocephalic  Neck: No tracheal deviation. No palpable masses. Full ROM.   GCS: E4 V5 M6; Total: 15  Mental Status: Awake, Alert, Oriented x 4  Language: No aphasia  Speech: No dysarthria  Cranial nerves: face symmetric, tongue midline, CN II-XII grossly intact.   Eyes: pupils equal, round, reactive to light with accomodation, EOMI.   Ears: No drainage.   Pulmonary: normal respirations, no signs of respiratory distress  Abdomen: soft, non-distended, not tender to palpation  Skin: Skin is warm, dry and intact.    Sensory: intact to light touch throughout  Motor Strength: Moves all extremities spontaneously with good tone.  Full strength upper and lower extremities. No abnormal movements seen.     Finger-to-nose: intact bilaterally   Pronator drift: absent bilaterally    Left Cranial Incision: Inferior aspect of incision with uneven edges, delayed healing, no leakage. Anterior left scalp swelling, no erythema, but tender to palpation.       Significant Labs:  Recent Labs   Lab 08/21/22 2101 08/22/22  0600 08/23/22  0422  08/23/22  0844   * 337* 335*  --    * 134* 138  --    K 3.1* 2.8* 2.9*  --    CL 97 99 104  --    CO2 23 23 24  --    BUN 16 9 8  --    CREATININE 1.2 0.9 0.9  --    CALCIUM 9.7 9.2 9.3  --    MG  --   --   --  1.5*     Recent Labs   Lab 08/21/22  2101 08/22/22  0600 08/23/22  0422   WBC 8.72 7.07 7.68   HGB 12.4 11.9* 11.7*   HCT 35.5* 33.4* 32.6*    348 322     Recent Labs   Lab 08/21/22  2101 08/22/22  1420   INR 1.0  --    APTT  --  24.0     Microbiology Results (last 7 days)       Procedure Component Value Units Date/Time    AFB Culture & Smear [[id number]] Collected: 08/22/22 1550    Order Status: Completed Specimen: Incision site from Head Updated: 08/23/22 1439     AFB CULTURE STAIN No acid fast bacilli seen.    Fungus culture [[id number]] Collected: 08/22/22 1550    Order Status: No result Specimen: Incision site from Head Updated: 08/23/22 0930    Culture, Anaerobic [[id number]] Collected: 08/22/22 1550    Order Status: No result Specimen: Incision site from Head Updated: 08/23/22 0928    Aerobic culture [[id number]] Collected: 08/22/22 1550    Order Status: No result Specimen: Incision site from Head Updated: 08/23/22 0927    Aerobic culture [[id number]]     Order Status: Completed Specimen: Incision site from Head     Culture, Anaerobe [[id number]]     Order Status: Completed Specimen: Body Fluid from Head     Fungus culture [[id number]]     Order Status: Completed Specimen: Body Fluid from Head     Blood culture [[id number]] Collected: 08/21/22 2325    Order Status: Completed Specimen: Blood from Peripheral, Antecubital, Right Updated: 08/23/22 0613     Blood Culture, Routine No Growth to date      No Growth to date    Fungus culture [[id number]]     Order Status: Canceled Specimen: Body Fluid from Head     Culture, Anaerobe [[id number]]     Order Status: Canceled Specimen: Body Fluid from Head     Aerobic culture [[id number]]     Order Status: Canceled Specimen: Incision site from Head     AFB  Culture & Smear [[id number]]     Order Status: Canceled Specimen: Body Fluid from Head           All pertinent labs from the last 24 hours have been reviewed.    Significant Diagnostics:  I have reviewed and interpreted all pertinent imaging results/findings within the past 24 hours.

## 2022-08-23 NOTE — CARE UPDATE
BG goal 140 - 180     BG remains > 500 mg/dl despite starting MDI SQ insulin. Morning potassium was 2.9. patient received electrolyte replacement therapy today.     - order BMP to re-evaluate K level.   - 10 units SQ insulin bolus once.   - IF new K is greater than 3.3 then start IV Insulin infusion protocol with q 1 hr monitoring.    - Diet to NPO.     - Case discussed with Dr. Hearn.   - Case discussed with primary team.

## 2022-08-23 NOTE — PROGRESS NOTES
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 Diaz who presented with sudden onset of symptoms concerning for focal seizure. See HPI for more detail.      Afebrile. Remains without leukocytosis. sed rate 46 and CRP 26 from 8/21. Blood cultures remain NGTD. MRI brain w wo c/f enhancement of the sub dural and scalp complex fluid collections, potentially representing granulation tissue or infection hematoma. MRI wo c/f stable subdural and subq fluid collections surrounding cranitomy defect on the left, potentially appearing somewhat loculated. CTA head with no large vessel occulsion, stenosis, or vascular malformation.      Subcutaneous scalp fluid collection tapped 8/22, sent for cultures, NGTD.      Per chart review, NSGY plans to go to OR tomorrow for washout.      ID was consulted for mgmt recommendations. Pt is currently on vancomycin and cefepime.     Recommendations:   - Continue Vancomycin IV, inpatient pharmacy to manage dosing. Trough goal 15-20.   - Continue Cefepime 2g IV q8hr in the setting of recent operation.   - When taken to OR please send specimen for path, gram stain, aerobic, anaerobic, AFB and fungal cultures.   - Will monitor pt culture data and tailor abx as needed.   - Plan reviewed with ID staff, Dr. Pineda. ID will follow.               Thank you for your consult. I will follow-up with patient. Please contact us if you have any additional questions.    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 Diaz who presented with sudden onset of symptoms concerning for focal seizure. Pt states she was in her yard with her son on Sunday sharing a snack when she suddenly became aphasic and had RLE weakness. Pt states occurred for about 5 mins and then self resolved. Pt reported to the ED immediately and reports the episode occurred for a 2nd time in the ER, again self resolving. Pt denies drainage, pain, or swelling from her craniotomy incision site. Denies current weakness, paralysis. Denies change of vision, headaches. Denies loss of control from bowels, urine. Denies fevers, body aches, chills. Denies concerns/complications postoperatively until on Sunday when the symptoms started.     Since admission pt remains without leukocytosis. Pt with slightly increased inflammatory markers, sed rate 46 and CRP 26. Blood cultures NGTD. MRI brain w wo c/f enhancement of the sub dural and scalp complex fluid collections, potentially representing granulation tissue or infection hematoma. MRI wo c/f stable subdural and subq fluid collections surrounding cranitomy defect on the left, potentially appearing somewhat loculated. CTA head with no large vessel occulsion, stenosis, or vascular malformation.     ID was consulted for mgmt recommendations. Pt is currently on vancomycin and piperacillin tazobactam.       Past Medical History:   Diagnosis Date    Brain aneurysm     CHF (congestive heart failure)     H/O coronary angioplasty     Hypercholesteremia     Hypertension     Malignant hypertension     Migraine headache     Stroke 10/2017       Past Surgical History:   Procedure Laterality Date    CARDIAC CATHETERIZATION      CEREBRAL ANGIOGRAM      CLIP LIGATION  OF INTRACRANIAL ANEURYSM BY CRANIOTOMY N/A 7/15/2022    Procedure: CRANIOTOMY, WITH ANEURYSM CLIPPING;  Surgeon: Timothy Diaz MD;  Location: [facility] OR [address] [place] [address];  Service: Neurosurgery;  Laterality: N/A;  PTERIONAL CRANIOTOMY WITH CLIP LIGATION OF L PCOMM, L ANTERIOR CHOROIDAL, L MCA  ANEURYSM, ANESTHESIA: GENERAL, BLOOD: TYPE&CROSS 2 UNITS, NEUROMONITORING: SEP, MEP, EEG, RADIOLOGY: C-ARM, POSITION: SUPINE, ANNA CO-SURGERON: DR. LEXI DOMÍNGUEZ.       Review of patient's allergies indicates:   Allergen Reactions    Lisinopril Swelling    Bactrim [sulfamethoxazole-trimethoprim] Rash       Medications:  Facility-Administered Medications Prior to Admission   Medication    albuterol inhaler 2 puff     Medications Prior to Admission   Medication Sig    amitriptyline (ELAVIL) 75 MG tablet Take 75 mg by mouth every evening.    amlodipine (NORVASC) 10 MG tablet Take 1 tablet (10 mg total) by mouth once daily.    atorvastatin (LIPITOR) 40 MG tablet Take 1 tablet (40 mg total) by mouth once daily.    butalbital-acetaminophen-caffeine -40 mg (FIORICET, ESGIC) -40 mg per tablet Take 1 tablet by mouth every 4 (four) hours as needed.    carvediloL (COREG) 25 MG tablet Take 37.5 mg by mouth 2 (two) times daily.    cephALEXin (KEFLEX) 500 MG capsule Take 1 capsule (500 mg total) by mouth every 6 (six) hours. for 14 days    cyanocobalamin (VITAMIN B-12) 1000 MCG tablet Take 1 tablet (1,000 mcg total) by mouth once daily. (Patient not taking: No sig reported)    dexAMETHasone (DECADRON) 2 MG tablet Take 2 tablets (4 mg) by mouth every 8 hours for 1 day, THEN 2 tablets (4 mg) every 12 hours for 2 days, THEN 1 tablet (2 mg) every 12 hours for 2 days, THEN 1 tablet (2 mg) daily for 2 days, then STOP.    diphenhydrAMINE (BENADRYL) 25 mg capsule Take 1 each (25 mg total) by mouth every 6 (six) hours as needed for Itching or Allergies. (Patient not taking: No sig reported)    docusate sodium (COLACE) 100 MG  "capsule Take 1 capsule (100 mg total) by mouth 2 (two) times daily as needed for Constipation.    EScitalopram oxalate (LEXAPRO) 10 MG tablet Take 10 mg by mouth once daily.    famotidine (PEPCID) 20 MG tablet Take 1 tablet (20 mg total) by mouth 2 (two) times daily. (Patient not taking: No sig reported)    hydrochlorothiazide (HYDRODIURIL) 25 MG tablet Take 1 tablet (25 mg total) by mouth once daily.    HYDROcodone-acetaminophen (NORCO) 5-325 mg per tablet Take 1 tablet by mouth every 6 (six) hours as needed for Pain.    levETIRAcetam (KEPPRA) 500 MG Tab Take 1 tablet (500 mg total) by mouth 2 (two) times daily for 5 days (Patient not taking: Reported on 7/29/2022)    magnesium oxide (MAG-OX) 400 mg (241.3 mg magnesium) tablet Take 1 tablet by mouth 2 (two) times daily.    potassium chloride SA (K-DUR,KLOR-CON) 20 MEQ tablet Take 20 mEq by mouth 2 (two) times daily.     Antibiotics (From admission, onward)                Start     Stop Route Frequency Ordered    08/22/22 1915  cefepime in dextrose 5 % IVPB 2 g         -- IV Every 8 hours (non-standard times) 08/22/22 1805    08/22/22 1600  vancomycin in dextrose 5 % 1 gram/250 mL IVPB 1,000 mg         -- IV Every 12 hours (non-standard times) 08/22/22 1506    08/22/22 0212  vancomycin - pharmacy to dose  (vancomycin IVPB)        "And" Linked Group Details    -- IV pharmacy to manage frequency 08/22/22 0112          Antifungals (From admission, onward)                None          Antivirals (From admission, onward)      None             Immunization History   Administered Date(s) Administered    Influenza - Quadrivalent - PF *Preferred* (6 months and older) 10/05/2017       Family History    None       Social History     Socioeconomic History    Marital status:    Tobacco Use    Smoking status: Current Every Day Smoker     Packs/day: 0.50     Types: Cigarettes    Smokeless tobacco: Never Used   Substance and Sexual Activity    Alcohol use: Yes    "  Comment: occassionally    Drug use: No    Sexual activity: Not Currently     Partners: Male     Birth control/protection: None     Review of Systems   Constitutional:  Negative for appetite change, chills, diaphoresis, fatigue and fever.   Respiratory:  Negative for cough and shortness of breath.    Cardiovascular:  Negative for chest pain, palpitations and leg swelling.   Gastrointestinal:  Negative for abdominal distention, abdominal pain, constipation, diarrhea, nausea and vomiting.   Genitourinary:  Negative for difficulty urinating and dyspareunia.   Musculoskeletal:  Negative for arthralgias, joint swelling and myalgias.   Skin:  Negative for color change, rash and wound.   Neurological:  Negative for dizziness, weakness and numbness.   Psychiatric/Behavioral:  Negative for agitation and confusion. The patient is not nervous/anxious.    Objective:     Vital Signs (Most Recent):  Temp: 98.5 °F (36.9 °C) (08/23/22 1148)  Pulse: 81 (08/23/22 1148)  Resp: 17 (08/23/22 1148)  BP: 125/67 (08/23/22 1148)  SpO2: 98 % (08/23/22 1148)   Vital Signs (24h Range):  Temp:  [98 °F (36.7 °C)-98.9 °F (37.2 °C)] 98.5 °F (36.9 °C)  Pulse:  [77-85] 81  Resp:  [17-18] 17  SpO2:  [94 %-100 %] 98 %  BP: (125-155)/(64-88) 125/67     Weight: 88.5 kg (195 lb 1.8 oz)  Body mass index is 35.69 kg/m².    Estimated Creatinine Clearance: 80.8 mL/min (based on SCr of 0.9 mg/dL).    Physical Exam  Vitals and nursing note reviewed.   Constitutional:       General: She is not in acute distress.     Appearance: Normal appearance. She is well-developed and normal weight. She is not ill-appearing, toxic-appearing or diaphoretic.   HENT:      Head:        Nose: Nose normal.      Mouth/Throat:      Dentition: Normal dentition. Does not have dentures. No dental caries or dental abscesses.   Eyes:      General: No scleral icterus.     Conjunctiva/sclera: Conjunctivae normal.   Cardiovascular:      Rate and Rhythm: Normal rate and regular rhythm.       Heart sounds: Normal heart sounds. No murmur heard.  Pulmonary:      Effort: Pulmonary effort is normal. No respiratory distress.      Breath sounds: Normal breath sounds. No wheezing or rales.   Abdominal:      General: Bowel sounds are normal. There is no distension.      Palpations: Abdomen is soft.      Tenderness: There is no abdominal tenderness.   Musculoskeletal:         General: Normal range of motion.      Cervical back: Normal range of motion.      Right lower leg: No edema.      Left lower leg: No edema.   Skin:     General: Skin is warm and dry.      Findings: No erythema or rash.      Comments: Healing left craniotomy site. See photo. Small about of swelling noted. No drainage, erythema, warmth noted.     Neurological:      General: No focal deficit present.      Mental Status: She is alert and oriented to person, place, and time. Mental status is at baseline.      Motor: No weakness.      Gait: Gait normal.   Psychiatric:         Mood and Affect: Mood normal.         Behavior: Behavior normal.         Thought Content: Thought content normal.         Judgment: Judgment normal.       Significant Labs: All pertinent labs within the past 24 hours have been reviewed.    Significant Imaging: I have reviewed all pertinent imaging results/findings within the past 24 hours.    8/22/22:      8/23/22:

## 2022-08-23 NOTE — PROGRESS NOTES
Pharmacokinetic Assessment Follow Up: IV Vancomycin    Vancomycin serum concentration assessment(s):    The trough level was drawn correctly and can be used to guide therapy at this time. The measurement is within the desired definitive target range of 10 to 20 mcg/mL.   - The trough level was drawn ~11 hours after previous dose and resulted at 15.0.    Vancomycin Regimen Plan:    Continue regimen to Vancomycin 1000 mg IV every 12 hours with next serum trough concentration measured at 0430 prior to 7th dose on 8/25.   - Ms. Jenkins's renal function now appears stable and at baseline.  - Will get another trough, and if within range, may be able to consider surveillance troughs after.   - Please draw random level sooner than scheduled trough if renal function changes significantly.    Drug levels (last 3 results):  Recent Labs   Lab Result Units 08/22/22  1420 08/23/22  1613   Vancomycin, Random ug/mL 13.9  --    Vancomycin-Trough ug/mL  --  15.0       Pharmacy will continue to follow and monitor vancomycin.    Please contact pharmacy at extension [phone] for questions regarding this assessment.    Thank you for the consult,   Ana Castro       Patient brief summary:  Gina Jenkins is a 47 y.o. female initiated on antimicrobial therapy with IV Vancomycin for treatment of skin & soft tissue infection    Actual Body Weight:   88.5 kg    Renal Function:   Estimated Creatinine Clearance: 80.8 mL/min (based on SCr of 0.9 mg/dL).     Dialysis Method (if applicable):  N/A

## 2022-08-23 NOTE — SUBJECTIVE & OBJECTIVE
Past Medical History:   Diagnosis Date    Brain aneurysm     CHF (congestive heart failure)     H/O coronary angioplasty     Hypercholesteremia     Hypertension     Malignant hypertension     Migraine headache     Stroke 10/2017       Past Surgical History:   Procedure Laterality Date    CARDIAC CATHETERIZATION      CEREBRAL ANGIOGRAM      CLIP LIGATION OF INTRACRANIAL ANEURYSM BY CRANIOTOMY N/A 7/15/2022    Procedure: CRANIOTOMY, WITH ANEURYSM CLIPPING;  Surgeon: Timothy Diaz MD;  Location: [facility] OR [address] [place] [address];  Service: Neurosurgery;  Laterality: N/A;  PTERIONAL CRANIOTOMY WITH CLIP LIGATION OF L PCOMM, L ANTERIOR CHOROIDAL, L MCA  ANEURYSM, ANESTHESIA: GENERAL, BLOOD: TYPE&CROSS 2 UNITS, NEUROMONITORING: SEP, MEP, EEG, RADIOLOGY: C-ARM, POSITION: SUPINE, TONEY CASTILLO-SURGERON: DR. LEXI DOMÍNGUEZ.       Review of patient's allergies indicates:   Allergen Reactions    Lisinopril Swelling    Bactrim [sulfamethoxazole-trimethoprim] Rash       Medications:  Facility-Administered Medications Prior to Admission   Medication    albuterol inhaler 2 puff     Medications Prior to Admission   Medication Sig    amitriptyline (ELAVIL) 75 MG tablet Take 75 mg by mouth every evening.    amlodipine (NORVASC) 10 MG tablet Take 1 tablet (10 mg total) by mouth once daily.    atorvastatin (LIPITOR) 40 MG tablet Take 1 tablet (40 mg total) by mouth once daily.    butalbital-acetaminophen-caffeine -40 mg (FIORICET, ESGIC) -40 mg per tablet Take 1 tablet by mouth every 4 (four) hours as needed.    carvediloL (COREG) 25 MG tablet Take 37.5 mg by mouth 2 (two) times daily.    cephALEXin (KEFLEX) 500 MG capsule Take 1 capsule (500 mg total) by mouth every 6 (six) hours. for 14 days    cyanocobalamin (VITAMIN B-12) 1000 MCG tablet Take 1 tablet (1,000 mcg total) by mouth once daily. (Patient not taking: No sig reported)    dexAMETHasone (DECADRON) 2 MG tablet Take 2 tablets (4 mg) by mouth every 8 hours for 1 day, THEN 2 tablets (4  "mg) every 12 hours for 2 days, THEN 1 tablet (2 mg) every 12 hours for 2 days, THEN 1 tablet (2 mg) daily for 2 days, then STOP.    diphenhydrAMINE (BENADRYL) 25 mg capsule Take 1 each (25 mg total) by mouth every 6 (six) hours as needed for Itching or Allergies. (Patient not taking: No sig reported)    docusate sodium (COLACE) 100 MG capsule Take 1 capsule (100 mg total) by mouth 2 (two) times daily as needed for Constipation.    EScitalopram oxalate (LEXAPRO) 10 MG tablet Take 10 mg by mouth once daily.    famotidine (PEPCID) 20 MG tablet Take 1 tablet (20 mg total) by mouth 2 (two) times daily. (Patient not taking: No sig reported)    hydrochlorothiazide (HYDRODIURIL) 25 MG tablet Take 1 tablet (25 mg total) by mouth once daily.    HYDROcodone-acetaminophen (NORCO) 5-325 mg per tablet Take 1 tablet by mouth every 6 (six) hours as needed for Pain.    levETIRAcetam (KEPPRA) 500 MG Tab Take 1 tablet (500 mg total) by mouth 2 (two) times daily for 5 days (Patient not taking: Reported on 7/29/2022)    magnesium oxide (MAG-OX) 400 mg (241.3 mg magnesium) tablet Take 1 tablet by mouth 2 (two) times daily.    potassium chloride SA (K-DUR,KLOR-CON) 20 MEQ tablet Take 20 mEq by mouth 2 (two) times daily.     Antibiotics (From admission, onward)                Start     Stop Route Frequency Ordered    08/22/22 1915  cefepime in dextrose 5 % IVPB 2 g         -- IV Every 8 hours (non-standard times) 08/22/22 1805    08/22/22 1600  vancomycin in dextrose 5 % 1 gram/250 mL IVPB 1,000 mg         -- IV Every 12 hours (non-standard times) 08/22/22 1506    08/22/22 0212  vancomycin - pharmacy to dose  (vancomycin IVPB)        "And" Linked Group Details    -- IV pharmacy to manage frequency 08/22/22 0112          Antifungals (From admission, onward)                None          Antivirals (From admission, onward)      None             Immunization History   Administered Date(s) Administered    Influenza - Quadrivalent - PF " *Preferred* (6 months and older) 10/05/2017       Family History    None       Social History     Socioeconomic History    Marital status:    Tobacco Use    Smoking status: Current Every Day Smoker     Packs/day: 0.50     Types: Cigarettes    Smokeless tobacco: Never Used   Substance and Sexual Activity    Alcohol use: Yes     Comment: occassionally    Drug use: No    Sexual activity: Not Currently     Partners: Male     Birth control/protection: None     Review of Systems   Constitutional:  Negative for appetite change, chills, diaphoresis, fatigue and fever.   Respiratory:  Negative for cough and shortness of breath.    Cardiovascular:  Negative for chest pain, palpitations and leg swelling.   Gastrointestinal:  Negative for abdominal distention, abdominal pain, constipation, diarrhea, nausea and vomiting.   Genitourinary:  Negative for difficulty urinating and dyspareunia.   Musculoskeletal:  Negative for arthralgias, joint swelling and myalgias.   Skin:  Negative for color change, rash and wound.   Neurological:  Negative for dizziness, weakness and numbness.   Psychiatric/Behavioral:  Negative for agitation and confusion. The patient is not nervous/anxious.    Objective:     Vital Signs (Most Recent):  Temp: 98.5 °F (36.9 °C) (08/23/22 1148)  Pulse: 81 (08/23/22 1148)  Resp: 17 (08/23/22 1148)  BP: 125/67 (08/23/22 1148)  SpO2: 98 % (08/23/22 1148)   Vital Signs (24h Range):  Temp:  [98 °F (36.7 °C)-98.9 °F (37.2 °C)] 98.5 °F (36.9 °C)  Pulse:  [77-85] 81  Resp:  [17-18] 17  SpO2:  [94 %-100 %] 98 %  BP: (125-155)/(64-88) 125/67     Weight: 88.5 kg (195 lb 1.8 oz)  Body mass index is 35.69 kg/m².    Estimated Creatinine Clearance: 80.8 mL/min (based on SCr of 0.9 mg/dL).    Physical Exam  Vitals and nursing note reviewed.   Constitutional:       General: She is not in acute distress.     Appearance: Normal appearance. She is well-developed and normal weight. She is not ill-appearing, toxic-appearing or  diaphoretic.   HENT:      Head:        Nose: Nose normal.      Mouth/Throat:      Dentition: Normal dentition. Does not have dentures. No dental caries or dental abscesses.   Eyes:      General: No scleral icterus.     Conjunctiva/sclera: Conjunctivae normal.   Cardiovascular:      Rate and Rhythm: Normal rate and regular rhythm.      Heart sounds: Normal heart sounds. No murmur heard.  Pulmonary:      Effort: Pulmonary effort is normal. No respiratory distress.      Breath sounds: Normal breath sounds. No wheezing or rales.   Abdominal:      General: Bowel sounds are normal. There is no distension.      Palpations: Abdomen is soft.      Tenderness: There is no abdominal tenderness.   Musculoskeletal:         General: Normal range of motion.      Cervical back: Normal range of motion.      Right lower leg: No edema.      Left lower leg: No edema.   Skin:     General: Skin is warm and dry.      Findings: No erythema or rash.      Comments: Healing left craniotomy site. See photo. Small about of swelling noted. No drainage, erythema, warmth noted.     Neurological:      General: No focal deficit present.      Mental Status: She is alert and oriented to person, place, and time. Mental status is at baseline.      Motor: No weakness.      Gait: Gait normal.   Psychiatric:         Mood and Affect: Mood normal.         Behavior: Behavior normal.         Thought Content: Thought content normal.         Judgment: Judgment normal.       Significant Labs: All pertinent labs within the past 24 hours have been reviewed.    Significant Imaging: I have reviewed all pertinent imaging results/findings within the past 24 hours.    8/22/22:      8/23/22:

## 2022-08-23 NOTE — ASSESSMENT & PLAN NOTE
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 Diaz who presented with sudden onset of symptoms concerning for focal seizure. See HPI for more detail.      Remains without leukocytosis. sed rate 46 and CRP 26 from 8/21. Blood cultures remain NGTD. MRI brain w wo c/f enhancement of the sub dural and scalp complex fluid collections, potentially representing granulation tissue or infection hematoma. MRI wo c/f stable subdural and subq fluid collections surrounding cranitomy defect on the left, potentially appearing somewhat loculated. CTA head with no large vessel occulsion, stenosis, or vascular malformation.      Per chart review, NSGY plans to go to OR tomorrow for washout.      ID was consulted for mgmt recommendations. Pt is currently on vancomycin and cefepime.     Recommendations:   - Continue Vancomycin IV, inpatient pharmacy to manage dosing. Trough goal 15-20.   - Continue Cefepime 2g IV q8hr in the setting of recent operation.   - When taken to OR please send specimen for path, gram stain, aerobic, anaerobic, AFB and fungal cultures.   - Will monitor pt culture data and tailor abx as needed.   - Plan reviewed with ID staff, Dr. Pineda. ID will follow.

## 2022-08-23 NOTE — NURSING
Notified Endocrinology that patients Blood sugars still remaining elevated last check 500 per the continued medicine therapy and sliding scale approach. Received one time order and  communication response that the doctor is aware and will be putting  further treatment orders in place.

## 2022-08-24 ENCOUNTER — ANESTHESIA (OUTPATIENT)
Dept: SURGERY | Facility: HOSPITAL | Age: 47
DRG: 856 | End: 2022-08-24
Payer: MEDICAID

## 2022-08-24 LAB
ANION GAP SERPL CALC-SCNC: 9 MMOL/L (ref 8–16)
BACTERIA BLD CULT: NORMAL
BACTERIA BLD CULT: NORMAL
BASOPHILS # BLD AUTO: 0.05 K/UL (ref 0–0.2)
BASOPHILS NFR BLD: 0.7 % (ref 0–1.9)
BUN SERPL-MCNC: 8 MG/DL (ref 6–20)
CALCIUM SERPL-MCNC: 9.2 MG/DL (ref 8.7–10.5)
CHLORIDE SERPL-SCNC: 107 MMOL/L (ref 95–110)
CO2 SERPL-SCNC: 22 MMOL/L (ref 23–29)
CREAT SERPL-MCNC: 0.9 MG/DL (ref 0.5–1.4)
DIFFERENTIAL METHOD: ABNORMAL
EOSINOPHIL # BLD AUTO: 0.2 K/UL (ref 0–0.5)
EOSINOPHIL NFR BLD: 2 % (ref 0–8)
ERYTHROCYTE [DISTWIDTH] IN BLOOD BY AUTOMATED COUNT: 13.8 % (ref 11.5–14.5)
EST. GFR  (NO RACE VARIABLE): >60 ML/MIN/1.73 M^2
GLUCOSE SERPL-MCNC: 237 MG/DL (ref 70–110)
HCT VFR BLD AUTO: 33.5 % (ref 37–48.5)
HGB BLD-MCNC: 12 G/DL (ref 12–16)
IMM GRANULOCYTES # BLD AUTO: 0.14 K/UL (ref 0–0.04)
IMM GRANULOCYTES NFR BLD AUTO: 1.9 % (ref 0–0.5)
LYMPHOCYTES # BLD AUTO: 1.6 K/UL (ref 1–4.8)
LYMPHOCYTES NFR BLD: 21.6 % (ref 18–48)
MAGNESIUM SERPL-MCNC: 1.8 MG/DL (ref 1.6–2.6)
MCH RBC QN AUTO: 28.9 PG (ref 27–31)
MCHC RBC AUTO-ENTMCNC: 35.8 G/DL (ref 32–36)
MCV RBC AUTO: 81 FL (ref 82–98)
MONOCYTES # BLD AUTO: 0.7 K/UL (ref 0.3–1)
MONOCYTES NFR BLD: 9 % (ref 4–15)
NEUTROPHILS # BLD AUTO: 4.8 K/UL (ref 1.8–7.7)
NEUTROPHILS NFR BLD: 64.8 % (ref 38–73)
NRBC BLD-RTO: 0 /100 WBC
PLATELET # BLD AUTO: 298 K/UL (ref 150–450)
PMV BLD AUTO: 10.7 FL (ref 9.2–12.9)
POCT GLUCOSE: 100 MG/DL (ref 70–110)
POCT GLUCOSE: 170 MG/DL (ref 70–110)
POCT GLUCOSE: 181 MG/DL (ref 70–110)
POCT GLUCOSE: 208 MG/DL (ref 70–110)
POCT GLUCOSE: 217 MG/DL (ref 70–110)
POCT GLUCOSE: 261 MG/DL (ref 70–110)
POTASSIUM SERPL-SCNC: 3.5 MMOL/L (ref 3.5–5.1)
RBC # BLD AUTO: 4.15 M/UL (ref 4–5.4)
SODIUM SERPL-SCNC: 138 MMOL/L (ref 136–145)
WBC # BLD AUTO: 7.35 K/UL (ref 3.9–12.7)

## 2022-08-24 PROCEDURE — 63600175 PHARM REV CODE 636 W HCPCS: Performed by: PHYSICIAN ASSISTANT

## 2022-08-24 PROCEDURE — 25000003 PHARM REV CODE 250: Performed by: PHYSICIAN ASSISTANT

## 2022-08-24 PROCEDURE — 25000003 PHARM REV CODE 250: Performed by: NURSE PRACTITIONER

## 2022-08-24 PROCEDURE — 80048 BASIC METABOLIC PNL TOTAL CA: CPT | Performed by: STUDENT IN AN ORGANIZED HEALTH CARE EDUCATION/TRAINING PROGRAM

## 2022-08-24 PROCEDURE — 63600175 PHARM REV CODE 636 W HCPCS: Performed by: STUDENT IN AN ORGANIZED HEALTH CARE EDUCATION/TRAINING PROGRAM

## 2022-08-24 PROCEDURE — 99024 PR POST-OP FOLLOW-UP VISIT: ICD-10-PCS | Mod: ,,, | Performed by: PHYSICIAN ASSISTANT

## 2022-08-24 PROCEDURE — 11000001 HC ACUTE MED/SURG PRIVATE ROOM

## 2022-08-24 PROCEDURE — 63600175 PHARM REV CODE 636 W HCPCS: Performed by: NURSE PRACTITIONER

## 2022-08-24 PROCEDURE — 36415 COLL VENOUS BLD VENIPUNCTURE: CPT | Performed by: STUDENT IN AN ORGANIZED HEALTH CARE EDUCATION/TRAINING PROGRAM

## 2022-08-24 PROCEDURE — 99233 SBSQ HOSP IP/OBS HIGH 50: CPT | Mod: ,,, | Performed by: REGISTERED NURSE

## 2022-08-24 PROCEDURE — 83735 ASSAY OF MAGNESIUM: CPT | Performed by: PHYSICIAN ASSISTANT

## 2022-08-24 PROCEDURE — 25000003 PHARM REV CODE 250: Performed by: NEUROLOGICAL SURGERY

## 2022-08-24 PROCEDURE — 99233 SBSQ HOSP IP/OBS HIGH 50: CPT | Mod: ,,, | Performed by: NURSE PRACTITIONER

## 2022-08-24 PROCEDURE — 99233 PR SUBSEQUENT HOSPITAL CARE,LEVL III: ICD-10-PCS | Mod: ,,, | Performed by: REGISTERED NURSE

## 2022-08-24 PROCEDURE — 85025 COMPLETE CBC W/AUTO DIFF WBC: CPT | Performed by: STUDENT IN AN ORGANIZED HEALTH CARE EDUCATION/TRAINING PROGRAM

## 2022-08-24 PROCEDURE — 99024 POSTOP FOLLOW-UP VISIT: CPT | Mod: ,,, | Performed by: PHYSICIAN ASSISTANT

## 2022-08-24 PROCEDURE — 99233 PR SUBSEQUENT HOSPITAL CARE,LEVL III: ICD-10-PCS | Mod: ,,, | Performed by: NURSE PRACTITIONER

## 2022-08-24 PROCEDURE — 63600175 PHARM REV CODE 636 W HCPCS: Performed by: NEUROLOGICAL SURGERY

## 2022-08-24 PROCEDURE — 25000003 PHARM REV CODE 250: Performed by: STUDENT IN AN ORGANIZED HEALTH CARE EDUCATION/TRAINING PROGRAM

## 2022-08-24 RX ORDER — GLUCAGON 1 MG
1 KIT INJECTION
Status: DISCONTINUED | OUTPATIENT
Start: 2022-08-24 | End: 2022-09-02 | Stop reason: HOSPADM

## 2022-08-24 RX ORDER — INSULIN ASPART 100 [IU]/ML
1-10 INJECTION, SOLUTION INTRAVENOUS; SUBCUTANEOUS
Status: DISCONTINUED | OUTPATIENT
Start: 2022-08-24 | End: 2022-09-02 | Stop reason: HOSPADM

## 2022-08-24 RX ORDER — IBUPROFEN 200 MG
16 TABLET ORAL
Status: DISCONTINUED | OUTPATIENT
Start: 2022-08-24 | End: 2022-09-02 | Stop reason: HOSPADM

## 2022-08-24 RX ORDER — IBUPROFEN 200 MG
24 TABLET ORAL
Status: DISCONTINUED | OUTPATIENT
Start: 2022-08-24 | End: 2022-09-02 | Stop reason: HOSPADM

## 2022-08-24 RX ORDER — INSULIN ASPART 100 [IU]/ML
10 INJECTION, SOLUTION INTRAVENOUS; SUBCUTANEOUS
Status: DISCONTINUED | OUTPATIENT
Start: 2022-08-24 | End: 2022-08-29

## 2022-08-24 RX ADMIN — VANCOMYCIN HYDROCHLORIDE 1000 MG: 1 INJECTION, POWDER, LYOPHILIZED, FOR SOLUTION INTRAVENOUS at 04:08

## 2022-08-24 RX ADMIN — INSULIN ASPART 4 UNITS: 100 INJECTION, SOLUTION INTRAVENOUS; SUBCUTANEOUS at 09:08

## 2022-08-24 RX ADMIN — AMITRIPTYLINE HYDROCHLORIDE 75 MG: 50 TABLET, FILM COATED ORAL at 08:08

## 2022-08-24 RX ADMIN — CARVEDILOL 37.5 MG: 25 TABLET, FILM COATED ORAL at 09:08

## 2022-08-24 RX ADMIN — CEFEPIME 2 G: 2 INJECTION, POWDER, FOR SOLUTION INTRAVENOUS at 05:08

## 2022-08-24 RX ADMIN — CEFEPIME 2 G: 2 INJECTION, POWDER, FOR SOLUTION INTRAVENOUS at 10:08

## 2022-08-24 RX ADMIN — ATORVASTATIN CALCIUM 40 MG: 40 TABLET, FILM COATED ORAL at 09:08

## 2022-08-24 RX ADMIN — INSULIN DETEMIR 30 UNITS: 100 INJECTION, SOLUTION SUBCUTANEOUS at 10:08

## 2022-08-24 RX ADMIN — INSULIN ASPART 1 UNITS: 100 INJECTION, SOLUTION INTRAVENOUS; SUBCUTANEOUS at 12:08

## 2022-08-24 RX ADMIN — CEFEPIME 2 G: 2 INJECTION, POWDER, FOR SOLUTION INTRAVENOUS at 12:08

## 2022-08-24 RX ADMIN — CARVEDILOL 37.5 MG: 25 TABLET, FILM COATED ORAL at 08:08

## 2022-08-24 RX ADMIN — ESCITALOPRAM OXALATE 10 MG: 10 TABLET ORAL at 09:08

## 2022-08-24 RX ADMIN — LEVETIRACETAM 1000 MG: 100 INJECTION, SOLUTION INTRAVENOUS at 09:08

## 2022-08-24 RX ADMIN — AMLODIPINE BESYLATE 10 MG: 10 TABLET ORAL at 09:08

## 2022-08-24 RX ADMIN — ACETAMINOPHEN 650 MG: 325 TABLET ORAL at 08:08

## 2022-08-24 RX ADMIN — INSULIN ASPART 3 UNITS: 100 INJECTION, SOLUTION INTRAVENOUS; SUBCUTANEOUS at 09:08

## 2022-08-24 RX ADMIN — Medication 400 MG: at 09:08

## 2022-08-24 RX ADMIN — POTASSIUM CHLORIDE 20 MEQ: 1500 TABLET, EXTENDED RELEASE ORAL at 08:08

## 2022-08-24 RX ADMIN — Medication 400 MG: at 08:08

## 2022-08-24 RX ADMIN — POTASSIUM CHLORIDE 20 MEQ: 1500 TABLET, EXTENDED RELEASE ORAL at 09:08

## 2022-08-24 RX ADMIN — INSULIN ASPART 4 UNITS: 100 INJECTION, SOLUTION INTRAVENOUS; SUBCUTANEOUS at 12:08

## 2022-08-24 RX ADMIN — LEVETIRACETAM 1000 MG: 100 INJECTION, SOLUTION INTRAVENOUS at 08:08

## 2022-08-24 RX ADMIN — VANCOMYCIN HYDROCHLORIDE 1000 MG: 1 INJECTION, POWDER, LYOPHILIZED, FOR SOLUTION INTRAVENOUS at 06:08

## 2022-08-24 NOTE — PROGRESS NOTES
[facility] - Neurosurgery 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 Diaz who presented with sudden onset of symptoms concerning for focal seizure. See HPI for more detail.      Remains without leukocytosis. sed rate 46 and CRP 26 from 8/21. Blood cultures remain NGTD. MRI brain w wo c/f enhancement of the sub dural and scalp complex fluid collections, potentially representing granulation tissue or infection hematoma. MRI wo c/f stable subdural and subq fluid collections surrounding cranitomy defect on the left, potentially appearing somewhat loculated. CTA head with no large vessel occulsion, stenosis, or vascular malformation.      Subcutaneous scalp fluid collection tapped 8/22, sent for cultures, NGTD thus far.      Plans to go to OR today for washout with NSGY.     ID was consulted for mgmt recommendations. Pt is currently on vancomycin and cefepime.     Recommendations:   - Continue Vancomycin IV, inpatient pharmacy to manage dosing. Trough goal 15-20.   - Continue Cefepime 2g IV q8hr in the setting of recent operation.   - When taken to OR please send specimen for path, gram stain, aerobic, anaerobic, AFB and fungal cultures.   - Will monitor pt culture data and tailor abx as needed.   - Plan reviewed with ID staff, Dr. Pineda. ID will follow.               Thank you for your consult. I will follow-up with patient. Please contact us if you have any additional questions.    Bryson Craft NP  Infectious Disease  [facility] - Neurosurgery 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 Diaz who presented with sudden onset of symptoms concerning for focal seizure. Pt states she was in her yard with her son on Sunday sharing a snack when she suddenly became aphasic and had RLE weakness. Pt states occurred for about 5 mins and then self resolved. Pt reported to the ED immediately and reports the episode occurred for a 2nd time in the ER, again self resolving. Pt denies drainage, pain, or swelling from her craniotomy incision site. Denies current weakness, paralysis. Denies change of vision, headaches. Denies loss of control from bowels, urine. Denies fevers, body aches, chills. Denies concerns/complications postoperatively until on Sunday when the symptoms started.     Since admission pt remains without leukocytosis. Pt with slightly increased inflammatory markers, sed rate 46 and CRP 26. Blood cultures NGTD. MRI brain w wo c/f enhancement of the sub dural and scalp complex fluid collections, potentially representing granulation tissue or infection hematoma. MRI wo c/f stable subdural and subq fluid collections surrounding cranitomy defect on the left, potentially appearing somewhat loculated. CTA head with no large vessel occulsion, stenosis, or vascular malformation.     ID was consulted for mgmt recommendations. Pt is currently on vancomycin and piperacillin tazobactam.       Interval History: NAEON. Reports mild frontal headache overnight relieved by tylenol. Denying recurrent neuro symptoms. Reporting left sided face swelling, see media. Afebrile. No leukocytosis. S/p cranial tap on 8/22, cultures NGTD. Pending OR for craniotomy washout today.     Past Medical History:   Diagnosis Date    Brain aneurysm     CHF (congestive heart failure)     H/O coronary  angioplasty     Hypercholesteremia     Hypertension     Malignant hypertension     Migraine headache     Stroke 10/2017       Past Surgical History:   Procedure Laterality Date    CARDIAC CATHETERIZATION      CEREBRAL ANGIOGRAM      CLIP LIGATION OF INTRACRANIAL ANEURYSM BY CRANIOTOMY N/A 7/15/2022    Procedure: CRANIOTOMY, WITH ANEURYSM CLIPPING;  Surgeon: Timothy Diaz MD;  Location: [facility] OR [address] [place] [address];  Service: Neurosurgery;  Laterality: N/A;  PTERIONAL CRANIOTOMY WITH CLIP LIGATION OF L PCOMM, L ANTERIOR CHOROIDAL, L MCA  ANEURYSM, ANESTHESIA: GENERAL, BLOOD: TYPE&CROSS 2 UNITS, NEUROMONITORING: SEP, MEP, EEG, RADIOLOGY: C-ARM, POSITION: SUPINE, ANNA, CO-SURGERON: DR. LEXI DOMÍNGUEZ.       Review of patient's allergies indicates:   Allergen Reactions    Lisinopril Swelling    Bactrim [sulfamethoxazole-trimethoprim] Rash       Medications:  Facility-Administered Medications Prior to Admission   Medication    albuterol inhaler 2 puff     Medications Prior to Admission   Medication Sig    amitriptyline (ELAVIL) 75 MG tablet Take 75 mg by mouth every evening.    amlodipine (NORVASC) 10 MG tablet Take 1 tablet (10 mg total) by mouth once daily.    atorvastatin (LIPITOR) 40 MG tablet Take 1 tablet (40 mg total) by mouth once daily.    butalbital-acetaminophen-caffeine -40 mg (FIORICET, ESGIC) -40 mg per tablet Take 1 tablet by mouth every 4 (four) hours as needed.    carvediloL (COREG) 25 MG tablet Take 37.5 mg by mouth 2 (two) times daily.    cyanocobalamin (VITAMIN B-12) 1000 MCG tablet Take 1 tablet (1,000 mcg total) by mouth once daily. (Patient not taking: No sig reported)    diphenhydrAMINE (BENADRYL) 25 mg capsule Take 1 each (25 mg total) by mouth every 6 (six) hours as needed for Itching or Allergies. (Patient not taking: No sig reported)    docusate sodium (COLACE) 100 MG capsule Take 1 capsule (100 mg total) by mouth 2 (two) times daily as needed for Constipation.     "EScitalopram oxalate (LEXAPRO) 10 MG tablet Take 10 mg by mouth once daily.    famotidine (PEPCID) 20 MG tablet Take 1 tablet (20 mg total) by mouth 2 (two) times daily. (Patient not taking: No sig reported)    hydrochlorothiazide (HYDRODIURIL) 25 MG tablet Take 1 tablet (25 mg total) by mouth once daily.    levETIRAcetam (KEPPRA) 500 MG Tab Take 1 tablet (500 mg total) by mouth 2 (two) times daily for 5 days (Patient not taking: Reported on 7/29/2022)    magnesium oxide (MAG-OX) 400 mg (241.3 mg magnesium) tablet Take 1 tablet by mouth 2 (two) times daily.    potassium chloride SA (K-DUR,KLOR-CON) 20 MEQ tablet Take 20 mEq by mouth 2 (two) times daily.    [DISCONTINUED] cephALEXin (KEFLEX) 500 MG capsule Take 1 capsule (500 mg total) by mouth every 6 (six) hours. for 14 days    [DISCONTINUED] dexAMETHasone (DECADRON) 2 MG tablet Take 2 tablets (4 mg) by mouth every 8 hours for 1 day, THEN 2 tablets (4 mg) every 12 hours for 2 days, THEN 1 tablet (2 mg) every 12 hours for 2 days, THEN 1 tablet (2 mg) daily for 2 days, then STOP.    [DISCONTINUED] HYDROcodone-acetaminophen (NORCO) 5-325 mg per tablet Take 1 tablet by mouth every 6 (six) hours as needed for Pain.     Antibiotics (From admission, onward)                Start     Stop Route Frequency Ordered    08/22/22 1915  cefepime in dextrose 5 % IVPB 2 g         -- IV Every 8 hours (non-standard times) 08/22/22 1805    08/22/22 1600  vancomycin in dextrose 5 % 1 gram/250 mL IVPB 1,000 mg         -- IV Every 12 hours (non-standard times) 08/22/22 1506    08/22/22 0212  vancomycin - pharmacy to dose  (vancomycin IVPB)        "And" Linked Group Details    -- IV pharmacy to manage frequency 08/22/22 0112          Antifungals (From admission, onward)                None          Antivirals (From admission, onward)      None             Immunization History   Administered Date(s) Administered    Influenza - Quadrivalent - PF *Preferred* (6 months and older) " 10/05/2017       Family History    None       Social History     Socioeconomic History    Marital status:    Tobacco Use    Smoking status: Current Every Day Smoker     Packs/day: 0.50     Types: Cigarettes    Smokeless tobacco: Never Used   Substance and Sexual Activity    Alcohol use: Yes     Comment: occassionally    Drug use: No    Sexual activity: Not Currently     Partners: Male     Birth control/protection: None     Review of Systems   Constitutional:  Negative for appetite change, chills, diaphoresis, fatigue and fever.   Respiratory:  Negative for cough and shortness of breath.    Cardiovascular:  Negative for chest pain, palpitations and leg swelling.   Gastrointestinal:  Negative for abdominal distention, abdominal pain, constipation, diarrhea, nausea and vomiting.   Genitourinary:  Negative for difficulty urinating and dyspareunia.   Musculoskeletal:  Negative for arthralgias, joint swelling and myalgias.   Skin:  Negative for color change, rash and wound.   Neurological:  Negative for dizziness, weakness and numbness.   Psychiatric/Behavioral:  Negative for agitation and confusion. The patient is not nervous/anxious.    Objective:     Vital Signs (Most Recent):  Temp: 98.5 °F (36.9 °C) (08/24/22 1130)  Pulse: 79 (08/24/22 1130)  Resp: 17 (08/24/22 1130)  BP: (!) 135/94 (08/24/22 1130)  SpO2: 98 % (08/24/22 1130)   Vital Signs (24h Range):  Temp:  [97.8 °F (36.6 °C)-98.8 °F (37.1 °C)] 98.5 °F (36.9 °C)  Pulse:  [77-82] 79  Resp:  [17-18] 17  SpO2:  [95 %-100 %] 98 %  BP: (134-148)/(71-94) 135/94     Weight: 88.5 kg (195 lb 1.8 oz)  Body mass index is 35.69 kg/m².    Estimated Creatinine Clearance: 80.8 mL/min (based on SCr of 0.9 mg/dL).    Physical Exam  Vitals and nursing note reviewed.   Constitutional:       General: She is not in acute distress.     Appearance: Normal appearance. She is well-developed and normal weight. She is not ill-appearing, toxic-appearing or diaphoretic.   HENT:       Head:        Nose: Nose normal.      Mouth/Throat:      Dentition: Normal dentition. Does not have dentures. No dental caries or dental abscesses.   Eyes:      General: No scleral icterus.     Conjunctiva/sclera: Conjunctivae normal.   Cardiovascular:      Rate and Rhythm: Normal rate and regular rhythm.      Heart sounds: Normal heart sounds. No murmur heard.  Pulmonary:      Effort: Pulmonary effort is normal. No respiratory distress.      Breath sounds: Normal breath sounds. No wheezing or rales.   Abdominal:      General: Bowel sounds are normal. There is no distension.      Palpations: Abdomen is soft.      Tenderness: There is no abdominal tenderness.   Musculoskeletal:         General: Normal range of motion.      Cervical back: Normal range of motion.      Right lower leg: No edema.      Left lower leg: No edema.   Skin:     General: Skin is warm and dry.      Findings: No erythema or rash.      Comments: Healing left craniotomy site. See photo. Small about of swelling noted. No drainage, erythema, warmth noted.     Neurological:      General: No focal deficit present.      Mental Status: She is alert and oriented to person, place, and time. Mental status is at baseline.      Motor: No weakness.      Gait: Gait normal.   Psychiatric:         Mood and Affect: Mood normal.         Behavior: Behavior normal.         Thought Content: Thought content normal.         Judgment: Judgment normal.       Significant Labs: All pertinent labs within the past 24 hours have been reviewed.    Significant Imaging: I have reviewed all pertinent imaging results/findings within the past 24 hours.    8/22/22:      8/23/22:                  8/24/22

## 2022-08-24 NOTE — ASSESSMENT & PLAN NOTE
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 Diaz who presented with sudden onset of symptoms concerning for focal seizure. See HPI for more detail.      Remains without leukocytosis. sed rate 46 and CRP 26 from 8/21. Blood cultures remain NGTD. MRI brain w wo c/f enhancement of the sub dural and scalp complex fluid collections, potentially representing granulation tissue or infection hematoma. MRI wo c/f stable subdural and subq fluid collections surrounding cranitomy defect on the left, potentially appearing somewhat loculated. CTA head with no large vessel occulsion, stenosis, or vascular malformation.      Subcutaneous scalp fluid collection tapped 8/22, sent for cultures, NGTD thus far.      Plans to go to OR today for washout with NSGY.     ID was consulted for mgmt recommendations. Pt is currently on vancomycin and cefepime.     Recommendations:   - Continue Vancomycin IV, inpatient pharmacy to manage dosing. Trough goal 15-20.   - Continue Cefepime 2g IV q8hr in the setting of recent operation.   - When taken to OR please send specimen for path, gram stain, aerobic, anaerobic, AFB and fungal cultures.   - Will monitor pt culture data and tailor abx as needed.   - Plan reviewed with ID staff, Dr. Pineda. ID will follow.

## 2022-08-24 NOTE — PROGRESS NOTES
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 Diaz who presents with sudden onset of symptoms concerning for focal seizure. Patient was in her yard today playing with her son when her R arm became weak suddenly followed by the inability to speak and RLE weakness. This went on for a couple minutes and self resolved; patient said she had some lasting heaviness on her R side after. She came to the ED immediately.    Here in the ED she had another episode that resolved on its own. NSGY consulted.       Post-Op Info:  Procedure(s) (LRB):  LEFT Cranial wound debridement and washout (Left)         Interval History: NAEON. Pt with ongoing mild HA, no other complaints. Cranial fluid and blood Cx's NGTD. EEG yesterday negative for seizures. Endocrine following, started on insulin gtt yesterday for persistent hyperglycemia, improved today to 200's. Plan for OR today for washout pending OR availability. NPO since midnight.    Medications:  Continuous Infusions:  Scheduled Meds:   amitriptyline  75 mg Oral QHS    amLODIPine  10 mg Oral Daily    atorvastatin  40 mg Oral Daily    carvediloL  37.5 mg Oral BID    ceFEPime (MAXIPIME) IVPB  2 g Intravenous Q8H    EScitalopram oxalate  10 mg Oral Daily    insulin aspart U-100  10 Units Subcutaneous TIDWM    insulin detemir U-100  30 Units Subcutaneous Daily    levetiracetam IV  1,000 mg Intravenous Q12H    magnesium oxide  400 mg Oral BID    potassium chloride SA  20 mEq Oral BID    vancomycin (VANCOCIN) IVPB  1,000 mg Intravenous Q12H     PRN Meds:acetaminophen, albuterol, dextrose 10%, dextrose 10%, glucagon (human recombinant), glucose, glucose, insulin aspart U-100, melatonin, Pharmacy to dose Vancomycin consult **AND** vancomycin - pharmacy to dose     Review of  Systems  Objective:     Weight: 88.5 kg (195 lb 1.8 oz)  Body mass index is 35.69 kg/m².  Vital Signs (Most Recent):  Temp: 98.5 °F (36.9 °C) (08/24/22 1130)  Pulse: 79 (08/24/22 1130)  Resp: 17 (08/24/22 1130)  BP: (!) 135/94 (08/24/22 1130)  SpO2: 98 % (08/24/22 1130)   Vital Signs (24h Range):  Temp:  [97.8 °F (36.6 °C)-98.8 °F (37.1 °C)] 98.5 °F (36.9 °C)  Pulse:  [77-82] 79  Resp:  [17-18] 17  SpO2:  [95 %-100 %] 98 %  BP: (134-148)/(71-94) 135/94                     Female External Urinary Catheter 08/21/22 2221 (Active)   Skin no redness;no breakdown 08/23/22 1800   Tolerance no signs/symptoms of discomfort 08/23/22 1800   Suction Continuous suction at 60 mmHg 08/23/22 0800   Date of last wick change 08/23/22 08/23/22 0800   Time of last wick change 0800 08/23/22 0800   Output (mL) 1000 mL 08/23/22 0800       Physical Exam    Neurosurgery Physical Exam    General: well developed, well nourished, no distress.   Head: normocephalic  Neck: No tracheal deviation. No palpable masses. Full ROM.   GCS: E4 V5 M6; Total: 15  Mental Status: Awake, Alert, Oriented x 4  Language: No aphasia  Speech: No dysarthria  Cranial nerves: face symmetric, tongue midline, CN II-XII grossly intact.   Eyes: pupils equal, round, reactive to light with accomodation, EOMI.   Ears: No drainage.   Pulmonary: normal respirations, no signs of respiratory distress  Abdomen: soft, non-distended, not tender to palpation  Skin: Skin is warm, dry and intact.     Sensory: intact to light touch throughout  Motor Strength: Moves all extremities spontaneously with good tone.  Full strength upper and lower extremities. No abnormal movements seen.      Finger-to-nose: intact bilaterally   Pronator drift: absent bilaterally     Left Cranial Incision: Inferior aspect of incision with uneven edges, delayed healing, no leakage. Anterior left scalp swelling, no erythema, but tender to palpation.     Significant Labs:  Recent Labs   Lab 08/23/22  5999  08/23/22  0844 08/23/22  1822 08/24/22  0809   *  --  481* 237*     --  137 138   K 2.9*  --  3.2* 3.5     --  102 107   CO2 24  --  24 22*   BUN 8  --  8 8   CREATININE 0.9  --  1.0 0.9   CALCIUM 9.3  --  9.1 9.2   MG  --  1.5*  --  1.8     Recent Labs   Lab 08/23/22  0422 08/24/22  0809   WBC 7.68 7.35   HGB 11.7* 12.0   HCT 32.6* 33.5*    298     Recent Labs   Lab 08/22/22  1420   APTT 24.0     Microbiology Results (last 7 days)       Procedure Component Value Units Date/Time    Culture, Anaerobic [[id number]] Collected: 08/22/22 1550    Order Status: Completed Specimen: Incision site from Head Updated: 08/24/22 0856     Anaerobic Culture Culture in progress    Aerobic culture [[id number]] Collected: 08/22/22 1550    Order Status: Completed Specimen: Incision site from Head Updated: 08/24/22 0728     Aerobic Bacterial Culture No growth    Blood culture [[id number]] Collected: 08/21/22 2325    Order Status: Completed Specimen: Blood from Peripheral, Antecubital, Right Updated: 08/24/22 0613     Blood Culture, Routine No Growth to date      No Growth to date      No Growth to date    AFB Culture & Smear [[id number]] Collected: 08/22/22 1550    Order Status: Completed Specimen: Incision site from Head Updated: 08/23/22 2127     AFB Culture & Smear Culture in progress     AFB CULTURE STAIN No acid fast bacilli seen.    Fungus culture [[id number]] Collected: 08/22/22 1550    Order Status: No result Specimen: Incision site from Head Updated: 08/23/22 0930    Aerobic culture [[id number]]     Order Status: Completed Specimen: Incision site from Head     Culture, Anaerobe [[id number]]     Order Status: Completed Specimen: Body Fluid from Head     Fungus culture [[id number]]     Order Status: Completed Specimen: Body Fluid from Head     Fungus culture [[id number]]     Order Status: Canceled Specimen: Body Fluid from Head     Culture, Anaerobe [[id number]]     Order Status: Canceled Specimen: Body Fluid from  Head     Aerobic culture [[id number]]     Order Status: Canceled Specimen: Incision site from Head     AFB Culture & Smear [[id number]]     Order Status: Canceled Specimen: Body Fluid from Head           All pertinent labs from the last 24 hours have been reviewed.    Significant Diagnostics:  I have reviewed and interpreted all pertinent imaging results/findings within the past 24 hours.    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 Diaz who presented after episode of sudden onset R-sided weakness/tremors and aphasia for about 5 minutes, resolved prior to EMS arrival, concerning for focal seizure, then with second episode while in the ED. MRI brain w/wo concerning for scalp and epidural infection.    - Admitted to [facility] [place]   - Neuro checks q4h  - all labs and diagnostics reviewed   - CTA 8/21: largely stable from prior with epidural fluid collection and extracranial fluid collection stable in size; stable postsurgical changes of L PCOM/anterior choroidal aneurysm clipping, stable small left MCA bifurcation aneurysm   - MRI brain w wo 8/21: peripheral rim enhancement of subdural and scalp complex fluid collections, concerning for infection; no evidence of enhancement of brain parenchyma   - spot EEG 8/23: mild regional cortical or subcortical dysfunction in the left temporal region with no electrographic seizures or indications of seizure tendency.  - Infectious workup:   - afebrile, no leukocytosis   - blood cx 8/21 NGTD   - ESR 30-->46, CRP 28.5-->26   - Subcutaneous scalp fluid collection tapped 8/22, sent for Cx's - NGTD   - ID consulted, recommend Vanc/Cefepime, continue to follow Cx's  - Plan for OR today 8/24 for cranial wound washout, pending OR availability   - NPO since midnight   - Seizures: Episodes concerning for focal seizures with R-sided tremors and weakness.  Not on AEDs prior to admission.   - Keppra loaded on admission, continue 1g bid   - spot EEG 8/23 with left temporal subcortical dysfunction, negative for electrographic seizures  - New Onset T2DM: No h/o DM. Hyperglycemic on admission. HbA1C 8.3. Endocrinology consulted. Goal -180.   - Start Levemir 30 untis daily First dose this Am. 0.7 u/kg/d dosing.    - Start Novolog 10 units TIDWM. Basal/Prandial physiologic matching.     - Moderate Dose SQ Insulin Correction Scale.   - BG Monitoring AC/HS.    - Bedside nurse to instruct on insulin pen use and blood sugar monitor. Have patient administer own injections after education completed supervised by nurse. Have patient watch insulin educatoin video's via i-pad if available on unit.  - HTN: SBP <160. Continue home meds.  - Hypokalemia, Hypomagnesemia: Chronic, on home supplements daily.   - Resume home potassium and Mg supplements   - Replete PRN, daily labs    Dispo: ongoing        Marlene Robison PA-C  Neurosurgery  Misael Schmitt - Neurosurgery ([facility])

## 2022-08-24 NOTE — SUBJECTIVE & OBJECTIVE
Interval History: NAEON. Reports mild frontal headache overnight relieved by tylenol. Denying recurrent neuro symptoms. Reporting left sided face swelling, see media. Afebrile. No leukocytosis. S/p cranial tap on 8/22, cultures NGTD. Pending OR for craniotomy washout today.     Past Medical History:   Diagnosis Date    Brain aneurysm     CHF (congestive heart failure)     H/O coronary angioplasty     Hypercholesteremia     Hypertension     Malignant hypertension     Migraine headache     Stroke 10/2017       Past Surgical History:   Procedure Laterality Date    CARDIAC CATHETERIZATION      CEREBRAL ANGIOGRAM      CLIP LIGATION OF INTRACRANIAL ANEURYSM BY CRANIOTOMY N/A 7/15/2022    Procedure: CRANIOTOMY, WITH ANEURYSM CLIPPING;  Surgeon: Timothy Diaz MD;  Location: [facility] OR [address] [place] [address];  Service: Neurosurgery;  Laterality: N/A;  PTERIONAL CRANIOTOMY WITH CLIP LIGATION OF L PCOMM, L ANTERIOR CHOROIDAL, L MCA  ANEURYSM, ANESTHESIA: GENERAL, BLOOD: TYPE&CROSS 2 UNITS, NEUROMONITORING: SEP, MEP, EEG, RADIOLOGY: C-ARM, POSITION: SUPINE, CASTILLO, CO-SURGERON: DR. LEXI DOMÍNGUEZ.       Review of patient's allergies indicates:   Allergen Reactions    Lisinopril Swelling    Bactrim [sulfamethoxazole-trimethoprim] Rash       Medications:  Facility-Administered Medications Prior to Admission   Medication    albuterol inhaler 2 puff     Medications Prior to Admission   Medication Sig    amitriptyline (ELAVIL) 75 MG tablet Take 75 mg by mouth every evening.    amlodipine (NORVASC) 10 MG tablet Take 1 tablet (10 mg total) by mouth once daily.    atorvastatin (LIPITOR) 40 MG tablet Take 1 tablet (40 mg total) by mouth once daily.    butalbital-acetaminophen-caffeine -40 mg (FIORICET, ESGIC) -40 mg per tablet Take 1 tablet by mouth every 4 (four) hours as needed.    carvediloL (COREG) 25 MG tablet Take 37.5 mg by mouth 2 (two) times daily.    cyanocobalamin (VITAMIN B-12) 1000 MCG tablet Take 1 tablet (1,000 mcg total) by  mouth once daily. (Patient not taking: No sig reported)    diphenhydrAMINE (BENADRYL) 25 mg capsule Take 1 each (25 mg total) by mouth every 6 (six) hours as needed for Itching or Allergies. (Patient not taking: No sig reported)    docusate sodium (COLACE) 100 MG capsule Take 1 capsule (100 mg total) by mouth 2 (two) times daily as needed for Constipation.    EScitalopram oxalate (LEXAPRO) 10 MG tablet Take 10 mg by mouth once daily.    famotidine (PEPCID) 20 MG tablet Take 1 tablet (20 mg total) by mouth 2 (two) times daily. (Patient not taking: No sig reported)    hydrochlorothiazide (HYDRODIURIL) 25 MG tablet Take 1 tablet (25 mg total) by mouth once daily.    levETIRAcetam (KEPPRA) 500 MG Tab Take 1 tablet (500 mg total) by mouth 2 (two) times daily for 5 days (Patient not taking: Reported on 7/29/2022)    magnesium oxide (MAG-OX) 400 mg (241.3 mg magnesium) tablet Take 1 tablet by mouth 2 (two) times daily.    potassium chloride SA (K-DUR,KLOR-CON) 20 MEQ tablet Take 20 mEq by mouth 2 (two) times daily.    [DISCONTINUED] cephALEXin (KEFLEX) 500 MG capsule Take 1 capsule (500 mg total) by mouth every 6 (six) hours. for 14 days    [DISCONTINUED] dexAMETHasone (DECADRON) 2 MG tablet Take 2 tablets (4 mg) by mouth every 8 hours for 1 day, THEN 2 tablets (4 mg) every 12 hours for 2 days, THEN 1 tablet (2 mg) every 12 hours for 2 days, THEN 1 tablet (2 mg) daily for 2 days, then STOP.    [DISCONTINUED] HYDROcodone-acetaminophen (NORCO) 5-325 mg per tablet Take 1 tablet by mouth every 6 (six) hours as needed for Pain.     Antibiotics (From admission, onward)                Start     Stop Route Frequency Ordered    08/22/22 1915  cefepime in dextrose 5 % IVPB 2 g         -- IV Every 8 hours (non-standard times) 08/22/22 1805    08/22/22 1600  vancomycin in dextrose 5 % 1 gram/250 mL IVPB 1,000 mg         -- IV Every 12 hours (non-standard times) 08/22/22 1506    08/22/22 0212  vancomycin - pharmacy to dose  (vancomycin  "IVPB)        "And" Linked Group Details    -- IV pharmacy to manage frequency 08/22/22 0112          Antifungals (From admission, onward)                None          Antivirals (From admission, onward)      None             Immunization History   Administered Date(s) Administered    Influenza - Quadrivalent - PF *Preferred* (6 months and older) 10/05/2017       Family History    None       Social History     Socioeconomic History    Marital status:    Tobacco Use    Smoking status: Current Every Day Smoker     Packs/day: 0.50     Types: Cigarettes    Smokeless tobacco: Never Used   Substance and Sexual Activity    Alcohol use: Yes     Comment: occassionally    Drug use: No    Sexual activity: Not Currently     Partners: Male     Birth control/protection: None     Review of Systems   Constitutional:  Negative for appetite change, chills, diaphoresis, fatigue and fever.   Respiratory:  Negative for cough and shortness of breath.    Cardiovascular:  Negative for chest pain, palpitations and leg swelling.   Gastrointestinal:  Negative for abdominal distention, abdominal pain, constipation, diarrhea, nausea and vomiting.   Genitourinary:  Negative for difficulty urinating and dyspareunia.   Musculoskeletal:  Negative for arthralgias, joint swelling and myalgias.   Skin:  Negative for color change, rash and wound.   Neurological:  Negative for dizziness, weakness and numbness.   Psychiatric/Behavioral:  Negative for agitation and confusion. The patient is not nervous/anxious.    Objective:     Vital Signs (Most Recent):  Temp: 98.5 °F (36.9 °C) (08/24/22 1130)  Pulse: 79 (08/24/22 1130)  Resp: 17 (08/24/22 1130)  BP: (!) 135/94 (08/24/22 1130)  SpO2: 98 % (08/24/22 1130)   Vital Signs (24h Range):  Temp:  [97.8 °F (36.6 °C)-98.8 °F (37.1 °C)] 98.5 °F (36.9 °C)  Pulse:  [77-82] 79  Resp:  [17-18] 17  SpO2:  [95 %-100 %] 98 %  BP: (134-148)/(71-94) 135/94     Weight: 88.5 kg (195 lb 1.8 oz)  Body mass index is 35.69 " kg/m².    Estimated Creatinine Clearance: 80.8 mL/min (based on SCr of 0.9 mg/dL).    Physical Exam  Vitals and nursing note reviewed.   Constitutional:       General: She is not in acute distress.     Appearance: Normal appearance. She is well-developed and normal weight. She is not ill-appearing, toxic-appearing or diaphoretic.   HENT:      Head:        Nose: Nose normal.      Mouth/Throat:      Dentition: Normal dentition. Does not have dentures. No dental caries or dental abscesses.   Eyes:      General: No scleral icterus.     Conjunctiva/sclera: Conjunctivae normal.   Cardiovascular:      Rate and Rhythm: Normal rate and regular rhythm.      Heart sounds: Normal heart sounds. No murmur heard.  Pulmonary:      Effort: Pulmonary effort is normal. No respiratory distress.      Breath sounds: Normal breath sounds. No wheezing or rales.   Abdominal:      General: Bowel sounds are normal. There is no distension.      Palpations: Abdomen is soft.      Tenderness: There is no abdominal tenderness.   Musculoskeletal:         General: Normal range of motion.      Cervical back: Normal range of motion.      Right lower leg: No edema.      Left lower leg: No edema.   Skin:     General: Skin is warm and dry.      Findings: No erythema or rash.      Comments: Healing left craniotomy site. See photo. Small about of swelling noted. No drainage, erythema, warmth noted.     Neurological:      General: No focal deficit present.      Mental Status: She is alert and oriented to person, place, and time. Mental status is at baseline.      Motor: No weakness.      Gait: Gait normal.   Psychiatric:         Mood and Affect: Mood normal.         Behavior: Behavior normal.         Thought Content: Thought content normal.         Judgment: Judgment normal.       Significant Labs: All pertinent labs within the past 24 hours have been reviewed.    Significant Imaging: I have reviewed all pertinent imaging results/findings within the past 24  hours.    8/22/22:      8/23/22:                  8/24/22

## 2022-08-24 NOTE — PROCEDURES
EEG REPORT      Gina Jenkins  [id number]  1975    DATE OF SERVICE: 8/23/2022         METHODOLOGY      Extended electroencephalographic recording is made while the patient is ambulatory and continuing normal daily activities.  Electrodes are placed according to the International 10-20 placement system and included T1 and T2 electrode placement.  Twenty four (24) channels of digital signal (sampling rate of 512/sec) was simultaneously recorded from the scalp including EKG and eye monitors.  Recording band pass was 0.1 to 100 hz and all data was stored digitally on the recorder.  The patient is instructed to press an event button when clinical symptoms occur and write the symptoms into a diary. Activation procedures which include photic stimulation, hyperventilation and instructing patients to perform simple task are done in selected patients.        The EEG is displayed on a monitor screen and can be reformatted into different montages for evaluation.  The entire recoding is submitted for computer assisted analysis to detect spike and electrographic seizure activity.  The entire recording is visually reviewed and the times identified by computer analysis as being spikes or seizures are reviewed again.  Compresses spectral analysis (CSA) is also performed on the activity recorded from each individual channel.  This is displayed as a power display of frequencies from 0 to 30 Hz over time.   The CSA analysis is done and displayed continuously.  This is reviewed for asymmetries in power between homologous areas of the scalp and for presence of changes in power which canbe seen when seizures occur.  Sections of suspected abnormalities on the CSA is then compared with the original EEG recording.  .     [employer] software was also utilized in the review of this study.  This software suite analyzes the EEG recording in multiple domains.  Coherence and rhythmicity is computed to identify EEG sections which may  contain organized seizures.  Each channel undergoes analysis to detect presence of spike and sharp waves which have special and morphological characteristic of epileptic activity.  The routine EEG recording is converted from spacial into frequency domain.  This is then displayed comparing homologous areas to identify areas of significant asymmetry.  Algorithm to identify non-cortically generated artifact is used to separate eye movement, EMG and other artifact from the EEG     Recording Times    A total of 00:30:11 hours of EEG was recorded.      EEG FINDINGS:  Background activity:   The background rhythm was characterized by alpha and anterior dominant beta activity with a 10Hz posterior dominant alpha rhythm at 30-70 microvolts.   Symmetry and continuity: there is intermittent slowing in the left temporal chain     Sleep:   No sleep transients although drowsiness is noted.    Activation procedures:   NA    Abnormal activity:   No epileptiform discharges, periodic discharges, lateralized rhythmic delta activity or electrographic seizures were seen.    IMPRESSION:   Abnormal EEG due to mild regional cortical or subcortical dysfunction in the left temporal region with no electrographic seizures or indications of seizure tendency.      Tello Humphrey MD  Neurology-Epilepsy.  [facility].

## 2022-08-24 NOTE — SUBJECTIVE & OBJECTIVE
Interval History: NAEON. Pt with ongoing mild HA, no other complaints. Cranial fluid and blood Cx's NGTD. EEG yesterday negative for seizures. Endocrine following, started on insulin gtt yesterday for persistent hyperglycemia, improved today to 200's. Plan for OR today for washout pending OR availability. NPO since midnight.    Medications:  Continuous Infusions:  Scheduled Meds:   amitriptyline  75 mg Oral QHS    amLODIPine  10 mg Oral Daily    atorvastatin  40 mg Oral Daily    carvediloL  37.5 mg Oral BID    ceFEPime (MAXIPIME) IVPB  2 g Intravenous Q8H    EScitalopram oxalate  10 mg Oral Daily    insulin aspart U-100  10 Units Subcutaneous TIDWM    insulin detemir U-100  30 Units Subcutaneous Daily    levetiracetam IV  1,000 mg Intravenous Q12H    magnesium oxide  400 mg Oral BID    potassium chloride SA  20 mEq Oral BID    vancomycin (VANCOCIN) IVPB  1,000 mg Intravenous Q12H     PRN Meds:acetaminophen, albuterol, dextrose 10%, dextrose 10%, glucagon (human recombinant), glucose, glucose, insulin aspart U-100, melatonin, Pharmacy to dose Vancomycin consult **AND** vancomycin - pharmacy to dose     Review of Systems  Objective:     Weight: 88.5 kg (195 lb 1.8 oz)  Body mass index is 35.69 kg/m².  Vital Signs (Most Recent):  Temp: 98.5 °F (36.9 °C) (08/24/22 1130)  Pulse: 79 (08/24/22 1130)  Resp: 17 (08/24/22 1130)  BP: (!) 135/94 (08/24/22 1130)  SpO2: 98 % (08/24/22 1130)   Vital Signs (24h Range):  Temp:  [97.8 °F (36.6 °C)-98.8 °F (37.1 °C)] 98.5 °F (36.9 °C)  Pulse:  [77-82] 79  Resp:  [17-18] 17  SpO2:  [95 %-100 %] 98 %  BP: (134-148)/(71-94) 135/94                     Female External Urinary Catheter 08/21/22 2221 (Active)   Skin no redness;no breakdown 08/23/22 1800   Tolerance no signs/symptoms of discomfort 08/23/22 1800   Suction Continuous suction at 60 mmHg 08/23/22 0800   Date of last wick change 08/23/22 08/23/22 0800   Time of last wick change 0800 08/23/22 0800   Output (mL) 1000 mL 08/23/22  0800       Physical Exam    Neurosurgery Physical Exam    General: well developed, well nourished, no distress.   Head: normocephalic  Neck: No tracheal deviation. No palpable masses. Full ROM.   GCS: E4 V5 M6; Total: 15  Mental Status: Awake, Alert, Oriented x 4  Language: No aphasia  Speech: No dysarthria  Cranial nerves: face symmetric, tongue midline, CN II-XII grossly intact.   Eyes: pupils equal, round, reactive to light with accomodation, EOMI.   Ears: No drainage.   Pulmonary: normal respirations, no signs of respiratory distress  Abdomen: soft, non-distended, not tender to palpation  Skin: Skin is warm, dry and intact.     Sensory: intact to light touch throughout  Motor Strength: Moves all extremities spontaneously with good tone.  Full strength upper and lower extremities. No abnormal movements seen.      Finger-to-nose: intact bilaterally   Pronator drift: absent bilaterally     Left Cranial Incision: Inferior aspect of incision with uneven edges, delayed healing, no leakage. Anterior left scalp swelling, no erythema, but tender to palpation.     Significant Labs:  Recent Labs   Lab 08/23/22  0422 08/23/22  0844 08/23/22  1822 08/24/22  0809   *  --  481* 237*     --  137 138   K 2.9*  --  3.2* 3.5     --  102 107   CO2 24  --  24 22*   BUN 8  --  8 8   CREATININE 0.9  --  1.0 0.9   CALCIUM 9.3  --  9.1 9.2   MG  --  1.5*  --  1.8     Recent Labs   Lab 08/23/22  0422 08/24/22  0809   WBC 7.68 7.35   HGB 11.7* 12.0   HCT 32.6* 33.5*    298     Recent Labs   Lab 08/22/22  1420   APTT 24.0     Microbiology Results (last 7 days)       Procedure Component Value Units Date/Time    Culture, Anaerobic [[id number]] Collected: 08/22/22 1550    Order Status: Completed Specimen: Incision site from Head Updated: 08/24/22 0856     Anaerobic Culture Culture in progress    Aerobic culture [[id number]] Collected: 08/22/22 1550    Order Status: Completed Specimen: Incision site from Head Updated:  08/24/22 0728     Aerobic Bacterial Culture No growth    Blood culture [[id number]] Collected: 08/21/22 2325    Order Status: Completed Specimen: Blood from Peripheral, Antecubital, Right Updated: 08/24/22 0613     Blood Culture, Routine No Growth to date      No Growth to date      No Growth to date    AFB Culture & Smear [[id number]] Collected: 08/22/22 1550    Order Status: Completed Specimen: Incision site from Head Updated: 08/23/22 2127     AFB Culture & Smear Culture in progress     AFB CULTURE STAIN No acid fast bacilli seen.    Fungus culture [[id number]] Collected: 08/22/22 1550    Order Status: No result Specimen: Incision site from Head Updated: 08/23/22 0930    Aerobic culture [[id number]]     Order Status: Completed Specimen: Incision site from Head     Culture, Anaerobe [[id number]]     Order Status: Completed Specimen: Body Fluid from Head     Fungus culture [[id number]]     Order Status: Completed Specimen: Body Fluid from Head     Fungus culture [[id number]]     Order Status: Canceled Specimen: Body Fluid from Head     Culture, Anaerobe [[id number]]     Order Status: Canceled Specimen: Body Fluid from Head     Aerobic culture [[id number]]     Order Status: Canceled Specimen: Incision site from Head     AFB Culture & Smear [[id number]]     Order Status: Canceled Specimen: Body Fluid from Head           All pertinent labs from the last 24 hours have been reviewed.    Significant Diagnostics:  I have reviewed and interpreted all pertinent imaging results/findings within the past 24 hours.

## 2022-08-24 NOTE — ASSESSMENT & PLAN NOTE
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 Diaz who presented after episode of sudden onset R-sided weakness/tremors and aphasia for about 5 minutes, resolved prior to EMS arrival, concerning for focal seizure, then with second episode while in the ED. MRI brain w/wo concerning for scalp and epidural infection.    - Admitted to NS   - Neuro checks q4h  - all labs and diagnostics reviewed   - CTA 8/21: largely stable from prior with epidural fluid collection and extracranial fluid collection stable in size; stable postsurgical changes of L PCOM/anterior choroidal aneurysm clipping, stable small left MCA bifurcation aneurysm   - MRI brain w wo 8/21: peripheral rim enhancement of subdural and scalp complex fluid collections, concerning for infection; no evidence of enhancement of brain parenchyma   - spot EEG 8/23: mild regional cortical or subcortical dysfunction in the left temporal region with no electrographic seizures or indications of seizure tendency.  - Infectious workup:   - afebrile, no leukocytosis   - blood cx 8/21 NGTD   - ESR 30-->46, CRP 28.5-->26   - Subcutaneous scalp fluid collection tapped 8/22, sent for Cx's - NGTD   - ID consulted, recommend Vanc/Cefepime, continue to follow Cx's  - Plan for OR today 8/24 for cranial wound washout, pending OR availability   - NPO since midnight   - Seizures: Episodes concerning for focal seizures with R-sided tremors and weakness. Not on AEDs prior to admission.   - Keppra loaded on admission, continue 1g bid   - spot EEG 8/23 with left temporal subcortical dysfunction, negative for electrographic seizures  - New Onset T2DM: No h/o DM. Hyperglycemic on admission. HbA1C 8.3. Endocrinology consulted. Goal -180.   - Start Levemir 30 untis daily First dose this Am. 0.7 u/kg/d dosing.    - Start Novolog 10 units TIDWM. Basal/Prandial physiologic matching.      - Moderate Dose SQ Insulin Correction Scale.   - BG Monitoring AC/HS.    - Bedside nurse to instruct on insulin pen use and blood sugar monitor. Have patient administer own injections after education completed supervised by nurse. Have patient watch insulin educatoin video's via i-pad if available on unit.  - HTN: SBP <160. Continue home meds.  - Hypokalemia, Hypomagnesemia: Chronic, on home supplements daily.   - Resume home potassium and Mg supplements   - Replete PRN, daily labs    Dispo: ongoing

## 2022-08-24 NOTE — SUBJECTIVE & OBJECTIVE
Interval HPI:   Overnight events:   BG has trended downwards overnight, but remains slightly above goal this Am. Patient scheduled for washout today. Endocrinology will continue to follow, and manage glycemic control while inpatient.   Diet NPO Except for: Medication, Sips with Medication         Eating:   NPO  Nausea: No  Hypoglycemia and intervention: No  Fever: No  TPN and/or TF: No  If yes, type of TF/TPN and rate: None    BP (!) 143/84   Pulse 77   Temp 98.1 °F (36.7 °C)   Resp 18   Wt 88.5 kg (195 lb 1.8 oz)   SpO2 95%   BMI 35.69 kg/m²     Labs Reviewed and Include    Recent Labs   Lab 08/24/22  0809   *   CALCIUM 9.2      K 3.5   CO2 22*      BUN 8   CREATININE 0.9     Lab Results   Component Value Date    WBC 7.35 08/24/2022    HGB 12.0 08/24/2022    HCT 33.5 (L) 08/24/2022    MCV 81 (L) 08/24/2022     08/24/2022     Recent Labs   Lab 08/21/22 2101   TSH 1.371     Lab Results   Component Value Date    HGBA1C 8.3 (H) 08/22/2022       Nutritional status:   Body mass index is 35.69 kg/m².  Lab Results   Component Value Date    ALBUMIN 3.4 (L) 08/21/2022    ALBUMIN 3.2 (L) 07/19/2022    ALBUMIN 3.4 (L) 07/18/2022     No results found for: PREALBUMIN    Estimated Creatinine Clearance: 80.8 mL/min (based on SCr of 0.9 mg/dL).    Accu-Checks  Recent Labs     08/23/22  0803 08/23/22  1251 08/23/22  1642 08/23/22  1728 08/23/22  1816 08/23/22  2004 08/23/22  2103 08/23/22  2204 08/23/22  2302 08/24/22  0748   POCTGLUCOSE >500* 471* 478* >500* 442* 458* 330* 265* 231* 208*       Current Medications and/or Treatments Impacting Glycemic Control  Immunotherapy:    Immunosuppressants       None          Steroids:   Hormones (From admission, onward)                Start     Stop Route Frequency Ordered    08/22/22 0313  melatonin tablet 6 mg         -- Oral Nightly PRN 08/22/22 0213          Pressors:    Autonomic Drugs (From admission, onward)                None           Hyperglycemia/Diabetes Medications:   Antihyperglycemics (From admission, onward)                Start     Stop Route Frequency Ordered    08/23/22 0384  insulin aspart U-100 pen 1-10 Units         -- SubQ As needed (PRN) 08/23/22 4837

## 2022-08-24 NOTE — PROGRESS NOTES
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 Diaz    Diabetes diagnosis year: 2022    Home Diabetes Medications:  No current home medications.     How often checking glucose at home?  Not previously monitoring blood sugar.    BG readings on regimen: unknown  Hypoglycemia on the regimen?  No  Missed doses on regimen?  No    Diabetes Complications include:     Hyperglycemia    Complicating diabetes co morbidities:   CHF and History of CVA, HTN, HLD      HPI:   Patient is a 47 y.o. 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 Diaz who presents with sudden onset of symptoms concerning for focal seizure. Patient was in her yard today playing with her son when her R arm became weak suddenly followed by the inability to speak and RLE weakness. This went on for a couple minutes and self resolved; patient said she had some lasting heaviness on her R side after. Endocrinology consulted on 08/23/2022 to manage glycemic control.     Lab Results   Component Value Date    HGBA1C 8.3 (H) 08/22/2022             Interval HPI:   Overnight events:   BG has trended downwards overnight, but remains slightly above goal this Am. Patient scheduled for washout today. Endocrinology will continue to follow, and manage glycemic control while inpatient.   Diet NPO Except for: Medication, Sips with Medication         Eating:   NPO  Nausea: No  Hypoglycemia and intervention: No  Fever: No  TPN and/or TF: No  If yes, type of TF/TPN and rate: None    BP (!) 143/84   Pulse 77   Temp 98.1 °F (36.7 °C)   Resp 18   Wt 88.5 kg (195 lb 1.8 oz)   SpO2 95%   BMI 35.69 kg/m²     Labs Reviewed and Include     Recent Labs   Lab 08/24/22  0809   *   CALCIUM 9.2      K 3.5   CO2 22*      BUN 8   CREATININE 0.9     Lab Results   Component Value Date    WBC 7.35 08/24/2022    HGB 12.0 08/24/2022    HCT 33.5 (L) 08/24/2022    MCV 81 (L) 08/24/2022     08/24/2022     Recent Labs   Lab 08/21/22 2101   TSH 1.371     Lab Results   Component Value Date    HGBA1C 8.3 (H) 08/22/2022       Nutritional status:   Body mass index is 35.69 kg/m².  Lab Results   Component Value Date    ALBUMIN 3.4 (L) 08/21/2022    ALBUMIN 3.2 (L) 07/19/2022    ALBUMIN 3.4 (L) 07/18/2022     No results found for: PREALBUMIN    Estimated Creatinine Clearance: 80.8 mL/min (based on SCr of 0.9 mg/dL).    Accu-Checks  Recent Labs     08/23/22  0803 08/23/22  1251 08/23/22  1642 08/23/22  1728 08/23/22  1816 08/23/22  2004 08/23/22  2103 08/23/22  2204 08/23/22  2302 08/24/22  0748   POCTGLUCOSE >500* 471* 478* >500* 442* 458* 330* 265* 231* 208*       Current Medications and/or Treatments Impacting Glycemic Control  Immunotherapy:    Immunosuppressants       None          Steroids:   Hormones (From admission, onward)                Start     Stop Route Frequency Ordered    08/22/22 0313  melatonin tablet 6 mg         -- Oral Nightly PRN 08/22/22 0213          Pressors:    Autonomic Drugs (From admission, onward)                None          Hyperglycemia/Diabetes Medications:   Antihyperglycemics (From admission, onward)                Start     Stop Route Frequency Ordered    08/23/22 2347  insulin aspart U-100 pen 1-10 Units         -- SubQ As needed (PRN) 08/23/22 2248            ASSESSMENT and PLAN    * Post op infection  Managed per primary team  Avoid hypoglycemia        New onset type 2 diabetes mellitus  BG goal 140 - 180     - Start Levemir 30 untis daily First dose this Am. 0.7 u/kg/d dosing.   - Start Novolog 10 units TIDWM. Basal/Prandial physiologic matching.    - Moderate Dose SQ Insulin Correction Scale.  - BG  Monitoring AC/HS.   - Bedside nurse to instruct on insulin pen use and blood sugar monitor. Have patient administer own injections after education completed supervised by nurse. Have patient watch insulin educatoin video's via i-pad if available on unit.      ** Please call Endocrine for any BG related issues **  ** Please notify Endocrine for any change and/or advance in diet**    Lab Results   Component Value Date    HGBA1C 8.3 (H) 08/22/2022       Discharge Planning:   TBD. Please notify endocrinology prior to discharge.           Mixed hyperlipidemia  Managed per primary team  Condition may cause insulin resistance         Essential hypertension  Managed per primary team  Condition may cause insulin resistance               Julio Lewis NP  Endocrinology  [facility] - Neurosurgery ([facility])

## 2022-08-24 NOTE — PLAN OF CARE
Problem: Diabetes Comorbidity  Goal: Blood Glucose Level Within Targeted Range  Outcome: Ongoing, Progressing       Patient is AAO x4. POC reviewed with patient. Patient verbalized understanding. Patient's breathing is unlabored with equal chest expansion. Bed in lowest position,bed alarm on, side rails up x3, no complaints or signs of distress. WCTM during shift.  See flowsheets for full assessment and VS info.

## 2022-08-24 NOTE — CARE UPDATE
Care Update:     Surgery postponed due to lack of OR availability today.   Plan to reschedule surgery to Friday 8/26.   Okay for diet at this time.     Marlene Robison PA-C  Neurosurgery  [facility]

## 2022-08-24 NOTE — ASSESSMENT & PLAN NOTE
BG goal 140 - 180     - Start Levemir 30 untis daily First dose this Am. 0.7 u/kg/d dosing.   - Start Novolog 10 units TIDWM. Basal/Prandial physiologic matching.    - Moderate Dose SQ Insulin Correction Scale.  - BG Monitoring AC/HS.   - Bedside nurse to instruct on insulin pen use and blood sugar monitor. Have patient administer own injections after education completed supervised by nurse. Have patient watch insulin educatoin video's via i-pad if available on unit.      ** Please call Endocrine for any BG related issues **  ** Please notify Endocrine for any change and/or advance in diet**    Lab Results   Component Value Date    HGBA1C 8.3 (H) 08/22/2022       Discharge Planning:   TBD. Please notify endocrinology prior to discharge.

## 2022-08-25 PROBLEM — T88.8XXA FLUID COLLECTION AT SURGICAL SITE: Status: ACTIVE | Noted: 2022-08-25

## 2022-08-25 LAB
ANION GAP SERPL CALC-SCNC: 8 MMOL/L (ref 8–16)
BASOPHILS # BLD AUTO: 0.05 K/UL (ref 0–0.2)
BASOPHILS NFR BLD: 0.7 % (ref 0–1.9)
BUN SERPL-MCNC: 9 MG/DL (ref 6–20)
CALCIUM SERPL-MCNC: 9.3 MG/DL (ref 8.7–10.5)
CHLORIDE SERPL-SCNC: 109 MMOL/L (ref 95–110)
CO2 SERPL-SCNC: 22 MMOL/L (ref 23–29)
CREAT SERPL-MCNC: 1 MG/DL (ref 0.5–1.4)
DIFFERENTIAL METHOD: ABNORMAL
EOSINOPHIL # BLD AUTO: 0.2 K/UL (ref 0–0.5)
EOSINOPHIL NFR BLD: 2.2 % (ref 0–8)
ERYTHROCYTE [DISTWIDTH] IN BLOOD BY AUTOMATED COUNT: 14.1 % (ref 11.5–14.5)
EST. GFR  (NO RACE VARIABLE): >60 ML/MIN/1.73 M^2
GLUCOSE SERPL-MCNC: 176 MG/DL (ref 70–110)
GRAM STN SPEC: NORMAL
GRAM STN SPEC: NORMAL
HCT VFR BLD AUTO: 35.1 % (ref 37–48.5)
HGB BLD-MCNC: 12.3 G/DL (ref 12–16)
IMM GRANULOCYTES # BLD AUTO: 0.09 K/UL (ref 0–0.04)
IMM GRANULOCYTES NFR BLD AUTO: 1.2 % (ref 0–0.5)
LYMPHOCYTES # BLD AUTO: 1.5 K/UL (ref 1–4.8)
LYMPHOCYTES NFR BLD: 19.9 % (ref 18–48)
MAGNESIUM SERPL-MCNC: 2 MG/DL (ref 1.6–2.6)
MCH RBC QN AUTO: 28.9 PG (ref 27–31)
MCHC RBC AUTO-ENTMCNC: 35 G/DL (ref 32–36)
MCV RBC AUTO: 82 FL (ref 82–98)
MONOCYTES # BLD AUTO: 0.7 K/UL (ref 0.3–1)
MONOCYTES NFR BLD: 9 % (ref 4–15)
NEUTROPHILS # BLD AUTO: 5.1 K/UL (ref 1.8–7.7)
NEUTROPHILS NFR BLD: 67 % (ref 38–73)
NRBC BLD-RTO: 0 /100 WBC
PLATELET # BLD AUTO: 318 K/UL (ref 150–450)
PMV BLD AUTO: 10.4 FL (ref 9.2–12.9)
POCT GLUCOSE: 173 MG/DL (ref 70–110)
POCT GLUCOSE: 173 MG/DL (ref 70–110)
POCT GLUCOSE: 191 MG/DL (ref 70–110)
POCT GLUCOSE: 204 MG/DL (ref 70–110)
POCT GLUCOSE: 207 MG/DL (ref 70–110)
POTASSIUM SERPL-SCNC: 3.3 MMOL/L (ref 3.5–5.1)
RBC # BLD AUTO: 4.26 M/UL (ref 4–5.4)
SODIUM SERPL-SCNC: 139 MMOL/L (ref 136–145)
VANCOMYCIN TROUGH SERPL-MCNC: 23.2 UG/ML (ref 10–22)
WBC # BLD AUTO: 7.59 K/UL (ref 3.9–12.7)

## 2022-08-25 PROCEDURE — 99900035 HC TECH TIME PER 15 MIN (STAT)

## 2022-08-25 PROCEDURE — 63600175 PHARM REV CODE 636 W HCPCS: Performed by: PHYSICIAN ASSISTANT

## 2022-08-25 PROCEDURE — 25000003 PHARM REV CODE 250: Performed by: STUDENT IN AN ORGANIZED HEALTH CARE EDUCATION/TRAINING PROGRAM

## 2022-08-25 PROCEDURE — 63600175 PHARM REV CODE 636 W HCPCS: Performed by: NEUROLOGICAL SURGERY

## 2022-08-25 PROCEDURE — 85025 COMPLETE CBC W/AUTO DIFF WBC: CPT | Performed by: STUDENT IN AN ORGANIZED HEALTH CARE EDUCATION/TRAINING PROGRAM

## 2022-08-25 PROCEDURE — 63600175 PHARM REV CODE 636 W HCPCS: Performed by: STUDENT IN AN ORGANIZED HEALTH CARE EDUCATION/TRAINING PROGRAM

## 2022-08-25 PROCEDURE — 99024 POSTOP FOLLOW-UP VISIT: CPT | Mod: ,,, | Performed by: PHYSICIAN ASSISTANT

## 2022-08-25 PROCEDURE — 25000003 PHARM REV CODE 250

## 2022-08-25 PROCEDURE — 63600175 PHARM REV CODE 636 W HCPCS: Performed by: NURSE PRACTITIONER

## 2022-08-25 PROCEDURE — 99024 PR POST-OP FOLLOW-UP VISIT: ICD-10-PCS | Mod: ,,, | Performed by: PHYSICIAN ASSISTANT

## 2022-08-25 PROCEDURE — 25000003 PHARM REV CODE 250: Performed by: NEUROLOGICAL SURGERY

## 2022-08-25 PROCEDURE — 25000003 PHARM REV CODE 250: Performed by: PHYSICIAN ASSISTANT

## 2022-08-25 PROCEDURE — 99233 PR SUBSEQUENT HOSPITAL CARE,LEVL III: ICD-10-PCS | Mod: ,,, | Performed by: PHYSICIAN ASSISTANT

## 2022-08-25 PROCEDURE — 83735 ASSAY OF MAGNESIUM: CPT | Performed by: PHYSICIAN ASSISTANT

## 2022-08-25 PROCEDURE — 11000001 HC ACUTE MED/SURG PRIVATE ROOM

## 2022-08-25 PROCEDURE — 80202 ASSAY OF VANCOMYCIN: CPT | Performed by: NEUROLOGICAL SURGERY

## 2022-08-25 PROCEDURE — 80048 BASIC METABOLIC PNL TOTAL CA: CPT | Performed by: STUDENT IN AN ORGANIZED HEALTH CARE EDUCATION/TRAINING PROGRAM

## 2022-08-25 PROCEDURE — 99233 SBSQ HOSP IP/OBS HIGH 50: CPT | Mod: ,,, | Performed by: PHYSICIAN ASSISTANT

## 2022-08-25 RX ORDER — HEPARIN SODIUM 5000 [USP'U]/ML
5000 INJECTION, SOLUTION INTRAVENOUS; SUBCUTANEOUS EVERY 8 HOURS
Status: COMPLETED | OUTPATIENT
Start: 2022-08-25 | End: 2022-08-25

## 2022-08-25 RX ORDER — OXYCODONE HYDROCHLORIDE 5 MG/1
5 TABLET ORAL EVERY 4 HOURS PRN
Status: DISCONTINUED | OUTPATIENT
Start: 2022-08-25 | End: 2022-08-27

## 2022-08-25 RX ADMIN — CEFEPIME 2 G: 2 INJECTION, POWDER, FOR SOLUTION INTRAVENOUS at 09:08

## 2022-08-25 RX ADMIN — CARVEDILOL 37.5 MG: 25 TABLET, FILM COATED ORAL at 08:08

## 2022-08-25 RX ADMIN — POTASSIUM CHLORIDE 20 MEQ: 1500 TABLET, EXTENDED RELEASE ORAL at 08:08

## 2022-08-25 RX ADMIN — ATORVASTATIN CALCIUM 40 MG: 40 TABLET, FILM COATED ORAL at 08:08

## 2022-08-25 RX ADMIN — AMLODIPINE BESYLATE 10 MG: 10 TABLET ORAL at 08:08

## 2022-08-25 RX ADMIN — HEPARIN SODIUM 5000 UNITS: 5000 INJECTION INTRAVENOUS; SUBCUTANEOUS at 02:08

## 2022-08-25 RX ADMIN — INSULIN ASPART 10 UNITS: 100 INJECTION, SOLUTION INTRAVENOUS; SUBCUTANEOUS at 11:08

## 2022-08-25 RX ADMIN — AMITRIPTYLINE HYDROCHLORIDE 75 MG: 50 TABLET, FILM COATED ORAL at 09:08

## 2022-08-25 RX ADMIN — CEFEPIME 2 G: 2 INJECTION, POWDER, FOR SOLUTION INTRAVENOUS at 12:08

## 2022-08-25 RX ADMIN — VANCOMYCIN HYDROCHLORIDE 1000 MG: 1 INJECTION, POWDER, LYOPHILIZED, FOR SOLUTION INTRAVENOUS at 06:08

## 2022-08-25 RX ADMIN — INSULIN ASPART 10 UNITS: 100 INJECTION, SOLUTION INTRAVENOUS; SUBCUTANEOUS at 04:08

## 2022-08-25 RX ADMIN — LEVETIRACETAM 1000 MG: 100 INJECTION, SOLUTION INTRAVENOUS at 08:08

## 2022-08-25 RX ADMIN — INSULIN ASPART 1 UNITS: 100 INJECTION, SOLUTION INTRAVENOUS; SUBCUTANEOUS at 09:08

## 2022-08-25 RX ADMIN — LEVETIRACETAM 1000 MG: 100 INJECTION, SOLUTION INTRAVENOUS at 09:08

## 2022-08-25 RX ADMIN — Medication 400 MG: at 09:08

## 2022-08-25 RX ADMIN — CARVEDILOL 37.5 MG: 25 TABLET, FILM COATED ORAL at 09:08

## 2022-08-25 RX ADMIN — POTASSIUM CHLORIDE 20 MEQ: 1500 TABLET, EXTENDED RELEASE ORAL at 09:08

## 2022-08-25 RX ADMIN — Medication 400 MG: at 08:08

## 2022-08-25 RX ADMIN — HEPARIN SODIUM 5000 UNITS: 5000 INJECTION INTRAVENOUS; SUBCUTANEOUS at 09:08

## 2022-08-25 RX ADMIN — OXYCODONE 5 MG: 5 TABLET ORAL at 12:08

## 2022-08-25 RX ADMIN — INSULIN DETEMIR 30 UNITS: 100 INJECTION, SOLUTION SUBCUTANEOUS at 08:08

## 2022-08-25 RX ADMIN — POTASSIUM BICARBONATE 20 MEQ: 391 TABLET, EFFERVESCENT ORAL at 10:08

## 2022-08-25 RX ADMIN — CEFEPIME 2 G: 2 INJECTION, POWDER, FOR SOLUTION INTRAVENOUS at 05:08

## 2022-08-25 RX ADMIN — ESCITALOPRAM OXALATE 10 MG: 10 TABLET ORAL at 08:08

## 2022-08-25 RX ADMIN — ACETAMINOPHEN 650 MG: 325 TABLET ORAL at 10:08

## 2022-08-25 RX ADMIN — INSULIN ASPART 10 UNITS: 100 INJECTION, SOLUTION INTRAVENOUS; SUBCUTANEOUS at 08:08

## 2022-08-25 NOTE — PROGRESS NOTES
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 Diaz who presents with sudden onset of symptoms concerning for focal seizure. Patient was in her yard today playing with her son when her R arm became weak suddenly followed by the inability to speak and RLE weakness. This went on for a couple minutes and self resolved; patient said she had some lasting heaviness on her R side after. She came to the ED immediately.    Here in the ED she had another episode that resolved on its own. NSGY consulted.       Post-Op Info:  Procedure(s) (LRB):  LEFT Cranial wound debridement and washout (Left)         Interval History: NAEON. Washout rescheduled to tomorrow due to OR availability.  Persistent headache. Glucose improving. NPO at midnight. Denies fevers or chills.     Medications:  Continuous Infusions:  Scheduled Meds:   amitriptyline  75 mg Oral QHS    amLODIPine  10 mg Oral Daily    atorvastatin  40 mg Oral Daily    carvediloL  37.5 mg Oral BID    ceFEPime (MAXIPIME) IVPB  2 g Intravenous Q8H    EScitalopram oxalate  10 mg Oral Daily    heparin (porcine)  5,000 Units Subcutaneous Q8H    insulin aspart U-100  10 Units Subcutaneous TIDWM    insulin detemir U-100  30 Units Subcutaneous Daily    levetiracetam IV  1,000 mg Intravenous Q12H    magnesium oxide  400 mg Oral BID    potassium chloride SA  20 mEq Oral BID    [START ON 8/26/2022] vancomycin (VANCOCIN) IVPB  750 mg Intravenous Q12H     PRN Meds:acetaminophen, albuterol, dextrose 10%, dextrose 10%, glucagon (human recombinant), glucose, glucose, insulin aspart U-100, melatonin, Pharmacy to dose Vancomycin consult **AND** vancomycin - pharmacy to dose     Review of Systems  Objective:     Weight: 88.5 kg (195 lb 1.8 oz)  Body mass index is 35.69  kg/m².  Vital Signs (Most Recent):  Temp: 98.5 °F (36.9 °C) (08/25/22 0723)  Pulse: 74 (08/25/22 0723)  Resp: 17 (08/25/22 0723)  BP: (!) 164/85 (08/25/22 0723)  SpO2: 97 % (08/25/22 0723)   Vital Signs (24h Range):  Temp:  [98.1 °F (36.7 °C)-98.7 °F (37.1 °C)] 98.5 °F (36.9 °C)  Pulse:  [74-84] 74  Resp:  [17-18] 17  SpO2:  [94 %-99 %] 97 %  BP: (135-164)/(67-94) 164/85                     Female External Urinary Catheter 08/21/22 2221 (Active)   Skin no redness;no breakdown 08/23/22 1800   Tolerance no signs/symptoms of discomfort 08/23/22 1800   Suction Continuous suction at 60 mmHg 08/23/22 0800   Date of last wick change 08/23/22 08/23/22 0800   Time of last wick change 0800 08/23/22 0800   Output (mL) 1000 mL 08/23/22 0800     Neurosurgery Physical Exam  General: well developed, well nourished, no distress.   Head: normocephalic  Neck: No tracheal deviation. No palpable masses. Full ROM.   GCS: E4 V5 M6; Total: 15  Mental Status: Awake, Alert, Oriented x 4  Language: No aphasia  Speech: No dysarthria  Cranial nerves: face symmetric, tongue midline, CN II-XII grossly intact.   Eyes: pupils equal, round, reactive to light with accomodation, EOMI.   Ears: No drainage.   Pulmonary: normal respirations, no signs of respiratory distress  Abdomen: soft, non-distended, not tender to palpation  Skin: Skin is warm, dry and intact.     Sensory: intact to light touch throughout  Motor Strength: Moves all extremities spontaneously with good tone.  Full strength upper and lower extremities. No abnormal movements seen.      Finger-to-nose: intact bilaterally   Pronator drift: absent bilaterally     Left Cranial Incision: Inferior aspect of incision with uneven edges, delayed healing, no leakage. Anterior left scalp swelling, no erythema, but tender to palpation.     Significant Labs:  Recent Labs   Lab 08/23/22 1822 08/24/22  0809 08/25/22  0540   * 237* 176*    138 139   K 3.2* 3.5 3.3*    107 109   CO2  24 22* 22*   BUN 8 8 9   CREATININE 1.0 0.9 1.0   CALCIUM 9.1 9.2 9.3   MG  --  1.8 2.0     Recent Labs   Lab 08/24/22  0809 08/25/22  0540   WBC 7.35 7.59   HGB 12.0 12.3   HCT 33.5* 35.1*    318     No results for input(s): LABPT, INR, APTT in the last 48 hours.  Microbiology Results (last 7 days)       Procedure Component Value Units Date/Time    Blood culture [[id number]] Collected: 08/21/22 2325    Order Status: Completed Specimen: Blood from Peripheral, Antecubital, Right Updated: 08/25/22 0612     Blood Culture, Routine No Growth to date      No Growth to date      No Growth to date      No Growth to date    Culture, Anaerobic [[id number]] Collected: 08/22/22 1550    Order Status: Completed Specimen: Incision site from Head Updated: 08/24/22 0856     Anaerobic Culture Culture in progress    Aerobic culture [[id number]] Collected: 08/22/22 1550    Order Status: Completed Specimen: Incision site from Head Updated: 08/24/22 0728     Aerobic Bacterial Culture No growth    AFB Culture & Smear [[id number]] Collected: 08/22/22 1550    Order Status: Completed Specimen: Incision site from Head Updated: 08/23/22 2127     AFB Culture & Smear Culture in progress     AFB CULTURE STAIN No acid fast bacilli seen.    Fungus culture [[id number]] Collected: 08/22/22 1550    Order Status: No result Specimen: Incision site from Head Updated: 08/23/22 0930    Aerobic culture [[id number]]     Order Status: Completed Specimen: Incision site from Head     Culture, Anaerobe [[id number]]     Order Status: Completed Specimen: Body Fluid from Head     Fungus culture [[id number]]     Order Status: Completed Specimen: Body Fluid from Head     Fungus culture [[id number]]     Order Status: Canceled Specimen: Body Fluid from Head     Culture, Anaerobe [[id number]]     Order Status: Canceled Specimen: Body Fluid from Head     Aerobic culture [[id number]]     Order Status: Canceled Specimen: Incision site from Head     AFB Culture & Smear  [[id number]]     Order Status: Canceled Specimen: Body Fluid from Head           All pertinent labs from the last 24 hours have been reviewed.    Significant Diagnostics:  I have reviewed all pertinent imaging results/findings within the past 24 hours.    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 Diaz who presented after episode of sudden onset R-sided weakness/tremors and aphasia for about 5 minutes, resolved prior to EMS arrival, concerning for focal seizure, then with second episode while in the ED. MRI brain w/wo concerning for scalp and epidural infection.    - Admitted to NSGY   - Neuro checks q4h  - all labs and diagnostics reviewed   - CTA 8/21: largely stable from prior with epidural fluid collection and extracranial fluid collection stable in size; stable postsurgical changes of L PCOM/anterior choroidal aneurysm clipping, stable small left MCA bifurcation aneurysm   - MRI brain w wo 8/21: peripheral rim enhancement of subdural and scalp complex fluid collections, concerning for infection; no evidence of enhancement of brain parenchyma   - spot EEG 8/23: mild regional cortical or subcortical dysfunction in the left temporal region with no electrographic seizures or indications of seizure tendency.  - Infectious workup:   - afebrile, no leukocytosis   - blood cx 8/21 NGTD   - ESR 30-->46, CRP 28.5-->26   - Subcutaneous scalp fluid collection tapped 8/22, sent for Cx's - NGTD   - ID consulted, recommend Vdanc/Cefepime, continue to follow Cx's  - Plan for OR tomorrow 8/26 for cranial wound washout, Rescheduled due to OR availability   - NPO at midnight. SQH to be held at midnight.   - Seizures: Episodes concerning for focal seizures with R-sided tremors and weakness. Not on AEDs prior to admission.   - Keppra loaded on admisdddsion, continue 1g bid   - spot EEG  8/23 with left temporal subcortical dysfunction, negative for electrographic seizures  - New Onset T2DM: No h/o DM. Hyperglycemic on admission. HbA1C 8.3. Endocrinology consulted. Goal -180.   - Start Levemir 30 untis daily First dose this Am. 0.7 u/kg/d dosing.    - Start Novolog 10 units TIDWM. Basal/Prandial physiologic matching.     - Moderate Dose SQ Insulin Correction Scale.   - BG Monitoring AC/HS.    - Bedside nurse to instruct on insulin pen use and blood sugar monitor. Have patient administer own injections after education completed supervised by nurse. Have patient watch insulin educatoin video's via i-pad if available on unit.  - HTN: SBP <160. Continue home meds.  - Hypokalemia, Hypomagnesemia: Chronic, on home supplements daily.   - Resume home potassium and Mg supplements   - Replete PRN, daily labs    Dispo: ongoing        Areli Turner PA-C  Neurosurgery  [facility] - Neurosurgery ([facility])

## 2022-08-25 NOTE — SUBJECTIVE & OBJECTIVE
Interval History: NAEON. Washout rescheduled to tomorrow due to OR availability.  Persistent headache. Glucose improving. NPO at midnight. Denies fevers or chills.     Medications:  Continuous Infusions:  Scheduled Meds:   amitriptyline  75 mg Oral QHS    amLODIPine  10 mg Oral Daily    atorvastatin  40 mg Oral Daily    carvediloL  37.5 mg Oral BID    ceFEPime (MAXIPIME) IVPB  2 g Intravenous Q8H    EScitalopram oxalate  10 mg Oral Daily    heparin (porcine)  5,000 Units Subcutaneous Q8H    insulin aspart U-100  10 Units Subcutaneous TIDWM    insulin detemir U-100  30 Units Subcutaneous Daily    levetiracetam IV  1,000 mg Intravenous Q12H    magnesium oxide  400 mg Oral BID    potassium chloride SA  20 mEq Oral BID    [START ON 8/26/2022] vancomycin (VANCOCIN) IVPB  750 mg Intravenous Q12H     PRN Meds:acetaminophen, albuterol, dextrose 10%, dextrose 10%, glucagon (human recombinant), glucose, glucose, insulin aspart U-100, melatonin, Pharmacy to dose Vancomycin consult **AND** vancomycin - pharmacy to dose     Review of Systems  Objective:     Weight: 88.5 kg (195 lb 1.8 oz)  Body mass index is 35.69 kg/m².  Vital Signs (Most Recent):  Temp: 98.5 °F (36.9 °C) (08/25/22 0723)  Pulse: 74 (08/25/22 0723)  Resp: 17 (08/25/22 0723)  BP: (!) 164/85 (08/25/22 0723)  SpO2: 97 % (08/25/22 0723)   Vital Signs (24h Range):  Temp:  [98.1 °F (36.7 °C)-98.7 °F (37.1 °C)] 98.5 °F (36.9 °C)  Pulse:  [74-84] 74  Resp:  [17-18] 17  SpO2:  [94 %-99 %] 97 %  BP: (135-164)/(67-94) 164/85                     Female External Urinary Catheter 08/21/22 2221 (Active)   Skin no redness;no breakdown 08/23/22 1800   Tolerance no signs/symptoms of discomfort 08/23/22 1800   Suction Continuous suction at 60 mmHg 08/23/22 0800   Date of last wick change 08/23/22 08/23/22 0800   Time of last wick change 0800 08/23/22 0800   Output (mL) 1000 mL 08/23/22 0800     Neurosurgery Physical Exam  General: well developed, well nourished, no distress.    Head: normocephalic  Neck: No tracheal deviation. No palpable masses. Full ROM.   GCS: E4 V5 M6; Total: 15  Mental Status: Awake, Alert, Oriented x 4  Language: No aphasia  Speech: No dysarthria  Cranial nerves: face symmetric, tongue midline, CN II-XII grossly intact.   Eyes: pupils equal, round, reactive to light with accomodation, EOMI.   Ears: No drainage.   Pulmonary: normal respirations, no signs of respiratory distress  Abdomen: soft, non-distended, not tender to palpation  Skin: Skin is warm, dry and intact.     Sensory: intact to light touch throughout  Motor Strength: Moves all extremities spontaneously with good tone.  Full strength upper and lower extremities. No abnormal movements seen.      Finger-to-nose: intact bilaterally   Pronator drift: absent bilaterally     Left Cranial Incision: Inferior aspect of incision with uneven edges, delayed healing, no leakage. Anterior left scalp swelling, no erythema, but tender to palpation.     Significant Labs:  Recent Labs   Lab 08/23/22  1822 08/24/22  0809 08/25/22  0540   * 237* 176*    138 139   K 3.2* 3.5 3.3*    107 109   CO2 24 22* 22*   BUN 8 8 9   CREATININE 1.0 0.9 1.0   CALCIUM 9.1 9.2 9.3   MG  --  1.8 2.0     Recent Labs   Lab 08/24/22  0809 08/25/22  0540   WBC 7.35 7.59   HGB 12.0 12.3   HCT 33.5* 35.1*    318     No results for input(s): LABPT, INR, APTT in the last 48 hours.  Microbiology Results (last 7 days)       Procedure Component Value Units Date/Time    Blood culture [[id number]] Collected: 08/21/22 2325    Order Status: Completed Specimen: Blood from Peripheral, Antecubital, Right Updated: 08/25/22 0612     Blood Culture, Routine No Growth to date      No Growth to date      No Growth to date      No Growth to date    Culture, Anaerobic [[id number]] Collected: 08/22/22 1550    Order Status: Completed Specimen: Incision site from Head Updated: 08/24/22 0856     Anaerobic Culture Culture in progress    Aerobic  culture [[id number]] Collected: 08/22/22 1550    Order Status: Completed Specimen: Incision site from Head Updated: 08/24/22 0728     Aerobic Bacterial Culture No growth    AFB Culture & Smear [[id number]] Collected: 08/22/22 1550    Order Status: Completed Specimen: Incision site from Head Updated: 08/23/22 2127     AFB Culture & Smear Culture in progress     AFB CULTURE STAIN No acid fast bacilli seen.    Fungus culture [[id number]] Collected: 08/22/22 1550    Order Status: No result Specimen: Incision site from Head Updated: 08/23/22 0930    Aerobic culture [[id number]]     Order Status: Completed Specimen: Incision site from Head     Culture, Anaerobe [[id number]]     Order Status: Completed Specimen: Body Fluid from Head     Fungus culture [[id number]]     Order Status: Completed Specimen: Body Fluid from Head     Fungus culture [[id number]]     Order Status: Canceled Specimen: Body Fluid from Head     Culture, Anaerobe [[id number]]     Order Status: Canceled Specimen: Body Fluid from Head     Aerobic culture [[id number]]     Order Status: Canceled Specimen: Incision site from Head     AFB Culture & Smear [[id number]]     Order Status: Canceled Specimen: Body Fluid from Head           All pertinent labs from the last 24 hours have been reviewed.    Significant Diagnostics:  I have reviewed all pertinent imaging results/findings within the past 24 hours.

## 2022-08-25 NOTE — PROGRESS NOTES
Pharmacokinetic Assessment Follow Up: IV Vancomycin    Vancomycin serum concentration assessment(s) and plan:  - Vancomycin level resulted as 23.2 mcg/mL today (~13 hrs post dose)  - Desired empiric serum trough concentration is 15 to 20 mcg/mL.  - Renal function appears stable  - Change Vancomycin to 750 mg IV Q12h (start 8/26 @0100 to allow additional clearance given level)  - Draw trough level 60 min prior to 4th dose @1200 on 8/27/2022     Drug levels (last 3 results):  Recent Labs   Lab Result Units 08/22/22  1420 08/23/22  1613 08/25/22  0540   Vancomycin, Random ug/mL 13.9  --   --    Vancomycin-Trough ug/mL  --  15.0 23.2*       Pharmacy will continue to follow and monitor vancomycin.    Please contact pharmacy at extension [phone] for questions regarding this assessment.    Thank you for the consult,   Anne Mckeon       Patient brief summary:  Gina Jenkins is a 47 y.o. female initiated on antimicrobial therapy with IV Vancomycin for treatment of skin & soft tissue infection    Drug Allergies:   Review of patient's allergies indicates:   Allergen Reactions    Lisinopril Swelling    Bactrim [sulfamethoxazole-trimethoprim] Rash       Actual Body Weight:   88.5 kg    Renal Function:   Estimated Creatinine Clearance: 72.7 mL/min (based on SCr of 1 mg/dL).,     Dialysis Method (if applicable):  N/A    CBC (last 72 hours):  Recent Labs   Lab Result Units 08/22/22  1420 08/23/22  0422 08/24/22  0809 08/25/22  0540   WBC K/uL  --  7.68 7.35 7.59   Hemoglobin g/dL  --  11.7* 12.0 12.3   Hemoglobin A1C % 8.3*  --   --   --    Hematocrit %  --  32.6* 33.5* 35.1*   Platelets K/uL  --  322 298 318   Gran % %  --  63.2 64.8 67.0   Lymph % %  --  24.3 21.6 19.9   Mono % %  --  9.1 9.0 9.0   Eosinophil % %  --  1.3 2.0 2.2   Basophil % %  --  0.5 0.7 0.7   Differential Method   --  Automated Automated Automated       Metabolic Panel (last 72 hours):  Recent Labs   Lab Result Units 08/23/22  0422 08/23/22  0844  08/23/22  1822 08/24/22  0809 08/25/22  0540   Sodium mmol/L 138  --  137 138 139   Potassium mmol/L 2.9*  --  3.2* 3.5 3.3*   Chloride mmol/L 104  --  102 107 109   CO2 mmol/L 24  --  24 22* 22*   Glucose mg/dL 335*  --  481* 237* 176*   BUN mg/dL 8  --  8 8 9   Creatinine mg/dL 0.9  --  1.0 0.9 1.0   Magnesium mg/dL  --  1.5*  --  1.8 2.0       Vancomycin Administrations:  vancomycin given in the last 96 hours                   vancomycin in dextrose 5 % 1 gram/250 mL IVPB 1,000 mg (mg) 1,000 mg New Bag 08/25/22 0645     1,000 mg New Bag 08/24/22 1646     1,000 mg New Bag  0653     1,000 mg New Bag 08/23/22 1813     1,000 mg New Bag  0528     1,000 mg New Bag 08/22/22 1810    vancomycin 1.75 g in 5 % dextrose 500 mL IVPB (mg) 1,750 mg New Bag 08/22/22 0231                Microbiologic Results:  Microbiology Results (last 7 days)     Procedure Component Value Units Date/Time    Blood culture [[id number]] Collected: 08/21/22 2325    Order Status: Completed Specimen: Blood from Peripheral, Antecubital, Right Updated: 08/25/22 0612     Blood Culture, Routine No Growth to date      No Growth to date      No Growth to date      No Growth to date    Culture, Anaerobic [[id number]] Collected: 08/22/22 1550    Order Status: Completed Specimen: Incision site from Head Updated: 08/24/22 0856     Anaerobic Culture Culture in progress    Aerobic culture [[id number]] Collected: 08/22/22 1550    Order Status: Completed Specimen: Incision site from Head Updated: 08/24/22 0728     Aerobic Bacterial Culture No growth    AFB Culture & Smear [[id number]] Collected: 08/22/22 1550    Order Status: Completed Specimen: Incision site from Head Updated: 08/23/22 2127     AFB Culture & Smear Culture in progress     AFB CULTURE STAIN No acid fast bacilli seen.    Fungus culture [[id number]] Collected: 08/22/22 1550    Order Status: No result Specimen: Incision site from Head Updated: 08/23/22 0930    Aerobic culture [[id number]]     Order Status:  Completed Specimen: Incision site from Head     Culture, Anaerobe [[id number]]     Order Status: Completed Specimen: Body Fluid from Head     Fungus culture [[id number]]     Order Status: Completed Specimen: Body Fluid from Head     Fungus culture [[id number]]     Order Status: Canceled Specimen: Body Fluid from Head     Culture, Anaerobe [[id number]]     Order Status: Canceled Specimen: Body Fluid from Head     Aerobic culture [[id number]]     Order Status: Canceled Specimen: Incision site from Head     AFB Culture & Smear [[id number]]     Order Status: Canceled Specimen: Body Fluid from Head

## 2022-08-25 NOTE — PROGRESS NOTES
Misael Schmitt - Neurosurgery ([facility])  Infectious Disease  Progress Note    Patient Name: Gina Jenkins  MRN: [id number]  Admission Date: 8/21/2022  Length of Stay: 3 days  Attending Physician: Timothy Diaz MD  Primary Care Provider: Clair Johnson NP    Isolation Status: No active isolations  Assessment/Plan:       Fluid collection at surgical site     See assessment and plan below     Post op infection     46 yo female with history of HTN, smoking and left brain aneurysm s/p left sided craniotomy for aneurysm clipping on 7/15/22 who presented with sudden onset of symptoms concerning for focal seizure. See HPI for more detail.      MRI brain showed peripherally enhancing sub dural and scalp complex fluid collections. CTA head without large vessel occulsion, stenosis, or vascular malformation.  NSGY performed a bedside aspiration of one of the subcutaneous fluid collections. Per previous discussion with team, blood was aspirated. Cx NGTD.      Patient is on empiric Vancomycin and Cefepime. Afebrile without a leukocytosis. HDS. She is scheduled for cranial wound I&D tomorrow.       Recommendations:   · Continue IV Vancomycin. Inpatient pharmacy managing Vanc dosing. Trough goal 15-20.   · Continue Cefepime 2 g IV q 8 hours   · When taken to OR, please send tissue/fluid for pathology, gram stain, aerobic, anaerobic, AFB and fungal cultures.  · Will follow culture data to guide antibiotic therapy   · Plan reviewed with ID staff, Dr. Pineda. ID will follow.         Thank you for the consult. Please call for any questions.  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 Diaz who presented with sudden onset of symptoms concerning for focal seizure. Pt states she  was in her yard with her son on Sunday sharing a snack when she suddenly became aphasic and had RLE weakness. Pt states occurred for about 5 mins and then self resolved. Pt reported to the ED immediately and reports the episode occurred for a 2nd time in the ER, again self resolving. Pt denies drainage, pain, or swelling from her craniotomy incision site. Denies current weakness, paralysis. Denies change of vision, headaches. Denies loss of control from bowels, urine. Denies fevers, body aches, chills. Denies concerns/complications postoperatively until on Sunday when the symptoms started.     Since admission pt remains without leukocytosis. Pt with slightly increased inflammatory markers, sed rate 46 and CRP 26. Blood cultures NGTD. MRI brain w wo c/f enhancement of the sub dural and scalp complex fluid collections, potentially representing granulation tissue or infection hematoma. MRI wo c/f stable subdural and subq fluid collections surrounding cranitomy defect on the left, potentially appearing somewhat loculated. CTA head with no large vessel occulsion, stenosis, or vascular malformation.     ID was consulted for mgmt recommendations. Pt is currently on vancomycin and piperacillin tazobactam.       Interval History: NAEON. Reports frontal headache this morning though improved. Good appetite. Denies nausea, new visual changes. Afebrile. No leukocytosis. Scheduled for cranial wound washout tomorrow.    Past Medical History:   Diagnosis Date    Brain aneurysm     CHF (congestive heart failure)     H/O coronary angioplasty     Hypercholesteremia     Hypertension     Malignant hypertension     Migraine headache     Stroke 10/2017       Past Surgical History:   Procedure Laterality Date    CARDIAC CATHETERIZATION      CEREBRAL ANGIOGRAM      CLIP LIGATION OF INTRACRANIAL ANEURYSM BY CRANIOTOMY N/A 7/15/2022    Procedure: CRANIOTOMY, WITH ANEURYSM CLIPPING;  Surgeon: Timothy Diza MD;  Location: [facility]  OR 2ND FLR;  Service: Neurosurgery;  Laterality: N/A;  PTERIONAL CRANIOTOMY WITH CLIP LIGATION OF L PCOMM, L ANTERIOR CHOROIDAL, L MCA  ANEURYSM, ANESTHESIA: GENERAL, BLOOD: TYPE&CROSS 2 UNITS, NEUROMONITORING: SEP, MEP, EEG, RADIOLOGY: C-ARM, POSITION: SUPINE, ANNA CO-SURGERON: DR. LEXI DOMÍNGUEZ.       Review of patient's allergies indicates:   Allergen Reactions    Lisinopril Swelling    Bactrim [sulfamethoxazole-trimethoprim] Rash       Medications:  Facility-Administered Medications Prior to Admission   Medication    albuterol inhaler 2 puff     Medications Prior to Admission   Medication Sig    amitriptyline (ELAVIL) 75 MG tablet Take 75 mg by mouth every evening.    amlodipine (NORVASC) 10 MG tablet Take 1 tablet (10 mg total) by mouth once daily.    atorvastatin (LIPITOR) 40 MG tablet Take 1 tablet (40 mg total) by mouth once daily.    butalbital-acetaminophen-caffeine -40 mg (FIORICET, ESGIC) -40 mg per tablet Take 1 tablet by mouth every 4 (four) hours as needed.    carvediloL (COREG) 25 MG tablet Take 37.5 mg by mouth 2 (two) times daily.    cyanocobalamin (VITAMIN B-12) 1000 MCG tablet Take 1 tablet (1,000 mcg total) by mouth once daily. (Patient not taking: No sig reported)    diphenhydrAMINE (BENADRYL) 25 mg capsule Take 1 each (25 mg total) by mouth every 6 (six) hours as needed for Itching or Allergies. (Patient not taking: No sig reported)    docusate sodium (COLACE) 100 MG capsule Take 1 capsule (100 mg total) by mouth 2 (two) times daily as needed for Constipation.    EScitalopram oxalate (LEXAPRO) 10 MG tablet Take 10 mg by mouth once daily.    famotidine (PEPCID) 20 MG tablet Take 1 tablet (20 mg total) by mouth 2 (two) times daily. (Patient not taking: No sig reported)    hydrochlorothiazide (HYDRODIURIL) 25 MG tablet Take 1 tablet (25 mg total) by mouth once daily.    levETIRAcetam (KEPPRA) 500 MG Tab Take 1 tablet (500 mg total) by mouth 2 (two) times daily for 5  "days (Patient not taking: Reported on 7/29/2022)    magnesium oxide (MAG-OX) 400 mg (241.3 mg magnesium) tablet Take 1 tablet by mouth 2 (two) times daily.    potassium chloride SA (K-DUR,KLOR-CON) 20 MEQ tablet Take 20 mEq by mouth 2 (two) times daily.    [DISCONTINUED] cephALEXin (KEFLEX) 500 MG capsule Take 1 capsule (500 mg total) by mouth every 6 (six) hours. for 14 days    [DISCONTINUED] dexAMETHasone (DECADRON) 2 MG tablet Take 2 tablets (4 mg) by mouth every 8 hours for 1 day, THEN 2 tablets (4 mg) every 12 hours for 2 days, THEN 1 tablet (2 mg) every 12 hours for 2 days, THEN 1 tablet (2 mg) daily for 2 days, then STOP.    [DISCONTINUED] HYDROcodone-acetaminophen (NORCO) 5-325 mg per tablet Take 1 tablet by mouth every 6 (six) hours as needed for Pain.     Antibiotics (From admission, onward)                Start     Stop Route Frequency Ordered    08/26/22 0100  vancomycin 750 mg in dextrose 5 % 250 mL IVPB (ready to mix system)         -- IV Every 12 hours (non-standard times) 08/25/22 0712    08/22/22 1915  cefepime in dextrose 5 % IVPB 2 g         -- IV Every 8 hours (non-standard times) 08/22/22 1805    08/22/22 0212  vancomycin - pharmacy to dose  (vancomycin IVPB)        "And" Linked Group Details    -- IV pharmacy to manage frequency 08/22/22 0112          Antifungals (From admission, onward)                None          Antivirals (From admission, onward)      None             Immunization History   Administered Date(s) Administered    Influenza - Quadrivalent - PF *Preferred* (6 months and older) 10/05/2017       Family History    None       Social History     Socioeconomic History    Marital status:    Tobacco Use    Smoking status: Current Every Day Smoker     Packs/day: 0.50     Types: Cigarettes    Smokeless tobacco: Never Used   Substance and Sexual Activity    Alcohol use: Yes     Comment: occassionally    Drug use: No    Sexual activity: Not Currently     Partners: Male "     Birth control/protection: None     Review of Systems   Constitutional:  Negative for appetite change, chills, diaphoresis, fatigue and fever.   HENT:  Positive for facial swelling.    Respiratory:  Negative for cough and shortness of breath.    Cardiovascular:  Negative for chest pain and leg swelling.   Gastrointestinal:  Negative for abdominal pain, diarrhea, nausea and vomiting.   Genitourinary:  Negative for difficulty urinating, flank pain and frequency.   Musculoskeletal:  Negative for arthralgias, joint swelling and myalgias.   Skin:  Positive for wound. Negative for color change and rash.   Neurological:  Positive for headaches. Negative for dizziness, weakness and numbness.   Psychiatric/Behavioral:  Negative for agitation and confusion. The patient is not nervous/anxious.    Objective:     Vital Signs (Most Recent):  Temp: 98.5 °F (36.9 °C) (08/25/22 1138)  Pulse: 77 (08/25/22 1138)  Resp: 17 (08/25/22 1138)  BP: (!) 141/84 (08/25/22 1138)  SpO2: 98 % (08/25/22 1138)   Vital Signs (24h Range):  Temp:  [98.1 °F (36.7 °C)-98.7 °F (37.1 °C)] 98.5 °F (36.9 °C)  Pulse:  [72-84] 77  Resp:  [17-18] 17  SpO2:  [94 %-99 %] 98 %  BP: (135-164)/(67-86) 141/84     Weight: 88.5 kg (195 lb 1.8 oz)  Body mass index is 35.69 kg/m².    Estimated Creatinine Clearance: 72.7 mL/min (based on SCr of 1 mg/dL).    Physical Exam  Vitals and nursing note reviewed.   Constitutional:       General: She is not in acute distress.     Appearance: Normal appearance. She is well-developed and normal weight. She is not ill-appearing, toxic-appearing or diaphoretic.   HENT:      Head:      Comments: Healing left craniotomy site. See photos. No drainage, erythema, warmth noted.  Anterior left scalp/facial swelling.     Nose: Nose normal. No congestion.      Mouth/Throat:      Dentition: Does not have dentures. No dental abscesses.   Eyes:      General: No scleral icterus.     Conjunctiva/sclera: Conjunctivae normal.   Cardiovascular:       Rate and Rhythm: Normal rate and regular rhythm.   Pulmonary:      Effort: Pulmonary effort is normal. No respiratory distress.      Breath sounds: Normal breath sounds. No wheezing or rales.   Abdominal:      General: Bowel sounds are normal. There is no distension.      Palpations: Abdomen is soft.      Tenderness: There is no abdominal tenderness.   Musculoskeletal:         General: Normal range of motion.      Cervical back: Normal range of motion.      Right lower leg: No edema.      Left lower leg: No edema.   Skin:     General: Skin is warm and dry.      Findings: No erythema or rash.   Neurological:      Mental Status: She is alert and oriented to person, place, and time. Mental status is at baseline.      Motor: No weakness.   Psychiatric:         Mood and Affect: Mood normal.         Behavior: Behavior normal.         Thought Content: Thought content normal.         Judgment: Judgment normal.       Significant Labs: CBC:   Recent Labs   Lab 08/24/22  0809 08/25/22  0540   WBC 7.35 7.59   HGB 12.0 12.3   HCT 33.5* 35.1*    318     CMP:   Recent Labs   Lab 08/23/22  1822 08/24/22  0809 08/25/22  0540    138 139   K 3.2* 3.5 3.3*    107 109   CO2 24 22* 22*   * 237* 176*   BUN 8 8 9   CREATININE 1.0 0.9 1.0   CALCIUM 9.1 9.2 9.3   ANIONGAP 11 9 8     Microbiology Results (last 7 days)       Procedure Component Value Units Date/Time    Gram stain [[id number]]     Order Status: Completed Specimen: Incision site from Head     Culture, Anaerobic [[id number]] Collected: 08/22/22 1550    Order Status: Completed Specimen: Incision site from Head Updated: 08/25/22 1031     Anaerobic Culture Culture in progress    Blood culture [[id number]] Collected: 08/21/22 2325    Order Status: Completed Specimen: Blood from Peripheral, Antecubital, Right Updated: 08/25/22 0612     Blood Culture, Routine No Growth to date      No Growth to date      No Growth to date      No Growth to date    Aerobic  culture [[id number]] Collected: 08/22/22 1550    Order Status: Completed Specimen: Incision site from Head Updated: 08/24/22 0728     Aerobic Bacterial Culture No growth    AFB Culture & Smear [[id number]] Collected: 08/22/22 1550    Order Status: Completed Specimen: Incision site from Head Updated: 08/23/22 2127     AFB Culture & Smear Culture in progress     AFB CULTURE STAIN No acid fast bacilli seen.    Fungus culture [[id number]] Collected: 08/22/22 1550    Order Status: No result Specimen: Incision site from Head Updated: 08/23/22 0930    Aerobic culture [[id number]]     Order Status: Completed Specimen: Incision site from Head     Culture, Anaerobe [[id number]]     Order Status: Completed Specimen: Body Fluid from Head     Fungus culture [[id number]]     Order Status: Completed Specimen: Body Fluid from Head     Fungus culture [[id number]]     Order Status: Canceled Specimen: Body Fluid from Head     Culture, Anaerobe [[id number]]     Order Status: Canceled Specimen: Body Fluid from Head     Aerobic culture [[id number]]     Order Status: Canceled Specimen: Incision site from Head     AFB Culture & Smear [[id number]]     Order Status: Canceled Specimen: Body Fluid from Head           Recent Lab Results  (Last 5 results in the past 24 hours)        08/25/22  1159   08/25/22  0711   08/25/22  0540   08/25/22  0536   08/24/22  2055        Anion Gap     8           Baso #     0.05           Basophil %     0.7           BUN     9           Calcium     9.3           Chloride     109           CO2     22           Creatinine     1.0           Differential Method     Automated           eGFR     >60.0           Eos #     0.2           Eosinophil %     2.2           Glucose     176           Gran # (ANC)     5.1           Gran %     67.0           Hematocrit     35.1           Hemoglobin     12.3           Immature Grans (Abs)     0.09  Comment: Mild elevation in immature granulocytes is non specific and   can be seen in a  variety of conditions including stress response,   acute inflammation, trauma and pregnancy. Correlation with other   laboratory and clinical findings is essential.             Immature Granulocytes     1.2           Lymph #     1.5           Lymph %     19.9           Magnesium     2.0           MCH     28.9           MCHC     35.0           MCV     82           Mono #     0.7           Mono %     9.0           MPV     10.4           nRBC     0           Platelets     318           POCT Glucose 207   191     173   261       Potassium     3.3           RBC     4.26           RDW     14.1           Sodium     139           Vancomycin-Trough     23.2           WBC     7.59                                  Significant Imaging:   Imaging Results              MRI Brain W WO Contrast (Final result)  Result time 08/21/22 23:31:21   Procedure changed from MRI Brain With Contrast     Final result by Anthony Jones MD (08/21/22 23:31:21)                   Impression:      Peripheral rim enhancement of the sub dural and scalp complex fluid collections.  While this could represent enhancing granulation tissue, rim enhancement of infected hematoma is difficult to exclude.    Enhancement extending into the TMJ and lateral upper facial region on the left.  Correlate for cellulitis.    No evidence of enhancement of the brain parenchyma to suggest cerebritis.      Electronically signed by: Anthony Jones  Date:    08/21/2022  Time:    23:31               Narrative:    EXAMINATION:  MRI BRAIN W WO CONTRAST    CLINICAL HISTORY:  rule out infection;    TECHNIQUE:  Multiplanar multisequence MR imaging of the brain was performed before and after the administration of 10 mL Gadavist intravenous contrast.    COMPARISON:  MRI of the brain, earlier same day previous CT scans    FINDINGS:  Complex subdural and scalp fluid collections demonstrate peripheral rim enhancement with internal septa mainly in the scalp complex mass and fluid  collection.  The scalp changes and enhancement extend into the TMJ region and pre-auricular region on the left.  There is no abnormal enhancement of the brain parenchyma to suggest cerebritis.                                       MRI Brain Without Contrast (Final result)  Result time 08/21/22 22:24:25      Final result by Anthony Jones MD (08/21/22 22:24:25)                   Impression:      Stable subdural and subcutaneous fluid collection surrounding the patient's craniotomy defect on the left.  These appear somewhat loculated.  Correlate for subdural and scalp hematoma.  Versus signs of infection.    Postoperative changes of aneurysm clipping with no evidence of acute intracranial hemorrhage, mass or infarction.      Electronically signed by: Anthony Jones  Date:    08/21/2022  Time:    22:24               Narrative:    EXAMINATION:  MRI BRAIN WITHOUT CONTRAST    CLINICAL HISTORY:  Headache, new or worsening, neuro deficit (Age 19-49y);    TECHNIQUE:  Multiplanar multisequence MR imaging of the brain was performed without contrast.    COMPARISON:  CT, 08/21/2022, CT brain, 08/17/2022    FINDINGS:  There is no restricted diffusion.  Patchy punctate foci of gliotic signal intensity throughout the deep and subcortical white matter is again noted.  The left craniotomy and subdural and scalp complex fluid collections with proteinaceous content are noted.  No significant mass effect is evident.  Postoperative changes aneurysm clipping in the sylvian fissure near the [id number] on the left is noted.  There is no evidence midline shift or mass effect or new pathologic fluid collection within the brain parenchyma.  There is no hydrocephalus.  Empty sella configuration is.  Bilateral sinus disease is present.  The orbits and orbital contents appear unremarkable.                                       X-Ray Chest AP Portable (Final result)  Result time 08/21/22 21:40:18      Final result by Mario Michaud,  MD (08/21/22 21:40:18)                   Impression:      Hypoventilatory examination.  No convincing radiographic evidence of acute intrathoracic process on this single view.      Electronically signed by: Mario Michaud MD  Date:    08/21/2022  Time:    21:40               Narrative:    EXAMINATION:  XR CHEST AP PORTABLE    CLINICAL HISTORY:  Stroke;    TECHNIQUE:  Single frontal view of the chest was performed.    COMPARISON:  07/15/2022    FINDINGS:  Cardiac monitoring leads overlie the chest.  Cardiac silhouette is stable in size.  Lung volumes are diminished with resultant bronchovascular crowding.  No large confluent airspace consolidation appreciated.  No significant volume of pleural fluid or pneumothorax identified.  Osseous structures demonstrate mild degenerative changes.                                       CTA STROKE MULTI-PHASE (Final result)  Result time 08/21/22 22:21:22      Final result by Flaquito Burciaga MD (08/21/22 22:21:22)                   Impression:      CTA head: No large vessel occlusion, high grade stenosis, or vascular malformation identified.  Stable postsurgical changes of left PCOM/anterior choroidal aneurysm clipping.  Unchanged small aneurysm at the left M2 bifurcation.    CT head: No evidence for acute intracranial hemorrhage or major vascular distribution infarct.  Stable postsurgical changes of left frontotemporal craniotomy for aneurysm clipping.  Small subdural collection underlying the craniotomy site, similar to prior exam.  Soft tissue collection overlying the craniotomy site is similar in size to prior exam.    Electronically signed by resident: Bianca Davis  Date:    08/21/2022  Time:    21:38    Electronically signed by: Flaquito Burciaga MD  Date:    08/21/2022  Time:    22:21               Narrative:    EXAMINATION:  CTA STROKE MULTI-PHASE    CLINICAL HISTORY:  Transient ischemic attack (TIA);    TECHNIQUE:  Axial CT images obtained throughout the before and after the  administration of intravenous contrast.    Multiphase CT angiogram was then performed from the top of aortic arch to the vertex during bolus administration of 75 mL of Omnipaque 350 intravenous contrast.  Scan through the head and neck was performed in arterial phase.  Axial, sagittal and coronal reconstructions, including maximum intensity projection reconstructions were performed.    CT source data was analyzed using artificial intelligence software for detection of a large vessel occlusions (LVO) in order to enable computer assisted triage notification and aid clinical stroke decision making.    COMPARISON:  CT head 08/17/2022.  08/02/2022.    CTA head neck 07/15/2022.    FINDINGS:  The ventricles are normal in size without evidence of hydrocephalus.    Empty sella configuration..  No parenchymal mass, hemorrhage, edema or major vascular distribution infarct.    Stable postsurgical changes of left frontotemporal craniotomy for left ICA aneurysm clipping.  Redemonstration of mixed density fluid collection underlying the craniotomy site that measures approximately 0.9 cm with mild mass effect on the underlying brain parenchyma.  Heterogeneous fluid collection underlying the surgical flap external to the craniotomy site, similar to prior exam.    Mucosal thickening the right maxillary sinus.  Remaining paranasal sinuses and mastoid air cells are essentially clear.    CTA:    Left-sided aortic arch with common origin of the brachiocephalic and left common carotid arteries.  With no significant atherosclerosis.    The common and internal carotid arteries are normal in course and caliber. No significant stenosis in either carotid bifurcation.    The vertebral origins are patent. The cervical vertebral arteries are normal in course and caliber.  Left vertebral artery is dominant.  Vertebrobasilar system is within normal limits without focal abnormality.    Postsurgical changes of left PCOM and anterior choroidal artery  aneurysm clipping.  Fetal origin of the left PCA.  The anterior cerebral arteries and anterior communicating complex are within normal limits.  The right middle cerebral artery is within normal limits.  Previous left proximal M1 3 mm aneurysm is difficult to discern and may be obscured by artifact from the prior clip in.  There is a stable 3 mm aneurysm in the left M2 bifurcation.    The dural venous sinuses are patent.    The soft tissues of the neck are within normal limits.  There is no evidence of lymphadenopathy in the neck.  The parapharyngeal fat planes are present.  The trachea is within normal limits.  The visualized lung apices demonstrate mild dependent atelectasis..  The cervical spine is unremarkable.                                        8/22/22:      8/23/22:                  8/24/22

## 2022-08-25 NOTE — SUBJECTIVE & OBJECTIVE
Interval History: NAEON. Reports frontal headache this morning though improved. Good appetite. Denies nausea, new visual changes. Afebrile. No leukocytosis. Scheduled for cranial wound washout tomorrow.    Past Medical History:   Diagnosis Date    Brain aneurysm     CHF (congestive heart failure)     H/O coronary angioplasty     Hypercholesteremia     Hypertension     Malignant hypertension     Migraine headache     Stroke 10/2017       Past Surgical History:   Procedure Laterality Date    CARDIAC CATHETERIZATION      CEREBRAL ANGIOGRAM      CLIP LIGATION OF INTRACRANIAL ANEURYSM BY CRANIOTOMY N/A 7/15/2022    Procedure: CRANIOTOMY, WITH ANEURYSM CLIPPING;  Surgeon: Timothy Diaz MD;  Location: [facility] OR [address] [place] [address];  Service: Neurosurgery;  Laterality: N/A;  PTERIONAL CRANIOTOMY WITH CLIP LIGATION OF L PCOMM, L ANTERIOR CHOROIDAL, L MCA  ANEURYSM, ANESTHESIA: GENERAL, BLOOD: TYPE&CROSS 2 UNITS, NEUROMONITORING: SEP, MEP, EEG, RADIOLOGY: C-ARM, POSITION: SUPINE, CASTILLO, CO-SURGERON: DR. LEXI DOMÍNGUEZ.       Review of patient's allergies indicates:   Allergen Reactions    Lisinopril Swelling    Bactrim [sulfamethoxazole-trimethoprim] Rash       Medications:  Facility-Administered Medications Prior to Admission   Medication    albuterol inhaler 2 puff     Medications Prior to Admission   Medication Sig    amitriptyline (ELAVIL) 75 MG tablet Take 75 mg by mouth every evening.    amlodipine (NORVASC) 10 MG tablet Take 1 tablet (10 mg total) by mouth once daily.    atorvastatin (LIPITOR) 40 MG tablet Take 1 tablet (40 mg total) by mouth once daily.    butalbital-acetaminophen-caffeine -40 mg (FIORICET, ESGIC) -40 mg per tablet Take 1 tablet by mouth every 4 (four) hours as needed.    carvediloL (COREG) 25 MG tablet Take 37.5 mg by mouth 2 (two) times daily.    cyanocobalamin (VITAMIN B-12) 1000 MCG tablet Take 1 tablet (1,000 mcg total) by mouth once daily. (Patient not taking: No sig reported)     "diphenhydrAMINE (BENADRYL) 25 mg capsule Take 1 each (25 mg total) by mouth every 6 (six) hours as needed for Itching or Allergies. (Patient not taking: No sig reported)    docusate sodium (COLACE) 100 MG capsule Take 1 capsule (100 mg total) by mouth 2 (two) times daily as needed for Constipation.    EScitalopram oxalate (LEXAPRO) 10 MG tablet Take 10 mg by mouth once daily.    famotidine (PEPCID) 20 MG tablet Take 1 tablet (20 mg total) by mouth 2 (two) times daily. (Patient not taking: No sig reported)    hydrochlorothiazide (HYDRODIURIL) 25 MG tablet Take 1 tablet (25 mg total) by mouth once daily.    levETIRAcetam (KEPPRA) 500 MG Tab Take 1 tablet (500 mg total) by mouth 2 (two) times daily for 5 days (Patient not taking: Reported on 7/29/2022)    magnesium oxide (MAG-OX) 400 mg (241.3 mg magnesium) tablet Take 1 tablet by mouth 2 (two) times daily.    potassium chloride SA (K-DUR,KLOR-CON) 20 MEQ tablet Take 20 mEq by mouth 2 (two) times daily.    [DISCONTINUED] cephALEXin (KEFLEX) 500 MG capsule Take 1 capsule (500 mg total) by mouth every 6 (six) hours. for 14 days    [DISCONTINUED] dexAMETHasone (DECADRON) 2 MG tablet Take 2 tablets (4 mg) by mouth every 8 hours for 1 day, THEN 2 tablets (4 mg) every 12 hours for 2 days, THEN 1 tablet (2 mg) every 12 hours for 2 days, THEN 1 tablet (2 mg) daily for 2 days, then STOP.    [DISCONTINUED] HYDROcodone-acetaminophen (NORCO) 5-325 mg per tablet Take 1 tablet by mouth every 6 (six) hours as needed for Pain.     Antibiotics (From admission, onward)                Start     Stop Route Frequency Ordered    08/26/22 0100  vancomycin 750 mg in dextrose 5 % 250 mL IVPB (ready to mix system)         -- IV Every 12 hours (non-standard times) 08/25/22 0712    08/22/22 1915  cefepime in dextrose 5 % IVPB 2 g         -- IV Every 8 hours (non-standard times) 08/22/22 1805    08/22/22 0212  vancomycin - pharmacy to dose  (vancomycin IVPB)        "And" Linked Group Details    -- " IV pharmacy to manage frequency 08/22/22 0112          Antifungals (From admission, onward)                None          Antivirals (From admission, onward)      None             Immunization History   Administered Date(s) Administered    Influenza - Quadrivalent - PF *Preferred* (6 months and older) 10/05/2017       Family History    None       Social History     Socioeconomic History    Marital status:    Tobacco Use    Smoking status: Current Every Day Smoker     Packs/day: 0.50     Types: Cigarettes    Smokeless tobacco: Never Used   Substance and Sexual Activity    Alcohol use: Yes     Comment: occassionally    Drug use: No    Sexual activity: Not Currently     Partners: Male     Birth control/protection: None     Review of Systems   Constitutional:  Negative for appetite change, chills, diaphoresis, fatigue and fever.   HENT:  Positive for facial swelling.    Respiratory:  Negative for cough and shortness of breath.    Cardiovascular:  Negative for chest pain and leg swelling.   Gastrointestinal:  Negative for abdominal pain, diarrhea, nausea and vomiting.   Genitourinary:  Negative for difficulty urinating, flank pain and frequency.   Musculoskeletal:  Negative for arthralgias, joint swelling and myalgias.   Skin:  Positive for wound. Negative for color change and rash.   Neurological:  Positive for headaches. Negative for dizziness, weakness and numbness.   Psychiatric/Behavioral:  Negative for agitation and confusion. The patient is not nervous/anxious.    Objective:     Vital Signs (Most Recent):  Temp: 98.5 °F (36.9 °C) (08/25/22 1138)  Pulse: 77 (08/25/22 1138)  Resp: 17 (08/25/22 1138)  BP: (!) 141/84 (08/25/22 1138)  SpO2: 98 % (08/25/22 1138)   Vital Signs (24h Range):  Temp:  [98.1 °F (36.7 °C)-98.7 °F (37.1 °C)] 98.5 °F (36.9 °C)  Pulse:  [72-84] 77  Resp:  [17-18] 17  SpO2:  [94 %-99 %] 98 %  BP: (135-164)/(67-86) 141/84     Weight: 88.5 kg (195 lb 1.8 oz)  Body mass index is 35.69  kg/m².    Estimated Creatinine Clearance: 72.7 mL/min (based on SCr of 1 mg/dL).    Physical Exam  Vitals and nursing note reviewed.   Constitutional:       General: She is not in acute distress.     Appearance: Normal appearance. She is well-developed and normal weight. She is not ill-appearing, toxic-appearing or diaphoretic.   HENT:      Head:      Comments: Healing left craniotomy site. See photos. No drainage, erythema, warmth noted.  Anterior left scalp/facial swelling.     Nose: Nose normal. No congestion.      Mouth/Throat:      Dentition: Does not have dentures. No dental abscesses.   Eyes:      General: No scleral icterus.     Conjunctiva/sclera: Conjunctivae normal.   Cardiovascular:      Rate and Rhythm: Normal rate and regular rhythm.   Pulmonary:      Effort: Pulmonary effort is normal. No respiratory distress.      Breath sounds: Normal breath sounds. No wheezing or rales.   Abdominal:      General: Bowel sounds are normal. There is no distension.      Palpations: Abdomen is soft.      Tenderness: There is no abdominal tenderness.   Musculoskeletal:         General: Normal range of motion.      Cervical back: Normal range of motion.      Right lower leg: No edema.      Left lower leg: No edema.   Skin:     General: Skin is warm and dry.      Findings: No erythema or rash.   Neurological:      Mental Status: She is alert and oriented to person, place, and time. Mental status is at baseline.      Motor: No weakness.   Psychiatric:         Mood and Affect: Mood normal.         Behavior: Behavior normal.         Thought Content: Thought content normal.         Judgment: Judgment normal.       Significant Labs: CBC:   Recent Labs   Lab 08/24/22  0809 08/25/22  0540   WBC 7.35 7.59   HGB 12.0 12.3   HCT 33.5* 35.1*    318     CMP:   Recent Labs   Lab 08/23/22  1822 08/24/22  0809 08/25/22  0540    138 139   K 3.2* 3.5 3.3*    107 109   CO2 24 22* 22*   * 237* 176*   BUN 8 8 9    CREATININE 1.0 0.9 1.0   CALCIUM 9.1 9.2 9.3   ANIONGAP 11 9 8     Microbiology Results (last 7 days)       Procedure Component Value Units Date/Time    Gram stain [[id number]]     Order Status: Completed Specimen: Incision site from Head     Culture, Anaerobic [[id number]] Collected: 08/22/22 1550    Order Status: Completed Specimen: Incision site from Head Updated: 08/25/22 1031     Anaerobic Culture Culture in progress    Blood culture [[id number]] Collected: 08/21/22 2325    Order Status: Completed Specimen: Blood from Peripheral, Antecubital, Right Updated: 08/25/22 0612     Blood Culture, Routine No Growth to date      No Growth to date      No Growth to date      No Growth to date    Aerobic culture [[id number]] Collected: 08/22/22 1550    Order Status: Completed Specimen: Incision site from Head Updated: 08/24/22 0728     Aerobic Bacterial Culture No growth    AFB Culture & Smear [[id number]] Collected: 08/22/22 1550    Order Status: Completed Specimen: Incision site from Head Updated: 08/23/22 2127     AFB Culture & Smear Culture in progress     AFB CULTURE STAIN No acid fast bacilli seen.    Fungus culture [[id number]] Collected: 08/22/22 1550    Order Status: No result Specimen: Incision site from Head Updated: 08/23/22 0930    Aerobic culture [[id number]]     Order Status: Completed Specimen: Incision site from Head     Culture, Anaerobe [[id number]]     Order Status: Completed Specimen: Body Fluid from Head     Fungus culture [[id number]]     Order Status: Completed Specimen: Body Fluid from Head     Fungus culture [[id number]]     Order Status: Canceled Specimen: Body Fluid from Head     Culture, Anaerobe [[id number]]     Order Status: Canceled Specimen: Body Fluid from Head     Aerobic culture [[id number]]     Order Status: Canceled Specimen: Incision site from Head     AFB Culture & Smear [[id number]]     Order Status: Canceled Specimen: Body Fluid from Head           Recent Lab Results  (Last 5 results in the  past 24 hours)        08/25/22  1159   08/25/22  0711   08/25/22  0540   08/25/22  0536   08/24/22 2055        Anion Gap     8           Baso #     0.05           Basophil %     0.7           BUN     9           Calcium     9.3           Chloride     109           CO2     22           Creatinine     1.0           Differential Method     Automated           eGFR     >60.0           Eos #     0.2           Eosinophil %     2.2           Glucose     176           Gran # (ANC)     5.1           Gran %     67.0           Hematocrit     35.1           Hemoglobin     12.3           Immature Grans (Abs)     0.09  Comment: Mild elevation in immature granulocytes is non specific and   can be seen in a variety of conditions including stress response,   acute inflammation, trauma and pregnancy. Correlation with other   laboratory and clinical findings is essential.             Immature Granulocytes     1.2           Lymph #     1.5           Lymph %     19.9           Magnesium     2.0           MCH     28.9           MCHC     35.0           MCV     82           Mono #     0.7           Mono %     9.0           MPV     10.4           nRBC     0           Platelets     318           POCT Glucose 207   191     173   261       Potassium     3.3           RBC     4.26           RDW     14.1           Sodium     139           Vancomycin-Trough     23.2           WBC     7.59                                  Significant Imaging:   Imaging Results              MRI Brain W WO Contrast (Final result)  Result time 08/21/22 23:31:21   Procedure changed from MRI Brain With Contrast     Final result by Anthony Jones MD (08/21/22 23:31:21)                   Impression:      Peripheral rim enhancement of the sub dural and scalp complex fluid collections.  While this could represent enhancing granulation tissue, rim enhancement of infected hematoma is difficult to exclude.    Enhancement extending into the TMJ and lateral upper facial  region on the left.  Correlate for cellulitis.    No evidence of enhancement of the brain parenchyma to suggest cerebritis.      Electronically signed by: Anthony Jones  Date:    08/21/2022  Time:    23:31               Narrative:    EXAMINATION:  MRI BRAIN W WO CONTRAST    CLINICAL HISTORY:  rule out infection;    TECHNIQUE:  Multiplanar multisequence MR imaging of the brain was performed before and after the administration of 10 mL Gadavist intravenous contrast.    COMPARISON:  MRI of the brain, earlier same day previous CT scans    FINDINGS:  Complex subdural and scalp fluid collections demonstrate peripheral rim enhancement with internal septa mainly in the scalp complex mass and fluid collection.  The scalp changes and enhancement extend into the TMJ region and pre-auricular region on the left.  There is no abnormal enhancement of the brain parenchyma to suggest cerebritis.                                       MRI Brain Without Contrast (Final result)  Result time 08/21/22 22:24:25      Final result by Anthony Jones MD (08/21/22 22:24:25)                   Impression:      Stable subdural and subcutaneous fluid collection surrounding the patient's craniotomy defect on the left.  These appear somewhat loculated.  Correlate for subdural and scalp hematoma.  Versus signs of infection.    Postoperative changes of aneurysm clipping with no evidence of acute intracranial hemorrhage, mass or infarction.      Electronically signed by: Anthony Jones  Date:    08/21/2022  Time:    22:24               Narrative:    EXAMINATION:  MRI BRAIN WITHOUT CONTRAST    CLINICAL HISTORY:  Headache, new or worsening, neuro deficit (Age 19-49y);    TECHNIQUE:  Multiplanar multisequence MR imaging of the brain was performed without contrast.    COMPARISON:  CT, 08/21/2022, CT brain, 08/17/2022    FINDINGS:  There is no restricted diffusion.  Patchy punctate foci of gliotic signal intensity throughout the deep and subcortical  white matter is again noted.  The left craniotomy and subdural and scalp complex fluid collections with proteinaceous content are noted.  No significant mass effect is evident.  Postoperative changes aneurysm clipping in the sylvian fissure near the [id number] on the left is noted.  There is no evidence midline shift or mass effect or new pathologic fluid collection within the brain parenchyma.  There is no hydrocephalus.  Empty sella configuration is.  Bilateral sinus disease is present.  The orbits and orbital contents appear unremarkable.                                       X-Ray Chest AP Portable (Final result)  Result time 08/21/22 21:40:18      Final result by Mario Michaud MD (08/21/22 21:40:18)                   Impression:      Hypoventilatory examination.  No convincing radiographic evidence of acute intrathoracic process on this single view.      Electronically signed by: Mario Michaud MD  Date:    08/21/2022  Time:    21:40               Narrative:    EXAMINATION:  XR CHEST AP PORTABLE    CLINICAL HISTORY:  Stroke;    TECHNIQUE:  Single frontal view of the chest was performed.    COMPARISON:  07/15/2022    FINDINGS:  Cardiac monitoring leads overlie the chest.  Cardiac silhouette is stable in size.  Lung volumes are diminished with resultant bronchovascular crowding.  No large confluent airspace consolidation appreciated.  No significant volume of pleural fluid or pneumothorax identified.  Osseous structures demonstrate mild degenerative changes.                                       CTA STROKE MULTI-PHASE (Final result)  Result time 08/21/22 22:21:22      Final result by Flaquito Burciaga MD (08/21/22 22:21:22)                   Impression:      CTA head: No large vessel occlusion, high grade stenosis, or vascular malformation identified.  Stable postsurgical changes of left PCOM/anterior choroidal aneurysm clipping.  Unchanged small aneurysm at the left M2 bifurcation.    CT head: No evidence  for acute intracranial hemorrhage or major vascular distribution infarct.  Stable postsurgical changes of left frontotemporal craniotomy for aneurysm clipping.  Small subdural collection underlying the craniotomy site, similar to prior exam.  Soft tissue collection overlying the craniotomy site is similar in size to prior exam.    Electronically signed by resident: Bianca Davis  Date:    08/21/2022  Time:    21:38    Electronically signed by: Flaquito Burciaga MD  Date:    08/21/2022  Time:    22:21               Narrative:    EXAMINATION:  CTA STROKE MULTI-PHASE    CLINICAL HISTORY:  Transient ischemic attack (TIA);    TECHNIQUE:  Axial CT images obtained throughout the before and after the administration of intravenous contrast.    Multiphase CT angiogram was then performed from the top of aortic arch to the vertex during bolus administration of 75 mL of Omnipaque 350 intravenous contrast.  Scan through the head and neck was performed in arterial phase.  Axial, sagittal and coronal reconstructions, including maximum intensity projection reconstructions were performed.    CT source data was analyzed using artificial intelligence software for detection of a large vessel occlusions (LVO) in order to enable computer assisted triage notification and aid clinical stroke decision making.    COMPARISON:  CT head 08/17/2022.  08/02/2022.    CTA head neck 07/15/2022.    FINDINGS:  The ventricles are normal in size without evidence of hydrocephalus.    Empty sella configuration..  No parenchymal mass, hemorrhage, edema or major vascular distribution infarct.    Stable postsurgical changes of left frontotemporal craniotomy for left ICA aneurysm clipping.  Redemonstration of mixed density fluid collection underlying the craniotomy site that measures approximately 0.9 cm with mild mass effect on the underlying brain parenchyma.  Heterogeneous fluid collection underlying the surgical flap external to the craniotomy site, similar  to prior exam.    Mucosal thickening the right maxillary sinus.  Remaining paranasal sinuses and mastoid air cells are essentially clear.    CTA:    Left-sided aortic arch with common origin of the brachiocephalic and left common carotid arteries.  With no significant atherosclerosis.    The common and internal carotid arteries are normal in course and caliber. No significant stenosis in either carotid bifurcation.    The vertebral origins are patent. The cervical vertebral arteries are normal in course and caliber.  Left vertebral artery is dominant.  Vertebrobasilar system is within normal limits without focal abnormality.    Postsurgical changes of left PCOM and anterior choroidal artery aneurysm clipping.  Fetal origin of the left PCA.  The anterior cerebral arteries and anterior communicating complex are within normal limits.  The right middle cerebral artery is within normal limits.  Previous left proximal M1 3 mm aneurysm is difficult to discern and may be obscured by artifact from the prior clip in.  There is a stable 3 mm aneurysm in the left M2 bifurcation.    The dural venous sinuses are patent.    The soft tissues of the neck are within normal limits.  There is no evidence of lymphadenopathy in the neck.  The parapharyngeal fat planes are present.  The trachea is within normal limits.  The visualized lung apices demonstrate mild dependent atelectasis..  The cervical spine is unremarkable.                                        8/22/22:      8/23/22:                  8/24/22

## 2022-08-25 NOTE — PLAN OF CARE
[facility] (Hospital)  Discharge Reassessment    Primary Care Provider: Clair Johnson NP    Expected Discharge Date: 8/25/2022     Patient not medically ready.  Patient to go to surgery tomorrow. PT/OT to eval after surgery.    Reassessment (most recent)     Discharge Reassessment - 08/25/22 0956        Discharge Reassessment    Assessment Type Discharge Planning Reassessment     Did the patient's condition or plan change since previous assessment? No     Discharge Plan discussed with: Patient     Communicated CADY with patient/caregiver Date not available/Unable to determine     Discharge Plan A Home with family;Home Health     Discharge Plan B Home with family     DME Needed Upon Discharge  none     Discharge Barriers Identified None     Why the patient remains in the hospital Requires continued medical care        Post-Acute Status    Discharge Delays None known at this time

## 2022-08-25 NOTE — CARE UPDATE
BG goal 140-180    Remains on neuro floor. Surgery postponed yesterday and rescheduled for 8/26. BG reasonably well controlled on current SQ insulin regimen. [facility] diabetic [facility]; 2000 Calorie      Plan:  -Continue Levemir 30 units daily  -Continue Novolog 10 units TID with meals  -Moderate Dose Correction Scale  -BG monitoring ac/hs    ** Please call Endocrine for any BG related issues **    Bedside nurse to instruct on insulin pen use and blood sugar monitor. Have patient administer own injections after education completed supervised by nurse.    Lab Results   Component Value Date    HGBA1C 8.3 (H) 08/22/2022       Discharge planning:  TBD. Notify endocrine prior to discharge.

## 2022-08-25 NOTE — ASSESSMENT & PLAN NOTE
46 yo female with history of HTN, smoking and left brain aneurysm s/p left sided craniotomy for aneurysm clipping on 7/15/22 who presented with sudden onset of symptoms concerning for focal seizure. See HPI for more detail.      MRI brain showed peripherally enhancing sub dural and scalp complex fluid collections. CTA head without large vessel occulsion, stenosis, or vascular malformation.  NSGY performed a bedside aspiration of one of the subcutaneous fluid collections. Per previous discussion with team, blood was aspirated. Cx NGTD.      Patient is on empiric Vancomycin and Cefepime. Afebrile without a leukocytosis. HDS. She is scheduled for cranial wound I&D tomorrow.       Recommendations:   · Continue IV Vancomycin. Inpatient pharmacy managing Vanc dosing. Trough goal 15-20.   · Continue Cefepime 2 g IV q 8 hours   · When taken to OR, please send tissue/fluid for pathology, gram stain, aerobic, anaerobic, AFB and fungal cultures.  · Will follow culture data to guide antibiotic therapy   · Plan reviewed with ID staff, Dr. Pineda. ID will follow.

## 2022-08-25 NOTE — ASSESSMENT & PLAN NOTE
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 Diaz who presented after episode of sudden onset R-sided weakness/tremors and aphasia for about 5 minutes, resolved prior to EMS arrival, concerning for focal seizure, then with second episode while in the ED. MRI brain w/wo concerning for scalp and epidural infection.    - Admitted to NSGY   - Neuro checks q4h  - all labs and diagnostics reviewed   - CTA 8/21: largely stable from prior with epidural fluid collection and extracranial fluid collection stable in size; stable postsurgical changes of L PCOM/anterior choroidal aneurysm clipping, stable small left MCA bifurcation aneurysm   - MRI brain w wo 8/21: peripheral rim enhancement of subdural and scalp complex fluid collections, concerning for infection; no evidence of enhancement of brain parenchyma   - spot EEG 8/23: mild regional cortical or subcortical dysfunction in the left temporal region with no electrographic seizures or indications of seizure tendency.  - Infectious workup:   - afebrile, no leukocytosis   - blood cx 8/21 NGTD   - ESR 30-->46, CRP 28.5-->26   - Subcutaneous scalp fluid collection tapped 8/22, sent for Cx's - NGTD   - ID consulted, recommend Vdanc/Cefepime, continue to follow Cx's  - Plan for OR tomorrow 8/26 for cranial wound washout, Rescheduled due to OR availability   - NPO at midnight. SQH to be held at midnight.   - Seizures: Episodes concerning for focal seizures with R-sided tremors and weakness. Not on AEDs prior to admission.   - Keppra loaded on admisdddsion, continue 1g bid   - spot EEG 8/23 with left temporal subcortical dysfunction, negative for electrographic seizures  - New Onset T2DM: No h/o DM. Hyperglycemic on admission. HbA1C 8.3. Endocrinology consulted. Goal -180.   - Start Levemir 30 untis daily First dose this Am. 0.7 u/kg/d dosing.    - Start Novolog 10 units  TIDWM. Basal/Prandial physiologic matching.     - Moderate Dose SQ Insulin Correction Scale.   - BG Monitoring AC/HS.    - Bedside nurse to instruct on insulin pen use and blood sugar monitor. Have patient administer own injections after education completed supervised by nurse. Have patient watch insulin educatoin video's via i-pad if available on unit.  - HTN: SBP <160. Continue home meds.  - Hypokalemia, Hypomagnesemia: Chronic, on home supplements daily.   - Resume home potassium and Mg supplements   - Replete PRN, daily labs    Dispo: ongoing

## 2022-08-25 NOTE — PLAN OF CARE
Problem: Adult Inpatient Plan of Care  Goal: Plan of Care Review  Outcome: Ongoing, Progressing  Goal: Optimal Comfort and Wellbeing  Outcome: Ongoing, Progressing     Problem: Diabetes Comorbidity  Goal: Blood Glucose Level Within Targeted Range  Outcome: Ongoing, Progressing       Patient is AAO x4. POC reviewed with patient. Patient verbalized understanding. Patient's breathing is unlabored with equal chest expansion. Patient ambulates to the restroom. Patient remained free from falls.Paged NSX about patient's increase in swelling on L side of face, ordered to continue to monitor. Patient rested well through shift. Bed in lowest position,bed alarm on, side rails up x3, no complaints or signs of distress. WCTM during shift.  See flowsheets for full assessment and VS info.

## 2022-08-25 NOTE — PLAN OF CARE
1220 Pt c/o headache as 8/10. Pt previously medicated with tylenol at 1055. Paged oncall neurosurgery md (MD Funmi). MD to enter orders.     1645  Pt given printed education on diabetes.

## 2022-08-26 ENCOUNTER — DOCUMENT SCAN (OUTPATIENT)
Dept: HOME HEALTH SERVICES | Facility: HOSPITAL | Age: 47
End: 2022-08-26
Payer: MEDICAID

## 2022-08-26 LAB
ANION GAP SERPL CALC-SCNC: 9 MMOL/L (ref 8–16)
BACTERIA SPEC AEROBE CULT: NO GROWTH
BASOPHILS # BLD AUTO: 0.04 K/UL (ref 0–0.2)
BASOPHILS NFR BLD: 0.6 % (ref 0–1.9)
BUN SERPL-MCNC: 11 MG/DL (ref 6–20)
CALCIUM SERPL-MCNC: 9.2 MG/DL (ref 8.7–10.5)
CHLORIDE SERPL-SCNC: 106 MMOL/L (ref 95–110)
CO2 SERPL-SCNC: 22 MMOL/L (ref 23–29)
CREAT SERPL-MCNC: 1 MG/DL (ref 0.5–1.4)
DIFFERENTIAL METHOD: ABNORMAL
EOSINOPHIL # BLD AUTO: 0.1 K/UL (ref 0–0.5)
EOSINOPHIL NFR BLD: 1.9 % (ref 0–8)
ERYTHROCYTE [DISTWIDTH] IN BLOOD BY AUTOMATED COUNT: 14 % (ref 11.5–14.5)
EST. GFR  (NO RACE VARIABLE): >60 ML/MIN/1.73 M^2
GLUCOSE SERPL-MCNC: 229 MG/DL (ref 70–110)
HCT VFR BLD AUTO: 33.8 % (ref 37–48.5)
HGB BLD-MCNC: 12 G/DL (ref 12–16)
IMM GRANULOCYTES # BLD AUTO: 0.05 K/UL (ref 0–0.04)
IMM GRANULOCYTES NFR BLD AUTO: 0.7 % (ref 0–0.5)
LYMPHOCYTES # BLD AUTO: 1.7 K/UL (ref 1–4.8)
LYMPHOCYTES NFR BLD: 23.1 % (ref 18–48)
MAGNESIUM SERPL-MCNC: 1.9 MG/DL (ref 1.6–2.6)
MCH RBC QN AUTO: 29.5 PG (ref 27–31)
MCHC RBC AUTO-ENTMCNC: 35.5 G/DL (ref 32–36)
MCV RBC AUTO: 83 FL (ref 82–98)
MONOCYTES # BLD AUTO: 0.8 K/UL (ref 0.3–1)
MONOCYTES NFR BLD: 10.5 % (ref 4–15)
NEUTROPHILS # BLD AUTO: 4.6 K/UL (ref 1.8–7.7)
NEUTROPHILS NFR BLD: 63.2 % (ref 38–73)
NRBC BLD-RTO: 0 /100 WBC
PLATELET # BLD AUTO: 280 K/UL (ref 150–450)
PMV BLD AUTO: 11.1 FL (ref 9.2–12.9)
POCT GLUCOSE: 120 MG/DL (ref 70–110)
POCT GLUCOSE: 136 MG/DL (ref 70–110)
POCT GLUCOSE: 171 MG/DL (ref 70–110)
POCT GLUCOSE: 212 MG/DL (ref 70–110)
POTASSIUM SERPL-SCNC: 3.5 MMOL/L (ref 3.5–5.1)
RBC # BLD AUTO: 4.07 M/UL (ref 4–5.4)
SARS-COV-2 RDRP RESP QL NAA+PROBE: NEGATIVE
SODIUM SERPL-SCNC: 137 MMOL/L (ref 136–145)
WBC # BLD AUTO: 7.23 K/UL (ref 3.9–12.7)

## 2022-08-26 PROCEDURE — 99233 PR SUBSEQUENT HOSPITAL CARE,LEVL III: ICD-10-PCS | Mod: ,,, | Performed by: PHYSICIAN ASSISTANT

## 2022-08-26 PROCEDURE — 85025 COMPLETE CBC W/AUTO DIFF WBC: CPT | Performed by: STUDENT IN AN ORGANIZED HEALTH CARE EDUCATION/TRAINING PROGRAM

## 2022-08-26 PROCEDURE — 99233 SBSQ HOSP IP/OBS HIGH 50: CPT | Mod: ,,, | Performed by: PHYSICIAN ASSISTANT

## 2022-08-26 PROCEDURE — 63600175 PHARM REV CODE 636 W HCPCS: Performed by: PHYSICIAN ASSISTANT

## 2022-08-26 PROCEDURE — 94761 N-INVAS EAR/PLS OXIMETRY MLT: CPT

## 2022-08-26 PROCEDURE — U0002 COVID-19 LAB TEST NON-CDC: HCPCS | Performed by: NEUROLOGICAL SURGERY

## 2022-08-26 PROCEDURE — 25000003 PHARM REV CODE 250: Performed by: PHYSICIAN ASSISTANT

## 2022-08-26 PROCEDURE — 25000003 PHARM REV CODE 250: Performed by: STUDENT IN AN ORGANIZED HEALTH CARE EDUCATION/TRAINING PROGRAM

## 2022-08-26 PROCEDURE — 63600175 PHARM REV CODE 636 W HCPCS: Performed by: NEUROLOGICAL SURGERY

## 2022-08-26 PROCEDURE — 25000003 PHARM REV CODE 250: Performed by: NEUROLOGICAL SURGERY

## 2022-08-26 PROCEDURE — 80048 BASIC METABOLIC PNL TOTAL CA: CPT | Performed by: STUDENT IN AN ORGANIZED HEALTH CARE EDUCATION/TRAINING PROGRAM

## 2022-08-26 PROCEDURE — 11000001 HC ACUTE MED/SURG PRIVATE ROOM

## 2022-08-26 PROCEDURE — 63600175 PHARM REV CODE 636 W HCPCS: Performed by: STUDENT IN AN ORGANIZED HEALTH CARE EDUCATION/TRAINING PROGRAM

## 2022-08-26 PROCEDURE — 36415 COLL VENOUS BLD VENIPUNCTURE: CPT | Performed by: PHYSICIAN ASSISTANT

## 2022-08-26 PROCEDURE — 83735 ASSAY OF MAGNESIUM: CPT | Performed by: PHYSICIAN ASSISTANT

## 2022-08-26 RX ADMIN — Medication 400 MG: at 09:08

## 2022-08-26 RX ADMIN — CEFEPIME 2 G: 2 INJECTION, POWDER, FOR SOLUTION INTRAVENOUS at 12:08

## 2022-08-26 RX ADMIN — ESCITALOPRAM OXALATE 10 MG: 10 TABLET ORAL at 09:08

## 2022-08-26 RX ADMIN — CEFEPIME 2 G: 2 INJECTION, POWDER, FOR SOLUTION INTRAVENOUS at 06:08

## 2022-08-26 RX ADMIN — Medication 6 MG: at 09:08

## 2022-08-26 RX ADMIN — LEVETIRACETAM 1000 MG: 100 INJECTION, SOLUTION INTRAVENOUS at 09:08

## 2022-08-26 RX ADMIN — CARVEDILOL 37.5 MG: 25 TABLET, FILM COATED ORAL at 09:08

## 2022-08-26 RX ADMIN — VANCOMYCIN HYDROCHLORIDE 750 MG: 750 INJECTION, POWDER, LYOPHILIZED, FOR SOLUTION INTRAVENOUS at 01:08

## 2022-08-26 RX ADMIN — POTASSIUM CHLORIDE 20 MEQ: 1500 TABLET, EXTENDED RELEASE ORAL at 09:08

## 2022-08-26 RX ADMIN — ATORVASTATIN CALCIUM 40 MG: 40 TABLET, FILM COATED ORAL at 09:08

## 2022-08-26 RX ADMIN — AMLODIPINE BESYLATE 10 MG: 10 TABLET ORAL at 09:08

## 2022-08-26 RX ADMIN — AMITRIPTYLINE HYDROCHLORIDE 75 MG: 50 TABLET, FILM COATED ORAL at 09:08

## 2022-08-26 RX ADMIN — ACETAMINOPHEN 650 MG: 325 TABLET ORAL at 01:08

## 2022-08-26 RX ADMIN — INSULIN ASPART 4 UNITS: 100 INJECTION, SOLUTION INTRAVENOUS; SUBCUTANEOUS at 09:08

## 2022-08-26 RX ADMIN — CEFEPIME 2 G: 2 INJECTION, POWDER, FOR SOLUTION INTRAVENOUS at 09:08

## 2022-08-26 RX ADMIN — INSULIN ASPART 1 UNITS: 100 INJECTION, SOLUTION INTRAVENOUS; SUBCUTANEOUS at 09:08

## 2022-08-26 RX ADMIN — VANCOMYCIN HYDROCHLORIDE 750 MG: 750 INJECTION, POWDER, LYOPHILIZED, FOR SOLUTION INTRAVENOUS at 12:08

## 2022-08-26 NOTE — PROGRESS NOTES
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 Diaz who presents with sudden onset of symptoms concerning for focal seizure. Patient was in her yard today playing with her son when her R arm became weak suddenly followed by the inability to speak and RLE weakness. This went on for a couple minutes and self resolved; patient said she had some lasting heaviness on her R side after. She came to the ED immediately.    Here in the ED she had another episode that resolved on its own. NSGY consulted.       Post-Op Info:  Procedure(s) (LRB):  LEFT Cranial wound debridement and washout (Left)         Interval History: NAEON. AFVSS. Pt neurologically stable. Reports tenderness to L side of scalp, denies any severe HA or recurrence of tremors/seizure-like activity. Denies fever/chill, N/V. Pending OR today for wound washout and evacuation of fluid collection.    Medications:  Continuous Infusions:  Scheduled Meds:   amitriptyline  75 mg Oral QHS    amLODIPine  10 mg Oral Daily    atorvastatin  40 mg Oral Daily    carvediloL  37.5 mg Oral BID    ceFEPime (MAXIPIME) IVPB  2 g Intravenous Q8H    EScitalopram oxalate  10 mg Oral Daily    insulin aspart U-100  10 Units Subcutaneous TIDWM    insulin detemir U-100  30 Units Subcutaneous Daily    levetiracetam IV  1,000 mg Intravenous Q12H    magnesium oxide  400 mg Oral BID    potassium chloride SA  20 mEq Oral BID    vancomycin (VANCOCIN) IVPB  750 mg Intravenous Q12H     PRN Meds:acetaminophen, albuterol, dextrose 10%, dextrose 10%, glucagon (human recombinant), glucose, glucose, insulin aspart U-100, melatonin, oxyCODONE, Pharmacy to dose Vancomycin consult **AND** vancomycin - pharmacy to dose     Review of Systems  Objective:     Weight: 88.5 kg (195 lb 1.8  oz)  Body mass index is 35.69 kg/m².  Vital Signs (Most Recent):  Temp: 98.3 °F (36.8 °C) (08/26/22 0751)  Pulse: 79 (08/26/22 0950)  Resp: 19 (08/26/22 0950)  BP: (!) 138/96 (08/26/22 0950)  SpO2: 98 % (08/26/22 0950)   Vital Signs (24h Range):  Temp:  [98.2 °F (36.8 °C)-98.7 °F (37.1 °C)] 98.3 °F (36.8 °C)  Pulse:  [71-82] 79  Resp:  [17-20] 19  SpO2:  [93 %-100 %] 98 %  BP: (111-143)/(62-96) 138/96                     Female External Urinary Catheter 08/21/22 2221 (Active)   Skin no redness;no breakdown 08/23/22 1800   Tolerance no signs/symptoms of discomfort 08/23/22 1800   Suction Continuous suction at 60 mmHg 08/23/22 0800   Date of last wick change 08/23/22 08/23/22 0800   Time of last wick change 0800 08/23/22 0800   Output (mL) 1000 mL 08/23/22 0800       Physical Exam    Neurosurgery Physical Exam    General: well developed, well nourished, no distress.   Head: normocephalic  Neck: No tracheal deviation. No palpable masses. Full ROM.   GCS: E4 V5 M6; Total: 15  Mental Status: Awake, Alert, Oriented x 4  Language: No aphasia  Speech: No dysarthria  Cranial nerves: face symmetric, tongue midline, CN II-XII grossly intact.   Eyes: pupils equal, round, reactive to light with accomodation, EOMI.   Ears: No drainage.   Pulmonary: normal respirations, no signs of respiratory distress  Abdomen: soft, non-distended, not tender to palpation  Skin: Skin is warm, dry and intact.     Sensory: intact to light touch throughout  Motor Strength: Moves all extremities spontaneously with good tone.  Full strength upper and lower extremities. No abnormal movements seen.      Finger-to-nose: intact bilaterally   Pronator drift: absent bilaterally     Left Cranial Incision: Inferior aspect of incision with uneven edges but skin closed with no leakage. Anterior left scalp swelling, no erythema, but tender to palpation.     Significant Labs:  Recent Labs   Lab 08/25/22  0540 08/26/22  0429   * 229*    137   K 3.3*  3.5    106   CO2 22* 22*   BUN 9 11   CREATININE 1.0 1.0   CALCIUM 9.3 9.2   MG 2.0 1.9     Recent Labs   Lab 08/25/22  0540 08/26/22  0429   WBC 7.59 7.23   HGB 12.3 12.0   HCT 35.1* 33.8*    280     No results for input(s): LABPT, INR, APTT in the last 48 hours.  Microbiology Results (last 7 days)       Procedure Component Value Units Date/Time    Blood culture [[id number]] Collected: 08/21/22 2325    Order Status: Completed Specimen: Blood from Peripheral, Antecubital, Right Updated: 08/26/22 0612     Blood Culture, Routine No Growth to date      No Growth to date      No Growth to date      No Growth to date      No Growth to date    Gram stain [[id number]] Collected: 08/22/22 1550    Order Status: Completed Specimen: Incision site from Head Updated: 08/25/22 1447     Gram Stain Result No organisms seen      Rare WBC's    Gram stain [[id number]]     Order Status: Completed Specimen: Incision site from Head     Culture, Anaerobic [[id number]] Collected: 08/22/22 1550    Order Status: Completed Specimen: Incision site from Head Updated: 08/25/22 1031     Anaerobic Culture Culture in progress    Aerobic culture [[id number]] Collected: 08/22/22 1550    Order Status: Completed Specimen: Incision site from Head Updated: 08/24/22 0728     Aerobic Bacterial Culture No growth    AFB Culture & Smear [[id number]] Collected: 08/22/22 1550    Order Status: Completed Specimen: Incision site from Head Updated: 08/23/22 2127     AFB Culture & Smear Culture in progress     AFB CULTURE STAIN No acid fast bacilli seen.    Fungus culture [[id number]] Collected: 08/22/22 1550    Order Status: No result Specimen: Incision site from Head Updated: 08/23/22 0930    Aerobic culture [[id number]]     Order Status: Completed Specimen: Incision site from Head     Culture, Anaerobe [[id number]]     Order Status: Completed Specimen: Body Fluid from Head     Fungus culture [[id number]]     Order Status: Completed Specimen: Body Fluid from  Head     Fungus culture [[id number]]     Order Status: Canceled Specimen: Body Fluid from Head     Culture, Anaerobe [[id number]]     Order Status: Canceled Specimen: Body Fluid from Head     Aerobic culture [[id number]]     Order Status: Canceled Specimen: Incision site from Head     AFB Culture & Smear [[id number]]     Order Status: Canceled Specimen: Body Fluid from Head           All pertinent labs from the last 24 hours have been reviewed.    Significant Diagnostics:  I have reviewed and interpreted all pertinent imaging results/findings within the past 24 hours.    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 Diaz who presented after episode of sudden onset R-sided weakness/tremors and aphasia for about 5 minutes, resolved prior to EMS arrival, concerning for focal seizure, then with second episode while in the ED. MRI brain w/wo concerning for scalp and epidural infection.    - Admitted to NSGY   - Neuro checks q4h  - all labs and diagnostics reviewed   - CTA 8/21: largely stable from prior with epidural fluid collection and extracranial fluid collection stable in size; stable postsurgical changes of L PCOM/anterior choroidal aneurysm clipping, stable small left MCA bifurcation aneurysm   - MRI brain w wo 8/21: peripheral rim enhancement of subdural and scalp complex fluid collections, concerning for infection; no evidence of enhancement of brain parenchyma   - spot EEG 8/23: mild regional cortical or subcortical dysfunction in the left temporal region with no electrographic seizures or indications of seizure tendency.  - Infectious workup:   - afebrile, no leukocytosis   - blood cx 8/21 NGTD   - ESR 30-->46, CRP 28.5-->26   - Subcutaneous scalp fluid collection tapped 8/22, sent for Cx's - NGTD   - ID consulted, recommend Vanc/Cefepime, continue to follow Cx's  - Plan  for OR today 8/26 for cranial wound washout, Rescheduled due to OR availability   - NPO since midnight. SQH on hold for OR.  - Seizures: Episodes concerning for focal seizures with R-sided tremors and weakness. Not on AEDs prior to admission.   - Keppra loaded on admission, continue 1g bid   - Likely provoked focal seizure activity due to cranial fluid collection; pt also with very elevated BG on admission, uncontrolled hyperglycemia may have been contributory to provoked seizure   - spot EEG 8/23 with left temporal subcortical dysfunction, negative for electrographic seizures  - New Onset T2DM: No h/o DM. Hyperglycemic on admission. HbA1C 8.3. Endocrinology consulted. Goal -180.   - Levemir 30 untis daily First dose this Am. 0.7 u/kg/d dosing.    - Novolog 10 units TIDWM. Basal/Prandial physiologic matching.     - Moderate Dose SQ Insulin Correction Scale.   - BG Monitoring AC/HS.    - Bedside nurse to instruct on insulin pen use and blood sugar monitor. Have patient administer own injections after education completed supervised by nurse. Have patient watch insulin educatoin video's via i-pad if available on unit.  - HTN: SBP <160. Controlled on current regimen. Continue home meds.  - Hypokalemia, Hypomagnesemia: Chronic, on home supplements daily.   - Resume home potassium and Mg supplements   - Replete PRN, daily labs    Dispo: ongoing        Marlene Robison PA-C  Neurosurgery  [facility] - Neurosurgery ([facility]

## 2022-08-26 NOTE — SUBJECTIVE & OBJECTIVE
Interval History: NAEON. Afebrile. No leukocytosis. Scheduled for cranial wound washout today. No new complaints today.    Past Medical History:   Diagnosis Date    Brain aneurysm     CHF (congestive heart failure)     H/O coronary angioplasty     Hypercholesteremia     Hypertension     Malignant hypertension     Migraine headache     Stroke 10/2017       Past Surgical History:   Procedure Laterality Date    CARDIAC CATHETERIZATION      CEREBRAL ANGIOGRAM      CLIP LIGATION OF INTRACRANIAL ANEURYSM BY CRANIOTOMY N/A 7/15/2022    Procedure: CRANIOTOMY, WITH ANEURYSM CLIPPING;  Surgeon: Timothy Diaz MD;  Location: [facility] OR [address] [place] [address];  Service: Neurosurgery;  Laterality: N/A;  PTERIONAL CRANIOTOMY WITH CLIP LIGATION OF L PCOMM, L ANTERIOR CHOROIDAL, L MCA  ANEURYSM, ANESTHESIA: GENERAL, BLOOD: TYPE&CROSS 2 UNITS, NEUROMONITORING: SEP, MEP, EEG, RADIOLOGY: C-ARM, POSITION: SUPINE, CASTILLO, CO-SURGERON: DR. LEXI DOMÍNGUEZ.       Review of patient's allergies indicates:   Allergen Reactions    Lisinopril Swelling    Bactrim [sulfamethoxazole-trimethoprim] Rash       Medications:  Facility-Administered Medications Prior to Admission   Medication    albuterol inhaler 2 puff     Medications Prior to Admission   Medication Sig    amitriptyline (ELAVIL) 75 MG tablet Take 75 mg by mouth every evening.    amlodipine (NORVASC) 10 MG tablet Take 1 tablet (10 mg total) by mouth once daily.    atorvastatin (LIPITOR) 40 MG tablet Take 1 tablet (40 mg total) by mouth once daily.    butalbital-acetaminophen-caffeine -40 mg (FIORICET, ESGIC) -40 mg per tablet Take 1 tablet by mouth every 4 (four) hours as needed.    carvediloL (COREG) 25 MG tablet Take 37.5 mg by mouth 2 (two) times daily.    cyanocobalamin (VITAMIN B-12) 1000 MCG tablet Take 1 tablet (1,000 mcg total) by mouth once daily. (Patient not taking: No sig reported)    diphenhydrAMINE (BENADRYL) 25 mg capsule Take 1 each (25 mg total) by mouth every 6 (six) hours  "as needed for Itching or Allergies. (Patient not taking: No sig reported)    docusate sodium (COLACE) 100 MG capsule Take 1 capsule (100 mg total) by mouth 2 (two) times daily as needed for Constipation.    EScitalopram oxalate (LEXAPRO) 10 MG tablet Take 10 mg by mouth once daily.    famotidine (PEPCID) 20 MG tablet Take 1 tablet (20 mg total) by mouth 2 (two) times daily. (Patient not taking: No sig reported)    hydrochlorothiazide (HYDRODIURIL) 25 MG tablet Take 1 tablet (25 mg total) by mouth once daily.    levETIRAcetam (KEPPRA) 500 MG Tab Take 1 tablet (500 mg total) by mouth 2 (two) times daily for 5 days (Patient not taking: Reported on 7/29/2022)    magnesium oxide (MAG-OX) 400 mg (241.3 mg magnesium) tablet Take 1 tablet by mouth 2 (two) times daily.    potassium chloride SA (K-DUR,KLOR-CON) 20 MEQ tablet Take 20 mEq by mouth 2 (two) times daily.    [DISCONTINUED] cephALEXin (KEFLEX) 500 MG capsule Take 1 capsule (500 mg total) by mouth every 6 (six) hours. for 14 days    [DISCONTINUED] dexAMETHasone (DECADRON) 2 MG tablet Take 2 tablets (4 mg) by mouth every 8 hours for 1 day, THEN 2 tablets (4 mg) every 12 hours for 2 days, THEN 1 tablet (2 mg) every 12 hours for 2 days, THEN 1 tablet (2 mg) daily for 2 days, then STOP.    [DISCONTINUED] HYDROcodone-acetaminophen (NORCO) 5-325 mg per tablet Take 1 tablet by mouth every 6 (six) hours as needed for Pain.     Antibiotics (From admission, onward)                Start     Stop Route Frequency Ordered    08/26/22 0100  vancomycin 750 mg in dextrose 5 % 250 mL IVPB (ready to mix system)         -- IV Every 12 hours (non-standard times) 08/25/22 0712    08/22/22 1915  cefepime in dextrose 5 % IVPB 2 g         -- IV Every 8 hours (non-standard times) 08/22/22 1805    08/22/22 0212  vancomycin - pharmacy to dose  (vancomycin IVPB)        "And" Linked Group Details    -- IV pharmacy to manage frequency 08/22/22 0112          Antifungals (From admission, onward) "                None          Antivirals (From admission, onward)      None             Immunization History   Administered Date(s) Administered    Influenza - Quadrivalent - PF *Preferred* (6 months and older) 10/05/2017       Family History    None       Social History     Socioeconomic History    Marital status:    Tobacco Use    Smoking status: Current Every Day Smoker     Packs/day: 0.50     Types: Cigarettes    Smokeless tobacco: Never Used   Substance and Sexual Activity    Alcohol use: Yes     Comment: occassionally    Drug use: No    Sexual activity: Not Currently     Partners: Male     Birth control/protection: None     Review of Systems   Constitutional:  Negative for appetite change, chills, diaphoresis, fatigue and fever.   HENT:  Positive for facial swelling.    Respiratory:  Negative for cough and shortness of breath.    Cardiovascular:  Negative for chest pain and leg swelling.   Gastrointestinal:  Negative for abdominal pain, diarrhea, nausea and vomiting.   Genitourinary:  Negative for difficulty urinating, flank pain and frequency.   Musculoskeletal:  Negative for arthralgias, joint swelling and myalgias.   Skin:  Positive for wound. Negative for color change and rash.   Neurological:  Positive for headaches. Negative for dizziness, weakness and numbness.   Psychiatric/Behavioral:  Negative for agitation and confusion. The patient is not nervous/anxious.    Objective:     Vital Signs (Most Recent):  Temp: 98.3 °F (36.8 °C) (08/26/22 0751)  Pulse: 79 (08/26/22 0950)  Resp: 19 (08/26/22 0950)  BP: (!) 138/96 (08/26/22 0959)  SpO2: 98 % (08/26/22 0950)   Vital Signs (24h Range):  Temp:  [98.2 °F (36.8 °C)-98.7 °F (37.1 °C)] 98.3 °F (36.8 °C)  Pulse:  [71-82] 79  Resp:  [17-20] 19  SpO2:  [93 %-100 %] 98 %  BP: (111-143)/(62-96) 138/96     Weight: 88.5 kg (195 lb 1.8 oz)  Body mass index is 35.69 kg/m².    Estimated Creatinine Clearance: 72.7 mL/min (based on SCr of 1 mg/dL).    Physical  Exam  Vitals and nursing note reviewed.   Constitutional:       General: She is not in acute distress.     Appearance: Normal appearance. She is well-developed and normal weight. She is not ill-appearing, toxic-appearing or diaphoretic.   HENT:      Head:      Comments: Healing left craniotomy site. See photos. No drainage, erythema, warmth noted.  Anterior left scalp/facial swelling.     Nose: Nose normal. No congestion.      Mouth/Throat:      Dentition: Does not have dentures. No dental abscesses.   Eyes:      General: No scleral icterus.     Conjunctiva/sclera: Conjunctivae normal.   Cardiovascular:      Rate and Rhythm: Normal rate and regular rhythm.   Pulmonary:      Effort: Pulmonary effort is normal. No respiratory distress.      Breath sounds: Normal breath sounds. No wheezing or rales.   Abdominal:      General: Bowel sounds are normal. There is no distension.      Palpations: Abdomen is soft.      Tenderness: There is no abdominal tenderness.   Musculoskeletal:         General: Normal range of motion.      Cervical back: Normal range of motion.      Right lower leg: No edema.      Left lower leg: No edema.   Skin:     General: Skin is warm and dry.      Findings: No erythema or rash.   Neurological:      Mental Status: She is alert and oriented to person, place, and time. Mental status is at baseline.      Motor: No weakness.   Psychiatric:         Mood and Affect: Mood normal.         Behavior: Behavior normal.         Thought Content: Thought content normal.         Judgment: Judgment normal.       Significant Labs: CBC:   Recent Labs   Lab 08/25/22  0540 08/26/22  0429   WBC 7.59 7.23   HGB 12.3 12.0   HCT 35.1* 33.8*    280       CMP:   Recent Labs   Lab 08/25/22  0540 08/26/22  0429    137   K 3.3* 3.5    106   CO2 22* 22*   * 229*   BUN 9 11   CREATININE 1.0 1.0   CALCIUM 9.3 9.2   ANIONGAP 8 9       Microbiology Results (last 7 days)       Procedure Component Value Units  Date/Time    Aerobic culture [[id number]] Collected: 08/22/22 1550    Order Status: Completed Specimen: Incision site from Head Updated: 08/26/22 1029     Aerobic Bacterial Culture No growth    Blood culture [[id number]] Collected: 08/21/22 2325    Order Status: Completed Specimen: Blood from Peripheral, Antecubital, Right Updated: 08/26/22 0612     Blood Culture, Routine No Growth to date      No Growth to date      No Growth to date      No Growth to date      No Growth to date    Gram stain [[id number]] Collected: 08/22/22 1550    Order Status: Completed Specimen: Incision site from Head Updated: 08/25/22 1447     Gram Stain Result No organisms seen      Rare WBC's    Gram stain [[id number]]     Order Status: Completed Specimen: Incision site from Head     Culture, Anaerobic [[id number]] Collected: 08/22/22 1550    Order Status: Completed Specimen: Incision site from Head Updated: 08/25/22 1031     Anaerobic Culture Culture in progress    AFB Culture & Smear [[id number]] Collected: 08/22/22 1550    Order Status: Completed Specimen: Incision site from Head Updated: 08/23/22 2127     AFB Culture & Smear Culture in progress     AFB CULTURE STAIN No acid fast bacilli seen.    Fungus culture [[id number]] Collected: 08/22/22 1550    Order Status: No result Specimen: Incision site from Head Updated: 08/23/22 0930    Aerobic culture [[id number]]     Order Status: Completed Specimen: Incision site from Head     Culture, Anaerobe [[id number]]     Order Status: Completed Specimen: Body Fluid from Head     Fungus culture [[id number]]     Order Status: Completed Specimen: Body Fluid from Head     Fungus culture [[id number]]     Order Status: Canceled Specimen: Body Fluid from Head     Culture, Anaerobe [[id number]]     Order Status: Canceled Specimen: Body Fluid from Head     Aerobic culture [[id number]]     Order Status: Canceled Specimen: Incision site from Head     AFB Culture & Smear [[id number]]     Order Status: Canceled Specimen:  Body Fluid from Head           Recent Lab Results  (Last 5 results in the past 24 hours)        08/26/22  1245   08/26/22  0721   08/26/22  0715   08/26/22  0429   08/25/22  2158        Anion Gap       9         Baso #       0.04         Basophil %       0.6         BUN       11         Calcium       9.2         Chloride       106         CO2       22         Creatinine       1.0         Differential Method       Automated         eGFR       >60.0         Eos #       0.1         Eosinophil %       1.9         Glucose       229         Gran # (ANC)       4.6         Gran %       63.2         Hematocrit       33.8         Hemoglobin       12.0         Immature Grans (Abs)       0.05  Comment: Mild elevation in immature granulocytes is non specific and   can be seen in a variety of conditions including stress response,   acute inflammation, trauma and pregnancy. Correlation with other   laboratory and clinical findings is essential.           Immature Granulocytes       0.7         Lymph #       1.7         Lymph %       23.1         Magnesium       1.9         MCH       29.5         MCHC       35.5         MCV       83         Mono #       0.8         Mono %       10.5         MPV       11.1         nRBC       0         Platelets       280         POCT Glucose 120   212       173       Potassium       3.5         RBC       4.07         RDW       14.0         SARS-CoV-2 RNA, Amplification, Qual     Negative  Comment: This test utilizes isothermal nucleic acid amplification   technology to detect the SARS-CoV-2 RdRp nucleic acid segment.   The analytical sensitivity (limit of detection) is 125 genome   equivalents/mL.     A POSITIVE result implies infection with the SARS-CoV-2 virus;  the patient is presumed to be contagious.    A NEGATIVE result means that SARS-CoV-2 nucleic acids are not  present above the limit of detection. A NEGATIVE result should be   treated as presumptive. It does not rule out the possibility of    COVID-19 and should not be the sole basis for treatment decisions.   If COVID-19 is strongly suspected based on clinical and exposure   history, re-testing using an alternate molecular assay should be   considered.       This test is only for use under the Food and Drug   Administration s Emergency Use Authorization (EUA).   Commercial kits are provided by [employer].   Performance characteristics of the EUA have been independently  verified by [facility] Department of  Pathology and Laboratory Medicine.   _________________________________________________________________  The ID NOW COVID-19 Letter of Authorization, along with the   authorized Fact Sheet for Healthcare Providers, the authorized Fact  Sheet for Patients, and authorized labeling are available on the FDA   website:  www.fda.gov/MedicalDevices/Safety/EmergencySituations/ott954543.htm             Sodium       137         WBC       7.23                                Significant Imaging:   Imaging Results              MRI Brain W WO Contrast (Final result)  Result time 08/21/22 23:31:21   Procedure changed from MRI Brain With Contrast     Final result by Anthony Jones MD (08/21/22 23:31:21)                   Impression:      Peripheral rim enhancement of the sub dural and scalp complex fluid collections.  While this could represent enhancing granulation tissue, rim enhancement of infected hematoma is difficult to exclude.    Enhancement extending into the TMJ and lateral upper facial region on the left.  Correlate for cellulitis.    No evidence of enhancement of the brain parenchyma to suggest cerebritis.      Electronically signed by: Anthony Jones  Date:    08/21/2022  Time:    23:31               Narrative:    EXAMINATION:  MRI BRAIN W WO CONTRAST    CLINICAL HISTORY:  rule out infection;    TECHNIQUE:  Multiplanar multisequence MR imaging of the brain was performed before and after the administration of 10 mL Gadavist  intravenous contrast.    COMPARISON:  MRI of the brain, earlier same day previous CT scans    FINDINGS:  Complex subdural and scalp fluid collections demonstrate peripheral rim enhancement with internal septa mainly in the scalp complex mass and fluid collection.  The scalp changes and enhancement extend into the TMJ region and pre-auricular region on the left.  There is no abnormal enhancement of the brain parenchyma to suggest cerebritis.                                       MRI Brain Without Contrast (Final result)  Result time 08/21/22 22:24:25      Final result by Anthony Jones MD (08/21/22 22:24:25)                   Impression:      Stable subdural and subcutaneous fluid collection surrounding the patient's craniotomy defect on the left.  These appear somewhat loculated.  Correlate for subdural and scalp hematoma.  Versus signs of infection.    Postoperative changes of aneurysm clipping with no evidence of acute intracranial hemorrhage, mass or infarction.      Electronically signed by: Anthony Jones  Date:    08/21/2022  Time:    22:24               Narrative:    EXAMINATION:  MRI BRAIN WITHOUT CONTRAST    CLINICAL HISTORY:  Headache, new or worsening, neuro deficit (Age 19-49y);    TECHNIQUE:  Multiplanar multisequence MR imaging of the brain was performed without contrast.    COMPARISON:  CT, 08/21/2022, CT brain, 08/17/2022    FINDINGS:  There is no restricted diffusion.  Patchy punctate foci of gliotic signal intensity throughout the deep and subcortical white matter is again noted.  The left craniotomy and subdural and scalp complex fluid collections with proteinaceous content are noted.  No significant mass effect is evident.  Postoperative changes aneurysm clipping in the sylvian fissure near the [id number]-md-[id number] on the left is noted.  There is no evidence midline shift or mass effect or new pathologic fluid collection within the brain parenchyma.  There is no hydrocephalus.  Empty sella  configuration is.  Bilateral sinus disease is present.  The orbits and orbital contents appear unremarkable.                                       X-Ray Chest AP Portable (Final result)  Result time 08/21/22 21:40:18      Final result by Mario Michaud MD (08/21/22 21:40:18)                   Impression:      Hypoventilatory examination.  No convincing radiographic evidence of acute intrathoracic process on this single view.      Electronically signed by: Mario Michaud MD  Date:    08/21/2022  Time:    21:40               Narrative:    EXAMINATION:  XR CHEST AP PORTABLE    CLINICAL HISTORY:  Stroke;    TECHNIQUE:  Single frontal view of the chest was performed.    COMPARISON:  07/15/2022    FINDINGS:  Cardiac monitoring leads overlie the chest.  Cardiac silhouette is stable in size.  Lung volumes are diminished with resultant bronchovascular crowding.  No large confluent airspace consolidation appreciated.  No significant volume of pleural fluid or pneumothorax identified.  Osseous structures demonstrate mild degenerative changes.                                       CTA STROKE MULTI-PHASE (Final result)  Result time 08/21/22 22:21:22      Final result by Flaquito Burciaga MD (08/21/22 22:21:22)                   Impression:      CTA head: No large vessel occlusion, high grade stenosis, or vascular malformation identified.  Stable postsurgical changes of left PCOM/anterior choroidal aneurysm clipping.  Unchanged small aneurysm at the left M2 bifurcation.    CT head: No evidence for acute intracranial hemorrhage or major vascular distribution infarct.  Stable postsurgical changes of left frontotemporal craniotomy for aneurysm clipping.  Small subdural collection underlying the craniotomy site, similar to prior exam.  Soft tissue collection overlying the craniotomy site is similar in size to prior exam.    Electronically signed by resident: Bianca Davis  Date:    08/21/2022  Time:    21:38    Electronically  signed by: Flaquito Burciaga MD  Date:    08/21/2022  Time:    22:21               Narrative:    EXAMINATION:  CTA STROKE MULTI-PHASE    CLINICAL HISTORY:  Transient ischemic attack (TIA);    TECHNIQUE:  Axial CT images obtained throughout the before and after the administration of intravenous contrast.    Multiphase CT angiogram was then performed from the top of aortic arch to the vertex during bolus administration of 75 mL of Omnipaque 350 intravenous contrast.  Scan through the head and neck was performed in arterial phase.  Axial, sagittal and coronal reconstructions, including maximum intensity projection reconstructions were performed.    CT source data was analyzed using artificial intelligence software for detection of a large vessel occlusions (LVO) in order to enable computer assisted triage notification and aid clinical stroke decision making.    COMPARISON:  CT head 08/17/2022.  08/02/2022.    CTA head neck 07/15/2022.    FINDINGS:  The ventricles are normal in size without evidence of hydrocephalus.    Empty sella configuration..  No parenchymal mass, hemorrhage, edema or major vascular distribution infarct.    Stable postsurgical changes of left frontotemporal craniotomy for left ICA aneurysm clipping.  Redemonstration of mixed density fluid collection underlying the craniotomy site that measures approximately 0.9 cm with mild mass effect on the underlying brain parenchyma.  Heterogeneous fluid collection underlying the surgical flap external to the craniotomy site, similar to prior exam.    Mucosal thickening the right maxillary sinus.  Remaining paranasal sinuses and mastoid air cells are essentially clear.    CTA:    Left-sided aortic arch with common origin of the brachiocephalic and left common carotid arteries.  With no significant atherosclerosis.    The common and internal carotid arteries are normal in course and caliber. No significant stenosis in either carotid bifurcation.    The vertebral  origins are patent. The cervical vertebral arteries are normal in course and caliber.  Left vertebral artery is dominant.  Vertebrobasilar system is within normal limits without focal abnormality.    Postsurgical changes of left PCOM and anterior choroidal artery aneurysm clipping.  Fetal origin of the left PCA.  The anterior cerebral arteries and anterior communicating complex are within normal limits.  The right middle cerebral artery is within normal limits.  Previous left proximal M1 3 mm aneurysm is difficult to discern and may be obscured by artifact from the prior clip in.  There is a stable 3 mm aneurysm in the left M2 bifurcation.    The dural venous sinuses are patent.    The soft tissues of the neck are within normal limits.  There is no evidence of lymphadenopathy in the neck.  The parapharyngeal fat planes are present.  The trachea is within normal limits.  The visualized lung apices demonstrate mild dependent atelectasis..  The cervical spine is unremarkable.                                        8/22/22:      8/23/22:                  8/24/22

## 2022-08-26 NOTE — PLAN OF CARE
Problem: Adult Inpatient Plan of Care  Goal: Plan of Care Review  Outcome: Ongoing, Progressing  Goal: Optimal Comfort and Wellbeing  Outcome: Ongoing, Progressing     Problem: Diabetes Comorbidity  Goal: Blood Glucose Level Within Targeted Range  Outcome: Ongoing, Progressing     Patient is AAO x4. POC reviewed with patient. Patient verbalized understanding. Patient's breathing is unlabored with equal chest expansion. Patient ambulates to the restroom. Patient remained free from falls. Patient rested well through shift. NPO at midnight for procedure in the morning. Bed in lowest position,bed alarm on, side rails up x3, no complaints or signs of distress. WCTM during shift.  See flowsheets for full assessment and VS info.

## 2022-08-26 NOTE — ASSESSMENT & PLAN NOTE
47 year old female with history of HTN, smoking and left brain aneurysm s/p left sided craniotomy for aneurysm clipping on 7/15/22 who presented with sudden onset of symptoms concerning for focal seizure. See HPI for more detail.      MRI brain showed peripherally enhancing sub dural and scalp complex fluid collections. CTA head without large vessel occulsion, stenosis, or vascular malformation.  NSGY performed a bedside aspiration of one of the subcutaneous fluid collections. Per previous discussion with team, blood was aspirated. Cx NGTD.      Patient is on empiric Vancomycin and Cefepime. Afebrile without a leukocytosis. HDS. She is scheduled for cranial wound I&D today.      Recommendations:   · Continue IV Vancomycin. Inpatient pharmacy managing Vanc dosing. Trough goal 15-20.   · Continue Cefepime 2 g IV q 8 hours   · When taken to OR, please send tissue/fluid for pathology, gram stain, aerobic, anaerobic, AFB and fungal cultures.  · Will follow culture data to guide antibiotic therapy   · Plan reviewed with ID staff, Dr. Pineda. [place].

## 2022-08-26 NOTE — SUBJECTIVE & OBJECTIVE
Interval History: NAEON. AFVSS. Pt neurologically stable. Reports tenderness to L side of scalp, denies any severe HA or recurrence of tremors/seizure-like activity. Denies fever/chill, N/V. Pending OR today for wound washout and evacuation of fluid collection.    Medications:  Continuous Infusions:  Scheduled Meds:   amitriptyline  75 mg Oral QHS    amLODIPine  10 mg Oral Daily    atorvastatin  40 mg Oral Daily    carvediloL  37.5 mg Oral BID    ceFEPime (MAXIPIME) IVPB  2 g Intravenous Q8H    EScitalopram oxalate  10 mg Oral Daily    insulin aspart U-100  10 Units Subcutaneous TIDWM    insulin detemir U-100  30 Units Subcutaneous Daily    levetiracetam IV  1,000 mg Intravenous Q12H    magnesium oxide  400 mg Oral BID    potassium chloride SA  20 mEq Oral BID    vancomycin (VANCOCIN) IVPB  750 mg Intravenous Q12H     PRN Meds:acetaminophen, albuterol, dextrose 10%, dextrose 10%, glucagon (human recombinant), glucose, glucose, insulin aspart U-100, melatonin, oxyCODONE, Pharmacy to dose Vancomycin consult **AND** vancomycin - pharmacy to dose     Review of Systems  Objective:     Weight: 88.5 kg (195 lb 1.8 oz)  Body mass index is 35.69 kg/m².  Vital Signs (Most Recent):  Temp: 98.3 °F (36.8 °C) (08/26/22 0751)  Pulse: 79 (08/26/22 0950)  Resp: 19 (08/26/22 0950)  BP: (!) 138/96 (08/26/22 0950)  SpO2: 98 % (08/26/22 0950)   Vital Signs (24h Range):  Temp:  [98.2 °F (36.8 °C)-98.7 °F (37.1 °C)] 98.3 °F (36.8 °C)  Pulse:  [71-82] 79  Resp:  [17-20] 19  SpO2:  [93 %-100 %] 98 %  BP: (111-143)/(62-96) 138/96                     Female External Urinary Catheter 08/21/22 2221 (Active)   Skin no redness;no breakdown 08/23/22 1800   Tolerance no signs/symptoms of discomfort 08/23/22 1800   Suction Continuous suction at 60 mmHg 08/23/22 0800   Date of last wick change 08/23/22 08/23/22 0800   Time of last wick change 0800 08/23/22 0800   Output (mL) 1000 mL 08/23/22 0800       Physical Exam    Neurosurgery Physical  Exam    General: well developed, well nourished, no distress.   Head: normocephalic  Neck: No tracheal deviation. No palpable masses. Full ROM.   GCS: E4 V5 M6; Total: 15  Mental Status: Awake, Alert, Oriented x 4  Language: No aphasia  Speech: No dysarthria  Cranial nerves: face symmetric, tongue midline, CN II-XII grossly intact.   Eyes: pupils equal, round, reactive to light with accomodation, EOMI.   Ears: No drainage.   Pulmonary: normal respirations, no signs of respiratory distress  Abdomen: soft, non-distended, not tender to palpation  Skin: Skin is warm, dry and intact.     Sensory: intact to light touch throughout  Motor Strength: Moves all extremities spontaneously with good tone.  Full strength upper and lower extremities. No abnormal movements seen.      Finger-to-nose: intact bilaterally   Pronator drift: absent bilaterally     Left Cranial Incision: Inferior aspect of incision with uneven edges but skin closed with no leakage. Anterior left scalp swelling, no erythema, but tender to palpation.     Significant Labs:  Recent Labs   Lab 08/25/22  0540 08/26/22  0429   * 229*    137   K 3.3* 3.5    106   CO2 22* 22*   BUN 9 11   CREATININE 1.0 1.0   CALCIUM 9.3 9.2   MG 2.0 1.9     Recent Labs   Lab 08/25/22  0540 08/26/22  0429   WBC 7.59 7.23   HGB 12.3 12.0   HCT 35.1* 33.8*    280     No results for input(s): LABPT, INR, APTT in the last 48 hours.  Microbiology Results (last 7 days)       Procedure Component Value Units Date/Time    Blood culture [[id number]] Collected: 08/21/22 2325    Order Status: Completed Specimen: Blood from Peripheral, Antecubital, Right Updated: 08/26/22 0612     Blood Culture, Routine No Growth to date      No Growth to date      No Growth to date      No Growth to date      No Growth to date    Gram stain [[id number]] Collected: 08/22/22 1550    Order Status: Completed Specimen: Incision site from Head Updated: 08/25/22 1447     Gram Stain Result No  organisms seen      Rare WBC's    Gram stain [[id number]]     Order Status: Completed Specimen: Incision site from Head     Culture, Anaerobic [[id number]] Collected: 08/22/22 1550    Order Status: Completed Specimen: Incision site from Head Updated: 08/25/22 1031     Anaerobic Culture Culture in progress    Aerobic culture [[id number]] Collected: 08/22/22 1550    Order Status: Completed Specimen: Incision site from Head Updated: 08/24/22 0728     Aerobic Bacterial Culture No growth    AFB Culture & Smear [[id number]] Collected: 08/22/22 1550    Order Status: Completed Specimen: Incision site from Head Updated: 08/23/22 2127     AFB Culture & Smear Culture in progress     AFB CULTURE STAIN No acid fast bacilli seen.    Fungus culture [[id number]] Collected: 08/22/22 1550    Order Status: No result Specimen: Incision site from Head Updated: 08/23/22 0930    Aerobic culture [[id number]]     Order Status: Completed Specimen: Incision site from Head     Culture, Anaerobe [[id number]]     Order Status: Completed Specimen: Body Fluid from Head     Fungus culture [[id number]]     Order Status: Completed Specimen: Body Fluid from Head     Fungus culture [[id number]]     Order Status: Canceled Specimen: Body Fluid from Head     Culture, Anaerobe [[id number]]     Order Status: Canceled Specimen: Body Fluid from Head     Aerobic culture [[id number]]     Order Status: Canceled Specimen: Incision site from Head     AFB Culture & Smear [[id number]]     Order Status: Canceled Specimen: Body Fluid from Head           All pertinent labs from the last 24 hours have been reviewed.    Significant Diagnostics:  I have reviewed and interpreted all pertinent imaging results/findings within the past 24 hours.

## 2022-08-26 NOTE — PROGRESS NOTES
Misael Schmitt - Neurosurgery ([facility])  Infectious Disease  Progress Note    Patient Name: Gina Jenkins  MRN: [id number]  Admission Date: 8/21/2022  Length of Stay: 4 days  Attending Physician: Timothy Diaz MD  Primary Care Provider: Clair Johnson NP    Isolation Status: No active isolations  Assessment/Plan:       Fluid collection at surgical site     See assessment and plan below      Post op infection     47 year old female with history of HTN, smoking and left brain aneurysm s/p left sided craniotomy for aneurysm clipping on 7/15/22 who presented with sudden onset of symptoms concerning for focal seizure. See HPI for more detail.      MRI brain showed peripherally enhancing sub dural and scalp complex fluid collections. CTA head without large vessel occulsion, stenosis, or vascular malformation.  NSGY performed a bedside aspiration of one of the subcutaneous fluid collections. Per previous discussion with team, blood was aspirated. Cx NGTD.      Patient is on empiric Vancomycin and Cefepime. Afebrile without a leukocytosis. HDS. She is scheduled for cranial wound I&D today.      Recommendations:   · Continue IV Vancomycin. Inpatient pharmacy managing Vanc dosing. Trough goal 15-20.   · Continue Cefepime 2 g IV q 8 hours   · When taken to OR, please send tissue/fluid for pathology, gram stain, aerobic, anaerobic, AFB and fungal cultures.  · Will follow culture data to guide antibiotic therapy   · Plan reviewed with ID staff, Dr. Pineda. [place].        Thank you for the consult. Please call for any questions.  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 Diaz who presented with sudden onset of symptoms concerning for  focal seizure. Pt states she was in her yard with her son on Sunday sharing a snack when she suddenly became aphasic and had RLE weakness. Pt states occurred for about 5 mins and then self resolved. Pt reported to the ED immediately and reports the episode occurred for a 2nd time in the ER, again self resolving. Pt denies drainage, pain, or swelling from her craniotomy incision site. Denies current weakness, paralysis. Denies change of vision, headaches. Denies loss of control from bowels, urine. Denies fevers, body aches, chills. Denies concerns/complications postoperatively until on Sunday when the symptoms started.     Since admission pt remains without leukocytosis. Pt with slightly increased inflammatory markers, sed rate 46 and CRP 26. Blood cultures NGTD. MRI brain w wo c/f enhancement of the sub dural and scalp complex fluid collections, potentially representing granulation tissue or infection hematoma. MRI wo c/f stable subdural and subq fluid collections surrounding cranitomy defect on the left, potentially appearing somewhat loculated. CTA head with no large vessel occulsion, stenosis, or vascular malformation.     ID was consulted for mgmt recommendations. Pt is currently on vancomycin and piperacillin tazobactam.       Interval History: NAEON. Afebrile. No leukocytosis. Scheduled for cranial wound washout today. No new complaints today.    Past Medical History:   Diagnosis Date    Brain aneurysm     CHF (congestive heart failure)     H/O coronary angioplasty     Hypercholesteremia     Hypertension     Malignant hypertension     Migraine headache     Stroke 10/2017       Past Surgical History:   Procedure Laterality Date    CARDIAC CATHETERIZATION      CEREBRAL ANGIOGRAM      CLIP LIGATION OF INTRACRANIAL ANEURYSM BY CRANIOTOMY N/A 7/15/2022    Procedure: CRANIOTOMY, WITH ANEURYSM CLIPPING;  Surgeon: Timothy Diaz MD;  Location: [facility] OR [address] [place] [address];  Service: Neurosurgery;  Laterality: N/A;   PTERIONAL CRANIOTOMY WITH CLIP LIGATION OF L PCOMM, L ANTERIOR CHOROIDAL, L MCA  ANEURYSM, ANESTHESIA: GENERAL, BLOOD: TYPE&CROSS 2 UNITS, NEUROMONITORING: SEP, MEP, EEG, RADIOLOGY: C-ARM, POSITION: SUPINE, ANNA CO-SURGERON: DR. LEXI DOMÍNGUEZ.       Review of patient's allergies indicates:   Allergen Reactions    Lisinopril Swelling    Bactrim [sulfamethoxazole-trimethoprim] Rash       Medications:  Facility-Administered Medications Prior to Admission   Medication    albuterol inhaler 2 puff     Medications Prior to Admission   Medication Sig    amitriptyline (ELAVIL) 75 MG tablet Take 75 mg by mouth every evening.    amlodipine (NORVASC) 10 MG tablet Take 1 tablet (10 mg total) by mouth once daily.    atorvastatin (LIPITOR) 40 MG tablet Take 1 tablet (40 mg total) by mouth once daily.    butalbital-acetaminophen-caffeine -40 mg (FIORICET, ESGIC) -40 mg per tablet Take 1 tablet by mouth every 4 (four) hours as needed.    carvediloL (COREG) 25 MG tablet Take 37.5 mg by mouth 2 (two) times daily.    cyanocobalamin (VITAMIN B-12) 1000 MCG tablet Take 1 tablet (1,000 mcg total) by mouth once daily. (Patient not taking: No sig reported)    diphenhydrAMINE (BENADRYL) 25 mg capsule Take 1 each (25 mg total) by mouth every 6 (six) hours as needed for Itching or Allergies. (Patient not taking: No sig reported)    docusate sodium (COLACE) 100 MG capsule Take 1 capsule (100 mg total) by mouth 2 (two) times daily as needed for Constipation.    EScitalopram oxalate (LEXAPRO) 10 MG tablet Take 10 mg by mouth once daily.    famotidine (PEPCID) 20 MG tablet Take 1 tablet (20 mg total) by mouth 2 (two) times daily. (Patient not taking: No sig reported)    hydrochlorothiazide (HYDRODIURIL) 25 MG tablet Take 1 tablet (25 mg total) by mouth once daily.    levETIRAcetam (KEPPRA) 500 MG Tab Take 1 tablet (500 mg total) by mouth 2 (two) times daily for 5 days (Patient not taking: Reported on 7/29/2022)     "magnesium oxide (MAG-OX) 400 mg (241.3 mg magnesium) tablet Take 1 tablet by mouth 2 (two) times daily.    potassium chloride SA (K-DUR,KLOR-CON) 20 MEQ tablet Take 20 mEq by mouth 2 (two) times daily.    [DISCONTINUED] cephALEXin (KEFLEX) 500 MG capsule Take 1 capsule (500 mg total) by mouth every 6 (six) hours. for 14 days    [DISCONTINUED] dexAMETHasone (DECADRON) 2 MG tablet Take 2 tablets (4 mg) by mouth every 8 hours for 1 day, THEN 2 tablets (4 mg) every 12 hours for 2 days, THEN 1 tablet (2 mg) every 12 hours for 2 days, THEN 1 tablet (2 mg) daily for 2 days, then STOP.    [DISCONTINUED] HYDROcodone-acetaminophen (NORCO) 5-325 mg per tablet Take 1 tablet by mouth every 6 (six) hours as needed for Pain.     Antibiotics (From admission, onward)                Start     Stop Route Frequency Ordered    08/26/22 0100  vancomycin 750 mg in dextrose 5 % 250 mL IVPB (ready to mix system)         -- IV Every 12 hours (non-standard times) 08/25/22 0712    08/22/22 1915  cefepime in dextrose 5 % IVPB 2 g         -- IV Every 8 hours (non-standard times) 08/22/22 1805    08/22/22 0212  vancomycin - pharmacy to dose  (vancomycin IVPB)        "And" Linked Group Details    -- IV pharmacy to manage frequency 08/22/22 0112          Antifungals (From admission, onward)                None          Antivirals (From admission, onward)      None             Immunization History   Administered Date(s) Administered    Influenza - Quadrivalent - PF *Preferred* (6 months and older) 10/05/2017       Family History    None       Social History     Socioeconomic History    Marital status:    Tobacco Use    Smoking status: Current Every Day Smoker     Packs/day: 0.50     Types: Cigarettes    Smokeless tobacco: Never Used   Substance and Sexual Activity    Alcohol use: Yes     Comment: occassionally    Drug use: No    Sexual activity: Not Currently     Partners: Male     Birth control/protection: None     Review of " Systems   Constitutional:  Negative for appetite change, chills, diaphoresis, fatigue and fever.   HENT:  Positive for facial swelling.    Respiratory:  Negative for cough and shortness of breath.    Cardiovascular:  Negative for chest pain and leg swelling.   Gastrointestinal:  Negative for abdominal pain, diarrhea, nausea and vomiting.   Genitourinary:  Negative for difficulty urinating, flank pain and frequency.   Musculoskeletal:  Negative for arthralgias, joint swelling and myalgias.   Skin:  Positive for wound. Negative for color change and rash.   Neurological:  Positive for headaches. Negative for dizziness, weakness and numbness.   Psychiatric/Behavioral:  Negative for agitation and confusion. The patient is not nervous/anxious.    Objective:     Vital Signs (Most Recent):  Temp: 98.3 °F (36.8 °C) (08/26/22 0751)  Pulse: 79 (08/26/22 0950)  Resp: 19 (08/26/22 0950)  BP: (!) 138/96 (08/26/22 0959)  SpO2: 98 % (08/26/22 0950)   Vital Signs (24h Range):  Temp:  [98.2 °F (36.8 °C)-98.7 °F (37.1 °C)] 98.3 °F (36.8 °C)  Pulse:  [71-82] 79  Resp:  [17-20] 19  SpO2:  [93 %-100 %] 98 %  BP: (111-143)/(62-96) 138/96     Weight: 88.5 kg (195 lb 1.8 oz)  Body mass index is 35.69 kg/m².    Estimated Creatinine Clearance: 72.7 mL/min (based on SCr of 1 mg/dL).    Physical Exam  Vitals and nursing note reviewed.   Constitutional:       General: She is not in acute distress.     Appearance: Normal appearance. She is well-developed and normal weight. She is not ill-appearing, toxic-appearing or diaphoretic.   HENT:      Head:      Comments: Healing left craniotomy site. See photos. No drainage, erythema, warmth noted.  Anterior left scalp/facial swelling.     Nose: Nose normal. No congestion.      Mouth/Throat:      Dentition: Does not have dentures. No dental abscesses.   Eyes:      General: No scleral icterus.     Conjunctiva/sclera: Conjunctivae normal.   Cardiovascular:      Rate and Rhythm: Normal rate and regular  rhythm.   Pulmonary:      Effort: Pulmonary effort is normal. No respiratory distress.      Breath sounds: Normal breath sounds. No wheezing or rales.   Abdominal:      General: Bowel sounds are normal. There is no distension.      Palpations: Abdomen is soft.      Tenderness: There is no abdominal tenderness.   Musculoskeletal:         General: Normal range of motion.      Cervical back: Normal range of motion.      Right lower leg: No edema.      Left lower leg: No edema.   Skin:     General: Skin is warm and dry.      Findings: No erythema or rash.   Neurological:      Mental Status: She is alert and oriented to person, place, and time. Mental status is at baseline.      Motor: No weakness.   Psychiatric:         Mood and Affect: Mood normal.         Behavior: Behavior normal.         Thought Content: Thought content normal.         Judgment: Judgment normal.       Significant Labs: CBC:   Recent Labs   Lab 08/25/22  0540 08/26/22 0429   WBC 7.59 7.23   HGB 12.3 12.0   HCT 35.1* 33.8*    280       CMP:   Recent Labs   Lab 08/25/22  0540 08/26/22  0429    137   K 3.3* 3.5    106   CO2 22* 22*   * 229*   BUN 9 11   CREATININE 1.0 1.0   CALCIUM 9.3 9.2   ANIONGAP 8 9       Microbiology Results (last 7 days)       Procedure Component Value Units Date/Time    Aerobic culture [[id number]] Collected: 08/22/22 1550    Order Status: Completed Specimen: Incision site from Head Updated: 08/26/22 1029     Aerobic Bacterial Culture No growth    Blood culture [[id number]] Collected: 08/21/22 2325    Order Status: Completed Specimen: Blood from Peripheral, Antecubital, Right Updated: 08/26/22 0612     Blood Culture, Routine No Growth to date      No Growth to date      No Growth to date      No Growth to date      No Growth to date    Gram stain [[id number]] Collected: 08/22/22 1550    Order Status: Completed Specimen: Incision site from Head Updated: 08/25/22 1447     Gram Stain Result No organisms seen       Rare WBC's    Gram stain [[id number]]     Order Status: Completed Specimen: Incision site from Head     Culture, Anaerobic [[id number]] Collected: 08/22/22 1550    Order Status: Completed Specimen: Incision site from Head Updated: 08/25/22 1031     Anaerobic Culture Culture in progress    AFB Culture & Smear [[id number]] Collected: 08/22/22 1550    Order Status: Completed Specimen: Incision site from Head Updated: 08/23/22 2127     AFB Culture & Smear Culture in progress     AFB CULTURE STAIN No acid fast bacilli seen.    Fungus culture [[id number]] Collected: 08/22/22 1550    Order Status: No result Specimen: Incision site from Head Updated: 08/23/22 0930    Aerobic culture [[id number]]     Order Status: Completed Specimen: Incision site from Head     Culture, Anaerobe [[id number]]     Order Status: Completed Specimen: Body Fluid from Head     Fungus culture [[id number]]     Order Status: Completed Specimen: Body Fluid from Head     Fungus culture [[id number]]     Order Status: Canceled Specimen: Body Fluid from Head     Culture, Anaerobe [[id number]]     Order Status: Canceled Specimen: Body Fluid from Head     Aerobic culture [[id number]]     Order Status: Canceled Specimen: Incision site from Head     AFB Culture & Smear [[id number]]     Order Status: Canceled Specimen: Body Fluid from Head           Recent Lab Results  (Last 5 results in the past 24 hours)        08/26/22  1245   08/26/22  0721   08/26/22  0715   08/26/22  0429   08/25/22  2158        Anion Gap       9         Baso #       0.04         Basophil %       0.6         BUN       11         Calcium       9.2         Chloride       106         CO2       22         Creatinine       1.0         Differential Method       Automated         eGFR       >60.0         Eos #       0.1         Eosinophil %       1.9         Glucose       229         Gran # (ANC)       4.6         Gran %       63.2         Hematocrit       33.8         Hemoglobin       12.0          Immature Grans (Abs)       0.05  Comment: Mild elevation in immature granulocytes is non specific and   can be seen in a variety of conditions including stress response,   acute inflammation, trauma and pregnancy. Correlation with other   laboratory and clinical findings is essential.           Immature Granulocytes       0.7         Lymph #       1.7         Lymph %       23.1         Magnesium       1.9         MCH       29.5         MCHC       35.5         MCV       83         Mono #       0.8         Mono %       10.5         MPV       11.1         nRBC       0         Platelets       280         POCT Glucose 120   212       173       Potassium       3.5         RBC       4.07         RDW       14.0         SARS-CoV-2 RNA, Amplification, Qual     Negative  Comment: This test utilizes isothermal nucleic acid amplification   technology to detect the SARS-CoV-2 RdRp nucleic acid segment.   The analytical sensitivity (limit of detection) is 125 genome   equivalents/mL.     A POSITIVE result implies infection with the SARS-CoV-2 virus;  the patient is presumed to be contagious.    A NEGATIVE result means that SARS-CoV-2 nucleic acids are not  present above the limit of detection. A NEGATIVE result should be   treated as presumptive. It does not rule out the possibility of   COVID-19 and should not be the sole basis for treatment decisions.   If COVID-19 is strongly suspected based on clinical and exposure   history, re-testing using an alternate molecular assay should be   considered.       This test is only for use under the Food and Drug   Administration s Emergency Use Authorization (EUA).   Commercial kits are provided by [employer].   Performance characteristics of the EUA have been independently  verified by [facility] Department of  Pathology and Laboratory Medicine.   _________________________________________________________________  The ID NOW COVID-19 Letter of Authorization, along with  the   authorized Fact Sheet for Healthcare Providers, the authorized Fact  Sheet for Patients, and authorized labeling are available on the FDA   website:  www.fda.gov/MedicalDevices/Safety/EmergencySituations/tvg663022.htm             Sodium       137         WBC       7.23                                Significant Imaging:   Imaging Results              MRI Brain W WO Contrast (Final result)  Result time 08/21/22 23:31:21   Procedure changed from MRI Brain With Contrast     Final result by Anthony Jones MD (08/21/22 23:31:21)                   Impression:      Peripheral rim enhancement of the sub dural and scalp complex fluid collections.  While this could represent enhancing granulation tissue, rim enhancement of infected hematoma is difficult to exclude.    Enhancement extending into the TMJ and lateral upper facial region on the left.  Correlate for cellulitis.    No evidence of enhancement of the brain parenchyma to suggest cerebritis.      Electronically signed by: Anthony Jones  Date:    08/21/2022  Time:    23:31               Narrative:    EXAMINATION:  MRI BRAIN W WO CONTRAST    CLINICAL HISTORY:  rule out infection;    TECHNIQUE:  Multiplanar multisequence MR imaging of the brain was performed before and after the administration of 10 mL Gadavist intravenous contrast.    COMPARISON:  MRI of the brain, earlier same day previous CT scans    FINDINGS:  Complex subdural and scalp fluid collections demonstrate peripheral rim enhancement with internal septa mainly in the scalp complex mass and fluid collection.  The scalp changes and enhancement extend into the TMJ region and pre-auricular region on the left.  There is no abnormal enhancement of the brain parenchyma to suggest cerebritis.                                       MRI Brain Without Contrast (Final result)  Result time 08/21/22 22:24:25      Final result by Anthony Jones MD (08/21/22 22:24:25)                   Impression:       Stable subdural and subcutaneous fluid collection surrounding the patient's craniotomy defect on the left.  These appear somewhat loculated.  Correlate for subdural and scalp hematoma.  Versus signs of infection.    Postoperative changes of aneurysm clipping with no evidence of acute intracranial hemorrhage, mass or infarction.      Electronically signed by: Anthony Jones  Date:    08/21/2022  Time:    22:24               Narrative:    EXAMINATION:  MRI BRAIN WITHOUT CONTRAST    CLINICAL HISTORY:  Headache, new or worsening, neuro deficit (Age 19-49y);    TECHNIQUE:  Multiplanar multisequence MR imaging of the brain was performed without contrast.    COMPARISON:  CT, 08/21/2022, CT brain, 08/17/2022    FINDINGS:  There is no restricted diffusion.  Patchy punctate foci of gliotic signal intensity throughout the deep and subcortical white matter is again noted.  The left craniotomy and subdural and scalp complex fluid collections with proteinaceous content are noted.  No significant mass effect is evident.  Postoperative changes aneurysm clipping in the sylvian fissure near the vgyqyn-yg-Ndgzlz on the left is noted.  There is no evidence midline shift or mass effect or new pathologic fluid collection within the brain parenchyma.  There is no hydrocephalus.  Empty sella configuration is.  Bilateral sinus disease is present.  The orbits and orbital contents appear unremarkable.                                       X-Ray Chest AP Portable (Final result)  Result time 08/21/22 21:40:18      Final result by Mario Michaud MD (08/21/22 21:40:18)                   Impression:      Hypoventilatory examination.  No convincing radiographic evidence of acute intrathoracic process on this single view.      Electronically signed by: Mario Michaud MD  Date:    08/21/2022  Time:    21:40               Narrative:    EXAMINATION:  XR CHEST AP PORTABLE    CLINICAL HISTORY:  Stroke;    TECHNIQUE:  Single frontal view of the  chest was performed.    COMPARISON:  07/15/2022    FINDINGS:  Cardiac monitoring leads overlie the chest.  Cardiac silhouette is stable in size.  Lung volumes are diminished with resultant bronchovascular crowding.  No large confluent airspace consolidation appreciated.  No significant volume of pleural fluid or pneumothorax identified.  Osseous structures demonstrate mild degenerative changes.                                       CTA STROKE MULTI-PHASE (Final result)  Result time 08/21/22 22:21:22      Final result by Flaquito Burciaga MD (08/21/22 22:21:22)                   Impression:      CTA head: No large vessel occlusion, high grade stenosis, or vascular malformation identified.  Stable postsurgical changes of left PCOM/anterior choroidal aneurysm clipping.  Unchanged small aneurysm at the left M2 bifurcation.    CT head: No evidence for acute intracranial hemorrhage or major vascular distribution infarct.  Stable postsurgical changes of left frontotemporal craniotomy for aneurysm clipping.  Small subdural collection underlying the craniotomy site, similar to prior exam.  Soft tissue collection overlying the craniotomy site is similar in size to prior exam.    Electronically signed by resident: Bianca Davis  Date:    08/21/2022  Time:    21:38    Electronically signed by: Flaquito Burciaga MD  Date:    08/21/2022  Time:    22:21               Narrative:    EXAMINATION:  CTA STROKE MULTI-PHASE    CLINICAL HISTORY:  Transient ischemic attack (TIA);    TECHNIQUE:  Axial CT images obtained throughout the before and after the administration of intravenous contrast.    Multiphase CT angiogram was then performed from the top of aortic arch to the vertex during bolus administration of 75 mL of Omnipaque 350 intravenous contrast.  Scan through the head and neck was performed in arterial phase.  Axial, sagittal and coronal reconstructions, including maximum intensity projection reconstructions were performed.    CT source  data was analyzed using artificial intelligence software for detection of a large vessel occlusions (LVO) in order to enable computer assisted triage notification and aid clinical stroke decision making.    COMPARISON:  CT head 08/17/2022.  08/02/2022.    CTA head neck 07/15/2022.    FINDINGS:  The ventricles are normal in size without evidence of hydrocephalus.    Empty sella configuration..  No parenchymal mass, hemorrhage, edema or major vascular distribution infarct.    Stable postsurgical changes of left frontotemporal craniotomy for left ICA aneurysm clipping.  Redemonstration of mixed density fluid collection underlying the craniotomy site that measures approximately 0.9 cm with mild mass effect on the underlying brain parenchyma.  Heterogeneous fluid collection underlying the surgical flap external to the craniotomy site, similar to prior exam.    Mucosal thickening the right maxillary sinus.  Remaining paranasal sinuses and mastoid air cells are essentially clear.    CTA:    Left-sided aortic arch with common origin of the brachiocephalic and left common carotid arteries.  With no significant atherosclerosis.    The common and internal carotid arteries are normal in course and caliber. No significant stenosis in either carotid bifurcation.    The vertebral origins are patent. The cervical vertebral arteries are normal in course and caliber.  Left vertebral artery is dominant.  Vertebrobasilar system is within normal limits without focal abnormality.    Postsurgical changes of left PCOM and anterior choroidal artery aneurysm clipping.  Fetal origin of the left PCA.  The anterior cerebral arteries and anterior communicating complex are within normal limits.  The right middle cerebral artery is within normal limits.  Previous left proximal M1 3 mm aneurysm is difficult to discern and may be obscured by artifact from the prior clip in.  There is a stable 3 mm aneurysm in the left M2 bifurcation.    The dural  venous sinuses are patent.    The soft tissues of the neck are within normal limits.  There is no evidence of lymphadenopathy in the neck.  The parapharyngeal fat planes are present.  The trachea is within normal limits.  The visualized lung apices demonstrate mild dependent atelectasis..  The cervical spine is unremarkable.                                        8/22/22:      8/23/22:                  8/24/22

## 2022-08-26 NOTE — CARE UPDATE
BG goal 140-180    Remains on neuro floor. OR today for wound washout and evacuation of fluid collection. BG reasonably well controlled on current SQ insulin regimen. Diet NPO Except for: Medication, Sips with Medication      Plan:  -Continue Levemir 30 units daily  -Continue Novolog 10 units TID with meals (HOLD while NPO)   -Moderate Dose Correction Scale  -BG monitoring ac/hs    ** Please call Endocrine for any BG related issues **    Bedside nurse to instruct on insulin pen use and blood sugar monitor. Have patient administer own injections after education completed supervised by nurse.    Lab Results   Component Value Date    HGBA1C 8.3 (H) 08/22/2022       Discharge planning:  TBD. Notify endocrine prior to discharge.

## 2022-08-26 NOTE — ASSESSMENT & PLAN NOTE
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 Diaz who presented after episode of sudden onset R-sided weakness/tremors and aphasia for about 5 minutes, resolved prior to EMS arrival, concerning for focal seizure, then with second episode while in the ED. MRI brain w/wo concerning for scalp and epidural infection.    - Admitted to NS   - Neuro checks q4h  - all labs and diagnostics reviewed   - CTA 8/21: largely stable from prior with epidural fluid collection and extracranial fluid collection stable in size; stable postsurgical changes of L PCOM/anterior choroidal aneurysm clipping, stable small left MCA bifurcation aneurysm   - MRI brain w wo 8/21: peripheral rim enhancement of subdural and scalp complex fluid collections, concerning for infection; no evidence of enhancement of brain parenchyma   - spot EEG 8/23: mild regional cortical or subcortical dysfunction in the left temporal region with no electrographic seizures or indications of seizure tendency.  - Infectious workup:   - afebrile, no leukocytosis   - blood cx 8/21 NGTD   - ESR 30-->46, CRP 28.5-->26   - Subcutaneous scalp fluid collection tapped 8/22, sent for Cx's - NGTD   - ID consulted, recommend Vanc/Cefepime, continue to follow Cx's  - Plan for OR today 8/26 for cranial wound washout, Rescheduled due to OR availability   - NPO since midnight. SQH on hold for OR.  - Seizures: Episodes concerning for focal seizures with R-sided tremors and weakness. Not on AEDs prior to admission.   - Keppra loaded on admission, continue 1g bid   - Likely provoked focal seizure activity due to cranial fluid collection; pt also with very elevated BG on admission, uncontrolled hyperglycemia may have been contributory to provoked seizure   - spot EEG 8/23 with left temporal subcortical dysfunction, negative for electrographic seizures  - New Onset T2DM: No h/o DM.  Hyperglycemic on admission. HbA1C 8.3. Endocrinology consulted. Goal -180.   - Levemir 30 untis daily First dose this Am. 0.7 u/kg/d dosing.    - Novolog 10 units TIDWM. Basal/Prandial physiologic matching.     - Moderate Dose SQ Insulin Correction Scale.   - BG Monitoring AC/HS.    - Bedside nurse to instruct on insulin pen use and blood sugar monitor. Have patient administer own injections after education completed supervised by nurse. Have patient watch insulin educatoin video's via i-pad if available on unit.  - HTN: SBP <160. Controlled on current regimen. Continue home meds.  - Hypokalemia, Hypomagnesemia: Chronic, on home supplements daily.   - Resume home potassium and Mg supplements   - Replete PRN, daily labs    Dispo: ongoing

## 2022-08-26 NOTE — PLAN OF CARE
0720  Covid test collected and taken to the lab.     approx 12p   blood sugar 120    1540  Blood sugar 136

## 2022-08-27 LAB
ANION GAP SERPL CALC-SCNC: 9 MMOL/L (ref 8–16)
BACTERIA BLD CULT: NORMAL
BASOPHILS # BLD AUTO: 0.04 K/UL (ref 0–0.2)
BASOPHILS NFR BLD: 0.7 % (ref 0–1.9)
BUN SERPL-MCNC: 11 MG/DL (ref 6–20)
CALCIUM SERPL-MCNC: 9.5 MG/DL (ref 8.7–10.5)
CHLORIDE SERPL-SCNC: 105 MMOL/L (ref 95–110)
CO2 SERPL-SCNC: 21 MMOL/L (ref 23–29)
CREAT SERPL-MCNC: 0.9 MG/DL (ref 0.5–1.4)
DIFFERENTIAL METHOD: ABNORMAL
EOSINOPHIL # BLD AUTO: 0.2 K/UL (ref 0–0.5)
EOSINOPHIL NFR BLD: 3.3 % (ref 0–8)
ERYTHROCYTE [DISTWIDTH] IN BLOOD BY AUTOMATED COUNT: 14.2 % (ref 11.5–14.5)
EST. GFR  (NO RACE VARIABLE): >60 ML/MIN/1.73 M^2
GLUCOSE SERPL-MCNC: 196 MG/DL (ref 70–110)
GRAM STN SPEC: NORMAL
HCT VFR BLD AUTO: 33.2 % (ref 37–48.5)
HGB BLD-MCNC: 11.5 G/DL (ref 12–16)
IMM GRANULOCYTES # BLD AUTO: 0.04 K/UL (ref 0–0.04)
IMM GRANULOCYTES NFR BLD AUTO: 0.7 % (ref 0–0.5)
LYMPHOCYTES # BLD AUTO: 1.2 K/UL (ref 1–4.8)
LYMPHOCYTES NFR BLD: 19.9 % (ref 18–48)
MAGNESIUM SERPL-MCNC: 1.8 MG/DL (ref 1.6–2.6)
MCH RBC QN AUTO: 28.3 PG (ref 27–31)
MCHC RBC AUTO-ENTMCNC: 34.6 G/DL (ref 32–36)
MCV RBC AUTO: 82 FL (ref 82–98)
MONOCYTES # BLD AUTO: 0.7 K/UL (ref 0.3–1)
MONOCYTES NFR BLD: 11.3 % (ref 4–15)
NEUTROPHILS # BLD AUTO: 3.7 K/UL (ref 1.8–7.7)
NEUTROPHILS NFR BLD: 64.1 % (ref 38–73)
NRBC BLD-RTO: 0 /100 WBC
PLATELET # BLD AUTO: 265 K/UL (ref 150–450)
PMV BLD AUTO: 10.7 FL (ref 9.2–12.9)
POCT GLUCOSE: 139 MG/DL (ref 70–110)
POCT GLUCOSE: 182 MG/DL (ref 70–110)
POCT GLUCOSE: 186 MG/DL (ref 70–110)
POCT GLUCOSE: 285 MG/DL (ref 70–110)
POTASSIUM SERPL-SCNC: 3.6 MMOL/L (ref 3.5–5.1)
RBC # BLD AUTO: 4.07 M/UL (ref 4–5.4)
SODIUM SERPL-SCNC: 135 MMOL/L (ref 136–145)
WBC # BLD AUTO: 5.77 K/UL (ref 3.9–12.7)

## 2022-08-27 PROCEDURE — 25000003 PHARM REV CODE 250: Performed by: STUDENT IN AN ORGANIZED HEALTH CARE EDUCATION/TRAINING PROGRAM

## 2022-08-27 PROCEDURE — 25000003 PHARM REV CODE 250: Performed by: NEUROLOGICAL SURGERY

## 2022-08-27 PROCEDURE — 87206 SMEAR FLUORESCENT/ACID STAI: CPT | Performed by: NEUROLOGICAL SURGERY

## 2022-08-27 PROCEDURE — 25000003 PHARM REV CODE 250

## 2022-08-27 PROCEDURE — 87205 SMEAR GRAM STAIN: CPT | Mod: 59 | Performed by: NEUROLOGICAL SURGERY

## 2022-08-27 PROCEDURE — 63600175 PHARM REV CODE 636 W HCPCS: Performed by: PHYSICIAN ASSISTANT

## 2022-08-27 PROCEDURE — 82962 GLUCOSE BLOOD TEST: CPT | Performed by: NEUROLOGICAL SURGERY

## 2022-08-27 PROCEDURE — 27000221 HC OXYGEN, UP TO 24 HOURS

## 2022-08-27 PROCEDURE — 61304 PR OPEN SKULL SUPRATENT EXPLORE: ICD-10-PCS | Mod: ,,, | Performed by: NEUROLOGICAL SURGERY

## 2022-08-27 PROCEDURE — 63600175 PHARM REV CODE 636 W HCPCS: Performed by: NURSE PRACTITIONER

## 2022-08-27 PROCEDURE — 37000009 HC ANESTHESIA EA ADD 15 MINS: Performed by: NEUROLOGICAL SURGERY

## 2022-08-27 PROCEDURE — 63600175 PHARM REV CODE 636 W HCPCS: Performed by: NEUROLOGICAL SURGERY

## 2022-08-27 PROCEDURE — 27201423 OPTIME MED/SURG SUP & DEVICES STERILE SUPPLY: Performed by: NEUROLOGICAL SURGERY

## 2022-08-27 PROCEDURE — 88305 TISSUE EXAM BY PATHOLOGIST: CPT | Performed by: STUDENT IN AN ORGANIZED HEALTH CARE EDUCATION/TRAINING PROGRAM

## 2022-08-27 PROCEDURE — 87070 CULTURE OTHR SPECIMN AEROBIC: CPT | Performed by: NEUROLOGICAL SURGERY

## 2022-08-27 PROCEDURE — 85025 COMPLETE CBC W/AUTO DIFF WBC: CPT | Performed by: STUDENT IN AN ORGANIZED HEALTH CARE EDUCATION/TRAINING PROGRAM

## 2022-08-27 PROCEDURE — D9220A PRA ANESTHESIA: Mod: ANES,,, | Performed by: ANESTHESIOLOGY

## 2022-08-27 PROCEDURE — 36000710: Performed by: NEUROLOGICAL SURGERY

## 2022-08-27 PROCEDURE — 61304 CRNEC/CRNOT EXPL SUPRATNTORL: CPT | Mod: ,,, | Performed by: NEUROLOGICAL SURGERY

## 2022-08-27 PROCEDURE — 36000711: Performed by: NEUROLOGICAL SURGERY

## 2022-08-27 PROCEDURE — 83735 ASSAY OF MAGNESIUM: CPT | Performed by: PHYSICIAN ASSISTANT

## 2022-08-27 PROCEDURE — 87075 CULTR BACTERIA EXCEPT BLOOD: CPT | Mod: 59 | Performed by: NEUROLOGICAL SURGERY

## 2022-08-27 PROCEDURE — 99223 PR INITIAL HOSPITAL CARE,LEVL III: ICD-10-PCS | Mod: FS,,, | Performed by: NURSE PRACTITIONER

## 2022-08-27 PROCEDURE — 88304 TISSUE EXAM BY PATHOLOGIST: CPT | Mod: 26,,, | Performed by: STUDENT IN AN ORGANIZED HEALTH CARE EDUCATION/TRAINING PROGRAM

## 2022-08-27 PROCEDURE — 37000008 HC ANESTHESIA 1ST 15 MINUTES: Performed by: NEUROLOGICAL SURGERY

## 2022-08-27 PROCEDURE — 25000003 PHARM REV CODE 250: Performed by: PHYSICIAN ASSISTANT

## 2022-08-27 PROCEDURE — 87102 FUNGUS ISOLATION CULTURE: CPT | Mod: 59 | Performed by: NEUROLOGICAL SURGERY

## 2022-08-27 PROCEDURE — 80048 BASIC METABOLIC PNL TOTAL CA: CPT | Performed by: STUDENT IN AN ORGANIZED HEALTH CARE EDUCATION/TRAINING PROGRAM

## 2022-08-27 PROCEDURE — C1713 ANCHOR/SCREW BN/BN,TIS/BN: HCPCS | Performed by: NEUROLOGICAL SURGERY

## 2022-08-27 PROCEDURE — 88304 PR  SURG PATH,LEVEL III: ICD-10-PCS | Mod: 26,,, | Performed by: STUDENT IN AN ORGANIZED HEALTH CARE EDUCATION/TRAINING PROGRAM

## 2022-08-27 PROCEDURE — D9220A PRA ANESTHESIA: Mod: CRNA,,, | Performed by: NURSE ANESTHETIST, CERTIFIED REGISTERED

## 2022-08-27 PROCEDURE — 63600175 PHARM REV CODE 636 W HCPCS: Performed by: STUDENT IN AN ORGANIZED HEALTH CARE EDUCATION/TRAINING PROGRAM

## 2022-08-27 PROCEDURE — 87116 MYCOBACTERIA CULTURE: CPT | Performed by: NEUROLOGICAL SURGERY

## 2022-08-27 PROCEDURE — 36415 COLL VENOUS BLD VENIPUNCTURE: CPT | Performed by: PHYSICIAN ASSISTANT

## 2022-08-27 PROCEDURE — D9220A PRA ANESTHESIA: ICD-10-PCS | Mod: ANES,,, | Performed by: ANESTHESIOLOGY

## 2022-08-27 PROCEDURE — 99223 1ST HOSP IP/OBS HIGH 75: CPT | Mod: FS,,, | Performed by: NURSE PRACTITIONER

## 2022-08-27 PROCEDURE — 20000000 HC ICU ROOM

## 2022-08-27 PROCEDURE — 25000003 PHARM REV CODE 250: Performed by: NURSE ANESTHETIST, CERTIFIED REGISTERED

## 2022-08-27 PROCEDURE — D9220A PRA ANESTHESIA: ICD-10-PCS | Mod: CRNA,,, | Performed by: NURSE ANESTHETIST, CERTIFIED REGISTERED

## 2022-08-27 PROCEDURE — 25000003 PHARM REV CODE 250: Performed by: NURSE PRACTITIONER

## 2022-08-27 PROCEDURE — 63600175 PHARM REV CODE 636 W HCPCS: Performed by: NURSE ANESTHETIST, CERTIFIED REGISTERED

## 2022-08-27 PROCEDURE — 94761 N-INVAS EAR/PLS OXIMETRY MLT: CPT

## 2022-08-27 DEVICE — SCREW UN3 AXS EMER 1.7X4MM: Type: IMPLANTABLE DEVICE | Site: CRANIAL | Status: FUNCTIONAL

## 2022-08-27 DEVICE — SCREW UN3 AXS SD 1.5X5MM: Type: IMPLANTABLE DEVICE | Site: CRANIAL | Status: FUNCTIONAL

## 2022-08-27 DEVICE — PLATE BONE 2X2 HOLE SM BOX: Type: IMPLANTABLE DEVICE | Site: CRANIAL | Status: FUNCTIONAL

## 2022-08-27 RX ORDER — LEVETIRACETAM 500 MG/5ML
INJECTION, SOLUTION, CONCENTRATE INTRAVENOUS
Status: DISCONTINUED | OUTPATIENT
Start: 2022-08-27 | End: 2022-08-27

## 2022-08-27 RX ORDER — MORPHINE SULFATE 2 MG/ML
2 INJECTION, SOLUTION INTRAMUSCULAR; INTRAVENOUS ONCE
Status: COMPLETED | OUTPATIENT
Start: 2022-08-27 | End: 2022-08-27

## 2022-08-27 RX ORDER — ROCURONIUM BROMIDE 10 MG/ML
INJECTION, SOLUTION INTRAVENOUS
Status: DISCONTINUED | OUTPATIENT
Start: 2022-08-27 | End: 2022-08-27

## 2022-08-27 RX ORDER — ONDANSETRON HYDROCHLORIDE 2 MG/ML
INJECTION, SOLUTION INTRAMUSCULAR; INTRAVENOUS
Status: DISCONTINUED | OUTPATIENT
Start: 2022-08-27 | End: 2022-08-27

## 2022-08-27 RX ORDER — BACITRACIN ZINC 500 UNIT/G
OINTMENT (GRAM) TOPICAL
Status: DISCONTINUED | OUTPATIENT
Start: 2022-08-27 | End: 2022-08-27 | Stop reason: HOSPADM

## 2022-08-27 RX ORDER — OXYCODONE HYDROCHLORIDE 10 MG/1
10 TABLET ORAL EVERY 4 HOURS PRN
Status: DISCONTINUED | OUTPATIENT
Start: 2022-08-27 | End: 2022-09-02 | Stop reason: HOSPADM

## 2022-08-27 RX ORDER — SODIUM CHLORIDE 0.9 % (FLUSH) 0.9 %
10 SYRINGE (ML) INJECTION
Status: CANCELLED | OUTPATIENT
Start: 2022-08-27

## 2022-08-27 RX ORDER — HYDRALAZINE HYDROCHLORIDE 20 MG/ML
10 INJECTION INTRAMUSCULAR; INTRAVENOUS EVERY 6 HOURS PRN
Status: DISCONTINUED | OUTPATIENT
Start: 2022-08-27 | End: 2022-09-02 | Stop reason: HOSPADM

## 2022-08-27 RX ORDER — FENTANYL CITRATE 50 UG/ML
INJECTION, SOLUTION INTRAMUSCULAR; INTRAVENOUS
Status: DISCONTINUED | OUTPATIENT
Start: 2022-08-27 | End: 2022-08-27

## 2022-08-27 RX ORDER — AMLODIPINE BESYLATE 10 MG/1
10 TABLET ORAL DAILY
Status: DISCONTINUED | OUTPATIENT
Start: 2022-08-27 | End: 2022-09-02 | Stop reason: HOSPADM

## 2022-08-27 RX ORDER — MIDAZOLAM HYDROCHLORIDE 1 MG/ML
INJECTION INTRAMUSCULAR; INTRAVENOUS
Status: DISCONTINUED | OUTPATIENT
Start: 2022-08-27 | End: 2022-08-27

## 2022-08-27 RX ORDER — PHENYLEPHRINE HYDROCHLORIDE 10 MG/ML
INJECTION INTRAVENOUS
Status: DISCONTINUED | OUTPATIENT
Start: 2022-08-27 | End: 2022-08-27

## 2022-08-27 RX ORDER — VASOPRESSIN 20 [USP'U]/ML
INJECTION, SOLUTION INTRAMUSCULAR; SUBCUTANEOUS
Status: DISCONTINUED | OUTPATIENT
Start: 2022-08-27 | End: 2022-08-27

## 2022-08-27 RX ORDER — HYDROMORPHONE HYDROCHLORIDE 1 MG/ML
0.2 INJECTION, SOLUTION INTRAMUSCULAR; INTRAVENOUS; SUBCUTANEOUS EVERY 5 MIN PRN
Status: CANCELLED | OUTPATIENT
Start: 2022-08-27

## 2022-08-27 RX ORDER — PROPOFOL 10 MG/ML
VIAL (ML) INTRAVENOUS
Status: DISCONTINUED | OUTPATIENT
Start: 2022-08-27 | End: 2022-08-27

## 2022-08-27 RX ORDER — LIDOCAINE HYDROCHLORIDE 10 MG/ML
INJECTION INFILTRATION; PERINEURAL
Status: DISCONTINUED | OUTPATIENT
Start: 2022-08-27 | End: 2022-08-27 | Stop reason: HOSPADM

## 2022-08-27 RX ORDER — LIDOCAINE HCL/PF 100 MG/5ML
SYRINGE (ML) INTRAVENOUS
Status: DISCONTINUED | OUTPATIENT
Start: 2022-08-27 | End: 2022-08-27

## 2022-08-27 RX ORDER — LABETALOL HCL 20 MG/4 ML
10 SYRINGE (ML) INTRAVENOUS EVERY 6 HOURS PRN
Status: DISCONTINUED | OUTPATIENT
Start: 2022-08-27 | End: 2022-09-02 | Stop reason: HOSPADM

## 2022-08-27 RX ORDER — PROCHLORPERAZINE EDISYLATE 5 MG/ML
5 INJECTION INTRAMUSCULAR; INTRAVENOUS EVERY 30 MIN PRN
Status: CANCELLED | OUTPATIENT
Start: 2022-08-27

## 2022-08-27 RX ORDER — MUPIROCIN 20 MG/G
OINTMENT TOPICAL 2 TIMES DAILY
Status: COMPLETED | OUTPATIENT
Start: 2022-08-27 | End: 2022-09-01

## 2022-08-27 RX ORDER — OXYCODONE HYDROCHLORIDE 5 MG/1
5 TABLET ORAL EVERY 4 HOURS PRN
Status: DISCONTINUED | OUTPATIENT
Start: 2022-08-27 | End: 2022-09-02 | Stop reason: HOSPADM

## 2022-08-27 RX ORDER — NEOSTIGMINE METHYLSULFATE 0.5 MG/ML
INJECTION, SOLUTION INTRAVENOUS
Status: DISCONTINUED | OUTPATIENT
Start: 2022-08-27 | End: 2022-08-27

## 2022-08-27 RX ADMIN — FENTANYL CITRATE 25 MCG: 50 INJECTION, SOLUTION INTRAMUSCULAR; INTRAVENOUS at 09:08

## 2022-08-27 RX ADMIN — PROPOFOL 200 MG: 10 INJECTION, EMULSION INTRAVENOUS at 08:08

## 2022-08-27 RX ADMIN — VASOPRESSIN 2 UNITS: 20 INJECTION, SOLUTION INTRAMUSCULAR; SUBCUTANEOUS at 09:08

## 2022-08-27 RX ADMIN — VASOPRESSIN 1 UNITS: 20 INJECTION, SOLUTION INTRAMUSCULAR; SUBCUTANEOUS at 09:08

## 2022-08-27 RX ADMIN — OXYCODONE HYDROCHLORIDE 10 MG: 10 TABLET ORAL at 09:08

## 2022-08-27 RX ADMIN — VANCOMYCIN HYDROCHLORIDE 750 MG: 750 INJECTION, POWDER, LYOPHILIZED, FOR SOLUTION INTRAVENOUS at 10:08

## 2022-08-27 RX ADMIN — CEFEPIME 2 G: 2 INJECTION, POWDER, FOR SOLUTION INTRAVENOUS at 05:08

## 2022-08-27 RX ADMIN — PHENYLEPHRINE HYDROCHLORIDE 80 MCG: 10 INJECTION INTRAVENOUS at 08:08

## 2022-08-27 RX ADMIN — CEFEPIME 2 G: 2 INJECTION, POWDER, FOR SOLUTION INTRAVENOUS at 12:08

## 2022-08-27 RX ADMIN — LEVETIRACETAM 1000 MG: 100 INJECTION, SOLUTION INTRAVENOUS at 09:08

## 2022-08-27 RX ADMIN — Medication 400 MG: at 09:08

## 2022-08-27 RX ADMIN — FENTANYL CITRATE 25 MCG: 50 INJECTION, SOLUTION INTRAMUSCULAR; INTRAVENOUS at 08:08

## 2022-08-27 RX ADMIN — SODIUM CHLORIDE: 9 INJECTION, SOLUTION INTRAVENOUS at 09:08

## 2022-08-27 RX ADMIN — INSULIN ASPART 6 UNITS: 100 INJECTION, SOLUTION INTRAVENOUS; SUBCUTANEOUS at 11:08

## 2022-08-27 RX ADMIN — OXYCODONE 5 MG: 5 TABLET ORAL at 03:08

## 2022-08-27 RX ADMIN — OXYCODONE 5 MG: 5 TABLET ORAL at 11:08

## 2022-08-27 RX ADMIN — INSULIN ASPART 10 UNITS: 100 INJECTION, SOLUTION INTRAVENOUS; SUBCUTANEOUS at 05:08

## 2022-08-27 RX ADMIN — PHENYLEPHRINE HYDROCHLORIDE 160 MCG: 10 INJECTION INTRAVENOUS at 08:08

## 2022-08-27 RX ADMIN — PROPOFOL 30 MG: 10 INJECTION, EMULSION INTRAVENOUS at 09:08

## 2022-08-27 RX ADMIN — LABETALOL HYDROCHLORIDE 10 MG: 5 INJECTION, SOLUTION INTRAVENOUS at 02:08

## 2022-08-27 RX ADMIN — AMITRIPTYLINE HYDROCHLORIDE 75 MG: 50 TABLET, FILM COATED ORAL at 09:08

## 2022-08-27 RX ADMIN — VANCOMYCIN HYDROCHLORIDE 750 MG: 750 INJECTION, POWDER, LYOPHILIZED, FOR SOLUTION INTRAVENOUS at 12:08

## 2022-08-27 RX ADMIN — OXYCODONE HYDROCHLORIDE 10 MG: 10 TABLET ORAL at 05:08

## 2022-08-27 RX ADMIN — MIDAZOLAM HYDROCHLORIDE 2 MG: 1 INJECTION, SOLUTION INTRAMUSCULAR; INTRAVENOUS at 07:08

## 2022-08-27 RX ADMIN — INSULIN ASPART 1 UNITS: 100 INJECTION, SOLUTION INTRAVENOUS; SUBCUTANEOUS at 10:08

## 2022-08-27 RX ADMIN — CARVEDILOL 37.5 MG: 25 TABLET, FILM COATED ORAL at 03:08

## 2022-08-27 RX ADMIN — SODIUM CHLORIDE: 9 INJECTION, SOLUTION INTRAVENOUS at 07:08

## 2022-08-27 RX ADMIN — ACETAMINOPHEN 650 MG: 325 TABLET ORAL at 02:08

## 2022-08-27 RX ADMIN — POTASSIUM CHLORIDE 20 MEQ: 1500 TABLET, EXTENDED RELEASE ORAL at 09:08

## 2022-08-27 RX ADMIN — HYDRALAZINE HYDROCHLORIDE 10 MG: 20 INJECTION, SOLUTION INTRAMUSCULAR; INTRAVENOUS at 05:08

## 2022-08-27 RX ADMIN — LIDOCAINE HYDROCHLORIDE 100 MG: 20 INJECTION, SOLUTION INTRAVENOUS at 08:08

## 2022-08-27 RX ADMIN — ONDANSETRON 4 MG: 2 INJECTION INTRAMUSCULAR; INTRAVENOUS at 08:08

## 2022-08-27 RX ADMIN — ROCURONIUM BROMIDE 40 MG: 10 INJECTION, SOLUTION INTRAVENOUS at 08:08

## 2022-08-27 RX ADMIN — MORPHINE SULFATE 2 MG: 2 INJECTION, SOLUTION INTRAMUSCULAR; INTRAVENOUS at 12:08

## 2022-08-27 RX ADMIN — GLYCOPYRROLATE 0.6 MG: 0.2 INJECTION, SOLUTION INTRAMUSCULAR; INTRAVITREAL at 10:08

## 2022-08-27 RX ADMIN — NEOSTIGMINE METHYLSULFATE 5 MG: 0.5 INJECTION INTRAVENOUS at 10:08

## 2022-08-27 RX ADMIN — CEFEPIME 2 G: 2 INJECTION, POWDER, FOR SOLUTION INTRAVENOUS at 09:08

## 2022-08-27 RX ADMIN — MUPIROCIN: 20 OINTMENT TOPICAL at 09:08

## 2022-08-27 RX ADMIN — AMLODIPINE BESYLATE 10 MG: 10 TABLET ORAL at 03:08

## 2022-08-27 RX ADMIN — VANCOMYCIN HYDROCHLORIDE 1000 MG: 1 INJECTION, POWDER, LYOPHILIZED, FOR SOLUTION INTRAVENOUS at 08:08

## 2022-08-27 RX ADMIN — LEVETIRACETAM 1000 MG: 100 INJECTION, SOLUTION, CONCENTRATE INTRAVENOUS at 08:08

## 2022-08-27 NOTE — ANESTHESIA PROCEDURE NOTES
Intubation    Date/Time: 8/27/2022 8:03 AM  Performed by: Aliya Mary CRNA  Authorized by: CORBY Atwood MD     Intubation:     Induction:  Intravenous    Intubated:  Postinduction    Attempts:  1    Attempted By:  CRNA    Method of Intubation:  Video laryngoscopy    Blade:  Brody 3    Laryngeal View Grade: Grade I - full view of cords      Difficult Airway Encountered?: No      Complications:  None    Airway Device:  Oral endotracheal tube    Airway Device Size:  7.0    Style/Cuff Inflation:  Cuffed (inflated to minimal occlusive pressure)    Tube secured:  22    Secured at:  The lips    Placement Verified By:  Capnometry    Complicating Factors:  None    Findings Post-Intubation:  BS equal bilateral and atraumatic/condition of teeth unchanged

## 2022-08-27 NOTE — CARE UPDATE
BG goal 140-180    Remains on neuro floor. Procedure today per notes. BG reasonably well controlled on current SQ insulin regimen. Diet NPO Except for: Medication, Sips with Medication      Plan:  -Continue Levemir 30 units daily  -Continue Novolog 10 units TID with meals (HOLD while NPO)   -Moderate Dose Correction Scale  -BG monitoring ac/hs    ** Please call Endocrine for any BG related issues **    Bedside nurse to instruct on insulin pen use and blood sugar monitor. Have patient administer own injections after education completed supervised by nurse.    Lab Results   Component Value Date    HGBA1C 8.3 (H) 08/22/2022       Discharge planning:  TBD. Notify endocrine prior to discharge.

## 2022-08-27 NOTE — ASSESSMENT & PLAN NOTE
Hx of L crani for aneurysm clipping 7/15/22  Developed transient RSW/aphasia recently, possible seizure activity  Fluid collection and infection of cranial wound site  S/p Washout and fluid collection removal 8/27    -Admit to NCC  -Neuro checks/VS q1hr  -SBP goal < 160  -VTE prophylaxis: Mechanical SCDs, hold subq heparin in the post op period  -NSGY following  -CTH post op pending  -Continue Keppra 1000mg BID  -Post op pain control  -ID following, continue Vanc and cefepime

## 2022-08-27 NOTE — HPI
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 Diaz who presented with sudden onset of symptoms concerning for focal seizure, aphasia and RSW. Each episode resolved on its own. NSGY was consulted and she was taken for a wound washout and evacuation of fluid collection. Will admit to NCC post op.

## 2022-08-27 NOTE — NURSING
Patient arrived to [facility] from OR    Report received from: OSH RN, OR nurse    Type of stroke/diagnosis: post washout from NPU    Current symptoms: drowsy, spont mvts, AAOX4    Skin assessment done: Y  Wounds noted: L crani incison, sacrum intact    *If wounds noted, was Wound Care consulted? No    Philip Completed? No pt drowsy    Patient Belongings on Admit: none    NCC notified: SARAVANAN Siddiqi

## 2022-08-27 NOTE — TRANSFER OF CARE
Anesthesia Transfer of Care Note    Patient: Gina Jenkins    Procedure(s) Performed: Procedure(s) (LRB):  LEFT Cranial wound debridement and washout (Left)    Patient location: ICU    Anesthesia Type: general    Transport from OR: Transported from OR on 100% O2 by closed face mask with adequate spontaneous ventilation. Continuous ECG monitoring in transport. Continuous SpO2 monitoring in transport    Post pain: adequate analgesia    Post assessment: no apparent anesthetic complications and tolerated procedure well    Post vital signs: stable    Level of consciousness: awake, alert and oriented    Nausea/Vomiting: no nausea/vomiting    Complications: none    Transfer of care protocol was followed      Last vitals:   Visit Vitals  BP (!) 140/78 (BP Location: Left arm, Patient Position: Lying)   Pulse 72   Temp 37.1 °C (98.8 °F) (Skin)   Resp 16   Wt 88.5 kg (195 lb 1.8 oz)   SpO2 100%   Breastfeeding No   BMI 35.69 kg/m²

## 2022-08-27 NOTE — PLAN OF CARE
Problem: Adult Inpatient Plan of Care  Goal: Plan of Care Review  Outcome: Ongoing, Progressing  Goal: Absence of Hospital-Acquired Illness or Injury  Outcome: Ongoing, Progressing  Goal: Readiness for Transition of Care  Outcome: Ongoing, Progressing     Problem: Seizure, Active Management  Goal: Absence of Seizure/Seizure-Related Injury  Outcome: Ongoing, Progressing       Patient is AAO x4. POC reviewed with patient. Patient verbalized understanding. Patient's breathing is unlabored with equal chest expansion. Patient has localized swelling to L side of head. NPO at midnight for procedure today. Patient ambulates to the restroom. Patient remained free from falls. Patient rested well through shift. Bed in lowest position,bed alarm on, side rails up x3, no complaints or signs of distress. WCTM during shift.  See flowsheets for full assessment and VS info.

## 2022-08-27 NOTE — SUBJECTIVE & OBJECTIVE
Past Medical History:   Diagnosis Date    Brain aneurysm     CHF (congestive heart failure)     H/O coronary angioplasty     Hypercholesteremia     Hypertension     Malignant hypertension     Migraine headache     Stroke 10/2017     Past Surgical History:   Procedure Laterality Date    CARDIAC CATHETERIZATION      CEREBRAL ANGIOGRAM      CLIP LIGATION OF INTRACRANIAL ANEURYSM BY CRANIOTOMY N/A 7/15/2022    Procedure: CRANIOTOMY, WITH ANEURYSM CLIPPING;  Surgeon: Timothy Diaz MD;  Location: [facility] OR [address] [place] [address];  Service: Neurosurgery;  Laterality: N/A;  PTERIONAL CRANIOTOMY WITH CLIP LIGATION OF L PCOMM, L ANTERIOR CHOROIDAL, L MCA  ANEURYSM, ANESTHESIA: GENERAL, BLOOD: TYPE&CROSS 2 UNITS, NEUROMONITORING: SEP, MEP, EEG, RADIOLOGY: C-ARM, POSITION: SUPINE, ANNA, CO-SURGERON: DR. LEXI DOMÍNGUEZ.       No current facility-administered medications on file prior to encounter.     Current Outpatient Medications on File Prior to Encounter   Medication Sig Dispense Refill    carvediloL (COREG) 25 MG tablet Take 37.5 mg by mouth 2 (two) times daily.      amitriptyline (ELAVIL) 75 MG tablet Take 75 mg by mouth every evening.      amlodipine (NORVASC) 10 MG tablet Take 1 tablet (10 mg total) by mouth once daily. 30 tablet 0    atorvastatin (LIPITOR) 40 MG tablet Take 1 tablet (40 mg total) by mouth once daily. 90 tablet 3    butalbital-acetaminophen-caffeine -40 mg (FIORICET, ESGIC) -40 mg per tablet Take 1 tablet by mouth every 4 (four) hours as needed.      cyanocobalamin (VITAMIN B-12) 1000 MCG tablet Take 1 tablet (1,000 mcg total) by mouth once daily. (Patient not taking: No sig reported)      diphenhydrAMINE (BENADRYL) 25 mg capsule Take 1 each (25 mg total) by mouth every 6 (six) hours as needed for Itching or Allergies. (Patient not taking: No sig reported) 20 capsule 0    docusate sodium (COLACE) 100 MG capsule Take 1 capsule (100 mg total) by mouth 2 (two) times daily as needed for Constipation. 30  capsule 0    EScitalopram oxalate (LEXAPRO) 10 MG tablet Take 10 mg by mouth once daily.      famotidine (PEPCID) 20 MG tablet Take 1 tablet (20 mg total) by mouth 2 (two) times daily. (Patient not taking: No sig reported) 60 tablet 11    hydrochlorothiazide (HYDRODIURIL) 25 MG tablet Take 1 tablet (25 mg total) by mouth once daily. 30 tablet 0    levETIRAcetam (KEPPRA) 500 MG Tab Take 1 tablet (500 mg total) by mouth 2 (two) times daily for 5 days (Patient not taking: Reported on 7/29/2022) 10 tablet 0    magnesium oxide (MAG-OX) 400 mg (241.3 mg magnesium) tablet Take 1 tablet by mouth 2 (two) times daily.      potassium chloride SA (K-DUR,KLOR-CON) 20 MEQ tablet Take 20 mEq by mouth 2 (two) times daily.      [DISCONTINUED] aspirin 81 MG Chew Take 1 tablet (81 mg total) by mouth once daily.  0     Allergies: Lisinopril and Bactrim [sulfamethoxazole-trimethoprim]    No family history on file.    Social History     Tobacco Use    Smoking status: Every Day     Packs/day: 0.50     Types: Cigarettes    Smokeless tobacco: Never   Substance Use Topics    Alcohol use: Yes     Comment: occassionally    Drug use: No      Review of Systems: Review of Systems   Constitutional:  Negative for chills, fever and weight loss.   HENT:  Positive for sore throat. Negative for congestion, hearing loss and nosebleeds.    Eyes:  Negative for blurred vision, double vision and photophobia.   Respiratory:  Negative for cough, sputum production and shortness of breath.    Cardiovascular:  Negative for chest pain, palpitations and leg swelling.   Gastrointestinal:  Negative for heartburn, nausea and vomiting.   Genitourinary:  Negative for dysuria, frequency and urgency.   Musculoskeletal:  Negative for back pain, joint pain and neck pain.   Skin:  Negative for itching and rash.   Neurological:  Positive for headaches. Negative for tingling, sensory change, speech change, focal weakness, seizures, loss of consciousness and weakness.      Vitals:   Temp: 97.7 °F (36.5 °C)  Pulse: 82  Rhythm: normal sinus rhythm  BP: 128/74  MAP (mmHg): 95  Resp: 18  SpO2: (!) 94 %    Temp  Min: 97.7 °F (36.5 °C)  Max: 98.9 °F (37.2 °C)  Pulse  Min: 69  Max: 85  BP  Min: 125/78  Max: 140/78  MAP (mmHg)  Min: 95  Max: 112  Resp  Min: 16  Max: 20  SpO2  Min: 94 %  Max: 100 %    No intake/output data recorded.   Unmeasured Output  Urine Occurrence: 2  Stool Occurrence: 0     Examination:   Constitutional: Well-nourished and -developed. No apparent distress.   Eyes: Conjunctiva clear, anicteric. Lids no lesions.  Head/Ears/Nose/Mouth/Throat/Neck: Moist mucous membranes. External ears, nose atraumatic. L Scalp dressing.  Cardiovascular: Regular rhythm. No leg edema.  Respiratory: Comfortable respirations. Clear to auscultation.  Gastrointestinal: No hernia. Soft, nondistended, nontender. + bowel sounds.    Neurologic:   -E 4 V 5 M 6  -Alert. Oriented to person, place, and time. Speech fluent. Follows commands.   -Strength 5 and symmetric in arms, legs. Tone normal.   -Sensation intact to touch in arms, legs.    Today I independently reviewed pertinent medications, lines/drains/airways, imaging, cardiology results, laboratory results, microbiology results, notably:     CTH post op

## 2022-08-27 NOTE — BRIEF OP NOTE
[facility] Neuro Critical [facility]  Brief Operative Note    SUMMARY     Surgery Date: 8/27/2022     Surgeon(s) and Role:     * Timothy Diaz MD - Primary     * Hipolito Darling MD - Resident - Assisting        Pre-op Diagnosis:  Infection of superficial incisional surgical site after procedure, initial encounter [T81.41XA]    Post-op Diagnosis:  Post-Op Diagnosis Codes:     * Infection of superficial incisional surgical site after procedure, initial encounter [T81.41XA]    Procedure(s) (LRB):  LEFT Cranial wound debridement and washout (Left)    Anesthesia: General    Operative Findings: L frontal crani for subcutaneous and epidural fluid collection washout, no phoebe pus or infection encountered, only reactive hyperemic tissue was found. Fluid collections sent for cultures.    Estimated Blood Loss: 80 mL    Estimated Blood Loss has been documented.         Specimens:   Specimen (24h ago, onward)       Start     Ordered    08/27/22 0917  Specimen to Pathology, Surgery Neurosurgery  Once        Comments: Pre-op Diagnosis: Infection of superficial incisional surgical site after procedure, initial encounter [T81.41XA]    Post-op Diagnosis: same    Procedure(s):  LEFT Cranial wound debridement and washout     Number of specimens: 2    Name of specimens: 1. Subgaleal reactive tissue, perm  2. Reactive dura, perm   References:    Click here for ordering Quick Tip   Question Answer Comment   Procedure Type: Neurosurgery    Specimen Class: Routine/Screening        08/27/22 0917                    [id number]

## 2022-08-27 NOTE — ANESTHESIA POSTPROCEDURE EVALUATION
Anesthesia Post Evaluation    Patient: Gina Jenkins    Procedure(s) Performed: Procedure(s) (LRB):  LEFT Cranial wound debridement and washout (Left)    Final Anesthesia Type: general      Patient location during evaluation: PACU  Patient participation: Yes- Able to Participate  Level of consciousness: awake and alert  Post-procedure vital signs: reviewed and stable  Pain management: adequate  Airway patency: patent    PONV status at discharge: No PONV  Anesthetic complications: no      Cardiovascular status: blood pressure returned to baseline  Respiratory status: unassisted, spontaneous ventilation and room air  Hydration status: euvolemic            Vitals Value Taken Time   /75 08/27/22 1302   Temp 36.4 °C (97.5 °F) 08/27/22 1200   Pulse 74 08/27/22 1317   Resp 29 08/27/22 1317   SpO2 98 % 08/27/22 1317   Vitals shown include unvalidated device data.      No case tracking events are documented in the log.      Pain/James Score: Pain Rating Prior to Med Admin: 9 (8/27/2022 12:08 PM)  Pain Rating Post Med Admin: 2 (8/27/2022 12:36 PM)

## 2022-08-28 PROBLEM — T81.40XA POST OP INFECTION: Status: RESOLVED | Noted: 2022-08-22 | Resolved: 2022-08-28

## 2022-08-28 LAB
ANION GAP SERPL CALC-SCNC: 8 MMOL/L (ref 8–16)
BASOPHILS # BLD AUTO: 0.03 K/UL (ref 0–0.2)
BASOPHILS NFR BLD: 0.4 % (ref 0–1.9)
BUN SERPL-MCNC: 11 MG/DL (ref 6–20)
CALCIUM SERPL-MCNC: 9.2 MG/DL (ref 8.7–10.5)
CHLORIDE SERPL-SCNC: 105 MMOL/L (ref 95–110)
CO2 SERPL-SCNC: 22 MMOL/L (ref 23–29)
CREAT SERPL-MCNC: 1 MG/DL (ref 0.5–1.4)
DIFFERENTIAL METHOD: ABNORMAL
EOSINOPHIL # BLD AUTO: 0.2 K/UL (ref 0–0.5)
EOSINOPHIL NFR BLD: 2.5 % (ref 0–8)
ERYTHROCYTE [DISTWIDTH] IN BLOOD BY AUTOMATED COUNT: 14.1 % (ref 11.5–14.5)
EST. GFR  (NO RACE VARIABLE): >60 ML/MIN/1.73 M^2
GLUCOSE SERPL-MCNC: 224 MG/DL (ref 70–110)
HCT VFR BLD AUTO: 33.2 % (ref 37–48.5)
HGB BLD-MCNC: 11.4 G/DL (ref 12–16)
IMM GRANULOCYTES # BLD AUTO: 0.04 K/UL (ref 0–0.04)
IMM GRANULOCYTES NFR BLD AUTO: 0.5 % (ref 0–0.5)
LYMPHOCYTES # BLD AUTO: 1.2 K/UL (ref 1–4.8)
LYMPHOCYTES NFR BLD: 14.1 % (ref 18–48)
MAGNESIUM SERPL-MCNC: 1.6 MG/DL (ref 1.6–2.6)
MCH RBC QN AUTO: 28.6 PG (ref 27–31)
MCHC RBC AUTO-ENTMCNC: 34.3 G/DL (ref 32–36)
MCV RBC AUTO: 83 FL (ref 82–98)
MONOCYTES # BLD AUTO: 0.8 K/UL (ref 0.3–1)
MONOCYTES NFR BLD: 10 % (ref 4–15)
NEUTROPHILS # BLD AUTO: 6.1 K/UL (ref 1.8–7.7)
NEUTROPHILS NFR BLD: 72.5 % (ref 38–73)
NRBC BLD-RTO: 0 /100 WBC
PHOSPHATE SERPL-MCNC: 2.6 MG/DL (ref 2.7–4.5)
PLATELET # BLD AUTO: 271 K/UL (ref 150–450)
PMV BLD AUTO: 10.9 FL (ref 9.2–12.9)
POCT GLUCOSE: 166 MG/DL (ref 70–110)
POCT GLUCOSE: 169 MG/DL (ref 70–110)
POCT GLUCOSE: 170 MG/DL (ref 70–110)
POCT GLUCOSE: 97 MG/DL (ref 70–110)
POTASSIUM SERPL-SCNC: 3.7 MMOL/L (ref 3.5–5.1)
RBC # BLD AUTO: 3.98 M/UL (ref 4–5.4)
SODIUM SERPL-SCNC: 135 MMOL/L (ref 136–145)
VANCOMYCIN TROUGH SERPL-MCNC: 23.3 UG/ML (ref 10–22)
WBC # BLD AUTO: 8.38 K/UL (ref 3.9–12.7)

## 2022-08-28 PROCEDURE — 99233 SBSQ HOSP IP/OBS HIGH 50: CPT | Mod: ,,, | Performed by: PHYSICIAN ASSISTANT

## 2022-08-28 PROCEDURE — 85025 COMPLETE CBC W/AUTO DIFF WBC: CPT | Performed by: STUDENT IN AN ORGANIZED HEALTH CARE EDUCATION/TRAINING PROGRAM

## 2022-08-28 PROCEDURE — 99233 PR SUBSEQUENT HOSPITAL CARE,LEVL III: ICD-10-PCS | Mod: ,,, | Performed by: NURSE PRACTITIONER

## 2022-08-28 PROCEDURE — 84100 ASSAY OF PHOSPHORUS: CPT | Performed by: PHYSICIAN ASSISTANT

## 2022-08-28 PROCEDURE — 25000003 PHARM REV CODE 250: Performed by: PHYSICIAN ASSISTANT

## 2022-08-28 PROCEDURE — 94761 N-INVAS EAR/PLS OXIMETRY MLT: CPT

## 2022-08-28 PROCEDURE — 99233 SBSQ HOSP IP/OBS HIGH 50: CPT | Mod: ,,, | Performed by: NURSE PRACTITIONER

## 2022-08-28 PROCEDURE — 63600175 PHARM REV CODE 636 W HCPCS: Performed by: PHYSICIAN ASSISTANT

## 2022-08-28 PROCEDURE — 99232 SBSQ HOSP IP/OBS MODERATE 35: CPT | Mod: ,,, | Performed by: NURSE PRACTITIONER

## 2022-08-28 PROCEDURE — 63600175 PHARM REV CODE 636 W HCPCS: Performed by: INTERNAL MEDICINE

## 2022-08-28 PROCEDURE — 80048 BASIC METABOLIC PNL TOTAL CA: CPT | Performed by: STUDENT IN AN ORGANIZED HEALTH CARE EDUCATION/TRAINING PROGRAM

## 2022-08-28 PROCEDURE — 25000003 PHARM REV CODE 250: Performed by: NURSE PRACTITIONER

## 2022-08-28 PROCEDURE — 20000000 HC ICU ROOM

## 2022-08-28 PROCEDURE — 99233 PR SUBSEQUENT HOSPITAL CARE,LEVL III: ICD-10-PCS | Mod: ,,, | Performed by: PHYSICIAN ASSISTANT

## 2022-08-28 PROCEDURE — 25000003 PHARM REV CODE 250: Performed by: INTERNAL MEDICINE

## 2022-08-28 PROCEDURE — 63600175 PHARM REV CODE 636 W HCPCS: Performed by: STUDENT IN AN ORGANIZED HEALTH CARE EDUCATION/TRAINING PROGRAM

## 2022-08-28 PROCEDURE — 83735 ASSAY OF MAGNESIUM: CPT | Performed by: PHYSICIAN ASSISTANT

## 2022-08-28 PROCEDURE — 99232 PR SUBSEQUENT HOSPITAL CARE,LEVL II: ICD-10-PCS | Mod: ,,, | Performed by: NURSE PRACTITIONER

## 2022-08-28 PROCEDURE — 63600175 PHARM REV CODE 636 W HCPCS: Performed by: NURSE PRACTITIONER

## 2022-08-28 PROCEDURE — 80202 ASSAY OF VANCOMYCIN: CPT | Performed by: NEUROLOGICAL SURGERY

## 2022-08-28 PROCEDURE — 25000003 PHARM REV CODE 250: Performed by: STUDENT IN AN ORGANIZED HEALTH CARE EDUCATION/TRAINING PROGRAM

## 2022-08-28 RX ORDER — SODIUM,POTASSIUM PHOSPHATES 280-250MG
2 POWDER IN PACKET (EA) ORAL
Status: DISCONTINUED | OUTPATIENT
Start: 2022-08-28 | End: 2022-09-02

## 2022-08-28 RX ORDER — HEPARIN SODIUM 5000 [USP'U]/ML
5000 INJECTION, SOLUTION INTRAVENOUS; SUBCUTANEOUS EVERY 8 HOURS
Status: DISCONTINUED | OUTPATIENT
Start: 2022-08-28 | End: 2022-09-02 | Stop reason: HOSPADM

## 2022-08-28 RX ORDER — LANOLIN ALCOHOL/MO/W.PET/CERES
800 CREAM (GRAM) TOPICAL
Status: DISCONTINUED | OUTPATIENT
Start: 2022-08-28 | End: 2022-09-02

## 2022-08-28 RX ORDER — HEPARIN SODIUM 5000 [USP'U]/ML
5000 INJECTION, SOLUTION INTRAVENOUS; SUBCUTANEOUS EVERY 8 HOURS
Status: DISCONTINUED | OUTPATIENT
Start: 2022-08-28 | End: 2022-08-28

## 2022-08-28 RX ADMIN — ATORVASTATIN CALCIUM 40 MG: 40 TABLET, FILM COATED ORAL at 08:08

## 2022-08-28 RX ADMIN — INSULIN ASPART 2 UNITS: 100 INJECTION, SOLUTION INTRAVENOUS; SUBCUTANEOUS at 04:08

## 2022-08-28 RX ADMIN — VANCOMYCIN HYDROCHLORIDE 750 MG: 750 INJECTION, POWDER, LYOPHILIZED, FOR SOLUTION INTRAVENOUS at 04:08

## 2022-08-28 RX ADMIN — AMLODIPINE BESYLATE 10 MG: 10 TABLET ORAL at 08:08

## 2022-08-28 RX ADMIN — POTASSIUM CHLORIDE 20 MEQ: 1500 TABLET, EXTENDED RELEASE ORAL at 08:08

## 2022-08-28 RX ADMIN — POTASSIUM CHLORIDE 20 MEQ: 1500 TABLET, EXTENDED RELEASE ORAL at 09:08

## 2022-08-28 RX ADMIN — LEVETIRACETAM 1000 MG: 100 INJECTION, SOLUTION INTRAVENOUS at 09:08

## 2022-08-28 RX ADMIN — OXYCODONE HYDROCHLORIDE 10 MG: 10 TABLET ORAL at 05:08

## 2022-08-28 RX ADMIN — MUPIROCIN: 20 OINTMENT TOPICAL at 08:08

## 2022-08-28 RX ADMIN — POTASSIUM & SODIUM PHOSPHATES POWDER PACK 280-160-250 MG 2 PACKET: 280-160-250 PACK at 05:08

## 2022-08-28 RX ADMIN — Medication 400 MG: at 09:08

## 2022-08-28 RX ADMIN — INSULIN ASPART 2 UNITS: 100 INJECTION, SOLUTION INTRAVENOUS; SUBCUTANEOUS at 07:08

## 2022-08-28 RX ADMIN — MUPIROCIN: 20 OINTMENT TOPICAL at 10:08

## 2022-08-28 RX ADMIN — CEFEPIME 2 G: 2 INJECTION, POWDER, FOR SOLUTION INTRAVENOUS at 05:08

## 2022-08-28 RX ADMIN — CEFEPIME 2 G: 2 INJECTION, POWDER, FOR SOLUTION INTRAVENOUS at 01:08

## 2022-08-28 RX ADMIN — INSULIN ASPART 10 UNITS: 100 INJECTION, SOLUTION INTRAVENOUS; SUBCUTANEOUS at 04:08

## 2022-08-28 RX ADMIN — CEFEPIME 2 G: 2 INJECTION, POWDER, FOR SOLUTION INTRAVENOUS at 10:08

## 2022-08-28 RX ADMIN — CARVEDILOL 37.5 MG: 25 TABLET, FILM COATED ORAL at 09:08

## 2022-08-28 RX ADMIN — Medication 400 MG: at 08:08

## 2022-08-28 RX ADMIN — OXYCODONE 5 MG: 5 TABLET ORAL at 09:08

## 2022-08-28 RX ADMIN — ESCITALOPRAM OXALATE 10 MG: 10 TABLET ORAL at 08:08

## 2022-08-28 RX ADMIN — OXYCODONE HYDROCHLORIDE 10 MG: 10 TABLET ORAL at 03:08

## 2022-08-28 RX ADMIN — INSULIN ASPART 2 UNITS: 100 INJECTION, SOLUTION INTRAVENOUS; SUBCUTANEOUS at 12:08

## 2022-08-28 RX ADMIN — HEPARIN SODIUM 5000 UNITS: 5000 INJECTION INTRAVENOUS; SUBCUTANEOUS at 04:08

## 2022-08-28 RX ADMIN — ACETAMINOPHEN 650 MG: 325 TABLET ORAL at 07:08

## 2022-08-28 RX ADMIN — INSULIN DETEMIR 30 UNITS: 100 INJECTION, SOLUTION SUBCUTANEOUS at 08:08

## 2022-08-28 RX ADMIN — HEPARIN SODIUM 5000 UNITS: 5000 INJECTION INTRAVENOUS; SUBCUTANEOUS at 09:08

## 2022-08-28 RX ADMIN — INSULIN ASPART 10 UNITS: 100 INJECTION, SOLUTION INTRAVENOUS; SUBCUTANEOUS at 07:08

## 2022-08-28 RX ADMIN — CARVEDILOL 37.5 MG: 25 TABLET, FILM COATED ORAL at 08:08

## 2022-08-28 RX ADMIN — LEVETIRACETAM 1000 MG: 100 INJECTION, SOLUTION INTRAVENOUS at 08:08

## 2022-08-28 RX ADMIN — INSULIN ASPART 10 UNITS: 100 INJECTION, SOLUTION INTRAVENOUS; SUBCUTANEOUS at 12:08

## 2022-08-28 RX ADMIN — AMITRIPTYLINE HYDROCHLORIDE 75 MG: 50 TABLET, FILM COATED ORAL at 10:08

## 2022-08-28 RX ADMIN — POTASSIUM BICARBONATE 50 MEQ: 978 TABLET, EFFERVESCENT ORAL at 05:08

## 2022-08-28 NOTE — ASSESSMENT & PLAN NOTE
BG goal 140 - 180     - Continue Levemir 30 untis daily   - Continue Novolog 10 units TIDWM. Basal/Prandial physiologic matching.    - Moderate Dose SQ Insulin Correction Scale.  - BG Monitoring AC/HS.   - Bedside nurse to instruct on insulin pen use and blood sugar monitor. Have patient administer own injections after education completed supervised by nurse. Have patient watch insulin educatoin video's via i-pad if available on unit.      ** Please call Endocrine for any BG related issues **  ** Please notify Endocrine for any change and/or advance in diet**    Lab Results   Component Value Date    HGBA1C 8.3 (H) 08/22/2022       Discharge Planning:   TBD. Please notify endocrinology prior to discharge.

## 2022-08-28 NOTE — PROGRESS NOTES
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 Diaz who presents with sudden onset of symptoms concerning for focal seizure. Patient was in her yard today playing with her son when her R arm became weak suddenly followed by the inability to speak and RLE weakness. This went on for a couple minutes and self resolved; patient said she had some lasting heaviness on her R side after. She came to the ED immediately.    Here in the ED she had another episode that resolved on its own. NSGY consulted.       Post-Op Info:  Procedure(s) (LRB):  LEFT Cranial wound debridement and washout (Left)   1 Day Post-Op     Interval History: 8/28: POD 1 s/p L crani revision for washout. NAEON. AFVSS. Exam stable. Pain controlled. Tolerating PO.     Medications:  Continuous Infusions:  Scheduled Meds:   amitriptyline  75 mg Oral QHS    amLODIPine  10 mg Oral Daily    atorvastatin  40 mg Oral Daily    carvediloL  37.5 mg Oral BID    ceFEPime (MAXIPIME) IVPB  2 g Intravenous Q8H    EScitalopram oxalate  10 mg Oral Daily    heparin (porcine)  5,000 Units Subcutaneous Q8H    insulin aspart U-100  10 Units Subcutaneous TIDWM    insulin detemir U-100  30 Units Subcutaneous Daily    levetiracetam IV  1,000 mg Intravenous Q12H    magnesium oxide  400 mg Oral BID    mupirocin   Nasal BID    potassium chloride SA  20 mEq Oral BID    vancomycin (VANCOCIN) IVPB  750 mg Intravenous Q12H     PRN Meds:acetaminophen, albuterol, dextrose 10%, dextrose 10%, glucagon (human recombinant), glucose, glucose, hydrALAZINE, insulin aspart U-100, labetalol, magnesium oxide, magnesium oxide, melatonin, oxyCODONE, oxyCODONE, potassium bicarbonate, potassium bicarbonate, potassium bicarbonate, potassium, sodium phosphates, potassium, sodium  phosphates, potassium, sodium phosphates, Pharmacy to dose Vancomycin consult **AND** vancomycin - pharmacy to dose     Review of Systems  Objective:     Weight: 88.5 kg (195 lb)  Body mass index is 34.54 kg/m².  Vital Signs (Most Recent):  Temp: 98.4 °F (36.9 °C) (08/28/22 1501)  Pulse: 84 (08/28/22 1801)  Resp: (!) 26 (08/28/22 1801)  BP: (!) 145/76 (08/28/22 1801)  SpO2: 96 % (08/28/22 1801)   Vital Signs (24h Range):  Temp:  [98.2 °F (36.8 °C)-98.9 °F (37.2 °C)] 98.4 °F (36.9 °C)  Pulse:  [70-86] 84  Resp:  [14-34] 26  SpO2:  [91 %-98 %] 96 %  BP: (131-163)/(60-93) 145/76     Date 08/28/22 0700 - 08/29/22 0659   Shift 7918-4145 0734-7839 0267-4222 24 Hour Total   INTAKE   Shift Total(mL/kg)       OUTPUT   Urine(mL/kg/hr) 500(0.7) 300  800   Shift Total(mL/kg) 500(5.7) 300(3.4)  800(9)   Weight (kg) 88.4 88.4 88.4 88.4                        Closed/Suction Drain 08/27/22 0941 Left Scalp Other (Comment) (Active)   Site Description Unable to view 08/28/22 1501   Dressing Type Gauze 08/28/22 1501   Dressing Status Clean;Dry;Intact 08/28/22 1501   Dressing Intervention Integrity maintained 08/28/22 1501   Drainage Serosanguineous 08/28/22 1501   Status To bulb suction 08/28/22 1501   Output (mL) 0 mL 08/28/22 0502       Female External Urinary Catheter 08/27/22 1045 (Active)   Skin no redness;no breakdown 08/28/22 1501   Tolerance no signs/symptoms of discomfort 08/28/22 1501   Suction Continuous suction at 40 mmHg 08/28/22 0701   Date of last wick change 08/27/22 08/27/22 1105   Time of last wick change 1045 08/27/22 1105   Output (mL) 500 mL 08/28/22 0502       Physical Exam    Neurosurgery Physical Exam    GENERAL: resting comfortably  HEENT: NC, PERRL, mucous membranes moist  NECK: supple, trachea midline  CV: normal capillary refill  PULM: aerating well, symmetric expansion, no distress  ABD: soft, NT, ND  EXT: no c/c/e    NEURO:    AAO x 3  CN II-XII grossly intact  Fc x 4 antigravity  SILT    No drift or  dysmetria    Cranial dressing c/d/I     HV with scant SS output      Significant Labs:  Recent Labs   Lab 08/27/22  0529 08/28/22  0036   * 224*   * 135*   K 3.6 3.7    105   CO2 21* 22*   BUN 11 11   CREATININE 0.9 1.0   CALCIUM 9.5 9.2   MG 1.8 1.6     Recent Labs   Lab 08/27/22  0529 08/28/22  0036   WBC 5.77 8.38   HGB 11.5* 11.4*   HCT 33.2* 33.2*    271     No results for input(s): LABPT, INR, APTT in the last 48 hours.  Microbiology Results (last 7 days)       Procedure Component Value Units Date/Time    Culture, Anaerobic [[id number]] Collected: 08/27/22 0909    Order Status: Completed Specimen: Wound from Head Updated: 08/28/22 1154     Anaerobic Culture Culture in progress    Narrative:      Superficial head culture, aerobic, anaerobic, fungus, AFB,  gram    Culture, Anaerobic [[id number]] Collected: 08/27/22 0912    Order Status: Completed Specimen: Wound from Head Updated: 08/28/22 1154     Anaerobic Culture Culture in progress    Narrative:      Epidural culture, aerobic, anaerobic, gram, AFB, gram    Aerobic culture [[id number]] Collected: 08/27/22 0912    Order Status: Completed Specimen: Wound from Head Updated: 08/28/22 0734     Aerobic Bacterial Culture No growth    Narrative:      Epidural culture, aerobic, anaerobic, gram, AFB, gram    Aerobic culture [[id number]] Collected: 08/27/22 0909    Order Status: Completed Specimen: Wound from Head Updated: 08/28/22 0732     Aerobic Bacterial Culture No growth    Narrative:      Superficial head culture, aerobic, anaerobic, fungus, AFB,  gram    Gram stain [[id number]] Collected: 08/27/22 0912    Order Status: Completed Specimen: Wound from Head Updated: 08/27/22 1044     Gram Stain Result No WBC's      No organisms seen    Narrative:      Epidural culture, aerobic, anaerobic, gram, AFB, gram    Gram stain [[id number]] Collected: 08/27/22 0909    Order Status: Completed Specimen: Wound from Head Updated: 08/27/22 1043     Gram Stain  Result Rare WBC's      No organisms seen    Narrative:      Superficial head culture, aerobic, anaerobic, fungus, AFB,  gram    AFB Culture & Smear [[id number]] Collected: 08/27/22 0909    Order Status: Sent Specimen: Wound from Head Updated: 08/27/22 1004    Fungus culture [[id number]] Collected: 08/27/22 0909    Order Status: Sent Specimen: Wound from Head Updated: 08/27/22 1003    Fungus culture [[id number]] Collected: 08/27/22 0912    Order Status: Sent Specimen: Wound from Head Updated: 08/27/22 1001    AFB Culture & Smear [[id number]] Collected: 08/27/22 0912    Order Status: Sent Specimen: Wound from Head Updated: 08/27/22 1001    Blood culture [[id number]] Collected: 08/21/22 2325    Order Status: Completed Specimen: Blood from Peripheral, Antecubital, Right Updated: 08/27/22 0612     Blood Culture, Routine No growth after 5 days.    Aerobic culture [[id number]] Collected: 08/22/22 1550    Order Status: Completed Specimen: Incision site from Head Updated: 08/26/22 1029     Aerobic Bacterial Culture No growth    Gram stain [[id number]] Collected: 08/22/22 1550    Order Status: Completed Specimen: Incision site from Head Updated: 08/25/22 1447     Gram Stain Result No organisms seen      Rare WBC's    Gram stain [[id number]]     Order Status: Completed Specimen: Incision site from Head     Culture, Anaerobic [[id number]] Collected: 08/22/22 1550    Order Status: Completed Specimen: Incision site from Head Updated: 08/25/22 1031     Anaerobic Culture Culture in progress    AFB Culture & Smear [[id number]] Collected: 08/22/22 1550    Order Status: Completed Specimen: Incision site from Head Updated: 08/23/22 2127     AFB Culture & Smear Culture in progress     AFB CULTURE STAIN No acid fast bacilli seen.    Fungus culture [[id number]] Collected: 08/22/22 1550    Order Status: No result Specimen: Incision site from Head Updated: 08/23/22 0930    Aerobic culture [[id number]]     Order Status: Completed Specimen: Incision site  from Head     Culture, Anaerobe [[id number]]     Order Status: Completed Specimen: Body Fluid from Head     Fungus culture [[id number]]     Order Status: Completed Specimen: Body Fluid from Head     Fungus culture [[id number]]     Order Status: Canceled Specimen: Body Fluid from Head     Culture, Anaerobe [[id number]]     Order Status: Canceled Specimen: Body Fluid from Head     Aerobic culture [[id number]]     Order Status: Canceled Specimen: Incision site from Head     AFB Culture & Smear [[id number]]     Order Status: Canceled Specimen: Body Fluid from Head           All pertinent labs from the last 24 hours have been reviewed.    Significant Diagnostics:  I have reviewed all pertinent imaging results/findings within the past 24 hours.  No results found in the last 24 hours.    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 Diaz who presented after episode of sudden onset R-sided weakness/tremors and aphasia for about 5 minutes, resolved prior to EMS arrival, concerning for focal seizure, then with second episode while in the ED. MRI brain w/wo concerning for scalp and epidural infection.    Now s/p L crani revision for wound washout.     - Admitted to [facility] with q1h neurochecks.  - ADAT  - HAIM/SCD/SQH  - PT/OT/OOB  - Monitor drain output and empty q1h   - all labs and diagnostics reviewed          - CTA 8/21: largely stable from prior with epidural fluid collection and extracranial fluid collection stable in size; stable postsurgical changes of L PCOM/anterior choroidal aneurysm clipping, stable small left MCA bifurcation aneurysm          - MRI brain w wo 8/21: peripheral rim enhancement of subdural and scalp complex fluid collections, concerning for infection; no evidence of enhancement of brain parenchyma   - CTH 8/28: Residual mixed density collection overlies  the soft tissues as well as within the extra-axial space  underlying the craniotomy likely represents postoperative gas fluid and hemorrhage with residual infection not  Excluded. No new intracranial findings.           - spot EEG 8/23: mild regional cortical or subcortical dysfunction in the left temporal region with no electrographic seizures or indications of seizure tendency.  - Infectious workup:          - afebrile, no leukocytosis          - blood cx 8/21 NGTD          - ESR 30-->46, CRP 28.5-->26          - Subcutaneous scalp fluid collection tapped 8/22, sent for Cx's - NGTD          - ID consulted, recommend Vanc/Cefepime, continue to follow Cx's  - Seizures: Episodes concerning for focal seizures with R-sided tremors and weakness. Not on AEDs prior to admission.          - Keppra loaded on admission, continue 1g bid          - Likely provoked focal seizure activity due to cranial fluid collection; pt also with very elevated BG on admission, uncontrolled hyperglycemia may have been contributory to provoked seizure          - spot EEG 8/23 with left temporal subcortical dysfunction, negative for electrographic seizures  - New Onset T2DM: No h/o DM. Hyperglycemic on admission. HbA1C 8.3. Endocrinology consulted. Goal -180.     - Levemir 30 untis daily First dose this Am. 0.7 u/kg/d dosing.      - Novolog 10 units TIDWM. Basal/Prandial physiologic matching.       - Moderate Dose SQ Insulin Correction Scale.     - BG Monitoring AC/HS.           - Bedside nurse to instruct on insulin pen use and blood sugar monitor. Have patient administer own injections after education completed supervised by nurse. Have patient watch insulin educatoin video's via i-pad if available on unit.  - HTN: SBP <160. Controlled on current regimen. Continue home meds.  - Hypokalemia, Hypomagnesemia: Chronic, on home supplements daily.          - Resume home potassium and Mg supplements          - Replete PRN, daily  labs     Dispo: TTF under BRYANT Rios MD  Neurosurgery  [facility] - Neuro Critical Care

## 2022-08-28 NOTE — ASSESSMENT & PLAN NOTE
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 Diaz who presented after episode of sudden onset R-sided weakness/tremors and aphasia for about 5 minutes, resolved prior to EMS arrival, concerning for focal seizure, then with second episode while in the ED. MRI brain w/wo concerning for scalp and epidural infection.    Now s/p L crani revision for wound washout.     - Admitted to [facility] with q1h neurochecks.  - ADAT  - HAIM/SCD/SQH  - PT/OT/OOB  - Monitor drain output and empty q1h   - all labs and diagnostics reviewed          - CTA 8/21: largely stable from prior with epidural fluid collection and extracranial fluid collection stable in size; stable postsurgical changes of L PCOM/anterior choroidal aneurysm clipping, stable small left MCA bifurcation aneurysm          - MRI brain w wo 8/21: peripheral rim enhancement of subdural and scalp complex fluid collections, concerning for infection; no evidence of enhancement of brain parenchyma   - CTH 8/28: Residual mixed density collection overlies the soft tissues as well as within the extra-axial space  underlying the craniotomy likely represents postoperative gas fluid and hemorrhage with residual infection not  Excluded. No new intracranial findings.           - spot EEG 8/23: mild regional cortical or subcortical dysfunction in the left temporal region with no electrographic seizures or indications of seizure tendency.  - Infectious workup:          - afebrile, no leukocytosis          - blood cx 8/21 NGTD          - ESR 30-->46, CRP 28.5-->26          - Subcutaneous scalp fluid collection tapped 8/22, sent for Cx's - NGTD          - ID consulted, recommend Vanc/Cefepime, continue to follow Cx's  - Seizures: Episodes concerning for focal seizures with R-sided tremors and weakness. Not on AEDs prior to admission.          - Keppra loaded on admission, continue 1g bid           - Likely provoked focal seizure activity due to cranial fluid collection; pt also with very elevated BG on admission, uncontrolled hyperglycemia may have been contributory to provoked seizure          - spot EEG 8/23 with left temporal subcortical dysfunction, negative for electrographic seizures  - New Onset T2DM: No h/o DM. Hyperglycemic on admission. HbA1C 8.3. Endocrinology consulted. Goal -180.     - Levemir 30 untis daily First dose this Am. 0.7 u/kg/d dosing.      - Novolog 10 units TIDWM. Basal/Prandial physiologic matching.       - Moderate Dose SQ Insulin Correction Scale.     - BG Monitoring AC/HS.           - Bedside nurse to instruct on insulin pen use and blood sugar monitor. Have patient administer own injections after education completed supervised by nurse. Have patient watch insulin educatoin video's via i-pad if available on unit.  - HTN: SBP <160. Controlled on current regimen. Continue home meds.  - Hypokalemia, Hypomagnesemia: Chronic, on home supplements daily.          - Resume home potassium and Mg supplements          - Replete PRN, daily labs     Dispo: TTF under NSY

## 2022-08-28 NOTE — PROGRESS NOTES
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 Diaz who presented with sudden onset of symptoms concerning for focal seizure, aphasia and RSW. Each episode resolved on its own. NSGY was consulted and she was taken for a wound washout and evacuation of fluid collection. Will admit to [facility] post op.      Hospital Course: 8/28/22: step down to NSGY      Interval History:  NAEON. Ok to step down to NSGY    Review of Systems   Constitutional: Negative.    HENT: Negative.     Eyes: Negative.    Respiratory: Negative.     Cardiovascular: Negative.    Gastrointestinal: Negative.    Endocrine: Negative.    Genitourinary: Negative.    Musculoskeletal: Negative.    Skin: Negative.      Objective:     Vitals:  Temp: 98.7 °F (37.1 °C)  Pulse: 76  Rhythm: normal sinus rhythm  BP: 135/73  MAP (mmHg): 98  Resp: 16  SpO2: (!) 93 %  O2 Device (Oxygen Therapy): room air    Temp  Min: 98.2 °F (36.8 °C)  Max: 98.9 °F (37.2 °C)  Pulse  Min: 69  Max: 86  BP  Min: 126/80  Max: 167/87  MAP (mmHg)  Min: 87  Max: 121  Resp  Min: 14  Max: 34  SpO2  Min: 91 %  Max: 99 %    08/27 0701 - 08/28 0700  In: 2402.3 [I.V.:23]  Out: 2080 [Urine:2000]   Unmeasured Output  Urine Occurrence: 2  Stool Occurrence: 0       Physical Exam  Vitals and nursing note reviewed.   Constitutional:       Appearance: Normal appearance.   HENT:      Head: Normocephalic.      Nose: Nose normal.      Mouth/Throat:      Mouth: Mucous membranes are moist.      Pharynx: Oropharynx is clear.   Eyes:      Pupils: Pupils are equal, round, and reactive to light.   Cardiovascular:      Rate and Rhythm: Normal rate and regular rhythm.      Pulses:  Normal pulses.      Heart sounds: Normal heart sounds.   Pulmonary:      Effort: Pulmonary effort is normal.      Breath sounds: Normal breath sounds.   Abdominal:      General: Bowel sounds are normal.      Palpations: Abdomen is soft.   Musculoskeletal:         General: Normal range of motion.   Skin:     General: Skin is warm and dry.      Capillary Refill: Capillary refill takes less than 2 seconds.   Neurological:      Mental Status: She is alert.      Comments: E 4 V 5 M 6  -Alert. Oriented to person, place, and time. Speech fluent. Follows commands.   -Strength 5 and symmetric in arms, legs. Tone normal.   -Sensation intact to touch in arms, legs.         Medications:  Continuous Scheduledamitriptyline, 75 mg, QHS  amLODIPine, 10 mg, Daily  atorvastatin, 40 mg, Daily  carvediloL, 37.5 mg, BID  ceFEPime (MAXIPIME) IVPB, 2 g, Q8H  EScitalopram oxalate, 10 mg, Daily  insulin aspart U-100, 10 Units, TIDWM  insulin detemir U-100, 30 Units, Daily  levetiracetam IV, 1,000 mg, Q12H  magnesium oxide, 400 mg, BID  mupirocin, , BID  potassium chloride SA, 20 mEq, BID  vancomycin (VANCOCIN) IVPB, 750 mg, Q12H    PRNacetaminophen, 650 mg, Q6H PRN  albuterol, 2 puff, Q20 Min PRN  dextrose 10%, 12.5 g, PRN  dextrose 10%, 25 g, PRN  glucagon (human recombinant), 1 mg, PRN  glucose, 16 g, PRN  glucose, 24 g, PRN  hydrALAZINE, 10 mg, Q6H PRN  insulin aspart U-100, 1-10 Units, QID (AC + HS) PRN  labetalol, 10 mg, Q6H PRN  magnesium oxide, 800 mg, PRN  magnesium oxide, 800 mg, PRN  melatonin, 6 mg, Nightly PRN  oxyCODONE, 5 mg, Q4H PRN  oxyCODONE, 10 mg, Q4H PRN  potassium bicarbonate, 35 mEq, PRN  potassium bicarbonate, 50 mEq, PRN  potassium bicarbonate, 60 mEq, PRN  potassium, sodium phosphates, 2 packet, PRN  potassium, sodium phosphates, 2 packet, PRN  potassium, sodium phosphates, 2 packet, PRN  vancomycin - pharmacy to dose, , pharmacy to manage frequency      Today I personally reviewed pertinent medications,  lines/drains/airways, imaging, cardiology results, laboratory results, microbiology results, notably:    Diet  Diet diabetic [facility]; 2000 Calorie; Isolation Tray - Regular [place]  Diet diabetic [facility]; 2000 Calorie; Isolation Tray - Regular [place]          Assessment/Plan:     Neuro  Transient neurological symptoms  Resolved    Cardiac/Vascular  Mixed hyperlipidemia  Continue atorvastatin    Essential hypertension  SBP Goal < 160  Continue home BP meds    ID  * Post op infection  Hx of L crani for aneurysm clipping 7/15/22  Developed transient RSW/aphasia recently, possible seizure activity  Fluid collection and infection of cranial wound site  S/p Washout and fluid collection removal 8/27    -Admit to [facility]  -Neuro checks/VS q1hr  -SBP goal < 160  -VTE prophylaxis: Mechanical SCDs, hold subq heparin in the post op period  -NSGY following  -CTH post op pending  -Continue Keppra 1000mg BID  -Post op pain control  -ID following, continue Vanc and cefepime  - step down to NSGY    Endocrine  New onset type 2 diabetes mellitus  Continue scheduled insulin and SSI  Diabetic diet    Other  Fluid collection at surgical site  S/p washout 8/27          The patient is being Prophylaxed for:  Venous Thromboembolism with: Mechanical or Chemical  Stress Ulcer with: Not Applicable   Ventilator Pneumonia with: not applicable    Activity Orders          Diet diabetic [facility]; 2000 Calorie; Isolation Tray - Regular [place]: Diabetic starting at 08/27 1135        Full Code  Level 3  Diamond Loera NP  Neurocritical Care  [facility] - Neuro Critical Care

## 2022-08-28 NOTE — SUBJECTIVE & OBJECTIVE
Interval History: 8/28: POD 1 s/p L crani revision for washout. NAEON. AFVSS. Exam stable. Pain controlled. Tolerating PO.     Medications:  Continuous Infusions:  Scheduled Meds:   amitriptyline  75 mg Oral QHS    amLODIPine  10 mg Oral Daily    atorvastatin  40 mg Oral Daily    carvediloL  37.5 mg Oral BID    ceFEPime (MAXIPIME) IVPB  2 g Intravenous Q8H    EScitalopram oxalate  10 mg Oral Daily    heparin (porcine)  5,000 Units Subcutaneous Q8H    insulin aspart U-100  10 Units Subcutaneous TIDWM    insulin detemir U-100  30 Units Subcutaneous Daily    levetiracetam IV  1,000 mg Intravenous Q12H    magnesium oxide  400 mg Oral BID    mupirocin   Nasal BID    potassium chloride SA  20 mEq Oral BID    vancomycin (VANCOCIN) IVPB  750 mg Intravenous Q12H     PRN Meds:acetaminophen, albuterol, dextrose 10%, dextrose 10%, glucagon (human recombinant), glucose, glucose, hydrALAZINE, insulin aspart U-100, labetalol, magnesium oxide, magnesium oxide, melatonin, oxyCODONE, oxyCODONE, potassium bicarbonate, potassium bicarbonate, potassium bicarbonate, potassium, sodium phosphates, potassium, sodium phosphates, potassium, sodium phosphates, Pharmacy to dose Vancomycin consult **AND** vancomycin - pharmacy to dose     Review of Systems  Objective:     Weight: 88.5 kg (195 lb)  Body mass index is 34.54 kg/m².  Vital Signs (Most Recent):  Temp: 98.4 °F (36.9 °C) (08/28/22 1501)  Pulse: 84 (08/28/22 1801)  Resp: (!) 26 (08/28/22 1801)  BP: (!) 145/76 (08/28/22 1801)  SpO2: 96 % (08/28/22 1801)   Vital Signs (24h Range):  Temp:  [98.2 °F (36.8 °C)-98.9 °F (37.2 °C)] 98.4 °F (36.9 °C)  Pulse:  [70-86] 84  Resp:  [14-34] 26  SpO2:  [91 %-98 %] 96 %  BP: (131-163)/(60-93) 145/76     Date 08/28/22 0700 - 08/29/22 0659   Shift 3193-0334 6428-0101 3764-8501 24 Hour Total   INTAKE   Shift Total(mL/kg)       OUTPUT   Urine(mL/kg/hr) 500(0.7) 300  800   Shift Total(mL/kg) 500(5.7) 300(3.4)  800(9)   Weight (kg) 88.4 88.4 88.4 88.4                         Closed/Suction Drain 08/27/22 0941 Left Scalp Other (Comment) (Active)   Site Description Unable to view 08/28/22 1501   Dressing Type Gauze 08/28/22 1501   Dressing Status Clean;Dry;Intact 08/28/22 1501   Dressing Intervention Integrity maintained 08/28/22 1501   Drainage Serosanguineous 08/28/22 1501   Status To bulb suction 08/28/22 1501   Output (mL) 0 mL 08/28/22 0502       Female External Urinary Catheter 08/27/22 1045 (Active)   Skin no redness;no breakdown 08/28/22 1501   Tolerance no signs/symptoms of discomfort 08/28/22 1501   Suction Continuous suction at 40 mmHg 08/28/22 0701   Date of last wick change 08/27/22 08/27/22 1105   Time of last wick change 1045 08/27/22 1105   Output (mL) 500 mL 08/28/22 0502       Physical Exam    Neurosurgery Physical Exam    GENERAL: resting comfortably  HEENT: NC, PERRL, mucous membranes moist  NECK: supple, trachea midline  CV: normal capillary refill  PULM: aerating well, symmetric expansion, no distress  ABD: soft, NT, ND  EXT: no c/c/e    NEURO:    AAO x 3  CN II-XII grossly intact  Fc x 4 antigravity  SILT    No drift or dysmetria    Cranial dressing c/d/I     HV with scant SS output      Significant Labs:  Recent Labs   Lab 08/27/22  0529 08/28/22  0036   * 224*   * 135*   K 3.6 3.7    105   CO2 21* 22*   BUN 11 11   CREATININE 0.9 1.0   CALCIUM 9.5 9.2   MG 1.8 1.6     Recent Labs   Lab 08/27/22  0529 08/28/22  0036   WBC 5.77 8.38   HGB 11.5* 11.4*   HCT 33.2* 33.2*    271     No results for input(s): LABPT, INR, APTT in the last 48 hours.  Microbiology Results (last 7 days)       Procedure Component Value Units Date/Time    Culture, Anaerobic [[id number]] Collected: 08/27/22 0909    Order Status: Completed Specimen: Wound from Head Updated: 08/28/22 1154     Anaerobic Culture Culture in progress    Narrative:      Superficial head culture, aerobic, anaerobic, fungus, AFB,  gram    Culture, Anaerobic [[id number]] Collected:  08/27/22 0912    Order Status: Completed Specimen: Wound from Head Updated: 08/28/22 1154     Anaerobic Culture Culture in progress    Narrative:      Epidural culture, aerobic, anaerobic, gram, AFB, gram    Aerobic culture [[id number]] Collected: 08/27/22 0912    Order Status: Completed Specimen: Wound from Head Updated: 08/28/22 0734     Aerobic Bacterial Culture No growth    Narrative:      Epidural culture, aerobic, anaerobic, gram, AFB, gram    Aerobic culture [[id number]] Collected: 08/27/22 0909    Order Status: Completed Specimen: Wound from Head Updated: 08/28/22 0732     Aerobic Bacterial Culture No growth    Narrative:      Superficial head culture, aerobic, anaerobic, fungus, AFB,  gram    Gram stain [[id number]] Collected: 08/27/22 0912    Order Status: Completed Specimen: Wound from Head Updated: 08/27/22 1044     Gram Stain Result No WBC's      No organisms seen    Narrative:      Epidural culture, aerobic, anaerobic, gram, AFB, gram    Gram stain [[id number]] Collected: 08/27/22 0909    Order Status: Completed Specimen: Wound from Head Updated: 08/27/22 1043     Gram Stain Result Rare WBC's      No organisms seen    Narrative:      Superficial head culture, aerobic, anaerobic, fungus, AFB,  gram    AFB Culture & Smear [[id number]] Collected: 08/27/22 0909    Order Status: Sent Specimen: Wound from Head Updated: 08/27/22 1004    Fungus culture [[id number]] Collected: 08/27/22 0909    Order Status: Sent Specimen: Wound from Head Updated: 08/27/22 1003    Fungus culture [[id number]] Collected: 08/27/22 0912    Order Status: Sent Specimen: Wound from Head Updated: 08/27/22 1001    AFB Culture & Smear [[id number]] Collected: 08/27/22 0912    Order Status: Sent Specimen: Wound from Head Updated: 08/27/22 1001    Blood culture [[id number]] Collected: 08/21/22 2325    Order Status: Completed Specimen: Blood from Peripheral, Antecubital, Right Updated: 08/27/22 0612     Blood Culture, Routine No growth after 5 days.     Aerobic culture [[id number]] Collected: 08/22/22 1550    Order Status: Completed Specimen: Incision site from Head Updated: 08/26/22 1029     Aerobic Bacterial Culture No growth    Gram stain [[id number]] Collected: 08/22/22 1550    Order Status: Completed Specimen: Incision site from Head Updated: 08/25/22 1447     Gram Stain Result No organisms seen      Rare WBC's    Gram stain [[id number]]     Order Status: Completed Specimen: Incision site from Head     Culture, Anaerobic [[id number]] Collected: 08/22/22 1550    Order Status: Completed Specimen: Incision site from Head Updated: 08/25/22 1031     Anaerobic Culture Culture in progress    AFB Culture & Smear [[id number]] Collected: 08/22/22 1550    Order Status: Completed Specimen: Incision site from Head Updated: 08/23/22 2127     AFB Culture & Smear Culture in progress     AFB CULTURE STAIN No acid fast bacilli seen.    Fungus culture [[id number]] Collected: 08/22/22 1550    Order Status: No result Specimen: Incision site from Head Updated: 08/23/22 0930    Aerobic culture [[id number]]     Order Status: Completed Specimen: Incision site from Head     Culture, Anaerobe [[id number]]     Order Status: Completed Specimen: Body Fluid from Head     Fungus culture [[id number]]     Order Status: Completed Specimen: Body Fluid from Head     Fungus culture [[id number]]     Order Status: Canceled Specimen: Body Fluid from Head     Culture, Anaerobe [[id number]]     Order Status: Canceled Specimen: Body Fluid from Head     Aerobic culture [[id number]]     Order Status: Canceled Specimen: Incision site from Head     AFB Culture & Smear [[id number]]     Order Status: Canceled Specimen: Body Fluid from Head           All pertinent labs from the last 24 hours have been reviewed.    Significant Diagnostics:  I have reviewed all pertinent imaging results/findings within the past 24 hours.  No results found in the last 24 hours.

## 2022-08-28 NOTE — PROGRESS NOTES
Pharmacokinetic Assessment Follow Up: IV Vancomycin    Vancomycin serum concentration assessment/plan:  The trough level was drawn correctly ~10 hours post-dose and is above the desired range. This could be due to slightly early level as well as the early 1000 mg dose administered in the OR yesterday 8/27.   Per ID recs, goal 15-20 mcg/mL.  Will continue with Vancomycin 750 mg every 12 hours, but will push back the scheduled doses today to 1600 (~16 hours post last dose).  Draw next trough level 8/30 @ 0300.     Drug levels (last 3 results):  Recent Labs   Lab Result Units 08/28/22  0914   Vancomycin-Trough ug/mL 23.3*       Pharmacy will continue to follow and monitor vancomycin.    Please contact pharmacy at extension [phone] for questions regarding this assessment.    Thank you for the consult,   Semaj Rouse, JamesD       Patient brief summary:  Gina Jenkins is a 47 y.o. female initiated on antimicrobial therapy with IV Vancomycin for treatment of skin & soft tissue infection.    Actual Body Weight:   88.5 kg    Renal Function:   Estimated Creatinine Clearance: 74.1 mL/min (based on SCr of 1 mg/dL).    Dialysis Method (if applicable):  N/A    Drug Allergies:   Review of patient's allergies indicates:   Allergen Reactions    Lisinopril Swelling    Bactrim [sulfamethoxazole-trimethoprim] Rash       CBC (last 72 hours):  Recent Labs   Lab Result Units 08/26/22 0429 08/27/22 0529 08/28/22  0036   WBC K/uL 7.23 5.77 8.38   Hemoglobin g/dL 12.0 11.5* 11.4*   Hematocrit % 33.8* 33.2* 33.2*   Platelets K/uL 280 265 271   Gran % % 63.2 64.1 72.5   Lymph % % 23.1 19.9 14.1*   Mono % % 10.5 11.3 10.0   Eosinophil % % 1.9 3.3 2.5   Basophil % % 0.6 0.7 0.4   Differential Method  Automated Automated Automated       Metabolic Panel (last 72 hours):  Recent Labs   Lab Result Units 08/26/22 0429 08/27/22  0529 08/28/22  0036   Sodium mmol/L 137 135* 135*   Potassium mmol/L 3.5 3.6 3.7   Chloride mmol/L 106 105 105   CO2  mmol/L 22* 21* 22*   Glucose mg/dL 229* 196* 224*   BUN mg/dL 11 11 11   Creatinine mg/dL 1.0 0.9 1.0   Magnesium mg/dL 1.9 1.8 1.6   Phosphorus mg/dL  --   --  2.6*       Vancomycin Administrations:  vancomycin given in the last 96 hours                     vancomycin 750 mg in dextrose 5 % 250 mL IVPB (ready to mix system) (mg) 750 mg New Bag 08/27/22 2257    vancomycin (VANCOCIN) 1,000 mg in sodium chloride 0.9% 250 mL IVPB (mg) 1,000 mg New Bag 08/27/22 0831    vancomycin 750 mg in dextrose 5 % 250 mL IVPB (ready to mix system) (mg) 750 mg New Bag 08/27/22 0020     750 mg New Bag 08/26/22 1243     750 mg New Bag  0137    vancomycin in dextrose 5 % 1 gram/250 mL IVPB 1,000 mg (mg) 1,000 mg New Bag 08/25/22 0645     1,000 mg New Bag 08/24/22 1646                    Microbiologic Results:  Microbiology Results (last 7 days)       Procedure Component Value Units Date/Time    Aerobic culture [[id number]] Collected: 08/27/22 0912    Order Status: Completed Specimen: Wound from Head Updated: 08/28/22 0734     Aerobic Bacterial Culture No growth    Narrative:      Epidural culture, aerobic, anaerobic, gram, AFB, gram    Aerobic culture [[id number]] Collected: 08/27/22 0909    Order Status: Completed Specimen: Wound from Head Updated: 08/28/22 0732     Aerobic Bacterial Culture No growth    Narrative:      Superficial head culture, aerobic, anaerobic, fungus, AFB,  gram    Gram stain [[id number]] Collected: 08/27/22 0912    Order Status: Completed Specimen: Wound from Head Updated: 08/27/22 1044     Gram Stain Result No WBC's      No organisms seen    Narrative:      Epidural culture, aerobic, anaerobic, gram, AFB, gram    Gram stain [[id number]] Collected: 08/27/22 0909    Order Status: Completed Specimen: Wound from Head Updated: 08/27/22 1043     Gram Stain Result Rare WBC's      No organisms seen    Narrative:      Superficial head culture, aerobic, anaerobic, fungus, AFB,  gram    AFB Culture & Smear [[id number]]  Collected: 08/27/22 0909    Order Status: Sent Specimen: Wound from Head Updated: 08/27/22 1004    Fungus culture [[id number]] Collected: 08/27/22 0909    Order Status: Sent Specimen: Wound from Head Updated: 08/27/22 1003    Culture, Anaerobic [[id number]] Collected: 08/27/22 0912    Order Status: Sent Specimen: Wound from Head Updated: 08/27/22 1002    Fungus culture [[id number]] Collected: 08/27/22 0912    Order Status: Sent Specimen: Wound from Head Updated: 08/27/22 1001    AFB Culture & Smear [[id number]] Collected: 08/27/22 0912    Order Status: Sent Specimen: Wound from Head Updated: 08/27/22 1001    Culture, Anaerobic [[id number]] Collected: 08/27/22 0909    Order Status: Sent Specimen: Wound from Head Updated: 08/27/22 1000    Blood culture [[id number]] Collected: 08/21/22 2325    Order Status: Completed Specimen: Blood from Peripheral, Antecubital, Right Updated: 08/27/22 0612     Blood Culture, Routine No growth after 5 days.    Aerobic culture [[id number]] Collected: 08/22/22 1550    Order Status: Completed Specimen: Incision site from Head Updated: 08/26/22 1029     Aerobic Bacterial Culture No growth    Gram stain [[id number]] Collected: 08/22/22 1550    Order Status: Completed Specimen: Incision site from Head Updated: 08/25/22 1447     Gram Stain Result No organisms seen      Rare WBC's    Gram stain [[id number]]     Order Status: Completed Specimen: Incision site from Head     Culture, Anaerobic [[id number]] Collected: 08/22/22 1550    Order Status: Completed Specimen: Incision site from Head Updated: 08/25/22 1031     Anaerobic Culture Culture in progress    AFB Culture & Smear [[id number]] Collected: 08/22/22 1550    Order Status: Completed Specimen: Incision site from Head Updated: 08/23/22 2127     AFB Culture & Smear Culture in progress     AFB CULTURE STAIN No acid fast bacilli seen.    Fungus culture [[id number]] Collected: 08/22/22 1550    Order Status: No result Specimen: Incision site from Head  Updated: 08/23/22 0930    Aerobic culture [[id number]]     Order Status: Completed Specimen: Incision site from Head     Culture, Anaerobe [[id number]]     Order Status: Completed Specimen: Body Fluid from Head     Fungus culture [[id number]]     Order Status: Completed Specimen: Body Fluid from Head     Fungus culture [[id number]]     Order Status: Canceled Specimen: Body Fluid from Head     Culture, Anaerobe [[id number]]     Order Status: Canceled Specimen: Body Fluid from Head     Aerobic culture [[id number]]     Order Status: Canceled Specimen: Incision site from Head     AFB Culture & Smear [[id number]]     Order Status: Canceled Specimen: Body Fluid from Head

## 2022-08-28 NOTE — ASSESSMENT & PLAN NOTE
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

## 2022-08-28 NOTE — PLAN OF CARE
[facility] Care Plan    POC reviewed with Gina Jenkins and family at 1400. Pt verbalized understanding. Questions and concerns addressed. No acute events today. Pt progressing toward goals. Will continue to monitor. See below and flowsheets for full assessment and VS info.     - Linen changed and bath given.   - Tylenol given x 1.   - Oxycodone given x 1.   - Pt has transfer orders for stepdown to NS service.   - Pt placed on NC @ 2L for sleeping for stats < 90%.        Is this a stroke patient? no    Neuro:  Danielle Coma Scale  Best Eye Response: 4-->(E4) spontaneous  Best Motor Response: 6-->(M6) obeys commands  Best Verbal Response: 5-->(V5) oriented  Cranberry Coma Scale Score: 15  Assessment Qualifiers: patient not sedated/intubated, no eye obstruction present  Pupil PERRLA: yes     24 hr Temp:  [98.2 °F (36.8 °C)-98.9 °F (37.2 °C)]     CV:   Rhythm: normal sinus rhythm  BP goals:   SBP < 160  MAP > 65    Resp:   O2 Device (Oxygen Therapy): nasal cannula       Plan: N/A    GI/:     Diet/Nutrition Received: regular, consistent carb/diabetic diet  Last Bowel Movement: 08/25/22  Voiding Characteristics: voids spontaneously without difficulty, external catheter    Intake/Output Summary (Last 24 hours) at 8/28/2022 1645  Last data filed at 8/28/2022 1616  Gross per 24 hour   Intake 952.29 ml   Output 1550 ml   Net -597.71 ml     Unmeasured Output  Urine Occurrence: 2  Stool Occurrence: 0    Labs/Accuchecks:  Recent Labs   Lab 08/28/22  0036   WBC 8.38   RBC 3.98*   HGB 11.4*   HCT 33.2*         Recent Labs   Lab 08/21/22 2101 08/22/22  0600 08/28/22  0036   *   < > 135*   K 3.1*   < > 3.7   CO2 23   < > 22*   CL 97   < > 105   BUN 16   < > 11   CREATININE 1.2   < > 1.0   ALKPHOS 204*  --   --    ALT 15  --   --    AST 13  --   --    BILITOT 0.3  --   --     < > = values in this interval not displayed.      Recent Labs   Lab 08/21/22 2101 08/22/22  1420   INR 1.0  --    APTT  --  24.0    No results for  input(s): CPK, CPKMB, TROPONINI, MB in the last 168 hours.    Electrolytes: No replacement orders  Accuchecks: ACHS    Gtts:      LDA/Wounds:  Lines/Drains/Airways       Drain  Duration                  Closed/Suction Drain 08/27/22 0941 Left Scalp Other (Comment) 1 day    Female External Urinary Catheter 08/27/22 1045 1 day              Peripheral Intravenous Line  Duration                  Peripheral IV - Single Lumen 08/27/22 0806 18 G Right Forearm 1 day         Peripheral IV - Single Lumen 08/27/22 1017 18 G Right Forearm 1 day                  Wounds: No  Wound care consulted: No

## 2022-08-28 NOTE — HOSPITAL COURSE
8/28/22: step down to NSGY  8/27/22: EEG pending  8/30/22 : remains with expressive asphasia, CTA/CTP ordered  8/31/22: Aphasia improved somewhat this morning, EEG remains negative for seizures and to be disconnected.  CTA/P showed no new ischemia but some concern per radiology for vasogenic edema along left cerebrum, concerning for possible seizure changes vs cerebritis - MRI re-ordered.

## 2022-08-28 NOTE — PROGRESS NOTES
"[facility]  Endocrinology  Progress Note    Admit Date: 2022     Reason for Consult: Management of T2DM (NEW ONSET), Hyperglycemia     Surgical Procedure and Date: L Pcomm and L anterior choroidal artery aneuryms craniotomy for clipping on 7/15/22 with Dr. Diaz    Diabetes diagnosis year:     Home Diabetes Medications:  No current home medications.     How often checking glucose at home?  Not previously monitoring blood sugar.    BG readings on regimen: unknown  Hypoglycemia on the regimen?  No  Missed doses on regimen?  No    Diabetes Complications include:     Hyperglycemia    Complicating diabetes co morbidities:   CHF and History of CVA, HTN, HLD      HPI:   Patient is a 47 y.o. 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 Diaz who presents with sudden onset of symptoms concerning for focal seizure. Patient was in her yard today playing with her son when her R arm became weak suddenly followed by the inability to speak and RLE weakness. This went on for a couple minutes and self resolved; patient said she had some lasting heaviness on her R side after. Endocrinology consulted on 2022 to manage glycemic control.     Lab Results   Component Value Date    HGBA1C 8.3 (H) 2022             Interval HPI:   Overnight events: POD 1 s/p L cranial wound debridement and washout. BG reasonably well controlled on current SQ insulin regimen. Diet diabetic [facility]; 2000 Calorie; Isolation Tray - Regular [place]  Eatin%  Nausea: No  Hypoglycemia and intervention: No  Fever: No  TPN and/or TF: No  If yes, type of TF/TPN and rate: n/a    BP (!) 158/77 (BP Location: Right arm, Patient Position: Lying)   Pulse 82   Temp 98.9 °F (37.2 °C) (Oral)   Resp 19   Ht 5' 3" (1.6 m)   Wt 88.5 kg (195 lb)   SpO2 97%   Breastfeeding No   BMI 34.54 kg/m²     Labs Reviewed and " Include    Recent Labs   Lab 08/28/22  0036   *   CALCIUM 9.2   *   K 3.7   CO2 22*      BUN 11   CREATININE 1.0     Lab Results   Component Value Date    WBC 8.38 08/28/2022    HGB 11.4 (L) 08/28/2022    HCT 33.2 (L) 08/28/2022    MCV 83 08/28/2022     08/28/2022     Recent Labs   Lab 08/21/22  2101   TSH 1.371     Lab Results   Component Value Date    HGBA1C 8.3 (H) 08/22/2022       Nutritional status:   Body mass index is 34.54 kg/m².  Lab Results   Component Value Date    ALBUMIN 3.4 (L) 08/21/2022    ALBUMIN 3.2 (L) 07/19/2022    ALBUMIN 3.4 (L) 07/18/2022     No results found for: PREALBUMIN    Estimated Creatinine Clearance: 74.1 mL/min (based on SCr of 1 mg/dL).    Accu-Checks  Recent Labs     08/25/22  2158 08/26/22  0721 08/26/22  1245 08/26/22  1542 08/26/22  2102 08/27/22  0744 08/27/22  1141 08/27/22  1706 08/27/22  2253 08/28/22  0712   POCTGLUCOSE 173* 212* 120* 136* 171* 182* 285* 139* 186* 170*       Current Medications and/or Treatments Impacting Glycemic Control  Immunotherapy:    Immunosuppressants       None          Steroids:   Hormones (From admission, onward)      Start     Stop Route Frequency Ordered    08/22/22 0313  melatonin tablet 6 mg         -- Oral Nightly PRN 08/22/22 0213          Pressors:    Autonomic Drugs (From admission, onward)      None          Hyperglycemia/Diabetes Medications:   Antihyperglycemics (From admission, onward)      Start     Stop Route Frequency Ordered    08/24/22 1130  insulin aspart U-100 pen 10 Units         -- SubQ 3 times daily with meals 08/24/22 1010    08/24/22 1109  insulin aspart U-100 pen 1-10 Units         -- SubQ Before meals & nightly PRN 08/24/22 1010    08/24/22 1030  insulin detemir U-100 pen 30 Units         -- SubQ Daily 08/24/22 1010            ASSESSMENT and PLAN    * Post op infection  Managed per primary team  Avoid hypoglycemia        New onset type 2 diabetes mellitus  BG goal 140 - 180     - Continue  Levemir 30 untis daily   - Continue Novolog 10 units TIDWM. Basal/Prandial physiologic matching.    - Moderate Dose SQ Insulin Correction Scale.  - BG Monitoring AC/HS.   - Bedside nurse to instruct on insulin pen use and blood sugar monitor. Have patient administer own injections after education completed supervised by nurse. Have patient watch insulin educatoin video's via i-pad if available on unit.      ** Please call Endocrine for any BG related issues **  ** Please notify Endocrine for any change and/or advance in diet**    Lab Results   Component Value Date    HGBA1C 8.3 (H) 08/22/2022       Discharge Planning:   TBD. Please notify endocrinology prior to discharge.           Mixed hyperlipidemia  Managed per primary team  Condition may cause insulin resistance         Essential hypertension  Managed per primary team  Condition may cause insulin resistance             Grisel Montana NP  Endocrinology  [facility]

## 2022-08-28 NOTE — SUBJECTIVE & OBJECTIVE
Interval History:  NAEON. Ok to step down to NSGY    Review of Systems   Constitutional: Negative.    HENT: Negative.     Eyes: Negative.    Respiratory: Negative.     Cardiovascular: Negative.    Gastrointestinal: Negative.    Endocrine: Negative.    Genitourinary: Negative.    Musculoskeletal: Negative.    Skin: Negative.      Objective:     Vitals:  Temp: 98.7 °F (37.1 °C)  Pulse: 76  Rhythm: normal sinus rhythm  BP: 135/73  MAP (mmHg): 98  Resp: 16  SpO2: (!) 93 %  O2 Device (Oxygen Therapy): room air    Temp  Min: 98.2 °F (36.8 °C)  Max: 98.9 °F (37.2 °C)  Pulse  Min: 69  Max: 86  BP  Min: 126/80  Max: 167/87  MAP (mmHg)  Min: 87  Max: 121  Resp  Min: 14  Max: 34  SpO2  Min: 91 %  Max: 99 %    08/27 0701 - 08/28 0700  In: 2402.3 [I.V.:23]  Out: 2080 [Urine:2000]   Unmeasured Output  Urine Occurrence: 2  Stool Occurrence: 0       Physical Exam  Vitals and nursing note reviewed.   Constitutional:       Appearance: Normal appearance.   HENT:      Head: Normocephalic.      Nose: Nose normal.      Mouth/Throat:      Mouth: Mucous membranes are moist.      Pharynx: Oropharynx is clear.   Eyes:      Pupils: Pupils are equal, round, and reactive to light.   Cardiovascular:      Rate and Rhythm: Normal rate and regular rhythm.      Pulses: Normal pulses.      Heart sounds: Normal heart sounds.   Pulmonary:      Effort: Pulmonary effort is normal.      Breath sounds: Normal breath sounds.   Abdominal:      General: Bowel sounds are normal.      Palpations: Abdomen is soft.   Musculoskeletal:         General: Normal range of motion.   Skin:     General: Skin is warm and dry.      Capillary Refill: Capillary refill takes less than 2 seconds.   Neurological:      Mental Status: She is alert.      Comments: E 4 V 5 M 6  -Alert. Oriented to person, place, and time. Speech fluent. Follows commands.   -Strength 5 and symmetric in arms, legs. Tone normal.   -Sensation intact to touch in arms, legs.          Medications:  Continuous Scheduledamitriptyline, 75 mg, QHS  amLODIPine, 10 mg, Daily  atorvastatin, 40 mg, Daily  carvediloL, 37.5 mg, BID  ceFEPime (MAXIPIME) IVPB, 2 g, Q8H  EScitalopram oxalate, 10 mg, Daily  insulin aspart U-100, 10 Units, TIDWM  insulin detemir U-100, 30 Units, Daily  levetiracetam IV, 1,000 mg, Q12H  magnesium oxide, 400 mg, BID  mupirocin, , BID  potassium chloride SA, 20 mEq, BID  vancomycin (VANCOCIN) IVPB, 750 mg, Q12H    PRNacetaminophen, 650 mg, Q6H PRN  albuterol, 2 puff, Q20 Min PRN  dextrose 10%, 12.5 g, PRN  dextrose 10%, 25 g, PRN  glucagon (human recombinant), 1 mg, PRN  glucose, 16 g, PRN  glucose, 24 g, PRN  hydrALAZINE, 10 mg, Q6H PRN  insulin aspart U-100, 1-10 Units, QID (AC + HS) PRN  labetalol, 10 mg, Q6H PRN  magnesium oxide, 800 mg, PRN  magnesium oxide, 800 mg, PRN  melatonin, 6 mg, Nightly PRN  oxyCODONE, 5 mg, Q4H PRN  oxyCODONE, 10 mg, Q4H PRN  potassium bicarbonate, 35 mEq, PRN  potassium bicarbonate, 50 mEq, PRN  potassium bicarbonate, 60 mEq, PRN  potassium, sodium phosphates, 2 packet, PRN  potassium, sodium phosphates, 2 packet, PRN  potassium, sodium phosphates, 2 packet, PRN  vancomycin - pharmacy to dose, , pharmacy to manage frequency      Today I personally reviewed pertinent medications, lines/drains/airways, imaging, cardiology results, laboratory results, microbiology results, notably:    Diet  Diet diabetic [facility]; 2000 Calorie; Isolation Tray - Regular [place]  Diet diabetic [facility]; 2000 Calorie; Isolation Tray - Regular [place]

## 2022-08-28 NOTE — PLAN OF CARE
[facility] Care Plan    POC reviewed with Gina Jenkins and family at 0300. Pt verbalized understanding. Questions and concerns addressed. No acute events overnight. Pt progressing toward goals. Will continue to monitor. See below and flowsheets for full assessment and VS info.           Is this a stroke patient? no    Neuro:  Danielle Coma Scale  Best Eye Response: 3-->(E3) to speech  Best Motor Response: 6-->(M6) obeys commands  Best Verbal Response: 5-->(V5) oriented  Danielle Coma Scale Score: 14  Assessment Qualifiers: patient not sedated/intubated, no eye obstruction present        24hr Temp:  [97.5 °F (36.4 °C)-98.8 °F (37.1 °C)]     CV:   Rhythm: normal sinus rhythm  BP goals:   SBP < 160  MAP > 65    Resp:   O2 Device (Oxygen Therapy): room air       Plan: N/A    GI/:     Diet/Nutrition Received: NPO  Last Bowel Movement: 08/25/22  Voiding Characteristics: external catheter    Intake/Output Summary (Last 24 hours) at 8/28/2022 0527  Last data filed at 8/28/2022 0502  Gross per 24 hour   Intake 2402.29 ml   Output 2080 ml   Net 322.29 ml     Unmeasured Output  Urine Occurrence: 2  Stool Occurrence: 0    Labs/Accuchecks:  Recent Labs   Lab 08/28/22  0036   WBC 8.38   RBC 3.98*   HGB 11.4*   HCT 33.2*         Recent Labs   Lab 08/21/22 2101 08/22/22  0600 08/28/22  0036   *   < > 135*   K 3.1*   < > 3.7   CO2 23   < > 22*   CL 97   < > 105   BUN 16   < > 11   CREATININE 1.2   < > 1.0   ALKPHOS 204*  --   --    ALT 15  --   --    AST 13  --   --    BILITOT 0.3  --   --     < > = values in this interval not displayed.      Recent Labs   Lab 08/21/22 2101 08/22/22  1420   INR 1.0  --    APTT  --  24.0    No results for input(s): CPK, CPKMB, TROPONINI, MB in the last 168 hours.    Electrolytes: Electrolytes replaced  Accuchecks: ACHS    Gtts:      LDA/Wounds:  Lines/Drains/Airways       Drain  Duration                  Closed/Suction Drain 08/27/22 0941 Left Scalp Other (Comment) <1 day    Female  External Urinary Catheter 08/27/22 1045 <1 day              Peripheral Intravenous Line  Duration                  Peripheral IV - Single Lumen 08/27/22 0806 18 G Right Forearm <1 day         Peripheral IV - Single Lumen 08/27/22 1017 18 G Right Forearm <1 day                  Wounds: No  Wound care consulted: No

## 2022-08-28 NOTE — SUBJECTIVE & OBJECTIVE
"Interval HPI:   Overnight events: POD 1 s/p L cranial wound debridement and washout. BG reasonably well controlled on current SQ insulin regimen. Diet diabetic [facility];  Calorie; Isolation Tray - Regular [place]  Eatin%  Nausea: No  Hypoglycemia and intervention: No  Fever: No  TPN and/or TF: No  If yes, type of TF/TPN and rate: n/a    BP (!) 158/77 (BP Location: Right arm, Patient Position: Lying)   Pulse 82   Temp 98.9 °F (37.2 °C) (Oral)   Resp 19   Ht 5' 3" (1.6 m)   Wt 88.5 kg (195 lb)   SpO2 97%   Breastfeeding No   BMI 34.54 kg/m²     Labs Reviewed and Include    Recent Labs   Lab 22  0036   *   CALCIUM 9.2   *   K 3.7   CO2 22*      BUN 11   CREATININE 1.0     Lab Results   Component Value Date    WBC 8.38 2022    HGB 11.4 (L) 2022    HCT 33.2 (L) 2022    MCV 83 2022     2022     Recent Labs   Lab 22   TSH 1.371     Lab Results   Component Value Date    HGBA1C 8.3 (H) 2022       Nutritional status:   Body mass index is 34.54 kg/m².  Lab Results   Component Value Date    ALBUMIN 3.4 (L) 2022    ALBUMIN 3.2 (L) 2022    ALBUMIN 3.4 (L) 2022     No results found for: PREALBUMIN    Estimated Creatinine Clearance: 74.1 mL/min (based on SCr of 1 mg/dL).    Accu-Checks  Recent Labs     22  2158 22  0721 22  1245 22  1542 22  2102 22  0744 22  1141 22  1706 22  2253 22  0712   POCTGLUCOSE 173* 212* 120* 136* 171* 182* 285* 139* 186* 170*       Current Medications and/or Treatments Impacting Glycemic Control  Immunotherapy:    Immunosuppressants       None          Steroids:   Hormones (From admission, onward)      Start     Stop Route Frequency Ordered    08/22/22 0313  melatonin tablet 6 mg         -- Oral Nightly PRN 22          Pressors:    Autonomic Drugs (From admission, onward)      None          Hyperglycemia/Diabetes " Medications:   Antihyperglycemics (From admission, onward)      Start     Stop Route Frequency Ordered    08/24/22 1130  insulin aspart U-100 pen 10 Units         -- SubQ 3 times daily with meals 08/24/22 1010    08/24/22 1109  insulin aspart U-100 pen 1-10 Units         -- SubQ Before meals & nightly PRN 08/24/22 1010    08/24/22 1030  insulin detemir U-100 pen 30 Units         -- SubQ Daily 08/24/22 1010

## 2022-08-29 PROBLEM — R56.9 FOCAL SEIZURE: Status: ACTIVE | Noted: 2022-08-29

## 2022-08-29 PROBLEM — T81.41XA INFECTION OF SUPERFICIAL INCISIONAL SURGICAL SITE AFTER PROCEDURE: Status: ACTIVE | Noted: 2022-08-29

## 2022-08-29 LAB
ANION GAP SERPL CALC-SCNC: 12 MMOL/L (ref 8–16)
BASOPHILS # BLD AUTO: 0.07 K/UL (ref 0–0.2)
BASOPHILS NFR BLD: 0.9 % (ref 0–1.9)
BUN SERPL-MCNC: 15 MG/DL (ref 6–20)
CALCIUM SERPL-MCNC: 10 MG/DL (ref 8.7–10.5)
CHLORIDE SERPL-SCNC: 107 MMOL/L (ref 95–110)
CO2 SERPL-SCNC: 20 MMOL/L (ref 23–29)
CREAT SERPL-MCNC: 1.2 MG/DL (ref 0.5–1.4)
DIFFERENTIAL METHOD: ABNORMAL
EOSINOPHIL # BLD AUTO: 0.2 K/UL (ref 0–0.5)
EOSINOPHIL NFR BLD: 2.6 % (ref 0–8)
ERYTHROCYTE [DISTWIDTH] IN BLOOD BY AUTOMATED COUNT: 14.3 % (ref 11.5–14.5)
EST. GFR  (NO RACE VARIABLE): 56.5 ML/MIN/1.73 M^2
GLUCOSE SERPL-MCNC: 99 MG/DL (ref 70–110)
HCT VFR BLD AUTO: 35 % (ref 37–48.5)
HGB BLD-MCNC: 12 G/DL (ref 12–16)
IMM GRANULOCYTES # BLD AUTO: 0.03 K/UL (ref 0–0.04)
IMM GRANULOCYTES NFR BLD AUTO: 0.4 % (ref 0–0.5)
LYMPHOCYTES # BLD AUTO: 1.2 K/UL (ref 1–4.8)
LYMPHOCYTES NFR BLD: 16.5 % (ref 18–48)
MAGNESIUM SERPL-MCNC: 1.9 MG/DL (ref 1.6–2.6)
MCH RBC QN AUTO: 28.8 PG (ref 27–31)
MCHC RBC AUTO-ENTMCNC: 34.3 G/DL (ref 32–36)
MCV RBC AUTO: 84 FL (ref 82–98)
MONOCYTES # BLD AUTO: 1.2 K/UL (ref 0.3–1)
MONOCYTES NFR BLD: 15.8 % (ref 4–15)
NEUTROPHILS # BLD AUTO: 4.8 K/UL (ref 1.8–7.7)
NEUTROPHILS NFR BLD: 63.8 % (ref 38–73)
NRBC BLD-RTO: 0 /100 WBC
PHOSPHATE SERPL-MCNC: 3.9 MG/DL (ref 2.7–4.5)
PLATELET # BLD AUTO: 230 K/UL (ref 150–450)
PMV BLD AUTO: 10.5 FL (ref 9.2–12.9)
POCT GLUCOSE: 103 MG/DL (ref 70–110)
POCT GLUCOSE: 120 MG/DL (ref 70–110)
POCT GLUCOSE: 130 MG/DL (ref 70–110)
POCT GLUCOSE: 151 MG/DL (ref 70–110)
POCT GLUCOSE: 91 MG/DL (ref 70–110)
POTASSIUM SERPL-SCNC: 4.8 MMOL/L (ref 3.5–5.1)
RBC # BLD AUTO: 4.17 M/UL (ref 4–5.4)
SODIUM SERPL-SCNC: 139 MMOL/L (ref 136–145)
WBC # BLD AUTO: 7.45 K/UL (ref 3.9–12.7)

## 2022-08-29 PROCEDURE — 99232 SBSQ HOSP IP/OBS MODERATE 35: CPT | Mod: ,,, | Performed by: NURSE PRACTITIONER

## 2022-08-29 PROCEDURE — 99232 PR SUBSEQUENT HOSPITAL CARE,LEVL II: ICD-10-PCS | Mod: ,,, | Performed by: NURSE PRACTITIONER

## 2022-08-29 PROCEDURE — 95700 EEG CONT REC W/VID EEG TECH: CPT

## 2022-08-29 PROCEDURE — 99024 POSTOP FOLLOW-UP VISIT: CPT | Mod: ,,, | Performed by: NEUROLOGICAL SURGERY

## 2022-08-29 PROCEDURE — 25000003 PHARM REV CODE 250: Performed by: NURSE PRACTITIONER

## 2022-08-29 PROCEDURE — 99233 PR SUBSEQUENT HOSPITAL CARE,LEVL III: ICD-10-PCS | Mod: ,,, | Performed by: PHYSICIAN ASSISTANT

## 2022-08-29 PROCEDURE — 63600175 PHARM REV CODE 636 W HCPCS: Performed by: NURSE PRACTITIONER

## 2022-08-29 PROCEDURE — 95720 EEG PHY/QHP EA INCR W/VEEG: CPT | Mod: ,,, | Performed by: PSYCHIATRY & NEUROLOGY

## 2022-08-29 PROCEDURE — 25000003 PHARM REV CODE 250: Performed by: PHYSICIAN ASSISTANT

## 2022-08-29 PROCEDURE — 99024 PR POST-OP FOLLOW-UP VISIT: ICD-10-PCS | Mod: ,,, | Performed by: NEUROLOGICAL SURGERY

## 2022-08-29 PROCEDURE — 80048 BASIC METABOLIC PNL TOTAL CA: CPT | Performed by: PHYSICIAN ASSISTANT

## 2022-08-29 PROCEDURE — 63600175 PHARM REV CODE 636 W HCPCS: Performed by: INTERNAL MEDICINE

## 2022-08-29 PROCEDURE — 99223 1ST HOSP IP/OBS HIGH 75: CPT | Mod: ,,, | Performed by: PSYCHIATRY & NEUROLOGY

## 2022-08-29 PROCEDURE — 99223 PR INITIAL HOSPITAL CARE,LEVL III: ICD-10-PCS | Mod: ,,, | Performed by: PSYCHIATRY & NEUROLOGY

## 2022-08-29 PROCEDURE — 25000003 PHARM REV CODE 250: Performed by: INTERNAL MEDICINE

## 2022-08-29 PROCEDURE — 99233 SBSQ HOSP IP/OBS HIGH 50: CPT | Mod: ,,, | Performed by: PHYSICIAN ASSISTANT

## 2022-08-29 PROCEDURE — 63600175 PHARM REV CODE 636 W HCPCS: Performed by: STUDENT IN AN ORGANIZED HEALTH CARE EDUCATION/TRAINING PROGRAM

## 2022-08-29 PROCEDURE — 85025 COMPLETE CBC W/AUTO DIFF WBC: CPT | Performed by: STUDENT IN AN ORGANIZED HEALTH CARE EDUCATION/TRAINING PROGRAM

## 2022-08-29 PROCEDURE — 20000000 HC ICU ROOM

## 2022-08-29 PROCEDURE — 99223 1ST HOSP IP/OBS HIGH 75: CPT | Mod: FS,,, | Performed by: PHYSICIAN ASSISTANT

## 2022-08-29 PROCEDURE — 63600175 PHARM REV CODE 636 W HCPCS: Performed by: PHYSICIAN ASSISTANT

## 2022-08-29 PROCEDURE — 99291 CRITICAL CARE FIRST HOUR: CPT | Mod: ,,, | Performed by: PSYCHIATRY & NEUROLOGY

## 2022-08-29 PROCEDURE — 95714 VEEG EA 12-26 HR UNMNTR: CPT

## 2022-08-29 PROCEDURE — 25000003 PHARM REV CODE 250: Performed by: NEUROLOGICAL SURGERY

## 2022-08-29 PROCEDURE — 51798 US URINE CAPACITY MEASURE: CPT

## 2022-08-29 PROCEDURE — 83735 ASSAY OF MAGNESIUM: CPT | Performed by: PHYSICIAN ASSISTANT

## 2022-08-29 PROCEDURE — 95720 PR EEG, W/VIDEO, CONT RECORD, I&R, >12<26 HRS: ICD-10-PCS | Mod: ,,, | Performed by: PSYCHIATRY & NEUROLOGY

## 2022-08-29 PROCEDURE — 25000003 PHARM REV CODE 250: Performed by: STUDENT IN AN ORGANIZED HEALTH CARE EDUCATION/TRAINING PROGRAM

## 2022-08-29 PROCEDURE — 99223 PR INITIAL HOSPITAL CARE,LEVL III: ICD-10-PCS | Mod: FS,,, | Performed by: PHYSICIAN ASSISTANT

## 2022-08-29 PROCEDURE — 84100 ASSAY OF PHOSPHORUS: CPT | Performed by: PHYSICIAN ASSISTANT

## 2022-08-29 PROCEDURE — 99291 PR CRITICAL CARE, E/M 30-74 MINUTES: ICD-10-PCS | Mod: ,,, | Performed by: PSYCHIATRY & NEUROLOGY

## 2022-08-29 RX ORDER — INSULIN ASPART 100 [IU]/ML
8 INJECTION, SOLUTION INTRAVENOUS; SUBCUTANEOUS
Status: DISCONTINUED | OUTPATIENT
Start: 2022-08-29 | End: 2022-09-02 | Stop reason: HOSPADM

## 2022-08-29 RX ORDER — TOPIRAMATE 50 MG/1
50 TABLET, FILM COATED ORAL 2 TIMES DAILY
COMMUNITY
Start: 2022-07-31 | End: 2023-02-07 | Stop reason: SDUPTHER

## 2022-08-29 RX ADMIN — VANCOMYCIN HYDROCHLORIDE 750 MG: 750 INJECTION, POWDER, LYOPHILIZED, FOR SOLUTION INTRAVENOUS at 03:08

## 2022-08-29 RX ADMIN — CEFTAZIDIME 2 G: 2 INJECTION, POWDER, FOR SOLUTION INTRAVENOUS at 11:08

## 2022-08-29 RX ADMIN — INSULIN ASPART 10 UNITS: 100 INJECTION, SOLUTION INTRAVENOUS; SUBCUTANEOUS at 07:08

## 2022-08-29 RX ADMIN — AMLODIPINE BESYLATE 10 MG: 10 TABLET ORAL at 08:08

## 2022-08-29 RX ADMIN — CARVEDILOL 37.5 MG: 25 TABLET, FILM COATED ORAL at 08:08

## 2022-08-29 RX ADMIN — OXYCODONE 5 MG: 5 TABLET ORAL at 10:08

## 2022-08-29 RX ADMIN — ESCITALOPRAM OXALATE 10 MG: 10 TABLET ORAL at 08:08

## 2022-08-29 RX ADMIN — HEPARIN SODIUM 5000 UNITS: 5000 INJECTION INTRAVENOUS; SUBCUTANEOUS at 01:08

## 2022-08-29 RX ADMIN — OXYCODONE HYDROCHLORIDE 10 MG: 10 TABLET ORAL at 02:08

## 2022-08-29 RX ADMIN — LEVETIRACETAM 1000 MG: 100 INJECTION, SOLUTION INTRAVENOUS at 08:08

## 2022-08-29 RX ADMIN — INSULIN ASPART 2 UNITS: 100 INJECTION, SOLUTION INTRAVENOUS; SUBCUTANEOUS at 04:08

## 2022-08-29 RX ADMIN — AMITRIPTYLINE HYDROCHLORIDE 75 MG: 50 TABLET, FILM COATED ORAL at 08:08

## 2022-08-29 RX ADMIN — HEPARIN SODIUM 5000 UNITS: 5000 INJECTION INTRAVENOUS; SUBCUTANEOUS at 06:08

## 2022-08-29 RX ADMIN — Medication 400 MG: at 08:08

## 2022-08-29 RX ADMIN — INSULIN DETEMIR 30 UNITS: 100 INJECTION, SOLUTION SUBCUTANEOUS at 08:08

## 2022-08-29 RX ADMIN — MUPIROCIN: 20 OINTMENT TOPICAL at 08:08

## 2022-08-29 RX ADMIN — CEFEPIME 2 G: 2 INJECTION, POWDER, FOR SOLUTION INTRAVENOUS at 01:08

## 2022-08-29 RX ADMIN — HEPARIN SODIUM 5000 UNITS: 5000 INJECTION INTRAVENOUS; SUBCUTANEOUS at 09:08

## 2022-08-29 RX ADMIN — CEFEPIME 2 G: 2 INJECTION, POWDER, FOR SOLUTION INTRAVENOUS at 05:08

## 2022-08-29 RX ADMIN — ATORVASTATIN CALCIUM 40 MG: 40 TABLET, FILM COATED ORAL at 08:08

## 2022-08-29 RX ADMIN — CEFEPIME 2 G: 2 INJECTION, POWDER, FOR SOLUTION INTRAVENOUS at 08:08

## 2022-08-29 RX ADMIN — OXYCODONE HYDROCHLORIDE 10 MG: 10 TABLET ORAL at 06:08

## 2022-08-29 RX ADMIN — INSULIN ASPART 8 UNITS: 100 INJECTION, SOLUTION INTRAVENOUS; SUBCUTANEOUS at 04:08

## 2022-08-29 NOTE — SUBJECTIVE & OBJECTIVE
Interval History:  This am patient more delayed in answering question, disoriented, CTH showed increasing fluid collection, EEG pending    Review of Systems   Constitutional: Negative.    HENT: Negative.     Eyes: Negative.    Respiratory: Negative.     Cardiovascular: Negative.    Gastrointestinal: Negative.    Endocrine: Negative.    Genitourinary: Negative.    Musculoskeletal: Negative.    Skin: Negative.    Neurological:  Positive for headaches.      Objective:     Vitals:  Temp: 99.1 °F (37.3 °C)  Pulse: 81  Rhythm: normal sinus rhythm  BP: 124/71  MAP (mmHg): 80  Resp: (!) 21  SpO2: 97 %  O2 Device (Oxygen Therapy): nasal cannula    Temp  Min: 98.4 °F (36.9 °C)  Max: 99.2 °F (37.3 °C)  Pulse  Min: 67  Max: 86  BP  Min: 101/59  Max: 155/80  MAP (mmHg)  Min: 75  Max: 109  Resp  Min: 13  Max: 33  SpO2  Min: 92 %  Max: 97 %    08/28 0701 - 08/29 0700  In: 691.3   Out: 800 [Urine:800]   Unmeasured Output  Urine Occurrence: 1  Stool Occurrence: 0       Physical Exam  Vitals and nursing note reviewed.   Constitutional:       Appearance: She is ill-appearing.   HENT:      Nose: Nose normal.      Mouth/Throat:      Mouth: Mucous membranes are moist.      Pharynx: Oropharynx is clear.   Cardiovascular:      Rate and Rhythm: Normal rate and regular rhythm.      Pulses: Normal pulses.      Heart sounds: Normal heart sounds.   Pulmonary:      Effort: Pulmonary effort is normal.      Breath sounds: Normal breath sounds.   Abdominal:      General: Bowel sounds are normal.      Palpations: Abdomen is soft.   Musculoskeletal:         General: Normal range of motion.      Cervical back: Normal range of motion.   Skin:     General: Skin is warm and dry.      Capillary Refill: Capillary refill takes less than 2 seconds.   Neurological:      Comments: E 4 V 5 M 6  -Alert. Oriented to person,    Speech delayed repeats name . Follows commands.   -Strength 5 and symmetric in arms, legs  -Sensation intact to touch in arms, legs      Unable to test orientation, language, memory, judgment, insight, fund of knowledge, hearing, shoulder shrug, tongue protrusion, coordination, gait due to level of consciousness.    Medications:  Continuous Scheduledamitriptyline, 75 mg, QHS  amLODIPine, 10 mg, Daily  atorvastatin, 40 mg, Daily  carvediloL, 37.5 mg, BID  ceFEPime (MAXIPIME) IVPB, 2 g, Q8H  EScitalopram oxalate, 10 mg, Daily  heparin (porcine), 5,000 Units, Q8H  insulin aspart U-100, 8 Units, TIDWM  insulin detemir U-100, 30 Units, Daily  levetiracetam IV, 1,000 mg, Q12H  magnesium oxide, 400 mg, BID  mupirocin, , BID  vancomycin (VANCOCIN) IVPB, 750 mg, Q12H    PRNacetaminophen, 650 mg, Q6H PRN  albuterol, 2 puff, Q20 Min PRN  dextrose 10%, 12.5 g, PRN  dextrose 10%, 25 g, PRN  glucagon (human recombinant), 1 mg, PRN  glucose, 16 g, PRN  glucose, 24 g, PRN  hydrALAZINE, 10 mg, Q6H PRN  insulin aspart U-100, 1-10 Units, QID (AC + HS) PRN  labetalol, 10 mg, Q6H PRN  magnesium oxide, 800 mg, PRN  magnesium oxide, 800 mg, PRN  melatonin, 6 mg, Nightly PRN  oxyCODONE, 5 mg, Q4H PRN  oxyCODONE, 10 mg, Q4H PRN  potassium bicarbonate, 35 mEq, PRN  potassium bicarbonate, 50 mEq, PRN  potassium bicarbonate, 60 mEq, PRN  potassium, sodium phosphates, 2 packet, PRN  potassium, sodium phosphates, 2 packet, PRN  potassium, sodium phosphates, 2 packet, PRN  vancomycin - pharmacy to dose, , pharmacy to manage frequency      Today I personally reviewed pertinent medications, lines/drains/airways, imaging, cardiology results, laboratory results, microbiology results, notably:    Diet  Diet diabetic [facility]; 2000 Calorie; Isolation Tray - Regular [place]  Diet diabetic [facility]; 2000 Calorie; Isolation Tray - Regular [place]

## 2022-08-29 NOTE — PROGRESS NOTES
"[facility]  Endocrinology  Progress Note    Admit Date: 2022     Reason for Consult: Management of T2DM (NEW ONSET), Hyperglycemia     Surgical Procedure and Date: L Pcomm and L anterior choroidal artery aneuryms craniotomy for clipping on 7/15/22 with Dr. Diaz    Diabetes diagnosis year:     Home Diabetes Medications:  No current home medications.     How often checking glucose at home?  Not previously monitoring blood sugar.    BG readings on regimen: unknown  Hypoglycemia on the regimen?  No  Missed doses on regimen?  No    Diabetes Complications include:     Hyperglycemia    Complicating diabetes co morbidities:   CHF and History of CVA, HTN, HLD      HPI:   Patient is a 47 y.o. 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 Diaz who presents with sudden onset of symptoms concerning for focal seizure. Patient was in her yard today playing with her son when her R arm became weak suddenly followed by the inability to speak and RLE weakness. This went on for a couple minutes and self resolved; patient said she had some lasting heaviness on her R side after. Endocrinology consulted on 2022 to manage glycemic control.     Lab Results   Component Value Date    HGBA1C 8.3 (H) 2022             Interval HPI:   Overnight events: POD 2. BG at or slightly below goal ranges on current SQ insulin regimen. Diet diabetic [facility]; 2000 Calorie; Isolation Tray - Regular [place]    Eatin-75%  Nausea: No  Hypoglycemia and intervention: No  Fever: No  TPN and/or TF: No  If yes, type of TF/TPN and rate: n/a    /66 (BP Location: Left arm, Patient Position: Lying)   Pulse 79   Temp 99.1 °F (37.3 °C) (Oral)   Resp (!) 22   Ht 5' 3" (1.6 m)   Wt 88.5 kg (195 lb)   SpO2 (!) 92%   Breastfeeding No   BMI 34.54 kg/m²     Labs Reviewed and Include    Recent Labs   Lab " 08/29/22  0256   GLU 99   CALCIUM 10.0      K 4.8   CO2 20*      BUN 15   CREATININE 1.2     Lab Results   Component Value Date    WBC 7.45 08/29/2022    HGB 12.0 08/29/2022    HCT 35.0 (L) 08/29/2022    MCV 84 08/29/2022     08/29/2022     No results for input(s): TSH, FREET4 in the last 168 hours.  Lab Results   Component Value Date    HGBA1C 8.3 (H) 08/22/2022       Nutritional status:   Body mass index is 34.54 kg/m².  Lab Results   Component Value Date    ALBUMIN 3.4 (L) 08/21/2022    ALBUMIN 3.2 (L) 07/19/2022    ALBUMIN 3.4 (L) 07/18/2022     No results found for: PREALBUMIN    Estimated Creatinine Clearance: 61.8 mL/min (based on SCr of 1.2 mg/dL).    Accu-Checks  Recent Labs     08/27/22  1141 08/27/22  1706 08/27/22  2253 08/28/22  0712 08/28/22  1206 08/28/22  1657 08/28/22  2211 08/29/22  0737 08/29/22  0826 08/29/22  1155   POCTGLUCOSE 285* 139* 186* 170* 169* 166* 97 120* 130* 103       Current Medications and/or Treatments Impacting Glycemic Control  Immunotherapy:    Immunosuppressants       None          Steroids:   Hormones (From admission, onward)      Start     Stop Route Frequency Ordered    08/22/22 0313  melatonin tablet 6 mg         -- Oral Nightly PRN 08/22/22 0213          Pressors:    Autonomic Drugs (From admission, onward)      None          Hyperglycemia/Diabetes Medications:   Antihyperglycemics (From admission, onward)      Start     Stop Route Frequency Ordered    08/29/22 1215  insulin aspart U-100 pen 8 Units         -- SubQ 3 times daily with meals 08/29/22 1202    08/24/22 1109  insulin aspart U-100 pen 1-10 Units         -- SubQ Before meals & nightly PRN 08/24/22 1010    08/24/22 1030  insulin detemir U-100 pen 30 Units         -- SubQ Daily 08/24/22 1010            ASSESSMENT and PLAN    New onset type 2 diabetes mellitus  BG goal 140 - 180     - Continue Levemir 30 untis daily   - Decrease Novolog to 8 units TIDWM. (20% dose decrease) BG below goal ranges.    - Moderate Dose SQ Insulin Correction Scale.  - BG Monitoring AC/HS.   - Bedside nurse to instruct on insulin pen use and blood sugar monitor. Have patient administer own injections after education completed supervised by nurse. Have patient watch insulin educatoin video's via i-pad if available on unit.      ** Please call Endocrine for any BG related issues **  ** Please notify Endocrine for any change and/or advance in diet**    Lab Results   Component Value Date    HGBA1C 8.3 (H) 08/22/2022       Discharge Planning:   TBD. Please notify endocrinology prior to discharge.           Mixed hyperlipidemia  Managed per primary team  Condition may cause insulin resistance         Essential hypertension  Managed per primary team  Condition may cause insulin resistance             Grisel Montana NP  Endocrinology  [facility]

## 2022-08-29 NOTE — SUBJECTIVE & OBJECTIVE
Interval History: 8/29: neuro stable, taken for CTH later this morning for concern for word finding difficulties, CTH showed worsening epidural and subcutaneous fluid collection from prior. HV drain with no output    Medications:  Continuous Infusions:  Scheduled Meds:   amitriptyline  75 mg Oral QHS    amLODIPine  10 mg Oral Daily    atorvastatin  40 mg Oral Daily    carvediloL  37.5 mg Oral BID    ceFEPime (MAXIPIME) IVPB  2 g Intravenous Q8H    EScitalopram oxalate  10 mg Oral Daily    heparin (porcine)  5,000 Units Subcutaneous Q8H    insulin aspart U-100  10 Units Subcutaneous TIDWM    insulin detemir U-100  30 Units Subcutaneous Daily    levetiracetam IV  1,000 mg Intravenous Q12H    magnesium oxide  400 mg Oral BID    mupirocin   Nasal BID    vancomycin (VANCOCIN) IVPB  750 mg Intravenous Q12H     PRN Meds:acetaminophen, albuterol, dextrose 10%, dextrose 10%, glucagon (human recombinant), glucose, glucose, hydrALAZINE, insulin aspart U-100, labetalol, magnesium oxide, magnesium oxide, melatonin, oxyCODONE, oxyCODONE, potassium bicarbonate, potassium bicarbonate, potassium bicarbonate, potassium, sodium phosphates, potassium, sodium phosphates, potassium, sodium phosphates, Pharmacy to dose Vancomycin consult **AND** vancomycin - pharmacy to dose     Review of Systems  Objective:     Weight: 88.5 kg (195 lb)  Body mass index is 34.54 kg/m².  Vital Signs (Most Recent):  Temp: 99.1 °F (37.3 °C) (08/29/22 1101)  Pulse: 79 (08/29/22 1101)  Resp: (!) 22 (08/29/22 1101)  BP: 108/66 (08/29/22 1101)  SpO2: (!) 92 % (08/29/22 1101)   Vital Signs (24h Range):  Temp:  [98.4 °F (36.9 °C)-99.2 °F (37.3 °C)] 99.1 °F (37.3 °C)  Pulse:  [67-86] 79  Resp:  [14-33] 22  SpO2:  [92 %-97 %] 92 %  BP: (101-155)/(57-93) 108/66     Date 08/29/22 0700 - 08/30/22 0659   Shift 7929-4928 6470-8368 4675-5152 24 Hour Total   INTAKE   P.O. 120   120   Shift Total(mL/kg) 120(1.4)   120(1.4)   OUTPUT   Shift Total(mL/kg)       Weight (kg)  88.4 88.4 88.4 88.4                          Closed/Suction Drain 08/27/22 0941 Left Scalp Other (Comment) (Active)   Site Description Unable to view 08/28/22 1501   Dressing Type Gauze 08/28/22 1501   Dressing Status Clean;Dry;Intact 08/28/22 1501   Dressing Intervention Integrity maintained 08/28/22 1501   Drainage Serosanguineous 08/28/22 1501   Status To bulb suction 08/28/22 1501   Output (mL) 0 mL 08/28/22 0502       Female External Urinary Catheter 08/27/22 1045 (Active)   Skin no redness;no breakdown 08/28/22 1501   Tolerance no signs/symptoms of discomfort 08/28/22 1501   Suction Continuous suction at 40 mmHg 08/28/22 0701   Date of last wick change 08/27/22 08/27/22 1105   Time of last wick change 1045 08/27/22 1105   Output (mL) 500 mL 08/28/22 0502       Physical Exam    Neurosurgery Physical Exam    GENERAL: resting comfortably  HEENT: NC, PERRL, mucous membranes moist  NECK: supple, trachea midline  CV: normal capillary refill  PULM: aerating well, symmetric expansion, no distress  ABD: soft, NT, ND  EXT: no c/c/e    NEURO:    AAO x 3  Speech normal  CN II-XII grossly intact  Fc x 4 antigravity  SILT    No drift or dysmetria    Incision cdi    HV without drainage      Significant Labs:  Recent Labs   Lab 08/28/22  0036 08/29/22  0256   * 99   * 139   K 3.7 4.8    107   CO2 22* 20*   BUN 11 15   CREATININE 1.0 1.2   CALCIUM 9.2 10.0   MG 1.6 1.9       Recent Labs   Lab 08/28/22  0036 08/29/22  0256   WBC 8.38 7.45   HGB 11.4* 12.0   HCT 33.2* 35.0*    230       No results for input(s): LABPT, INR, APTT in the last 48 hours.  Microbiology Results (last 7 days)       Procedure Component Value Units Date/Time    Aerobic culture [[id number]] Collected: 08/27/22 0912    Order Status: Completed Specimen: Wound from Head Updated: 08/29/22 1107     Aerobic Bacterial Culture No growth    Narrative:      Epidural culture, aerobic, anaerobic, gram, AFB, gram    Aerobic culture [[id number]]  Collected: 08/27/22 0909    Order Status: Completed Specimen: Wound from Head Updated: 08/29/22 1100     Aerobic Bacterial Culture No growth    Narrative:      Superficial head culture, aerobic, anaerobic, fungus, AFB,  gram    Culture, Anaerobic [[id number]] Collected: 08/22/22 1550    Order Status: Completed Specimen: Incision site from Head Updated: 08/29/22 0909     Anaerobic Culture Culture in progress    AFB Culture & Smear [[id number]] Collected: 08/27/22 0912    Order Status: Completed Specimen: Wound from Head Updated: 08/28/22 2127     AFB Culture & Smear Culture in progress    Narrative:      Epidural culture, aerobic, anaerobic, gram, AFB, gram    AFB Culture & Smear [[id number]] Collected: 08/27/22 0909    Order Status: Completed Specimen: Wound from Head Updated: 08/28/22 2127     AFB Culture & Smear Culture in progress    Narrative:      Superficial head culture, aerobic, anaerobic, fungus, AFB,  gram    Culture, Anaerobic [[id number]] Collected: 08/27/22 0909    Order Status: Completed Specimen: Wound from Head Updated: 08/28/22 1154     Anaerobic Culture Culture in progress    Narrative:      Superficial head culture, aerobic, anaerobic, fungus, AFB,  gram    Culture, Anaerobic [[id number]] Collected: 08/27/22 0912    Order Status: Completed Specimen: Wound from Head Updated: 08/28/22 1154     Anaerobic Culture Culture in progress    Narrative:      Epidural culture, aerobic, anaerobic, gram, AFB, gram    Gram stain [[id number]] Collected: 08/27/22 0912    Order Status: Completed Specimen: Wound from Head Updated: 08/27/22 1044     Gram Stain Result No WBC's      No organisms seen    Narrative:      Epidural culture, aerobic, anaerobic, gram, AFB, gram    Gram stain [[id number]] Collected: 08/27/22 0909    Order Status: Completed Specimen: Wound from Head Updated: 08/27/22 1043     Gram Stain Result Rare WBC's      No organisms seen    Narrative:      Superficial head culture, aerobic, anaerobic, fungus,  AFB,  gram    Fungus culture [[id number]] Collected: 08/27/22 0909    Order Status: Sent Specimen: Wound from Head Updated: 08/27/22 1003    Fungus culture [[id number]] Collected: 08/27/22 0912    Order Status: Sent Specimen: Wound from Head Updated: 08/27/22 1001    Blood culture [[id number]] Collected: 08/21/22 2325    Order Status: Completed Specimen: Blood from Peripheral, Antecubital, Right Updated: 08/27/22 0612     Blood Culture, Routine No growth after 5 days.    Aerobic culture [[id number]] Collected: 08/22/22 1550    Order Status: Completed Specimen: Incision site from Head Updated: 08/26/22 1029     Aerobic Bacterial Culture No growth    Gram stain [[id number]] Collected: 08/22/22 1550    Order Status: Completed Specimen: Incision site from Head Updated: 08/25/22 1447     Gram Stain Result No organisms seen      Rare WBC's    Gram stain [[id number]]     Order Status: Completed Specimen: Incision site from Head     AFB Culture & Smear [[id number]] Collected: 08/22/22 1550    Order Status: Completed Specimen: Incision site from Head Updated: 08/23/22 2127     AFB Culture & Smear Culture in progress     AFB CULTURE STAIN No acid fast bacilli seen.    Fungus culture [[id number]] Collected: 08/22/22 1550    Order Status: No result Specimen: Incision site from Head Updated: 08/23/22 0930    Aerobic culture [[id number]]     Order Status: Completed Specimen: Incision site from Head     Culture, Anaerobe [[id number]]     Order Status: Completed Specimen: Body Fluid from Head     Fungus culture [[id number]]     Order Status: Completed Specimen: Body Fluid from Head     Fungus culture [[id number]]     Order Status: Canceled Specimen: Body Fluid from Head     Culture, Anaerobe [[id number]]     Order Status: Canceled Specimen: Body Fluid from Head     Aerobic culture [[id number]]     Order Status: Canceled Specimen: Incision site from Head     AFB Culture & Smear [[id number]]     Order Status: Canceled Specimen: Body Fluid from Head            All pertinent labs from the last 24 hours have been reviewed.    Significant Diagnostics:  I have reviewed all pertinent imaging results/findings within the past 24 hours.  No results found in the last 24 hours.

## 2022-08-29 NOTE — NURSING
Pt having more difficulty with word finding and more lethargic. NCC to bedside, orders received for STAT CTH and EEG, tx orders to be held off for now.    1000-Pt transported to and from [facility]. NCC notified.

## 2022-08-29 NOTE — PROGRESS NOTES
"[facility]  Infectious Disease  Progress Note    Patient Name: Gina Jenkins  MRN: [id number]  Admission Date: 8/21/2022  Length of Stay: 6 days  Attending Physician: Gregory Hoover MD  Primary Care Provider: Clair Johnson NP    Isolation Status: No active isolations  Assessment/Plan:      * Post op infection-resolved as of 8/28/2022     47 year old female with history of HTN, smoking and left brain aneurysm s/p left sided craniotomy for aneurysm clipping on 7/15/22 who presented with sudden onset of symptoms concerning for focal seizure. See HPI for more detail.      MRI brain showed peripherally enhancing sub dural and scalp complex fluid collections. CTA head without large vessel occulsion, stenosis, or vascular malformation.  NSGY performed a bedside aspiration of one of the subcutaneous fluid collections. Per previous discussion with team, blood was aspirated. Cx NGTD.      Patient is on empiric Vancomycin and Cefepime. Afebrile without a leukocytosis. HDS. She is s/p cranial wound I&D 8/27.  OR cultures NGTD.  Per op note "no phoebe pus or infection encountered, only reactive hyperemic tissue was found"      Recommendations:   · Continue IV Vancomycin. Inpatient pharmacy managing Vanc dosing. Trough goal 15-20.   · Continue Cefepime 2 g IV q 8 hours   · Will follow culture data to guide antibiotic therapy   · Plan reviewed with ID staff. ID will follow      Fluid collection at surgical site     See assessment and plan below        Anticipated Disposition: tbd    Thank you for your consult. I will follow-up with patient. Please contact us if you have any additional questions.    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 Diaz who presented with sudden onset of symptoms concerning for focal seizure. Pt states she was in her yard with her son on Sunday sharing a snack when she suddenly became aphasic and had RLE weakness. Pt states occurred for about 5 mins and then self resolved. Pt reported to the ED immediately and reports the episode occurred for a 2nd time in the ER, again self resolving. Pt denies drainage, pain, or swelling from her craniotomy incision site. Denies current weakness, paralysis. Denies change of vision, headaches. Denies loss of control from bowels, urine. Denies fevers, body aches, chills. Denies concerns/complications postoperatively until on Sunday when the symptoms started.     Since admission pt remains without leukocytosis. Pt with slightly increased inflammatory markers, sed rate 46 and CRP 26. Blood cultures NGTD. MRI brain w wo c/f enhancement of the sub dural and scalp complex fluid collections, potentially representing granulation tissue or infection hematoma. MRI wo c/f stable subdural and subq fluid collections surrounding cranitomy defect on the left, potentially appearing somewhat loculated. CTA head with no large vessel occulsion, stenosis, or vascular malformation.     ID was consulted for mgmt recommendations. Pt is currently on vancomycin and piperacillin tazobactam.       Interval History:   NAEON. Afebrile.   No leukocytosis.   S/P craniotomy and washout.  Cultures NGTD  The patient denies any recent fever, chills, or sweats.      Past Medical History:   Diagnosis Date    Brain aneurysm     CHF (congestive heart failure)     H/O coronary angioplasty     Hypercholesteremia     Hypertension     Malignant hypertension     Migraine headache     Stroke 10/2017       Past Surgical History:   Procedure Laterality Date    CARDIAC CATHETERIZATION      CEREBRAL ANGIOGRAM      CLIP LIGATION OF INTRACRANIAL ANEURYSM BY CRANIOTOMY N/A 7/15/2022    Procedure: CRANIOTOMY, WITH  ANEURYSM CLIPPING;  Surgeon: Timothy Diaz MD;  Location: [facility] OR [address] [place] [address];  Service: Neurosurgery;  Laterality: N/A;  PTERIONAL CRANIOTOMY WITH CLIP LIGATION OF L PCOMM, L ANTERIOR CHOROIDAL, L MCA  ANEURYSM, ANESTHESIA: GENERAL, BLOOD: TYPE&CROSS 2 UNITS, NEUROMONITORING: SEP, MEP, EEG, RADIOLOGY: C-ARM, POSITION: SUPINE, CASTILLO, CO-SURGERON: DR. LEXI DOMÍNGUEZ.       Review of patient's allergies indicates:   Allergen Reactions    Lisinopril Swelling    Bactrim [sulfamethoxazole-trimethoprim] Rash       Medications:  Facility-Administered Medications Prior to Admission   Medication    albuterol inhaler 2 puff     Medications Prior to Admission   Medication Sig    carvediloL (COREG) 25 MG tablet Take 37.5 mg by mouth 2 (two) times daily.    amitriptyline (ELAVIL) 75 MG tablet Take 75 mg by mouth every evening.    amlodipine (NORVASC) 10 MG tablet Take 1 tablet (10 mg total) by mouth once daily.    atorvastatin (LIPITOR) 40 MG tablet Take 1 tablet (40 mg total) by mouth once daily.    butalbital-acetaminophen-caffeine -40 mg (FIORICET, ESGIC) -40 mg per tablet Take 1 tablet by mouth every 4 (four) hours as needed.    cyanocobalamin (VITAMIN B-12) 1000 MCG tablet Take 1 tablet (1,000 mcg total) by mouth once daily. (Patient not taking: No sig reported)    diphenhydrAMINE (BENADRYL) 25 mg capsule Take 1 each (25 mg total) by mouth every 6 (six) hours as needed for Itching or Allergies. (Patient not taking: No sig reported)    docusate sodium (COLACE) 100 MG capsule Take 1 capsule (100 mg total) by mouth 2 (two) times daily as needed for Constipation.    EScitalopram oxalate (LEXAPRO) 10 MG tablet Take 10 mg by mouth once daily.    famotidine (PEPCID) 20 MG tablet Take 1 tablet (20 mg total) by mouth 2 (two) times daily. (Patient not taking: No sig reported)    hydrochlorothiazide (HYDRODIURIL) 25 MG tablet Take 1 tablet (25 mg total) by mouth once daily.    levETIRAcetam (KEPPRA) 500 MG  Tab Take 1 tablet (500 mg total) by mouth 2 (two) times daily for 5 days (Patient not taking: Reported on 7/29/2022)    magnesium oxide (MAG-OX) 400 mg (241.3 mg magnesium) tablet Take 1 tablet by mouth 2 (two) times daily.    potassium chloride SA (K-DUR,KLOR-CON) 20 MEQ tablet Take 20 mEq by mouth 2 (two) times daily.    [DISCONTINUED] cephALEXin (KEFLEX) 500 MG capsule Take 1 capsule (500 mg total) by mouth every 6 (six) hours. for 14 days    [DISCONTINUED] dexAMETHasone (DECADRON) 2 MG tablet Take 2 tablets (4 mg) by mouth every 8 hours for 1 day, THEN 2 tablets (4 mg) every 12 hours for 2 days, THEN 1 tablet (2 mg) every 12 hours for 2 days, THEN 1 tablet (2 mg) daily for 2 days, then STOP.    [DISCONTINUED] HYDROcodone-acetaminophen (NORCO) 5-325 mg per tablet Take 1 tablet by mouth every 6 (six) hours as needed for Pain.     Antibiotics (From admission, onward)      Start     Stop Route Frequency Ordered    08/28/22 1600  vancomycin 750 mg in dextrose 5 % 250 mL IVPB (ready to mix system)         -- IV Every 12 hours (non-standard times) 08/28/22 1013    08/27/22 2100  mupirocin 2 % ointment         09/01 2059 Nasl 2 times daily 08/27/22 1418    08/22/22 1915  cefepime in dextrose 5 % IVPB 2 g         -- IV Every 8 hours (non-standard times) 08/22/22 1805    08/22/22 0212  vancomycin - pharmacy to dose  (vancomycin IVPB)        See Hyperspace for full Linked Orders Report.    -- IV pharmacy to manage frequency 08/22/22 0112          Antifungals (From admission, onward)      None          Antivirals (From admission, onward)      None             Immunization History   Administered Date(s) Administered    Influenza - Quadrivalent - PF *Preferred* (6 months and older) 10/05/2017       Family History    None       Social History     Socioeconomic History    Marital status:    Tobacco Use    Smoking status: Every Day     Packs/day: 0.50     Types: Cigarettes    Smokeless tobacco: Never   Substance  and Sexual Activity    Alcohol use: Yes     Comment: occassionally    Drug use: No    Sexual activity: Not Currently     Partners: Male     Birth control/protection: None     Review of Systems   Constitutional:  Negative for appetite change, chills, diaphoresis, fatigue and fever.   HENT:  Positive for facial swelling.    Respiratory:  Negative for cough and shortness of breath.    Cardiovascular:  Negative for chest pain and leg swelling.   Gastrointestinal:  Negative for abdominal pain, diarrhea, nausea and vomiting.   Genitourinary:  Negative for difficulty urinating, flank pain and frequency.   Musculoskeletal:  Negative for arthralgias, joint swelling and myalgias.   Skin:  Positive for wound. Negative for color change and rash.   Neurological:  Positive for headaches. Negative for dizziness, weakness and numbness.   Psychiatric/Behavioral:  Negative for agitation and confusion. The patient is not nervous/anxious.    Objective:     Vital Signs (Most Recent):  Temp: 98.7 °F (37.1 °C) (08/28/22 1901)  Pulse: 73 (08/28/22 2001)  Resp: 19 (08/28/22 2001)  BP: (!) 144/69 (08/28/22 2001)  SpO2: (!) 94 % (08/28/22 2001)   Vital Signs (24h Range):  Temp:  [98.2 °F (36.8 °C)-98.9 °F (37.2 °C)] 98.7 °F (37.1 °C)  Pulse:  [73-86] 73  Resp:  [14-34] 19  SpO2:  [91 %-98 %] 94 %  BP: (132-163)/(60-93) 144/69     Weight: 88.5 kg (195 lb)  Body mass index is 34.54 kg/m².    Estimated Creatinine Clearance: 74.1 mL/min (based on SCr of 1 mg/dL).    Physical Exam  Vitals and nursing note reviewed.   Constitutional:       General: She is not in acute distress.     Appearance: Normal appearance. She is well-developed and normal weight. She is not ill-appearing, toxic-appearing or diaphoretic.   HENT:      Head:        Nose: Nose normal. No congestion.      Mouth/Throat:      Dentition: Does not have dentures. No dental abscesses.   Eyes:      General: No scleral icterus.     Conjunctiva/sclera: Conjunctivae normal.    Cardiovascular:      Rate and Rhythm: Normal rate and regular rhythm.   Pulmonary:      Effort: Pulmonary effort is normal. No respiratory distress.      Breath sounds: Normal breath sounds. No wheezing or rales.   Abdominal:      General: Bowel sounds are normal. There is no distension.      Palpations: Abdomen is soft.      Tenderness: There is no abdominal tenderness.   Musculoskeletal:         General: Normal range of motion.      Cervical back: Normal range of motion.      Right lower leg: No edema.      Left lower leg: No edema.   Skin:     General: Skin is warm and dry.      Findings: No erythema or rash.   Neurological:      Mental Status: She is alert and oriented to person, place, and time. Mental status is at baseline.      Motor: No weakness.   Psychiatric:         Mood and Affect: Mood normal.         Behavior: Behavior normal.         Thought Content: Thought content normal.         Judgment: Judgment normal.       Significant Labs: CBC:   Recent Labs   Lab 08/27/22 0529 08/28/22 0036   WBC 5.77 8.38   HGB 11.5* 11.4*   HCT 33.2* 33.2*    271       CMP:   Recent Labs   Lab 08/27/22 0529 08/28/22 0036   * 135*   K 3.6 3.7    105   CO2 21* 22*   * 224*   BUN 11 11   CREATININE 0.9 1.0   CALCIUM 9.5 9.2   ANIONGAP 9 8       Microbiology Results (last 7 days)       Procedure Component Value Units Date/Time    Culture, Anaerobic [[id number]] Collected: 08/27/22 0909    Order Status: Completed Specimen: Wound from Head Updated: 08/28/22 1154     Anaerobic Culture Culture in progress    Narrative:      Superficial head culture, aerobic, anaerobic, fungus, AFB,  gram    Culture, Anaerobic [[id number]] Collected: 08/27/22 0912    Order Status: Completed Specimen: Wound from Head Updated: 08/28/22 1154     Anaerobic Culture Culture in progress    Narrative:      Epidural culture, aerobic, anaerobic, gram, AFB, gram    Aerobic culture [[id number]] Collected: 08/27/22 0912    Order  Status: Completed Specimen: Wound from Head Updated: 08/28/22 0734     Aerobic Bacterial Culture No growth    Narrative:      Epidural culture, aerobic, anaerobic, gram, AFB, gram    Aerobic culture [[id number]] Collected: 08/27/22 0909    Order Status: Completed Specimen: Wound from Head Updated: 08/28/22 0732     Aerobic Bacterial Culture No growth    Narrative:      Superficial head culture, aerobic, anaerobic, fungus, AFB,  gram    Gram stain [[id number]] Collected: 08/27/22 0912    Order Status: Completed Specimen: Wound from Head Updated: 08/27/22 1044     Gram Stain Result No WBC's      No organisms seen    Narrative:      Epidural culture, aerobic, anaerobic, gram, AFB, gram    Gram stain [[id number]] Collected: 08/27/22 0909    Order Status: Completed Specimen: Wound from Head Updated: 08/27/22 1043     Gram Stain Result Rare WBC's      No organisms seen    Narrative:      Superficial head culture, aerobic, anaerobic, fungus, AFB,  gram    AFB Culture & Smear [[id number]] Collected: 08/27/22 0909    Order Status: Sent Specimen: Wound from Head Updated: 08/27/22 1004    Fungus culture [[id number]] Collected: 08/27/22 0909    Order Status: Sent Specimen: Wound from Head Updated: 08/27/22 1003    Fungus culture [[id number]] Collected: 08/27/22 0912    Order Status: Sent Specimen: Wound from Head Updated: 08/27/22 1001    AFB Culture & Smear [[id number]] Collected: 08/27/22 0912    Order Status: Sent Specimen: Wound from Head Updated: 08/27/22 1001    Blood culture [[id number]] Collected: 08/21/22 2325    Order Status: Completed Specimen: Blood from Peripheral, Antecubital, Right Updated: 08/27/22 0612     Blood Culture, Routine No growth after 5 days.    Aerobic culture [[id number]] Collected: 08/22/22 1550    Order Status: Completed Specimen: Incision site from Head Updated: 08/26/22 1029     Aerobic Bacterial Culture No growth    Gram stain [[id number]] Collected: 08/22/22 1550    Order Status: Completed Specimen:  Incision site from Head Updated: 08/25/22 1447     Gram Stain Result No organisms seen      Rare WBC's    Gram stain [[id number]]     Order Status: Completed Specimen: Incision site from Head     Culture, Anaerobic [[id number]] Collected: 08/22/22 1550    Order Status: Completed Specimen: Incision site from Head Updated: 08/25/22 1031     Anaerobic Culture Culture in progress    AFB Culture & Smear [[id number]] Collected: 08/22/22 1550    Order Status: Completed Specimen: Incision site from Head Updated: 08/23/22 2127     AFB Culture & Smear Culture in progress     AFB CULTURE STAIN No acid fast bacilli seen.    Fungus culture [[id number]] Collected: 08/22/22 1550    Order Status: No result Specimen: Incision site from Head Updated: 08/23/22 0930    Aerobic culture [[id number]]     Order Status: Completed Specimen: Incision site from Head     Culture, Anaerobe [[id number]]     Order Status: Completed Specimen: Body Fluid from Head     Fungus culture [[id number]]     Order Status: Completed Specimen: Body Fluid from Head     Fungus culture [[id number]]     Order Status: Canceled Specimen: Body Fluid from Head     Culture, Anaerobe [[id number]]     Order Status: Canceled Specimen: Body Fluid from Head     Aerobic culture [[id number]]     Order Status: Canceled Specimen: Incision site from Head     AFB Culture & Smear [[id number]]     Order Status: Canceled Specimen: Body Fluid from Head           Recent Lab Results  (Last 5 results in the past 24 hours)        08/28/22  1657   08/28/22  1206   08/28/22  0914   08/28/22  0712   08/28/22  0036        Anion Gap         8       Baso #         0.03       Basophil %         0.4       BUN         11       Calcium         9.2       Chloride         105       CO2         22       Creatinine         1.0       Differential Method         Automated       eGFR         >60.0       Eos #         0.2       Eosinophil %         2.5       Glucose         224       Gran # (ANC)         6.1        Gran %         72.5       Hematocrit         33.2       Hemoglobin         11.4       Immature Grans (Abs)         0.04  Comment: Mild elevation in immature granulocytes is non specific and   can be seen in a variety of conditions including stress response,   acute inflammation, trauma and pregnancy. Correlation with other   laboratory and clinical findings is essential.         Immature Granulocytes         0.5       Lymph #         1.2       Lymph %         14.1       Magnesium         1.6       MCH         28.6       MCHC         34.3       MCV         83       Mono #         0.8       Mono %         10.0       MPV         10.9       nRBC         0       Phosphorus         2.6       Platelets         271       POCT Glucose 166   169     170         Potassium         3.7       RBC         3.98       RDW         14.1       Sodium         135       Vancomycin-Trough     23.3           WBC         8.38                              Significant Imaging:   Imaging Results              MRI Brain W WO Contrast (Final result)  Result time 08/21/22 23:31:21   Procedure changed from MRI Brain With Contrast     Final result by Anthony Jones MD (08/21/22 23:31:21)                   Impression:      Peripheral rim enhancement of the sub dural and scalp complex fluid collections.  While this could represent enhancing granulation tissue, rim enhancement of infected hematoma is difficult to exclude.    Enhancement extending into the TMJ and lateral upper facial region on the left.  Correlate for cellulitis.    No evidence of enhancement of the brain parenchyma to suggest cerebritis.      Electronically signed by: Anthony Jones  Date:    08/21/2022  Time:    23:31               Narrative:    EXAMINATION:  MRI BRAIN W WO CONTRAST    CLINICAL HISTORY:  rule out infection;    TECHNIQUE:  Multiplanar multisequence MR imaging of the brain was performed before and after the administration of 10 mL Gadavist intravenous  contrast.    COMPARISON:  MRI of the brain, earlier same day previous CT scans    FINDINGS:  Complex subdural and scalp fluid collections demonstrate peripheral rim enhancement with internal septa mainly in the scalp complex mass and fluid collection.  The scalp changes and enhancement extend into the TMJ region and pre-auricular region on the left.  There is no abnormal enhancement of the brain parenchyma to suggest cerebritis.                                       MRI Brain Without Contrast (Final result)  Result time 08/21/22 22:24:25      Final result by Anthony Jones MD (08/21/22 22:24:25)                   Impression:      Stable subdural and subcutaneous fluid collection surrounding the patient's craniotomy defect on the left.  These appear somewhat loculated.  Correlate for subdural and scalp hematoma.  Versus signs of infection.    Postoperative changes of aneurysm clipping with no evidence of acute intracranial hemorrhage, mass or infarction.      Electronically signed by: Anthony Jones  Date:    08/21/2022  Time:    22:24               Narrative:    EXAMINATION:  MRI BRAIN WITHOUT CONTRAST    CLINICAL HISTORY:  Headache, new or worsening, neuro deficit (Age 19-49y);    TECHNIQUE:  Multiplanar multisequence MR imaging of the brain was performed without contrast.    COMPARISON:  CT, 08/21/2022, CT brain, 08/17/2022    FINDINGS:  There is no restricted diffusion.  Patchy punctate foci of gliotic signal intensity throughout the deep and subcortical white matter is again noted.  The left craniotomy and subdural and scalp complex fluid collections with proteinaceous content are noted.  No significant mass effect is evident.  Postoperative changes aneurysm clipping in the sylvian fissure near the [id number]-cg-Mclpbo on the left is noted.  There is no evidence midline shift or mass effect or new pathologic fluid collection within the brain parenchyma.  There is no hydrocephalus.  Empty sella configuration  is.  Bilateral sinus disease is present.  The orbits and orbital contents appear unremarkable.                                       X-Ray Chest AP Portable (Final result)  Result time 08/21/22 21:40:18      Final result by Mario Michaud MD (08/21/22 21:40:18)                   Impression:      Hypoventilatory examination.  No convincing radiographic evidence of acute intrathoracic process on this single view.      Electronically signed by: Mario Michaud MD  Date:    08/21/2022  Time:    21:40               Narrative:    EXAMINATION:  XR CHEST AP PORTABLE    CLINICAL HISTORY:  Stroke;    TECHNIQUE:  Single frontal view of the chest was performed.    COMPARISON:  07/15/2022    FINDINGS:  Cardiac monitoring leads overlie the chest.  Cardiac silhouette is stable in size.  Lung volumes are diminished with resultant bronchovascular crowding.  No large confluent airspace consolidation appreciated.  No significant volume of pleural fluid or pneumothorax identified.  Osseous structures demonstrate mild degenerative changes.                                       CTA STROKE MULTI-PHASE (Final result)  Result time 08/21/22 22:21:22      Final result by Flaquito Burciaga MD (08/21/22 22:21:22)                   Impression:      CTA head: No large vessel occlusion, high grade stenosis, or vascular malformation identified.  Stable postsurgical changes of left PCOM/anterior choroidal aneurysm clipping.  Unchanged small aneurysm at the left M2 bifurcation.    CT head: No evidence for acute intracranial hemorrhage or major vascular distribution infarct.  Stable postsurgical changes of left frontotemporal craniotomy for aneurysm clipping.  Small subdural collection underlying the craniotomy site, similar to prior exam.  Soft tissue collection overlying the craniotomy site is similar in size to prior exam.    Electronically signed by resident: Bianca Davis  Date:    08/21/2022  Time:    21:38    Electronically signed by: Flaquito  MD Gualberto  Date:    08/21/2022  Time:    22:21               Narrative:    EXAMINATION:  CTA STROKE MULTI-PHASE    CLINICAL HISTORY:  Transient ischemic attack (TIA);    TECHNIQUE:  Axial CT images obtained throughout the before and after the administration of intravenous contrast.    Multiphase CT angiogram was then performed from the top of aortic arch to the vertex during bolus administration of 75 mL of Omnipaque 350 intravenous contrast.  Scan through the head and neck was performed in arterial phase.  Axial, sagittal and coronal reconstructions, including maximum intensity projection reconstructions were performed.    CT source data was analyzed using artificial intelligence software for detection of a large vessel occlusions (LVO) in order to enable computer assisted triage notification and aid clinical stroke decision making.    COMPARISON:  CT head 08/17/2022.  08/02/2022.    CTA head neck 07/15/2022.    FINDINGS:  The ventricles are normal in size without evidence of hydrocephalus.    Empty sella configuration..  No parenchymal mass, hemorrhage, edema or major vascular distribution infarct.    Stable postsurgical changes of left frontotemporal craniotomy for left ICA aneurysm clipping.  Redemonstration of mixed density fluid collection underlying the craniotomy site that measures approximately 0.9 cm with mild mass effect on the underlying brain parenchyma.  Heterogeneous fluid collection underlying the surgical flap external to the craniotomy site, similar to prior exam.    Mucosal thickening the right maxillary sinus.  Remaining paranasal sinuses and mastoid air cells are essentially clear.    CTA:    Left-sided aortic arch with common origin of the brachiocephalic and left common carotid arteries.  With no significant atherosclerosis.    The common and internal carotid arteries are normal in course and caliber. No significant stenosis in either carotid bifurcation.    The vertebral origins are patent.  The cervical vertebral arteries are normal in course and caliber.  Left vertebral artery is dominant.  Vertebrobasilar system is within normal limits without focal abnormality.    Postsurgical changes of left PCOM and anterior choroidal artery aneurysm clipping.  Fetal origin of the left PCA.  The anterior cerebral arteries and anterior communicating complex are within normal limits.  The right middle cerebral artery is within normal limits.  Previous left proximal M1 3 mm aneurysm is difficult to discern and may be obscured by artifact from the prior clip in.  There is a stable 3 mm aneurysm in the left M2 bifurcation.    The dural venous sinuses are patent.    The soft tissues of the neck are within normal limits.  There is no evidence of lymphadenopathy in the neck.  The parapharyngeal fat planes are present.  The trachea is within normal limits.  The visualized lung apices demonstrate mild dependent atelectasis..  The cervical spine is unremarkable.                                        8/22/22:      8/23/22:                  8/24/22

## 2022-08-29 NOTE — NURSING
Pt not answering orientation questions, very lethargic, Still following commands. EEG in process of being set up. Diamond Loera NP notified. No new orders, awaiting EEG results.

## 2022-08-29 NOTE — CONSULTS
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 Diaz who p/w aphasia & RSW on 8/21/2022 and subsequently given IV Levetiracetam 1500 mg x1 followed by 1g BID due to concern for seizure. HPI per chart review 2/2 patient's mental status. MRI Brain WWO read as peripheral rim enhancement of subdural and scalp complex fluid collections. Cefepime and Vancomycin started per ID recs. Patient now s/p L cranial wound debridement and washout on 8/27 and admitted to [facility] for postoperative monitoring. Patient noted to have word finding difficulty and increased lethargy 8/29. Repeat CTH today with increased fluid collection. EEG placed due to concern for seizure. Epilepsy following for assistance in management.     Patient is currently maintained on Levetiracetam 1g BID.    Review of systems:  Not performed secondary to patient's mental status    Physical Exam  General: Awake, nasal cannula in place, comfortable, no acute distress. Well-perfused.  HEENT: Normocephalic. EEG in place. No scleral icterus. MMM.  Pulmonary: Effort normal, no respiratory distress.  Cardiac: Normal rate. No JVD.   Abdominal: Soft, non-distended.  Skin: No jaundice, rashes, petechiae or ecchymosis.   Psych: Unable to assess.   Neuro:  Drowsy, oriented to self and "[facility]"  Repetitively states name  JUANITA OU   Corneal intact OU   + spontaneous eye opening   Face symmetric   Tongue midline   Moves all extremities   Follows simple commands    Exam findings to suggest seizures:  Myoclonus - no   eye twitching - no   Nystagmus - no   gaze deviation - no   waxy rigidity - no      EEG reviewed by Epileptologist, preliminary findings include encephalopathy, no epileptiform activity (full EEG report to follow)     Encephalopathy  - Continuous vEEG monitoring  - Currently on Levetiracetam 1g BID  - Avoid " agents that lower seizure threshold  - Surgical management of fluid collection per NSGY  - Management of infectious/metabolic abnormalities per NCC  - Seizure precautions      Discussed plan of care with NCC team. No family at bedside. Will follow, please call with questions.    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 Diaz  - s/p L cranial wound debridement and washout on 8/27  - Repeat CTH 8/29 read as interval increased sized hypodense collections within the soft tissues overlying the craniotomy and underlying the craniotomy, may represent evolving postoperative seroma with pseudomeningocele not excluded, alternatively enlarging infectious collection to be considered  - Management per NSGY    Essential hypertension  Hx of  - On home Amlodipine and Carvedilol  - Management per [facility]    Mixed hyperlipidemia  - On home Atorvastatin    New onset type 2 diabetes mellitus  - A1c 8.3  - Endocrinology following  - Insulin management per Lesly, DM Brandon PA-C  Neurology Epilepsy  Staff: Dr. Humphrey

## 2022-08-29 NOTE — ASSESSMENT & PLAN NOTE
A1c 8.3  - On Detemir 24U daily, Aspart 8U TIDWM + SSI  - Endocrinology following  - Insulin management per Endo, NCC

## 2022-08-29 NOTE — ASSESSMENT & PLAN NOTE
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 Diaz who presented after episode of sudden onset R-sided weakness/tremors and aphasia for about 5 minutes, resolved prior to EMS arrival, concerning for focal seizure, then with second episode while in the ED. MRI brain w/wo concerning for scalp and epidural infection.    Now s/p L crani revision for wound washout.     - Admitted to [facility] with [facility] neurochecks.  - all labs and diagnostics reviewed          - CTA 8/21: largely stable from prior with epidural fluid collection and extracranial fluid collection stable in size; stable postsurgical changes of L PCOM/anterior choroidal aneurysm clipping, stable small left MCA bifurcation aneurysm          - MRI brain w wo 8/21: peripheral rim enhancement of subdural and scalp complex fluid collections, concerning for infection; no evidence of enhancement of brain parenchyma   - spot EEG 8/23: mild regional cortical or subcortical dysfunction in the left temporal region with no electrographic seizures or indications of seizure tendency.   - CTH 8/28: Residual mixed density collection overlies the soft tissues as well as within the extra-axial space  underlying the craniotomy likely represents postoperative gas fluid and hemorrhage with residual infection not  Excluded. No new intracranial findings.    - CTH 8/29: worsening epidural and subcutaneous fluid collection with local mass effect on L frontal lobe           - Infectious workup:          - afebrile, no leukocytosis          - blood cx 8/21 NGTD          - ESR 30-->46, CRP 28.5-->26          - Subcutaneous scalp fluid collection tapped 8/22, sent for Cx's - NGTD          - ID consulted, recommend Vanc/Cefepime, continue to follow Cx's  - Seizures: Episodes concerning for focal seizures with R-sided tremors and weakness. Not on AEDs prior to admission.          - Keppra  loaded on admission, continue 1g bid          - Likely provoked focal seizure activity due to cranial fluid collection; pt also with very elevated BG on admission, uncontrolled hyperglycemia may have been contributory to provoked seizure          - spot EEG 8/23 with left temporal subcortical dysfunction, negative for electrographic seizures  - New Onset T2DM: No h/o DM. Hyperglycemic on admission. HbA1C 8.3. Endocrinology consulted. Goal -180.     - Levemir 30 untis daily First dose this Am. 0.7 u/kg/d dosing.      - Novolog 10 units TIDWM. Basal/Prandial physiologic matching.       - Moderate Dose SQ Insulin Correction Scale.     - BG Monitoring AC/HS.           - Bedside nurse to instruct on insulin pen use and blood sugar monitor. Have patient administer own injections after education completed supervised by nurse. Have patient watch insulin educatoin video's via i-pad if available on unit.  - HTN: SBP <160. Controlled on current regimen. Continue home meds.  - Hypokalemia, Hypomagnesemia: Chronic, on home supplements daily.          - Resume home potassium and Mg supplements          - Replete PRN, daily labs  - regular diet  - HAIM/SCD/SQH  - PT/OT/OOB     Dispo: ongoing

## 2022-08-29 NOTE — ASSESSMENT & PLAN NOTE
"   47 year old female with history of HTN, smoking and left brain aneurysm s/p left sided craniotomy for aneurysm clipping on 7/15/22 who presented with sudden onset of symptoms concerning for focal seizure. See HPI for more detail.      MRI brain showed peripherally enhancing sub dural and scalp complex fluid collections. CTA head without large vessel occulsion, stenosis, or vascular malformation.  NSGY performed a bedside aspiration of one of the subcutaneous fluid collections. Per previous discussion with team, blood was aspirated. Cx NGTD.      Patient is on empiric Vancomycin and Cefepime. Afebrile without a leukocytosis. HDS. She is s/p cranial wound I&D 8/27.  OR cultures NGTD.  Per op note "no phoebe pus or infection encountered, only reactive hyperemic tissue was found"      Recommendations:   · Continue IV Vancomycin. Inpatient pharmacy managing Vanc dosing. Trough goal 15-20.   · Continue Cefepime 2 g IV q 8 hours   · Will follow culture data to guide antibiotic therapy   · Plan reviewed with ID staff. ID will follow    "

## 2022-08-29 NOTE — SUBJECTIVE & OBJECTIVE
Interval History:   NAEON. Afebrile.   No leukocytosis.   S/P craniotomy and washout.  Cultures NGTD  The patient denies any recent fever, chills, or sweats.      Past Medical History:   Diagnosis Date    Brain aneurysm     CHF (congestive heart failure)     H/O coronary angioplasty     Hypercholesteremia     Hypertension     Malignant hypertension     Migraine headache     Stroke 10/2017       Past Surgical History:   Procedure Laterality Date    CARDIAC CATHETERIZATION      CEREBRAL ANGIOGRAM      CLIP LIGATION OF INTRACRANIAL ANEURYSM BY CRANIOTOMY N/A 7/15/2022    Procedure: CRANIOTOMY, WITH ANEURYSM CLIPPING;  Surgeon: Timothy Diaz MD;  Location: [facility] OR [address] [place] [address];  Service: Neurosurgery;  Laterality: N/A;  PTERIONAL CRANIOTOMY WITH CLIP LIGATION OF L PCOMM, L ANTERIOR CHOROIDAL, L MCA  ANEURYSM, ANESTHESIA: GENERAL, BLOOD: TYPE&CROSS 2 UNITS, NEUROMONITORING: SEP, MEP, EEG, RADIOLOGY: C-ARM, POSITION: SUPINE, CASTILLO, CO-SURGERON: DR. LEXI DOMÍNGUEZ.       Review of patient's allergies indicates:   Allergen Reactions    Lisinopril Swelling    Bactrim [sulfamethoxazole-trimethoprim] Rash       Medications:  Facility-Administered Medications Prior to Admission   Medication    albuterol inhaler 2 puff     Medications Prior to Admission   Medication Sig    carvediloL (COREG) 25 MG tablet Take 37.5 mg by mouth 2 (two) times daily.    amitriptyline (ELAVIL) 75 MG tablet Take 75 mg by mouth every evening.    amlodipine (NORVASC) 10 MG tablet Take 1 tablet (10 mg total) by mouth once daily.    atorvastatin (LIPITOR) 40 MG tablet Take 1 tablet (40 mg total) by mouth once daily.    butalbital-acetaminophen-caffeine -40 mg (FIORICET, ESGIC) -40 mg per tablet Take 1 tablet by mouth every 4 (four) hours as needed.    cyanocobalamin (VITAMIN B-12) 1000 MCG tablet Take 1 tablet (1,000 mcg total) by mouth once daily. (Patient not taking: No sig reported)    diphenhydrAMINE (BENADRYL) 25 mg capsule Take 1 each (25  mg total) by mouth every 6 (six) hours as needed for Itching or Allergies. (Patient not taking: No sig reported)    docusate sodium (COLACE) 100 MG capsule Take 1 capsule (100 mg total) by mouth 2 (two) times daily as needed for Constipation.    EScitalopram oxalate (LEXAPRO) 10 MG tablet Take 10 mg by mouth once daily.    famotidine (PEPCID) 20 MG tablet Take 1 tablet (20 mg total) by mouth 2 (two) times daily. (Patient not taking: No sig reported)    hydrochlorothiazide (HYDRODIURIL) 25 MG tablet Take 1 tablet (25 mg total) by mouth once daily.    levETIRAcetam (KEPPRA) 500 MG Tab Take 1 tablet (500 mg total) by mouth 2 (two) times daily for 5 days (Patient not taking: Reported on 7/29/2022)    magnesium oxide (MAG-OX) 400 mg (241.3 mg magnesium) tablet Take 1 tablet by mouth 2 (two) times daily.    potassium chloride SA (K-DUR,KLOR-CON) 20 MEQ tablet Take 20 mEq by mouth 2 (two) times daily.    [DISCONTINUED] cephALEXin (KEFLEX) 500 MG capsule Take 1 capsule (500 mg total) by mouth every 6 (six) hours. for 14 days    [DISCONTINUED] dexAMETHasone (DECADRON) 2 MG tablet Take 2 tablets (4 mg) by mouth every 8 hours for 1 day, THEN 2 tablets (4 mg) every 12 hours for 2 days, THEN 1 tablet (2 mg) every 12 hours for 2 days, THEN 1 tablet (2 mg) daily for 2 days, then STOP.    [DISCONTINUED] HYDROcodone-acetaminophen (NORCO) 5-325 mg per tablet Take 1 tablet by mouth every 6 (six) hours as needed for Pain.     Antibiotics (From admission, onward)      Start     Stop Route Frequency Ordered    08/28/22 1600  vancomycin 750 mg in dextrose 5 % 250 mL IVPB (ready to mix system)         -- IV Every 12 hours (non-standard times) 08/28/22 1013    08/27/22 2100  mupirocin 2 % ointment         09/01 2059 Nasl 2 times daily 08/27/22 1418    08/22/22 1915  cefepime in dextrose 5 % IVPB 2 g         -- IV Every 8 hours (non-standard times) 08/22/22 1805    08/22/22 0212  vancomycin - pharmacy to dose  (vancomycin IVPB)        See  Hyperspace for full Linked Orders Report.    -- IV pharmacy to manage frequency 08/22/22 0112          Antifungals (From admission, onward)      None          Antivirals (From admission, onward)      None             Immunization History   Administered Date(s) Administered    Influenza - Quadrivalent - PF *Preferred* (6 months and older) 10/05/2017       Family History    None       Social History     Socioeconomic History    Marital status:    Tobacco Use    Smoking status: Every Day     Packs/day: 0.50     Types: Cigarettes    Smokeless tobacco: Never   Substance and Sexual Activity    Alcohol use: Yes     Comment: occassionally    Drug use: No    Sexual activity: Not Currently     Partners: Male     Birth control/protection: None     Review of Systems   Constitutional:  Negative for appetite change, chills, diaphoresis, fatigue and fever.   HENT:  Positive for facial swelling.    Respiratory:  Negative for cough and shortness of breath.    Cardiovascular:  Negative for chest pain and leg swelling.   Gastrointestinal:  Negative for abdominal pain, diarrhea, nausea and vomiting.   Genitourinary:  Negative for difficulty urinating, flank pain and frequency.   Musculoskeletal:  Negative for arthralgias, joint swelling and myalgias.   Skin:  Positive for wound. Negative for color change and rash.   Neurological:  Positive for headaches. Negative for dizziness, weakness and numbness.   Psychiatric/Behavioral:  Negative for agitation and confusion. The patient is not nervous/anxious.    Objective:     Vital Signs (Most Recent):  Temp: 98.7 °F (37.1 °C) (08/28/22 1901)  Pulse: 73 (08/28/22 2001)  Resp: 19 (08/28/22 2001)  BP: (!) 144/69 (08/28/22 2001)  SpO2: (!) 94 % (08/28/22 2001)   Vital Signs (24h Range):  Temp:  [98.2 °F (36.8 °C)-98.9 °F (37.2 °C)] 98.7 °F (37.1 °C)  Pulse:  [73-86] 73  Resp:  [14-34] 19  SpO2:  [91 %-98 %] 94 %  BP: (132-163)/(60-93) 144/69     Weight: 88.5 kg (195 lb)  Body mass index is  34.54 kg/m².    Estimated Creatinine Clearance: 74.1 mL/min (based on SCr of 1 mg/dL).    Physical Exam  Vitals and nursing note reviewed.   Constitutional:       General: She is not in acute distress.     Appearance: Normal appearance. She is well-developed and normal weight. She is not ill-appearing, toxic-appearing or diaphoretic.   HENT:      Head:        Nose: Nose normal. No congestion.      Mouth/Throat:      Dentition: Does not have dentures. No dental abscesses.   Eyes:      General: No scleral icterus.     Conjunctiva/sclera: Conjunctivae normal.   Cardiovascular:      Rate and Rhythm: Normal rate and regular rhythm.   Pulmonary:      Effort: Pulmonary effort is normal. No respiratory distress.      Breath sounds: Normal breath sounds. No wheezing or rales.   Abdominal:      General: Bowel sounds are normal. There is no distension.      Palpations: Abdomen is soft.      Tenderness: There is no abdominal tenderness.   Musculoskeletal:         General: Normal range of motion.      Cervical back: Normal range of motion.      Right lower leg: No edema.      Left lower leg: No edema.   Skin:     General: Skin is warm and dry.      Findings: No erythema or rash.   Neurological:      Mental Status: She is alert and oriented to person, place, and time. Mental status is at baseline.      Motor: No weakness.   Psychiatric:         Mood and Affect: Mood normal.         Behavior: Behavior normal.         Thought Content: Thought content normal.         Judgment: Judgment normal.       Significant Labs: CBC:   Recent Labs   Lab 08/27/22  0529 08/28/22 0036   WBC 5.77 8.38   HGB 11.5* 11.4*   HCT 33.2* 33.2*    271       CMP:   Recent Labs   Lab 08/27/22 0529 08/28/22  0036   * 135*   K 3.6 3.7    105   CO2 21* 22*   * 224*   BUN 11 11   CREATININE 0.9 1.0   CALCIUM 9.5 9.2   ANIONGAP 9 8       Microbiology Results (last 7 days)       Procedure Component Value Units Date/Time    Culture,  Anaerobic [[id number]] Collected: 08/27/22 0909    Order Status: Completed Specimen: Wound from Head Updated: 08/28/22 1154     Anaerobic Culture Culture in progress    Narrative:      Superficial head culture, aerobic, anaerobic, fungus, AFB,  gram    Culture, Anaerobic [[id number]] Collected: 08/27/22 0912    Order Status: Completed Specimen: Wound from Head Updated: 08/28/22 1154     Anaerobic Culture Culture in progress    Narrative:      Epidural culture, aerobic, anaerobic, gram, AFB, gram    Aerobic culture [[id number]] Collected: 08/27/22 0912    Order Status: Completed Specimen: Wound from Head Updated: 08/28/22 0734     Aerobic Bacterial Culture No growth    Narrative:      Epidural culture, aerobic, anaerobic, gram, AFB, gram    Aerobic culture [[id number]] Collected: 08/27/22 0909    Order Status: Completed Specimen: Wound from Head Updated: 08/28/22 0732     Aerobic Bacterial Culture No growth    Narrative:      Superficial head culture, aerobic, anaerobic, fungus, AFB,  gram    Gram stain [[id number]] Collected: 08/27/22 0912    Order Status: Completed Specimen: Wound from Head Updated: 08/27/22 1044     Gram Stain Result No WBC's      No organisms seen    Narrative:      Epidural culture, aerobic, anaerobic, gram, AFB, gram    Gram stain [[id number]] Collected: 08/27/22 0909    Order Status: Completed Specimen: Wound from Head Updated: 08/27/22 1043     Gram Stain Result Rare WBC's      No organisms seen    Narrative:      Superficial head culture, aerobic, anaerobic, fungus, AFB,  gram    AFB Culture & Smear [[id number]] Collected: 08/27/22 0909    Order Status: Sent Specimen: Wound from Head Updated: 08/27/22 1004    Fungus culture [[id number]] Collected: 08/27/22 0909    Order Status: Sent Specimen: Wound from Head Updated: 08/27/22 1003    Fungus culture [[id number]] Collected: 08/27/22 0912    Order Status: Sent Specimen: Wound from Head Updated: 08/27/22 1001    AFB Culture & Smear [[id number]] Collected:  08/27/22 0912    Order Status: Sent Specimen: Wound from Head Updated: 08/27/22 1001    Blood culture [[id number]] Collected: 08/21/22 2325    Order Status: Completed Specimen: Blood from Peripheral, Antecubital, Right Updated: 08/27/22 0612     Blood Culture, Routine No growth after 5 days.    Aerobic culture [[id number]] Collected: 08/22/22 1550    Order Status: Completed Specimen: Incision site from Head Updated: 08/26/22 1029     Aerobic Bacterial Culture No growth    Gram stain [[id number]] Collected: 08/22/22 1550    Order Status: Completed Specimen: Incision site from Head Updated: 08/25/22 1447     Gram Stain Result No organisms seen      Rare WBC's    Gram stain [[id number]]     Order Status: Completed Specimen: Incision site from Head     Culture, Anaerobic [[id number]] Collected: 08/22/22 1550    Order Status: Completed Specimen: Incision site from Head Updated: 08/25/22 1031     Anaerobic Culture Culture in progress    AFB Culture & Smear [[id number]] Collected: 08/22/22 1550    Order Status: Completed Specimen: Incision site from Head Updated: 08/23/22 2127     AFB Culture & Smear Culture in progress     AFB CULTURE STAIN No acid fast bacilli seen.    Fungus culture [[id number]] Collected: 08/22/22 1550    Order Status: No result Specimen: Incision site from Head Updated: 08/23/22 0930    Aerobic culture [[id number]]     Order Status: Completed Specimen: Incision site from Head     Culture, Anaerobe [[id number]]     Order Status: Completed Specimen: Body Fluid from Head     Fungus culture [[id number]]     Order Status: Completed Specimen: Body Fluid from Head     Fungus culture [[id number]]     Order Status: Canceled Specimen: Body Fluid from Head     Culture, Anaerobe [[id number]]     Order Status: Canceled Specimen: Body Fluid from Head     Aerobic culture [[id number]]     Order Status: Canceled Specimen: Incision site from Head     AFB Culture & Smear [[id number]]     Order Status: Canceled Specimen: Body Fluid  from Head           Recent Lab Results  (Last 5 results in the past 24 hours)        08/28/22  1657   08/28/22  1206   08/28/22  0914   08/28/22  0712   08/28/22  0036        Anion Gap         8       Baso #         0.03       Basophil %         0.4       BUN         11       Calcium         9.2       Chloride         105       CO2         22       Creatinine         1.0       Differential Method         Automated       eGFR         >60.0       Eos #         0.2       Eosinophil %         2.5       Glucose         224       Gran # (ANC)         6.1       Gran %         72.5       Hematocrit         33.2       Hemoglobin         11.4       Immature Grans (Abs)         0.04  Comment: Mild elevation in immature granulocytes is non specific and   can be seen in a variety of conditions including stress response,   acute inflammation, trauma and pregnancy. Correlation with other   laboratory and clinical findings is essential.         Immature Granulocytes         0.5       Lymph #         1.2       Lymph %         14.1       Magnesium         1.6       MCH         28.6       MCHC         34.3       MCV         83       Mono #         0.8       Mono %         10.0       MPV         10.9       nRBC         0       Phosphorus         2.6       Platelets         271       POCT Glucose 166   169     170         Potassium         3.7       RBC         3.98       RDW         14.1       Sodium         135       Vancomycin-Trough     23.3           WBC         8.38                              Significant Imaging:   Imaging Results              MRI Brain W WO Contrast (Final result)  Result time 08/21/22 23:31:21   Procedure changed from MRI Brain With Contrast     Final result by Anthony Jones MD (08/21/22 23:31:21)                   Impression:      Peripheral rim enhancement of the sub dural and scalp complex fluid collections.  While this could represent enhancing granulation tissue, rim enhancement of infected hematoma  is difficult to exclude.    Enhancement extending into the TMJ and lateral upper facial region on the left.  Correlate for cellulitis.    No evidence of enhancement of the brain parenchyma to suggest cerebritis.      Electronically signed by: Anthony Jones  Date:    08/21/2022  Time:    23:31               Narrative:    EXAMINATION:  MRI BRAIN W WO CONTRAST    CLINICAL HISTORY:  rule out infection;    TECHNIQUE:  Multiplanar multisequence MR imaging of the brain was performed before and after the administration of 10 mL Gadavist intravenous contrast.    COMPARISON:  MRI of the brain, earlier same day previous CT scans    FINDINGS:  Complex subdural and scalp fluid collections demonstrate peripheral rim enhancement with internal septa mainly in the scalp complex mass and fluid collection.  The scalp changes and enhancement extend into the TMJ region and pre-auricular region on the left.  There is no abnormal enhancement of the brain parenchyma to suggest cerebritis.                                       MRI Brain Without Contrast (Final result)  Result time 08/21/22 22:24:25      Final result by Anthony Jones MD (08/21/22 22:24:25)                   Impression:      Stable subdural and subcutaneous fluid collection surrounding the patient's craniotomy defect on the left.  These appear somewhat loculated.  Correlate for subdural and scalp hematoma.  Versus signs of infection.    Postoperative changes of aneurysm clipping with no evidence of acute intracranial hemorrhage, mass or infarction.      Electronically signed by: Anthony Jones  Date:    08/21/2022  Time:    22:24               Narrative:    EXAMINATION:  MRI BRAIN WITHOUT CONTRAST    CLINICAL HISTORY:  Headache, new or worsening, neuro deficit (Age 19-49y);    TECHNIQUE:  Multiplanar multisequence MR imaging of the brain was performed without contrast.    COMPARISON:  CT, 08/21/2022, CT brain, 08/17/2022    FINDINGS:  There is no restricted  diffusion.  Patchy punctate foci of gliotic signal intensity throughout the deep and subcortical white matter is again noted.  The left craniotomy and subdural and scalp complex fluid collections with proteinaceous content are noted.  No significant mass effect is evident.  Postoperative changes aneurysm clipping in the sylvian fissure near the oagurr-ri-Tyying on the left is noted.  There is no evidence midline shift or mass effect or new pathologic fluid collection within the brain parenchyma.  There is no hydrocephalus.  Empty sella configuration is.  Bilateral sinus disease is present.  The orbits and orbital contents appear unremarkable.                                       X-Ray Chest AP Portable (Final result)  Result time 08/21/22 21:40:18      Final result by Mario Michaud MD (08/21/22 21:40:18)                   Impression:      Hypoventilatory examination.  No convincing radiographic evidence of acute intrathoracic process on this single view.      Electronically signed by: Mario Michaud MD  Date:    08/21/2022  Time:    21:40               Narrative:    EXAMINATION:  XR CHEST AP PORTABLE    CLINICAL HISTORY:  Stroke;    TECHNIQUE:  Single frontal view of the chest was performed.    COMPARISON:  07/15/2022    FINDINGS:  Cardiac monitoring leads overlie the chest.  Cardiac silhouette is stable in size.  Lung volumes are diminished with resultant bronchovascular crowding.  No large confluent airspace consolidation appreciated.  No significant volume of pleural fluid or pneumothorax identified.  Osseous structures demonstrate mild degenerative changes.                                       CTA STROKE MULTI-PHASE (Final result)  Result time 08/21/22 22:21:22      Final result by Flaquito Burciaga MD (08/21/22 22:21:22)                   Impression:      CTA head: No large vessel occlusion, high grade stenosis, or vascular malformation identified.  Stable postsurgical changes of left PCOM/anterior choroidal  aneurysm clipping.  Unchanged small aneurysm at the left M2 bifurcation.    CT head: No evidence for acute intracranial hemorrhage or major vascular distribution infarct.  Stable postsurgical changes of left frontotemporal craniotomy for aneurysm clipping.  Small subdural collection underlying the craniotomy site, similar to prior exam.  Soft tissue collection overlying the craniotomy site is similar in size to prior exam.    Electronically signed by resident: Bianca Davis  Date:    08/21/2022  Time:    21:38    Electronically signed by: Flaquito Burciaga MD  Date:    08/21/2022  Time:    22:21               Narrative:    EXAMINATION:  CTA STROKE MULTI-PHASE    CLINICAL HISTORY:  Transient ischemic attack (TIA);    TECHNIQUE:  Axial CT images obtained throughout the before and after the administration of intravenous contrast.    Multiphase CT angiogram was then performed from the top of aortic arch to the vertex during bolus administration of 75 mL of Omnipaque 350 intravenous contrast.  Scan through the head and neck was performed in arterial phase.  Axial, sagittal and coronal reconstructions, including maximum intensity projection reconstructions were performed.    CT source data was analyzed using artificial intelligence software for detection of a large vessel occlusions (LVO) in order to enable computer assisted triage notification and aid clinical stroke decision making.    COMPARISON:  CT head 08/17/2022.  08/02/2022.    CTA head neck 07/15/2022.    FINDINGS:  The ventricles are normal in size without evidence of hydrocephalus.    Empty sella configuration..  No parenchymal mass, hemorrhage, edema or major vascular distribution infarct.    Stable postsurgical changes of left frontotemporal craniotomy for left ICA aneurysm clipping.  Redemonstration of mixed density fluid collection underlying the craniotomy site that measures approximately 0.9 cm with mild mass effect on the underlying brain parenchyma.   Heterogeneous fluid collection underlying the surgical flap external to the craniotomy site, similar to prior exam.    Mucosal thickening the right maxillary sinus.  Remaining paranasal sinuses and mastoid air cells are essentially clear.    CTA:    Left-sided aortic arch with common origin of the brachiocephalic and left common carotid arteries.  With no significant atherosclerosis.    The common and internal carotid arteries are normal in course and caliber. No significant stenosis in either carotid bifurcation.    The vertebral origins are patent. The cervical vertebral arteries are normal in course and caliber.  Left vertebral artery is dominant.  Vertebrobasilar system is within normal limits without focal abnormality.    Postsurgical changes of left PCOM and anterior choroidal artery aneurysm clipping.  Fetal origin of the left PCA.  The anterior cerebral arteries and anterior communicating complex are within normal limits.  The right middle cerebral artery is within normal limits.  Previous left proximal M1 3 mm aneurysm is difficult to discern and may be obscured by artifact from the prior clip in.  There is a stable 3 mm aneurysm in the left M2 bifurcation.    The dural venous sinuses are patent.    The soft tissues of the neck are within normal limits.  There is no evidence of lymphadenopathy in the neck.  The parapharyngeal fat planes are present.  The trachea is within normal limits.  The visualized lung apices demonstrate mild dependent atelectasis..  The cervical spine is unremarkable.                                        8/22/22:      8/23/22:                  8/24/22

## 2022-08-29 NOTE — ASSESSMENT & PLAN NOTE
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 Diaz   - s/p left cranial wound debridement and washout on 8/27   - Repeat CTH 8/29 read as interval increased sized hypodense collections within the soft tissues overlying the craniotomy and underlying the craniotomy, may represent evolving postoperative seroma with pseudomeningocele not excluded, alternatively enlarging infectious collection to be considered  - Management per NSGY, NCC

## 2022-08-29 NOTE — PROGRESS NOTES
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 Diaz who presents with sudden onset of symptoms concerning for focal seizure. Patient was in her yard today playing with her son when her R arm became weak suddenly followed by the inability to speak and RLE weakness. This went on for a couple minutes and self resolved; patient said she had some lasting heaviness on her R side after. She came to the ED immediately.    Here in the ED she had another episode that resolved on its own. NSGY consulted.       Post-Op Info:  Procedure(s) (LRB):  LEFT Cranial wound debridement and washout (Left)  DEBRIDEMENT, WOUND   2 Days Post-Op     Interval History: 8/29: neuro stable, taken for CTH later this morning for concern for word finding difficulties, CTH showed worsening epidural and subcutaneous fluid collection from prior. HV drain with no output    Medications:  Continuous Infusions:  Scheduled Meds:   amitriptyline  75 mg Oral QHS    amLODIPine  10 mg Oral Daily    atorvastatin  40 mg Oral Daily    carvediloL  37.5 mg Oral BID    ceFEPime (MAXIPIME) IVPB  2 g Intravenous Q8H    EScitalopram oxalate  10 mg Oral Daily    heparin (porcine)  5,000 Units Subcutaneous Q8H    insulin aspart U-100  10 Units Subcutaneous TIDWM    insulin detemir U-100  30 Units Subcutaneous Daily    levetiracetam IV  1,000 mg Intravenous Q12H    magnesium oxide  400 mg Oral BID    mupirocin   Nasal BID    vancomycin (VANCOCIN) IVPB  750 mg Intravenous Q12H     PRN Meds:acetaminophen, albuterol, dextrose 10%, dextrose 10%, glucagon (human recombinant), glucose, glucose, hydrALAZINE, insulin aspart U-100, labetalol, magnesium oxide, magnesium oxide, melatonin, oxyCODONE, oxyCODONE, potassium bicarbonate, potassium bicarbonate, potassium  bicarbonate, potassium, sodium phosphates, potassium, sodium phosphates, potassium, sodium phosphates, Pharmacy to dose Vancomycin consult **AND** vancomycin - pharmacy to dose     Review of Systems  Objective:     Weight: 88.5 kg (195 lb)  Body mass index is 34.54 kg/m².  Vital Signs (Most Recent):  Temp: 99.1 °F (37.3 °C) (08/29/22 1101)  Pulse: 79 (08/29/22 1101)  Resp: (!) 22 (08/29/22 1101)  BP: 108/66 (08/29/22 1101)  SpO2: (!) 92 % (08/29/22 1101)   Vital Signs (24h Range):  Temp:  [98.4 °F (36.9 °C)-99.2 °F (37.3 °C)] 99.1 °F (37.3 °C)  Pulse:  [67-86] 79  Resp:  [14-33] 22  SpO2:  [92 %-97 %] 92 %  BP: (101-155)/(57-93) 108/66     Date 08/29/22 0700 - 08/30/22 0659   Shift 3660-7372 7951-8527 9313-0450 24 Hour Total   INTAKE   P.O. 120   120   Shift Total(mL/kg) 120(1.4)   120(1.4)   OUTPUT   Shift Total(mL/kg)       Weight (kg) 88.4 88.4 88.4 88.4                          Closed/Suction Drain 08/27/22 0941 Left Scalp Other (Comment) (Active)   Site Description Unable to view 08/28/22 1501   Dressing Type Gauze 08/28/22 1501   Dressing Status Clean;Dry;Intact 08/28/22 1501   Dressing Intervention Integrity maintained 08/28/22 1501   Drainage Serosanguineous 08/28/22 1501   Status To bulb suction 08/28/22 1501   Output (mL) 0 mL 08/28/22 0502       Female External Urinary Catheter 08/27/22 1045 (Active)   Skin no redness;no breakdown 08/28/22 1501   Tolerance no signs/symptoms of discomfort 08/28/22 1501   Suction Continuous suction at 40 mmHg 08/28/22 0701   Date of last wick change 08/27/22 08/27/22 1105   Time of last wick change 1045 08/27/22 1105   Output (mL) 500 mL 08/28/22 0502       Physical Exam    Neurosurgery Physical Exam    GENERAL: resting comfortably  HEENT: NC, PERRL, mucous membranes moist  NECK: supple, trachea midline  CV: normal capillary refill  PULM: aerating well, symmetric expansion, no distress  ABD: soft, NT, ND  EXT: no c/c/e    NEURO:    AAO x 3  Speech normal  CN II-XII grossly  intact  Fc x 4 antigravity  SILT    No drift or dysmetria    Incision cdi    HV without drainage      Significant Labs:  Recent Labs   Lab 08/28/22  0036 08/29/22  0256   * 99   * 139   K 3.7 4.8    107   CO2 22* 20*   BUN 11 15   CREATININE 1.0 1.2   CALCIUM 9.2 10.0   MG 1.6 1.9       Recent Labs   Lab 08/28/22  0036 08/29/22  0256   WBC 8.38 7.45   HGB 11.4* 12.0   HCT 33.2* 35.0*    230       No results for input(s): LABPT, INR, APTT in the last 48 hours.  Microbiology Results (last 7 days)       Procedure Component Value Units Date/Time    Aerobic culture [[id number]] Collected: 08/27/22 0912    Order Status: Completed Specimen: Wound from Head Updated: 08/29/22 1107     Aerobic Bacterial Culture No growth    Narrative:      Epidural culture, aerobic, anaerobic, gram, AFB, gram    Aerobic culture [[id number]] Collected: 08/27/22 0909    Order Status: Completed Specimen: Wound from Head Updated: 08/29/22 1100     Aerobic Bacterial Culture No growth    Narrative:      Superficial head culture, aerobic, anaerobic, fungus, AFB,  gram    Culture, Anaerobic [[id number]] Collected: 08/22/22 1550    Order Status: Completed Specimen: Incision site from Head Updated: 08/29/22 0909     Anaerobic Culture Culture in progress    AFB Culture & Smear [[id number]] Collected: 08/27/22 0912    Order Status: Completed Specimen: Wound from Head Updated: 08/28/22 2127     AFB Culture & Smear Culture in progress    Narrative:      Epidural culture, aerobic, anaerobic, gram, AFB, gram    AFB Culture & Smear [[id number]] Collected: 08/27/22 0909    Order Status: Completed Specimen: Wound from Head Updated: 08/28/22 2127     AFB Culture & Smear Culture in progress    Narrative:      Superficial head culture, aerobic, anaerobic, fungus, AFB,  gram    Culture, Anaerobic [[id number]] Collected: 08/27/22 0909    Order Status: Completed Specimen: Wound from Head Updated: 08/28/22 1154     Anaerobic Culture Culture in  progress    Narrative:      Superficial head culture, aerobic, anaerobic, fungus, AFB,  gram    Culture, Anaerobic [[id number]] Collected: 08/27/22 0912    Order Status: Completed Specimen: Wound from Head Updated: 08/28/22 1154     Anaerobic Culture Culture in progress    Narrative:      Epidural culture, aerobic, anaerobic, gram, AFB, gram    Gram stain [[id number]] Collected: 08/27/22 0912    Order Status: Completed Specimen: Wound from Head Updated: 08/27/22 1044     Gram Stain Result No WBC's      No organisms seen    Narrative:      Epidural culture, aerobic, anaerobic, gram, AFB, gram    Gram stain [[id number]] Collected: 08/27/22 0909    Order Status: Completed Specimen: Wound from Head Updated: 08/27/22 1043     Gram Stain Result Rare WBC's      No organisms seen    Narrative:      Superficial head culture, aerobic, anaerobic, fungus, AFB,  gram    Fungus culture [[id number]] Collected: 08/27/22 0909    Order Status: Sent Specimen: Wound from Head Updated: 08/27/22 1003    Fungus culture [[id number]] Collected: 08/27/22 0912    Order Status: Sent Specimen: Wound from Head Updated: 08/27/22 1001    Blood culture [[id number]] Collected: 08/21/22 2325    Order Status: Completed Specimen: Blood from Peripheral, Antecubital, Right Updated: 08/27/22 0612     Blood Culture, Routine No growth after 5 days.    Aerobic culture [[id number]] Collected: 08/22/22 1550    Order Status: Completed Specimen: Incision site from Head Updated: 08/26/22 1029     Aerobic Bacterial Culture No growth    Gram stain [[id number]] Collected: 08/22/22 1550    Order Status: Completed Specimen: Incision site from Head Updated: 08/25/22 1447     Gram Stain Result No organisms seen      Rare WBC's    Gram stain [[id number]]     Order Status: Completed Specimen: Incision site from Head     AFB Culture & Smear [[id number]] Collected: 08/22/22 1550    Order Status: Completed Specimen: Incision site from Head Updated: 08/23/22 2127     AFB Culture &  Smear Culture in progress     AFB CULTURE STAIN No acid fast bacilli seen.    Fungus culture [[id number]] Collected: 08/22/22 1550    Order Status: No result Specimen: Incision site from Head Updated: 08/23/22 0930    Aerobic culture [[id number]]     Order Status: Completed Specimen: Incision site from Head     Culture, Anaerobe [[id number]]     Order Status: Completed Specimen: Body Fluid from Head     Fungus culture [[id number]]     Order Status: Completed Specimen: Body Fluid from Head     Fungus culture [[id number]]     Order Status: Canceled Specimen: Body Fluid from Head     Culture, Anaerobe [[id number]]     Order Status: Canceled Specimen: Body Fluid from Head     Aerobic culture [[id number]]     Order Status: Canceled Specimen: Incision site from Head     AFB Culture & Smear [[id number]]     Order Status: Canceled Specimen: Body Fluid from Head           All pertinent labs from the last 24 hours have been reviewed.    Significant Diagnostics:  I have reviewed all pertinent imaging results/findings within the past 24 hours.  No results found in the last 24 hours.    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 Diaz who presented after episode of sudden onset R-sided weakness/tremors and aphasia for about 5 minutes, resolved prior to EMS arrival, concerning for focal seizure, then with second episode while in the ED. MRI brain w/wo concerning for scalp and epidural infection.    Now s/p L crani revision for wound washout.     - Admitted to [facility] with q1h neurochecks.  - all labs and diagnostics reviewed          - CTA 8/21: largely stable from prior with epidural fluid collection and extracranial fluid collection stable in size; stable postsurgical changes of L PCOM/anterior choroidal aneurysm clipping, stable small left MCA bifurcation aneurysm          - MRI  brain w wo 8/21: peripheral rim enhancement of subdural and scalp complex fluid collections, concerning for infection; no evidence of enhancement of brain parenchyma   - spot EEG 8/23: mild regional cortical or subcortical dysfunction in the left temporal region with no electrographic seizures or indications of seizure tendency.   - CTH 8/28: Residual mixed density collection overlies the soft tissues as well as within the extra-axial space  underlying the craniotomy likely represents postoperative gas fluid and hemorrhage with residual infection not  Excluded. No new intracranial findings.    - CTH 8/29: worsening epidural and subcutaneous fluid collection with local mass effect on L frontal lobe           - Infectious workup:          - afebrile, no leukocytosis          - blood cx 8/21 NGTD          - ESR 30-->46, CRP 28.5-->26          - Subcutaneous scalp fluid collection tapped 8/22, sent for Cx's - NGTD          - ID consulted, recommend Vanc/Cefepime, continue to follow Cx's  - Seizures: Episodes concerning for focal seizures with R-sided tremors and weakness. Not on AEDs prior to admission.          - Keppra loaded on admission, continue 1g bid          - Likely provoked focal seizure activity due to cranial fluid collection; pt also with very elevated BG on admission, uncontrolled hyperglycemia may have been contributory to provoked seizure          - spot EEG 8/23 with left temporal subcortical dysfunction, negative for electrographic seizures  - New Onset T2DM: No h/o DM. Hyperglycemic on admission. HbA1C 8.3. Endocrinology consulted. Goal -180.     - Levemir 30 untis daily First dose this Am. 0.7 u/kg/d dosing.      - Novolog 10 units TIDWM. Basal/Prandial physiologic matching.       - Moderate Dose SQ Insulin Correction Scale.     - BG Monitoring AC/HS.           - Bedside nurse to instruct on insulin pen use and blood sugar monitor. Have patient administer own injections after education  completed supervised by nurse. Have patient watch insulin educatoin video's via i-pad if available on unit.  - HTN: SBP <160. Controlled on current regimen. Continue home meds.  - Hypokalemia, Hypomagnesemia: Chronic, on home supplements daily.          - Resume home potassium and Mg supplements          - Replete PRN, daily labs  - regular diet  - HAIM/SCD/SQH  - PT/OT/OOB     Dispo: ongoing        Hipolito Darling MD  Neurosurgery  [facility] - Neuro Critical Care

## 2022-08-29 NOTE — ASSESSMENT & PLAN NOTE
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 Diaz who p/w aphasia & RSW on 8/21/2022 and subsequently given IV Levetiracetam 1500 mg x1 followed by 1g BID due to concern for seizure. MRI Brain WWO read as peripheral rim enhancement of subdural and scalp complex fluid collections. Cefepime and Vancomycin started per ID recs. Patient now s/p L cranial wound debridement and washout on 8/27 and admitted to [facility] for postoperative monitoring. Patient noted to have word finding difficulty and increased lethargy 8/29. Repeat CTH today with increased fluid collection. EEG placed due to concern for seizure. Epilepsy following for assistance in management.     Recommendations:  - Continuous vEEG monitoring; prelim- encephalopathy, no epileptiform activity (full EEG report to follow)  - Currently on Levetiracetam 1g BID  - Avoid agents that lower seizure threshold  - Management of infectious/metabolic abnormalities per NCC  - Management of fluid collection per NSGY  - Seizure precautions      Discussed plan of care with NCC team. No family at bedside. Will follow, please call with questions.

## 2022-08-29 NOTE — PROGRESS NOTES
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 Diaz who presented with sudden onset of symptoms concerning for focal seizure, aphasia and RSW. Each episode resolved on its own. NSGY was consulted and she was taken for a wound washout and evacuation of fluid collection. Will admit to [facility] post op.      Hospital Course: 8/28/22: step down to NSGY  8/27/22: EEG pending        Interval History:  This am patient more delayed in answering question, disoriented, CTH showed increasing fluid collection, EEG pending    Review of Systems   Constitutional: Negative.    HENT: Negative.     Eyes: Negative.    Respiratory: Negative.     Cardiovascular: Negative.    Gastrointestinal: Negative.    Endocrine: Negative.    Genitourinary: Negative.    Musculoskeletal: Negative.    Skin: Negative.    Neurological:  Positive for headaches.      Objective:     Vitals:  Temp: 99.1 °F (37.3 °C)  Pulse: 81  Rhythm: normal sinus rhythm  BP: 124/71  MAP (mmHg): 80  Resp: (!) 21  SpO2: 97 %  O2 Device (Oxygen Therapy): nasal cannula    Temp  Min: 98.4 °F (36.9 °C)  Max: 99.2 °F (37.3 °C)  Pulse  Min: 67  Max: 86  BP  Min: 101/59  Max: 155/80  MAP (mmHg)  Min: 75  Max: 109  Resp  Min: 13  Max: 33  SpO2  Min: 92 %  Max: 97 %    08/28 0701 - 08/29 0700  In: 691.3   Out: 800 [Urine:800]   Unmeasured Output  Urine Occurrence: 1  Stool Occurrence: 0       Physical Exam  Vitals and nursing note reviewed.   Constitutional:       Appearance: She is ill-appearing.   HENT:      Nose: Nose normal.      Mouth/Throat:      Mouth: Mucous membranes are moist.      Pharynx: Oropharynx is clear.   Cardiovascular:      Rate and  Rhythm: Normal rate and regular rhythm.      Pulses: Normal pulses.      Heart sounds: Normal heart sounds.   Pulmonary:      Effort: Pulmonary effort is normal.      Breath sounds: Normal breath sounds.   Abdominal:      General: Bowel sounds are normal.      Palpations: Abdomen is soft.   Musculoskeletal:         General: Normal range of motion.      Cervical back: Normal range of motion.   Skin:     General: Skin is warm and dry.      Capillary Refill: Capillary refill takes less than 2 seconds.   Neurological:      Comments: E 4 V 5 M 6  -Alert. Oriented to person,    Speech delayed repeats name . Follows commands.   -Strength 5 and symmetric in arms, legs  -Sensation intact to touch in arms, legs     Unable to test orientation, language, memory, judgment, insight, fund of knowledge, hearing, shoulder shrug, tongue protrusion, coordination, gait due to level of consciousness.    Medications:  Continuous Scheduledamitriptyline, 75 mg, QHS  amLODIPine, 10 mg, Daily  atorvastatin, 40 mg, Daily  carvediloL, 37.5 mg, BID  ceFEPime (MAXIPIME) IVPB, 2 g, Q8H  EScitalopram oxalate, 10 mg, Daily  heparin (porcine), 5,000 Units, Q8H  insulin aspart U-100, 8 Units, TIDWM  insulin detemir U-100, 30 Units, Daily  levetiracetam IV, 1,000 mg, Q12H  magnesium oxide, 400 mg, BID  mupirocin, , BID  vancomycin (VANCOCIN) IVPB, 750 mg, Q12H    PRNacetaminophen, 650 mg, Q6H PRN  albuterol, 2 puff, Q20 Min PRN  dextrose 10%, 12.5 g, PRN  dextrose 10%, 25 g, PRN  glucagon (human recombinant), 1 mg, PRN  glucose, 16 g, PRN  glucose, 24 g, PRN  hydrALAZINE, 10 mg, Q6H PRN  insulin aspart U-100, 1-10 Units, QID (AC + HS) PRN  labetalol, 10 mg, Q6H PRN  magnesium oxide, 800 mg, PRN  magnesium oxide, 800 mg, PRN  melatonin, 6 mg, Nightly PRN  oxyCODONE, 5 mg, Q4H PRN  oxyCODONE, 10 mg, Q4H PRN  potassium bicarbonate, 35 mEq, PRN  potassium bicarbonate, 50 mEq, PRN  potassium bicarbonate, 60 mEq, PRN  potassium, sodium phosphates, 2 packet,  PRN  potassium, sodium phosphates, 2 packet, PRN  potassium, sodium phosphates, 2 packet, PRN  vancomycin - pharmacy to dose, , pharmacy to manage frequency      Today I personally reviewed pertinent medications, lines/drains/airways, imaging, cardiology results, laboratory results, microbiology results, notably:    Diet  Diet diabetic [facility]; 2000 Calorie; Isolation Tray - Regular [place]  Diet diabetic [facility]; 2000 Calorie; Isolation Tray - Regular [place]          Assessment/Plan:     Neuro  Transient neurological symptoms  Resolved    Cardiac/Vascular  Mixed hyperlipidemia  Continue atorvastatin    Essential hypertension  SBP Goal < 160  Continue home BP meds    Endocrine  New onset type 2 diabetes mellitus  Continue scheduled insulin and SSI  Diabetic diet    Other  Fluid collection at surgical site  S/p washout 8/27  CTH revealed increase fluid collection NSGY aware  EEG pending          The patient is being Prophylaxed for:  Venous Thromboembolism with: Mechanical or Chemical  Stress Ulcer with: Not Applicable   Ventilator Pneumonia with: not applicable    Activity Orders          Diet diabetic [facility]; 2000 Calorie; Isolation Tray - Regular [place]: Diabetic starting at 08/27 1135        Full Code  Critical care time 45 mins  Diamond Loera NP  Neurocritical Care  [facility] - Neuro Critical Care

## 2022-08-29 NOTE — ASSESSMENT & PLAN NOTE
Hx of  - On home Amlodipine 10 mg daily and Carvedilol 37.5 mg BID, PRNs ordered  - Management per NCC

## 2022-08-29 NOTE — ASSESSMENT & PLAN NOTE
BG goal 140 - 180     - Continue Levemir 30 untis daily   - Decrease Novolog to 8 units TIDWM. (20% dose decrease) BG below goal ranges.   - Moderate Dose SQ Insulin Correction Scale.  - BG Monitoring AC/HS.   - Bedside nurse to instruct on insulin pen use and blood sugar monitor. Have patient administer own injections after education completed supervised by nurse. Have patient watch insulin educatoin video's via i-pad if available on unit.      ** Please call Endocrine for any BG related issues **  ** Please notify Endocrine for any change and/or advance in diet**    Lab Results   Component Value Date    HGBA1C 8.3 (H) 08/22/2022       Discharge Planning:   TBD. Please notify endocrinology prior to discharge.

## 2022-08-29 NOTE — PLAN OF CARE
[facility] - Neuro Critical Care  Discharge Reassessment    Primary Care Provider: Clair Johnson NP    Expected Discharge Date: 9/7/2022    Per MD: Interval History: 8/29: neuro stable, taken for CTH later this morning for concern for word finding difficulties, CTH showed worsening epidural and subcutaneous fluid collection from prior.    Not medically ready for discharge.  Awaiting therapy recs for needs.       Reassessment (most recent)       Discharge Reassessment - 08/29/22 6196          Discharge Reassessment    Assessment Type Discharge Planning Reassessment     Did the patient's condition or plan change since previous assessment? No     Communicated CADY with patient/caregiver Date not available/Unable to determine     Discharge Plan A Rehab     Discharge Plan B Home Health     DME Needed Upon Discharge  other (see comments)   tbd    Discharge Barriers Identified Underinsured     Why the patient remains in the hospital Requires continued medical care                     Melinda Swanson RN, CCRN-K, [facility]  Neuro-Critical Care   [phone]

## 2022-08-29 NOTE — PLAN OF CARE
[facility] Care Plan    POC reviewed with Gina Jenkins at 0300. Pt verbalized understanding. Questions and concerns addressed. No acute events overnight. Pt progressing toward goals. Will continue to monitor. See below and flowsheets for full assessment and VS info.     -PRN oxy given for pain management.      Is this a stroke patient? no    Neuro:  Danielle Coma Scale  Best Eye Response: 4-->(E4) spontaneous  Best Motor Response: 6-->(M6) obeys commands  Best Verbal Response: 5-->(V5) oriented  [place] Coma Scale Score: 15  Assessment Qualifiers: patient not sedated/intubated, no eye obstruction present  Pupil PERRLA: yes     24hr Temp:  [98.4 °F (36.9 °C)-98.9 °F (37.2 °C)]     CV:   Rhythm: normal sinus rhythm  BP goals:   SBP < 160  MAP > 65    Resp:   O2 Device (Oxygen Therapy): nasal cannula       Plan: N/A    GI/:     Diet/Nutrition Received: consistent carb/diabetic diet  Last Bowel Movement: 08/25/22  Voiding Characteristics: external catheter    Intake/Output Summary (Last 24 hours) at 8/29/2022 0314  Last data filed at 8/28/2022 2240  Gross per 24 hour   Intake 145.59 ml   Output 1300 ml   Net -1154.41 ml     Unmeasured Output  Urine Occurrence: 1  Stool Occurrence: 0    Labs/Accuchecks:  Recent Labs   Lab 08/29/22  0256   WBC 7.45   RBC 4.17   HGB 12.0   HCT 35.0*         Recent Labs   Lab 08/28/22  0036   *   K 3.7   CO2 22*      BUN 11   CREATININE 1.0      Recent Labs   Lab 08/22/22  1420   APTT 24.0    No results for input(s): CPK, CPKMB, TROPONINI, MB in the last 168 hours.    Electrolytes: N/A - electrolytes WDL  Accuchecks: ACHS    Gtts:      LDA/Wounds:  Lines/Drains/Airways       Drain  Duration                  Closed/Suction Drain 08/27/22 0941 Left Scalp Other (Comment) 1 day    Female External Urinary Catheter 08/27/22 1045 1 day              Peripheral Intravenous Line  Duration                  Peripheral IV - Single Lumen 08/27/22 0806 18 G Right Forearm 1 day          Peripheral IV - Single Lumen 08/27/22 1017 18 G Right Forearm 1 day                  Wounds: No  Wound care consulted: No

## 2022-08-29 NOTE — SUBJECTIVE & OBJECTIVE
"Interval HPI:   Overnight events: POD 2. BG at or slightly below goal ranges on current SQ insulin regimen. Diet diabetic [facility];  Calorie; Isolation Tray - Regular [place]    Eatin-75%  Nausea: No  Hypoglycemia and intervention: No  Fever: No  TPN and/or TF: No  If yes, type of TF/TPN and rate: n/a    /66 (BP Location: Left arm, Patient Position: Lying)   Pulse 79   Temp 99.1 °F (37.3 °C) (Oral)   Resp (!) 22   Ht 5' 3" (1.6 m)   Wt 88.5 kg (195 lb)   SpO2 (!) 92%   Breastfeeding No   BMI 34.54 kg/m²     Labs Reviewed and Include    Recent Labs   Lab 22  0256   GLU 99   CALCIUM 10.0      K 4.8   CO2 20*      BUN 15   CREATININE 1.2     Lab Results   Component Value Date    WBC 7.45 2022    HGB 12.0 2022    HCT 35.0 (L) 2022    MCV 84 2022     2022     No results for input(s): TSH, FREET4 in the last 168 hours.  Lab Results   Component Value Date    HGBA1C 8.3 (H) 2022       Nutritional status:   Body mass index is 34.54 kg/m².  Lab Results   Component Value Date    ALBUMIN 3.4 (L) 2022    ALBUMIN 3.2 (L) 2022    ALBUMIN 3.4 (L) 2022     No results found for: PREALBUMIN    Estimated Creatinine Clearance: 61.8 mL/min (based on SCr of 1.2 mg/dL).    Accu-Checks  Recent Labs     22  1141 22  1706 22  2253 22  0712 22  1206 22  1657 22  2211 22  0737 22  0826 22  1155   POCTGLUCOSE 285* 139* 186* 170* 169* 166* 97 120* 130* 103       Current Medications and/or Treatments Impacting Glycemic Control  Immunotherapy:    Immunosuppressants       None          Steroids:   Hormones (From admission, onward)      Start     Stop Route Frequency Ordered    08/22/22 0313  melatonin tablet 6 mg         -- Oral Nightly PRN 22          Pressors:    Autonomic Drugs (From admission, onward)      None          Hyperglycemia/Diabetes Medications: "   Antihyperglycemics (From admission, onward)      Start     Stop Route Frequency Ordered    08/29/22 1215  insulin aspart U-100 pen 8 Units         -- SubQ 3 times daily with meals 08/29/22 1202    08/24/22 1109  insulin aspart U-100 pen 1-10 Units         -- SubQ Before meals & nightly PRN 08/24/22 1010    08/24/22 1030  insulin detemir U-100 pen 30 Units         -- SubQ Daily 08/24/22 1010

## 2022-08-29 NOTE — PLAN OF CARE
POC reviewed with Gina Jenkins and family at 1400. Pt verbalized understanding. Questions and concerns addressed. Pt progressing toward goals. Will continue to monitor. See below and flowsheets for full assessment and VS info.     -Refer to RN's previous notes for today's events  -CTH completed and cEEG in progress  -Repeat CTH scheduled for 0200 tonight      Neuro:  Danielle Coma Scale  Best Eye Response: 3-->(E3) to speech  Best Motor Response: 6-->(M6) obeys commands  Best Verbal Response: 4-->(V4) confused  Danielle Coma Scale Score: 13  Assessment Qualifiers: no eye obstruction present, patient not sedated/intubated  Pupil PERRLA: yes     24 hr Temp:  [98.4 °F (36.9 °C)-99.2 °F (37.3 °C)]     CV:   Rhythm: normal sinus rhythm  BP goals:   SBP < 160  MAP > 65    Resp:   O2 Device (Oxygen Therapy): nasal cannula w/ humidification       Plan: N/A    GI/:     Diet/Nutrition Received: consistent carb/diabetic diet  Last Bowel Movement: 08/25/22  Voiding Characteristics: external catheter    Intake/Output Summary (Last 24 hours) at 8/29/2022 1612  Last data filed at 8/29/2022 1600  Gross per 24 hour   Intake 1082.17 ml   Output 550 ml   Net 532.17 ml     Unmeasured Output  Urine Occurrence: 1  Stool Occurrence: 0    Labs/Accuchecks:  Recent Labs   Lab 08/29/22  0256   WBC 7.45   RBC 4.17   HGB 12.0   HCT 35.0*         Recent Labs   Lab 08/29/22  0256      K 4.8   CO2 20*      BUN 15   CREATININE 1.2    No results for input(s): PROTIME, INR, APTT, HEPANTIXA in the last 168 hours. No results for input(s): CPK, CPKMB, TROPONINI, MB in the last 168 hours.    Electrolytes: N/A - electrolytes WDL  Accuchecks: ACHS    Gtts:      LDA/Wounds:  Lines/Drains/Airways       Drain  Duration                  Closed/Suction Drain 08/27/22 0941 Left Scalp Other (Comment) 2 days    Female External Urinary Catheter 08/27/22 1045 2 days              Peripheral Intravenous Line  Duration                   Peripheral IV - Single Lumen 08/27/22 0806 18 G Right Forearm 2 days         Peripheral IV - Single Lumen 08/27/22 1017 18 G Right Forearm 2 days                  Wounds: Yes  Wound care consulted: No

## 2022-08-29 NOTE — BRIEF OP NOTE
[facility] - [facility] Critical [facility]  Neurosurgery  Operative Note    SUMMARY      Date of Procedure: 8/27/2022     Procedure: Procedure(s) (LRB):  LEFT Cranial wound debridement and washout (Left)  DEBRIDEMENT, WOUND     Surgeon(s) and Role:     * Timothy Diaz MD - Primary     * Hipolito Darling MD - Resident - Assisting        Pre-Operative Diagnosis: Infection of superficial incisional surgical site after procedure, initial encounter [T81.41XA]    Post-Operative Diagnosis: Post-Op Diagnosis Codes:     * Infection of superficial incisional surgical site after procedure, initial encounter [T81.41XA]    Anesthesia: General    Operative Findings :  Encapsulated hematoma, no evidence of purulence    Indication for the procedure:  Ms. Jenkins, Is a 47-year-old female with a past medical history of hypertension, hyperlipidemia, and smoking.  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  She had presented with some concern for encapsulated hematoma versus infection underneath the epidural and subgaleal space.  MRI was concerning for enhancement along the subgaleal and epidural region with no clear or phoebe purulence.  Given the persistence of the extra-axial collection decision made for wound washout and revision of the incision.  The patient and family were well appraised of all risks, benefits, alternative procedure including not limited to heart attack coma, stroke, infection, bleeding, paralysis, even death.  Informed consent was obtained and secured in chart after the patient's family voiced understanding of these risks and decided proceed with the procedure.    Description of Procedures:     Patient brought to the operative theatre.  She was intubated by anesthesia team.  Preoperative prophylactic IV antibiotics were given.  She was placed in a Whitehead horseshoe, with a roll placed under her left shoulder.  With the head  turned to the right.  The previous incision was outlined.  The hair was clipped in the area of the previous incision.  The in incision was then pre prepped utilizing alcohol.  The patient then prepped and draped in a standard sterile fashion.  The skin was then opened sharply in the region of the previous incision.  Kirby for then placed for retraction.  A superficial subgaleal culture was then sent.  There was no evidence of any underlying purulent material.  There was some evidence of encapsulated aged hematoma.  Which was debrided.  And also sent for pathology.  The cavity was then irrigated with copious irrigation to perfectly clear.  Hemostasis achieved with combination of bipolar monopolar cautery.  We then removed the 4 mm screws with the previous plate was.  And then in the epidural space examined the tissue there is again no evidence of any purulence could be identified.  There was some evidence of reactive tissue along the dural substitute.  This was sent for pathology.  The cavity was then also irrigated with copious irrigation to perfectly clear.  The titanium plates were then replaced with 5 mm screws and rescue screws.  The cavity was then again irrigated and with copious irrigation to perfectly clear.  Vancomycin powder was then placed in the subgaleal space.  A Hemovac drain was placed and then tunneled posteriorly.  And secured utilizing sutures.  The galea and temporalis fascia was then reapproximated utilizing 2-0 Vicryl. The skin was then reapproximated utilizing to-0 nylon sutures in a horizontal mattress fashion.  The patient tolerated procedure well.  All sponge needle counts were correct at the end procedure x2.  She is transferred to the PACU in stable neurologic condition.    Estimated Blood Loss (EBL): 80 mL           Specimens: epidural inflammatory tissue to path. Cultures to microbiology   Specimen (24h ago, onward)      None             Implants:   Implant Name Type Inv. Item Serial  No.  Lot No. LRB No. Used Action   Faisal 1.5 mm self drilling screw     FAISAL  Left 3 Implanted   Marshall 1.5 mm self drilling screw    FAISAL  Left 5 Implanted              Condition: Good    Disposition: ICU - extubated and stable.    Attestation: I was present and scrubbed for the entire procedure.

## 2022-08-29 NOTE — HPI
47 year old female with a PMHx of HTN, HLD, smoker, recent left Pcomm and anterior choroidal artery aneurysm s/p elective craniotomy for clipping on 7/15/2022 by Dr. Diaz who initially presented 8/21/2022 for aphasia and right sided weakness. HPI per chart review due to patient's mental status. Patient given IV Levetiracetam 1500 mg x1 followed by 1g BID due to concern for seizure. MRI Brain WWO read as peripheral rim enhancement of subdural and scalp complex fluid collections. ID consulted and recommended treatment with Cefepime and Vancomycin. EEG 8/23 with mild regional cortical or subcortical dysfunction in left temporal region, no electrographic seizures or indications of seizure tendency. Patient is now s/p left cranial wound debridement and washout on 8/27 and admitted to [facility] for postoperative monitoring. Patient noted to have word finding difficulty and increased lethargy 8/29. Repeat CTH 8/29 read as interval increased sized hypodense collections within the soft tissues overlying the craniotomy and underlying the craniotomy, may represent evolving postoperative seroma with pseudomeningocele not excluded, alternatively enlarging infectious collection to be considered. EEG placed due to concern for seizure. Epilepsy following for assistance in management.

## 2022-08-30 PROBLEM — G93.5 BRAIN COMPRESSION: Status: ACTIVE | Noted: 2022-08-30

## 2022-08-30 PROBLEM — G93.40 ENCEPHALOPATHY: Status: ACTIVE | Noted: 2022-08-30

## 2022-08-30 LAB
ANION GAP SERPL CALC-SCNC: 14 MMOL/L (ref 8–16)
BACTERIA SPEC AEROBE CULT: NO GROWTH
BACTERIA SPEC AEROBE CULT: NO GROWTH
BACTERIA SPEC ANAEROBE CULT: NORMAL
BASOPHILS # BLD AUTO: 0.04 K/UL (ref 0–0.2)
BASOPHILS NFR BLD: 0.5 % (ref 0–1.9)
BUN SERPL-MCNC: 13 MG/DL (ref 6–20)
CALCIUM SERPL-MCNC: 9.8 MG/DL (ref 8.7–10.5)
CHLORIDE SERPL-SCNC: 105 MMOL/L (ref 95–110)
CO2 SERPL-SCNC: 21 MMOL/L (ref 23–29)
CREAT SERPL-MCNC: 1.3 MG/DL (ref 0.5–1.4)
DIFFERENTIAL METHOD: ABNORMAL
EOSINOPHIL # BLD AUTO: 0.3 K/UL (ref 0–0.5)
EOSINOPHIL NFR BLD: 3.7 % (ref 0–8)
ERYTHROCYTE [DISTWIDTH] IN BLOOD BY AUTOMATED COUNT: 14 % (ref 11.5–14.5)
EST. GFR  (NO RACE VARIABLE): 51.4 ML/MIN/1.73 M^2
GLUCOSE SERPL-MCNC: 106 MG/DL (ref 70–110)
HCT VFR BLD AUTO: 31.9 % (ref 37–48.5)
HGB BLD-MCNC: 10.7 G/DL (ref 12–16)
IMM GRANULOCYTES # BLD AUTO: 0.04 K/UL (ref 0–0.04)
IMM GRANULOCYTES NFR BLD AUTO: 0.5 % (ref 0–0.5)
LYMPHOCYTES # BLD AUTO: 1.5 K/UL (ref 1–4.8)
LYMPHOCYTES NFR BLD: 20.7 % (ref 18–48)
MAGNESIUM SERPL-MCNC: 2 MG/DL (ref 1.6–2.6)
MCH RBC QN AUTO: 28.9 PG (ref 27–31)
MCHC RBC AUTO-ENTMCNC: 33.5 G/DL (ref 32–36)
MCV RBC AUTO: 86 FL (ref 82–98)
MONOCYTES # BLD AUTO: 0.9 K/UL (ref 0.3–1)
MONOCYTES NFR BLD: 12.8 % (ref 4–15)
NEUTROPHILS # BLD AUTO: 4.5 K/UL (ref 1.8–7.7)
NEUTROPHILS NFR BLD: 61.8 % (ref 38–73)
NRBC BLD-RTO: 0 /100 WBC
PHOSPHATE SERPL-MCNC: 3.3 MG/DL (ref 2.7–4.5)
PLATELET # BLD AUTO: 213 K/UL (ref 150–450)
PMV BLD AUTO: 11.1 FL (ref 9.2–12.9)
POCT GLUCOSE: 117 MG/DL (ref 70–110)
POCT GLUCOSE: 123 MG/DL (ref 70–110)
POCT GLUCOSE: 126 MG/DL (ref 70–110)
POCT GLUCOSE: 145 MG/DL (ref 70–110)
POTASSIUM SERPL-SCNC: 3.9 MMOL/L (ref 3.5–5.1)
RBC # BLD AUTO: 3.7 M/UL (ref 4–5.4)
SODIUM SERPL-SCNC: 140 MMOL/L (ref 136–145)
VANCOMYCIN SERPL-MCNC: 17.2 UG/ML
VANCOMYCIN TROUGH SERPL-MCNC: 26.1 UG/ML (ref 10–22)
WBC # BLD AUTO: 7.34 K/UL (ref 3.9–12.7)

## 2022-08-30 PROCEDURE — 83735 ASSAY OF MAGNESIUM: CPT | Performed by: PHYSICIAN ASSISTANT

## 2022-08-30 PROCEDURE — 99233 PR SUBSEQUENT HOSPITAL CARE,LEVL III: ICD-10-PCS | Mod: FS,,, | Performed by: PHYSICIAN ASSISTANT

## 2022-08-30 PROCEDURE — 25000003 PHARM REV CODE 250: Performed by: STUDENT IN AN ORGANIZED HEALTH CARE EDUCATION/TRAINING PROGRAM

## 2022-08-30 PROCEDURE — 63600175 PHARM REV CODE 636 W HCPCS: Performed by: PSYCHIATRY & NEUROLOGY

## 2022-08-30 PROCEDURE — 99233 SBSQ HOSP IP/OBS HIGH 50: CPT | Mod: ,,, | Performed by: PHYSICIAN ASSISTANT

## 2022-08-30 PROCEDURE — 25000003 PHARM REV CODE 250: Performed by: PSYCHIATRY & NEUROLOGY

## 2022-08-30 PROCEDURE — 95720 EEG PHY/QHP EA INCR W/VEEG: CPT | Mod: ,,, | Performed by: PSYCHIATRY & NEUROLOGY

## 2022-08-30 PROCEDURE — 80202 ASSAY OF VANCOMYCIN: CPT | Mod: 91 | Performed by: PSYCHIATRY & NEUROLOGY

## 2022-08-30 PROCEDURE — 25000003 PHARM REV CODE 250: Performed by: PHYSICIAN ASSISTANT

## 2022-08-30 PROCEDURE — 99024 PR POST-OP FOLLOW-UP VISIT: ICD-10-PCS | Mod: ,,, | Performed by: NEUROLOGICAL SURGERY

## 2022-08-30 PROCEDURE — 63600175 PHARM REV CODE 636 W HCPCS: Performed by: PHYSICIAN ASSISTANT

## 2022-08-30 PROCEDURE — 85025 COMPLETE CBC W/AUTO DIFF WBC: CPT | Performed by: STUDENT IN AN ORGANIZED HEALTH CARE EDUCATION/TRAINING PROGRAM

## 2022-08-30 PROCEDURE — 25000003 PHARM REV CODE 250: Performed by: NURSE PRACTITIONER

## 2022-08-30 PROCEDURE — 95720 PR EEG, W/VIDEO, CONT RECORD, I&R, >12<26 HRS: ICD-10-PCS | Mod: ,,, | Performed by: PSYCHIATRY & NEUROLOGY

## 2022-08-30 PROCEDURE — 99024 POSTOP FOLLOW-UP VISIT: CPT | Mod: ,,, | Performed by: NEUROLOGICAL SURGERY

## 2022-08-30 PROCEDURE — 94761 N-INVAS EAR/PLS OXIMETRY MLT: CPT

## 2022-08-30 PROCEDURE — 99232 PR SUBSEQUENT HOSPITAL CARE,LEVL II: ICD-10-PCS | Mod: ,,, | Performed by: NURSE PRACTITIONER

## 2022-08-30 PROCEDURE — 99233 SBSQ HOSP IP/OBS HIGH 50: CPT | Mod: ,,, | Performed by: PSYCHIATRY & NEUROLOGY

## 2022-08-30 PROCEDURE — 99291 CRITICAL CARE FIRST HOUR: CPT | Mod: ,,, | Performed by: PSYCHIATRY & NEUROLOGY

## 2022-08-30 PROCEDURE — 99232 SBSQ HOSP IP/OBS MODERATE 35: CPT | Mod: ,,, | Performed by: NURSE PRACTITIONER

## 2022-08-30 PROCEDURE — 25500020 PHARM REV CODE 255: Performed by: PSYCHIATRY & NEUROLOGY

## 2022-08-30 PROCEDURE — 99233 PR SUBSEQUENT HOSPITAL CARE,LEVL III: ICD-10-PCS | Mod: ,,, | Performed by: PSYCHIATRY & NEUROLOGY

## 2022-08-30 PROCEDURE — 63600175 PHARM REV CODE 636 W HCPCS: Performed by: STUDENT IN AN ORGANIZED HEALTH CARE EDUCATION/TRAINING PROGRAM

## 2022-08-30 PROCEDURE — 99233 SBSQ HOSP IP/OBS HIGH 50: CPT | Mod: FS,,, | Performed by: PHYSICIAN ASSISTANT

## 2022-08-30 PROCEDURE — 80202 ASSAY OF VANCOMYCIN: CPT | Performed by: INTERNAL MEDICINE

## 2022-08-30 PROCEDURE — 99233 PR SUBSEQUENT HOSPITAL CARE,LEVL III: ICD-10-PCS | Mod: ,,, | Performed by: PHYSICIAN ASSISTANT

## 2022-08-30 PROCEDURE — 63600175 PHARM REV CODE 636 W HCPCS: Performed by: NURSE PRACTITIONER

## 2022-08-30 PROCEDURE — 20000000 HC ICU ROOM

## 2022-08-30 PROCEDURE — 80048 BASIC METABOLIC PNL TOTAL CA: CPT | Performed by: STUDENT IN AN ORGANIZED HEALTH CARE EDUCATION/TRAINING PROGRAM

## 2022-08-30 PROCEDURE — 99900035 HC TECH TIME PER 15 MIN (STAT)

## 2022-08-30 PROCEDURE — 95714 VEEG EA 12-26 HR UNMNTR: CPT

## 2022-08-30 PROCEDURE — 99291 PR CRITICAL CARE, E/M 30-74 MINUTES: ICD-10-PCS | Mod: ,,, | Performed by: PSYCHIATRY & NEUROLOGY

## 2022-08-30 PROCEDURE — 84100 ASSAY OF PHOSPHORUS: CPT | Performed by: PHYSICIAN ASSISTANT

## 2022-08-30 RX ORDER — VANCOMYCIN HCL IN 5 % DEXTROSE 1G/250ML
1000 PLASTIC BAG, INJECTION (ML) INTRAVENOUS
Status: DISCONTINUED | OUTPATIENT
Start: 2022-08-30 | End: 2022-08-30

## 2022-08-30 RX ORDER — AMOXICILLIN 250 MG
1 CAPSULE ORAL DAILY
Status: DISCONTINUED | OUTPATIENT
Start: 2022-08-31 | End: 2022-09-02 | Stop reason: HOSPADM

## 2022-08-30 RX ADMIN — CARVEDILOL 37.5 MG: 25 TABLET, FILM COATED ORAL at 09:08

## 2022-08-30 RX ADMIN — INSULIN ASPART 8 UNITS: 100 INJECTION, SOLUTION INTRAVENOUS; SUBCUTANEOUS at 04:08

## 2022-08-30 RX ADMIN — INSULIN ASPART 8 UNITS: 100 INJECTION, SOLUTION INTRAVENOUS; SUBCUTANEOUS at 06:08

## 2022-08-30 RX ADMIN — ACETAMINOPHEN 650 MG: 325 TABLET ORAL at 10:08

## 2022-08-30 RX ADMIN — OXYCODONE 5 MG: 5 TABLET ORAL at 10:08

## 2022-08-30 RX ADMIN — ACETAMINOPHEN 650 MG: 325 TABLET ORAL at 03:08

## 2022-08-30 RX ADMIN — INSULIN ASPART 8 UNITS: 100 INJECTION, SOLUTION INTRAVENOUS; SUBCUTANEOUS at 11:08

## 2022-08-30 RX ADMIN — HEPARIN SODIUM 5000 UNITS: 5000 INJECTION INTRAVENOUS; SUBCUTANEOUS at 05:08

## 2022-08-30 RX ADMIN — Medication 400 MG: at 09:08

## 2022-08-30 RX ADMIN — LEVETIRACETAM 1000 MG: 100 INJECTION, SOLUTION INTRAVENOUS at 09:08

## 2022-08-30 RX ADMIN — AMITRIPTYLINE HYDROCHLORIDE 75 MG: 50 TABLET, FILM COATED ORAL at 08:08

## 2022-08-30 RX ADMIN — AMLODIPINE BESYLATE 10 MG: 10 TABLET ORAL at 09:08

## 2022-08-30 RX ADMIN — Medication 400 MG: at 08:08

## 2022-08-30 RX ADMIN — VANCOMYCIN HYDROCHLORIDE 1250 MG: 1.25 INJECTION, POWDER, LYOPHILIZED, FOR SOLUTION INTRAVENOUS at 02:08

## 2022-08-30 RX ADMIN — HEPARIN SODIUM 5000 UNITS: 5000 INJECTION INTRAVENOUS; SUBCUTANEOUS at 01:08

## 2022-08-30 RX ADMIN — MUPIROCIN: 20 OINTMENT TOPICAL at 09:08

## 2022-08-30 RX ADMIN — HEPARIN SODIUM 5000 UNITS: 5000 INJECTION INTRAVENOUS; SUBCUTANEOUS at 09:08

## 2022-08-30 RX ADMIN — CEFTAZIDIME 2 G: 2 INJECTION, POWDER, FOR SOLUTION INTRAVENOUS at 03:08

## 2022-08-30 RX ADMIN — MUPIROCIN: 20 OINTMENT TOPICAL at 08:08

## 2022-08-30 RX ADMIN — ATORVASTATIN CALCIUM 40 MG: 40 TABLET, FILM COATED ORAL at 09:08

## 2022-08-30 RX ADMIN — OXYCODONE 5 MG: 5 TABLET ORAL at 06:08

## 2022-08-30 RX ADMIN — ESCITALOPRAM OXALATE 10 MG: 10 TABLET ORAL at 09:08

## 2022-08-30 RX ADMIN — CARVEDILOL 37.5 MG: 25 TABLET, FILM COATED ORAL at 08:08

## 2022-08-30 RX ADMIN — LEVETIRACETAM 1000 MG: 100 INJECTION, SOLUTION INTRAVENOUS at 08:08

## 2022-08-30 RX ADMIN — CEFTAZIDIME 2 G: 2 INJECTION, POWDER, FOR SOLUTION INTRAVENOUS at 06:08

## 2022-08-30 RX ADMIN — IOHEXOL 100 ML: 350 INJECTION, SOLUTION INTRAVENOUS at 12:08

## 2022-08-30 NOTE — ASSESSMENT & PLAN NOTE
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 Diaz who p/w aphasia and RSW on 8/21 and subsequently given IV Levetiracetam 1500 mg x1 followed by 1g BID due to concern for seizure. MRI Brain WWO read as peripheral rim enhancement of subdural and scalp complex fluid collections. Cefepime and Vancomycin started per ID recs. Patient now s/p L cranial wound debridement and wash on 8/27 and admitted to [facility] for postoperative monitoring. Patient noted to have increased lethargy and word finding difficulty 8/29-> repeat CTH with increased fluid collection. EEG placed d/t concern for seizure; Epilepsy following for assistance in management.    Recommendations:  - Continuous vEEG monitoring given patient's worsening mentation  - Recommend to continue Levetiracetam 1g BID  - Avoid agents that lower seizure threshold  - Cefepime d/c 8/29  - Management of fluid collection per NSGY  - Management of infectious/metabolic abnormalities per NCC  - Seizure precautions      Discussed plan of care with NCC team. No family at bedside. Will follow, please call with questions.

## 2022-08-30 NOTE — SUBJECTIVE & OBJECTIVE
Interval History:  Expressive asphasia this am, EEG negative for seizures, CTA  and perfusion studying pending. Neuro checks Q2 QHS.          Review of Systems   Constitutional: Negative.    HENT: Negative.     Eyes: Negative.    Respiratory: Negative.     Cardiovascular: Negative.    Gastrointestinal: Negative.    Endocrine: Negative.    Genitourinary: Negative.    Musculoskeletal: Negative.    Skin: Negative.    Neurological:  Positive for headaches.      Objective:     Vitals:  Temp: 97.9 °F (36.6 °C)  Pulse: 76  Rhythm: normal sinus rhythm  BP: 127/84  MAP (mmHg): 100  Resp: 19  SpO2: 98 %  O2 Device (Oxygen Therapy): room air    Temp  Min: 97.9 °F (36.6 °C)  Max: 99.3 °F (37.4 °C)  Pulse  Min: 75  Max: 90  BP  Min: 114/55  Max: 147/99  MAP (mmHg)  Min: 77  Max: 117  Resp  Min: 15  Max: 30  SpO2  Min: 91 %  Max: 100 %    08/29 0701 - 08/30 0700  In: 594.4 [P.O.:200]  Out: 1315 [Urine:1250; Drains:65]   Unmeasured Output  Urine Occurrence: 1  Stool Occurrence: 0       Physical Exam  Vitals and nursing note reviewed.   Constitutional:       Appearance: She is ill-appearing.   HENT:      Nose: Nose normal.      Mouth/Throat:      Mouth: Mucous membranes are moist.      Pharynx: Oropharynx is clear.   Cardiovascular:      Rate and Rhythm: Normal rate and regular rhythm.      Pulses: Normal pulses.      Heart sounds: Normal heart sounds.   Pulmonary:      Effort: Pulmonary effort is normal.      Breath sounds: Normal breath sounds.   Abdominal:      General: Bowel sounds are normal.      Palpations: Abdomen is soft.   Musculoskeletal:         General: Normal range of motion.      Cervical back: Normal range of motion.   Skin:     General: Skin is warm and dry.      Capillary Refill: Capillary refill takes less than 2 seconds.   Neurological:      Comments: E 4 V 5 M 6  -Alert. Oriented to person,   - Expressive asphasia   - Follows commands.   -Strength 5 and symmetric in arms, legs  -Sensation intact to touch in  arms, legs     Unable to test orientation, language, memory, judgment, insight, fund of knowledge, hearing, shoulder shrug, tongue protrusion, coordination, gait due to level of consciousness.    Medications:  Continuous Scheduledamitriptyline, 75 mg, QHS  amLODIPine, 10 mg, Daily  atorvastatin, 40 mg, Daily  carvediloL, 37.5 mg, BID  ceftAZIDime (FORTAZ) IVPB, 2 g, Q8H  EScitalopram oxalate, 10 mg, Daily  heparin (porcine), 5,000 Units, Q8H  insulin aspart U-100, 8 Units, TIDWM  insulin detemir U-100, 24 Units, Daily  levetiracetam IV, 1,000 mg, Q12H  magnesium oxide, 400 mg, BID  mupirocin, , BID  [START ON 8/31/2022] senna-docusate 8.6-50 mg, 1 tablet, Daily  vancomycin (VANCOCIN) IVPB, 1,250 mg, Q24H  PRNacetaminophen, 650 mg, Q6H PRN  albuterol, 2 puff, Q20 Min PRN  dextrose 10%, 12.5 g, PRN  dextrose 10%, 25 g, PRN  glucagon (human recombinant), 1 mg, PRN  glucose, 16 g, PRN  glucose, 24 g, PRN  hydrALAZINE, 10 mg, Q6H PRN  insulin aspart U-100, 1-10 Units, QID (AC + HS) PRN  labetalol, 10 mg, Q6H PRN  magnesium oxide, 800 mg, PRN  magnesium oxide, 800 mg, PRN  melatonin, 6 mg, Nightly PRN  oxyCODONE, 5 mg, Q4H PRN  oxyCODONE, 10 mg, Q4H PRN  potassium bicarbonate, 35 mEq, PRN  potassium bicarbonate, 50 mEq, PRN  potassium bicarbonate, 60 mEq, PRN  potassium, sodium phosphates, 2 packet, PRN  potassium, sodium phosphates, 2 packet, PRN  potassium, sodium phosphates, 2 packet, PRN  vancomycin - pharmacy to dose, , pharmacy to manage frequency    Today I personally reviewed pertinent medications, lines/drains/airways, imaging, cardiology results, laboratory results, microbiology results, notably:    Diet  Diet diabetic [facility]; 2000 Calorie; Isolation Tray - Regular [place] Diet diabetic [facility]; 2000 Calorie; Isolation Tray - Regular [place]

## 2022-08-30 NOTE — SUBJECTIVE & OBJECTIVE
"Interval History: NAEON. Per RN note, NSGY able to aspirate 60cc from drain. This AM, patient with increased confusion, disoriented to place and situation, emotional lability, and repeatedly states "I am not right." No seizures on EEG. No family at bedside this morning.    Current Facility-Administered Medications   Medication Dose Route Frequency Provider Last Rate Last Admin    acetaminophen tablet 650 mg  650 mg Oral Q6H PRN Hipolito Darling MD   650 mg at 08/30/22 1013    albuterol inhaler 2 puff  2 puff Inhalation Q20 Min PRN Marlene Robison PA-C        amitriptyline tablet 75 mg  75 mg Oral QHS Hipolito Darling MD   75 mg at 08/29/22 2051    amLODIPine tablet 10 mg  10 mg Oral Daily Cherelle Proctor NP   10 mg at 08/30/22 0901    atorvastatin tablet 40 mg  40 mg Oral Daily Hipolito Darling MD   40 mg at 08/30/22 0900    carvediloL tablet 37.5 mg  37.5 mg Oral BID Cherelle Proctor NP   37.5 mg at 08/30/22 0900    ceftAZIDime (FORTAZ) 2 g in dextrose 5 % 50 mL IVPB  2 g Intravenous Q8H Jose Chin Jr., PA   Stopped at 08/30/22 0749    dextrose 10% bolus 125 mL  12.5 g Intravenous PRN Lujessica Lewis NP        dextrose 10% bolus 250 mL  25 g Intravenous PRN Luke Cenac, NP        EScitalopram oxalate tablet 10 mg  10 mg Oral Daily Hipolito Darling MD   10 mg at 08/30/22 0900    glucagon (human recombinant) injection 1 mg  1 mg Intramuscular PRN Luke Cenac, NP        glucose chewable tablet 16 g  16 g Oral PRN Luke Cenac, NP        glucose chewable tablet 24 g  24 g Oral PRN Luke Cenac, NP        heparin (porcine) injection 5,000 Units  5,000 Units Subcutaneous Q8H Diamond Loera NP   5,000 Units at 08/30/22 0521    hydrALAZINE injection 10 mg  10 mg Intravenous Q6H PRN Cherelle Proctor NP   10 mg at 08/27/22 1715    insulin aspart U-100 pen 1-10 Units  1-10 Units Subcutaneous QID (AC + HS) PRN Julio Lewis NP   2 Units at 08/29/22 1620    insulin aspart U-100 " pen 8 Units  8 Units Subcutaneous TIDWM Grisel Montana NP   8 Units at 08/30/22 0645    insulin detemir U-100 pen 24 Units  24 Units Subcutaneous Daily Grisel Montana NP   24 Units at 08/30/22 0901    labetalol 20 mg/4 mL (5 mg/mL) IV syring  10 mg Intravenous Q6H PRN Cherelle Proctor NP   10 mg at 08/27/22 1421    levETIRAcetam injection 1,000 mg  1,000 mg Intravenous Q12H Hipolito Darling MD   1,000 mg at 08/30/22 0901    magnesium oxide tablet 400 mg  400 mg Oral BID Marlene Robison PA-C   400 mg at 08/30/22 0900    magnesium oxide tablet 800 mg  800 mg Oral PRN Gwen Lange PA-C        magnesium oxide tablet 800 mg  800 mg Oral PRN Gwen Lange PA-C        melatonin tablet 6 mg  6 mg Oral Nightly PRN Hipolito Darling MD   6 mg at 08/26/22 2129    mupirocin 2 % ointment   Nasal BID Timothy Diaz MD   Given at 08/30/22 0900    oxyCODONE immediate release tablet 5 mg  5 mg Oral Q4H PRN Cherelle Proctor NP   5 mg at 08/30/22 0644    oxyCODONE immediate release tablet Tab 10 mg  10 mg Oral Q4H PRN Cherelle Proctor NP   10 mg at 08/29/22 0638    potassium bicarbonate disintegrating tablet 35 mEq  35 mEq Oral PRN Gwen Lange PA-C        potassium bicarbonate disintegrating tablet 50 mEq  50 mEq Oral PRN Gwen Lange PA-C   50 mEq at 08/28/22 0513    potassium bicarbonate disintegrating tablet 60 mEq  60 mEq Oral PRN Gwen Lange PA-C        potassium, sodium phosphates 280-160-250 mg packet 2 packet  2 packet Oral PRN Gwen Lange PA-C   2 packet at 08/28/22 0513    potassium, sodium phosphates 280-160-250 mg packet 2 packet  2 packet Oral PRN Gwen Lange PA-C        potassium, sodium phosphates 280-160-250 mg packet 2 packet  2 packet Oral PRN Gwen Lange PA-C        [START ON 8/31/2022] senna-docusate 8.6-50 mg per tablet 1 tablet  1 tablet Oral Daily Diamond Loera NP        vancomycin - pharmacy to  dose   Intravenous pharmacy to manage frequency Hipolito Darling MD         Continuous Infusions:    Review of Systems   Unable to perform ROS: Mental status change   Objective:     Vital Signs (Most Recent):  Temp: 99.3 °F (37.4 °C) (08/30/22 0705)  Pulse: 84 (08/30/22 1000)  Resp: 18 (08/30/22 1000)  BP: (!) 147/99 (08/30/22 1000)  SpO2: 98 % (08/30/22 1000)   Vital Signs (24h Range):  Temp:  [98.5 °F (36.9 °C)-99.3 °F (37.4 °C)] 99.3 °F (37.4 °C)  Pulse:  [73-90] 84  Resp:  [13-30] 18  SpO2:  [91 %-100 %] 98 %  BP: (114-147)/(55-99) 147/99     Weight: 88.5 kg (195 lb)  Body mass index is 34.54 kg/m².    Physical Exam  Constitutional:       General: She is not in acute distress.     Appearance: She is not diaphoretic.   HENT:      Head: Normocephalic.      Comments: EEG in place  Eyes:      General: No scleral icterus.        Right eye: No discharge.         Left eye: No discharge.      Conjunctiva/sclera: Conjunctivae normal.      Pupils: Pupils are equal, round, and reactive to light.   Cardiovascular:      Rate and Rhythm: Normal rate.   Pulmonary:      Effort: Pulmonary effort is normal. No respiratory distress.   Abdominal:      General: There is no distension.      Palpations: Abdomen is soft.      Tenderness: There is no abdominal tenderness.   Musculoskeletal:         General: No deformity or signs of injury.   Skin:     General: Skin is warm and dry.   Psychiatric:      Comments: Unable to assess       NEUROLOGICAL EXAMINATION:     MENTAL STATUS   Disoriented to place.   Disoriented to time.     CRANIAL NERVES     CN III, IV, VI   Pupils are equal, round, and reactive to light.       Awake, disoriented  JUANITA OU   Corneal intact OU   + spontaneous eye opening   Face symmetric   Tongue midline   R ataxia  Spontaneous movement of extremities     Exam findings to suggest seizures:  Myoclonus - no   eye twitching - no   Nystagmus - no   gaze deviation - no   waxy rigidity - no      Significant Labs:  All pertinent lab results from the past 24 hours have been reviewed.    Significant Studies: I have reviewed all pertinent imaging results/findings within the past 24 hours.

## 2022-08-30 NOTE — HOSPITAL COURSE
8/29>8/30: moderate encephalopathy with prominent focal slowing on the left with contributions from a breach rhythm, no epileptiform discharges, no electrographic seizures  8/30>8/31: encephalopathy with prominent focal slowing on the left with contributions from a breach rhythm, no epileptiform discharges, no electrographic seizures; see EEG report for full details

## 2022-08-30 NOTE — ASSESSMENT & PLAN NOTE
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 Diaz   - s/p left cranial wound debridement and washout on 8/27   - Repeat CTH 8/29 read as interval increased sized hypodense collections within the soft tissues overlying the craniotomy and underlying the craniotomy, may represent evolving postoperative seroma with pseudomeningocele not excluded, alternatively enlarging infectious collection to be considered  - Repeat CTH 8/30 reports extra-axial fluid collection and pneumocephalus underlying craniotomy similar to prior exam, interval decrease in size of extracranial soft tissue fluid collection   - Management per NSGY, NCC

## 2022-08-30 NOTE — ASSESSMENT & PLAN NOTE
"   47 year old female with history of HTN, smoking and left brain aneurysm s/p left sided craniotomy for aneurysm clipping on 7/15/22 who presented with sudden onset of symptoms concerning for focal seizure. See HPI for more detail.      MRI brain showed peripherally enhancing sub dural and scalp complex fluid collections. CTA head without large vessel occulsion, stenosis, or vascular malformation.  NSGY performed a bedside aspiration of one of the subcutaneous fluid collections. Per previous discussion with team, blood was aspirated. Cx NGTD.      Patient is on empiric Vancomycin and Cefepime. Afebrile without a leukocytosis. HDS. She is s/p cranial wound I&D 8/27.  OR cultures NGTD.  Per op note "no phoebe pus or infection encountered, only reactive hyperemic tissue was found"    Today continues with speech difficulty.  Repeat CTH with stable epidural fluid and improved extracranial fluid after aspiration. On vanc and ceftazidime.  EEG done and preliminarily without epileptiform findings.    Recommendations:   · Continue IV Vancomycin. Inpatient pharmacy managing Vanc dosing. Trough goal 15-20.   · Start Ceftazidime 2g IV q8h as much lower cognitive effects but still offers pseudomonas coverage.  · Will follow culture data to guide antibiotic therapy   · Likely plan for 2 weeks of vanc and ceftazidime from time of surgery 8/21  · Plan reviewed with ID staff.   · ID will follow x 1 more day and if no growth on cultures    "

## 2022-08-30 NOTE — PROGRESS NOTES
Pharmacokinetic Assessment Follow Up: IV Vancomycin    Vancomycin serum concentration assessment(s):    The trough level was drawn correctly and can be used to guide therapy at this time. The measurement is above the desired definitive target range of 10 to 15 mcg/mL.    Vancomycin Regimen Plan:    Change regimen to Vancomycin 1250 mg IV every 24 hours with next serum trough concentration measured at 1400 prior to next dose on 8/30    Drug levels (last 3 results):  Recent Labs   Lab Result Units 08/28/22  0914 08/30/22  0255   Vancomycin-Trough ug/mL 23.3* 26.1*       Pharmacy will continue to follow and monitor vancomycin.    Please contact pharmacy at extension [phone] for questions regarding this assessment.    Thank you for the consult,   Leonel Sexton       Patient brief summary:  Gina Jenkins is a 47 y.o. female initiated on antimicrobial therapy with IV Vancomycin for treatment of skin & soft tissue infection    The patient's current regimen is Vancomycin 750mg IV every 12 hours.     Drug Allergies:   Review of patient's allergies indicates:   Allergen Reactions    Lisinopril Swelling    Bactrim [sulfamethoxazole-trimethoprim] Rash       Actual Body Weight:   88.5kg    Renal Function:   Estimated Creatinine Clearance: 57 mL/min (based on SCr of 1.3 mg/dL).,     Dialysis Method (if applicable):  N/A    CBC (last 72 hours):  Recent Labs   Lab Result Units 08/27/22  0529 08/28/22  0036 08/29/22 0256 08/30/22  0255   WBC K/uL 5.77 8.38 7.45 7.34   Hemoglobin g/dL 11.5* 11.4* 12.0 10.7*   Hematocrit % 33.2* 33.2* 35.0* 31.9*   Platelets K/uL 265 271 230 213   Gran % % 64.1 72.5 63.8 61.8   Lymph % % 19.9 14.1* 16.5* 20.7   Mono % % 11.3 10.0 15.8* 12.8   Eosinophil % % 3.3 2.5 2.6 3.7   Basophil % % 0.7 0.4 0.9 0.5   Differential Method  Automated Automated Automated Automated       Metabolic Panel (last 72 hours):  Recent Labs   Lab Result Units 08/27/22  0529 08/28/22  0036 08/29/22  0256 08/30/22  0255   Sodium  mmol/L 135* 135* 139 140   Potassium mmol/L 3.6 3.7 4.8 3.9   Chloride mmol/L 105 105 107 105   CO2 mmol/L 21* 22* 20* 21*   Glucose mg/dL 196* 224* 99 106   BUN mg/dL 11 11 15 13   Creatinine mg/dL 0.9 1.0 1.2 1.3   Magnesium mg/dL 1.8 1.6 1.9 2.0   Phosphorus mg/dL  --  2.6* 3.9 3.3       Vancomycin Administrations:  vancomycin given in the last 96 hours                     vancomycin 750 mg in dextrose 5 % 250 mL IVPB (ready to mix system) (mg) 750 mg New Bag 08/29/22 1506     750 mg New Bag  0351     750 mg New Bag 08/28/22 1616    vancomycin 750 mg in dextrose 5 % 250 mL IVPB (ready to mix system) (mg) 750 mg New Bag 08/27/22 2257    vancomycin (VANCOCIN) 1,000 mg in sodium chloride 0.9% 250 mL IVPB (mg) 1,000 mg New Bag 08/27/22 0831    vancomycin 750 mg in dextrose 5 % 250 mL IVPB (ready to mix system) (mg) 750 mg New Bag 08/27/22 0020     750 mg New Bag 08/26/22 1243                    Microbiologic Results:  Microbiology Results (last 7 days)       Procedure Component Value Units Date/Time    AFB Culture & Smear [[id number]] Collected: 08/27/22 0909    Order Status: Completed Specimen: Wound from Head Updated: 08/29/22 1521     AFB Culture & Smear Culture in progress     AFB CULTURE STAIN No acid fast bacilli seen.    Narrative:      Superficial head culture, aerobic, anaerobic, fungus, AFB,  gram    AFB Culture & Smear [[id number]] Collected: 08/27/22 0912    Order Status: Completed Specimen: Wound from Head Updated: 08/29/22 1521     AFB Culture & Smear Culture in progress     AFB CULTURE STAIN No acid fast bacilli seen.    Narrative:      Epidural culture, aerobic, anaerobic, gram, AFB, gram    Aerobic culture [[id number]] Collected: 08/27/22 0912    Order Status: Completed Specimen: Wound from Head Updated: 08/29/22 1107     Aerobic Bacterial Culture No growth    Narrative:      Epidural culture, aerobic, anaerobic, gram, AFB, gram    Aerobic culture [[id number]] Collected: 08/27/22 0909    Order Status:  Completed Specimen: Wound from Head Updated: 08/29/22 1100     Aerobic Bacterial Culture No growth    Narrative:      Superficial head culture, aerobic, anaerobic, fungus, AFB,  gram    Culture, Anaerobic [[id number]] Collected: 08/22/22 1550    Order Status: Completed Specimen: Incision site from Head Updated: 08/29/22 0909     Anaerobic Culture Culture in progress    Culture, Anaerobic [[id number]] Collected: 08/27/22 0909    Order Status: Completed Specimen: Wound from Head Updated: 08/28/22 1154     Anaerobic Culture Culture in progress    Narrative:      Superficial head culture, aerobic, anaerobic, fungus, AFB,  gram    Culture, Anaerobic [[id number]] Collected: 08/27/22 0912    Order Status: Completed Specimen: Wound from Head Updated: 08/28/22 1154     Anaerobic Culture Culture in progress    Narrative:      Epidural culture, aerobic, anaerobic, gram, AFB, gram    Gram stain [[id number]] Collected: 08/27/22 0912    Order Status: Completed Specimen: Wound from Head Updated: 08/27/22 1044     Gram Stain Result No WBC's      No organisms seen    Narrative:      Epidural culture, aerobic, anaerobic, gram, AFB, gram    Gram stain [[id number]] Collected: 08/27/22 0909    Order Status: Completed Specimen: Wound from Head Updated: 08/27/22 1043     Gram Stain Result Rare WBC's      No organisms seen    Narrative:      Superficial head culture, aerobic, anaerobic, fungus, AFB,  gram    Fungus culture [[id number]] Collected: 08/27/22 0909    Order Status: Sent Specimen: Wound from Head Updated: 08/27/22 1003    Fungus culture [[id number]] Collected: 08/27/22 0912    Order Status: Sent Specimen: Wound from Head Updated: 08/27/22 1001    Blood culture [[id number]] Collected: 08/21/22 2325    Order Status: Completed Specimen: Blood from Peripheral, Antecubital, Right Updated: 08/27/22 0612     Blood Culture, Routine No growth after 5 days.    Aerobic culture [[id number]] Collected: 08/22/22 1550    Order Status: Completed  Specimen: Incision site from Head Updated: 08/26/22 1029     Aerobic Bacterial Culture No growth    Gram stain [[id number]] Collected: 08/22/22 1550    Order Status: Completed Specimen: Incision site from Head Updated: 08/25/22 1447     Gram Stain Result No organisms seen      Rare WBC's    Gram stain [[id number]]     Order Status: Completed Specimen: Incision site from Head     AFB Culture & Smear [[id number]] Collected: 08/22/22 1550    Order Status: Completed Specimen: Incision site from Head Updated: 08/23/22 2127     AFB Culture & Smear Culture in progress     AFB CULTURE STAIN No acid fast bacilli seen.    Fungus culture [[id number]] Collected: 08/22/22 1550    Order Status: No result Specimen: Incision site from Head Updated: 08/23/22 0930    Aerobic culture [[id number]]     Order Status: Completed Specimen: Incision site from Head     Culture, Anaerobe [[id number]]     Order Status: Completed Specimen: Body Fluid from Head     Fungus culture [[id number]]     Order Status: Completed Specimen: Body Fluid from Head

## 2022-08-30 NOTE — SUBJECTIVE & OBJECTIVE
Interval History: 8/30: BON. AFVSS. Aspirated ~60cc fluid from drain yesterday, working properly afterwards. CTH this AM showed significantly improved extracranial collection with epidural collection still present. Will leave drain in one more day    Medications:  Continuous Infusions:  Scheduled Meds:   amitriptyline  75 mg Oral QHS    amLODIPine  10 mg Oral Daily    atorvastatin  40 mg Oral Daily    carvediloL  37.5 mg Oral BID    ceftAZIDime (FORTAZ) IVPB  2 g Intravenous Q8H    EScitalopram oxalate  10 mg Oral Daily    heparin (porcine)  5,000 Units Subcutaneous Q8H    insulin aspart U-100  8 Units Subcutaneous TIDWM    insulin detemir U-100  30 Units Subcutaneous Daily    levetiracetam IV  1,000 mg Intravenous Q12H    magnesium oxide  400 mg Oral BID    mupirocin   Nasal BID     PRN Meds:acetaminophen, albuterol, dextrose 10%, dextrose 10%, glucagon (human recombinant), glucose, glucose, hydrALAZINE, insulin aspart U-100, labetalol, magnesium oxide, magnesium oxide, melatonin, oxyCODONE, oxyCODONE, potassium bicarbonate, potassium bicarbonate, potassium bicarbonate, potassium, sodium phosphates, potassium, sodium phosphates, potassium, sodium phosphates, Pharmacy to dose Vancomycin consult **AND** vancomycin - pharmacy to dose     Review of Systems  Objective:     Weight: 88.5 kg (195 lb)  Body mass index is 34.54 kg/m².  Vital Signs (Most Recent):  Temp: 99.3 °F (37.4 °C) (08/30/22 0705)  Pulse: 86 (08/30/22 0800)  Resp: 17 (08/30/22 0800)  BP: 137/64 (08/30/22 0800)  SpO2: 100 % (08/30/22 0800)   Vital Signs (24h Range):  Temp:  [98.5 °F (36.9 °C)-99.3 °F (37.4 °C)] 99.3 °F (37.4 °C)  Pulse:  [73-90] 86  Resp:  [13-30] 17  SpO2:  [91 %-100 %] 100 %  BP: (101-147)/(55-78) 137/64     Date 08/30/22 0700 - 08/31/22 0659   Shift 4108-8944 8870-9683 5689-4746 24 Hour Total   INTAKE   P.O. 150   150   IV Piggyback 50   50   Shift Total(mL/kg) 200(2.3)   200(2.3)   OUTPUT   Urine(mL/kg/hr) 400   400   Drains 0   0    Shift Total(mL/kg) 400(4.5)   400(4.5)   Weight (kg) 88.4 88.4 88.4 88.4                          Closed/Suction Drain 08/27/22 0941 Left Scalp Other (Comment) (Active)   Site Description Unable to view 08/28/22 1501   Dressing Type Gauze 08/28/22 1501   Dressing Status Clean;Dry;Intact 08/28/22 1501   Dressing Intervention Integrity maintained 08/28/22 1501   Drainage Serosanguineous 08/28/22 1501   Status To bulb suction 08/28/22 1501   Output (mL) 0 mL 08/28/22 0502       Female External Urinary Catheter 08/27/22 1045 (Active)   Skin no redness;no breakdown 08/28/22 1501   Tolerance no signs/symptoms of discomfort 08/28/22 1501   Suction Continuous suction at 40 mmHg 08/28/22 0701   Date of last wick change 08/27/22 08/27/22 1105   Time of last wick change 1045 08/27/22 1105   Output (mL) 500 mL 08/28/22 0502       Physical Exam    Neurosurgery Physical Exam    GENERAL: resting comfortably  HEENT: NC, PERRL, mucous membranes moist  NECK: supple, trachea midline  CV: normal capillary refill  PULM: aerating well, symmetric expansion, no distress  ABD: soft, NT, ND  EXT: no c/c/e    NEURO:    AAO x 3  Mild expressive aphasia  CN II-XII grossly intact  Fc x 4 antigravity  SILT    No drift or dysmetria    Incision cdi    HV in place      Significant Labs:  Recent Labs   Lab 08/29/22  0256 08/30/22  0255   GLU 99 106    140   K 4.8 3.9    105   CO2 20* 21*   BUN 15 13   CREATININE 1.2 1.3   CALCIUM 10.0 9.8   MG 1.9 2.0       Recent Labs   Lab 08/29/22  0256 08/30/22  0255   WBC 7.45 7.34   HGB 12.0 10.7*   HCT 35.0* 31.9*    213       No results for input(s): LABPT, INR, APTT in the last 48 hours.  Microbiology Results (last 7 days)       Procedure Component Value Units Date/Time    Culture, Anaerobic [[id number]] Collected: 08/22/22 1550    Order Status: Completed Specimen: Incision site from Head Updated: 08/30/22 0654     Anaerobic Culture No anaerobes isolated    AFB Culture & Smear [[id number]]  Collected: 08/27/22 0909    Order Status: Completed Specimen: Wound from Head Updated: 08/29/22 1521     AFB Culture & Smear Culture in progress     AFB CULTURE STAIN No acid fast bacilli seen.    Narrative:      Superficial head culture, aerobic, anaerobic, fungus, AFB,  gram    AFB Culture & Smear [[id number]] Collected: 08/27/22 0912    Order Status: Completed Specimen: Wound from Head Updated: 08/29/22 1521     AFB Culture & Smear Culture in progress     AFB CULTURE STAIN No acid fast bacilli seen.    Narrative:      Epidural culture, aerobic, anaerobic, gram, AFB, gram    Aerobic culture [[id number]] Collected: 08/27/22 0912    Order Status: Completed Specimen: Wound from Head Updated: 08/29/22 1107     Aerobic Bacterial Culture No growth    Narrative:      Epidural culture, aerobic, anaerobic, gram, AFB, gram    Aerobic culture [[id number]] Collected: 08/27/22 0909    Order Status: Completed Specimen: Wound from Head Updated: 08/29/22 1100     Aerobic Bacterial Culture No growth    Narrative:      Superficial head culture, aerobic, anaerobic, fungus, AFB,  gram    Culture, Anaerobic [[id number]] Collected: 08/27/22 0909    Order Status: Completed Specimen: Wound from Head Updated: 08/28/22 1154     Anaerobic Culture Culture in progress    Narrative:      Superficial head culture, aerobic, anaerobic, fungus, AFB,  gram    Culture, Anaerobic [[id number]] Collected: 08/27/22 0912    Order Status: Completed Specimen: Wound from Head Updated: 08/28/22 1154     Anaerobic Culture Culture in progress    Narrative:      Epidural culture, aerobic, anaerobic, gram, AFB, gram    Gram stain [[id number]] Collected: 08/27/22 0912    Order Status: Completed Specimen: Wound from Head Updated: 08/27/22 1044     Gram Stain Result No WBC's      No organisms seen    Narrative:      Epidural culture, aerobic, anaerobic, gram, AFB, gram    Gram stain [[id number]] Collected: 08/27/22 0909    Order Status: Completed Specimen: Wound from Head  Updated: 08/27/22 1043     Gram Stain Result Rare WBC's      No organisms seen    Narrative:      Superficial head culture, aerobic, anaerobic, fungus, AFB,  gram    Fungus culture [[id number]] Collected: 08/27/22 0909    Order Status: Sent Specimen: Wound from Head Updated: 08/27/22 1003    Fungus culture [[id number]] Collected: 08/27/22 0912    Order Status: Sent Specimen: Wound from Head Updated: 08/27/22 1001    Blood culture [[id number]] Collected: 08/21/22 2325    Order Status: Completed Specimen: Blood from Peripheral, Antecubital, Right Updated: 08/27/22 0612     Blood Culture, Routine No growth after 5 days.    Aerobic culture [[id number]] Collected: 08/22/22 1550    Order Status: Completed Specimen: Incision site from Head Updated: 08/26/22 1029     Aerobic Bacterial Culture No growth    Gram stain [[id number]] Collected: 08/22/22 1550    Order Status: Completed Specimen: Incision site from Head Updated: 08/25/22 1447     Gram Stain Result No organisms seen      Rare WBC's    Gram stain [[id number]]     Order Status: Completed Specimen: Incision site from Head     AFB Culture & Smear [[id number]] Collected: 08/22/22 1550    Order Status: Completed Specimen: Incision site from Head Updated: 08/23/22 2127     AFB Culture & Smear Culture in progress     AFB CULTURE STAIN No acid fast bacilli seen.    Fungus culture [[id number]] Collected: 08/22/22 1550    Order Status: No result Specimen: Incision site from Head Updated: 08/23/22 0930    Aerobic culture [[id number]]     Order Status: Completed Specimen: Incision site from Head     Culture, Anaerobe [[id number]]     Order Status: Completed Specimen: Body Fluid from Head     Fungus culture [[id number]]     Order Status: Completed Specimen: Body Fluid from Head           All pertinent labs from the last 24 hours have been reviewed.    Significant Diagnostics:  I have reviewed all pertinent imaging results/findings within the past 24 hours.  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 Interval increased sized hypodense collections within the soft tissues overlying the craniotomy and underlying the craniotomy as detailed above.  This may represent evolving postoperative seroma with pseudomeningocele not excluded.  Alternatively enlarging infectious collection to be considered.  Clinical correlation and correlation with drainage output advised. Smaller caliber ventricles without hydrocephalus with continued mass effect and effacement left cerebral sulci Follow-up recommended.  Further evaluation with MRI as warranted. Electronically signed by: Laureano Venegas DO Date:    08/29/2022 Time:    10:22

## 2022-08-30 NOTE — PROGRESS NOTES
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 Diaz who presents with sudden onset of symptoms concerning for focal seizure. Patient was in her yard today playing with her son when her R arm became weak suddenly followed by the inability to speak and RLE weakness. This went on for a couple minutes and self resolved; patient said she had some lasting heaviness on her R side after. She came to the ED immediately.    Here in the ED she had another episode that resolved on its own. NSGY consulted.       Post-Op Info:  Procedure(s) (LRB):  LEFT Cranial wound debridement and washout (Left)  DEBRIDEMENT, WOUND   3 Days Post-Op     Interval History: 8/30: BON. AFVSS. Aspirated ~60cc fluid from drain yesterday, working properly afterwards. CTH this AM showed significantly improved extracranial collection with epidural collection still present. Will leave drain in one more day    Medications:  Continuous Infusions:  Scheduled Meds:   amitriptyline  75 mg Oral QHS    amLODIPine  10 mg Oral Daily    atorvastatin  40 mg Oral Daily    carvediloL  37.5 mg Oral BID    ceftAZIDime (FORTAZ) IVPB  2 g Intravenous Q8H    EScitalopram oxalate  10 mg Oral Daily    heparin (porcine)  5,000 Units Subcutaneous Q8H    insulin aspart U-100  8 Units Subcutaneous TIDWM    insulin detemir U-100  30 Units Subcutaneous Daily    levetiracetam IV  1,000 mg Intravenous Q12H    magnesium oxide  400 mg Oral BID    mupirocin   Nasal BID     PRN Meds:acetaminophen, albuterol, dextrose 10%, dextrose 10%, glucagon (human recombinant), glucose, glucose, hydrALAZINE, insulin aspart U-100, labetalol, magnesium oxide, magnesium oxide, melatonin, oxyCODONE, oxyCODONE, potassium bicarbonate, potassium bicarbonate, potassium bicarbonate, potassium,  sodium phosphates, potassium, sodium phosphates, potassium, sodium phosphates, Pharmacy to dose Vancomycin consult **AND** vancomycin - pharmacy to dose     Review of Systems  Objective:     Weight: 88.5 kg (195 lb)  Body mass index is 34.54 kg/m².  Vital Signs (Most Recent):  Temp: 99.3 °F (37.4 °C) (08/30/22 0705)  Pulse: 86 (08/30/22 0800)  Resp: 17 (08/30/22 0800)  BP: 137/64 (08/30/22 0800)  SpO2: 100 % (08/30/22 0800)   Vital Signs (24h Range):  Temp:  [98.5 °F (36.9 °C)-99.3 °F (37.4 °C)] 99.3 °F (37.4 °C)  Pulse:  [73-90] 86  Resp:  [13-30] 17  SpO2:  [91 %-100 %] 100 %  BP: (101-147)/(55-78) 137/64     Date 08/30/22 0700 - 08/31/22 0659   Shift 4301-2249 0966-0313 1402-2872 24 Hour Total   INTAKE   P.O. 150   150   IV Piggyback 50   50   Shift Total(mL/kg) 200(2.3)   200(2.3)   OUTPUT   Urine(mL/kg/hr) 400   400   Drains 0   0   Shift Total(mL/kg) 400(4.5)   400(4.5)   Weight (kg) 88.4 88.4 88.4 88.4                          Closed/Suction Drain 08/27/22 0941 Left Scalp Other (Comment) (Active)   Site Description Unable to view 08/28/22 1501   Dressing Type Gauze 08/28/22 1501   Dressing Status Clean;Dry;Intact 08/28/22 1501   Dressing Intervention Integrity maintained 08/28/22 1501   Drainage Serosanguineous 08/28/22 1501   Status To bulb suction 08/28/22 1501   Output (mL) 0 mL 08/28/22 0502       Female External Urinary Catheter 08/27/22 1045 (Active)   Skin no redness;no breakdown 08/28/22 1501   Tolerance no signs/symptoms of discomfort 08/28/22 1501   Suction Continuous suction at 40 mmHg 08/28/22 0701   Date of last wick change 08/27/22 08/27/22 1105   Time of last wick change 1045 08/27/22 1105   Output (mL) 500 mL 08/28/22 0502       Physical Exam    Neurosurgery Physical Exam    GENERAL: resting comfortably  HEENT: NC, PERRL, mucous membranes moist  NECK: supple, trachea midline  CV: normal capillary refill  PULM: aerating well, symmetric expansion, no distress  ABD: soft, NT, ND  EXT: no  c/c/e    NEURO:    AAO x 3  Mild expressive aphasia  CN II-XII grossly intact  Fc x 4 antigravity  SILT    No drift or dysmetria    Incision cdi    HV in place      Significant Labs:  Recent Labs   Lab 08/29/22  0256 08/30/22  0255   GLU 99 106    140   K 4.8 3.9    105   CO2 20* 21*   BUN 15 13   CREATININE 1.2 1.3   CALCIUM 10.0 9.8   MG 1.9 2.0       Recent Labs   Lab 08/29/22  0256 08/30/22  0255   WBC 7.45 7.34   HGB 12.0 10.7*   HCT 35.0* 31.9*    213       No results for input(s): LABPT, INR, APTT in the last 48 hours.  Microbiology Results (last 7 days)       Procedure Component Value Units Date/Time    Culture, Anaerobic [[id number]] Collected: 08/22/22 1550    Order Status: Completed Specimen: Incision site from Head Updated: 08/30/22 0654     Anaerobic Culture No anaerobes isolated    AFB Culture & Smear [[id number]] Collected: 08/27/22 0909    Order Status: Completed Specimen: Wound from Head Updated: 08/29/22 1521     AFB Culture & Smear Culture in progress     AFB CULTURE STAIN No acid fast bacilli seen.    Narrative:      Superficial head culture, aerobic, anaerobic, fungus, AFB,  gram    AFB Culture & Smear [[id number]] Collected: 08/27/22 0912    Order Status: Completed Specimen: Wound from Head Updated: 08/29/22 1521     AFB Culture & Smear Culture in progress     AFB CULTURE STAIN No acid fast bacilli seen.    Narrative:      Epidural culture, aerobic, anaerobic, gram, AFB, gram    Aerobic culture [[id number]] Collected: 08/27/22 0912    Order Status: Completed Specimen: Wound from Head Updated: 08/29/22 1107     Aerobic Bacterial Culture No growth    Narrative:      Epidural culture, aerobic, anaerobic, gram, AFB, gram    Aerobic culture [[id number]] Collected: 08/27/22 0909    Order Status: Completed Specimen: Wound from Head Updated: 08/29/22 1100     Aerobic Bacterial Culture No growth    Narrative:      Superficial head culture, aerobic, anaerobic, fungus, AFB,  gram    Culture,  Anaerobic [[id number]] Collected: 08/27/22 0909    Order Status: Completed Specimen: Wound from Head Updated: 08/28/22 1154     Anaerobic Culture Culture in progress    Narrative:      Superficial head culture, aerobic, anaerobic, fungus, AFB,  gram    Culture, Anaerobic [[id number]] Collected: 08/27/22 0912    Order Status: Completed Specimen: Wound from Head Updated: 08/28/22 1154     Anaerobic Culture Culture in progress    Narrative:      Epidural culture, aerobic, anaerobic, gram, AFB, gram    Gram stain [[id number]] Collected: 08/27/22 0912    Order Status: Completed Specimen: Wound from Head Updated: 08/27/22 1044     Gram Stain Result No WBC's      No organisms seen    Narrative:      Epidural culture, aerobic, anaerobic, gram, AFB, gram    Gram stain [[id number]] Collected: 08/27/22 0909    Order Status: Completed Specimen: Wound from Head Updated: 08/27/22 1043     Gram Stain Result Rare WBC's      No organisms seen    Narrative:      Superficial head culture, aerobic, anaerobic, fungus, AFB,  gram    Fungus culture [[id number]] Collected: 08/27/22 0909    Order Status: Sent Specimen: Wound from Head Updated: 08/27/22 1003    Fungus culture [[id number]] Collected: 08/27/22 0912    Order Status: Sent Specimen: Wound from Head Updated: 08/27/22 1001    Blood culture [[id number]] Collected: 08/21/22 2325    Order Status: Completed Specimen: Blood from Peripheral, Antecubital, Right Updated: 08/27/22 0612     Blood Culture, Routine No growth after 5 days.    Aerobic culture [[id number]] Collected: 08/22/22 1550    Order Status: Completed Specimen: Incision site from Head Updated: 08/26/22 1029     Aerobic Bacterial Culture No growth    Gram stain [[id number]] Collected: 08/22/22 1550    Order Status: Completed Specimen: Incision site from Head Updated: 08/25/22 1447     Gram Stain Result No organisms seen      Rare WBC's    Gram stain [[id number]]     Order Status: Completed Specimen: Incision site from Head     AFB  Culture & Smear [[id number]] Collected: 08/22/22 1550    Order Status: Completed Specimen: Incision site from Head Updated: 08/23/22 2127     AFB Culture & Smear Culture in progress     AFB CULTURE STAIN No acid fast bacilli seen.    Fungus culture [[id number]] Collected: 08/22/22 1550    Order Status: No result Specimen: Incision site from Head Updated: 08/23/22 0930    Aerobic culture [[id number]]     Order Status: Completed Specimen: Incision site from Head     Culture, Anaerobe [[id number]]     Order Status: Completed Specimen: Body Fluid from Head     Fungus culture [[id number]]     Order Status: Completed Specimen: Body Fluid from Head           All pertinent labs from the last 24 hours have been reviewed.    Significant Diagnostics:  I have reviewed all pertinent imaging results/findings within the past 24 hours.  CT Head Without Contrast    Result Date: 8/29/2022  Evolving operative changes status post left pterional craniotomy for left paraclinoid region aneurysm clipping with superimposed more recent repeat craniotomy for subcutaneous and epidural collection evacuation and washout. Interval increased sized hypodense collections within the soft tissues overlying the craniotomy and underlying the craniotomy as detailed above.  This may represent evolving postoperative seroma with pseudomeningocele not excluded.  Alternatively enlarging infectious collection to be considered.  Clinical correlation and correlation with drainage output advised. Smaller caliber ventricles without hydrocephalus with continued mass effect and effacement left cerebral sulci Follow-up recommended.  Further evaluation with MRI as warranted. 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 Diaz who  presented after episode of sudden onset R-sided weakness/tremors and aphasia for about 5 minutes, resolved prior to EMS arrival, concerning for focal seizure, then with second episode while in the ED. MRI brain w/wo concerning for scalp and epidural infection.    Now s/p L crani revision for wound washout.     - Admitted to [facility] with q1h neurochecks.  - all labs and diagnostics reviewed          - CTA 8/21: largely stable from prior with epidural fluid collection and extracranial fluid collection stable in size; stable postsurgical changes of L PCOM/anterior choroidal aneurysm clipping, stable small left MCA bifurcation aneurysm          - MRI brain w wo 8/21: peripheral rim enhancement of subdural and scalp complex fluid collections, concerning for infection; no evidence of enhancement of brain parenchyma   - spot EEG 8/23: mild regional cortical or subcortical dysfunction in the left temporal region with no electrographic seizures or indications of seizure tendency.   - CTH 8/28: Residual mixed density collection overlies the soft tissues as well as within the extra-axial space  underlying the craniotomy likely represents postoperative gas fluid and hemorrhage with residual infection not  Excluded. No new intracranial findings.    - CTH 8/29: worsening epidural and subcutaneous fluid collection with local mass effect on L frontal lobe      - CTH 8/30: extracranial collection gone, epidural collection remains with some mild mass effect     - Infectious workup:          - afebrile, no leukocytosis          - blood cx 8/21 NGTD          - ESR 30-->46, CRP 28.5-->26          - Subcutaneous scalp fluid collection tapped 8/22, sent for Cx's - NGTD          - ID consulted, recommend Vanc/Cefepime, continue to follow Cx's  - Seizures: Episodes concerning for focal seizures with R-sided tremors and weakness. Not on AEDs prior to admission.          - Keppra loaded on admission, continue 1g bid          - Likely provoked  focal seizure activity due to cranial fluid collection; pt also with very elevated BG on admission, uncontrolled hyperglycemia may have been contributory to provoked seizure          - spot EEG 8/23 with left temporal subcortical dysfunction, negative for electrographic seizures  - New Onset T2DM: No h/o DM. Hyperglycemic on admission. HbA1C 8.3. Endocrinology consulted. Goal -180.     - Levemir 30 untis daily First dose this Am. 0.7 u/kg/d dosing.      - Novolog 10 units TIDWM. Basal/Prandial physiologic matching.       - Moderate Dose SQ Insulin Correction Scale.     - BG Monitoring AC/HS.           - Bedside nurse to instruct on insulin pen use and blood sugar monitor. Have patient administer own injections after education completed supervised by nurse. Have patient watch insulin educatoin video's via i-pad if available on unit.  - HTN: SBP <160. Controlled on current regimen. Continue home meds.  - Hypokalemia, Hypomagnesemia: Chronic, on home supplements daily.          - Resume home potassium and Mg supplements          - Replete PRN, daily labs  - regular diet  - HAIM/SCD/SQH  - PT/OT/OOB  - HV x1; abx while in place  - stable from nsgy perspective to TTF when deemed appropriate by NCC  Dispo: ongoing        Jeremi Yu MD  Neurosurgery  [facility]

## 2022-08-30 NOTE — PLAN OF CARE
[facility] Care Plan    POC reviewed with Gina Jenkins and pt's sister at 1500. Pt verbalized understanding. Questions and concerns addressed.  Pt progressing toward goals. Will continue to monitor. See below and flowsheets for full assessment and VS info.   -CTA completed, pending CT perfusion   -cEEG in place  -Daily bath completed  -Pt very emotional this morning with consistent expressive aphasia, but improved throughout afternoon when sister at bedside  -Subgaleal drain put out 0ml total for shift, Dr Joe WEISS with nsgy was aware when at bedside  -PT/OT/SLP ordered   -Tylenol given for headache    Is this a stroke patient? no    Neuro:  Waterman Coma Scale  Best Eye Response: 3-->(E3) to speech  Best Motor Response: 6-->(M6) obeys commands  Best Verbal Response: 4-->(V4) confused  Waterman Coma Scale Score: 13  Assessment Qualifiers: no eye obstruction present, patient not sedated/intubated  Pupil PERRLA: yes     24 hr Temp:  [97.9 °F (36.6 °C)-99.3 °F (37.4 °C)]     CV:   Rhythm: normal sinus rhythm  BP goals:   SBP < 160  MAP > 65    Resp:   O2 Device (Oxygen Therapy): room air       Plan: N/A    GI/:     Diet/Nutrition Received: consistent carb/diabetic diet  Last Bowel Movement: 08/25/22  Voiding Characteristics: external catheter    Intake/Output Summary (Last 24 hours) at 8/30/2022 1627  Last data filed at 8/30/2022 1600  Gross per 24 hour   Intake 1065.54 ml   Output 2065 ml   Net -999.46 ml     Unmeasured Output  Urine Occurrence: 1  Stool Occurrence: 0    Labs/Accuchecks:  Recent Labs   Lab 08/30/22  0255   WBC 7.34   RBC 3.70*   HGB 10.7*   HCT 31.9*         Recent Labs   Lab 08/30/22  0255      K 3.9   CO2 21*      BUN 13   CREATININE 1.3    No results for input(s): PROTIME, INR, APTT, HEPANTIXA in the last 168 hours. No results for input(s): CPK, CPKMB, TROPONINI, MB in the last 168 hours.    Electrolytes: N/A - electrolytes WDL  Accuchecks:  ACHS    Gtts:      LDA/Wounds:  Lines/Drains/Airways       Drain  Duration                  Closed/Suction Drain 08/27/22 0941 Left Scalp Other (Comment) 3 days    Female External Urinary Catheter 08/27/22 1045 3 days              Peripheral Intravenous Line  Duration                  Peripheral IV - Single Lumen 08/27/22 0806 18 G Right Forearm 3 days         Peripheral IV - Single Lumen 08/27/22 1017 18 G Right Forearm 3 days         Peripheral IV - Single Lumen 08/30/22 1324 20 G Anterior;Left Forearm <1 day                  Wounds: No  Wound care consulted: No

## 2022-08-30 NOTE — ASSESSMENT & PLAN NOTE
"   47 year old female with history of HTN, smoking and left brain aneurysm s/p left sided craniotomy for aneurysm clipping on 7/15/22 who presented with sudden onset of symptoms concerning for focal seizure. See HPI for more detail.      MRI brain showed peripherally enhancing sub dural and scalp complex fluid collections. CTA head without large vessel occulsion, stenosis, or vascular malformation.  NSGY performed a bedside aspiration of one of the subcutaneous fluid collections. Per previous discussion with team, blood was aspirated. Cx NGTD.      Patient is on empiric Vancomycin and Cefepime. Afebrile without a leukocytosis. HDS. She is s/p cranial wound I&D 8/27.  OR cultures NGTD.  Per op note "no phoebe pus or infection encountered, only reactive hyperemic tissue was found"    Today with speech difficulty.  Repeat CTH with increased L sided SD fluid and shift.  Primary checking EKG and repeat CTH planned.  ? Cefepime contributing?      Recommendations:   · Continue IV Vancomycin. Inpatient pharmacy managing Vanc dosing. Trough goal 15-20.   · DC Cefepime 2 g IV q8h  · Start Ceftazidime 2g IV q8h as much lower cognitive effects but still offers pseudomonas coverage.  · Will follow culture data to guide antibiotic therapy   · Plan reviewed with ID staff. ID will follow    "

## 2022-08-30 NOTE — SUBJECTIVE & OBJECTIVE
"Interval HPI:   Overnight events: POD 3. BG at and slightly below goal ranges on current SQ insulin regimen. Diet diabetic [facility];  Calorie; Isolation Tray - Regular [place]  Eatin%  Nausea: No  Hypoglycemia and intervention: No  Fever: No  TPN and/or TF: No  If yes, type of TF/TPN and rate: n/a    /64 (BP Location: Left arm, Patient Position: Lying)   Pulse 86   Temp 99.3 °F (37.4 °C) (Oral)   Resp 17   Ht 5' 3" (1.6 m)   Wt 88.5 kg (195 lb)   SpO2 100%   Breastfeeding No   BMI 34.54 kg/m²     Labs Reviewed and Include    Recent Labs   Lab 22  0255      CALCIUM 9.8      K 3.9   CO2 21*      BUN 13   CREATININE 1.3     Lab Results   Component Value Date    WBC 7.34 2022    HGB 10.7 (L) 2022    HCT 31.9 (L) 2022    MCV 86 2022     2022     No results for input(s): TSH, FREET4 in the last 168 hours.  Lab Results   Component Value Date    HGBA1C 8.3 (H) 2022       Nutritional status:   Body mass index is 34.54 kg/m².  Lab Results   Component Value Date    ALBUMIN 3.4 (L) 2022    ALBUMIN 3.2 (L) 2022    ALBUMIN 3.4 (L) 2022     No results found for: PREALBUMIN    Estimated Creatinine Clearance: 57 mL/min (based on SCr of 1.3 mg/dL).    Accu-Checks  Recent Labs     22  0712 22  1206 22  1657 22  2211 22  0737 22  0826 22  1155 22  1618 22  2057 22  0642   POCTGLUCOSE 170* 169* 166* 97 120* 130* 103 151* 91 123*       Current Medications and/or Treatments Impacting Glycemic Control  Immunotherapy:    Immunosuppressants       None          Steroids:   Hormones (From admission, onward)      Start     Stop Route Frequency Ordered    22 0313  melatonin tablet 6 mg         -- Oral Nightly PRN 22 0213          Pressors:    Autonomic Drugs (From admission, onward)      None          Hyperglycemia/Diabetes Medications:   Antihyperglycemics " (From admission, onward)      Start     Stop Route Frequency Ordered    08/30/22 0900  insulin detemir U-100 pen 24 Units         -- SubQ Daily 08/30/22 0849    08/29/22 1215  insulin aspart U-100 pen 8 Units         -- SubQ 3 times daily with meals 08/29/22 1202    08/24/22 1109  insulin aspart U-100 pen 1-10 Units         -- SubQ Before meals & nightly PRN 08/24/22 1010

## 2022-08-30 NOTE — PROGRESS NOTES
"[facility]  Endocrinology  Progress Note    Admit Date: 2022     Reason for Consult: Management of T2DM (NEW ONSET), Hyperglycemia     Surgical Procedure and Date: L Pcomm and L anterior choroidal artery aneuryms craniotomy for clipping on 7/15/22 with Dr. Diaz    Diabetes diagnosis year:     Home Diabetes Medications:  No current home medications.     How often checking glucose at home?  Not previously monitoring blood sugar.    BG readings on regimen: unknown  Hypoglycemia on the regimen?  No  Missed doses on regimen?  No    Diabetes Complications include:     Hyperglycemia    Complicating diabetes co morbidities:   CHF and History of CVA, HTN, HLD      HPI:   Patient is a 47 y.o. 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 Diaz who presents with sudden onset of symptoms concerning for focal seizure. Patient was in her yard today playing with her son when her R arm became weak suddenly followed by the inability to speak and RLE weakness. This went on for a couple minutes and self resolved; patient said she had some lasting heaviness on her R side after. Endocrinology consulted on 2022 to manage glycemic control.     Lab Results   Component Value Date    HGBA1C 8.3 (H) 2022             Interval HPI:   Overnight events: POD 3. BG at and slightly below goal ranges on current SQ insulin regimen. Diet diabetic [facility]; 2000 Calorie; Isolation Tray - Regular [place]  Eatin%  Nausea: No  Hypoglycemia and intervention: No  Fever: No  TPN and/or TF: No  If yes, type of TF/TPN and rate: n/a    /64 (BP Location: Left arm, Patient Position: Lying)   Pulse 86   Temp 99.3 °F (37.4 °C) (Oral)   Resp 17   Ht 5' 3" (1.6 m)   Wt 88.5 kg (195 lb)   SpO2 100%   Breastfeeding No   BMI 34.54 kg/m²     Labs Reviewed and Include    Recent Labs   Lab " 08/30/22  0255      CALCIUM 9.8      K 3.9   CO2 21*      BUN 13   CREATININE 1.3     Lab Results   Component Value Date    WBC 7.34 08/30/2022    HGB 10.7 (L) 08/30/2022    HCT 31.9 (L) 08/30/2022    MCV 86 08/30/2022     08/30/2022     No results for input(s): TSH, FREET4 in the last 168 hours.  Lab Results   Component Value Date    HGBA1C 8.3 (H) 08/22/2022       Nutritional status:   Body mass index is 34.54 kg/m².  Lab Results   Component Value Date    ALBUMIN 3.4 (L) 08/21/2022    ALBUMIN 3.2 (L) 07/19/2022    ALBUMIN 3.4 (L) 07/18/2022     No results found for: PREALBUMIN    Estimated Creatinine Clearance: 57 mL/min (based on SCr of 1.3 mg/dL).    Accu-Checks  Recent Labs     08/28/22  0712 08/28/22  1206 08/28/22  1657 08/28/22  2211 08/29/22  0737 08/29/22  0826 08/29/22  1155 08/29/22  1618 08/29/22  2057 08/30/22  0642   POCTGLUCOSE 170* 169* 166* 97 120* 130* 103 151* 91 123*       Current Medications and/or Treatments Impacting Glycemic Control  Immunotherapy:    Immunosuppressants       None          Steroids:   Hormones (From admission, onward)      Start     Stop Route Frequency Ordered    08/22/22 0313  melatonin tablet 6 mg         -- Oral Nightly PRN 08/22/22 0213          Pressors:    Autonomic Drugs (From admission, onward)      None          Hyperglycemia/Diabetes Medications:   Antihyperglycemics (From admission, onward)      Start     Stop Route Frequency Ordered    08/30/22 0900  insulin detemir U-100 pen 24 Units         -- SubQ Daily 08/30/22 0849    08/29/22 1215  insulin aspart U-100 pen 8 Units         -- SubQ 3 times daily with meals 08/29/22 1202    08/24/22 1109  insulin aspart U-100 pen 1-10 Units         -- SubQ Before meals & nightly PRN 08/24/22 1010            ASSESSMENT and PLAN    * Fluid collection at surgical site  Managed per primary team  Optimize BG control      New onset type 2 diabetes mellitus  BG goal 140 - 180     - Decrease Levemir to 24  untis daily (20% dose decrease)   - Decrease Novolog to 8 units TIDWM.  - Moderate Dose SQ Insulin Correction Scale.  - BG Monitoring AC/HS.   - Bedside nurse to instruct on insulin pen use and blood sugar monitor. Have patient administer own injections after education completed supervised by nurse. Have patient watch insulin educatoin video's via i-pad if available on unit.      ** Please call Endocrine for any BG related issues **  ** Please notify Endocrine for any change and/or advance in diet**    Lab Results   Component Value Date    HGBA1C 8.3 (H) 08/22/2022       Discharge Planning:   TBD. Please notify endocrinology prior to discharge.           Mixed hyperlipidemia  Managed per primary team  Condition may cause insulin resistance         Essential hypertension  Managed per primary team  Condition may cause insulin resistance             Grisel Montana NP  Endocrinology  [facility]

## 2022-08-30 NOTE — PROGRESS NOTES
"[facility]  Neurology-Epilepsy  Progress Note    Patient Name: Gina Jenkins  MRN: [id number]  Admission Date: 8/21/2022  Hospital Length of Stay: 8 days  Code Status: Full Code   Attending Provider: Ryan Cage MD  Primary Care Physician: Clair Johnson NP   Principal Problem:Fluid collection at surgical site    Subjective:     Hospital Course:   8/29>8/30: moderate encephalopathy with prominent focal slowing on the left with contributions from a breach rhythm, no epileptiform discharges, no electrographic seizures      Interval History: NAEON. Per RN note, NSGY able to aspirate 60cc from drain. This AM, patient with increased confusion, disoriented to place and situation, emotional lability, and repeatedly states "I am not right." No seizures on EEG. No family at bedside this morning.    Current Facility-Administered Medications   Medication Dose Route Frequency Provider Last Rate Last Admin    acetaminophen tablet 650 mg  650 mg Oral Q6H PRN Hipolito Darling MD   650 mg at 08/30/22 1013    albuterol inhaler 2 puff  2 puff Inhalation Q20 Min PRN Marlene Robison PA-C        amitriptyline tablet 75 mg  75 mg Oral QHS Hipolito Darling MD   75 mg at 08/29/22 2051    amLODIPine tablet 10 mg  10 mg Oral Daily Cherelle Proctor NP   10 mg at 08/30/22 0901    atorvastatin tablet 40 mg  40 mg Oral Daily Hipolito Darling MD   40 mg at 08/30/22 0900    carvediloL tablet 37.5 mg  37.5 mg Oral BID Cherelle Proctor NP   37.5 mg at 08/30/22 0900    ceftAZIDime (FORTAZ) 2 g in dextrose 5 % 50 mL IVPB  2 g Intravenous Q8H Jose Chin Jr., PA   Stopped at 08/30/22 0749    dextrose 10% bolus 125 mL  12.5 g Intravenous PRN Luke Cenac, NP        dextrose 10% bolus 250 mL  25 g Intravenous PRN Luke Cenac, NP        EScitalopram oxalate tablet 10 mg  10 mg Oral Daily Hipolito Darling MD   10 mg at 08/30/22 0900    glucagon (human recombinant) " injection 1 mg  1 mg Intramuscular PRN Julio Cenmo, NP        glucose chewable tablet 16 g  16 g Oral PRN Lakishake Cenac, NP        glucose chewable tablet 24 g  24 g Oral PRN Luke Cenac, NP        heparin (porcine) injection 5,000 Units  5,000 Units Subcutaneous Q8H Diamond MILO Loera NP   5,000 Units at 08/30/22 0521    hydrALAZINE injection 10 mg  10 mg Intravenous Q6H PRN Cherelle Proctor NP   10 mg at 08/27/22 1715    insulin aspart U-100 pen 1-10 Units  1-10 Units Subcutaneous QID (AC + HS) PRN Julio Cenmo, NP   2 Units at 08/29/22 1620    insulin aspart U-100 pen 8 Units  8 Units Subcutaneous TIDWM Grisel Montana NP   8 Units at 08/30/22 0645    insulin detemir U-100 pen 24 Units  24 Units Subcutaneous Daily Grisel Montana NP   24 Units at 08/30/22 0901    labetalol 20 mg/4 mL (5 mg/mL) IV syring  10 mg Intravenous Q6H PRN Cherelle Proctor NP   10 mg at 08/27/22 1421    levETIRAcetam injection 1,000 mg  1,000 mg Intravenous Q12H Hipolito Darling MD   1,000 mg at 08/30/22 0901    magnesium oxide tablet 400 mg  400 mg Oral BID Marlene Robison PA-C   400 mg at 08/30/22 0900    magnesium oxide tablet 800 mg  800 mg Oral PRN Gwen Lange PA-C        magnesium oxide tablet 800 mg  800 mg Oral PRN Gwen Lange PA-C        melatonin tablet 6 mg  6 mg Oral Nightly PRN Hipolito Darling MD   6 mg at 08/26/22 2129    mupirocin 2 % ointment   Nasal BID Timothy Diaz MD   Given at 08/30/22 0900    oxyCODONE immediate release tablet 5 mg  5 mg Oral Q4H PRN Cherelle Proctor NP   5 mg at 08/30/22 0644    oxyCODONE immediate release tablet Tab 10 mg  10 mg Oral Q4H PRN Cherelle Proctor RUSSELL   10 mg at 08/29/22 0638    potassium bicarbonate disintegrating tablet 35 mEq  35 mEq Oral PRN Gwen Lange PA-C        potassium bicarbonate disintegrating tablet 50 mEq  50 mEq Oral PRN Gwen Lange PA-C   50 mEq at 08/28/22 0513    potassium  bicarbonate disintegrating tablet 60 mEq  60 mEq Oral PRN Gwen RADHA Nazario, PA-C        potassium, sodium phosphates 280-160-250 mg packet 2 packet  2 packet Oral PRN Gwen PAYAL. Padmapiccolo, PA-C   2 packet at 08/28/22 0513    potassium, sodium phosphates 280-160-250 mg packet 2 packet  2 packet Oral PRN Gwen G. Lopiccolo, PA-C        potassium, sodium phosphates 280-160-250 mg packet 2 packet  2 packet Oral PRN Gwen MOE. Brianacolo, PA-C        [START ON 8/31/2022] senna-docusate 8.6-50 mg per tablet 1 tablet  1 tablet Oral Daily Diamond Loera NP        vancomycin - pharmacy to dose   Intravenous pharmacy to manage frequency Hipolito Darling MD         Continuous Infusions:    Review of Systems   Unable to perform ROS: Mental status change   Objective:     Vital Signs (Most Recent):  Temp: 99.3 °F (37.4 °C) (08/30/22 0705)  Pulse: 84 (08/30/22 1000)  Resp: 18 (08/30/22 1000)  BP: (!) 147/99 (08/30/22 1000)  SpO2: 98 % (08/30/22 1000)   Vital Signs (24h Range):  Temp:  [98.5 °F (36.9 °C)-99.3 °F (37.4 °C)] 99.3 °F (37.4 °C)  Pulse:  [73-90] 84  Resp:  [13-30] 18  SpO2:  [91 %-100 %] 98 %  BP: (114-147)/(55-99) 147/99     Weight: 88.5 kg (195 lb)  Body mass index is 34.54 kg/m².    Physical Exam  Constitutional:       General: She is not in acute distress.     Appearance: She is not diaphoretic.   HENT:      Head: Normocephalic.      Comments: EEG in place  Eyes:      General: No scleral icterus.        Right eye: No discharge.         Left eye: No discharge.      Conjunctiva/sclera: Conjunctivae normal.      Pupils: Pupils are equal, round, and reactive to light.   Cardiovascular:      Rate and Rhythm: Normal rate.   Pulmonary:      Effort: Pulmonary effort is normal. No respiratory distress.   Abdominal:      General: There is no distension.      Palpations: Abdomen is soft.      Tenderness: There is no abdominal tenderness.   Musculoskeletal:         General: No deformity or signs of injury.    Skin:     General: Skin is warm and dry.   Psychiatric:      Comments: Unable to assess       NEUROLOGICAL EXAMINATION:     MENTAL STATUS   Disoriented to place.   Disoriented to time.     CRANIAL NERVES     CN III, IV, VI   Pupils are equal, round, and reactive to light.       Awake, disoriented  JUANITA OU   Corneal intact OU   + spontaneous eye opening   Face symmetric   Tongue midline   R ataxia  Spontaneous movement of extremities     Exam findings to suggest seizures:  Myoclonus - no   eye twitching - no   Nystagmus - no   gaze deviation - no   waxy rigidity - no      Significant Labs: All pertinent lab results from the past 24 hours have been reviewed.    Significant Studies: I have reviewed all pertinent imaging results/findings within the past 24 hours.    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 Diaz who p/w aphasia and RSW on 8/21 and subsequently given IV Levetiracetam 1500 mg x1 followed by 1g BID due to concern for seizure. MRI Brain WWO read as peripheral rim enhancement of subdural and scalp complex fluid collections. Cefepime and Vancomycin started per ID recs. Patient now s/p L cranial wound debridement and wash on 8/27 and admitted to [facility] for postoperative monitoring. Patient noted to have increased lethargy and word finding difficulty 8/29-> repeat CTH with increased fluid collection. EEG placed d/t concern for seizure; Epilepsy following for assistance in management.    Recommendations:  - Continuous vEEG monitoring given patient's worsening mentation  - Recommend to continue Levetiracetam 1g BID  - Avoid agents that lower seizure threshold  - Cefepime d/c 8/29  - Management of fluid collection per NSGY  - Management of infectious/metabolic abnormalities per NCC  - Seizure precautions      Discussed plan of care with NCC team. No family at bedside.  Will follow, please call with questions.    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 Diaz   - s/p left cranial wound debridement and washout on 8/27   - Repeat CTH 8/29 read as interval increased sized hypodense collections within the soft tissues overlying the craniotomy and underlying the craniotomy, may represent evolving postoperative seroma with pseudomeningocele not excluded, alternatively enlarging infectious collection to be considered  - Repeat CTH 8/30 reports extra-axial fluid collection and pneumocephalus underlying craniotomy similar to prior exam, interval decrease in size of extracranial soft tissue fluid collection   - Management per NSGY, [facility]    New onset type 2 diabetes mellitus  A1c 8.3  - On Detemir 24U daily, Aspart 8U TIDWM + SSI  - Endocrinology following  - Insulin management per Endo, [facility]    Mixed hyperlipidemia  - On home Atorvastatin  - Management per [facility]    Essential hypertension  Hx of  - On home Amlodipine 10 mg daily and Carvedilol 37.5 mg BID, PRNs ordered  - Management per [facility]        VTE Risk Mitigation (From admission, onward)         Ordered     heparin (porcine) injection 5,000 Units  Every 8 hours         08/28/22 1449     IP VTE HIGH RISK PATIENT  Once         08/22/22 0213     Place sequential compression device  Until discontinued         08/22/22 0213                Tricia Brandon PA-C  Neurology-Epilepsy  [facility] - Neuro Critical Care  Staff: Dr. Zurita

## 2022-08-30 NOTE — PROGRESS NOTES
[facility]  Neurocritical Care  Progress Note    Admit Date: 8/21/2022  Service Date: 08/30/2022  Length of Stay: 8    Subjective:     Chief Complaint: Fluid collection at surgical site    History of Present Illness: Gina Jenkins is a 48 yo F with PMH of HTN, HLD, smoking and recent L Pcomm and L anterior choroidal artery aneuryms craniotomy for clipping on 7/15/22 with Dr. Diaz who presented with sudden onset of symptoms concerning for focal seizure, aphasia and RSW. Each episode resolved on its own. NSGY was consulted and she was taken for a wound washout and evacuation of fluid collection. Will admit to NCC post op.      Hospital Course: 8/28/22: step down to NSGY  8/27/22: EEG pending  8/30/22 : remains with expressive asphasia       Interval History:  Expressive asphasia this am, EEG negative for seizures, CTA  and perfusion studying pending. Neuro checks Q2 QHS.          Review of Systems   Constitutional: Negative.    HENT: Negative.     Eyes: Negative.    Respiratory: Negative.     Cardiovascular: Negative.    Gastrointestinal: Negative.    Endocrine: Negative.    Genitourinary: Negative.    Musculoskeletal: Negative.    Skin: Negative.    Neurological:  Positive for headaches.      Objective:     Vitals:  Temp: 97.9 °F (36.6 °C)  Pulse: 76  Rhythm: normal sinus rhythm  BP: 127/84  MAP (mmHg): 100  Resp: 19  SpO2: 98 %  O2 Device (Oxygen Therapy): room air    Temp  Min: 97.9 °F (36.6 °C)  Max: 99.3 °F (37.4 °C)  Pulse  Min: 75  Max: 90  BP  Min: 114/55  Max: 147/99  MAP (mmHg)  Min: 77  Max: 117  Resp  Min: 15  Max: 30  SpO2  Min: 91 %  Max: 100 %    08/29 0701 - 08/30 0700  In: 594.4 [P.O.:200]  Out: 1315 [Urine:1250; Drains:65]   Unmeasured Output  Urine Occurrence: 1  Stool Occurrence: 0       Physical Exam  Vitals and nursing note reviewed.   Constitutional:       Appearance: She is ill-appearing.   HENT:      Nose: Nose normal.      Mouth/Throat:      Mouth: Mucous membranes  are moist.      Pharynx: Oropharynx is clear.   Cardiovascular:      Rate and Rhythm: Normal rate and regular rhythm.      Pulses: Normal pulses.      Heart sounds: Normal heart sounds.   Pulmonary:      Effort: Pulmonary effort is normal.      Breath sounds: Normal breath sounds.   Abdominal:      General: Bowel sounds are normal.      Palpations: Abdomen is soft.   Musculoskeletal:         General: Normal range of motion.      Cervical back: Normal range of motion.   Skin:     General: Skin is warm and dry.      Capillary Refill: Capillary refill takes less than 2 seconds.   Neurological:      Comments: E 4 V 5 M 6  -Alert. Oriented to person,   - Expressive asphasia   - Follows commands.   -Strength 5 and symmetric in arms, legs  -Sensation intact to touch in arms, legs     Unable to test orientation, language, memory, judgment, insight, fund of knowledge, hearing, shoulder shrug, tongue protrusion, coordination, gait due to level of consciousness.    Medications:  Continuous Scheduledamitriptyline, 75 mg, QHS  amLODIPine, 10 mg, Daily  atorvastatin, 40 mg, Daily  carvediloL, 37.5 mg, BID  ceftAZIDime (FORTAZ) IVPB, 2 g, Q8H  EScitalopram oxalate, 10 mg, Daily  heparin (porcine), 5,000 Units, Q8H  insulin aspart U-100, 8 Units, TIDWM  insulin detemir U-100, 24 Units, Daily  levetiracetam IV, 1,000 mg, Q12H  magnesium oxide, 400 mg, BID  mupirocin, , BID  [START ON 8/31/2022] senna-docusate 8.6-50 mg, 1 tablet, Daily  vancomycin (VANCOCIN) IVPB, 1,250 mg, Q24H  PRNacetaminophen, 650 mg, Q6H PRN  albuterol, 2 puff, Q20 Min PRN  dextrose 10%, 12.5 g, PRN  dextrose 10%, 25 g, PRN  glucagon (human recombinant), 1 mg, PRN  glucose, 16 g, PRN  glucose, 24 g, PRN  hydrALAZINE, 10 mg, Q6H PRN  insulin aspart U-100, 1-10 Units, QID (AC + HS) PRN  labetalol, 10 mg, Q6H PRN  magnesium oxide, 800 mg, PRN  magnesium oxide, 800 mg, PRN  melatonin, 6 mg, Nightly PRN  oxyCODONE, 5 mg, Q4H PRN  oxyCODONE, 10 mg, Q4H PRN  potassium  bicarbonate, 35 mEq, PRN  potassium bicarbonate, 50 mEq, PRN  potassium bicarbonate, 60 mEq, PRN  potassium, sodium phosphates, 2 packet, PRN  potassium, sodium phosphates, 2 packet, PRN  potassium, sodium phosphates, 2 packet, PRN  vancomycin - pharmacy to dose, , pharmacy to manage frequency    Today I personally reviewed pertinent medications, lines/drains/airways, imaging, cardiology results, laboratory results, microbiology results, notably:    Diet  Diet diabetic [facility]; 2000 Calorie; Isolation Tray - Regular [place]  Diet diabetic [facility]; 2000 Calorie; Isolation Tray - Regular [place]          Assessment/Plan:     Neuro  Transient neurological symptoms  Resolved    Cardiac/Vascular  Mixed hyperlipidemia  Continue atorvastatin    Essential hypertension  SBP Goal < 160  Continue home BP meds    Endocrine  New onset type 2 diabetes mellitus  Continue scheduled insulin and SSI  Diabetic diet    Other  * Fluid collection at surgical site  S/p washout 8/27  CTH revealed increase fluid collection NSGY aware  EEG pending  CTA with brain perfusion pending          The patient is being Prophylaxed for:  Venous Thromboembolism with: Mechanical or Chemical  Stress Ulcer with: Not Applicable   Ventilator Pneumonia with: not applicable    Activity Orders          Diet diabetic [facility]; 2000 Calorie; Isolation Tray - Regular [place]: Diabetic starting at 08/27 1135        Full Code  Critical care time 40 mins  Diamond Loera NP  Neurocritical Care  [facility] - Neuro Critical Care

## 2022-08-30 NOTE — NURSING
Verified with [facility] Diamond WELLS about CTH perfusion. States still wants the scan. CAlled CT tech and gave spectra number so they will give me a call when able to take her. Supposed to wait specific time for contrast. WCTM.

## 2022-08-30 NOTE — ASSESSMENT & PLAN NOTE
S/p washout 8/27  CTH revealed increase fluid collection NSGY aware  EEG pending  CTA with brain perfusion pending

## 2022-08-30 NOTE — PROCEDURES
Procedure(s):  excise right upper lesion 21 x 7 cm with complex closure. general    Anesthesia Post Evaluation      Multimodal analgesia: multimodal analgesia used between 6 hours prior to anesthesia start to PACU discharge  Patient location during evaluation: bedside  Patient participation: complete - patient participated  Level of consciousness: awake  Pain management: adequate  Airway patency: patent  Anesthetic complications: no  Cardiovascular status: stable  Respiratory status: acceptable  Hydration status: acceptable  Post anesthesia nausea and vomiting:  controlled      Vitals Value Taken Time   /72 6/27/2019  2:43 PM   Temp 36.3 °C (97.3 °F) 6/27/2019 11:45 AM   Pulse 90 6/27/2019  2:49 PM   Resp 17 6/27/2019  2:49 PM   SpO2 91 % 6/27/2019  2:50 PM   Vitals shown include unvalidated device data. [facility] EEG/VIDEO MONITORING REPORT  Gina Jenkins  [id number]  1975    DATE OF SERVICE:  08/29/2022  DATE OF ADMISSION: 8/21/2022  8:20 PM    ADMITTING/REQUESTING PROVIDER: Justice Steven MD    REASON FOR CONSULT:  47-year-old woman with recent left aneurysm clipping 07/2022 admitted with subdural and scalp fluid collections with intermittent right-sided weakness and language difficulties.  Evaluate for evidence of epileptiform activity.    METHODOLOGY   Electroencephalographic (EEG) recording is with electrodes placed according to the International 10-20 placement system.  Thirty two (32) channels of digital signal (sampling rate of 512/sec) including T1 and T2 was simultaneously recorded from the scalp and may include  EKG, EMG, and/or eye monitors.  Recording band pass was 0.1 to 512 hz.  Digital video recording of the patient is simultaneously recorded with the EEG.  The patient is instructed report clinical symptoms which may occur during the recording session.  EEG and video recording is stored and archived in digital format.  Activation procedures which include photic stimulation, hyperventilation and instructing patients to perform simple task are done in selected patients.   The EEG is displayed on a monitor screen and can be reviewed using different montages.  Computer assisted analysis is employed to detect spike and electrographic seizure activity.   The entire record is submitted for computer analysis.  The entire recording is visually reviewed and the times identified by computer analysis as being spikes or seizures are reviewed again.  Compresses spectral analysis (CSA) is also performed on the activity recorded from each individual channel.  This is displayed as a power display of frequencies from 0 to 30 Hz over time.   The CSA is reviewed looking for asymmetries in power between homologous areas of the scalp and then compared with the original EEG recording.     [employer] software is also utilized in  the review of this study.  This software suite analyzes the EEG recording in multiple domains.  Coherence and rhythmicity is computed to identify EEG sections which may contain organized seizures.  Each channel undergoes analysis to detect presence of spike and sharp waves which have special and morphological characteristic of epileptic activity.  The routine EEG recording is converted from spacial into frequency domain.  This is then displayed comparing homologous areas to identify areas of significant asymmetry.  Algorithm to identify non-cortically generated artifact is used to separate eye movement, EMG and other artifact from the EEG.      RECORDING TIMES  Start on 08/29/2022 at 15:23 p.m.  Stop on 08/30/2022 at 07:00 a.m.  A total of 15 hours and 2 minutes of EEG recording is obtained.    EEG FINDINGS  Background activity:   The background is continuous and asymmetric predominantly delta activity with more admixed theta frequencies seen over the right hemisphere.  At times, there is a moderately well-formed somewhat poorly sustained 7 hz maximal posterior dominant rhythm in the right posterior quadrant.  There is higher voltage more disorganized predominantly polymorphic delta activity with some admixed higher frequencies and a less well-formed posterior dominant rhythm seen over the left hemisphere.  There is intermittent generalized and multifocal slowing seen bilaterally, left>right.     Sleep:  There is evidence of state changes with the appearance of sleep architecture    Activation procedures:   The patient is able to follow simple commands and answers orientation questions correctly.     Cardiac Monitor:   Heart rate appears generally regular on a single lead EKG.    Impression:   This is an abnormal continuous EEG monitoring study because of generalized background slowing consistent with a moderate encephalopathy with more prominent slowing seen over the left hemisphere with contributions from a breach  rhythm in this area.  There are no pushbutton activations, no epileptiform discharges, and no electrographic seizures.    Kamille Zurita MD PhD  Neurology-Epilepsy  [facility].

## 2022-08-30 NOTE — PROGRESS NOTES
"[facility]  Infectious Disease  Progress Note    Patient Name: Gina Jenkins  MRN: [id number]  Admission Date: 8/21/2022  Length of Stay: 8 days  Attending Physician: Ryan Cage MD  Primary Care Provider: Clair Johnson NP    Isolation Status: No active isolations  Assessment/Plan:      * Fluid collection at surgical site     See assessment and plan below    Post op infection-resolved as of 8/28/2022     47 year old female with history of HTN, smoking and left brain aneurysm s/p left sided craniotomy for aneurysm clipping on 7/15/22 who presented with sudden onset of symptoms concerning for focal seizure. See HPI for more detail.      MRI brain showed peripherally enhancing sub dural and scalp complex fluid collections. CTA head without large vessel occulsion, stenosis, or vascular malformation.  NSGY performed a bedside aspiration of one of the subcutaneous fluid collections. Per previous discussion with team, blood was aspirated. Cx NGTD.      Patient is on empiric Vancomycin and Cefepime. Afebrile without a leukocytosis. HDS. She is s/p cranial wound I&D 8/27.  OR cultures NGTD.  Per op note "no phoebe pus or infection encountered, only reactive hyperemic tissue was found"    Today continues with speech difficulty.  Repeat CTH with stable epidural fluid and improved extracranial fluid after aspiration. On vanc and ceftazidime.  EEG done and preliminarily without epileptiform findings.    Recommendations:   · Continue IV Vancomycin. Inpatient pharmacy managing Vanc dosing. Trough goal 15-20.   · Start Ceftazidime 2g IV q8h as much lower cognitive effects but still offers pseudomonas coverage.  · Will follow culture data to guide antibiotic therapy   · Likely plan for 2 weeks of vanc and ceftazidime from time of surgery 8/21  · Plan reviewed with ID staff.   · ID will follow x 1 more day and if no growth on cultures          Anticipated Disposition: tbd    Thank you for your consult. I " will follow-up with patient. Please contact us if you have any additional questions.    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 Diaz who presented with sudden onset of symptoms concerning for focal seizure. Pt states she was in her yard with her son on Sunday sharing a snack when she suddenly became aphasic and had RLE weakness. Pt states occurred for about 5 mins and then self resolved. Pt reported to the ED immediately and reports the episode occurred for a 2nd time in the ER, again self resolving. Pt denies drainage, pain, or swelling from her craniotomy incision site. Denies current weakness, paralysis. Denies change of vision, headaches. Denies loss of control from bowels, urine. Denies fevers, body aches, chills. Denies concerns/complications postoperatively until on Sunday when the symptoms started.     Since admission pt remains without leukocytosis. Pt with slightly increased inflammatory markers, sed rate 46 and CRP 26. Blood cultures NGTD. MRI brain w wo c/f enhancement of the sub dural and scalp complex fluid collections, potentially representing granulation tissue or infection hematoma. MRI wo c/f stable subdural and subq fluid collections surrounding cranitomy defect on the left, potentially appearing somewhat loculated. CTA head with no large vessel occulsion, stenosis, or vascular malformation.     ID was consulted for mgmt recommendations. Pt is currently on vancomycin and piperacillin tazobactam.       Interval History:   Still with dysarthria/dysphasia  Afebrile.   No leukocytosis.   S/P craniotomy and washout.   NSGY reaspirated SQ fluid and that fluid on repeat CTH improved but epidural fluid  unchanged.  Cultures NGTD  The patient denies any recent fever, chills, or sweats.      Past Medical History:   Diagnosis Date    Brain aneurysm     CHF (congestive heart failure)     H/O coronary angioplasty     Hypercholesteremia     Hypertension     Malignant hypertension     Migraine headache     Stroke 10/2017       Past Surgical History:   Procedure Laterality Date    CARDIAC CATHETERIZATION      CEREBRAL ANGIOGRAM      CLIP LIGATION OF INTRACRANIAL ANEURYSM BY CRANIOTOMY N/A 7/15/2022    Procedure: CRANIOTOMY, WITH ANEURYSM CLIPPING;  Surgeon: Timothy Diaz MD;  Location: [facility] OR [address] [place] [address];  Service: Neurosurgery;  Laterality: N/A;  PTERIONAL CRANIOTOMY WITH CLIP LIGATION OF L PCOMM, L ANTERIOR CHOROIDAL, L MCA  ANEURYSM, ANESTHESIA: GENERAL, BLOOD: TYPE&CROSS 2 UNITS, NEUROMONITORING: SEP, MEP, EEG, RADIOLOGY: C-ARM, POSITION: SUPINE, ANNA CO-SURGERON: DR. LEXI DOMÍNGUEZ.    WOUND DEBRIDEMENT  8/27/2022    Procedure: DEBRIDEMENT, WOUND;  Surgeon: Timothy Diaz MD;  Location: [facility] OR [address] [place] [address];  Service: Neurosurgery;;       Review of patient's allergies indicates:   Allergen Reactions    Lisinopril Swelling    Bactrim [sulfamethoxazole-trimethoprim] Rash       Medications:  Facility-Administered Medications Prior to Admission   Medication    albuterol inhaler 2 puff     Medications Prior to Admission   Medication Sig    carvediloL (COREG) 25 MG tablet Take 37.5 mg by mouth 2 (two) times daily.    amitriptyline (ELAVIL) 75 MG tablet Take 75 mg by mouth every evening.    amlodipine (NORVASC) 10 MG tablet Take 1 tablet (10 mg total) by mouth once daily.    atorvastatin (LIPITOR) 40 MG tablet Take 1 tablet (40 mg total) by mouth once daily.    butalbital-acetaminophen-caffeine -40 mg (FIORICET, ESGIC) -40 mg per tablet Take 1 tablet by mouth every 4 (four) hours as needed.    cyanocobalamin (VITAMIN B-12) 1000 MCG tablet Take 1 tablet (1,000 mcg total) by mouth once daily.  (Patient not taking: No sig reported)    diphenhydrAMINE (BENADRYL) 25 mg capsule Take 1 each (25 mg total) by mouth every 6 (six) hours as needed for Itching or Allergies. (Patient not taking: No sig reported)    docusate sodium (COLACE) 100 MG capsule Take 1 capsule (100 mg total) by mouth 2 (two) times daily as needed for Constipation.    EScitalopram oxalate (LEXAPRO) 10 MG tablet Take 10 mg by mouth once daily.    famotidine (PEPCID) 20 MG tablet Take 1 tablet (20 mg total) by mouth 2 (two) times daily. (Patient not taking: No sig reported)    hydrochlorothiazide (HYDRODIURIL) 25 MG tablet Take 1 tablet (25 mg total) by mouth once daily.    levETIRAcetam (KEPPRA) 500 MG Tab Take 1 tablet (500 mg total) by mouth 2 (two) times daily for 5 days (Patient not taking: Reported on 7/29/2022)    magnesium oxide (MAG-OX) 400 mg (241.3 mg magnesium) tablet Take 1 tablet by mouth 2 (two) times daily.    potassium chloride SA (K-DUR,KLOR-CON) 20 MEQ tablet Take 20 mEq by mouth 2 (two) times daily.    topiramate (TOPAMAX) 50 MG tablet Take 50 mg by mouth 2 (two) times daily.    [DISCONTINUED] cephALEXin (KEFLEX) 500 MG capsule Take 1 capsule (500 mg total) by mouth every 6 (six) hours. for 14 days    [DISCONTINUED] dexAMETHasone (DECADRON) 2 MG tablet Take 2 tablets (4 mg) by mouth every 8 hours for 1 day, THEN 2 tablets (4 mg) every 12 hours for 2 days, THEN 1 tablet (2 mg) every 12 hours for 2 days, THEN 1 tablet (2 mg) daily for 2 days, then STOP.    [DISCONTINUED] HYDROcodone-acetaminophen (NORCO) 5-325 mg per tablet Take 1 tablet by mouth every 6 (six) hours as needed for Pain.     Antibiotics (From admission, onward)      Start     Stop Route Frequency Ordered    08/30/22 1400  vancomycin 1.25 g in dextrose 5% 250 mL IVPB (ready to mix)         -- IV Every 24 hours (non-standard times) 08/30/22 1312    08/29/22 2345  ceftAZIDime (FORTAZ) 2 g in dextrose 5 % 50 mL IVPB         -- IV Every 8 hours  (non-standard times) 08/29/22 2231    08/27/22 2100  mupirocin 2 % ointment         09/01 2059 Nasl 2 times daily 08/27/22 1418    08/22/22 0212  vancomycin - pharmacy to dose  (vancomycin IVPB)        See Yang for full Linked Orders Report.    -- IV pharmacy to manage frequency 08/22/22 0112          Antifungals (From admission, onward)      None          Antivirals (From admission, onward)      None             Immunization History   Administered Date(s) Administered    Influenza - Quadrivalent - PF *Preferred* (6 months and older) 10/05/2017       Family History    None       Social History     Socioeconomic History    Marital status:    Tobacco Use    Smoking status: Every Day     Packs/day: 0.50     Types: Cigarettes    Smokeless tobacco: Never   Substance and Sexual Activity    Alcohol use: Yes     Comment: occassionally    Drug use: No    Sexual activity: Not Currently     Partners: Male     Birth control/protection: None     Review of Systems   Constitutional:  Negative for chills, diaphoresis and fever.   HENT:  Negative for facial swelling.    Gastrointestinal:  Negative for abdominal pain and vomiting.   Skin:  Positive for wound.   Neurological:  Positive for weakness and headaches.   Objective:     Vital Signs (Most Recent):  Temp: 98.7 °F (37.1 °C) (08/30/22 1505)  Pulse: 72 (08/30/22 1505)  Resp: 18 (08/30/22 1505)  BP: 109/67 (08/30/22 1505)  SpO2: 100 % (08/30/22 1505)   Vital Signs (24h Range):  Temp:  [97.9 °F (36.6 °C)-99.3 °F (37.4 °C)] 98.7 °F (37.1 °C)  Pulse:  [72-90] 72  Resp:  [17-30] 18  SpO2:  [91 %-100 %] 100 %  BP: (109-147)/(55-99) 109/67     Weight: 88.5 kg (195 lb)  Body mass index is 34.54 kg/m².    Estimated Creatinine Clearance: 57 mL/min (based on SCr of 1.3 mg/dL).    Physical Exam  Vitals and nursing note reviewed.   Constitutional:       General: She is not in acute distress.     Appearance: Normal appearance. She is well-developed and normal weight. She is  not ill-appearing, toxic-appearing or diaphoretic.       HENT:      Head:        Nose: Nose normal. No congestion.      Mouth/Throat:      Dentition: Does not have dentures. No dental abscesses.   Eyes:      General: No scleral icterus.     Conjunctiva/sclera: Conjunctivae normal.   Cardiovascular:      Rate and Rhythm: Normal rate and regular rhythm.   Pulmonary:      Effort: Pulmonary effort is normal. No respiratory distress.      Breath sounds: Normal breath sounds. No wheezing or rales.   Abdominal:      General: Bowel sounds are normal. There is no distension.      Palpations: Abdomen is soft.      Tenderness: There is no abdominal tenderness.   Musculoskeletal:         General: Normal range of motion.      Cervical back: Normal range of motion. No rigidity.      Right lower leg: No edema.      Left lower leg: No edema.   Skin:     General: Skin is warm and dry.      Findings: No erythema or rash.   Neurological:      Mental Status: She is alert. She is disoriented.      Motor: No weakness.      Comments: Perseverating, mild dysarthria/dysphasia, FC, facial symmetric, tongue in midline.  No weakness noted    Psychiatric:         Mood and Affect: Mood normal.         Behavior: Behavior normal.         Thought Content: Thought content normal.         Judgment: Judgment normal.       Significant Labs: CBC:   Recent Labs   Lab 08/29/22  0256 08/30/22  0255   WBC 7.45 7.34   HGB 12.0 10.7*   HCT 35.0* 31.9*    213       CMP:   Recent Labs   Lab 08/29/22  0256 08/30/22  0255    140   K 4.8 3.9    105   CO2 20* 21*   GLU 99 106   BUN 15 13   CREATININE 1.2 1.3   CALCIUM 10.0 9.8   ANIONGAP 12 14       Microbiology Results (last 7 days)       Procedure Component Value Units Date/Time    Culture, Anaerobic [[id number]] Collected: 08/27/22 0909    Order Status: Completed Specimen: Wound from Head Updated: 08/30/22 1326     Anaerobic Culture Culture in progress    Narrative:      Superficial head  culture, aerobic, anaerobic, fungus, AFB,  gram    Culture, Anaerobic [[id number]] Collected: 08/27/22 0912    Order Status: Completed Specimen: Wound from Head Updated: 08/30/22 1326     Anaerobic Culture Culture in progress    Narrative:      Epidural culture, aerobic, anaerobic, gram, AFB, gram    Aerobic culture [[id number]] Collected: 08/27/22 0909    Order Status: Completed Specimen: Wound from Head Updated: 08/30/22 0953     Aerobic Bacterial Culture No growth    Narrative:      Superficial head culture, aerobic, anaerobic, fungus, AFB,  gram    Aerobic culture [[id number]] Collected: 08/27/22 0912    Order Status: Completed Specimen: Wound from Head Updated: 08/30/22 0953     Aerobic Bacterial Culture No growth    Narrative:      Epidural culture, aerobic, anaerobic, gram, AFB, gram    Culture, Anaerobic [[id number]] Collected: 08/22/22 1550    Order Status: Completed Specimen: Incision site from Head Updated: 08/30/22 0654     Anaerobic Culture No anaerobes isolated    AFB Culture & Smear [[id number]] Collected: 08/27/22 0909    Order Status: Completed Specimen: Wound from Head Updated: 08/29/22 1521     AFB Culture & Smear Culture in progress     AFB CULTURE STAIN No acid fast bacilli seen.    Narrative:      Superficial head culture, aerobic, anaerobic, fungus, AFB,  gram    AFB Culture & Smear [[id number]] Collected: 08/27/22 0912    Order Status: Completed Specimen: Wound from Head Updated: 08/29/22 1521     AFB Culture & Smear Culture in progress     AFB CULTURE STAIN No acid fast bacilli seen.    Narrative:      Epidural culture, aerobic, anaerobic, gram, AFB, gram    Gram stain [[id number]] Collected: 08/27/22 0912    Order Status: Completed Specimen: Wound from Head Updated: 08/27/22 1044     Gram Stain Result No WBC's      No organisms seen    Narrative:      Epidural culture, aerobic, anaerobic, gram, AFB, gram    Gram stain [[id number]] Collected: 08/27/22 0909    Order Status: Completed Specimen: Wound  from Head Updated: 08/27/22 1043     Gram Stain Result Rare WBC's      No organisms seen    Narrative:      Superficial head culture, aerobic, anaerobic, fungus, AFB,  gram    Fungus culture [[id number]] Collected: 08/27/22 0909    Order Status: Sent Specimen: Wound from Head Updated: 08/27/22 1003    Fungus culture [[id number]] Collected: 08/27/22 0912    Order Status: Sent Specimen: Wound from Head Updated: 08/27/22 1001    Blood culture [[id number]] Collected: 08/21/22 2325    Order Status: Completed Specimen: Blood from Peripheral, Antecubital, Right Updated: 08/27/22 0612     Blood Culture, Routine No growth after 5 days.    Aerobic culture [[id number]] Collected: 08/22/22 1550    Order Status: Completed Specimen: Incision site from Head Updated: 08/26/22 1029     Aerobic Bacterial Culture No growth    Gram stain [[id number]] Collected: 08/22/22 1550    Order Status: Completed Specimen: Incision site from Head Updated: 08/25/22 1447     Gram Stain Result No organisms seen      Rare WBC's    Gram stain [[id number]]     Order Status: Completed Specimen: Incision site from Head     AFB Culture & Smear [[id number]] Collected: 08/22/22 1550    Order Status: Completed Specimen: Incision site from Head Updated: 08/23/22 2127     AFB Culture & Smear Culture in progress     AFB CULTURE STAIN No acid fast bacilli seen.          Recent Lab Results  (Last 5 results in the past 24 hours)        08/30/22  1159   08/30/22  1157   08/30/22  0858   08/30/22  0642   08/30/22  0255        Anion Gap         14       Baso #         0.04       Basophil %         0.5       BUN         13       Calcium         9.8       Chloride         105       CO2         21       Creatinine         1.3       Differential Method         Automated       eGFR         51.4       Eos #         0.3       Eosinophil %         3.7       Glucose         106       Gran # (ANC)         4.5       Gran %         61.8       Hematocrit         31.9       Hemoglobin          10.7       Immature Grans (Abs)         0.04  Comment: Mild elevation in immature granulocytes is non specific and   can be seen in a variety of conditions including stress response,   acute inflammation, trauma and pregnancy. Correlation with other   laboratory and clinical findings is essential.         Immature Granulocytes         0.5       Lymph #         1.5       Lymph %         20.7       Magnesium         2.0       MCH         28.9       MCHC         33.5       MCV         86       Mono #         0.9       Mono %         12.8       MPV         11.1       nRBC         0       Phosphorus         3.3       Platelets         213       POCT Glucose   117   145   123         Potassium         3.9       RBC         3.70       RDW         14.0       Sodium         140       Vancomycin, Random 17.2               Vancomycin-Trough         26.1       WBC         7.34                              Significant Imaging:   CTA Head [[id number]] Resulted: 08/30/22 1319   Order Status: Completed Updated: 08/30/22 1321   Narrative:     EXAMINATION:   CTA HEAD     CLINICAL HISTORY:   Stroke, follow up;     TECHNIQUE:   Non contrast low dose axial images were obtained thought the head. CT angiogram was performed from the level of the bottom of C2 to the top of the head following the IV administration of 75mL of Omnipaque 350.   Sagittal and coronal reconstructions and maximum intensity projection reconstructions were performed.     COMPARISON:   CT 08/30/2022, CTA 08/21/2022.     FINDINGS:   Brain:     There is a stable appearance of the brain with no evidence of acute territorial infarct or intracranial hemorrhage. Stable extra-axial collection beneath the left pterional craniotomy flap status post aneurysm clipping.  Minimal midline shift to the right is stable.  Stable small extracranial low-density fluid collection with an underlying drain again noted.  No enhancing intracranial lesion is identified.     CTA:     There  is no significant stenosis at the origin of the vessels from the aortic arch or at the origin of the vertebral arteries from the subclavian arteries.  Low density along the posterior margin of the proximal left subclavian artery is thought more likely to reflect streak artifact than thrombus as it is new from the prior recent study.     There is no significant stenosis at the carotid bifurcations bilaterally by NASCET criteria with mild calcifications noted on the right.  The vertebral arteries are patent without significant stenosis.     Intracranially no recurrent aneurysm at the site of the aneurysm clips.  The adjacent PCOM/fetal PCA remains patent remains patent.  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     No new major branch stenosis/occlusion is identified at the [id number].     Left periparotid/jugular chain adenopathy is presumably reactive in light of the postsurgical changes in the left scalp.     Nonspecific ground-glass opacities lung apices.    Impression:       Stable appearance of the brain and extra-axial/extracranial fluid collections.     No significant stenosis at the carotid bifurcations by NASCET criteria.  The vertebral arteries are patent.     Stable appearance of the intracranial vasculature.  No recurrent aneurysm status post aneurysm clipping.  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  No new major branch stenosis/occlusion at the [id number].     Nonspecific ground-glass opacities lung apices.       Electronically signed by: Jose Perez   Date: 08/30/2022   Time: 13:19   CT Head Without Contrast [[id number]] Resulted: 08/30/22 0915   Order Status: Completed Updated: 08/30/22 0918   Narrative:     EXAMINATION:   CT HEAD WITHOUT CONTRAST     CLINICAL HISTORY:   Stroke, follow up;     TECHNIQUE:   Low dose axial CT images obtained throughout the head without the use of intravenous contrast.  Axial, sagittal, and coronal reconstructions were performed.      COMPARISON:   CT head 08/29/2022.  08/27/2022.     FINDINGS:   Intracranial compartment:     Postsurgical changes left pterional craniotomy for left PCOM and left anterior choroidal artery aneurysm clipping and recent wound washout.     Residual hypodense extra-axial fluid collection measures 0.8 cm and postop pneumocephalus is similar to prior exam.  Surgical drain in the soft tissues overlying the craniotomy sites in stable position with interval decrease size of hypodense fluid collection in the extracranial soft tissues now measures 3.1 x 0.9 cm, previously measured 5.8 x 1.7 cm..     The brain parenchyma appears stable from prior exam with mild sulcal crowding and mass effect on the left cerebral hemisphere from overlying extra-axial collection.  Stable minimal midline shift anteriorly.     No new  hemorrhage, edema, or major vascular distribution infarct.     Ventricles are stable in size and configuration from prior exam with no evidence of hydrocephalus.     Skull/extracranial contents (limited evaluation):     No fracture.     Mucosal thickening of the right maxillary sinus similar to prior exams.  Remaining paranasal sinuses and mastoid air cells are essentially clear.    Impression:       Stable postsurgical changes of repeat left pterional craniotomy for subcutaneous epidural collection washout status post prior left PCOM and left anterior choroidal artery aneurysm clipping.     Extra-axial fluid collection and pneumocephalus underlying the craniotomy is similar to prior exam.  Interval decrease in size of extracranial soft tissue fluid collection.  No new intracranial process.     Electronically signed by resident: Bianca Davis   Date: 08/30/2022   Time: 08:20     Electronically signed by: Jose Perez   Date: 08/30/2022   Time: 09:15   CT Head Without Contrast [[id number]] (Abnormal) Resulted: 08/29/22 1022   Order Status: Completed Updated: 08/29/22 1025   Narrative:     EXAMINATION:   CT HEAD  WITHOUT CONTRAST     CLINICAL HISTORY:   Stroke, follow up;     TECHNIQUE:   Multiple sequential 5 mm axial images of the head without contrast.  Coronal and sagittal reformatted imaging from the axial acquisition.     COMPARISON:   08/27/2022     FINDINGS:   Evolving operative changes status post repeat left pterional craniotomy for subcutaneous and epidural collection evacuation and washout.  There is overall reduced scattered postoperative pneumocephalus however there is increased sized hypodense extra-axial collections within the soft tissues overlying the left craniotomy as well as increased sized hypodense collection underlying the craniotomy superficial to the dural plasty.  This may all represent postoperative seroma/hematoma with indwelling drain within the soft tissues again seen.  Please note enlarging infectious collection not excluded in light of history.  Collection within the soft tissues measures approximately 1.7 cm TV and 5.8 cm AP previously measuring 0.8 cm in thickness and 5 cm AP.  The collection underlying the craniotomy measures approximately 0.8 cm in thickness previously measuring 0.6.     Continued mass effect and effacement of the left cerebral sulci diffusely which is nonspecific.  Slight smaller caliber ventricles without hydrocephalus.  No significant midline shift.  No definite new abnormal parenchymal attenuation.     Operative change with left paraclinoid region aneurysm clipping and presumed stent again seen with hypodensity within the superior left middle cranial fossa stable.  Probable empty sella unchanged     This report was flagged in Epic as abnormal.    Impression:       Evolving operative changes status post left pterional craniotomy for left paraclinoid region aneurysm clipping with superimposed more recent repeat craniotomy for subcutaneous and epidural collection evacuation and washout.     Interval increased sized hypodense collections within the soft tissues overlying  the craniotomy and underlying the craniotomy as detailed above.  This may represent evolving postoperative seroma with pseudomeningocele not excluded.  Alternatively enlarging infectious collection to be considered.  Clinical correlation and correlation with drainage output advised.     Smaller caliber ventricles without hydrocephalus with continued mass effect and effacement left cerebral sulci     Follow-up recommended.  Further evaluation with MRI as warranted.       Electronically signed by: Laureano Venegas DO   Date: 08/29/2022   Time: 10:22   CT Head Without Contrast [[id number]] (Abnormal) Resulted: 08/28/22 0815   Order Status: Completed Updated: 08/28/22 0817   Narrative:     EXAMINATION:   CT HEAD WITHOUT CONTRAST     CLINICAL HISTORY:   post op subcutaneous and epidural washout;     TECHNIQUE:   Multiple sequential 5 mm axial images of the head without contrast.  Coronal and sagittal reformatted imaging from the axial acquisition.     COMPARISON:   MRI and CT 08/21/2022     FINDINGS:   Interval operative change status post left frontal craniotomy for subcutaneous and a epidural collection evacuation and washout.  There is scattered postoperative gas fluid and hemorrhage within the soft tissues overlying the craniotomy with reduced size collection compared to MRI suggestive for postoperative gas fluid and hemorrhage.  In addition there is a thin extra-axial mixed density collection underlying the craniotomy superficial to the dural plasty suggestive for additional postoperative gas fluid and hemorrhage this measures approximately 0.6 cm in thickness.  Please note component of residual infection cannot be excluded.  Clinical correlation and follow-up advised.     Mass effect on the left cerebral hemisphere without midline shift or hydrocephalus.  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  There is slight hypodensity in fullness along the left middle cranial fossa anteriorly which may represent encephalomalacia with extension of infectious collection to this level cannot be excluded.  Clinical correlation and follow-up MRI recommended.     There is continued incidental lobular opacity right maxillary antra suggestive for mucous retention cyst.  Periapical cyst within the maxillary dentition partially visualized bilaterally.     This report was flagged in Epic as abnormal.    Impression:       Interval operative change status post left pterional repeat craniotomy for subcutaneous and epidural collection evacuation and washout with subcutaneous drain placement.     Residual mixed density collection overlies the soft tissues as well as within the extra-axial space underlying the craniotomy likely represents postoperative gas fluid and hemorrhage with residual infection not excluded.     There is no significant acute intracranial hemorrhage or new abnormal parenchymal attenuation.  Superimposed additional operative change from left pterional craniotomy and paraclinoid region aneurysm clipping and possible MCA stenting.  There is hypodense collection within the left middle cranial fossa superiorly along the region of postoperative metal which may represent encephalomalacia additional region of infection cannot be entirely excluded.  Clinical correlation and follow-up advised.  This could be further evaluated with MRI.     Ventricles relatively stable without hydrocephalus.       Electronically signed by: Laureano Venegas DO   Date: 08/28/2022   Time: 08:15     Imaging History    2022    Date Procedure Name Study Review Link PACS Link Status Accession Number Location   08/30/22 12:43 PM CTA Head Study Review  Images Final [id number] [facility]   08/30/22 03:36 AM CT Head Without Contrast Study Review  Images Final [id number] [facility]   08/29/22 10:12 AM CT Head Without Contrast Study Review  Images Final  [id number] [facility]   08/27/22 04:53 PM CT Head Without Contrast Study Review  Images Final [id number] [facility]   08/21/22 11:10 PM MRI Brain W WO Contrast Study Review  Images Final [id number] [facility]   08/21/22 10:04 PM MRI Brain Without Contrast Study Review  Images Final [id number] [facility]   08/21/22 09:35 PM X-Ray Chest AP Portable Study Review  Images Final [id number] [facility]   08/21/22 09:32 PM CTA STROKE MULTI-PHASE Study Review  Images Final [id number] [facility]   08/17/22 06:24 PM CT Head Without Contrast Study Review  Images Final [id number] [facility]   08/02/22 07:55 PM CT Head Without Contrast Study Review  Images Final [id number] [facility]     8/22/22:      8/23/22:                  8/24/22

## 2022-08-30 NOTE — SUBJECTIVE & OBJECTIVE
Interval History:   Still with dysarthria/dysphasia  Afebrile.   No leukocytosis.   S/P craniotomy and washout.   NSGY reaspirated SQ fluid and that fluid on repeat CTH improved but epidural fluid unchanged.  Cultures NGTD  The patient denies any recent fever, chills, or sweats.      Past Medical History:   Diagnosis Date    Brain aneurysm     CHF (congestive heart failure)     H/O coronary angioplasty     Hypercholesteremia     Hypertension     Malignant hypertension     Migraine headache     Stroke 10/2017       Past Surgical History:   Procedure Laterality Date    CARDIAC CATHETERIZATION      CEREBRAL ANGIOGRAM      CLIP LIGATION OF INTRACRANIAL ANEURYSM BY CRANIOTOMY N/A 7/15/2022    Procedure: CRANIOTOMY, WITH ANEURYSM CLIPPING;  Surgeon: Timothy Diaz MD;  Location: [facility] OR [address] [place] [address];  Service: Neurosurgery;  Laterality: N/A;  PTERIONAL CRANIOTOMY WITH CLIP LIGATION OF L PCOMM, L ANTERIOR CHOROIDAL, L MCA  ANEURYSM, ANESTHESIA: GENERAL, BLOOD: TYPE&CROSS 2 UNITS, NEUROMONITORING: SEP, MEP, EEG, RADIOLOGY: C-ARM, POSITION: SUPINE, CASTILLO, CO-SURGERON: DR. LEXI DOMÍNGUEZ.    WOUND DEBRIDEMENT  8/27/2022    Procedure: DEBRIDEMENT, WOUND;  Surgeon: Timothy Diaz MD;  Location: [facility] OR [address] [place] [address];  Service: Neurosurgery;;       Review of patient's allergies indicates:   Allergen Reactions    Lisinopril Swelling    Bactrim [sulfamethoxazole-trimethoprim] Rash       Medications:  Facility-Administered Medications Prior to Admission   Medication    albuterol inhaler 2 puff     Medications Prior to Admission   Medication Sig    carvediloL (COREG) 25 MG tablet Take 37.5 mg by mouth 2 (two) times daily.    amitriptyline (ELAVIL) 75 MG tablet Take 75 mg by mouth every evening.    amlodipine (NORVASC) 10 MG tablet Take 1 tablet (10 mg total) by mouth once daily.    atorvastatin (LIPITOR) 40 MG tablet Take 1 tablet (40 mg total) by mouth once daily.    butalbital-acetaminophen-caffeine -40 mg (FIORICET, ESGIC)  -40 mg per tablet Take 1 tablet by mouth every 4 (four) hours as needed.    cyanocobalamin (VITAMIN B-12) 1000 MCG tablet Take 1 tablet (1,000 mcg total) by mouth once daily. (Patient not taking: No sig reported)    diphenhydrAMINE (BENADRYL) 25 mg capsule Take 1 each (25 mg total) by mouth every 6 (six) hours as needed for Itching or Allergies. (Patient not taking: No sig reported)    docusate sodium (COLACE) 100 MG capsule Take 1 capsule (100 mg total) by mouth 2 (two) times daily as needed for Constipation.    EScitalopram oxalate (LEXAPRO) 10 MG tablet Take 10 mg by mouth once daily.    famotidine (PEPCID) 20 MG tablet Take 1 tablet (20 mg total) by mouth 2 (two) times daily. (Patient not taking: No sig reported)    hydrochlorothiazide (HYDRODIURIL) 25 MG tablet Take 1 tablet (25 mg total) by mouth once daily.    levETIRAcetam (KEPPRA) 500 MG Tab Take 1 tablet (500 mg total) by mouth 2 (two) times daily for 5 days (Patient not taking: Reported on 7/29/2022)    magnesium oxide (MAG-OX) 400 mg (241.3 mg magnesium) tablet Take 1 tablet by mouth 2 (two) times daily.    potassium chloride SA (K-DUR,KLOR-CON) 20 MEQ tablet Take 20 mEq by mouth 2 (two) times daily.    topiramate (TOPAMAX) 50 MG tablet Take 50 mg by mouth 2 (two) times daily.    [DISCONTINUED] cephALEXin (KEFLEX) 500 MG capsule Take 1 capsule (500 mg total) by mouth every 6 (six) hours. for 14 days    [DISCONTINUED] dexAMETHasone (DECADRON) 2 MG tablet Take 2 tablets (4 mg) by mouth every 8 hours for 1 day, THEN 2 tablets (4 mg) every 12 hours for 2 days, THEN 1 tablet (2 mg) every 12 hours for 2 days, THEN 1 tablet (2 mg) daily for 2 days, then STOP.    [DISCONTINUED] HYDROcodone-acetaminophen (NORCO) 5-325 mg per tablet Take 1 tablet by mouth every 6 (six) hours as needed for Pain.     Antibiotics (From admission, onward)      Start     Stop Route Frequency Ordered    08/30/22 1400  vancomycin 1.25 g in dextrose 5% 250 mL IVPB (ready to mix)          -- IV Every 24 hours (non-standard times) 08/30/22 1312    08/29/22 2345  ceftAZIDime (FORTAZ) 2 g in dextrose 5 % 50 mL IVPB         -- IV Every 8 hours (non-standard times) 08/29/22 2231    08/27/22 2100  mupirocin 2 % ointment         09/01 2059 Nasl 2 times daily 08/27/22 1418    08/22/22 0212  vancomycin - pharmacy to dose  (vancomycin IVPB)        See Yang for full Linked Orders Report.    -- IV pharmacy to manage frequency 08/22/22 0112          Antifungals (From admission, onward)      None          Antivirals (From admission, onward)      None             Immunization History   Administered Date(s) Administered    Influenza - Quadrivalent - PF *Preferred* (6 months and older) 10/05/2017       Family History    None       Social History     Socioeconomic History    Marital status:    Tobacco Use    Smoking status: Every Day     Packs/day: 0.50     Types: Cigarettes    Smokeless tobacco: Never   Substance and Sexual Activity    Alcohol use: Yes     Comment: occassionally    Drug use: No    Sexual activity: Not Currently     Partners: Male     Birth control/protection: None     Review of Systems   Constitutional:  Negative for chills, diaphoresis and fever.   HENT:  Negative for facial swelling.    Gastrointestinal:  Negative for abdominal pain and vomiting.   Skin:  Positive for wound.   Neurological:  Positive for weakness and headaches.   Objective:     Vital Signs (Most Recent):  Temp: 98.7 °F (37.1 °C) (08/30/22 1505)  Pulse: 72 (08/30/22 1505)  Resp: 18 (08/30/22 1505)  BP: 109/67 (08/30/22 1505)  SpO2: 100 % (08/30/22 1505)   Vital Signs (24h Range):  Temp:  [97.9 °F (36.6 °C)-99.3 °F (37.4 °C)] 98.7 °F (37.1 °C)  Pulse:  [72-90] 72  Resp:  [17-30] 18  SpO2:  [91 %-100 %] 100 %  BP: (109-147)/(55-99) 109/67     Weight: 88.5 kg (195 lb)  Body mass index is 34.54 kg/m².    Estimated Creatinine Clearance: 57 mL/min (based on SCr of 1.3 mg/dL).    Physical Exam  Vitals and nursing note  reviewed.   Constitutional:       General: She is not in acute distress.     Appearance: Normal appearance. She is well-developed and normal weight. She is not ill-appearing, toxic-appearing or diaphoretic.       HENT:      Head:        Nose: Nose normal. No congestion.      Mouth/Throat:      Dentition: Does not have dentures. No dental abscesses.   Eyes:      General: No scleral icterus.     Conjunctiva/sclera: Conjunctivae normal.   Cardiovascular:      Rate and Rhythm: Normal rate and regular rhythm.   Pulmonary:      Effort: Pulmonary effort is normal. No respiratory distress.      Breath sounds: Normal breath sounds. No wheezing or rales.   Abdominal:      General: Bowel sounds are normal. There is no distension.      Palpations: Abdomen is soft.      Tenderness: There is no abdominal tenderness.   Musculoskeletal:         General: Normal range of motion.      Cervical back: Normal range of motion. No rigidity.      Right lower leg: No edema.      Left lower leg: No edema.   Skin:     General: Skin is warm and dry.      Findings: No erythema or rash.   Neurological:      Mental Status: She is alert. She is disoriented.      Motor: No weakness.      Comments: Perseverating, mild dysarthria/dysphasia, FC, facial symmetric, tongue in midline.  No weakness noted    Psychiatric:         Mood and Affect: Mood normal.         Behavior: Behavior normal.         Thought Content: Thought content normal.         Judgment: Judgment normal.       Significant Labs: CBC:   Recent Labs   Lab 08/29/22 0256 08/30/22  0255   WBC 7.45 7.34   HGB 12.0 10.7*   HCT 35.0* 31.9*    213       CMP:   Recent Labs   Lab 08/29/22  0256 08/30/22  0255    140   K 4.8 3.9    105   CO2 20* 21*   GLU 99 106   BUN 15 13   CREATININE 1.2 1.3   CALCIUM 10.0 9.8   ANIONGAP 12 14       Microbiology Results (last 7 days)       Procedure Component Value Units Date/Time    Culture, Anaerobic [[id number]] Collected: 08/27/22 0909     Order Status: Completed Specimen: Wound from Head Updated: 08/30/22 1326     Anaerobic Culture Culture in progress    Narrative:      Superficial head culture, aerobic, anaerobic, fungus, AFB,  gram    Culture, Anaerobic [[id number]] Collected: 08/27/22 0912    Order Status: Completed Specimen: Wound from Head Updated: 08/30/22 1326     Anaerobic Culture Culture in progress    Narrative:      Epidural culture, aerobic, anaerobic, gram, AFB, gram    Aerobic culture [[id number]] Collected: 08/27/22 0909    Order Status: Completed Specimen: Wound from Head Updated: 08/30/22 0953     Aerobic Bacterial Culture No growth    Narrative:      Superficial head culture, aerobic, anaerobic, fungus, AFB,  gram    Aerobic culture [[id number]] Collected: 08/27/22 0912    Order Status: Completed Specimen: Wound from Head Updated: 08/30/22 0953     Aerobic Bacterial Culture No growth    Narrative:      Epidural culture, aerobic, anaerobic, gram, AFB, gram    Culture, Anaerobic [[id number]] Collected: 08/22/22 1550    Order Status: Completed Specimen: Incision site from Head Updated: 08/30/22 0654     Anaerobic Culture No anaerobes isolated    AFB Culture & Smear [[id number]] Collected: 08/27/22 0909    Order Status: Completed Specimen: Wound from Head Updated: 08/29/22 1521     AFB Culture & Smear Culture in progress     AFB CULTURE STAIN No acid fast bacilli seen.    Narrative:      Superficial head culture, aerobic, anaerobic, fungus, AFB,  gram    AFB Culture & Smear [[id number]] Collected: 08/27/22 0912    Order Status: Completed Specimen: Wound from Head Updated: 08/29/22 1521     AFB Culture & Smear Culture in progress     AFB CULTURE STAIN No acid fast bacilli seen.    Narrative:      Epidural culture, aerobic, anaerobic, gram, AFB, gram    Gram stain [[id number]] Collected: 08/27/22 0912    Order Status: Completed Specimen: Wound from Head Updated: 08/27/22 1044     Gram Stain Result No WBC's      No organisms seen    Narrative:       Epidural culture, aerobic, anaerobic, gram, AFB, gram    Gram stain [[id number]] Collected: 08/27/22 0909    Order Status: Completed Specimen: Wound from Head Updated: 08/27/22 1043     Gram Stain Result Rare WBC's      No organisms seen    Narrative:      Superficial head culture, aerobic, anaerobic, fungus, AFB,  gram    Fungus culture [[id number]] Collected: 08/27/22 0909    Order Status: Sent Specimen: Wound from Head Updated: 08/27/22 1003    Fungus culture [[id number]] Collected: 08/27/22 0912    Order Status: Sent Specimen: Wound from Head Updated: 08/27/22 1001    Blood culture [[id number]] Collected: 08/21/22 2325    Order Status: Completed Specimen: Blood from Peripheral, Antecubital, Right Updated: 08/27/22 0612     Blood Culture, Routine No growth after 5 days.    Aerobic culture [[id number]] Collected: 08/22/22 1550    Order Status: Completed Specimen: Incision site from Head Updated: 08/26/22 1029     Aerobic Bacterial Culture No growth    Gram stain [[id number]] Collected: 08/22/22 1550    Order Status: Completed Specimen: Incision site from Head Updated: 08/25/22 1447     Gram Stain Result No organisms seen      Rare WBC's    Gram stain [[id number]]     Order Status: Completed Specimen: Incision site from Head     AFB Culture & Smear [[id number]] Collected: 08/22/22 1550    Order Status: Completed Specimen: Incision site from Head Updated: 08/23/22 2127     AFB Culture & Smear Culture in progress     AFB CULTURE STAIN No acid fast bacilli seen.          Recent Lab Results  (Last 5 results in the past 24 hours)        08/30/22  1159   08/30/22  1157   08/30/22  0858   08/30/22  0642   08/30/22  0255        Anion Gap         14       Baso #         0.04       Basophil %         0.5       BUN         13       Calcium         9.8       Chloride         105       CO2         21       Creatinine         1.3       Differential Method         Automated       eGFR         51.4       Eos #         0.3        Eosinophil %         3.7       Glucose         106       Gran # (ANC)         4.5       Gran %         61.8       Hematocrit         31.9       Hemoglobin         10.7       Immature Grans (Abs)         0.04  Comment: Mild elevation in immature granulocytes is non specific and   can be seen in a variety of conditions including stress response,   acute inflammation, trauma and pregnancy. Correlation with other   laboratory and clinical findings is essential.         Immature Granulocytes         0.5       Lymph #         1.5       Lymph %         20.7       Magnesium         2.0       MCH         28.9       MCHC         33.5       MCV         86       Mono #         0.9       Mono %         12.8       MPV         11.1       nRBC         0       Phosphorus         3.3       Platelets         213       POCT Glucose   117   145   123         Potassium         3.9       RBC         3.70       RDW         14.0       Sodium         140       Vancomycin, Random 17.2               Vancomycin-Trough         26.1       WBC         7.34                              Significant Imaging:   CTA Head [[id number]] Resulted: 08/30/22 1319   Order Status: Completed Updated: 08/30/22 1321   Narrative:     EXAMINATION:   CTA HEAD     CLINICAL HISTORY:   Stroke, follow up;     TECHNIQUE:   Non contrast low dose axial images were obtained thought the head. CT angiogram was performed from the level of the bottom of C2 to the top of the head following the IV administration of 75mL of Omnipaque 350.   Sagittal and coronal reconstructions and maximum intensity projection reconstructions were performed.     COMPARISON:   CT 08/30/2022, CTA 08/21/2022.     FINDINGS:   Brain:     There is a stable appearance of the brain with no evidence of acute territorial infarct or intracranial hemorrhage. Stable extra-axial collection beneath the left pterional craniotomy flap status post aneurysm clipping.  Minimal midline shift to the right is stable.   Stable small extracranial low-density fluid collection with an underlying drain again noted.  No enhancing intracranial lesion is identified.     CTA:     There is no significant stenosis at the origin of the vessels from the aortic arch or at the origin of the vertebral arteries from the subclavian arteries.  Low density along the posterior margin of the proximal left subclavian artery is thought more likely to reflect streak artifact than thrombus as it is new from the prior recent study.     There is no significant stenosis at the carotid bifurcations bilaterally by NASCET criteria with mild calcifications noted on the right.  The vertebral arteries are patent without significant stenosis.     Intracranially no recurrent aneurysm at the site of the aneurysm clips.  The adjacent PCOM/fetal PCA remains patent remains patent.  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     No new major branch stenosis/occlusion is identified at the [id number]-[id number].     Left periparotid/jugular chain adenopathy is presumably reactive in light of the postsurgical changes in the left scalp.     Nonspecific ground-glass opacities lung apices.    Impression:       Stable appearance of the brain and extra-axial/extracranial fluid collections.     No significant stenosis at the carotid bifurcations by NASCET criteria.  The vertebral arteries are patent.     Stable appearance of the intracranial vasculature.  No recurrent aneurysm status post aneurysm clipping.  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  No new major branch stenosis/occlusion at the [id number].     Nonspecific ground-glass opacities lung apices.       Electronically signed by: Jose Perez   Date: 08/30/2022   Time: 13:19   CT Head Without Contrast [[id number]] Resulted: 08/30/22 0915   Order Status: Completed Updated: 08/30/22 0918   Narrative:     EXAMINATION:   CT HEAD WITHOUT CONTRAST     CLINICAL HISTORY:   Stroke, follow up;     TECHNIQUE:   Low  dose axial CT images obtained throughout the head without the use of intravenous contrast.  Axial, sagittal, and coronal reconstructions were performed.     COMPARISON:   CT head 08/29/2022.  08/27/2022.     FINDINGS:   Intracranial compartment:     Postsurgical changes left pterional craniotomy for left PCOM and left anterior choroidal artery aneurysm clipping and recent wound washout.     Residual hypodense extra-axial fluid collection measures 0.8 cm and postop pneumocephalus is similar to prior exam.  Surgical drain in the soft tissues overlying the craniotomy sites in stable position with interval decrease size of hypodense fluid collection in the extracranial soft tissues now measures 3.1 x 0.9 cm, previously measured 5.8 x 1.7 cm..     The brain parenchyma appears stable from prior exam with mild sulcal crowding and mass effect on the left cerebral hemisphere from overlying extra-axial collection.  Stable minimal midline shift anteriorly.     No new  hemorrhage, edema, or major vascular distribution infarct.     Ventricles are stable in size and configuration from prior exam with no evidence of hydrocephalus.     Skull/extracranial contents (limited evaluation):     No fracture.     Mucosal thickening of the right maxillary sinus similar to prior exams.  Remaining paranasal sinuses and mastoid air cells are essentially clear.    Impression:       Stable postsurgical changes of repeat left pterional craniotomy for subcutaneous epidural collection washout status post prior left PCOM and left anterior choroidal artery aneurysm clipping.     Extra-axial fluid collection and pneumocephalus underlying the craniotomy is similar to prior exam.  Interval decrease in size of extracranial soft tissue fluid collection.  No new intracranial process.     Electronically signed by resident: Bianca Davis   Date: 08/30/2022   Time: 08:20     Electronically signed by: Jose Perez   Date: 08/30/2022   Time: 09:15   CT Head  Without Contrast [[id number]] (Abnormal) Resulted: 08/29/22 1022   Order Status: Completed Updated: 08/29/22 1025   Narrative:     EXAMINATION:   CT HEAD WITHOUT CONTRAST     CLINICAL HISTORY:   Stroke, follow up;     TECHNIQUE:   Multiple sequential 5 mm axial images of the head without contrast.  Coronal and sagittal reformatted imaging from the axial acquisition.     COMPARISON:   08/27/2022     FINDINGS:   Evolving operative changes status post repeat left pterional craniotomy for subcutaneous and epidural collection evacuation and washout.  There is overall reduced scattered postoperative pneumocephalus however there is increased sized hypodense extra-axial collections within the soft tissues overlying the left craniotomy as well as increased sized hypodense collection underlying the craniotomy superficial to the dural plasty.  This may all represent postoperative seroma/hematoma with indwelling drain within the soft tissues again seen.  Please note enlarging infectious collection not excluded in light of history.  Collection within the soft tissues measures approximately 1.7 cm TV and 5.8 cm AP previously measuring 0.8 cm in thickness and 5 cm AP.  The collection underlying the craniotomy measures approximately 0.8 cm in thickness previously measuring 0.6.     Continued mass effect and effacement of the left cerebral sulci diffusely which is nonspecific.  Slight smaller caliber ventricles without hydrocephalus.  No significant midline shift.  No definite new abnormal parenchymal attenuation.     Operative change with left paraclinoid region aneurysm clipping and presumed stent again seen with hypodensity within the superior left middle cranial fossa stable.  Probable empty sella unchanged     This report was flagged in Epic as abnormal.    Impression:       Evolving operative changes status post left pterional craniotomy for left paraclinoid region aneurysm clipping with superimposed more recent repeat  craniotomy for subcutaneous and epidural collection evacuation and washout.     Interval increased sized hypodense collections within the soft tissues overlying the craniotomy and underlying the craniotomy as detailed above.  This may represent evolving postoperative seroma with pseudomeningocele not excluded.  Alternatively enlarging infectious collection to be considered.  Clinical correlation and correlation with drainage output advised.     Smaller caliber ventricles without hydrocephalus with continued mass effect and effacement left cerebral sulci     Follow-up recommended.  Further evaluation with MRI as warranted.       Electronically signed by: Laureano Venegas DO   Date: 08/29/2022   Time: 10:22   CT Head Without Contrast [[id number]] (Abnormal) Resulted: 08/28/22 0815   Order Status: Completed Updated: 08/28/22 0817   Narrative:     EXAMINATION:   CT HEAD WITHOUT CONTRAST     CLINICAL HISTORY:   post op subcutaneous and epidural washout;     TECHNIQUE:   Multiple sequential 5 mm axial images of the head without contrast.  Coronal and sagittal reformatted imaging from the axial acquisition.     COMPARISON:   MRI and CT 08/21/2022     FINDINGS:   Interval operative change status post left frontal craniotomy for subcutaneous and a epidural collection evacuation and washout.  There is scattered postoperative gas fluid and hemorrhage within the soft tissues overlying the craniotomy with reduced size collection compared to MRI suggestive for postoperative gas fluid and hemorrhage.  In addition there is a thin extra-axial mixed density collection underlying the craniotomy superficial to the dural plasty suggestive for additional postoperative gas fluid and hemorrhage this measures approximately 0.6 cm in thickness.  Please note component of residual infection cannot be excluded.  Clinical correlation and follow-up advised.     Mass effect on the left cerebral hemisphere without midline shift or hydrocephalus.  There  is superimposed more remote operative change from left pterional craniotomy for left paraclinoid region aneurysm clipping, with question superimposed MCA stenting.  There is slight hypodensity in fullness along the left middle cranial fossa anteriorly which may represent encephalomalacia with extension of infectious collection to this level cannot be excluded.  Clinical correlation and follow-up MRI recommended.     There is continued incidental lobular opacity right maxillary antra suggestive for mucous retention cyst.  Periapical cyst within the maxillary dentition partially visualized bilaterally.     This report was flagged in Epic as abnormal.    Impression:       Interval operative change status post left pterional repeat craniotomy for subcutaneous and epidural collection evacuation and washout with subcutaneous drain placement.     Residual mixed density collection overlies the soft tissues as well as within the extra-axial space underlying the craniotomy likely represents postoperative gas fluid and hemorrhage with residual infection not excluded.     There is no significant acute intracranial hemorrhage or new abnormal parenchymal attenuation.  Superimposed additional operative change from left pterional craniotomy and paraclinoid region aneurysm clipping and possible MCA stenting.  There is hypodense collection within the left middle cranial fossa superiorly along the region of postoperative metal which may represent encephalomalacia additional region of infection cannot be entirely excluded.  Clinical correlation and follow-up advised.  This could be further evaluated with MRI.     Ventricles relatively stable without hydrocephalus.       Electronically signed by: Laureano Venegas DO   Date: 08/28/2022   Time: 08:15     Imaging History    2022    Date Procedure Name Study Review Link PACS Link Status Accession Number Location   08/30/22 12:43 PM CTA Head Study Review  Images Final [id number] [facility]   08/30/22  03:36 AM CT Head Without Contrast Study Review  Images Final [id number] [facility]   08/29/22 10:12 AM CT Head Without Contrast Study Review  Images Final [id number] [facility]   08/27/22 04:53 PM CT Head Without Contrast Study Review  Images Final [id number] [facility]   08/21/22 11:10 PM MRI Brain W WO Contrast Study Review  Images Final [id number] [facility]   08/21/22 10:04 PM MRI Brain Without Contrast Study Review  Images Final [id number] [facility]   08/21/22 09:35 PM X-Ray Chest AP Portable Study Review  Images Final [id number] [facility]   08/21/22 09:32 PM CTA STROKE MULTI-PHASE Study Review  Images Final [id number] [facility]   08/17/22 06:24 PM CT Head Without Contrast Study Review  Images Final [id number] [facility]   08/02/22 07:55 PM CT Head Without Contrast Study Review  Images Final [id number] [facility]     8/22/22:      8/23/22:                  8/24/22

## 2022-08-30 NOTE — NURSING
Pt transported to CT on continuous monitoring and room air by RN at 1225. Ambu bag accompanied pt. Pt tolerated well. Pt transported back to room 9074. Back on bedside monitoring. EVONNE.

## 2022-08-30 NOTE — PROGRESS NOTES
"[facility]  Infectious Disease  Progress Note    Patient Name: Gina Jenkins  MRN: [id number]  Admission Date: 8/21/2022  Length of Stay: 7 days  Attending Physician: Ryan Cage MD  Primary Care Provider: Clair Johnson NP    Isolation Status: No active isolations  Assessment/Plan:      * Post op infection-resolved as of 8/28/2022     47 year old female with history of HTN, smoking and left brain aneurysm s/p left sided craniotomy for aneurysm clipping on 7/15/22 who presented with sudden onset of symptoms concerning for focal seizure. See HPI for more detail.      MRI brain showed peripherally enhancing sub dural and scalp complex fluid collections. CTA head without large vessel occulsion, stenosis, or vascular malformation.  NSGY performed a bedside aspiration of one of the subcutaneous fluid collections. Per previous discussion with team, blood was aspirated. Cx NGTD.      Patient is on empiric Vancomycin and Cefepime. Afebrile without a leukocytosis. HDS. She is s/p cranial wound I&D 8/27.  OR cultures NGTD.  Per op note "no phoebe pus or infection encountered, only reactive hyperemic tissue was found"    Today with speech difficulty.  Repeat CTH with increased L sided SD fluid and shift.  Primary checking EKG and repeat CTH planned.  ? Cefepime contributing?      Recommendations:   · Continue IV Vancomycin. Inpatient pharmacy managing Vanc dosing. Trough goal 15-20.   · DC Cefepime 2 g IV q8h  · Start Ceftazidime 2g IV q8h as much lower cognitive effects but still offers pseudomonas coverage.  · Will follow culture data to guide antibiotic therapy   · Plan reviewed with ID staff. ID will follow      Fluid collection at surgical site     See assessment and plan below        Anticipated Disposition: tbd    Thank you for your consult. I will follow-up with patient. Please contact us if you have any additional questions.    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 Diaz who presented with sudden onset of symptoms concerning for focal seizure. Pt states she was in her yard with her son on Sunday sharing a snack when she suddenly became aphasic and had RLE weakness. Pt states occurred for about 5 mins and then self resolved. Pt reported to the ED immediately and reports the episode occurred for a 2nd time in the ER, again self resolving. Pt denies drainage, pain, or swelling from her craniotomy incision site. Denies current weakness, paralysis. Denies change of vision, headaches. Denies loss of control from bowels, urine. Denies fevers, body aches, chills. Denies concerns/complications postoperatively until on Sunday when the symptoms started.     Since admission pt remains without leukocytosis. Pt with slightly increased inflammatory markers, sed rate 46 and CRP 26. Blood cultures NGTD. MRI brain w wo c/f enhancement of the sub dural and scalp complex fluid collections, potentially representing granulation tissue or infection hematoma. MRI wo c/f stable subdural and subq fluid collections surrounding cranitomy defect on the left, potentially appearing somewhat loculated. CTA head with no large vessel occulsion, stenosis, or vascular malformation.     ID was consulted for mgmt recommendations. Pt is currently on vancomycin and piperacillin tazobactam.       Interval History:   Difficulty speaking today and repeat CTH shows Interval increased sized hypodense collections within the soft tissues overlying the craniotomy and underlying the craniotomy   Afebrile.   No leukocytosis.   S/P craniotomy and washout.  Cultures NGTD  The patient denies any recent fever, chills, or sweats.      Past Medical History:   Diagnosis  Date    Brain aneurysm     CHF (congestive heart failure)     H/O coronary angioplasty     Hypercholesteremia     Hypertension     Malignant hypertension     Migraine headache     Stroke 10/2017       Past Surgical History:   Procedure Laterality Date    CARDIAC CATHETERIZATION      CEREBRAL ANGIOGRAM      CLIP LIGATION OF INTRACRANIAL ANEURYSM BY CRANIOTOMY N/A 7/15/2022    Procedure: CRANIOTOMY, WITH ANEURYSM CLIPPING;  Surgeon: Timothy Diaz MD;  Location: [facility] OR [address] [place] [address];  Service: Neurosurgery;  Laterality: N/A;  PTERIONAL CRANIOTOMY WITH CLIP LIGATION OF L PCOMM, L ANTERIOR CHOROIDAL, L MCA  ANEURYSM, ANESTHESIA: GENERAL, BLOOD: TYPE&CROSS 2 UNITS, NEUROMONITORING: SEP, MEP, EEG, RADIOLOGY: C-ARM, POSITION: SUPINE, ANNA, CO-SURGERON: DR. LEXI DOMÍNGUEZ.    WOUND DEBRIDEMENT  8/27/2022    Procedure: DEBRIDEMENT, WOUND;  Surgeon: Timothy Diaz MD;  Location: [facility] OR [address] [place] [address];  Service: Neurosurgery;;       Review of patient's allergies indicates:   Allergen Reactions    Lisinopril Swelling    Bactrim [sulfamethoxazole-trimethoprim] Rash       Medications:  Facility-Administered Medications Prior to Admission   Medication    albuterol inhaler 2 puff     Medications Prior to Admission   Medication Sig    carvediloL (COREG) 25 MG tablet Take 37.5 mg by mouth 2 (two) times daily.    amitriptyline (ELAVIL) 75 MG tablet Take 75 mg by mouth every evening.    amlodipine (NORVASC) 10 MG tablet Take 1 tablet (10 mg total) by mouth once daily.    atorvastatin (LIPITOR) 40 MG tablet Take 1 tablet (40 mg total) by mouth once daily.    butalbital-acetaminophen-caffeine -40 mg (FIORICET, ESGIC) -40 mg per tablet Take 1 tablet by mouth every 4 (four) hours as needed.    cyanocobalamin (VITAMIN B-12) 1000 MCG tablet Take 1 tablet (1,000 mcg total) by mouth once daily. (Patient not taking: No sig reported)    diphenhydrAMINE (BENADRYL) 25 mg capsule Take 1 each (25 mg total) by mouth  every 6 (six) hours as needed for Itching or Allergies. (Patient not taking: No sig reported)    docusate sodium (COLACE) 100 MG capsule Take 1 capsule (100 mg total) by mouth 2 (two) times daily as needed for Constipation.    EScitalopram oxalate (LEXAPRO) 10 MG tablet Take 10 mg by mouth once daily.    famotidine (PEPCID) 20 MG tablet Take 1 tablet (20 mg total) by mouth 2 (two) times daily. (Patient not taking: No sig reported)    hydrochlorothiazide (HYDRODIURIL) 25 MG tablet Take 1 tablet (25 mg total) by mouth once daily.    levETIRAcetam (KEPPRA) 500 MG Tab Take 1 tablet (500 mg total) by mouth 2 (two) times daily for 5 days (Patient not taking: Reported on 7/29/2022)    magnesium oxide (MAG-OX) 400 mg (241.3 mg magnesium) tablet Take 1 tablet by mouth 2 (two) times daily.    potassium chloride SA (K-DUR,KLOR-CON) 20 MEQ tablet Take 20 mEq by mouth 2 (two) times daily.    topiramate (TOPAMAX) 50 MG tablet Take 50 mg by mouth 2 (two) times daily.    [DISCONTINUED] cephALEXin (KEFLEX) 500 MG capsule Take 1 capsule (500 mg total) by mouth every 6 (six) hours. for 14 days    [DISCONTINUED] dexAMETHasone (DECADRON) 2 MG tablet Take 2 tablets (4 mg) by mouth every 8 hours for 1 day, THEN 2 tablets (4 mg) every 12 hours for 2 days, THEN 1 tablet (2 mg) every 12 hours for 2 days, THEN 1 tablet (2 mg) daily for 2 days, then STOP.    [DISCONTINUED] HYDROcodone-acetaminophen (NORCO) 5-325 mg per tablet Take 1 tablet by mouth every 6 (six) hours as needed for Pain.     Antibiotics (From admission, onward)      Start     Stop Route Frequency Ordered    08/28/22 1600  vancomycin 750 mg in dextrose 5 % 250 mL IVPB (ready to mix system)         -- IV Every 12 hours (non-standard times) 08/28/22 1013    08/27/22 2100  mupirocin 2 % ointment         09/01 2059 Nasl 2 times daily 08/27/22 1418    08/22/22 1915  cefepime in dextrose 5 % IVPB 2 g         -- IV Every 8 hours (non-standard times) 08/22/22 2986     08/22/22 0212  vancomycin - pharmacy to dose  (vancomycin IVPB)        See Yang for full Linked Orders Report.    -- IV pharmacy to manage frequency 08/22/22 0112          Antifungals (From admission, onward)      None          Antivirals (From admission, onward)      None             Immunization History   Administered Date(s) Administered    Influenza - Quadrivalent - PF *Preferred* (6 months and older) 10/05/2017       Family History    None       Social History     Socioeconomic History    Marital status:    Tobacco Use    Smoking status: Every Day     Packs/day: 0.50     Types: Cigarettes    Smokeless tobacco: Never   Substance and Sexual Activity    Alcohol use: Yes     Comment: occassionally    Drug use: No    Sexual activity: Not Currently     Partners: Male     Birth control/protection: None     Review of Systems   Constitutional:  Negative for chills, diaphoresis and fever.   HENT:  Negative for facial swelling.    Gastrointestinal:  Negative for abdominal pain and vomiting.   Skin:  Positive for wound.   Neurological:  Positive for weakness and headaches.   Objective:     Vital Signs (Most Recent):  Temp: 98.5 °F (36.9 °C) (08/29/22 1501)  Pulse: 84 (08/29/22 1801)  Resp: (!) 27 (08/29/22 1801)  BP: 129/78 (08/29/22 1801)  SpO2: 100 % (08/29/22 1801)   Vital Signs (24h Range):  Temp:  [98.4 °F (36.9 °C)-99.2 °F (37.3 °C)] 98.5 °F (36.9 °C)  Pulse:  [67-86] 84  Resp:  [13-33] 27  SpO2:  [91 %-100 %] 100 %  BP: (101-151)/(56-84) 129/78     Weight: 88.5 kg (195 lb)  Body mass index is 34.54 kg/m².    Estimated Creatinine Clearance: 61.8 mL/min (based on SCr of 1.2 mg/dL).    Physical Exam  Vitals and nursing note reviewed.   Constitutional:       General: She is not in acute distress.     Appearance: Normal appearance. She is well-developed and normal weight. She is not ill-appearing, toxic-appearing or diaphoretic.   HENT:      Head:        Nose: Nose normal. No congestion.       Mouth/Throat:      Dentition: Does not have dentures. No dental abscesses.   Eyes:      General: No scleral icterus.     Conjunctiva/sclera: Conjunctivae normal.   Cardiovascular:      Rate and Rhythm: Normal rate and regular rhythm.   Pulmonary:      Effort: Pulmonary effort is normal. No respiratory distress.      Breath sounds: Normal breath sounds. No wheezing or rales.   Abdominal:      General: Bowel sounds are normal. There is no distension.      Palpations: Abdomen is soft.      Tenderness: There is no abdominal tenderness.   Musculoskeletal:         General: Normal range of motion.      Cervical back: Normal range of motion.      Right lower leg: No edema.      Left lower leg: No edema.   Skin:     General: Skin is warm and dry.      Findings: No erythema or rash.   Neurological:      Mental Status: She is alert. She is disoriented.      Motor: No weakness.      Comments: Perseverating, mild dysarthria, FC, facial symmetric, tongue in midline.  No weakness noted    Psychiatric:         Mood and Affect: Mood normal.         Behavior: Behavior normal.         Thought Content: Thought content normal.         Judgment: Judgment normal.       Significant Labs: CBC:   Recent Labs   Lab 08/28/22  0036 08/29/22  0256   WBC 8.38 7.45   HGB 11.4* 12.0   HCT 33.2* 35.0*    230       CMP:   Recent Labs   Lab 08/28/22  0036 08/29/22  0256   * 139   K 3.7 4.8    107   CO2 22* 20*   * 99   BUN 11 15   CREATININE 1.0 1.2   CALCIUM 9.2 10.0   ANIONGAP 8 12       Microbiology Results (last 7 days)       Procedure Component Value Units Date/Time    AFB Culture & Smear [[id number]] Collected: 08/27/22 0909    Order Status: Completed Specimen: Wound from Head Updated: 08/29/22 1521     AFB Culture & Smear Culture in progress     AFB CULTURE STAIN No acid fast bacilli seen.    Narrative:      Superficial head culture, aerobic, anaerobic, fungus, AFB,  gram    AFB Culture & Smear [[id number]] Collected:  08/27/22 0912    Order Status: Completed Specimen: Wound from Head Updated: 08/29/22 1521     AFB Culture & Smear Culture in progress     AFB CULTURE STAIN No acid fast bacilli seen.    Narrative:      Epidural culture, aerobic, anaerobic, gram, AFB, gram    Aerobic culture [[id number]] Collected: 08/27/22 0912    Order Status: Completed Specimen: Wound from Head Updated: 08/29/22 1107     Aerobic Bacterial Culture No growth    Narrative:      Epidural culture, aerobic, anaerobic, gram, AFB, gram    Aerobic culture [[id number]] Collected: 08/27/22 0909    Order Status: Completed Specimen: Wound from Head Updated: 08/29/22 1100     Aerobic Bacterial Culture No growth    Narrative:      Superficial head culture, aerobic, anaerobic, fungus, AFB,  gram    Culture, Anaerobic [[id number]] Collected: 08/22/22 1550    Order Status: Completed Specimen: Incision site from Head Updated: 08/29/22 0909     Anaerobic Culture Culture in progress    Culture, Anaerobic [[id number]] Collected: 08/27/22 0909    Order Status: Completed Specimen: Wound from Head Updated: 08/28/22 1154     Anaerobic Culture Culture in progress    Narrative:      Superficial head culture, aerobic, anaerobic, fungus, AFB,  gram    Culture, Anaerobic [[id number]] Collected: 08/27/22 0912    Order Status: Completed Specimen: Wound from Head Updated: 08/28/22 1154     Anaerobic Culture Culture in progress    Narrative:      Epidural culture, aerobic, anaerobic, gram, AFB, gram    Gram stain [[id number]] Collected: 08/27/22 0912    Order Status: Completed Specimen: Wound from Head Updated: 08/27/22 1044     Gram Stain Result No WBC's      No organisms seen    Narrative:      Epidural culture, aerobic, anaerobic, gram, AFB, gram    Gram stain [[id number]] Collected: 08/27/22 0909    Order Status: Completed Specimen: Wound from Head Updated: 08/27/22 1043     Gram Stain Result Rare WBC's      No organisms seen    Narrative:      Superficial head culture, aerobic,  anaerobic, fungus, AFB,  gram    Fungus culture [[id number]] Collected: 08/27/22 0909    Order Status: Sent Specimen: Wound from Head Updated: 08/27/22 1003    Fungus culture [[id number]] Collected: 08/27/22 0912    Order Status: Sent Specimen: Wound from Head Updated: 08/27/22 1001    Blood culture [[id number]] Collected: 08/21/22 2325    Order Status: Completed Specimen: Blood from Peripheral, Antecubital, Right Updated: 08/27/22 0612     Blood Culture, Routine No growth after 5 days.    Aerobic culture [[id number]] Collected: 08/22/22 1550    Order Status: Completed Specimen: Incision site from Head Updated: 08/26/22 1029     Aerobic Bacterial Culture No growth    Gram stain [[id number]] Collected: 08/22/22 1550    Order Status: Completed Specimen: Incision site from Head Updated: 08/25/22 1447     Gram Stain Result No organisms seen      Rare WBC's    Gram stain [[id number]]     Order Status: Completed Specimen: Incision site from Head     AFB Culture & Smear [[id number]] Collected: 08/22/22 1550    Order Status: Completed Specimen: Incision site from Head Updated: 08/23/22 2127     AFB Culture & Smear Culture in progress     AFB CULTURE STAIN No acid fast bacilli seen.    Fungus culture [[id number]] Collected: 08/22/22 1550    Order Status: No result Specimen: Incision site from Head Updated: 08/23/22 0930    Aerobic culture [[id number]]     Order Status: Completed Specimen: Incision site from Head     Culture, Anaerobe [[id number]]     Order Status: Completed Specimen: Body Fluid from Head     Fungus culture [[id number]]     Order Status: Completed Specimen: Body Fluid from Head           Recent Lab Results  (Last 5 results in the past 24 hours)        08/29/22  1618   08/29/22  1155   08/29/22  0826   08/29/22  0737   08/29/22  0256        Anion Gap         12       Baso #         0.07       Basophil %         0.9       BUN         15       Calcium         10.0       Chloride         107       CO2         20        Creatinine         1.2       Differential Method         Automated       eGFR         56.5       Eos #         0.2       Eosinophil %         2.6       Glucose         99       Gran # (ANC)         4.8       Gran %         63.8       Hematocrit         35.0       Hemoglobin         12.0       Immature Grans (Abs)         0.03  Comment: Mild elevation in immature granulocytes is non specific and   can be seen in a variety of conditions including stress response,   acute inflammation, trauma and pregnancy. Correlation with other   laboratory and clinical findings is essential.         Immature Granulocytes         0.4       Lymph #         1.2       Lymph %         16.5       Magnesium         1.9       MCH         28.8       MCHC         34.3       MCV         84       Mono #         1.2       Mono %         15.8       MPV         10.5       nRBC         0       Phosphorus         3.9       Platelets         230       POCT Glucose 151   103   130   120         Potassium         4.8       RBC         4.17       RDW         14.3       Sodium         139       WBC         7.45                              Significant Imaging:     CT Head Without Contrast [[id number]] (Abnormal) Resulted: 08/29/22 1022   Order Status: Completed Updated: 08/29/22 1025   Narrative:     EXAMINATION:   CT HEAD WITHOUT CONTRAST     CLINICAL HISTORY:   Stroke, follow up;     TECHNIQUE:   Multiple sequential 5 mm axial images of the head without contrast.  Coronal and sagittal reformatted imaging from the axial acquisition.     COMPARISON:   08/27/2022     FINDINGS:   Evolving operative changes status post repeat left pterional craniotomy for subcutaneous and epidural collection evacuation and washout.  There is overall reduced scattered postoperative pneumocephalus however there is increased sized hypodense extra-axial collections within the soft tissues overlying the left craniotomy as well as increased sized hypodense collection underlying  the craniotomy superficial to the dural plasty.  This may all represent postoperative seroma/hematoma with indwelling drain within the soft tissues again seen.  Please note enlarging infectious collection not excluded in light of history.  Collection within the soft tissues measures approximately 1.7 cm TV and 5.8 cm AP previously measuring 0.8 cm in thickness and 5 cm AP.  The collection underlying the craniotomy measures approximately 0.8 cm in thickness previously measuring 0.6.     Continued mass effect and effacement of the left cerebral sulci diffusely which is nonspecific.  Slight smaller caliber ventricles without hydrocephalus.  No significant midline shift.  No definite new abnormal parenchymal attenuation.     Operative change with left paraclinoid region aneurysm clipping and presumed stent again seen with hypodensity within the superior left middle cranial fossa stable.  Probable empty sella unchanged     This report was flagged in Epic as abnormal.    Impression:       Evolving operative changes status post left pterional craniotomy for left paraclinoid region aneurysm clipping with superimposed more recent repeat craniotomy for subcutaneous and epidural collection evacuation and washout.     Interval increased sized hypodense collections within the soft tissues overlying the craniotomy and underlying the craniotomy as detailed above.  This may represent evolving postoperative seroma with pseudomeningocele not excluded.  Alternatively enlarging infectious collection to be considered.  Clinical correlation and correlation with drainage output advised.     Smaller caliber ventricles without hydrocephalus with continued mass effect and effacement left cerebral sulci     Follow-up recommended.  Further evaluation with MRI as warranted.       Electronically signed by: Laureano Venegas DO   Date: 08/29/2022   Time: 10:22   CT Head Without Contrast [[id number]] (Abnormal) Resulted: 08/28/22 0815   Order Status:  Completed Updated: 08/28/22 0817   Narrative:     EXAMINATION:   CT HEAD WITHOUT CONTRAST     CLINICAL HISTORY:   post op subcutaneous and epidural washout;     TECHNIQUE:   Multiple sequential 5 mm axial images of the head without contrast.  Coronal and sagittal reformatted imaging from the axial acquisition.     COMPARISON:   MRI and CT 08/21/2022     FINDINGS:   Interval operative change status post left frontal craniotomy for subcutaneous and a epidural collection evacuation and washout.  There is scattered postoperative gas fluid and hemorrhage within the soft tissues overlying the craniotomy with reduced size collection compared to MRI suggestive for postoperative gas fluid and hemorrhage.  In addition there is a thin extra-axial mixed density collection underlying the craniotomy superficial to the dural plasty suggestive for additional postoperative gas fluid and hemorrhage this measures approximately 0.6 cm in thickness.  Please note component of residual infection cannot be excluded.  Clinical correlation and follow-up advised.     Mass effect on the left cerebral hemisphere without midline shift or hydrocephalus.  There is superimposed more remote operative change from left pterional craniotomy for left paraclinoid region aneurysm clipping, with question superimposed MCA stenting.  There is slight hypodensity in fullness along the left middle cranial fossa anteriorly which may represent encephalomalacia with extension of infectious collection to this level cannot be excluded.  Clinical correlation and follow-up MRI recommended.     There is continued incidental lobular opacity right maxillary antra suggestive for mucous retention cyst.  Periapical cyst within the maxillary dentition partially visualized bilaterally.     This report was flagged in Epic as abnormal.    Impression:       Interval operative change status post left pterional repeat craniotomy for subcutaneous and epidural collection evacuation  and washout with subcutaneous drain placement.     Residual mixed density collection overlies the soft tissues as well as within the extra-axial space underlying the craniotomy likely represents postoperative gas fluid and hemorrhage with residual infection not excluded.     There is no significant acute intracranial hemorrhage or new abnormal parenchymal attenuation.  Superimposed additional operative change from left pterional craniotomy and paraclinoid region aneurysm clipping and possible MCA stenting.  There is hypodense collection within the left middle cranial fossa superiorly along the region of postoperative metal which may represent encephalomalacia additional region of infection cannot be entirely excluded.  Clinical correlation and follow-up advised.  This could be further evaluated with MRI.     Ventricles relatively stable without hydrocephalus.       Electronically signed by: Laureano Venegas DO   Date: 08/28/2022   Time: 08:15     Imaging History    2022    Date Procedure Name Study Review Link PACS Link Status Accession Number Location   08/29/22 10:12 AM CT Head Without Contrast Study Review  Images Final [id number] [facility]   08/27/22 04:53 PM CT Head Without Contrast Study Review  Images Final [id number] [facility]   08/21/22 11:10 PM MRI Brain W WO Contrast Study Review  Images Final [id number] [facility]   08/21/22 10:04 PM MRI Brain Without Contrast Study Review  Images Final [id number] [facility]   08/21/22 09:35 PM X-Ray Chest AP Portable Study Review  Images Final [id number] [facility]   08/21/22 09:32 PM CTA STROKE MULTI-PHASE Study Review  Images Final [id number] [facility]   08/17/22 06:24 PM CT Head Without Contrast Study Review  Images Final [id number] [facility]   08/02/22 07:55 PM CT Head Without Contrast Study Review  Images Final [id number] [facility]       8/22/22:      8/23/22:                  8/24/22

## 2022-08-30 NOTE — ASSESSMENT & PLAN NOTE
BG goal 140 - 180     - Decrease Levemir to 24 untis daily (20% dose decrease)   - Decrease Novolog to 8 units TIDWM.  - Moderate Dose SQ Insulin Correction Scale.  - BG Monitoring AC/HS.   - Bedside nurse to instruct on insulin pen use and blood sugar monitor. Have patient administer own injections after education completed supervised by nurse. Have patient watch insulin educatoin video's via i-pad if available on unit.      ** Please call Endocrine for any BG related issues **  ** Please notify Endocrine for any change and/or advance in diet**    Lab Results   Component Value Date    HGBA1C 8.3 (H) 08/22/2022       Discharge Planning:   TBD. Please notify endocrinology prior to discharge.

## 2022-08-30 NOTE — SUBJECTIVE & OBJECTIVE
Interval History:   Difficulty speaking today and repeat CTH shows Interval increased sized hypodense collections within the soft tissues overlying the craniotomy and underlying the craniotomy   Afebrile.   No leukocytosis.   S/P craniotomy and washout.  Cultures NGTD  The patient denies any recent fever, chills, or sweats.      Past Medical History:   Diagnosis Date    Brain aneurysm     CHF (congestive heart failure)     H/O coronary angioplasty     Hypercholesteremia     Hypertension     Malignant hypertension     Migraine headache     Stroke 10/2017       Past Surgical History:   Procedure Laterality Date    CARDIAC CATHETERIZATION      CEREBRAL ANGIOGRAM      CLIP LIGATION OF INTRACRANIAL ANEURYSM BY CRANIOTOMY N/A 7/15/2022    Procedure: CRANIOTOMY, WITH ANEURYSM CLIPPING;  Surgeon: Timothy Diaz MD;  Location: [facility] OR [address] [place] [address];  Service: Neurosurgery;  Laterality: N/A;  PTERIONAL CRANIOTOMY WITH CLIP LIGATION OF L PCOMM, L ANTERIOR CHOROIDAL, L MCA  ANEURYSM, ANESTHESIA: GENERAL, BLOOD: TYPE&CROSS 2 UNITS, NEUROMONITORING: SEP, MEP, EEG, RADIOLOGY: C-ARM, POSITION: SUPINE, CASTILLO, CO-SURGERON: DR. LEXI DOMÍNGUEZ.    WOUND DEBRIDEMENT  8/27/2022    Procedure: DEBRIDEMENT, WOUND;  Surgeon: Timothy Diaz MD;  Location: [facility] OR [address] [place] [address];  Service: Neurosurgery;;       Review of patient's allergies indicates:   Allergen Reactions    Lisinopril Swelling    Bactrim [sulfamethoxazole-trimethoprim] Rash       Medications:  Facility-Administered Medications Prior to Admission   Medication    albuterol inhaler 2 puff     Medications Prior to Admission   Medication Sig    carvediloL (COREG) 25 MG tablet Take 37.5 mg by mouth 2 (two) times daily.    amitriptyline (ELAVIL) 75 MG tablet Take 75 mg by mouth every evening.    amlodipine (NORVASC) 10 MG tablet Take 1 tablet (10 mg total) by mouth once daily.    atorvastatin (LIPITOR) 40 MG tablet Take 1 tablet (40 mg total) by mouth once daily.     butalbital-acetaminophen-caffeine -40 mg (FIORICET, ESGIC) -40 mg per tablet Take 1 tablet by mouth every 4 (four) hours as needed.    cyanocobalamin (VITAMIN B-12) 1000 MCG tablet Take 1 tablet (1,000 mcg total) by mouth once daily. (Patient not taking: No sig reported)    diphenhydrAMINE (BENADRYL) 25 mg capsule Take 1 each (25 mg total) by mouth every 6 (six) hours as needed for Itching or Allergies. (Patient not taking: No sig reported)    docusate sodium (COLACE) 100 MG capsule Take 1 capsule (100 mg total) by mouth 2 (two) times daily as needed for Constipation.    EScitalopram oxalate (LEXAPRO) 10 MG tablet Take 10 mg by mouth once daily.    famotidine (PEPCID) 20 MG tablet Take 1 tablet (20 mg total) by mouth 2 (two) times daily. (Patient not taking: No sig reported)    hydrochlorothiazide (HYDRODIURIL) 25 MG tablet Take 1 tablet (25 mg total) by mouth once daily.    levETIRAcetam (KEPPRA) 500 MG Tab Take 1 tablet (500 mg total) by mouth 2 (two) times daily for 5 days (Patient not taking: Reported on 7/29/2022)    magnesium oxide (MAG-OX) 400 mg (241.3 mg magnesium) tablet Take 1 tablet by mouth 2 (two) times daily.    potassium chloride SA (K-DUR,KLOR-CON) 20 MEQ tablet Take 20 mEq by mouth 2 (two) times daily.    topiramate (TOPAMAX) 50 MG tablet Take 50 mg by mouth 2 (two) times daily.    [DISCONTINUED] cephALEXin (KEFLEX) 500 MG capsule Take 1 capsule (500 mg total) by mouth every 6 (six) hours. for 14 days    [DISCONTINUED] dexAMETHasone (DECADRON) 2 MG tablet Take 2 tablets (4 mg) by mouth every 8 hours for 1 day, THEN 2 tablets (4 mg) every 12 hours for 2 days, THEN 1 tablet (2 mg) every 12 hours for 2 days, THEN 1 tablet (2 mg) daily for 2 days, then STOP.    [DISCONTINUED] HYDROcodone-acetaminophen (NORCO) 5-325 mg per tablet Take 1 tablet by mouth every 6 (six) hours as needed for Pain.     Antibiotics (From admission, onward)      Start     Stop Route Frequency Ordered    08/28/22 1600   vancomycin 750 mg in dextrose 5 % 250 mL IVPB (ready to mix system)         -- IV Every 12 hours (non-standard times) 08/28/22 1013    08/27/22 2100  mupirocin 2 % ointment         09/01 2059 Nasl 2 times daily 08/27/22 1418    08/22/22 1915  cefepime in dextrose 5 % IVPB 2 g         -- IV Every 8 hours (non-standard times) 08/22/22 1805    08/22/22 0212  vancomycin - pharmacy to dose  (vancomycin IVPB)        See Hyperspace for full Linked Orders Report.    -- IV pharmacy to manage frequency 08/22/22 0112          Antifungals (From admission, onward)      None          Antivirals (From admission, onward)      None             Immunization History   Administered Date(s) Administered    Influenza - Quadrivalent - PF *Preferred* (6 months and older) 10/05/2017       Family History    None       Social History     Socioeconomic History    Marital status:    Tobacco Use    Smoking status: Every Day     Packs/day: 0.50     Types: Cigarettes    Smokeless tobacco: Never   Substance and Sexual Activity    Alcohol use: Yes     Comment: occassionally    Drug use: No    Sexual activity: Not Currently     Partners: Male     Birth control/protection: None     Review of Systems   Constitutional:  Negative for chills, diaphoresis and fever.   HENT:  Negative for facial swelling.    Gastrointestinal:  Negative for abdominal pain and vomiting.   Skin:  Positive for wound.   Neurological:  Positive for weakness and headaches.   Objective:     Vital Signs (Most Recent):  Temp: 98.5 °F (36.9 °C) (08/29/22 1501)  Pulse: 84 (08/29/22 1801)  Resp: (!) 27 (08/29/22 1801)  BP: 129/78 (08/29/22 1801)  SpO2: 100 % (08/29/22 1801)   Vital Signs (24h Range):  Temp:  [98.4 °F (36.9 °C)-99.2 °F (37.3 °C)] 98.5 °F (36.9 °C)  Pulse:  [67-86] 84  Resp:  [13-33] 27  SpO2:  [91 %-100 %] 100 %  BP: (101-151)/(56-84) 129/78     Weight: 88.5 kg (195 lb)  Body mass index is 34.54 kg/m².    Estimated Creatinine Clearance: 61.8 mL/min (based on SCr  of 1.2 mg/dL).    Physical Exam  Vitals and nursing note reviewed.   Constitutional:       General: She is not in acute distress.     Appearance: Normal appearance. She is well-developed and normal weight. She is not ill-appearing, toxic-appearing or diaphoretic.   HENT:      Head:        Nose: Nose normal. No congestion.      Mouth/Throat:      Dentition: Does not have dentures. No dental abscesses.   Eyes:      General: No scleral icterus.     Conjunctiva/sclera: Conjunctivae normal.   Cardiovascular:      Rate and Rhythm: Normal rate and regular rhythm.   Pulmonary:      Effort: Pulmonary effort is normal. No respiratory distress.      Breath sounds: Normal breath sounds. No wheezing or rales.   Abdominal:      General: Bowel sounds are normal. There is no distension.      Palpations: Abdomen is soft.      Tenderness: There is no abdominal tenderness.   Musculoskeletal:         General: Normal range of motion.      Cervical back: Normal range of motion.      Right lower leg: No edema.      Left lower leg: No edema.   Skin:     General: Skin is warm and dry.      Findings: No erythema or rash.   Neurological:      Mental Status: She is alert. She is disoriented.      Motor: No weakness.      Comments: Perseverating, mild dysarthria, FC, facial symmetric, tongue in midline.  No weakness noted    Psychiatric:         Mood and Affect: Mood normal.         Behavior: Behavior normal.         Thought Content: Thought content normal.         Judgment: Judgment normal.       Significant Labs: CBC:   Recent Labs   Lab 08/28/22  0036 08/29/22  0256   WBC 8.38 7.45   HGB 11.4* 12.0   HCT 33.2* 35.0*    230       CMP:   Recent Labs   Lab 08/28/22  0036 08/29/22  0256   * 139   K 3.7 4.8    107   CO2 22* 20*   * 99   BUN 11 15   CREATININE 1.0 1.2   CALCIUM 9.2 10.0   ANIONGAP 8 12       Microbiology Results (last 7 days)       Procedure Component Value Units Date/Time    AFB Culture & Smear  [[id number]] Collected: 08/27/22 0909    Order Status: Completed Specimen: Wound from Head Updated: 08/29/22 1521     AFB Culture & Smear Culture in progress     AFB CULTURE STAIN No acid fast bacilli seen.    Narrative:      Superficial head culture, aerobic, anaerobic, fungus, AFB,  gram    AFB Culture & Smear [[id number]] Collected: 08/27/22 0912    Order Status: Completed Specimen: Wound from Head Updated: 08/29/22 1521     AFB Culture & Smear Culture in progress     AFB CULTURE STAIN No acid fast bacilli seen.    Narrative:      Epidural culture, aerobic, anaerobic, gram, AFB, gram    Aerobic culture [[id number]] Collected: 08/27/22 0912    Order Status: Completed Specimen: Wound from Head Updated: 08/29/22 1107     Aerobic Bacterial Culture No growth    Narrative:      Epidural culture, aerobic, anaerobic, gram, AFB, gram    Aerobic culture [[id number]] Collected: 08/27/22 0909    Order Status: Completed Specimen: Wound from Head Updated: 08/29/22 1100     Aerobic Bacterial Culture No growth    Narrative:      Superficial head culture, aerobic, anaerobic, fungus, AFB,  gram    Culture, Anaerobic [[id number]] Collected: 08/22/22 1550    Order Status: Completed Specimen: Incision site from Head Updated: 08/29/22 0909     Anaerobic Culture Culture in progress    Culture, Anaerobic [[id number]] Collected: 08/27/22 0909    Order Status: Completed Specimen: Wound from Head Updated: 08/28/22 1154     Anaerobic Culture Culture in progress    Narrative:      Superficial head culture, aerobic, anaerobic, fungus, AFB,  gram    Culture, Anaerobic [[id number]] Collected: 08/27/22 0912    Order Status: Completed Specimen: Wound from Head Updated: 08/28/22 1154     Anaerobic Culture Culture in progress    Narrative:      Epidural culture, aerobic, anaerobic, gram, AFB, gram    Gram stain [[id number]] Collected: 08/27/22 0912    Order Status: Completed Specimen: Wound from Head Updated: 08/27/22 1044     Gram Stain Result No WBC's       No organisms seen    Narrative:      Epidural culture, aerobic, anaerobic, gram, AFB, gram    Gram stain [[id number]] Collected: 08/27/22 0909    Order Status: Completed Specimen: Wound from Head Updated: 08/27/22 1043     Gram Stain Result Rare WBC's      No organisms seen    Narrative:      Superficial head culture, aerobic, anaerobic, fungus, AFB,  gram    Fungus culture [[id number]] Collected: 08/27/22 0909    Order Status: Sent Specimen: Wound from Head Updated: 08/27/22 1003    Fungus culture [[id number]] Collected: 08/27/22 0912    Order Status: Sent Specimen: Wound from Head Updated: 08/27/22 1001    Blood culture [[id number]] Collected: 08/21/22 2325    Order Status: Completed Specimen: Blood from Peripheral, Antecubital, Right Updated: 08/27/22 0612     Blood Culture, Routine No growth after 5 days.    Aerobic culture [[id number]] Collected: 08/22/22 1550    Order Status: Completed Specimen: Incision site from Head Updated: 08/26/22 1029     Aerobic Bacterial Culture No growth    Gram stain [[id number]] Collected: 08/22/22 1550    Order Status: Completed Specimen: Incision site from Head Updated: 08/25/22 1447     Gram Stain Result No organisms seen      Rare WBC's    Gram stain [[id number]]     Order Status: Completed Specimen: Incision site from Head     AFB Culture & Smear [[id number]] Collected: 08/22/22 1550    Order Status: Completed Specimen: Incision site from Head Updated: 08/23/22 2127     AFB Culture & Smear Culture in progress     AFB CULTURE STAIN No acid fast bacilli seen.    Fungus culture [[id number]] Collected: 08/22/22 1550    Order Status: No result Specimen: Incision site from Head Updated: 08/23/22 0930    Aerobic culture [[id number]]     Order Status: Completed Specimen: Incision site from Head     Culture, Anaerobe [[id number]]     Order Status: Completed Specimen: Body Fluid from Head     Fungus culture [[id number]]     Order Status: Completed Specimen: Body Fluid from Head           Recent  Lab Results  (Last 5 results in the past 24 hours)        08/29/22  1618   08/29/22  1155   08/29/22  0826   08/29/22  0737   08/29/22  0256        Anion Gap         12       Baso #         0.07       Basophil %         0.9       BUN         15       Calcium         10.0       Chloride         107       CO2         20       Creatinine         1.2       Differential Method         Automated       eGFR         56.5       Eos #         0.2       Eosinophil %         2.6       Glucose         99       Gran # (ANC)         4.8       Gran %         63.8       Hematocrit         35.0       Hemoglobin         12.0       Immature Grans (Abs)         0.03  Comment: Mild elevation in immature granulocytes is non specific and   can be seen in a variety of conditions including stress response,   acute inflammation, trauma and pregnancy. Correlation with other   laboratory and clinical findings is essential.         Immature Granulocytes         0.4       Lymph #         1.2       Lymph %         16.5       Magnesium         1.9       MCH         28.8       MCHC         34.3       MCV         84       Mono #         1.2       Mono %         15.8       MPV         10.5       nRBC         0       Phosphorus         3.9       Platelets         230       POCT Glucose 151   103   130   120         Potassium         4.8       RBC         4.17       RDW         14.3       Sodium         139       WBC         7.45                              Significant Imaging:     CT Head Without Contrast [[id number]] (Abnormal) Resulted: 08/29/22 1022   Order Status: Completed Updated: 08/29/22 1025   Narrative:     EXAMINATION:   CT HEAD WITHOUT CONTRAST     CLINICAL HISTORY:   Stroke, follow up;     TECHNIQUE:   Multiple sequential 5 mm axial images of the head without contrast.  Coronal and sagittal reformatted imaging from the axial acquisition.     COMPARISON:   08/27/2022     FINDINGS:   Evolving operative changes status post repeat left  pterional craniotomy for subcutaneous and epidural collection evacuation and washout.  There is overall reduced scattered postoperative pneumocephalus however there is increased sized hypodense extra-axial collections within the soft tissues overlying the left craniotomy as well as increased sized hypodense collection underlying the craniotomy superficial to the dural plasty.  This may all represent postoperative seroma/hematoma with indwelling drain within the soft tissues again seen.  Please note enlarging infectious collection not excluded in light of history.  Collection within the soft tissues measures approximately 1.7 cm TV and 5.8 cm AP previously measuring 0.8 cm in thickness and 5 cm AP.  The collection underlying the craniotomy measures approximately 0.8 cm in thickness previously measuring 0.6.     Continued mass effect and effacement of the left cerebral sulci diffusely which is nonspecific.  Slight smaller caliber ventricles without hydrocephalus.  No significant midline shift.  No definite new abnormal parenchymal attenuation.     Operative change with left paraclinoid region aneurysm clipping and presumed stent again seen with hypodensity within the superior left middle cranial fossa stable.  Probable empty sella unchanged     This report was flagged in Epic as abnormal.    Impression:       Evolving operative changes status post left pterional craniotomy for left paraclinoid region aneurysm clipping with superimposed more recent repeat craniotomy for subcutaneous and epidural collection evacuation and washout.     Interval increased sized hypodense collections within the soft tissues overlying the craniotomy and underlying the craniotomy as detailed above.  This may represent evolving postoperative seroma with pseudomeningocele not excluded.  Alternatively enlarging infectious collection to be considered.  Clinical correlation and correlation with drainage output advised.     Smaller caliber  ventricles without hydrocephalus with continued mass effect and effacement left cerebral sulci     Follow-up recommended.  Further evaluation with MRI as warranted.       Electronically signed by: Laureano Venegas DO   Date: 08/29/2022   Time: 10:22   CT Head Without Contrast [[id number]] (Abnormal) Resulted: 08/28/22 0815   Order Status: Completed Updated: 08/28/22 0817   Narrative:     EXAMINATION:   CT HEAD WITHOUT CONTRAST     CLINICAL HISTORY:   post op subcutaneous and epidural washout;     TECHNIQUE:   Multiple sequential 5 mm axial images of the head without contrast.  Coronal and sagittal reformatted imaging from the axial acquisition.     COMPARISON:   MRI and CT 08/21/2022     FINDINGS:   Interval operative change status post left frontal craniotomy for subcutaneous and a epidural collection evacuation and washout.  There is scattered postoperative gas fluid and hemorrhage within the soft tissues overlying the craniotomy with reduced size collection compared to MRI suggestive for postoperative gas fluid and hemorrhage.  In addition there is a thin extra-axial mixed density collection underlying the craniotomy superficial to the dural plasty suggestive for additional postoperative gas fluid and hemorrhage this measures approximately 0.6 cm in thickness.  Please note component of residual infection cannot be excluded.  Clinical correlation and follow-up advised.     Mass effect on the left cerebral hemisphere without midline shift or hydrocephalus.  There is superimposed more remote operative change from left pterional craniotomy for left paraclinoid region aneurysm clipping, with question superimposed MCA stenting.  There is slight hypodensity in fullness along the left middle cranial fossa anteriorly which may represent encephalomalacia with extension of infectious collection to this level cannot be excluded.  Clinical correlation and follow-up MRI recommended.     There is continued incidental lobular  opacity right maxillary antra suggestive for mucous retention cyst.  Periapical cyst within the maxillary dentition partially visualized bilaterally.     This report was flagged in Epic as abnormal.    Impression:       Interval operative change status post left pterional repeat craniotomy for subcutaneous and epidural collection evacuation and washout with subcutaneous drain placement.     Residual mixed density collection overlies the soft tissues as well as within the extra-axial space underlying the craniotomy likely represents postoperative gas fluid and hemorrhage with residual infection not excluded.     There is no significant acute intracranial hemorrhage or new abnormal parenchymal attenuation.  Superimposed additional operative change from left pterional craniotomy and paraclinoid region aneurysm clipping and possible MCA stenting.  There is hypodense collection within the left middle cranial fossa superiorly along the region of postoperative metal which may represent encephalomalacia additional region of infection cannot be entirely excluded.  Clinical correlation and follow-up advised.  This could be further evaluated with MRI.     Ventricles relatively stable without hydrocephalus.       Electronically signed by: Laureano Venegas DO   Date: 08/28/2022   Time: 08:15     Imaging History    2022    Date Procedure Name Study Review Link PACS Link Status Accession Number Location   08/29/22 10:12 AM CT Head Without Contrast Study Review  Images Final [id number] [facility]   08/27/22 04:53 PM CT Head Without Contrast Study Review  Images Final [id number] [facility]   08/21/22 11:10 PM MRI Brain W WO Contrast Study Review  Images Final [id number] [facility]   08/21/22 10:04 PM MRI Brain Without Contrast Study Review  Images Final [id number] [facility]   08/21/22 09:35 PM X-Ray Chest AP Portable Study Review  Images Final [id number] [facility]   08/21/22 09:32 PM CTA STROKE MULTI-PHASE Study Review  Images Final [id number] [facility]   08/17/22 06:24 PM  CT Head Without Contrast Study Review  Images Final [id number] [facility]   08/02/22 07:55 PM CT Head Without Contrast Study Review  Images Final [id number] [facility]       8/22/22:      8/23/22:                  8/24/22

## 2022-08-30 NOTE — ASSESSMENT & PLAN NOTE
Suspect patient had focal seizures on presentation  - No electrographic seizures on EEG   - Recommend to continue Levetiracetam 1g BID  - Will arrange for outpatient follow up in Epilepsy clinic

## 2022-08-31 LAB
ANION GAP SERPL CALC-SCNC: 14 MMOL/L (ref 8–16)
BASOPHILS # BLD AUTO: 0.05 K/UL (ref 0–0.2)
BASOPHILS NFR BLD: 0.8 % (ref 0–1.9)
BUN SERPL-MCNC: 12 MG/DL (ref 6–20)
CALCIUM SERPL-MCNC: 9.2 MG/DL (ref 8.7–10.5)
CHLORIDE SERPL-SCNC: 106 MMOL/L (ref 95–110)
CO2 SERPL-SCNC: 21 MMOL/L (ref 23–29)
CREAT SERPL-MCNC: 1.1 MG/DL (ref 0.5–1.4)
DIFFERENTIAL METHOD: ABNORMAL
EOSINOPHIL # BLD AUTO: 0.3 K/UL (ref 0–0.5)
EOSINOPHIL NFR BLD: 5.1 % (ref 0–8)
ERYTHROCYTE [DISTWIDTH] IN BLOOD BY AUTOMATED COUNT: 13.9 % (ref 11.5–14.5)
EST. GFR  (NO RACE VARIABLE): >60 ML/MIN/1.73 M^2
GLUCOSE SERPL-MCNC: 113 MG/DL (ref 70–110)
HCT VFR BLD AUTO: 31.2 % (ref 37–48.5)
HGB BLD-MCNC: 10.7 G/DL (ref 12–16)
IMM GRANULOCYTES # BLD AUTO: 0.04 K/UL (ref 0–0.04)
IMM GRANULOCYTES NFR BLD AUTO: 0.7 % (ref 0–0.5)
LYMPHOCYTES # BLD AUTO: 1.3 K/UL (ref 1–4.8)
LYMPHOCYTES NFR BLD: 22.1 % (ref 18–48)
MAGNESIUM SERPL-MCNC: 1.9 MG/DL (ref 1.6–2.6)
MCH RBC QN AUTO: 28.8 PG (ref 27–31)
MCHC RBC AUTO-ENTMCNC: 34.3 G/DL (ref 32–36)
MCV RBC AUTO: 84 FL (ref 82–98)
MONOCYTES # BLD AUTO: 0.7 K/UL (ref 0.3–1)
MONOCYTES NFR BLD: 10.9 % (ref 4–15)
NEUTROPHILS # BLD AUTO: 3.6 K/UL (ref 1.8–7.7)
NEUTROPHILS NFR BLD: 60.4 % (ref 38–73)
NRBC BLD-RTO: 0 /100 WBC
PHOSPHATE SERPL-MCNC: 2.8 MG/DL (ref 2.7–4.5)
PLATELET # BLD AUTO: 233 K/UL (ref 150–450)
PMV BLD AUTO: 10.8 FL (ref 9.2–12.9)
POCT GLUCOSE: 111 MG/DL (ref 70–110)
POCT GLUCOSE: 112 MG/DL (ref 70–110)
POCT GLUCOSE: 124 MG/DL (ref 70–110)
POCT GLUCOSE: 132 MG/DL (ref 70–110)
POCT GLUCOSE: 146 MG/DL (ref 70–110)
POCT GLUCOSE: 89 MG/DL (ref 70–110)
POTASSIUM SERPL-SCNC: 3.7 MMOL/L (ref 3.5–5.1)
RBC # BLD AUTO: 3.72 M/UL (ref 4–5.4)
SODIUM SERPL-SCNC: 141 MMOL/L (ref 136–145)
WBC # BLD AUTO: 5.94 K/UL (ref 3.9–12.7)

## 2022-08-31 PROCEDURE — 63600175 PHARM REV CODE 636 W HCPCS: Performed by: PHYSICIAN ASSISTANT

## 2022-08-31 PROCEDURE — 99291 PR CRITICAL CARE, E/M 30-74 MINUTES: ICD-10-PCS | Mod: ,,, | Performed by: PSYCHIATRY & NEUROLOGY

## 2022-08-31 PROCEDURE — 92610 EVALUATE SWALLOWING FUNCTION: CPT

## 2022-08-31 PROCEDURE — 99233 SBSQ HOSP IP/OBS HIGH 50: CPT | Mod: FS,,, | Performed by: PHYSICIAN ASSISTANT

## 2022-08-31 PROCEDURE — 25000003 PHARM REV CODE 250: Performed by: PSYCHIATRY & NEUROLOGY

## 2022-08-31 PROCEDURE — 63600175 PHARM REV CODE 636 W HCPCS: Performed by: STUDENT IN AN ORGANIZED HEALTH CARE EDUCATION/TRAINING PROGRAM

## 2022-08-31 PROCEDURE — 25000003 PHARM REV CODE 250: Performed by: PHYSICIAN ASSISTANT

## 2022-08-31 PROCEDURE — 99233 PR SUBSEQUENT HOSPITAL CARE,LEVL III: ICD-10-PCS | Mod: ,,, | Performed by: PSYCHIATRY & NEUROLOGY

## 2022-08-31 PROCEDURE — 63600175 PHARM REV CODE 636 W HCPCS: Performed by: PSYCHIATRY & NEUROLOGY

## 2022-08-31 PROCEDURE — 92523 SPEECH SOUND LANG COMPREHEN: CPT

## 2022-08-31 PROCEDURE — 99024 POSTOP FOLLOW-UP VISIT: CPT | Mod: ,,, | Performed by: NEUROLOGICAL SURGERY

## 2022-08-31 PROCEDURE — 85025 COMPLETE CBC W/AUTO DIFF WBC: CPT | Performed by: STUDENT IN AN ORGANIZED HEALTH CARE EDUCATION/TRAINING PROGRAM

## 2022-08-31 PROCEDURE — 80048 BASIC METABOLIC PNL TOTAL CA: CPT | Performed by: STUDENT IN AN ORGANIZED HEALTH CARE EDUCATION/TRAINING PROGRAM

## 2022-08-31 PROCEDURE — 94761 N-INVAS EAR/PLS OXIMETRY MLT: CPT

## 2022-08-31 PROCEDURE — 99024 PR POST-OP FOLLOW-UP VISIT: ICD-10-PCS | Mod: ,,, | Performed by: NEUROLOGICAL SURGERY

## 2022-08-31 PROCEDURE — 97166 OT EVAL MOD COMPLEX 45 MIN: CPT

## 2022-08-31 PROCEDURE — 99233 PR SUBSEQUENT HOSPITAL CARE,LEVL III: ICD-10-PCS | Mod: FS,,, | Performed by: PHYSICIAN ASSISTANT

## 2022-08-31 PROCEDURE — 63600175 PHARM REV CODE 636 W HCPCS: Performed by: NURSE PRACTITIONER

## 2022-08-31 PROCEDURE — 99233 SBSQ HOSP IP/OBS HIGH 50: CPT | Mod: ,,, | Performed by: INTERNAL MEDICINE

## 2022-08-31 PROCEDURE — 25500020 PHARM REV CODE 255: Performed by: PSYCHIATRY & NEUROLOGY

## 2022-08-31 PROCEDURE — 99233 PR SUBSEQUENT HOSPITAL CARE,LEVL III: ICD-10-PCS | Mod: ,,, | Performed by: INTERNAL MEDICINE

## 2022-08-31 PROCEDURE — 84100 ASSAY OF PHOSPHORUS: CPT | Performed by: PHYSICIAN ASSISTANT

## 2022-08-31 PROCEDURE — 99291 CRITICAL CARE FIRST HOUR: CPT | Mod: ,,, | Performed by: PSYCHIATRY & NEUROLOGY

## 2022-08-31 PROCEDURE — 97162 PT EVAL MOD COMPLEX 30 MIN: CPT

## 2022-08-31 PROCEDURE — 25000003 PHARM REV CODE 250: Performed by: NURSE PRACTITIONER

## 2022-08-31 PROCEDURE — 97530 THERAPEUTIC ACTIVITIES: CPT

## 2022-08-31 PROCEDURE — 83735 ASSAY OF MAGNESIUM: CPT | Performed by: PHYSICIAN ASSISTANT

## 2022-08-31 PROCEDURE — 97535 SELF CARE MNGMENT TRAINING: CPT

## 2022-08-31 PROCEDURE — 20000000 HC ICU ROOM

## 2022-08-31 PROCEDURE — 25000003 PHARM REV CODE 250: Performed by: STUDENT IN AN ORGANIZED HEALTH CARE EDUCATION/TRAINING PROGRAM

## 2022-08-31 PROCEDURE — 99233 SBSQ HOSP IP/OBS HIGH 50: CPT | Mod: ,,, | Performed by: PSYCHIATRY & NEUROLOGY

## 2022-08-31 RX ORDER — MAGNESIUM SULFATE HEPTAHYDRATE 40 MG/ML
2 INJECTION, SOLUTION INTRAVENOUS ONCE
Status: COMPLETED | OUTPATIENT
Start: 2022-08-31 | End: 2022-08-31

## 2022-08-31 RX ORDER — LEVETIRACETAM 500 MG/1
1000 TABLET ORAL 2 TIMES DAILY
Status: DISCONTINUED | OUTPATIENT
Start: 2022-08-31 | End: 2022-09-02 | Stop reason: HOSPADM

## 2022-08-31 RX ADMIN — OXYCODONE 5 MG: 5 TABLET ORAL at 09:08

## 2022-08-31 RX ADMIN — MAGNESIUM SULFATE HEPTAHYDRATE 2 G: 40 INJECTION, SOLUTION INTRAVENOUS at 10:08

## 2022-08-31 RX ADMIN — INSULIN ASPART 8 UNITS: 100 INJECTION, SOLUTION INTRAVENOUS; SUBCUTANEOUS at 06:08

## 2022-08-31 RX ADMIN — ACETAMINOPHEN 650 MG: 325 TABLET ORAL at 07:08

## 2022-08-31 RX ADMIN — HEPARIN SODIUM 5000 UNITS: 5000 INJECTION INTRAVENOUS; SUBCUTANEOUS at 09:08

## 2022-08-31 RX ADMIN — CEFTAZIDIME 2 G: 2 INJECTION, POWDER, FOR SOLUTION INTRAVENOUS at 03:08

## 2022-08-31 RX ADMIN — CARVEDILOL 37.5 MG: 25 TABLET, FILM COATED ORAL at 08:08

## 2022-08-31 RX ADMIN — POTASSIUM BICARBONATE 50 MEQ: 978 TABLET, EFFERVESCENT ORAL at 08:08

## 2022-08-31 RX ADMIN — IOHEXOL 50 ML: 350 INJECTION, SOLUTION INTRAVENOUS at 05:08

## 2022-08-31 RX ADMIN — HEPARIN SODIUM 5000 UNITS: 5000 INJECTION INTRAVENOUS; SUBCUTANEOUS at 01:08

## 2022-08-31 RX ADMIN — HEPARIN SODIUM 5000 UNITS: 5000 INJECTION INTRAVENOUS; SUBCUTANEOUS at 06:08

## 2022-08-31 RX ADMIN — CEFTAZIDIME 2 G: 2 INJECTION, POWDER, FOR SOLUTION INTRAVENOUS at 11:08

## 2022-08-31 RX ADMIN — CEFTAZIDIME 2 G: 2 INJECTION, POWDER, FOR SOLUTION INTRAVENOUS at 06:08

## 2022-08-31 RX ADMIN — AMLODIPINE BESYLATE 10 MG: 10 TABLET ORAL at 08:08

## 2022-08-31 RX ADMIN — ESCITALOPRAM OXALATE 10 MG: 10 TABLET ORAL at 08:08

## 2022-08-31 RX ADMIN — INSULIN ASPART 8 UNITS: 100 INJECTION, SOLUTION INTRAVENOUS; SUBCUTANEOUS at 04:08

## 2022-08-31 RX ADMIN — Medication 400 MG: at 09:08

## 2022-08-31 RX ADMIN — Medication 400 MG: at 08:08

## 2022-08-31 RX ADMIN — AMITRIPTYLINE HYDROCHLORIDE 75 MG: 50 TABLET, FILM COATED ORAL at 08:08

## 2022-08-31 RX ADMIN — INSULIN ASPART 8 UNITS: 100 INJECTION, SOLUTION INTRAVENOUS; SUBCUTANEOUS at 12:08

## 2022-08-31 RX ADMIN — MUPIROCIN: 20 OINTMENT TOPICAL at 08:08

## 2022-08-31 RX ADMIN — ACETAMINOPHEN 650 MG: 325 TABLET ORAL at 01:08

## 2022-08-31 RX ADMIN — VANCOMYCIN HYDROCHLORIDE 1250 MG: 1.25 INJECTION, POWDER, LYOPHILIZED, FOR SOLUTION INTRAVENOUS at 01:08

## 2022-08-31 RX ADMIN — MUPIROCIN: 20 OINTMENT TOPICAL at 09:08

## 2022-08-31 RX ADMIN — CEFTAZIDIME 2 G: 2 INJECTION, POWDER, FOR SOLUTION INTRAVENOUS at 12:08

## 2022-08-31 RX ADMIN — LEVETIRACETAM 1000 MG: 100 INJECTION, SOLUTION INTRAVENOUS at 08:08

## 2022-08-31 RX ADMIN — ATORVASTATIN CALCIUM 40 MG: 40 TABLET, FILM COATED ORAL at 08:08

## 2022-08-31 RX ADMIN — LEVETIRACETAM 1000 MG: 500 TABLET, FILM COATED ORAL at 09:08

## 2022-08-31 NOTE — PT/OT/SLP EVAL
Physical Therapy Co-Evaluation/Treatment    Patient Name:  Gina Jenkins   MRN:  [id number]    Recommendations:     Discharge Recommendations:  rehabilitation facility   Discharge Equipment Recommendations: bedside commode   Barriers to discharge: Inaccessible home and Decreased caregiver support    Assessment:     Gina Jenkins is a 47 y.o. female admitted with a medical diagnosis of Fluid collection at surgical site.  She presents with the following impairments/functional limitations:  weakness, impaired endurance, impaired self care skills, impaired functional mobility, gait instability, impaired balance, decreased upper extremity function, decreased coordination, impaired coordination, decreased safety awareness, pain, abnormal tone, decreased ROM, impaired cognition, impaired fine motor, impaired cardiopulmonary response to activity, impaired sensation. Pt highly motivated to participate in therapy session and progress to OOB mobility. Pt able to transition to sit EOB with min assist; however, upon sitting EOB pt with immediate dizziness with rapidly increasing drowsiness and disorientation. Pt returned to supine for recovery with pt demo'ing improved arousal but continued to demo waxing and waning confusion and emotional lability. Pt would continue to benefit from skilled acute PT in order to address current deficits and progress functional mobility. Currently recommending IP rehab upon d/c in order to progress safety and independence with mobility prior to return home.     Rehab Prognosis: Good; patient would benefit from acute skilled PT services to address these deficits and reach maximum level of function.    Recent Surgery: Procedure(s) (LRB):  LEFT Cranial wound debridement and washout (Left)  DEBRIDEMENT, WOUND 4 Days Post-Op    Plan:     During this hospitalization, patient to be seen 4 x/week to address the identified rehab impairments via gait training, therapeutic activities, therapeutic  "exercises, neuromuscular re-education and progress toward the following goals:    Plan of Care Expires:  09/29/22    Subjective     Chief Complaint: dizziness upon sitting EOB  Patient/Family Comments/goals: "Y'all scared me." Re: pt stating after sitting EOB and becoming disorganized  Pain/Comfort:  Pain Rating 1:  (reported headache, but did not rate)  Pain Addressed 1: Reposition, Distraction, Nurse notified    Patients cultural, spiritual, Sabianism conflicts given the current situation: no    Living Environment:  Pt lives with her 3 children (ages 13, 11, 10 y/o) in a 1SH with 3 MARLENE, L handrail.   Prior to admission, patients level of function was ambulatory with SPC. Pt completing basic ADLs without assist; mother assists with I-ADLs. Equipment used at home: walker, rolling, cane, straight.  Upon discharge, patient will have assistance from her mother.    Objective:     Communicated with RN prior to session.  Patient found supine with telemetry, pulse ox (continuous), bed alarm, external ventricular drain, PureWick, hemovac, peripheral IV, blood pressure cuff  upon PT entry to room.    General Precautions: Standard, fall, seizure, aspiration, aphasia   Orthopedic Precautions:N/A   Braces: N/A  Respiratory Status: Room air    Exams:  Cognitive Exam:  Patient is oriented to Person and Place  Postural Exam:  Patient presented with the following abnormalities:    -       Rounded shoulders  -       Forward head  Sensation:    -       Impaired  light/touch- tingling reported RUE  RLE ROM: WFL  RLE Strength: WFL  LLE ROM: WFL  LLE Strength: WFL  RUE weakness    Functional Mobility:  Bed Mobility:     Supine to Sit: minimum assistance  Sit to Supine: maximal assistance   Required urgent return to supine due to increasing dizziness and disorientation while sitting EOB. Following return to supine, pt with improved alertness, visual focus, and orientation. However, pt appearing frightened and emotionally labile with " "waxing and waning confusion. RN notified and monitoring.   Transfers:  deferred 2* dizziness and increasing disorientation upon sitting EOB; required urgent return to supine due to pt reporting "I feel like I'm going to pass out" with pt appearing dizzy    Therapeutic Activities and Exercises:  Pt educated on role of PT and PT POC. Pt verbalized understanding.   Daily orientation provided.   Pt educated on the effects of bed rest and the importance increasing upright tolerance. Pt encouraged to sit with HOB elevated during daytime. Pt verbalized understanding.  Pt oriented to call bell and instructed to call for staff assist as needed Pt verbalized understanding.     AM-PAC 6 CLICK MOBILITY  Total Score:11     Patient left supine with HOB elevated with all lines intact, call button in reach, bed alarm on, and RN notified.    GOALS:   Multidisciplinary Problems       Physical Therapy Goals          Problem: Physical Therapy    Goal Priority Disciplines Outcome Goal Variances Interventions   Physical Therapy Goal     PT, PT/OT Ongoing, Progressing     Description: Goals to be met by: 9/10/2022     Patient will increase functional independence with mobility by performin. Supine to sit with Stand-by Assistance  2. Sit to stand transfer with Stand-by Assistance  3. Bed to chair transfer with Stand-by Assistance using LRAD as needed  4. Gait  x 50 feet with Contact Guard Assistance using LRAD as needed.   5. Ascend/descend 3 stair with left Handrails Minimal Assistance.  6. Lower extremity exercise program x15 reps, with supervision, in order to increase LE strength and (I) with functional mobility.                           History:     Past Medical History:   Diagnosis Date    Brain aneurysm     CHF (congestive heart failure)     H/O coronary angioplasty     Hypercholesteremia     Hypertension     Malignant hypertension     Migraine headache     Stroke 10/2017       Past Surgical History:   Procedure Laterality " Date    CARDIAC CATHETERIZATION      CEREBRAL ANGIOGRAM      CLIP LIGATION OF INTRACRANIAL ANEURYSM BY CRANIOTOMY N/A 7/15/2022    Procedure: CRANIOTOMY, WITH ANEURYSM CLIPPING;  Surgeon: Timothy Diaz MD;  Location: [facility] OR [address] [place] [address];  Service: Neurosurgery;  Laterality: N/A;  PTERIONAL CRANIOTOMY WITH CLIP LIGATION OF L PCOMM, L ANTERIOR CHOROIDAL, L MCA  ANEURYSM, ANESTHESIA: GENERAL, BLOOD: TYPE&CROSS 2 UNITS, NEUROMONITORING: SEP, MEP, EEG, RADIOLOGY: C-ARM, POSITION: SUPINE, CASTILLO, CO-SURGERON: DR. LEXI DOMÍNGUEZ.    WOUND DEBRIDEMENT  8/27/2022    Procedure: DEBRIDEMENT, WOUND;  Surgeon: Timothy Diaz MD;  Location: [facility] OR [address] [place] [address];  Service: Neurosurgery;;       Time Tracking:     PT Received On: 08/31/22  PT Start Time: 1020     PT Stop Time: 1036  PT Total Time (min): 16 min     Billable Minutes: Evaluation 8 and Therapeutic Activity 8  (co-eval performed this date due to need for 2 skilled therapists in order to ensure pt safety, accomodate for pt activity tolerance, and maximize functional potential)     08/31/2022

## 2022-08-31 NOTE — PLAN OF CARE
Regular diet with thin liquids recommended   Problem: SLP  Goal: SLP Goal  Description: Goals due 9/6  1.  Assess functional reading and writing skills  2.  [place] to month, year and place  3.  Respond to word finding tasks with 80% accuracy  4.  Respond to verbal problem solving/categorization tasks with 80% accuracy  5.  Follow 2 step commands with 80% accuracy    Outcome: Ongoing, Progressing

## 2022-08-31 NOTE — ASSESSMENT & PLAN NOTE
Reported focal seizure on admission.  On anti-seizure regimen with Keppra, cEEG run for >24 hours without further seizure events.      Remain on Keppra

## 2022-08-31 NOTE — ASSESSMENT & PLAN NOTE
"47 year old female with history of HTN, smoking and left brain aneurysm s/p left sided craniotomy for aneurysm clipping on 7/15/22 who presented with sudden onset of symptoms concerning for focal seizure.  MRI brain showed peripherally enhancing sub dural and scalp complex fluid collections. CTA head without large vessel occulsion, stenosis, or vascular malformation.  NSGY performed a bedside aspiration of one of the subcutaneous fluid collections. Per previous discussion with team, blood was aspirated. Cx NGTD.      Patient is on empiric Vancomycin and Cefepime. Afebrile without a leukocytosis. HDS. She is s/p cranial wound I&D 8/27.  OR cultures NGTD.  Per op note "no phoebe pus or infection encountered, only reactive hyperemic tissue was found"    Today continues with speech difficulty.  Repeat CTH with stable epidural fluid and improved extracranial fluid after aspiration. On vanc and ceftazidime.  EEG done and preliminarily without epileptiform findings.    Recommendations:   · Continue IV Vancomycin. Inpatient pharmacy managing Vanc dosing. Trough goal 15-20.   · Start Ceftazidime 2g IV q8h as much lower cognitive effects but still offers pseudomonas coverage.  · Will follow culture data to guide antibiotic therapy   · Likely plan for 2 weeks of vanc and ceftazidime from time of surgery 8/21  · Plan reviewed with ID staff.   · ID will follow x 1 more day and if no growth on cultures    "

## 2022-08-31 NOTE — PROGRESS NOTES
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  MRI brain showed peripherally enhancing sub dural and scalp complex fluid collections. CTA head without large vessel occulsion, stenosis, or vascular malformation.  Bedside aspiration of one of the subcutaneous fluid collections, appeared bloody, NGTD.  8/27/22 I&D and plate exchange, found hematoma in subgaleal space and inflammatory epidural tissue sent for culture and path, no purulence noted.    Recommendations:   -Would err on side of caution and treat for possible and treat with extended course 6 weeks of antibiotics from hardware exchange.  -Vancomycin, ceftazidime  -8/27/22 intraoperative samples:  Subgaleal Cx NGTD  Epidural tissue Cx NGTD, pathology in process        Outpatient Antibiotic Therapy Plan:    Please send referral to [facility].    1) Infection:   Craniotomy fluid collection s/p hardware exchange    2) Discharge Antibiotics:    Intravenous antibiotics:  Vancomycin  Ceftazidime 2g q8h    3) Therapy Duration:  6 weeks    Estimated end date of IV antibiotics: 10/8/22    4) Outpatient Weekly Labs:    Order the following labs to be drawn on Mondays:   CBC  CMP   CRP  Vancomycin trough. Target 15-20    If discharged on vancomycin IV, order the following additional labs to be drawn on Thursdays:  CMP   Vancomycin trough. Target 15-20    If vancomycin trough is not at target (15-20) prior to  discharge, schedule vancomycin trough to be drawn before their fourth outpatient dose.    5) Fax Lab Results to Infectious Diseases Provider: Dr. Ned Meléndez    [facility] Fax Number: [phone]    6) Outpatient Infectious Diseases Follow-up    Follow-up appointment will be arranged by the ID clinic and will be found in the patient's appointments tab.    Prior to discharge, please ensure the patient's follow-up has been scheduled.    If there is still no follow-up scheduled prior to discharge, please send an EPIC message to Stephania Carlin in Infectious Diseases.        Thank you for your consult. I will sign off. Please contact us if you have any additional questions.    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 Diaz who presented with sudden onset of symptoms concerning for focal seizure. Pt states she was in her yard with her son on Sunday sharing a snack when she suddenly became aphasic and had RLE weakness. Pt states occurred for about 5 mins and then self resolved. Pt reported to the ED immediately and reports the episode occurred for a 2nd time in the ER, again self resolving. Pt denies drainage, pain, or swelling from her craniotomy incision site. Denies current weakness, paralysis. Denies change of vision, headaches. Denies loss of control from bowels, urine. Denies fevers, body aches, chills. Denies concerns/complications postoperatively until on Sunday when the symptoms started.     Since admission pt remains without leukocytosis. Pt with slightly increased inflammatory markers, sed rate 46 and CRP 26. Blood cultures NGTD. MRI brain w wo c/f enhancement of the sub dural and scalp complex fluid collections,  potentially representing granulation tissue or infection hematoma. MRI wo c/f stable subdural and subq fluid collections surrounding cranitomy defect on the left, potentially appearing somewhat loculated. CTA head with no large vessel occulsion, stenosis, or vascular malformation.     ID was consulted for mgmt recommendations. Pt is currently on vancomycin and piperacillin tazobactam.       Interval History: Afebrile.    Review of Systems   Constitutional:  Negative for chills, diaphoresis and fever.   HENT:  Negative for facial swelling.    Gastrointestinal:  Negative for abdominal pain and vomiting.   Skin:  Positive for wound.   Neurological:  Positive for weakness and headaches.   Objective:     Vital Signs (Most Recent):  Temp: 98.3 °F (36.8 °C) (08/31/22 1505)  Pulse: 76 (08/31/22 1700)  Resp: 19 (08/31/22 1700)  BP: (!) 135/93 (08/31/22 1700)  SpO2: 100 % (08/31/22 1700)   Vital Signs (24h Range):  Temp:  [98.3 °F (36.8 °C)-98.8 °F (37.1 °C)] 98.3 °F (36.8 °C)  Pulse:  [70-85] 76  Resp:  [16-27] 19  SpO2:  [95 %-100 %] 100 %  BP: (116-150)/(60-95) 135/93     Weight: 88.5 kg (195 lb)  Body mass index is 34.54 kg/m².    Estimated Creatinine Clearance: 67.4 mL/min (based on SCr of 1.1 mg/dL).    Physical Exam  Vitals and nursing note reviewed.   Constitutional:       General: She is not in acute distress.     Appearance: Normal appearance. She is well-developed and normal weight. She is not ill-appearing, toxic-appearing or diaphoretic.       HENT:      Head:        Nose: Nose normal. No congestion.      Mouth/Throat:      Dentition: Does not have dentures. No dental abscesses.   Eyes:      General: No scleral icterus.     Conjunctiva/sclera: Conjunctivae normal.   Cardiovascular:      Rate and Rhythm: Normal rate and regular rhythm.   Pulmonary:      Effort: Pulmonary effort is normal. No respiratory distress.      Breath sounds: Normal breath sounds. No wheezing or rales.   Abdominal:      General: Bowel  sounds are normal. There is no distension.      Palpations: Abdomen is soft.      Tenderness: There is no abdominal tenderness.   Musculoskeletal:         General: Normal range of motion.      Cervical back: Normal range of motion. No rigidity.      Right lower leg: No edema.      Left lower leg: No edema.   Skin:     General: Skin is warm and dry.      Findings: No erythema or rash.   Neurological:      Mental Status: She is alert. She is disoriented.      Motor: No weakness.      Comments: Perseverating, mild dysarthria/dysphasia, FC, facial symmetric, tongue in midline.  No weakness noted    Psychiatric:         Mood and Affect: Mood normal.         Behavior: Behavior normal.         Thought Content: Thought content normal.         Judgment: Judgment normal.       Significant Labs: All pertinent labs within the past 24 hours have been reviewed.    Significant Imaging: I have reviewed all pertinent imaging results/findings within the past 24 hours.

## 2022-08-31 NOTE — PLAN OF CARE
PT evaluation complete and appropriate goals established.    2022    Problem: Physical Therapy  Goal: Physical Therapy Goal  Description: Goals to be met by: 9/10/2022     Patient will increase functional independence with mobility by performin. Supine to sit with Stand-by Assistance  2. Sit to stand transfer with Stand-by Assistance  3. Bed to chair transfer with Stand-by Assistance using LRAD as needed  4. Gait  x 50 feet with Contact Guard Assistance using LRAD as needed.   5. Ascend/descend 3 stair with left Handrails Minimal Assistance.  6. Lower extremity exercise program x15 reps, with supervision, in order to increase LE strength and (I) with functional mobility.      Outcome: Ongoing, Progressing

## 2022-08-31 NOTE — NURSING
Dr Darling of [facility] at bedside and removed L subgaleal drain. VSS. WCTM.

## 2022-08-31 NOTE — ASSESSMENT & PLAN NOTE
A1c 8.3  - On Detemir 24U daily, Aspart 8U TIDWM + moderate SSI  - Endocrinology following  - Insulin management per Endo, NCC

## 2022-08-31 NOTE — ASSESSMENT & PLAN NOTE
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 Diaz who p/w aphasia and RSW on 8/21 and subsequently given IV Levetiracetam 1500 mg x1 followed by 1g BID due to concern for seizure. MRI Brain WWO read as peripheral rim enhancement of subdural and scalp complex fluid collections. Cefepime and Vancomycin started per ID recs. Patient now s/p L cranial wound debridement and wash on 8/27 and admitted to [facility] for postoperative monitoring. Patient noted to have increased lethargy and word finding difficulty 8/29-> repeat CTH with increased fluid collection. EEG placed d/t concern for seizure; Epilepsy following for assistance in management.    Recommendations:  - Discontinue vEEG monitoring   - Recommend to continue Levetiracetam 1g BID on discharge due to suspected focal seizures on presentation; see focal seizure  - Avoid agents that lower seizure threshold  - Cefepime d/c 8/29  - Management of fluid collection per NSGY  - Management of infectious/metabolic abnormalities per NCC  - Seizure precautions      Discussed plan of care with NCC team. No family at bedside. Will sign off, please call with questions.

## 2022-08-31 NOTE — ASSESSMENT & PLAN NOTE
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 Diaz   - s/p left cranial wound debridement and washout on 8/27   - Repeat CTH 8/29 read as interval increased sized hypodense collections within the soft tissues overlying the craniotomy and underlying the craniotomy, may represent evolving postoperative seroma with pseudomeningocele not excluded, alternatively enlarging infectious collection to be considered  - Repeat CTH 8/30 reports extra-axial fluid collection and pneumocephalus underlying craniotomy similar to prior exam, interval decrease in size of extracranial soft tissue fluid collection   - CT Brain Perfusion 8/31 with left cerebral hemisphere sulcal effacement with subtle vasogenic edema within left frontal and left temporal lobes   - NCC planning for repeat MRI to evaluate for cerebritis/encephalitis   - Management per NSGY, NCC

## 2022-08-31 NOTE — PLAN OF CARE
[facility] Care Plan    POC reviewed with Gina Jenkins and family at 1500. Pt verbalized understanding. Questions and concerns addressed. Pt progressing toward goals. Will continue to monitor. See below and flowsheets for full assessment and VS info.   -Pain control  -PT/OT/SLP   -Dizziness upon working with PT/OT  -Improved speech, still has trouble with word finding   -MRI tonight   -Drain removed by neurosurgery     Is this a stroke patient? no    Neuro:  Danielle Coma Scale  Best Eye Response: 3-->(E3) to speech  Best Motor Response: 6-->(M6) obeys commands  Best Verbal Response: 4-->(V4) confused  [place] Coma Scale Score: 13  Assessment Qualifiers: no eye obstruction present, patient not sedated/intubated  Pupil PERRLA: yes     24 hr Temp:  [98.4 °F (36.9 °C)-98.8 °F (37.1 °C)]     CV:   Rhythm: normal sinus rhythm  BP goals:   SBP < 160  MAP > 65    Resp:   O2 Device (Oxygen Therapy): room air       Plan: N/A    GI/:     Diet/Nutrition Received: consistent carb/diabetic diet  Last Bowel Movement: 08/25/22  Voiding Characteristics: external catheter    Intake/Output Summary (Last 24 hours) at 8/31/2022 1440  Last data filed at 8/31/2022 1400  Gross per 24 hour   Intake 1536.74 ml   Output 2200 ml   Net -663.26 ml     Unmeasured Output  Urine Occurrence: 1  Stool Occurrence: 0    Labs/Accuchecks:  Recent Labs   Lab 08/31/22  0254   WBC 5.94   RBC 3.72*   HGB 10.7*   HCT 31.2*         Recent Labs   Lab 08/31/22  0254      K 3.7   CO2 21*      BUN 12   CREATININE 1.1    No results for input(s): PROTIME, INR, APTT, HEPANTIXA in the last 168 hours. No results for input(s): CPK, CPKMB, TROPONINI, MB in the last 168 hours.    Electrolytes: Electrolytes replaced  Accuchecks: ACHS    Gtts:      LDA/Wounds:  Lines/Drains/Airways       Drain  Duration                  Closed/Suction Drain 08/27/22 0941 Left Scalp Other (Comment) 4 days    Female External Urinary Catheter 08/27/22 1045 4 days               Peripheral Intravenous Line  Duration                  Peripheral IV - Single Lumen 08/27/22 0806 18 G Right Forearm 4 days         Peripheral IV - Single Lumen 08/27/22 1017 18 G Right Forearm 4 days         Peripheral IV - Single Lumen 08/31/22 1135 20 G;1 3/4 in Right Forearm <1 day                  Wounds: No  Wound care consulted: No

## 2022-08-31 NOTE — ASSESSMENT & PLAN NOTE
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 Diaz who presented after episode of sudden onset R-sided weakness/tremors and aphasia for about 5 minutes, resolved prior to EMS arrival, concerning for focal seizure, then with second episode while in the ED. MRI brain w/wo concerning for scalp and epidural infection.    Now s/p L crani revision for wound washout.     - Admitted to [facility] with [facility] neurochecks.  - all labs and diagnostics reviewed          - CTA 8/21: largely stable from prior with epidural fluid collection and extracranial fluid collection stable in size; stable postsurgical changes of L PCOM/anterior choroidal aneurysm clipping, stable small left MCA bifurcation aneurysm          - MRI brain w wo 8/21: peripheral rim enhancement of subdural and scalp complex fluid collections, concerning for infection; no evidence of enhancement of brain parenchyma   - spot EEG 8/23: mild regional cortical or subcortical dysfunction in the left temporal region with no electrographic seizures or indications of seizure tendency.   - CTH 8/28: Residual mixed density collection overlies the soft tissues as well as within the extra-axial space  underlying the craniotomy likely represents postoperative gas fluid and hemorrhage with residual infection not  Excluded. No new intracranial findings.    - CTH 8/29: worsening epidural and subcutaneous fluid collection with local mass effect on L frontal lobe      - CTH 8/30: extracranial collection gone, epidural collection remains with some mild mass effect     - Infectious workup:   - CTP 8/31: equivocal           - afebrile, no leukocytosis          - blood cx 8/21 NGTD          - ESR 30-->46, CRP 28.5-->26          - Subcutaneous scalp fluid collection tapped 8/22, sent for Cx's - NGTD          - ID consulted, recommend Vanc/Cefepime, continue to follow Cx's; Needs PICC  placement for abx  - Seizures: Episodes concerning for focal seizures with R-sided tremors and weakness. Not on AEDs prior to admission.          - Keppra loaded on admission, continue 1g bid          - Likely provoked focal seizure activity due to cranial fluid collection; pt also with very elevated BG on admission, uncontrolled hyperglycemia may have been contributory to provoked seizure          - spot EEG 8/23 with left temporal subcortical dysfunction, negative for electrographic seizures  - New Onset T2DM: No h/o DM. Hyperglycemic on admission. HbA1C 8.3. Endocrinology consulted. Goal -180.     - Levemir 30 untis daily First dose this Am. 0.7 u/kg/d dosing.      - Novolog 10 units TIDWM. Basal/Prandial physiologic matching.       - Moderate Dose SQ Insulin Correction Scale.     - BG Monitoring AC/HS.           - Bedside nurse to instruct on insulin pen use and blood sugar monitor. Have patient administer own injections after education completed supervised by nurse. Have patient watch insulin educatoin video's via i-pad if available on unit.  - HTN: SBP <160. Controlled on current regimen. Continue home meds.  - Hypokalemia, Hypomagnesemia: Chronic, on home supplements daily.          - Resume home potassium and Mg supplements          - Replete PRN, daily labs  - regular diet  - HAIM/SCD/SQH  - PT/OT/OOB  - HV x1; abx while in place; will remove today.   - stable from nsgy perspective to TTF when deemed appropriate by NCC  Dispo: ongoing

## 2022-08-31 NOTE — PROGRESS NOTES
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 Diaz who presents with sudden onset of symptoms concerning for focal seizure. Patient was in her yard today playing with her son when her R arm became weak suddenly followed by the inability to speak and RLE weakness. This went on for a couple minutes and self resolved; patient said she had some lasting heaviness on her R side after. She came to the ED immediately.    Here in the ED she had another episode that resolved on its own. NSGY consulted.       Post-Op Info:  Procedure(s) (LRB):  LEFT Cranial wound debridement and washout (Left)  DEBRIDEMENT, WOUND   4 Days Post-Op     Interval History: 8/31: NAEON. AFVSS. Exam stable. Pain controlled. Pending PICC.     Medications:  Continuous Infusions:  Scheduled Meds:   amitriptyline  75 mg Oral QHS    amLODIPine  10 mg Oral Daily    atorvastatin  40 mg Oral Daily    carvediloL  37.5 mg Oral BID    ceftAZIDime (FORTAZ) IVPB  2 g Intravenous Q8H    EScitalopram oxalate  10 mg Oral Daily    heparin (porcine)  5,000 Units Subcutaneous Q8H    insulin aspart U-100  8 Units Subcutaneous TIDWM    insulin detemir U-100  24 Units Subcutaneous Daily    levETIRAcetam  1,000 mg Oral BID    magnesium oxide  400 mg Oral BID    mupirocin   Nasal BID    senna-docusate 8.6-50 mg  1 tablet Oral Daily    vancomycin (VANCOCIN) IVPB  1,250 mg Intravenous Q24H     PRN Meds:acetaminophen, albuterol, dextrose 10%, dextrose 10%, glucagon (human recombinant), glucose, glucose, hydrALAZINE, insulin aspart U-100, labetalol, magnesium oxide, magnesium oxide, melatonin, oxyCODONE, oxyCODONE, potassium bicarbonate, potassium bicarbonate, potassium bicarbonate, potassium, sodium phosphates, potassium, sodium phosphates, potassium, sodium  phosphates, Pharmacy to dose Vancomycin consult **AND** vancomycin - pharmacy to dose     Review of Systems  Objective:     Weight: 88.5 kg (195 lb)  Body mass index is 34.54 kg/m².  Vital Signs (Most Recent):  Temp: 98.8 °F (37.1 °C) (08/31/22 1105)  Pulse: 74 (08/31/22 1200)  Resp: 20 (08/31/22 1200)  BP: (!) 140/82 (08/31/22 1200)  SpO2: 100 % (08/31/22 1200)   Vital Signs (24h Range):  Temp:  [98.4 °F (36.9 °C)-98.8 °F (37.1 °C)] 98.8 °F (37.1 °C)  Pulse:  [70-85] 74  Resp:  [16-27] 20  SpO2:  [95 %-100 %] 100 %  BP: (109-150)/(60-85) 140/82     Date 08/31/22 0700 - 09/01/22 0659   Shift 3070-5907 5632-9189 8429-5508 24 Hour Total   INTAKE   P.O. 750   750   I.V.(mL/kg) 65.3(0.7)   65.3(0.7)   IV Piggyback 98.1   98.1   Shift Total(mL/kg) 913.4(10.3)   913.4(10.3)   OUTPUT   Urine(mL/kg/hr) 800   800   Drains 0   0   Shift Total(mL/kg) 800(9)   800(9)   Weight (kg) 88.4 88.4 88.4 88.4                          Closed/Suction Drain 08/27/22 0941 Left Scalp Other (Comment) (Active)   Site Description Unable to view 08/28/22 1501   Dressing Type Gauze 08/28/22 1501   Dressing Status Clean;Dry;Intact 08/28/22 1501   Dressing Intervention Integrity maintained 08/28/22 1501   Drainage Serosanguineous 08/28/22 1501   Status To bulb suction 08/28/22 1501   Output (mL) 0 mL 08/28/22 0502       Female External Urinary Catheter 08/27/22 1045 (Active)   Skin no redness;no breakdown 08/28/22 1501   Tolerance no signs/symptoms of discomfort 08/28/22 1501   Suction Continuous suction at 40 mmHg 08/28/22 0701   Date of last wick change 08/27/22 08/27/22 1105   Time of last wick change 1045 08/27/22 1105   Output (mL) 500 mL 08/28/22 0502       Physical Exam    Neurosurgery Physical Exam    GENERAL: resting comfortably  HEENT: NC, PERRL, mucous membranes moist  NECK: supple, trachea midline  CV: normal capillary refill  PULM: aerating well, symmetric expansion, no distress  ABD: soft, NT, ND  EXT: no c/c/e    NEURO:    AAO x  3  Mild expressive aphasia  CN II-XII grossly intact  Fc x 4 antigravity  SILT    No drift or dysmetria    Incision cdi    HV in place      Significant Labs:  Recent Labs   Lab 08/30/22  0255 08/31/22  0254    113*    141   K 3.9 3.7    106   CO2 21* 21*   BUN 13 12   CREATININE 1.3 1.1   CALCIUM 9.8 9.2   MG 2.0 1.9       Recent Labs   Lab 08/30/22  0255 08/31/22  0254   WBC 7.34 5.94   HGB 10.7* 10.7*   HCT 31.9* 31.2*    233       No results for input(s): LABPT, INR, APTT in the last 48 hours.  Microbiology Results (last 7 days)       Procedure Component Value Units Date/Time    Fungus culture [[id number]] Collected: 08/27/22 0909    Order Status: Completed Specimen: Wound from Head Updated: 08/31/22 1055     Fungus (Mycology) Culture Culture in progress    Narrative:      Superficial head culture, aerobic, anaerobic, fungus, AFB,  gram    Fungus culture [[id number]] Collected: 08/27/22 0912    Order Status: Completed Specimen: Wound from Head Updated: 08/31/22 1055     Fungus (Mycology) Culture Culture in progress    Narrative:      Epidural culture, aerobic, anaerobic, gram, AFB, gram    Fungus culture [[id number]] Collected: 08/22/22 1550    Order Status: Completed Specimen: Incision site from Head Updated: 08/31/22 1055     Fungus (Mycology) Culture Culture in progress    Culture, Anaerobic [[id number]] Collected: 08/27/22 0909    Order Status: Completed Specimen: Wound from Head Updated: 08/31/22 0736     Anaerobic Culture Culture in progress    Narrative:      Superficial head culture, aerobic, anaerobic, fungus, AFB,  gram    Culture, Anaerobic [[id number]] Collected: 08/27/22 0912    Order Status: Completed Specimen: Wound from Head Updated: 08/31/22 0734     Anaerobic Culture Culture in progress    Narrative:      Epidural culture, aerobic, anaerobic, gram, AFB, gram    Aerobic culture [[id number]] Collected: 08/27/22 0909    Order Status: Completed Specimen: Wound from Head Updated:  08/30/22 0953     Aerobic Bacterial Culture No growth    Narrative:      Superficial head culture, aerobic, anaerobic, fungus, AFB,  gram    Aerobic culture [[id number]] Collected: 08/27/22 0912    Order Status: Completed Specimen: Wound from Head Updated: 08/30/22 0953     Aerobic Bacterial Culture No growth    Narrative:      Epidural culture, aerobic, anaerobic, gram, AFB, gram    Culture, Anaerobic [[id number]] Collected: 08/22/22 1550    Order Status: Completed Specimen: Incision site from Head Updated: 08/30/22 0654     Anaerobic Culture No anaerobes isolated    AFB Culture & Smear [[id number]] Collected: 08/27/22 0909    Order Status: Completed Specimen: Wound from Head Updated: 08/29/22 1521     AFB Culture & Smear Culture in progress     AFB CULTURE STAIN No acid fast bacilli seen.    Narrative:      Superficial head culture, aerobic, anaerobic, fungus, AFB,  gram    AFB Culture & Smear [[id number]] Collected: 08/27/22 0912    Order Status: Completed Specimen: Wound from Head Updated: 08/29/22 1521     AFB Culture & Smear Culture in progress     AFB CULTURE STAIN No acid fast bacilli seen.    Narrative:      Epidural culture, aerobic, anaerobic, gram, AFB, gram    Gram stain [[id number]] Collected: 08/27/22 0912    Order Status: Completed Specimen: Wound from Head Updated: 08/27/22 1044     Gram Stain Result No WBC's      No organisms seen    Narrative:      Epidural culture, aerobic, anaerobic, gram, AFB, gram    Gram stain [[id number]] Collected: 08/27/22 0909    Order Status: Completed Specimen: Wound from Head Updated: 08/27/22 1043     Gram Stain Result Rare WBC's      No organisms seen    Narrative:      Superficial head culture, aerobic, anaerobic, fungus, AFB,  gram    Blood culture [[id number]] Collected: 08/21/22 2325    Order Status: Completed Specimen: Blood from Peripheral, Antecubital, Right Updated: 08/27/22 0612     Blood Culture, Routine No growth after 5 days.    Aerobic culture [[id number]]  Collected: 08/22/22 1550    Order Status: Completed Specimen: Incision site from Head Updated: 08/26/22 1029     Aerobic Bacterial Culture No growth    Gram stain [[id number]] Collected: 08/22/22 1550    Order Status: Completed Specimen: Incision site from Head Updated: 08/25/22 1447     Gram Stain Result No organisms seen      Rare WBC's    Gram stain [[id number]]     Order Status: Completed Specimen: Incision site from Head           All pertinent labs from the last 24 hours have been reviewed.    Significant Diagnostics:  I have reviewed all pertinent imaging results/findings within the past 24 hours.  CTA Head    Result Date: 8/30/2022  Stable appearance of the brain and extra-axial/extracranial fluid collections. No significant stenosis at the carotid bifurcations by NASCET criteria.  The vertebral arteries are patent. Stable appearance of the intracranial vasculature.  No recurrent aneurysm status post aneurysm clipping.  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  No new major branch stenosis/occlusion at the [id number]. Nonspecific ground-glass opacities lung apices. Electronically signed by: Jose Perez Date:    08/30/2022 Time:    13:19    CT Brain Perfusion inc Post Processing    Result Date: 8/31/2022  No perfusion abnormality to suggest cerebral ischemia. Left cerebral hemisphere sulcal effacement with subtle vasogenic edema within the left frontal and left temporal lobes.  While there may be a component of seizure related signal changes, repeat MRI of the brain with contrast is recommended to evaluate for cerebritis/encephalitis or other acute intracranial abnormalities. 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 Extra-axial fluid collection underlying the craniotomy is similar to/minimally increased prior exam.  Slight interval increase in size extracranial soft tissue  fluid collection.  This collection could also be further evaluated with MRI of the brain in conjunction with above findings. Findings were discussed with Justino Montgomery NP at 11:21 on 08/31/2022. This report was flagged in Epic as abnormal. 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 Diaz who presented after episode of sudden onset R-sided weakness/tremors and aphasia for about 5 minutes, resolved prior to EMS arrival, concerning for focal seizure, then with second episode while in the ED. MRI brain w/wo concerning for scalp and epidural infection.    Now s/p L crani revision for wound washout.     - Admitted to [facility] with q1h neurochecks.  - all labs and diagnostics reviewed          - CTA 8/21: largely stable from prior with epidural fluid collection and extracranial fluid collection stable in size; stable postsurgical changes of L PCOM/anterior choroidal aneurysm clipping, stable small left MCA bifurcation aneurysm          - MRI brain w wo 8/21: peripheral rim enhancement of subdural and scalp complex fluid collections, concerning for infection; no evidence of enhancement of brain parenchyma   - spot EEG 8/23: mild regional cortical or subcortical dysfunction in the left temporal region with no electrographic seizures or indications of seizure tendency.   - CTH 8/28: Residual mixed density collection overlies the soft tissues as well as within the extra-axial space  underlying the craniotomy likely represents postoperative gas fluid and hemorrhage with residual infection not  Excluded. No new intracranial findings.    - CTH 8/29: worsening epidural and subcutaneous fluid collection with  local mass effect on L frontal lobe      - CTH 8/30: extracranial collection gone, epidural collection remains with some mild mass effect     - Infectious workup:   - CTP 8/31: equivocal           - afebrile, no leukocytosis          - blood cx 8/21 NGTD          - ESR 30-->46, CRP 28.5-->26          - Subcutaneous scalp fluid collection tapped 8/22, sent for Cx's - NGTD          - ID consulted, recommend Vanc/Cefepime, continue to follow Cx's; Needs PICC placement for abx  - Seizures: Episodes concerning for focal seizures with R-sided tremors and weakness. Not on AEDs prior to admission.          - Keppra loaded on admission, continue 1g bid          - Likely provoked focal seizure activity due to cranial fluid collection; pt also with very elevated BG on admission, uncontrolled hyperglycemia may have been contributory to provoked seizure          - spot EEG 8/23 with left temporal subcortical dysfunction, negative for electrographic seizures  - New Onset T2DM: No h/o DM. Hyperglycemic on admission. HbA1C 8.3. Endocrinology consulted. Goal -180.     - Levemir 30 untis daily First dose this Am. 0.7 u/kg/d dosing.      - Novolog 10 units TIDWM. Basal/Prandial physiologic matching.       - Moderate Dose SQ Insulin Correction Scale.     - BG Monitoring AC/HS.           - Bedside nurse to instruct on insulin pen use and blood sugar monitor. Have patient administer own injections after education completed supervised by nurse. Have patient watch insulin educatoin video's via i-pad if available on unit.  - HTN: SBP <160. Controlled on current regimen. Continue home meds.  - Hypokalemia, Hypomagnesemia: Chronic, on home supplements daily.          - Resume home potassium and Mg supplements          - Replete PRN, daily labs  - regular diet  - HAIM/SCD/SQH  - PT/OT/OOB  - HV x1; abx while in place; will remove today.   - stable from nsgy perspective to TTF when deemed appropriate by NCC  Dispo: ongoing        William ROSA  MD [place]  Neurosurgery  [facility] - Neuro Critical Care

## 2022-08-31 NOTE — PROGRESS NOTES
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 Diaz who presented with sudden onset of symptoms concerning for focal seizure, aphasia and RSW. Each episode resolved on its own. NSGY was consulted and she was taken for a wound washout and evacuation of fluid collection. Will admit to NCC post op.      Hospital Course: 8/28/22: step down to NSGY  8/27/22: EEG pending  8/30/22 : remains with expressive asphasia, CTA/CTP ordered  8/31/22: Aphasia improved somewhat this morning, EEG remains negative for seizures and to be disconnected.  CTA/P showed no new ischemia but some concern per radiology for vasogenic edema along left cerebrum, concerning for possible seizure changes vs cerebritis - MRI re-ordered.         Objective:     Vitals:  Temp: 98.8 °F (37.1 °C)  Pulse: 74  Rhythm: normal sinus rhythm  BP: (!) 140/82  MAP (mmHg): 106  Resp: 20  SpO2: 100 %  O2 Device (Oxygen Therapy): room air    Temp  Min: 98.4 °F (36.9 °C)  Max: 98.8 °F (37.1 °C)  Pulse  Min: 70  Max: 85  BP  Min: 109/67  Max: 150/82  MAP (mmHg)  Min: 81  Max: 110  Resp  Min: 16  Max: 27  SpO2  Min: 95 %  Max: 100 %    08/30 0701 - 08/31 0700  In: 960 [P.O.:610]  Out: 1850 [Urine:1850]   Unmeasured Output  Urine Occurrence: 1  Stool Occurrence: 0       Physical Exam  Vitals and nursing note reviewed.   Constitutional:       Appearance: She is ill-appearing.   HENT:      Nose: Nose normal.      Mouth/Throat:      Mouth: Mucous membranes are moist.      Pharynx: Oropharynx is clear.   Cardiovascular:      Rate and Rhythm: Normal rate and regular rhythm.      Pulses: Normal pulses.      Heart sounds:  Normal heart sounds.   Pulmonary:      Effort: Pulmonary effort is normal.      Breath sounds: Normal breath sounds.   Abdominal:      General: Bowel sounds are normal.      Palpations: Abdomen is soft.   Musculoskeletal:         General: Normal range of motion.      Cervical back: Normal range of motion.   Skin:     General: Skin is warm and dry.      Capillary Refill: Capillary refill takes less than 2 seconds.   Neurological:      Comments: E 4 V 5 M 6  -Alert. Oriented to person,   - Expressive asphasia, improved today. Repetition now intact and speech more fluent  - Follows commands.   -Strength 5 and symmetric in arms, legs  -Sensation intact to touch in arms, legs     Medications:  Continuous Scheduledamitriptyline, 75 mg, QHS  amLODIPine, 10 mg, Daily  atorvastatin, 40 mg, Daily  carvediloL, 37.5 mg, BID  ceftAZIDime (FORTAZ) IVPB, 2 g, Q8H  EScitalopram oxalate, 10 mg, Daily  heparin (porcine), 5,000 Units, Q8H  insulin aspart U-100, 8 Units, TIDWM  insulin detemir U-100, 24 Units, Daily  levETIRAcetam, 1,000 mg, BID  magnesium oxide, 400 mg, BID  mupirocin, , BID  senna-docusate 8.6-50 mg, 1 tablet, Daily  vancomycin (VANCOCIN) IVPB, 1,250 mg, Q24H    PRNacetaminophen, 650 mg, Q6H PRN  albuterol, 2 puff, Q20 Min PRN  dextrose 10%, 12.5 g, PRN  dextrose 10%, 25 g, PRN  glucagon (human recombinant), 1 mg, PRN  glucose, 16 g, PRN  glucose, 24 g, PRN  hydrALAZINE, 10 mg, Q6H PRN  insulin aspart U-100, 1-10 Units, QID (AC + HS) PRN  labetalol, 10 mg, Q6H PRN  magnesium oxide, 800 mg, PRN  magnesium oxide, 800 mg, PRN  melatonin, 6 mg, Nightly PRN  oxyCODONE, 5 mg, Q4H PRN  oxyCODONE, 10 mg, Q4H PRN  potassium bicarbonate, 35 mEq, PRN  potassium bicarbonate, 50 mEq, PRN  potassium bicarbonate, 60 mEq, PRN  potassium, sodium phosphates, 2 packet, PRN  potassium, sodium phosphates, 2 packet, PRN  potassium, sodium phosphates, 2 packet, PRN  vancomycin - pharmacy to dose, , pharmacy to manage frequency      Today I  personally reviewed pertinent medications, lines/drains/airways, imaging, cardiology results, laboratory results, microbiology results,     Diet  Diet diabetic [facility]; 2000 Calorie; Isolation Tray - Regular [place]  Diet diabetic [facility]; 2000 Calorie; Isolation Tray - Regular [place]          Assessment/Plan:     Neuro  Brain compression  Compression due to intracranial extra-axial fluid collection s/p washout, with some vasogenic edema concerning for possibility of cerebritis. MRI pending  - General precautions: HOB up, maintain normocarbia, normonatremia, etc.    Focal seizure  Reported focal seizure on admission.  On anti-seizure regimen with Keppra, cEEG run for >24 hours without further seizure events.      Remain on Keppra    Cardiac/Vascular  Mixed hyperlipidemia  Continue atorvastatin    Essential hypertension  SBP Goal < 160  Continue home BP meds    Endocrine  New onset type 2 diabetes mellitus  Continue scheduled insulin and SSI  Diabetic diet    Other  * Fluid collection at surgical site  S/p washout 8/27  CTH revealed increase fluid collection NSGY aware  EEG pending    8/31: CTA with brain perfusion with stable to minimally increased fluid collection intra-axially, fluctuating extra-axially with subgaleal drain in place.  Cultures negative to date and on broad-spectrum antibiotics, however re-obtaining MRI brain per radiology recommendations.          The patient is being Prophylaxed for:  Venous Thromboembolism with: Mechanical or Chemical  Stress Ulcer with: None  Ventilator Pneumonia with: not applicable    Activity Orders          Diet diabetic [facility]; 2000 Calorie; Isolation Tray - Regular [place]: Diabetic starting at 08/27 1135        Full Code     Critical condition in that Patient has a condition that poses threat to life and bodily function: See above documentation     35 minutes of Critical care time was spent personally by me on the following activities: development  of treatment plan with patient or surrogate and bedside caregivers, discussions with consultants, evaluation of patient's response to treatment, examination of patient, ordering and performing treatments and interventions, ordering and review of laboratory studies, ordering and review of radiographic studies, pulse oximetry, antibiotic titration if applicable, vasopressor titration if applicable, re-evaluation of patient's condition. This critical care time did not overlap with that of any other provider or involve time for any procedures. There is high probability for acute neurological change leading to clinical and possibly life-threatening deterioration requiring highest level of physician preparedness for urgent intervention.      Ryan Cage, DO  Neurocritical Care  [facility] - Neuro Critical Care

## 2022-08-31 NOTE — ASSESSMENT & PLAN NOTE
Compression due to intracranial extra-axial fluid collection s/p washout, with some vasogenic edema concerning for possibility of cerebritis. MRI pending  - General precautions: HOB up, maintain normocarbia, normonatremia, etc.

## 2022-08-31 NOTE — ASSESSMENT & PLAN NOTE
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  MRI brain showed peripherally enhancing sub dural and scalp complex fluid collections. CTA head without large vessel occulsion, stenosis, or vascular malformation.  MRI revealed enhancement of subgaleal and epidural regions.  Bedside aspiration of one of the subcutaneous fluid collections, appeared bloody, NGTD.  8/27/22 I&D and plate exchange, found hematoma in subgaleal space and inflammatory epidural tissue sent for culture and path, no purulence noted.    Recommendations:   -Would err on side of caution and treat for possible and treat with extended course 6 weeks of antibiotics from hardware exchange.  -Vancomycin, ceftazidime  -8/27/22 intraoperative cultures NGTD, path in processlow

## 2022-08-31 NOTE — PROCEDURES
[facility] EEG/VIDEO MONITORING REPORT  Gina Jenkins  [id number]  1975    DATE OF SERVICE:  08/30/2022-08/31/2022  DATE OF ADMISSION: 8/21/2022  8:20 PM    ADMITTING/REQUESTING PROVIDER: Justice Steven MD    REASON FOR CONSULT:  47-year-old woman with recent left aneurysm clipping 07/2022 admitted with subdural and scalp fluid collections with intermittent right-sided weakness and language difficulties.  Evaluate for evidence of epileptiform activity.    METHODOLOGY   Electroencephalographic (EEG) recording is with electrodes placed according to the International 10-20 placement system.  Thirty two (32) channels of digital signal (sampling rate of 512/sec) including T1 and T2 was simultaneously recorded from the scalp and may include  EKG, EMG, and/or eye monitors.  Recording band pass was 0.1 to 512 hz.  Digital video recording of the patient is simultaneously recorded with the EEG.  The patient is instructed report clinical symptoms which may occur during the recording session.  EEG and video recording is stored and archived in digital format.  Activation procedures which include photic stimulation, hyperventilation and instructing patients to perform simple task are done in selected patients.   The EEG is displayed on a monitor screen and can be reviewed using different montages.  Computer assisted analysis is employed to detect spike and electrographic seizure activity.   The entire record is submitted for computer analysis.  The entire recording is visually reviewed and the times identified by computer analysis as being spikes or seizures are reviewed again.  Compresses spectral analysis (CSA) is also performed on the activity recorded from each individual channel.  This is displayed as a power display of frequencies from 0 to 30 Hz over time.   The CSA is reviewed looking for asymmetries in power between homologous areas of the scalp and then compared with the original EEG recording.     [employer] software is also  utilized in the review of this study.  This software suite analyzes the EEG recording in multiple domains.  Coherence and rhythmicity is computed to identify EEG sections which may contain organized seizures.  Each channel undergoes analysis to detect presence of spike and sharp waves which have special and morphological characteristic of epileptic activity.  The routine EEG recording is converted from spacial into frequency domain.  This is then displayed comparing homologous areas to identify areas of significant asymmetry.  Algorithm to identify non-cortically generated artifact is used to separate eye movement, EMG and other artifact from the EEG.      RECORDING TIMES  Start on 08/30/2022 at 07:00 a.m.  Stop on 08/31/2022 at 12:43 p.m.-> End of the Recording Session  A total of 26 hours and 48 minutes of EEG recording is obtained.    EEG FINDINGS  Background activity:   The background is continuous and asymmetric predominantly delta activity with more admixed theta frequencies seen over the right hemisphere.  At times, there is a moderately well-formed somewhat poorly sustained 7 hz maximal posterior dominant rhythm in the right posterior quadrant.  There is higher voltage more disorganized predominantly polymorphic delta activity with some admixed higher frequencies and a less well-formed posterior dominant rhythm seen over the left hemisphere.  There is intermittent generalized and multifocal slowing seen bilaterally, left>right.     Sleep:  There is evidence of state changes with the appearance of sleep architecture    Activation procedures:   Hyperventilation is not performed  Photic stimulation is not performed    Cardiac Monitor:   Heart rate appears generally regular on a single lead EKG.    Impression:   This is an abnormal continuous EEG monitoring study because of generalized background slowing consistent with a moderate encephalopathy with more prominent slowing seen over the left hemisphere with  contributions from a breach rhythm in this area.  There are no pushbutton activations, no epileptiform discharges, and no electrographic seizures.    Kamille Zurita MD PhD  Neurology-Epilepsy  [facility].

## 2022-08-31 NOTE — PROGRESS NOTES
"[facility]  Neurology-Epilepsy  Progress Note    Patient Name: Gina Jenkins  MRN: [id number]  Admission Date: 8/21/2022  Hospital Length of Stay: 9 days  Code Status: Full Code   Attending Provider: Ryan Cage MD  Primary Care Physician: Clair Johnson NP   Principal Problem:Fluid collection at surgical site    Subjective:     Hospital Course:   8/29>8/30: moderate encephalopathy with prominent focal slowing on the left with contributions from a breach rhythm, no epileptiform discharges, no electrographic seizures  8/30>8/31: encephalopathy with prominent focal slowing on the left with contributions from a breach rhythm, no epileptiform discharges, no electrographic seizures; see EEG report for full details      Interval History: NAEON. Patient clinically improved this morning, but remains encephalopathic. She is only oriented to "[facility]" and year. She continues to endorse that "something is wrong" with her right hand. Patient also emotional about being away from three sons. Offered emotional support and reassurance. No family at bedside.     Current Facility-Administered Medications   Medication Dose Route Frequency Provider Last Rate Last Admin    acetaminophen tablet 650 mg  650 mg Oral Q6H PRN Hipolito Darling MD   650 mg at 08/31/22 0722    albuterol inhaler 2 puff  2 puff Inhalation Q20 Min PRN Marlene Robison PA-C        amitriptyline tablet 75 mg  75 mg Oral QHS Hipolito Darling MD   75 mg at 08/30/22 2042    amLODIPine tablet 10 mg  10 mg Oral Daily Cherelle Proctor NP   10 mg at 08/31/22 0800    atorvastatin tablet 40 mg  40 mg Oral Daily Hipolito Darling MD   40 mg at 08/31/22 0801    carvediloL tablet 37.5 mg  37.5 mg Oral BID Cherelle Proctor NP   37.5 mg at 08/31/22 0800    ceftAZIDime (FORTAZ) 2 g in dextrose 5 % 50 mL IVPB  2 g Intravenous Q8H Ryan Cage MD   Stopped at 08/31/22 0745    dextrose 10% bolus 125 mL  12.5 g " Intravenous PRN Julio Lewis, NP        dextrose 10% bolus 250 mL  25 g Intravenous PRN Julio Lewis NP        EScitalopram oxalate tablet 10 mg  10 mg Oral Daily Hipolito Darling MD   10 mg at 08/31/22 0800    glucagon (human recombinant) injection 1 mg  1 mg Intramuscular PRN Julio Lewis, NP        glucose chewable tablet 16 g  16 g Oral PRN Lakishake Joshua, NP        glucose chewable tablet 24 g  24 g Oral PRN Julio Lewis, NP        heparin (porcine) injection 5,000 Units  5,000 Units Subcutaneous Q8H Diamond Loera NP   5,000 Units at 08/31/22 0609    hydrALAZINE injection 10 mg  10 mg Intravenous Q6H PRN Cherelle Proctor NP   10 mg at 08/27/22 1715    insulin aspart U-100 pen 1-10 Units  1-10 Units Subcutaneous QID (AC + HS) PRN Julio Lewis NP   2 Units at 08/29/22 1620    insulin aspart U-100 pen 8 Units  8 Units Subcutaneous TIDWM Grisel Montana NP   8 Units at 08/31/22 0622    insulin detemir U-100 pen 24 Units  24 Units Subcutaneous Daily Grisel Montana NP   24 Units at 08/31/22 0809    labetalol 20 mg/4 mL (5 mg/mL) IV syring  10 mg Intravenous Q6H PRN Cherelle Proctor NP   10 mg at 08/27/22 1421    levETIRAcetam tablet 1,000 mg  1,000 mg Oral BID Ryan Cage MD        magnesium oxide tablet 400 mg  400 mg Oral BID Marlene Robison PA-C   400 mg at 08/31/22 0800    magnesium oxide tablet 800 mg  800 mg Oral PRN Gwen Lange PA-C        magnesium oxide tablet 800 mg  800 mg Oral PRN Gwen Lange PA-C        magnesium sulfate 2g in water 50mL IVPB (premix)  2 g Intravenous Once Justino Montgomery NP 25 mL/hr at 08/31/22 1100 Rate Verify at 08/31/22 1100    melatonin tablet 6 mg  6 mg Oral Nightly PRN Hipolito Darling MD   6 mg at 08/26/22 2129    mupirocin 2 % ointment   Nasal BID Timothy Diaz MD   Given at 08/31/22 0801    oxyCODONE immediate release tablet 5 mg  5 mg Oral Q4H PRN Cherelle Proctor NP   5 mg at 08/30/22 221     oxyCODONE immediate release tablet Tab 10 mg  10 mg Oral Q4H PRN Cherelle Proctor NP   10 mg at 08/29/22 0638    potassium bicarbonate disintegrating tablet 35 mEq  35 mEq Oral PRN Gwen G. Lopiccolo, PA-C        potassium bicarbonate disintegrating tablet 50 mEq  50 mEq Oral PRN Gwen G. Lopiccolo, PA-C   50 mEq at 08/31/22 0802    potassium bicarbonate disintegrating tablet 60 mEq  60 mEq Oral PRN Gwen G. Lopiccolo, PA-C        potassium, sodium phosphates 280-160-250 mg packet 2 packet  2 packet Oral PRN Gwen G. Lopiccolo, PA-C   2 packet at 08/28/22 0513    potassium, sodium phosphates 280-160-250 mg packet 2 packet  2 packet Oral PRN Gwen G. Lopiccolo, PA-C        potassium, sodium phosphates 280-160-250 mg packet 2 packet  2 packet Oral PRN Gwen G. Lopiccolo, PA-C        senna-docusate 8.6-50 mg per tablet 1 tablet  1 tablet Oral Daily Diamond Loera NP        vancomycin - pharmacy to dose   Intravenous pharmacy to manage frequency Hipolito Darling MD        vancomycin 1.25 g in dextrose 5% 250 mL IVPB (ready to mix)  1,250 mg Intravenous Q24H Ryan Cage MD   Stopped at 08/30/22 1535     Continuous Infusions:    Review of Systems   Unable to perform ROS: Mental status change   Objective:     Vital Signs (Most Recent):  Temp: 98.8 °F (37.1 °C) (08/31/22 1105)  Pulse: 75 (08/31/22 1105)  Resp: 17 (08/31/22 1105)  BP: (!) 143/80 (08/31/22 1105)  SpO2: 100 % (08/31/22 1105)   Vital Signs (24h Range):  Temp:  [98.4 °F (36.9 °C)-98.8 °F (37.1 °C)] 98.8 °F (37.1 °C)  Pulse:  [70-85] 75  Resp:  [16-27] 17  SpO2:  [95 %-100 %] 100 %  BP: (109-150)/(60-85) 143/80     Weight: 88.5 kg (195 lb)  Body mass index is 34.54 kg/m².    Physical Exam  Constitutional:       General: She is not in acute distress.     Appearance: She is not diaphoretic.   HENT:      Head: Normocephalic.      Comments: EEG in place  Eyes:      General: No scleral icterus.        Right eye: No discharge.          Left eye: No discharge.      Conjunctiva/sclera: Conjunctivae normal.      Pupils: Pupils are equal, round, and reactive to light.   Cardiovascular:      Rate and Rhythm: Normal rate.   Pulmonary:      Effort: Pulmonary effort is normal. No respiratory distress.   Abdominal:      General: There is no distension.      Palpations: Abdomen is soft.      Tenderness: There is no abdominal tenderness.   Musculoskeletal:         General: No deformity or signs of injury.   Skin:     General: Skin is warm and dry.   Psychiatric:         Mood and Affect: Affect is labile and tearful.         Behavior: Behavior is cooperative.         Cognition and Memory: Cognition is impaired.       NEUROLOGICAL EXAMINATION:     MENTAL STATUS   Disoriented to city.   Oriented to year.   Level of consciousness: alert    CRANIAL NERVES     CN III, IV, VI   Pupils are equal, round, and reactive to light.       Awake  JUANITA OU   Corneal intact OU   + spontaneous eye opening   Face symmetric   Tongue midline   RUE ataxia  Spontaneous movement of extremities   Follows commands    Exam findings to suggest seizures:  Myoclonus - no   eye twitching - no   Nystagmus - no   gaze deviation - no   waxy rigidity - no      Significant Labs: All pertinent lab results from the past 24 hours have been reviewed.    Significant Studies: I have reviewed all pertinent imaging results/findings within the past 24 hours.    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 Diaz who p/w aphasia and RSW on 8/21 and subsequently given IV Levetiracetam 1500 mg x1 followed by 1g BID due to concern for seizure. MRI Brain WWO read as peripheral rim enhancement of subdural and scalp complex fluid collections. Cefepime and Vancomycin started per ID recs. Patient now s/p L cranial wound debridement and wash on 8/27 and admitted to [facility] for  postoperative monitoring. Patient noted to have increased lethargy and word finding difficulty 8/29-> repeat CTH with increased fluid collection. EEG placed d/t concern for seizure; Epilepsy following for assistance in management.    Recommendations:  - Discontinue vEEG monitoring   - Recommend to continue Levetiracetam 1g BID on discharge due to suspected focal seizures on presentation; see focal seizure  - Avoid agents that lower seizure threshold  - Cefepime d/c 8/29  - Management of fluid collection per NSGY  - Management of infectious/metabolic abnormalities per NCC  - Seizure precautions      Discussed plan of care with NCC team. No family at bedside. Will sign off, please call with questions.    Focal seizure  Suspect patient had focal seizures on presentation 2/2 cranial fluid collection  - No electrographic seizures on EEG   - Recommend to continue Levetiracetam 1g BID on discharge  - Will arrange for outpatient follow up in Epilepsy clinic    Infection of superficial incisional surgical site after procedure  s/p left Pcomm and anterior choroidal artery aneurysm s/p elective craniotomy for clipping on 7/15/2022 by Dr. Diaz   - s/p left cranial wound debridement and washout on 8/27   - Repeat CTH 8/29 read as interval increased sized hypodense collections within the soft tissues overlying the craniotomy and underlying the craniotomy, may represent evolving postoperative seroma with pseudomeningocele not excluded, alternatively enlarging infectious collection to be considered  - Repeat CTH 8/30 reports extra-axial fluid collection and pneumocephalus underlying craniotomy similar to prior exam, interval decrease in size of extracranial soft tissue fluid collection   - CT Brain Perfusion 8/31 with left cerebral hemisphere sulcal effacement with subtle vasogenic edema within left frontal and left temporal lobes   - [facility] planning for repeat MRI to evaluate for cerebritis/encephalitis   - Management per NSGY,  [facility]    New onset type 2 diabetes mellitus  A1c 8.3  - On Detemir 24U daily, Aspart 8U TIDWM + moderate SSI  - Endocrinology following  - Insulin management per [place], [facility]    Mixed hyperlipidemia  - On home Atorvastatin  - Management per [facility]    Essential hypertension  Hx of  - On home Amlodipine 10 mg daily and Carvedilol 37.5 mg BID  - PRNs ordered- has not needed  - Management per [facility]      VTE Risk Mitigation (From admission, onward)         Ordered     heparin (porcine) injection 5,000 Units  Every 8 hours         08/28/22 1449     IP VTE HIGH RISK PATIENT  Once         08/22/22 0213     Place sequential compression device  Until discontinued         08/22/22 0213                Tricia Brandon PA-C  Neurology-Epilepsy  [facility]  Staff: Dr. Zurita

## 2022-08-31 NOTE — PLAN OF CARE
08/31/22 1422   Post-Acute Status   Post-Acute Authorization Placement   Post-Acute Placement Status Referrals Sent  (rehab)     SW met with Pt at bedside. Discussed therapy recs for rehab and provided list. Addressed questions and reported need to send referrals to obtain accepting options. Pt agreeable and will review the list for preferences asap.     SW sent referrals via [facility] to KELSIE Irwin, and Lian.    Maria Teresa Reyes LCSW  Neurocritical Care   [facility]  [phone]

## 2022-08-31 NOTE — ASSESSMENT & PLAN NOTE
S/p washout 8/27  CTH revealed increase fluid collection NSGY aware  EEG pending    8/31: CTA with brain perfusion with stable to minimally increased fluid collection intra-axially, fluctuating extra-axially with subgaleal drain in place.  Cultures negative to date and on broad-spectrum antibiotics, however re-obtaining MRI brain per radiology recommendations.

## 2022-08-31 NOTE — PT/OT/SLP EVAL
Occupational Therapy   Co-Evaluation and Co-Treatment    Patient Name: Gina Jenkins   MRN: [id number]  Admit Date: 8/21/2022  Admitting Diagnosis: Fluid collection at surgical site   Recent Surgery: Procedure(s) (LRB):  LEFT Cranial wound debridement and washout (Left)  DEBRIDEMENT, WOUND 4 Days Post-Op    Co-evaluation and co-treatment performed for this visit due to suspected patient need for two skilled therapists to ensure patient and staff safety and to accommodate for patient activity tolerance/pain management   Recommendations:     Discharge Recommendations: rehabilitation facility  Discharge Equipment Recommendations: bedside commode  Barriers to discharge: Decreased caregiver support    Assessment:     Gina Jenkins is a 47 y.o. female with a medical diagnosis of Fluid collection at surgical site. She presents with performance deficits affecting function including weakness, decreased upper extremity function, impaired cardiopulmonary response to activity, impaired endurance, impaired balance, impaired coordination, decreased safety awareness, impaired fine motor, impaired self care skills, impaired cognition, impaired functional mobility. Patient reports she was home with three children prior to this admission, her mother assist with meds, meals, and home tasks. She reports she was using single point cane for mobility, not driving. She has poor insight into deficits, safety and situation.  Patient states she wants to walk with poor insight into deficits. EEG in place during eval.     Rehab Prognosis: Good; patient would benefit from acute skilled OT services to address these deficits and reach maximum level of function.     Plan:     Patient to be seen 3 x/week to address the above listed problems via self-care/home management, therapeutic activities, therapeutic exercises  Plan of Care Expires: 09/30/22  Plan of Care Reviewed with: patient    Subjective     Communicated with RN prior to session.  "Patient found HOB elevated upon OT entry to room, agreeable to evaluation.     Chief Complaint: OTHER (Pt arrives EMS from home. EMS was activated due to pt having aphasia and R sided paralysis that resolved prior to EMS arrival. LKN was 1845 and EMS reports they arrived at 1854. Pt is AAOx4, no slurred speech noted, no weakness noted on arrival. )    Patient/Family Comments/Goals: home with family    Occupational Profile:  Living Environment: Patient lives with their family in a single story home, number of outside stairs: 3, tub-shower combo.  Prior Level of Function: Prior to admission, patient required assistance with ADLs including mother helps with medication, money, home tasks and meals for patient and her three children.   Equipment Used at Home:FLOW(5344:LAST:1:1)@  DME owned (not currently used): rolling walker and single point cane  Upon discharge, patient will have assistance from family.    Pain/Comfort:  Pain Rating 1:  (head ache)    Objective:   Patient found with: telemetry, pulse ox (continuous), bed alarm, ANIYAH drain, PureWick, hemovac, blood pressure cuff, external ventricular drain     General Precautions: Standard, fall, seizure, aspiration   Orthopedic Precautions:N/A   Braces: N/A   Respiratory Status:   Room air  Vitals: BP (!) 140/82 (BP Location: Right arm, Patient Position: Lying)   Pulse 74   Temp 98.8 °F (37.1 °C) (Oral)   Resp 20   Ht 5' 3" (1.6 m)   Wt 88.5 kg (195 lb)   SpO2 100%   Breastfeeding No   BMI 34.54 kg/m²     Cognitive and Psychosocial Function:   AxOx2 -- Person and Place   Follows Commands/attention: easily distracted by after sit at EOB  Communication: clear/fluent  Memory: Impaired STM and Impaired LTM  Safety awareness/insight to disability: impaired   Mood/Affect/Coping skills/emotional control: Tearful    Hearing: Intact    Vision: Intact visual fields    Physical Exam:  Postural examination/scapula alignment:    -       No postural abnormalities " identified  Skin integrity: Wound head     Left UE Right UE   UE Edema present present   UE ROM limited by shoulder flexion 0-90 limited by shoulder flexion 0-90   UE Strength decreased ROM, decreased strength decreased ROM, decreased strength    Strength grasp WFL fair composite grasp   Sensation LUE INTACT:WFL RUE INTACT: WFL   Fine Motor Coordination:  LUE INTACT: manipulation of objects RUE IMPAIRED: manipulation of objects   Gross Motor Coordination: LUE INTACT: WFL RUE IMPAIRED:ataxia     Occupational Performance:    Bed Mobility:   Supine to Sit with maximal assistance on L side of bed  Scooting anteriorly to EOB to have both feet planted on floor: maximal assistance  Sit to Supine with maximal assistance on L side of bed    Functional Mobility/Transfers:    Static Sitting EOB: poor patient became dizzy upon sitting upright which progressed to need to be returned to supine, She experience increase confusion with poor organizational thinking after sitting up, anxious.       Activities of Daily Living:  Grooming: minimum assistance    Upper Body Dressing: maximal assistance    Lower Body Dressing: total assistance    Toileting: total assistance      AMPAC 6 Click ADL:  AMPAC Total Score: 9    Treatment & Education:  Therapist provided facilitation and instruction of proper body mechanics, energy conservation, and fall prevention strategies during tasks listed above.  Pt educated on role of OT, POC and goals for therapy  Pt educated on importance of OOB activities with staff member assistance and sitting OOB majority of the day.   Updated communication board with level of assist required (max (A) bed mobility) & educated RN/patient that patient is not clear to transfer with RN/PCT.     Patient left HOB elevated with all lines intact, call button in reach, RN notified, and bed alarm on.    GOALS:   Multidisciplinary Problems       Occupational Therapy Goals          Problem: Occupational Therapy    Goal  Priority Disciplines Outcome Interventions   Occupational Therapy Goal     OT, PT/OT Ongoing, Progressing    Description: Goals to be met by: 09/31/22     Patient will increase functional independence with ADLs by performing:    UE Dressing with Supervision.  Grooming while EOB with Set-up Assistance.  Toileting from bedside commode with Minimal Assistance for hygiene and clothing management.   Sitting at edge of bed x10 minutes with Stand-by Assistance.  Toilet transfer to bedside commode with Contact Guard Assistance.                         History:     Past Medical History:   Diagnosis Date    Brain aneurysm     CHF (congestive heart failure)     H/O coronary angioplasty     Hypercholesteremia     Hypertension     Malignant hypertension     Migraine headache     Stroke 10/2017         Past Surgical History:   Procedure Laterality Date    CARDIAC CATHETERIZATION      CEREBRAL ANGIOGRAM      CLIP LIGATION OF INTRACRANIAL ANEURYSM BY CRANIOTOMY N/A 7/15/2022    Procedure: CRANIOTOMY, WITH ANEURYSM CLIPPING;  Surgeon: Timothy Diaz MD;  Location: [facility] OR [address] [place] [address];  Service: Neurosurgery;  Laterality: N/A;  PTERIONAL CRANIOTOMY WITH CLIP LIGATION OF L PCOMM, L ANTERIOR CHOROIDAL, L MCA  ANEURYSM, ANESTHESIA: GENERAL, BLOOD: TYPE&CROSS 2 UNITS, NEUROMONITORING: SEP, MEP, EEG, RADIOLOGY: C-ARM, POSITION: SUPINE, CASTILLO, CO-SURGERON: DR. LEXI DOMÍNGUEZ.    WOUND DEBRIDEMENT  8/27/2022    Procedure: DEBRIDEMENT, WOUND;  Surgeon: Timothy Diaz MD;  Location: [facility] OR [address] [place] [address];  Service: Neurosurgery;;       Time Tracking:     OT Date of Treatment: 08/31/22  OT Start Time: 1018  OT Stop Time: 1041  OT Total Time (min): 23 min  Additional staff present: PT      Billable Minutes: Evaluation 5   Self Care/Home Management 18    8/31/2022

## 2022-08-31 NOTE — PLAN OF CARE
Eval and POC in place.   Problem: Occupational Therapy  Goal: Occupational Therapy Goal  Description: Goals to be met by: 09/31/22     Patient will increase functional independence with ADLs by performing:    UE Dressing with Supervision.  Grooming while EOB with Set-up Assistance.  Toileting from bedside commode with Minimal Assistance for hygiene and clothing management.   Sitting at edge of bed x10 minutes with Stand-by Assistance.  Toilet transfer to bedside commode with Contact Guard Assistance.    Outcome: Ongoing, Progressing

## 2022-08-31 NOTE — CARE UPDATE
BG Goal 140 -180   Diet diabetic [facility]; 2000 Calorie; Isolation Tray - Regular [place]  4 Days Post-Op    BG stable while on current SQ insulin regimen. Patient remains in neuro ICU. Endocrinology will continue to follow, and manage glycemic control while inpatient.     - Continue Levemir 24 units daily.   - Continue Novolog 8 units TDIWM.   - Moderate Dose SQ Insulin Correction Scale.  - BG Monitoring AC/HS     ** Please call Endocrine for any BG related issues **    ** Please notify Endocrine for any change and/or advance in diet**     Lab Results   Component Value Date    HGBA1C 8.3 (H) 08/22/2022       Discharge Planning:   TBD. Please notify endocrinology prior to discharge.

## 2022-08-31 NOTE — SUBJECTIVE & OBJECTIVE
"Interval History: NAEON. Patient clinically improved this morning, but remains encephalopathic. She is only oriented to "[facility]" and year. She continues to endorse that "something is wrong" with her right hand. Patient also emotional about being away from three sons. Offered emotional support and reassurance. No family at bedside.     Current Facility-Administered Medications   Medication Dose Route Frequency Provider Last Rate Last Admin    acetaminophen tablet 650 mg  650 mg Oral Q6H PRN Hipolito Darling MD   650 mg at 08/31/22 0722    albuterol inhaler 2 puff  2 puff Inhalation Q20 Min PRN Marlene Robison PA-C        amitriptyline tablet 75 mg  75 mg Oral QHS Hipolito Darling MD   75 mg at 08/30/22 2042    amLODIPine tablet 10 mg  10 mg Oral Daily Cherelle Proctor NP   10 mg at 08/31/22 0800    atorvastatin tablet 40 mg  40 mg Oral Daily Hipolito Darling MD   40 mg at 08/31/22 0801    carvediloL tablet 37.5 mg  37.5 mg Oral BID Cherelle Proctor NP   37.5 mg at 08/31/22 0800    ceftAZIDime (FORTAZ) 2 g in dextrose 5 % 50 mL IVPB  2 g Intravenous Q8H Ryan Cage MD   Stopped at 08/31/22 0745    dextrose 10% bolus 125 mL  12.5 g Intravenous PRN Julio Lewis NP        dextrose 10% bolus 250 mL  25 g Intravenous PRN Julio Lewis NP        EScitalopram oxalate tablet 10 mg  10 mg Oral Daily Hipolito Darling MD   10 mg at 08/31/22 0800    glucagon (human recombinant) injection 1 mg  1 mg Intramuscular PRN Luke Cenac, NP        glucose chewable tablet 16 g  16 g Oral PRN Luke Cenac, NP        glucose chewable tablet 24 g  24 g Oral PRN Luke Cenac, NP        heparin (porcine) injection 5,000 Units  5,000 Units Subcutaneous Q8H Diamond Loera NP   5,000 Units at 08/31/22 0609    hydrALAZINE injection 10 mg  10 mg Intravenous Q6H PRN Cherelle Proctor NP   10 mg at 08/27/22 1715    insulin aspart U-100 pen 1-10 Units  1-10 Units Subcutaneous QID (AC + HS) PRN Luke Cenac, " NP   2 Units at 08/29/22 1620    insulin aspart U-100 pen 8 Units  8 Units Subcutaneous TIDWM Grisel Montana NP   8 Units at 08/31/22 0622    insulin detemir U-100 pen 24 Units  24 Units Subcutaneous Daily Grisel Montana NP   24 Units at 08/31/22 0809    labetalol 20 mg/4 mL (5 mg/mL) IV syring  10 mg Intravenous Q6H PRN Cherelle Proctor NP   10 mg at 08/27/22 1421    levETIRAcetam tablet 1,000 mg  1,000 mg Oral BID Ryan Cage MD        magnesium oxide tablet 400 mg  400 mg Oral BID Marlene Robison PA-C   400 mg at 08/31/22 0800    magnesium oxide tablet 800 mg  800 mg Oral PRN Gwen Lange PA-C        magnesium oxide tablet 800 mg  800 mg Oral PRN Gwen Lange PA-C        magnesium sulfate 2g in water 50mL IVPB (premix)  2 g Intravenous Once Justino Montgomery NP 25 mL/hr at 08/31/22 1100 Rate Verify at 08/31/22 1100    melatonin tablet 6 mg  6 mg Oral Nightly PRN Hipolito Darling MD   6 mg at 08/26/22 2129    mupirocin 2 % ointment   Nasal BID Timothy Diaz MD   Given at 08/31/22 0801    oxyCODONE immediate release tablet 5 mg  5 mg Oral Q4H PRN Cherelle Proctor NP   5 mg at 08/30/22 2213    oxyCODONE immediate release tablet Tab 10 mg  10 mg Oral Q4H PRN Cherelle Proctor NP   10 mg at 08/29/22 0638    potassium bicarbonate disintegrating tablet 35 mEq  35 mEq Oral PRN Gwen Lange PA-C        potassium bicarbonate disintegrating tablet 50 mEq  50 mEq Oral PRN Gwen Lange PA-C   50 mEq at 08/31/22 0802    potassium bicarbonate disintegrating tablet 60 mEq  60 mEq Oral PRN Gwen Lange PA-C        potassium, sodium phosphates 280-160-250 mg packet 2 packet  2 packet Oral PRN Gwen Lange PA-C   2 packet at 08/28/22 0513    potassium, sodium phosphates 280-160-250 mg packet 2 packet  2 packet Oral PRN Gwen Lange PA-C        potassium, sodium phosphates 280-160-250 mg packet 2 packet  2 packet Oral PRN Gwen  RADHA Lange PA-C        senna-docusate 8.6-50 mg per tablet 1 tablet  1 tablet Oral Daily Diamond Loera NP        vancomycin - pharmacy to dose   Intravenous pharmacy to manage frequency Hipolito Darling MD        vancomycin 1.25 g in dextrose 5% 250 mL IVPB (ready to mix)  1,250 mg Intravenous Q24H Ryan Cage MD   Stopped at 08/30/22 1535     Continuous Infusions:    Review of Systems   Unable to perform ROS: Mental status change   Objective:     Vital Signs (Most Recent):  Temp: 98.8 °F (37.1 °C) (08/31/22 1105)  Pulse: 75 (08/31/22 1105)  Resp: 17 (08/31/22 1105)  BP: (!) 143/80 (08/31/22 1105)  SpO2: 100 % (08/31/22 1105)   Vital Signs (24h Range):  Temp:  [98.4 °F (36.9 °C)-98.8 °F (37.1 °C)] 98.8 °F (37.1 °C)  Pulse:  [70-85] 75  Resp:  [16-27] 17  SpO2:  [95 %-100 %] 100 %  BP: (109-150)/(60-85) 143/80     Weight: 88.5 kg (195 lb)  Body mass index is 34.54 kg/m².    Physical Exam  Constitutional:       General: She is not in acute distress.     Appearance: She is not diaphoretic.   HENT:      Head: Normocephalic.      Comments: EEG in place  Eyes:      General: No scleral icterus.        Right eye: No discharge.         Left eye: No discharge.      Conjunctiva/sclera: Conjunctivae normal.      Pupils: Pupils are equal, round, and reactive to light.   Cardiovascular:      Rate and Rhythm: Normal rate.   Pulmonary:      Effort: Pulmonary effort is normal. No respiratory distress.   Abdominal:      General: There is no distension.      Palpations: Abdomen is soft.      Tenderness: There is no abdominal tenderness.   Musculoskeletal:         General: No deformity or signs of injury.   Skin:     General: Skin is warm and dry.   Psychiatric:         Mood and Affect: Affect is labile and tearful.         Behavior: Behavior is cooperative.         Cognition and Memory: Cognition is impaired.       NEUROLOGICAL EXAMINATION:     MENTAL STATUS   Disoriented to city.   Oriented to year.   Level of  consciousness: alert    CRANIAL NERVES     CN III, IV, VI   Pupils are equal, round, and reactive to light.       Awake  JUANITA OU   Corneal intact OU   + spontaneous eye opening   Face symmetric   Tongue midline   RUE ataxia  Spontaneous movement of extremities   Follows commands    Exam findings to suggest seizures:  Myoclonus - no   eye twitching - no   Nystagmus - no   gaze deviation - no   waxy rigidity - no      Significant Labs: All pertinent lab results from the past 24 hours have been reviewed.    Significant Studies: I have reviewed all pertinent imaging results/findings within the past 24 hours.

## 2022-08-31 NOTE — SUBJECTIVE & OBJECTIVE
Interval History: 8/31: NAEON. AFVSS. Exam stable. Pain controlled. Pending PICC.     Medications:  Continuous Infusions:  Scheduled Meds:   amitriptyline  75 mg Oral QHS    amLODIPine  10 mg Oral Daily    atorvastatin  40 mg Oral Daily    carvediloL  37.5 mg Oral BID    ceftAZIDime (FORTAZ) IVPB  2 g Intravenous Q8H    EScitalopram oxalate  10 mg Oral Daily    heparin (porcine)  5,000 Units Subcutaneous Q8H    insulin aspart U-100  8 Units Subcutaneous TIDWM    insulin detemir U-100  24 Units Subcutaneous Daily    levETIRAcetam  1,000 mg Oral BID    magnesium oxide  400 mg Oral BID    mupirocin   Nasal BID    senna-docusate 8.6-50 mg  1 tablet Oral Daily    vancomycin (VANCOCIN) IVPB  1,250 mg Intravenous Q24H     PRN Meds:acetaminophen, albuterol, dextrose 10%, dextrose 10%, glucagon (human recombinant), glucose, glucose, hydrALAZINE, insulin aspart U-100, labetalol, magnesium oxide, magnesium oxide, melatonin, oxyCODONE, oxyCODONE, potassium bicarbonate, potassium bicarbonate, potassium bicarbonate, potassium, sodium phosphates, potassium, sodium phosphates, potassium, sodium phosphates, Pharmacy to dose Vancomycin consult **AND** vancomycin - pharmacy to dose     Review of Systems  Objective:     Weight: 88.5 kg (195 lb)  Body mass index is 34.54 kg/m².  Vital Signs (Most Recent):  Temp: 98.8 °F (37.1 °C) (08/31/22 1105)  Pulse: 74 (08/31/22 1200)  Resp: 20 (08/31/22 1200)  BP: (!) 140/82 (08/31/22 1200)  SpO2: 100 % (08/31/22 1200)   Vital Signs (24h Range):  Temp:  [98.4 °F (36.9 °C)-98.8 °F (37.1 °C)] 98.8 °F (37.1 °C)  Pulse:  [70-85] 74  Resp:  [16-27] 20  SpO2:  [95 %-100 %] 100 %  BP: (109-150)/(60-85) 140/82     Date 08/31/22 0700 - 09/01/22 0659   Shift 2352-5545 9961-9025 5982-6498 24 Hour Total   INTAKE   P.O. 750   750   I.V.(mL/kg) 65.3(0.7)   65.3(0.7)   IV Piggyback 98.1   98.1   Shift Total(mL/kg) 913.4(10.3)   913.4(10.3)   OUTPUT   Urine(mL/kg/hr) 800   800   Drains 0   0   Shift Total(mL/kg)  800(9)   800(9)   Weight (kg) 88.4 88.4 88.4 88.4                          Closed/Suction Drain 08/27/22 0941 Left Scalp Other (Comment) (Active)   Site Description Unable to view 08/28/22 1501   Dressing Type Gauze 08/28/22 1501   Dressing Status Clean;Dry;Intact 08/28/22 1501   Dressing Intervention Integrity maintained 08/28/22 1501   Drainage Serosanguineous 08/28/22 1501   Status To bulb suction 08/28/22 1501   Output (mL) 0 mL 08/28/22 0502       Female External Urinary Catheter 08/27/22 1045 (Active)   Skin no redness;no breakdown 08/28/22 1501   Tolerance no signs/symptoms of discomfort 08/28/22 1501   Suction Continuous suction at 40 mmHg 08/28/22 0701   Date of last wick change 08/27/22 08/27/22 1105   Time of last wick change 1045 08/27/22 1105   Output (mL) 500 mL 08/28/22 0502       Physical Exam    Neurosurgery Physical Exam    GENERAL: resting comfortably  HEENT: NC, PERRL, mucous membranes moist  NECK: supple, trachea midline  CV: normal capillary refill  PULM: aerating well, symmetric expansion, no distress  ABD: soft, NT, ND  EXT: no c/c/e    NEURO:    AAO x 3  Mild expressive aphasia  CN II-XII grossly intact  Fc x 4 antigravity  SILT    No drift or dysmetria    Incision cdi    HV in place      Significant Labs:  Recent Labs   Lab 08/30/22  0255 08/31/22  0254    113*    141   K 3.9 3.7    106   CO2 21* 21*   BUN 13 12   CREATININE 1.3 1.1   CALCIUM 9.8 9.2   MG 2.0 1.9       Recent Labs   Lab 08/30/22  0255 08/31/22  0254   WBC 7.34 5.94   HGB 10.7* 10.7*   HCT 31.9* 31.2*    233       No results for input(s): LABPT, INR, APTT in the last 48 hours.  Microbiology Results (last 7 days)       Procedure Component Value Units Date/Time    Fungus culture [[id number]] Collected: 08/27/22 0909    Order Status: Completed Specimen: Wound from Head Updated: 08/31/22 1055     Fungus (Mycology) Culture Culture in progress    Narrative:      Superficial head culture, aerobic,  anaerobic, fungus, AFB,  gram    Fungus culture [[id number]] Collected: 08/27/22 0912    Order Status: Completed Specimen: Wound from Head Updated: 08/31/22 1055     Fungus (Mycology) Culture Culture in progress    Narrative:      Epidural culture, aerobic, anaerobic, gram, AFB, gram    Fungus culture [[id number]] Collected: 08/22/22 1550    Order Status: Completed Specimen: Incision site from Head Updated: 08/31/22 1055     Fungus (Mycology) Culture Culture in progress    Culture, Anaerobic [[id number]] Collected: 08/27/22 0909    Order Status: Completed Specimen: Wound from Head Updated: 08/31/22 0736     Anaerobic Culture Culture in progress    Narrative:      Superficial head culture, aerobic, anaerobic, fungus, AFB,  gram    Culture, Anaerobic [[id number]] Collected: 08/27/22 0912    Order Status: Completed Specimen: Wound from Head Updated: 08/31/22 0734     Anaerobic Culture Culture in progress    Narrative:      Epidural culture, aerobic, anaerobic, gram, AFB, gram    Aerobic culture [[id number]] Collected: 08/27/22 0909    Order Status: Completed Specimen: Wound from Head Updated: 08/30/22 0953     Aerobic Bacterial Culture No growth    Narrative:      Superficial head culture, aerobic, anaerobic, fungus, AFB,  gram    Aerobic culture [[id number]] Collected: 08/27/22 0912    Order Status: Completed Specimen: Wound from Head Updated: 08/30/22 0953     Aerobic Bacterial Culture No growth    Narrative:      Epidural culture, aerobic, anaerobic, gram, AFB, gram    Culture, Anaerobic [[id number]] Collected: 08/22/22 1550    Order Status: Completed Specimen: Incision site from Head Updated: 08/30/22 0654     Anaerobic Culture No anaerobes isolated    AFB Culture & Smear [[id number]] Collected: 08/27/22 0909    Order Status: Completed Specimen: Wound from Head Updated: 08/29/22 1521     AFB Culture & Smear Culture in progress     AFB CULTURE STAIN No acid fast bacilli seen.    Narrative:      Superficial head culture,  aerobic, anaerobic, fungus, AFB,  gram    AFB Culture & Smear [[id number]] Collected: 08/27/22 0912    Order Status: Completed Specimen: Wound from Head Updated: 08/29/22 1521     AFB Culture & Smear Culture in progress     AFB CULTURE STAIN No acid fast bacilli seen.    Narrative:      Epidural culture, aerobic, anaerobic, gram, AFB, gram    Gram stain [[id number]] Collected: 08/27/22 0912    Order Status: Completed Specimen: Wound from Head Updated: 08/27/22 1044     Gram Stain Result No WBC's      No organisms seen    Narrative:      Epidural culture, aerobic, anaerobic, gram, AFB, gram    Gram stain [[id number]] Collected: 08/27/22 0909    Order Status: Completed Specimen: Wound from Head Updated: 08/27/22 1043     Gram Stain Result Rare WBC's      No organisms seen    Narrative:      Superficial head culture, aerobic, anaerobic, fungus, AFB,  gram    Blood culture [[id number]] Collected: 08/21/22 2325    Order Status: Completed Specimen: Blood from Peripheral, Antecubital, Right Updated: 08/27/22 0612     Blood Culture, Routine No growth after 5 days.    Aerobic culture [[id number]] Collected: 08/22/22 1550    Order Status: Completed Specimen: Incision site from Head Updated: 08/26/22 1029     Aerobic Bacterial Culture No growth    Gram stain [[id number]] Collected: 08/22/22 1550    Order Status: Completed Specimen: Incision site from Head Updated: 08/25/22 1447     Gram Stain Result No organisms seen      Rare WBC's    Gram stain [[id number]]     Order Status: Completed Specimen: Incision site from Head           All pertinent labs from the last 24 hours have been reviewed.    Significant Diagnostics:  I have reviewed all pertinent imaging results/findings within the past 24 hours.  CTA Head    Result Date: 8/30/2022  Stable appearance of the brain and extra-axial/extracranial fluid collections. No significant stenosis at the carotid bifurcations by NASCET criteria.  The vertebral arteries are patent. Stable  appearance of the intracranial vasculature.  No recurrent aneurysm status post aneurysm clipping.  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  No new major branch stenosis/occlusion at the [id number]. Nonspecific ground-glass opacities lung apices. Electronically signed by: Jose Perez Date:    08/30/2022 Time:    13:19    CT Brain Perfusion inc Post Processing    Result Date: 8/31/2022  No perfusion abnormality to suggest cerebral ischemia. Left cerebral hemisphere sulcal effacement with subtle vasogenic edema within the left frontal and left temporal lobes.  While there may be a component of seizure related signal changes, repeat MRI of the brain with contrast is recommended to evaluate for cerebritis/encephalitis or other acute intracranial abnormalities. 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 Extra-axial fluid collection underlying the craniotomy is similar to/minimally increased prior exam.  Slight interval increase in size extracranial soft tissue fluid collection.  This collection could also be further evaluated with MRI of the brain in conjunction with above findings. Findings were discussed with Justino Montgomery NP at 11:21 on 08/31/2022. This report was flagged in Epic as abnormal. Electronically signed by resident: Bianac Davis Date:    08/31/2022 Time:    08:38 Electronically signed by: Lul Doe MD Date:    08/31/2022 Time:    11:44

## 2022-08-31 NOTE — ASSESSMENT & PLAN NOTE
Suspect patient had focal seizures on presentation 2/2 cranial fluid collection  - No electrographic seizures on EEG   - Recommend to continue Levetiracetam 1g BID on discharge  - Will arrange for outpatient follow up in Epilepsy clinic

## 2022-08-31 NOTE — SUBJECTIVE & OBJECTIVE
Objective:     Vitals:  Temp: 98.8 °F (37.1 °C)  Pulse: 74  Rhythm: normal sinus rhythm  BP: (!) 140/82  MAP (mmHg): 106  Resp: 20  SpO2: 100 %  O2 Device (Oxygen Therapy): room air    Temp  Min: 98.4 °F (36.9 °C)  Max: 98.8 °F (37.1 °C)  Pulse  Min: 70  Max: 85  BP  Min: 109/67  Max: 150/82  MAP (mmHg)  Min: 81  Max: 110  Resp  Min: 16  Max: 27  SpO2  Min: 95 %  Max: 100 %    08/30 0701 - 08/31 0700  In: 960 [P.O.:610]  Out: 1850 [Urine:1850]   Unmeasured Output  Urine Occurrence: 1  Stool Occurrence: 0       Physical Exam  Vitals and nursing note reviewed.   Constitutional:       Appearance: She is ill-appearing.   HENT:      Nose: Nose normal.      Mouth/Throat:      Mouth: Mucous membranes are moist.      Pharynx: Oropharynx is clear.   Cardiovascular:      Rate and Rhythm: Normal rate and regular rhythm.      Pulses: Normal pulses.      Heart sounds: Normal heart sounds.   Pulmonary:      Effort: Pulmonary effort is normal.      Breath sounds: Normal breath sounds.   Abdominal:      General: Bowel sounds are normal.      Palpations: Abdomen is soft.   Musculoskeletal:         General: Normal range of motion.      Cervical back: Normal range of motion.   Skin:     General: Skin is warm and dry.      Capillary Refill: Capillary refill takes less than 2 seconds.   Neurological:      Comments: E 4 V 5 M 6  -Alert. Oriented to person,   - Expressive asphasia, improved today. Repetition now intact and speech more fluent  - Follows commands.   -Strength 5 and symmetric in arms, legs  -Sensation intact to touch in arms, legs     Medications:  Continuous Scheduledamitriptyline, 75 mg, QHS  amLODIPine, 10 mg, Daily  atorvastatin, 40 mg, Daily  carvediloL, 37.5 mg, BID  ceftAZIDime (FORTAZ) IVPB, 2 g, Q8H  EScitalopram oxalate, 10 mg, Daily  heparin (porcine), 5,000 Units, Q8H  insulin aspart U-100, 8 Units, TIDWM  insulin detemir U-100, 24 Units, Daily  levETIRAcetam, 1,000 mg, BID  magnesium oxide, 400 mg,  BID  mupirocin, , BID  senna-docusate 8.6-50 mg, 1 tablet, Daily  vancomycin (VANCOCIN) IVPB, 1,250 mg, Q24H    PRNacetaminophen, 650 mg, Q6H PRN  albuterol, 2 puff, Q20 Min PRN  dextrose 10%, 12.5 g, PRN  dextrose 10%, 25 g, PRN  glucagon (human recombinant), 1 mg, PRN  glucose, 16 g, PRN  glucose, 24 g, PRN  hydrALAZINE, 10 mg, Q6H PRN  insulin aspart U-100, 1-10 Units, QID (AC + HS) PRN  labetalol, 10 mg, Q6H PRN  magnesium oxide, 800 mg, PRN  magnesium oxide, 800 mg, PRN  melatonin, 6 mg, Nightly PRN  oxyCODONE, 5 mg, Q4H PRN  oxyCODONE, 10 mg, Q4H PRN  potassium bicarbonate, 35 mEq, PRN  potassium bicarbonate, 50 mEq, PRN  potassium bicarbonate, 60 mEq, PRN  potassium, sodium phosphates, 2 packet, PRN  potassium, sodium phosphates, 2 packet, PRN  potassium, sodium phosphates, 2 packet, PRN  vancomycin - pharmacy to dose, , pharmacy to manage frequency      Today I personally reviewed pertinent medications, lines/drains/airways, imaging, cardiology results, laboratory results, microbiology results,     Diet  Diet diabetic [facility]; 2000 Calorie; Isolation Tray - Regular [place]  Diet diabetic [facility]; 2000 Calorie; Isolation Tray - Regular [place]

## 2022-08-31 NOTE — ASSESSMENT & PLAN NOTE
Hx of  - On home Amlodipine 10 mg daily and Carvedilol 37.5 mg BID  - PRNs ordered- has not needed  - Management per NCC

## 2022-08-31 NOTE — SUBJECTIVE & OBJECTIVE
Interval History: Afebrile.    Review of Systems   Constitutional:  Negative for chills, diaphoresis and fever.   HENT:  Negative for facial swelling.    Gastrointestinal:  Negative for abdominal pain and vomiting.   Skin:  Positive for wound.   Neurological:  Positive for weakness and headaches.   Objective:     Vital Signs (Most Recent):  Temp: 98.3 °F (36.8 °C) (08/31/22 1505)  Pulse: 76 (08/31/22 1700)  Resp: 19 (08/31/22 1700)  BP: (!) 135/93 (08/31/22 1700)  SpO2: 100 % (08/31/22 1700)   Vital Signs (24h Range):  Temp:  [98.3 °F (36.8 °C)-98.8 °F (37.1 °C)] 98.3 °F (36.8 °C)  Pulse:  [70-85] 76  Resp:  [16-27] 19  SpO2:  [95 %-100 %] 100 %  BP: (116-150)/(60-95) 135/93     Weight: 88.5 kg (195 lb)  Body mass index is 34.54 kg/m².    Estimated Creatinine Clearance: 67.4 mL/min (based on SCr of 1.1 mg/dL).    Physical Exam  Vitals and nursing note reviewed.   Constitutional:       General: She is not in acute distress.     Appearance: Normal appearance. She is well-developed and normal weight. She is not ill-appearing, toxic-appearing or diaphoretic.       HENT:      Head:        Nose: Nose normal. No congestion.      Mouth/Throat:      Dentition: Does not have dentures. No dental abscesses.   Eyes:      General: No scleral icterus.     Conjunctiva/sclera: Conjunctivae normal.   Cardiovascular:      Rate and Rhythm: Normal rate and regular rhythm.   Pulmonary:      Effort: Pulmonary effort is normal. No respiratory distress.      Breath sounds: Normal breath sounds. No wheezing or rales.   Abdominal:      General: Bowel sounds are normal. There is no distension.      Palpations: Abdomen is soft.      Tenderness: There is no abdominal tenderness.   Musculoskeletal:         General: Normal range of motion.      Cervical back: Normal range of motion. No rigidity.      Right lower leg: No edema.      Left lower leg: No edema.   Skin:     General: Skin is warm and dry.      Findings: No erythema or rash.    Neurological:      Mental Status: She is alert. She is disoriented.      Motor: No weakness.      Comments: Perseverating, mild dysarthria/dysphasia, FC, facial symmetric, tongue in midline.  No weakness noted    Psychiatric:         Mood and Affect: Mood normal.         Behavior: Behavior normal.         Thought Content: Thought content normal.         Judgment: Judgment normal.       Significant Labs: All pertinent labs within the past 24 hours have been reviewed.    Significant Imaging: I have reviewed all pertinent imaging results/findings within the past 24 hours.

## 2022-09-01 LAB
ANION GAP SERPL CALC-SCNC: 11 MMOL/L (ref 8–16)
BASOPHILS # BLD AUTO: 0.06 K/UL (ref 0–0.2)
BASOPHILS NFR BLD: 1 % (ref 0–1.9)
BUN SERPL-MCNC: 9 MG/DL (ref 6–20)
CALCIUM SERPL-MCNC: 9.4 MG/DL (ref 8.7–10.5)
CHLORIDE SERPL-SCNC: 103 MMOL/L (ref 95–110)
CO2 SERPL-SCNC: 25 MMOL/L (ref 23–29)
CREAT SERPL-MCNC: 0.9 MG/DL (ref 0.5–1.4)
DIFFERENTIAL METHOD: ABNORMAL
EOSINOPHIL # BLD AUTO: 0.4 K/UL (ref 0–0.5)
EOSINOPHIL NFR BLD: 6.6 % (ref 0–8)
ERYTHROCYTE [DISTWIDTH] IN BLOOD BY AUTOMATED COUNT: 13.9 % (ref 11.5–14.5)
EST. GFR  (NO RACE VARIABLE): >60 ML/MIN/1.73 M^2
GLUCOSE SERPL-MCNC: 103 MG/DL (ref 70–110)
HCT VFR BLD AUTO: 29.5 % (ref 37–48.5)
HGB BLD-MCNC: 10.1 G/DL (ref 12–16)
IMM GRANULOCYTES # BLD AUTO: 0.05 K/UL (ref 0–0.04)
IMM GRANULOCYTES NFR BLD AUTO: 0.9 % (ref 0–0.5)
LYMPHOCYTES # BLD AUTO: 1.3 K/UL (ref 1–4.8)
LYMPHOCYTES NFR BLD: 23.1 % (ref 18–48)
MAGNESIUM SERPL-MCNC: 2.1 MG/DL (ref 1.6–2.6)
MCH RBC QN AUTO: 28.8 PG (ref 27–31)
MCHC RBC AUTO-ENTMCNC: 34.2 G/DL (ref 32–36)
MCV RBC AUTO: 84 FL (ref 82–98)
MONOCYTES # BLD AUTO: 0.8 K/UL (ref 0.3–1)
MONOCYTES NFR BLD: 14.6 % (ref 4–15)
NEUTROPHILS # BLD AUTO: 3.1 K/UL (ref 1.8–7.7)
NEUTROPHILS NFR BLD: 53.8 % (ref 38–73)
NRBC BLD-RTO: 0 /100 WBC
PHOSPHATE SERPL-MCNC: 2.2 MG/DL (ref 2.7–4.5)
PLATELET # BLD AUTO: 255 K/UL (ref 150–450)
PMV BLD AUTO: 10.5 FL (ref 9.2–12.9)
POCT GLUCOSE: 118 MG/DL (ref 70–110)
POCT GLUCOSE: 128 MG/DL (ref 70–110)
POCT GLUCOSE: 199 MG/DL (ref 70–110)
POTASSIUM SERPL-SCNC: 3.2 MMOL/L (ref 3.5–5.1)
RBC # BLD AUTO: 3.51 M/UL (ref 4–5.4)
SODIUM SERPL-SCNC: 139 MMOL/L (ref 136–145)
VANCOMYCIN TROUGH SERPL-MCNC: 12.6 UG/ML (ref 10–22)
WBC # BLD AUTO: 5.75 K/UL (ref 3.9–12.7)

## 2022-09-01 PROCEDURE — 85025 COMPLETE CBC W/AUTO DIFF WBC: CPT | Performed by: STUDENT IN AN ORGANIZED HEALTH CARE EDUCATION/TRAINING PROGRAM

## 2022-09-01 PROCEDURE — 76937 US GUIDE VASCULAR ACCESS: CPT

## 2022-09-01 PROCEDURE — 80202 ASSAY OF VANCOMYCIN: CPT | Performed by: PSYCHIATRY & NEUROLOGY

## 2022-09-01 PROCEDURE — 63600175 PHARM REV CODE 636 W HCPCS: Performed by: NURSE PRACTITIONER

## 2022-09-01 PROCEDURE — 25000003 PHARM REV CODE 250: Performed by: STUDENT IN AN ORGANIZED HEALTH CARE EDUCATION/TRAINING PROGRAM

## 2022-09-01 PROCEDURE — 97116 GAIT TRAINING THERAPY: CPT

## 2022-09-01 PROCEDURE — 25000003 PHARM REV CODE 250: Performed by: PSYCHIATRY & NEUROLOGY

## 2022-09-01 PROCEDURE — 25000003 PHARM REV CODE 250: Performed by: PHYSICIAN ASSISTANT

## 2022-09-01 PROCEDURE — 25000003 PHARM REV CODE 250: Performed by: INTERNAL MEDICINE

## 2022-09-01 PROCEDURE — 63600175 PHARM REV CODE 636 W HCPCS: Performed by: INTERNAL MEDICINE

## 2022-09-01 PROCEDURE — 94761 N-INVAS EAR/PLS OXIMETRY MLT: CPT

## 2022-09-01 PROCEDURE — A4216 STERILE WATER/SALINE, 10 ML: HCPCS | Performed by: INTERNAL MEDICINE

## 2022-09-01 PROCEDURE — C1751 CATH, INF, PER/CENT/MIDLINE: HCPCS

## 2022-09-01 PROCEDURE — 80048 BASIC METABOLIC PNL TOTAL CA: CPT | Performed by: STUDENT IN AN ORGANIZED HEALTH CARE EDUCATION/TRAINING PROGRAM

## 2022-09-01 PROCEDURE — 83735 ASSAY OF MAGNESIUM: CPT | Performed by: PHYSICIAN ASSISTANT

## 2022-09-01 PROCEDURE — 63600175 PHARM REV CODE 636 W HCPCS: Performed by: PSYCHIATRY & NEUROLOGY

## 2022-09-01 PROCEDURE — 11000001 HC ACUTE MED/SURG PRIVATE ROOM

## 2022-09-01 PROCEDURE — 36573 INSJ PICC RS&I 5 YR+: CPT

## 2022-09-01 PROCEDURE — 25000003 PHARM REV CODE 250: Performed by: NURSE PRACTITIONER

## 2022-09-01 PROCEDURE — 99024 POSTOP FOLLOW-UP VISIT: CPT | Mod: ,,, | Performed by: NEUROLOGICAL SURGERY

## 2022-09-01 PROCEDURE — 99024 PR POST-OP FOLLOW-UP VISIT: ICD-10-PCS | Mod: ,,, | Performed by: NEUROLOGICAL SURGERY

## 2022-09-01 PROCEDURE — 92507 TX SP LANG VOICE COMM INDIV: CPT

## 2022-09-01 PROCEDURE — 97530 THERAPEUTIC ACTIVITIES: CPT

## 2022-09-01 PROCEDURE — 84100 ASSAY OF PHOSPHORUS: CPT | Performed by: PHYSICIAN ASSISTANT

## 2022-09-01 RX ORDER — SODIUM CHLORIDE 0.9 % (FLUSH) 0.9 %
10 SYRINGE (ML) INJECTION EVERY 6 HOURS
Status: DISCONTINUED | OUTPATIENT
Start: 2022-09-01 | End: 2022-09-02 | Stop reason: HOSPADM

## 2022-09-01 RX ORDER — SODIUM CHLORIDE 0.9 % (FLUSH) 0.9 %
10 SYRINGE (ML) INJECTION
Status: DISCONTINUED | OUTPATIENT
Start: 2022-09-01 | End: 2022-09-02 | Stop reason: HOSPADM

## 2022-09-01 RX ADMIN — ACETAMINOPHEN 650 MG: 325 TABLET ORAL at 03:09

## 2022-09-01 RX ADMIN — CEFTAZIDIME 2 G: 2 INJECTION, POWDER, FOR SOLUTION INTRAVENOUS at 07:09

## 2022-09-01 RX ADMIN — LEVETIRACETAM 1000 MG: 500 TABLET, FILM COATED ORAL at 08:09

## 2022-09-01 RX ADMIN — Medication 10 ML: at 12:09

## 2022-09-01 RX ADMIN — AMLODIPINE BESYLATE 10 MG: 10 TABLET ORAL at 08:09

## 2022-09-01 RX ADMIN — HEPARIN SODIUM 5000 UNITS: 5000 INJECTION INTRAVENOUS; SUBCUTANEOUS at 02:09

## 2022-09-01 RX ADMIN — CEFTAZIDIME 2 G: 2 INJECTION, POWDER, FOR SOLUTION INTRAVENOUS at 02:09

## 2022-09-01 RX ADMIN — ATORVASTATIN CALCIUM 40 MG: 40 TABLET, FILM COATED ORAL at 08:09

## 2022-09-01 RX ADMIN — INSULIN ASPART 8 UNITS: 100 INJECTION, SOLUTION INTRAVENOUS; SUBCUTANEOUS at 07:09

## 2022-09-01 RX ADMIN — ESCITALOPRAM OXALATE 10 MG: 10 TABLET ORAL at 08:09

## 2022-09-01 RX ADMIN — POTASSIUM BICARBONATE 35 MEQ: 391 TABLET, EFFERVESCENT ORAL at 04:09

## 2022-09-01 RX ADMIN — OXYCODONE 5 MG: 5 TABLET ORAL at 08:09

## 2022-09-01 RX ADMIN — CEFTAZIDIME 2 G: 2 INJECTION, POWDER, FOR SOLUTION INTRAVENOUS at 11:09

## 2022-09-01 RX ADMIN — CARVEDILOL 37.5 MG: 25 TABLET, FILM COATED ORAL at 08:09

## 2022-09-01 RX ADMIN — Medication 400 MG: at 08:09

## 2022-09-01 RX ADMIN — MUPIROCIN: 20 OINTMENT TOPICAL at 08:09

## 2022-09-01 RX ADMIN — VANCOMYCIN HYDROCHLORIDE 1250 MG: 1.25 INJECTION, POWDER, LYOPHILIZED, FOR SOLUTION INTRAVENOUS at 01:09

## 2022-09-01 RX ADMIN — OXYCODONE HYDROCHLORIDE 10 MG: 10 TABLET ORAL at 09:09

## 2022-09-01 RX ADMIN — INSULIN ASPART 8 UNITS: 100 INJECTION, SOLUTION INTRAVENOUS; SUBCUTANEOUS at 12:09

## 2022-09-01 RX ADMIN — OXYCODONE 5 MG: 5 TABLET ORAL at 03:09

## 2022-09-01 RX ADMIN — HEPARIN SODIUM 5000 UNITS: 5000 INJECTION INTRAVENOUS; SUBCUTANEOUS at 09:09

## 2022-09-01 RX ADMIN — POTASSIUM BICARBONATE 35 MEQ: 391 TABLET, EFFERVESCENT ORAL at 08:09

## 2022-09-01 RX ADMIN — HEPARIN SODIUM 5000 UNITS: 5000 INJECTION INTRAVENOUS; SUBCUTANEOUS at 06:09

## 2022-09-01 RX ADMIN — INSULIN ASPART 2 UNITS: 100 INJECTION, SOLUTION INTRAVENOUS; SUBCUTANEOUS at 09:09

## 2022-09-01 RX ADMIN — AMITRIPTYLINE HYDROCHLORIDE 75 MG: 50 TABLET, FILM COATED ORAL at 08:09

## 2022-09-01 NOTE — PROGRESS NOTES
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 Diaz who presented with sudden onset of symptoms concerning for focal seizure, aphasia and RSW. Each episode resolved on its own. NSGY was consulted and she was taken for a wound washout and evacuation of fluid collection. Will admit to NCC post op.      Hospital Course: 8/28/22: step down to NSGY  8/27/22: EEG pending  8/30/22 : remains with expressive asphasia, CTA/CTP ordered  8/31/22: Aphasia improved somewhat this morning, EEG remains negative for seizures and to be disconnected.  CTA/P showed no new ischemia but some concern per radiology for vasogenic edema along left cerebrum, concerning for possible seizure changes vs cerebritis - MRI re-ordered.   9/1/22: NAEON; plan for PICC today for long term Abx per ID appreciate recs; boarding for NSGY     Objective:      Vitals:  Vitals:    09/01/22 1201 09/01/22 1254 09/01/22 1301 09/01/22 1401   BP: 129/72  135/73 124/74   BP Location:       Patient Position:       Pulse: 74 75 75 77   Resp: 20 (!) 39 20 20   Temp:       TempSrc:       SpO2: 99% (!) 93% (!) 94% 95%   Weight:       Height:            Physical Exam  Vitals and nursing note reviewed.   Constitutional:       Appearance: She is ill-appearing.   HENT:      Nose: Nose normal.      Mouth/Throat:      Mouth: Mucous membranes are moist.      Pharynx: Oropharynx is clear.   Cardiovascular:      Rate and Rhythm: Normal rate and regular rhythm.      Pulses: Normal pulses.      Heart sounds: Normal heart sounds.   Pulmonary:      Effort: Pulmonary effort is normal.      Breath sounds: Normal breath sounds.    Abdominal:      General: Bowel sounds are normal.      Palpations: Abdomen is soft.   Musculoskeletal:         General: Normal range of motion.      Cervical back: Normal range of motion.   Skin:     General: Skin is warm and dry.      Capillary Refill: Capillary refill takes less than 2 seconds.   Neurological:      Comments: E 4 V 5 M 6  -Alert. Oriented to person,   -Expressive asphasia, improved. Repetition now intact and speech more fluent  -Follows commands.   -Strength 5 and symmetric in arms, legs  -Sensation intact to touch in arms, legs      Medications:  Scheduled Meds:   amitriptyline  75 mg Oral QHS    amLODIPine  10 mg Oral Daily    atorvastatin  40 mg Oral Daily    carvediloL  37.5 mg Oral BID    ceftAZIDime (FORTAZ) IVPB  2 g Intravenous Q8H    EScitalopram oxalate  10 mg Oral Daily    heparin (porcine)  5,000 Units Subcutaneous Q8H    insulin aspart U-100  8 Units Subcutaneous TIDWM    insulin detemir U-100  24 Units Subcutaneous Daily    levETIRAcetam  1,000 mg Oral BID    magnesium oxide  400 mg Oral BID    senna-docusate 8.6-50 mg  1 tablet Oral Daily    sodium chloride 0.9%  10 mL Intravenous Q6H    vancomycin (VANCOCIN) IVPB  1,250 mg Intravenous Q24H     Continuous Infusions:  PRN Meds:.acetaminophen, albuterol, dextrose 10%, dextrose 10%, glucagon (human recombinant), glucose, glucose, hydrALAZINE, insulin aspart U-100, labetalol, magnesium oxide, magnesium oxide, melatonin, oxyCODONE, oxyCODONE, potassium bicarbonate, potassium bicarbonate, potassium bicarbonate, potassium, sodium phosphates, potassium, sodium phosphates, potassium, sodium phosphates, Flushing PICC Protocol **AND** sodium chloride 0.9% **AND** sodium chloride 0.9%, Pharmacy to dose Vancomycin consult **AND** vancomycin - pharmacy to dose        Today I personally reviewed pertinent medications, lines/drains/airways, imaging, cardiology results, laboratory results, microbiology results,      Diet  Diet diabetic [facility]; 2000 Calorie; Isolation Tray - Regular [place]  Diet diabetic [facility]; 2000 Calorie; Isolation Tray - Regular [place]        Assessment/Plan:      Neuro  Brain compression  Compression due to intracranial extra-axial fluid collection s/p washout, with some vasogenic edema concerning for possibility of cerebritis. MRI pending  - General precautions: HOB up, maintain normocarbia, normonatremia, etc.     Focal seizure  Reported focal seizure on admission.  On anti-seizure regimen with Keppra, cEEG run for >24 hours without further seizure events.       Remain on Keppra     Cardiac/Vascular  Mixed hyperlipidemia  Continue atorvastatin     Essential hypertension  SBP Goal < 160  Continue home BP meds     Endocrine  New onset type 2 diabetes mellitus  Continue scheduled insulin and SSI  Diabetic diet     Other  * Fluid collection at surgical site  S/p washout 8/27  CTH revealed increase fluid collection NSGY aware  EEG pending     8/31: CTA with brain perfusion with stable to minimally increased fluid collection intra-axially, fluctuating extra-axially with subgaleal drain in place.  Cultures negative to date and on broad-spectrum antibiotics, however re-obtaining MRI brain per radiology recommendations.  9/1: Abx per ID    The patient is being Prophylaxed for:  Venous Thromboembolism with: Mechanical or Chemical  Stress Ulcer with: None  Ventilator Pneumonia with: not applicable    Activity Orders            Diet diabetic [facility]; 2000 Calorie; Isolation Tray - Regular [place]: Diabetic starting at 08/27 1135          Full Code   Ventura Li MD  Neurocritical Care  [facility]

## 2022-09-01 NOTE — PROCEDURES
"Gina Jenkins is a 47 y.o. female patient.    Temp: 98.6 °F (37 °C) (09/01/22 0701)  Pulse: 77 (09/01/22 1001)  Resp: 20 (09/01/22 1001)  BP: 133/84 (09/01/22 1001)  SpO2: 95 % (09/01/22 1001)  Weight: 88.5 kg (195 lb) (08/28/22 0801)  Height: 5' 3" (160 cm) (08/28/22 0801)    PICC  Date/Time: 9/1/2022 11:01 AM  Performed by: Pedro Morris RN  Assisting provider: Sergio Meléndez RN  Post-operative diagnosis: PICC  Consent Done: Yes  Time out: Immediately prior to procedure a time out was called to verify the correct patient, procedure, equipment, support staff and site/side marked as required  Indications: med administration and vascular access  Anesthesia: local infiltration  Local anesthetic: lidocaine 1% without epinephrine  Anesthetic Total (mL): 3  Description of findings: PICC  Preparation: skin prepped with ChloraPrep  Skin prep agent dried: skin prep agent completely dried prior to procedure  Sterile barriers: all five maximum sterile barriers used - cap, mask, sterile gown, sterile gloves, and large sterile sheet  Hand hygiene: hand hygiene performed prior to central venous catheter insertion  Location details: right brachial  Catheter type: double lumen  Catheter size: 5 Fr  Catheter Length: 34cm    Ultrasound guidance: yes  Vessel Caliber: patent, compressibility normal  Needle advanced into vessel with real time Ultrasound guidance.  Guidewire confirmed in vessel.  Image recorded and saved.  Sterile sheath used.  Number of attempts: 1  Post-procedure: blood return through all ports and sterile dressing applied  Technical procedures used: 3CG  Specimens: No  Implants: No  Assessment: placement verified by x-ray  Complications: none        Name   9/1/2022    "

## 2022-09-01 NOTE — CONSULTS
Double lumen PICC to RIGHT BRACHIAL  vein.  34 cm in length, 33 cm arm circumference and 0 cm exposed.   Lot # [id number].

## 2022-09-01 NOTE — PLAN OF CARE
PT evaluation complete and appropriate goals established.    2022    Problem: Physical Therapy  Goal: Physical Therapy Goal  Description: Goals to be met by: 9/10/2022     Patient will increase functional independence with mobility by performin. Supine to sit with Stand-by Assistance  2. Sit to stand transfer with Stand-by Assistance  3. Bed to chair transfer with Stand-by Assistance using LRAD as needed  4. Gait  x 50 feet with Contact Guard Assistance using LRAD as needed. - met   4a. Gait x300 ft with Supervision using LRAD as needed  5. Ascend/descend 3 stair with left Handrails Minimal Assistance.  6. Lower extremity exercise program x15 reps, with supervision, in order to increase LE strength and (I) with functional mobility.      Outcome: Ongoing, Progressing

## 2022-09-01 NOTE — NURSING
Patient arrives to floor via wheelchair and RN.  Ambulated to couch w walker, assessment performed, intro to unit and room given, questions answered, dinner tray given, VSS, call light on patients lap, instructed to call for assistance before ambulating.

## 2022-09-01 NOTE — SUBJECTIVE & OBJECTIVE
Interval History: 9/1: resolution of aphasia overnight, neurointact this AM    Medications:  Continuous Infusions:  Scheduled Meds:   amitriptyline  75 mg Oral QHS    amLODIPine  10 mg Oral Daily    atorvastatin  40 mg Oral Daily    carvediloL  37.5 mg Oral BID    ceftAZIDime (FORTAZ) IVPB  2 g Intravenous Q8H    EScitalopram oxalate  10 mg Oral Daily    heparin (porcine)  5,000 Units Subcutaneous Q8H    insulin aspart U-100  8 Units Subcutaneous TIDWM    insulin detemir U-100  24 Units Subcutaneous Daily    levETIRAcetam  1,000 mg Oral BID    magnesium oxide  400 mg Oral BID    senna-docusate 8.6-50 mg  1 tablet Oral Daily    sodium chloride 0.9%  10 mL Intravenous Q6H    vancomycin (VANCOCIN) IVPB  1,250 mg Intravenous Q24H     PRN Meds:acetaminophen, albuterol, dextrose 10%, dextrose 10%, glucagon (human recombinant), glucose, glucose, hydrALAZINE, insulin aspart U-100, labetalol, magnesium oxide, magnesium oxide, melatonin, oxyCODONE, oxyCODONE, potassium bicarbonate, potassium bicarbonate, potassium bicarbonate, potassium, sodium phosphates, potassium, sodium phosphates, potassium, sodium phosphates, Flushing PICC Protocol **AND** sodium chloride 0.9% **AND** sodium chloride 0.9%, Pharmacy to dose Vancomycin consult **AND** vancomycin - pharmacy to dose     Review of Systems  Objective:     Weight: 88.5 kg (195 lb)  Body mass index is 34.54 kg/m².  Vital Signs (Most Recent):  Temp: 98.8 °F (37.1 °C) (09/01/22 1101)  Pulse: 74 (09/01/22 1201)  Resp: 20 (09/01/22 1201)  BP: 129/72 (09/01/22 1201)  SpO2: 99 % (09/01/22 1201)   Vital Signs (24h Range):  Temp:  [98.3 °F (36.8 °C)-98.8 °F (37.1 °C)] 98.8 °F (37.1 °C)  Pulse:  [73-84] 74  Resp:  [17-23] 20  SpO2:  [94 %-100 %] 99 %  BP: (125-163)/(70-95) 129/72     Date 09/01/22 0700 - 09/02/22 0659   Shift 4522-8460 7016-3134 4450-9055 24 Hour Total   INTAKE   P.O. 560   560   IV Piggyback 49.5   49.5   Shift Total(mL/kg) 609.5(6.9)   609.5(6.9)   OUTPUT   Shift  Total(mL/kg)       Weight (kg) 88.4 88.4 88.4 88.4                          Closed/Suction Drain 08/27/22 0941 Left Scalp Other (Comment) (Active)   Site Description Unable to view 08/28/22 1501   Dressing Type Gauze 08/28/22 1501   Dressing Status Clean;Dry;Intact 08/28/22 1501   Dressing Intervention Integrity maintained 08/28/22 1501   Drainage Serosanguineous 08/28/22 1501   Status To bulb suction 08/28/22 1501   Output (mL) 0 mL 08/28/22 0502       Female External Urinary Catheter 08/27/22 1045 (Active)   Skin no redness;no breakdown 08/28/22 1501   Tolerance no signs/symptoms of discomfort 08/28/22 1501   Suction Continuous suction at 40 mmHg 08/28/22 0701   Date of last wick change 08/27/22 08/27/22 1105   Time of last wick change 1045 08/27/22 1105   Output (mL) 500 mL 08/28/22 0502       Physical Exam    Neurosurgery Physical Exam    GENERAL: resting comfortably  HEENT: NC, PERRL, mucous membranes moist  NECK: supple, trachea midline  CV: normal capillary refill  PULM: aerating well, symmetric expansion, no distress  ABD: soft, NT, ND  EXT: no c/c/e    NEURO:    GCS 15  AAO x 3  Aphasia resolved  CN II-XII grossly intact  Fc x 4 antigravity  SILT    No drift or dysmetria    Incision cdi        Significant Labs:  Recent Labs   Lab 08/31/22  0254 09/01/22  0029   * 103    139   K 3.7 3.2*    103   CO2 21* 25   BUN 12 9   CREATININE 1.1 0.9   CALCIUM 9.2 9.4   MG 1.9 2.1       Recent Labs   Lab 08/31/22  0254 09/01/22  0029   WBC 5.94 5.75   HGB 10.7* 10.1*   HCT 31.2* 29.5*    255       No results for input(s): LABPT, INR, APTT in the last 48 hours.  Microbiology Results (last 7 days)       Procedure Component Value Units Date/Time    Fungus culture [[id number]] Collected: 08/27/22 0909    Order Status: Completed Specimen: Wound from Head Updated: 08/31/22 1055     Fungus (Mycology) Culture Culture in progress    Narrative:      Superficial head culture, aerobic, anaerobic, fungus,  AFB,  gram    Fungus culture [[id number]] Collected: 08/27/22 0912    Order Status: Completed Specimen: Wound from Head Updated: 08/31/22 1055     Fungus (Mycology) Culture Culture in progress    Narrative:      Epidural culture, aerobic, anaerobic, gram, AFB, gram    Fungus culture [[id number]] Collected: 08/22/22 1550    Order Status: Completed Specimen: Incision site from Head Updated: 08/31/22 1055     Fungus (Mycology) Culture Culture in progress    Culture, Anaerobic [[id number]] Collected: 08/27/22 0909    Order Status: Completed Specimen: Wound from Head Updated: 08/31/22 0736     Anaerobic Culture Culture in progress    Narrative:      Superficial head culture, aerobic, anaerobic, fungus, AFB,  gram    Culture, Anaerobic [[id number]] Collected: 08/27/22 0912    Order Status: Completed Specimen: Wound from Head Updated: 08/31/22 0734     Anaerobic Culture Culture in progress    Narrative:      Epidural culture, aerobic, anaerobic, gram, AFB, gram    Aerobic culture [[id number]] Collected: 08/27/22 0909    Order Status: Completed Specimen: Wound from Head Updated: 08/30/22 0953     Aerobic Bacterial Culture No growth    Narrative:      Superficial head culture, aerobic, anaerobic, fungus, AFB,  gram    Aerobic culture [[id number]] Collected: 08/27/22 0912    Order Status: Completed Specimen: Wound from Head Updated: 08/30/22 0953     Aerobic Bacterial Culture No growth    Narrative:      Epidural culture, aerobic, anaerobic, gram, AFB, gram    Culture, Anaerobic [[id number]] Collected: 08/22/22 1550    Order Status: Completed Specimen: Incision site from Head Updated: 08/30/22 0654     Anaerobic Culture No anaerobes isolated    AFB Culture & Smear [[id number]] Collected: 08/27/22 0909    Order Status: Completed Specimen: Wound from Head Updated: 08/29/22 1521     AFB Culture & Smear Culture in progress     AFB CULTURE STAIN No acid fast bacilli seen.    Narrative:      Superficial head culture, aerobic, anaerobic,  fungus, AFB,  gram    AFB Culture & Smear [[id number]] Collected: 08/27/22 0912    Order Status: Completed Specimen: Wound from Head Updated: 08/29/22 1521     AFB Culture & Smear Culture in progress     AFB CULTURE STAIN No acid fast bacilli seen.    Narrative:      Epidural culture, aerobic, anaerobic, gram, AFB, gram    Gram stain [[id number]] Collected: 08/27/22 0912    Order Status: Completed Specimen: Wound from Head Updated: 08/27/22 1044     Gram Stain Result No WBC's      No organisms seen    Narrative:      Epidural culture, aerobic, anaerobic, gram, AFB, gram    Gram stain [[id number]] Collected: 08/27/22 0909    Order Status: Completed Specimen: Wound from Head Updated: 08/27/22 1043     Gram Stain Result Rare WBC's      No organisms seen    Narrative:      Superficial head culture, aerobic, anaerobic, fungus, AFB,  gram    Blood culture [[id number]] Collected: 08/21/22 2325    Order Status: Completed Specimen: Blood from Peripheral, Antecubital, Right Updated: 08/27/22 0612     Blood Culture, Routine No growth after 5 days.    Aerobic culture [[id number]] Collected: 08/22/22 1550    Order Status: Completed Specimen: Incision site from Head Updated: 08/26/22 1029     Aerobic Bacterial Culture No growth    Gram stain [[id number]] Collected: 08/22/22 1550    Order Status: Completed Specimen: Incision site from Head Updated: 08/25/22 1447     Gram Stain Result No organisms seen      Rare WBC's          All pertinent labs from the last 24 hours have been reviewed.    Significant Diagnostics:  I have reviewed all pertinent imaging results/findings within the past 24 hours.  CTA Head    Result Date: 8/30/2022  Stable appearance of the brain and extra-axial/extracranial fluid collections. No significant stenosis at the carotid bifurcations by NASCET criteria.  The vertebral arteries are patent. Stable appearance of the intracranial vasculature.  No recurrent aneurysm status post aneurysm clipping.  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  No new major branch stenosis/occlusion at the [id number]. Nonspecific ground-glass opacities lung apices. Electronically signed by: Jose Perez Date:    08/30/2022 Time:    13:19    CT Brain Perfusion inc Post Processing    Result Date: 8/31/2022  No perfusion abnormality to suggest cerebral ischemia. Left cerebral hemisphere sulcal effacement with subtle vasogenic edema within the left frontal and left temporal lobes.  While there may be a component of seizure related signal changes, repeat MRI of the brain with contrast is recommended to evaluate for cerebritis/encephalitis or other acute intracranial abnormalities. 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 Extra-axial fluid collection underlying the craniotomy is similar to/minimally increased prior exam.  Slight interval increase in size extracranial soft tissue fluid collection.  This collection could also be further evaluated with MRI of the brain in conjunction with above findings. Findings were discussed with Justino Montgomery NP at 11:21 on 08/31/2022. This report was flagged in Epic as abnormal. Electronically signed by resident: Bianca Davis Date:    08/31/2022 Time:    08:38 Electronically signed by: Lul Doe MD Date:    08/31/2022 Time:    11:44

## 2022-09-01 NOTE — PT/OT/SLP PROGRESS
"Physical Therapy Treatment    Patient Name:  Gina Jenkins   MRN:  [id number]    Recommendations:     Discharge Recommendations:  rehabilitation facility   Discharge Equipment Recommendations: bedside commode   Barriers to discharge: Inaccessible home and Decreased caregiver support    Assessment:     Gina Jenkins is a 47 y.o. female admitted with a medical diagnosis of Fluid collection at surgical site.  She presents with the following impairments/functional limitations:  weakness, impaired balance, impaired endurance, impaired self care skills, impaired functional mobility, gait instability, decreased coordination, impaired cognition, decreased upper extremity function, decreased lower extremity function, decreased safety awareness, pain. Noted improvement in functional mobility this date with pt able to complete transfers and gait trials during therapy session. Pt completing functional mobility with CGA needed for pt safety. RW used throughout standing tasks 2* impaired balance and LE weakness. Ambulated household distances; however, progress LE weakness, fatigue, and impaired endurance noted, limiting further progression. Pt would continue to benefit from skilled acute PT in order to address current deficits and progress functional mobility.     Rehab Prognosis: Good; patient would benefit from acute skilled PT services to address these deficits and reach maximum level of function.    Recent Surgery: Procedure(s) (LRB):  LEFT Cranial wound debridement and washout (Left)  DEBRIDEMENT, WOUND 5 Days Post-Op    Plan:     During this hospitalization, patient to be seen 4 x/week to address the identified rehab impairments via gait training, therapeutic activities, therapeutic exercises, neuromuscular re-education and progress toward the following goals:    Plan of Care Expires:  09/29/22    Subjective     Chief Complaint: legs feeling "heavy"; headache at end of session  Patient/Family Comments/goals: return " "home  Pain/Comfort:  Pain Rating 1:  (reported headache at end of session; did not rate)  Pain Addressed 1: Reposition, Distraction, Cessation of Activity, Nurse notified      Objective:     Communicated with RN prior to session.  Patient found up in chair with telemetry, pulse ox (continuous), blood pressure cuff, peripheral IV upon PT entry to room.     General Precautions: Standard, fall, aspiration, seizure   Orthopedic Precautions:N/A   Braces: N/A  Respiratory Status: Room air     Functional Mobility:  Transfers:     Sit to Stand:  contact guard assistance with rolling walker, x3 reps from bedside chair   Cues provided for hand placement and transfer technique with RW   Gait: ~100 ft. + ~40 ft. + ~30 ft with RW and CGA  Mask donned prior to hallway amb  Chair follow throughout. Seated rest breaks between gait trials.  RN present and monitoring throughout  Demo'd decreased step length, impaired weight-shifting ability, decreased toe-floor clearance, instability, postural sway, inconsistent step placement  Progressively increasing LE weakness/fatigue with pt reporting BLE feeling "heavy", requiring seated rest breaks for recovery      AM-PAC 6 CLICK MOBILITY  Turning over in bed (including adjusting bedclothes, sheets and blankets)?: 3  Sitting down on and standing up from a chair with arms (e.g., wheelchair, bedside commode, etc.): 3  Moving from lying on back to sitting on the side of the bed?: 3  Moving to and from a bed to a chair (including a wheelchair)?: 3  Need to walk in hospital room?: 3  Climbing 3-5 steps with a railing?: 2  Basic Mobility Total Score: 17       Therapeutic Activities and Exercises:  Pt educated on role of PT and PT POC. Pt verbalized understanding.   Pt educated on the effects of bed rest and the importance of OOB activity. Pt encouraged to sit UIC majority of day as tolerated and continue daily ambulation with nursing assist. Pt verbalized understanding.  Pt oriented to call bell " and instructed to call for staff assist with standing tasks/transfers. Pt verbalized understanding.   Pt educated on RUE  strength and coordination exercises to be completed (I) daily. Pt verbalized and demo'd understanding.     Patient left up in chair with all lines intact, call button in reach, and RN present..    GOALS:   Multidisciplinary Problems       Physical Therapy Goals          Problem: Physical Therapy    Goal Priority Disciplines Outcome Goal Variances Interventions   Physical Therapy Goal     PT, PT/OT Ongoing, Progressing     Description: Goals to be met by: 9/10/2022     Patient will increase functional independence with mobility by performin. Supine to sit with Stand-by Assistance  2. Sit to stand transfer with Stand-by Assistance  3. Bed to chair transfer with Stand-by Assistance using LRAD as needed  4. Gait  x 50 feet with Contact Guard Assistance using LRAD as needed. - met   4a. Gait x300 ft with Supervision using LRAD as needed  5. Ascend/descend 3 stair with left Handrails Minimal Assistance.  6. Lower extremity exercise program x15 reps, with supervision, in order to increase LE strength and (I) with functional mobility.                           Time Tracking:     PT Received On: 22  PT Start Time: 1449     PT Stop Time: 1514  PT Total Time (min): 25 min     Billable Minutes: Gait Training 17 and Therapeutic Activity 8    Treatment Type: Treatment  PT/PTA: PT     PTA Visit Number: 0     2022

## 2022-09-01 NOTE — PLAN OF CARE
[facility] - Neuro Critical Care  Discharge Reassessment    Primary Care Provider: Clair Johnson NP    Expected Discharge Date: 9/7/2022    Per MD, Patient will need 6 weeks of  IV Abx from the surgery date of 08/21/2022.   PICC to be placed today.  SW to send LTAC referrals.  Patient to step down to the floor.     Reassessment (most recent)       Discharge Reassessment - 09/01/22 1530          Discharge Reassessment    Assessment Type Discharge Planning Reassessment     Did the patient's condition or plan change since previous assessment? Yes     Communicated CADY with patient/caregiver Date not available/Unable to determine     Discharge Plan A Long-term acute care facility (LTAC)     Discharge Plan B Rehab     DME Needed Upon Discharge  other (see comments)   tbd    Discharge Barriers Identified Underinsured     Why the patient remains in the hospital Requires continued medical care                   Melinda Swanson RN, CCRN-K, [facility]  Neuro-Critical Care   X [phone]
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
## 2022-09-01 NOTE — ASSESSMENT & PLAN NOTE
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 Diaz who presented after episode of sudden onset R-sided weakness/tremors and aphasia for about 5 minutes, resolved prior to EMS arrival, concerning for focal seizure, then with second episode while in the ED. MRI brain w/wo concerning for scalp and epidural infection.    Now s/p L crani revision for wound washout.  OK for D/C home today once ID final recs are in place     - Admitted to [facility] with q1h neurochecks.  - all labs and diagnostics reviewed          - CTA 8/21: largely stable from prior with epidural fluid collection and extracranial fluid collection stable in size; stable postsurgical changes of L PCOM/anterior choroidal aneurysm clipping, stable small left MCA bifurcation aneurysm          - MRI brain w wo 8/21: peripheral rim enhancement of subdural and scalp complex fluid collections, concerning for infection; no evidence of enhancement of brain parenchyma   - spot EEG 8/23: mild regional cortical or subcortical dysfunction in the left temporal region with no electrographic seizures or indications of seizure tendency.   - CTH 8/28: Residual mixed density collection overlies the soft tissues as well as within the extra-axial space  underlying the craniotomy likely represents postoperative gas fluid and hemorrhage with residual infection not  Excluded. No new intracranial findings.    - CTH 8/29: worsening epidural and subcutaneous fluid collection with local mass effect on L frontal lobe      - CTH 8/30: extracranial collection gone, epidural collection remains with some mild mass effect     - Infectious workup:   - CTP 8/31: equivocal           - afebrile, no leukocytosis          - blood cx 8/21 NGTD          - ESR 30-->46, CRP 28.5-->26          - Subcutaneous scalp fluid collection tapped 8/22, sent for Cx's - NGTD          - ID consulted, recommend  Vanc/Cefepime, continue to follow Cx's; Needs PICC placement for abx  - Seizures: Episodes concerning for focal seizures with R-sided tremors and weakness. Not on AEDs prior to admission.          - Keppra loaded on admission, continue 1g bid          - Likely provoked focal seizure activity due to cranial fluid collection; pt also with very elevated BG on admission, uncontrolled hyperglycemia may have been contributory to provoked seizure          - spot EEG 8/23 with left temporal subcortical dysfunction, negative for electrographic seizures  - New Onset T2DM: No h/o DM. Hyperglycemic on admission. HbA1C 8.3. Endocrinology consulted. Goal -180.     - Levemir 30 untis daily First dose this Am. 0.7 u/kg/d dosing.      - Novolog 10 units TIDWM. Basal/Prandial physiologic matching.       - Moderate Dose SQ Insulin Correction Scale.     - BG Monitoring AC/HS.           - Bedside nurse to instruct on insulin pen use and blood sugar monitor. Have patient administer own injections after education completed supervised by nurse. Have patient watch insulin educatoin video's via i-pad if available on unit.  - HTN: SBP <160. Controlled on current regimen. Continue home meds.  - Hypokalemia, Hypomagnesemia: Chronic, on home supplements daily.          - Resume home potassium and Mg supplements          - Replete PRN, daily labs  - regular diet  - HAIM/SCD/SQH  - PT/OT/OOB  - HV removed    Dispo: medically ready for discharge

## 2022-09-01 NOTE — CARE UPDATE
BG Goal 140 -180   Diet diabetic [facility]; 2000 Calorie; Isolation Tray - Regular [place]  5 Days Post-Op    BG stable while on current SQ insulin regimen. Patient remains in neuro ICU. Endocrinology will continue to follow, and manage glycemic control while inpatient.     - Continue Levemir 24 units daily.   - Continue Novolog 8 units TDIWM.   - Moderate Dose SQ Insulin Correction Scale.  - BG Monitoring AC/HS     ** Please call Endocrine for any BG related issues **  ** Please notify Endocrine for any change and/or advance in diet**     Lab Results   Component Value Date    HGBA1C 8.3 (H) 08/22/2022       Discharge Planning:   TBD. Please notify endocrinology prior to discharge.

## 2022-09-01 NOTE — PROGRESS NOTES
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 Diaz who presents with sudden onset of symptoms concerning for focal seizure. Patient was in her yard today playing with her son when her R arm became weak suddenly followed by the inability to speak and RLE weakness. This went on for a couple minutes and self resolved; patient said she had some lasting heaviness on her R side after. She came to the ED immediately.    Here in the ED she had another episode that resolved on its own. NSGY consulted.       Post-Op Info:  Procedure(s) (LRB):  LEFT Cranial wound debridement and washout (Left)  DEBRIDEMENT, WOUND   5 Days Post-Op     Interval History: 9/1: resolution of aphasia overnight, neurointact this AM    Medications:  Continuous Infusions:  Scheduled Meds:   amitriptyline  75 mg Oral QHS    amLODIPine  10 mg Oral Daily    atorvastatin  40 mg Oral Daily    carvediloL  37.5 mg Oral BID    ceftAZIDime (FORTAZ) IVPB  2 g Intravenous Q8H    EScitalopram oxalate  10 mg Oral Daily    heparin (porcine)  5,000 Units Subcutaneous Q8H    insulin aspart U-100  8 Units Subcutaneous TIDWM    insulin detemir U-100  24 Units Subcutaneous Daily    levETIRAcetam  1,000 mg Oral BID    magnesium oxide  400 mg Oral BID    senna-docusate 8.6-50 mg  1 tablet Oral Daily    sodium chloride 0.9%  10 mL Intravenous Q6H    vancomycin (VANCOCIN) IVPB  1,250 mg Intravenous Q24H     PRN Meds:acetaminophen, albuterol, dextrose 10%, dextrose 10%, glucagon (human recombinant), glucose, glucose, hydrALAZINE, insulin aspart U-100, labetalol, magnesium oxide, magnesium oxide, melatonin, oxyCODONE, oxyCODONE, potassium bicarbonate, potassium bicarbonate, potassium bicarbonate, potassium, sodium phosphates, potassium, sodium phosphates,  potassium, sodium phosphates, Flushing PICC Protocol **AND** sodium chloride 0.9% **AND** sodium chloride 0.9%, Pharmacy to dose Vancomycin consult **AND** vancomycin - pharmacy to dose     Review of Systems  Objective:     Weight: 88.5 kg (195 lb)  Body mass index is 34.54 kg/m².  Vital Signs (Most Recent):  Temp: 98.8 °F (37.1 °C) (09/01/22 1101)  Pulse: 74 (09/01/22 1201)  Resp: 20 (09/01/22 1201)  BP: 129/72 (09/01/22 1201)  SpO2: 99 % (09/01/22 1201)   Vital Signs (24h Range):  Temp:  [98.3 °F (36.8 °C)-98.8 °F (37.1 °C)] 98.8 °F (37.1 °C)  Pulse:  [73-84] 74  Resp:  [17-23] 20  SpO2:  [94 %-100 %] 99 %  BP: (125-163)/(70-95) 129/72     Date 09/01/22 0700 - 09/02/22 0659   Shift 8325-3613 5973-8609 2866-8315 24 Hour Total   INTAKE   P.O. 560   560   IV Piggyback 49.5   49.5   Shift Total(mL/kg) 609.5(6.9)   609.5(6.9)   OUTPUT   Shift Total(mL/kg)       Weight (kg) 88.4 88.4 88.4 88.4                          Closed/Suction Drain 08/27/22 0941 Left Scalp Other (Comment) (Active)   Site Description Unable to view 08/28/22 1501   Dressing Type Gauze 08/28/22 1501   Dressing Status Clean;Dry;Intact 08/28/22 1501   Dressing Intervention Integrity maintained 08/28/22 1501   Drainage Serosanguineous 08/28/22 1501   Status To bulb suction 08/28/22 1501   Output (mL) 0 mL 08/28/22 0502       Female External Urinary Catheter 08/27/22 1045 (Active)   Skin no redness;no breakdown 08/28/22 1501   Tolerance no signs/symptoms of discomfort 08/28/22 1501   Suction Continuous suction at 40 mmHg 08/28/22 0701   Date of last wick change 08/27/22 08/27/22 1105   Time of last wick change 1045 08/27/22 1105   Output (mL) 500 mL 08/28/22 0502       Physical Exam    Neurosurgery Physical Exam    GENERAL: resting comfortably  HEENT: NC, PERRL, mucous membranes moist  NECK: supple, trachea midline  CV: normal capillary refill  PULM: aerating well, symmetric expansion, no distress  ABD: soft, NT, ND  EXT: no c/c/e    NEURO:    GCS  15  AAO x 3  Aphasia resolved  CN II-XII grossly intact  Fc x 4 antigravity  SILT    No drift or dysmetria    Incision cdi        Significant Labs:  Recent Labs   Lab 08/31/22  0254 09/01/22  0029   * 103    139   K 3.7 3.2*    103   CO2 21* 25   BUN 12 9   CREATININE 1.1 0.9   CALCIUM 9.2 9.4   MG 1.9 2.1       Recent Labs   Lab 08/31/22  0254 09/01/22  0029   WBC 5.94 5.75   HGB 10.7* 10.1*   HCT 31.2* 29.5*    255       No results for input(s): LABPT, INR, APTT in the last 48 hours.  Microbiology Results (last 7 days)       Procedure Component Value Units Date/Time    Fungus culture [[id number]] Collected: 08/27/22 0909    Order Status: Completed Specimen: Wound from Head Updated: 08/31/22 1055     Fungus (Mycology) Culture Culture in progress    Narrative:      Superficial head culture, aerobic, anaerobic, fungus, AFB,  gram    Fungus culture [[id number]] Collected: 08/27/22 0912    Order Status: Completed Specimen: Wound from Head Updated: 08/31/22 1055     Fungus (Mycology) Culture Culture in progress    Narrative:      Epidural culture, aerobic, anaerobic, gram, AFB, gram    Fungus culture [[id number]] Collected: 08/22/22 1550    Order Status: Completed Specimen: Incision site from Head Updated: 08/31/22 1055     Fungus (Mycology) Culture Culture in progress    Culture, Anaerobic [[id number]] Collected: 08/27/22 0909    Order Status: Completed Specimen: Wound from Head Updated: 08/31/22 0736     Anaerobic Culture Culture in progress    Narrative:      Superficial head culture, aerobic, anaerobic, fungus, AFB,  gram    Culture, Anaerobic [[id number]] Collected: 08/27/22 0912    Order Status: Completed Specimen: Wound from Head Updated: 08/31/22 0734     Anaerobic Culture Culture in progress    Narrative:      Epidural culture, aerobic, anaerobic, gram, AFB, gram    Aerobic culture [[id number]] Collected: 08/27/22 0909    Order Status: Completed Specimen: Wound from Head Updated: 08/30/22  0953     Aerobic Bacterial Culture No growth    Narrative:      Superficial head culture, aerobic, anaerobic, fungus, AFB,  gram    Aerobic culture [[id number]] Collected: 08/27/22 0912    Order Status: Completed Specimen: Wound from Head Updated: 08/30/22 0953     Aerobic Bacterial Culture No growth    Narrative:      Epidural culture, aerobic, anaerobic, gram, AFB, gram    Culture, Anaerobic [[id number]] Collected: 08/22/22 1550    Order Status: Completed Specimen: Incision site from Head Updated: 08/30/22 0654     Anaerobic Culture No anaerobes isolated    AFB Culture & Smear [[id number]] Collected: 08/27/22 0909    Order Status: Completed Specimen: Wound from Head Updated: 08/29/22 1521     AFB Culture & Smear Culture in progress     AFB CULTURE STAIN No acid fast bacilli seen.    Narrative:      Superficial head culture, aerobic, anaerobic, fungus, AFB,  gram    AFB Culture & Smear [[id number]] Collected: 08/27/22 0912    Order Status: Completed Specimen: Wound from Head Updated: 08/29/22 1521     AFB Culture & Smear Culture in progress     AFB CULTURE STAIN No acid fast bacilli seen.    Narrative:      Epidural culture, aerobic, anaerobic, gram, AFB, gram    Gram stain [[id number]] Collected: 08/27/22 0912    Order Status: Completed Specimen: Wound from Head Updated: 08/27/22 1044     Gram Stain Result No WBC's      No organisms seen    Narrative:      Epidural culture, aerobic, anaerobic, gram, AFB, gram    Gram stain [[id number]] Collected: 08/27/22 0909    Order Status: Completed Specimen: Wound from Head Updated: 08/27/22 1043     Gram Stain Result Rare WBC's      No organisms seen    Narrative:      Superficial head culture, aerobic, anaerobic, fungus, AFB,  gram    Blood culture [[id number]] Collected: 08/21/22 2325    Order Status: Completed Specimen: Blood from Peripheral, Antecubital, Right Updated: 08/27/22 0612     Blood Culture, Routine No growth after 5 days.    Aerobic culture [[id number]] Collected:  08/22/22 1550    Order Status: Completed Specimen: Incision site from Head Updated: 08/26/22 1029     Aerobic Bacterial Culture No growth    Gram stain [[id number]] Collected: 08/22/22 1550    Order Status: Completed Specimen: Incision site from Head Updated: 08/25/22 1447     Gram Stain Result No organisms seen      Rare WBC's          All pertinent labs from the last 24 hours have been reviewed.    Significant Diagnostics:  I have reviewed all pertinent imaging results/findings within the past 24 hours.  CTA Head    Result Date: 8/30/2022  Stable appearance of the brain and extra-axial/extracranial fluid collections. No significant stenosis at the carotid bifurcations by NASCET criteria.  The vertebral arteries are patent. Stable appearance of the intracranial vasculature.  No recurrent aneurysm status post aneurysm clipping.  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  No new major branch stenosis/occlusion at the [id number]. Nonspecific ground-glass opacities lung apices. Electronically signed by: Jose Perez Date:    08/30/2022 Time:    13:19    CT Brain Perfusion inc Post Processing    Result Date: 8/31/2022  No perfusion abnormality to suggest cerebral ischemia. Left cerebral hemisphere sulcal effacement with subtle vasogenic edema within the left frontal and left temporal lobes.  While there may be a component of seizure related signal changes, repeat MRI of the brain with contrast is recommended to evaluate for cerebritis/encephalitis or other acute intracranial abnormalities. 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 Extra-axial fluid collection underlying the craniotomy is similar to/minimally increased prior exam.  Slight interval increase in size extracranial soft tissue fluid collection.  This collection could also be further evaluated with MRI of the brain in  conjunction with above findings. Findings were discussed with Justino Montgomery NP at 11:21 on 08/31/2022. This report was flagged in Epic as abnormal. 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 Diaz who presented after episode of sudden onset R-sided weakness/tremors and aphasia for about 5 minutes, resolved prior to EMS arrival, concerning for focal seizure, then with second episode while in the ED. MRI brain w/wo concerning for scalp and epidural infection.    Now s/p L crani revision for wound washout.  OK for D/C home today once ID final recs are in place     - Admitted to [facility] with q1h neurochecks.  - all labs and diagnostics reviewed          - CTA 8/21: largely stable from prior with epidural fluid collection and extracranial fluid collection stable in size; stable postsurgical changes of L PCOM/anterior choroidal aneurysm clipping, stable small left MCA bifurcation aneurysm          - MRI brain w wo 8/21: peripheral rim enhancement of subdural and scalp complex fluid collections, concerning for infection; no evidence of enhancement of brain parenchyma   - spot EEG 8/23: mild regional cortical or subcortical dysfunction in the left temporal region with no electrographic seizures or indications of seizure tendency.   - CTH 8/28: Residual mixed density collection overlies the soft tissues as well as within the extra-axial space  underlying the craniotomy likely represents postoperative gas fluid and hemorrhage with residual infection not  Excluded. No new intracranial findings.    - CTH 8/29: worsening epidural and subcutaneous fluid collection with local mass effect on L frontal lobe       - CTH 8/30: extracranial collection gone, epidural collection remains with some mild mass effect     - Infectious workup:   - CTP 8/31: equivocal           - afebrile, no leukocytosis          - blood cx 8/21 NGTD          - ESR 30-->46, CRP 28.5-->26          - Subcutaneous scalp fluid collection tapped 8/22, sent for Cx's - NGTD          - ID consulted, recommend Vanc/Cefepime, continue to follow Cx's; Needs PICC placement for abx  - Seizures: Episodes concerning for focal seizures with R-sided tremors and weakness. Not on AEDs prior to admission.          - Keppra loaded on admission, continue 1g bid          - Likely provoked focal seizure activity due to cranial fluid collection; pt also with very elevated BG on admission, uncontrolled hyperglycemia may have been contributory to provoked seizure          - spot EEG 8/23 with left temporal subcortical dysfunction, negative for electrographic seizures  - New Onset T2DM: No h/o DM. Hyperglycemic on admission. HbA1C 8.3. Endocrinology consulted. Goal -180.     - Levemir 30 untis daily First dose this Am. 0.7 u/kg/d dosing.      - Novolog 10 units TIDWM. Basal/Prandial physiologic matching.       - Moderate Dose SQ Insulin Correction Scale.     - BG Monitoring AC/HS.           - Bedside nurse to instruct on insulin pen use and blood sugar monitor. Have patient administer own injections after education completed supervised by nurse. Have patient watch insulin educatoin video's via i-pad if available on unit.  - HTN: SBP <160. Controlled on current regimen. Continue home meds.  - Hypokalemia, Hypomagnesemia: Chronic, on home supplements daily.          - Resume home potassium and Mg supplements          - Replete PRN, daily labs  - regular diet  - HAIM/SCD/SQH  - PT/OT/OOB  - HV removed    Dispo: medically ready for discharge        Rosalina Oliveros MD  Neurosurgery  [facility] - Neuro [facility]

## 2022-09-01 NOTE — PLAN OF CARE
09/01/22 1541   Post-Acute Status   Post-Acute Authorization Placement   Post-Acute Placement Status Referrals Sent  (LTAC)     SW advised by CM that Pt will need 6 weeks of IV antibiotics per MD team today. Rehab will likely not be able to meet Pt needs given this new rec. Sent to LTAC options via [employer] to see how would be able to meet needs and take her insurance.     Maria Teresa Reyes LCSW  Neurocritical Care   [facility]  [id number]

## 2022-09-01 NOTE — NURSING TRANSFER
Nursing Transfer Note      9/1/2022     Reason patient is being transferred: Stepdown    Transfer To: 956    Transfer via wheelchair    Transfer with cardiac monitoring    Transported by RN    Medicines sent: insulin    Any special needs or follow-up needed: none    Chart send with patient: Yes    Notified: family    Patient reassessed at: 9/1/22 @1615    Upon arrival to floor: cardiac monitor applied, patient oriented to room, and call bell in reach

## 2022-09-01 NOTE — PROGRESS NOTES
Pharmacokinetic Assessment Follow Up: IV Vancomycin    Assessment and Plan:    - Vanc trough prior to third dose is slightly subtherapeutic at 12.6 mcg/mL  - I do expect the level to increase with ongoing treatment as accumulation will occur, however this may be augmented by the observed improvement in renal function  - Increase vanc to 1500 mg Q24H  - Goal trough 15-20 mcg/mL  - Draw trough prior to fourth dose on 9/5 at 13:00  - Anticipating 6 week course per ID rec; EOT 10/8  - Pt was previously supratherapeutic on Q12H regimen, however with continued improvement of renal function, may require Q12H dosing in the future     Drug levels (last 3 results):  Recent Labs   Lab Result Units 08/30/22  0255 08/30/22  1159 09/01/22  1244   Vancomycin, Random ug/mL  --  17.2  --    Vancomycin-Trough ug/mL 26.1*  --  12.6       Pharmacy will continue to follow and monitor vancomycin.  Please contact pharmacy at extension [phone] for questions regarding this assessment.    Thank you for the consult,   Yara Brush, PharmD, [facility]  Neurocritical Care Pharmacist  [id number]         Patient brief summary:  Gina Jenkins is a 47 y.o. female initiated on antimicrobial therapy with IV Vancomycin for treatment of skin & soft tissue infection      Drug Allergies:   Review of patient's allergies indicates:   Allergen Reactions    Lisinopril Swelling    Bactrim [sulfamethoxazole-trimethoprim] Rash       Actual Body Weight:   88.5kg    Renal Function:   Estimated Creatinine Clearance: 82.4 mL/min (based on SCr of 0.9 mg/dL).,     Dialysis Method (if applicable):  N/A    CBC (last 72 hours):  Recent Labs   Lab Result Units 08/30/22  0255 08/31/22  0254 09/01/22  0029   WBC K/uL 7.34 5.94 5.75   Hemoglobin g/dL 10.7* 10.7* 10.1*   Hematocrit % 31.9* 31.2* 29.5*   Platelets K/uL 213 233 255   Gran % % 61.8 60.4 53.8   Lymph % % 20.7 22.1 23.1   Mono % % 12.8 10.9 14.6   Eosinophil % % 3.7 5.1 6.6   Basophil % % 0.5 0.8 1.0   Differential  Method  Automated Automated Automated       Metabolic Panel (last 72 hours):  Recent Labs   Lab Result Units 08/30/22  0255 08/31/22  0254 09/01/22  0029   Sodium mmol/L 140 141 139   Potassium mmol/L 3.9 3.7 3.2*   Chloride mmol/L 105 106 103   CO2 mmol/L 21* 21* 25   Glucose mg/dL 106 113* 103   BUN mg/dL 13 12 9   Creatinine mg/dL 1.3 1.1 0.9   Magnesium mg/dL 2.0 1.9 2.1   Phosphorus mg/dL 3.3 2.8 2.2*       Vancomycin Administrations:  vancomycin given in the last 96 hours                     vancomycin 750 mg in dextrose 5 % 250 mL IVPB (ready to mix system) (mg) 750 mg New Bag 08/29/22 1506     750 mg New Bag  0351     750 mg New Bag 08/28/22 1616    vancomycin 750 mg in dextrose 5 % 250 mL IVPB (ready to mix system) (mg) 750 mg New Bag 08/27/22 2257    vancomycin (VANCOCIN) 1,000 mg in sodium chloride 0.9% 250 mL IVPB (mg) 1,000 mg New Bag 08/27/22 0831    vancomycin 750 mg in dextrose 5 % 250 mL IVPB (ready to mix system) (mg) 750 mg New Bag 08/27/22 0020     750 mg New Bag 08/26/22 1243                    Microbiologic Results:  Microbiology Results (last 7 days)       Procedure Component Value Units Date/Time    Fungus culture [[id number]] Collected: 08/27/22 0909    Order Status: Completed Specimen: Wound from Head Updated: 08/31/22 1055     Fungus (Mycology) Culture Culture in progress    Narrative:      Superficial head culture, aerobic, anaerobic, fungus, AFB,  gram    Fungus culture [[id number]] Collected: 08/27/22 0912    Order Status: Completed Specimen: Wound from Head Updated: 08/31/22 1055     Fungus (Mycology) Culture Culture in progress    Narrative:      Epidural culture, aerobic, anaerobic, gram, AFB, gram    Fungus culture [[id number]] Collected: 08/22/22 1550    Order Status: Completed Specimen: Incision site from Head Updated: 08/31/22 1055     Fungus (Mycology) Culture Culture in progress    Culture, Anaerobic [[id number]] Collected: 08/27/22 0909    Order Status: Completed Specimen: Wound  from Head Updated: 08/31/22 0736     Anaerobic Culture Culture in progress    Narrative:      Superficial head culture, aerobic, anaerobic, fungus, AFB,  gram    Culture, Anaerobic [[id number]] Collected: 08/27/22 0912    Order Status: Completed Specimen: Wound from Head Updated: 08/31/22 0734     Anaerobic Culture Culture in progress    Narrative:      Epidural culture, aerobic, anaerobic, gram, AFB, gram    Aerobic culture [[id number]] Collected: 08/27/22 0909    Order Status: Completed Specimen: Wound from Head Updated: 08/30/22 0953     Aerobic Bacterial Culture No growth    Narrative:      Superficial head culture, aerobic, anaerobic, fungus, AFB,  gram    Aerobic culture [[id number]] Collected: 08/27/22 0912    Order Status: Completed Specimen: Wound from Head Updated: 08/30/22 0953     Aerobic Bacterial Culture No growth    Narrative:      Epidural culture, aerobic, anaerobic, gram, AFB, gram    Culture, Anaerobic [[id number]] Collected: 08/22/22 1550    Order Status: Completed Specimen: Incision site from Head Updated: 08/30/22 0654     Anaerobic Culture No anaerobes isolated    AFB Culture & Smear [[id number]] Collected: 08/27/22 0909    Order Status: Completed Specimen: Wound from Head Updated: 08/29/22 1521     AFB Culture & Smear Culture in progress     AFB CULTURE STAIN No acid fast bacilli seen.    Narrative:      Superficial head culture, aerobic, anaerobic, fungus, AFB,  gram    AFB Culture & Smear [[id number]] Collected: 08/27/22 0912    Order Status: Completed Specimen: Wound from Head Updated: 08/29/22 1521     AFB Culture & Smear Culture in progress     AFB CULTURE STAIN No acid fast bacilli seen.    Narrative:      Epidural culture, aerobic, anaerobic, gram, AFB, gram    Gram stain [[id number]] Collected: 08/27/22 0912    Order Status: Completed Specimen: Wound from Head Updated: 08/27/22 1044     Gram Stain Result No WBC's      No organisms seen    Narrative:      Epidural culture, aerobic, anaerobic,  gram, AFB, gram    Gram stain [[id number]] Collected: 08/27/22 0909    Order Status: Completed Specimen: Wound from Head Updated: 08/27/22 1043     Gram Stain Result Rare WBC's      No organisms seen    Narrative:      Superficial head culture, aerobic, anaerobic, fungus, AFB,  gram    Blood culture [[id number]] Collected: 08/21/22 2325    Order Status: Completed Specimen: Blood from Peripheral, Antecubital, Right Updated: 08/27/22 0612     Blood Culture, Routine No growth after 5 days.    Aerobic culture [[id number]] Collected: 08/22/22 1550    Order Status: Completed Specimen: Incision site from Head Updated: 08/26/22 1029     Aerobic Bacterial Culture No growth

## 2022-09-02 ENCOUNTER — TELEPHONE (OUTPATIENT)
Dept: NEUROSURGERY | Facility: CLINIC | Age: 47
End: 2022-09-02
Payer: MEDICAID

## 2022-09-02 ENCOUNTER — PATIENT MESSAGE (OUTPATIENT)
Dept: ENDOCRINOLOGY | Facility: HOSPITAL | Age: 47
End: 2022-09-02
Payer: MEDICAID

## 2022-09-02 ENCOUNTER — TELEPHONE (OUTPATIENT)
Dept: ENDOCRINOLOGY | Facility: HOSPITAL | Age: 47
End: 2022-09-02
Payer: MEDICAID

## 2022-09-02 VITALS
WEIGHT: 195 LBS | OXYGEN SATURATION: 98 % | DIASTOLIC BLOOD PRESSURE: 71 MMHG | TEMPERATURE: 98 F | RESPIRATION RATE: 18 BRPM | HEIGHT: 63 IN | HEART RATE: 83 BPM | BODY MASS INDEX: 34.55 KG/M2 | SYSTOLIC BLOOD PRESSURE: 143 MMHG

## 2022-09-02 DIAGNOSIS — E11.9 NEW ONSET TYPE 2 DIABETES MELLITUS: Primary | ICD-10-CM

## 2022-09-02 LAB
ANION GAP SERPL CALC-SCNC: 9 MMOL/L (ref 8–16)
BASOPHILS # BLD AUTO: 0.05 K/UL (ref 0–0.2)
BASOPHILS NFR BLD: 0.9 % (ref 0–1.9)
BUN SERPL-MCNC: 8 MG/DL (ref 6–20)
CALCIUM SERPL-MCNC: 9.4 MG/DL (ref 8.7–10.5)
CHLORIDE SERPL-SCNC: 102 MMOL/L (ref 95–110)
CO2 SERPL-SCNC: 28 MMOL/L (ref 23–29)
CREAT SERPL-MCNC: 0.8 MG/DL (ref 0.5–1.4)
DIFFERENTIAL METHOD: ABNORMAL
EOSINOPHIL # BLD AUTO: 0.5 K/UL (ref 0–0.5)
EOSINOPHIL NFR BLD: 8.2 % (ref 0–8)
ERYTHROCYTE [DISTWIDTH] IN BLOOD BY AUTOMATED COUNT: 13.7 % (ref 11.5–14.5)
EST. GFR  (NO RACE VARIABLE): >60 ML/MIN/1.73 M^2
FINAL PATHOLOGIC DIAGNOSIS: NORMAL
GLUCOSE SERPL-MCNC: 115 MG/DL (ref 70–110)
HCT VFR BLD AUTO: 31 % (ref 37–48.5)
HGB BLD-MCNC: 10.4 G/DL (ref 12–16)
IMM GRANULOCYTES # BLD AUTO: 0.06 K/UL (ref 0–0.04)
IMM GRANULOCYTES NFR BLD AUTO: 1.1 % (ref 0–0.5)
LYMPHOCYTES # BLD AUTO: 1.3 K/UL (ref 1–4.8)
LYMPHOCYTES NFR BLD: 23.2 % (ref 18–48)
Lab: NORMAL
MAGNESIUM SERPL-MCNC: 1.7 MG/DL (ref 1.6–2.6)
MCH RBC QN AUTO: 28.5 PG (ref 27–31)
MCHC RBC AUTO-ENTMCNC: 33.5 G/DL (ref 32–36)
MCV RBC AUTO: 85 FL (ref 82–98)
MONOCYTES # BLD AUTO: 0.9 K/UL (ref 0.3–1)
MONOCYTES NFR BLD: 16.7 % (ref 4–15)
NEUTROPHILS # BLD AUTO: 2.8 K/UL (ref 1.8–7.7)
NEUTROPHILS NFR BLD: 49.9 % (ref 38–73)
NRBC BLD-RTO: 0 /100 WBC
PHOSPHATE SERPL-MCNC: 2.9 MG/DL (ref 2.7–4.5)
PLATELET # BLD AUTO: 252 K/UL (ref 150–450)
PMV BLD AUTO: 10.6 FL (ref 9.2–12.9)
POCT GLUCOSE: 132 MG/DL (ref 70–110)
POCT GLUCOSE: 179 MG/DL (ref 70–110)
POCT GLUCOSE: 181 MG/DL (ref 70–110)
POTASSIUM SERPL-SCNC: 3.9 MMOL/L (ref 3.5–5.1)
RBC # BLD AUTO: 3.65 M/UL (ref 4–5.4)
SODIUM SERPL-SCNC: 139 MMOL/L (ref 136–145)
WBC # BLD AUTO: 5.52 K/UL (ref 3.9–12.7)

## 2022-09-02 PROCEDURE — 85025 COMPLETE CBC W/AUTO DIFF WBC: CPT | Performed by: STUDENT IN AN ORGANIZED HEALTH CARE EDUCATION/TRAINING PROGRAM

## 2022-09-02 PROCEDURE — 99232 PR SUBSEQUENT HOSPITAL CARE,LEVL II: ICD-10-PCS | Mod: ,,, | Performed by: NURSE PRACTITIONER

## 2022-09-02 PROCEDURE — 25000003 PHARM REV CODE 250: Performed by: NURSE PRACTITIONER

## 2022-09-02 PROCEDURE — 25000003 PHARM REV CODE 250: Performed by: STUDENT IN AN ORGANIZED HEALTH CARE EDUCATION/TRAINING PROGRAM

## 2022-09-02 PROCEDURE — 25000003 PHARM REV CODE 250: Performed by: PSYCHIATRY & NEUROLOGY

## 2022-09-02 PROCEDURE — 97530 THERAPEUTIC ACTIVITIES: CPT

## 2022-09-02 PROCEDURE — 63600175 PHARM REV CODE 636 W HCPCS: Performed by: INTERNAL MEDICINE

## 2022-09-02 PROCEDURE — 80048 BASIC METABOLIC PNL TOTAL CA: CPT | Performed by: STUDENT IN AN ORGANIZED HEALTH CARE EDUCATION/TRAINING PROGRAM

## 2022-09-02 PROCEDURE — 99232 SBSQ HOSP IP/OBS MODERATE 35: CPT | Mod: ,,, | Performed by: NURSE PRACTITIONER

## 2022-09-02 PROCEDURE — 84100 ASSAY OF PHOSPHORUS: CPT | Performed by: STUDENT IN AN ORGANIZED HEALTH CARE EDUCATION/TRAINING PROGRAM

## 2022-09-02 PROCEDURE — A4216 STERILE WATER/SALINE, 10 ML: HCPCS | Performed by: INTERNAL MEDICINE

## 2022-09-02 PROCEDURE — 25000003 PHARM REV CODE 250: Performed by: INTERNAL MEDICINE

## 2022-09-02 PROCEDURE — 99024 PR POST-OP FOLLOW-UP VISIT: ICD-10-PCS | Mod: ,,, | Performed by: PHYSICIAN ASSISTANT

## 2022-09-02 PROCEDURE — 99024 POSTOP FOLLOW-UP VISIT: CPT | Mod: ,,, | Performed by: PHYSICIAN ASSISTANT

## 2022-09-02 PROCEDURE — 63600175 PHARM REV CODE 636 W HCPCS: Performed by: NURSE PRACTITIONER

## 2022-09-02 PROCEDURE — 83735 ASSAY OF MAGNESIUM: CPT | Performed by: STUDENT IN AN ORGANIZED HEALTH CARE EDUCATION/TRAINING PROGRAM

## 2022-09-02 PROCEDURE — 99024 PR POST-OP FOLLOW-UP VISIT: ICD-10-PCS | Mod: ,,, | Performed by: NEUROLOGICAL SURGERY

## 2022-09-02 PROCEDURE — 25000003 PHARM REV CODE 250: Performed by: PHYSICIAN ASSISTANT

## 2022-09-02 PROCEDURE — 99024 POSTOP FOLLOW-UP VISIT: CPT | Mod: ,,, | Performed by: NEUROLOGICAL SURGERY

## 2022-09-02 RX ORDER — DULAGLUTIDE 0.75 MG/.5ML
0.75 INJECTION, SOLUTION SUBCUTANEOUS
Qty: 4 PEN | Refills: 1 | Status: SHIPPED | OUTPATIENT
Start: 2022-09-02 | End: 2023-02-07

## 2022-09-02 RX ORDER — OXYCODONE HYDROCHLORIDE 5 MG/1
5 TABLET ORAL EVERY 6 HOURS PRN
Qty: 40 TABLET | Refills: 0 | Status: SHIPPED | OUTPATIENT
Start: 2022-09-02 | End: 2023-02-07

## 2022-09-02 RX ORDER — BLOOD-GLUCOSE CONTROL, NORMAL
EACH MISCELLANEOUS
Qty: 200 EACH | Refills: 11 | Status: SHIPPED | OUTPATIENT
Start: 2022-09-02

## 2022-09-02 RX ORDER — PEN NEEDLE, DIABETIC 30 GX3/16"
200 NEEDLE, DISPOSABLE MISCELLANEOUS
Qty: 200 EACH | Refills: 11 | Status: SHIPPED | OUTPATIENT
Start: 2022-09-02

## 2022-09-02 RX ORDER — DEXTROSE 4 G
1 TABLET,CHEWABLE ORAL
Qty: 1 EACH | Refills: 1 | Status: SHIPPED | OUTPATIENT
Start: 2022-09-02

## 2022-09-02 RX ORDER — LEVETIRACETAM 1000 MG/1
1000 TABLET ORAL 2 TIMES DAILY
Qty: 60 TABLET | Refills: 11 | Status: SHIPPED | OUTPATIENT
Start: 2022-09-02 | End: 2023-02-07 | Stop reason: SDUPTHER

## 2022-09-02 RX ORDER — INSULIN ASPART 100 [IU]/ML
8 INJECTION, SOLUTION INTRAVENOUS; SUBCUTANEOUS
Qty: 6 ML | Refills: 5 | Status: SHIPPED | OUTPATIENT
Start: 2022-09-02 | End: 2023-09-02

## 2022-09-02 RX ADMIN — HEPARIN SODIUM 5000 UNITS: 5000 INJECTION INTRAVENOUS; SUBCUTANEOUS at 05:09

## 2022-09-02 RX ADMIN — ESCITALOPRAM OXALATE 10 MG: 10 TABLET ORAL at 10:09

## 2022-09-02 RX ADMIN — LEVETIRACETAM 1000 MG: 500 TABLET, FILM COATED ORAL at 10:09

## 2022-09-02 RX ADMIN — INSULIN ASPART 8 UNITS: 100 INJECTION, SOLUTION INTRAVENOUS; SUBCUTANEOUS at 02:09

## 2022-09-02 RX ADMIN — SENNOSIDES AND DOCUSATE SODIUM 1 TABLET: 50; 8.6 TABLET ORAL at 05:09

## 2022-09-02 RX ADMIN — Medication 10 ML: at 05:09

## 2022-09-02 RX ADMIN — OXYCODONE HYDROCHLORIDE 10 MG: 10 TABLET ORAL at 03:09

## 2022-09-02 RX ADMIN — INSULIN ASPART 8 UNITS: 100 INJECTION, SOLUTION INTRAVENOUS; SUBCUTANEOUS at 10:09

## 2022-09-02 RX ADMIN — HEPARIN SODIUM 5000 UNITS: 5000 INJECTION INTRAVENOUS; SUBCUTANEOUS at 02:09

## 2022-09-02 RX ADMIN — VANCOMYCIN HYDROCHLORIDE 1500 MG: 1.5 INJECTION, POWDER, LYOPHILIZED, FOR SOLUTION INTRAVENOUS at 02:09

## 2022-09-02 RX ADMIN — Medication 10 ML: at 12:09

## 2022-09-02 RX ADMIN — CARVEDILOL 37.5 MG: 25 TABLET, FILM COATED ORAL at 10:09

## 2022-09-02 RX ADMIN — Medication 400 MG: at 10:09

## 2022-09-02 RX ADMIN — CEFTAZIDIME 2 G: 2 INJECTION, POWDER, FOR SOLUTION INTRAVENOUS at 12:09

## 2022-09-02 RX ADMIN — ATORVASTATIN CALCIUM 40 MG: 40 TABLET, FILM COATED ORAL at 10:09

## 2022-09-02 RX ADMIN — AMLODIPINE BESYLATE 10 MG: 10 TABLET ORAL at 10:09

## 2022-09-02 NOTE — PROGRESS NOTES
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 Diaz who presents with sudden onset of symptoms concerning for focal seizure. Patient was in her yard today playing with her son when her R arm became weak suddenly followed by the inability to speak and RLE weakness. This went on for a couple minutes and self resolved; patient said she had some lasting heaviness on her R side after. She came to the ED immediately.    Here in the ED she had another episode that resolved on its own. NSGY consulted.       Post-Op Info:  Procedure(s) (LRB):  LEFT Cranial wound debridement and washout (Left)  DEBRIDEMENT, WOUND   6 Days Post-Op     Interval History: NAEON. AFVSS. Stepped down to floor. Pt reports mild pain around incision, o/w no complaints. Denies any episodes of aphasia or seizure-like activity. OR Cx's remain no growth. PICC placed for IV Abx. PT/OT recs for rehab, however pt would like to go home with HH and assistance from family.    Medications:  Continuous Infusions:  Scheduled Meds:   amitriptyline  75 mg Oral QHS    amLODIPine  10 mg Oral Daily    atorvastatin  40 mg Oral Daily    carvediloL  37.5 mg Oral BID    ceftAZIDime (FORTAZ) IVPB  2 g Intravenous Q8H    EScitalopram oxalate  10 mg Oral Daily    heparin (porcine)  5,000 Units Subcutaneous Q8H    insulin aspart U-100  8 Units Subcutaneous TIDWM    insulin detemir U-100  24 Units Subcutaneous Daily    levETIRAcetam  1,000 mg Oral BID    magnesium oxide  400 mg Oral BID    senna-docusate 8.6-50 mg  1 tablet Oral Daily    sodium chloride 0.9%  10 mL Intravenous Q6H    vancomycin (VANCOCIN) IVPB  1,500 mg Intravenous Q24H     PRN Meds:acetaminophen, albuterol, dextrose 10%, dextrose 10%, glucagon (human recombinant), glucose, glucose,  hydrALAZINE, insulin aspart U-100, labetalol, melatonin, oxyCODONE, oxyCODONE, [place] PICC Protocol **AND** sodium chloride 0.9% **AND** sodium chloride 0.9%, Pharmacy to dose Vancomycin consult **AND** vancomycin - pharmacy to dose     Review of Systems  Objective:     Weight: 88.5 kg (195 lb)  Body mass index is 34.54 kg/m².  Vital Signs (Most Recent):  Temp: 98 °F (36.7 °C) (09/02/22 0816)  Pulse: 84 (09/02/22 0816)  Resp: 15 (09/02/22 0816)  BP: (!) 143/71 (09/02/22 0816)  SpO2: 98 % (09/02/22 0816)   Vital Signs (24h Range):  Temp:  [98 °F (36.7 °C)-98.9 °F (37.2 °C)] 98 °F (36.7 °C)  Pulse:  [73-92] 84  Resp:  [15-39] 15  SpO2:  [91 %-100 %] 98 %  BP: (124-144)/(71-84) 143/71                     Female External Urinary Catheter 08/27/22 1045 (Active)   Skin no redness;no breakdown 09/01/22 1501   Tolerance no signs/symptoms of discomfort 09/01/22 1501   Suction Continuous suction at 60 mmHg 09/01/22 0701   Date of last wick change 09/01/22 09/01/22 0701   Time of last wick change 0701 09/01/22 0701   Output (mL) 200 mL 09/01/22 0400       Physical Exam    Neurosurgery Physical Exam    General: well developed, well nourished, no distress.   Head: normocephalic  Neck: No tracheal deviation. No palpable masses. Full ROM.   Neurologic: Alert and oriented. Thought content appropriate.  GCS: E4 V5 M6; Total: 15  Mental Status: Awake, Alert, Oriented x 4  Language: No aphasia  Speech: No dysarthria  Cranial nerves: face symmetric, tongue midline, CN II-XII grossly intact.   Eyes: pupils equal, round, reactive to light with accomodation, EOMI.   Ears: No drainage.   Pulmonary: normal respirations, no signs of respiratory distress  Abdomen: soft, non-distended, not tender to palpation  Skin: Skin is warm, dry and intact.    Sensory: intact to light touch throughout  Motor Strength: Moves all extremities spontaneously with good tone. Grossly full strength upper and lower extremities. No abnormal movements seen.       Cerebellar:   Finger-to-nose: intact bilaterally   Pronator drift: absent bilaterally    Left Cranial Incision: C/D/I with skin edges well approximated with nylon sutures. No surrounding erythema or edema. No drainage from incision. No palpable hematoma or underlying fluid collection.      Significant Labs:  Recent Labs   Lab 09/01/22  0029 09/02/22  0401    115*    139   K 3.2* 3.9    102   CO2 25 28   BUN 9 8   CREATININE 0.9 0.8   CALCIUM 9.4 9.4   MG 2.1 1.7     Recent Labs   Lab 09/01/22  0029 09/02/22  0401   WBC 5.75 5.52   HGB 10.1* 10.4*   HCT 29.5* 31.0*    252     No results for input(s): LABPT, INR, APTT in the last 48 hours.  Microbiology Results (last 7 days)       Procedure Component Value Units Date/Time    Fungus culture [[id number]] Collected: 08/27/22 0909    Order Status: Completed Specimen: Wound from Head Updated: 08/31/22 1055     Fungus (Mycology) Culture Culture in progress    Narrative:      Superficial head culture, aerobic, anaerobic, fungus, AFB,  gram    Fungus culture [[id number]] Collected: 08/27/22 0912    Order Status: Completed Specimen: Wound from Head Updated: 08/31/22 1055     Fungus (Mycology) Culture Culture in progress    Narrative:      Epidural culture, aerobic, anaerobic, gram, AFB, gram    Fungus culture [[id number]] Collected: 08/22/22 1550    Order Status: Completed Specimen: Incision site from Head Updated: 08/31/22 1055     Fungus (Mycology) Culture Culture in progress    Culture, Anaerobic [[id number]] Collected: 08/27/22 0909    Order Status: Completed Specimen: Wound from Head Updated: 08/31/22 0736     Anaerobic Culture Culture in progress    Narrative:      Superficial head culture, aerobic, anaerobic, fungus, AFB,  gram    Culture, Anaerobic [[id number]] Collected: 08/27/22 0912    Order Status: Completed Specimen: Wound from Head Updated: 08/31/22 0734     Anaerobic Culture Culture in progress    Narrative:      Epidural culture, aerobic,  anaerobic, gram, AFB, gram    Aerobic culture [[id number]] Collected: 08/27/22 0909    Order Status: Completed Specimen: Wound from Head Updated: 08/30/22 0953     Aerobic Bacterial Culture No growth    Narrative:      Superficial head culture, aerobic, anaerobic, fungus, AFB,  gram    Aerobic culture [[id number]] Collected: 08/27/22 0912    Order Status: Completed Specimen: Wound from Head Updated: 08/30/22 0953     Aerobic Bacterial Culture No growth    Narrative:      Epidural culture, aerobic, anaerobic, gram, AFB, gram    Culture, Anaerobic [[id number]] Collected: 08/22/22 1550    Order Status: Completed Specimen: Incision site from Head Updated: 08/30/22 0654     Anaerobic Culture No anaerobes isolated    AFB Culture & Smear [[id number]] Collected: 08/27/22 0909    Order Status: Completed Specimen: Wound from Head Updated: 08/29/22 1521     AFB Culture & Smear Culture in progress     AFB CULTURE STAIN No acid fast bacilli seen.    Narrative:      Superficial head culture, aerobic, anaerobic, fungus, AFB,  gram    AFB Culture & Smear [[id number]] Collected: 08/27/22 0912    Order Status: Completed Specimen: Wound from Head Updated: 08/29/22 1521     AFB Culture & Smear Culture in progress     AFB CULTURE STAIN No acid fast bacilli seen.    Narrative:      Epidural culture, aerobic, anaerobic, gram, AFB, gram    Gram stain [[id number]] Collected: 08/27/22 0912    Order Status: Completed Specimen: Wound from Head Updated: 08/27/22 1044     Gram Stain Result No WBC's      No organisms seen    Narrative:      Epidural culture, aerobic, anaerobic, gram, AFB, gram    Gram stain [[id number]] Collected: 08/27/22 0909    Order Status: Completed Specimen: Wound from Head Updated: 08/27/22 1043     Gram Stain Result Rare WBC's      No organisms seen    Narrative:      Superficial head culture, aerobic, anaerobic, fungus, AFB,  gram    Blood culture [[id number]] Collected: 08/21/22 2325    Order Status: Completed Specimen: Blood  from Peripheral, Antecubital, Right Updated: 08/27/22 0612     Blood Culture, Routine No growth after 5 days.          All pertinent labs from the last 24 hours have been reviewed.    Significant Diagnostics:  I have reviewed and interpreted all pertinent imaging results/findings within the past 24 hours.    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 Diaz who presented after episode of sudden onset R-sided weakness/tremors and aphasia for about 5 minutes, resolved prior to EMS arrival, concerning for focal seizure, then with second episode while in the ED. MRI brain w/wo concerning for scalp and epidural infection.    Now s/p L crani revision for wound washout on 8/27/22.     - Stepped down to floor under NSGY on 9/1. Neuro checks q4h.  - all labs and diagnostics reviewed          - CTA 8/21: largely stable from prior with epidural fluid collection and extracranial fluid collection stable in size; stable postsurgical changes of L PCOM/anterior choroidal aneurysm clipping, stable small left MCA bifurcation aneurysm          - MRI brain w wo 8/21: peripheral rim enhancement of subdural and scalp complex fluid collections, concerning for infection; no evidence of enhancement of brain parenchyma   - spot EEG 8/23: mild regional cortical or subcortical dysfunction in the left temporal region with no electrographic seizures or indications of seizure tendency.   - CTH 8/28: Residual mixed density collection overlies the soft tissues as well as within the extra-axial space  underlying the craniotomy likely represents postoperative gas fluid and hemorrhage with residual infection not  Excluded. No new intracranial findings.    - CTH 8/29: worsening epidural and subcutaneous fluid collection with local mass effect on L frontal lobe      - CTH 8/30: extracranial  collection gone, epidural collection remains with some mild mass effect     - Incision open to air. HV drain removed 9/1.  - Infectious workup:   - CTP 8/31: equivocal           - afebrile, no leukocytosis          - blood cx 8/21 NGTD          - ESR 30-->46, CRP 28.5-->26          - Subcutaneous scalp fluid collection tapped 8/22, sent for Cx's - remain no growth   - OR Cx's 8/27 NGTD, Pathology in process          - ID consulted, recommend Vanc/Ceftazidime x 6 weeks (end date 10/8/22)   - PICC in place  - Seizures: Episodes concerning for focal seizures on admission with R-sided tremors and weakness. Not on AEDs prior to admission.          - Keppra loaded on admission, continue 1g bid          - spot EEG 8/23 with left temporal subcortical dysfunction, negative for electrographic seizures   - cEEG 8/30-8/31: generalized background slowing consistent with a moderate encephalopathy with more prominent slowing seen over the left hemisphere with contributions from a breach rhythm in this area.  There are no pushbutton activations, no epileptiform discharges, and no electrographic seizures.   - New Onset T2DM: No h/o DM. Hyperglycemic on admission. HbA1C 8.3. Endocrinology consulted, managing insulin. Goal -180.   - Continue Levemir 24 units daily.    - Continue Novolog 8 units TDIWM.    - Moderate Dose SQ Insulin Correction Scale.   - BG Monitoring AC/HS     - HTN: SBP <160. Controlled on current regimen. Continue home meds.  - Hypokalemia, Hypomagnesemia: Chronic, on home supplements daily.          - Continue home potassium and Mg supplements          - Replete PRN, daily labs  - regular diet  - HAIM/SCD/SQH  - PT/OT/OOB    Dispo: medically ready for discharge. PT/OT recs for rehab, pt prefers to discharge home w/ HH and assistance from family.         SARAVANAN Gray-DI  Neurosurgery  Misael Schmitt - Neurosurgery ([facility])

## 2022-09-02 NOTE — TELEPHONE ENCOUNTER
----- Message from Marlene Robison PA-C sent at 9/2/2022  1:02 PM CDT -----  Hey,    Can you please schedule her for 2 wk postop f/u for wound check and suture removal, and about 6 wks postop with Dr. Diaz with imaging? I will see what imaging he wants and let you know.    Thanks!  Shivani

## 2022-09-02 NOTE — PLAN OF CARE
Problem: Adult Inpatient Plan of Care  Goal: Plan of Care Review  Outcome: Met  Goal: Patient-Specific Goal (Individualized)  Description: Admit Date: 8/21/2022    Post op infection    Past Medical History:  No date: Brain aneurysm  No date: CHF (congestive heart failure)  No date: H/O coronary angioplasty  No date: Hypercholesteremia  No date: Hypertension  No date: Malignant hypertension  No date: Migraine headache  10/2017: Stroke    Past Surgical History:  No date: CARDIAC CATHETERIZATION  No date: CEREBRAL ANGIOGRAM  7/15/2022: CLIP LIGATION OF INTRACRANIAL ANEURYSM BY CRANIOTOMY; N/A      Comment:  Procedure: CRANIOTOMY, WITH ANEURYSM CLIPPING;  Surgeon:               Timothy Diaz MD;  Location: [facility] OR [address] [place] [address];                 Service: Neurosurgery;  Laterality: N/A;  PTERIONAL                CRANIOTOMY WITH CLIP LIGATION OF L PCOMM, L ANTERIOR                CHOROIDAL, L MCA  ANEURYSM, ANESTHESIA: GENERAL, BLOOD:                TYPE&CROSS 2 UNITS, NEUROMONITORING: SEP, MEP, EEG,                RADIOLOGY: C-ARM, POSITION: SUPINE, CASTILLO, CO-SURGERON:                DR. LEXI DOMÍNGUEZ.    Individualization:   1. Pt likes dim lights.    Restraints: na             Outcome: Met  Goal: Absence of Hospital-Acquired Illness or Injury  Outcome: Met  Goal: Optimal Comfort and Wellbeing  Outcome: Met  Goal: Readiness for Transition of Care  Outcome: Met     Problem: Diabetes Comorbidity  Goal: Blood Glucose Level Within Targeted Range  Outcome: Met     Problem: Pain Acute  Goal: Acceptable Pain Control and Functional Ability  Outcome: Met     Problem: Seizure, Active Management  Goal: Absence of Seizure/Seizure-Related Injury  Outcome: Met     Problem: Infection  Goal: Absence of Infection Signs and Symptoms  Outcome: Met     Problem: Skin Injury Risk Increased  Goal: Skin Health and Integrity  Outcome: Met

## 2022-09-02 NOTE — PT/OT/SLP PROGRESS
"Occupational Therapy   Treatment    Name: Gina Jenkins  MRN: [id number]  Admitting Diagnosis:  Fluid collection at surgical site  6 Days Post-Op    Recommendations:     Discharge Recommendations: rehabilitation facility  Discharge Equipment Recommendations:  bedside commode  Barriers to discharge:  Decreased caregiver support    Assessment:     Gina Jenkins is a 47 y.o. female with a medical diagnosis of Fluid collection at surgical site.  She presents with the following performance deficits affecting function:  weakness, impaired endurance, impaired self care skills, impaired functional mobility, gait instability, impaired balance, decreased safety awareness.     Pt preparing to discharge upon attempt. Ambulated into the bathroom for a void on the toilet with CGA provided. Pt still demonstrating poor endurance and decreased safety awareness. Pt would benefit from continued skilled acute OT services in order to maximize independence and safety with ADLs and functional mobility to ensure safe return to PLOF in the least restrictive environment. OT recommending Rehab once pt is medically appropriate for d/c.       Rehab Prognosis:  Good; patient would benefit from acute skilled OT services to address these deficits and reach maximum level of function.       Plan:     Patient to be seen 3 x/week to address the above listed problems via self-care/home management, therapeutic activities, therapeutic exercises, neuromuscular re-education  Plan of Care Expires: 09/30/22  Plan of Care Reviewed with: patient    Subjective     "I am going home"     Pain/Comfort:  Pain Rating 1: 0/10    Objective:     Communicated with: RN prior to session.  Patient found sitting edge of bed with Other (comments) (no active lines) upon OT entry to room.    General Precautions: Standard, fall, aspiration, seizure   Orthopedic Precautions:N/A   Braces: N/A  Respiratory Status: Room air     Occupational Performance:     Bed Mobility:    Not " performed     Functional Mobility/Transfers:  Patient completed Sit <> Stand Transfer with contact guard assistance  with  rolling walker   Patient completed Toilet Transfer Step Transfer technique with contact guard assistance with  rolling walker  Functional Mobility: Pt engaging in functional mobility to simulate household/community distances approx 15 ft with CGA and utilizing RW in order to maximize functional activity tolerance and standing balance required for engagement in occupations of choice.    Activities of Daily Living:  Toileting: stand by assistance : For a void on the toilet       AMPA 6 Click ADL: 21    Treatment & Education:  Therapist provided facilitation and instruction of proper body mechanics and fall prevention strategies during tasks listed above.  Instructed patient to sit in bedside chair daily to increase OOB/activity tolerance.  Instructed patient to use call light to have nursing staff assist with needs/transfers.  Discussed OT POC and answered all questions within OT scope of practice.  Whiteboard updated       Patient left HOB elevated with all lines intact, call button in reach, and family and RN present    GOALS:   Multidisciplinary Problems       Occupational Therapy Goals          Problem: Occupational Therapy    Goal Priority Disciplines Outcome Interventions   Occupational Therapy Goal     OT, PT/OT Ongoing, Progressing    Description: Goals to be met by: 09/31/22     Patient will increase functional independence with ADLs by performing:    UE Dressing with Supervision.  Grooming while EOB with Set-up Assistance.  Toileting from bedside commode with Minimal Assistance for hygiene and clothing management.   Sitting at edge of bed x10 minutes with Stand-by Assistance.  Toilet transfer to bedside commode with Contact Guard Assistance.                         Time Tracking:     OT Date of Treatment: 09/02/22  OT Start Time: 1420  OT Stop Time: 1428  OT Total Time (min): 8  min    Billable Minutes:Therapeutic Activity 8    OT/LUKE: OT          9/2/2022

## 2022-09-02 NOTE — PLAN OF CARE
[facility] - Neurosurgery (Hospital)  Discharge Final Note    Primary Care Provider: Clair Johnson NP    Expected Discharge Date: 9/2/2022    Patient to be discharged home.  The patient will have home health with [facility], [place] and ivab with [facility].  Family to provide transportation home.  Neurosurgery clinic to schedule follow up appointment.    UPDATE:  [facility] unable to draw vanc trough this weekend due to lab being closed.  CM in communication with Adilene with [facility] who checked with pharmacist and advised this is ok. [facility] to draw trough on Tuesday.  D[facility]REL communicated with [facility] to draw lab on Tuesday.    Future Appointments   Date Time Provider Department Center   9/14/2022  9:00 AM Deb Wong RN [facility] NEUROS8 [facility] [address]   9/28/2022  2:00 PM Karely Rosa, DO [facility] ID [facility] [address]   10/10/2022 10:00 AM Karely Rosa, DO [facility] ID [facility] [address]   11/3/2022  3:30 PM [facility] [address]   12/8/2022  2:40 PM Mil Adam III, MD [facility] CARDIO [place]        Final Discharge Note (most recent)       Final Note - 09/02/22 1413          Final Note    Assessment Type Final Discharge Note     Anticipated Discharge Disposition IV Therapy Provider        Post-Acute Status    Post-Acute Authorization Placement;IV Infusion;Home Health     Post-Acute Placement Status Discharge Plan Changed     Home Health Status Set-up Complete/Auth obtained     IV Infusion Status Set-up Complete/Auth obtained     Discharge Delays None known at this time                     Important Message from Medicare

## 2022-09-02 NOTE — PLAN OF CARE
Problem: Adult Inpatient Plan of Care  Goal: Plan of Care Review  Outcome: Ongoing, Progressing  Goal: Optimal Comfort and Wellbeing  Outcome: Ongoing, Progressing     Problem: Diabetes Comorbidity  Goal: Blood Glucose Level Within Targeted Range  Outcome: Ongoing, Progressing     Problem: Pain Acute  Goal: Acceptable Pain Control and Functional Ability  Outcome: Ongoing, Progressing     Problem: Seizure, Active Management  Goal: Absence of Seizure/Seizure-Related Injury  Outcome: Ongoing, Progressing     Problem: Infection  Goal: Absence of Infection Signs and Symptoms  Outcome: Ongoing, Progressing

## 2022-09-02 NOTE — TELEPHONE ENCOUNTER
Patient is new onset DM, and would benefit from DM education as well as discharge clinic follow-up.     Thanks for your time,

## 2022-09-02 NOTE — DISCHARGE INSTRUCTIONS
Neurosurgery Patient Information  -Return to work will be determined on an individual basis.  -No driving until released by Dr. Diaz   -Do not take any OTC products containing acetaminophen at the same time as you take your narcotic pain medication. Medications that may contain acetaminophen include but are not limited to: Excedrin and other headache medications, arthritis medications, cold and sinus medications, etc. Please review the list of active ingredients in any OTC medication prior to taking it.  -Do not take any Aspirin or Aspirin-containing products for 2 weeks after surgery.  -Do not take any Aleve, Naprosyn, Naproxen, Ibuprofen, Advil or any other nonsteroidal anti-inflammatory drug (NSAID) for 2 weeks after surgery.  -Do not take any herbal supplements for 2 weeks after surgery.   -Do not consume any alcoholic beverages until released by your neurosurgeon  -Do not perform any excessive bending over or leaning forward as this is a fall hazard.  -Do not perform any heavy lifting or lifting more than 5-10 lbs from the ground level as this is a fall hazard.  -Slowly increase your ambulation [walking] over the next 2 weeks as tolerated. The goal is to be walking 1-2 miles by the time of your 2 week post op appointment.   -Walk on paved surfaces only. It is okay to walk up and down stairs while holding onto a side rail.      Contact the Neurosurgery Office immediately if:  If you begin to notice any neurologic changes such as:           -Sudden onset of lethargy or sleepiness           -Sudden confusion, trouble speaking, or understanding            -Sudden trouble seeing in one or both eyes            -Sudden trouble walking, dizziness, loss of coordination            -Sudden severe headache with no known cause            -Sudden onset of numbness or weakness     Wound Care:  Keep your incision open to air. You may shower on the 2nd day after your surgery. Keep the incision clean and dry at all times. Do  not allow the force of water to hit the incision. If the incision gets damp, gently pat it dry. Do not rub or scrub the incision. You cannot take a bath/swim/submerge the incision until 8 weeks after surgery.    The incision does not need to be cleaned with any water, soap, alcohol, peroxide, or other substance.    Call your doctor or go to the Emergency Room for any signs of infection including: increased redness, drainage, pain or fever (temperature greater than or equal to 101.4).       Miscellaneous:  -You were started on a medication to prevent seizures (Keppra) while in the hospital. Please continue to take this medication as instructed.   -Follow up with Dr. Diaz in 2 weeks for a wound check. Appointment will be mailed to you.        [facility] Neurosurgery Office: [phone]

## 2022-09-02 NOTE — SUBJECTIVE & OBJECTIVE
Interval History: NAEON. AFVSS. Stepped down to floor. Pt reports mild pain around incision, o/w no complaints. Denies any episodes of aphasia or seizure-like activity. OR Cx's remain no growth. PICC placed for IV Abx. PT/OT recs for rehab, however pt would like to go home with HH and assistance from family.    Medications:  Continuous Infusions:  Scheduled Meds:   amitriptyline  75 mg Oral QHS    amLODIPine  10 mg Oral Daily    atorvastatin  40 mg Oral Daily    carvediloL  37.5 mg Oral BID    ceftAZIDime (FORTAZ) IVPB  2 g Intravenous Q8H    EScitalopram oxalate  10 mg Oral Daily    heparin (porcine)  5,000 Units Subcutaneous Q8H    insulin aspart U-100  8 Units Subcutaneous TIDWM    insulin detemir U-100  24 Units Subcutaneous Daily    levETIRAcetam  1,000 mg Oral BID    magnesium oxide  400 mg Oral BID    senna-docusate 8.6-50 mg  1 tablet Oral Daily    sodium chloride 0.9%  10 mL Intravenous Q6H    vancomycin (VANCOCIN) IVPB  1,500 mg Intravenous Q24H     PRN Meds:acetaminophen, albuterol, dextrose 10%, dextrose 10%, glucagon (human recombinant), glucose, glucose, hydrALAZINE, insulin aspart U-100, labetalol, melatonin, oxyCODONE, oxyCODONE, Flushing PICC Protocol **AND** sodium chloride 0.9% **AND** sodium chloride 0.9%, Pharmacy to dose Vancomycin consult **AND** vancomycin - pharmacy to dose     Review of Systems  Objective:     Weight: 88.5 kg (195 lb)  Body mass index is 34.54 kg/m².  Vital Signs (Most Recent):  Temp: 98 °F (36.7 °C) (09/02/22 0816)  Pulse: 84 (09/02/22 0816)  Resp: 15 (09/02/22 0816)  BP: (!) 143/71 (09/02/22 0816)  SpO2: 98 % (09/02/22 0816)   Vital Signs (24h Range):  Temp:  [98 °F (36.7 °C)-98.9 °F (37.2 °C)] 98 °F (36.7 °C)  Pulse:  [73-92] 84  Resp:  [15-39] 15  SpO2:  [91 %-100 %] 98 %  BP: (124-144)/(71-84) 143/71                     Female External Urinary Catheter 08/27/22 1045 (Active)   Skin no redness;no breakdown 09/01/22 1501   Tolerance no signs/symptoms of discomfort  09/01/22 1501   Suction Continuous suction at 60 mmHg 09/01/22 0701   Date of last wick change 09/01/22 09/01/22 0701   Time of last wick change 0701 09/01/22 0701   Output (mL) 200 mL 09/01/22 0400       Physical Exam    Neurosurgery Physical Exam    General: well developed, well nourished, no distress.   Head: normocephalic  Neck: No tracheal deviation. No palpable masses. Full ROM.   Neurologic: Alert and oriented. Thought content appropriate.  GCS: E4 V5 M6; Total: 15  Mental Status: Awake, Alert, Oriented x 4  Language: No aphasia  Speech: No dysarthria  Cranial nerves: face symmetric, tongue midline, CN II-XII grossly intact.   Eyes: pupils equal, round, reactive to light with accomodation, EOMI.   Ears: No drainage.   Pulmonary: normal respirations, no signs of respiratory distress  Abdomen: soft, non-distended, not tender to palpation  Skin: Skin is warm, dry and intact.    Sensory: intact to light touch throughout  Motor Strength: Moves all extremities spontaneously with good tone. Grossly full strength upper and lower extremities. No abnormal movements seen.      Cerebellar:   Finger-to-nose: intact bilaterally   Pronator drift: absent bilaterally    Left Cranial Incision: C/D/I with skin edges well approximated with nylon sutures. No surrounding erythema or edema. No drainage from incision. No palpable hematoma or underlying fluid collection.      Significant Labs:  Recent Labs   Lab 09/01/22  0029 09/02/22  0401    115*    139   K 3.2* 3.9    102   CO2 25 28   BUN 9 8   CREATININE 0.9 0.8   CALCIUM 9.4 9.4   MG 2.1 1.7     Recent Labs   Lab 09/01/22  0029 09/02/22  0401   WBC 5.75 5.52   HGB 10.1* 10.4*   HCT 29.5* 31.0*    252     No results for input(s): LABPT, INR, APTT in the last 48 hours.  Microbiology Results (last 7 days)       Procedure Component Value Units Date/Time    Fungus culture [[id number]] Collected: 08/27/22 0909    Order Status: Completed Specimen: Wound from  Head Updated: 08/31/22 1055     Fungus (Mycology) Culture Culture in progress    Narrative:      Superficial head culture, aerobic, anaerobic, fungus, AFB,  gram    Fungus culture [[id number]] Collected: 08/27/22 0912    Order Status: Completed Specimen: Wound from Head Updated: 08/31/22 1055     Fungus (Mycology) Culture Culture in progress    Narrative:      Epidural culture, aerobic, anaerobic, gram, AFB, gram    Fungus culture [[id number]] Collected: 08/22/22 1550    Order Status: Completed Specimen: Incision site from Head Updated: 08/31/22 1055     Fungus (Mycology) Culture Culture in progress    Culture, Anaerobic [[id number]] Collected: 08/27/22 0909    Order Status: Completed Specimen: Wound from Head Updated: 08/31/22 0736     Anaerobic Culture Culture in progress    Narrative:      Superficial head culture, aerobic, anaerobic, fungus, AFB,  gram    Culture, Anaerobic [[id number]] Collected: 08/27/22 0912    Order Status: Completed Specimen: Wound from Head Updated: 08/31/22 0734     Anaerobic Culture Culture in progress    Narrative:      Epidural culture, aerobic, anaerobic, gram, AFB, gram    Aerobic culture [[id number]] Collected: 08/27/22 0909    Order Status: Completed Specimen: Wound from Head Updated: 08/30/22 0953     Aerobic Bacterial Culture No growth    Narrative:      Superficial head culture, aerobic, anaerobic, fungus, AFB,  gram    Aerobic culture [[id number]] Collected: 08/27/22 0912    Order Status: Completed Specimen: Wound from Head Updated: 08/30/22 0953     Aerobic Bacterial Culture No growth    Narrative:      Epidural culture, aerobic, anaerobic, gram, AFB, gram    Culture, Anaerobic [[id number]] Collected: 08/22/22 1550    Order Status: Completed Specimen: Incision site from Head Updated: 08/30/22 0654     Anaerobic Culture No anaerobes isolated    AFB Culture & Smear [[id number]] Collected: 08/27/22 0909    Order Status: Completed Specimen: Wound from Head Updated: 08/29/22 1521     AFB  Culture & Smear Culture in progress     AFB CULTURE STAIN No acid fast bacilli seen.    Narrative:      Superficial head culture, aerobic, anaerobic, fungus, AFB,  gram    AFB Culture & Smear [[id number]] Collected: 08/27/22 0912    Order Status: Completed Specimen: Wound from Head Updated: 08/29/22 1521     AFB Culture & Smear Culture in progress     AFB CULTURE STAIN No acid fast bacilli seen.    Narrative:      Epidural culture, aerobic, anaerobic, gram, AFB, gram    Gram stain [[id number]] Collected: 08/27/22 0912    Order Status: Completed Specimen: Wound from Head Updated: 08/27/22 1044     Gram Stain Result No WBC's      No organisms seen    Narrative:      Epidural culture, aerobic, anaerobic, gram, AFB, gram    Gram stain [[id number]] Collected: 08/27/22 0909    Order Status: Completed Specimen: Wound from Head Updated: 08/27/22 1043     Gram Stain Result Rare WBC's      No organisms seen    Narrative:      Superficial head culture, aerobic, anaerobic, fungus, AFB,  gram    Blood culture [[id number]] Collected: 08/21/22 2325    Order Status: Completed Specimen: Blood from Peripheral, Antecubital, Right Updated: 08/27/22 0612     Blood Culture, Routine No growth after 5 days.          All pertinent labs from the last 24 hours have been reviewed.    Significant Diagnostics:  I have reviewed and interpreted all pertinent imaging results/findings within the past 24 hours.

## 2022-09-02 NOTE — PROGRESS NOTES
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 Diaz    Diabetes diagnosis year:     Home Diabetes Medications:  No current home medications.     How often checking glucose at home?  Not previously monitoring blood sugar.    BG readings on regimen: unknown  Hypoglycemia on the regimen?  No  Missed doses on regimen?  No    Diabetes Complications include:     Hyperglycemia    Complicating diabetes co morbidities:   CHF and History of CVA, HTN, HLD      HPI:   Patient is a 47 y.o. 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 Diaz who presents with sudden onset of symptoms concerning for focal seizure. Patient was in her yard today playing with her son when her R arm became weak suddenly followed by the inability to speak and RLE weakness. This went on for a couple minutes and self resolved; patient said she had some lasting heaviness on her R side after. Endocrinology consulted on 2022 to manage glycemic control.     Lab Results   Component Value Date    HGBA1C 8.3 (H) 2022             Interval HPI:   Overnight events:   BG stable and will controlled while on current SQ MDI insulin regimen. Patient is scheduled for discharge today. Endocrinology will continue to follow, provide discharge recs, and manage glycemic control while inpatient.   Diet diabetic [facility]; 2000 Calorie; Isolation Tray - Regular [place]  6 Days Post-Op      Eatin%  Nausea: No  Hypoglycemia and intervention: No  Fever: No  TPN and/or TF: No  If yes, type of TF/TPN and rate: None    BP (!) 143/71 (BP Location: Left arm, Patient Position: Lying)   Pulse 84   Temp 98 °F (36.7  "°C) (Oral)   Resp 15   Ht 5' 3" (1.6 m)   Wt 88.5 kg (195 lb)   SpO2 98%   Breastfeeding No   BMI 34.54 kg/m²     Labs Reviewed and Include    Recent Labs   Lab 09/02/22  0401   *   CALCIUM 9.4      K 3.9   CO2 28      BUN 8   CREATININE 0.8     Lab Results   Component Value Date    WBC 5.52 09/02/2022    HGB 10.4 (L) 09/02/2022    HCT 31.0 (L) 09/02/2022    MCV 85 09/02/2022     09/02/2022     No results for input(s): TSH, FREET4 in the last 168 hours.  Lab Results   Component Value Date    HGBA1C 8.3 (H) 08/22/2022       Nutritional status:   Body mass index is 34.54 kg/m².  Lab Results   Component Value Date    ALBUMIN 3.4 (L) 08/21/2022    ALBUMIN 3.2 (L) 07/19/2022    ALBUMIN 3.4 (L) 07/18/2022     No results found for: PREALBUMIN    Estimated Creatinine Clearance: 92.7 mL/min (based on SCr of 0.8 mg/dL).    Accu-Checks  Recent Labs     08/31/22  0809 08/31/22  1206 08/31/22  1648 08/31/22  2112 09/01/22  0748 09/01/22  1202 09/01/22  2055 09/02/22  0739 09/02/22  1041 09/02/22  1128   POCTGLUCOSE 111* 146* 132* 89 118* 128* 199* 132* 181* 179*       Current Medications and/or Treatments Impacting Glycemic Control  Immunotherapy:    Immunosuppressants       None          Steroids:   Hormones (From admission, onward)      Start     Stop Route Frequency Ordered    08/22/22 0313  melatonin tablet 6 mg         -- Oral Nightly PRN 08/22/22 0213          Pressors:    Autonomic Drugs (From admission, onward)      None          Hyperglycemia/Diabetes Medications:   Antihyperglycemics (From admission, onward)      Start     Stop Route Frequency Ordered    08/30/22 0900  insulin detemir U-100 pen 24 Units         -- SubQ Daily 08/30/22 0849    08/29/22 1215  insulin aspart U-100 pen 8 Units         -- SubQ 3 times daily with meals 08/29/22 1202    08/24/22 1109  insulin aspart U-100 pen 1-10 Units         -- SubQ Before meals & nightly PRN 08/24/22 1010            ASSESSMENT and PLAN    * " "Fluid collection at surgical site  Managed per primary team  Optimize BG control      New onset type 2 diabetes mellitus  BG goal 140 - 180     - Continue Levemir to 24 untis daily (20% dose decrease)   - Continue Novolog to 8 units TIDWM.  - Moderate Dose SQ Insulin Correction Scale.  - BG Monitoring AC/HS.   - Bedside nurse to instruct on insulin pen use and blood sugar monitor. Have patient administer own injections after education completed supervised by nurse. Have patient watch insulin educatoin video's via i-pad if available on unit.    ** Please call Endocrine for any BG related issues **  ** Please notify Endocrine for any change and/or advance in diet**    Lab Results   Component Value Date    HGBA1C 8.3 (H) 08/22/2022       Discharge Planning:   - Insurance preferred diabetes testing supplies  - Ultra-Fine Alisia Pen Needles 4mm x 32 G (5/32" x 0.23mm).   - Start Levemir (insurance preferred basal insulin) 24 units daily.   - Start Novolog 8 units TIDWM in addition to the following correction scale:     150 - 200 + 1 unit     201 - 250 + 2 units     251 - 300 + 3 units     301 - 350 + 4 units      > 350   + 5 units  - Start Trulicity (GLP-1) to help reduce BMI, and correct BG excursions    SubQ: Initial: 0.75 mg once weekly; may increase to 1.5 mg once weekly if inadequate glycemic response; maximum: 1.5 mg/week  Missed doses: If a dose is missed, administer as soon as possible within 3 days after the missed dose; dosing can then be resumed on the usual day of administration. If there are less than 3 days until next scheduled dose, omit the missed dose and resume administration at the next regularly scheduled weekly dose.    - patient is to keep BG logs, and monitor BG ACHS.   - Recommend patient follow-up with outpatient endocrinology for DM education and follow-up.           Mixed hyperlipidemia  Managed per primary team  Condition may cause insulin resistance         Essential hypertension  Managed per " primary team  Condition may cause insulin resistance               Julio Lewis NP  Endocrinology  Misael Schmitt - Neurosurgery ([facility])

## 2022-09-02 NOTE — ASSESSMENT & PLAN NOTE
"BG goal 140 - 180     - Continue Levemir to 24 untis daily (20% dose decrease)   - Continue Novolog to 8 units TIDWM.  - Moderate Dose SQ Insulin Correction Scale.  - BG Monitoring AC/HS.   - Bedside nurse to instruct on insulin pen use and blood sugar monitor. Have patient administer own injections after education completed supervised by nurse. Have patient watch insulin educatoin video's via i-pad if available on unit.    ** Please call Endocrine for any BG related issues **  ** Please notify Endocrine for any change and/or advance in diet**    Lab Results   Component Value Date    HGBA1C 8.3 (H) 08/22/2022       Discharge Planning:   - Insurance preferred diabetes testing supplies  - Ultra-Fine Alisia Pen Needles 4mm x 32 G (5/32" x 0.23mm).   - Start Levemir (insurance preferred basal insulin) 24 units daily.   - Start Novolog 8 units TIDWM in addition to the following correction scale:     150 - 200 + 1 unit     201 - 250 + 2 units     251 - 300 + 3 units     301 - 350 + 4 units      > 350   + 5 units  - Start Trulicity (GLP-1) to help reduce BMI, and correct BG excursions    SubQ: Initial: 0.75 mg once weekly; may increase to 1.5 mg once weekly if inadequate glycemic response; maximum: 1.5 mg/week  Missed doses: If a dose is missed, administer as soon as possible within 3 days after the missed dose; dosing can then be resumed on the usual day of administration. If there are less than 3 days until next scheduled dose, omit the missed dose and resume administration at the next regularly scheduled weekly dose.    - patient is to keep BG logs, and monitor BG ACHS.   - Recommend patient follow-up with outpatient endocrinology for DM education and follow-up.         "

## 2022-09-02 NOTE — SUBJECTIVE & OBJECTIVE
"Interval HPI:   Overnight events:   BG stable and will controlled while on current SQ MDI insulin regimen. Patient is scheduled for discharge today. Endocrinology will continue to follow, provide discharge recs, and manage glycemic control while inpatient.   Diet diabetic [facility]; 2000 Calorie; Isolation Tray - Regular [place]  6 Days Post-Op      Eatin%  Nausea: No  Hypoglycemia and intervention: No  Fever: No  TPN and/or TF: No  If yes, type of TF/TPN and rate: None    BP (!) 143/71 (BP Location: Left arm, Patient Position: Lying)   Pulse 84   Temp 98 °F (36.7 °C) (Oral)   Resp 15   Ht 5' 3" (1.6 m)   Wt 88.5 kg (195 lb)   SpO2 98%   Breastfeeding No   BMI 34.54 kg/m²     Labs Reviewed and Include    Recent Labs   Lab 22  0401   *   CALCIUM 9.4      K 3.9   CO2 28      BUN 8   CREATININE 0.8     Lab Results   Component Value Date    WBC 5.52 2022    HGB 10.4 (L) 2022    HCT 31.0 (L) 2022    MCV 85 2022     2022     No results for input(s): TSH, FREET4 in the last 168 hours.  Lab Results   Component Value Date    HGBA1C 8.3 (H) 2022       Nutritional status:   Body mass index is 34.54 kg/m².  Lab Results   Component Value Date    ALBUMIN 3.4 (L) 2022    ALBUMIN 3.2 (L) 2022    ALBUMIN 3.4 (L) 2022     No results found for: PREALBUMIN    Estimated Creatinine Clearance: 92.7 mL/min (based on SCr of 0.8 mg/dL).    Accu-Checks  Recent Labs     22  0809 22  1206 22  1648 22  2112 22  0748 22  1202 22  2055 22  0739 22  1041 22  1128   POCTGLUCOSE 111* 146* 132* 89 118* 128* 199* 132* 181* 179*       Current Medications and/or Treatments Impacting Glycemic Control  Immunotherapy:    Immunosuppressants       None          Steroids:   Hormones (From admission, onward)      Start     Stop Route Frequency Ordered    22 0313  melatonin tablet 6 mg      "    -- Oral Nightly PRN 08/22/22 0213          Pressors:    Autonomic Drugs (From admission, onward)      None          Hyperglycemia/Diabetes Medications:   Antihyperglycemics (From admission, onward)      Start     Stop Route Frequency Ordered    08/30/22 0900  insulin detemir U-100 pen 24 Units         -- SubQ Daily 08/30/22 0849    08/29/22 1215  insulin aspart U-100 pen 8 Units         -- SubQ 3 times daily with meals 08/29/22 1202    08/24/22 1109  insulin aspart U-100 pen 1-10 Units         -- SubQ Before meals & nightly PRN 08/24/22 1010

## 2022-09-02 NOTE — ASSESSMENT & PLAN NOTE
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 Diaz who presented after episode of sudden onset R-sided weakness/tremors and aphasia for about 5 minutes, resolved prior to EMS arrival, concerning for focal seizure, then with second episode while in the ED. MRI brain w/wo concerning for scalp and epidural infection.    Now s/p L crani revision for wound washout on 8/27/22.     - Stepped down to floor under NSGY on 9/1. Neuro checks q4h.  - all labs and diagnostics reviewed          - CTA 8/21: largely stable from prior with epidural fluid collection and extracranial fluid collection stable in size; stable postsurgical changes of L PCOM/anterior choroidal aneurysm clipping, stable small left MCA bifurcation aneurysm          - MRI brain w wo 8/21: peripheral rim enhancement of subdural and scalp complex fluid collections, concerning for infection; no evidence of enhancement of brain parenchyma   - spot EEG 8/23: mild regional cortical or subcortical dysfunction in the left temporal region with no electrographic seizures or indications of seizure tendency.   - CTH 8/28: Residual mixed density collection overlies the soft tissues as well as within the extra-axial space  underlying the craniotomy likely represents postoperative gas fluid and hemorrhage with residual infection not  Excluded. No new intracranial findings.    - CTH 8/29: worsening epidural and subcutaneous fluid collection with local mass effect on L frontal lobe      - CTH 8/30: extracranial collection gone, epidural collection remains with some mild mass effect     - Incision open to air. HV drain removed 9/1.  - Infectious workup:   - CTP 8/31: equivocal           - afebrile, no leukocytosis          - blood cx 8/21 NGTD          - ESR 30-->46, CRP 28.5-->26          - Subcutaneous scalp fluid collection tapped 8/22, sent for Cx's - remain no growth   -  OR Cx's 8/27 NGTD, Pathology in process          - ID consulted, recommend Vanc/Ceftazidime x 6 weeks (end date 10/8/22)   - PICC in place  - Seizures: Episodes concerning for focal seizures on admission with R-sided tremors and weakness. Not on AEDs prior to admission.          - Keppra loaded on admission, continue 1g bid          - spot EEG 8/23 with left temporal subcortical dysfunction, negative for electrographic seizures   - cEEG 8/30-8/31: generalized background slowing consistent with a moderate encephalopathy with more prominent slowing seen over the left hemisphere with contributions from a breach rhythm in this area.  There are no pushbutton activations, no epileptiform discharges, and no electrographic seizures.   - New Onset T2DM: No h/o DM. Hyperglycemic on admission. HbA1C 8.3. Endocrinology consulted, managing insulin. Goal -180.   - Continue Levemir 24 units daily.    - Continue Novolog 8 units TDIWM.    - Moderate Dose SQ Insulin Correction Scale.   - BG Monitoring AC/HS     - HTN: SBP <160. Controlled on current regimen. Continue home meds.  - Hypokalemia, Hypomagnesemia: Chronic, on home supplements daily.          - Continue home potassium and Mg supplements          - Replete PRN, daily labs  - regular diet  - HAIM/SCD/SQH  - PT/OT/OOB    Dispo: medically ready for discharge. PT/OT recs for rehab, pt prefers to discharge home w/ HH and assistance from family.

## 2022-09-02 NOTE — PROGRESS NOTES
[facility] Home IV ABX Education/Discharge Planning Note:     [facility] educator met with patient and niece and discussed discharge plan for home IVABX. Ms Gina Jenkins will discharge home with family support. Patient will infuse both IV medications via Elastomeric Pump. Patient & niece educated on S.A.S.H procedure & [facility] S.A.S.[facility] mat provided.  Patient education checklist reviewed and acknowledged by the patient and her niece and they are agreeable to discharge with home infusion plan of care. IV administration process using aspetic technique was reviewed with successful return demonstration by the patient's niece. Patient's niece feels comfortable with home infusion process after bedside education provided; patient was also participating during the education. Patient will discharge home with IV Vancomycin 1.5g Q24 hours + IV Ceftazidime 6g continuous (patient was converted to continuous for ease of home administration) for estimated end of therapy date 10/6/22. Dosing schedule time will be 09:00 daily for the IV Vanc, beginning with first home dose on 9/3/22; prior to discharge, patient's niece successfully connected patient's continuous IV ABX (Ceftazidime) to DL PICC & flushed second lumen of PICC to assure there was no occlusion in the PICC. No extension set to be placed on double lumen PICC, as patient will not be self infusing for the meantime. Patient to be followed by [facility] for weekly dressing changes and lab draws.     Time allotted for questions; questions addressed appropriately. Patients home IV ABX & supplies have been delivered to the bedside. Provided patient with [facility] support number [phone] & [facility]  phone number of [phone]. Patient is ready for discharge home from [facility] perspective. Patient's discharge planning team and bedside nurse updated with the information above.      -Patient accepted to  care by [facility] and report called to Araceli. Araceli confirmed [facility] has accepted patient onto services for skilled nursing needs.   -Phone number [phone]    Please do not hesitate to reach out for any additional needs.    Adilene Farrell MS, RDN, LDN  Clinical Dietitian  [facility]   Desk: [phone]  Other Phone: [phone]  [email or web address]

## 2022-09-02 NOTE — PLAN OF CARE
Patient in need of ivab therapy for 6 weeks.  Patient wants to go home.  CM sent referral to [facility] who will run benefits.   09/02/22 1041   Post-Acute Status   Post-Acute Authorization IV Infusion   IV Infusion Status Referral(s) sent

## 2022-09-03 LAB — POCT GLUCOSE: 123 MG/DL (ref 70–110)

## 2022-09-05 LAB
BACTERIA SPEC ANAEROBE CULT: NORMAL
BACTERIA SPEC ANAEROBE CULT: NORMAL

## 2022-09-06 NOTE — DISCHARGE SUMMARY
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 Diaz who presents with sudden onset of symptoms concerning for focal seizure. Patient was in her yard today playing with her son when her R arm became weak suddenly followed by the inability to speak and RLE weakness. This went on for a couple minutes and self resolved; patient said she had some lasting heaviness on her R side after. She came to the ED immediately.    Here in the ED she had another episode that resolved on its own. NSGY consulted.       Procedure(s) (LRB):  LEFT Cranial wound debridement and washout (Left)  DEBRIDEMENT, WOUND     Hospital Course: 8/23: NAEON. AFVSS. Pt reports mild HA and tenderness over L scalp swelling, o/w no complaints. Denies any further seizure-like episodes since admission. Neuro stable. Plan for OR tomorrow for cranial wound washout. ID consulted, on Vanc/Cefepime. SubQ cranial fluid collection tapped yesterday, Cx's pending/NGTD. Endocrinology consulted for hyperglycemia/DM.   8/24: NAEON. Pt with ongoing mild HA, no other complaints. Cranial fluid and blood Cx's NGTD. EEG yesterday negative for seizures. Endocrine following, started on insulin gtt yesterday for persistent hyperglycemia, improved today to 200's. Plan for OR today for washout pending OR availability. NPO since midnight.  8/25: NAEON. Washout rescheduled to tomorrow due to OR availability.  Persistent headache. Glucose improving. NPO at midnight. Denies fevers or chills.   8/26: NAEON. AFVSS. Pt  neurologically stable. Reports tenderness to L side of scalp, denies any severe HA or recurrence of tremors/seizure-like activity. Denies fever/chill, N/V. Pending OR today for wound washout and evacuation of fluid collection.  8/28: POD 1 s/p L crani revision for washout. NAEON. AFVSS. Exam stable. Pain controlled. Tolerating PO.   8/29: neuro stable, taken for CTH later this morning for concern for word finding difficulties, CTH showed worsening epidural and subcutaneous fluid collection from prior. HV drain with no output  8/30: NAEON. AFVSS. Aspirated ~60cc fluid from drain yesterday, working properly afterwards. CTH this AM showed significantly improved extracranial collection with epidural collection still present. Will leave drain in one more day  8/31: NAEON. AFVSS. Exam stable. Pain controlled. Pending PICC.   9/1: resolution of aphasia overnight, neurointact this AM  9/2: NAEON. AFVSS. Stepped down to floor. Pt reports mild pain around incision, o/w no complaints. Denies any episodes of aphasia or seizure-like activity. OR Cx's remain no growth. PICC placed for IV Abx. PT/OT recs for rehab, however pt would like to go home with HH and assistance from family.      Goals of Care Treatment Preferences:  Code Status: Full Code      Consults:   Consults (From admission, onward)        Status Ordering Provider     [facility]  Once        Provider:  (Not yet assigned)    Completed MEREDITH TRIANA     Inpatient consult to Midline team  Once        Provider:  (Not yet assigned)    Completed CARMITA MTZ     Inpatient consult to Midline team  Once        Provider:  (Not yet assigned)    Completed DEBBIE MEDINA     Inpatient consult to Endocrinology  Once        Provider:  (Not yet assigned)    Completed JANIS BUTLER     Inpatient consult to Infectious Diseases  Once        Provider:  (Not yet assigned)    Completed JANIS BUTLER     Inpatient consult to  Midline team  Once        Provider:  (Not yet assigned)    Completed DEBBIE MEDINA     Inpatient consult to Neurosurgery  Once        Provider:  (Not yet assigned)    Completed PHILLY CARVER     Inpatient consult to Vascular (Stroke) Neurology  Once        Provider:  (Not yet assigned)    Completed PHILLY CARVER          Significant Diagnostic Studies: Labs:   BMP: No results for input(s): GLU, NA, K, CL, CO2, BUN, CREATININE, CALCIUM, MG in the last 48 hours., CMP No results for input(s): NA, K, CL, CO2, GLU, BUN, CREATININE, CALCIUM, PROT, ALBUMIN, BILITOT, ALKPHOS, AST, ALT, ANIONGAP, ESTGFRAFRICA, EGFRNONAA in the last 48 hours., CBC No results for input(s): WBC, HGB, HCT, PLT in the last 48 hours., INR   Lab Results   Component Value Date    INR 1.0 08/21/2022    INR 1.0 07/15/2022    INR 1.0 04/19/2022    and All labs within the past 24 hours have been reviewed  Microbiology:   Blood Culture   Lab Results   Component Value Date    LABBLOO No growth after 5 days. 08/21/2022    and Wound Culture: negative  Radiology:   Imaging Results          MRI Brain W WO Contrast (Final result)  Result time 08/21/22 23:31:21   Procedure changed from MRI Brain With Contrast     Final result by Anthony Jones MD (08/21/22 23:31:21)                 Impression:      Peripheral rim enhancement of the sub dural and scalp complex fluid collections.  While this could represent enhancing granulation tissue, rim enhancement of infected hematoma is difficult to exclude.    Enhancement extending into the TMJ and lateral upper facial region on the left.  Correlate for cellulitis.    No evidence of enhancement of the brain parenchyma to suggest cerebritis.      Electronically signed by: Anthony Jones  Date:    08/21/2022  Time:    23:31             Narrative:    EXAMINATION:  MRI BRAIN W WO CONTRAST    CLINICAL HISTORY:  rule out infection;    TECHNIQUE:  Multiplanar multisequence MR imaging of the brain  was performed before and after the administration of 10 mL Gadavist intravenous contrast.    COMPARISON:  MRI of the brain, earlier same day previous CT scans    FINDINGS:  Complex subdural and scalp fluid collections demonstrate peripheral rim enhancement with internal septa mainly in the scalp complex mass and fluid collection.  The scalp changes and enhancement extend into the TMJ region and pre-auricular region on the left.  There is no abnormal enhancement of the brain parenchyma to suggest cerebritis.                               MRI Brain Without Contrast (Final result)  Result time 08/21/22 22:24:25    Final result by Anthony Jones MD (08/21/22 22:24:25)                 Impression:      Stable subdural and subcutaneous fluid collection surrounding the patient's craniotomy defect on the left.  These appear somewhat loculated.  Correlate for subdural and scalp hematoma.  Versus signs of infection.    Postoperative changes of aneurysm clipping with no evidence of acute intracranial hemorrhage, mass or infarction.      Electronically signed by: Anthony Jones  Date:    08/21/2022  Time:    22:24             Narrative:    EXAMINATION:  MRI BRAIN WITHOUT CONTRAST    CLINICAL HISTORY:  Headache, new or worsening, neuro deficit (Age 19-49y);    TECHNIQUE:  Multiplanar multisequence MR imaging of the brain was performed without contrast.    COMPARISON:  CT, 08/21/2022, CT brain, 08/17/2022    FINDINGS:  There is no restricted diffusion.  Patchy punctate foci of gliotic signal intensity throughout the deep and subcortical white matter is again noted.  The left craniotomy and subdural and scalp complex fluid collections with proteinaceous content are noted.  No significant mass effect is evident.  Postoperative changes aneurysm clipping in the sylvian fissure near the bybpxx-bx-[id number] on the left is noted.  There is no evidence midline shift or mass effect or new pathologic fluid collection within the brain  parenchyma.  There is no hydrocephalus.  Empty sella configuration is.  Bilateral sinus disease is present.  The orbits and orbital contents appear unremarkable.                               X-Ray Chest AP Portable (Final result)  Result time 08/21/22 21:40:18    Final result by Mario Michaud MD (08/21/22 21:40:18)                 Impression:      Hypoventilatory examination.  No convincing radiographic evidence of acute intrathoracic process on this single view.      Electronically signed by: Mario Michaud MD  Date:    08/21/2022  Time:    21:40             Narrative:    EXAMINATION:  XR CHEST AP PORTABLE    CLINICAL HISTORY:  Stroke;    TECHNIQUE:  Single frontal view of the chest was performed.    COMPARISON:  07/15/2022    FINDINGS:  Cardiac monitoring leads overlie the chest.  Cardiac silhouette is stable in size.  Lung volumes are diminished with resultant bronchovascular crowding.  No large confluent airspace consolidation appreciated.  No significant volume of pleural fluid or pneumothorax identified.  Osseous structures demonstrate mild degenerative changes.                               CTA STROKE MULTI-PHASE (Final result)  Result time 08/21/22 22:21:22    Final result by Flaquito Burciaga MD (08/21/22 22:21:22)                 Impression:      CTA head: No large vessel occlusion, high grade stenosis, or vascular malformation identified.  Stable postsurgical changes of left PCOM/anterior choroidal aneurysm clipping.  Unchanged small aneurysm at the left M2 bifurcation.    CT head: No evidence for acute intracranial hemorrhage or major vascular distribution infarct.  Stable postsurgical changes of left frontotemporal craniotomy for aneurysm clipping.  Small subdural collection underlying the craniotomy site, similar to prior exam.  Soft tissue collection overlying the craniotomy site is similar in size to prior exam.    Electronically signed by resident: Bianca  Ryan  Date:    08/21/2022  Time:    21:38    Electronically signed by: Flaquito Burciaga MD  Date:    08/21/2022  Time:    22:21             Narrative:    EXAMINATION:  CTA STROKE MULTI-PHASE    CLINICAL HISTORY:  Transient ischemic attack (TIA);    TECHNIQUE:  Axial CT images obtained throughout the before and after the administration of intravenous contrast.    Multiphase CT angiogram was then performed from the top of aortic arch to the vertex during bolus administration of 75 mL of Omnipaque 350 intravenous contrast.  Scan through the head and neck was performed in arterial phase.  Axial, sagittal and coronal reconstructions, including maximum intensity projection reconstructions were performed.    CT source data was analyzed using artificial intelligence software for detection of a large vessel occlusions (LVO) in order to enable computer assisted triage notification and aid clinical stroke decision making.    COMPARISON:  CT head 08/17/2022.  08/02/2022.    CTA head neck 07/15/2022.    FINDINGS:  The ventricles are normal in size without evidence of hydrocephalus.    Empty sella configuration..  No parenchymal mass, hemorrhage, edema or major vascular distribution infarct.    Stable postsurgical changes of left frontotemporal craniotomy for left ICA aneurysm clipping.  Redemonstration of mixed density fluid collection underlying the craniotomy site that measures approximately 0.9 cm with mild mass effect on the underlying brain parenchyma.  Heterogeneous fluid collection underlying the surgical flap external to the craniotomy site, similar to prior exam.    Mucosal thickening the right maxillary sinus.  Remaining paranasal sinuses and mastoid air cells are essentially clear.    CTA:    Left-sided aortic arch with common origin of the brachiocephalic and left common carotid arteries.  With no significant atherosclerosis.    The common and internal carotid arteries are normal in course and caliber. No significant  stenosis in either carotid bifurcation.    The vertebral origins are patent. The cervical vertebral arteries are normal in course and caliber.  Left vertebral artery is dominant.  Vertebrobasilar system is within normal limits without focal abnormality.    Postsurgical changes of left PCOM and anterior choroidal artery aneurysm clipping.  Fetal origin of the left PCA.  The anterior cerebral arteries and anterior communicating complex are within normal limits.  The right middle cerebral artery is within normal limits.  Previous left proximal M1 3 mm aneurysm is difficult to discern and may be obscured by artifact from the prior clip in.  There is a stable 3 mm aneurysm in the left M2 bifurcation.    The dural venous sinuses are patent.    The soft tissues of the neck are within normal limits.  There is no evidence of lymphadenopathy in the neck.  The parapharyngeal fat planes are present.  The trachea is within normal limits.  The visualized lung apices demonstrate mild dependent atelectasis..  The cervical spine is unremarkable.                                  Pending Diagnostic Studies:     None        Final Active Diagnoses:    Diagnosis Date Noted POA    PRINCIPAL PROBLEM:  Fluid collection at surgical site [T88.8XXA] 08/25/2022 Yes    Encephalopathy [G93.40] 08/30/2022 No    Brain compression [G93.5] 08/30/2022 Yes    Infection of superficial incisional surgical site after procedure [T81.41XA] 08/29/2022 Unknown    Focal seizure [R56.9] 08/29/2022 Unknown    New onset type 2 diabetes mellitus [E11.9] 08/23/2022 Yes    Mixed hyperlipidemia [E78.2] 06/07/2022 Yes    Chronic diastolic heart failure [I50.32] 10/03/2017 Yes     Chronic    Essential hypertension [I10] 03/05/2016 Yes      Problems Resolved During this Admission:    Diagnosis Date Noted Date Resolved POA    Post op infection [T81.40XA] 08/22/2022 08/28/2022 Yes      Discharged Condition: good     Disposition: Home-Health Care c    Follow  "Up:    Patient Instructions:      Ambulatory referral/consult to Home Health   Standing Status: Future   Referral Priority: Routine Referral Type: Home Health   Referral Reason: Specialty Services Required   Requested Specialty: Home Health Services   Number of Visits Requested: 1     Notify your health care provider if you experience any of the following:  increased confusion or weakness     Notify your health care provider if you experience any of the following:  persistent dizziness, light-headedness, or visual disturbances     Notify your health care provider if you experience any of the following:  worsening rash     Notify your health care provider if you experience any of the following:  severe persistent headache     Notify your health care provider if you experience any of the following:  difficulty breathing or increased cough     Notify your health care provider if you experience any of the following:  redness, tenderness, or signs of infection (pain, swelling, redness, odor or green/yellow discharge around incision site)     Notify your health care provider if you experience any of the following:  severe uncontrolled pain     Notify your health care provider if you experience any of the following:  persistent nausea and vomiting or diarrhea     Notify your health care provider if you experience any of the following:  temperature >100.4     Activity as tolerated     Medications:  Reconciled Home Medications:      Medication List      START taking these medications    BD ULTRA-FINE MARIELY PEN NEEDLE 32 gauge x 5/32" Ndle  Generic drug: pen needle, diabetic  Use to inject insulin into the skin three times daily .     dextrose 5 % SolP 50 mL with ceftAZIDime 1 gram SolR 2 g  Inject 2 g into the vein every 8 (eight) hours.     LEVEMIR FLEXTOUCH U-100 INSULN 100 unit/mL (3 mL) Inpn pen  Generic drug: insulin detemir U-100  Inject 24 Units into the skin once daily.     NovoLOG Flexpen U-100 Insulin 100 unit/mL (3 " mL) Inpn pen  Generic drug: insulin aspart U-100  Inject 8 Units into the skin 3 (three) times daily with meals.     oxyCODONE 5 MG immediate release tablet  Commonly known as: ROXICODONE  Take 1 tablet (5 mg total) by mouth every 6 (six) hours as needed for Pain.     TRUE METRIX GLUCOSE METER Misc  Generic drug: blood-glucose meter  Use to check blood glucose 3 times daily.     TRUE METRIX GLUCOSE TEST STRIP Strp  Generic drug: blood sugar diagnostic  Use to check blood glucose 3 times daily.     TRUEPLUS LANCETS 30 gauge Misc  Generic drug: lancets  Use to check blood glucose 3 times daily.     TRULICITY 0.75 mg/0.5 mL pen injector  Generic drug: dulaglutide  Inject 0.75 mg into the skin every 7 days.     VANCOMYCIN 1 G/250 ML D5W (READY TO MIX SYSTEM)  Inject 375 mLs (1,500 mg total) into the vein once daily.        CHANGE how you take these medications    levETIRAcetam 1000 MG tablet  Commonly known as: KEPPRA  Take 1 tablet (1,000 mg total) by mouth 2 (two) times daily.  What changed:   · medication strength  · how much to take        CONTINUE taking these medications    amitriptyline 75 MG tablet  Commonly known as: ELAVIL  Take 75 mg by mouth every evening.     amLODIPine 10 MG tablet  Commonly known as: NORVASC  Take 1 tablet (10 mg total) by mouth once daily.     atorvastatin 40 MG tablet  Commonly known as: LIPITOR  Take 1 tablet (40 mg total) by mouth once daily.     butalbital-acetaminophen-caffeine -40 mg -40 mg per tablet  Commonly known as: FIORICET, ESGIC  Take 1 tablet by mouth every 4 (four) hours as needed.     carvediloL 25 MG tablet  Commonly known as: COREG  Take 37.5 mg by mouth 2 (two) times daily.     docusate sodium 100 MG capsule  Commonly known as: COLACE  Take 1 capsule (100 mg total) by mouth 2 (two) times daily as needed for Constipation.     EScitalopram oxalate 10 MG tablet  Commonly known as: LEXAPRO  Take 10 mg by mouth once daily.     magnesium oxide 400 mg (241.3 mg  magnesium) tablet  Commonly known as: MAG-OX  Take 1 tablet by mouth 2 (two) times daily.     potassium chloride SA 20 MEQ tablet  Commonly known as: K-DUR,KLOR-CON  Take 20 mEq by mouth 2 (two) times daily.     topiramate 50 MG tablet  Commonly known as: TOPAMAX  Take 50 mg by mouth 2 (two) times daily.        STOP taking these medications    aspirin 81 MG Chew     cephALEXin 500 MG capsule  Commonly known as: KEFLEX     cyanocobalamin 1000 MCG tablet  Commonly known as: VITAMIN B-12     dexAMETHasone 2 MG tablet  Commonly known as: DECADRON     diphenhydrAMINE 25 mg capsule  Commonly known as: BENADRYL     famotidine 20 MG tablet  Commonly known as: PEPCID     hydroCHLOROthiazide 25 MG tablet  Commonly known as: HYDRODIURIL     HYDROcodone-acetaminophen 5-325 mg per tablet  Commonly known as: MARK Robison PA-C  Neurosurgery  [facility] - Neurosurgery [facility])

## 2022-09-07 ENCOUNTER — LAB VISIT (OUTPATIENT)
Dept: LAB | Facility: HOSPITAL | Age: 47
End: 2022-09-07
Attending: STUDENT IN AN ORGANIZED HEALTH CARE EDUCATION/TRAINING PROGRAM
Payer: MEDICAID

## 2022-09-07 DIAGNOSIS — Z48.811 ENCOUNTER FOR SURGICAL AFTERCARE FOLLOWING SURGERY ON THE NERVOUS SYSTEM: Primary | ICD-10-CM

## 2022-09-07 LAB
ALBUMIN SERPL BCP-MCNC: 3.9 G/DL (ref 3.5–5.2)
ALP SERPL-CCNC: 207 U/L (ref 38–126)
ALT SERPL W/O P-5'-P-CCNC: 48 U/L (ref 10–44)
ANION GAP SERPL CALC-SCNC: 11 MMOL/L (ref 8–16)
AST SERPL-CCNC: 45 U/L (ref 15–46)
BASOPHILS # BLD AUTO: 0.05 K/UL (ref 0–0.2)
BASOPHILS NFR BLD: 1.5 % (ref 0–1.9)
BILIRUB SERPL-MCNC: 0.5 MG/DL (ref 0.1–1)
CALCIUM SERPL-MCNC: 9.4 MG/DL (ref 8.7–10.5)
CHLORIDE SERPL-SCNC: 101 MMOL/L (ref 95–110)
CO2 SERPL-SCNC: 29 MMOL/L (ref 23–29)
CREAT SERPL-MCNC: 1.2 MG/DL (ref 0.5–1.4)
CRP SERPL-MCNC: 1.36 MG/DL (ref 0–1)
DIFFERENTIAL METHOD: ABNORMAL
EOSINOPHIL # BLD AUTO: 0.5 K/UL (ref 0–0.5)
EOSINOPHIL NFR BLD: 14.3 % (ref 0–8)
ERYTHROCYTE [DISTWIDTH] IN BLOOD BY AUTOMATED COUNT: 13.4 % (ref 11.5–14.5)
EST. GFR  (NO RACE VARIABLE): 56.5 ML/MIN/1.73 M^2
GLUCOSE SERPL-MCNC: 188 MG/DL (ref 70–110)
HCT VFR BLD AUTO: 34.3 % (ref 37–48.5)
HGB BLD-MCNC: 11 G/DL (ref 12–16)
IMM GRANULOCYTES # BLD AUTO: 0.01 K/UL (ref 0–0.04)
IMM GRANULOCYTES NFR BLD AUTO: 0.3 % (ref 0–0.5)
LYMPHOCYTES # BLD AUTO: 0.8 K/UL (ref 1–4.8)
LYMPHOCYTES NFR BLD: 23.9 % (ref 18–48)
MCH RBC QN AUTO: 27.9 PG (ref 27–31)
MCHC RBC AUTO-ENTMCNC: 32.1 G/DL (ref 32–36)
MCV RBC AUTO: 87 FL (ref 82–98)
MONOCYTES # BLD AUTO: 0.6 K/UL (ref 0.3–1)
MONOCYTES NFR BLD: 16.6 % (ref 4–15)
NEUTROPHILS # BLD AUTO: 1.5 K/UL (ref 1.8–7.7)
NEUTROPHILS NFR BLD: 43.4 % (ref 38–73)
NRBC BLD-RTO: 0 /100 WBC
PLATELET # BLD AUTO: 219 K/UL (ref 150–450)
PMV BLD AUTO: 10.3 FL (ref 9.2–12.9)
POTASSIUM SERPL-SCNC: 3.7 MMOL/L (ref 3.5–5.1)
PROT SERPL-MCNC: 7.1 G/DL (ref 6–8.4)
RBC # BLD AUTO: 3.94 M/UL (ref 4–5.4)
SODIUM SERPL-SCNC: 141 MMOL/L (ref 136–145)
UUN UR-MCNC: 11 MG/DL (ref 7–17)
VANCOMYCIN TROUGH SERPL-MCNC: 19.2 UG/ML (ref 10–22)
WBC # BLD AUTO: 3.43 K/UL (ref 3.9–12.7)

## 2022-09-07 PROCEDURE — 86140 C-REACTIVE PROTEIN: CPT | Mod: PO | Performed by: STUDENT IN AN ORGANIZED HEALTH CARE EDUCATION/TRAINING PROGRAM

## 2022-09-07 PROCEDURE — 80202 ASSAY OF VANCOMYCIN: CPT | Performed by: STUDENT IN AN ORGANIZED HEALTH CARE EDUCATION/TRAINING PROGRAM

## 2022-09-07 PROCEDURE — 85025 COMPLETE CBC W/AUTO DIFF WBC: CPT | Mod: PO | Performed by: STUDENT IN AN ORGANIZED HEALTH CARE EDUCATION/TRAINING PROGRAM

## 2022-09-07 PROCEDURE — 80053 COMPREHEN METABOLIC PANEL: CPT | Mod: PO | Performed by: STUDENT IN AN ORGANIZED HEALTH CARE EDUCATION/TRAINING PROGRAM

## 2022-09-09 ENCOUNTER — TELEPHONE (OUTPATIENT)
Dept: NEUROSURGERY | Facility: CLINIC | Age: 47
End: 2022-09-09
Payer: MEDICAID

## 2022-09-09 ENCOUNTER — TELEPHONE (OUTPATIENT)
Dept: INFECTIOUS DISEASES | Facility: CLINIC | Age: 47
End: 2022-09-09
Payer: MEDICAID

## 2022-09-09 ENCOUNTER — LAB VISIT (OUTPATIENT)
Dept: LAB | Facility: HOSPITAL | Age: 47
End: 2022-09-09
Attending: STUDENT IN AN ORGANIZED HEALTH CARE EDUCATION/TRAINING PROGRAM
Payer: MEDICAID

## 2022-09-09 DIAGNOSIS — Z48.811 ENCOUNTER FOR SURGICAL AFTERCARE FOLLOWING SURGERY ON THE NERVOUS SYSTEM: Primary | ICD-10-CM

## 2022-09-09 LAB
ALBUMIN SERPL BCP-MCNC: 3.8 G/DL (ref 3.5–5.2)
ALP SERPL-CCNC: 181 U/L (ref 38–126)
ALT SERPL W/O P-5'-P-CCNC: 37 U/L (ref 10–44)
ANION GAP SERPL CALC-SCNC: 11 MMOL/L (ref 8–16)
AST SERPL-CCNC: 41 U/L (ref 15–46)
BILIRUB SERPL-MCNC: 0.2 MG/DL (ref 0.1–1)
CALCIUM SERPL-MCNC: 9.1 MG/DL (ref 8.7–10.5)
CHLORIDE SERPL-SCNC: 101 MMOL/L (ref 95–110)
CO2 SERPL-SCNC: 27 MMOL/L (ref 23–29)
CREAT SERPL-MCNC: 1.27 MG/DL (ref 0.5–1.4)
EST. GFR  (NO RACE VARIABLE): 52.8 ML/MIN/1.73 M^2
GLUCOSE SERPL-MCNC: 185 MG/DL (ref 70–110)
POTASSIUM SERPL-SCNC: 3.3 MMOL/L (ref 3.5–5.1)
PROT SERPL-MCNC: 6.8 G/DL (ref 6–8.4)
SODIUM SERPL-SCNC: 139 MMOL/L (ref 136–145)
UUN UR-MCNC: 14 MG/DL (ref 7–17)
VANCOMYCIN TROUGH SERPL-MCNC: 26.9 UG/ML (ref 10–22)

## 2022-09-09 PROCEDURE — 80202 ASSAY OF VANCOMYCIN: CPT | Performed by: STUDENT IN AN ORGANIZED HEALTH CARE EDUCATION/TRAINING PROGRAM

## 2022-09-09 PROCEDURE — 80053 COMPREHEN METABOLIC PANEL: CPT | Mod: PO | Performed by: STUDENT IN AN ORGANIZED HEALTH CARE EDUCATION/TRAINING PROGRAM

## 2022-09-09 NOTE — TELEPHONE ENCOUNTER
Tried to reach pt 3 times. Called pt number twice and called moms number once. Left vm each time to contact clinic about pain meds. Called and spoke with ALEXANDER Joel who stated when she asked the pt about her pain she is saying 10/10 but pointing to the opposite side of her head. Awaiting for return call from pt or mom.

## 2022-09-09 NOTE — TELEPHONE ENCOUNTER
----- Message from Beverly Miller sent at 9/9/2022  9:22 AM CDT -----  Contact: pt  Pt requesting a callback to get something for pain         Confirmed contact below:  Contact Name:Gina Jenkins  Phone Number: [phone]       Marycruz (RN)  Phone [phone]

## 2022-09-09 NOTE — TELEPHONE ENCOUNTER
Vancomycin Tr collected today was 26.9  Currently on 1.5 Q24   Pt will hold medication on Saturday and Sunday.  Will repeat labs on  Monday.

## 2022-09-12 ENCOUNTER — HOSPITAL ENCOUNTER (EMERGENCY)
Facility: HOSPITAL | Age: 47
Discharge: HOME | End: 2022-09-12
Attending: EMERGENCY MEDICINE
Payer: MEDICAID

## 2022-09-12 VITALS
DIASTOLIC BLOOD PRESSURE: 78 MMHG | WEIGHT: 201 LBS | TEMPERATURE: 99 F | SYSTOLIC BLOOD PRESSURE: 160 MMHG | HEART RATE: 88 BPM | HEIGHT: 62 IN | RESPIRATION RATE: 18 BRPM | OXYGEN SATURATION: 100 % | BODY MASS INDEX: 36.99 KG/M2

## 2022-09-12 DIAGNOSIS — T78.2XXA ANAPHYLAXIS, INITIAL ENCOUNTER: ICD-10-CM

## 2022-09-12 DIAGNOSIS — T78.40XA ALLERGIC REACTION, INITIAL ENCOUNTER: Primary | ICD-10-CM

## 2022-09-12 PROCEDURE — 25000003 PHARM REV CODE 250: Mod: ER | Performed by: EMERGENCY MEDICINE

## 2022-09-12 PROCEDURE — 96372 THER/PROPH/DIAG INJ SC/IM: CPT | Mod: 59 | Performed by: EMERGENCY MEDICINE

## 2022-09-12 PROCEDURE — 99284 EMERGENCY DEPT VISIT MOD MDM: CPT | Mod: 25,ER

## 2022-09-12 PROCEDURE — 96375 TX/PRO/DX INJ NEW DRUG ADDON: CPT | Mod: ER

## 2022-09-12 PROCEDURE — 94760 N-INVAS EAR/PLS OXIMETRY 1: CPT | Mod: ER

## 2022-09-12 PROCEDURE — 63600175 PHARM REV CODE 636 W HCPCS: Mod: ER | Performed by: EMERGENCY MEDICINE

## 2022-09-12 PROCEDURE — 96374 THER/PROPH/DIAG INJ IV PUSH: CPT | Mod: ER

## 2022-09-12 RX ORDER — METHYLPREDNISOLONE SOD SUCC 125 MG
125 VIAL (EA) INJECTION
Status: COMPLETED | OUTPATIENT
Start: 2022-09-12 | End: 2022-09-12

## 2022-09-12 RX ORDER — EPINEPHRINE 0.3 MG/.3ML
0.3 INJECTION SUBCUTANEOUS
Status: COMPLETED | OUTPATIENT
Start: 2022-09-12 | End: 2022-09-12

## 2022-09-12 RX ORDER — HYDROXYZINE HYDROCHLORIDE 25 MG/1
25 TABLET, FILM COATED ORAL EVERY 6 HOURS
Qty: 12 TABLET | Refills: 0 | Status: SHIPPED | OUTPATIENT
Start: 2022-09-12 | End: 2023-02-07

## 2022-09-12 RX ORDER — DIPHENHYDRAMINE HYDROCHLORIDE 50 MG/ML
25 INJECTION INTRAMUSCULAR; INTRAVENOUS
Status: COMPLETED | OUTPATIENT
Start: 2022-09-12 | End: 2022-09-12

## 2022-09-12 RX ORDER — EPINEPHRINE 0.3 MG/.3ML
1 INJECTION SUBCUTANEOUS
Qty: 1 EACH | Refills: 0 | Status: SHIPPED | OUTPATIENT
Start: 2022-09-12 | End: 2023-02-07

## 2022-09-12 RX ADMIN — EPINEPHRINE 0.3 MG: 0.3 INJECTION INTRAMUSCULAR at 04:09

## 2022-09-12 RX ADMIN — DIPHENHYDRAMINE HYDROCHLORIDE 25 MG: 50 INJECTION INTRAMUSCULAR; INTRAVENOUS at 04:09

## 2022-09-12 RX ADMIN — METHYLPREDNISOLONE SODIUM SUCCINATE 125 MG: 125 INJECTION, POWDER, FOR SOLUTION INTRAMUSCULAR; INTRAVENOUS at 04:09

## 2022-09-12 NOTE — ED PROVIDER NOTES
"Encounter Date: 9/12/2022       History     Chief Complaint   Patient presents with    Allergic Reaction     Pt states started itching yesterday.  Pt with infusions s/p "brain surgery", pt states had to stop one of the medications and then restart this am.  Pt states has taken 6 benadryl.  Pt anxious and scratching arms.  Pt denies SOB.      47-year-old female nearly 2 months postop craniotomy for elective clipping aneurysms with recent readmission for washout, PICC line placement for IV antibiotics presents for diffuse itching and urticarial rash.  Onset yesterday.  Patient has been on vancomycin and ceftazidime via PICC line since discharge a week ago with plan on continuing for another month.  Patient states that she was told to stop her vancomycin for a few days and then restart today after a trough level was found to be subtherapeutic.  She reports the initial itching started after she stopped the medication and then once it was restarted today became acutely worse.  No respiratory or GI symptoms.    The history is provided by the patient and medical records.   Review of patient's allergies indicates:   Allergen Reactions    Lisinopril Swelling    Bactrim [sulfamethoxazole-trimethoprim] Rash     Past Medical History:   Diagnosis Date    Brain aneurysm     CHF (congestive heart failure)     H/O coronary angioplasty     Hypercholesteremia     Hypertension     Malignant hypertension     Migraine headache     Stroke 10/2017     Past Surgical History:   Procedure Laterality Date    CARDIAC CATHETERIZATION      CEREBRAL ANGIOGRAM      CLIP LIGATION OF INTRACRANIAL ANEURYSM BY CRANIOTOMY N/A 7/15/2022    Procedure: CRANIOTOMY, WITH ANEURYSM CLIPPING;  Surgeon: Timothy Diaz MD;  Location: [facility] OR [address] [place] [address];  Service: Neurosurgery;  Laterality: N/A;  PTERIONAL CRANIOTOMY WITH CLIP LIGATION OF L PCOMM, L ANTERIOR CHOROIDAL, L MCA  ANEURYSM, ANESTHESIA: GENERAL, BLOOD: TYPE&CROSS 2 UNITS, NEUROMONITORING: SEP, MEP, " EEG, RADIOLOGY: C-ARM, POSITION: SUPINE, TONEY CASTILLO-SURGERON: DR. LEXI DOMÍNGUEZ.    WOUND DEBRIDEMENT  8/27/2022    Procedure: DEBRIDEMENT, WOUND;  Surgeon: Timothy Diaz MD;  Location: [facility] OR [address] [place] [address];  Service: Neurosurgery;;     History reviewed. No pertinent family history.  Social History     Tobacco Use    Smoking status: Every Day     Packs/day: 0.50     Types: Cigarettes    Smokeless tobacco: Never   Substance Use Topics    Alcohol use: Yes     Comment: occassionally    Drug use: No     Review of Systems   Constitutional:  Negative for chills and fever.   HENT:  Negative for congestion, ear pain, rhinorrhea and sore throat.    Eyes:  Negative for visual disturbance.   Respiratory:  Negative for cough and shortness of breath.    Cardiovascular:  Negative for chest pain.   Gastrointestinal:  Negative for abdominal pain, diarrhea, nausea and vomiting.   Genitourinary:  Negative for dysuria and hematuria.   Musculoskeletal:  Negative for arthralgias and myalgias.   Skin:  Positive for rash. Negative for pallor.   Neurological:  Negative for weakness, numbness and headaches.   All other systems reviewed and are negative.    Physical Exam     Initial Vitals [09/12/22 1602]   BP Pulse Resp Temp SpO2   123/76 96 20 98.8 °F (37.1 °C) 98 %      MAP       --         Physical Exam    Nursing note and vitals reviewed.  Constitutional: She appears well-developed and well-nourished. No distress.   HENT:   Head: Normocephalic and atraumatic.   Eyes: Conjunctivae and EOM are normal. Pupils are equal, round, and reactive to light.   Neck: Neck supple.   Normal range of motion.  Cardiovascular:  Normal rate, regular rhythm and intact distal pulses.           Pulmonary/Chest: Breath sounds normal. No stridor. No respiratory distress.   Musculoskeletal:         General: Normal range of motion.      Cervical back: Normal range of motion and neck supple.     Neurological: She is alert and oriented to person, place, and time.    Skin: Skin is warm and dry. Rash noted.   Diffuse urticarial rash   Psychiatric: She has a normal mood and affect.       ED Course   Procedures  Labs Reviewed - No data to display       Imaging Results    None          Medications   methylPREDNISolone sodium succinate injection 125 mg (125 mg Intravenous Given 9/12/22 1634)   diphenhydrAMINE injection 25 mg (25 mg Intravenous Given 9/12/22 1634)   EPINEPHrine (EPIPEN) 0.3 mg/0.3 mL pen injection 0.3 mg (0.3 mg Intramuscular Given 9/12/22 1641)     Medical Decision Making:   History:   Old Medical Records: I decided to obtain old medical records.  Initial Assessment:   Uncomfortable but generally well-appearing nontoxic with normal vitals  Differential Diagnosis:   Allergic reaction, drug reaction  ED Management:  Patient's IV vancomycin stopped with only 9 minutes left ago on its administration.      1635- patient complaining of itchy scratchy throat with occasional cough.  Will treat as if anaphylaxis epinephrine ordered.        1900- patient re-evaluated symptoms have resolved without signs of rebound.  Patient feeling well.  Stable for discharge.  Recommend contacting physician regarding continued antibiotic use.          Pt. diagnosed with:  Anaphylaxis Critical care time spent:  30 minutes.  Due to a high probability of clinically significant, life threatening deterioration, the patient required my highest level of preparedness to intervene emergently and I personally spent this critical care time directly and personally managing the patient. This critical care time included obtaining a history; examining the patient; pulse oximetry; ordering and review of studies; arranging urgent treatment with development of a management plan; evaluation of patient's response to treatment; frequent reassessment; and, discussions with other providers.  This critical care time was performed to assess and manage the high probability of imminent, life-threatening deterioration  that could result in multi-organ failure. It was exclusive of separately billable procedures and treating other patients and teaching time.                            Clinical Impression:   Final diagnoses:  [T78.40XA] Allergic reaction, initial encounter (Primary)  [T78.2XXA] Anaphylaxis, initial encounter      ED Disposition Condition    Discharge Stable          ED Prescriptions    None       Follow-up Information       Follow up With Specialties Details Why Contact Info    Clair Johnson NP Family Medicine   [address] [facility]  [address]  [facility] [address] [phone]      Timothy Diaz MD Neurosurgery Call in 1 day  [address] [facility] [address]  [phone]      [facility] - Emergency Dept Emergency Medicine  If symptoms worsen [address] [facility] [address] [phone]             Ronald Wall DO  09/12/22 1901

## 2022-09-12 NOTE — ED NOTES
Pt reports to ED with c/o allergic reaction. Pt reports itching and rash began last night but went away. Pt reports symptoms came back today after Vanco antibiotic was turned back on infusing through PICC line. Pt was recently discharged after having brain surgery. No swelling, redness, or drainage noted to incision located to right forehead. No redness, swelling or drainage noted to PICC line site to right upper arm. No airway obstructions noted. AAO x 3. Hives noted to all areas of body and pt c/o itching everywhere. Medical history was provided by Pt.

## 2022-09-12 NOTE — ED NOTES
Pt lying in bed, resting comfortably. No hives observed. Respirations even and non-labored. AAO x 3. NADN.

## 2022-09-13 ENCOUNTER — TELEPHONE (OUTPATIENT)
Dept: NEUROSURGERY | Facility: CLINIC | Age: 47
End: 2022-09-13
Payer: MEDICAID

## 2022-09-13 DIAGNOSIS — I67.1 CEREBRAL ANEURYSM, NONRUPTURED: Primary | ICD-10-CM

## 2022-09-13 NOTE — TELEPHONE ENCOUNTER
----- Message from Beverly Miller sent at 9/13/2022 10:44 AM CDT -----  Contact: pt  Pt requesting a callback to speak with a nurse

## 2022-09-13 NOTE — TELEPHONE ENCOUNTER
Called and spoke with pt. She stated she is having severe sharp pain to the right side of her head. She stated the pain is in her head not on top of skin. This is the opposite of her surgery site. She stated it is intermittent but very severe and sharp. It actually wakes her up. Dr. Diaz notified. Orders received for scan CT w/o contrast and CTA head. Both scheduled and pt notified.

## 2022-09-14 ENCOUNTER — TELEPHONE (OUTPATIENT)
Dept: NEUROSURGERY | Facility: CLINIC | Age: 47
End: 2022-09-14
Payer: MEDICAID

## 2022-09-14 ENCOUNTER — CLINICAL SUPPORT (OUTPATIENT)
Dept: NEUROSURGERY | Facility: CLINIC | Age: 47
End: 2022-09-14
Payer: MEDICAID

## 2022-09-14 ENCOUNTER — HOSPITAL ENCOUNTER (EMERGENCY)
Facility: HOSPITAL | Age: 47
Discharge: HOME | End: 2022-09-14
Attending: EMERGENCY MEDICINE
Payer: MEDICAID

## 2022-09-14 ENCOUNTER — DOCUMENT SCAN (OUTPATIENT)
Dept: HOME HEALTH SERVICES | Facility: HOSPITAL | Age: 47
End: 2022-09-14
Payer: MEDICAID

## 2022-09-14 VITALS
TEMPERATURE: 98 F | HEART RATE: 82 BPM | DIASTOLIC BLOOD PRESSURE: 86 MMHG | OXYGEN SATURATION: 100 % | SYSTOLIC BLOOD PRESSURE: 144 MMHG | RESPIRATION RATE: 19 BRPM | WEIGHT: 201 LBS | BODY MASS INDEX: 36.76 KG/M2

## 2022-09-14 VITALS — TEMPERATURE: 99 F | DIASTOLIC BLOOD PRESSURE: 90 MMHG | HEART RATE: 98 BPM | SYSTOLIC BLOOD PRESSURE: 148 MMHG

## 2022-09-14 DIAGNOSIS — I67.1 CEREBRAL ANEURYSM, NONRUPTURED: Primary | ICD-10-CM

## 2022-09-14 DIAGNOSIS — R50.9 FEVER, UNSPECIFIED FEVER CAUSE: Primary | ICD-10-CM

## 2022-09-14 LAB
ALBUMIN SERPL BCP-MCNC: 3.4 G/DL (ref 3.5–5.2)
ALP SERPL-CCNC: 167 U/L (ref 55–135)
ALT SERPL W/O P-5'-P-CCNC: 49 U/L (ref 10–44)
ANION GAP SERPL CALC-SCNC: 15 MMOL/L (ref 8–16)
AST SERPL-CCNC: 62 U/L (ref 10–40)
BACTERIA #/AREA URNS AUTO: NORMAL /HPF
BASOPHILS # BLD AUTO: 0.07 K/UL (ref 0–0.2)
BASOPHILS NFR BLD: 1 % (ref 0–1.9)
BILIRUB SERPL-MCNC: 0.2 MG/DL (ref 0.1–1)
BILIRUB UR QL STRIP: NEGATIVE
BUN SERPL-MCNC: 20 MG/DL (ref 6–20)
CALCIUM SERPL-MCNC: 9.1 MG/DL (ref 8.7–10.5)
CHLORIDE SERPL-SCNC: 104 MMOL/L (ref 95–110)
CLARITY UR REFRACT.AUTO: ABNORMAL
CO2 SERPL-SCNC: 21 MMOL/L (ref 23–29)
COLOR UR AUTO: YELLOW
CREAT SERPL-MCNC: 1.3 MG/DL (ref 0.5–1.4)
CTP QC/QA: YES
DIFFERENTIAL METHOD: ABNORMAL
EOSINOPHIL # BLD AUTO: 0.7 K/UL (ref 0–0.5)
EOSINOPHIL NFR BLD: 9.3 % (ref 0–8)
ERYTHROCYTE [DISTWIDTH] IN BLOOD BY AUTOMATED COUNT: 14.2 % (ref 11.5–14.5)
EST. GFR  (NO RACE VARIABLE): 51.4 ML/MIN/1.73 M^2
GLUCOSE SERPL-MCNC: 106 MG/DL (ref 70–110)
GLUCOSE UR QL STRIP: NEGATIVE
HCT VFR BLD AUTO: 34.5 % (ref 37–48.5)
HGB BLD-MCNC: 11.7 G/DL (ref 12–16)
HGB UR QL STRIP: ABNORMAL
HYALINE CASTS UR QL AUTO: 0 /LPF
IMM GRANULOCYTES # BLD AUTO: 0.06 K/UL (ref 0–0.04)
IMM GRANULOCYTES NFR BLD AUTO: 0.9 % (ref 0–0.5)
KETONES UR QL STRIP: ABNORMAL
LACTATE SERPL-SCNC: 1.5 MMOL/L (ref 0.5–2.2)
LEUKOCYTE ESTERASE UR QL STRIP: NEGATIVE
LYMPHOCYTES # BLD AUTO: 1.4 K/UL (ref 1–4.8)
LYMPHOCYTES NFR BLD: 19.2 % (ref 18–48)
MCH RBC QN AUTO: 28 PG (ref 27–31)
MCHC RBC AUTO-ENTMCNC: 33.9 G/DL (ref 32–36)
MCV RBC AUTO: 83 FL (ref 82–98)
MICROSCOPIC COMMENT: NORMAL
MONOCYTES # BLD AUTO: 0.6 K/UL (ref 0.3–1)
MONOCYTES NFR BLD: 8.7 % (ref 4–15)
NEUTROPHILS # BLD AUTO: 4.3 K/UL (ref 1.8–7.7)
NEUTROPHILS NFR BLD: 60.9 % (ref 38–73)
NITRITE UR QL STRIP: NEGATIVE
NRBC BLD-RTO: 0 /100 WBC
PH UR STRIP: 6 [PH] (ref 5–8)
PLATELET # BLD AUTO: 244 K/UL (ref 150–450)
PMV BLD AUTO: 10 FL (ref 9.2–12.9)
POC MOLECULAR INFLUENZA A AGN: NEGATIVE
POC MOLECULAR INFLUENZA B AGN: NEGATIVE
POTASSIUM SERPL-SCNC: 3 MMOL/L (ref 3.5–5.1)
PROCALCITONIN SERPL IA-MCNC: 0.53 NG/ML
PROT SERPL-MCNC: 7.6 G/DL (ref 6–8.4)
PROT UR QL STRIP: ABNORMAL
RBC # BLD AUTO: 4.18 M/UL (ref 4–5.4)
RBC #/AREA URNS AUTO: 1 /HPF (ref 0–4)
SARS-COV-2 RDRP RESP QL NAA+PROBE: NEGATIVE
SODIUM SERPL-SCNC: 140 MMOL/L (ref 136–145)
SP GR UR STRIP: 1.03 (ref 1–1.03)
SQUAMOUS #/AREA URNS AUTO: 3 /HPF
URN SPEC COLLECT METH UR: ABNORMAL
WBC # BLD AUTO: 7.02 K/UL (ref 3.9–12.7)
WBC #/AREA URNS AUTO: 3 /HPF (ref 0–5)

## 2022-09-14 PROCEDURE — 99284 EMERGENCY DEPT VISIT MOD MDM: CPT | Mod: 25,27

## 2022-09-14 PROCEDURE — 84145 PROCALCITONIN (PCT): CPT | Performed by: EMERGENCY MEDICINE

## 2022-09-14 PROCEDURE — 99024 POSTOP FOLLOW-UP VISIT: CPT | Mod: ,,, | Performed by: NEUROLOGICAL SURGERY

## 2022-09-14 PROCEDURE — U0002 COVID-19 LAB TEST NON-CDC: HCPCS | Performed by: EMERGENCY MEDICINE

## 2022-09-14 PROCEDURE — 99285 PR EMERGENCY DEPT VISIT,LEVEL V: ICD-10-PCS | Mod: CS,,, | Performed by: EMERGENCY MEDICINE

## 2022-09-14 PROCEDURE — 99285 EMERGENCY DEPT VISIT HI MDM: CPT | Mod: CS,,, | Performed by: EMERGENCY MEDICINE

## 2022-09-14 PROCEDURE — 87040 BLOOD CULTURE FOR BACTERIA: CPT | Performed by: EMERGENCY MEDICINE

## 2022-09-14 PROCEDURE — 99213 OFFICE O/P EST LOW 20 MIN: CPT | Mod: PBBFAC

## 2022-09-14 PROCEDURE — 83605 ASSAY OF LACTIC ACID: CPT | Performed by: EMERGENCY MEDICINE

## 2022-09-14 PROCEDURE — 81001 URINALYSIS AUTO W/SCOPE: CPT | Performed by: EMERGENCY MEDICINE

## 2022-09-14 PROCEDURE — 80053 COMPREHEN METABOLIC PANEL: CPT | Performed by: EMERGENCY MEDICINE

## 2022-09-14 PROCEDURE — 85025 COMPLETE CBC W/AUTO DIFF WBC: CPT | Performed by: EMERGENCY MEDICINE

## 2022-09-14 PROCEDURE — 99999 PR PBB SHADOW E&M-EST. PATIENT-LVL III: ICD-10-PCS | Mod: PBBFAC,,,

## 2022-09-14 PROCEDURE — 87502 INFLUENZA DNA AMP PROBE: CPT

## 2022-09-14 PROCEDURE — 99999 PR PBB SHADOW E&M-EST. PATIENT-LVL III: CPT | Mod: PBBFAC,,,

## 2022-09-14 PROCEDURE — 25000003 PHARM REV CODE 250: Performed by: EMERGENCY MEDICINE

## 2022-09-14 PROCEDURE — 99024 PR POST-OP FOLLOW-UP VISIT: ICD-10-PCS | Mod: ,,, | Performed by: NEUROLOGICAL SURGERY

## 2022-09-14 RX ADMIN — POTASSIUM BICARBONATE 50 MEQ: 977.5 TABLET, EFFERVESCENT ORAL at 02:09

## 2022-09-14 NOTE — CONSULTS
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 Patient states she was febrile to 101 yesterday. She had stopped her abx treatment as she developed an allergic reaction (hives and itching) 2 days ago. Her fever responded to tylenol. Her incision is clean, dry and intact - healing well. She was afebrile while in the ED. Her R arm PICC site was not tender to palpation.       (Not in a hospital admission)      Review of patient's allergies indicates:   Allergen Reactions    Lisinopril Swelling    Bactrim [sulfamethoxazole-trimethoprim] Rash       Past Medical History:   Diagnosis Date    Brain aneurysm     CHF (congestive heart failure)     H/O coronary angioplasty     Hypercholesteremia     Hypertension     Malignant hypertension     Migraine headache     Stroke 10/2017     Past Surgical History:   Procedure Laterality Date    CARDIAC CATHETERIZATION      CEREBRAL ANGIOGRAM      CLIP LIGATION OF INTRACRANIAL ANEURYSM BY CRANIOTOMY N/A 7/15/2022    Procedure: CRANIOTOMY, WITH ANEURYSM CLIPPING;  Surgeon: Timothy Diaz MD;  Location: [facility] OR [address] [place] [address];  Service: Neurosurgery;  Laterality: N/A;  PTERIONAL CRANIOTOMY WITH CLIP LIGATION OF L PCOMM, L ANTERIOR CHOROIDAL, L MCA  ANEURYSM, ANESTHESIA: GENERAL, BLOOD: TYPE&CROSS 2 UNITS, NEUROMONITORING: SEP, MEP, EEG, RADIOLOGY: C-ARM, POSITION: SUPINE, ANNA, CO-SURGERON: DR. LEXI DOMÍNGUEZ.    WOUND DEBRIDEMENT  8/27/2022    Procedure: DEBRIDEMENT, WOUND;  Surgeon: Timothy Diaz MD;  Location: [facility] OR [facility];  Service: Neurosurgery;;     Family History    None       Tobacco Use    Smoking status: Every Day     Packs/day: 0.50     Types: Cigarettes    Smokeless tobacco: Never   Substance and Sexual Activity    Alcohol use: Yes     Comment: occassionally    Drug use: No    Sexual activity: Not Currently     Partners: Male     Birth control/protection: None     Review of Systems  Objective:     Weight: 91.2 kg (201 lb)  Body mass index is 36.76 kg/m².  Vital Signs (Most Recent):  Temp: 98 °F (36.7 °C) (09/14/22 1032)  Pulse: 82 (09/14/22 1430)  Resp: 19 (09/14/22 1032)  BP: (!) 144/86 (09/14/22 1032)  SpO2: 100 % (09/14/22 1430)   Vital Signs (24h Range):  Temp:  [98 °F (36.7 °C)-98.8 °F (37.1 °C)] 98 °F (36.7 °C)  Pulse:  [82-98] 82  Resp:  [19] 19  SpO2:  [100 %] 100 %  BP: (144-148)/(86-90) 144/86                     Female External Urinary Catheter 08/27/22 1045 (Active)       Physical Exam    Neurosurgery Physical Exam    Aox3, in no acute distress  Cranial nerves intact  PERRL  5/5 BUE/BLE  Incision c/d/I  R arm PICC site not tender to palpation    Significant Labs:  Recent Labs   Lab 09/14/22  1233         K 3.0*      CO2 21*   BUN 20   CREATININE 1.3   CALCIUM 9.1     Recent Labs   Lab 09/14/22  1233   WBC 7.02   HGB 11.7*   HCT 34.5*        No results for input(s): LABPT, INR, APTT in the last 48 hours.  Microbiology Results (last 7 days)       Procedure Component Value Units Date/Time    Blood culture x two cultures. Draw prior to antibiotics. [[id number]] Collected: 09/14/22 1233    Order Status: Sent Specimen: Blood from Peripheral, Hand, Right Updated: 09/14/22 1242    Blood culture x two cultures. Draw prior to antibiotics. [[id number]] Collected: 09/14/22 1233    Order Status: Sent Specimen: Blood from Peripheral, Antecubital, Left Updated: 09/14/22 1242          All pertinent labs from the last 24 hours have been reviewed.    Significant Diagnostics:  I have reviewed all  pertinent imaging results/findings within the past 24 hours.    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    Plan:  -Patient seen at bedside  -Fever workup labs reviewed - WBC wnl, afebrile, leuk est neg on UA, elevated procalcitonin  -No acute neurosurgical intervention required  -Infection of incision unlikely  -patient okay for dc per ED team        Thank you for your consult. I will sign off. Please contact us if you have any additional questions.    Connie Parra MD  Neurosurgery  [facility] - Emergency Dept

## 2022-09-14 NOTE — SUBJECTIVE & OBJECTIVE
(Not in a hospital admission)      Review of patient's allergies indicates:   Allergen Reactions    Lisinopril Swelling    Bactrim [sulfamethoxazole-trimethoprim] Rash       Past Medical History:   Diagnosis Date    Brain aneurysm     CHF (congestive heart failure)     H/O coronary angioplasty     Hypercholesteremia     Hypertension     Malignant hypertension     Migraine headache     Stroke 10/2017     Past Surgical History:   Procedure Laterality Date    CARDIAC CATHETERIZATION      CEREBRAL ANGIOGRAM      CLIP LIGATION OF INTRACRANIAL ANEURYSM BY CRANIOTOMY N/A 7/15/2022    Procedure: CRANIOTOMY, WITH ANEURYSM CLIPPING;  Surgeon: Timothy Diaz MD;  Location: [facility] OR [address] [place] [address];  Service: Neurosurgery;  Laterality: N/A;  PTERIONAL CRANIOTOMY WITH CLIP LIGATION OF L PCOMM, L ANTERIOR CHOROIDAL, L MCA  ANEURYSM, ANESTHESIA: GENERAL, BLOOD: TYPE&CROSS 2 UNITS, NEUROMONITORING: SEP, MEP, EEG, RADIOLOGY: C-ARM, POSITION: SUPINE, CASTILLO, CO-SURGERON: DR. LEXI DOMÍNGUEZ.    WOUND DEBRIDEMENT  8/27/2022    Procedure: DEBRIDEMENT, WOUND;  Surgeon: Timothy Diaz MD;  Location: [facility] OR [address] [place] [address];  Service: Neurosurgery;;     Family History    None       Tobacco Use    Smoking status: Every Day     Packs/day: 0.50     Types: Cigarettes    Smokeless tobacco: Never   Substance and Sexual Activity    Alcohol use: Yes     Comment: occassionally    Drug use: No    Sexual activity: Not Currently     Partners: Male     Birth control/protection: None     Review of Systems  Objective:     Weight: 91.2 kg (201 lb)  Body mass index is 36.76 kg/m².  Vital Signs (Most Recent):  Temp: 98 °F (36.7 °C) (09/14/22 1032)  Pulse: 82 (09/14/22 1430)  Resp: 19 (09/14/22 1032)  BP: (!) 144/86 (09/14/22 1032)  SpO2: 100 % (09/14/22 1430)   Vital Signs (24h Range):  Temp:  [98 °F (36.7 °C)-98.8 °F (37.1 °C)] 98 °F (36.7 °C)  Pulse:  [82-98] 82  Resp:  [19] 19  SpO2:  [100 %] 100 %  BP: (144-148)/(86-90) 144/86                     Female External  Urinary Catheter 08/27/22 1045 (Active)       Physical Exam    Neurosurgery Physical Exam    Aox3, in no acute distress  Cranial nerves intact  PERRL  5/5 BUE/BLE  Incision c/d/I  R arm PICC site not tender to palpation    Significant Labs:  Recent Labs   Lab 09/14/22  1233         K 3.0*      CO2 21*   BUN 20   CREATININE 1.3   CALCIUM 9.1     Recent Labs   Lab 09/14/22  1233   WBC 7.02   HGB 11.7*   HCT 34.5*        No results for input(s): LABPT, INR, APTT in the last 48 hours.  Microbiology Results (last 7 days)       Procedure Component Value Units Date/Time    Blood culture x two cultures. Draw prior to antibiotics. [[id number]] Collected: 09/14/22 1233    Order Status: Sent Specimen: Blood from Peripheral, Hand, Right Updated: 09/14/22 1242    Blood culture x two cultures. Draw prior to antibiotics. [[id number]] Collected: 09/14/22 1233    Order Status: Sent Specimen: Blood from Peripheral, Antecubital, Left Updated: 09/14/22 1242          All pertinent labs from the last 24 hours have been reviewed.    Significant Diagnostics:  I have reviewed all pertinent imaging results/findings within the past 24 hours.

## 2022-09-14 NOTE — ED NOTES
Bed: [facility] 04  Expected date:   Expected time:   Means of arrival:   Comments:

## 2022-09-14 NOTE — FIRST PROVIDER EVALUATION
Medical screening examination initiated.  I have conducted a focused provider triage encounter, findings are as follows:    Brief history of present illness:  47 F hx of Left MCA aneurysm c/b wound infection here for fever.  Seen in [facility] clinic today and referred to ED for workup    Vitals:    09/14/22 1032   BP: (!) 144/86   Pulse: 88   Resp: 19   Temp: 98 °F (36.7 °C)   TempSrc: Oral   SpO2: 100%   Weight: 91.2 kg (201 lb)       Pertinent physical exam:  no distress     Brief workup plan:  labs, imaging deferred to ED team    Preliminary workup initiated; this workup will be continued and followed by the physician or advanced practice provider that is assigned to the patient when roomed.

## 2022-09-14 NOTE — PROGRESS NOTES
Gina Jenkins  is a 47 y.o. female here for 2 week post op wound check. Pt here for suture removal. Has picc line to rt arm intact. Pt stated she had a temp of 101 at home. She took 4 tylenol prior to coming.        Surgery: washout    Symptoms: none    DME:none     Brace: none    Pain: none at the moment    Medication Refills: none           Incision with sutures LUKE, well approximated, no redness, swelling or purulent drainage. After speaking with Lida Fernandez in regards to pt stating she is having fever of 101 ok to remove sutures. Sutures removed. Instructed patient to keep incision LUKE, no lotions, creams or bandages. OK to shower without water pressure to area. PostOP written instructions given to patient. Pt instructed to go to emergency room due to fever. Pt verbalized understanding. Awaiting transport.     Patient verbalizes understanding. Patient to followup with Dr. Diaz for 6 week followup with imaging.      Future Appointments   Date Time Provider Department Center   9/14/2022 10:30 AM [facility] Home Inf Chair 01 [facility] OPIPHRM Infusion   9/15/2022 10:30 AM [facility] CT2 LIMIT 450 LBS [facility] CT SCAN [facility]   9/15/2022 12:00 PM K[facility] CT2 LIMIT 450 LBS [facility] CT SCAN [facility]   9/20/2022 11:30 AM Glo [address] RD [facility] JAMEL June   9/28/2022  2:00 PM Karely Jenny Rosa, DO [facility] ID [facility]   10/10/2022 10:00 AM Karely Jenny Rosa, DO [facility] ID [facility]   10/18/2022 12:15 PM [facility] OIC-CT1 500 LB LIMIT [facility] IC Imaging Ctr   10/18/2022  2:30 PM Timothy Diaz MD [facility] NEUROS8 [facility]   11/3/2022  3:30 PM Kamille Zurita MD PhD [facility] JANAY EPI [facility]   12/8/2022  2:40 PM Mil Adam III, MD [facility] CARDIO Janet

## 2022-09-14 NOTE — TELEPHONE ENCOUNTER
----- Message from Marlene Robison PA-C sent at 9/14/2022  8:22 AM CDT -----  Tonynadir Enedina,    She should have f/u appt about 6 weeks postop with CTA head.    Thanks!  ----- Message -----  From: Deb Wong RN  Sent: 9/13/2022   8:05 AM CDT  To: Marlene Robison PA-C    Good morning Shivani. Pt is coming in for wound care tomorrow. When does she need to be seen by you or  and will she need any imaging?

## 2022-09-14 NOTE — ASSESSMENT & PLAN NOTE
Patient is a 47F with PMH of HTN, HLD, and recently L Pcomm and L anterior choroidal aneurysm crani clipping on 7/15 s/p wound washout on 8/27 who presents to clinic for suture removal and was sent to the ED for fever workup because she was febrile to 101 yesterday after stopping her abx treatment due to an allergic reaction 2 days ago.    Plan:  -Patient seen at bedside  -Fever workup labs reviewed - WBC wnl, afebrile, leuk est neg on UA, elevated procalcitonin  -No acute neurosurgical intervention required  -Infection of incision unlikely  -patient okay for dc per ED team

## 2022-09-14 NOTE — HPI
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 Patient states she was febrile to 101 yesterday. She had stopped her abx treatment as she developed an allergic reaction (hives and itching) 2 days ago. Her fever responded to tylenol. Her incision is clean, dry and intact - healing well. She was afebrile while in the ED. Her R arm PICC site was not tender to palpation.

## 2022-09-14 NOTE — DISCHARGE INSTRUCTIONS
At this time it is unclear what is causing your fever.    If you have any new or unusual symptoms such as weakness, new or unusual headache, abdominal pain, trouble breathing, coughing, diarrhea, repeated vomiting, or any other new, worsening worrisome symptoms please return immediately to the emergency department for re-evaluation.      Otherwise please follow-up with your primary care doctor as well as her Neurosurgery team for continued management of your symptoms, and history as well as management of your picc line.

## 2022-09-14 NOTE — ED TRIAGE NOTES
47 y.o. female arrived to the ED via personal transport from home for c/o fever. Pt w/ hx of CVA and recent craniotomy reports at home fever of 101 x2 days, following PICC line insertion and abx use. Pt denies chills, N/V/D, chest pain, SOB, or HA.

## 2022-09-14 NOTE — ED PROVIDER NOTES
Encounter Date: 9/14/2022       History     Chief Complaint   Patient presents with    Fever     Recent brain surgery, discharged on 9/2.  Had an allergic reaction the the IV antibiotics in her PICC line and still running fevers.      HPI    47-year-old female nearly 2 months postop craniotomy for elective clipping aneurysms with recent readmission for washout, PICC line placement for IV antibiotics, 2 days ago had allergic reaction to antibiotics and the since been off of them, coming in with a fever 101 yesterday, as well as the last multiple days.  She says she gets a fever take Tylenol and then feels back to normal.  She was seen in clinic today for stitches removed in because the fever was told to come the emergency department for a infectious workup.  She says she feels well, she denies any headaches, vomiting, diarrhea, cough, runny nose, sore throat, chest pain, shortness of breath.  She denies any pain her PICC line site.  No rashes.  Says feels in her usual state health.  She takes Tylenol which has the fever goes away and she feels better.    Review of patient's allergies indicates:   Allergen Reactions    Lisinopril Swelling    Bactrim [sulfamethoxazole-trimethoprim] Rash     Past Medical History:   Diagnosis Date    Brain aneurysm     CHF (congestive heart failure)     H/O coronary angioplasty     Hypercholesteremia     Hypertension     Malignant hypertension     Migraine headache     Stroke 10/2017     Past Surgical History:   Procedure Laterality Date    CARDIAC CATHETERIZATION      CEREBRAL ANGIOGRAM      CLIP LIGATION OF INTRACRANIAL ANEURYSM BY CRANIOTOMY N/A 7/15/2022    Procedure: CRANIOTOMY, WITH ANEURYSM CLIPPING;  Surgeon: Timothy Diaz MD;  Location: [facility] OR [address] [place] [address];  Service: Neurosurgery;  Laterality: N/A;  PTERIONAL CRANIOTOMY WITH CLIP LIGATION OF L PCOMM, L ANTERIOR CHOROIDAL, L MCA  ANEURYSM, ANESTHESIA: GENERAL, BLOOD: TYPE&CROSS 2 UNITS, NEUROMONITORING: SEP, MEP, EEG,  RADIOLOGY: C-ARM, POSITION: SUPINE, JESSE CASTILLOSURGERON: DR. LEXI DOMÍNGUEZ.    WOUND DEBRIDEMENT  8/27/2022    Procedure: DEBRIDEMENT, WOUND;  Surgeon: Timothy Diaz MD;  Location: [facility] OR [address] [place] [address];  Service: Neurosurgery;;     No family history on file.  Social History     Tobacco Use    Smoking status: Every Day     Packs/day: 0.50     Types: Cigarettes    Smokeless tobacco: Never   Substance Use Topics    Alcohol use: Yes     Comment: occassionally    Drug use: No     Review of Systems  Constitutional:  No Fever, No Chills,   Eyes: No Vision Changes  ENT/Mouth: No sore throat, No rhinorrhea  Cardiovascular:  No Chest Pain, No Palpitations  Respiratory:  No Cough, No SOB  Gastrointestinal:  No Nausea, No Vomiting, No Diarrhea, No abdo pain.  Genitourinary:  No  pain, No dysuria   Musculoskeletal:  No Arthralgias, No Back Pain, No Neck Pain, No recent trauma.  Skin:  No skin Lesions  Neuro:  No Weakness, No Numbness, No Paresthesias, No Dizziness, No Headache      Physical Exam     Initial Vitals [09/14/22 1032]   BP Pulse Resp Temp SpO2   (!) 144/86 88 19 98 °F (36.7 °C) 100 %      MAP       --         Physical Exam    Physical Exam:  CONSTITUTIONAL: Well developed, well nourished, in no acute distress.  HENT: Normocephalic, atraumatic, left-sided scalp incision is clean dry intact.  No tenderness.  No redness or fluctuance    EYES: Sclerae anicteric   NECK: Supple, no thyroid enlargement  CARDIOVASCULAR: Regular rate and rhythm without any murmurs, gallops, rubs.  RESPIRATORY: Speaking in full sentences. Breathing comfortably. Auscultation of the lungs revealed normal breath sounds b/l, no wheezing, no rales, no rhonchi.  ABDOMEN: Soft and nontender, no masses, no rebound or guarding   NEUROLOGIC: Alert, interacting normally. No facial droop.  Moving all extremities, normal strength bilateral upper and lower extremities, normal gait.  MSK: Moving all four extremities.  Skin:  There is no pain or tenderness at  her PICC line site., no erythema around the dressing, no skin changes.  Clean dry intact dressing.  Warm and dry. No visible rash on exposed areas of skin.    Psych: Mood and affect normal.       ED Course   Procedures  Labs Reviewed   CBC W/ AUTO DIFFERENTIAL - Abnormal; Notable for the following components:       Result Value    Hemoglobin 11.7 (*)     Hematocrit 34.5 (*)     Immature Granulocytes 0.9 (*)     Immature Grans (Abs) 0.06 (*)     Eos # 0.7 (*)     Eosinophil % 9.3 (*)     All other components within normal limits   COMPREHENSIVE METABOLIC PANEL - Abnormal; Notable for the following components:    Potassium 3.0 (*)     CO2 21 (*)     Albumin 3.4 (*)     Alkaline Phosphatase 167 (*)     AST 62 (*)     ALT 49 (*)     eGFR 51.4 (*)     All other components within normal limits   URINALYSIS, REFLEX TO URINE CULTURE - Abnormal; Notable for the following components:    Appearance, UA Hazy (*)     Protein, UA 3+ (*)     Ketones, UA Trace (*)     Occult Blood UA 2+ (*)     All other components within normal limits    Narrative:     Specimen Source->Urine   PROCALCITONIN - Abnormal; Notable for the following components:    Procalcitonin 0.53 (*)     All other components within normal limits   CULTURE, BLOOD    Narrative:     Aerobic and anaerobic   CULTURE, BLOOD    Narrative:     Aerobic and anaerobic   LACTIC ACID, PLASMA   SARS-COV-2 RNA AMPLIFICATION, QUAL   URINALYSIS MICROSCOPIC    Narrative:     Specimen Source->Urine   POCT INFLUENZA A/B MOLECULAR          Imaging Results              X-Ray Chest AP Portable (Final result)  Result time 09/14/22 13:31:38      Final result by Lora Johnson MD (09/14/22 13:31:38)                   Impression:      No acute pulmonary pathology identified.      Electronically signed by: Lora Johnson  Date:    09/14/2022  Time:    13:31               Narrative:    EXAMINATION:  XR CHEST AP PORTABLE    CLINICAL HISTORY:  Sepsis;    TECHNIQUE:  Single frontal view of the  chest was performed.    COMPARISON:  09/01/2022    FINDINGS:  Right PICC tip overlies the cavoatrial junction.  The lung fields and pleural spaces appear to be clear.  The heart size appears normal.  The bones appear unremarkable for the age of the patient.  No significant interval change since prior exam noted.                                       Medications   potassium bicarbonate disintegrating tablet 50 mEq (50 mEq Oral Given 9/14/22 1428)     Medical Decision Making:   History:   Old Medical Records: I decided to obtain old medical records.  Old Records Summarized: records from clinic visits.       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 Diaz who presents with sudden onset of symptoms concerning for focal seizure. Patient was in her yard today playing with her son when her R arm became weak suddenly followed by the inability to speak and RLE weakness. This went on for a couple minutes and self resolved; patient said she had some lasting heaviness on her R side after. She came to the ED immediately.     Here in the ED she had another episode that resolved on its own. NSGY consulted.         Procedure(s) (LRB):  LEFT Cranial wound debridement and washout (Left)  DEBRIDEMENT, WOUND      Hospital Course: 8/23: NAEON. AFVSS. Pt reports mild HA and tenderness over L scalp swelling, o/w no complaints. Denies any further seizure-like episodes since admission. Neuro stable. Plan for OR tomorrow for cranial wound washout. ID consulted, on Vanc/Cefepime. SubQ cranial fluid collection tapped yesterday, Cx's pending/NGTD. Endocrinology consulted for hyperglycemia/DM.   8/24: NAEON. Pt with ongoing mild HA, no other complaints. Cranial fluid and blood Cx's NGTD. EEG yesterday negative for seizures. Endocrine following, started on insulin gtt yesterday for persistent hyperglycemia, improved  today to 200's. Plan for OR today for washout pending OR availability. NPO since midnight.  8/25: NAEON. Washout rescheduled to tomorrow due to OR availability.  Persistent headache. Glucose improving. NPO at midnight. Denies fevers or chills.   8/26: NAEON. AFVSS. Pt neurologically stable. Reports tenderness to L side of scalp, denies any severe HA or recurrence of tremors/seizure-like activity. Denies fever/chill, N/V. Pending OR today for wound washout and evacuation of fluid collection.  8/28: POD 1 s/p L crani revision for washout. NAEON. AFVSS. Exam stable. Pain controlled. Tolerating PO.   8/29: neuro stable, taken for CTH later this morning for concern for word finding difficulties, CTH showed worsening epidural and subcutaneous fluid collection from prior. HV drain with no output  8/30: NAEON. AFVSS. Aspirated ~60cc fluid from drain yesterday, working properly afterwards. CTH this AM showed significantly improved extracranial collection with epidural collection still present. Will leave drain in one more day  8/31: NAEON. AFVSS. Exam stable. Pain controlled. Pending PICC.   9/1: resolution of aphasia overnight, neurointact this AM  9/2: NAEON. AFVSS. Stepped down to floor. Pt reports mild pain around incision, o/w no complaints. Denies any episodes of aphasia or seizure-like activity. OR Cx's remain no growth. PICC placed for IV Abx. PT/OT recs for rehab, however pt would like to go home with HH and assistance from family.     Clinical Tests:   Lab Tests: Ordered and Reviewed  Other:   I have discussed this case with another health care provider.     47-year-old female with past history as noted coming in with continued fevers, recent aneurysm surgery and subsequent washout for possible infection is status post course of IV antibiotics and has been off antibiotics for 2 days.  Fever of 101.  Clear focal symptoms today including no headache upper respiratory symptoms lower respiratory symptoms no urinary  symptoms no GI symptoms.  Unclear cause of her fevers.  Incision looks clean dry intact.    Will undertake infectious workup with chest x-ray, urine studies, labs.  Neurosurgery consulted regarding recommendations regarding neuro imaging    Disposition based on ED workup and patient's symptomatology.      Update:  She remains hemodynamically stable and well-appearing in the emergency department.    She is asymptomatic at all times in the emergency department.  Labs are benign, viral swabs were negative, UA without signs of infection, chest x-ray is clear.  Other labs are benign, mild hypokalemia, repleted, no white count, no shift.  Lactate is low.  She does have mildly elevated protocol.  Cultures are sent and are pending.  She was seen and evaluated by the Neurosurgery team who advise the based on their evaluation there is no signs of infection on her wound and she did not require any neuro imaging today.  They recommend discharging her home.    Given the fact that she is asymptomatic, there is no dangerous findings on her exam, her workup today is benign, she was cleared by neurosurgery, at this time no indication for admission, unclear source of her fevers, cultures are pending and she will be called back if there is a positive culture.  Will remain off antibiotics for now.    Safe for outpatient management.  Follow-up with neurosurgery and her primary care doctors.  ED return precautions.    Findings of ED work up explained to patient. Patient agrees with discharge plan and verbalizes understanding of return precautions.                          Clinical Impression:   Final diagnoses:  [R50.9] Fever, unspecified fever cause (Primary)      ED Disposition Condition    Discharge Stable          ED Prescriptions    None       Follow-up Information       Follow up With Specialties Details Why Contact Info    Clair Johnson NP Family Medicine In 1 week  [address]  [address]  [facility]  [place]  [id number]  [phone]               Rom Qiu MD  09/15/22 2414

## 2022-09-16 ENCOUNTER — DOCUMENT SCAN (OUTPATIENT)
Dept: HOME HEALTH SERVICES | Facility: HOSPITAL | Age: 47
End: 2022-09-16
Payer: MEDICAID

## 2022-09-16 ENCOUNTER — INFUSION (OUTPATIENT)
Dept: INFECTIOUS DISEASES | Facility: HOSPITAL | Age: 47
End: 2022-09-16
Attending: INTERNAL MEDICINE
Payer: MEDICAID

## 2022-09-16 ENCOUNTER — OFFICE VISIT (OUTPATIENT)
Dept: INFECTIOUS DISEASES | Facility: CLINIC | Age: 47
End: 2022-09-16
Payer: MEDICAID

## 2022-09-16 VITALS
BODY MASS INDEX: 37.08 KG/M2 | HEART RATE: 82 BPM | SYSTOLIC BLOOD PRESSURE: 145 MMHG | TEMPERATURE: 99 F | HEIGHT: 62 IN | WEIGHT: 201.5 LBS | DIASTOLIC BLOOD PRESSURE: 98 MMHG

## 2022-09-16 DIAGNOSIS — T88.8XXD FLUID COLLECTION AT SURGICAL SITE, SUBSEQUENT ENCOUNTER: Primary | ICD-10-CM

## 2022-09-16 DIAGNOSIS — B37.31 VAGINAL CANDIDIASIS: ICD-10-CM

## 2022-09-16 PROCEDURE — 3080F DIAST BP >= 90 MM HG: CPT | Mod: CPTII,,, | Performed by: STUDENT IN AN ORGANIZED HEALTH CARE EDUCATION/TRAINING PROGRAM

## 2022-09-16 PROCEDURE — 1111F DSCHRG MED/CURRENT MED MERGE: CPT | Mod: CPTII,,, | Performed by: STUDENT IN AN ORGANIZED HEALTH CARE EDUCATION/TRAINING PROGRAM

## 2022-09-16 PROCEDURE — 99214 PR OFFICE/OUTPT VISIT, EST, LEVL IV, 30-39 MIN: ICD-10-PCS | Mod: S$PBB,,, | Performed by: STUDENT IN AN ORGANIZED HEALTH CARE EDUCATION/TRAINING PROGRAM

## 2022-09-16 PROCEDURE — 1111F PR DISCHARGE MEDS RECONCILED W/ CURRENT OUTPATIENT MED LIST: ICD-10-PCS | Mod: CPTII,,, | Performed by: STUDENT IN AN ORGANIZED HEALTH CARE EDUCATION/TRAINING PROGRAM

## 2022-09-16 PROCEDURE — 3052F HG A1C>EQUAL 8.0%<EQUAL 9.0%: CPT | Mod: CPTII,,, | Performed by: STUDENT IN AN ORGANIZED HEALTH CARE EDUCATION/TRAINING PROGRAM

## 2022-09-16 PROCEDURE — 99214 OFFICE O/P EST MOD 30 MIN: CPT | Mod: S$PBB,,, | Performed by: STUDENT IN AN ORGANIZED HEALTH CARE EDUCATION/TRAINING PROGRAM

## 2022-09-16 PROCEDURE — 99999 PR PBB SHADOW E&M-EST. PATIENT-LVL V: CPT | Mod: PBBFAC,,, | Performed by: STUDENT IN AN ORGANIZED HEALTH CARE EDUCATION/TRAINING PROGRAM

## 2022-09-16 PROCEDURE — 3008F PR BODY MASS INDEX (BMI) DOCUMENTED: ICD-10-PCS | Mod: CPTII,,, | Performed by: STUDENT IN AN ORGANIZED HEALTH CARE EDUCATION/TRAINING PROGRAM

## 2022-09-16 PROCEDURE — 3077F SYST BP >= 140 MM HG: CPT | Mod: CPTII,,, | Performed by: STUDENT IN AN ORGANIZED HEALTH CARE EDUCATION/TRAINING PROGRAM

## 2022-09-16 PROCEDURE — 3080F PR MOST RECENT DIASTOLIC BLOOD PRESSURE >= 90 MM HG: ICD-10-PCS | Mod: CPTII,,, | Performed by: STUDENT IN AN ORGANIZED HEALTH CARE EDUCATION/TRAINING PROGRAM

## 2022-09-16 PROCEDURE — 99215 OFFICE O/P EST HI 40 MIN: CPT | Mod: PBBFAC | Performed by: STUDENT IN AN ORGANIZED HEALTH CARE EDUCATION/TRAINING PROGRAM

## 2022-09-16 PROCEDURE — 3052F PR MOST RECENT HEMOGLOBIN A1C LEVEL 8.0 - < 9.0%: ICD-10-PCS | Mod: CPTII,,, | Performed by: STUDENT IN AN ORGANIZED HEALTH CARE EDUCATION/TRAINING PROGRAM

## 2022-09-16 PROCEDURE — 3077F PR MOST RECENT SYSTOLIC BLOOD PRESSURE >= 140 MM HG: ICD-10-PCS | Mod: CPTII,,, | Performed by: STUDENT IN AN ORGANIZED HEALTH CARE EDUCATION/TRAINING PROGRAM

## 2022-09-16 PROCEDURE — 99999 PR PBB SHADOW E&M-EST. PATIENT-LVL V: ICD-10-PCS | Mod: PBBFAC,,, | Performed by: STUDENT IN AN ORGANIZED HEALTH CARE EDUCATION/TRAINING PROGRAM

## 2022-09-16 PROCEDURE — 3008F BODY MASS INDEX DOCD: CPT | Mod: CPTII,,, | Performed by: STUDENT IN AN ORGANIZED HEALTH CARE EDUCATION/TRAINING PROGRAM

## 2022-09-16 RX ORDER — FLUCONAZOLE 150 MG/1
150 TABLET ORAL DAILY
Qty: 1 TABLET | Refills: 0 | Status: SHIPPED | OUTPATIENT
Start: 2022-09-16 | End: 2022-09-17

## 2022-09-16 NOTE — PROGRESS NOTES
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  MRI brain showed peripherally enhancing sub dural and scalp complex fluid collections. CTA head without large vessel occulsion, stenosis, or vascular malformation.  Bedside aspiration of one of the subcutaneous fluid collections, appeared bloody, NGTD.  Underwent 8/27/22 I&D and plate exchange, found hematoma in subgaleal space and inflammatory epidural tissue.    Cultures negative.    Path shows granulation tissue with reactive fibrosis and mild chronic inflammation with recent hemorrhage and fibrin, suggestive of organizing hematoma.  Also benign soft tissue and bone with edema, calcification and recent hemorrhage most consistent with reactive dura mater.    Seen by ID and on erring on the side of caution recommended 6 weeks of abx from hardware exchange with vanc+ceftazidime ending 10/8/22.    Patient recently ED visits 9/12 and 9/14 with fever Tmax 101 a few days before, urticarial rash.  Elevated LFTs found during those visit.  Of note, the patient had been off vancomycin pending possible switch to daptomycin.    Symptoms have resolved now that patient has been off antibiotics since those ED visits.    Stitches removed from surgical site, no swelling/pain/drainage from that site.    She is complaining of vaginal itching and smell for the past few days.    Review of Systems   Constitutional: Positive for fever. Negative for chills.   Eyes:  Negative for visual disturbance.   Cardiovascular:  Negative for chest pain.   Respiratory:  Negative for cough and shortness of breath.    Skin:  Positive for rash.   Musculoskeletal:  Negative for  neck pain.   Gastrointestinal:  Negative for diarrhea, nausea and vomiting.   Genitourinary:  Negative for bladder incontinence, dysuria, frequency, genital sores and pelvic pain.   Neurological:  Negative for headaches.   Psychiatric/Behavioral:  Negative for altered mental status, depression and hallucinations.        Past Medical History:   Diagnosis Date    Brain aneurysm     CHF (congestive heart failure)     H/O coronary angioplasty     Hypercholesteremia     Hypertension     Malignant hypertension     Migraine headache     Stroke 10/2017     Past Surgical History:   Procedure Laterality Date    CARDIAC CATHETERIZATION      CEREBRAL ANGIOGRAM      CLIP LIGATION OF INTRACRANIAL ANEURYSM BY CRANIOTOMY N/A 7/15/2022    Procedure: CRANIOTOMY, WITH ANEURYSM CLIPPING;  Surgeon: Timothy Diaz MD;  Location: [facility] OR [address] [place] [address];  Service: Neurosurgery;  Laterality: N/A;  PTERIONAL CRANIOTOMY WITH CLIP LIGATION OF L PCOMM, L ANTERIOR CHOROIDAL, L MCA  ANEURYSM, ANESTHESIA: GENERAL, BLOOD: TYPE&CROSS 2 UNITS, NEUROMONITORING: SEP, MEP, EEG, RADIOLOGY: C-ARM, POSITION: SUPINE, CASTILLO, CO-SURGERON: DR. LEXI DOMÍNGUEZ.    WOUND DEBRIDEMENT  8/27/2022    Procedure: DEBRIDEMENT, WOUND;  Surgeon: Timothy Diaz MD;  Location: [facility] OR [address] [place] [address];  Service: Neurosurgery;;     No family history on file.  Social History     Tobacco Use    Smoking status: Every Day     Packs/day: 0.50     Types: Cigarettes    Smokeless tobacco: Never   Substance Use Topics    Alcohol use: Yes     Comment: occassionally    Drug use: No       Review of patient's allergies indicates:   Allergen Reactions    Lisinopril Swelling    Bactrim [sulfamethoxazole-trimethoprim] Rash         Objective:   VS (24h):   Vitals:    09/16/22 0907   BP: (!) 145/98   Pulse: 82   Temp: 98.5 °F (36.9 °C)         Physical Exam  Vitals reviewed.   Constitutional:       General: She is not in acute distress.     Appearance: She is obese. She is not ill-appearing,  toxic-appearing or diaphoretic.   HENT:      Head: Atraumatic.      Comments: Scalp surgical site healing well no erythema/swelling/discharge/tenderness     Right Ear: External ear normal.      Left Ear: External ear normal.   Eyes:      Extraocular Movements: Extraocular movements intact.      Conjunctiva/sclera: Conjunctivae normal.   Cardiovascular:      Rate and Rhythm: Normal rate and regular rhythm.      Heart sounds: Normal heart sounds.   Pulmonary:      Effort: Pulmonary effort is normal. No respiratory distress.   Abdominal:      General: Abdomen is flat.      Palpations: Abdomen is soft.   Musculoskeletal:         General: No deformity. Normal range of motion.      Cervical back: Normal range of motion.   Skin:     General: Skin is warm and dry.      Findings: No rash.   Neurological:      Mental Status: She is alert and oriented to person, place, and time. Mental status is at baseline.   Psychiatric:         Mood and Affect: Mood normal.         Behavior: Behavior normal.         Thought Content: Thought content normal.         Judgment: Judgment normal.           Labs:  Reviewed    Micro:   Reviewed    Radiology:   Reviewed    Immunization History   Administered Date(s) Administered    Influenza - Quadrivalent - PF *Preferred* (6 months and older) 10/05/2017         Assessment & Plan:     1. Fluid collection at surgical site, subsequent encounter  - PICC line removal    2. Vaginal candidiasis  - fluconazole (DIFLUCAN) 150 MG Tab; Take 1 tablet (150 mg total) by mouth once daily. for 1 day  Dispense: 1 tablet; Refill: 0     Suspect drug reaction, more likely ceftazidime as patient had been off vancomycin since symptoms started.  Symptoms had persisted while continuing ceftazidime.    Patient has completed several weeks of IV antibiotics and wound appears well-healed with negative cultures and path showing hematoma, reactive soft tissue.  No evidence of infection per report.    -Will list ceftazidime with  reaction of rash.  -D/c antibiotics, PICC line    Will empirically treat for vaginal candidiasis.  Was predisposed to this given ongoing IV abx.    -Diflucan 150mg x1        Follow up 9/28/22 with dr santana.      Ned Meléndez MD  Infectious Disease Fellow

## 2022-09-16 NOTE — PROGRESS NOTES
Patient presents today for removal of R PICC line from the  r UE.      Patient placed in the supine position with the catheter exit site lower than the heart.  Catheter dressing removed.  Site is free from patent and no redness.  Patient was instructed to take deep breath and bear down.  No resistance was met upon removal.  Catheter gently withdrawn, pulling it parallel to the arm with a constant, steady motion.  Firm, even, direct pressure placed on the exit site with a dry sterile gauze as the catheter exited the site. The patient was then instructed to exhale after the catheter was removed.  Direct pressure was applied until hemostasis was achieved.  Xeroform and a sterile dressing placed.  Catheter intact and length is 34 centimeters which is equivalent to the original insertion length.      Patient maintained in a supine position for 30 minutes after catheter removal.  No sudden onset of dyspnea, continued coughing, breathlessness, chest pain or AMS.     Pt instructed to leave dressing on for 24 hours.  Then assess the catheter exit site every day until the site is epithelialized.  Report any signs of infection (redness, warmth, tenderness, swelling, and drainage) to the practitioner if present.

## 2022-09-19 LAB
BACTERIA BLD CULT: NORMAL
BACTERIA BLD CULT: NORMAL

## 2022-09-26 LAB
FUNGUS SPEC CULT: NORMAL

## 2022-09-28 ENCOUNTER — OFFICE VISIT (OUTPATIENT)
Dept: INFECTIOUS DISEASES | Facility: CLINIC | Age: 47
End: 2022-09-28
Payer: MEDICAID

## 2022-09-28 VITALS
WEIGHT: 198.63 LBS | BODY MASS INDEX: 36.33 KG/M2 | DIASTOLIC BLOOD PRESSURE: 91 MMHG | SYSTOLIC BLOOD PRESSURE: 126 MMHG | TEMPERATURE: 98 F | HEART RATE: 71 BPM

## 2022-09-28 DIAGNOSIS — T88.8XXD FLUID COLLECTION AT SURGICAL SITE, SUBSEQUENT ENCOUNTER: Primary | ICD-10-CM

## 2022-09-28 DIAGNOSIS — B37.31 VAGINAL CANDIDIASIS: ICD-10-CM

## 2022-09-28 PROCEDURE — 1111F DSCHRG MED/CURRENT MED MERGE: CPT | Mod: CPTII,,, | Performed by: INTERNAL MEDICINE

## 2022-09-28 PROCEDURE — 3008F PR BODY MASS INDEX (BMI) DOCUMENTED: ICD-10-PCS | Mod: CPTII,,, | Performed by: INTERNAL MEDICINE

## 2022-09-28 PROCEDURE — 1111F PR DISCHARGE MEDS RECONCILED W/ CURRENT OUTPATIENT MED LIST: ICD-10-PCS | Mod: CPTII,,, | Performed by: INTERNAL MEDICINE

## 2022-09-28 PROCEDURE — 99215 PR OFFICE/OUTPT VISIT, EST, LEVL V, 40-54 MIN: ICD-10-PCS | Mod: S$PBB,,, | Performed by: INTERNAL MEDICINE

## 2022-09-28 PROCEDURE — 3080F PR MOST RECENT DIASTOLIC BLOOD PRESSURE >= 90 MM HG: ICD-10-PCS | Mod: CPTII,,, | Performed by: INTERNAL MEDICINE

## 2022-09-28 PROCEDURE — 3008F BODY MASS INDEX DOCD: CPT | Mod: CPTII,,, | Performed by: INTERNAL MEDICINE

## 2022-09-28 PROCEDURE — 3080F DIAST BP >= 90 MM HG: CPT | Mod: CPTII,,, | Performed by: INTERNAL MEDICINE

## 2022-09-28 PROCEDURE — 99214 OFFICE O/P EST MOD 30 MIN: CPT | Mod: PBBFAC | Performed by: INTERNAL MEDICINE

## 2022-09-28 PROCEDURE — 3052F PR MOST RECENT HEMOGLOBIN A1C LEVEL 8.0 - < 9.0%: ICD-10-PCS | Mod: CPTII,,, | Performed by: INTERNAL MEDICINE

## 2022-09-28 PROCEDURE — 1159F MED LIST DOCD IN RCRD: CPT | Mod: CPTII,,, | Performed by: INTERNAL MEDICINE

## 2022-09-28 PROCEDURE — 1159F PR MEDICATION LIST DOCUMENTED IN MEDICAL RECORD: ICD-10-PCS | Mod: CPTII,,, | Performed by: INTERNAL MEDICINE

## 2022-09-28 PROCEDURE — 99215 OFFICE O/P EST HI 40 MIN: CPT | Mod: S$PBB,,, | Performed by: INTERNAL MEDICINE

## 2022-09-28 PROCEDURE — 99999 PR PBB SHADOW E&M-EST. PATIENT-LVL IV: ICD-10-PCS | Mod: PBBFAC,,, | Performed by: INTERNAL MEDICINE

## 2022-09-28 PROCEDURE — 99999 PR PBB SHADOW E&M-EST. PATIENT-LVL IV: CPT | Mod: PBBFAC,,, | Performed by: INTERNAL MEDICINE

## 2022-09-28 PROCEDURE — 3052F HG A1C>EQUAL 8.0%<EQUAL 9.0%: CPT | Mod: CPTII,,, | Performed by: INTERNAL MEDICINE

## 2022-09-28 PROCEDURE — 3074F PR MOST RECENT SYSTOLIC BLOOD PRESSURE < 130 MM HG: ICD-10-PCS | Mod: CPTII,,, | Performed by: INTERNAL MEDICINE

## 2022-09-28 PROCEDURE — 3074F SYST BP LT 130 MM HG: CPT | Mod: CPTII,,, | Performed by: INTERNAL MEDICINE

## 2022-09-28 RX ORDER — FLUCONAZOLE 150 MG/1
150 TABLET ORAL DAILY
Qty: 2 TABLET | Refills: 0 | Status: SHIPPED | OUTPATIENT
Start: 2022-09-28 | End: 2022-12-19 | Stop reason: SDUPTHER

## 2022-09-28 NOTE — PROGRESS NOTES
INFECTIOUS DISEASE CLINIC  CLINIC NOTE    Patient Name: iGna Jenkins  YOB: 1975    PRESENTING HISTORY       History of Present Illness:  Ms. Gina Jenkins is a 47 y.o. female w/ a PMHx of CVA, dysarthria, emphysema, CHF, HTN, DM2, and superficial cranial post op infection who presents for hospital follow up. She had a craniotomy w/ aneurysm clipping 7/15/22 and repeat craniotomy w/ wound debridement on 8/27. After her wound debridement, she was started on vancomycin and ceftazidime for suspected superficial post-op infection. Cultures from that admission have shown NGTD. She stopped these on 9/14 after she was seen in the ED for fever, generalized pruritis, and hives. She was last seen in ID clinic on 9/16 and during this visit was prescribed fluconazole for suspected vaginal candidiasis. Since her last visit, she has been doing moderately well. Today, she endorses headaches (consistent in nature), surgical site pain, appetite loss, weakness, fatigue, and vaginal pruritis. She denies vision changes, hearing changes, eye discharge, abdominal pain, vaginal discharge, new rashes, and pruritis.    Review of Systems   Constitutional:  Positive for malaise/fatigue. Negative for chills, fever and weight loss.   HENT:  Negative for congestion, hearing loss, sinus pain and tinnitus.    Eyes:  Negative for blurred vision, double vision and redness.   Respiratory:  Negative for cough, sputum production and shortness of breath.    Cardiovascular:  Negative for chest pain, palpitations, orthopnea and leg swelling.   Gastrointestinal:  Negative for abdominal pain, blood in stool, constipation, diarrhea, heartburn, nausea and vomiting.   Genitourinary:  Negative for dysuria, frequency and urgency.        Vaginal pruritis   Musculoskeletal:  Negative for falls, joint pain and myalgias.   Skin: Negative.    Neurological:  Positive for weakness and headaches (consistent in nature). Negative for dizziness and  loss of consciousness.   Endo/Heme/Allergies:  Does not bruise/bleed easily.   Psychiatric/Behavioral:  Negative for depression and suicidal ideas. The patient is not nervous/anxious and does not have insomnia.      OBJECTIVE:   Vital Signs:  Vitals:    09/28/22 1355   BP: (!) 126/91   Pulse: 71   Temp: 98.3 °F (36.8 °C)   TempSrc: Oral   Weight: 90.1 kg (198 lb 10.2 oz)       No results found for this or any previous visit (from the past 24 hour(s)).      Physical Exam  Constitutional:       General: She is not in acute distress.     Appearance: Normal appearance. She is not ill-appearing.   HENT:      Head:        Right Ear: External ear normal.      Left Ear: External ear normal.      Nose: Nose normal.      Mouth/Throat:      Mouth: Mucous membranes are moist.      Pharynx: Oropharynx is clear. No posterior oropharyngeal erythema.   Eyes:      General: No scleral icterus.     Extraocular Movements: Extraocular movements intact.      Conjunctiva/sclera: Conjunctivae normal.   Neck:      Thyroid: No thyromegaly.      Vascular: No JVD.      Trachea: No tracheal deviation.   Cardiovascular:      Rate and Rhythm: Normal rate and regular rhythm.      Pulses: Normal pulses.      Heart sounds: Normal heart sounds. No murmur heard.  Pulmonary:      Effort: Pulmonary effort is normal. No respiratory distress.      Breath sounds: Normal breath sounds.   Chest:      Chest wall: No tenderness.   Abdominal:      General: Bowel sounds are normal. There is no distension.      Palpations: Abdomen is soft. There is no mass.      Tenderness: There is no abdominal tenderness.   Musculoskeletal:         General: No tenderness. Normal range of motion.      Cervical back: Normal range of motion and neck supple.      Right lower leg: No edema.      Left lower leg: No edema.   Lymphadenopathy:      Cervical: No cervical adenopathy.   Skin:     General: Skin is warm and dry.      Capillary Refill: Capillary refill takes less than 2  seconds.      Findings: No bruising, erythema or rash.   Neurological:      General: No focal deficit present.      Mental Status: She is alert and oriented to person, place, and time. Mental status is at baseline.      Motor: No weakness.      Gait: Gait is intact. Gait normal.      Deep Tendon Reflexes: Reflexes are normal and symmetric.   Psychiatric:         Mood and Affect: Mood and affect normal.         Thought Content: Thought content normal.         Cognition and Memory: Memory normal.       ASSESSMENT & PLAN:     Gina was seen today for follow-up.    Diagnoses and all orders for this visit:    Fluid collection at surgical site, subsequent encounter   - no ID clinic follow up necessary   - pt to follow up with neurosurgery    Vaginal candidiasis  -     fluconazole (DIFLUCAN) 150 MG Tab; Take 1 tablet (150 mg total) by mouth once daily. for 2 doses          Discussed with Dr. Indiana Rosa    Signed:    Brenda Almanza M.D.  Internal Medicine PGY-3  09/28/2022 2:23 PM

## 2022-10-10 LAB
ACID FAST MOD KINY STN SPEC: NORMAL
MYCOBACTERIUM SPEC QL CULT: NORMAL

## 2022-10-15 LAB
ACID FAST MOD KINY STN SPEC: NORMAL
ACID FAST MOD KINY STN SPEC: NORMAL
MYCOBACTERIUM SPEC QL CULT: NORMAL
MYCOBACTERIUM SPEC QL CULT: NORMAL

## 2022-10-18 ENCOUNTER — TELEPHONE (OUTPATIENT)
Dept: NEUROSURGERY | Facility: CLINIC | Age: 47
End: 2022-10-18

## 2022-10-18 ENCOUNTER — HOSPITAL ENCOUNTER (OUTPATIENT)
Dept: RADIOLOGY | Facility: HOSPITAL | Age: 47
Discharge: HOME | End: 2022-10-18
Attending: PHYSICIAN ASSISTANT
Payer: MEDICAID

## 2022-10-18 ENCOUNTER — OFFICE VISIT (OUTPATIENT)
Dept: NEUROSURGERY | Facility: CLINIC | Age: 47
End: 2022-10-18
Payer: MEDICAID

## 2022-10-18 VITALS
TEMPERATURE: 99 F | SYSTOLIC BLOOD PRESSURE: 141 MMHG | HEIGHT: 62 IN | DIASTOLIC BLOOD PRESSURE: 89 MMHG | HEART RATE: 65 BPM | BODY MASS INDEX: 36.33 KG/M2

## 2022-10-18 DIAGNOSIS — I67.1 CEREBRAL ANEURYSM, NONRUPTURED: Primary | ICD-10-CM

## 2022-10-18 DIAGNOSIS — I67.1 CEREBRAL ANEURYSM, NONRUPTURED: ICD-10-CM

## 2022-10-18 PROCEDURE — 99024 POSTOP FOLLOW-UP VISIT: CPT | Mod: S$PBB,,, | Performed by: NEUROLOGICAL SURGERY

## 2022-10-18 PROCEDURE — 70496 CT ANGIOGRAPHY HEAD: CPT | Mod: 26,,, | Performed by: RADIOLOGY

## 2022-10-18 PROCEDURE — 3052F PR MOST RECENT HEMOGLOBIN A1C LEVEL 8.0 - < 9.0%: ICD-10-PCS | Mod: CPTII,,, | Performed by: NEUROLOGICAL SURGERY

## 2022-10-18 PROCEDURE — 1160F RVW MEDS BY RX/DR IN RCRD: CPT | Mod: CPTII,,, | Performed by: NEUROLOGICAL SURGERY

## 2022-10-18 PROCEDURE — 1159F MED LIST DOCD IN RCRD: CPT | Mod: CPTII,,, | Performed by: NEUROLOGICAL SURGERY

## 2022-10-18 PROCEDURE — 99024 PR POST-OP FOLLOW-UP VISIT: ICD-10-PCS | Mod: S$PBB,,, | Performed by: NEUROLOGICAL SURGERY

## 2022-10-18 PROCEDURE — 4010F ACE/ARB THERAPY RXD/TAKEN: CPT | Mod: CPTII,,, | Performed by: NEUROLOGICAL SURGERY

## 2022-10-18 PROCEDURE — 3077F SYST BP >= 140 MM HG: CPT | Mod: CPTII,,, | Performed by: NEUROLOGICAL SURGERY

## 2022-10-18 PROCEDURE — 70496 CTA HEAD: ICD-10-PCS | Mod: 26,,, | Performed by: RADIOLOGY

## 2022-10-18 PROCEDURE — 1160F PR REVIEW ALL MEDS BY PRESCRIBER/CLIN PHARMACIST DOCUMENTED: ICD-10-PCS | Mod: CPTII,,, | Performed by: NEUROLOGICAL SURGERY

## 2022-10-18 PROCEDURE — 99999 PR PBB SHADOW E&M-EST. PATIENT-LVL IV: ICD-10-PCS | Mod: PBBFAC,,, | Performed by: NEUROLOGICAL SURGERY

## 2022-10-18 PROCEDURE — 3077F PR MOST RECENT SYSTOLIC BLOOD PRESSURE >= 140 MM HG: ICD-10-PCS | Mod: CPTII,,, | Performed by: NEUROLOGICAL SURGERY

## 2022-10-18 PROCEDURE — 1159F PR MEDICATION LIST DOCUMENTED IN MEDICAL RECORD: ICD-10-PCS | Mod: CPTII,,, | Performed by: NEUROLOGICAL SURGERY

## 2022-10-18 PROCEDURE — 3052F HG A1C>EQUAL 8.0%<EQUAL 9.0%: CPT | Mod: CPTII,,, | Performed by: NEUROLOGICAL SURGERY

## 2022-10-18 PROCEDURE — 99214 OFFICE O/P EST MOD 30 MIN: CPT | Mod: PBBFAC,25 | Performed by: NEUROLOGICAL SURGERY

## 2022-10-18 PROCEDURE — 4010F PR ACE/ARB THEARPY RXD/TAKEN: ICD-10-PCS | Mod: CPTII,,, | Performed by: NEUROLOGICAL SURGERY

## 2022-10-18 PROCEDURE — 70496 CT ANGIOGRAPHY HEAD: CPT | Mod: TC

## 2022-10-18 PROCEDURE — 25500020 PHARM REV CODE 255: Performed by: PHYSICIAN ASSISTANT

## 2022-10-18 PROCEDURE — 99999 PR PBB SHADOW E&M-EST. PATIENT-LVL IV: CPT | Mod: PBBFAC,,, | Performed by: NEUROLOGICAL SURGERY

## 2022-10-18 PROCEDURE — 3079F DIAST BP 80-89 MM HG: CPT | Mod: CPTII,,, | Performed by: NEUROLOGICAL SURGERY

## 2022-10-18 PROCEDURE — 3079F PR MOST RECENT DIASTOLIC BLOOD PRESSURE 80-89 MM HG: ICD-10-PCS | Mod: CPTII,,, | Performed by: NEUROLOGICAL SURGERY

## 2022-10-18 RX ADMIN — IOHEXOL 100 ML: 350 INJECTION, SOLUTION INTRAVENOUS at 01:10

## 2022-10-18 NOTE — PROGRESS NOTES
Neurosurgery  Established Patient    SUBJECTIVE:     History of Present Illness:  Ms. 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  She had her postoperative course complicated somewhat by subgaleal fluid collection, with possible infection she underwent revision and washout.  She presents now for follow-up with a CTA.  CTA does not show any significant recurrence of her previous aneurysm.  The 3 mm left MCA bifurcation aneurysm which was left is largely unchanged from previous imaging.  She denies any new symptoms.  She does note she continues to have occasional diarrhea, secondary she believes from her antibiotics.    Review of patient's allergies indicates:   Allergen Reactions    Lisinopril Swelling    Bactrim [sulfamethoxazole-trimethoprim] Rash    Ceftazidime Hives     Rash while on IV ceftazidime for planned 6 weeks.  See ED notes 9/12, 9/14 and ID clinic notes 9/16 and 9/28       Current Outpatient Medications   Medication Sig Dispense Refill    amitriptyline (ELAVIL) 75 MG tablet Take 75 mg by mouth every evening.      amlodipine (NORVASC) 10 MG tablet Take 1 tablet (10 mg total) by mouth once daily. 30 tablet 0    blood sugar diagnostic Strp Use to check blood glucose 3 times daily. 200 each 11    blood-glucose meter Misc Use to check blood glucose 3 times daily. 1 each 1    butalbital-acetaminophen-caffeine -40 mg (FIORICET, ESGIC) -40 mg per tablet Take 1 tablet by mouth every 4 (four) hours as needed.      carvediloL (COREG) 25 MG tablet Take 37.5 mg by mouth 2 (two) times daily.      dextrose 5 % SolP 50 mL with ceftAZIDime 1 gram SolR 2 g Inject 2 g into the vein every 8 (eight) hours.      docusate sodium (COLACE) 100 MG capsule Take 1 capsule (100 mg total) by mouth 2 (two) times daily as needed for Constipation.  "30 capsule 0    dulaglutide (TRULICITY) 0.75 mg/0.5 mL pen injector Inject 0.75 mg into the skin every 7 days. 4 pen 1    EPINEPHrine (EPIPEN) 0.3 mg/0.3 mL AtIn Inject 0.3 mLs (0.3 mg total) into the muscle as needed. 1 each 0    EScitalopram oxalate (LEXAPRO) 10 MG tablet Take 10 mg by mouth once daily.      hydrOXYzine HCL (ATARAX) 25 MG tablet Take 1 tablet (25 mg total) by mouth every 6 (six) hours. 12 tablet 0    insulin aspart U-100 (NOVOLOG) 100 unit/mL (3 mL) InPn pen Inject 8 Units into the skin 3 (three) times daily with meals. 6 mL 5    insulin detemir U-100 (LEVEMIR FLEXTOUCH) 100 unit/mL (3 mL) SubQ InPn pen Inject 24 Units into the skin once daily. 6 mL 11    lancets 30 gauge Misc Use to check blood glucose 3 times daily. 200 each 11    levETIRAcetam (KEPPRA) 1000 MG tablet Take 1 tablet (1,000 mg total) by mouth 2 (two) times daily. 60 tablet 11    magnesium oxide (MAG-OX) 400 mg (241.3 mg magnesium) tablet Take 1 tablet by mouth 2 (two) times daily.      oxyCODONE (ROXICODONE) 5 MG immediate release tablet Take 1 tablet (5 mg total) by mouth every 6 (six) hours as needed for Pain. 40 tablet 0    pen needle, diabetic (BD ULTRA-FINE MARIELY PEN NEEDLE) 32 gauge x 5/32" Ndle Use to inject insulin into the skin three times daily . 200 each 11    potassium chloride SA (K-DUR,KLOR-CON) 20 MEQ tablet Take 20 mEq by mouth 2 (two) times daily.      topiramate (TOPAMAX) 50 MG tablet Take 50 mg by mouth 2 (two) times daily.      vancomycin HCl (VANCOMYCIN 1 G/250 ML D5W, READY TO MIX SYSTEM,) Inject 375 mLs (1,500 mg total) into the vein once daily.      atorvastatin (LIPITOR) 40 MG tablet Take 1 tablet (40 mg total) by mouth once daily. 90 tablet 3     Current Facility-Administered Medications   Medication Dose Route Frequency Provider Last Rate Last Admin    albuterol inhaler 2 puff  2 puff Inhalation Q20 Min PRN Bonifacio Ramirez MD           Past Medical History:   Diagnosis Date    Brain aneurysm     CHF " "(congestive heart failure)     H/O coronary angioplasty     Hypercholesteremia     Hypertension     Malignant hypertension     Migraine headache     Stroke 10/2017     Past Surgical History:   Procedure Laterality Date    CARDIAC CATHETERIZATION      CEREBRAL ANGIOGRAM      CLIP LIGATION OF INTRACRANIAL ANEURYSM BY CRANIOTOMY N/A 7/15/2022    Procedure: CRANIOTOMY, WITH ANEURYSM CLIPPING;  Surgeon: Timothy Diaz MD;  Location: [facility] OR [address] [place] [address];  Service: Neurosurgery;  Laterality: N/A;  PTERIONAL CRANIOTOMY WITH CLIP LIGATION OF L PCOMM, L ANTERIOR CHOROIDAL, L MCA  ANEURYSM, ANESTHESIA: GENERAL, BLOOD: TYPE&CROSS 2 UNITS, NEUROMONITORING: SEP, MEP, EEG, RADIOLOGY: C-ARM, POSITION: SUPINE, CASTILLO, CO-SURGERON: DR. LEXI DOMÍNGUEZ.    WOUND DEBRIDEMENT  8/27/2022    Procedure: DEBRIDEMENT, WOUND;  Surgeon: Timothy Diaz MD;  Location: [facility] OR [address] [place] [address];  Service: Neurosurgery;;     Family History    None       Social History     Socioeconomic History    Marital status:    Tobacco Use    Smoking status: Every Day     Packs/day: 0.50     Types: Cigarettes    Smokeless tobacco: Never   Substance and Sexual Activity    Alcohol use: Yes     Comment: occassionally    Drug use: No    Sexual activity: Not Currently     Partners: Male     Birth control/protection: None       Review of Systems    OBJECTIVE:     Vital Signs  Temp: 98.7 °F (37.1 °C)  Pulse: 65  BP: (!) 141/89  Pain Score:   5  Height: 5' 2" (157.5 cm)  Body mass index is 36.33 kg/m².    Neurosurgery Physical Exam    General: no acute distress  Head:  Postsurgical, wound clean, dry, intact.  Mildly tender to palpation at the inferior portion of the incision.  No fluctuance appreciated.  Eyes: Pupils equal, EOMI  Neck: Supple, normal ROM, no tenderness to palpation  CVS: Normal rate and rhythm, distal pulses present  Pulm: Symmetric expansion, no respiratory distress  GI: Abdomen nondistended, nontender    MSK: Moves all extremities without restriction, " atraumatic  Skin: Dry, intact  Psych: Normal thought content and cognition    Neuro:  Alert, awake, oriented, to self, place, time  Language:  Speech is fluent, goal directed without any noted dysarthria or aphasia    Cranial nerves:    CNII-XII: Intact on fine exam,   Pupils equal round react to light,   Extraocular muscles are intact  V1 to V3 is intact to light touch,   no facial asymmetry,   Hearing is intact to finger rub and voice  tongue/uvula/palate midline,   shoulder shrug equal,     No pronator drift    Extremities:  Motor:  Upper Extremity    Deltoid Triceps Biceps Wrist  Extension Wrist  Flexion Interosseous   R 5/5 5/5 5/5 5/5 5/5 5/5   L 5/5 5/5 5/5 5/5 5/5 5/5       Thumb   Abduction Thumb  ADDuction Finger  Flexion Finger  Extension     R 5/5 5/5 5/5 5/5     L 5/5 5/5 5/5 5/5        Lower Extremity     Iliopsoas Quadriceps Hamstring Plantarflexion Dorsiflexion EHL   R 5/5 5/5 5/5 5/5 5/5 5/5   L 5/5 5/5 5/5 5/5 5/5 5/5       Reflexes:     DTR: 2+ biceps    2+ triceps   2+ brachioradialis   2+ patellar  2+ Achilles     Sarabia's: Negative     Babinski's: Negative     Clonus: Negative     Sensory:      Sensation intact to light touch, temperature sensation, vibration, pinprick     Coordination:      Coordination intact throughout, no dysmetria, normal rapid alternating movements, no dysdiadochokinesia  Cerebellar:      Diagnostic Results:  I personally reviewed patient's Diagnostic Imaging.  Previously clipped posterior communicating, and anterior choroidal artery aneurysms appear to be stable with no residual.  The 2 mm M1/M2 aneurysm is largely unchanged.    ASSESSMENT/PLAN:     47-year-old female status post left pterional craniotomy for microsurgical clip ligation of dysplastic anterior choroidal, and posterior communicating artery aneurysm.    1. No focal motor or sensory deficits on examination in clinic  2. CTA without any residual of the previously clipped aneurysm.  Stable appearing left M2  aneurysm, 3 mm in size.    3. Recommend continued follow-up as discussed with the patient.  CTA in 1 year, with MRA with vessel wall imaging in 1 year to assess the untreated MCA aneurysm.  We will continue to follow.  Answered questions to patient's satisfaction.  Would recommend continued follow-up with Infectious Disease, given the persistent diarrhea potentially related to previous antibiotic therapy.      Thank you so very much for allowing me to participate in the care of this patient.  Please feel free to call any questions, comments or concerns.    Timothy Diaz MD,MSc  Department of Neurosurgery   Department of Radiology  Department of Neurology  [facility]    [facility] [employer] [facility] [employer]    Total time spent in counseling and discussion about further management options including relevant lab work, treatment,  prognosis, medications and intended side effects was more than 60 minutes. More than 50 % of the time was spent in counseling and coordination of care.  I spent a total of 60 minutes on the day of the visit.This includes face to face time and non-face to face time preparing to see the patient (eg, review of tests), Obtaining and/or reviewing separately obtained history, Documenting clinical information in the electronic or other health record, Independently interpreting resultsand communicating results to the patient/family/caregiver, or Care coordination         Note dictated with voice recognition software, please excuse any grammatical errors.

## 2023-01-29 ENCOUNTER — HOSPITAL ENCOUNTER (EMERGENCY)
Facility: HOSPITAL | Age: 48
Discharge: HOME | End: 2023-01-29
Attending: EMERGENCY MEDICINE
Payer: MEDICAID

## 2023-01-29 VITALS
OXYGEN SATURATION: 99 % | HEART RATE: 92 BPM | RESPIRATION RATE: 20 BRPM | SYSTOLIC BLOOD PRESSURE: 129 MMHG | TEMPERATURE: 99 F | BODY MASS INDEX: 31.28 KG/M2 | DIASTOLIC BLOOD PRESSURE: 88 MMHG | WEIGHT: 171 LBS

## 2023-01-29 DIAGNOSIS — U07.1 COVID-19: Primary | ICD-10-CM

## 2023-01-29 DIAGNOSIS — U07.1 COVID-19 VIRUS DETECTED: ICD-10-CM

## 2023-01-29 LAB — SARS-COV-2 RDRP RESP QL NAA+PROBE: POSITIVE

## 2023-01-29 PROCEDURE — U0002 COVID-19 LAB TEST NON-CDC: HCPCS | Mod: ER | Performed by: NURSE PRACTITIONER

## 2023-01-29 PROCEDURE — 25000003 PHARM REV CODE 250: Mod: ER | Performed by: NURSE PRACTITIONER

## 2023-01-29 PROCEDURE — 99282 EMERGENCY DEPT VISIT SF MDM: CPT | Mod: ER

## 2023-01-29 RX ORDER — ACETAMINOPHEN 500 MG
1000 TABLET ORAL
Status: COMPLETED | OUTPATIENT
Start: 2023-01-29 | End: 2023-01-29

## 2023-01-29 RX ADMIN — ACETAMINOPHEN 1000 MG: 500 TABLET ORAL at 05:01

## 2023-01-29 NOTE — Clinical Note
"Gina"Erin Jenkins was seen and treated in our emergency department on 1/29/2023.     COVID-19 is present in our communities across the state. There is limited testing for COVID at this time, so not all patients can be tested. In this situation, your employee meets the following criteria:    Gina Jenkins has met the criteria for COVID-19 testing and has a POSITIVE result. She can return to work once they are asymptomatic for 24 hours without the use of fever reducing medications AND at least five days from the first positive result. A mask is recommended for 5 days post quarantine.     If you have any questions or concerns, or if I can be of further assistance, please do not hesitate to contact me.    Sincerely,             Julissa Uribe NP"

## 2023-01-29 NOTE — Clinical Note
"Gina Cuellohope Jenkins was seen and treated in our emergency department on 1/29/2023.  She may return to work on 02/04/2023.       If you have any questions or concerns, please don't hesitate to call.      Julissa Uribe NP"

## 2023-01-30 NOTE — ED PROVIDER NOTES
Encounter Date: 1/29/2023       History     Chief Complaint   Patient presents with    COVID-19 Concerns     Reports of body aches, fever, cough. Patient tested for covid at home, tested positive.      48-year-old female presents emergency room with reports of body aches, cough, fever.  She reports she is been sick since last night and this morning.  She reports that she took a home COVID test which was positive.  She is here with her son for repeat testing.  She denies vomiting or diarrhea.  She denies rash or neck stiffness.  She has a past medical history of brain aneurysm, CHF, coronary angioplasty, hypertension, headache, stroke.    The history is provided by the patient. No  was used.   Review of patient's allergies indicates:   Allergen Reactions    Lisinopril Swelling    Bactrim [sulfamethoxazole-trimethoprim] Rash    Ceftazidime Hives     Rash while on IV ceftazidime for planned 6 weeks.  See ED notes 9/12, 9/14 and ID clinic notes 9/16 and 9/28     Past Medical History:   Diagnosis Date    Brain aneurysm     CHF (congestive heart failure)     H/O coronary angioplasty     Hypercholesteremia     Hypertension     Malignant hypertension     Migraine headache     Stroke 10/2017     Past Surgical History:   Procedure Laterality Date    CARDIAC CATHETERIZATION      CEREBRAL ANGIOGRAM      CLIP LIGATION OF INTRACRANIAL ANEURYSM BY CRANIOTOMY N/A 7/15/2022    Procedure: CRANIOTOMY, WITH ANEURYSM CLIPPING;  Surgeon: Timothy Diaz MD;  Location: [facility] OR [address] [place] [address];  Service: Neurosurgery;  Laterality: N/A;  PTERIONAL CRANIOTOMY WITH CLIP LIGATION OF L PCOMM, L ANTERIOR CHOROIDAL, L MCA  ANEURYSM, ANESTHESIA: GENERAL, BLOOD: TYPE&CROSS 2 UNITS, NEUROMONITORING: SEP, MEP, EEG, RADIOLOGY: C-ARM, POSITION: SUPINE, CASTILLO, CO-SURGERON: DR. LEXI DOMÍNGUEZ.    WOUND DEBRIDEMENT  8/27/2022    Procedure: DEBRIDEMENT, WOUND;  Surgeon: Timothy Diaz MD;  Location: [facility] OR [address] [place] [address];  Service:  Neurosurgery;;     No family history on file.  Social History     Tobacco Use    Smoking status: Every Day     Packs/day: 0.50     Types: Cigarettes    Smokeless tobacco: Never   Substance Use Topics    Alcohol use: Yes     Comment: occassionally    Drug use: No     Review of Systems   Constitutional:  Positive for fever.   HENT:  Positive for congestion.    Respiratory:  Positive for cough.    All other systems reviewed and are negative.    Physical Exam     Initial Vitals [01/29/23 1712]   BP Pulse Resp Temp SpO2   133/86 92 20 (!) 100.4 °F (38 °C) 100 %      MAP       --         Physical Exam    Constitutional: She appears well-developed and well-nourished. She has a sickly appearance.   HENT:   Head: Normocephalic.   Right Ear: Hearing and tympanic membrane normal.   Left Ear: Hearing and tympanic membrane normal.   Nose: Nose normal.   Mouth/Throat: Oropharynx is clear and moist.   Eyes: Lids are normal. Pupils are equal, round, and reactive to light.   Neck:   Normal range of motion.  Cardiovascular:  Normal rate.           Pulmonary/Chest: Breath sounds normal. No respiratory distress. She has no wheezes. She has no rhonchi.   Abdominal: Abdomen is soft. There is no abdominal tenderness.   Musculoskeletal:         General: Normal range of motion.      Cervical back: Normal range of motion. No rigidity. No spinous process tenderness or muscular tenderness.     Neurological: She is alert and oriented to person, place, and time.   Skin: Skin is warm and dry. No rash noted.   Psychiatric: She has a normal mood and affect. Her behavior is normal. Judgment and thought content normal.       ED Course   Procedures  Labs Reviewed   SARS-COV-2 RNA AMPLIFICATION, QUAL - Abnormal; Notable for the following components:       Result Value    SARS-CoV-2 RNA, Amplification, Qual Positive (*)     All other components within normal limits    Narrative:     Is the patient symptomatic?->Yes  Covid Positive  result(s) called and  verbal readback obtained from   Jesús Leblanc RN by OZZY 01/29/2023 18:10          Imaging Results    None          Medications   acetaminophen tablet 1,000 mg (1,000 mg Oral Given 1/29/23 1719)     Medical Decision Making:   Differential Diagnosis:   COVID, influenza, pneumonia, viral illness.  Clinical Tests:   Lab Tests: Ordered and Reviewed           ED Course as of 01/29/23 1820   Sun Jan 29, 2023   1810 COVID rapid screening is positive here in the emergency room.  The patient has been encouraged to rotate Tylenol and or Motrin for fever and/or body aches.  She will be set up with the COVID home monitoring program. [DT]   1820 Patient does not want any antivirals prescribed.  She states she will return for any worsening of her condition. [DT]      ED Course User Index  [DT] Julissa Uribe NP                 Clinical Impression:   Final diagnoses:  [U07.1] COVID-19 (Primary)        ED Disposition Condition    Discharge Stable          ED Prescriptions    None       Follow-up Information       Follow up With Specialties Details Why Contact Info    Clair Johnson NP Family Medicine Schedule an appointment as soon as possible for a visit in 2 days  [address] [facility]  [address]  [facility]  [place] [address]  [phone]               Julissa Uribe NP  01/29/23 1821

## 2023-01-30 NOTE — DISCHARGE INSTRUCTIONS

## 2023-02-07 ENCOUNTER — OFFICE VISIT (OUTPATIENT)
Dept: NEUROLOGY | Facility: CLINIC | Age: 48
End: 2023-02-07
Payer: MEDICAID

## 2023-02-07 VITALS
WEIGHT: 168.63 LBS | HEART RATE: 76 BPM | SYSTOLIC BLOOD PRESSURE: 144 MMHG | BODY MASS INDEX: 31.03 KG/M2 | DIASTOLIC BLOOD PRESSURE: 97 MMHG | HEIGHT: 62 IN

## 2023-02-07 DIAGNOSIS — G40.209 LOCALIZATION-RELATED (FOCAL) (PARTIAL) SYMPTOMATIC EPILEPSY AND EPILEPTIC SYNDROMES WITH COMPLEX PARTIAL SEIZURES, NOT INTRACTABLE, WITHOUT STATUS EPILEPTICUS: Primary | ICD-10-CM

## 2023-02-07 DIAGNOSIS — Z98.890 HISTORY OF CEREBRAL ANEURYSM REPAIR: ICD-10-CM

## 2023-02-07 DIAGNOSIS — T81.41XD INFECTION OF SUPERFICIAL INCISIONAL SURGICAL SITE AFTER PROCEDURE, SUBSEQUENT ENCOUNTER: ICD-10-CM

## 2023-02-07 DIAGNOSIS — Z86.79 HISTORY OF CEREBRAL ANEURYSM REPAIR: ICD-10-CM

## 2023-02-07 PROCEDURE — 1159F PR MEDICATION LIST DOCUMENTED IN MEDICAL RECORD: ICD-10-PCS | Mod: CPTII,,, | Performed by: PSYCHIATRY & NEUROLOGY

## 2023-02-07 PROCEDURE — 99214 OFFICE O/P EST MOD 30 MIN: CPT | Mod: PBBFAC | Performed by: PSYCHIATRY & NEUROLOGY

## 2023-02-07 PROCEDURE — 4010F PR ACE/ARB THEARPY RXD/TAKEN: ICD-10-PCS | Mod: CPTII,,, | Performed by: PSYCHIATRY & NEUROLOGY

## 2023-02-07 PROCEDURE — 1160F PR REVIEW ALL MEDS BY PRESCRIBER/CLIN PHARMACIST DOCUMENTED: ICD-10-PCS | Mod: CPTII,,, | Performed by: PSYCHIATRY & NEUROLOGY

## 2023-02-07 PROCEDURE — 4010F ACE/ARB THERAPY RXD/TAKEN: CPT | Mod: CPTII,,, | Performed by: PSYCHIATRY & NEUROLOGY

## 2023-02-07 PROCEDURE — 99215 PR OFFICE/OUTPT VISIT, EST, LEVL V, 40-54 MIN: ICD-10-PCS | Mod: S$PBB,,, | Performed by: PSYCHIATRY & NEUROLOGY

## 2023-02-07 PROCEDURE — 3077F PR MOST RECENT SYSTOLIC BLOOD PRESSURE >= 140 MM HG: ICD-10-PCS | Mod: CPTII,,, | Performed by: PSYCHIATRY & NEUROLOGY

## 2023-02-07 PROCEDURE — 3008F PR BODY MASS INDEX (BMI) DOCUMENTED: ICD-10-PCS | Mod: CPTII,,, | Performed by: PSYCHIATRY & NEUROLOGY

## 2023-02-07 PROCEDURE — 1160F RVW MEDS BY RX/DR IN RCRD: CPT | Mod: CPTII,,, | Performed by: PSYCHIATRY & NEUROLOGY

## 2023-02-07 PROCEDURE — 3080F PR MOST RECENT DIASTOLIC BLOOD PRESSURE >= 90 MM HG: ICD-10-PCS | Mod: CPTII,,, | Performed by: PSYCHIATRY & NEUROLOGY

## 2023-02-07 PROCEDURE — 3008F BODY MASS INDEX DOCD: CPT | Mod: CPTII,,, | Performed by: PSYCHIATRY & NEUROLOGY

## 2023-02-07 PROCEDURE — 3080F DIAST BP >= 90 MM HG: CPT | Mod: CPTII,,, | Performed by: PSYCHIATRY & NEUROLOGY

## 2023-02-07 PROCEDURE — 99215 OFFICE O/P EST HI 40 MIN: CPT | Mod: S$PBB,,, | Performed by: PSYCHIATRY & NEUROLOGY

## 2023-02-07 PROCEDURE — 99999 PR PBB SHADOW E&M-EST. PATIENT-LVL IV: CPT | Mod: PBBFAC,,, | Performed by: PSYCHIATRY & NEUROLOGY

## 2023-02-07 PROCEDURE — 99999 PR PBB SHADOW E&M-EST. PATIENT-LVL IV: ICD-10-PCS | Mod: PBBFAC,,, | Performed by: PSYCHIATRY & NEUROLOGY

## 2023-02-07 PROCEDURE — 3077F SYST BP >= 140 MM HG: CPT | Mod: CPTII,,, | Performed by: PSYCHIATRY & NEUROLOGY

## 2023-02-07 PROCEDURE — 1159F MED LIST DOCD IN RCRD: CPT | Mod: CPTII,,, | Performed by: PSYCHIATRY & NEUROLOGY

## 2023-02-07 RX ORDER — METFORMIN HYDROCHLORIDE 500 MG/1
500 TABLET, EXTENDED RELEASE ORAL 2 TIMES DAILY
COMMUNITY
Start: 2023-02-03

## 2023-02-07 RX ORDER — LEVETIRACETAM 1000 MG/1
1000 TABLET ORAL 2 TIMES DAILY
Qty: 180 TABLET | Refills: 3 | Status: SHIPPED | OUTPATIENT
Start: 2023-02-07 | End: 2024-02-07

## 2023-02-07 RX ORDER — TOPIRAMATE 50 MG/1
50 TABLET, FILM COATED ORAL 2 TIMES DAILY
Qty: 180 TABLET | Refills: 3 | Status: SHIPPED | OUTPATIENT
Start: 2023-02-07

## 2023-02-07 RX ORDER — LOSARTAN POTASSIUM 50 MG/1
50 TABLET ORAL
COMMUNITY
Start: 2022-12-29

## 2023-02-07 RX ORDER — TRAMADOL HYDROCHLORIDE 50 MG/1
50 TABLET ORAL 2 TIMES DAILY
COMMUNITY
Start: 2023-02-02
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
## 2023-02-07 NOTE — PATIENT INSTRUCTIONS
You came to Epilepsy Clinic because of seizures after aneurysm clipping. Seizures are very common in this setting. Currently, your seizures are well controlled on Keppra 1000mg twice daily and topiramate 50mg twice daily. Please continue the same medications at the same doses.     Do not miss any doses of medication. If a dose of medication is missed, take it as soon as it is remembered even if that means doubling up on the dose. Please get a lab test to check out the blood level of medication. Get regular sleep. Go to sleep at the same time and wake up at the same time every day.      Per [place] law, no episodes of loss of consciousness for 6 months before driving.  Avoid dangerous situations.  For example, no baths/pools alone, no heights, no power tools.  Wear a bike helmet.  If breakthrough seizures occur, please patient portal me or call the office and we will decide the next steps. If multiple seizures occur in a row without return back to baseline, 911 needs to be called.     Return to clinic in 6 months or sooner with issues.  Please patient portal with any questions or concerns.    Kamille Zurita MD PhD  Neurology-Epilepsy  [facility].

## 2023-03-24 NOTE — ED NOTES
Patient states she has ear pain and headache.  She states she was seen here previously and dx with ear infection.  She states her medications are not helping.     LOC: The patient is awake and alert; oriented x 3 and speaking appropriately.  APPEARANCE: Patient resting comfortably, patient is clean and well groomed  SKIN: warm and dry, normal skin turgor & moist mucus membranes, skin intact, no breakdown noted.  MUSCULOSKELETAL: Patient moving all extremities well, no obvious swelling or deformities noted  RESPIRATORY: Airway is open and patent, breath sounds clear throughout all lung fields; respirations are spontaneous, normal effort and rate  CARDIAC: Patient has a normal rate, no peripheral edema noted, capillary refill < 3 seconds; No complaints of chest pain   ABDOMEN: Soft and non tender to palpation, no distention noted. Bowel sounds present x 4   Patient states takes Rx Meloxicam one in the morning but it does not help with pain and asking if it [ossible to increase it to two tablets a day to help with pain and get additional refill send to the pharmacy in [place], please.

## 2023-05-23 ENCOUNTER — HOSPITAL ENCOUNTER (EMERGENCY)
Facility: HOSPITAL | Age: 48
Discharge: HOME | End: 2023-05-23
Attending: EMERGENCY MEDICINE
Payer: MEDICAID

## 2023-05-23 VITALS
SYSTOLIC BLOOD PRESSURE: 131 MMHG | WEIGHT: 164.38 LBS | OXYGEN SATURATION: 100 % | BODY MASS INDEX: 30.06 KG/M2 | RESPIRATION RATE: 20 BRPM | TEMPERATURE: 98 F | DIASTOLIC BLOOD PRESSURE: 82 MMHG | HEART RATE: 82 BPM

## 2023-05-23 DIAGNOSIS — J06.9 URI WITH COUGH AND CONGESTION: Primary | ICD-10-CM

## 2023-05-23 LAB
GROUP A STREP, MOLECULAR: NEGATIVE
INFLUENZA A, MOLECULAR: NEGATIVE
INFLUENZA B, MOLECULAR: NEGATIVE
SARS-COV-2 RDRP RESP QL NAA+PROBE: NEGATIVE
SPECIMEN SOURCE: NORMAL

## 2023-05-23 PROCEDURE — 25000003 PHARM REV CODE 250: Mod: ER | Performed by: EMERGENCY MEDICINE

## 2023-05-23 PROCEDURE — 87502 INFLUENZA DNA AMP PROBE: CPT | Mod: ER | Performed by: EMERGENCY MEDICINE

## 2023-05-23 PROCEDURE — U0002 COVID-19 LAB TEST NON-CDC: HCPCS | Mod: ER | Performed by: EMERGENCY MEDICINE

## 2023-05-23 PROCEDURE — 99283 EMERGENCY DEPT VISIT LOW MDM: CPT | Mod: ER

## 2023-05-23 PROCEDURE — 87651 STREP A DNA AMP PROBE: CPT | Mod: ER | Performed by: EMERGENCY MEDICINE

## 2023-05-23 RX ORDER — BENZONATATE 100 MG/1
100 CAPSULE ORAL 3 TIMES DAILY PRN
Qty: 21 CAPSULE | Refills: 0 | Status: SHIPPED | OUTPATIENT
Start: 2023-05-23 | End: 2023-05-30

## 2023-05-23 RX ORDER — ACETAMINOPHEN 500 MG
500 TABLET ORAL EVERY 6 HOURS PRN
Qty: 20 TABLET | Refills: 0 | Status: SHIPPED | OUTPATIENT
Start: 2023-05-23

## 2023-05-23 RX ORDER — ACETAMINOPHEN 500 MG
1000 TABLET ORAL
Status: COMPLETED | OUTPATIENT
Start: 2023-05-23 | End: 2023-05-23

## 2023-05-23 RX ADMIN — ACETAMINOPHEN 1000 MG: 500 TABLET ORAL at 09:05

## 2023-05-24 ENCOUNTER — HOSPITAL ENCOUNTER (EMERGENCY)
Facility: HOSPITAL | Age: 48
Discharge: HOME | End: 2023-05-25
Attending: STUDENT IN AN ORGANIZED HEALTH CARE EDUCATION/TRAINING PROGRAM
Payer: MEDICAID

## 2023-05-24 DIAGNOSIS — R56.9 FOCAL SEIZURE: Primary | ICD-10-CM

## 2023-05-24 PROCEDURE — 99284 EMERGENCY DEPT VISIT MOD MDM: CPT | Mod: 25,ER

## 2023-05-24 NOTE — DISCHARGE INSTRUCTIONS

## 2023-05-24 NOTE — ED PROVIDER NOTES
Encounter Date: 5/23/2023       History     Chief Complaint   Patient presents with    URI     Pt c/o cough, sore throat and fever that began 2 days PTA, denies N/V     Gina Jenkins is a 48 y.o. female who  has a past medical history of Brain aneurysm, CHF (congestive heart failure), H/O coronary angioplasty, Hypercholesteremia, Hypertension, Malignant hypertension, Migraine headache, and Stroke (10/2017).        She presents today with 2 days of generalized body aches cough sore throat runny nose congestion and subjective fevers.  No medications tried today.  She has been exposed to sick persons, her son is here being checked out for similar symptoms.  She denies any numbness weakness dizziness or other concerns.    The history is provided by the patient.   Review of patient's allergies indicates:   Allergen Reactions    Lisinopril Swelling    Bactrim [sulfamethoxazole-trimethoprim] Rash    Ceftazidime Hives     Rash while on IV ceftazidime for planned 6 weeks.  See ED notes 9/12, 9/14 and ID clinic notes 9/16 and 9/28     Past Medical History:   Diagnosis Date    Brain aneurysm     CHF (congestive heart failure)     H/O coronary angioplasty     Hypercholesteremia     Hypertension     Malignant hypertension     Migraine headache     Stroke 10/2017     Past Surgical History:   Procedure Laterality Date    CARDIAC CATHETERIZATION      CEREBRAL ANGIOGRAM      CLIP LIGATION OF INTRACRANIAL ANEURYSM BY CRANIOTOMY N/A 7/15/2022    Procedure: CRANIOTOMY, WITH ANEURYSM CLIPPING;  Surgeon: Timothy Diaz MD;  Location: [facility] OR [address] [place] [address];  Service: Neurosurgery;  Laterality: N/A;  PTERIONAL CRANIOTOMY WITH CLIP LIGATION OF L PCOMM, L ANTERIOR CHOROIDAL, L MCA  ANEURYSM, ANESTHESIA: GENERAL, BLOOD: TYPE&CROSS 2 UNITS, NEUROMONITORING: SEP, MEP, EEG, RADIOLOGY: C-ARM, POSITION: SUPINE, ANNA CO-SURGERON: DR. LEXI DOMÍNGUEZ.    WOUND DEBRIDEMENT  8/27/2022    Procedure: DEBRIDEMENT, WOUND;  Surgeon: Timothy Diaz MD;   Location: [facility] OR [address] [place] [address];  Service: Neurosurgery;;     No family history on file.  Social History     Tobacco Use    Smoking status: Every Day     Packs/day: 0.50     Types: Cigarettes    Smokeless tobacco: Never   Substance Use Topics    Alcohol use: Yes     Comment: occassionally    Drug use: No     Review of Systems   Constitutional:  Positive for fever. Negative for chills.   HENT:  Positive for congestion and sore throat.    Respiratory:  Positive for cough. Negative for shortness of breath.    Cardiovascular:  Negative for chest pain.   Gastrointestinal:  Negative for abdominal pain, nausea and vomiting.   Genitourinary:  Negative for dysuria.   Musculoskeletal:  Positive for myalgias. Negative for back pain, neck pain and neck stiffness.   Skin:  Negative for rash and wound.   Neurological:  Negative for syncope, weakness and numbness.   Hematological:  Does not bruise/bleed easily.   Psychiatric/Behavioral:  Negative for agitation, behavioral problems and confusion.      Physical Exam     Initial Vitals [05/23/23 2047]   BP Pulse Resp Temp SpO2   126/85 79 20 98 °F (36.7 °C) 100 %      MAP       --         Physical Exam    Nursing note and vitals reviewed.  Constitutional: She appears well-developed and well-nourished. She is not diaphoretic. No distress.   HENT:   Head: Normocephalic and atraumatic.   Mouth/Throat: Oropharynx is clear and moist.   Eyes: EOM are normal. Pupils are equal, round, and reactive to light.   Neck: No tracheal deviation present.   Cardiovascular:  Normal rate, regular rhythm, normal heart sounds and intact distal pulses.           Pulmonary/Chest: Breath sounds normal. No stridor. No respiratory distress. She has no wheezes.   Abdominal: Abdomen is soft. Bowel sounds are normal. She exhibits no distension and no mass. There is no abdominal tenderness.   Musculoskeletal:         General: No edema. Normal range of motion.     Neurological: She is alert and oriented to person,  place, and time. She has normal strength. No cranial nerve deficit or sensory deficit.   Skin: Skin is warm and dry. Capillary refill takes less than 2 seconds. No rash noted.   Psychiatric: She has a normal mood and affect. Her behavior is normal. Thought content normal.       ED Course   Procedures  Labs Reviewed   INFLUENZA A & B BY MOLECULAR   GROUP A STREP, MOLECULAR   SARS-COV-2 RNA AMPLIFICATION, QUAL    Narrative:     Is the patient symptomatic?->Yes          Imaging Results    None          Medications   acetaminophen tablet 1,000 mg (1,000 mg Oral Given 5/23/23 2111)     Medical Decision Making:   Initial Assessment:   48-year-old woman with multiple comorbidities presenting with URI type symptoms.  Her vital signs are stable.  Her neuro exam is nonfocal.  Do not suspect pneumonia, meningitis or emergent cause of her symptoms today.  Likely URI will plan to obtain COVID flu and strep swabs and reassess.  Differential Diagnosis:   Differential Diagnosis includes, but is not limited to:  Sepsis, meningitis, cavernous sinus thrombosis, nasal foreign body, otitis media/external, nasal polyp, bacterial sinusitis, allergic rhinitis, influenza, bacterial/viral pharyngitis, peritonsillar abscess, retropharyngeal abscess, bacterial/viral pneumonia.    ED Management:  Patient is resting comfortably on reassessment.  No distress noted.  COVID flu and strep are negative.  Suspect viral symptom.  No signs at this time to suggest acute bacterial infection.  Patient is stable for discharge.  Will plan to treat symptomatically recommended she follow-up closely with her PCP.  Discussed return precautions for worsening symptoms or any new symptoms especially shortness of breath worsening fever headaches or any other concerns.    After taking into careful account the historical factors and physical exam findings of the patient's presentation today, in conjunction with the empirical and objective data obtained on ED workup,  no acute emergent medical condition has been identified. The patient appears to be low risk for an emergent medical condition and I feel it is safe and appropriate at this time for the patient to be discharged to follow-up as detailed in their discharge instructions for reevaluation and possible continued outpatient workup and management. I have discussed the specifics of the workup with the patient and the patient has verbalized understanding of the details of the workup, the diagnosis, the treatment plan, and the need for outpatient follow-up.  Although the patient has no emergent etiology today this does not preclude the development of an emergent condition so in addition, I have advised the patient that they can return to the ED and/or activate EMS at any time with worsening of their symptoms, change of their symptoms, or with any other medical complaint.  The patient remained comfortable and stable during their visit in the ED.  Discharge and follow-up instructions discussed with the patient who expressed understanding and willingness to comply with my recommendations.               ED Course as of 05/23/23 2258   Tue May 23, 2023   2119 COVID-19 Rapid Screening  No significant abnormality.  [RN]   2159 Influenza A & B by Molecular  No significant abnormality.    [RN]   2200 Group A Strep, Molecular  No significant abnormality.    [RN]      ED Course User Index  [RN] Derrick Anderson Jr., MD                   Clinical Impression:   Final diagnoses:  [J06.9] URI with cough and congestion (Primary)        ED Disposition Condition    Discharge Stable          ED Prescriptions       Medication Sig Dispense Start Date End Date Auth. Provider    acetaminophen (TYLENOL) 500 MG tablet Take 1 tablet (500 mg total) by mouth every 6 (six) hours as needed for Pain. 20 tablet 5/23/2023 -- Derrick Anderson Jr., MD    benzonatate (TESSALON) 100 MG capsule Take 1 capsule (100 mg total) by mouth 3 (three) times daily as needed  for Cough. 21 capsule 5/23/2023 5/30/2023 Derrick Anderson Jr., MD          Follow-up Information       Follow up With Specialties Details Why Contact Info    Clair Johnson NP Family Medicine In 3 days  [address]  [address]  [facility] [place] [address]  [phone]            Portions of this note were dictated using voice recognition software and may contain dictation related errors in spelling/grammar/syntax not found on text review       Derrick Anderson Jr., MD  05/23/23 1796

## 2023-05-24 NOTE — ED NOTES
Pt arrives with complaints of a cough, sore throat and fever that has been present for 2 days now. Pt denies any other pain and denies any n/v/d. Pt denies any chest pain and dyspnea. Normal respiratory drive noted.        Pain:  Rated 6/10.     Psychosocial:  Patient is calm and cooperative.  Patients insight and judgement are appropriate to situation.  Appears clean, well maintained, with clothing appropriate to environment.  No evidence of delusions, hallucinations, or psychosis.     Neuro:  Eyes open spontaneously.  Awake, alert, oriented x 4.  Speech clear and appropriate.  Tolerating saliva secretions well.  Able to follow commands, demonstrating ability to actively and appropriately communicate within context of current conversation.  Symmetrical facial muscles.  Moving all extremities well with no noted weakness.  Adequate muscle tone present.    Movement is purposeful.       Airway:  Bilateral chest rise and fall.  RR regular and non-labored.         Circulatory:  Skin warm, dry, and pink.  Radial pulses strong and regular.  Capillary refill/skin blanching less than 3 seconds to distal of upper extremities.     Abdomen:  No related complaint.      Urinary:  No related complaints.

## 2023-05-25 VITALS
TEMPERATURE: 98 F | SYSTOLIC BLOOD PRESSURE: 124 MMHG | BODY MASS INDEX: 30.18 KG/M2 | WEIGHT: 164 LBS | HEART RATE: 77 BPM | OXYGEN SATURATION: 98 % | HEIGHT: 62 IN | DIASTOLIC BLOOD PRESSURE: 80 MMHG | RESPIRATION RATE: 19 BRPM

## 2023-05-25 PROCEDURE — 63600175 PHARM REV CODE 636 W HCPCS: Mod: ER | Performed by: STUDENT IN AN ORGANIZED HEALTH CARE EDUCATION/TRAINING PROGRAM

## 2023-05-25 PROCEDURE — 96374 THER/PROPH/DIAG INJ IV PUSH: CPT | Mod: ER

## 2023-05-25 RX ORDER — LEVETIRACETAM 500 MG/5ML
2000 INJECTION, SOLUTION, CONCENTRATE INTRAVENOUS
Status: COMPLETED | OUTPATIENT
Start: 2023-05-25 | End: 2023-05-25

## 2023-05-25 RX ADMIN — LEVETIRACETAM 2000 MG: 500 INJECTION, SOLUTION INTRAVENOUS at 12:05

## 2023-05-25 NOTE — ED PROVIDER NOTES
NAME:  Gina Jenkins  [facility]:     [id number]  MRN:    [id number]  ADMIT DATE: 5/24/2023        eMERGENCY dEPARTMENT eNCOUnter    CHIEF COMPLAINT    Chief Complaint   Patient presents with    Seizures     Reports to ED c c/o witnessed seizure by family. Last 30 sec to 1 min States has not take her keppra  in 4 days. Pt is AAOX4 upon arrival        HPI    Gina Jenkins is a 48 y.o. female with a past medical history of  has a past medical history of Brain aneurysm, CHF (congestive heart failure), H/O coronary angioplasty, Hypercholesteremia, Hypertension, Malignant hypertension, Migraine headache, and Stroke (10/2017).     she presents to the ED due to focal seizure occurring at home just prior to arrival.  She states her right arm cramped up and she was able to speak for approximately 30 seconds to 1 minute.  Notes another episode happened while in the ambulance.  She states she has a known seizure disorder with focal seizures and that this is very typical in relation to her previous seizures.  She can not even remember the last time she had 1.  Notes she is not taken her antiepileptic in 3 days and states she has not been taking a lot of her medications due to feeling unwell.  Notes multiple people in the house with URI symptoms.  States she is starting to improve from that standpoint.  Denies any new headache, numbness, weakness or confusion.  She was in bed when this occurred, no trauma noted.  No incontinence.      HPI       PAST MEDICAL HISTORY  Past Medical History:   Diagnosis Date    Brain aneurysm     CHF (congestive heart failure)     H/O coronary angioplasty     Hypercholesteremia     Hypertension     Malignant hypertension     Migraine headache     Stroke 10/2017       SURGICAL HISTORY    Past Surgical History:   Procedure Laterality Date    CARDIAC CATHETERIZATION      CEREBRAL ANGIOGRAM      CLIP LIGATION OF INTRACRANIAL ANEURYSM BY CRANIOTOMY N/A 7/15/2022    Procedure: CRANIOTOMY, WITH ANEURYSM  CLIPPING;  Surgeon: Timothy Diaz MD;  [facility];  Service: Neurosurgery;  Laterality: N/A;  PTERIONAL CRANIOTOMY WITH CLIP LIGATION OF L PCOMM, L ANTERIOR CHOROIDAL, L MCA  ANEURYSM, ANESTHESIA: GENERAL, BLOOD: TYPE&CROSS 2 UNITS, NEUROMONITORING: SEP, MEP, EEG, RADIOLOGY: C-ARM, POSITION: SUPINE, ANNA, CO-SURGERON: DR. LEXI DOMÍNGUEZ.    WOUND DEBRIDEMENT  8/27/2022    Procedure: DEBRIDEMENT, WOUND;  Surgeon: Timohty Diaz MD;  [facility];  Service: Neurosurgery;;       FAMILY HISTORY    History reviewed. No pertinent family history.    SOCIAL HISTORY    Social History     Socioeconomic History    Marital status:    Tobacco Use    Smoking status: Every Day     Packs/day: 0.50     Types: Cigarettes    Smokeless tobacco: Never   Substance and Sexual Activity    Alcohol use: Yes     Comment: occassionally    Drug use: No    Sexual activity: Not Currently     Partners: Male     Birth control/protection: None       MEDICATIONS  Current Outpatient Medications   Medication Instructions    acetaminophen (TYLENOL) 500 mg, Oral, Every 6 hours PRN    amitriptyline (ELAVIL) 75 mg, Oral, Nightly    amLODIPine (NORVASC) 10 mg, Oral, Daily    atorvastatin (LIPITOR) 40 mg, Oral, Daily    benzonatate (TESSALON) 100 mg, Oral, 3 times daily PRN    blood sugar diagnostic Strp Use to check blood glucose 3 times daily.    blood-glucose meter Misc Use to check blood glucose 3 times daily.    carvediloL (COREG) 37.5 mg, Oral, 2 times daily    EScitalopram oxalate (LEXAPRO) 10 mg, Oral, Daily    fluconazole (DIFLUCAN) 150 MG Tab TAKE 1 TABLET(150 MG) BY MOUTH EVERY DAY FOR 2 DOSES    lancets 30 gauge Misc Use to check blood glucose 3 times daily.    LEVEMIR FLEXTOUCH U100 INSULIN 24 Units, Subcutaneous, Daily    levETIRAcetam (KEPPRA) 1,000 mg, Oral, 2 times daily    losartan (COZAAR) 50 mg, Oral    magnesium oxide (MAG-OX) 400 mg (241.3 mg magnesium) tablet 1 tablet, Oral, 2 times daily     "metFORMIN (GLUCOPHAGE-XR) 500 mg, Oral, 2 times daily    NovoLOG FlexPen U-100 Insulin 8 Units, Subcutaneous, 3 times daily with meals    pen needle, diabetic (BD ULTRA-FINE MARIELY PEN NEEDLE) 32 gauge x 5/32" Ndle Use to inject insulin into the skin three times daily .    potassium chloride SA (K-DUR,KLOR-CON) 20 MEQ tablet 20 mEq, Oral, 2 times daily    topiramate (TOPAMAX) 50 mg, Oral, 2 times daily    traMADoL (ULTRAM) 50 mg, Oral, 2 times daily       ALLERGIES    Review of patient's allergies indicates:   Allergen Reactions    Lisinopril Swelling    Bactrim [sulfamethoxazole-trimethoprim] Rash    Ceftazidime Hives     Rash while on IV ceftazidime for planned 6 weeks.  See ED notes 9/12, 9/14 and ID clinic notes 9/16 and 9/28         REVIEW OF SYSTEMS   Review of Systems       PHYSICAL EXAM    Reviewed Triage Note    VITAL SIGNS:   ED Triage Vitals [05/25/23 0004]   Enc Vitals Group      /78      Pulse 81      Resp 19      Temp 98.2 °F (36.8 °C)      Temp Source Oral      SpO2 98 %      Weight 164 lb      Height 5' 2"      Head Circumference       Peak Flow       Pain Score       Pain Loc       Pain Edu?       Excl. in GC?        Patient Vitals for the past 24 hrs:   BP Temp Temp src Pulse Resp SpO2 Height Weight   05/25/23 0303 124/80 -- -- 77 19 98 % -- --   05/25/23 0255 -- -- -- 79 19 99 % -- --   05/25/23 0215 -- -- -- 79 19 97 % -- --   05/25/23 0004 131/78 98.2 °F (36.8 °C) Oral 81 19 98 % 5' 2" (1.575 m) 74.4 kg (164 lb)       Physical Exam    Nursing note and vitals reviewed.  Constitutional: She appears well-developed and well-nourished.   HENT:   Head: Normocephalic and atraumatic.   Eyes: EOM are normal. Pupils are equal, round, and reactive to light.   Neck: Neck supple.   Normal range of motion.  Cardiovascular:  Normal rate and regular rhythm.           Pulmonary/Chest: Breath sounds normal. No respiratory distress.   Abdominal: Abdomen is soft. There is no abdominal tenderness. "   Musculoskeletal:         General: Normal range of motion.      Cervical back: Normal range of motion and neck supple.     Neurological: She is alert and oriented to person, place, and time. She has normal strength. No cranial nerve deficit or sensory deficit.   Skin: Skin is warm and dry.   Psychiatric: She has a normal mood and affect.              EKG     Interpreted by EM physician if performed:               LABS  Pertinent labs reviewed. (See chart for details)   Labs Reviewed - No data to display      RADIOLOGY          Imaging Results    None           PROCEDURES    Procedures      ED COURSE & MEDICAL DECISION MAKING    Pertinent Labs & Imaging studies reviewed. (See chart for details and specific orders.)        Summary of review of records:     Seen yesterday for URI symptoms.  Negative for COVID, strep, flu.    She does follow with neurosurgery for known aneurysms with clipping of 2 done previously.  Last visit with them was in October of 2022 and her left MCA bifurcation aneurysm was stable on CTA at that time.    MDM:  Gina Jenkins is a 48 y.o. female Who presents for a breakthrough focal seizure today that was very consistent with her previous scissors.  Asymptomatic on arrival.  Admits to 3 days of not taking her AED, though she actually has it.  Will load with Keppra here and monitor for any additional breakthrough seizures.  Low suspicion for underlying bacterial infection or other acute abnormality at this time and do not feel that additional workup is currently indicated.            Medications   levETIRAcetam injection 2,000 mg (2,000 mg Intravenous Given 5/25/23 0035)       ED Course as of 05/25/23 0417   u May 25, 2023   0251 Patient monitored for 3 hours in the emergency department without any additional seizures.  She is comfortable with plan of discharge at this time.  Actually does have her medications and understands the importance of medication adherence had this time.  Reasons to  return discussed and all questions addressed. [HL]      ED Course User Index  [HL] Lexii Lazar DO     Medical Decision Making  Amount and/or Complexity of Data Reviewed  External Data Reviewed: notes.    Risk  Prescription drug management.  Diagnosis or treatment significantly limited by social determinants of health.         FINAL IMPRESSION    Final diagnoses:  [R56.9] Focal seizure (Primary)       DISPOSITION  Patient discharge in stable condition        ED Prescriptions    None       Follow-up Information       Follow up With Specialties Details Why Contact Info    Clair Johnson NP Family Medicine Schedule an appointment as soon as possible for a visit   [address] [place] [address]  [facility] [id number] [phone]      [facility] - Emergency Dept Emergency Medicine  As needed, If symptoms worsen [address] [facility] [id number] [phone]              DISCLAIMER: This note was prepared with [employer] voice recognition transcription software. Garbled syntax, mangled pronouns, and other bizarre constructions may be attributed to that software system.      DO Lexii Humphrey DO  05/25/23 0420

## 2023-05-25 NOTE — ED TRIAGE NOTES
Reports to ED c c/o witnessed seizure by family. Last 30 sec to 1 min States has not take her keppra in 4 days. Pt is AAOX4 upon arrival

## 2023-06-19 ENCOUNTER — PATIENT MESSAGE (OUTPATIENT)
Dept: ADMINISTRATIVE | Facility: OTHER | Age: 48
End: 2023-06-19
Payer: MEDICAID

## 2023-08-06 ENCOUNTER — HOSPITAL ENCOUNTER (EMERGENCY)
Facility: HOSPITAL | Age: 48
Discharge: LEFT BEFORE DISCHARGE | End: 2023-08-07
Attending: STUDENT IN AN ORGANIZED HEALTH CARE EDUCATION/TRAINING PROGRAM
Payer: MEDICAID

## 2023-08-06 VITALS
BODY MASS INDEX: 29.08 KG/M2 | WEIGHT: 158 LBS | HEIGHT: 62 IN | SYSTOLIC BLOOD PRESSURE: 159 MMHG | RESPIRATION RATE: 13 BRPM | TEMPERATURE: 98 F | OXYGEN SATURATION: 100 % | DIASTOLIC BLOOD PRESSURE: 97 MMHG | HEART RATE: 81 BPM

## 2023-08-06 DIAGNOSIS — E87.6 HYPOKALEMIA: ICD-10-CM

## 2023-08-06 DIAGNOSIS — I63.9 STROKE: ICD-10-CM

## 2023-08-06 DIAGNOSIS — N17.9 AKI (ACUTE KIDNEY INJURY): Primary | ICD-10-CM

## 2023-08-06 DIAGNOSIS — M25.571 RIGHT ANKLE PAIN: ICD-10-CM

## 2023-08-06 LAB
ALBUMIN SERPL BCP-MCNC: 4.6 G/DL (ref 3.5–5.2)
ALP SERPL-CCNC: 138 U/L (ref 38–126)
ALT SERPL W/O P-5'-P-CCNC: 28 U/L (ref 10–44)
ANION GAP SERPL CALC-SCNC: 17 MMOL/L (ref 8–16)
AST SERPL-CCNC: 43 U/L (ref 15–46)
BASOPHILS # BLD AUTO: 0.06 K/UL (ref 0–0.2)
BASOPHILS NFR BLD: 0.8 % (ref 0–1.9)
BILIRUB SERPL-MCNC: 0.6 MG/DL (ref 0.1–1)
CALCIUM SERPL-MCNC: 9.8 MG/DL (ref 8.7–10.5)
CHLORIDE SERPL-SCNC: 102 MMOL/L (ref 95–110)
CO2 SERPL-SCNC: 25 MMOL/L (ref 23–29)
CREAT SERPL-MCNC: 2.18 MG/DL (ref 0.5–1.4)
DIFFERENTIAL METHOD: ABNORMAL
EOSINOPHIL # BLD AUTO: 0.3 K/UL (ref 0–0.5)
EOSINOPHIL NFR BLD: 3.4 % (ref 0–8)
ERYTHROCYTE [DISTWIDTH] IN BLOOD BY AUTOMATED COUNT: 12.9 % (ref 11.5–14.5)
EST. GFR  (NO RACE VARIABLE): 27.4 ML/MIN/1.73 M^2
GLUCOSE SERPL-MCNC: 131 MG/DL (ref 70–110)
HCT VFR BLD AUTO: 36.4 % (ref 37–48.5)
HGB BLD-MCNC: 12.7 G/DL (ref 12–16)
IMM GRANULOCYTES # BLD AUTO: 0.02 K/UL (ref 0–0.04)
IMM GRANULOCYTES NFR BLD AUTO: 0.3 % (ref 0–0.5)
INR PPP: 1 (ref 0.8–1.2)
LYMPHOCYTES # BLD AUTO: 2.7 K/UL (ref 1–4.8)
LYMPHOCYTES NFR BLD: 37.1 % (ref 18–48)
MCH RBC QN AUTO: 29.6 PG (ref 27–31)
MCHC RBC AUTO-ENTMCNC: 34.9 G/DL (ref 32–36)
MCV RBC AUTO: 85 FL (ref 82–98)
MONOCYTES # BLD AUTO: 0.5 K/UL (ref 0.3–1)
MONOCYTES NFR BLD: 7.2 % (ref 4–15)
NEUTROPHILS # BLD AUTO: 3.8 K/UL (ref 1.8–7.7)
NEUTROPHILS NFR BLD: 51.2 % (ref 38–73)
NRBC BLD-RTO: 0 /100 WBC
PLATELET # BLD AUTO: 336 K/UL (ref 150–450)
PMV BLD AUTO: 10.8 FL (ref 9.2–12.9)
POCT GLUCOSE: 170 MG/DL (ref 70–110)
POTASSIUM SERPL-SCNC: 2.9 MMOL/L (ref 3.5–5.1)
PROT SERPL-MCNC: 8.3 G/DL (ref 6–8.4)
PROTHROMBIN TIME: 11.1 SEC (ref 9–12.5)
RBC # BLD AUTO: 4.29 M/UL (ref 4–5.4)
SODIUM SERPL-SCNC: 144 MMOL/L (ref 136–145)
TROPONIN I SERPL-MCNC: <0.012 NG/ML (ref 0.01–0.03)
UUN UR-MCNC: 35 MG/DL (ref 7–17)
WBC # BLD AUTO: 7.33 K/UL (ref 3.9–12.7)

## 2023-08-06 PROCEDURE — 84443 ASSAY THYROID STIM HORMONE: CPT | Mod: ER | Performed by: STUDENT IN AN ORGANIZED HEALTH CARE EDUCATION/TRAINING PROGRAM

## 2023-08-06 PROCEDURE — 93005 ELECTROCARDIOGRAM TRACING: CPT | Mod: ER

## 2023-08-06 PROCEDURE — 93010 EKG 12-LEAD: ICD-10-PCS | Mod: ,,, | Performed by: INTERNAL MEDICINE

## 2023-08-06 PROCEDURE — 80053 COMPREHEN METABOLIC PANEL: CPT | Mod: ER | Performed by: STUDENT IN AN ORGANIZED HEALTH CARE EDUCATION/TRAINING PROGRAM

## 2023-08-06 PROCEDURE — 99285 EMERGENCY DEPT VISIT HI MDM: CPT | Mod: 25,ER

## 2023-08-06 PROCEDURE — 85025 COMPLETE CBC W/AUTO DIFF WBC: CPT | Mod: ER | Performed by: STUDENT IN AN ORGANIZED HEALTH CARE EDUCATION/TRAINING PROGRAM

## 2023-08-06 PROCEDURE — 93010 ELECTROCARDIOGRAM REPORT: CPT | Mod: ,,, | Performed by: INTERNAL MEDICINE

## 2023-08-06 PROCEDURE — 85610 PROTHROMBIN TIME: CPT | Mod: ER | Performed by: STUDENT IN AN ORGANIZED HEALTH CARE EDUCATION/TRAINING PROGRAM

## 2023-08-06 PROCEDURE — 96360 HYDRATION IV INFUSION INIT: CPT | Mod: ER

## 2023-08-06 PROCEDURE — 82962 GLUCOSE BLOOD TEST: CPT | Mod: ER

## 2023-08-06 PROCEDURE — 25000003 PHARM REV CODE 250: Mod: ER | Performed by: STUDENT IN AN ORGANIZED HEALTH CARE EDUCATION/TRAINING PROGRAM

## 2023-08-06 PROCEDURE — 84484 ASSAY OF TROPONIN QUANT: CPT | Mod: ER | Performed by: STUDENT IN AN ORGANIZED HEALTH CARE EDUCATION/TRAINING PROGRAM

## 2023-08-06 RX ORDER — ACETAMINOPHEN 500 MG
1000 TABLET ORAL
Status: DISCONTINUED | OUTPATIENT
Start: 2023-08-07 | End: 2023-08-07 | Stop reason: HOSPADM

## 2023-08-06 RX ADMIN — SODIUM CHLORIDE 1000 ML: 9 INJECTION, SOLUTION INTRAVENOUS at 11:08

## 2023-08-07 PROBLEM — M79.604 RIGHT LEG PAIN: Status: ACTIVE | Noted: 2023-08-07

## 2023-08-07 LAB — TSH SERPL DL<=0.005 MIU/L-ACNC: 2.06 UIU/ML (ref 0.4–4)

## 2023-08-07 PROCEDURE — 99213 PR OFFICE/OUTPT VISIT, EST, LEVL III, 20-29 MIN: ICD-10-PCS | Mod: 95,,, | Performed by: PSYCHIATRY & NEUROLOGY

## 2023-08-07 PROCEDURE — 99213 OFFICE O/P EST LOW 20 MIN: CPT | Mod: 95,,, | Performed by: PSYCHIATRY & NEUROLOGY

## 2023-08-07 NOTE — HPI
47 y/o woman with PMH L-Pcomm and L-Tianna aneurysms s/p clipping, c/b epilepsy who presents with R leg pain and L facial droop.  Patient reports leg pain started this morning after doing a lot of walking yesterday.  She reports pain radiates up back of her leg.  Also felt some numbness and swelling of L side of face.

## 2023-08-07 NOTE — ED PROVIDER NOTES
"NAME :  Gina Jenkins  CSN:     [id number]  MRN:    [id number]  ADMIT DATE: 8/6/2023        eMERGENCY dEPARTMENT eNCOUnter    CHIEF COMPLAINT    Chief Complaint   Patient presents with    Weakness     Pt to the ER with right leg pain and weakness started yesterday. Pt reports tonight left side of face "twitching and feels like neck and face going to sleep." Pt concerned because of hx of strokes and sx for aneurysms. Pt reports right leg started as painful but has not been able to walk on it since about 3 am. Pt took a Lupe Aspirin today.        HPI    Gina Jenkins is a 48 y.o. female with a past medical history of  has a past medical history of Brain aneurysm, CHF (congestive heart failure), H/O coronary angioplasty, Hypercholesteremia, Hypertension, Malignant hypertension, Migraine headache, and Stroke (10/2017).     she presents to the ED due to Pain  to the right lower leg since yesterday.  She states when she awoke around 4:00 a.m. this morning she could no longer move the right leg.  About an hour ago she started to note some right-sided facial droop and feeling as if her face was falling asleep.  She has a history of stroke.  Takes a daily aspirin but does not take any other blood thinning medications.  No recent trauma does have a history of aneurysm with repair.  No known injury.  Has not taken anything for her symptoms.    HPI       PAST MEDICAL HISTORY  Past Medical History:   Diagnosis Date    Brain aneurysm     CHF (congestive heart failure)     H/O coronary angioplasty     Hypercholesteremia     Hypertension     Malignant hypertension     Migraine headache     Stroke 10/2017       SURGICAL HISTORY    Past Surgical History:   Procedure Laterality Date    CARDIAC CATHETERIZATION      CEREBRAL ANGIOGRAM      CLIP LIGATION OF INTRACRANIAL ANEURYSM BY CRANIOTOMY N/A 7/15/2022    Procedure: CRANIOTOMY, WITH ANEURYSM CLIPPING;  Surgeon: Timothy Diaz MD;  Location: [facility] OR [address] [place] [address];  Service: " Neurosurgery;  Laterality: N/A;  PTERIONAL CRANIOTOMY WITH CLIP LIGATION OF L PCOMM, L ANTERIOR CHOROIDAL, L MCA  ANEURYSM, ANESTHESIA: GENERAL, BLOOD: TYPE&CROSS 2 UNITS, NEUROMONITORING: SEP, MEP, EEG, RADIOLOGY: C-ARM, POSITION: SUPINE, JESSE CASTILLOSURGERON: DR. LEXI DOMÍNGUEZ.    WOUND DEBRIDEMENT  8/27/2022    Procedure: DEBRIDEMENT, WOUND;  Surgeon: Timothy Diaz MD;  Location: [facility] OR [address] [place] [address];  Service: Neurosurgery;;       FAMILY HISTORY    History reviewed. No pertinent family history.    SOCIAL HISTORY    Social History     Socioeconomic History    Marital status:    Tobacco Use    Smoking status: Every Day     Current packs/day: 0.50     Types: Cigarettes    Smokeless tobacco: Never   Substance and Sexual Activity    Alcohol use: Yes     Comment: occassionally    Drug use: No    Sexual activity: Not Currently     Partners: Male     Birth control/protection: None       MEDICATIONS  Current Outpatient Medications   Medication Instructions    acetaminophen (TYLENOL) 500 mg, Oral, Every 6 hours PRN    amitriptyline (ELAVIL) 75 mg, Oral, Nightly    amLODIPine (NORVASC) 10 mg, Oral, Daily    atorvastatin (LIPITOR) 40 mg, Oral, Daily    blood sugar diagnostic Strp Use to check blood glucose 3 times daily.    blood-glucose meter Misc Use to check blood glucose 3 times daily.    carvediloL (COREG) 37.5 mg, Oral, 2 times daily    EScitalopram oxalate (LEXAPRO) 10 mg, Oral, Daily    fluconazole (DIFLUCAN) 150 MG Tab TAKE 1 TABLET(150 MG) BY MOUTH EVERY DAY FOR 2 DOSES    lancets 30 gauge Misc Use to check blood glucose 3 times daily.    LEVEMIR FLEXTOUCH U100 INSULIN 24 Units, Subcutaneous, Daily    levETIRAcetam (KEPPRA) 1,000 mg, Oral, 2 times daily    losartan (COZAAR) 50 mg, Oral    magnesium oxide (MAG-OX) 400 mg (241.3 mg magnesium) tablet 1 tablet, Oral, 2 times daily    metFORMIN (GLUCOPHAGE-XR) 500 mg, Oral, 2 times daily    NovoLOG FlexPen U-100 Insulin 8 Units, Subcutaneous, 3 times daily with  "meals    pen needle, diabetic (BD ULTRA-FINE MARIELY PEN NEEDLE) 32 gauge x 5/32" Ndle Use to inject insulin into the skin three times daily .    potassium chloride SA (K-DUR,KLOR-CON) 20 MEQ tablet 20 mEq, Oral, 2 times daily    topiramate (TOPAMAX) 50 mg, Oral, 2 times daily    traMADoL (ULTRAM) 50 mg, Oral, 2 times daily       ALLERGIES    Review of patient's allergies indicates:   Allergen Reactions    Lisinopril Swelling    Bactrim [sulfamethoxazole-trimethoprim] Rash    Ceftazidime Hives     Rash while on IV ceftazidime for planned 6 weeks.  See ED notes 9/12, 9/14 and ID clinic notes 9/16 and 9/28         REVIEW OF SYSTEMS   Review of Systems       PHYSICAL EXAM    Reviewed Triage Note    VITAL SIGNS:   ED Triage Vitals [08/06/23 2249]   Enc Vitals Group      /77      Pulse 89      Resp 18      Temp 97.8 °F (36.6 °C)      Temp Source Oral      SpO2 100 %      Weight 158 lb      Height 5' 2"      Head Circumference       Peak Flow       Pain Score       Pain Loc       Pain Edu?       Excl. in GC?        Patient Vitals for the past 24 hrs:   BP Temp Temp src Pulse Resp SpO2 Height Weight   08/06/23 2309 -- -- -- 81 13 100 % -- --   08/06/23 2301 (!) 159/97 -- -- -- -- -- -- --   08/06/23 2249 133/77 97.8 °F (36.6 °C) Oral 89 18 100 % 5' 2" (1.575 m) 71.7 kg (158 lb)       Physical Exam    Nursing note and vitals reviewed.  Constitutional: She appears well-developed and well-nourished.   HENT:   Head: Normocephalic and atraumatic.   Eyes: EOM are normal. Pupils are equal, round, and reactive to light.   Neck: Neck supple.   Normal range of motion.  Cardiovascular:  Normal rate and regular rhythm.           Pulmonary/Chest: Breath sounds normal. No respiratory distress.   Abdominal: Abdomen is soft. There is no abdominal tenderness.   Musculoskeletal:         General: Normal range of motion.      Cervical back: Normal range of motion and neck supple.     Neurological: She is alert and oriented to person, " place, and time.   Right-sided facial droop that is not appreciated with testing of cranial nerves, equal and symmetric smile as well as ability to maintain cheeks puff doubt during exam.  Initially was not moving the right lower leg at all however on reassessment after initial stroke evaluation she is able to hold her right leg up against gravity.  Notes focal pain over the right Achilles area.  Negative Vargas test.  Legs are equal and symmetric bilaterally.  No pronator drift.  Initial NIH stroke scale   Skin: Skin is warm and dry.   Psychiatric: She has a normal mood and affect.            EKG     Interpreted by EM physician if performed:   Some baseline motion artifact noted.  Sinus rhythm at a rate of 87.  No ST elevation or depression.  QTC of 486.             LABS  Pertinent labs reviewed. (See chart for details)   Labs Reviewed   CBC W/ AUTO DIFFERENTIAL - Abnormal; Notable for the following components:       Result Value    Hematocrit 36.4 (*)     All other components within normal limits   COMPREHENSIVE METABOLIC PANEL - Abnormal; Notable for the following components:    Potassium 2.9 (*)     Glucose 131 (*)     BUN 35 (*)     Creatinine 2.18 (*)     Alkaline Phosphatase 138 (*)     Anion Gap 17 (*)     eGFR 27.4 (*)     All other components within normal limits   POCT GLUCOSE - Abnormal; Notable for the following components:    POCT Glucose 170 (*)     All other components within normal limits   PROTIME-INR   TSH   TROPONIN I   POCT GLUCOSE, HAND-HELD DEVICE         RADIOLOGY          Imaging Results              X-Ray Ankle Complete Right (Final result)  Result time 08/06/23 23:57:10      Final result by Reno Lawson MD (08/06/23 23:57:10)                   Impression:      As above      Electronically signed by: Reno Lawson  Date:    08/06/2023  Time:    23:57               Narrative:    EXAMINATION:  XR ANKLE COMPLETE 3 VIEW RIGHT    CLINICAL HISTORY:  Pain in right ankle and joints of right  foot    TECHNIQUE:  AP, lateral, and oblique images of the right ankle were performed.    COMPARISON:  None    FINDINGS:  Achilles insertional tendinopathy.  No acute fracture or dislocation.                                       CT Head Without Contrast (Final result)  Result time 08/06/23 23:28:06      Final result by Reno Lawson MD (08/06/23 23:28:06)                   Impression:      No acute abnormality.    All CT scans   are performed using dose optimization techniques including the following: automated exposure control; adjustment of the mA and/or kV; use of iterative reconstruction technique.  Dose modulation was employed for ALARA by means of: Automated exposure control; adjustment of the mA and/or kV according to patient size (this includes techniques or standardized protocols for targeted exams where dose is matched to indication/reason for exam; i.e. extremities or head); and/or use of iterative reconstructive technique.      Electronically signed by: Reno Lawson  Date:    08/06/2023  Time:    23:28               Narrative:    EXAMINATION:  CT HEAD WITHOUT CONTRAST    CLINICAL HISTORY:  Neuro deficit, acute, stroke suspected;    TECHNIQUE:  Low dose axial CT images obtained throughout the head without intravenous contrast. Sagittal and coronal reconstructions were performed.    COMPARISON:  None.    FINDINGS:  Intracranial compartment:    Ventricles and sulci are normal in size for age without evidence of hydrocephalus. No extra-axial blood or fluid collections.    No parenchymal mass, hemorrhage, edema or major vascular distribution infarct.    Skull/extracranial contents (limited evaluation): No fracture. Mastoid air cells and paranasal sinuses are essentially clear.                                        PROCEDURES    Procedures      ED COURSE & MEDICAL DECISION MAKING    Pertinent Labs & Imaging studies reviewed. (See chart for details and specific orders.)        Summary of review of records:    S/p aneurysm repair 7/22 by .      MDM:  Gina Jenkins is a 48 y.o. female who presents with 1 day pain to the right lower leg with difficulty moving the leg per her report and right-sided facial droop with paresthesias within the last hour.  Significant history of multiple aneurysms with subsequent repair last July.  Due to acuity a reported facial droop and numbness in the last hours stroke alert was initially called, however symptoms did seem to be some distractible with the exception of the right lower leg which she initially states she could not move at all.    Differential diagnosis includes but is not limited to:  Acute intracranial process, subarachnoid hemorrhage, electrolyte great boot amongst others      CBC, CMP ordered as well as EKG, head CT, TSH, troponin, PT INR          Medications   sodium chloride 0.9% bolus 1,000 mL 1,000 mL (0 mLs Intravenous Stopped 8/7/23 0041)   potassium bicarbonate disintegrating tablet 20 mEq (20 mEq Oral Not Given 8/7/23 0000)   acetaminophen tablet 1,000 mg (1,000 mg Oral Not Given 8/7/23 0000)       ED Course as of 08/07/23 0040   Sun Aug 06, 2023   2336 In discussion with stroke neurologist, feels that her facial complaints are distractible and likely functional.  In review noncontrast CT no acute abnormalities noted.  Do not feel that we need to proceed with CTA or additional imaging of the head from that standpoint.  Unable to explain inability to use her right leg at this time, will further assess from this standpoint. [HL]   2337 Comprehensive metabolic panel(!)  New COOPER and hypokalemia noted. [HL]   2338 CBC W/ AUTO DIFFERENTIAL(!)  No acute abnormalities  [HL]   2338 INR: 1.0 [HL]   2348 Went into re-evaluate patient and discussed findings of far.  Has full motion of the leg stating that she always did and that she just has focal pain to the distal right lower leg and heel since yesterday of unclear etiology.  States she was not coming in for  anything other than that.  Did discuss that this may be secondary to her low potassium.  Negative Vargas test and Achilles tendon is intact.  She is able to hold her leg up completely against gravity and moves both legs spontaneously with 5/5 strength of both.  Rate facial asymmetry is distractible with conversation [HL]   2351 Troponin I: <0.012 [HL]   2356 This patient has elected to leave against medical advice. In my opinion, the patient has capacity to leave made decisions and leave AMA. The patient is clinically sober, free from distracting injury, appears to have intact insight, judgment, and reason. I explained to the patient that her symptoms may represent electrolyte derangement and kidney injury, unclear ankle injury.      I had a discussion with the patient about their workup and results, and that they may still have low potassium and dehydration as she has not received potassium supplementation and IVF. I informed the patient that the next step in diagnosis and treatment would be IVF, potassium replacement, interpretation of XR imaging just performed, and they verbalized understanding of this as well. I explained the risks of leaving without further workup or treatment, which included reasonably foreseeable complications such as death, serious injury, permanent disability. I also offered alternatives to departing AMA such as  an alternate workup pathway.     The patient is refusing any further care, specifically IVF, XR interpretation, electrolyte replacement , and is leaving against medical advice. I am unable to convince the patient to stay. I have asked them to return as soon as possible to complete their evaluation, and also explained that they were welcome to return to the ER for further evaluation whenever they choose. I have asked the patient to follow up with their primary doctor as soon as possible. States she plans to go to another hospital as she does not feel her issues are being  adequately addressed. I have answered all their questions. Patient signed AMA paperwork. Able to get up from the bed and bear weight on her R foot, ankle.     [HL]   Mon Aug 07, 2023   0010 X-ray result after patient has already eloped consistent with tendinitis of the Achilles insertion point which is likely the underlying etiology of her discomfort in the right heel.  Patient unfortunately did not wait for results.  Will attempt to contact her to inform her of these findings. [HL]      ED Course User Index  [HL] Lexii Lazar DO         FINAL IMPRESSION    Final diagnoses:  [I63.9] Stroke  [M25.571] Right ankle pain  [N17.9] COOPER (acute kidney injury) (Primary)  [E87.6] Hypokalemia       DISPOSITION  Patient left AMA            DISCLAIMER: This note was prepared with [employer] voice recognition transcription software. Garbled syntax, mangled pronouns, and other bizarre constructions may be attributed to that software system.      DO Cady Humphrey Hanna K., DO  08/07/23 0049

## 2023-08-07 NOTE — SUBJECTIVE & OBJECTIVE
"  Woke up with symptoms?: yes    Recent bleeding noted: no  Does the patient take any Blood Thinners? no  Medications: No relevant medications      Past Medical History: hypertension and seizures    Past Surgical History: aneurysm clipping 2022    Family History: no relevant history    Social History: no smoking, no drinking, no drugs    Allergies: Lisinopril  Bactrim [Sulfamethoxazole-Trimethoprim]  Ceftazidime No relevant allergies    Review of Systems   Musculoskeletal: Positive for back pain.        Leg pain   Neurological: Positive for facial asymmetry and weakness.   All other systems reviewed and are negative.    Objective:   Vitals: Blood pressure (!) 159/97, pulse 81, temperature 97.8 °F (36.6 °C), temperature source Oral, resp. rate 13, height 5' 2" (1.575 m), weight 71.7 kg (158 lb), SpO2 100 %, not currently breastfeeding. Height: .    CT READ: Yes  No hemmorhage. No mass effect. No early infarct signs.     Physical Exam  Constitutional:       General: She is not in acute distress.  HENT:      Head: Normocephalic and atraumatic.   Eyes:      Pupils: Pupils are equal, round, and reactive to light.   Pulmonary:      Effort: Pulmonary effort is normal.   Neurological:      Mental Status: She is oriented to person, place, and time.      Comments: Smile symmetric but when speaking activates R side of mouth preferentially.  No L facial weakness appreciated.  Unable to lift R leg due to pain  Sensation intact           "

## 2023-08-07 NOTE — CONSULTS
[facility]  Vascular Neurology  Comprehensive Stroke Center  TeleVascular Neurology Acute Consultation Note      Consults    Consulting Provider: EMILE HUMPHRIES  Current Providers  No providers found    Patient Location:  [facility] EMERGENCY DEPARTMENT Emergency Department  Spoke hospital nurse at bedside with patient assisting consultant.     Patient information was obtained from patient.         Assessment/Plan:       Diagnoses:   Orthopedic  Right leg pain  49 y/o woman with h/o multiple cerebral aneurysms s/p clipping presenting with R leg pain and facial asymmetry.  Facial asymmetry appears volitional / functional (no true weakness).  Regarding R leg pain consider radiculopathy or musculoskeletal injury.  She reports increased urinary frequency but no urinary retention.  No numbness on brief telemedicine exam.        STROKE DOCUMENTATION     Acute Stroke Times:   Acute Stroke Times   Last Known Normal Date: 08/06/23  Symptom Onset Date: 08/06/23  Symptom Onset Time: 2130  Stroke Team Called Date: 08/06/23  Stroke Team Called Time: 2304  Stroke Team Arrival Date: 08/06/23  Stroke Team Arrival Time: 2320  CT Interpretation Time: 2325  Thrombolytic Therapy Recommended: No  Thrombectomy Recommended: No    NIH Scale:  1a. Level of Consciousness: 0-->Alert, keenly responsive  1b. LOC Questions: 0-->Answers both questions correctly  1c. LOC Commands: 0-->Performs both tasks correctly  2. Best Gaze: 0-->Normal  3. Visual: 0-->No visual loss  4. Facial Palsy: 0-->Normal symmetrical movements  5a. Motor Arm, Left: 0-->No drift, limb holds 90 (or 45) degrees for full 10 secs  5b. Motor Arm, Right: 0-->No drift, limb holds 90 (or 45) degrees for full 10 secs  6a. Motor Leg, Left: 0-->No drift, leg holds 30 degree position for full 5 secs  6b. Motor Leg, Right: 3-->No effort against gravity, leg falls to bed immediately (pain limited)  7. Limb Ataxia: 0-->Absent  8. Sensory: 0-->Normal, no  "sensory loss  9. Best Language: 0-->No aphasia, normal  10. Dysarthria: 0-->Normal  11. Extinction and Inattention (formerly Neglect): 0-->No abnormality  Total (NIH Stroke Scale): 3     Modified [place]    Danielle Coma Scale:    ABCD2 Score:    [id number] Score:   HAS -BLED Score:   ICH Score:   Hunt & Britton Classification:       Blood pressure (!) 159/97, pulse 81, temperature 97.8 °F (36.6 °C), temperature source Oral, resp. rate 13, height 5' 2" (1.575 m), weight 71.7 kg (158 lb), SpO2 100 %, not currently breastfeeding.  Eligible for thrombolytic therapy?: No  Thrombolytic therapy recomended: Thrombolytic therapy not recommended due to Suspected stroke mimic   Possible Interventional Revascularization Candidate? No; at this time symptoms not suggestive of large vessel occlusion    Disposition Recommendation: pending further studies    Subjective:     History of Present Illness:  49 y/o woman with PMH L-Pcomm and L-Tianna aneurysms s/p clipping, c/b epilepsy who presents with R leg pain and L facial droop.  Patient reports leg pain started this morning after doing a lot of walking yesterday.  She reports pain radiates up back of her leg.  Also felt some numbness and swelling of L side of face.  History limited by poor audio quality of telemedicine machine.      Woke up with symptoms?: yes    Recent bleeding noted: no  Does the patient take any Blood Thinners? no  Medications: No relevant medications      Past Medical History: hypertension and seizures    Past Surgical History: aneurysm clipping 2022    Family History: no relevant history    Social History: no smoking, no drinking, no drugs    Allergies: Lisinopril  Bactrim [Sulfamethoxazole-Trimethoprim]  Ceftazidime No relevant allergies    Review of Systems   Musculoskeletal: Positive for back pain.        Leg pain   Neurological: Positive for facial asymmetry and weakness.   All other systems reviewed and are negative.    Objective:   Vitals: Blood pressure (!) " "159/97, pulse 81, temperature 97.8 °F (36.6 °C), temperature source Oral, resp. rate 13, height 5' 2" (1.575 m), weight 71.7 kg (158 lb), SpO2 100 %, not currently breastfeeding. Height: .    CT READ: Yes  No hemmorhage. No mass effect. No early infarct signs.     Physical Exam  Constitutional:       General: She is not in acute distress.  HENT:      Head: Normocephalic and atraumatic.   Eyes:      Pupils: Pupils are equal, round, and reactive to light.   Pulmonary:      Effort: Pulmonary effort is normal.   Neurological:      Mental Status: She is oriented to person, place, and time.      Comments: Smile symmetric but when speaking activates R side of mouth preferentially.  No L facial weakness appreciated.  Unable to lift R leg due to pain  Sensation intact             Recommended the emergency room physician to have a brief discussion with the patient and/or family if available regarding the  risks and benefits of treatment, and to briefly document the occurrence of that discussion in his clinical encounter note.     The attending portion of this evaluation, treatment, and documentation was performed per Chrystal Carrera MD via audiovisual.    Billing code:  (non-intervention mild to moderate stroke, TIA, some mimics)      This patient has a critical neurological condition/illness, with some potential for high morbidity and mortality.  There is a moderate probability for acute neurological change leading to clinical and possibly life-threatening deterioration requiring highest level of physician preparedness for urgent intervention.  Care was coordinated with other physicians involved in the patient's care.  Radiologic studies and laboratory data were reviewed and interpreted, and plan of care was re-assessed based on the results.  Diagnosis, treatment options and prognosis may have been discussed with the patient and/or family members or caregiver.    In your opinion, this was a: Tier 2 Van " Negative    Consult End Time: 12:28 AM     Chrystal Carrera MD  Comprehensive Stroke Center  Vascular Neurology   [facility]

## 2023-08-07 NOTE — ASSESSMENT & PLAN NOTE
47 y/o woman with h/o multiple cerebral aneurysms s/p clipping presenting with R leg pain and facial asymmetry.  Facial asymmetry appears volitional / functional (no true weakness).  Regarding R leg pain consider radiculopathy or musculoskeletal injury.  She reports increased urinary frequency but no urinary retention.  No numbness on brief telemedicine exam.

## 2023-08-07 NOTE — ED NOTES
Risks explained to pt about wanting to leave AMA per provider. Pt states she would like to go to another hospital for treatment. Pt is alert and oriented x4 prior to departing ER.

## 2023-09-07 ENCOUNTER — HOSPITAL ENCOUNTER (EMERGENCY)
Facility: HOSPITAL | Age: 48
Discharge: HOME | End: 2023-09-07
Attending: EMERGENCY MEDICINE
Payer: MEDICAID

## 2023-09-07 VITALS
OXYGEN SATURATION: 99 % | DIASTOLIC BLOOD PRESSURE: 75 MMHG | SYSTOLIC BLOOD PRESSURE: 182 MMHG | HEIGHT: 62 IN | HEART RATE: 77 BPM | BODY MASS INDEX: 29.26 KG/M2 | WEIGHT: 159 LBS | RESPIRATION RATE: 17 BRPM | TEMPERATURE: 98 F

## 2023-09-07 DIAGNOSIS — E87.6 HYPOKALEMIA: ICD-10-CM

## 2023-09-07 DIAGNOSIS — M62.830 LUMBAR PARASPINAL MUSCLE SPASM: Primary | ICD-10-CM

## 2023-09-07 DIAGNOSIS — N17.9 AKI (ACUTE KIDNEY INJURY): ICD-10-CM

## 2023-09-07 LAB
ALBUMIN SERPL BCP-MCNC: 4.7 G/DL (ref 3.5–5.2)
ALP SERPL-CCNC: 126 U/L (ref 38–126)
ALT SERPL W/O P-5'-P-CCNC: 25 U/L (ref 10–44)
ANION GAP SERPL CALC-SCNC: 17 MMOL/L (ref 8–16)
AST SERPL-CCNC: 44 U/L (ref 15–46)
B-HCG UR QL: NEGATIVE
BACTERIA #/AREA URNS AUTO: ABNORMAL /HPF
BASOPHILS # BLD AUTO: 0.06 K/UL (ref 0–0.2)
BASOPHILS NFR BLD: 1 % (ref 0–1.9)
BILIRUB SERPL-MCNC: 0.5 MG/DL (ref 0.1–1)
BILIRUB UR QL STRIP: NEGATIVE
CALCIUM SERPL-MCNC: 10.2 MG/DL (ref 8.7–10.5)
CHLORIDE SERPL-SCNC: 103 MMOL/L (ref 95–110)
CLARITY UR REFRACT.AUTO: CLEAR
CO2 SERPL-SCNC: 21 MMOL/L (ref 23–29)
COLOR UR AUTO: YELLOW
CREAT SERPL-MCNC: 1.43 MG/DL (ref 0.5–1.4)
CTP QC/QA: YES
DIFFERENTIAL METHOD: NORMAL
EOSINOPHIL # BLD AUTO: 0.4 K/UL (ref 0–0.5)
EOSINOPHIL NFR BLD: 7.5 % (ref 0–8)
ERYTHROCYTE [DISTWIDTH] IN BLOOD BY AUTOMATED COUNT: 13 % (ref 11.5–14.5)
EST. GFR  (NO RACE VARIABLE): 45.5 ML/MIN/1.73 M^2
GLUCOSE SERPL-MCNC: 126 MG/DL (ref 70–110)
GLUCOSE UR QL STRIP: NEGATIVE
HCT VFR BLD AUTO: 39.3 % (ref 37–48.5)
HGB BLD-MCNC: 13.5 G/DL (ref 12–16)
HGB UR QL STRIP: ABNORMAL
HYALINE CASTS UR QL AUTO: 0 /LPF
IMM GRANULOCYTES # BLD AUTO: 0.02 K/UL (ref 0–0.04)
IMM GRANULOCYTES NFR BLD AUTO: 0.3 % (ref 0–0.5)
KETONES UR QL STRIP: NEGATIVE
LEUKOCYTE ESTERASE UR QL STRIP: NEGATIVE
LYMPHOCYTES # BLD AUTO: 1.5 K/UL (ref 1–4.8)
LYMPHOCYTES NFR BLD: 24.8 % (ref 18–48)
MAGNESIUM SERPL-MCNC: 1.8 MG/DL (ref 1.6–2.6)
MCH RBC QN AUTO: 29.5 PG (ref 27–31)
MCHC RBC AUTO-ENTMCNC: 34.4 G/DL (ref 32–36)
MCV RBC AUTO: 86 FL (ref 82–98)
MICROSCOPIC COMMENT: ABNORMAL
MONOCYTES # BLD AUTO: 0.4 K/UL (ref 0.3–1)
MONOCYTES NFR BLD: 5.9 % (ref 4–15)
NEUTROPHILS # BLD AUTO: 3.6 K/UL (ref 1.8–7.7)
NEUTROPHILS NFR BLD: 60.5 % (ref 38–73)
NITRITE UR QL STRIP: NEGATIVE
NRBC BLD-RTO: 0 /100 WBC
PH UR STRIP: 6 [PH] (ref 5–8)
PLATELET # BLD AUTO: 331 K/UL (ref 150–450)
PMV BLD AUTO: 10.3 FL (ref 9.2–12.9)
POTASSIUM SERPL-SCNC: 2.9 MMOL/L (ref 3.5–5.1)
PROT SERPL-MCNC: 9.1 G/DL (ref 6–8.4)
PROT UR QL STRIP: ABNORMAL
RBC # BLD AUTO: 4.57 M/UL (ref 4–5.4)
RBC #/AREA URNS AUTO: 4 /HPF (ref 0–4)
SODIUM SERPL-SCNC: 141 MMOL/L (ref 136–145)
SP GR UR STRIP: 1.02 (ref 1–1.03)
URN SPEC COLLECT METH UR: ABNORMAL
UROBILINOGEN UR STRIP-ACNC: NEGATIVE EU/DL
UUN UR-MCNC: 14 MG/DL (ref 7–17)
WBC # BLD AUTO: 5.89 K/UL (ref 3.9–12.7)
WBC #/AREA URNS AUTO: 5 /HPF (ref 0–5)

## 2023-09-07 PROCEDURE — 99284 EMERGENCY DEPT VISIT MOD MDM: CPT | Mod: 25,ER

## 2023-09-07 PROCEDURE — 81025 URINE PREGNANCY TEST: CPT | Mod: ER

## 2023-09-07 PROCEDURE — 80053 COMPREHEN METABOLIC PANEL: CPT | Mod: ER

## 2023-09-07 PROCEDURE — 81000 URINALYSIS NONAUTO W/SCOPE: CPT | Mod: ER

## 2023-09-07 PROCEDURE — 83735 ASSAY OF MAGNESIUM: CPT | Mod: ER

## 2023-09-07 PROCEDURE — 25000003 PHARM REV CODE 250: Mod: ER

## 2023-09-07 PROCEDURE — 85025 COMPLETE CBC W/AUTO DIFF WBC: CPT | Mod: ER

## 2023-09-07 RX ORDER — LIDOCAINE 50 MG/G
1 PATCH TOPICAL
Status: DISCONTINUED | OUTPATIENT
Start: 2023-09-07 | End: 2023-09-07 | Stop reason: HOSPADM

## 2023-09-07 RX ORDER — POTASSIUM CHLORIDE 20 MEQ/1
60 TABLET, EXTENDED RELEASE ORAL
Status: COMPLETED | OUTPATIENT
Start: 2023-09-07 | End: 2023-09-07

## 2023-09-07 RX ORDER — NITROFURANTOIN 25; 75 MG/1; MG/1
100 CAPSULE ORAL 2 TIMES DAILY
Qty: 10 CAPSULE | Refills: 0 | Status: SHIPPED | OUTPATIENT
Start: 2023-09-07 | End: 2023-09-12

## 2023-09-07 RX ORDER — POTASSIUM CHLORIDE 20 MEQ/1
20 TABLET, EXTENDED RELEASE ORAL DAILY
Qty: 3 TABLET | Refills: 0 | Status: SHIPPED | OUTPATIENT
Start: 2023-09-07 | End: 2023-09-10

## 2023-09-07 RX ORDER — LIDOCAINE 50 MG/G
1 PATCH TOPICAL DAILY
Qty: 3 PATCH | Refills: 0 | Status: SHIPPED | OUTPATIENT
Start: 2023-09-07

## 2023-09-07 RX ADMIN — LIDOCAINE 1 PATCH: 50 PATCH CUTANEOUS at 08:09

## 2023-09-07 RX ADMIN — POTASSIUM CHLORIDE 60 MEQ: 1500 TABLET, EXTENDED RELEASE ORAL at 07:09

## 2023-09-07 NOTE — ED PROVIDER NOTES
Encounter Date: 9/7/2023       History     Chief Complaint   Patient presents with    Flank Pain     Right sided flank pain ongoing for weeks. States is not urinating as much as normal. No blood in urine, pain with urination, or fever.      Gina Jenkins is a 48 y.o. female with a past medical history of Brain aneurysm, CHF (congestive heart failure), H/O coronary angioplasty, Hypercholesteremia, Hypertension, Malignant hypertension, Migraine headache, and Stroke. presenting to the Emergency Department for right flank pain and decreased urination x1 week.  Patient reports that her pain has kept her in the bed the majority of the day during the week.  Reports that at night she usually wakes up 6-7 times a night to use the restroom.  However, she has been sleeping through the night.  Patient also reports decreased urination during the day.  Reports that her urine has been dark yellow in color.  Patient reports no trauma or accident prior to her symptoms beginning.  No fever, headache, nausea, vomiting, diarrhea.  No history of kidney stones. Pt reports that she has been taking NSAIDs more frequently to manage her right ankle pain.             Review of patient's allergies indicates:   Allergen Reactions    Lisinopril Swelling    Bactrim [sulfamethoxazole-trimethoprim] Rash    Ceftazidime Hives     Rash while on IV ceftazidime for planned 6 weeks.  See ED notes 9/12, 9/14 and ID clinic notes 9/16 and 9/28     Past Medical History:   Diagnosis Date    Brain aneurysm     CHF (congestive heart failure)     H/O coronary angioplasty     Hypercholesteremia     Hypertension     Malignant hypertension     Migraine headache     Stroke 10/2017     Past Surgical History:   Procedure Laterality Date    CARDIAC CATHETERIZATION      CEREBRAL ANGIOGRAM      CLIP LIGATION OF INTRACRANIAL ANEURYSM BY CRANIOTOMY N/A 7/15/2022    Procedure: CRANIOTOMY, WITH ANEURYSM CLIPPING;  Surgeon: Timothy Diaz MD;  Location: [facility] OR [facility]  [facility];  Service: Neurosurgery;  Laterality: N/A;  PTERIONAL CRANIOTOMY WITH CLIP LIGATION OF L PCOMM, L ANTERIOR CHOROIDAL, L MCA  ANEURYSM, ANESTHESIA: GENERAL, BLOOD: TYPE&CROSS 2 UNITS, NEUROMONITORING: SEP, MEP, EEG, RADIOLOGY: C-ARM, POSITION: SUPINE, ANNA CO-SURGERON: DR. LEXI DOMÍNGUEZ.    WOUND DEBRIDEMENT  8/27/2022    Procedure: DEBRIDEMENT, WOUND;  Surgeon: Timothy Diaz MD;  Location: [facility] OR [address] [place] [address];  Service: Neurosurgery;;     No family history on file.  Social History     Tobacco Use    Smoking status: Every Day     Current packs/day: 0.50     Types: Cigarettes    Smokeless tobacco: Never   Substance Use Topics    Alcohol use: Yes     Comment: occassionally    Drug use: No     Review of Systems   Constitutional:  Positive for activity change. Negative for appetite change, chills and fever.   Respiratory:  Negative for cough, chest tightness and shortness of breath.    Cardiovascular:  Negative for chest pain.   Gastrointestinal:  Negative for abdominal pain, blood in stool, constipation, diarrhea, nausea and vomiting.   Genitourinary:  Positive for decreased urine volume and flank pain. Negative for difficulty urinating, dysuria, frequency and urgency.   Musculoskeletal:  Positive for back pain.   Neurological:  Negative for headaches.   All other systems reviewed and are negative.      Physical Exam     Initial Vitals [09/07/23 1659]   BP Pulse Resp Temp SpO2   (!) 162/107 90 19 98.1 °F (36.7 °C) 98 %      MAP       --         Physical Exam    Nursing note and vitals reviewed.  Constitutional: She appears well-developed and well-nourished. She is not diaphoretic. No distress.   HENT:   Head: Normocephalic and atraumatic.   Right Ear: External ear normal.   Left Ear: External ear normal.   Mouth/Throat: Oropharynx is clear and moist.   Eyes: Conjunctivae and EOM are normal.   Neck:   Normal range of motion.  Cardiovascular:  Normal rate, regular rhythm and normal heart sounds.            Pulmonary/Chest: Breath sounds normal. No respiratory distress. She has no wheezes.   Abdominal: Abdomen is soft. Bowel sounds are normal. She exhibits no distension. There is no abdominal tenderness.   R CVA tenderness. There is no rebound and no guarding.   Musculoskeletal:         General: No edema. Normal range of motion.      Cervical back: Normal range of motion.      Comments: R lumbar paraspinal spasm  Negative straight leg raise bilaterally  No midline spinous process tenderness     Neurological: She is alert and oriented to person, place, and time.   Skin: Skin is warm. Capillary refill takes less than 2 seconds.       ED Course   Procedures  Labs Reviewed   COMPREHENSIVE METABOLIC PANEL - Abnormal; Notable for the following components:       Result Value    Potassium 2.9 (*)     CO2 21 (*)     Glucose 126 (*)     Creatinine 1.43 (*)     Total Protein 9.1 (*)     Anion Gap 17 (*)     eGFR 45.5 (*)     All other components within normal limits   URINALYSIS, REFLEX TO URINE CULTURE - Abnormal; Notable for the following components:    Protein, UA 2+ (*)     Occult Blood UA 1+ (*)     All other components within normal limits    Narrative:     Preferred Collection Type->Urine, Clean Catch  Specimen Source->Urine   URINALYSIS MICROSCOPIC - Abnormal; Notable for the following components:    Bacteria Moderate (*)     All other components within normal limits    Narrative:     Preferred Collection Type->Urine, Clean Catch  Specimen Source->Urine   CBC W/ AUTO DIFFERENTIAL   MAGNESIUM   MAGNESIUM   POCT URINE PREGNANCY          Imaging Results              CT Abdomen Pelvis  Without Contrast (Final result)  Result time 09/07/23 18:26:25      Final result by Deena Hernandez MD (09/07/23 18:26:25)                   Impression:      No obstructive uropathy or adverse finding      Electronically signed by: Deena Hernandez  Date:    09/07/2023  Time:    18:26               Narrative:    EXAMINATION:  CT ABDOMEN  PELVIS WITHOUT CONTRAST    CLINICAL HISTORY:  Flank pain, kidney stone suspected;    TECHNIQUE:  Low dose axial images, sagittal and coronal reformations were obtained from the lung bases to the pubic symphysis.  Contrast was not administered.    COMPARISON:  October 2017    FINDINGS:  Liver and spleen stable size.  Biliary ductal dilatation similar to prior exam.  Gallbladder present    Pancreas stable    Kidneys without hydronephrosis or sizable calcification.  No hydroureter.    Urinary bladder decompressed    No aortic aneurysm    Large calcified uterine leiomyoma redemonstrated    No obstructive or inflammatory bowel findings.  No signs of appendicitis.    No fluid collection seen                                       Medications   potassium chloride SA CR tablet 60 mEq (60 mEq Oral Given 9/7/23 1915)     Medical Decision Making  Urgent evaluation of a 48-year-old female presenting to the emergency department for right-sided flank pain x1 week.  Patient is hypertensive, afebrile, nontoxic appearing.  R CVA tenderness. There is also R sided lumbar paraspinal spasm. Patient has no abdominal tenderness to palpation.  No lower extremity weakness.  Negative straight leg raise.  No midline spinous process tenderness.  No saddle anesthesia.  CBC, CMP, UA, CT abdomen pelvis without contrast ordered.     Vital signs reassuring. RESULTS: as documented in ED course    Patient's COOPER likely secondary to NSAID use.  Patient's back pain likely musculoskeletal than pyelonephritis.  Patient afebrile.  No CT evidence kidney inflammation.  No leukocytes present on UA. Covering pt for UTI vs contamination with moderate bacteria present. Pt left AMA at last visit and may be difficult to contact for potential future growth and antibiotic. Pt agrees with the plan. Pt declines muscle relaxers for muscle spasm because she does not like the way they make her feel.     Given the above findings, my overall impression is lumbar muscle  spasm, COOPER secondary to NSAID use, hypokalemia. Given the above findings, I do not think the patient has acute vertebral fracture, subluxation, dislocation, sciatica, epidural abscess, cauda equina, shingles, pyelonephritis.    ED Course: as documented. D/C Meds: lidocaine patch, macrobid for UTI coverage, potassium. D/C Information: F/u with PCP in 3 days.  The diagnosis, treatment plan, instructions for follow-up and reevaluation with PCP as well as ED return precautions were discussed and understanding was verbalized. All questions or concerns have been addressed.     This case was discussed with Pricilla Escobar PA-C who is in agreement with my assessment and plan.      Amount and/or Complexity of Data Reviewed  Labs: ordered. Decision-making details documented in ED Course.  Radiology: ordered and independent interpretation performed. Decision-making details documented in ED Course.    Risk  Prescription drug management.              Attending Attestation:     Physician Attestation Statement for NP/PA:   I have directed and reviewed the workup performed by the PA/NP.  I performed the substantive portion of the medical decision making.     Other NP/PA Attestation Additions:      Medical Decision Making: Agree with assessment and plan.             ED Course as of 09/08/23 0708   Thu Sep 07, 2023   1810 CBC W/ AUTO DIFFERENTIAL  CBC is unremarkable. [LH]   1810 POCT urine pregnancy  UPT negative [LH]   1834 Comp. Metabolic Panel(!)  CMP reveals hypokalemia (same as 8/6 visit), elevated glucose, Cr elevated. GFR decreased.  [LH]   1842 Urinalysis, Reflex to Urine Culture Urine, Clean Catch(!)  UA reveals 2+ protein with 1+ occult blood [LH]   1843 Urinalysis Microscopic(!)  Shows moderate bacteria [LH]   1853 CT Abdomen Pelvis  Without Contrast [LH]   1853 CT Abdomen Pelvis  Without Contrast  I have independently reviewed the CT and reviewed the radiologist's report.  I see no signs of any obstructive uropathy.   No hydronephrosis.  No inflammatory bowel signs.  [LH]   1906 Patient advised of low potassium.  Patient agreeable to taking potassium.  Patient advised that right-sided low back pain most likely musculoskeletal.  Patient declines muscle relaxers feeling drowsy. [LH]   2028 Magnesium  Magnesium within normal limits. [LH]      ED Course User Index  [LH] Angelina Gallegos PA-C                      Clinical Impression:   Final diagnoses:  [M62.830] Lumbar paraspinal muscle spasm (Primary)  [E87.6] Hypokalemia  [N17.9] COOPER (acute kidney injury)        ED Disposition Condition    Discharge Stable          ED Prescriptions       Medication Sig Dispense Start Date End Date Auth. Provider    nitrofurantoin, macrocrystal-monohydrate, (MACROBID) 100 MG capsule Take 1 capsule (100 mg total) by mouth 2 (two) times daily. for 5 days 10 capsule 9/7/2023 9/12/2023 Angelina Gallegos PA-C    LIDOcaine (LIDODERM) 5 % Place 1 patch onto the skin once daily. Remove & Discard patch within 12 hours or as directed by MD 3 patch 9/7/2023 -- Angelina Gallegos PA-C    potassium chloride SA (K-DUR,KLOR-CON) 20 MEQ tablet Take 1 tablet (20 mEq total) by mouth once daily. for 3 days 3 tablet 9/7/2023 9/10/2023 Angelina Gallegos PA-C          Follow-up Information       Follow up With Specialties Details Why Contact Info    Clair Johnson, NP Family Medicine In 3 days For potassium recheck and symptom recheck. [address]  [address]  [facility] [place] [address]  [phone]               Angelina Gallegos PA-C  09/07/23 2031       Angelina Gallegos PA-C  09/07/23 2320       Angelina Gallegos PA-C  09/08/23 0011       Benjamin Anderson MD  09/08/23 0781

## 2023-09-08 NOTE — DISCHARGE INSTRUCTIONS
If the pharmacy does not have lidocaine patches available, they can also be found over the counter. Take tylenol as needed three times a day for pain. Do not exceed more than 1000mg at a time and 4000 mg per day. AVOID NSAIDs like ibuprofen, naproxen, aleve, advil, etc. until you follow up with your primary care doctor. Take your antibiotics to completion.

## 2023-10-19 ENCOUNTER — TELEPHONE (OUTPATIENT)
Dept: NEUROSURGERY | Facility: CLINIC | Age: 48
End: 2023-10-19
Payer: MEDICAID

## 2023-10-19 DIAGNOSIS — I67.1 CEREBRAL ANEURYSM, NONRUPTURED: Primary | ICD-10-CM

## 2023-11-14 ENCOUNTER — HOSPITAL ENCOUNTER (OUTPATIENT)
Dept: RADIOLOGY | Facility: HOSPITAL | Age: 48
Discharge: HOME | End: 2023-11-14
Attending: NEUROLOGICAL SURGERY
Payer: MEDICAID

## 2023-11-14 DIAGNOSIS — I67.1 CEREBRAL ANEURYSM, NONRUPTURED: ICD-10-CM

## 2023-11-14 LAB
CREAT SERPL-MCNC: 1.7 MG/DL (ref 0.5–1.4)
SAMPLE: ABNORMAL

## 2023-11-14 PROCEDURE — 70496 CT ANGIOGRAPHY HEAD: CPT | Mod: 26,,, | Performed by: RADIOLOGY

## 2023-11-14 PROCEDURE — 70496 CTA HEAD: ICD-10-PCS | Mod: 26,,, | Performed by: RADIOLOGY

## 2023-11-14 PROCEDURE — 70546 MR ANGIOGRAPH HEAD W/O&W/DYE: CPT | Mod: 26,,, | Performed by: RADIOLOGY

## 2023-11-14 PROCEDURE — 70546 MR ANGIOGRAPH HEAD W/O&W/DYE: CPT | Mod: TC

## 2023-11-14 PROCEDURE — 70496 CT ANGIOGRAPHY HEAD: CPT | Mod: TC

## 2023-11-14 PROCEDURE — 70546 MRA BRAIN WITH AND WITHOUT: ICD-10-PCS | Mod: 26,,, | Performed by: RADIOLOGY

## 2023-11-14 PROCEDURE — 25500020 PHARM REV CODE 255: Performed by: NEUROLOGICAL SURGERY

## 2023-11-14 PROCEDURE — A9585 GADOBUTROL INJECTION: HCPCS | Performed by: NEUROLOGICAL SURGERY

## 2023-11-14 RX ORDER — GADOBUTROL 604.72 MG/ML
8 INJECTION INTRAVENOUS
Status: COMPLETED | OUTPATIENT
Start: 2023-11-14 | End: 2023-11-14

## 2023-11-14 RX ADMIN — GADOBUTROL 8 ML: 604.72 INJECTION INTRAVENOUS at 07:11

## 2023-11-14 RX ADMIN — IOHEXOL 75 ML: 350 INJECTION, SOLUTION INTRAVENOUS at 06:11

## 2023-11-20 ENCOUNTER — PATIENT MESSAGE (OUTPATIENT)
Dept: NEUROSURGERY | Facility: CLINIC | Age: 48
End: 2023-11-20

## 2023-11-20 ENCOUNTER — OFFICE VISIT (OUTPATIENT)
Dept: NEUROSURGERY | Facility: CLINIC | Age: 48
End: 2023-11-20
Payer: MEDICAID

## 2023-11-20 DIAGNOSIS — I67.1 ANEURYSM OF MIDDLE CEREBRAL ARTERY: ICD-10-CM

## 2023-11-20 DIAGNOSIS — I67.1 CEREBRAL ANEURYSM, NONRUPTURED: Primary | ICD-10-CM

## 2023-11-20 PROCEDURE — 99215 OFFICE O/P EST HI 40 MIN: CPT | Mod: S$PBB,,, | Performed by: PHYSICIAN ASSISTANT

## 2023-11-20 PROCEDURE — 99215 PR OFFICE/OUTPT VISIT, EST, LEVL V, 40-54 MIN: ICD-10-PCS | Mod: S$PBB,,, | Performed by: PHYSICIAN ASSISTANT

## 2023-11-20 PROCEDURE — 1159F PR MEDICATION LIST DOCUMENTED IN MEDICAL RECORD: ICD-10-PCS | Mod: CPTII,,, | Performed by: PHYSICIAN ASSISTANT

## 2023-11-20 PROCEDURE — 4010F ACE/ARB THERAPY RXD/TAKEN: CPT | Mod: CPTII,,, | Performed by: PHYSICIAN ASSISTANT

## 2023-11-20 PROCEDURE — 1159F MED LIST DOCD IN RCRD: CPT | Mod: CPTII,,, | Performed by: PHYSICIAN ASSISTANT

## 2023-11-20 PROCEDURE — 99213 OFFICE O/P EST LOW 20 MIN: CPT | Mod: PBBFAC | Performed by: PHYSICIAN ASSISTANT

## 2023-11-20 PROCEDURE — 99999 PR PBB SHADOW E&M-EST. PATIENT-LVL III: CPT | Mod: PBBFAC,,, | Performed by: PHYSICIAN ASSISTANT

## 2023-11-20 PROCEDURE — 4010F PR ACE/ARB THEARPY RXD/TAKEN: ICD-10-PCS | Mod: CPTII,,, | Performed by: PHYSICIAN ASSISTANT

## 2023-11-20 PROCEDURE — 99999 PR PBB SHADOW E&M-EST. PATIENT-LVL III: ICD-10-PCS | Mod: PBBFAC,,, | Performed by: PHYSICIAN ASSISTANT

## 2023-11-20 RX ORDER — METHOCARBAMOL 500 MG/1
500 TABLET, FILM COATED ORAL 2 TIMES DAILY
COMMUNITY
Start: 2023-09-21

## 2023-11-20 RX ORDER — TRIPROLIDINE/PSEUDOEPHEDRINE 2.5MG-60MG
TABLET ORAL
COMMUNITY
Start: 2023-11-09

## 2023-11-20 NOTE — PROGRESS NOTES
Neurosurgery  Established Patient    SUBJECTIVE:     History of Present Illness 10/18/22:  Ms. 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  She had her postoperative course complicated somewhat by subgaleal fluid collection, with possible infection she underwent revision and washout.  She presents now for follow-up with a CTA.  CTA does not show any significant recurrence of her previous aneurysm.  The 3 mm left MCA bifurcation aneurysm which was left is largely unchanged from previous imaging.  She denies any new symptoms.  She does note she continues to have occasional diarrhea, secondary she believes from her antibiotics.     Interval History 11/20/23:  Patient presents to clinic for follow up of multiple cerebral aneurysms. Reports intermittent head pains that come on suddenly but usually resolve fairly quickly. Not related to her craniotomy site, pain can be on either side of the head. Takes Tylenol as needed. She also reports pain in her mid-back to the R paraspinal muscles, which started about a month ago without any trauma or inciting event. Pain is constant, non-radiating. Denies any weakness or sensory changes. She has otherwise been well without other focal complaints. She is anxious about her remaining untreated aneurysm. She is still smoking cigarettes but states she has cut back.     Review of patient's allergies indicates:   Allergen Reactions    Lisinopril Swelling    Bactrim [sulfamethoxazole-trimethoprim] Rash    Ceftazidime Hives     Rash while on IV ceftazidime for planned 6 weeks.  See ED notes 9/12, 9/14 and ID clinic notes 9/16 and 9/28       Current Outpatient Medications   Medication Sig Dispense Refill    acetaminophen (TYLENOL) 500 MG tablet Take 1 tablet (500 mg total) by mouth every 6 (six) hours as needed for  "Pain. 20 tablet 0    amitriptyline (ELAVIL) 75 MG tablet Take 75 mg by mouth every evening.      amlodipine (NORVASC) 10 MG tablet Take 1 tablet (10 mg total) by mouth once daily. 30 tablet 0    atorvastatin (LIPITOR) 40 MG tablet Take 1 tablet (40 mg total) by mouth once daily. 90 tablet 3    blood sugar diagnostic Strp Use to check blood glucose 3 times daily. 200 each 11    blood-glucose meter Misc Use to check blood glucose 3 times daily. 1 each 1    carvediloL (COREG) 25 MG tablet Take 37.5 mg by mouth 2 (two) times daily.      EScitalopram oxalate (LEXAPRO) 10 MG tablet Take 10 mg by mouth once daily.      fluconazole (DIFLUCAN) 150 MG Tab TAKE 1 TABLET(150 MG) BY MOUTH EVERY DAY FOR 2 DOSES (Patient not taking: Reported on 2/7/2023) 2 tablet 0    ibuprofen 20 mg/mL oral liquid TAKE 30 ML BY MOUTH THREE TIMES DAILY FOR PAIN      lancets 30 gauge Misc Use to check blood glucose 3 times daily. 200 each 11    levETIRAcetam (KEPPRA) 1000 MG tablet Take 1 tablet (1,000 mg total) by mouth 2 (two) times daily. 180 tablet 3    losartan (COZAAR) 50 MG tablet Take 50 mg by mouth.      magnesium oxide (MAG-OX) 400 mg (241.3 mg magnesium) tablet Take 1 tablet by mouth 2 (two) times daily.      methocarbamoL (ROBAXIN) 500 MG Tab Take 500 mg by mouth 2 (two) times daily.      pen needle, diabetic (BD ULTRA-FINE MARIELY PEN NEEDLE) 32 gauge x 5/32" Ndle Use to inject insulin into the skin three times daily . 200 each 11    topiramate (TOPAMAX) 50 MG tablet Take 1 tablet (50 mg total) by mouth 2 (two) times daily. 180 tablet 3    traMADoL (ULTRAM) 50 mg tablet Take 50 mg by mouth 2 (two) times daily.      insulin aspart U-100 (NOVOLOG) 100 unit/mL (3 mL) InPn pen Inject 8 Units into the skin 3 (three) times daily with meals. 6 mL 5    insulin detemir U-100 (LEVEMIR FLEXTOUCH) 100 unit/mL (3 mL) SubQ InPn pen Inject 24 Units into the skin once daily. 6 mL 11    LIDOcaine (LIDODERM) 5 % Place 1 patch onto the skin once daily. " Remove & Discard patch within 12 hours or as directed by MD (Patient not taking: Reported on 11/20/2023) 3 patch 0    metFORMIN (GLUCOPHAGE-XR) 500 MG ER 24hr tablet Take 500 mg by mouth 2 (two) times daily.       No current facility-administered medications for this visit.       Past Medical History:   Diagnosis Date    Brain aneurysm     CHF (congestive heart failure)     H/O coronary angioplasty     Hypercholesteremia     Hypertension     Malignant hypertension     Migraine headache     Stroke 10/2017     Past Surgical History:   Procedure Laterality Date    CARDIAC CATHETERIZATION      CEREBRAL ANGIOGRAM      CLIP LIGATION OF INTRACRANIAL ANEURYSM BY CRANIOTOMY N/A 7/15/2022    Procedure: CRANIOTOMY, WITH ANEURYSM CLIPPING;  Surgeon: Timothy Diaz MD;  Location: [facility] OR [address] [place] [address];  Service: Neurosurgery;  Laterality: N/A;  PTERIONAL CRANIOTOMY WITH CLIP LIGATION OF L PCOMM, L ANTERIOR CHOROIDAL, L MCA  ANEURYSM, ANESTHESIA: GENERAL, BLOOD: TYPE&CROSS 2 UNITS, NEUROMONITORING: SEP, MEP, EEG, RADIOLOGY: C-ARM, POSITION: SUPINE, CASTILLO, CO-SURGERON: DR. LEXI DOMÍNGUEZ.    WOUND DEBRIDEMENT  8/27/2022    Procedure: DEBRIDEMENT, WOUND;  Surgeon: Timothy Diaz MD;  Location: [facility] OR [address] [place] [address];  Service: Neurosurgery;;     Family History    None       Social History     Socioeconomic History    Marital status:    Tobacco Use    Smoking status: Every Day     Current packs/day: 0.50     Types: Cigarettes    Smokeless tobacco: Never   Substance and Sexual Activity    Alcohol use: Yes     Comment: occassionally    Drug use: No    Sexual activity: Not Currently     Partners: Male     Birth control/protection: None       Review of Systems  Positive per HPI, otherwise a pertinent ROS was performed and was negative.        OBJECTIVE:     Vital Signs  Pain Score: 10-Worst pain ever  There is no height or weight on file to calculate BMI.    Neurosurgery Physical Exam  General: well developed, well nourished, no  distress.   Head: normocephalic, atraumatic. Unable to visualize prior cranial incision under wig.   Neck: No tracheal deviation. No palpable masses. Full ROM.   Neurologic: Alert and oriented. Thought content appropriate.  GCS: E4 V5 M6; Total: 15  Mental Status: Awake, Alert, Oriented x 4  Language: No aphasia  Speech: No dysarthria  Cranial nerves: face symmetric, tongue midline, CN II-XII grossly intact.   Eyes: pupils equal, round, reactive to light with accomodation, EOMI.   Pulmonary: normal respirations, no signs of respiratory distress  Abdomen: soft, non-distended, not tender to palpation  Skin: Skin is warm, dry and intact.    Sensory: intact to light touch throughout  Motor Strength: Moves all extremities spontaneously with good tone.  Full strength upper and lower extremities. No abnormal movements seen.     Finger-to-nose: intact bilaterally   Pronator drift: absent bilaterally    Spine: no midline TTP along spinous processes throughout. Positive to TTP to mid-thoracic R-sided paraspinal musculature.          Diagnostic Results:  Imaging was independently reviewed by myself.    CTA head and MRA brain w/wo contrast 11/14/23:  - Stable appearance of previously clipped L ICA aneurysms without evidence of residual/recurrence  - Stable appearance of small untreated L M1/M2 aneurysm, with rounded appearance, approximately 3 mm in size, without any evidence of abnormal wall enhancement        ASSESSMENT/PLAN:     Gina Jenkins is a 48 y.o. 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 Pt has additional untreated L M1/M2 aneurysm, approximately 3 mm in size, stable on updated MRA without any enhancement. No evidence of residual/recurrence of previously clipped L ICA aneurysms.    - Recommend continued surveillance,  follow up in about 6 months with repeat CTA head and MRA brain with vessel wall imaging  - Smoking cessation counseling provided, encouraged complete cessation          Diagnoses addressed and orders placed this encounter:      Cerebral aneurysm, nonruptured  -     CTA Head; Future; Expected date: 05/20/2024  -     MRA Brain with and without contrast; Future; Expected date: 05/20/2024    Aneurysm of middle cerebral artery  -     CTA Head; Future; Expected date: 05/20/2024  -     MRA Brain with and without contrast; Future; Expected date: 05/20/2024          Marlene Robison PA-C  Neurosurgery  [facility]        Time spent on this encounter: 60 minutes. This includes face to face time and non-face to face time preparing to see the patient (eg, review of tests), obtaining and/or reviewing separately obtained history, documenting clinical information in the electronic or other health record, independently interpreting results and communicating results to the patient/family/caregiver, or care coordinator.

## 2024-05-25 NOTE — SUBJECTIVE & OBJECTIVE
Problem: Adult Inpatient Plan of Care  Goal: Plan of Care Review  Outcome: Progressing  Goal: Optimal Comfort and Wellbeing  Outcome: Progressing     Problem: Infection  Goal: Absence of Infection Signs and Symptoms  Outcome: Progressing     Problem: Wound  Goal: Optimal Coping  Outcome: Progressing  Goal: Absence of Infection Signs and Symptoms  Outcome: Progressing  Goal: Optimal Wound Healing  Outcome: Progressing     Problem: Skin Injury Risk Increased  Goal: Skin Health and Integrity  Outcome: Progressing      "  Woke up with symptoms?: no    Recent bleeding noted: no  Does the patient take any Blood Thinners? no  Medications: No relevant medications      Past Medical History: hypertension, MI/CAD, CHF, stroke and cerebral aneurysms    Past Surgical History: no major surgeries within the last 2 weeks    Family History: no relevant history    Social History: no smoking, no drinking, no drugs    Allergies: Lisinopril  Bactrim [Sulfamethoxazole-Trimethoprim]     Review of Systems   Neurological: Positive for speech difficulty, weakness, numbness and headaches.   All other systems reviewed and are negative.    Objective:   Vitals: Blood pressure (!) 152/87, pulse 106, temperature (!) 100.4 °F (38 °C), temperature source Oral, resp. rate (!) 22, height 5' 2" (1.575 m), weight 89.5 kg (197 lb 5 oz), SpO2 96 %.     CT READ: Yes  No hemmorhage. No mass effect. No early infarct signs.     Physical Exam   Constitutional: She is oriented to person, place, and time. She appears well-developed and well-nourished.   HENT:   Head: Normocephalic and atraumatic.   Eyes: Pupils are equal, round, and reactive to light. EOM are normal.   Cardiovascular: Normal rate and regular rhythm.   Pulmonary/Chest: Effort normal.   Neurological: She is alert and oriented to person, place, and time. A sensory deficit is present.   Vitals reviewed.        "

## 2024-06-07 ENCOUNTER — HOSPITAL ENCOUNTER (INPATIENT)
Facility: HOSPITAL | Age: 49
LOS: 5 days | Discharge: HOME | DRG: 103 | End: 2024-06-12
Attending: EMERGENCY MEDICINE | Admitting: INTERNAL MEDICINE
Payer: MEDICAID

## 2024-06-07 DIAGNOSIS — R51.9 ACUTE NONINTRACTABLE HEADACHE, UNSPECIFIED HEADACHE TYPE: ICD-10-CM

## 2024-06-07 DIAGNOSIS — G40.209 LOCALIZATION-RELATED (FOCAL) (PARTIAL) SYMPTOMATIC EPILEPSY AND EPILEPTIC SYNDROMES WITH COMPLEX PARTIAL SEIZURES, NOT INTRACTABLE, WITHOUT STATUS EPILEPTICUS: ICD-10-CM

## 2024-06-07 DIAGNOSIS — G12.20 MOTOR NEURON DISEASE, UNSPECIFIED: ICD-10-CM

## 2024-06-07 DIAGNOSIS — R47.81 SLURRED SPEECH: ICD-10-CM

## 2024-06-07 DIAGNOSIS — R53.1 WEAKNESS: ICD-10-CM

## 2024-06-07 DIAGNOSIS — G37.9 DEMYELINATING DISEASE OF CENTRAL NERVOUS SYSTEM, UNSPECIFIED: ICD-10-CM

## 2024-06-07 DIAGNOSIS — G40.909 SEIZURE DISORDER: Primary | ICD-10-CM

## 2024-06-07 LAB
ALBUMIN SERPL BCP-MCNC: 4.3 G/DL (ref 3.5–5.2)
ALLENS TEST: ABNORMAL
ALP SERPL-CCNC: 153 U/L (ref 38–126)
ALT SERPL W/O P-5'-P-CCNC: 20 U/L (ref 10–44)
AMMONIA BLD-SCNC: <9 UMOL/L (ref 9–30)
AMPHET+METHAMPHET UR QL: NEGATIVE
ANION GAP SERPL CALC-SCNC: 12 MMOL/L (ref 8–16)
APAP SERPL-MCNC: <10 UG/ML (ref 10–20)
APTT PPP: 25.9 SEC (ref 21–32)
AST SERPL-CCNC: 36 U/L (ref 15–46)
BACTERIA #/AREA URNS AUTO: ABNORMAL /HPF
BARBITURATES UR QL SCN>200 NG/ML: NEGATIVE
BASOPHILS # BLD AUTO: 0.04 K/UL (ref 0–0.2)
BASOPHILS NFR BLD: 0.6 % (ref 0–1.9)
BENZODIAZ UR QL SCN>200 NG/ML: NEGATIVE
BILIRUB SERPL-MCNC: 0.3 MG/DL (ref 0.1–1)
BILIRUB UR QL STRIP: NEGATIVE
BZE UR QL SCN: NEGATIVE
CALCIUM SERPL-MCNC: 9.5 MG/DL (ref 8.7–10.5)
CANNABINOIDS UR QL SCN: NEGATIVE
CHLORIDE SERPL-SCNC: 104 MMOL/L (ref 95–110)
CLARITY UR REFRACT.AUTO: CLEAR
CO2 SERPL-SCNC: 24 MMOL/L (ref 23–29)
COLOR UR AUTO: YELLOW
CREAT SERPL-MCNC: 1.23 MG/DL (ref 0.5–1.4)
CREAT UR-MCNC: 180.3 MG/DL (ref 15–325)
DIFFERENTIAL METHOD BLD: ABNORMAL
EOSINOPHIL # BLD AUTO: 0.3 K/UL (ref 0–0.5)
EOSINOPHIL NFR BLD: 4.9 % (ref 0–8)
ERYTHROCYTE [DISTWIDTH] IN BLOOD BY AUTOMATED COUNT: 13.2 % (ref 11.5–14.5)
EST. GFR  (NO RACE VARIABLE): 54.2 ML/MIN/1.73 M^2
ETHANOL SERPL-MCNC: <10 MG/DL
GLUCOSE SERPL-MCNC: 91 MG/DL (ref 70–110)
GLUCOSE UR QL STRIP: NEGATIVE
HCO3 UR-SCNC: 24 MMOL/L (ref 24–28)
HCT VFR BLD AUTO: 36.6 % (ref 37–48.5)
HCT VFR BLD CALC: 32 %PCV (ref 36–54)
HGB BLD-MCNC: 11 G/DL
HGB BLD-MCNC: 12.8 G/DL (ref 12–16)
HGB UR QL STRIP: NEGATIVE
IMM GRANULOCYTES # BLD AUTO: 0.01 K/UL (ref 0–0.04)
IMM GRANULOCYTES NFR BLD AUTO: 0.2 % (ref 0–0.5)
INR PPP: 0.9 (ref 0.8–1.2)
KETONES UR QL STRIP: NEGATIVE
LACTATE SERPL-SCNC: 1.2 MMOL/L (ref 0.5–2.2)
LEUKOCYTE ESTERASE UR QL STRIP: ABNORMAL
LIPASE SERPL-CCNC: 123 U/L (ref 23–300)
LYMPHOCYTES # BLD AUTO: 1.7 K/UL (ref 1–4.8)
LYMPHOCYTES NFR BLD: 26.9 % (ref 18–48)
MAGNESIUM SERPL-MCNC: 1.8 MG/DL (ref 1.6–2.6)
MCH RBC QN AUTO: 30 PG (ref 27–31)
MCHC RBC AUTO-ENTMCNC: 35 G/DL (ref 32–36)
MCV RBC AUTO: 86 FL (ref 82–98)
METHADONE UR QL SCN>300 NG/ML: NEGATIVE
MICROSCOPIC COMMENT: ABNORMAL
MONOCYTES # BLD AUTO: 0.5 K/UL (ref 0.3–1)
MONOCYTES NFR BLD: 7.9 % (ref 4–15)
NEUTROPHILS # BLD AUTO: 3.8 K/UL (ref 1.8–7.7)
NEUTROPHILS NFR BLD: 59.5 % (ref 38–73)
NITRITE UR QL STRIP: NEGATIVE
NRBC BLD-RTO: 0 /100 WBC
NT-PROBNP SERPL-MCNC: 103 PG/ML (ref 5–450)
OPIATES UR QL SCN: NEGATIVE
PCO2 BLDA: 40 MMHG (ref 35–45)
PCP UR QL SCN>25 NG/ML: NEGATIVE
PH SMN: 7.39 [PH] (ref 7.35–7.45)
PH UR STRIP: 7 [PH] (ref 5–8)
PLATELET # BLD AUTO: 330 K/UL (ref 150–450)
PMV BLD AUTO: 10.3 FL (ref 9.2–12.9)
PO2 BLDA: 68 MMHG (ref 80–100)
POC BE: -1 MMOL/L
POC SATURATED O2: 93 % (ref 95–100)
POC TCO2: 25 MMOL/L (ref 23–27)
POCT GLUCOSE: 90 MG/DL (ref 70–110)
POTASSIUM BLD-SCNC: 3 MMOL/L (ref 3.5–5.1)
POTASSIUM SERPL-SCNC: 3.7 MMOL/L (ref 3.5–5.1)
PROT SERPL-MCNC: 7.9 G/DL (ref 6–8.4)
PROT UR QL STRIP: ABNORMAL
PROTHROMBIN TIME: 10.3 SEC (ref 9–12.5)
RBC # BLD AUTO: 4.27 M/UL (ref 4–5.4)
RBC #/AREA URNS AUTO: 0 /HPF (ref 0–4)
SALICYLATES SERPL-MCNC: <5 MG/DL (ref 15–30)
SAMPLE: ABNORMAL
SITE: ABNORMAL
SODIUM BLD-SCNC: 138 MMOL/L (ref 136–145)
SODIUM SERPL-SCNC: 140 MMOL/L (ref 136–145)
SP GR UR STRIP: 1.01 (ref 1–1.03)
SQUAMOUS #/AREA URNS AUTO: 6 /HPF
TOXICOLOGY INFORMATION: NORMAL
TROPONIN I SERPL-MCNC: <0.012 NG/ML (ref 0.01–0.03)
TSH SERPL DL<=0.005 MIU/L-ACNC: 1.57 UIU/ML (ref 0.4–4)
URN SPEC COLLECT METH UR: ABNORMAL
UROBILINOGEN UR STRIP-ACNC: NEGATIVE EU/DL
UUN UR-MCNC: 18 MG/DL (ref 7–17)
WBC # BLD AUTO: 6.33 K/UL (ref 3.9–12.7)
WBC #/AREA URNS AUTO: 35 /HPF (ref 0–5)
WBC CLUMPS UR QL AUTO: ABNORMAL

## 2024-06-07 PROCEDURE — 93005 ELECTROCARDIOGRAM TRACING: CPT | Mod: ER

## 2024-06-07 PROCEDURE — 94760 N-INVAS EAR/PLS OXIMETRY 1: CPT | Mod: ER,XB

## 2024-06-07 PROCEDURE — 11000001 HC ACUTE MED/SURG PRIVATE ROOM

## 2024-06-07 PROCEDURE — 99285 EMERGENCY DEPT VISIT HI MDM: CPT | Mod: 25,ER

## 2024-06-07 PROCEDURE — 80143 DRUG ASSAY ACETAMINOPHEN: CPT | Mod: ER | Performed by: EMERGENCY MEDICINE

## 2024-06-07 PROCEDURE — 80053 COMPREHEN METABOLIC PANEL: CPT | Mod: ER | Performed by: EMERGENCY MEDICINE

## 2024-06-07 PROCEDURE — 93010 ELECTROCARDIOGRAM REPORT: CPT | Mod: ,,, | Performed by: INTERNAL MEDICINE

## 2024-06-07 PROCEDURE — 83880 ASSAY OF NATRIURETIC PEPTIDE: CPT | Mod: ER | Performed by: EMERGENCY MEDICINE

## 2024-06-07 PROCEDURE — 82140 ASSAY OF AMMONIA: CPT | Mod: ER | Performed by: EMERGENCY MEDICINE

## 2024-06-07 PROCEDURE — 85025 COMPLETE CBC W/AUTO DIFF WBC: CPT | Mod: ER | Performed by: EMERGENCY MEDICINE

## 2024-06-07 PROCEDURE — 80179 DRUG ASSAY SALICYLATE: CPT | Mod: ER | Performed by: EMERGENCY MEDICINE

## 2024-06-07 PROCEDURE — 84443 ASSAY THYROID STIM HORMONE: CPT | Mod: ER | Performed by: EMERGENCY MEDICINE

## 2024-06-07 PROCEDURE — 82803 BLOOD GASES ANY COMBINATION: CPT | Mod: ER

## 2024-06-07 PROCEDURE — 83690 ASSAY OF LIPASE: CPT | Mod: ER | Performed by: EMERGENCY MEDICINE

## 2024-06-07 PROCEDURE — 82962 GLUCOSE BLOOD TEST: CPT | Mod: ER

## 2024-06-07 PROCEDURE — 36600 WITHDRAWAL OF ARTERIAL BLOOD: CPT | Mod: ER

## 2024-06-07 PROCEDURE — G0378 HOSPITAL OBSERVATION PER HR: HCPCS | Mod: ER

## 2024-06-07 PROCEDURE — 85610 PROTHROMBIN TIME: CPT | Mod: ER | Performed by: EMERGENCY MEDICINE

## 2024-06-07 PROCEDURE — 84484 ASSAY OF TROPONIN QUANT: CPT | Mod: ER | Performed by: EMERGENCY MEDICINE

## 2024-06-07 PROCEDURE — 85730 THROMBOPLASTIN TIME PARTIAL: CPT | Mod: ER | Performed by: EMERGENCY MEDICINE

## 2024-06-07 PROCEDURE — 25000003 PHARM REV CODE 250: Mod: ER | Performed by: EMERGENCY MEDICINE

## 2024-06-07 PROCEDURE — 80307 DRUG TEST PRSMV CHEM ANLYZR: CPT | Mod: ER | Performed by: EMERGENCY MEDICINE

## 2024-06-07 PROCEDURE — 99900035 HC TECH TIME PER 15 MIN (STAT): Mod: ER

## 2024-06-07 PROCEDURE — 96365 THER/PROPH/DIAG IV INF INIT: CPT | Mod: ER

## 2024-06-07 PROCEDURE — 83735 ASSAY OF MAGNESIUM: CPT | Mod: ER | Performed by: EMERGENCY MEDICINE

## 2024-06-07 PROCEDURE — 81000 URINALYSIS NONAUTO W/SCOPE: CPT | Mod: 59,ER | Performed by: EMERGENCY MEDICINE

## 2024-06-07 PROCEDURE — 63600175 PHARM REV CODE 636 W HCPCS: Mod: ER | Performed by: EMERGENCY MEDICINE

## 2024-06-07 PROCEDURE — 87086 URINE CULTURE/COLONY COUNT: CPT | Mod: ER | Performed by: EMERGENCY MEDICINE

## 2024-06-07 PROCEDURE — 87040 BLOOD CULTURE FOR BACTERIA: CPT | Mod: ER | Performed by: EMERGENCY MEDICINE

## 2024-06-07 PROCEDURE — 25500020 PHARM REV CODE 255: Mod: ER | Performed by: EMERGENCY MEDICINE

## 2024-06-07 PROCEDURE — 83605 ASSAY OF LACTIC ACID: CPT | Mod: ER | Performed by: EMERGENCY MEDICINE

## 2024-06-07 PROCEDURE — 94761 N-INVAS EAR/PLS OXIMETRY MLT: CPT | Mod: ER,XB

## 2024-06-07 PROCEDURE — 82077 ASSAY SPEC XCP UR&BREATH IA: CPT | Mod: ER | Performed by: EMERGENCY MEDICINE

## 2024-06-07 RX ORDER — ASPIRIN 325 MG
325 TABLET ORAL
Status: COMPLETED | OUTPATIENT
Start: 2024-06-07 | End: 2024-06-07

## 2024-06-07 RX ADMIN — IOHEXOL 100 ML: 350 INJECTION, SOLUTION INTRAVENOUS at 09:06

## 2024-06-07 RX ADMIN — ASPIRIN 325 MG ORAL TABLET 325 MG: 325 PILL ORAL at 10:06

## 2024-06-07 RX ADMIN — CEFTRIAXONE SODIUM 1 G: 1 INJECTION, POWDER, FOR SOLUTION INTRAMUSCULAR; INTRAVENOUS at 09:06

## 2024-06-07 NOTE — Clinical Note
Diagnosis: Acute nonintractable headache, unspecified headache type [[id number]]   Future Attending Provider: LOUISE KENDALL [[id number]]

## 2024-06-08 PROBLEM — R47.81 SLURRED SPEECH: Status: ACTIVE | Noted: 2024-06-08

## 2024-06-08 LAB
ALBUMIN SERPL BCP-MCNC: 3.1 G/DL (ref 3.5–5.2)
ALP SERPL-CCNC: 133 U/L (ref 55–135)
ALT SERPL W/O P-5'-P-CCNC: 12 U/L (ref 10–44)
ANION GAP SERPL CALC-SCNC: 11 MMOL/L (ref 8–16)
AST SERPL-CCNC: 20 U/L (ref 10–40)
BASOPHILS # BLD AUTO: 0.05 K/UL (ref 0–0.2)
BASOPHILS NFR BLD: 0.8 % (ref 0–1.9)
BILIRUB SERPL-MCNC: 0.2 MG/DL (ref 0.1–1)
BUN SERPL-MCNC: 16 MG/DL (ref 6–20)
CALCIUM SERPL-MCNC: 9.5 MG/DL (ref 8.7–10.5)
CHLORIDE SERPL-SCNC: 103 MMOL/L (ref 95–110)
CHOLEST SERPL-MCNC: 241 MG/DL (ref 120–199)
CHOLEST/HDLC SERPL: 5.6 {RATIO} (ref 2–5)
CO2 SERPL-SCNC: 24 MMOL/L (ref 23–29)
CREAT SERPL-MCNC: 1.3 MG/DL (ref 0.5–1.4)
DIFFERENTIAL METHOD BLD: ABNORMAL
EOSINOPHIL # BLD AUTO: 0.3 K/UL (ref 0–0.5)
EOSINOPHIL NFR BLD: 5 % (ref 0–8)
ERYTHROCYTE [DISTWIDTH] IN BLOOD BY AUTOMATED COUNT: 13.5 % (ref 11.5–14.5)
EST. GFR  (NO RACE VARIABLE): 51 ML/MIN/1.73 M^2
GLUCOSE SERPL-MCNC: 114 MG/DL (ref 70–110)
HCT VFR BLD AUTO: 35.4 % (ref 37–48.5)
HDLC SERPL-MCNC: 43 MG/DL (ref 40–75)
HDLC SERPL: 17.8 % (ref 20–50)
HGB BLD-MCNC: 12.4 G/DL (ref 12–16)
IMM GRANULOCYTES # BLD AUTO: 0.01 K/UL (ref 0–0.04)
IMM GRANULOCYTES NFR BLD AUTO: 0.2 % (ref 0–0.5)
LDLC SERPL CALC-MCNC: 127.8 MG/DL (ref 63–159)
LYMPHOCYTES # BLD AUTO: 1.8 K/UL (ref 1–4.8)
LYMPHOCYTES NFR BLD: 29.1 % (ref 18–48)
MAGNESIUM SERPL-MCNC: 1.7 MG/DL (ref 1.6–2.6)
MCH RBC QN AUTO: 29.9 PG (ref 27–31)
MCHC RBC AUTO-ENTMCNC: 35 G/DL (ref 32–36)
MCV RBC AUTO: 85 FL (ref 82–98)
MONOCYTES # BLD AUTO: 0.5 K/UL (ref 0.3–1)
MONOCYTES NFR BLD: 8.3 % (ref 4–15)
NEUTROPHILS # BLD AUTO: 3.5 K/UL (ref 1.8–7.7)
NEUTROPHILS NFR BLD: 56.6 % (ref 38–73)
NONHDLC SERPL-MCNC: 198 MG/DL
NRBC BLD-RTO: 0 /100 WBC
PHOSPHATE SERPL-MCNC: 2.4 MG/DL (ref 2.7–4.5)
PLATELET # BLD AUTO: 295 K/UL (ref 150–450)
PMV BLD AUTO: 10.7 FL (ref 9.2–12.9)
POTASSIUM SERPL-SCNC: 2.8 MMOL/L (ref 3.5–5.1)
PROT SERPL-MCNC: 6.8 G/DL (ref 6–8.4)
RBC # BLD AUTO: 4.15 M/UL (ref 4–5.4)
SODIUM SERPL-SCNC: 138 MMOL/L (ref 136–145)
TRIGL SERPL-MCNC: 351 MG/DL (ref 30–150)
WBC # BLD AUTO: 6.18 K/UL (ref 3.9–12.7)

## 2024-06-08 PROCEDURE — 25000003 PHARM REV CODE 250: Performed by: STUDENT IN AN ORGANIZED HEALTH CARE EDUCATION/TRAINING PROGRAM

## 2024-06-08 PROCEDURE — 80053 COMPREHEN METABOLIC PANEL: CPT

## 2024-06-08 PROCEDURE — G0378 HOSPITAL OBSERVATION PER HR: HCPCS | Mod: ER

## 2024-06-08 PROCEDURE — 25500020 PHARM REV CODE 255: Performed by: INTERNAL MEDICINE

## 2024-06-08 PROCEDURE — 63600175 PHARM REV CODE 636 W HCPCS

## 2024-06-08 PROCEDURE — 85025 COMPLETE CBC W/AUTO DIFF WBC: CPT

## 2024-06-08 PROCEDURE — 25000003 PHARM REV CODE 250

## 2024-06-08 PROCEDURE — 96372 THER/PROPH/DIAG INJ SC/IM: CPT

## 2024-06-08 PROCEDURE — 83735 ASSAY OF MAGNESIUM: CPT

## 2024-06-08 PROCEDURE — 80061 LIPID PANEL: CPT

## 2024-06-08 PROCEDURE — 36415 COLL VENOUS BLD VENIPUNCTURE: CPT

## 2024-06-08 PROCEDURE — 11000001 HC ACUTE MED/SURG PRIVATE ROOM

## 2024-06-08 PROCEDURE — 80177 DRUG SCRN QUAN LEVETIRACETAM: CPT

## 2024-06-08 PROCEDURE — 84100 ASSAY OF PHOSPHORUS: CPT

## 2024-06-08 PROCEDURE — A9585 GADOBUTROL INJECTION: HCPCS | Performed by: INTERNAL MEDICINE

## 2024-06-08 PROCEDURE — G0378 HOSPITAL OBSERVATION PER HR: HCPCS

## 2024-06-08 RX ORDER — GLUCAGON 1 MG
1 KIT INJECTION
Status: DISCONTINUED | OUTPATIENT
Start: 2024-06-08 | End: 2024-06-12 | Stop reason: HOSPADM

## 2024-06-08 RX ORDER — ESCITALOPRAM OXALATE 10 MG/1
10 TABLET ORAL DAILY
Status: DISCONTINUED | OUTPATIENT
Start: 2024-06-08 | End: 2024-06-10

## 2024-06-08 RX ORDER — NALOXONE HCL 0.4 MG/ML
0.02 VIAL (ML) INJECTION
Status: DISCONTINUED | OUTPATIENT
Start: 2024-06-08 | End: 2024-06-12 | Stop reason: HOSPADM

## 2024-06-08 RX ORDER — ATORVASTATIN CALCIUM 40 MG/1
40 TABLET, FILM COATED ORAL DAILY
Status: DISCONTINUED | OUTPATIENT
Start: 2024-06-08 | End: 2024-06-08

## 2024-06-08 RX ORDER — IBUPROFEN 200 MG
16 TABLET ORAL
Status: DISCONTINUED | OUTPATIENT
Start: 2024-06-08 | End: 2024-06-12 | Stop reason: HOSPADM

## 2024-06-08 RX ORDER — ATORVASTATIN CALCIUM 40 MG/1
80 TABLET, FILM COATED ORAL DAILY
Status: DISCONTINUED | OUTPATIENT
Start: 2024-06-08 | End: 2024-06-12 | Stop reason: HOSPADM

## 2024-06-08 RX ORDER — SODIUM CHLORIDE 0.9 % (FLUSH) 0.9 %
10 SYRINGE (ML) INJECTION EVERY 12 HOURS PRN
Status: DISCONTINUED | OUTPATIENT
Start: 2024-06-08 | End: 2024-06-12 | Stop reason: HOSPADM

## 2024-06-08 RX ORDER — LOSARTAN POTASSIUM 50 MG/1
50 TABLET ORAL DAILY
Status: DISCONTINUED | OUTPATIENT
Start: 2024-06-08 | End: 2024-06-12 | Stop reason: HOSPADM

## 2024-06-08 RX ORDER — LEVETIRACETAM 500 MG/1
1000 TABLET ORAL 2 TIMES DAILY
Status: DISCONTINUED | OUTPATIENT
Start: 2024-06-08 | End: 2024-06-10

## 2024-06-08 RX ORDER — LORAZEPAM 1 MG/1
1 TABLET ORAL
Status: COMPLETED | OUTPATIENT
Start: 2024-06-08 | End: 2024-06-08

## 2024-06-08 RX ORDER — POTASSIUM CHLORIDE 20 MEQ/1
20 TABLET, EXTENDED RELEASE ORAL
Status: COMPLETED | OUTPATIENT
Start: 2024-06-08 | End: 2024-06-08

## 2024-06-08 RX ORDER — AMLODIPINE BESYLATE 5 MG/1
10 TABLET ORAL DAILY
Status: DISCONTINUED | OUTPATIENT
Start: 2024-06-08 | End: 2024-06-12 | Stop reason: HOSPADM

## 2024-06-08 RX ORDER — OXYCODONE HYDROCHLORIDE 5 MG/1
5 TABLET ORAL EVERY 6 HOURS PRN
Status: DISCONTINUED | OUTPATIENT
Start: 2024-06-08 | End: 2024-06-12 | Stop reason: HOSPADM

## 2024-06-08 RX ORDER — ACETAMINOPHEN 500 MG
500 TABLET ORAL EVERY 6 HOURS PRN
Status: DISCONTINUED | OUTPATIENT
Start: 2024-06-08 | End: 2024-06-12 | Stop reason: HOSPADM

## 2024-06-08 RX ORDER — HEPARIN SODIUM 5000 [USP'U]/ML
5000 INJECTION, SOLUTION INTRAVENOUS; SUBCUTANEOUS EVERY 8 HOURS
Status: DISCONTINUED | OUTPATIENT
Start: 2024-06-08 | End: 2024-06-12 | Stop reason: HOSPADM

## 2024-06-08 RX ORDER — ACETAMINOPHEN 325 MG/1
650 TABLET ORAL EVERY 4 HOURS PRN
Status: DISCONTINUED | OUTPATIENT
Start: 2024-06-08 | End: 2024-06-12 | Stop reason: HOSPADM

## 2024-06-08 RX ORDER — IBUPROFEN 200 MG
24 TABLET ORAL
Status: DISCONTINUED | OUTPATIENT
Start: 2024-06-08 | End: 2024-06-12 | Stop reason: HOSPADM

## 2024-06-08 RX ORDER — AMITRIPTYLINE HYDROCHLORIDE 25 MG/1
75 TABLET, FILM COATED ORAL NIGHTLY
Status: DISCONTINUED | OUTPATIENT
Start: 2024-06-08 | End: 2024-06-09

## 2024-06-08 RX ORDER — TOPIRAMATE 25 MG/1
50 TABLET ORAL 2 TIMES DAILY
Status: DISCONTINUED | OUTPATIENT
Start: 2024-06-08 | End: 2024-06-12 | Stop reason: HOSPADM

## 2024-06-08 RX ORDER — GADOBUTROL 604.72 MG/ML
7.5 INJECTION INTRAVENOUS
Status: COMPLETED | OUTPATIENT
Start: 2024-06-08 | End: 2024-06-08

## 2024-06-08 RX ADMIN — ESCITALOPRAM OXALATE 10 MG: 10 TABLET ORAL at 08:06

## 2024-06-08 RX ADMIN — LORAZEPAM 1 MG: 1 TABLET ORAL at 06:06

## 2024-06-08 RX ADMIN — POTASSIUM CHLORIDE 20 MEQ: 1500 TABLET, EXTENDED RELEASE ORAL at 01:06

## 2024-06-08 RX ADMIN — CARVEDILOL 37.5 MG: 25 TABLET, FILM COATED ORAL at 08:06

## 2024-06-08 RX ADMIN — AMITRIPTYLINE HYDROCHLORIDE 75 MG: 25 TABLET, FILM COATED ORAL at 09:06

## 2024-06-08 RX ADMIN — AMLODIPINE BESYLATE 10 MG: 5 TABLET ORAL at 08:06

## 2024-06-08 RX ADMIN — GADOBUTROL 7.5 ML: 604.72 INJECTION INTRAVENOUS at 08:06

## 2024-06-08 RX ADMIN — ATORVASTATIN CALCIUM 80 MG: 40 TABLET, FILM COATED ORAL at 08:06

## 2024-06-08 RX ADMIN — HEPARIN SODIUM 5000 UNITS: 5000 INJECTION INTRAVENOUS; SUBCUTANEOUS at 06:06

## 2024-06-08 RX ADMIN — TOPIRAMATE 50 MG: 25 TABLET, FILM COATED ORAL at 09:06

## 2024-06-08 RX ADMIN — TOPIRAMATE 50 MG: 25 TABLET, FILM COATED ORAL at 08:06

## 2024-06-08 RX ADMIN — LEVETIRACETAM 1000 MG: 500 TABLET, FILM COATED ORAL at 08:06

## 2024-06-08 RX ADMIN — CARVEDILOL 37.5 MG: 25 TABLET, FILM COATED ORAL at 09:06

## 2024-06-08 RX ADMIN — HEPARIN SODIUM 5000 UNITS: 5000 INJECTION INTRAVENOUS; SUBCUTANEOUS at 09:06

## 2024-06-08 RX ADMIN — OXYCODONE 5 MG: 5 TABLET ORAL at 06:06

## 2024-06-08 RX ADMIN — AMITRIPTYLINE HYDROCHLORIDE 75 MG: 25 TABLET, FILM COATED ORAL at 01:06

## 2024-06-08 RX ADMIN — LEVETIRACETAM 1000 MG: 500 TABLET, FILM COATED ORAL at 09:06

## 2024-06-08 RX ADMIN — HEPARIN SODIUM 5000 UNITS: 5000 INJECTION INTRAVENOUS; SUBCUTANEOUS at 01:06

## 2024-06-08 RX ADMIN — POTASSIUM CHLORIDE 20 MEQ: 1500 TABLET, EXTENDED RELEASE ORAL at 10:06

## 2024-06-08 RX ADMIN — LOSARTAN POTASSIUM 50 MG: 50 TABLET, FILM COATED ORAL at 08:06

## 2024-06-08 NOTE — PROGRESS NOTES
VIRTUAL NURSE: Pt arrived to unit. Permission received to turn camera to view patient. VIP model explained; Patient informed this VN will be working with bedside nurse and the rest of the care team.      Admission questions completed.  Plan of care reviewed with patient.  Educated patient on fall risk. Safety precautions in place. Call light within reach, side rails up x2.     Patient instucted to ask staff for assistance. Patient verbalized complete understanding.  Will continue to be available and intervene as needed.    Labs, notes, orders, and careplan reviewed.        06/08/24 0141   Admission   Initial VN Admission Questions Complete   Communication Issues? None   Shift   Virtual Nurse - Rounding Complete   Virtual Nurse - Patient Verbalized Approval Of Camera Use;VN Rounding   Type of Frequent Check   Type Patient Rounds   Safety/Activity   Patient Rounds bed in low position;call light in patient/parent reach   Safety Promotion/Fall Prevention side rails raised x 2   Activity Assistance Provided independent   Positioning   Body Position position changed independently

## 2024-06-08 NOTE — ED PROVIDER NOTES
Encounter Date: 6/7/2024       History     Chief Complaint   Patient presents with    Back Pain    Headache     Pt c/o lower mid back and head pain starting 5 days ago. Denies injury trauma or fall. Denies use of blood thinners. Denies use of medication for pain today     Patient presents with headache, generalized weakness, and left-sided low back pain that she has had for the last 5 days.  She denies any fevers/chills.  She states that the headache is on the right side of her head.  She denies any visual abnormalities.  She denies any focal motor weakness or paresthesias.  Denies any nausea/vomiting/diarrhea.  She denies any acute trauma.    The history is provided by the patient.     Review of patient's allergies indicates:   Allergen Reactions    Lisinopril Swelling    Bactrim [sulfamethoxazole-trimethoprim] Rash    Ceftazidime Hives     Rash while on IV ceftazidime for planned 6 weeks.  See ED notes 9/12, 9/14 and ID clinic notes 9/16 and 9/28     Past Medical History:   Diagnosis Date    Brain aneurysm     CHF (congestive heart failure)     H/O coronary angioplasty     Hypercholesteremia     Hypertension     Malignant hypertension     Migraine headache     Stroke 10/2017     Past Surgical History:   Procedure Laterality Date    CARDIAC CATHETERIZATION      CEREBRAL ANGIOGRAM      CLIP LIGATION OF INTRACRANIAL ANEURYSM BY CRANIOTOMY N/A 7/15/2022    Procedure: CRANIOTOMY, WITH ANEURYSM CLIPPING;  Surgeon: Timothy Diaz MD;  Location: [facility] OR [address] [place] [address];  Service: Neurosurgery;  Laterality: N/A;  PTERIONAL CRANIOTOMY WITH CLIP LIGATION OF L PCOMM, L ANTERIOR CHOROIDAL, L MCA  ANEURYSM, ANESTHESIA: GENERAL, BLOOD: TYPE&CROSS 2 UNITS, NEUROMONITORING: SEP, MEP, EEG, RADIOLOGY: C-ARM, POSITION: SUPINE, ANNA, CO-SURGERON: DR. LEXI DOMÍNGUEZ.    WOUND DEBRIDEMENT  8/27/2022    Procedure: DEBRIDEMENT, WOUND;  Surgeon: Timothy Diaz MD;  Location: [facility] OR [address] [place] [address];  Service: Neurosurgery;;     No family history  on file.  Social History     Tobacco Use    Smoking status: Every Day     Current packs/day: 0.50     Types: Cigarettes    Smokeless tobacco: Never   Substance Use Topics    Alcohol use: Yes     Comment: occassionally    Drug use: No     Review of Systems   Musculoskeletal:  Positive for back pain.   Neurological:  Positive for weakness and headaches.       Physical Exam     Initial Vitals [06/07/24 1913]   BP Pulse Resp Temp SpO2   139/86 71 16 98.2 °F (36.8 °C) 100 %      MAP       --         Physical Exam    Vitals reviewed.  Constitutional:   Patient appears drowsy but is easily awakened and will follow commands and answer questions while awake   Eyes: EOM are normal. Pupils are equal, round, and reactive to light.   Neck:   There is no midline tenderness to palpation, step-offs, neck rigidity or tenderness to palpation noted   Cardiovascular:  Normal rate and regular rhythm.           Pulmonary/Chest: Breath sounds normal. She has no wheezes.   Abdominal: Abdomen is soft. There is no abdominal tenderness.   Musculoskeletal:      Comments: There is no midline tenderness to palpation, step-offs, or deformities noted to the thoracic or lumbar area, there is tenderness to palpation to the left lower lumbosacral area     Neurological: She is alert and oriented to person, place, and time. She has normal strength. No sensory deficit.   There is no pronator drift, muscle strength is 5/5 to all 4 extremities, patient has a good finger-to-nose and heel to shin test, no sensation abnormalities   Skin: Skin is warm and dry. No rash noted.         ED Course   Procedures  Labs Reviewed   CBC W/ AUTO DIFFERENTIAL - Abnormal; Notable for the following components:       Result Value    Hematocrit 36.6 (*)     All other components within normal limits   COMPREHENSIVE METABOLIC PANEL - Abnormal; Notable for the following components:    BUN 18 (*)     Alkaline Phosphatase 153 (*)     eGFR 54.2 (*)     All other components within  normal limits   URINALYSIS, REFLEX TO URINE CULTURE - Abnormal; Notable for the following components:    Protein, UA Trace (*)     Leukocytes, UA Trace (*)     All other components within normal limits    Narrative:     Preferred Collection Type->Urine, Catheterized  Specimen Source->Urine   SALICYLATE LEVEL - Abnormal; Notable for the following components:    Salicylate Lvl <5.0 (*)     All other components within normal limits   AMMONIA - Abnormal; Notable for the following components:    Ammonia <9 (*)     All other components within normal limits   URINALYSIS MICROSCOPIC - Abnormal; Notable for the following components:    WBC, UA 35 (*)     All other components within normal limits    Narrative:     Preferred Collection Type->Urine, Catheterized  Specimen Source->Urine   ISTAT PROCEDURE - Abnormal; Notable for the following components:    POC PO2 68 (*)     POC Potassium 3.0 (*)     POC Hematocrit 32 (*)     All other components within normal limits   ALCOHOL,MEDICAL (ETHANOL)   DRUG SCREEN PANEL, URINE EMERGENCY    Narrative:     Preferred Collection Type->Urine, Catheterized  Specimen Source->Urine   ACETAMINOPHEN LEVEL   LACTIC ACID, PLASMA   APTT   PROTIME-INR   TSH   TROPONIN I   LIPASE   MAGNESIUM   NT-PRO NATRIURETIC PEPTIDE   POCT GLUCOSE   POCT GLUCOSE MONITORING CONTINUOUS     EKG Readings: (Independently Interpreted)   Sinus rhythm rate of 71, left axis, nonspecific T-wave changes       Imaging Results              CTA Head and Neck (xpd) (Final result)  Result time 06/07/24 21:56:43      Final result by Reno Lawson MD (06/07/24 21:56:43)                   Impression:      Resolution of gas from prior procedure  the intracranial region.  Stable postoperative changes of left MCA aneurysmal clipping.  Resolution of the right maxillary sinus opacification.  Otherwise stable exam    All CT scans at this facility are performed  using dose modulation techniques as appropriate to performed exam  including the following:  automated exposure control; adjustment of mA and/or kV according to the patients size (this includes techniques or standardized protocols for targeted exams where dose is matched to indication/reason for exam: i.e. extremities or head);  iterative reconstruction technique.      Electronically signed by: Reno Lawson  Date:    06/07/2024  Time:    21:56               Narrative:    EXAMINATION:  CTA HEAD AND NECK (XPD)    CLINICAL HISTORY:  Stroke/TIA, determine embolic source;    TECHNIQUE:  Standard contrast enhanced CTA of brain with 100ml of Omnipaque 350 with 3D MIP reformations. No previous for comparison.    COMPARISON:  Prior    FINDINGS:  Postsurgical changes of left ICA aneurysmal clipping in the left parasellar region similar to prior exam.  Previously seen gas in the left temporal fossa has resolved.  Previously commented on small aneurysm of the untreated left MCA is not well appreciated.  Near complete resolution of the right maxillary sinus opacification.  Otherwise stable exam                                       CT Head Without Contrast (Final result)  Result time 06/07/24 20:14:00      Final result by Reno Lawson MD (06/07/24 20:14:00)                   Impression:      Mild moderate right maxillary sinus mucosal thickening.  No evidence for hemorrhage mass effect or midline shift.    All CT scans   are performed using dose optimization techniques including the following: automated exposure control; adjustment of the mA and/or kV; use of iterative reconstruction technique.  Dose modulation was employed for ALARA by means of: Automated exposure control; adjustment of the mA and/or kV according to patient size (this includes techniques or standardized protocols for targeted exams where dose is matched to indication/reason for exam; i.e. extremities or head); and/or use of iterative reconstructive technique.      Electronically signed by: Reno  Lulu  Date:    06/07/2024  Time:    20:14               Narrative:    EXAMINATION:  CT HEAD WITHOUT CONTRAST    CLINICAL HISTORY:  Headache, new or worsening, neuro deficit (Age 19-49y);    TECHNIQUE:  Low dose axial CT images obtained throughout the head without intravenous contrast. Sagittal and coronal reconstructions were performed.    COMPARISON:  None.    FINDINGS:  Age-related changes:    Ventricles and sulci are normal in size for age without evidence of hydrocephalus. No extra-axial blood or fluid collections.    No parenchymal mass, hemorrhage, edema or major vascular distribution infarct.    Skull/extracranial contents (limited evaluation): No fracture. Right maxillary sinus mucosal thickening.  A                                       X-Ray Chest AP Portable (Final result)  Result time 06/07/24 20:14:20      Final result by Reno Lawson MD (06/07/24 20:14:20)                   Impression:      No acute abnormality.      Electronically signed by: Reno Lawson  Date:    06/07/2024  Time:    20:14               Narrative:    EXAMINATION:  XR CHEST AP PORTABLE    CLINICAL HISTORY:  weakness;    TECHNIQUE:  Single frontal view of the chest was performed.    COMPARISON:  None    FINDINGS:  The lungs are clear, with normal appearance of pulmonary vasculature and no pleural effusion or pneumothorax.    The cardiac silhouette is normal in size. The hilar and mediastinal contours are unremarkable.    Bones are intact.                                       Medications   acetaminophen tablet 500 mg (has no administration in time range)   amLODIPine tablet 10 mg (10 mg Oral Given 6/9/24 0902)   carvediloL tablet 37.5 mg (37.5 mg Oral Given 6/9/24 2119)   levETIRAcetam tablet 1,000 mg (1,000 mg Oral Given 6/9/24 2119)   losartan tablet 50 mg (50 mg Oral Given 6/9/24 0903)   topiramate tablet 50 mg (50 mg Oral Given 6/9/24 2119)   sodium chloride 0.9% flush 10 mL (has no administration in time range)   naloxone  0.4 mg/mL injection 0.02 mg (has no administration in time range)   glucose chewable tablet 16 g (has no administration in time range)   glucose chewable tablet 24 g (has no administration in time range)   glucagon (human recombinant) injection 1 mg (has no administration in time range)   heparin (porcine) injection 5,000 Units (5,000 Units Subcutaneous Given 6/10/24 0536)   acetaminophen tablet 650 mg (has no administration in time range)   dextrose 10% bolus 125 mL 125 mL (has no administration in time range)   dextrose 10% bolus 250 mL 250 mL (has no administration in time range)   oxyCODONE immediate release tablet 5 mg (5 mg Oral Given 6/9/24 1632)   atorvastatin tablet 80 mg (80 mg Oral Given 6/9/24 0902)   famotidine tablet 20 mg (20 mg Oral Given 6/10/24 0043)   EScitalopram oxalate tablet 20 mg (has no administration in time range)   iohexoL (OMNIPAQUE 350) injection 100 mL (100 mLs Intravenous Given 6/7/24 2103)   cefTRIAXone (Rocephin) 1 g in dextrose 5 % in water (D5W) 100 mL IVPB (MB+) (0 g Intravenous Stopped 6/7/24 2214)   aspirin tablet 325 mg (325 mg Oral Given 6/7/24 2221)   potassium chloride SA CR tablet 20 mEq (20 mEq Oral Given 6/8/24 1334)   LORazepam tablet 1 mg (1 mg Oral Given 6/8/24 1856)   gadobutroL (GADAVIST) injection 7.5 mL (7.5 mLs Intravenous Given 6/8/24 2029)     Medical Decision Making  Patient has right-sided headache with slurred speech.  Because of this discussed case with [facility] who accepts the patient for admission.    Amount and/or Complexity of Data Reviewed  Labs: ordered. Decision-making details documented in ED Course.  Radiology: ordered. Decision-making details documented in ED Course.    Risk  OTC drugs.  Prescription drug management.  Risk Details: Differential diagnosis includes but is not limited to:  CVA, intracranial hemorrhage, electrolyte abnormality, UTI               ED Course as of 06/10/24 0652   Fri Jun 07, 2024   1931 POCT glucose  Within normal limits  [CD]   2010 CBC auto differential(!)  Nonspecific findings [CD]   2011 ISTAT PROCEDURE(!)  No acidosis [CD]   2011 Acetaminophen level  Within normal limits [CD]   2011 Ethanol  Within normal limit [CD]   2011 Salicylate level(!)  Within normal limits [CD]   2011 Protime-INR  Within normal limits [CD]   2011 Lactic acid, plasma  Within normal limits [CD]   2020 CT Head Without Contrast  Mild moderate right maxillary sinus mucosal thickening.  No evidence for hemorrhage mass effect or midline shift. [CD]   2021 X-Ray Chest AP Portable  No acute findings [CD]   2129 Urinalysis, Reflex to Urine Culture Urine, Catheterized(!)  There is trace leuk esterase with high white blood cell count occasional bacteria, given left-sided back pain we will treat his urinary tract infection [CD]   2202 CTA Head and Neck (xpd)  Resolution of gas from prior procedure  the intracranial region.  Stable postoperative changes of left MCA aneurysmal clipping.  Resolution of the right maxillary sinus opacification.  Otherwise stable exam [CD]      ED Course User Index  [CD] oJse Henry DO                           Clinical Impression:  Final diagnoses:  [R53.1] Weakness  [R51.9] Acute nonintractable headache, unspecified headache type (Primary)  [R47.81] Slurred speech          ED Disposition Condition    Observation Stable                Jose Henry DO  06/10/24 7500

## 2024-06-08 NOTE — NURSING
pt is upset asking why she has not eaten, Informed will communicate to Md regarding the same, states nurse is mean, SC Md and informed Vinnie Valenzuela of the same.

## 2024-06-08 NOTE — ED NOTES
Pt presents to Ed via POV accompanied with her x2 minor children with C/O R sided head pain and mid back pain with onset x5 days ago. Pt also C/O fatigued and weakness. Pt states pain is 10/10 at this time and denies taking any medication for the pain.

## 2024-06-08 NOTE — NURSING
"Dr. Guerrier and team @ bedside, pt frustrated, requesting for food, states,"Im sorry, I don't know what is going on with me, I don't want to cause any trouble, nurse I'm sorry for how I was behaving. Reassured it okay, md ordered a tray for pt.  "

## 2024-06-08 NOTE — PLAN OF CARE
VN note: Chart review completed. Patient labs and notes reviewed. Care plan and goals updated. VN available to intervene as needed.      Problem: Adult Inpatient Plan of Care  Goal: Plan of Care Review  Outcome: Progressing  Goal: Patient-Specific Goal (Individualized)  Outcome: Progressing  Goal: Absence of Hospital-Acquired Illness or Injury  Outcome: Progressing  Goal: Optimal Comfort and Wellbeing  Outcome: Progressing  Goal: Readiness for Transition of Care  Outcome: Progressing     Problem: Diabetes Comorbidity  Goal: Blood Glucose Level Within Targeted Range  Outcome: Progressing     Problem: Comorbidity Management  Goal: Blood Pressure in Desired Range  Outcome: Progressing  Goal: Maintenance of Seizure Control  Outcome: Progressing

## 2024-06-08 NOTE — H&P
[facility] Medicine H&P Note     Admitting Team: [facility] Hospitalist Team B  Attending Physician: Puneet Guerrier MD  Resident: Luis Manuel  Intern: Zak     Date of Admit: 6/7/2024    Chief Complaint     Headache, weakness w/ facial droop and voice change x5 days.  for 5 days     Subjective:      History of Present Illness:  Gina Jenkins is a 49 y.o. female who  has a past medical history of Brain aneurysm, CHF (congestive heart failure), H/O coronary angioplasty, Hypercholesteremia, Hypertension, Malignant hypertension, Migraine headache, and Stroke (10/2017).. The patient presented to [facility] on 6/7/2024 with a primary complaint of back pain, headache, weakness w/ facial droop and voice change x5 days.     The patient was in their usual state of health until approximately 5 days ago when she noted that she felt diffusely weak, noticed a voice change, and had a worsening headache. She notes that she does not like spending time at the doctor's office or hospital, so attempted to rest on the couch. She noted that her symptoms persisted and required the help of her sons to complete ADLs. She noted that on the day of admission, she attempted to stand on her own, noted that she was increasingly dizzy, and sat back down. She presented to the [facility] ED for evaluation. During this time she denies any focal strength changes in her upper and lower extremities. She notes that these episodes of weakness have presented prior but have never been this severe. She denies any knowledge of a recent viral URI but does live at home with 3 teenage sons. Denies any fevers, chills, rhinorrhea, n/v/d/c, vision changes, falls, chest pain, palpitations.     Past Medical History:  Past Medical History:   Diagnosis Date    Brain aneurysm     CHF (congestive heart failure)     H/O coronary angioplasty     Hypercholesteremia     Hypertension     Malignant hypertension     Migraine headache     Stroke 10/2017     Past Surgical History:  Past Surgical  History:   Procedure Laterality Date    CARDIAC CATHETERIZATION      CEREBRAL ANGIOGRAM      CLIP LIGATION OF INTRACRANIAL ANEURYSM BY CRANIOTOMY N/A 7/15/2022    Procedure: CRANIOTOMY, WITH ANEURYSM CLIPPING;  Surgeon: Timothy Diaz MD;  Location: [facility] OR [address] [place] [address];  Service: Neurosurgery;  Laterality: N/A;  PTERIONAL CRANIOTOMY WITH CLIP LIGATION OF L PCOMM, L ANTERIOR CHOROIDAL, L MCA  ANEURYSM, ANESTHESIA: GENERAL, BLOOD: TYPE&CROSS 2 UNITS, NEUROMONITORING: SEP, MEP, EEG, RADIOLOGY: C-ARM, POSITION: SUPINE, CASTILLO, CO-SURGERON: DR. LEXI DOMÍNGUEZ.    WOUND DEBRIDEMENT  8/27/2022    Procedure: DEBRIDEMENT, WOUND;  Surgeon: Timothy Diaz MD;  Location: [facility] OR [address] [place] [address];  Service: Neurosurgery;;     Allergies:  Review of patient's allergies indicates:   Allergen Reactions    Lisinopril Swelling    Bactrim [sulfamethoxazole-trimethoprim] Rash    Ceftazidime Hives     Rash while on IV ceftazidime for planned 6 weeks.  See ED notes 9/12, 9/14 and ID clinic notes 9/16 and 9/28     Home Medications:  Prior to Admission medications    Medication Sig Start Date End Date Taking? Authorizing Provider   acetaminophen (TYLENOL) 500 MG tablet Take 1 tablet (500 mg total) by mouth every 6 (six) hours as needed for Pain. 5/23/23  Yes Derrick Anderson Jr., MD   amitriptyline (ELAVIL) 75 MG tablet Take 75 mg by mouth every evening.   Yes Provider, Historical   blood sugar diagnostic Strp Use to check blood glucose 3 times daily. 9/2/22  Yes Julio Lewis NP   blood-glucose meter Misc Use to check blood glucose 3 times daily. 9/2/22  Yes Julio Lewis NP   carvediloL (COREG) 25 MG tablet Take 37.5 mg by mouth 2 (two) times daily. 1/18/22  Yes Provider, Historical   EScitalopram oxalate (LEXAPRO) 10 MG tablet Take 10 mg by mouth once daily. 7/5/22  Yes Provider, Historical   fluconazole (DIFLUCAN) 150 MG Tab TAKE 1 TABLET(150 MG) BY MOUTH EVERY DAY FOR 2 DOSES 1/17/23  Yes Sri Levin MD   ibuprofen 20 mg/mL oral liquid  "TAKE 30 ML BY MOUTH THREE TIMES DAILY FOR PAIN 11/9/23  Yes Provider, Historical   lancets 30 gauge Misc Use to check blood glucose 3 times daily. 9/2/22  Yes Julio Lewis NP   LIDOcaine (LIDODERM) 5 % Place 1 patch onto the skin once daily. Remove & Discard patch within 12 hours or as directed by MD 9/7/23  Yes Angelina Gallegos PA-C   losartan (COZAAR) 50 MG tablet Take 50 mg by mouth. 12/29/22  Yes Provider, Historical   magnesium oxide (MAG-OX) 400 mg (241.3 mg magnesium) tablet Take 1 tablet by mouth 2 (two) times daily. 6/19/22  Yes Provider, Historical   metFORMIN (GLUCOPHAGE-XR) 500 MG ER 24hr tablet Take 500 mg by mouth 2 (two) times daily. 2/3/23  Yes Provider, Historical   methocarbamoL (ROBAXIN) 500 MG Tab Take 500 mg by mouth 2 (two) times daily. 9/21/23  Yes Provider, Historical   pen needle, diabetic (BD ULTRA-FINE MARIELY PEN NEEDLE) 32 gauge x 5/32" Ndle Use to inject insulin into the skin three times daily . 9/2/22  Yes Julio Lewis NP   topiramate (TOPAMAX) 50 MG tablet Take 1 tablet (50 mg total) by mouth 2 (two) times daily. 2/7/23  Yes Kamille Zurita MD PhD   traMADoL (ULTRAM) 50 mg tablet Take 50 mg by mouth 2 (two) times daily. 2/2/23  Yes Provider, Historical   amlodipine (NORVASC) 10 MG tablet Take 1 tablet (10 mg total) by mouth once daily. 4/2/17 11/20/23  Yuval Caldwell MD   atorvastatin (LIPITOR) 40 MG tablet Take 1 tablet (40 mg total) by mouth once daily. 10/6/17 11/20/23  Marva Damon MD   insulin aspart U-100 (NOVOLOG) 100 unit/mL (3 mL) InPn pen Inject 8 Units into the skin 3 (three) times daily with meals. 9/2/22 9/2/23  Julio Lewis NP   insulin detemir U-100 (LEVEMIR FLEXTOUCH) 100 unit/mL (3 mL) SubQ InPn pen Inject 24 Units into the skin once daily. 9/3/22 9/3/23  Julio Lewis, RUSSELL   levETIRAcetam (KEPPRA) 1000 MG tablet Take 1 tablet (1,000 mg total) by mouth 2 (two) times daily. 2/7/23 2/7/24  Kamille Zurita MD PhD   aspirin 81 MG Chew Take 1 tablet (81 mg total) " "by mouth once daily. 4/10/18 7/20/22  William Rosales MD     Family History:  No family history on file.    Social History:  Social History     Tobacco Use    Smoking status: Every Day     Current packs/day: 0.50     Types: Cigarettes    Smokeless tobacco: Never   Substance Use Topics    Alcohol use: Yes     Comment: occassionally    Drug use: No     Review of Systems:  Pertinent items are noted in HPI. All other systems are reviewed and are negative.    Health Maintaince :   Primary Care Physician: Clair Johnson NP    Immunizations:   TDap - not utd   Flu - not utd   Pna - n/a    Cancer Screening:  PAP: not utd  MMG: not utd  Colonoscopy: not utd      Objective:   Last 24 Hour Vital Signs:  BP  Min: 135/85  Max: 171/94  Temp  Av.2 °F (36.8 °C)  Min: 97.8 °F (36.6 °C)  Max: 98.5 °F (36.9 °C)  Pulse  Av.6  Min: 64  Max: 71  Resp  Av.7  Min: 16  Max: 19  SpO2  Av.5 %  Min: 96 %  Max: 100 %  Height  Av' 2" (157.5 cm)  Min: 5' 2" (157.5 cm)  Max: 5' 2" (157.5 cm)  Weight  Av.5 kg (157 lb 10.8 oz)  Min: 68 kg (150 lb)  Max: 75 kg (165 lb 5.5 oz)  Body mass index is 30.24 kg/m².  No intake/output data recorded.    Physical Examination:  General: Alert, cooperative  HEENT: R facial droop w/o forehead involvement, atraumatic, PERRL, EOMI  Neck: No thyromegaly or masses   Cardiac: RRR, no murmurs appreciated, no extra heart sounds  Pulmonary/Chest: CTAB, symmetric chest rise, equal breath sounds bilaterally  GI: Soft, non tender, non distended, normoactive bowel sounds  Extremities: no edema or cyanosis  Skin: dry, warm, no wounds noted  Neuro: R facial droop as above, decreased strength diffusely w/o focal deficit, sensation intact b/l symmetrically     Laboratory:  Most Recent Data:  CBC:   Lab Results   Component Value Date    WBC 6.18 2024    HGB 12.4 2024    HCT 35.4 (L) 2024     2024    MCV 85 2024    RDW 13.5 2024     BMP:   Lab Results "   Component Value Date     06/08/2024    K 2.8 (L) 06/08/2024     06/08/2024    CO2 24 06/07/2024    BUN 18 (H) 06/07/2024    CREATININE 1.23 06/07/2024    GLU 91 06/07/2024    CALCIUM 9.5 06/08/2024    MG 1.8 06/07/2024    PHOS 2.9 09/02/2022     LFTs:   Lab Results   Component Value Date    PROT 7.9 06/07/2024    ALBUMIN 4.3 06/07/2024    BILITOT 0.2 06/08/2024    AST 20 06/08/2024    ALKPHOS 153 (H) 06/07/2024    ALT 20 06/07/2024     Coags:   Lab Results   Component Value Date    INR 0.9 06/07/2024     FLP:   Lab Results   Component Value Date    CHOL 241 (H) 06/08/2024    HDL 30 (L) 04/19/2022    LDLCALC 62.4 (L) 04/19/2022    TRIG 351 (H) 06/08/2024    CHOLHDL 20.4 04/19/2022     DM:   Lab Results   Component Value Date    HGBA1C 8.3 (H) 08/22/2022    HGBA1C 5.8 (H) 07/16/2022    HGBA1C 5.4 06/30/2020    LDLCALC 62.4 (L) 04/19/2022    CREATININE 1.23 06/07/2024     Thyroid:   Lab Results   Component Value Date    TSH 1.570 06/07/2024    FREET4 0.89 06/30/2020    E6CQPWY 7.1 11/02/2017     Anemia:   Lab Results   Component Value Date    IRON 52 06/30/2020    TIBC 314 06/30/2020    FERRITIN 63 06/30/2020    GGSCUPLI39 343 04/10/2018    FOLATE 12.9 04/09/2018     Cardiac:   Lab Results   Component Value Date    TROPONINI <0.012 06/07/2024     (H) 03/06/2017     Urinalysis:   Lab Results   Component Value Date    LABURIN  01/07/2021     Multiple organisms isolated. None in predominance.  Repeat if    LABURIN clinically necessary. 01/07/2021    COLORU Yellow 06/07/2024    SPECGRAV 1.015 06/07/2024    NITRITE Negative 06/07/2024    KETONESU Negative 06/07/2024    UROBILINOGEN Negative 06/07/2024    WBCUA 35 (H) 06/07/2024       Trended Lab Data:  Recent Labs   Lab 06/07/24 1951 06/07/24 2004 06/08/24  0233   WBC 6.33  --  6.18   HGB 12.8  --  12.4   HCT 36.6* 32* 35.4*     --  295   MCV 86  --  85   RDW 13.2  --  13.5     --  138   K 3.7  --  2.8*     --  103   CO2 24  --   --     BUN 18*  --   --    CREATININE 1.23  --   --    GLU 91  --   --    PROT 7.9  --   --    ALBUMIN 4.3  --   --    BILITOT 0.3  --  0.2   AST 36  --  20   ALKPHOS 153*  --   --    ALT 20  --   --      Trended [place] Data:  Recent Labs   Lab 06/07/24 1951   TROPONINI <0.012     Microbiology Data:  Microbiology Results (last 7 days)       Procedure Component Value Units Date/Time    Blood culture #1 [[id number]] Collected: 06/07/24 1930    Order Status: Sent Specimen: Blood from Peripheral, Upper Arm, Left Updated: 06/08/24 0032    Blood culture #2 [[id number]] Collected: 06/07/24 1940    Order Status: Sent Specimen: Blood from Peripheral, Lower Arm, Right Updated: 06/08/24 0032    Urine culture [[id number]] Collected: 06/07/24 2043    Order Status: No result Specimen: Urine Updated: 06/07/24 2121          Other Results:  EKG (my interpretation): ordered.    Radiology:  Imaging Results              CTA Head and Neck (xpd) (Final result)  Result time 06/07/24 21:56:43      Final result by Reno Lawson MD (06/07/24 21:56:43)                   Impression:      Resolution of gas from prior procedure  the intracranial region.  Stable postoperative changes of left MCA aneurysmal clipping.  Resolution of the right maxillary sinus opacification.  Otherwise stable exam    All CT scans at this facility are performed  using dose modulation techniques as appropriate to performed exam including the following:  automated exposure control; adjustment of mA and/or kV according to the patients size (this includes techniques or standardized protocols for targeted exams where dose is matched to indication/reason for exam: i.e. extremities or head);  iterative reconstruction technique.      Electronically signed by: Reno Lawson  Date:    06/07/2024  Time:    21:56               Narrative:    EXAMINATION:  CTA HEAD AND NECK (XPD)    CLINICAL HISTORY:  Stroke/TIA, determine embolic source;    TECHNIQUE:  Standard contrast enhanced  CTA of brain with 100ml of Omnipaque 350 with 3D MIP reformations. No previous for comparison.    COMPARISON:  Prior    FINDINGS:  Postsurgical changes of left ICA aneurysmal clipping in the left parasellar region similar to prior exam.  Previously seen gas in the left temporal fossa has resolved.  Previously commented on small aneurysm of the untreated left MCA is not well appreciated.  Near complete resolution of the right maxillary sinus opacification.  Otherwise stable exam                                       CT Head Without Contrast (Final result)  Result time 06/07/24 20:14:00      Final result by Reno Lawson MD (06/07/24 20:14:00)                   Impression:      Mild moderate right maxillary sinus mucosal thickening.  No evidence for hemorrhage mass effect or midline shift.    All CT scans   are performed using dose optimization techniques including the following: automated exposure control; adjustment of the mA and/or kV; use of iterative reconstruction technique.  Dose modulation was employed for ALARA by means of: Automated exposure control; adjustment of the mA and/or kV according to patient size (this includes techniques or standardized protocols for targeted exams where dose is matched to indication/reason for exam; i.e. extremities or head); and/or use of iterative reconstructive technique.      Electronically signed by: Reno Lawson  Date:    06/07/2024  Time:    20:14               Narrative:    EXAMINATION:  CT HEAD WITHOUT CONTRAST    CLINICAL HISTORY:  Headache, new or worsening, neuro deficit (Age 19-49y);    TECHNIQUE:  Low dose axial CT images obtained throughout the head without intravenous contrast. Sagittal and coronal reconstructions were performed.    COMPARISON:  None.    FINDINGS:  Age-related changes:    Ventricles and sulci are normal in size for age without evidence of hydrocephalus. No extra-axial blood or fluid collections.    No parenchymal mass, hemorrhage, edema or  major vascular distribution infarct.    Skull/extracranial contents (limited evaluation): No fracture. Right maxillary sinus mucosal thickening.  A                                       X-Ray Chest AP Portable (Final result)  Result time 06/07/24 20:14:20      Final result by Reno Lawson MD (06/07/24 20:14:20)                   Impression:      No acute abnormality.      Electronically signed by: Reno Lawson  Date:    06/07/2024  Time:    20:14               Narrative:    EXAMINATION:  XR CHEST AP PORTABLE    CLINICAL HISTORY:  weakness;    TECHNIQUE:  Single frontal view of the chest was performed.    COMPARISON:  None    FINDINGS:  The lungs are clear, with normal appearance of pulmonary vasculature and no pleural effusion or pneumothorax.    The cardiac silhouette is normal in size. The hilar and mediastinal contours are unremarkable.    Bones are intact.                                    Assessment:     Gina Jenkins is a 49 y.o. female who  has a past medical history of Brain aneurysm, CHF (congestive heart failure), H/O coronary angioplasty, Hypercholesteremia, Hypertension, Malignant hypertension, Migraine headache, and Stroke (10/2017). The patient presented to [facility] on 6/7/2024 with a primary complaint of back pain, headache, weakness w/ facial droop and voice change x5 days. Admitted to [facility] for evaluation of headaches, facial droops, and diffuse weakness with neurology consulted.      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 (7/2022)  Hx seizures off antiepileptics   - Aneurysms and clips stable on CTA head/neck   - MRI brain and spine ordered, can consider MRA pending neurology recs   - ASA loaded at [facility]  - Neurology advised to hold plavix load in the setting of neurosurgical, aneurysm history - will wait for MRI findings prior to proceeding   - Given pt's course of illness, could  potentially be 2/2 demyelinating disease, RVP ordered and will further assess with MRI   - Pt noting time off keppra, level pending   - Will reinitiate home keppra   - Neurology consulted, appreciate recs     Migraine headaches   - Continue topiramate 50 mg bid  - Continue home amitriptyline 75 mg nightly   - Multimodal pain control with tylenol, oxycodone   - Holding off NSAIDS at this time given aneurysm history   - Consider transition to fioricet, though pt preferring Tylenol currently   - Would likely be a good candidate for monoclonal antimigraine options     HTN  HLD   Hx coronary angioplasty   - Cholesterol, TG elevated on lipid panel  - Increased atorvastatin to maximal dose of 80 daily   - Continue home amlodipine 10 mg daily   - Continue home carvedilol 37.5 mg bid  - Continue home losartan 50 mg daily      Elevated alk phos   - Trend CMPs inpatient     Anxiety, depression  - Continue home lexapro 10 mg daily     DVT PPX  - Heparin ppx     Code Status:     FULL Code     Niles Zamora MD, MPH  [facility] Internal Medicine-Pediatrics, PGY-2  06/08/2024    [facility] Medicine Hospitalist Pager numbers:   [facility] Hospitalist Medicine Team A (Joshua/Sky): [phone]  [facility] Hospitalist Medicine Team B (Chey/Cali):  [phone]

## 2024-06-08 NOTE — CONSULTS
[facility] NEUROLOGY CONSULT  EVALUATION    Reason for consult:  facial droop, generalized weakness and migraine  Informant:  pt    Other sources of information : Chart    CC:  Back Pain and Headache (Pt c/o lower mid back and head pain starting 5 days ago. Denies injury trauma or fall. Denies use of blood thinners. Denies use of medication for pain today)       HPI: Gina Jenkins is a 49 y.o. female with past medical history of cerebral aneurysms including Left posterior communicating and left anterior choroidal artery aneurysms requiring a craniotomy for clipping 7/2022 c/b seizures, CHF (congestive heart failure), H/O coronary angioplasty, HLD, HTN, Migraine, Tobacco use who presents for facial droop, generalized weakness and migraine    Pt says she has been having back pain and headaches and 5 days ago noticed facial droop. She did not go to ED but since she thought it was not better after 5 days decided to get evaluated    She says her weakness is generalized but since admission improved. She gets very lightheaded standing up but denies any vision loss, blurred vision, focal weakness, numbness, speech difficulty    She reports having seizure as a complication of her aneurism and was put on ASM but not taking t and does not remember name of it. From chart review she was on Topamax 50 BID and Keppra 1000mg BID. Says she had seizure about 3 month ago    Taking Tramadol for headaches. Per chart has had lightheadedness w/ Migraines    Facial weakness previously [place] at [facility] [place] and deemed to be volitional / functional (no true weakness)      Outpatient Neurologist: 2/2023, Dr Zurita    ROS:   12pt ROS negative other then mentioned above    Histories:     Allergies:  Lisinopril, Bactrim [sulfamethoxazole-trimethoprim], and Ceftazidime    Current Medications:    Current Facility-Administered Medications   Medication Dose Route Frequency Provider Last Rate Last Admin    acetaminophen tablet 500 mg  500 mg Oral Q6H  PRN Niles Zamora MD        acetaminophen tablet 650 mg  650 mg Oral Q4H PRN Niles Zamora MD        amitriptyline tablet 75 mg  75 mg Oral QHS Niles Zamora MD   75 mg at 06/08/24 0158    amLODIPine tablet 10 mg  10 mg Oral Daily Niles Zamora MD   10 mg at 06/08/24 0820    atorvastatin tablet 80 mg  80 mg Oral Daily Niles Zamora MD   80 mg at 06/08/24 0820    carvediloL tablet 37.5 mg  37.5 mg Oral BID Niles Zamora MD   37.5 mg at 06/08/24 0820    dextrose 10% bolus 125 mL 125 mL  12.5 g Intravenous PRN Niles Zamora MD        dextrose 10% bolus 250 mL 250 mL  25 g Intravenous PRN Niles Zamora MD        EScitalopram oxalate tablet 10 mg  10 mg Oral Daily Niles Zamora MD   10 mg at 06/08/24 0821    glucagon (human recombinant) injection 1 mg  1 mg Intramuscular PRN Niles Zamora MD        glucose chewable tablet 16 g  16 g Oral PRN Niles Zamora MD        glucose chewable tablet 24 g  24 g Oral PRN Niles Zamora MD        heparin (porcine) injection 5,000 Units  5,000 Units Subcutaneous Q8H Niles Zamora MD   5,000 Units at 06/08/24 0612    levETIRAcetam tablet 1,000 mg  1,000 mg Oral BID Niles Zamora MD        losartan tablet 50 mg  50 mg Oral Daily Niles Zamora MD   50 mg at 06/08/24 0820    naloxone 0.4 mg/mL injection 0.02 mg  0.02 mg Intravenous PRN Niles Zamora MD        oxyCODONE immediate release tablet 5 mg  5 mg Oral Q6H PRN Niles Zamora MD   5 mg at 06/08/24 0612    potassium chloride SA CR tablet 20 mEq  20 mEq Oral Q2H Nae Crespo MD        sodium chloride 0.9% flush 10 mL  10 mL Intravenous Q12H PRN Niles Zamora MD        topiramate tablet 50 mg  50 mg Oral BID Niles Zamora MD   50 mg at 06/08/24 0820         Past Medical/Surgical/Family/Social History:  PMHx:   Past Medical History:   Diagnosis Date    Brain aneurysm     CHF (congestive heart failure)     H/O coronary angioplasty     Hypercholesteremia     Hypertension     Malignant hypertension      Migraine headache     Stroke 10/2017      Surgeries:   Past Surgical History:   Procedure Laterality Date    CARDIAC CATHETERIZATION      CEREBRAL ANGIOGRAM      CLIP LIGATION OF INTRACRANIAL ANEURYSM BY CRANIOTOMY N/A 7/15/2022    Procedure: CRANIOTOMY, WITH ANEURYSM CLIPPING;  Surgeon: Timothy Diaz MD;  Location: [facility] OR [address] [place] [address];  Service: Neurosurgery;  Laterality: N/A;  PTERIONAL CRANIOTOMY WITH CLIP LIGATION OF L PCOMM, L ANTERIOR CHOROIDAL, L MCA  ANEURYSM, ANESTHESIA: GENERAL, BLOOD: TYPE&CROSS 2 UNITS, NEUROMONITORING: SEP, MEP, EEG, RADIOLOGY: C-ARM, POSITION: SUPINE, TONEY CASTILLO-SURGERON: DR. LEXI DOMÍNGUEZ.    WOUND DEBRIDEMENT  8/27/2022    Procedure: DEBRIDEMENT, WOUND;  Surgeon: Timothy Diaz MD;  Location: [facility] OR [address] [place] [address];  Service: Neurosurgery;;      Family  Hx: No family history on file.   Social Hx:   Social History     Tobacco Use    Smoking status: Every Day     Current packs/day: 0.50     Types: Cigarettes    Smokeless tobacco: Never   Substance Use Topics    Alcohol use: Yes     Comment: occassionally    Drug use: No         Current Evaluation:     Vital Signs:   Vitals:    06/08/24 0757   BP: 136/77   Pulse: 68   Resp: 18   Temp: 98.1 °F (36.7 °C)        General Exam  No apparent distress  Orientation  Alert, awake, oriented to self, place, time, and situation.  Memory  Recent and remote memory intact.  Language  Fluent speech. No dysarthria, No aphasia.   Cranial Nerves  PERRL, VF intact, EOMI, V1-V3 intact, symmetric facial expression(w/ speech activates R side of face but no true weakness appreciated), hearing grossly intact, SCM & TPZ 5/5, tongue midline, symmetric palate elevation.  Motor  Normal Bulk, Normal Tone  Right Upper Extremity: Normal 5/5 strength  Left Upper Extremity: Normal 5/5 strength  Right Lower Extremity: Normal 5/5 strength  Left Lower Extremity: Normal 5/5 strength  Sensory  Normal to light touch throughout  Cerebellar/Gait  Normal finger to nose b/l  With  standing got lightheaded before taking a step so gait exam deferred       LABORATORY STUDIES:  Labs:  Recent Labs   Lab 06/07/24 1951 06/07/24 2004 06/08/24 0233   WBC 6.33  --  6.18   HGB 12.8  --  12.4   HCT 36.6* 32* 35.4*     --  295   MCV 86  --  85       Recent Labs   Lab 06/07/24 1951 06/08/24 0233    138   K 3.7 2.8*    103   CO2 24 24   BUN 18* 16   GLU 91 114*   CALCIUM 9.5 9.5   PROT 7.9 6.8   ALBUMIN 4.3 3.1*   BILITOT 0.3 0.2   AST 36 20   ALKPHOS 153* 133   ALT 20 12       Recent Labs   Lab 06/07/24 1951   INR 0.9       Recent Labs   Lab 06/07/24 1951 06/08/24 0233   GLU 91 114*       Urine:   Lab Results   Component Value Date    LABURIN  01/07/2021     Multiple organisms isolated. None in predominance.  Repeat if    LABURIN clinically necessary. 01/07/2021    SPECGRAV 1.015 06/07/2024    NITRITE Negative 06/07/2024    KETONESU Negative 06/07/2024    UROBILINOGEN Negative 06/07/2024    WBCUA 35 (H) 06/07/2024       Recent Labs   Lab 06/08/24 0233   LDLCALC 127.8       Thyroid:   Recent Labs   Lab 06/07/24 1951   TSH 1.570       FLP:   Recent Labs   Lab 06/08/24 0233   CHOL 241*   HDL 43   LDLCALC 127.8   TRIG 351*   CHOLHDL 17.8*       Cardiac markers:  Recent Labs   Lab 06/07/24 1951   TROPONINI <0.012       RADIOLOGY STUDIES:  Imaging Results              CTA Head and Neck (xpd) (Final result)  Result time 06/07/24 21:56:43      Final result by Reno Lawson MD (06/07/24 21:56:43)                   Impression:      Resolution of gas from prior procedure  the intracranial region.  Stable postoperative changes of left MCA aneurysmal clipping.  Resolution of the right maxillary sinus opacification.  Otherwise stable exam    All CT scans at this facility are performed  using dose modulation techniques as appropriate to performed exam including the following:  automated exposure control; adjustment of mA and/or kV according to the patients size (this includes techniques  or standardized protocols for targeted exams where dose is matched to indication/reason for exam: i.e. extremities or head);  iterative reconstruction technique.      Electronically signed by: Reno Lawson  Date:    06/07/2024  Time:    21:56               Narrative:    EXAMINATION:  CTA HEAD AND NECK (XPD)    CLINICAL HISTORY:  Stroke/TIA, determine embolic source;    TECHNIQUE:  Standard contrast enhanced CTA of brain with 100ml of Omnipaque 350 with 3D MIP reformations. No previous for comparison.    COMPARISON:  Prior    FINDINGS:  Postsurgical changes of left ICA aneurysmal clipping in the left parasellar region similar to prior exam.  Previously seen gas in the left temporal fossa has resolved.  Previously commented on small aneurysm of the untreated left MCA is not well appreciated.  Near complete resolution of the right maxillary sinus opacification.  Otherwise stable exam                                       CT Head Without Contrast (Final result)  Result time 06/07/24 20:14:00      Final result by Reno Lawson MD (06/07/24 20:14:00)                   Impression:      Mild moderate right maxillary sinus mucosal thickening.  No evidence for hemorrhage mass effect or midline shift.    All CT scans   are performed using dose optimization techniques including the following: automated exposure control; adjustment of the mA and/or kV; use of iterative reconstruction technique.  Dose modulation was employed for ALARA by means of: Automated exposure control; adjustment of the mA and/or kV according to patient size (this includes techniques or standardized protocols for targeted exams where dose is matched to indication/reason for exam; i.e. extremities or head); and/or use of iterative reconstructive technique.      Electronically signed by: Reno Lawson  Date:    06/07/2024  Time:    20:14               Narrative:    EXAMINATION:  CT HEAD WITHOUT CONTRAST    CLINICAL HISTORY:  Headache, new or worsening,  neuro deficit (Age 19-49y);    TECHNIQUE:  Low dose axial CT images obtained throughout the head without intravenous contrast. Sagittal and coronal reconstructions were performed.    COMPARISON:  None.    FINDINGS:  Age-related changes:    Ventricles and sulci are normal in size for age without evidence of hydrocephalus. No extra-axial blood or fluid collections.    No parenchymal mass, hemorrhage, edema or major vascular distribution infarct.    Skull/extracranial contents (limited evaluation): No fracture. Right maxillary sinus mucosal thickening.  A                                       X-Ray Chest AP Portable (Final result)  Result time 06/07/24 20:14:20      Final result by Reno Lawson MD (06/07/24 20:14:20)                   Impression:      No acute abnormality.      Electronically signed by: Reno Lawson  Date:    06/07/2024  Time:    20:14               Narrative:    EXAMINATION:  XR CHEST AP PORTABLE    CLINICAL HISTORY:  weakness;    TECHNIQUE:  Single frontal view of the chest was performed.    COMPARISON:  None    FINDINGS:  The lungs are clear, with normal appearance of pulmonary vasculature and no pleural effusion or pneumothorax.    The cardiac silhouette is normal in size. The hilar and mediastinal contours are unremarkable.    Bones are intact.                                      OTHER STUDIES/PROCEDURES:  TTE pending      Assessment/Plan:   Gina Jenkins is a 49 y.o. female with past medical history of cerebral aneurysms including Left posterior communicating and left anterior choroidal artery aneurysms requiring a craniotomy for clipping 7/2022 c/b seizures, CHF (congestive heart failure), H/O coronary angioplasty, HLD, HTN, Migraine, Tobacco use who presents for facial droop, generalized weakness and migraine. Given significant hx would evaluate pt w/ imaging but low concern for stroke given lack of deficits on exam. CTA and CTH wo acute findings. Pt is not taking ASMs and says she had  seizure about 3 month ago. Recs as below    - Obtain MRI Brain to definitively r/o stroke  - Given back pain primary team is obtaining imaging but exam reassuring  - Restart Keppra 1g BID. Obtain EEG outpt   - lightheadedness is positional. Would obtain orthostatics and workup as per primary  Obtain UDS  - Discussed cessation of tobacco use as pt smokes 0.5 pack a day  - FU w/ Neurology outpt for migraine, seizure and stroke management      Discussed seizure precautions:  - Avoid Non-compliance with medications ( most common cause of breakthrough seizures)  -Take seizure medicine as prescribed by your doctor  -Do not drive in 6 month from the last seizure episode.   -If you ride a bicycle use a helmet  -Never swim alone   -Use showers, do not use bath tube when unsupervised.  -If possible cook with someone else in the nearby. Use the back burners.  -Avoid operating heavy machinery or equipment.  The pt  is forbidden to drive, cook,swim or operate heavy machinery for 6 months after having a seizure.        Differential diagnosis was explained to the patient. All questions were answered. Patient understood and agreed to adhere to plan.       Will follow imaging. No further intervention indicated at this time from Neurology Service. Please call if any further questions or any changes in neurologic condition.      Case to be discussed with Dr. GUTIERREZ    Electronically signed by:   Penny Payton MD   6/8/2024 8:30 AM

## 2024-06-08 NOTE — CARE UPDATE
Prelim echocardiogram read       EF is preserved   No significant valve stenosis or regurgitation.   Overall unremarkable echocardiogram      Full echocardiogram report will follow,  however  not able to push the numbers across the system.  So the reports we will be delayed

## 2024-06-08 NOTE — ED TRIAGE NOTES
Right sided headache with lower back pain x 5 days. Denies fever, N/V. + h/o craniotomy with aneurysm clipping. Presents awake, answers question appropriately but speech slightly slurred. IVAN

## 2024-06-08 NOTE — NURSING
Pt on bed resting, AAOX4, noted to have R facial droop w/o forehead involvement. Neuro assessment done all negative no deficits observed but facial droop especially when speaking, denies any pain at the moment, speech clear at this time. Safety measures in place seizure precaution placed(Side rails padded) bed alarm on, instructed to call for assistance. Verbalized understanding.

## 2024-06-08 NOTE — PROGRESS NOTES
"  [facility] Resident Progress Note    Subjective:   Ms. Jenkins was extremely anxious today. She kept stating that something is wrong with her head and that she did not feel like herself.     Objective:   Last 24 Hour Vital Signs:  BP  Min: 107/73  Max: 171/94  Temp  Av.1 °F (36.7 °C)  Min: 97.8 °F (36.6 °C)  Max: 98.5 °F (36.9 °C)  Pulse  Av.9  Min: 64  Max: 71  Resp  Av.8  Min: 16  Max: 19  SpO2  Av.2 %  Min: 96 %  Max: 100 %  Height  Av' 2" (157.5 cm)  Min: 5' 2" (157.5 cm)  Max: 5' 2" (157.5 cm)  Weight  Av.5 kg (157 lb 10.8 oz)  Min: 68 kg (150 lb)  Max: 75 kg (165 lb 5.5 oz)  No intake/output data recorded.    Physical Examination:  Physical Exam  General: Alert, cooperative  HEENT: R facial droop w/o forehead involvement at rest, atraumatic, PERRL, EOMI  Neck: No thyromegaly or masses   Cardiac: RRR, no murmurs appreciated, no extra heart sounds  Pulmonary/Chest: CTAB, symmetric chest rise, equal breath sounds bilaterally  GI: Soft, non tender, non distended, normoactive bowel sounds  Extremities: no edema or cyanosis  Skin: dry, warm, no wounds noted  Neuro: R facial droop as above, 4/5 strength in all extremities, intact sensation throught, no focal deficits other than aforementioned R facial droop    Laboratory:  Most Recent Data:  CBC:   Lab Results   Component Value Date    WBC 6.18 2024    HGB 12.4 2024    HCT 35.4 (L) 2024     2024    MCV 85 2024    RDW 13.5 2024     BMP:   Lab Results   Component Value Date     2024    K 2.8 (L) 2024     2024    CO2 24 2024    BUN 16 2024    CREATININE 1.3 2024     (H) 2024    CALCIUM 9.5 2024    MG 1.7 2024    PHOS 2.4 (L) 2024     LFTs:   Lab Results   Component Value Date    PROT 6.8 2024    ALBUMIN 3.1 (L) 2024    BILITOT 0.2 2024    AST 20 2024    ALKPHOS 133 2024    ALT 12 2024 "     Coags:   Lab Results   Component Value Date    INR 0.9 06/07/2024     FLP:   Lab Results   Component Value Date    CHOL 241 (H) 06/08/2024    HDL 43 06/08/2024    LDLCALC 127.8 06/08/2024    TRIG 351 (H) 06/08/2024    CHOLHDL 17.8 (L) 06/08/2024     DM:   Lab Results   Component Value Date    HGBA1C 8.3 (H) 08/22/2022    HGBA1C 5.8 (H) 07/16/2022    HGBA1C 5.4 06/30/2020    LDLCALC 127.8 06/08/2024    CREATININE 1.3 06/08/2024     Thyroid:   Lab Results   Component Value Date    TSH 1.570 06/07/2024    FREET4 0.89 06/30/2020    Y4VEZNE 7.1 11/02/2017     Anemia:   Lab Results   Component Value Date    IRON 52 06/30/2020    TIBC 314 06/30/2020    FERRITIN 63 06/30/2020    WENEDSQI83 343 04/10/2018    FOLATE 12.9 04/09/2018     Cardiac:   Lab Results   Component Value Date    TROPONINI <0.012 06/07/2024     (H) 03/06/2017     Urinalysis:   Lab Results   Component Value Date    LABURIN  01/07/2021     Multiple organisms isolated. None in predominance.  Repeat if    LABURIN clinically necessary. 01/07/2021    COLORU Yellow 06/07/2024    SPECGRAV 1.015 06/07/2024    NITRITE Negative 06/07/2024    KETONESU Negative 06/07/2024    UROBILINOGEN Negative 06/07/2024    WBCUA 35 (H) 06/07/2024       Trended Lab Data:  Recent Labs   Lab 06/07/24 1951 06/07/24 2004 06/08/24  0233   WBC 6.33  --  6.18   HGB 12.8  --  12.4   HCT 36.6* 32* 35.4*     --  295   MCV 86  --  85   RDW 13.2  --  13.5     --  138   K 3.7  --  2.8*     --  103   CO2 24  --  24   BUN 18*  --  16   CREATININE 1.23  --  1.3   GLU 91  --  114*   PROT 7.9  --  6.8   ALBUMIN 4.3  --  3.1*   BILITOT 0.3  --  0.2   AST 36  --  20   ALKPHOS 153*  --  133   ALT 20  --  12       Trended Cardiac Data:  Recent Labs   Lab 06/07/24 1951   TROPONINI <0.012     Radiology Data Reviewed: yes  Pertinent Findings:  Imaging Results              CTA Head and Neck (xpd) (Final result)  Result time 06/07/24 21:56:43      Final result by Lulu  MD Reno (06/07/24 21:56:43)                   Impression:      Resolution of gas from prior procedure  the intracranial region.  Stable postoperative changes of left MCA aneurysmal clipping.  Resolution of the right maxillary sinus opacification.  Otherwise stable exam    All CT scans at this facility are performed  using dose modulation techniques as appropriate to performed exam including the following:  automated exposure control; adjustment of mA and/or kV according to the patients size (this includes techniques or standardized protocols for targeted exams where dose is matched to indication/reason for exam: i.e. extremities or head);  iterative reconstruction technique.      Electronically signed by: Reno Lawson  Date:    06/07/2024  Time:    21:56               Narrative:    EXAMINATION:  CTA HEAD AND NECK (XPD)    CLINICAL HISTORY:  Stroke/TIA, determine embolic source;    TECHNIQUE:  Standard contrast enhanced CTA of brain with 100ml of Omnipaque 350 with 3D MIP reformations. No previous for comparison.    COMPARISON:  Prior    FINDINGS:  Postsurgical changes of left ICA aneurysmal clipping in the left parasellar region similar to prior exam.  Previously seen gas in the left temporal fossa has resolved.  Previously commented on small aneurysm of the untreated left MCA is not well appreciated.  Near complete resolution of the right maxillary sinus opacification.  Otherwise stable exam                                       CT Head Without Contrast (Final result)  Result time 06/07/24 20:14:00      Final result by Reno Lawson MD (06/07/24 20:14:00)                   Impression:      Mild moderate right maxillary sinus mucosal thickening.  No evidence for hemorrhage mass effect or midline shift.    All CT scans   are performed using dose optimization techniques including the following: automated exposure control; adjustment of the mA and/or kV; use of iterative reconstruction technique.  Dose modulation  was employed for ALARA by means of: Automated exposure control; adjustment of the mA and/or kV according to patient size (this includes techniques or standardized protocols for targeted exams where dose is matched to indication/reason for exam; i.e. extremities or head); and/or use of iterative reconstructive technique.      Electronically signed by: Reno Lawson  Date:    06/07/2024  Time:    20:14               Narrative:    EXAMINATION:  CT HEAD WITHOUT CONTRAST    CLINICAL HISTORY:  Headache, new or worsening, neuro deficit (Age 19-49y);    TECHNIQUE:  Low dose axial CT images obtained throughout the head without intravenous contrast. Sagittal and coronal reconstructions were performed.    COMPARISON:  None.    FINDINGS:  Age-related changes:    Ventricles and sulci are normal in size for age without evidence of hydrocephalus. No extra-axial blood or fluid collections.    No parenchymal mass, hemorrhage, edema or major vascular distribution infarct.    Skull/extracranial contents (limited evaluation): No fracture. Right maxillary sinus mucosal thickening.  A                                       X-Ray Chest AP Portable (Final result)  Result time 06/07/24 20:14:20      Final result by Reno Lawson MD (06/07/24 20:14:20)                   Impression:      No acute abnormality.      Electronically signed by: Reno Lawson  Date:    06/07/2024  Time:    20:14               Narrative:    EXAMINATION:  XR CHEST AP PORTABLE    CLINICAL HISTORY:  weakness;    TECHNIQUE:  Single frontal view of the chest was performed.    COMPARISON:  None    FINDINGS:  The lungs are clear, with normal appearance of pulmonary vasculature and no pleural effusion or pneumothorax.    The cardiac silhouette is normal in size. The hilar and mediastinal contours are unremarkable.    Bones are intact.                                      Current Medications:     Infusions:       Scheduled:   amitriptyline  75 mg Oral QHS    amLODIPine  10  mg Oral Daily    atorvastatin  80 mg Oral Daily    carvediloL  37.5 mg Oral BID    EScitalopram oxalate  10 mg Oral Daily    heparin (porcine)  5,000 Units Subcutaneous Q8H    levETIRAcetam  1,000 mg Oral BID    losartan  50 mg Oral Daily    topiramate  50 mg Oral BID        PRN:    Current Facility-Administered Medications:     acetaminophen, 500 mg, Oral, Q6H PRN    acetaminophen, 650 mg, Oral, Q4H PRN    dextrose 10%, 12.5 g, Intravenous, PRN    dextrose 10%, 25 g, Intravenous, PRN    glucagon (human recombinant), 1 mg, Intramuscular, PRN    glucose, 16 g, Oral, PRN    glucose, 24 g, Oral, PRN    naloxone, 0.02 mg, Intravenous, PRN    oxyCODONE, 5 mg, Oral, Q6H PRN    sodium chloride 0.9%, 10 mL, Intravenous, Q12H PRN      Assessment:     Gina Jenkins is a 49 y.o. female who  has a past medical history of Brain aneurysm, CHF (congestive heart failure), H/O coronary angioplasty, Hypercholesteremia, Hypertension, Malignant hypertension, Migraine headache, and Stroke (10/2017). The patient presented to [facility] on 6/7/2024 with a primary complaint of back pain, headache, weakness w/ facial droop and voice change x5 days. Admitted to [facility] for evaluation of headaches, facial droops, and diffuse weakness with neurology consulted.     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 (7/2022)  Hx seizures off antiepileptics   - Aneurysms and clips stable on CTA head/neck   - MRI brain and spine ordered, likely will not occur until Monday as it is the weekend  - ASA loaded at [facility], plavix load held given aneurysm history  -Restart home Keppra 1g BID, patient states that she had not been taking for a month  - Neurology consulted, appreciate recs  -Will refer to neuro upon discharge and get outpatient EEG  -Concern for psychogenic component of presentation, will consult Tele-Psych over weekend     Migraine headaches   -  Continue topiramate 50 mg bid  - Continue home amitriptyline 75 mg nightly   - Multimodal pain control with tylenol, oxycodone   - Holding off NSAIDS at this time given aneurysm history   - Consider transition to fioricet, though pt preferring Tylenol currently     HTN  HLD   Hx coronary angioplasty   - Cholesterol, TG elevated on lipid panel  - Continue atorvastatin 80 daily   - Continue home amlodipine 10 mg daily   - Continue home carvedilol 37.5 mg bid  - Continue home losartan 50 mg daily       Elevated alk phos   - Trend CMPs inpatient      Anxiety, depression  - Continue home lexapro 10 mg daily     Ppx: Lovenox  Diet: Regular  Code: Full    Dispo: Pending MRI of brain    Nae Crespo MD  [facility] Internal Medicine/Pediatrics HO-I  06/08/2024  1:38 PM    [facility] Medicine Hospitalist Pager numbers:   [facility] Hospitalist Medicine Team A (Joshua/Sky): [phone]  [facility] Hospitalist Medicine Team B (Chey/Cali):  [phone]

## 2024-06-08 NOTE — NURSING
Pt back from MRI agitated and upset wants to speak to md as  was not able to have MRI done due to clippings of previous aneurism, per MRI tech. Mdi informed of the same by Rn, now @ bedside speaking with pt.

## 2024-06-09 LAB
ALBUMIN SERPL BCP-MCNC: 3.1 G/DL (ref 3.5–5.2)
ALP SERPL-CCNC: 141 U/L (ref 55–135)
ALT SERPL W/O P-5'-P-CCNC: 19 U/L (ref 10–44)
ANION GAP SERPL CALC-SCNC: 12 MMOL/L (ref 8–16)
ASCENDING AORTA: 2.44 CM
AST SERPL-CCNC: 32 U/L (ref 10–40)
AV INDEX (PROSTH): 0.85
AV MEAN GRADIENT: 4 MMHG
AV PEAK GRADIENT: 9 MMHG
AV VALVE AREA BY VELOCITY RATIO: 2.11 CM²
AV VALVE AREA: 2.3 CM²
AV VELOCITY RATIO: 0.78
BACTERIA UR CULT: NORMAL
BACTERIA UR CULT: NORMAL
BASOPHILS # BLD AUTO: 0.05 K/UL (ref 0–0.2)
BASOPHILS NFR BLD: 1 % (ref 0–1.9)
BILIRUB SERPL-MCNC: 0.2 MG/DL (ref 0.1–1)
BSA FOR ECHO PROCEDURE: 1.73 M2
BUN SERPL-MCNC: 20 MG/DL (ref 6–20)
CALCIUM SERPL-MCNC: 9.5 MG/DL (ref 8.7–10.5)
CHLORIDE SERPL-SCNC: 107 MMOL/L (ref 95–110)
CO2 SERPL-SCNC: 22 MMOL/L (ref 23–29)
CREAT SERPL-MCNC: 1.6 MG/DL (ref 0.5–1.4)
CV ECHO LV RWT: 0.44 CM
DIFFERENTIAL METHOD BLD: ABNORMAL
DOP CALC AO PEAK VEL: 1.48 M/S
DOP CALC AO VTI: 31 CM
DOP CALC LVOT AREA: 2.7 CM2
DOP CALC LVOT DIAMETER: 1.85 CM
DOP CALC LVOT PEAK VEL: 1.16 M/S
DOP CALC LVOT STROKE VOLUME: 71.2 CM3
DOP CALCLVOT PEAK VEL VTI: 26.5 CM
E WAVE DECELERATION TIME: 179.72 MSEC
E/A RATIO: 1.11
E/E' RATIO: 14.55 M/S
ECHO LV POSTERIOR WALL: 0.91 CM (ref 0.6–1.1)
EJECTION FRACTION: 55 %
EOSINOPHIL # BLD AUTO: 0.3 K/UL (ref 0–0.5)
EOSINOPHIL NFR BLD: 5.8 % (ref 0–8)
ERYTHROCYTE [DISTWIDTH] IN BLOOD BY AUTOMATED COUNT: 13.7 % (ref 11.5–14.5)
EST. GFR  (NO RACE VARIABLE): 40 ML/MIN/1.73 M^2
FRACTIONAL SHORTENING: 29 % (ref 28–44)
GLUCOSE SERPL-MCNC: 99 MG/DL (ref 70–110)
HCT VFR BLD AUTO: 34 % (ref 37–48.5)
HGB BLD-MCNC: 11.6 G/DL (ref 12–16)
IMM GRANULOCYTES # BLD AUTO: 0.01 K/UL (ref 0–0.04)
IMM GRANULOCYTES NFR BLD AUTO: 0.2 % (ref 0–0.5)
INTERVENTRICULAR SEPTUM: 0.66 CM (ref 0.6–1.1)
IVRT: 60.89 MSEC
LA MAJOR: 5.2 CM
LA MINOR: 4.73 CM
LA WIDTH: 3.9 CM
LEFT ATRIUM SIZE: 3.2 CM
LEFT ATRIUM VOLUME INDEX: 29.9 ML/M2
LEFT ATRIUM VOLUME: 52.55 CM3
LEFT INTERNAL DIMENSION IN SYSTOLE: 2.93 CM (ref 2.1–4)
LEFT VENTRICLE DIASTOLIC VOLUME INDEX: 43.46 ML/M2
LEFT VENTRICLE DIASTOLIC VOLUME: 76.49 ML
LEFT VENTRICLE MASS INDEX: 55 G/M2
LEFT VENTRICLE SYSTOLIC VOLUME INDEX: 18.8 ML/M2
LEFT VENTRICLE SYSTOLIC VOLUME: 33.09 ML
LEFT VENTRICULAR INTERNAL DIMENSION IN DIASTOLE: 4.15 CM (ref 3.5–6)
LEFT VENTRICULAR MASS: 96.84 G
LV LATERAL E/E' RATIO: 13.33 M/S
LV SEPTAL E/E' RATIO: 16 M/S
LVOT MG: 2.47 MMHG
LVOT MV: 0.72 CM/S
LYMPHOCYTES # BLD AUTO: 1.8 K/UL (ref 1–4.8)
LYMPHOCYTES NFR BLD: 35 % (ref 18–48)
MAGNESIUM SERPL-MCNC: 1.9 MG/DL (ref 1.6–2.6)
MCH RBC QN AUTO: 30 PG (ref 27–31)
MCHC RBC AUTO-ENTMCNC: 34.1 G/DL (ref 32–36)
MCV RBC AUTO: 88 FL (ref 82–98)
MONOCYTES # BLD AUTO: 0.6 K/UL (ref 0.3–1)
MONOCYTES NFR BLD: 10.6 % (ref 4–15)
MV PEAK A VEL: 0.72 M/S
MV PEAK E VEL: 0.8 M/S
NEUTROPHILS # BLD AUTO: 2.5 K/UL (ref 1.8–7.7)
NEUTROPHILS NFR BLD: 47.4 % (ref 38–73)
NRBC BLD-RTO: 0 /100 WBC
OHS LV EJECTION FRACTION SIMPSONS BIPLANE MOD: 51 %
PHOSPHATE SERPL-MCNC: 3 MG/DL (ref 2.7–4.5)
PISA TR MAX VEL: 1.88 M/S
PLATELET # BLD AUTO: 276 K/UL (ref 150–450)
PMV BLD AUTO: 10.6 FL (ref 9.2–12.9)
POTASSIUM SERPL-SCNC: 3.6 MMOL/L (ref 3.5–5.1)
PROT SERPL-MCNC: 6.5 G/DL (ref 6–8.4)
PULM VEIN S/D RATIO: 1.16
PV MV: 0.51 M/S
PV PEAK D VEL: 0.38 M/S
PV PEAK GRADIENT: 2 MMHG
PV PEAK S VEL: 0.44 M/S
PV PEAK VELOCITY: 0.7 M/S
RA MAJOR: 4.45 CM
RA PRESSURE ESTIMATED: 3 MMHG
RBC # BLD AUTO: 3.87 M/UL (ref 4–5.4)
RV TB RVSP: 5 MMHG
SINUS: 2.55 CM
SODIUM SERPL-SCNC: 141 MMOL/L (ref 136–145)
STJ: 2.73 CM
TDI LATERAL: 0.06 M/S
TDI SEPTAL: 0.05 M/S
TDI: 0.06 M/S
TR MAX PG: 14 MMHG
TRICUSPID ANNULAR PLANE SYSTOLIC EXCURSION: 1.87 CM
TV MEAN GRADIENT: 0 MMHG
TV PEAK GRADIENT: 1 MMHG
TV REST PULMONARY ARTERY PRESSURE: 17 MMHG
WBC # BLD AUTO: 5.2 K/UL (ref 3.9–12.7)
Z-SCORE OF LEFT VENTRICULAR DIMENSION IN END DIASTOLE: -1.59
Z-SCORE OF LEFT VENTRICULAR DIMENSION IN END SYSTOLE: -0.21

## 2024-06-09 PROCEDURE — 99215 OFFICE O/P EST HI 40 MIN: CPT | Mod: 95,,, | Performed by: PSYCHIATRY & NEUROLOGY

## 2024-06-09 PROCEDURE — 11000001 HC ACUTE MED/SURG PRIVATE ROOM

## 2024-06-09 PROCEDURE — 86235 NUCLEAR ANTIGEN ANTIBODY: CPT

## 2024-06-09 PROCEDURE — 63600175 PHARM REV CODE 636 W HCPCS

## 2024-06-09 PROCEDURE — 87389 HIV-1 AG W/HIV-1&-2 AB AG IA: CPT

## 2024-06-09 PROCEDURE — 86593 SYPHILIS TEST NON-TREP QUANT: CPT

## 2024-06-09 PROCEDURE — 97165 OT EVAL LOW COMPLEX 30 MIN: CPT

## 2024-06-09 PROCEDURE — 96372 THER/PROPH/DIAG INJ SC/IM: CPT

## 2024-06-09 PROCEDURE — 86038 ANTINUCLEAR ANTIBODIES: CPT

## 2024-06-09 PROCEDURE — 82607 VITAMIN B-12: CPT

## 2024-06-09 PROCEDURE — 92610 EVALUATE SWALLOWING FUNCTION: CPT

## 2024-06-09 PROCEDURE — 80053 COMPREHEN METABOLIC PANEL: CPT

## 2024-06-09 PROCEDURE — 97162 PT EVAL MOD COMPLEX 30 MIN: CPT

## 2024-06-09 PROCEDURE — 25000003 PHARM REV CODE 250

## 2024-06-09 PROCEDURE — 36415 COLL VENOUS BLD VENIPUNCTURE: CPT

## 2024-06-09 PROCEDURE — G0378 HOSPITAL OBSERVATION PER HR: HCPCS

## 2024-06-09 PROCEDURE — 83735 ASSAY OF MAGNESIUM: CPT

## 2024-06-09 PROCEDURE — 85025 COMPLETE CBC W/AUTO DIFF WBC: CPT

## 2024-06-09 PROCEDURE — 84100 ASSAY OF PHOSPHORUS: CPT

## 2024-06-09 RX ADMIN — HEPARIN SODIUM 5000 UNITS: 5000 INJECTION INTRAVENOUS; SUBCUTANEOUS at 06:06

## 2024-06-09 RX ADMIN — CARVEDILOL 37.5 MG: 25 TABLET, FILM COATED ORAL at 09:06

## 2024-06-09 RX ADMIN — HEPARIN SODIUM 5000 UNITS: 5000 INJECTION INTRAVENOUS; SUBCUTANEOUS at 09:06

## 2024-06-09 RX ADMIN — OXYCODONE 5 MG: 5 TABLET ORAL at 04:06

## 2024-06-09 RX ADMIN — LOSARTAN POTASSIUM 50 MG: 50 TABLET, FILM COATED ORAL at 09:06

## 2024-06-09 RX ADMIN — TOPIRAMATE 50 MG: 25 TABLET, FILM COATED ORAL at 09:06

## 2024-06-09 RX ADMIN — AMLODIPINE BESYLATE 10 MG: 5 TABLET ORAL at 09:06

## 2024-06-09 RX ADMIN — ESCITALOPRAM OXALATE 10 MG: 10 TABLET ORAL at 09:06

## 2024-06-09 RX ADMIN — ATORVASTATIN CALCIUM 80 MG: 40 TABLET, FILM COATED ORAL at 09:06

## 2024-06-09 RX ADMIN — LEVETIRACETAM 1000 MG: 500 TABLET, FILM COATED ORAL at 09:06

## 2024-06-09 NOTE — PROGRESS NOTES
"  [facility] IM Resident Progress Note    Subjective:     NAEON. MRI's done last night. Pt first sent back from radiology because of aneurysm clips. However, call team clarified that her clips are compatible with MRI and that she has gotten MRI before.     She reports feeling "confused" this morning about what's going on and what's wrong with her. She is frustrated that no one has answers for her yet. She reports continued speech changes and unsteady gait.        Objective:   Last 24 Hour Vital Signs:  BP  Min: 107/73  Max: 136/77  Temp  Av.1 °F (36.7 °C)  Min: 97.6 °F (36.4 °C)  Max: 98.5 °F (36.9 °C)  Pulse  Av.7  Min: 64  Max: 75  Resp  Av  Min: 18  Max: 18  SpO2  Av.2 %  Min: 95 %  Max: 99 %    No intake/output data recorded.    Physical Examination:  Physical Exam  Vitals and nursing note reviewed.   HENT:      Mouth/Throat:      Mouth: Mucous membranes are dry.      Dentition: Abnormal dentition.      Comments: Gingival hypertrophy   Pt has crossbite/moves lower jaw to R side sometimes when talking     Cardiovascular:      Rate and Rhythm: Normal rate and regular rhythm.   Neurological:      Mental Status: She is alert.      Cranial Nerves: Facial asymmetry present.      Comments: Ataxic gait - legs are tremulous, steps are shuffling  4/5 strength R leg  5/5 strength L leg  5/5 strength symmetric BUE    Psychiatric:         Mood and Affect: Mood is anxious. Affect is labile.         Laboratory:  Most Recent Data:  CBC:   Lab Results   Component Value Date    WBC 5.20 2024    HGB 11.6 (L) 2024    HCT 34.0 (L) 2024     2024    MCV 88 2024    RDW 13.7 2024     BMP:   Lab Results   Component Value Date     2024    K 3.6 2024     2024    CO2 22 (L) 2024    BUN 20 2024    CREATININE 1.6 (H) 2024    GLU 99 2024    CALCIUM 9.5 2024    MG 1.9 2024    PHOS 3.0 2024     LFTs:   Lab Results "   Component Value Date    PROT 6.5 06/09/2024    ALBUMIN 3.1 (L) 06/09/2024    BILITOT 0.2 06/09/2024    AST 32 06/09/2024    ALKPHOS 141 (H) 06/09/2024    ALT 19 06/09/2024     Coags:   Lab Results   Component Value Date    INR 0.9 06/07/2024     FLP:   Lab Results   Component Value Date    CHOL 241 (H) 06/08/2024    HDL 43 06/08/2024    LDLCALC 127.8 06/08/2024    TRIG 351 (H) 06/08/2024    CHOLHDL 17.8 (L) 06/08/2024     DM:   Lab Results   Component Value Date    HGBA1C 8.3 (H) 08/22/2022    HGBA1C 5.8 (H) 07/16/2022    HGBA1C 5.4 06/30/2020    LDLCALC 127.8 06/08/2024    CREATININE 1.6 (H) 06/09/2024     Thyroid:   Lab Results   Component Value Date    TSH 1.570 06/07/2024    FREET4 0.89 06/30/2020    [id number] 7.1 11/02/2017     Anemia:   Lab Results   Component Value Date    IRON 52 06/30/2020    TIBC 314 06/30/2020    FERRITIN 63 06/30/2020    YDHIMJKR91 343 04/10/2018    FOLATE 12.9 04/09/2018     Cardiac:   Lab Results   Component Value Date    TROPONINI <0.012 06/07/2024     (H) 03/06/2017     Urinalysis:   Lab Results   Component Value Date    LABURIN  01/07/2021     Multiple organisms isolated. None in predominance.  Repeat if    LABURIN clinically necessary. 01/07/2021    COLORU Yellow 06/07/2024    SPECGRAV 1.015 06/07/2024    NITRITE Negative 06/07/2024    KETONESU Negative 06/07/2024    UROBILINOGEN Negative 06/07/2024    WBCUA 35 (H) 06/07/2024       Trended Lab Data:  Recent Labs   Lab 06/07/24 1951 06/07/24 2004 06/08/24  0233 06/09/24  0206   WBC 6.33  --  6.18 5.20   HGB 12.8  --  12.4 11.6*   HCT 36.6* 32* 35.4* 34.0*     --  295 276   MCV 86  --  85 88   RDW 13.2  --  13.5 13.7     --  138 141   K 3.7  --  2.8* 3.6     --  103 107   CO2 24  --  24 22*   BUN 18*  --  16 20   CREATININE 1.23  --  1.3 1.6*   GLU 91  --  114* 99   PROT 7.9  --  6.8 6.5   ALBUMIN 4.3  --  3.1* 3.1*   BILITOT 0.3  --  0.2 0.2   AST 36  --  20 32   ALKPHOS 153*  --  133 141*   ALT 20  --   12 19       Trended Cardiac Data:  Recent Labs   Lab 06/07/24 1951   TROPONINI <0.012     Radiology Data Reviewed: yes  Pertinent Findings:    6/8/24 MRI Brain without  The craniocervical junction is intact. There is partial empty appearance of the sella.  The midline structures are intact. The intracranial flow voids are intact.  No acute diffusion-weighted signal abnormality is present.  There are postop changes of prior left frontal and temporal craniotomy for prior left MCA aneurysm clipping.  There is encephalomalacia in the left temporal pole.  There is also minimal degree of T2/FLAIR signal hyperintensity within this region.  There are T2/FLAIR signal hyperintensities within the periventricular and subcortical white matter. There is minimal susceptibility weighted artifact within the left inferior frontal and left temporal pole.  There is susceptibility weighted artifact identified within the left cerebellum.     Impression:  1.  No MR evidence of acute infarction.  2.  Postoperative changes in the left frontal and temporal calvarium with encephalomalacia in the left temporal pole.  3.  Nonspecific T2/FLAIR signal hyperintensities within the periventricular and subcortical white matter.      6/8/24 MRI spine with and without  Multilevel degenerative changes cervical spine as detailed above  No evidence for cord signal abnormality or abnormal intrathecal enhancement to suggest sequela of cord demyelination  Partially empty sella included in the field of view intracranial hypertension be included in differential in appropriate clinical setting      Current Medications:     Infusions:       Scheduled:   amitriptyline  75 mg Oral QHS    amLODIPine  10 mg Oral Daily    atorvastatin  80 mg Oral Daily    carvediloL  37.5 mg Oral BID    EScitalopram oxalate  10 mg Oral Daily    heparin (porcine)  5,000 Units Subcutaneous Q8H    levETIRAcetam  1,000 mg Oral BID    losartan  50 mg Oral Daily    topiramate  50 mg Oral  BID        PRN:    Current Facility-Administered Medications:     acetaminophen, 500 mg, Oral, Q6H PRN    acetaminophen, 650 mg, Oral, Q4H PRN    dextrose 10%, 12.5 g, Intravenous, PRN    dextrose 10%, 25 g, Intravenous, PRN    glucagon (human recombinant), 1 mg, Intramuscular, PRN    glucose, 16 g, Oral, PRN    glucose, 24 g, Oral, PRN    naloxone, 0.02 mg, Intravenous, PRN    oxyCODONE, 5 mg, Oral, Q6H PRN    sodium chloride 0.9%, 10 mL, Intravenous, Q12H PRN      Assessment:     Gina Jenkins is a 49 y.o. female who  has a past medical history of Brain aneurysm, CHF (congestive heart failure), H/O coronary angioplasty, Hypercholesteremia, Hypertension, Malignant hypertension, Migraine headache, and Stroke (10/2017). The patient presented to [facility] on 6/7/2024 with a primary complaint of back pain, headache, weakness w/ facial droop and voice change x5 days. Admitted to [facility] for evaluation of headaches, facial droops, and diffuse weakness with neurology consulted.     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 Aneurysms and clips stable on CTA head/neck. MRI brain with no acute infarctions, post-operative changes. MRI spine with cervical degenerative changes but no abnormality as evidence for demyelination.   Continue keppra 1g BID   Neurology consult  will get NYDIA/KARIS and B12   inpatient EEG  Psych consult to evaluate for functional causes  Hold amitriptyline   Will call Dr. Garcia in the morning   FU PT/OT/SLP     Migraine headaches   This morning, pt is not having headaches though she is still having some dizziness that is positional. Per neuro eval, pt's headache descriptions are less consistent with migraines. There is nothing on MRI head and neck that indicates cause of headaches.   Continue topiramate 50 mg bid  Continue  home amitriptyline 75 mg nightly   Multimodal pain control with tylenol, oxycodone   Holding off NSAIDS at this time given aneurysm history   Consider transition to fioricet, though pt preferring Tylenol currently     HTN  HLD   Hx coronary angioplasty   Cholesterol, TG elevated on lipid panel  Continue atorvastatin 80 daily   Continue home amlodipine 10 mg daily   Continue home carvedilol 37.5 mg bid  Continue home losartan 50 mg daily       Elevated alk phos   CTM     Anxiety, depression  C/f functional neuro deficits   Continue home lexapro 10 mg daily   Will consult Dr. Garcia in the morning     Ppx: heparin subQ  Diet: Regular  Code: Full    Dispo: continue inpatient evaluation of neurological deficits; inpatient EEG    Ashleigh James MD  [facility] Internal Medicine HO-I  06/09/2024    [facility] Medicine Hospitalist Pager numbers:   [facility] Hospitalist Medicine Team A (Joshua/Sky): [phone]  [facility] Hospitalist Medicine Team B (Chey/Cali):  [phone]

## 2024-06-09 NOTE — PT/OT/SLP EVAL
Occupational Therapy   Evaluation    Name: Gina Jenkins  MRN: [id number]  Admitting Diagnosis: Weakness  Recent Surgery: * No surgery found *      Recommendations:     Discharge Recommendations: Moderate Intensity Therapy  Discharge Equipment Recommendations:  walker, rolling  The mobility limitation cannot be sufficiently resolved by the use of a cane.    The use of a rolling walker will significantly improve the patient's ability to participate in [facility] and the patient will use it on regular basis in the home.     Barriers to discharge:  Decreased caregiver support    Assessment:     Gina Jenkins is a 49 y.o. female with a medical diagnosis of Weakness.  She presents with the following performance deficits affecting function: weakness, impaired endurance, impaired self care skills, impaired functional mobility, gait instability, impaired balance, decreased coordination, decreased safety awareness, decreased lower extremity function.      Pt was agreeable to and participated in OT/PT evaluation.  Pt reports that she was independent with mobility and ADLS at [facility].  Pt completed evaluation and noted to required set up - SBA for ADLS and SBA - mod A with func mobility.  Pt noted with L facial droop and poor standing balance which required her to have increased physical assistance for short distance ambulation/mobiltiy (long distance not attempted for safety). Goals established to assist pt with returning to [facility] regarding ADLs and func mobility.  Pt will benefit from skilled OT services in order to increase her level of safety and independence with ADLs and mobility.  PEREZ for post acute therapy is recommended as pt lives with her 3 teenage boys and has limited assistance at home.     Rehab Prognosis: Good; patient would benefit from acute skilled OT services to address these deficits and reach maximum level of function.       Plan:     Patient to be seen 4 x/week to address the above listed problems via  "self-care/home management, therapeutic activities, therapeutic exercises  Plan of Care Expires: 07/09/24  Plan of Care Reviewed with: patient    Subjective     Chief Complaint: none given  Patient/Family Comments/goals: "make legs work better"    Occupational Profile:  Living Environment: pt lives with her 3 teen-aged boy sin a [facility] with THE to enter - T/S combo for bathing  Previous level of function: independent with mobility and ADLS   Roles and Routines: mother of 3 boys  Equipment Used at Home: none  Assistance upon Discharge: unknown - limited    Pain/Comfort:  Pain Rating 1: 0/10  Pain Rating Post-Intervention 1: 0/10    Patients cultural, spiritual, Yazidism conflicts given the current situation: no    Objective:     Communicated with: nurse prior to session.  Patient found  sitting in recliner by window  with telemetry upon OT entry to room.  "I got out of bed into this chair today and nobody even knew."  Pt reports getting OOB on her own - explained to pt that she must ask for assistance when getting OOB or walking in room for safety.  Pt demonstrated poor balance in standing and required mod A when walking - reported to nurse and asked for camera in room to prevent falls.     General Precautions: Standard, fall  Orthopedic Precautions: N/A  Braces: N/A  Respiratory Status: Room air    Occupational Performance:    Bed Mobility:    Patient completed Sit to Supine with supervision    Functional Mobility/Transfers:  Patient completed Sit <> Stand Transfer with stand by assistance  with  no assistive device   Patient completed Bed <> Chair Transfer using Step Transfer technique with moderate assistance with hand-held assist initially and then with RW  Functional Mobility: Pt initially declined use of RW prior to walking with therapy - pt noted with posterior lean and unsteadiness when standing in front of recliner - therapists initially attempting ambulation outside of room, but due to poor balance, limited " ambulation to walk from recliner (by window) to bed - pt accepted RW as she neared her bed, but continued to require mod A due to poor stability/balance    Activities of Daily Living:  Grooming: set up A to wash face sitting in recliner  Lower Body Dressing: set up A to edison/doff socks while seated in recliner    Cognitive/Visual Perceptual:  Cognitive/Psychosocial Skills:     -       Oriented to: Person, Place, Time, and Situation   -       Follows Commands/attention:Follows two-step commands  -       Communication: clear/fluent  -       Memory: No Deficits noted  -       Safety awareness/insight to disability: impaired    -       Mood/Affect/Coping skills/emotional control: appeared impulsive     Physical Exam:  Balance: -       sitting = good;  static standing = fair;  dynamic standing = poor  Postural examination/scapula alignment:    -       Rounded shoulders  Skin integrity: Visible skin intact  Edema:  None noted  Sensation: -       Intact  Upper Extremity Range of Motion:   BUEs = WFL  Upper Extremity Strength:  BUEs = WFL   Strength:  BUEs = WFL    AMPAC 6 Click ADL:  AMPAC Total Score: 18    Treatment & Education:  Pt completed ADLs and func mobility activities for tx session as noted above  Pt with poor safety awareness and initially declined use of RW - very poor balance and coordination when walking towards bed - distance of ambulation limited due to balance/coordination difficulty for safety  Pt educated on role of OT and POC      Patient left HOB elevated with call button in reach, bed alarm on, and nurse notified    GOALS:   Multidisciplinary Problems       Occupational Therapy Goals          Problem: Occupational Therapy    Goal Priority Disciplines Outcome Interventions   Occupational Therapy Goal     OT, PT/OT Progressing    Description: Goals to be met by: 07/09/24     Patient will increase functional independence with ADLs by performing:    UE Dressing with Modified [place].  CAMRYN  Dressing with Modified [place].  Grooming while standing at sink with Modified [place].  Toileting from bedside commode with Modified [place] for hygiene and clothing management.   Toilet transfer to bedside commode with Modified [place].                         History:     Past Medical History:   Diagnosis Date    Brain aneurysm     CHF (congestive heart failure)     H/O coronary angioplasty     Hypercholesteremia     Hypertension     Malignant hypertension     Migraine headache     Stroke 10/2017         Past Surgical History:   Procedure Laterality Date    CARDIAC CATHETERIZATION      CEREBRAL ANGIOGRAM      CLIP LIGATION OF INTRACRANIAL ANEURYSM BY CRANIOTOMY N/A 7/15/2022    Procedure: CRANIOTOMY, WITH ANEURYSM CLIPPING;  Surgeon: Timothy Diaz MD;  Location: [facility] OR [address] [place] [address];  Service: Neurosurgery;  Laterality: N/A;  PTERIONAL CRANIOTOMY WITH CLIP LIGATION OF L PCOMM, L ANTERIOR CHOROIDAL, L MCA  ANEURYSM, ANESTHESIA: GENERAL, BLOOD: TYPE&CROSS 2 UNITS, NEUROMONITORING: SEP, MEP, EEG, RADIOLOGY: C-ARM, POSITION: SUPINE, CASTILLO, CO-SURGERON: DR. LEXI DOMÍNGUEZ.    WOUND DEBRIDEMENT  8/27/2022    Procedure: DEBRIDEMENT, WOUND;  Surgeon: Timothy Diaz MD;  Location: [facility] OR [address] [place] [address];  Service: Neurosurgery;;       Time Tracking:     OT Date of Treatment: 06/09/24  OT Start Time: 0953  OT Stop Time: 1009  OT Total Time (min): 16 min    Billable Minutes:Evaluation 16 - coeval with PT    6/9/2024

## 2024-06-09 NOTE — PLAN OF CARE
Problem: Physical Therapy  Goal: Physical Therapy Goal  6/9/2024 1120 by Nadeen Pickering, PT  Outcome: Progressing  6/9/2024 1119 by Nadeen Pickering, PT  Outcome: Progressing

## 2024-06-09 NOTE — PT/OT/SLP EVAL
Physical Therapy Evaluation    Patient Name:  Gina Jenkins   MRN:  [id number]    Recommendations:     Discharge Recommendations: Moderate Intensity Therapy   Discharge Equipment Recommendations: walker, rolling   Barriers to discharge: Decreased caregiver support    Assessment:     Gina Jenkins is a 49 y.o. female admitted with a medical diagnosis of Weakness.  She presents with the following impairments/functional limitations: weakness, impaired endurance, impaired functional mobility, gait instability, impaired balance, decreased safety awareness, decreased lower extremity function .    Rehab Prognosis: Good; patient would benefit from acute skilled PT services to address these deficits and reach maximum level of function.    Recent Surgery: * No surgery found *      Plan:     During this hospitalization, patient to be seen 5 x/week to address the identified rehab impairments via gait training, therapeutic activities, therapeutic exercises, neuromuscular re-education and progress toward the following goals:    Plan of Care Expires:  07/07/24    Subjective     Chief Complaint: there is something wrong with my legs  Patient/Family Comments/goals: get up and walk  Pain/Comfort:  Pain Rating 1: 0/10  Pain Rating Post-Intervention 1: 0/10    Patients cultural, spiritual, Adventist conflicts given the current situation:      Living Environment:  Pt lives in single story home, no steps to enter.  She lives with 3 sons  Prior to admission, patients level of function was independent without assistive device.  Equipment used at home: none.  DME owned (not currently used): none.  Upon discharge, patient will have assistance from family.    Objective:     Communicated with nurse, aCrlee,  prior to session.  Patient found up in chair with telemetry  upon PT entry to room.    General Precautions: Standard, fall  Orthopedic Precautions:    Braces: N/A  Respiratory Status: Room air    Exams:  Cognitive Exam:  Patient is  oriented to Person, Place, Time, and Situation  Gross Motor Coordination:  WFL  Sensation:    -       Intact  RLE ROM: WFL  RLE Strength: WFL  LLE ROM: grossly 4/5 t/o  LLE Strength: grossly 4/5 t/o    Functional Mobility:  Bed Mobility:     Scooting: stand by assistance  Sit to Supine: stand by assistance  Transfers:     Sit to Stand:  stand by assistance with no AD  Gait: mod A due to unsteady balance and posterior lean in standing position, pt initially refused use of rolling walker but did use for turn to approach side of bed.  Pt took 3 steps forward without AD, used AD to turn and approach bed  Balance: standing static fair minus, dynamic poor plus.  Slow to react to perturbations in standing position      AM-PAC 6 CLICK MOBILITY  Total Score:14       Treatment & Education:   PT educated patient:  PT plan of care/role of PT  Safety with OOB mobility - use of call bed for assistance  Pt  verbalized understanding      Patient left HOB elevated with call button in reach, bed alarm on, nurse Carlee notified, and recommended Samantha for safety in room .    GOALS:   Multidisciplinary Problems       Physical Therapy Goals          Problem: Physical Therapy    Goal Priority Disciplines Outcome Goal Variances Interventions   Physical Therapy Goal     PT, PT/OT Progressing                         History:     Past Medical History:   Diagnosis Date    Brain aneurysm     CHF (congestive heart failure)     H/O coronary angioplasty     Hypercholesteremia     Hypertension     Malignant hypertension     Migraine headache     Stroke 10/2017       Past Surgical History:   Procedure Laterality Date    CARDIAC CATHETERIZATION      CEREBRAL ANGIOGRAM      CLIP LIGATION OF INTRACRANIAL ANEURYSM BY CRANIOTOMY N/A 7/15/2022    Procedure: CRANIOTOMY, WITH ANEURYSM CLIPPING;  Surgeon: Timothy Diaz MD;  Location: [facility] OR [address] [place] [address];  Service: Neurosurgery;  Laterality: N/A;  PTERIONAL CRANIOTOMY WITH CLIP LIGATION OF L PCOMM, L ANTERIOR  CHOROIDAL, L MCA  ANEURYSM, ANESTHESIA: GENERAL, BLOOD: TYPE&CROSS 2 UNITS, NEUROMONITORING: SEP, MEP, EEG, RADIOLOGY: C-ARM, POSITION: SUPINE, ANNA, CO-SURGERON: DR. LEXI DOMÍNGUEZ.    WOUND DEBRIDEMENT  8/27/2022    Procedure: DEBRIDEMENT, WOUND;  Surgeon: Timothy Diaz MD;  Location: [facility] OR [address] [place] [address];  Service: Neurosurgery;;       Time Tracking:     PT Received On:    PT Start Time: 0953     PT Stop Time: 1009  PT Total Time (min): 16 min     Billable Minutes: Evaluation 16 min      06/09/2024

## 2024-06-09 NOTE — PLAN OF CARE
Problem: Occupational Therapy  Goal: Occupational Therapy Goal  Description: Goals to be met by: 07/09/24     Patient will increase functional independence with ADLs by performing:    UE Dressing with Modified [place].  LE Dressing with Modified [place].  Grooming while standing at sink with Modified [place].  Toileting from bedside commode with Modified [place] for hygiene and clothing management.   Toilet transfer to bedside commode with Modified [place].    Outcome: Progressing     Pt was agreeable to and participated in OT/PT evaluation.  Pt reports that she was independent with mobility and ADLS at [facility].  Pt completed evaluation and noted to required set up - SBA for ADLS and SBA - mod A with func mobility.  Pt noted with L facial droop and poor standing balance which required her to have increased physical assistance for short distance ambulation/mobiltiy (long distance not attempted for safety). Goals established to assist pt with returning to [facility] regarding ADLs and func mobility.  Pt will benefit from skilled OT services in order to increase her level of safety and independence with ADLs and mobility.      Serena Bishop, OT  6/9/2024

## 2024-06-09 NOTE — CONSULTS
[facility]  Psychiatry  Telepsychiatry Consult Note    Please see previous notes:    Patient agreeable to consultation via telepsychiatry.    Tele-Consultation from Psychiatry started: 6/9/2024 at 203 pm  The chief complaint leading to psychiatric consultation is: anxiety and agitation  This consultation was requested by Nae Crespo MD , the Emergency Department attending physician.  The location of the consulting psychiatrist is  [facility] .  The patient location is  [facility] TELEMETRY UNIT   The patient arrived at the ED at:  2 days ago    Also present with the patient at the time of the consultation:  The nurse came in briefly when the patient was yelling to make sure everything was okay otherwise seen alone over the telehealth wall monitor    Patient Identification:   Gina Jenkins is a 49 y.o. female.    Patient information was obtained from patient and past medical records.  Patient presented voluntarily to the Emergency Department by private vehicle.    Chief Complaint      Headache, weakness w/ facial droop and voice change x5 days. for 5 days      Subjective:   History of Present Illness:  Gina Jenkins is a 49 y.o. female who has a past medical history of Brain aneurysm, CHF (congestive heart failure), H/O coronary angioplasty, Hypercholesteremia, Hypertension, Malignant hypertension, Migraine headache, and Stroke (10/2017).. The patient presented to [facility] on 6/7/2024 with a primary complaint of back pain, headache, weakness w/ facial droop and voice change x5 days.      The patient was in their usual state of health until approximately 5 days ago when she noted that she felt diffusely weak, noticed a voice change, and had a worsening headache. She notes that she does not like spending time at the doctor's office or hospital, so attempted to rest on the couch. She noted that her symptoms persisted and required the help of her sons to complete ADLs. She noted that on the day of  admission, she attempted to stand on her own, noted that she was increasingly dizzy, and sat back down. She presented to the [facility] ED for evaluation. During this time she denies any focal strength changes in her upper and lower extremities. She notes that these episodes of weakness have presented prior but have never been this severe. She denies any knowledge of a recent viral URI but does live at home with 3 teenage sons. Denies any fevers, chills, rhinorrhea, n/v/d/c, vision changes, falls, chest pain, palpitations.     Inpatient consult to Telemedicine - Psych  Consult performed by: Judy Rg MD  Consult ordered by: Nae Crespo MD  Reason for consult: FOR ANXIETY & AGITATION      Teleconsult Time Documentation  Subjective:     History of Present Illness:  Patient is a 49-year-old woman with a history of anxiety treated with Lexapro 10 mg and migraine headache treated with Topamax 50 mg b.i.d. and amitriptyline 75 mg q.h.s. she has a history of brain aneurysm 2 of which were clipped and a 3rd unclipped.  She has seizure disorder but has been off of her seizure medicine and was restarted on Keppra for this hospitalization.  She presented with a headache pain and weakness described above.  She has been noted to be anxious and at times mildly agitated.  Interview was shorter than intended because she became acutely tearful and overwhelmed yelling loud enough that the nurse came from the nurse's station to see what was going on.  She asked to terminate the interview because she felt it was making her feel worse.  I let her know that Dr. Garcia could check in with her tomorrow to finish gathering the history.  She expressed reluctance but did not refuse.  Before getting dysregulated she admitted substantially increased anxiety since falling ill but not prior.  She is having catastrophic thoughts about her health.  She is very worried about losing control her health and having significant complications in the  "hospital.  She delayed coming in for 5 days because of those concerns and states she only came here because her son's convinced her and she relented due to their anxiety about her health status.  She has not complaining of panic attack per se.  She admits depressed mood.  She becomes tearful at that point.  She expresses irritability and feeling that she is on her last nerve.  She expresses regret for having lost her temper with 1 of the physicians yesterday and then becomes tearful and guilt-ridden about that.  Asked about suicidality she expresses intense passive suicidality but then partially retracts that stating that she is afraid of dying.  This is the point at which exam is interrupted by her becoming distraught.  She does not state any suicidal intent or plan.    Psychiatric History:   Remainder is deferred because of emotional dysregulation she is a single mom with 3 teenage sons at home and per the nurse her parents visited during the day today so there is some family support  Substance Abuse History:  Legal History:   Family Psychiatric History:   Social History:    Psychiatric Mental Status Exam:  Arousal: alert  Sensorium/Orientation: oriented to grossly intact  Behavior/Cooperation: reluctant to participate, psychomotor agitation, eye contact minimal   Speech: normal tone, normal rate, normal pitch, normal volume  Language: grossly intact  Mood: " sad and irritable and anxious "   Affect: labile  Thought Process: normal and logical  Thought Content:   Auditory hallucinations: NO  Visual hallucinations: NO  Paranoia: NO  Delusions:  NO  Suicidal ideation:  Passive suicidal ideation volunteered then retracted.  Did not voice any intent or plan  Homicidal ideation: NO  Attention/Concentration:  intact  grossly full testing deferred  Memory:  Deferred  Fund of Knowledge:  Deferred  Abstract reasoning:  deferred  Insight: limited awareness of illness   Judgment: limited      Past Medical History:   Past " Medical History:   Diagnosis Date    Brain aneurysm     CHF (congestive heart failure)     H/O coronary angioplasty     Hypercholesteremia     Hypertension     Malignant hypertension     Migraine headache     Stroke 10/2017      Laboratory Data:   Labs Reviewed   CBC W/ AUTO DIFFERENTIAL - Abnormal; Notable for the following components:       Result Value    Hematocrit 36.6 (*)     All other components within normal limits   COMPREHENSIVE METABOLIC PANEL - Abnormal; Notable for the following components:    BUN 18 (*)     Alkaline Phosphatase 153 (*)     eGFR 54.2 (*)     All other components within normal limits   URINALYSIS, REFLEX TO URINE CULTURE - Abnormal; Notable for the following components:    Protein, UA Trace (*)     Leukocytes, UA Trace (*)     All other components within normal limits    Narrative:     Preferred Collection Type->Urine, Catheterized  Specimen Source->Urine   SALICYLATE LEVEL - Abnormal; Notable for the following components:    Salicylate Lvl <5.0 (*)     All other components within normal limits   AMMONIA - Abnormal; Notable for the following components:    Ammonia <9 (*)     All other components within normal limits   URINALYSIS MICROSCOPIC - Abnormal; Notable for the following components:    WBC, UA 35 (*)     All other components within normal limits    Narrative:     Preferred Collection Type->Urine, Catheterized  Specimen Source->Urine   ISTAT PROCEDURE - Abnormal; Notable for the following components:    POC PO2 68 (*)     POC Potassium 3.0 (*)     POC Hematocrit 32 (*)     All other components within normal limits   CULTURE, URINE   ALCOHOL,MEDICAL (ETHANOL)   DRUG SCREEN PANEL, URINE EMERGENCY    Narrative:     Preferred Collection Type->Urine, Catheterized  Specimen Source->Urine   ACETAMINOPHEN LEVEL   LACTIC ACID, PLASMA   APTT   PROTIME-INR   TSH   TROPONIN I   LIPASE   MAGNESIUM   NT-PRO NATRIURETIC PEPTIDE   POCT GLUCOSE   POCT GLUCOSE MONITORING CONTINUOUS        Neurological History:  Seizures: Yes  Head trauma: Yes    Allergies:    Review of patient's allergies indicates:   Allergen Reactions    Lisinopril Swelling    Bactrim [sulfamethoxazole-trimethoprim] Rash    Ceftazidime Hives     Rash while on IV ceftazidime for planned 6 weeks.  See ED notes 9/12, 9/14 and ID clinic notes 9/16 and 9/28       Medications in ER:   Medications   acetaminophen tablet 500 mg (has no administration in time range)   amitriptyline tablet 75 mg (75 mg Oral Given 6/8/24 2145)   amLODIPine tablet 10 mg (10 mg Oral Given 6/9/24 0902)   carvediloL tablet 37.5 mg (37.5 mg Oral Given 6/9/24 0903)   EScitalopram oxalate tablet 10 mg (10 mg Oral Given 6/9/24 0903)   levETIRAcetam tablet 1,000 mg (1,000 mg Oral Given 6/9/24 0902)   losartan tablet 50 mg (50 mg Oral Given 6/9/24 0903)   topiramate tablet 50 mg (50 mg Oral Given 6/9/24 0902)   sodium chloride 0.9% flush 10 mL (has no administration in time range)   naloxone 0.4 mg/mL injection 0.02 mg (has no administration in time range)   glucose chewable tablet 16 g (has no administration in time range)   glucose chewable tablet 24 g (has no administration in time range)   glucagon (human recombinant) injection 1 mg (has no administration in time range)   heparin (porcine) injection 5,000 Units (5,000 Units Subcutaneous Not Given 6/9/24 1315)   acetaminophen tablet 650 mg (has no administration in time range)   dextrose 10% bolus 125 mL 125 mL (has no administration in time range)   dextrose 10% bolus 250 mL 250 mL (has no administration in time range)   oxyCODONE immediate release tablet 5 mg (5 mg Oral Given 6/8/24 0612)   atorvastatin tablet 80 mg (80 mg Oral Given 6/9/24 0902)   iohexoL (OMNIPAQUE 350) injection 100 mL (100 mLs Intravenous Given 6/7/24 2103)   cefTRIAXone (Rocephin) 1 g in dextrose 5 % in water (D5W) 100 mL IVPB (MB+) (0 g Intravenous Stopped 6/7/24 2214)   aspirin tablet 325 mg (325 mg Oral Given 6/7/24 2221)   potassium  chloride SA CR tablet 20 mEq (20 mEq Oral Given 6/8/24 1334)   LORazepam tablet 1 mg (1 mg Oral Given 6/8/24 1856)   gadobutroL (GADAVIST) injection 7.5 mL (7.5 mLs Intravenous Given 6/8/24 2029)       Medications at home:  Lexapro 10 mg daily    No new subjective & objective note has been filed under this hospital service since the last note was generated.      Assessment - Diagnosis - Goals:     Diagnosis/Impression:  Major depression moderate and unspecified anxiety disorder.  This is a preliminary diagnosis exam was limited today because the patient became acutely upset    Rec:   Recommend reconsult Dr. Garcia tomorrow for further evaluation  Increase Lexapro to 20 mg today to start    Time with patient: 40 m8in      More than 50% of the time was spent counseling/coordinating care    Consulting clinician was informed of the encounter and consult note.    Consultation ended: 6/9/2024 at 306 pm    uJdy Rg MD   Psychiatry  [facility]

## 2024-06-09 NOTE — PLAN OF CARE
Problem: SLP  Goal: SLP Goal  Description: Short Term Goals:  1. Pt will participate in a clinical swallow eval to determine least restrictive diet.-MET  2. Pt will safely tolerate >75% of Regular PO diet with no overt s/s of aspiration.  3. Pt will participate in informal speech-language and cognitive eval to r/o related deficits.    Outcome: Progressing   6/9/24:  Pt participated in clinical swallow eval this AM. Pt tolerated thin liquids, puree, and hard solid textures with no overt s/s of aspiration, at bedside. Pt known hx of brain aneurysm with residual language and cognitive deficits. Language and cognitive deficits still noted at bedside; mother reporting change in speech initially; however, noted with some improvement since admission, though not at baseline. Mildly reduced articulatory precision with fair rate of speech noted; speech intelligibility judged to be >90%. SLP will f/u x1-2 to assess speech-language and cognition, as warranted.     SLP recs: Continue Regular PO diet with standard swallow precautions (upright at 90 degrees, alternate sips/bites, 1 bite/sip at a time, remain upright for 30 mins after meals, whole meds 1 by, etc.)

## 2024-06-09 NOTE — PROGRESS NOTES
[facility] progress note:    Gina Jenkins  1975  [id number]    Subjective:  Gina Jenkins is a 49 y.o. female with past medical history of cerebral aneurysms including Left posterior communicating and left anterior choroidal artery aneurysms requiring a craniotomy for clipping 7/2022 c/b seizures, CHF (congestive heart failure), H/O coronary angioplasty, HLD, HTN, Headache, Tobacco use who presents for facial droop, generalized weakness and headache. Neurology consulted for assistance w/ workup    Today the patient states that she has no headache but still has the lightheadedness when she stands up. Frustrated when discussed imaging finding that do not completely explain her presentation    ROS: As per subjective    ROS:   12pt ROS negative other then mentioned above    Histories:     Allergies:  Lisinopril, Bactrim [sulfamethoxazole-trimethoprim], and Ceftazidime    Current Medications:    Current Facility-Administered Medications   Medication Dose Route Frequency Provider Last Rate Last Admin    acetaminophen tablet 500 mg  500 mg Oral Q6H PRN Niles Zamora MD        acetaminophen tablet 650 mg  650 mg Oral Q4H PRN Niles Zamora MD        amitriptyline tablet 75 mg  75 mg Oral QHS Niles Zamora MD   75 mg at 06/08/24 2145    amLODIPine tablet 10 mg  10 mg Oral Daily Niles Zamora MD   10 mg at 06/09/24 0902    atorvastatin tablet 80 mg  80 mg Oral Daily iNles Zamora MD   80 mg at 06/09/24 0902    carvediloL tablet 37.5 mg  37.5 mg Oral BID Niles Zamora MD   37.5 mg at 06/09/24 0903    dextrose 10% bolus 125 mL 125 mL  12.5 g Intravenous PRN Niles Zamora MD        dextrose 10% bolus 250 mL 250 mL  25 g Intravenous PRN Nlies Zamora MD        EScitalopram oxalate tablet 10 mg  10 mg Oral Daily Niles Zamora MD   10 mg at 06/09/24 0903    glucagon (human recombinant) injection 1 mg  1 mg Intramuscular PRN Niles Zamora MD        glucose chewable tablet 16 g  16 g Oral PRN Sera  MD Niles        glucose chewable tablet 24 g  24 g Oral PRN Niles Zamora MD        heparin (porcine) injection 5,000 Units  5,000 Units Subcutaneous Q8H Niles Zamora MD   5,000 Units at 06/09/24 0637    levETIRAcetam tablet 1,000 mg  1,000 mg Oral BID Niles Zamora MD   1,000 mg at 06/09/24 0902    losartan tablet 50 mg  50 mg Oral Daily Niles Zamora MD   50 mg at 06/09/24 0903    naloxone 0.4 mg/mL injection 0.02 mg  0.02 mg Intravenous PRN Niles Zamora MD        oxyCODONE immediate release tablet 5 mg  5 mg Oral Q6H PRN Niles Zamora MD   5 mg at 06/08/24 0612    sodium chloride 0.9% flush 10 mL  10 mL Intravenous Q12H PRN Niles Zamora MD        topiramate tablet 50 mg  50 mg Oral BID Niles Zamora MD   50 mg at 06/09/24 0902         Past Medical/Surgical/Family/Social History:  PMHx:   Past Medical History:   Diagnosis Date    Brain aneurysm     CHF (congestive heart failure)     H/O coronary angioplasty     Hypercholesteremia     Hypertension     Malignant hypertension     Migraine headache     Stroke 10/2017      Surgeries:   Past Surgical History:   Procedure Laterality Date    CARDIAC CATHETERIZATION      CEREBRAL ANGIOGRAM      CLIP LIGATION OF INTRACRANIAL ANEURYSM BY CRANIOTOMY N/A 7/15/2022    Procedure: CRANIOTOMY, WITH ANEURYSM CLIPPING;  Surgeon: Timothy Diaz MD;  Location: [facility] OR [address] [place] [address];  Service: Neurosurgery;  Laterality: N/A;  PTERIONAL CRANIOTOMY WITH CLIP LIGATION OF L PCOMM, L ANTERIOR CHOROIDAL, L MCA  ANEURYSM, ANESTHESIA: GENERAL, BLOOD: TYPE&CROSS 2 UNITS, NEUROMONITORING: SEP, MEP, EEG, RADIOLOGY: C-ARM, POSITION: SUPINE, ANNA, CO-SURGERON: DR. LEXI DOMÍNGUEZ.    WOUND DEBRIDEMENT  8/27/2022    Procedure: DEBRIDEMENT, WOUND;  Surgeon: Timothy Diaz MD;  Location: [facility] OR [address] [place] [address];  Service: Neurosurgery;;      Family  Hx: No family history on file.   Social Hx:   Social History     Tobacco Use    Smoking status: Every Day     Current packs/day: 0.50      Types: Cigarettes    Smokeless tobacco: Never   Substance Use Topics    Alcohol use: Yes     Comment: occassionally    Drug use: No       Current Evaluation:     Vital Signs:   Vitals:    06/09/24 1105   BP: 123/71   Pulse: 72   Resp: 16   Temp: 96.1 °F (35.6 °C)          General Exam  No apparent distress  Orientation  Alert, awake, oriented to self, place, time, and situation.  Memory  Recent and remote memory intact.  Language  Fluent speech. No dysarthria, No aphasia.   Cranial Nerves  PERRL,  EOMI, V1-V3 intact, symmetric facial expression(w/ speech activates R side of face but no true weakness appreciated), hearing grossly intact, SCM & TPZ 5/5, tongue midline, symmetric palate elevation.  Motor  Normal Bulk, Normal Tone  Right Upper Extremity: Normal 5/5 strength  Left Upper Extremity: Normal 5/5 strength  Right Lower Extremity: Normal 5/5 strength  Left Lower Extremity: Normal 5/5 strength  Sensory  Normal to light touch throughout  Cerebellar/Gait  Normal finger to nose b/l  Not attempted gait today      LABORATORY STUDIES:  Labs:  Recent Labs   Lab 06/07/24 1951 06/07/24 2004 06/08/24 0233 06/09/24  0206   WBC 6.33  --  6.18 5.20   HGB 12.8  --  12.4 11.6*   HCT 36.6* 32* 35.4* 34.0*     --  295 276   MCV 86  --  85 88       Recent Labs   Lab 06/07/24 1951 06/08/24 0233 06/09/24  0206    138 141   K 3.7 2.8* 3.6    103 107   CO2 24 24 22*   BUN 18* 16 20   GLU 91 114* 99   CALCIUM 9.5 9.5 9.5   PROT 7.9 6.8 6.5   ALBUMIN 4.3 3.1* 3.1*   BILITOT 0.3 0.2 0.2   AST 36 20 32   ALKPHOS 153* 133 141*   ALT 20 12 19       Recent Labs   Lab 06/07/24 1951   INR 0.9       Recent Labs   Lab 06/07/24 1951 06/08/24 0233 06/09/24  0206   GLU 91 114* 99       Urine:   Lab Results   Component Value Date    LABURIN  01/07/2021     Multiple organisms isolated. None in predominance.  Repeat if    LABURIN clinically necessary. 01/07/2021    SPECGRAV 1.015 06/07/2024    NITRITE Negative 06/07/2024     KETONESU Negative 06/07/2024    UROBILINOGEN Negative 06/07/2024    WBCUA 35 (H) 06/07/2024       Recent Labs   Lab 06/08/24  0233   LDLCALC 127.8       Thyroid:   Recent Labs   Lab 06/07/24 1951   TSH 1.570       FLP:   Recent Labs   Lab 06/08/24 0233   CHOL 241*   HDL 43   LDLCALC 127.8   TRIG 351*   CHOLHDL 17.8*       Cardiac markers:  Recent Labs   Lab 06/07/24 1951   TROPONINI <0.012       RADIOLOGY STUDIES:    MRI Brain 6/8:  1.  No MR evidence of acute infarction.     2.  Postoperative changes in the left frontal and temporal calvarium with encephalomalacia in the left temporal pole.     3.  Nonspecific T2/FLAIR signal hyperintensities within the periventricular and subcortical white matter.      CTA head and neck 6/7:  Resolution of gas from prior procedure the intracranial region. Stable postoperative changes of left MCA aneurysmal clipping. Resolution of the right maxillary sinus opacification. Otherwise stable exam     MRI full spine 6/8:   Multilevel degenerative changes cervical spine as detailed above     No evidence for cord signal abnormality or abnormal intrathecal enhancement to suggest sequela of cord demyelination     Partially empty sella included in the field of view intracranial hypertension be included in differential in appropriate clinical setting      OTHER STUDIES/PROCEDURES:  TTE per cards wo concerning findings. Report pending      Assessment/Plan:     Gina Jenkins is a 49 y.o. female with past medical history of cerebral aneurysms including Left posterior communicating and left anterior choroidal artery aneurysms requiring a craniotomy for clipping 7/2022 c/b seizures, CHF (congestive heart failure), H/O coronary angioplasty, HLD, HTN, Headache, Tobacco use who presents for facial droop, generalized weakness and headache. Neurology consulted for assistance in workup    #Stroke workup for weakness, ?dysarthria and facial droop   - Given CVA and aneurysms obtained imaging as part  of stroke workup despite low concern given lack of deficits on exam. CTA findings stable at this time.   - No new stroke on imaging  - Spine imaging wo cord compression or other acute findings  - functional component to facial weakness. Psyche consult recommended  - Obtain NYDIA/KARIS, B12  - RPR, HIV, Folate (previously unremarkable)  - Given hx of seizures and structural lesion would obtain EEG while inpatient  - Discussed cessation of tobacco use as pt smokes 0.5 pack a day    #headache and lightheadedness  - lightheadedness is positional wo focal sensory and motor findings including coordination deficit on FNF exam.   - Spine imaging wo findings that could explain pt's symptoms but radiology noted partially empty sella. At this time pt's clinical symptoms not suggestive of IIH but would monitor clinically (HA description, no vision changes, no photosensitivity, no whooshing sound)  - Would obtain orthostatics and hold amitriptyline as it could be contributing  - UDS negative    #seizure hx  - Pt is not taking ASMs and says she had seizure about 3 month ago.   - EEG Inpatient as above  - Restarted Keppra 1g BID yesterday    Differential diagnosis was explained to the patient. All questions were answered.    Will follow for additional input regarding management of current neurologic condition.  Will monitor for any new neurologic signs/symptoms or any neurologic detrimental changes.     Case discussed with Dr. Gusman    Electronically signed by:   Penny Payton MD   6/9/2024 12:10 PM

## 2024-06-09 NOTE — PLAN OF CARE
[facility] POC note:    See progress note for details    Penny Payton MD  [facility] HO - IV

## 2024-06-09 NOTE — PT/OT/SLP EVAL
Speech Language Pathology Evaluation  Bedside Swallow    Patient Name:  Gina Jenkins   MRN:  [id number]  Admitting Diagnosis: Weakness    Recommendations:                 General Recommendations:  Speech language evaluation and Cognitive-linguistic evaluation  Diet recommendations:  Regular Diet - IDDSI Level 7, Thin   Aspiration Precautions: 1 bite/sip at a time, Alternating bites/sips, Feed only when awake/alert, Frequent oral care, HOB to 90 degrees, Meds whole 1 at a time, Monitor for s/s of aspiration, Remain upright 30 minutes post meal, Small bites/sips, and Standard aspiration precautions   General Precautions: Standard, fall  Communication strategies:  provide increased time to answer    Assessment:     Gina Jenkins is a 49 y.o. female with an SLP diagnosis of Aphasia and Cognitive-Linguistic Impairment. Oral and pharyngeal phases of the swallow judged to be WFL - no overt s/s of dcr'd airway protection seen at bedside. Pt with known hx of brain aneurysm with residual language and cognitive deficits. Language and cognitive deficits persist at bedside; though mother reporting change in speech upon admit. However, mother noted some improvement since admission, though not at baseline. Mildly reduced articulatory precision with fair rate of speech and speech intelligibility judged to be >90%, per subjective observation. SLP will f/u x1-2 to assess speech-language and cognition, as warranted.     History:     HPI: Gina Jenkins is a 49 y.o. female who has a past medical history of Brain aneurysm, CHF (congestive heart failure), H/O coronary angioplasty, Hypercholesteremia, Hypertension, Malignant hypertension, Migraine headache, and Stroke (10/2017). The patient presented to [facility] on [facility]/7/2024 with a primary complaint of back pain, headache, weakness w/ facial droop and voice change x5 days. Admitted to [facility] for evaluation of headaches, facial droops, and diffuse weakness with neurology consulted  "      Past Medical History:   Diagnosis Date    Brain aneurysm     CHF (congestive heart failure)     H/O coronary angioplasty     Hypercholesteremia     Hypertension     Malignant hypertension     Migraine headache     Stroke 10/2017       Past Surgical History:   Procedure Laterality Date    CARDIAC CATHETERIZATION      CEREBRAL ANGIOGRAM      CLIP LIGATION OF INTRACRANIAL ANEURYSM BY CRANIOTOMY N/A 7/15/2022    Procedure: CRANIOTOMY, WITH ANEURYSM CLIPPING;  Surgeon: Timothy Diaz MD;  Location: [facility] OR [address] [place] [address];  Service: Neurosurgery;  Laterality: N/A;  PTERIONAL CRANIOTOMY WITH CLIP LIGATION OF L PCOMM, L ANTERIOR CHOROIDAL, L MCA  ANEURYSM, ANESTHESIA: GENERAL, BLOOD: TYPE&CROSS 2 UNITS, NEUROMONITORING: SEP, MEP, EEG, RADIOLOGY: C-ARM, POSITION: SUPINE, ANNA CO-SURGERON: DR. LEXI DOMÍNGUEZ.    WOUND DEBRIDEMENT  8/27/2022    Procedure: DEBRIDEMENT, WOUND;  Surgeon: Timothy Diaz MD;  Location: [address];  Service: Neurosurgery;;       Social History: Patient lives at home with 3 sons.    Prior Intubation HX:  N/A    Modified Barium Swallow: None on file per EMR    Chest X-Rays: No acute abnormality.       MRI Brain without Contrast: 1.  No MR evidence of acute infarction.     2.  Postoperative changes in the left frontal and temporal calvarium with encephalomalacia in the left temporal pole.     3.  Nonspecific T2/FLAIR signal hyperintensities within the periventricular and subcortical white matter.     Prior diet: Regular PO diet.       Subjective     SLP consulted - pt found in bed awake and alert with parents at bedside. Mother reported acute change in speech production c/b slowed and effortful and reduced intelligibility. However, pt noted with fairly timely rate of speech and mildly reduced artic precision and >90% intelligibility. Mother reports improvement since admission but not baseline.    Patient goals: "I used to get 'em but they never been this bad, like hurting this much" re: " "migraines.      Pain/Comfort:  Pain Rating 1: 0/10    Respiratory Status: Room air    Objective:   Cognition/Language: Awake, alert, reduced sustained attention and demonstrated episodes of slower processing and initiation throughout session. Oriented to name, , general place,  and general reasoning for current admission, but unable to name "migraines" but pointed to head and provided basic description of pain. Able to follow all commands and demo'd word finding difficulty/used very general terms when describing events or concerns and during simple conversational tasks -- appeared to rely on mother to respond to more complex questions.  SLP to further assess speech-language and cognition in upcoming sessions, as warranted.      Oral Musculature Evaluation  Oral Musculature: WFL  Dentition: present and adequate  Mucosal Quality: good  Mandibular Strength and Mobility: WFL  Oral Labial Strength and Mobility: WFL  Lingual Strength and Mobility: WFL  Buccal Strength and Mobility: WFL  Volitional Swallow:  (timely swallow with adequate laryngeal elevation/excursion, per hand palpation)  Voice Prior to PO Intake:  (clear voice with approrpiate volume)    Bedside Swallow Eval:   Consistencies Assessed:  Thin liquids - water via cup sips (~5-6oz)  Puree - pudding via tsp bites (entire pudding cup)  Solids - pieces of karo cracker x4      Oral Phase:   WFL    Pharyngeal Phase:   no overt clinical signs/symptoms of aspiration  no overt clinical signs/symptoms of pharyngeal dysphagia    Compensatory Strategies  None    Treatment: SLP will continue to follow and treat -  will f/u x1-2 to assess speech-language and cognition, as warranted.     Goals:   Multidisciplinary Problems       SLP Goals          Problem: SLP    Goal Priority Disciplines Outcome   SLP Goal     SLP Progressing   Description: Short Term Goals:  1. Pt will participate in a clinical swallow eval to determine least restrictive diet.-MET  2. Pt will safely " tolerate >75% of Regular PO diet with no overt s/s of aspiration.  3. Pt will participate in informal speech-language and cognitive eval to r/o related deficits.                         Plan:     Patient to be seen:  2 x/week   Plan of Care expires:  07/09/24  Plan of Care reviewed with:  patient, family   SLP Follow-Up:  Yes       Discharge recommendations:   (TBD pending ongoing assessment for SLP)   Barriers to Discharge:  None    Time Tracking:     SLP Treatment Date:   06/09/24  Speech Start Time:  1040  Speech Stop Time:  1049     Speech Total Time (min):  9 min    Billable Minutes: Eval Swallow and Oral Function 9    06/09/2024

## 2024-06-09 NOTE — PLAN OF CARE
Physical Therapy     Referred by: David Contreras DO; Medical Diagnosis (from order):    Diagnosis Information      Diagnosis    721.3 (ICD-9-CM) - M47.817 (ICD-10-CM) - Lumbosacral spondylosis without myelopathy                Daily Treatment Note    Visit:  3     SUBJECTIVE                                                                                                               States starting last evening he had increased pain on the left mid back region.  Pain / Symptoms:  Pain rating (out of 10): Current: 4     OBJECTIVE                                                                                                                        TREATMENT                                                                                                                  Manual Therapy:  R sidelying: thoracolumbar paraspinal STM B, T5-L1 PA gr III, lumbar rotation mobilization through hip rocking  MFR to thoracic and lumbar   Supine distraction on left LE    Skilled input: verbal instruction/cues and tactile instruction/cues    Writer verbally educated and received verbal consent for hand placement, positioning of patient, and techniques to be performed today from patient for therapist position for techniques and hand placement and palpation for techniques as described above and how they are pertinent to the patient's plan of care.       ASSESSMENT                                                                                                             Therapy was focused on manual techniques to help reduce low back symptoms.  His symptoms were mainly located on the left mid back region and techniques were mostly concentrated that area.  Pain/symptoms after session (out of 10): 5  Patient Education:   Results of above outlined education: Verbalizes understanding      PLAN                                                                                                                           Suggestions for next session  Plan of care , labs , orders and progress notes reviewed.   as indicated: Progress per plan of care         Therapy procedure time and total treatment time can be found documented on the Time Entry flowsheet

## 2024-06-09 NOTE — PLAN OF CARE
Problem: Fall Injury Risk  Goal: Absence of Fall and Fall-Related Injury  Outcome: Progressing  Intervention: Identify and Manage Contributors  Flowsheets (Taken 6/9/2024 1538)  Self-Care Promotion:   BADL personal routines maintained   meal set-up provided   BADL personal objects within reach

## 2024-06-10 ENCOUNTER — CLINICAL SUPPORT (OUTPATIENT)
Dept: SMOKING CESSATION | Facility: CLINIC | Age: 49
End: 2024-06-10
Payer: COMMERCIAL

## 2024-06-10 DIAGNOSIS — F17.210 CIGARETTE SMOKER: Primary | ICD-10-CM

## 2024-06-10 LAB
ALBUMIN SERPL BCP-MCNC: 3 G/DL (ref 3.5–5.2)
ALP SERPL-CCNC: 131 U/L (ref 55–135)
ALT SERPL W/O P-5'-P-CCNC: 19 U/L (ref 10–44)
ANA SER QL IF: NORMAL
ANION GAP SERPL CALC-SCNC: 11 MMOL/L (ref 8–16)
ANION GAP SERPL CALC-SCNC: 11 MMOL/L (ref 8–16)
AST SERPL-CCNC: 25 U/L (ref 10–40)
BASOPHILS # BLD AUTO: 0.05 K/UL (ref 0–0.2)
BASOPHILS NFR BLD: 0.9 % (ref 0–1.9)
BILIRUB SERPL-MCNC: 0.2 MG/DL (ref 0.1–1)
BUN SERPL-MCNC: 16 MG/DL (ref 6–20)
BUN SERPL-MCNC: 17 MG/DL (ref 6–20)
CALCIUM SERPL-MCNC: 9.3 MG/DL (ref 8.7–10.5)
CALCIUM SERPL-MCNC: 9.3 MG/DL (ref 8.7–10.5)
CHLORIDE SERPL-SCNC: 107 MMOL/L (ref 95–110)
CHLORIDE SERPL-SCNC: 108 MMOL/L (ref 95–110)
CK SERPL-CCNC: 91 U/L (ref 20–180)
CO2 SERPL-SCNC: 20 MMOL/L (ref 23–29)
CO2 SERPL-SCNC: 22 MMOL/L (ref 23–29)
CREAT SERPL-MCNC: 1.5 MG/DL (ref 0.5–1.4)
CREAT SERPL-MCNC: 1.6 MG/DL (ref 0.5–1.4)
DIFFERENTIAL METHOD BLD: ABNORMAL
EOSINOPHIL # BLD AUTO: 0.4 K/UL (ref 0–0.5)
EOSINOPHIL NFR BLD: 7.2 % (ref 0–8)
ERYTHROCYTE [DISTWIDTH] IN BLOOD BY AUTOMATED COUNT: 13.3 % (ref 11.5–14.5)
EST. GFR  (NO RACE VARIABLE): 40 ML/MIN/1.73 M^2
EST. GFR  (NO RACE VARIABLE): 43 ML/MIN/1.73 M^2
GLUCOSE SERPL-MCNC: 103 MG/DL (ref 70–110)
GLUCOSE SERPL-MCNC: 96 MG/DL (ref 70–110)
HCT VFR BLD AUTO: 32.9 % (ref 37–48.5)
HGB BLD-MCNC: 11.6 G/DL (ref 12–16)
HIV 1+2 AB+HIV1 P24 AG SERPL QL IA: NORMAL
IMM GRANULOCYTES # BLD AUTO: 0.02 K/UL (ref 0–0.04)
IMM GRANULOCYTES NFR BLD AUTO: 0.4 % (ref 0–0.5)
LACTATE SERPL-SCNC: 2.1 MMOL/L (ref 0.5–2.2)
LEVETIRACETAM SERPL-MCNC: <1 UG/ML (ref 3–60)
LYMPHOCYTES # BLD AUTO: 2.4 K/UL (ref 1–4.8)
LYMPHOCYTES NFR BLD: 43.5 % (ref 18–48)
MAGNESIUM SERPL-MCNC: 1.9 MG/DL (ref 1.6–2.6)
MAGNESIUM SERPL-MCNC: 2 MG/DL (ref 1.6–2.6)
MCH RBC QN AUTO: 30.5 PG (ref 27–31)
MCHC RBC AUTO-ENTMCNC: 35.3 G/DL (ref 32–36)
MCV RBC AUTO: 87 FL (ref 82–98)
MONOCYTES # BLD AUTO: 0.5 K/UL (ref 0.3–1)
MONOCYTES NFR BLD: 9 % (ref 4–15)
NEUTROPHILS # BLD AUTO: 2.2 K/UL (ref 1.8–7.7)
NEUTROPHILS NFR BLD: 39 % (ref 38–73)
NRBC BLD-RTO: 0 /100 WBC
PHOSPHATE SERPL-MCNC: 3.4 MG/DL (ref 2.7–4.5)
PLATELET # BLD AUTO: 277 K/UL (ref 150–450)
PMV BLD AUTO: 10.2 FL (ref 9.2–12.9)
POCT GLUCOSE: 141 MG/DL (ref 70–110)
POTASSIUM SERPL-SCNC: 3.6 MMOL/L (ref 3.5–5.1)
POTASSIUM SERPL-SCNC: 4.1 MMOL/L (ref 3.5–5.1)
PROT SERPL-MCNC: 6.4 G/DL (ref 6–8.4)
RBC # BLD AUTO: 3.8 M/UL (ref 4–5.4)
SODIUM SERPL-SCNC: 139 MMOL/L (ref 136–145)
SODIUM SERPL-SCNC: 140 MMOL/L (ref 136–145)
TREPONEMA PALLIDUM IGG+IGM AB [PRESENCE] IN SERUM OR PLASMA BY IMMUNOASSAY: NONREACTIVE
VIT B12 SERPL-MCNC: 440 PG/ML (ref 210–950)
WBC # BLD AUTO: 5.58 K/UL (ref 3.9–12.7)

## 2024-06-10 PROCEDURE — 99223 1ST HOSP IP/OBS HIGH 75: CPT | Mod: ,,, | Performed by: NEUROLOGICAL SURGERY

## 2024-06-10 PROCEDURE — 36415 COLL VENOUS BLD VENIPUNCTURE: CPT

## 2024-06-10 PROCEDURE — 99900035 HC TECH TIME PER 15 MIN (STAT)

## 2024-06-10 PROCEDURE — 25000003 PHARM REV CODE 250

## 2024-06-10 PROCEDURE — 80053 COMPREHEN METABOLIC PANEL: CPT

## 2024-06-10 PROCEDURE — 97530 THERAPEUTIC ACTIVITIES: CPT | Mod: CO

## 2024-06-10 PROCEDURE — 83735 ASSAY OF MAGNESIUM: CPT | Mod: 91

## 2024-06-10 PROCEDURE — 63600175 PHARM REV CODE 636 W HCPCS

## 2024-06-10 PROCEDURE — 85025 COMPLETE CBC W/AUTO DIFF WBC: CPT

## 2024-06-10 PROCEDURE — 80048 BASIC METABOLIC PNL TOTAL CA: CPT | Mod: XB

## 2024-06-10 PROCEDURE — 99232 SBSQ HOSP IP/OBS MODERATE 35: CPT | Mod: 95,,, | Performed by: PSYCHIATRY & NEUROLOGY

## 2024-06-10 PROCEDURE — 92507 TX SP LANG VOICE COMM INDIV: CPT

## 2024-06-10 PROCEDURE — 94761 N-INVAS EAR/PLS OXIMETRY MLT: CPT

## 2024-06-10 PROCEDURE — 83605 ASSAY OF LACTIC ACID: CPT

## 2024-06-10 PROCEDURE — 83735 ASSAY OF MAGNESIUM: CPT

## 2024-06-10 PROCEDURE — 84100 ASSAY OF PHOSPHORUS: CPT

## 2024-06-10 PROCEDURE — 96372 THER/PROPH/DIAG INJ SC/IM: CPT

## 2024-06-10 PROCEDURE — 11000001 HC ACUTE MED/SURG PRIVATE ROOM

## 2024-06-10 PROCEDURE — 82550 ASSAY OF CK (CPK): CPT

## 2024-06-10 PROCEDURE — 99407 BEHAV CHNG SMOKING > 10 MIN: CPT | Mod: S$GLB,,,

## 2024-06-10 RX ORDER — FAMOTIDINE 20 MG/1
20 TABLET, FILM COATED ORAL DAILY
Status: DISCONTINUED | OUTPATIENT
Start: 2024-06-10 | End: 2024-06-12 | Stop reason: HOSPADM

## 2024-06-10 RX ORDER — AMITRIPTYLINE HYDROCHLORIDE 25 MG/1
50 TABLET, FILM COATED ORAL NIGHTLY
Status: DISCONTINUED | OUTPATIENT
Start: 2024-06-10 | End: 2024-06-10

## 2024-06-10 RX ORDER — LEVETIRACETAM 500 MG/1
1500 TABLET ORAL 2 TIMES DAILY
Status: DISCONTINUED | OUTPATIENT
Start: 2024-06-10 | End: 2024-06-10

## 2024-06-10 RX ORDER — LEVETIRACETAM 5 MG/ML
500 INJECTION INTRAVASCULAR ONCE
Status: COMPLETED | OUTPATIENT
Start: 2024-06-10 | End: 2024-06-10

## 2024-06-10 RX ORDER — AMITRIPTYLINE HYDROCHLORIDE 25 MG/1
25 TABLET, FILM COATED ORAL NIGHTLY
Status: COMPLETED | OUTPATIENT
Start: 2024-06-11 | End: 2024-06-11

## 2024-06-10 RX ORDER — LEVETIRACETAM 15 MG/ML
1500 INJECTION INTRAVASCULAR EVERY 12 HOURS
Status: DISCONTINUED | OUTPATIENT
Start: 2024-06-10 | End: 2024-06-12

## 2024-06-10 RX ORDER — LORAZEPAM 2 MG/ML
2 INJECTION INTRAMUSCULAR
Status: DISCONTINUED | OUTPATIENT
Start: 2024-06-10 | End: 2024-06-12 | Stop reason: HOSPADM

## 2024-06-10 RX ORDER — AMITRIPTYLINE HYDROCHLORIDE 25 MG/1
50 TABLET, FILM COATED ORAL NIGHTLY
Status: COMPLETED | OUTPATIENT
Start: 2024-06-10 | End: 2024-06-10

## 2024-06-10 RX ORDER — LORAZEPAM 2 MG/ML
INJECTION INTRAMUSCULAR
Status: COMPLETED
Start: 2024-06-10 | End: 2024-06-10

## 2024-06-10 RX ORDER — LORAZEPAM 2 MG/ML
2 INJECTION INTRAMUSCULAR ONCE
Status: DISCONTINUED | OUTPATIENT
Start: 2024-06-10 | End: 2024-06-10

## 2024-06-10 RX ORDER — ESCITALOPRAM OXALATE 10 MG/1
20 TABLET ORAL DAILY
Status: DISCONTINUED | OUTPATIENT
Start: 2024-06-10 | End: 2024-06-12 | Stop reason: HOSPADM

## 2024-06-10 RX ADMIN — HEPARIN SODIUM 5000 UNITS: 5000 INJECTION INTRAVENOUS; SUBCUTANEOUS at 05:06

## 2024-06-10 RX ADMIN — ACETAMINOPHEN 650 MG: 325 TABLET ORAL at 09:06

## 2024-06-10 RX ADMIN — LEVETIRACETAM INJECTION 1500 MG: 15 INJECTION INTRAVENOUS at 09:06

## 2024-06-10 RX ADMIN — FAMOTIDINE 20 MG: 20 TABLET ORAL at 12:06

## 2024-06-10 RX ADMIN — ACETAMINOPHEN 650 MG: 325 TABLET ORAL at 08:06

## 2024-06-10 RX ADMIN — FAMOTIDINE 20 MG: 20 TABLET ORAL at 08:06

## 2024-06-10 RX ADMIN — LORAZEPAM 2 MG: 2 INJECTION INTRAMUSCULAR; INTRAVENOUS at 03:06

## 2024-06-10 RX ADMIN — LEVETIRACETAM 1000 MG: 500 TABLET, FILM COATED ORAL at 08:06

## 2024-06-10 RX ADMIN — AMITRIPTYLINE HYDROCHLORIDE 50 MG: 25 TABLET, FILM COATED ORAL at 09:06

## 2024-06-10 RX ADMIN — TOPIRAMATE 50 MG: 25 TABLET, FILM COATED ORAL at 08:06

## 2024-06-10 RX ADMIN — AMLODIPINE BESYLATE 10 MG: 5 TABLET ORAL at 08:06

## 2024-06-10 RX ADMIN — ESCITALOPRAM OXALATE 20 MG: 10 TABLET ORAL at 08:06

## 2024-06-10 RX ADMIN — LOSARTAN POTASSIUM 50 MG: 50 TABLET, FILM COATED ORAL at 08:06

## 2024-06-10 RX ADMIN — HEPARIN SODIUM 5000 UNITS: 5000 INJECTION INTRAVENOUS; SUBCUTANEOUS at 09:06

## 2024-06-10 RX ADMIN — LEVETIRACETAM INJECTION 500 MG: 5 INJECTION INTRAVENOUS at 03:06

## 2024-06-10 RX ADMIN — ATORVASTATIN CALCIUM 80 MG: 40 TABLET, FILM COATED ORAL at 08:06

## 2024-06-10 RX ADMIN — TOPIRAMATE 50 MG: 25 TABLET, FILM COATED ORAL at 09:06

## 2024-06-10 RX ADMIN — CARVEDILOL 37.5 MG: 25 TABLET, FILM COATED ORAL at 09:06

## 2024-06-10 RX ADMIN — HEPARIN SODIUM 5000 UNITS: 5000 INJECTION INTRAVENOUS; SUBCUTANEOUS at 02:06

## 2024-06-10 RX ADMIN — CARVEDILOL 37.5 MG: 25 TABLET, FILM COATED ORAL at 08:06

## 2024-06-10 NOTE — PT/OT/SLP PROGRESS
"Speech Language Pathology Treatment    Patient Name:  Gina Jenkins   MRN:  [id number]  Admitting Diagnosis: Weakness    Recommendations:                 Diet recommendations:  Regular Diet - IDDSI Level 7, Thin   Aspiration Precautions: 1 bite/sip at a time, Alternating bites/sips, Feed only when awake/alert, Frequent oral care, HOB to 90 degrees, Meds whole 1 at a time, Monitor for s/s of aspiration, Remain upright 30 minutes post meal, Small bites/sips, and Standard aspiration precautions   General Precautions: Standard, fall  Communication strategies:  provide increased time to answer    Assessment:     Gina Jenkins is a 49 y.o. female admitted with weakness who presents with timely swallow function, baseline reduced response time is noted.     Subjective     Pt found in room.   Patient goals: "I really just need to sleep, everyone keeps coming in here."     Pain/Comfort:  Pain Rating 1: 0/10    Respiratory Status: room air     Objective:     Has the patient been evaluated by SLP for swallowing?   Yes  Keep patient NPO? No     Cognition/Communication: Pt seen in room, pt requires some coaxing to participate. Pt reports that she is tired and has already worked with someone in therapy. Explained my role at this time and reason for visit. Pt nodded in agreement. Pt oriented to self, place and year. Pt aware that she had breakfast this am and able to state items sent on tray.   Pt responded to general ?s with 85%acc. Pt's speech is more intelligible and able to be understood when expressing self.   Pt completed general category naming with min cues. Pt again asking if she can go back to sleep. Per patient request, lights turned off and door closed.    Goals:   Multidisciplinary Problems       SLP Goals          Problem: SLP    Goal Priority Disciplines Outcome   SLP Goal     SLP Progressing   Description: Short Term Goals:  1. Pt will participate in a clinical swallow eval to determine least restrictive " diet.-MET  2. Pt will safely tolerate >75% of Regular PO diet with no overt s/s of aspiration.  3. Pt will participate in informal speech-language and cognitive eval to r/o related deficits.                         Plan:       Plan of Care expires:  07/09/24  Plan of Care reviewed with:  patient   SLP Follow-Up:  No       Discharge recommendations:  Moderate Intensity Therapy   Barriers to Discharge:  None    Time Tracking:     SLP Treatment Date:   06/10/24  Speech Start Time:  1133  Speech Stop Time:  1150     Speech Total Time (min):  17 min    Billable Minutes: Speech Therapy Individual 17    06/10/2024

## 2024-06-10 NOTE — PT/OT/SLP PROGRESS
Physical Therapy      Patient Name:  Gina Jenkins   MRN:  [id number]    RN hold secondary to pt recently having another seizure (second one today).  RN with pt giving pt IV seizure medication at this time. Will follow-up next tx date.

## 2024-06-10 NOTE — PLAN OF CARE
SW met with pt at bedside to discuss d/c, pt confirmed  and name. Pt reported that she was having 0/10 pain and was in a pleasant mood throughout assessment. Pt stated that prior to hospitalization she did not use any HME at home and she was fully independent in her ADL's. SW sent SNF referrals via [email or web address]. SW will request f/u appts. Pt did not have any additional questions or sw at this time. White board updated with CM name and contact information.  Discharge brochure provided.  Pt encouraged to call with any questions or concerns.  Cm will continue to follow pt through transitions of care and assist with any discharge needs.    Phu Jain, MSWESTON  [phone]    Future Appointments   Date Time Provider Department Center   2024 11:00 AM Eun Malloy, RRT [facility]        06/10/24 1533   Discharge Planning   Assessment Type Discharge Planning Brief Assessment   Support Systems Family members   Equipment Currently Used at Home none   Current Living Arrangements home   Care Facility Name home   Patient/Family Anticipates Transition to home with family   Patient/Family Anticipated Services at Transition none   DME Needed Upon Discharge  none   Discharge Plan A Home Health   Physical Activity   On average, how many days per week do you engage in moderate to strenuous exercise (like a brisk walk)? Pt Declined   On average, how many minutes do you engage in exercise at this level? Pt Declined   Financial Resource Strain   How hard is it for you to pay for the very basics like food, housing, medical care, and heating? Pt Declined   Housing Stability   In the last 12 months, was there a time when you were not able to pay the mortgage or rent on time? Pt Declined   At any time in the past 12 months, were you homeless or living in a shelter (including now)? Pt Declined   Transportation Needs   Has the lack of transportation kept you from medical appointments, meetings, work or from getting things  needed for daily living? Patient declined   Food Insecurity   Within the past 12 months, you worried that your food would run out before you got the money to buy more. Pt Declined   Within the past 12 months, the food you bought just didn't last and you didn't have money to get more. Pt Declined   Stress   Do you feel stress - tense, restless, nervous, or anxious, or unable to sleep at night because your mind is troubled all the time - these days? Pt Declined   Social Isolation   How often do you feel lonely or isolated from those around you?  Patient declined   Alcohol Use   Q2: How many drinks containing alcohol do you have on a typical day when you are drinking? Pt Declined   Q3: How often do you have six or more drinks on one occasion? Pt Declined   Utilities   In the past 12 months has the electric, gas, oil, or water company threatened to shut off services in your home? Pt Declined   Health Literacy   How often do you need to have someone help you when you read instructions, pamphlets, or other written material from your doctor or pharmacy? Patient declines to respond

## 2024-06-10 NOTE — NURSING
Clarified with MD that he wants 500 mg keppra admin now IVPB and 1500 to begin at 9pm. Keppra 500mg IVPB infusing .

## 2024-06-10 NOTE — NURSING
"RAPID RESPONSE NURSE PROACTIVE ROUNDING NOTE     Time of Visit: 0954    Admit Date: 2024  LOS: 0  Code Status: Full Code   Date of Visit: 06/10/2024  : 1975  Age: 49 y.o.  Sex: female  Race: Black or   Bed: K457/K457 A:   MRN: [id number]  Was the patient discharged from an ICU this admission? No   Was the patient discharged from a PACU within last 24 hours?  No  Did the patient receive conscious sedation/general anesthesia in last 24 hours?  No  Was the patient in the ED within the past 24 hours?  No  Was the patient started on NIPPV within the past 24 hours?  No  Attending Physician: Puneet Guerrier MD  Primary Service: Networked reference to record PCT     ASSESSMENT     Notified by Rapid Response RT.  Reason for alert: Seizure    Diagnosis: Weakness    Abnormal Vital Signs: BP (!) 95/57 (BP Location: Left arm, Patient Position: Lying)   Pulse 66   Temp 98.2 °F (36.8 °C) (Oral)   Resp 18   Ht 5' 2" (1.575 m)   Wt 75 kg (165 lb 5.5 oz)   SpO2 99%   Breastfeeding No   BMI 30.24 kg/m²      Clinical Issues: Neuro    Patient  has a past medical history of Brain aneurysm, CHF (congestive heart failure), H/O coronary angioplasty, Hypercholesteremia, Hypertension, Malignant hypertension, Migraine headache, and Stroke.      Patient actively seizing , VSS and , patient responsive following 5min seizure     INTERVENTIONS/ RECOMMENDATIONS     Monitor patient and PRN ativan order placed for seizure activity     Discussed plan of care with RN, Dr. Loaiza, and Dr. Winters    PHYSICIAN ESCALATION     Yes/No  No    Orders received and case discussed with Dr. Loaiza .    Disposition: Remain in room 457.    FOLLOW-UP     Call back the Rapid Response Nurse, Dorita Ruano RN at [phone] additional questions or concerns.         "

## 2024-06-10 NOTE — PROGRESS NOTES
Individual Follow-Up Form    6/10/2024    Quit Date: To be determined    Clinical Status of Patient: Inpatient    Length of Service: 30 minutes    Comments: Smoking cessation education note; Pt is a 0.33 pk/day cigarette smoker x 31 yrs. She denies nicotine withdrawal symptoms at this time, Pt states ready to quit smoking. Ambulatory referral to Smoking Cessation clinic following hospital discharge.     Diagnosis: F17.210

## 2024-06-10 NOTE — NURSING
Called to pt room by avys attendant. Family at bedside. Pt tells nurse I feel like it's coming on again. Pt turned to her right side. Pt immediatley started having seizure having a seizure. Charge nurse notified to bring ativan and pt is having a seizure. Shaking 2 mins stiffness and sleepy state remained. Ativan admin and stiffness subsided.  MD and nursing staff at bedside. MD assesssed, Labs ordered. Pt states she is tired. Aao1 (self) at this time. Family remains at bedside. Pt awake but drowsy.. SR up x 3 (2  padded), Bed in lowest position locked and bed alarm set. Avys camera present.

## 2024-06-10 NOTE — MEDICAL/APP STUDENT
"[facility] Medicine Progress Note    Primary Team: [facility] Hospitalist Team B  Attending Physician: Puneet Guerrier MD  Resident: Dr. Escobar Issa  Intern: Dr. Nae Crespo    Subjective:      NAEON. 1 desat to 93%. VSS otherwise.     Pt states she is doing okay this AM. She says her last headache was yesterday in the afternoon but denies currently having a headache. Pt states she is still having difficulty walking. Became tearful and says she feels like she is "falling apart." Pt asks about getting a toothbrush so she can brush her teeth.      Objective:     Last 24 Hour Vital Signs:  BP  Min: 113/57  Max: 140/81  Temp  Av.2 °F (36.2 °C)  Min: 96.1 °F (35.6 °C)  Max: 98.1 °F (36.7 °C)  Pulse  Av.3  Min: 67  Max: 76  Resp  Av.6  Min: 16  Max: 18  SpO2  Av.7 %  Min: 93 %  Max: 100 %  I/O last 3 completed shifts:  In: 500 [P.O.:500]  Out: 500 [Urine:500]    Physical Examination:  Physical Exam  Constitutional:       Appearance: Normal appearance.   HENT:      Head: Normocephalic and atraumatic.   Eyes:      General: No scleral icterus.     Extraocular Movements: Extraocular movements intact.   Cardiovascular:      Rate and Rhythm: Normal rate and regular rhythm.   Pulmonary:      Effort: Pulmonary effort is normal.      Breath sounds: Normal breath sounds.   Skin:     General: Skin is warm and dry.   Neurological:      Mental Status: She is alert.   Psychiatric:         Mood and Affect: Mood is anxious.          Laboratory:  Laboratory Data Reviewed:   CMP  Sodium   Date Value Ref Range Status   06/10/2024 140 136 - 145 mmol/L Final     Potassium   Date Value Ref Range Status   06/10/2024 3.6 3.5 - 5.1 mmol/L Final     Chloride   Date Value Ref Range Status   06/10/2024 107 95 - 110 mmol/L Final     CO2   Date Value Ref Range Status   06/10/2024 22 (L) 23 - 29 mmol/L Final     Glucose   Date Value Ref Range Status   06/10/2024 96 70 - 110 mg/dL Final     BUN   Date Value Ref Range Status   06/10/2024 " 17 6 - 20 mg/dL Final     Creatinine   Date Value Ref Range Status   06/10/2024 1.6 (H) 0.5 - 1.4 mg/dL Final     Calcium   Date Value Ref Range Status   06/10/2024 9.3 8.7 - 10.5 mg/dL Final     Total Protein   Date Value Ref Range Status   06/10/2024 6.4 6.0 - 8.4 g/dL Final     Albumin   Date Value Ref Range Status   06/10/2024 3.0 (L) 3.5 - 5.2 g/dL Final     Total Bilirubin   Date Value Ref Range Status   06/10/2024 0.2 0.1 - 1.0 mg/dL Final     Comment:     For infants and newborns, interpretation of results should be based  on gestational age, weight and in agreement with clinical  observations.    Premature Infant recommended reference ranges:  Up to 24 hours.............<8.0 mg/dL  Up to 48 hours............<12.0 mg/dL  3-5 days..................<15.0 mg/dL  6-29 days.................<15.0 mg/dL       Alkaline Phosphatase   Date Value Ref Range Status   06/10/2024 131 55 - 135 U/L Final     AST ([place])   Date Value Ref Range Status   05/11/2016 39 15 - 46 IU/L Final     AST   Date Value Ref Range Status   06/10/2024 25 10 - 40 U/L Final     ALT   Date Value Ref Range Status   06/10/2024 19 10 - 44 U/L Final     Anion Gap   Date Value Ref Range Status   06/10/2024 11 8 - 16 mmol/L Final     eGFR   Date Value Ref Range Status   06/10/2024 40 (A) >60 mL/min/1.73 m^2 Final        Lab Results   Component Value Date    WBC 5.58 06/10/2024    RBC 3.80 (L) 06/10/2024    HGB 11.6 (L) 06/10/2024    HCT 32.9 (L) 06/10/2024    MCV 87 06/10/2024    MCH 30.5 06/10/2024    MCHC 35.3 06/10/2024    RDW 13.3 06/10/2024     06/10/2024    MPV 10.2 06/10/2024    GRAN 2.2 06/10/2024    GRAN 39.0 06/10/2024    LYMPH 2.4 06/10/2024    LYMPH 43.5 06/10/2024    MONO 0.5 06/10/2024    MONO 9.0 06/10/2024    EOS 0.4 06/10/2024    BASO 0.05 06/10/2024    EOSINOPHIL 7.2 06/10/2024    BASOPHIL 0.9 06/10/2024            Microbiology Data Reviewed: {yes/no:19897}  Pertinent Findings:  Blood cultures with no growth to date.      Urine culture: Multiple organisms isolated. None in predominance. Repeat if clinically necessary    Other Results:  No new imaging    Current Medications:     Infusions:       Scheduled:   amLODIPine  10 mg Oral Daily    atorvastatin  80 mg Oral Daily    carvediloL  37.5 mg Oral BID    EScitalopram oxalate  20 mg Oral Daily    famotidine  20 mg Oral Daily    heparin (porcine)  5,000 Units Subcutaneous Q8H    levETIRAcetam  1,000 mg Oral BID    losartan  50 mg Oral Daily    topiramate  50 mg Oral BID        PRN:    Current Facility-Administered Medications:     acetaminophen, 500 mg, Oral, Q6H PRN    acetaminophen, 650 mg, Oral, Q4H PRN    dextrose 10%, 12.5 g, Intravenous, PRN    dextrose 10%, 25 g, Intravenous, PRN    glucagon (human recombinant), 1 mg, Intramuscular, PRN    glucose, 16 g, Oral, PRN    glucose, 24 g, Oral, PRN    naloxone, 0.02 mg, Intravenous, PRN    oxyCODONE, 5 mg, Oral, Q6H PRN    sodium chloride 0.9%, 10 mL, Intravenous, Q12H PRN      Assessment:     Gina Jenkins is a 49 y.o.female with  Patient Active Problem List    Diagnosis Date Noted    Slurred speech 06/08/2024    Right leg pain 08/07/2023    Localization-related (focal) (partial) symptomatic epilepsy and epileptic syndromes with complex partial seizures, not intractable, without status epilepticus 02/07/2023    History of cerebral aneurysm repair 02/07/2023    Fever 09/14/2022    Encephalopathy 08/30/2022    Brain compression 08/30/2022    Infection of superficial incisional surgical site after procedure 08/29/2022    Focal seizure 08/29/2022    Fluid collection at surgical site 08/25/2022    New onset type 2 diabetes mellitus 08/23/2022    Transient neurological symptoms 08/22/2022    Other emphysema 07/17/2022    Mixed hyperlipidemia 06/07/2022    Weakness 04/25/2018    H/O: CVA (cerebrovascular accident)     Cerebrovascular accident (CVA) 04/09/2018    Dysarthria 11/28/2017    Cognitive communication deficit 11/28/2017     Decreased  strength of right hand 11/14/2017    Weakness of right upper extremity 11/14/2017    Lack of coordination due to stroke 11/14/2017    Decreased range of motion of right shoulder 11/14/2017    Acute nonintractable headache 10/25/2017    Gait abnormality 10/05/2017    Aneurysm of left middle cerebral artery 10/04/2017    Tobacco abuse 10/04/2017    Thyroid nodule 10/03/2017    Chronic diastolic heart failure 10/03/2017    Acute ischemic left MCA stroke 10/03/2017    Thrombotic stroke involving left middle cerebral artery 10/03/2017    Calculus of gallbladder without cholecystitis without obstruction 03/05/2016    Essential hypertension 03/05/2016        Plan:   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 Aneurysms and clips stable on CTA head/neck. MRI brain with no acute infarctions, post-operative changes. MRI spine with cervical degenerative changes but no abnormality as evidence for demyelination.   Continue keppra 1g BID   Neurology consult  will get NYDIA/KARIS and B12   inpatient EEG  Psych consult to evaluate for functional causes  Hold amitriptyline   Will call Dr. Garcia in the morning   FU PT/OT/SLP      Migraine headaches   This morning, pt is not having headaches though she is still having some dizziness that is positional. Per neuro eval, pt's headache descriptions are less consistent with migraines. There is nothing on MRI head and neck that indicates cause of headaches.   Continue topiramate 50 mg bid  Hold home amitriptyline 75 mg nightly   Multimodal pain control with tylenol, oxycodone   Holding off NSAIDS at this time given aneurysm history   Consider transition to fioricet, though pt preferring Tylenol currently     HTN  HLD   Hx coronary angioplasty   Cholesterol, TG elevated on lipid panel  Continue atorvastatin 80  daily   Continue home amlodipine 10 mg daily   Continue home carvedilol 37.5 mg bid  Continue home losartan 50 mg daily       Elevated alk phos   Trending down with value of 131 today  CTM     Anxiety, depression  C/f functional neuro deficits   Continue home lexapro 10 mg daily   Will consult Dr. Garcia today     Ppx: heparin subQ  Diet: Regular  Code: Full     Dispo: continue inpatient evaluation of neurological deficits; inpatient EEG    Desi Rizzo, MS3    [facility] Medicine Hospitalist Pager numbers:   [facility] Hospitalist Medicine Team A (Joshua/Sky): [phone]  [facility] Hospitalist Medicine Team B (Chey/Cali):  [phone]

## 2024-06-10 NOTE — PROGRESS NOTES
"  [facility] IM Resident Progress Note    Subjective:     Patient uneasy this morning. Asks if we "think she is crazy." Was not able to complete tele-psych evaluation over weekend. Understands that many specialists will be seeing her today and that everyone's goal is to help her.    Update: MET called around 10 am. Please see plan of care     Objective:   Last 24 Hour Vital Signs:  BP  Min: 113/57  Max: 140/81  Temp  Av.3 °F (36.3 °C)  Min: 96.1 °F (35.6 °C)  Max: 98.1 °F (36.7 °C)  Pulse  Av.9  Min: 66  Max: 76  Resp  Av.7  Min: 16  Max: 18  SpO2  Av.2 %  Min: 96 %  Max: 100 %    I/O last 3 completed shifts:  In: 500 [P.O.:500]  Out: 500 [Urine:500]    Physical Examination:  General: Alert, cooperative  HEENT: R facial droop w/o forehead involvement at rest, atraumatic, PERRL, EOMI. Abnormal dentition.  Neck: No thyromegaly or masses   Cardiac: RRR, no murmurs appreciated, no extra heart sounds  Pulmonary/Chest: CTAB, symmetric chest rise, equal breath sounds bilaterally  GI: Soft, non tender, non distended, normoactive bowel sounds  Extremities: no edema or cyanosis  Skin: dry, warm, no wounds noted  Neuro: R facial droop as above, 4/5 strength in all extremities, intact sensation throught, no focal deficits other than aforementioned R facial droop. Did not ambulate patient.    Laboratory:  Most Recent Data:  CBC:   Lab Results   Component Value Date    WBC 5.58 06/10/2024    HGB 11.6 (L) 06/10/2024    HCT 32.9 (L) 06/10/2024     06/10/2024    MCV 87 06/10/2024    RDW 13.3 06/10/2024     BMP:   Lab Results   Component Value Date     06/10/2024    K 3.6 06/10/2024     06/10/2024    CO2 22 (L) 06/10/2024    BUN 17 06/10/2024    CREATININE 1.6 (H) 06/10/2024    GLU 96 06/10/2024    CALCIUM 9.3 06/10/2024    MG 1.9 06/10/2024    PHOS 3.4 06/10/2024     LFTs:   Lab Results   Component Value Date    PROT 6.4 06/10/2024    ALBUMIN 3.0 (L) 06/10/2024    BILITOT 0.2 06/10/2024    AST 25 " 06/10/2024    ALKPHOS 131 06/10/2024    ALT 19 06/10/2024     Coags:   Lab Results   Component Value Date    INR 0.9 06/07/2024     FLP:   Lab Results   Component Value Date    CHOL 241 (H) 06/08/2024    HDL 43 06/08/2024    LDLCALC 127.8 06/08/2024    TRIG 351 (H) 06/08/2024    CHOLHDL 17.8 (L) 06/08/2024     DM:   Lab Results   Component Value Date    HGBA1C 8.3 (H) 08/22/2022    HGBA1C 5.8 (H) 07/16/2022    HGBA1C 5.4 06/30/2020    LDLCALC 127.8 06/08/2024    CREATININE 1.6 (H) 06/10/2024     Thyroid:   Lab Results   Component Value Date    TSH 1.570 06/07/2024    FREET4 0.89 06/30/2020    T8WHFHL 7.1 11/02/2017     Anemia:   Lab Results   Component Value Date    IRON 52 06/30/2020    TIBC 314 06/30/2020    FERRITIN 63 06/30/2020    AQZGHLBU52 440 06/09/2024    FOLATE 12.9 04/09/2018     Cardiac:   Lab Results   Component Value Date    TROPONINI <0.012 06/07/2024     (H) 03/06/2017     Urinalysis:   Lab Results   Component Value Date    LABURIN  06/07/2024     Multiple organisms isolated. None in predominance.  Repeat if    LABURIN clinically necessary. 06/07/2024    COLORU Yellow 06/07/2024    SPECGRAV 1.015 06/07/2024    NITRITE Negative 06/07/2024    KETONESU Negative 06/07/2024    UROBILINOGEN Negative 06/07/2024    WBCUA 35 (H) 06/07/2024       Trended Lab Data:  Recent Labs   Lab 06/08/24  0233 06/09/24  0206 06/10/24  0239   WBC 6.18 5.20 5.58   HGB 12.4 11.6* 11.6*   HCT 35.4* 34.0* 32.9*    276 277   MCV 85 88 87   RDW 13.5 13.7 13.3    141 140   K 2.8* 3.6 3.6    107 107   CO2 24 22* 22*   BUN 16 20 17   CREATININE 1.3 1.6* 1.6*   * 99 96   PROT 6.8 6.5 6.4   ALBUMIN 3.1* 3.1* 3.0*   BILITOT 0.2 0.2 0.2   AST 20 32 25   ALKPHOS 133 141* 131   ALT 12 19 19       Trended Cardiac Data:  Recent Labs   Lab 06/07/24  1951   TROPONINI <0.012     Radiology Data Reviewed: yes  Pertinent Findings:    6/8/24 MRI Brain without  The craniocervical junction is intact. There is partial  empty appearance of the sella.  The midline structures are intact. The intracranial flow voids are intact.  No acute diffusion-weighted signal abnormality is present.  There are postop changes of prior left frontal and temporal craniotomy for prior left MCA aneurysm clipping.  There is encephalomalacia in the left temporal pole.  There is also minimal degree of T2/FLAIR signal hyperintensity within this region.  There are T2/FLAIR signal hyperintensities within the periventricular and subcortical white matter. There is minimal susceptibility weighted artifact within the left inferior frontal and left temporal pole.  There is susceptibility weighted artifact identified within the left cerebellum.     Impression:  1.  No MR evidence of acute infarction.  2.  Postoperative changes in the left frontal and temporal calvarium with encephalomalacia in the left temporal pole.  3.  Nonspecific T2/FLAIR signal hyperintensities within the periventricular and subcortical white matter.      6/8/24 MRI spine with and without  Multilevel degenerative changes cervical spine as detailed above  No evidence for cord signal abnormality or abnormal intrathecal enhancement to suggest sequela of cord demyelination  Partially empty sella included in the field of view intracranial hypertension be included in differential in appropriate clinical setting      Current Medications:     Infusions:       Scheduled:   amLODIPine  10 mg Oral Daily    atorvastatin  80 mg Oral Daily    carvediloL  37.5 mg Oral BID    EScitalopram oxalate  20 mg Oral Daily    famotidine  20 mg Oral Daily    heparin (porcine)  5,000 Units Subcutaneous Q8H    levETIRAcetam  1,000 mg Oral BID    losartan  50 mg Oral Daily    topiramate  50 mg Oral BID        PRN:    Current Facility-Administered Medications:     acetaminophen, 500 mg, Oral, Q6H PRN    acetaminophen, 650 mg, Oral, Q4H PRN    dextrose 10%, 12.5 g, Intravenous, PRN    dextrose 10%, 25 g, Intravenous, PRN     glucagon (human recombinant), 1 mg, Intramuscular, PRN    glucose, 16 g, Oral, PRN    glucose, 24 g, Oral, PRN    naloxone, 0.02 mg, Intravenous, PRN    oxyCODONE, 5 mg, Oral, Q6H PRN    sodium chloride 0.9%, 10 mL, Intravenous, Q12H PRN      Assessment:     Gina Jenkins is a 49 y.o. female who  has a past medical history of Brain aneurysm, CHF (congestive heart failure), H/O coronary angioplasty, Hypercholesteremia, Hypertension, Malignant hypertension, Migraine headache, and Stroke (10/2017). The patient presented to [facility] on 6/7/2024 with a primary complaint of back pain, headache, weakness w/ facial droop and voice change x5 days. Admitted to [facility] for evaluation of headaches, facial droops, and diffuse weakness with neurology consulted.     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 Aneurysms and clips stable on CTA head/neck. MRI brain with no acute infarctions, post-operative changes. MRI spine with cervical degenerative changes but no abnormality as evidence for demyelination.   -Increased Keppra to 1.5g BID after today's seizure per neurology  -ativan PRN for seizure lasting > 5 minutes  -Neurology consulted, appreciate recs  -Will obtain inpatient EEG  -NSGY consulted, appreciate recs  -Psych consulted, appreciate recs  -Amitriptyline taper: today 50mg, tomorrow 25mg, then discontinue  -NYDIA pending  -PT/OT/SLP    Migraine headaches   -Continue topiramate 50 mg bid  -Taper home amitriptyline as above  -Multimodal pain control with tylenol, oxycodone   -Holding off NSAIDS at this time given aneurysm history   -Consider transition to fioricet, though pt preferring Tylenol currently     HTN  HLD   Hx coronary angioplasty   Cholesterol, TG elevated on lipid panel  -Continue atorvastatin 80 daily   -Continue home amlodipine  10 mg daily   -Continue home carvedilol 37.5 mg bid  -Continue home losartan 50 mg daily       Elevated alk phos   -CTM     Anxiety, depression  C/f functional neuro deficits   -Increased home lexapro to 20 mg daily  -Psych consulted    Ppx: heparin subQ  Diet: Regular  Code: Full    Dispo: continue inpatient evaluation of neurological deficits; inpatient EEG; pending neuro, psych, NSGY eval    Nae Crespo MD  [facility] IM/Peds PGY-1    [facility] Medicine Hospitalist Pager numbers:   [facility] Hospitalist Medicine Team A (Joshua/Sky): [phone]  [facility] Hospitalist Medicine Team B (Chey/Cali):  [phone]

## 2024-06-10 NOTE — SIGNIFICANT EVENT
Pulmonary & Critical Care Medicine Plan of Care Note    Arrived to bedside after MET called for pt due to concern for seizure. PT in room, says she was up walking around the bed with her and doing ok, then got back in bed and had significant change with what looked like seizure. On arrival, told pt had been possibly seizing for >5min. On exam, pt rolled onto R side, arms contracted, no response to verbal or painful stimuli. SpO2 stable. BP mildly elevated. Prior to giving ativan, pt with change in status and seizure stopped, began responding to questions and sat up in bed confused and tried to get up. Able to be redirected back into bed. Post-ictal state suspected. Vitals stable, no benzos given. Neurology already following for recs. Discussed with primary team at bedside, pt to remain on the floor.       Desi Loaiza MD  [facility] Pulmonary [facility] Critical Care Fellow  06/10/2024

## 2024-06-10 NOTE — CODE/ RAPID DOCUMENTATION
Patient alert, disoriented, attempting to get out of bed, redirectable, and following simple commands.

## 2024-06-10 NOTE — CODE/ RAPID DOCUMENTATION
MET called. VN cued into room and founding patient right side lying in bed actively seizing. At bedside Dr. Williamson, Dr. Winters, ARA Rudolph, RN, and RITESH LAYTON. Per report, patient was engaging in with PT/OT and begin seizing. + full body jerking motions noted. No lost of bowel or bladder. All bed rails up for safety. Suction at beside. MD assessment in progress. See event rapid response documentation for further details.

## 2024-06-10 NOTE — CONSULTS
"274}  [facility] TELEPSYCHIATRY CONSULTATION:     Patient agreeable to SUBSEQUENT consultation via telepsychiatry.  Available documentation has been reviewed, and pertinent elements of the chart have been incorporated into this evaluation where appropriate.    CONSULT START TIME: 6/10/2024 5:50 PM  CONSULT END TIME: 6/10/2024 8:25 PM    -- PATIENT IDENTIFIERS: Gina Jenkins  [id number]  1975  49 y.o.  female  -- REQUESTING PROVIDER: Puneet Guerrier MD *  -- SETTING: inpatient  -- LEGAL STATUS ON PRESENTATION: voluntary  -- SOURCES OF INFORMATION: PATIENT, EHR/chart  -- PRESENT WITH PATIENT DURING EVALUATION: ALONE  -- CHIEF COMPLAINT: depression and anxiety  -- CONSULTANT PROVIDER: Anthony Salazar MD  -- LOCATION OF CONSULTANT: [facility]    -- LOCATION OF PATIENT: [facility] TELEMETRY UNIT             PRESENTATION:     Gina Jenkins is seen for a follow up psychiatric evaluation.  An interval history of the presenting illness (HPI) was obtained, and a pertinent psychiatric and medical review of systems was performed.    Per Chart:  Telepsych eval by Dr. Rg 6/9/24 (yesterday) - reviewed - see chart for details    Per Patient:    6pm  - attempted to interview patient  - she is sedated - not waking up even with nurse attempting to rouse her  - per nurse she had two seizures earlier and had received some lorazepam at 3pm  6:30pm  - still asleep, does not awake when addressed  7pm  - still asleep, does not awake when addressed  7:30pm  - still asleep, does not awake when addressed  8:10pm  - still asleep, woke to my voice    - denies current depression or anxiety   - states medication working well  - denies any suicidal ideation or homicidal ideation   - gets upset when people stare at her - but no thoughts of wanting to kill them - "I'll take their hamburgers"  - difficult to engage, declines to talk further  - reports there is nothing she wants to discuss        CURRENT PSYCHOTROPIC " REGIMEN:  I[x]I Y  I[]I N  I[]I U    Lexapro 20mg daily  Elavil (taper)  Topamax 50mg bid      PERTINENT PAST HISTORY:     The patient's past psychiatric, family, social and medical history have been reviewed and updated as appropriate within the electronic medical record system.            The electronic chart was reviewed and updated as appropriate.  See [employer] for details.    Current Facility-Administered Medications:     acetaminophen tablet 500 mg, 500 mg, Oral, Q6H PRN, Niles Zamora MD    acetaminophen tablet 650 mg, 650 mg, Oral, Q4H PRN, Niles Zamora MD, 650 mg at 06/10/24 0813    amitriptyline tablet 50 mg, 50 mg, Oral, QHS **FOLLOWED BY** [START ON 6/11/2024] amitriptyline tablet 25 mg, 25 mg, Oral, QHS, Nae Crespo MD    amLODIPine tablet 10 mg, 10 mg, Oral, Daily, Niles Zamora MD, 10 mg at 06/10/24 0808    atorvastatin tablet 80 mg, 80 mg, Oral, Daily, Niles Zamora MD, 80 mg at 06/10/24 0808    carvediloL tablet 37.5 mg, 37.5 mg, Oral, BID, Niles Zamora MD, 37.5 mg at 06/10/24 0808    dextrose 10% bolus 125 mL 125 mL, 12.5 g, Intravenous, PRN, Niles Zamora MD    dextrose 10% bolus 250 mL 250 mL, 25 g, Intravenous, PRNSera Carter, MD    EScitalopram oxalate tablet 20 mg, 20 mg, Oral, Daily, Nae Crespo MD, 20 mg at 06/10/24 0808    famotidine tablet 20 mg, 20 mg, Oral, Daily, Lidia Russell MD, 20 mg at 06/10/24 0808    glucagon (human recombinant) injection 1 mg, 1 mg, Intramuscular, PRN, Niles Zamora MD    glucose chewable tablet 16 g, 16 g, Oral, PRN, Niles Zamora MD    glucose chewable tablet 24 g, 24 g, Oral, PRN, Niles Zamora MD    heparin (porcine) injection 5,000 Units, 5,000 Units, Subcutaneous, Q8H, Niles Zamora MD, 5,000 Units at 06/10/24 1430    levETIRAcetam in NaCl (iso-os) IVPB 1,500 mg, 1,500 mg, Intravenous, Q12H, Julio Medeiros MD    LORazepam injection 2 mg, 2 mg, Intravenous, PRN, Nae Crespo MD, 2 mg at 06/10/24 1508    losartan tablet 50  "mg, 50 mg, Oral, Daily, Niles Zamora MD, 50 mg at 06/10/24 0808    naloxone 0.4 mg/mL injection 0.02 mg, 0.02 mg, Intravenous, PRN, Nlies Zamora MD    oxyCODONE immediate release tablet 5 mg, 5 mg, Oral, Q6H PRN, Niles Zamora MD, 5 mg at 06/09/24 1632    sodium chloride 0.9% flush 10 mL, 10 mL, Intravenous, Q12H PRN, Niles Zamora MD    topiramate tablet 50 mg, 50 mg, Oral, BID, Niles Zamora MD, 50 mg at 06/10/24 0808    Additional Relevant History, As Applicable:       EXAMINATION:     /85 (BP Location: Right arm, Patient Position: Lying)   Pulse 68   Temp 97.8 °F (36.6 °C) (Axillary)   Resp 18   Ht 5' 2" (1.575 m)   Wt 75 kg (165 lb 5.5 oz)   SpO2 100%   Breastfeeding No   BMI 30.24 kg/m²     MENTAL STATUS EXAMINATION:  General Appearance & Behavior: hospital garb, lying in bed  Involuntary Movements and Motor Activity: no tics or tremors  Speech & Language: decreased spontaneity, at one point yelling  Mood: "fine"  Affect: irritable, oddly related  Thought Process & Associations: difficult to assess due to paucity of content  Thought Content & Perceptions: no evidence of psychosis but did not answer questions on hallucinations   Sensorium and Cognition: drowsy, difficult to arouse  Insight & Judgment: both impaired        DIAGNOSES & PROBLEMS:     Depression and Anxiety    ASSESSMENT & PLAN:          -- ASSESSMENT (synthesis  analysis): Patient with history of depression and anxiety, seen by telepsych yesterday but only tolerated partial interview.  Telepsych asked to reassess tonight but again she was minimally cooperative and compliant.  She did deny any suicidal ideation or homicidal ideation to me.        -- DISPOSITION  SUMMATION:  With reasonable medical certainty, based on history, chart review, available collateral information, and a present-state examination:    - psychiatric hospitalization is not indicated   - treatment is VOLUNTARY - no legal status is indicated          " -- PLAN (goals  recommentations): Lexapro titrated yesterday - agree with this - will take time to work.  Please reconsult telepsych if she would like to speak with us - but currently minimally participative.      RISK MANAGEMENT:      -- SUICIDAL IDEATION (current  as voiced during the assessment): DENIES      -- HOMICIDAL IDEATION (current  as voiced during the assessment): DENIES      ESTIMATED RISK is a composite measure; it is based on clinical judgment, taking a multitude of factors, both tangible and intangible, into account.  It is meant to serve as a rough guide post, and is not reliant on any single identifiable scale or measure.  Furthermore, it is a dynamic measure, subject to change based on new available data and evolving circumstances, as well as clinical response.       -- ESTIMATED ACUTE RISK: MODERATE      * Elevated, but only modestly so; no acute safety concerns are present or anticipated.    INFORMED CONSENT & SHARED DECISION MAKING are the hallmark and bedrock of good clinical care, and as such have been employed and obtained, respectively, to the degree possible.    NOTE: discussed, to the extent possible, diagnosis, risks and benefits of proposed treatment (e.g., medication, therapy) vs alternative treatments vs no treatment, potential side effects of these treatments, and the inherent unpredictability of treatment.        - ABILITY TO UNDERSTAND, PARTICIPATE & ENGAGE: minimal     - AGREEABLE TO TREATMENT: the patient consents to treatment     - RELIABILITY/ACCURACY: the patient is deemed to be a poor historian    ADVICE & COUNSELING:   - In cases of emergencies (e.g. SI/HI resulting in danger to self or others, functioning deteriorating to the level of grave disability), call 911 or [phone], or present to the emergency department for immediate assistance.   - Individuals should not operate a motor vehicle or heavy machinery if effects of medications or underlying symptoms/condition impair the  ability to do so safely.   - FULLY comply with ANY/ALL medication as prescribed/instructed and report ANY/ALL suspected adverse effects to appropriate health care providers.         Anthony Salazar MD  Department of Psychiatry, [facility] Board Certified, Psychiatry and Addiction Medicine      DIAGNOSTIC TESTING:      Glu 103  6/10/2024  Li *   *  TSH 1.570  6/7/2024    HgA1c 8.3 (H)  8/22/2022  VPA *   *   FT4 *   *    Na 139  6/10/2024  CLZ *   *  WBC 5.58  6/10/2024    Cr 1.5 (H)  6/10/2024  ANC 2.2; 39.0;   6/10/2024   Hgb 11.6 (L)  6/10/2024     BUN 16  6/10/2024  Trop I <0.012  6/7/2024  HCT 32.9 (L)  6/10/2024     GFR 43 (A)  6/10/2024   CPK 91  6/10/2024    6/10/2024     Alb 3.0 (L)  6/10/2024   PRL *   *  B12 440  6/9/2024     T Bili 0.2  6/10/2024  Chol 241 (H)  6/8/2024  B9 *   *      6/10/2024  TGs 351 (H)  6/8/2024  B1 *   *    AST 25  6/10/2024  HDL 43  6/8/2024  Vit D *   *     ALT 19  6/10/2024  .8  6/8/2024  HIV Non-reactive  6/9/2024     INR 0.9  6/7/2024  Nicole *   *   Hep C Negative  8/21/2022    GGT *   *  Lip *   *  RPR *   *    MCV 87  6/10/2024   NH4 <9 (A)  6/7/2024  UPT Negative  9/7/2023      PETH *   *  THC Negative  6/7/2024    ETOH <10  6/7/2024  KEVON Negative  6/7/2024    EtG *   *  AMP Negative  6/7/2024    ALC *   *  OPI Negative  6/7/2024    BZO Negative  6/7/2024  MTD Negative  6/7/2024     BAR Negative  6/7/2024  BUP *   *    PCP Negative  6/7/2024  FEN *   *     Results for orders placed or performed during the hospital encounter of 06/07/24   EKG 12-lead    Collection Time: 06/07/24  7:28 PM   Result Value Ref Range    QRS Duration 98 ms    [facility] QTC Calculation 447 ms    Narrative    Test Reason : R53.1,    Vent. Rate : 071 BPM     Atrial Rate : 071 BPM     P-R Int : 188 ms          QRS Dur : 098 ms      QT Int : 412 ms       P-R-T Axes : 045 006 040 degrees     QTc Int : 447 ms    Normal sinus  rhythm  Cannot rule out Anterior infarct ,age undetermined  Abnormal ECG  When compared with ECG of 06-AUG-2023 23:05,  Nonspecific T wave abnormality now evident in Lateral leads    Referred By: [email or web address]   SELF           Confirmed By:        Inpatient consult to Telemedicine - Psych  Consult performed by: Anthony Salazar MD  Consult ordered by: Nae Crespo MD

## 2024-06-10 NOTE — CONSULTS
NEUROSURGICAL INPATIENT CONSULTATION NOTE    DATE OF SERVICE:  06/10/2024    ATTENDING PHYSICIAN:  Adama Barboza MD    CONSULT REQUESTED BY:  Hospital Medicine    REASON FOR CONSULT:  History of intracranial aneurysm clipping    SUBJECTIVE:    HISTORY OF PRESENT ILLNESS:  This is a very pleasant 49 y.o. female who presented to [facility] for persistent right frontal headaches.  Patient reports also having difficulty walking.  She was to found to have unruptured aneurysms following a syncope workup after losing consciousness at work.  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  Patient reports having suffered from intermittent seizures following her craniotomy.  She also has recurrent right-sided headaches.  She is concerned that she has an untreated unruptured aneurysm.  Last patient DSA was performed on July 16, 2022 was showing obliteration of the left anterior choroidal artery aneurysm and left posterior communicating artery aneurysm.  Left middle cerebral artery bifurcation saccular irregularity 2 by 3 mm.              PAST MEDICAL HISTORY:  Active Ambulatory Problems     Diagnosis Date Noted    Calculus of gallbladder without cholecystitis without obstruction 03/05/2016    Essential hypertension 03/05/2016    Thyroid nodule 10/03/2017    Chronic diastolic heart failure 10/03/2017    Acute ischemic left MCA stroke 10/03/2017    Thrombotic stroke involving left middle cerebral artery 10/03/2017    Aneurysm of left middle cerebral artery 10/04/2017    Tobacco abuse 10/04/2017    Gait abnormality 10/05/2017    Acute nonintractable headache 10/25/2017    Decreased  strength of right hand 11/14/2017    Weakness of right upper extremity 11/14/2017    Lack of coordination due to stroke 11/14/2017    Decreased range of motion of right shoulder 11/14/2017    Dysarthria 11/28/2017     Cognitive communication deficit 11/28/2017    Cerebrovascular accident (CVA) 04/09/2018    Weakness 04/25/2018    H/O: CVA (cerebrovascular accident)     Mixed hyperlipidemia 06/07/2022    Other emphysema 07/17/2022    Transient neurological symptoms 08/22/2022    New onset type 2 diabetes mellitus 08/23/2022    Fluid collection at surgical site 08/25/2022    Infection of superficial incisional surgical site after procedure 08/29/2022    Focal seizure 08/29/2022    Encephalopathy 08/30/2022    Brain compression 08/30/2022    Fever 09/14/2022    Localization-related (focal) (partial) symptomatic epilepsy and epileptic syndromes with complex partial seizures, not intractable, without status epilepticus 02/07/2023    History of cerebral aneurysm repair 02/07/2023    Right leg pain 08/07/2023     Resolved Ambulatory Problems     Diagnosis Date Noted    Chest pain 03/02/2016    Elevated troponin 03/05/2016    Left upper quadrant pain 03/05/2016    Right upper quadrant pain 03/05/2016    Hypertensive encephalopathy     Hypokalemia 10/03/2017    Hypocalcemia 10/03/2017    UTI (urinary tract infection) 10/03/2017    Flank pain 10/03/2017    Dysuria 10/05/2017    Weakness of right lower extremity 11/08/2017    Abnormality of gait as late effect of cerebrovascular accident (CVA) 11/08/2017    Impaired balance as late effect of cerebrovascular accident 11/08/2017    Decreased functional mobility 11/08/2017    Weakness of both lower extremities 04/22/2018    Impaired balance as late effect of cerebrovascular accident 04/22/2018    Abnormality of gait as late effect of cerebrovascular accident (CVA) 04/22/2018    Hemiplegic migraine without status migrainosus, not intractable 08/06/2019    Preop cardiovascular exam 06/07/2022    Acute respiratory failure with hypoxia and hypercapnia 07/15/2022    Post op infection 08/22/2022     Past Medical History:   Diagnosis Date    Brain aneurysm     CHF (congestive heart failure)      H/O coronary angioplasty     Hypercholesteremia     Hypertension     Malignant hypertension     Migraine headache     Stroke 10/2017       PAST SURGICAL HISTORY:  Past Surgical History:   Procedure Laterality Date    CARDIAC CATHETERIZATION      CEREBRAL ANGIOGRAM      CLIP LIGATION OF INTRACRANIAL ANEURYSM BY CRANIOTOMY N/A 7/15/2022    Procedure: CRANIOTOMY, WITH ANEURYSM CLIPPING;  Surgeon: Timothy Diaz MD;  Location: [facility] OR [address] [place] [address];  Service: Neurosurgery;  Laterality: N/A;  PTERIONAL CRANIOTOMY WITH CLIP LIGATION OF L PCOMM, L ANTERIOR CHOROIDAL, L MCA  ANEURYSM, ANESTHESIA: GENERAL, BLOOD: TYPE&CROSS 2 UNITS, NEUROMONITORING: SEP, MEP, EEG, RADIOLOGY: C-ARM, POSITION: SUPINE, CASTILLO, CO-SURGERON: DR. LEXI DOMÍNGUEZ.    WOUND DEBRIDEMENT  8/27/2022    Procedure: DEBRIDEMENT, WOUND;  Surgeon: Timothy Diaz MD;  Location: [facility] OR [address] [place] [address];  Service: Neurosurgery;;       SOCIAL HISTORY:   Social History     Socioeconomic History    Marital status:    Tobacco Use    Smoking status: Every Day     Current packs/day: 0.33     Average packs/day: 0.3 packs/day for 31.4 years (10.4 ttl pk-yrs)     Types: Cigarettes     Start date: 1993    Smokeless tobacco: Never    Tobacco comments:     Enrolled in the [employer] on 6/7/22 ([facility] Member ID # [id number]). Pt is a 0.33 pk/day cigarette smoker x 31 yrs. She states ready to quit smoking. Ambulatory referral to Smoking Cessation clinic following hospital discharge.    Substance and Sexual Activity    Alcohol use: Yes     Comment: occassionally    Drug use: No    Sexual activity: Not Currently     Partners: Male     Birth control/protection: None       FAMILY HISTORY:  No family history on file.    CURRENTS MEDICATIONS:    No current facility-administered medications on file prior to encounter.     Current Outpatient Medications on File Prior to Encounter   Medication Sig Dispense Refill    acetaminophen (TYLENOL) 500 MG tablet Take 1 tablet (500 mg total)  "by mouth every 6 (six) hours as needed for Pain. 20 tablet 0    amitriptyline (ELAVIL) 75 MG tablet Take 75 mg by mouth every evening.      blood sugar diagnostic Strp Use to check blood glucose 3 times daily. 200 each 11    blood-glucose meter Misc Use to check blood glucose 3 times daily. 1 each 1    carvediloL (COREG) 25 MG tablet Take 37.5 mg by mouth 2 (two) times daily.      EScitalopram oxalate (LEXAPRO) 10 MG tablet Take 10 mg by mouth once daily.      fluconazole (DIFLUCAN) 150 MG Tab TAKE 1 TABLET(150 MG) BY MOUTH EVERY DAY FOR 2 DOSES 2 tablet 0    ibuprofen 20 mg/mL oral liquid TAKE 30 ML BY MOUTH THREE TIMES DAILY FOR PAIN      lancets 30 gauge Misc Use to check blood glucose 3 times daily. 200 each 11    LIDOcaine (LIDODERM) 5 % Place 1 patch onto the skin once daily. Remove & Discard patch within 12 hours or as directed by MD 3 patch 0    losartan (COZAAR) 50 MG tablet Take 50 mg by mouth.      magnesium oxide (MAG-OX) 400 mg (241.3 mg magnesium) tablet Take 1 tablet by mouth 2 (two) times daily.      metFORMIN (GLUCOPHAGE-XR) 500 MG ER 24hr tablet Take 500 mg by mouth 2 (two) times daily.      methocarbamoL (ROBAXIN) 500 MG Tab Take 500 mg by mouth 2 (two) times daily.      pen needle, diabetic (BD ULTRA-FINE MARIELY PEN NEEDLE) 32 gauge x 5/32" Ndle Use to inject insulin into the skin three times daily . 200 each 11    topiramate (TOPAMAX) 50 MG tablet Take 1 tablet (50 mg total) by mouth 2 (two) times daily. 180 tablet 3    traMADoL (ULTRAM) 50 mg tablet Take 50 mg by mouth 2 (two) times daily.      amlodipine (NORVASC) 10 MG tablet Take 1 tablet (10 mg total) by mouth once daily. 30 tablet 0    atorvastatin (LIPITOR) 40 MG tablet Take 1 tablet (40 mg total) by mouth once daily. 90 tablet 3    insulin aspart U-100 (NOVOLOG) 100 unit/mL (3 mL) InPn pen Inject 8 Units into the skin 3 (three) times daily with meals. 6 mL 5    insulin detemir U-100 (LEVEMIR FLEXTOUCH) 100 unit/mL (3 mL) SubQ InPn pen " Inject 24 Units into the skin once daily. 6 mL 11    levETIRAcetam (KEPPRA) 1000 MG tablet Take 1 tablet (1,000 mg total) by mouth 2 (two) times daily. 180 tablet 3    [DISCONTINUED] aspirin 81 MG Chew Take 1 tablet (81 mg total) by mouth once daily.  0        amitriptyline  50 mg Oral QHS    Followed by    [START ON 6/11/2024] amitriptyline  25 mg Oral QHS    amLODIPine  10 mg Oral Daily    atorvastatin  80 mg Oral Daily    carvediloL  37.5 mg Oral BID    EScitalopram oxalate  20 mg Oral Daily    famotidine  20 mg Oral Daily    heparin (porcine)  5,000 Units Subcutaneous Q8H    levETIRAcetam  1,500 mg Oral BID    losartan  50 mg Oral Daily    topiramate  50 mg Oral BID       ALLERGIES:  Review of patient's allergies indicates:   Allergen Reactions    Lisinopril Swelling    Bactrim [sulfamethoxazole-trimethoprim] Rash    Ceftazidime Hives     Rash while on IV ceftazidime for planned 6 weeks.  See ED notes 9/12, 9/14 and ID clinic notes 9/16 and 9/28       REVIEW OF SYSTEMS:  Review of Systems   Constitutional:  Negative for diaphoresis, fever and weight loss.   Respiratory:  Negative for shortness of breath.    Cardiovascular:  Negative for chest pain.   Gastrointestinal:  Negative for blood in stool.   Genitourinary:  Negative for hematuria.   Endo/Heme/Allergies:  Does not bruise/bleed easily.   All other systems reviewed and are negative.      OBJECTIVE:    PHYSICAL EXAMINATION:   Vitals:    06/10/24 1234   BP:    Pulse: 66   Resp:    Temp:        Physical Exam:  Vitals reviewed.    Constitutional: She appears well-developed and well-nourished.     Eyes: Pupils are equal, round, and reactive to light. Conjunctivae and EOM are normal.     Cardiovascular: Normal distal pulses and no edema.     Abdominal: Soft.     Skin: Skin displays no rash on trunk and no rash on extremities. Skin displays no lesions on trunk and no lesions on extremities.     Psych/Behavior: She is alert. She is oriented to person, place, and  time. She has a normal mood and affect.     Musculoskeletal:        Neck: Range of motion is full.     Neurological:        DTRs: Patellar reflexes are 2+ on the right side and 2+ on the left side.       Ortho Exam    SI joint:   Palpation at the right and left SI joints not painful  ALMA DELIA test is negative bilaterally  Gaenslen test is negative bilaterally  Thigh thrust test is negative bilaterally    Neurologic Exam     Mental Status   Oriented to person, place, and time.     Cranial Nerves     CN III, IV, VI   Pupils are equal, round, and reactive to light.  Extraocular motions are normal.     Motor Exam     Strength   Strength 5/5 throughout.     Gait, Coordination, and Reflexes     Reflexes   Right patellar: 2+  Left patellar: 2+      DIAGNOSTIC DATA:    Recent Labs     06/07/24  1951 06/07/24  2004 06/08/24  0233 06/09/24  0206 06/10/24  0239   HGB 12.8  --  12.4 11.6* 11.6*   HCT 36.6*   < > 35.4* 34.0* 32.9*   MCV 86  --  85 88 87   WBC 6.33  --  6.18 5.20 5.58     --  295 276 277     --  138 141 140   K 3.7  --  2.8* 3.6 3.6     --  103 107 107   GLU 91  --  114* 99 96   BUN 18*  --  16 20 17   CREATININE 1.23  --  1.3 1.6* 1.6*   CALCIUM 9.5  --  9.5 9.5 9.3   MG 1.8  --  1.7 1.9 1.9   ALT 20  --  12 19 19   AST 36  --  20 32 25   ALBUMIN 4.3  --  3.1* 3.1* 3.0*   BILITOT 0.3  --  0.2 0.2 0.2   INR 0.9  --   --   --   --     < > = values in this interval not displayed.       Microbiology Results (last 7 days)       Procedure Component Value Units Date/Time    Blood culture #1 [[id number]] Collected: 06/07/24 1930    Order Status: Completed Specimen: Blood from Peripheral, Upper Arm, Left Updated: 06/10/24 0613     Blood Culture, Routine No Growth to date      No Growth to date      No Growth to date    Narrative:      Blood Culture #1    Blood culture #2 [[id number]] Collected: 06/07/24 1940    Order Status: Completed Specimen: Blood from Peripheral, Lower Arm, Right Updated: 06/10/24  0613     Blood Culture, Routine No Growth to date      No Growth to date      No Growth to date    Narrative:      Blood Culture #2    Urine culture [[id number]] Collected: 06/07/24 2043    Order Status: Completed Specimen: Urine Updated: 06/09/24 2027     Urine Culture, Routine Multiple organisms isolated. None in predominance.  Repeat if      clinically necessary.    Narrative:      Preferred Collection Type->Urine, Catheterized  Specimen Source->Urine            I personally interpreted the following imaging:  Cervical, thoracic, lumbar spine MRI showing some cervical spondylosis without significant stenosis  A CTA of the head and neck done on June 7, 2024 showing stable finding, status post left posterior communicating artery and left anterior choroidal artery aneurysm clipping  Brain MRI showing left frontal and temporal encephalomalacia from craniotomy and aneurysm clipping, retraction    ASSESSMENT:  This is a 49 y.o. female with presentation of severe right frontal headache and difficulty walking.  No evidence of a new subarachnoid hemorrhage.  Known left MCA bifurcation untreated aneurism.  No myelopathy or significant spinal stenosis on MRI.    Patient also has a history of seizure disorder following her craniotomy for aneurysm clipping.    Problem List Items Addressed This Visit          Neuro    Acute nonintractable headache - Primary    Relevant Orders    Levetiracetam level (Completed)    Vital signs    Bladder scan    Notify Physician    Saline lock IV    Place sequential compression device    Recheck Blood Glucose:    EKG 12-lead    Full code    Comprehensive Metabolic Panel (CMP) (Completed)    Magnesium (Completed)    Phosphorus (Completed)    Lipid panel (Completed)    CBC with Automated Differential (Completed)    MRI Brain Without Contrast (Completed)    Inpatient consult to [facility] (Completed)    Echo (Completed)    Orthostatic blood pressure (Completed)    Drug screen panel, in-house     Inpatient consult to Telemedicine - Psych (Completed)    Diet Cardiac Standard Tray    Inpatient consult to Psychiatry (Completed)    Inpatient consult to Neurosurgery    Inpatient consult to Telemedicine - Psych    Slurred speech       Other    * (Principal) Weakness    Relevant Orders    EKG 12-lead (Completed)     Other Visit Diagnoses       Demyelinating disease of central nervous system, unspecified        Relevant Orders    MRI Spine Cervical-Thoracic-Lumbar W W/O Contrast (XPD) (Completed)    Motor neuron disease, unspecified        Relevant Orders    MRI Spine Cervical-Thoracic-Lumbar W W/O Contrast (XPD) (Completed)            PLAN:  Okay with Neurology recommendations  No acute neurosurgical intervention recommended at this time    Outpatient follow-up with Neurosurgery      Adama Barboza MD  Cell: [phone]

## 2024-06-10 NOTE — PT/OT/SLP PROGRESS
"Occupational Therapy   Treatment    Name: Gina Jenkins  MRN: [id number]  Admitting Diagnosis:  Weakness       Recommendations:     Discharge Recommendations: Moderate Intensity Therapy  Discharge Equipment Recommendations:  walker, rolling, to be determined by next level of care  Barriers to discharge:  Decreased caregiver support    Assessment:     Gina Jenkins is a 49 y.o. female with a medical diagnosis of Weakness. Performance deficits affecting function are weakness, impaired endurance, impaired self care skills, impaired functional mobility, gait instability, impaired balance, decreased safety awareness, impaired coordination.     Rehab Prognosis:  Good; patient would benefit from acute skilled OT services to address these deficits and reach maximum level of function.       Plan:     Patient to be seen 4 x/week to address the above listed problems via self-care/home management, therapeutic activities, therapeutic exercises  Plan of Care Expires: 07/09/24  Plan of Care Reviewed with: patient    Subjective     Chief Complaint: "Is it supposed to be this hard?"  Patient/Family Comments/goals: return to PLOF  Pain/Comfort:  Pain Rating 1: 0/10  Pain Rating Post-Intervention 1: 0/10    Objective:     Patient found HOB elevated with telemetry upon OT entry to room.    General Precautions: Standard, fall    Orthopedic Precautions:N/A  Braces: N/A  Respiratory Status: Room air    Bed Mobility:    Patient completed Scooting/Bridging with stand by assistance  Patient completed Supine to Sit with stand by assistance and with side rail  Patient completed Sit to Supine with stand by assistance and contact guard assistance     Functional Mobility/Transfers:  Patient completed Sit <> Stand Transfer with contact guard assistance  with  rolling walker   Functional Mobility: ambulated around EOB using RW /c Princess  Patient demonstrated tremulous movements in stance and during gait, though at times controlled  Upon sitting " "EOB, patient with tremors in BUEs as well    [facility] 6 Click ADL: 18    Treatment & Education:  Educated on purpose/role of OT  Bed mobility as noted above  Patient sat EOB and reported "dizziness" and "blurry vision"; asked to focus on red light switches and sign above; able to read large print  After increased time in stance and x10 march in place, able to begin ambulation  Max cues for RW mgmt/proximity and sequencing  Patient sat EOB and perseverating on "Is it supposed to be hard?" "That was a lot"  Patient reassured and returned to bed level  Patient then staring off and looking back at therapists, mouth open and appearing to attempt to mouth words, but unable  When asked if she was having a seizure, patient nodded yes  At that point, patient with grimace and began to have full body jerking  Nsg came into assist and patient placed in R SL, with MET called overhead    Patient left HOB elevated with all lines intact, call button in reach, bed alarm on, and nsg notified  *MET called 2/2 patient seizing in the bed    GOALS:   Multidisciplinary Problems       Occupational Therapy Goals          Problem: Occupational Therapy    Goal Priority Disciplines Outcome Interventions   Occupational Therapy Goal     OT, PT/OT Progressing    Description: Goals to be met by: 07/09/24     Patient will increase functional independence with ADLs by performing:    UE Dressing with Modified [place].  LE Dressing with Modified [place].  Grooming while standing at sink with Modified [place].  Toileting from bedside commode with Modified [place] for hygiene and clothing management.   Toilet transfer to bedside commode with Modified [place].                         Time Tracking:     OT Date of Treatment: 06/10/24  OT Start Time: 0933  OT Stop Time: 0956  OT Total Time (min): 23 min    Billable Minutes:Therapeutic Activity 23    OT/LUKE: LUKE     Number of LUKE visits since last OT visit: 1    6/10/2024  "

## 2024-06-10 NOTE — NURSING
Tele psych consult called in by nurse     [Normal] : supple, no neck mass and thyroid not enlarged [Normal Neck Lymph Nodes] : normal neck lymph nodes  [Normal Supraclavicular Lymph Nodes] : normal supraclavicular lymph nodes [Normal Groin Lymph Nodes] : normal groin lymph nodes [Normal Axillary Lymph Nodes] : normal axillary lymph nodes [Normal] : full range of motion and no deformities appreciated [de-identified] : Below

## 2024-06-10 NOTE — PLAN OF CARE
"Plan of Care    BP (!) 142/74   Pulse 69   Temp 98 °F (36.7 °C) (Oral)   Resp 18   Ht 5' 2" (1.575 m)   Wt 75 kg (165 lb 5.5 oz)   SpO2 100%   Breastfeeding No   BMI 30.24 kg/m²     Rapid response called at 10 am for seizure activity. Per nursing, patient was rolled to her side, had open eyed blank stare and was convulsing. Primary team arrived and ICU team already at bedside. Patient was not seizing at time of arrival. No rescue ativan was needed to abort seizure. She was confused initially and then started responding appropriately. Vitals were as above. POCT glucose was 141.    Patient has a known seizure disorder and was off her medications prior to admission. She was restarted upon admission. Neurology on-call was reached out to. Keppra dosage increased to 1.5g BID. No need for any imaging at this time.    We will continue to monitor the patient.    Nae Crespo MD  [facility] IM/Peds PGY-1  [facility] IM Hospitalist Team B  "

## 2024-06-10 NOTE — PROGRESS NOTES
[facility] Neurology progress note:    Gina Jenkins  1975  [id number]    Subjective:  Had a event of staring with unresponsiveness which lasted <5 minutes today. No tonic or clonic movements of the extremities. Did not require abortive medication as lasted <5 minutes.    NSGY was consulted given hx of cerebral aneurysm, no acute intervention at this time.    ROS: As per subjective    ROS:   12pt ROS negative other then mentioned above    Histories:     Allergies:  Lisinopril, Bactrim [sulfamethoxazole-trimethoprim], and Ceftazidime    Current Medications:    Current Facility-Administered Medications   Medication Dose Route Frequency Provider Last Rate Last Admin    acetaminophen tablet 500 mg  500 mg Oral Q6H PRN Niles Zamora MD        acetaminophen tablet 650 mg  650 mg Oral Q4H PRN Niles Zamora MD   650 mg at 06/10/24 0813    amitriptyline tablet 50 mg  50 mg Oral QHS Nae Crespo MD        amLODIPine tablet 10 mg  10 mg Oral Daily Niles Zamora MD   10 mg at 06/10/24 0808    atorvastatin tablet 80 mg  80 mg Oral Daily Niles Zamora MD   80 mg at 06/10/24 0808    carvediloL tablet 37.5 mg  37.5 mg Oral BID Niles Zamora MD   37.5 mg at 06/10/24 0808    dextrose 10% bolus 125 mL 125 mL  12.5 g Intravenous PRN Niles Zamora MD        dextrose 10% bolus 250 mL 250 mL  25 g Intravenous PRN Niles Zamora MD        EScitalopram oxalate tablet 20 mg  20 mg Oral Daily Nae Crespo MD   20 mg at 06/10/24 0808    famotidine tablet 20 mg  20 mg Oral Daily Lidia Russell MD   20 mg at 06/10/24 0808    glucagon (human recombinant) injection 1 mg  1 mg Intramuscular PRN Niles Zamora MD        glucose chewable tablet 16 g  16 g Oral PRN Niles Zamora MD        glucose chewable tablet 24 g  24 g Oral PRN Niles Zamora MD        heparin (porcine) injection 5,000 Units  5,000 Units Subcutaneous Q8H Niles Zamora MD   5,000 Units at 06/10/24 0536    levETIRAcetam tablet 1,000 mg  1,000 mg Oral BID  Niles Zamora MD   1,000 mg at 06/10/24 0830    [COMPLETED] LORazepam (ATIVAN) 2 mg/mL injection             LORazepam injection 2 mg  2 mg Intravenous Once Desi Loaiza MD        LORazepam injection 2 mg  2 mg Intravenous PRN Nae Crespo MD        losartan tablet 50 mg  50 mg Oral Daily Niles Zamora MD   50 mg at 06/10/24 0808    naloxone 0.4 mg/mL injection 0.02 mg  0.02 mg Intravenous PRN Niles Zamora MD        oxyCODONE immediate release tablet 5 mg  5 mg Oral Q6H PRN Niles Zamora MD   5 mg at 06/09/24 1632    sodium chloride 0.9% flush 10 mL  10 mL Intravenous Q12H PRN Niles Zamora MD        topiramate tablet 50 mg  50 mg Oral BID Niles Zamora MD   50 mg at 06/10/24 0808         Past Medical/Surgical/Family/Social History:  PMHx:   Past Medical History:   Diagnosis Date    Brain aneurysm     CHF (congestive heart failure)     H/O coronary angioplasty     Hypercholesteremia     Hypertension     Malignant hypertension     Migraine headache     Stroke 10/2017      Surgeries:   Past Surgical History:   Procedure Laterality Date    CARDIAC CATHETERIZATION      CEREBRAL ANGIOGRAM      CLIP LIGATION OF INTRACRANIAL ANEURYSM BY CRANIOTOMY N/A 7/15/2022    Procedure: CRANIOTOMY, WITH ANEURYSM CLIPPING;  Surgeon: Timothy Diaz MD;  Location: [facility] OR [address] [place] [address];  Service: Neurosurgery;  Laterality: N/A;  PTERIONAL CRANIOTOMY WITH CLIP LIGATION OF L PCOMM, L ANTERIOR CHOROIDAL, L MCA  ANEURYSM, ANESTHESIA: GENERAL, BLOOD: TYPE&CROSS 2 UNITS, NEUROMONITORING: SEP, MEP, EEG, RADIOLOGY: C-ARM, POSITION: SUPINE, ANNA, CO-SURGERON: DR. LEXI DOMÍNGUEZ.    WOUND DEBRIDEMENT  8/27/2022    Procedure: DEBRIDEMENT, WOUND;  Surgeon: Timothy Diaz MD;  Location: [facility] OR [address] [place] [address];  Service: Neurosurgery;;      Family  Hx: No family history on file.   Social Hx:   Social History     Tobacco Use    Smoking status: Every Day     Current packs/day: 0.50     Types: Cigarettes    Smokeless tobacco: Never    Substance Use Topics    Alcohol use: Yes     Comment: occassionally    Drug use: No       Current Evaluation:     Vital Signs:   Vitals:    06/10/24 1001   BP: (!) 142/74   Pulse: 69   Resp:    Temp:         Neuro Exam  - Deferred, pt sleeping      LABORATORY STUDIES:  Labs:  Recent Labs   Lab 06/08/24 0233 06/09/24  0206 06/10/24  0239   WBC 6.18 5.20 5.58   HGB 12.4 11.6* 11.6*   HCT 35.4* 34.0* 32.9*    276 277   MCV 85 88 87       Recent Labs   Lab 06/08/24 0233 06/09/24  0206 06/10/24  0239    141 140   K 2.8* 3.6 3.6    107 107   CO2 24 22* 22*   BUN 16 20 17   * 99 96   CALCIUM 9.5 9.5 9.3   PROT 6.8 6.5 6.4   ALBUMIN 3.1* 3.1* 3.0*   BILITOT 0.2 0.2 0.2   AST 20 32 25   ALKPHOS 133 141* 131   ALT 12 19 19       Recent Labs   Lab 06/07/24 1951   INR 0.9       Recent Labs   Lab 06/07/24 1951 06/08/24  0233 06/09/24  0206 06/10/24  0239   GLU 91 114* 99 96       Urine:   Lab Results   Component Value Date    LABURIN  06/07/2024     Multiple organisms isolated. None in predominance.  Repeat if    LABURIN clinically necessary. 06/07/2024    SPECGRAV 1.015 06/07/2024    NITRITE Negative 06/07/2024    KETONESU Negative 06/07/2024    UROBILINOGEN Negative 06/07/2024    WBCUA 35 (H) 06/07/2024       Recent Labs   Lab 06/08/24  0233   LDLCALC 127.8       Thyroid:   Recent Labs   Lab 06/07/24 1951   TSH 1.570       FLP:   Recent Labs   Lab 06/08/24 0233   CHOL 241*   HDL 43   LDLCALC 127.8   TRIG 351*   CHOLHDL 17.8*       Cardiac markers:  Recent Labs   Lab 06/07/24 1951   TROPONINI <0.012       RADIOLOGY STUDIES:    MRI Brain 6/8:  1.  No MR evidence of acute infarction.     2.  Postoperative changes in the left frontal and temporal calvarium with encephalomalacia in the left temporal pole.     3.  Nonspecific T2/FLAIR signal hyperintensities within the periventricular and subcortical white matter.      CTA head and neck 6/7:  Resolution of gas from prior procedure the intracranial  region. Stable postoperative changes of left MCA aneurysmal clipping. Resolution of the right maxillary sinus opacification. Otherwise stable exam     MRI full spine 6/8:   Multilevel degenerative changes cervical spine as detailed above     No evidence for cord signal abnormality or abnormal intrathecal enhancement to suggest sequela of cord demyelination     Partially empty sella included in the field of view intracranial hypertension be included in differential in appropriate clinical setting    TTE 6/8/24    Left Ventricle: The left ventricle is normal in size. Mild septal thickening. There is concentric remodeling. There is normal systolic function. Ejection fraction by visual approximation is 55%. There is normal diastolic function.    Right Ventricle: Normal right ventricular cavity size. Wall thickness is normal. Systolic function is normal.    Pulmonary Artery: The estimated pulmonary artery systolic pressure is 17 mmHg.    IVC/SVC: Normal venous pressure at 3 mmHg.    Assessment/Plan:     Gina Jenkins is a 49 y.o. female with past medical history of cerebral aneurysms including Left posterior communicating and left anterior choroidal artery aneurysms requiring a craniotomy for clipping 7/2022 c/b seizures, CHF (congestive heart failure), H/O coronary angioplasty, HLD, HTN, Headache, Tobacco use who presents for facial droop, generalized weakness and headache. Neurology consulted for assistance in workup    Impression:  Positional weakness. Concern that intermittent facial droop is due to functional neurologic disorder  2.  Epilepsy post L craniotomy for L MCA aneurysmal clipping    #Stroke workup for weakness, ?dysarthria and facial droop   - Given CVA and aneurysms obtained imaging as part of stroke workup despite low concern given lack of deficits on exam. CTA findings stable at this time.   - No new stroke on MRI Brain  - Spine imaging wo cord compression or other acute findings  - Discussed  cessation of tobacco use as pt smokes 0.5 pack a day    #headache and lightheadedness  - lightheadedness is positional wo focal sensory and motor findings including coordination deficit on FNF exam.   - Spine imaging wo findings that could explain pt's symptoms but radiology noted partially empty sella. At this time pt's clinical symptoms not suggestive of IIH but would monitor clinically (HA description, no vision changes, no photosensitivity, no whooshing sound)  - Would obtain orthostatics  - Taper off amitriptyline: 50 mg PO today, followed by amitriptyline 25 mg on 6/11, then discontinue    #Seizure disorder post L craniotomy and L MCA aneurysmal clipping  - Spot EEG  - Increase maintenance keppra from 1000 mg BID to 1500 mg BID  - Ativan 2 mg IV PRN for any seizure lasting >5 minutes, or having back to back seizures with no return to mental baseline in between  - Discuss seizure precautions:    Discussed that it is illegal to drive for 6 months following a seizure.  Also advised to avoid operating heavy machinery, swimming alone, taking baths instead of showers, using front burners on the stove, climbing ladders, or other activities that would place himself or someone else at risk should he have a seizure.     Case to be discussed with Dr. Wall    Electronically signed by:   Marlene Coleman MD   6/10/2024 12:10 PM

## 2024-06-11 ENCOUNTER — CLINICAL SUPPORT (OUTPATIENT)
Dept: SMOKING CESSATION | Facility: CLINIC | Age: 49
End: 2024-06-11

## 2024-06-11 DIAGNOSIS — F17.210 CIGARETTE SMOKER: Primary | ICD-10-CM

## 2024-06-11 LAB
ALBUMIN SERPL BCP-MCNC: 3.5 G/DL (ref 3.5–5.2)
ALP SERPL-CCNC: 161 U/L (ref 55–135)
ALT SERPL W/O P-5'-P-CCNC: 24 U/L (ref 10–44)
ANION GAP SERPL CALC-SCNC: 12 MMOL/L (ref 8–16)
ANTI SM ANTIBODY: 0.07 RATIO (ref 0–0.99)
ANTI-SM INTERPRETATION: NEGATIVE
AST SERPL-CCNC: 27 U/L (ref 10–40)
BASOPHILS # BLD AUTO: 0.06 K/UL (ref 0–0.2)
BASOPHILS NFR BLD: 1 % (ref 0–1.9)
BILIRUB SERPL-MCNC: 0.2 MG/DL (ref 0.1–1)
BUN SERPL-MCNC: 19 MG/DL (ref 6–20)
CALCIUM SERPL-MCNC: 9.6 MG/DL (ref 8.7–10.5)
CHLORIDE SERPL-SCNC: 108 MMOL/L (ref 95–110)
CO2 SERPL-SCNC: 20 MMOL/L (ref 23–29)
CREAT SERPL-MCNC: 1.6 MG/DL (ref 0.5–1.4)
DIFFERENTIAL METHOD BLD: ABNORMAL
EOSINOPHIL # BLD AUTO: 0.4 K/UL (ref 0–0.5)
EOSINOPHIL NFR BLD: 6.1 % (ref 0–8)
ERYTHROCYTE [DISTWIDTH] IN BLOOD BY AUTOMATED COUNT: 13.2 % (ref 11.5–14.5)
EST. GFR  (NO RACE VARIABLE): 40 ML/MIN/1.73 M^2
GLUCOSE SERPL-MCNC: 91 MG/DL (ref 70–110)
HCT VFR BLD AUTO: 35.7 % (ref 37–48.5)
HGB BLD-MCNC: 12.2 G/DL (ref 12–16)
IMM GRANULOCYTES # BLD AUTO: 0.03 K/UL (ref 0–0.04)
IMM GRANULOCYTES NFR BLD AUTO: 0.5 % (ref 0–0.5)
LYMPHOCYTES # BLD AUTO: 2.2 K/UL (ref 1–4.8)
LYMPHOCYTES NFR BLD: 35.4 % (ref 18–48)
MCH RBC QN AUTO: 29.8 PG (ref 27–31)
MCHC RBC AUTO-ENTMCNC: 34.2 G/DL (ref 32–36)
MCV RBC AUTO: 87 FL (ref 82–98)
MONOCYTES # BLD AUTO: 0.4 K/UL (ref 0.3–1)
MONOCYTES NFR BLD: 6.7 % (ref 4–15)
NEUTROPHILS # BLD AUTO: 3.1 K/UL (ref 1.8–7.7)
NEUTROPHILS NFR BLD: 50.3 % (ref 38–73)
NRBC BLD-RTO: 0 /100 WBC
PLATELET # BLD AUTO: 305 K/UL (ref 150–450)
PMV BLD AUTO: 10 FL (ref 9.2–12.9)
POCT GLUCOSE: 139 MG/DL (ref 70–110)
POTASSIUM SERPL-SCNC: 4 MMOL/L (ref 3.5–5.1)
PROT SERPL-MCNC: 7.3 G/DL (ref 6–8.4)
RBC # BLD AUTO: 4.09 M/UL (ref 4–5.4)
SODIUM SERPL-SCNC: 140 MMOL/L (ref 136–145)
WBC # BLD AUTO: 6.1 K/UL (ref 3.9–12.7)

## 2024-06-11 PROCEDURE — 63600175 PHARM REV CODE 636 W HCPCS

## 2024-06-11 PROCEDURE — 25000003 PHARM REV CODE 250

## 2024-06-11 PROCEDURE — 80053 COMPREHEN METABOLIC PANEL: CPT

## 2024-06-11 PROCEDURE — 95714 VEEG EA 12-26 HR UNMNTR: CPT

## 2024-06-11 PROCEDURE — 97530 THERAPEUTIC ACTIVITIES: CPT | Mod: CO

## 2024-06-11 PROCEDURE — 36415 COLL VENOUS BLD VENIPUNCTURE: CPT

## 2024-06-11 PROCEDURE — 97530 THERAPEUTIC ACTIVITIES: CPT | Mod: CQ

## 2024-06-11 PROCEDURE — 94761 N-INVAS EAR/PLS OXIMETRY MLT: CPT

## 2024-06-11 PROCEDURE — 85025 COMPLETE CBC W/AUTO DIFF WBC: CPT

## 2024-06-11 PROCEDURE — 99900035 HC TECH TIME PER 15 MIN (STAT)

## 2024-06-11 PROCEDURE — 20000000 HC ICU ROOM

## 2024-06-11 PROCEDURE — 99406 BEHAV CHNG SMOKING 3-10 MIN: CPT | Mod: S$GLB,,,

## 2024-06-11 PROCEDURE — 25000003 PHARM REV CODE 250: Performed by: INTERNAL MEDICINE

## 2024-06-11 PROCEDURE — 95700 EEG CONT REC W/VID EEG TECH: CPT

## 2024-06-11 PROCEDURE — 95720 EEG PHY/QHP EA INCR W/VEEG: CPT | Mod: ,,, | Performed by: PSYCHIATRY & NEUROLOGY

## 2024-06-11 RX ORDER — METHOCARBAMOL 500 MG/1
500 TABLET, FILM COATED ORAL ONCE
Status: COMPLETED | OUTPATIENT
Start: 2024-06-11 | End: 2024-06-11

## 2024-06-11 RX ADMIN — LEVETIRACETAM INJECTION 1500 MG: 15 INJECTION INTRAVENOUS at 09:06

## 2024-06-11 RX ADMIN — LOSARTAN POTASSIUM 50 MG: 50 TABLET, FILM COATED ORAL at 08:06

## 2024-06-11 RX ADMIN — ATORVASTATIN CALCIUM 80 MG: 40 TABLET, FILM COATED ORAL at 08:06

## 2024-06-11 RX ADMIN — HEPARIN SODIUM 5000 UNITS: 5000 INJECTION INTRAVENOUS; SUBCUTANEOUS at 05:06

## 2024-06-11 RX ADMIN — CARVEDILOL 37.5 MG: 25 TABLET, FILM COATED ORAL at 08:06

## 2024-06-11 RX ADMIN — TOPIRAMATE 50 MG: 25 TABLET, FILM COATED ORAL at 08:06

## 2024-06-11 RX ADMIN — DEXTROSE MONOHYDRATE 750 MG: 50 INJECTION, SOLUTION INTRAVENOUS at 08:06

## 2024-06-11 RX ADMIN — ACETAMINOPHEN 650 MG: 325 TABLET ORAL at 08:06

## 2024-06-11 RX ADMIN — LEVETIRACETAM INJECTION 1500 MG: 15 INJECTION INTRAVENOUS at 10:06

## 2024-06-11 RX ADMIN — DEXTROSE MONOHYDRATE 2000 MG: 50 INJECTION, SOLUTION INTRAVENOUS at 12:06

## 2024-06-11 RX ADMIN — FAMOTIDINE 20 MG: 20 TABLET ORAL at 08:06

## 2024-06-11 RX ADMIN — OXYCODONE 5 MG: 5 TABLET ORAL at 03:06

## 2024-06-11 RX ADMIN — AMITRIPTYLINE HYDROCHLORIDE 25 MG: 25 TABLET, FILM COATED ORAL at 08:06

## 2024-06-11 RX ADMIN — HEPARIN SODIUM 5000 UNITS: 5000 INJECTION INTRAVENOUS; SUBCUTANEOUS at 02:06

## 2024-06-11 RX ADMIN — HEPARIN SODIUM 5000 UNITS: 5000 INJECTION INTRAVENOUS; SUBCUTANEOUS at 09:06

## 2024-06-11 RX ADMIN — AMLODIPINE BESYLATE 10 MG: 5 TABLET ORAL at 08:06

## 2024-06-11 RX ADMIN — METHOCARBAMOL 500 MG: 500 TABLET ORAL at 03:06

## 2024-06-11 RX ADMIN — ESCITALOPRAM OXALATE 20 MG: 10 TABLET ORAL at 08:06

## 2024-06-11 NOTE — PLAN OF CARE
Problem: Occupational Therapy  Goal: Occupational Therapy Goal  Description: Goals to be met by: 07/09/24     Patient will increase functional independence with ADLs by performing:    UE Dressing with Modified [place].  LE Dressing with Modified [place].  Grooming while standing at sink with Modified [place].  Toileting from bedside commode with Modified [place] for hygiene and clothing management.   Toilet transfer to bedside commode with Modified [place].    Outcome: Not Progressing     Patient has had seizure-like activity after therapy sessions x 2 days in a row.  Will need clearance/guidelines from MD team re: further therapy treatment. Patient requiring increased assistance for any mobility and today demonstrated increased tremors in stance.

## 2024-06-11 NOTE — CONSULTS
Progress Note  [facility]    Attending: Dr. Max  Admit Date: 6/7/2024  Today's Date: 06/11/2024    SUBJECTIVE:      HPI: Gina Jenkins is a 49 y.o. female who  has a past medical history of Brain aneurysm, CHF (congestive heart failure), H/O coronary angioplasty, Hypercholesteremia, Hypertension, Malignant hypertension, Migraine headache, and Stroke (10/2017).. The patient presented to [facility] on 6/7/2024 with a primary complaint of back pain, headache, weakness w/ facial droop and voice change x5 days.      The patient was in their usual state of health until approximately 5 days ago when she noted that she felt diffusely weak, noticed a voice change, and had a worsening headache. She notes that she does not like spending time at the doctor's office or hospital, so attempted to rest on the couch. She noted that her symptoms persisted and required the help of her sons to complete ADLs. She noted that on the day of admission, she attempted to stand on her own, noted that she was increasingly dizzy, and sat back down. She presented to the [facility] ED for evaluation. During this time she denies any focal strength changes in her upper and lower extremities. She notes that these episodes of weakness have presented prior but have never been this severe. She denies any knowledge of a recent viral URI but does live at home with 3 teenage sons. Denies any fevers, chills, rhinorrhea, n/v/d/c, vision changes, falls, chest pain, palpitations.     Interval History: Patient seen and examined this morning at MET. This was the 3rd MET called for this patient in the last 2 days. Upon arriving in the room, patient was on her side, moaning, in opisthotonus, but breathing and vital signs stable. This occurred shortly after PT, as did one of the events yesterday. The second event yesterday resolved with the administration of ativan, but the first event and this resolved before the need for administration. Pt on keppra and was  "switched from oral to IV yesterday. There was concern for epileptic seizure vs PNES. Loaded with 2000mg depakote IV. Transferred to ICU for continuous cap EEG and closer monitoring.    Review of Systems   Constitutional:  Negative for diaphoresis, fever and weight loss.   Respiratory:  Negative for shortness of breath.    Cardiovascular:  Negative for chest pain.   Gastrointestinal:  Negative for blood in stool.   Genitourinary:  Negative for hematuria.   Endo/Heme/Allergies:  Does not bruise/bleed easily.   All other systems reviewed and are negative.      OBJECTIVE:     Vital Signs Trends/Hx Reviewed  Vitals:    06/11/24 1315 06/11/24 1330 06/11/24 1345 06/11/24 1400   BP: 127/67 (!) 142/75 (!) 167/70    BP Location:       Patient Position:       Pulse: 73 75 75 74   Resp: (!) 41 (!) 45 (!) 43 (!) 22   Temp:       TempSrc:       SpO2: 96% 100% 100% 99%   Weight:       Height:              Physical Examination:  General: Alert, cooperative. AOx3  HEENT: R facial droop w/o forehead involvement at rest, atraumatic, PERRL, EOMI. Abnormal dentition.  Neck: No thyromegaly or masses   Cardiac: RRR, no murmurs appreciated, no extra heart sounds  Pulmonary/Chest: CTAB, symmetric chest rise, equal breath sounds bilaterally  GI: Soft, non tender, non distended, normoactive bowel sounds  Extremities: no edema or cyanosis  Skin: dry, warm, no wounds noted  Neuro: R facial droop as above, 4/5 strength in all extremities, intact sensation throught, no focal deficits other than aforementioned R facial droop. Did not ambulate patient.       Laboratory:  Recent Labs   Lab 06/11/24  0817   WBC 6.10   RBC 4.09   HGB 12.2   HCT 35.7*      MCV 87   MCH 29.8   MCHC 34.2     Recent Labs   Lab 06/10/24  1522 06/11/24  0817    140   K 4.1 4.0    108   CO2 20* 20*   BUN 16 19   CREATININE 1.5* 1.6*   CALCIUM 9.3 9.6   MG 2.0  --      No results for input(s): "PH", "PCO2", "PO2", "HCO3", "POCSATURATED", "BE" in the last 24 " "hours.        Chest Imaging:   No new chest imaging.     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 Tonic type.    MRI 6/8/24:   "1.  No MR evidence of acute infarction.  2.  Postoperative changes in the left frontal and temporal calvarium with encephalomalacia in the left temporal pole.  3.  Nonspecific T2/FLAIR signal hyperintensities within the periventricular and subcortical white matter."    MRI Spine w/wo 6/8/24:  "Multilevel degenerative changes cervical spine as detailed above  No evidence for cord signal abnormality or abnormal intrathecal enhancement to suggest sequela of cord demyelination  Partially empty sella included in the field of view intracranial hypertension be included in differential in appropriate clinical setting"    #Epilepsy vs PNES  - Neurology consulted  - Continuous EEG to observe event  - Adjust anti-epileptics per primary and neurology recommendations    #Migraine Headaches  - Agree with treatment plan per primary team    #Mood Disorder  - Per primary team  - Psych consulted  - Home lexapro 20mg daily    CV  Echo 6/8/24- normal EF (55%) and normal diastolic function.   Hx of coronary angioplasty    #HTN  - Amlodipine 10, Carvedilol 37.5 BID, losartan 50mg  - Continue per primary team    #HLD  -Continue atorvastatin 80mg per primary team     Pulm  No current issues      FEN/GI  Diet: Cardiac  is tolerating PO  Electrolytes are wnl  No other known issues at this time.     RENAL  UOP: 400cc , In: 250cc, net -150cc in last 24 hrs    Intake/Output - Last 3 Shifts         06/09 0700  06/10 0659 06/10 0700 06/11 0659 06/11 0700  06/12 0659    P.O. 500 250     IV Piggyback   200    Total Intake(mL/kg) 500 (6.7) 250 (3.3) 200 (2.7)    Urine (mL/kg/hr) 500 (0.3) 400 (0.2)     Total Output 500 400     Net 0 -150 +200        "    Urine Occurrence  1 x 1 x    Stool Occurrence  0 x     Emesis Occurrence  0 x           BUN/Cr: 19/1.6, baseline 1.2-1.4  Continue to monitor renal status and urine output     Heme  H/H 12.2/35.7; stable  WBC 6.10  DVT prophylaxis: Heparin (prophylactic)    Endo  No known issues at this time    ID  No known issues at this time.    ICU Checklist  Feeding: Cardiac  Analgesia: Acetaminophen, PRN Oxycodone 5  Sedation: None  Thrombo PPX: Heparin prophylactic dosing  Head of Bed: >30 degrees  Ulcer (Stress) PPX: None  Glucose: goal 140-180  SBT/SAT: N/A   Bowel Regimen: None  Indwelling catheter/Lines/Invasive devices: 2x PIV  De-escalation ABX: None    Thank you for this consult. Pulm/Critical Care will continue to follow patient.     Case discussed with faculty, Dr. Max.    Willie Bean MD, MPH  [facility]  PGY-1  06/11/2024

## 2024-06-11 NOTE — PLAN OF CARE
Problem: Adult Inpatient Plan of Care  Goal: Plan of Care Review  Outcome: Progressing  Goal: Patient-Specific Goal (Individualized)  Outcome: Progressing  Goal: Optimal Comfort and Wellbeing  Outcome: Progressing     Problem: Diabetes Comorbidity  Goal: Blood Glucose Level Within Targeted Range  Outcome: Progressing     Problem: Comorbidity Management  Goal: Blood Pressure in Desired Range  Outcome: Progressing     Problem: Fall Injury Risk  Goal: Absence of Fall and Fall-Related Injury  Outcome: Progressing   Pt resting in bed at shift change. Felicitas in place, door open. Pt sedated from ativan rec'd during 1 of 2 seizures this shift. Safety maintained through each seizure. NO acute distrsess RR even and nonlabored. SR up x3 and padded, call light in reach, bed locked in lowest position and room near nurse's station. No signs of distress at shift. Change.

## 2024-06-11 NOTE — PLAN OF CARE
Problem: Adult Inpatient Plan of Care  Goal: Plan of Care Review  Outcome: Progressing  Flowsheets (Taken 6/11/2024 1804)  Plan of Care Reviewed With: patient  Goal: Optimal Comfort and Wellbeing  Outcome: Progressing  Intervention: Monitor Pain and Promote Comfort  Flowsheets (Taken 6/11/2024 1804)  Pain Management Interventions:   care clustered   pillow support provided   position adjusted     Problem: Fall Injury Risk  Goal: Absence of Fall and Fall-Related Injury  Outcome: Progressing  Intervention: Identify and Manage Contributors  Flowsheets (Taken 6/11/2024 1804)  Self-Care Promotion: independence encouraged  Medication Review/Management: medications reviewed   -EEG started; w/ continuous overnight monitoring   -1 seizure since being brought to ICU

## 2024-06-11 NOTE — PROGRESS NOTES
[facility] Neurology progress note:    Gina Jenkins  1975  [id number]    Subjective:    Pt had recurrent seizure this AM, manifested as staring with unresponsiveness with no extremity jerking, lasted <5 minutes.     Pt gives different report of her seizure event, states she remembers the entire event and it was witnessed by therapy. States she was shaking on both sides and had tongue biting, denied urinary incontinence    Transferred to ICU for cap EEG.     Pt states her last seizure occurred about 8-9 months ago. She does not know what her seizures typically look like but she says she remembers most events? Her children have witnessed her seizures before, none of her children are currently present.     Pt reports she has had generalized weakness for one week now, worsens upon standing. Pt reports she was laying down not doing anything when she first noticed the subjective generalized weakness.    ROS:   12pt ROS negative other then mentioned above    Histories:     Allergies:  Lisinopril, Bactrim [sulfamethoxazole-trimethoprim], and Ceftazidime    Current Medications:    Current Facility-Administered Medications   Medication Dose Route Frequency Provider Last Rate Last Admin    acetaminophen tablet 500 mg  500 mg Oral Q6H PRN Niles Zamora MD        acetaminophen tablet 650 mg  650 mg Oral Q4H PRN Niles Zamora MD   650 mg at 06/11/24 0858    amitriptyline tablet 25 mg  25 mg Oral QHS Nae Crespo MD        amLODIPine tablet 10 mg  10 mg Oral Daily Niles Zamora MD   10 mg at 06/11/24 0837    atorvastatin tablet 80 mg  80 mg Oral Daily Niles Zamora MD   80 mg at 06/11/24 0836    carvediloL tablet 37.5 mg  37.5 mg Oral BID Niles Zamora MD   37.5 mg at 06/11/24 0837    dextrose 10% bolus 125 mL 125 mL  12.5 g Intravenous PRN Niles Zamora MD        dextrose 10% bolus 250 mL 250 mL  25 g Intravenous PRN Niles Zamora MD        EScitalopram oxalate tablet 20 mg  20 mg Oral Daily Nae Crespo MD   20 mg  at 06/11/24 0837    famotidine tablet 20 mg  20 mg Oral Daily Lidia Russell MD   20 mg at 06/11/24 0837    glucagon (human recombinant) injection 1 mg  1 mg Intramuscular PRN Niles Zamora MD        glucose chewable tablet 16 g  16 g Oral PRN Niles Zamora MD        glucose chewable tablet 24 g  24 g Oral PRN Niles Zamora MD        heparin (porcine) injection 5,000 Units  5,000 Units Subcutaneous Q8H Niles Zamora MD   5,000 Units at 06/11/24 0559    levETIRAcetam in NaCl (iso-os) IVPB 1,500 mg  1,500 mg Intravenous Q12H Julio Medeiros  mL/hr at 06/11/24 0844 1,500 mg at 06/11/24 0844    LORazepam injection 2 mg  2 mg Intravenous PRN Nae Crespo MD   2 mg at 06/10/24 1508    losartan tablet 50 mg  50 mg Oral Daily Niles Zamora MD   50 mg at 06/11/24 0837    naloxone 0.4 mg/mL injection 0.02 mg  0.02 mg Intravenous PRN Niles Zamora MD        oxyCODONE immediate release tablet 5 mg  5 mg Oral Q6H PRN Niles Zamora MD   5 mg at 06/11/24 0302    sodium chloride 0.9% flush 10 mL  10 mL Intravenous Q12H PRN Niles Zamora MD        topiramate tablet 50 mg  50 mg Oral BID Niles Zamora MD   50 mg at 06/11/24 0836    valproate (DEPACON) 2,000 mg in dextrose 5 % (D5W) 100 mL IVPB  2,000 mg Intravenous Once Michael Issa MD        valproate (DEPACON) 750 mg in dextrose 5 % (D5W) 100 mL IVPB  750 mg Intravenous Q12H Michael Issa MD             Past Medical/Surgical/Family/Social History:  PMHx:   Past Medical History:   Diagnosis Date    Brain aneurysm     CHF (congestive heart failure)     H/O coronary angioplasty     Hypercholesteremia     Hypertension     Malignant hypertension     Migraine headache     Stroke 10/2017      Surgeries:   Past Surgical History:   Procedure Laterality Date    CARDIAC CATHETERIZATION      CEREBRAL ANGIOGRAM      CLIP LIGATION OF INTRACRANIAL ANEURYSM BY CRANIOTOMY N/A 7/15/2022    Procedure: CRANIOTOMY, WITH ANEURYSM CLIPPING;  Surgeon: Timothy MANNING  MD Joe;  [facility];  Service: Neurosurgery;  Laterality: N/A;  PTERIONAL CRANIOTOMY WITH CLIP LIGATION OF L PCOMM, L ANTERIOR CHOROIDAL, L MCA  ANEURYSM, ANESTHESIA: GENERAL, BLOOD: TYPE&CROSS 2 UNITS, NEUROMONITORING: SEP, MEP, EEG, RADIOLOGY: C-ARM, POSITION: SUPINE, ANNA CO-SURGERON: DR. LEXI DOMÍNGUEZ.    WOUND DEBRIDEMENT  8/27/2022    Procedure: DEBRIDEMENT, WOUND;  Surgeon: Timothy Diaz MD;  Location: [facility] OR [address] [place] [address];  Service: Neurosurgery;;      Family  Hx: No family history on file.   Social Hx:   Social History     Tobacco Use    Smoking status: Every Day     Current packs/day: 0.33     Average packs/day: 0.3 packs/day for 31.4 years (10.4 ttl pk-yrs)     Types: Cigarettes     Start date: 1993    Smokeless tobacco: Never    Tobacco comments:     Enrolled in the [employer] on 6/7/22 ([facility] Member ID # [id number]). Pt is a 0.33 pk/day cigarette smoker x 31 yrs. She states ready to quit smoking. Ambulatory referral to Smoking Cessation clinic following hospital discharge.    Substance Use Topics    Alcohol use: Yes     Comment: occassionally    Drug use: No       Current Evaluation:     Vital Signs:   Vitals:    06/11/24 1036   BP:    Pulse: 77   Resp:    Temp:         Neuro Exam    Gen: Alert and oriented to name, city  Language: No dysarthria or aphasia    CN  CN II - Visual fields intact, PERRLA  CN III, IV, VI - EOM intact without nystagmus  CN V1, V2, V3 - Facial sensation preserved bilaterally  CN VII - Patient is able to smile and raise eyebrows symmetrically  CN VIII - No hearing deficit  CN IX and X - Palate rises symmetrically, uvula is midline  CN XI - Motor strength of shoulder shrug is 5/5 bilaterally  CN XII - Tongue protrudes midline without fasciculation    Motor     Left Right   Shoulder abduction 4/5 4/5   Shoulder adduction 5/5 5/5   Elbow flexion 5/5 5/5   Elbow extension 5/5 5/5   Wrist flexion 5/5 5/5   Wrist extension 5/5 5/5    strength 5/5 5/5   Hip  flexion 5/5 5/5   Knee flexion 5/5 5/5   Knee extension 5/5 5/5   Ankle plantarflexion 5/5 5/5   Ankle dorsiflexion 5/5 5/5     Normal muscle tone, no significant limitations in range of motion    Patient does not give full effort, she gives staggering effort in almost all muscle groups which is not reflective of true neuromuscular weakness. She does have smooth weakness to resistance of bilateral shoulder abduction.     Sensory: Intact to light touch in BUE and BLE, no neglect    Reflexes           Left       Right   Brachioradialis        3+         3+   Bicep        3+         3+   Tricep        3+         3+   Patellar        3+         3+   Achilles        3+         3+   Babinski   Negative    Negative     Cerebellar: No dysmetria on finger to nose or heel shin bilaterally    Gait: deferred      LABORATORY STUDIES:  Labs:  Recent Labs   Lab 06/09/24  0206 06/10/24  0239 06/11/24  0817   WBC 5.20 5.58 6.10   HGB 11.6* 11.6* 12.2   HCT 34.0* 32.9* 35.7*    277 305   MCV 88 87 87       Recent Labs   Lab 06/09/24  0206 06/10/24  0239 06/10/24  1522 06/11/24  0817    140 139 140   K 3.6 3.6 4.1 4.0    107 108 108   CO2 22* 22* 20* 20*   BUN 20 17 16 19   GLU 99 96 103 91   CALCIUM 9.5 9.3 9.3 9.6   PROT 6.5 6.4  --  7.3   ALBUMIN 3.1* 3.0*  --  3.5   BILITOT 0.2 0.2  --  0.2   AST 32 25  --  27   ALKPHOS 141* 131  --  161*   ALT 19 19  --  24       Recent Labs   Lab 06/07/24  1951   INR 0.9       Recent Labs   Lab 06/08/24  0233 06/09/24 0206 06/10/24  0239 06/10/24  1522 06/11/24  0817   * 99 96 103 91       Urine:   Lab Results   Component Value Date    LABURIN  06/07/2024     Multiple organisms isolated. None in predominance.  Repeat if    LABURIN clinically necessary. 06/07/2024    SPECGRAV 1.015 06/07/2024    NITRITE Negative 06/07/2024    KETONESU Negative 06/07/2024    UROBILINOGEN Negative 06/07/2024    WBCUA 35 (H) 06/07/2024       Recent Labs   Lab 06/08/24  0233   LDLCALC 127.8        Thyroid:   Recent Labs   Lab 06/07/24 1951   TSH 1.570       FLP:   Recent Labs   Lab 06/08/24  0233   CHOL 241*   HDL 43   LDLCALC 127.8   TRIG 351*   CHOLHDL 17.8*       Cardiac markers:  Recent Labs   Lab 06/07/24 1951   TROPONINI <0.012       RADIOLOGY STUDIES:    MRI Brain 6/8:  1.  No MR evidence of acute infarction.     2.  Postoperative changes in the left frontal and temporal calvarium with encephalomalacia in the left temporal pole.     3.  Nonspecific T2/FLAIR signal hyperintensities within the periventricular and subcortical white matter.      CTA head and neck 6/7:  Resolution of gas from prior procedure the intracranial region. Stable postoperative changes of left MCA aneurysmal clipping. Resolution of the right maxillary sinus opacification. Otherwise stable exam     MRI full spine 6/8:   Multilevel degenerative changes cervical spine as detailed above     No evidence for cord signal abnormality or abnormal intrathecal enhancement to suggest sequela of cord demyelination     Partially empty sella included in the field of view intracranial hypertension be included in differential in appropriate clinical setting    TTE 6/8/24    Left Ventricle: The left ventricle is normal in size. Mild septal thickening. There is concentric remodeling. There is normal systolic function. Ejection fraction by visual approximation is 55%. There is normal diastolic function.    Right Ventricle: Normal right ventricular cavity size. Wall thickness is normal. Systolic function is normal.    Pulmonary Artery: The estimated pulmonary artery systolic pressure is 17 mmHg.    IVC/SVC: Normal venous pressure at 3 mmHg.    Assessment/Plan:     Gina Jenkins is a 49 y.o. female with past medical history of cerebral aneurysms including Left posterior communicating and left anterior choroidal artery aneurysms requiring a craniotomy for clipping 7/2022 c/b seizures, CHF (congestive heart failure), H/O coronary angioplasty,  HLD, HTN, Headache, Tobacco use who presents for facial droop, generalized weakness and headache. Neurology consulted for assistance in workup    Impression:  Subjective generalized weakness, patient does not give full effort on strength exam, she gives staggering effort in almost all muscle groups which is not reflective of true neuromuscular weakness. She did have smooth weakness of bilateral shoulder abduction (4/5). She complains of muscle pain/cramping so would obtain labs to investigate potential myopathy given proximal weakness.   Concern that intermittent facial droop is due to functional neurologic disorder  2.  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 Seizure focus potentially from left temporal lobe anterior pole encephalomalacia from that procedure. It is also possible some of her events are PNES, as pt reports whole body shaking movements without loss of consciousness    #Stroke workup for weaknessn and facial droop   - No new stroke on MRI Brain  - Spine imaging wo cord compression or other acute findings  - On exam she gives staggering effort in all muscle groups. 5/5 strength throughout with encouragement, except for bilateral shoulder abduction 4/5  - Given proximal weakness (bilateral shoulder abduction), would obtain further lab workup for myopathy:   - CK normal   - AST and ALT normal   - Urinalysis with 0 RBCs and no blood (no evidence of myoglobin)   - Aldolase   - LDH   - ESR, CRP    #headache and lightheadedness  - lightheadedness is positional wo focal sensory and motor findings including coordination deficit on FNF exam.   - Spine imaging wo findings that could explain pt's symptoms but radiology noted partially empty sella. At this time pt's clinical symptoms not suggestive of IIH but would monitor clinically (HA description, no vision changes, no photosensitivity, no whooshing sound)  - Would obtain orthostatics  - Taper off amitriptyline: amitriptyline 25 mg on 6/11,  then discontinue    #Seizure disorder post L craniotomy and L MCA aneurysmal clipping  - Spot EEG  - Continue maintenance keppra 1500 mg BID  - Load depacon 2,000 mg now  - Start maintenance depacon 750 mg BID at 7 PM  - Continue home topamax 50 mg BID  - Ativan 2 mg IV PRN for any seizure lasting >5 minutes, or having back to back seizures with no return to mental baseline in between  - Discuss seizure precautions:    Discussed that it is illegal to drive for 6 months following a seizure.  Also advised to avoid operating heavy machinery, swimming alone, taking baths instead of showers, using front burners on the stove, climbing ladders, or other activities that would place himself or someone else at risk should he have a seizure.     Case to be discussed with Dr. Blue    Electronically signed by:   Marlene Coleman MD   6/11/2024 12:10 PM

## 2024-06-11 NOTE — PROGRESS NOTES
Individual Follow-Up Form    6/11/2024    Quit Date: To be determined    Clinical Status of Patient: Inpatient    Length of Service: 30 minutes    Comments: Smoking cessation education follow-up deferred. MET in progress- pt to be transferred to ICU. Will follow up when patient condition improves.     Diagnosis: F17.210

## 2024-06-11 NOTE — PT/OT/SLP PROGRESS
Physical Therapy Treatment    Patient Name:  Gina Jenkins   MRN:  [id number]    Recommendations:     Discharge Recommendations: Moderate Intensity Therapy  Discharge Equipment Recommendations:  (TBD next level of care)  Barriers to discharge:  decreased mobility,strength endurance and seizures    Assessment:     Gina Jenkins is a 49 y.o. female admitted with a medical diagnosis of Weakness.  She presents with the following impairments/functional limitations: weakness, impaired endurance, impaired functional mobility, gait instability, impaired balance, decreased lower extremity function, impaired coordination, impaired fine motor,pt with good participation with some c/o's dizziness and tremulous,pt requires assistance with all mobility and also experienced an active seizure at end of rx,assistance called for and pt placed in sidelying in bed,medical team entered room.Pt will benefit from moderate intensity therapy upon discharge    Rehab Prognosis: Fair; patient would benefit from acute skilled PT services to address these deficits and reach maximum level of function.    Recent Surgery: * No surgery found *      Plan:     During this hospitalization, patient to be seen 5 x/week to address the identified rehab impairments via gait training, therapeutic activities, therapeutic exercises, neuromuscular re-education and progress toward the following goals:    Plan of Care Expires:  07/07/24    Subjective     Chief Complaint: n/a  Patient/Family Comments/goals: pt agreeable to rx.  Pain/Comfort:  Pain Rating 1:  (no c/o's)      Objective:     Communicated with nsg prior to session.  Patient found supine with telemetry upon PT entry to room.     General Precautions: Standard, fall, seizure  Orthopedic Precautions: N/A  Braces: N/A  Respiratory Status: Room air     Functional Mobility:  Bed Mobility:     Scooting: minimum assistance, of 2 persons, and X 3 trials  Supine to Sit: stand by assistance  Sit to Supine:  maximal assistance and of 2 persons  Transfers:     Sit to Stand:  contact guard assistance with rolling walker  Balance: poor standing balance with RW      AM-PAC 6 CLICK MOBILITY  Turning over in bed (including adjusting bedclothes, sheets and blankets)?: 3  Sitting down on and standing up from a chair with arms (e.g., wheelchair, bedside commode, etc.): 3  Moving from lying on back to sitting on the side of the bed?: 3  Moving to and from a bed to a chair (including a wheelchair)?: 2  Need to walk in hospital room?: 2  Climbing 3-5 steps with a railing?: 1  Basic Mobility Total Score: 14       Treatment & Education:       Patient left left sidelying with all lines intact, call button in reach, and nsg and medical team present..    GOALS: see general POC  Multidisciplinary Problems       Physical Therapy Goals          Problem: Physical Therapy    Goal Priority Disciplines Outcome Goal Variances Interventions   Physical Therapy Goal     PT, PT/OT Progressing                         Time Tracking:     PT Received On: 06/11/24  PT Start Time: 0930     PT Stop Time: 0958  PT Total Time (min): 28 min     Billable Minutes: Therapeutic Activity 28       PT/PTA: PTA     Number of PTA visits since last PT visit: 1 06/11/2024

## 2024-06-11 NOTE — PT/OT/SLP PROGRESS
"Occupational Therapy   Treatment    Name: Gina Jenkins  MRN: [id number]  Admitting Diagnosis:  Weakness       Recommendations:     Discharge Recommendations: Moderate Intensity Therapy  Discharge Equipment Recommendations:  to be determined by next level of care  Barriers to discharge:  Decreased caregiver support (seizures)    Assessment:     Gina Jenkins is a 49 y.o. female with a medical diagnosis of Weakness.  Performance deficits affecting function are weakness, impaired endurance, impaired self care skills, decreased lower extremity function, impaired functional mobility, gait instability, impaired balance, decreased coordination, impaired fine motor, decreased upper extremity function, decreased safety awareness, impaired coordination.     Rehab Prognosis:  Good and Fair; patient would benefit from acute skilled OT services to address these deficits and reach maximum level of function.       Plan:     Patient to be seen 4 x/week to address the above listed problems via self-care/home management, therapeutic activities, therapeutic exercises  Plan of Care Expires: 07/09/24  Plan of Care Reviewed with: patient, other (see comments) (MD team (MET called))    Subjective     Chief Complaint: "I'm just scared"  Patient/Family Comments/goals: return to PLOF  Pain/Comfort:  Pain Rating 1: 0/10  Pain Rating Post-Intervention 1: 0/10    Objective:     Communicated with: Mesfin ramos prior to session.  Patient found HOB elevated with bed alarm, telemetry upon OT entry to room.    General Precautions: Standard, fall, seizure    Orthopedic Precautions:N/A  Braces: N/A  Respiratory Status: Room air    Bed Mobility:    Patient completed Scooting/Bridging with Princess of 2 persons to laterally scoot along EOB  Patient completed Supine to Sit with stand by assistance and with side rail  Patient completed Sit to Supine with maximal assistance and 2 persons     Functional Mobility/Transfers:  Patient completed Sit <> Stand " "Transfer with contact guard assistance  with  rolling walker   Functional Mobility: Princess of 2 persons for SS along EOB as well as maintaining balance in stance 2/2 increased full body tremors    Activities of Daily Living:  N/A    [facility] 6 Click ADL: 17    Treatment & Education:  Asleep on arrival, but easily arousable and agreeable to therapy  Bed mob as noted above  No tremors when sitting EOB initially, but once patient comes to stand, demos increased full body tremors  Also reports increased fear and anxiety during stand and step attempts  "I'm just scared"  Patient completed x 3 lateral scoots towards HOB before having a distant, glazed over look (same as yesterday); patient then returned to bed level safely, where she began to go rigid (BLEs in extension, BUEs in flexion) before having full body jerking  Patient placed in R SL to protect airway and MET called overhead    Patient left right sidelying with all lines intact, call button in reach, and MET team present    GOALS:   Multidisciplinary Problems       Occupational Therapy Goals          Problem: Occupational Therapy    Goal Priority Disciplines Outcome Interventions   Occupational Therapy Goal     OT, PT/OT Not Progressing    Description: Goals to be met by: 07/09/24     Patient will increase functional independence with ADLs by performing:    UE Dressing with Modified Cooper.  LE Dressing with Modified Cooper.  Grooming while standing at sink with Modified Cooper.  Toileting from bedside commode with Modified Cooper for hygiene and clothing management.   Toilet transfer to bedside commode with Modified Cooper.                         Time Tracking:     OT Date of Treatment: 06/11/24  OT Start Time: 0930  OT Stop Time: 0958  OT Total Time (min): 28 min co-tx /c PTA    Billable Minutes:Therapeutic Activity 28    OT/LUKE: LUKE     Number of LUKE visits since last OT visit: 2    6/11/2024  "

## 2024-06-11 NOTE — NURSING
RAPID RESPONSE NURSE NOTE        Admit Date: 2024  LOS: 1  Code Status: Full Code   Date of Consult: 2024  : 1975  Age: 49 y.o.  Weight:   Wt Readings from Last 1 Encounters:   24 75 kg (165 lb 5.5 oz)     Sex: female  Race: Black or    Bed: K565/K565 A  MRN: [id number]  Time Rapid Response Team page Received: 0948  Time Rapid Response Team at Bedside: 0948  Time Rapid Response Team left Bedside: 1010  Was the patient discharged from an ICU this admission? No   Was the patient discharged from a PACU within last 24 hours? No   Did the patient receive conscious sedation/general anesthesia in last 24 hours? No   Was the patient in the ED within the past 24 hours? No   Was the patient on NIPPV within the past 24 hours? No   Did this progress into an ARC or CPA: No  Attending Physician: Beck Leiva MD  Primary Service: Internal Medicine     SITUATION     Notified by overhead page.  Reason for alert: Seizure-like activity  Called to evaluate the patient for Neuro    Why is the patient in the hospital?: Weakness    Patient has a past medical history of Brain aneurysm, CHF (congestive heart failure), H/O coronary angioplasty, Hypercholesteremia, Hypertension, Malignant hypertension, Migraine headache, and Stroke.    Last Vitals:  Temp: 98.5 °F (36.9 °C) ( 0716)  Pulse: 77 ( 1036)  Resp: 22 ( 1031)  BP: 157/74 ( 1031)  SpO2: 97 % ( 1031)    24 Hours Vitals Range:  Temp:  [97.7 °F (36.5 °C)-98.5 °F (36.9 °C)]   Pulse:  [64-77]   Resp:  [18-28]   BP: ()/(57-89)   SpO2:  [91 %-100 %]     Labs:  Recent Labs     24  0206 06/10/24  0239 24  0817   WBC 5.20 5.58 6.10   HGB 11.6* 11.6* 12.2   HCT 34.0* 32.9* 35.7*    277 305       Recent Labs     24  0206 06/10/24  0239 06/10/24  1522 24  0817    140 139 140   K 3.6 3.6 4.1 4.0    107 108 108   CO2 22* 22* 20* 20*   BUN 20 17 16 19   CREATININE 1.6* 1.6* 1.5* 1.6*  "  GLU 99 96 103 91   PHOS 3.0 3.4  --   --    MG 1.9 1.9 2.0  --        Latest Reference Range & Units 06/11/24 09:49   POCT Glucose 70 - 110 mg/dL 139 (H)     No results for input(s): "PH", "PCO2", "PO2", "HCO3", "POCSATURATED", "BE" in the last 72 hours.     ASSESSMENT/INTERVENTIONS    The patient was seen for a Neurological problem. Staff concerns included seizure-like activity. The following interventions were performed: POCT glucose and seizure precautions.  Patient was moaning with rhythmic body movements for <5 minutes.  This episode occurred after physical therapy which happened yesterday as well.  Patient alert & answering questions appropriately shortly after episode.  States she no longer wants to participate in PT in the future.    RECOMMENDATIONS    We recommend: Transfer to ICU for EEG monitoring    Discussed plan of care with bedside RNKarla & JAROCHO Toure .  Accepting RNAsia, at bedside upon arrival to ICU.  Belongings including clothing, phone, and chargers transferred with patient.  Niece at bedside.    PROVIDER ESCALATION    Orders received and case discussed with Dr. Loaiza & [facility] IM Team (Dr. Leiva) .    Disposition: Tx in ICU bed 565           "

## 2024-06-11 NOTE — PLAN OF CARE
06/11/24 1250   Post-Acute Status   Post-Acute Authorization Placement   Post-Acute Placement Status Patient declined/refused   Home Health Status Pending medical clearance/testing   Discharge Plan   Discharge Plan A Home with family;Home Health;Home     Met with pt and family at bedside. Transferred to ICU bed 565. Pt is declining any placement at this time. Referrals to be rescinded. Pt's asked about disability. SSI referral to be sent. Consents to having sheyla Washington [phone] to help with communicated. Email noted as [email or web address] PCP Clair Johnson NP at [facility].  Cont EEG in ICU. Pending medical stability. F/U with Neuro requested.     Future Appointments   Date Time Provider Department Center   6/11/2024  3:30 PM JA, [facility]   6/27/2024 11:00 AM Eun Malloy, [facility]     No orders of the defined types were placed in this encounter.    Consults (From admission, onward)          Status Ordering Provider     Inpatient consult to Telemedicine - Psych  Once        Provider:  (Not yet assigned)    Completed [place]     Inpatient consult to Neurosurgery  Once        Provider:  Adama Barboza MD    Completed FRANK MOREAU     Inpatient consult to Psychiatry  Once        Provider:  Bonifacio Garcia MD    Completed FRANK MOREAU     Inpatient consult to Telemedicine - Psych  Once        Provider:  (Not yet assigned)    Completed [place]     Inpatient consult to [facility]  Once        Provider:  (Not yet assigned)    Completed JUAN ALBERTO ALVARADO

## 2024-06-11 NOTE — PLAN OF CARE
06/11/24 0845   Post-Acute Status   Post-Acute Authorization Placement   Post-Acute Placement Status Pending medical clearance/testing     Expected Discharge  No discharge order  Medical Readiness for Discharge  Medically ready is not yet set.  Expected Discharge: 6/13/2024  Expected Discharge Comment:Pending medical clearance.Referrals sent. Referrals also sent to [facility] via [employer] Right fax. No acceptances at present. Additional referrals sent. PENDING ACCEPTING FACILITY.    Future Appointments   Date Time Provider Department Center   6/27/2024 11:00 AM Eun Malloy, RRT [facility]       No orders of the defined types were placed in this encounter.    Consults (From admission, onward)          Status Ordering Provider     Inpatient consult to Telemedicine - Psych  Once        Provider:  (Not yet assigned)    Completed [place]     Inpatient consult to Neurosurgery  Once        Provider:  Adama Barboza MD    Completed FRANK MOREAU     Inpatient consult to Psychiatry  Once        Provider:  Bonifacio Garcia MD    Completed FRANK MOREAU     Inpatient consult to Telemedicine - Psych  Once        Provider:  (Not yet assigned)    Completed [place]     Inpatient consult to [facility]  Once        Provider:  (Not yet assigned)    Completed JUAN ALBERTO ALVARADO

## 2024-06-11 NOTE — MEDICAL/APP STUDENT
[facility] Medicine Progress Note    Primary Team: [facility] Hospitalist Team B  Attending Physician: Beck Leiva MD  Resident: Dr. Escobar Issa  Intern: Dr. Nae Crespo    Subjective:      Pt had two witnessed seizures yesterday. First seizure occurred after walking around with PT. Lasted about 5 mins and resolved before administration of medications. Second seizure witnessed by nurse. Lasted about 2 minutes and resolved with IM ativan. Pt switched from oral to IV kepra. Desat to 91%, vital signs otherwise stable.          Objective:     Last 24 Hour Vital Signs:  BP  Min: 95/57  Max: 143/65  Temp  Av.1 °F (36.7 °C)  Min: 97.7 °F (36.5 °C)  Max: 98.5 °F (36.9 °C)  Pulse  Av.9  Min: 64  Max: 76  Resp  Av.6  Min: 18  Max: 20  SpO2  Av %  Min: 91 %  Max: 100 %  I/O last 3 completed shifts:  In: 250 [P.O.:250]  Out: 400 [Urine:400]    Physical Examination:  {exam; complete:03458}      Laboratory:  BMP  Lab Results   Component Value Date     06/10/2024    K 4.1 06/10/2024     06/10/2024    CO2 20 (L) 06/10/2024    BUN 16 06/10/2024    CREATININE 1.5 (H) 06/10/2024    CALCIUM 9.3 06/10/2024    ANIONGAP 11 06/10/2024    EGFRNORACEVR 43 (A) 06/10/2024      Lab Results   Component Value Date    WBC 5.58 06/10/2024    HGB 11.6 (L) 06/10/2024    HCT 32.9 (L) 06/10/2024    MCV 87 06/10/2024     06/10/2024          Component Ref Range & Units 1 d ago   CPK 20 - 180 U/L 91        Component Ref Range & Units 2 d ago   NYDIA Screen Negative <1:80 Negative <1:80   Comment: NYDIA test was performed by Immunofluorescence on HEP2 cells.       Anti-Mendez antibody pending    No New Micro results    Other Results:  No new imaging    Current Medications:     Infusions:       Scheduled:   amitriptyline  25 mg Oral QHS    amLODIPine  10 mg Oral Daily    atorvastatin  80 mg Oral Daily    carvediloL  37.5 mg Oral BID    EScitalopram oxalate  20 mg Oral Daily    famotidine  20 mg Oral Daily    heparin  (porcine)  5,000 Units Subcutaneous Q8H    levETIRAcetam (Keppra) IV (PEDS and ADULTS)  1,500 mg Intravenous Q12H    losartan  50 mg Oral Daily    topiramate  50 mg Oral BID        PRN:    Current Facility-Administered Medications:     acetaminophen, 500 mg, Oral, Q6H PRN    acetaminophen, 650 mg, Oral, Q4H PRN    dextrose 10%, 12.5 g, Intravenous, PRN    dextrose 10%, 25 g, Intravenous, PRN    glucagon (human recombinant), 1 mg, Intramuscular, PRN    glucose, 16 g, Oral, PRN    glucose, 24 g, Oral, PRN    lorazepam, 2 mg, Intravenous, PRN    naloxone, 0.02 mg, Intravenous, PRN    oxyCODONE, 5 mg, Oral, Q6H PRN    sodium chloride 0.9%, 10 mL, Intravenous, Q12H PRN      Assessment:     Gina Jenkins is a 49 y.o.female with  Patient Active Problem List    Diagnosis Date Noted    Slurred speech 06/08/2024    Right leg pain 08/07/2023    Localization-related (focal) (partial) symptomatic epilepsy and epileptic syndromes with complex partial seizures, not intractable, without status epilepticus 02/07/2023    History of cerebral aneurysm repair 02/07/2023    Fever 09/14/2022    Encephalopathy 08/30/2022    Brain compression 08/30/2022    Infection of superficial incisional surgical site after procedure 08/29/2022    Focal seizure 08/29/2022    Fluid collection at surgical site 08/25/2022    New onset type 2 diabetes mellitus 08/23/2022    Transient neurological symptoms 08/22/2022    Other emphysema 07/17/2022    Mixed hyperlipidemia 06/07/2022    Weakness 04/25/2018    H/O: CVA (cerebrovascular accident)     Cerebrovascular accident (CVA) 04/09/2018    Dysarthria 11/28/2017    Cognitive communication deficit 11/28/2017    Decreased  strength of right hand 11/14/2017    Weakness of right upper extremity 11/14/2017    Lack of coordination due to stroke 11/14/2017    Decreased range of motion of right shoulder 11/14/2017    Acute nonintractable headache 10/25/2017    Gait abnormality 10/05/2017    Aneurysm of left  middle cerebral artery 10/04/2017    Tobacco abuse 10/04/2017    Thyroid nodule 10/03/2017    Chronic diastolic heart failure 10/03/2017    Acute ischemic left MCA stroke 10/03/2017    Thrombotic stroke involving left middle cerebral artery 10/03/2017    Calculus of gallbladder without cholecystitis without obstruction 03/05/2016    Essential hypertension 03/05/2016        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 Aneurysms and clips stable on CTA head/neck. MRI brain with no acute infarctions, post-operative changes. MRI spine with cervical degenerative changes but no abnormality as evidence for demyelination.   -Increased Keppra to 1.5g BID after yesterday's seizure per neurology  -ativan PRN for seizure lasting > 5 minutes  -Neurology consulted, appreciate recs  -Will obtain inpatient EEG  -NSGY consulted, appreciate recs  -Psych consulted, appreciate recs  -Amitriptyline taper: today 25mg, then discontinue  -NYDIA negative  -PT/OT/SLP     Migraine headaches   -Continue topiramate 50 mg bid  -Taper home amitriptyline as above  -Multimodal pain control with tylenol, oxycodone   -Holding off NSAIDS at this time given aneurysm history   -Consider transition to fioricet, though pt preferring Tylenol currently     HTN  HLD   Hx coronary angioplasty   Cholesterol, TG elevated on lipid panel  -Continue atorvastatin 80 daily   -Continue home amlodipine 10 mg daily   -Continue home carvedilol 37.5 mg bid  -Continue home losartan 50 mg daily       Elevated alk phos   -CTM       Anxiety, depression  C/f functional neuro deficits   -Increased home lexapro to 20 mg daily  -Psych consulted, appreciate recs     Ppx: heparin subQ  Diet: Regular  Code: Full     Dispo: continue inpatient evaluation of neurological deficits; inpatient EEG; pending neuro,  psych, NSGY eval    Desi Rizzo, MS3    [facility] Medicine Hospitalist Pager numbers:   [facility] Hospitalist Medicine Team A (Joshua/Sky): [phone]  [facility] Hospitalist Medicine Team B (Chey/Cali):  [phone]

## 2024-06-11 NOTE — PLAN OF CARE
Plan of Care    Vitals:    06/11/24 0429 06/11/24 0716 06/11/24 0945 06/11/24 1002   BP:  114/66     BP Location:  Right arm     Patient Position:  Lying     Pulse: 70 74  70   Resp:  18     Temp:  98.5 °F (36.9 °C)     TempSrc:  Oral     SpO2:  98% 98%    Weight:       Height:            MET called around 9:45 am. Patient was working with occupational therapy and stated that she felt dizzy. She then laid in bed rolled to her side and was unresponsive with open stare. She did not require ativan rescue. Was alert and responsive after the seizure. She was confused and did not know year or president. ICU and primary at bedside. Neurology on call consulted.     Plan: Loaded with 2000mg depakote IV. Transferred to ICU for continuous EEG. Will start maintenance depakote starting tonigth at 7 pm.    Nae Crespo MD  [facility] IM/Peds PGY-1

## 2024-06-11 NOTE — PROGRESS NOTES
[facility] Resident Progress Note    Subjective:     Repeat seizure activity around 3 pm yesterday. Keppra changed to IV. This morning, is oriented x 3.     Objective:   Last 24 Hour Vital Signs:  BP  Min: 95/57  Max: 143/65  Temp  Av.1 °F (36.7 °C)  Min: 97.7 °F (36.5 °C)  Max: 98.5 °F (36.9 °C)  Pulse  Av.9  Min: 64  Max: 76  Resp  Av.6  Min: 18  Max: 20  SpO2  Av %  Min: 91 %  Max: 100 %    I/O last 3 completed shifts:  In: 250 [P.O.:250]  Out: 400 [Urine:400]    Physical Examination:  General: Alert, cooperative. AOx3  HEENT: R facial droop w/o forehead involvement at rest, atraumatic, PERRL, EOMI. Abnormal dentition.  Neck: No thyromegaly or masses   Cardiac: RRR, no murmurs appreciated, no extra heart sounds  Pulmonary/Chest: CTAB, symmetric chest rise, equal breath sounds bilaterally  GI: Soft, non tender, non distended, normoactive bowel sounds  Extremities: no edema or cyanosis  Skin: dry, warm, no wounds noted  Neuro: R facial droop as above, 4/5 strength in all extremities, intact sensation throught, no focal deficits other than aforementioned R facial droop. Did not ambulate patient.    Laboratory:  Most Recent Data:  CBC:   Lab Results   Component Value Date    WBC 5.58 06/10/2024    HGB 11.6 (L) 06/10/2024    HCT 32.9 (L) 06/10/2024     06/10/2024    MCV 87 06/10/2024    RDW 13.3 06/10/2024     BMP:   Lab Results   Component Value Date     06/10/2024    K 4.1 06/10/2024     06/10/2024    CO2 20 (L) 06/10/2024    BUN 16 06/10/2024    CREATININE 1.5 (H) 06/10/2024     06/10/2024    CALCIUM 9.3 06/10/2024    MG 2.0 06/10/2024    PHOS 3.4 06/10/2024     LFTs:   Lab Results   Component Value Date    PROT 6.4 06/10/2024    ALBUMIN 3.0 (L) 06/10/2024    BILITOT 0.2 06/10/2024    AST 25 06/10/2024    ALKPHOS 131 06/10/2024    ALT 19 06/10/2024     Coags:   Lab Results   Component Value Date    INR 0.9 2024     FLP:   Lab Results   Component Value Date    CHOL  241 (H) 06/08/2024    HDL 43 06/08/2024    LDLCALC 127.8 06/08/2024    TRIG 351 (H) 06/08/2024    CHOLHDL 17.8 (L) 06/08/2024     DM:   Lab Results   Component Value Date    HGBA1C 8.3 (H) 08/22/2022    HGBA1C 5.8 (H) 07/16/2022    HGBA1C 5.4 06/30/2020    LDLCALC 127.8 06/08/2024    CREATININE 1.5 (H) 06/10/2024     Thyroid:   Lab Results   Component Value Date    TSH 1.570 06/07/2024    FREET4 0.89 06/30/2020    R5WODHK 7.1 11/02/2017     Anemia:   Lab Results   Component Value Date    IRON 52 06/30/2020    TIBC 314 06/30/2020    FERRITIN 63 06/30/2020    WGXPKYNZ94 440 06/09/2024    FOLATE 12.9 04/09/2018     Cardiac:   Lab Results   Component Value Date    TROPONINI <0.012 06/07/2024     (H) 03/06/2017     Urinalysis:   Lab Results   Component Value Date    LABURIN  06/07/2024     Multiple organisms isolated. None in predominance.  Repeat if    LABURIN clinically necessary. 06/07/2024    COLORU Yellow 06/07/2024    SPECGRAV 1.015 06/07/2024    NITRITE Negative 06/07/2024    KETONESU Negative 06/07/2024    UROBILINOGEN Negative 06/07/2024    WBCUA 35 (H) 06/07/2024       Trended Lab Data:  Recent Labs   Lab 06/08/24  0233 06/09/24  0206 06/10/24  0239 06/10/24  1522   WBC 6.18 5.20 5.58  --    HGB 12.4 11.6* 11.6*  --    HCT 35.4* 34.0* 32.9*  --     276 277  --    MCV 85 88 87  --    RDW 13.5 13.7 13.3  --     141 140 139   K 2.8* 3.6 3.6 4.1    107 107 108   CO2 24 22* 22* 20*   BUN 16 20 17 16   CREATININE 1.3 1.6* 1.6* 1.5*   * 99 96 103   PROT 6.8 6.5 6.4  --    ALBUMIN 3.1* 3.1* 3.0*  --    BILITOT 0.2 0.2 0.2  --    AST 20 32 25  --    ALKPHOS 133 141* 131  --    ALT 12 19 19  --        Trended Cardiac Data:  Recent Labs   Lab 06/07/24 1951   TROPONINI <0.012     Radiology Data Reviewed: yes  Pertinent Findings:    6/8/24 MRI Brain without  The craniocervical junction is intact. There is partial empty appearance of the sella.  The midline structures are intact. The  intracranial flow voids are intact.  No acute diffusion-weighted signal abnormality is present.  There are postop changes of prior left frontal and temporal craniotomy for prior left MCA aneurysm clipping.  There is encephalomalacia in the left temporal pole.  There is also minimal degree of T2/FLAIR signal hyperintensity within this region.  There are T2/FLAIR signal hyperintensities within the periventricular and subcortical white matter. There is minimal susceptibility weighted artifact within the left inferior frontal and left temporal pole.  There is susceptibility weighted artifact identified within the left cerebellum.     Impression:  1.  No MR evidence of acute infarction.  2.  Postoperative changes in the left frontal and temporal calvarium with encephalomalacia in the left temporal pole.  3.  Nonspecific T2/FLAIR signal hyperintensities within the periventricular and subcortical white matter.      6/8/24 MRI spine with and without  Multilevel degenerative changes cervical spine as detailed above  No evidence for cord signal abnormality or abnormal intrathecal enhancement to suggest sequela of cord demyelination  Partially empty sella included in the field of view intracranial hypertension be included in differential in appropriate clinical setting      Current Medications:     Infusions:       Scheduled:   amitriptyline  25 mg Oral QHS    amLODIPine  10 mg Oral Daily    atorvastatin  80 mg Oral Daily    carvediloL  37.5 mg Oral BID    EScitalopram oxalate  20 mg Oral Daily    famotidine  20 mg Oral Daily    heparin (porcine)  5,000 Units Subcutaneous Q8H    levETIRAcetam (Keppra) IV (PEDS and ADULTS)  1,500 mg Intravenous Q12H    losartan  50 mg Oral Daily    topiramate  50 mg Oral BID        PRN:    Current Facility-Administered Medications:     acetaminophen, 500 mg, Oral, Q6H PRN    acetaminophen, 650 mg, Oral, Q4H PRN    dextrose 10%, 12.5 g, Intravenous, PRN    dextrose 10%, 25 g, Intravenous, PRN     glucagon (human recombinant), 1 mg, Intramuscular, PRN    glucose, 16 g, Oral, PRN    glucose, 24 g, Oral, PRN    lorazepam, 2 mg, Intravenous, PRN    naloxone, 0.02 mg, Intravenous, PRN    oxyCODONE, 5 mg, Oral, Q6H PRN    sodium chloride 0.9%, 10 mL, Intravenous, Q12H PRN      Assessment:     Gina Jenkins is a 49 y.o. female who  has a past medical history of Brain aneurysm, CHF (congestive heart failure), H/O coronary angioplasty, Hypercholesteremia, Hypertension, Malignant hypertension, Migraine headache, and Stroke (10/2017). The patient presented to [facility] on [facility]/7/2024 with a primary complaint of back pain, headache, weakness w/ facial droop and voice change x5 days. Admitted to [facility] for evaluation of seizures and HA with facial droop with neurology consulted.    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 Garcia not here, TelePsych reevaluate but patient was not cooperative with interview  -Amitriptyline taper: today 25mg, then discontinue  -NYDIA pending  -PT/OT/SLP    Migraine headaches   -Continue topiramate 50 mg bid  -Taper home amitriptyline as above  -Multimodal pain control with tylenol, oxycodone   -Holding off NSAIDS at this time given aneurysm history   -Consider transition to fioricet, though pt preferring Tylenol currently     HTN  HLD   Hx coronary angioplasty   Cholesterol, TG elevated on lipid panel  -Continue atorvastatin 80 daily   -Continue home amlodipine 10 mg daily   -Continue home carvedilol 37.5 mg  bid  -Continue home losartan 50 mg daily       Elevated alk phos   -CTM     Anxiety, depression  C/f functional neuro deficits   -Increased home lexapro to 20 mg daily  -Psych consulted    Ppx: heparin subQ  Diet: Regular  Code: Full    Dispo: continue inpatient evaluation of neurological deficits; inpatient EEG; pending neuro eval    Nae Crespo MD  [facility] IM/Peds PGY-1    [facility] Medicine Hospitalist Pager numbers:   [facility] Hospitalist Medicine Team A (Joshua/Sky): [phone]  [facility] Hospitalist Medicine Team B (Chey/Cali):  [phone]

## 2024-06-12 VITALS
WEIGHT: 165.38 LBS | OXYGEN SATURATION: 100 % | BODY MASS INDEX: 30.44 KG/M2 | RESPIRATION RATE: 26 BRPM | DIASTOLIC BLOOD PRESSURE: 74 MMHG | TEMPERATURE: 99 F | HEART RATE: 71 BPM | HEIGHT: 62 IN | SYSTOLIC BLOOD PRESSURE: 132 MMHG

## 2024-06-12 PROBLEM — G40.909 SEIZURE DISORDER: Status: ACTIVE | Noted: 2022-08-30

## 2024-06-12 LAB
ALBUMIN SERPL BCP-MCNC: 3.1 G/DL (ref 3.5–5.2)
ALP SERPL-CCNC: 153 U/L (ref 55–135)
ALT SERPL W/O P-5'-P-CCNC: 30 U/L (ref 10–44)
ANION GAP SERPL CALC-SCNC: 10 MMOL/L (ref 8–16)
AST SERPL-CCNC: 45 U/L (ref 10–40)
BASOPHILS # BLD AUTO: 0.05 K/UL (ref 0–0.2)
BASOPHILS NFR BLD: 1 % (ref 0–1.9)
BILIRUB SERPL-MCNC: 0.1 MG/DL (ref 0.1–1)
BUN SERPL-MCNC: 15 MG/DL (ref 6–20)
CALCIUM SERPL-MCNC: 9.2 MG/DL (ref 8.7–10.5)
CHLORIDE SERPL-SCNC: 112 MMOL/L (ref 95–110)
CO2 SERPL-SCNC: 18 MMOL/L (ref 23–29)
CREAT SERPL-MCNC: 1.6 MG/DL (ref 0.5–1.4)
DIFFERENTIAL METHOD BLD: ABNORMAL
EOSINOPHIL # BLD AUTO: 0.4 K/UL (ref 0–0.5)
EOSINOPHIL NFR BLD: 7.1 % (ref 0–8)
ERYTHROCYTE [DISTWIDTH] IN BLOOD BY AUTOMATED COUNT: 13.2 % (ref 11.5–14.5)
EST. GFR  (NO RACE VARIABLE): 40 ML/MIN/1.73 M^2
GLUCOSE SERPL-MCNC: 110 MG/DL (ref 70–110)
HCT VFR BLD AUTO: 33.7 % (ref 37–48.5)
HGB BLD-MCNC: 11.6 G/DL (ref 12–16)
IMM GRANULOCYTES # BLD AUTO: 0.03 K/UL (ref 0–0.04)
IMM GRANULOCYTES NFR BLD AUTO: 0.6 % (ref 0–0.5)
LYMPHOCYTES # BLD AUTO: 2 K/UL (ref 1–4.8)
LYMPHOCYTES NFR BLD: 40.4 % (ref 18–48)
MCH RBC QN AUTO: 30.2 PG (ref 27–31)
MCHC RBC AUTO-ENTMCNC: 34.4 G/DL (ref 32–36)
MCV RBC AUTO: 88 FL (ref 82–98)
MONOCYTES # BLD AUTO: 0.3 K/UL (ref 0.3–1)
MONOCYTES NFR BLD: 6.7 % (ref 4–15)
NEUTROPHILS # BLD AUTO: 2.2 K/UL (ref 1.8–7.7)
NEUTROPHILS NFR BLD: 44.2 % (ref 38–73)
NRBC BLD-RTO: 0 /100 WBC
OHS QRS DURATION: 98 MS
OHS QTC CALCULATION: 447 MS
PLATELET # BLD AUTO: 309 K/UL (ref 150–450)
PMV BLD AUTO: 10 FL (ref 9.2–12.9)
POTASSIUM SERPL-SCNC: 3.7 MMOL/L (ref 3.5–5.1)
PROT SERPL-MCNC: 6.6 G/DL (ref 6–8.4)
RBC # BLD AUTO: 3.84 M/UL (ref 4–5.4)
SODIUM SERPL-SCNC: 140 MMOL/L (ref 136–145)
WBC # BLD AUTO: 5.05 K/UL (ref 3.9–12.7)

## 2024-06-12 PROCEDURE — 80053 COMPREHEN METABOLIC PANEL: CPT

## 2024-06-12 PROCEDURE — 25000003 PHARM REV CODE 250

## 2024-06-12 PROCEDURE — 85025 COMPLETE CBC W/AUTO DIFF WBC: CPT

## 2024-06-12 PROCEDURE — 36415 COLL VENOUS BLD VENIPUNCTURE: CPT

## 2024-06-12 PROCEDURE — 99499 UNLISTED E&M SERVICE: CPT | Mod: 95,,, | Performed by: PSYCHIATRY & NEUROLOGY

## 2024-06-12 PROCEDURE — 63600175 PHARM REV CODE 636 W HCPCS

## 2024-06-12 RX ORDER — CARVEDILOL 12.5 MG/1
37.5 TABLET ORAL 2 TIMES DAILY
Qty: 540 TABLET | Refills: 3 | Status: SHIPPED | OUTPATIENT
Start: 2024-06-12 | End: 2024-06-12

## 2024-06-12 RX ORDER — ONDANSETRON 8 MG/1
8 TABLET, ORALLY DISINTEGRATING ORAL ONCE
Status: COMPLETED | OUTPATIENT
Start: 2024-06-12 | End: 2024-06-12

## 2024-06-12 RX ORDER — ATORVASTATIN CALCIUM 80 MG/1
80 TABLET, FILM COATED ORAL DAILY
Qty: 90 TABLET | Refills: 3 | Status: SHIPPED | OUTPATIENT
Start: 2024-06-13 | End: 2025-06-13

## 2024-06-12 RX ORDER — LEVETIRACETAM 750 MG/1
1500 TABLET ORAL 2 TIMES DAILY
Qty: 120 TABLET | Refills: 11 | Status: ON HOLD | OUTPATIENT
Start: 2024-06-12 | End: 2024-06-20

## 2024-06-12 RX ORDER — DIVALPROEX SODIUM 250 MG/1
750 TABLET, FILM COATED, EXTENDED RELEASE ORAL EVERY 12 HOURS
Qty: 180 TABLET | Refills: 11 | Status: ON HOLD | OUTPATIENT
Start: 2024-06-12 | End: 2024-06-20 | Stop reason: HOSPADM

## 2024-06-12 RX ORDER — LEVETIRACETAM 750 MG/1
1500 TABLET ORAL 2 TIMES DAILY
Qty: 120 TABLET | Refills: 11 | Status: SHIPPED | OUTPATIENT
Start: 2024-06-12 | End: 2024-06-12

## 2024-06-12 RX ORDER — LEVETIRACETAM 500 MG/1
1500 TABLET ORAL 2 TIMES DAILY
Status: DISCONTINUED | OUTPATIENT
Start: 2024-06-12 | End: 2024-06-12 | Stop reason: HOSPADM

## 2024-06-12 RX ORDER — ESCITALOPRAM OXALATE 20 MG/1
20 TABLET ORAL DAILY
Qty: 90 TABLET | Refills: 3 | Status: SHIPPED | OUTPATIENT
Start: 2024-06-13 | End: 2025-06-13

## 2024-06-12 RX ORDER — DIVALPROEX SODIUM 250 MG/1
750 TABLET, FILM COATED, EXTENDED RELEASE ORAL EVERY 12 HOURS
Status: DISCONTINUED | OUTPATIENT
Start: 2024-06-12 | End: 2024-06-12 | Stop reason: HOSPADM

## 2024-06-12 RX ORDER — DIVALPROEX SODIUM 250 MG/1
750 TABLET, FILM COATED, EXTENDED RELEASE ORAL EVERY 12 HOURS
Qty: 180 TABLET | Refills: 11 | Status: SHIPPED | OUTPATIENT
Start: 2024-06-12 | End: 2024-06-12

## 2024-06-12 RX ORDER — ATORVASTATIN CALCIUM 80 MG/1
80 TABLET, FILM COATED ORAL DAILY
Qty: 90 TABLET | Refills: 3 | Status: SHIPPED | OUTPATIENT
Start: 2024-06-13 | End: 2024-06-12

## 2024-06-12 RX ORDER — ESCITALOPRAM OXALATE 20 MG/1
20 TABLET ORAL DAILY
Qty: 90 TABLET | Refills: 3 | Status: SHIPPED | OUTPATIENT
Start: 2024-06-13 | End: 2024-06-12

## 2024-06-12 RX ORDER — LOSARTAN POTASSIUM 50 MG/1
50 TABLET ORAL DAILY
Qty: 90 TABLET | Refills: 3 | Status: SHIPPED | OUTPATIENT
Start: 2024-06-13 | End: 2024-06-12

## 2024-06-12 RX ORDER — LOSARTAN POTASSIUM 50 MG/1
50 TABLET ORAL DAILY
Qty: 90 TABLET | Refills: 3 | Status: SHIPPED | OUTPATIENT
Start: 2024-06-13 | End: 2025-06-13

## 2024-06-12 RX ORDER — CARVEDILOL 12.5 MG/1
37.5 TABLET ORAL 2 TIMES DAILY
Qty: 540 TABLET | Refills: 3 | Status: SHIPPED | OUTPATIENT
Start: 2024-06-12 | End: 2025-06-12

## 2024-06-12 RX ADMIN — ATORVASTATIN CALCIUM 80 MG: 40 TABLET, FILM COATED ORAL at 08:06

## 2024-06-12 RX ADMIN — ONDANSETRON 8 MG: 8 TABLET, ORALLY DISINTEGRATING ORAL at 01:06

## 2024-06-12 RX ADMIN — HEPARIN SODIUM 5000 UNITS: 5000 INJECTION INTRAVENOUS; SUBCUTANEOUS at 01:06

## 2024-06-12 RX ADMIN — LOSARTAN POTASSIUM 50 MG: 50 TABLET, FILM COATED ORAL at 08:06

## 2024-06-12 RX ADMIN — FAMOTIDINE 20 MG: 20 TABLET ORAL at 08:06

## 2024-06-12 RX ADMIN — HEPARIN SODIUM 5000 UNITS: 5000 INJECTION INTRAVENOUS; SUBCUTANEOUS at 05:06

## 2024-06-12 RX ADMIN — CARVEDILOL 37.5 MG: 25 TABLET, FILM COATED ORAL at 08:06

## 2024-06-12 RX ADMIN — ESCITALOPRAM OXALATE 20 MG: 10 TABLET ORAL at 08:06

## 2024-06-12 RX ADMIN — TOPIRAMATE 50 MG: 25 TABLET, FILM COATED ORAL at 08:06

## 2024-06-12 RX ADMIN — DEXTROSE MONOHYDRATE 750 MG: 50 INJECTION, SOLUTION INTRAVENOUS at 06:06

## 2024-06-12 RX ADMIN — AMLODIPINE BESYLATE 10 MG: 5 TABLET ORAL at 08:06

## 2024-06-12 RX ADMIN — LEVETIRACETAM INJECTION 1500 MG: 15 INJECTION INTRAVENOUS at 08:06

## 2024-06-12 RX ADMIN — ACETAMINOPHEN 500 MG: 500 TABLET ORAL at 01:06

## 2024-06-12 NOTE — PT/OT/SLP PROGRESS
Physical Therapy      Patient Name:  Gina Jenkins   MRN:  [id number]    Patient not seen today secondary to MD hold (Comment). Pt txf to ICU 6/11/24.   Will follow-up as able.

## 2024-06-12 NOTE — PLAN OF CARE
[facility]      HOME HEALTH ORDERS  FACE TO FACE ENCOUNTER    Patient Name: Gina Jenkins  YOB: 1975    PCP: Clair Johnson NP   PCP Address: [address] [place] [address] [address] [facility]IMARY [facility] / DAIN *  PCP Phone Number: [phone]  PCP Fax: [phone]    Encounter Date: 6/7/24    Admit to Home Health    Diagnoses:  Active Hospital Problems    Diagnosis  POA    *Weakness [R53.1]  Yes    Slurred speech [R47.81]  Yes    Seizure disorder [G40.909]  Yes    Acute nonintractable headache [R51.9]  Yes      Resolved Hospital Problems   No resolved problems to display.       Follow Up Appointments:  No future appointments.    Allergies:  Review of patient's allergies indicates:   Allergen Reactions    Lisinopril Swelling    Bactrim [sulfamethoxazole-trimethoprim] Rash    Ceftazidime Hives     Rash while on IV ceftazidime for planned 6 weeks.  See ED notes 9/12, 9/14 and ID clinic notes 9/16 and 9/28       Medications: Review discharge medications with patient and family and provide education.    Current Facility-Administered Medications   Medication Dose Route Frequency Provider Last Rate Last Admin    acetaminophen tablet 500 mg  500 mg Oral Q6H PRN Niles Zamora MD   500 mg at 06/12/24 1348    acetaminophen tablet 650 mg  650 mg Oral Q4H PRN Niles Zamora MD   650 mg at 06/11/24 0858    amLODIPine tablet 10 mg  10 mg Oral Daily Niles Zamora MD   10 mg at 06/12/24 0827    atorvastatin tablet 80 mg  80 mg Oral Daily Niles Zamora MD   80 mg at 06/12/24 0828    carvediloL tablet 37.5 mg  37.5 mg Oral BID Niles Zamora MD   37.5 mg at 06/12/24 0828    dextrose 10% bolus 125 mL 125 mL  12.5 g Intravenous PRN Niles Zamora MD        dextrose 10% bolus 250 mL 250 mL  25 g Intravenous PRN Niles Zamora MD        divalproex ER 24 hr tablet 750 mg  750 mg Oral Q12H Michael Issa MD        EScitalopram oxalate tablet 20 mg  20 mg Oral Daily Nae Crespo MD   20 mg at 06/12/24  0828    famotidine tablet 20 mg  20 mg Oral Daily Lidia Russell MD   20 mg at 06/12/24 0828    glucagon (human recombinant) injection 1 mg  1 mg Intramuscular PRN Niles Zamora MD        glucose chewable tablet 16 g  16 g Oral PRN Niles Zamora MD        glucose chewable tablet 24 g  24 g Oral PRN Niles Zamora MD        heparin (porcine) injection 5,000 Units  5,000 Units Subcutaneous Q8H Niles Zamora MD   5,000 Units at 06/12/24 1346    levETIRAcetam tablet 1,500 mg  1,500 mg Oral BID Michael Issa MD        LORazepam injection 2 mg  2 mg Intravenous PRN Nae Crespo MD   2 mg at 06/10/24 1508    losartan tablet 50 mg  50 mg Oral Daily Niles Zamora MD   50 mg at 06/12/24 0828    naloxone 0.4 mg/mL injection 0.02 mg  0.02 mg Intravenous PRN Niles Zamora MD        oxyCODONE immediate release tablet 5 mg  5 mg Oral Q6H PRN Niles Zamora MD   5 mg at 06/11/24 0302    sodium chloride 0.9% flush 10 mL  10 mL Intravenous Q12H PRN Niles Zamora MD        topiramate tablet 50 mg  50 mg Oral BID Niles Zamora MD   50 mg at 06/12/24 0828     Current Discharge Medication List        START taking these medications    Details   divalproex ER (DEPAKOTE ER) 250 MG 24 hr tablet Take 3 tablets (750 mg total) by mouth every 12 (twelve) hours.  Qty: 180 tablet, Refills: 11           CONTINUE these medications which have CHANGED    Details   atorvastatin (LIPITOR) 80 MG tablet Take 1 tablet (80 mg total) by mouth once daily.  Qty: 90 tablet, Refills: 3      carvediloL (COREG) 12.5 MG tablet Take 3 tablets (37.5 mg total) by mouth 2 (two) times daily.  Qty: 540 tablet, Refills: 3    Comments: .      EScitalopram oxalate (LEXAPRO) 20 MG tablet Take 1 tablet (20 mg total) by mouth once daily.  Qty: 90 tablet, Refills: 3      levETIRAcetam (KEPPRA) 750 MG Tab Take 2 tablets (1,500 mg total) by mouth 2 (two) times daily.  Qty: 120 tablet, Refills: 11      losartan (COZAAR) 50 MG tablet Take 1 tablet (50 mg  "total) by mouth once daily.  Qty: 90 tablet, Refills: 3    Comments: .           CONTINUE these medications which have NOT CHANGED    Details   blood sugar diagnostic Strp Use to check blood glucose 3 times daily.  Qty: 200 each, Refills: 11      blood-glucose meter Misc Use to check blood glucose 3 times daily.  Qty: 1 each, Refills: 1      lancets 30 gauge Misc Use to check blood glucose 3 times daily.  Qty: 200 each, Refills: 11      metFORMIN (GLUCOPHAGE-XR) 500 MG ER 24hr tablet Take 500 mg by mouth 2 (two) times daily.      methocarbamoL (ROBAXIN) 500 MG Tab Take 500 mg by mouth 2 (two) times daily.      pen needle, diabetic (BD ULTRA-FINE MARIELY PEN NEEDLE) 32 gauge x 5/32" Ndle Use to inject insulin into the skin three times daily .  Qty: 200 each, Refills: 11      topiramate (TOPAMAX) 50 MG tablet Take 1 tablet (50 mg total) by mouth 2 (two) times daily.  Qty: 180 tablet, Refills: 3    Associated Diagnoses: Localization-related (focal) (partial) symptomatic epilepsy and epileptic syndromes with complex partial seizures, not intractable, without status epilepticus      amlodipine (NORVASC) 10 MG tablet Take 1 tablet (10 mg total) by mouth once daily.  Qty: 30 tablet, Refills: 0      insulin aspart U-100 (NOVOLOG) 100 unit/mL (3 mL) InPn pen Inject 8 Units into the skin 3 (three) times daily with meals.  Qty: 6 mL, Refills: 5      insulin detemir U-100 (LEVEMIR FLEXTOUCH) 100 unit/mL (3 mL) SubQ InPn pen Inject 24 Units into the skin once daily.  Qty: 6 mL, Refills: 11           STOP taking these medications       acetaminophen (TYLENOL) 500 MG tablet Comments:   Reason for Stopping:         amitriptyline (ELAVIL) 75 MG tablet Comments:   Reason for Stopping:         fluconazole (DIFLUCAN) 150 MG Tab Comments:   Reason for Stopping:         ibuprofen 20 mg/mL oral liquid Comments:   Reason for Stopping:         LIDOcaine (LIDODERM) 5 % Comments:   Reason for Stopping:         magnesium oxide (MAG-OX) 400 mg " (241.3 mg magnesium) tablet Comments:   Reason for Stopping:         traMADoL (ULTRAM) 50 mg tablet Comments:   Reason for Stopping:                 I have seen and examined this patient within the last 30 days. My clinical findings that support the need for the home health skilled services and home bound status are the following:no   Weakness/numbness causing balance and gait disturbance due to Stroke making it taxing to leave home.  Patient with medication mismanagement issues requiring home bound status as evidenced by  Poor understanding of medication regimen/dosage and Poor adherence to medication regimen/dosage.  Medical restrictions requiring assistance of another human to leave home due to  Unstable ambulation, Frequent Falls, and seizures.     Diet:   cardiac diet    Labs:  N/a    Referrals/ Consults  Physical Therapy to evaluate and treat. Evaluate for home safety and equipment needs; Establish/upgrade home exercise program. Perform / instruct on therapeutic exercises, gait training, transfer training, and Range of Motion.  Occupational Therapy to evaluate and treat. Evaluate home environment for safety and equipment needs. Perform/Instruct on transfers, ADL training, ROM, and therapeutic exercises.    Activities:   no driving for at least six months after last seizure    Nursing:   Agency to admit patient within 24 hours of hospital discharge unless specified on physician order or at patient request    SN to complete comprehensive assessment including routine vital signs. Instruct on disease process and s/s of complications to report to MD. Review/verify medication list sent home with the patient at time of discharge  and instruct patient/caregiver as needed. Frequency may be adjusted depending on start of care date.     Skilled nurse to perform up to 3 visits PRN for symptoms related to diagnosis    Notify MD if SBP > 160 or < 90; DBP > 90 or < 50; HR > 120 or < 50; Temp > 101; O2 < 88%    Ok to schedule  additional visits based on staff availability and patient request on consecutive days within the home health episode.    When multiple disciplines ordered:    Start of Care occurs on Sunday - Wednesday schedule remaining discipline evaluations as ordered on separate consecutive days following the start of care.    Thursday SOC -schedule subsequent evaluations Friday and Monday the following week.     Friday - Saturday SOC - schedule subsequent discipline evaluations on consecutive days starting Monday of the following week.    For all post-discharge communication and subsequent orders please contact patient's primary care physician. If unable to reach primary care physician or do not receive response within 30 minutes, please contact [facility] for clinical staff order clarification    Miscellaneous   N/a    Home Health Aide:  Nursing Three times weekly, Physical Therapy Three times weekly, and Occupational Therapy Three times weekly    Wound Care Orders  no    I certify that this patient is confined to her home and needs intermittent skilled nursing care, physical therapy, and occupational therapy.    Michael Issa MD  [facility] Internal Medicine, HO-II  [facility] Hospitalist Team B    [facility] Hospitalist Medicine Team A (Joshua/Sky): [phone]  [facility] Hospitalist Medicine Team B (Chey/Cali): [phone]

## 2024-06-12 NOTE — MEDICAL/APP STUDENT
[facility] Medicine Progress Note    Primary Team: hospitals Hospitalist Team B  Attending Physician: Beck Leiva MD  Resident: Dr. Escobar Issa  Intern: Dr. Nae Crespo    Subjective:      Pt had 1 seizure yesterday after working with PT. Lasted about 5 minutes and resolved without admin of medication. After resolution, pt was oriented to self but did not know the year or president. Pt stepped up to the ICU for continuous EEG monitoring.      Objective:     Last 24 Hour Vital Signs:  BP  Min: 106/56  Max: 167/70  Temp  Av.5 °F (36.9 °C)  Min: 98 °F (36.7 °C)  Max: 98.8 °F (37.1 °C)  Pulse  Av.5  Min: 64  Max: 80  Resp  Av.9  Min: 16  Max: 45  SpO2  Av.3 %  Min: 72 %  Max: 100 %  I/O last 3 completed shifts:  In: 648.5 [P.O.:250; IV Piggyback:398.5]  Out: 2200 [Urine:2200]    Physical Examination:  Physical Exam     Laboratory:  Laboratory Data Reviewed:     Lab Results   Component Value Date     2024    K 3.7 2024     (H) 2024    CO2 18 (L) 2024    BUN 15 2024    CREATININE 1.6 (H) 2024    CALCIUM 9.2 2024    ANIONGAP 10 2024    EGFRNORACEVR 40 (A) 2024        Lab Results   Component Value Date    ALT 30 2024    AST 45 (H) 2024    ALKPHOS 153 (H) 2024    BILITOT 0.1 2024        Other Results:  No new imaging    Current Medications:     Infusions:       Scheduled:   amLODIPine  10 mg Oral Daily    atorvastatin  80 mg Oral Daily    carvediloL  37.5 mg Oral BID    EScitalopram oxalate  20 mg Oral Daily    famotidine  20 mg Oral Daily    heparin (porcine)  5,000 Units Subcutaneous Q8H    levETIRAcetam (Keppra) IV (PEDS and ADULTS)  1,500 mg Intravenous Q12H    losartan  50 mg Oral Daily    topiramate  50 mg Oral BID    valproate sodium (DEPACON) IVPB  750 mg Intravenous Q12H        PRN:    Current Facility-Administered Medications:     acetaminophen, 500 mg, Oral, Q6H PRN    acetaminophen, 650 mg, Oral, Q4H  PRN    dextrose 10%, 12.5 g, Intravenous, PRN    dextrose 10%, 25 g, Intravenous, PRN    glucagon (human recombinant), 1 mg, Intramuscular, PRN    glucose, 16 g, Oral, PRN    glucose, 24 g, Oral, PRN    lorazepam, 2 mg, Intravenous, PRN    naloxone, 0.02 mg, Intravenous, PRN    oxyCODONE, 5 mg, Oral, Q6H PRN    sodium chloride 0.9%, 10 mL, Intravenous, Q12H PRN      Assessment:     Gina Jenkins is a 49 y.o.female with  Patient Active Problem List    Diagnosis Date Noted    Slurred speech 06/08/2024    Right leg pain 08/07/2023    Localization-related (focal) (partial) symptomatic epilepsy and epileptic syndromes with complex partial seizures, not intractable, without status epilepticus 02/07/2023    History of cerebral aneurysm repair 02/07/2023    Fever 09/14/2022    Encephalopathy 08/30/2022    Brain compression 08/30/2022    Infection of superficial incisional surgical site after procedure 08/29/2022    Focal seizure 08/29/2022    Fluid collection at surgical site 08/25/2022    New onset type 2 diabetes mellitus 08/23/2022    Transient neurological symptoms 08/22/2022    Other emphysema 07/17/2022    Mixed hyperlipidemia 06/07/2022    Weakness 04/25/2018    H/O: CVA (cerebrovascular accident)     Cerebrovascular accident (CVA) 04/09/2018    Dysarthria 11/28/2017    Cognitive communication deficit 11/28/2017    Decreased  strength of right hand 11/14/2017    Weakness of right upper extremity 11/14/2017    Lack of coordination due to stroke 11/14/2017    Decreased range of motion of right shoulder 11/14/2017    Acute nonintractable headache 10/25/2017    Gait abnormality 10/05/2017    Aneurysm of left middle cerebral artery 10/04/2017    Tobacco abuse 10/04/2017    Thyroid nodule 10/03/2017    Chronic diastolic heart failure 10/03/2017    Acute ischemic left MCA stroke 10/03/2017    Thrombotic stroke involving left middle cerebral artery 10/03/2017    Calculus of gallbladder without cholecystitis without  obstruction 03/05/2016    Essential hypertension 03/05/2016        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 Garcia not here, TelePsych reevaluate but patient was not cooperative with interview  -Discontinue amitriptyline   -NYDIA pending  -PT/OT/SLP     Migraine headaches   -Continue topiramate 50 mg bid  -Discontinue amitriptyline  -Multimodal pain control with tylenol, oxycodone   -Holding off NSAIDS at this time given aneurysm history   -Consider transition to fioricet, though pt preferring Tylenol currently     HTN  HLD   Hx coronary angioplasty   Cholesterol, TG elevated on lipid panel  -Continue atorvastatin 80 daily   -Continue home amlodipine 10 mg daily   -Continue home carvedilol 37.5 mg bid  -Continue home losartan 50 mg daily       Elevated alk phos   -CTM     Anxiety, depression  C/f functional neuro deficits   -Increased home lexapro to 20 mg daily  -Psych consulted     Ppx: heparin subQ  Diet: Regular  Code: Full     Dispo: continue inpatient evaluation of neurological deficits; inpatient EEG    Desi Rizzo, MS3      [facility] Medicine Hospitalist Pager numbers:   [facility] Hospitalist Medicine Team A (Joshua/Sky): [phone]  [facility] Hospitalist Medicine Team B (Chey/Cali):  [phone]

## 2024-06-12 NOTE — PROGRESS NOTES
[facility] Resident Progress Note    Subjective:     Patient with another seizure-like episode yesterday, subsequently stepped up to the ICU for closer monitoring and continuous EEG. This morning, she is resting comfortably in bed. No seizure like episodes. Alert and oriented x 4. No complaints at this time.    Objective:   Last 24 Hour Vital Signs:  BP  Min: 106/56  Max: 167/70  Temp  Av.5 °F (36.9 °C)  Min: 98 °F (36.7 °C)  Max: 98.8 °F (37.1 °C)  Pulse  Av.5  Min: 64  Max: 80  Resp  Av.9  Min: 16  Max: 45  SpO2  Av.3 %  Min: 72 %  Max: 100 %    I/O last 3 completed shifts:  In: 648.5 [P.O.:250; IV Piggyback:398.5]  Out: 2200 [Urine:2200]    Physical Examination:  General: Alert, cooperative. AOx3  HEENT: R facial droop w/o forehead involvement at rest, atraumatic, PERRL, EOMI. Abnormal dentition.  Neck: No thyromegaly or masses   Cardiac: RRR, no murmurs appreciated, no extra heart sounds  Pulmonary/Chest: CTAB, symmetric chest rise, equal breath sounds bilaterally  GI: Soft, non tender, non distended, normoactive bowel sounds  Extremities: no edema or cyanosis  Skin: dry, warm, no wounds noted  Neuro: R facial droop as above, 4/5 strength in all extremities, intact sensation throught, no focal deficits other than aforementioned R facial droop. Did not ambulate patient.    Laboratory:  Most Recent Data:  CBC:   Lab Results   Component Value Date    WBC 5.05 2024    HGB 11.6 (L) 2024    HCT 33.7 (L) 2024     2024    MCV 88 2024    RDW 13.2 2024     BMP:   Lab Results   Component Value Date     2024    K 3.7 2024     (H) 2024    CO2 18 (L) 2024    BUN 15 2024    CREATININE 1.6 (H) 2024     2024    CALCIUM 9.2 2024    MG 2.0 06/10/2024    PHOS 3.4 06/10/2024     LFTs:   Lab Results   Component Value Date    PROT 6.6 2024    ALBUMIN 3.1 (L) 2024    BILITOT 0.1 2024    AST  45 (H) 06/12/2024    ALKPHOS 153 (H) 06/12/2024    ALT 30 06/12/2024     Coags:   Lab Results   Component Value Date    INR 0.9 06/07/2024     FLP:   Lab Results   Component Value Date    CHOL 241 (H) 06/08/2024    HDL 43 06/08/2024    LDLCALC 127.8 06/08/2024    TRIG 351 (H) 06/08/2024    CHOLHDL 17.8 (L) 06/08/2024     DM:   Lab Results   Component Value Date    HGBA1C 8.3 (H) 08/22/2022    HGBA1C 5.8 (H) 07/16/2022    HGBA1C 5.4 06/30/2020    LDLCALC 127.8 06/08/2024    CREATININE 1.6 (H) 06/12/2024     Thyroid:   Lab Results   Component Value Date    TSH 1.570 06/07/2024    FREET4 0.89 06/30/2020    N2OIDCD 7.1 11/02/2017     Anemia:   Lab Results   Component Value Date    IRON 52 06/30/2020    TIBC 314 06/30/2020    FERRITIN 63 06/30/2020    TXCKSDQN71 440 06/09/2024    FOLATE 12.9 04/09/2018     Cardiac:   Lab Results   Component Value Date    TROPONINI <0.012 06/07/2024     (H) 03/06/2017     Urinalysis:   Lab Results   Component Value Date    LABURIN  06/07/2024     Multiple organisms isolated. None in predominance.  Repeat if    LABURIN clinically necessary. 06/07/2024    COLORU Yellow 06/07/2024    SPECGRAV 1.015 06/07/2024    NITRITE Negative 06/07/2024    KETONESU Negative 06/07/2024    UROBILINOGEN Negative 06/07/2024    WBCUA 35 (H) 06/07/2024       Trended Lab Data:  Recent Labs   Lab 06/10/24  0239 06/10/24  1522 06/11/24  0817 06/12/24  0439   WBC 5.58  --  6.10 5.05   HGB 11.6*  --  12.2 11.6*   HCT 32.9*  --  35.7* 33.7*     --  305 309   MCV 87  --  87 88   RDW 13.3  --  13.2 13.2    139 140 140   K 3.6 4.1 4.0 3.7    108 108 112*   CO2 22* 20* 20* 18*   BUN 17 16 19 15   CREATININE 1.6* 1.5* 1.6* 1.6*   GLU 96 103 91 110   PROT 6.4  --  7.3 6.6   ALBUMIN 3.0*  --  3.5 3.1*   BILITOT 0.2  --  0.2 0.1   AST 25  --  27 45*   ALKPHOS 131  --  161* 153*   ALT 19  --  24 30       Trended Cardiac Data:  Recent Labs   Lab 06/07/24 1951   TROPONINI <0.012     Radiology Data  Reviewed: yes  Pertinent Findings:    6/8/24 MRI Brain without  The craniocervical junction is intact. There is partial empty appearance of the sella.  The midline structures are intact. The intracranial flow voids are intact.  No acute diffusion-weighted signal abnormality is present.  There are postop changes of prior left frontal and temporal craniotomy for prior left MCA aneurysm clipping.  There is encephalomalacia in the left temporal pole.  There is also minimal degree of T2/FLAIR signal hyperintensity within this region.  There are T2/FLAIR signal hyperintensities within the periventricular and subcortical white matter. There is minimal susceptibility weighted artifact within the left inferior frontal and left temporal pole.  There is susceptibility weighted artifact identified within the left cerebellum.     Impression:  1.  No MR evidence of acute infarction.  2.  Postoperative changes in the left frontal and temporal calvarium with encephalomalacia in the left temporal pole.  3.  Nonspecific T2/FLAIR signal hyperintensities within the periventricular and subcortical white matter.      6/8/24 MRI spine with and without  Multilevel degenerative changes cervical spine as detailed above  No evidence for cord signal abnormality or abnormal intrathecal enhancement to suggest sequela of cord demyelination  Partially empty sella included in the field of view intracranial hypertension be included in differential in appropriate clinical setting      Current Medications:     Infusions:       Scheduled:   amLODIPine  10 mg Oral Daily    atorvastatin  80 mg Oral Daily    carvediloL  37.5 mg Oral BID    EScitalopram oxalate  20 mg Oral Daily    famotidine  20 mg Oral Daily    heparin (porcine)  5,000 Units Subcutaneous Q8H    levETIRAcetam (Keppra) IV (PEDS and ADULTS)  1,500 mg Intravenous Q12H    losartan  50 mg Oral Daily    topiramate  50 mg Oral BID    valproate sodium (DEPACON) IVPB  750 mg Intravenous Q12H         PRN:    Current Facility-Administered Medications:     acetaminophen, 500 mg, Oral, Q6H PRN    acetaminophen, 650 mg, Oral, Q4H PRN    dextrose 10%, 12.5 g, Intravenous, PRN    dextrose 10%, 25 g, Intravenous, PRN    glucagon (human recombinant), 1 mg, Intramuscular, PRN    glucose, 16 g, Oral, PRN    glucose, 24 g, Oral, PRN    lorazepam, 2 mg, Intravenous, PRN    naloxone, 0.02 mg, Intravenous, PRN    oxyCODONE, 5 mg, Oral, Q6H PRN    sodium chloride 0.9%, 10 mL, Intravenous, Q12H PRN      Assessment:     Gina Jenkins is a 49 y.o. female who  has a past medical history of Brain aneurysm, CHF (congestive heart failure), H/O coronary angioplasty, Hypercholesteremia, Hypertension, Malignant hypertension, Migraine headache, and Stroke (10/2017). The patient presented to [facility] on 6/7/2024 with a primary complaint of back pain, headache, weakness w/ facial droop and voice change x5 days. Admitted to [facility] for evaluation of seizures and HA with facial droop with neurology consulted.    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  - her imaging has been stable aneurysms  - with most recent episode, patient was stepped up to the ICU for closer monitoring and continuous EEG  - now on Keppra 1.5 g BID and Depakoate loaded and maintenance 750 mg IV BID  - ativan PRN for seizure lasting > 5 minutes  - Neurology on board  - NSGY consulted, signed off  - patient hesitant to work with TelePsych  - tapered amitriptyline and discontinued  - PT/OT recommending moderate intensity     Migraine headaches   -Continue topiramate 50 mg bid  -Taper home amitriptyline as above  -Multimodal pain control with tylenol, oxycodone    -Holding off NSAIDS at this time given aneurysm history   -Consider transition to fioricet, though pt preferring Tylenol currently     HTN  HLD   Hx coronary angioplasty   Cholesterol, TG elevated on lipid panel  -Continue atorvastatin 80 daily   -Continue home amlodipine 10 mg daily   -Continue home carvedilol 37.5 mg bid  -Continue home losartan 50 mg daily       Elevated alk phos   -CTM     Anxiety, depression  C/f functional neuro deficits   -Increased home lexapro to 20 mg daily  -Psych consulted    Ppx: heparin subQ  Diet: Regular  Code: Full    Dispo: ICU for continuous EEG, if Neurology recommends AED changes and patient stable for those, patient would be nearing medical stability for discharge    Michael Issa MD  [facility], HO-II  [facility] Hospitalist Team B    [facility] Hospitalist Medicine Team A (Joshua/Sky): [phone]  [facility] Hospitalist Medicine Team B (Chey/Cali):

## 2024-06-12 NOTE — CONSULTS
Progress Note  [facility]    Attending: Dr. Forrester  Admit Date: 6/7/2024  Today's Date: 06/12/2024    SUBJECTIVE:      HPI: Gina Jenkins is a 49 y.o. female who  has a past medical history of Brain aneurysm, CHF (congestive heart failure), H/O coronary angioplasty, Hypercholesteremia, Hypertension, Malignant hypertension, Migraine headache, and Stroke (10/2017).. The patient presented to [facility] on 6/7/2024 with a primary complaint of back pain, headache, weakness w/ facial droop and voice change x5 days.      The patient was in their usual state of health until approximately 5 days ago when she noted that she felt diffusely weak, noticed a voice change, and had a worsening headache. She notes that she does not like spending time at the doctor's office or hospital, so attempted to rest on the couch. She noted that her symptoms persisted and required the help of her sons to complete ADLs. She noted that on the day of admission, she attempted to stand on her own, noted that she was increasingly dizzy, and sat back down. She presented to the [facility] ED for evaluation. During this time she denies any focal strength changes in her upper and lower extremities. She notes that these episodes of weakness have presented prior but have never been this severe. She denies any knowledge of a recent viral URI but does live at home with 3 teenage sons. Denies any fevers, chills, rhinorrhea, n/v/d/c, vision changes, falls, chest pain, palpitations.     Interval History: Transferred to ICU for continuous cap EEG and closer monitoring. Patient seen this morning. No events overnight. Pt still wearing EEG cap, but no more seizure like activity since being in the ICU. Patient eating and sleeping well. She stated she had no questions or complaints. Valproate dose increased by primary team.    Review of Systems   Constitutional:  Negative for diaphoresis, fever and weight loss.   Respiratory:  Negative for shortness of breath.   "  Cardiovascular:  Negative for chest pain.   Gastrointestinal:  Negative for blood in stool.   Genitourinary:  Negative for hematuria.   Endo/Heme/Allergies:  Does not bruise/bleed easily.   All other systems reviewed and are negative.      OBJECTIVE:     Vital Signs Trends/Hx Reviewed  Vitals:    06/12/24 1045 06/12/24 1100 06/12/24 1115 06/12/24 1130   BP:  110/64  114/66   BP Location:       Patient Position:       Pulse: 71 69 69 69   Resp: 20 17 17 19   Temp:       TempSrc:       SpO2: 98% 97% 99% 97%   Weight:       Height:              Physical Examination:  General: Alert, cooperative. AOx3  HEENT: R facial droop w/o forehead involvement at rest, atraumatic, PERRL, EOMI. Abnormal dentition.  Neck: No thyromegaly or masses   Cardiac: RRR, no murmurs appreciated, no extra heart sounds  Pulmonary/Chest: CTAB, symmetric chest rise, equal breath sounds bilaterally  GI: Soft, non tender, non distended, normoactive bowel sounds  Extremities: no edema or cyanosis  Skin: dry, warm, no wounds noted  Neuro: R facial droop as above, 4/5 strength in all extremities, intact sensation throught, no focal deficits other than aforementioned R facial droop. Did not ambulate patient.       Laboratory:  Recent Labs   Lab 06/12/24  0439   WBC 5.05   RBC 3.84*   HGB 11.6*   HCT 33.7*      MCV 88   MCH 30.2   MCHC 34.4     Recent Labs   Lab 06/12/24  0439      K 3.7   *   CO2 18*   BUN 15   CREATININE 1.6*   CALCIUM 9.2     No results for input(s): "PH", "PCO2", "PO2", "HCO3", "POCSATURATED", "BE" in the last 24 hours.        Chest Imaging:   No new chest imaging.     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 Tonic type.    MRI 6/8/24:   "1.  No MR evidence of acute infarction.  2.  Postoperative changes in the left frontal and temporal " "calvarium with encephalomalacia in the left temporal pole.  3.  Nonspecific T2/FLAIR signal hyperintensities within the periventricular and subcortical white matter."    MRI Spine w/wo 6/8/24:  "Multilevel degenerative changes cervical spine as detailed above  No evidence for cord signal abnormality or abnormal intrathecal enhancement to suggest sequela of cord demyelination  Partially empty sella included in the field of view intracranial hypertension be included in differential in appropriate clinical setting"    #Epilepsy vs PNES  - Neurology consulted  - Continuous EEG to observe event  - Adjust anti-epileptics per primary and neurology recommendations    #Migraine Headaches  - Agree with treatment plan per primary team    #Mood Disorder  - Per primary team  - Psych consulted  - Home lexapro 20mg daily    CV  Echo 6/8/24- normal EF (55%) and normal diastolic function.   Hx of coronary angioplasty    #HTN  - Amlodipine 10, Carvedilol 37.5 BID, losartan 50mg  - Continue per primary team    #HLD  -Continue atorvastatin 80mg per primary team     Pulm  No current issues      FEN/GI  Diet: Cardiac  is tolerating PO  Electrolytes are wnl  No other known issues at this time.     RENAL  UOP: 400cc , In: 250cc, net -150cc in last 24 hrs    Intake/Output - Last 3 Shifts         06/10 0700  06/11 0659 06/11 0700  06/12 0659 06/12 0700  06/13 0659    P.O. 250      IV Piggyback  398.5 100    Total Intake(mL/kg) 250 (3.3) 398.5 (5.3) 100 (1.3)    Urine (mL/kg/hr) 400 (0.2) 2200 (1.2)     Stool  0     Total Output 400 2200     Net -150 -1801.5 +100           Urine Occurrence 1 x 1 x     Stool Occurrence 0 x 0 x     Emesis Occurrence 0 x            BUN/Cr: 19/1.6, baseline 1.2-1.4  Continue to monitor renal status and urine output     Heme  H/H 12.2/35.7; stable  WBC 6.10  DVT prophylaxis: Heparin (prophylactic)    Endo  No known issues at this time    ID  No known issues at this time.  Bcx x2 6/7- NGTD    ICU " Checklist  Feeding: Cardiac  Analgesia: Acetaminophen, PRN Oxycodone 5  Sedation: None  Thrombo PPX: Heparin prophylactic dosing  Head of Bed: >30 degrees  Ulcer (Stress) PPX: None  Glucose: goal 140-180  SBT/SAT: N/A   Bowel Regimen: None  Indwelling catheter/Lines/Invasive devices: 2x PIV  De-escalation ABX: None    Thank you for this consult. Pulm/Critical Care will continue to follow patient.     Case discussed with faculty, Dr. Forrester.    Willie Bean MD, MPH  [facility] [place]  PGY-1  06/12/2024    Pt seen and examined with Pulmonary/Critical Care team and this note reviewed and validated with the following additional comments: No more seizure-like activity.  Still wearing continuous EEG leads.  Will discuss with Epileptology.    The complexity of Medical Decision Making (MDM) was moderately complex.  The number of problems addressed was 2.  The risk of complications and/or morbidity/mortality was moderately high.  The tests ordered were EEG.  I communicated with the following providers .  Time spent in the care of this patient was 15 minutes    Rk Forrester MD  Phone [phone]

## 2024-06-12 NOTE — NURSING
Pt discontinued from EEG monitor, VSS, no acute events noted. AVS given to patient, meds brought to bedside.  Patient brought to car in wheelchair, all belongings sent w/ sister.

## 2024-06-12 NOTE — PROGRESS NOTES
U Neurology progress note:    Gina Jenkins  1975  [id number]    Subjective:    No further seizures since loaded with depacon and started maintenance depacon 750 mg BID yesterday.    Spot EEG with diffuse slowing (theta rhythm), no evidence of epileptiform activity.    ROS:   12pt ROS negative other then mentioned above    Histories:     Allergies:  Lisinopril, Bactrim [sulfamethoxazole-trimethoprim], and Ceftazidime    Current Medications:    Current Facility-Administered Medications   Medication Dose Route Frequency Provider Last Rate Last Admin    acetaminophen tablet 500 mg  500 mg Oral Q6H PRN Niles Zamora MD        acetaminophen tablet 650 mg  650 mg Oral Q4H PRN Niles Zamora MD   650 mg at 06/11/24 0858    amLODIPine tablet 10 mg  10 mg Oral Daily Niles Zamora MD   10 mg at 06/12/24 0827    atorvastatin tablet 80 mg  80 mg Oral Daily Niles Zamora MD   80 mg at 06/12/24 0828    carvediloL tablet 37.5 mg  37.5 mg Oral BID Niles Zamora MD   37.5 mg at 06/12/24 0828    dextrose 10% bolus 125 mL 125 mL  12.5 g Intravenous PRN Niles Zamora MD        dextrose 10% bolus 250 mL 250 mL  25 g Intravenous PRN Niles Zamora MD        EScitalopram oxalate tablet 20 mg  20 mg Oral Daily Nae Crespo MD   20 mg at 06/12/24 0828    famotidine tablet 20 mg  20 mg Oral Daily Lidia Russell MD   20 mg at 06/12/24 0828    glucagon (human recombinant) injection 1 mg  1 mg Intramuscular PRN Niles Zamora MD        glucose chewable tablet 16 g  16 g Oral PRN Niles Zamora MD        glucose chewable tablet 24 g  24 g Oral PRN Niles Zamora MD        heparin (porcine) injection 5,000 Units  5,000 Units Subcutaneous Q8H Niles aZmora MD   5,000 Units at 06/12/24 0522    levETIRAcetam in NaCl (iso-os) IVPB 1,500 mg  1,500 mg Intravenous Q12H Julio Medeiros  mL/hr at 06/12/24 0834 1,500 mg at 06/12/24 0834    LORazepam injection 2 mg  2 mg Intravenous PRN Nae Crespo MD   2 mg at 06/10/24  1508    losartan tablet 50 mg  50 mg Oral Daily Niles Zamora MD   50 mg at 06/12/24 0828    naloxone 0.4 mg/mL injection 0.02 mg  0.02 mg Intravenous PRN Niles Zamora MD        oxyCODONE immediate release tablet 5 mg  5 mg Oral Q6H PRN Niles Zamora MD   5 mg at 06/11/24 0302    sodium chloride 0.9% flush 10 mL  10 mL Intravenous Q12H PRN Niles Zamora MD        topiramate tablet 50 mg  50 mg Oral BID Niles Zamora MD   50 mg at 06/12/24 0828    valproate (DEPACON) 750 mg in dextrose 5 % (D5W) 100 mL IVPB  750 mg Intravenous Q12H Michael Issa MD   Stopped at 06/12/24 0708         Past Medical/Surgical/Family/Social History:  PMHx:   Past Medical History:   Diagnosis Date    Brain aneurysm     CHF (congestive heart failure)     H/O coronary angioplasty     Hypercholesteremia     Hypertension     Malignant hypertension     Migraine headache     Stroke 10/2017      Surgeries:   Past Surgical History:   Procedure Laterality Date    CARDIAC CATHETERIZATION      CEREBRAL ANGIOGRAM      CLIP LIGATION OF INTRACRANIAL ANEURYSM BY CRANIOTOMY N/A 7/15/2022    Procedure: CRANIOTOMY, WITH ANEURYSM CLIPPING;  Surgeon: Timothy Diaz MD;  Location: [facility] OR [address] [place] [address];  Service: Neurosurgery;  Laterality: N/A;  PTERIONAL CRANIOTOMY WITH CLIP LIGATION OF L PCOMM, L ANTERIOR CHOROIDAL, L MCA  ANEURYSM, ANESTHESIA: GENERAL, BLOOD: TYPE&CROSS 2 UNITS, NEUROMONITORING: SEP, MEP, EEG, RADIOLOGY: C-ARM, POSITION: SUPINE, CASTILLO, CO-SURGERON: DR. LEXI DOMÍNGUEZ.    WOUND DEBRIDEMENT  8/27/2022    Procedure: DEBRIDEMENT, WOUND;  Surgeon: Timothy Diaz MD;  Location: [facility] OR [address] [place] [address];  Service: Neurosurgery;;      Family  Hx: No family history on file.   Social Hx:   Social History     Tobacco Use    Smoking status: Every Day     Current packs/day: 0.33     Average packs/day: 0.3 packs/day for 31.4 years (10.4 ttl pk-yrs)     Types: Cigarettes     Start date: 1993    Smokeless tobacco: Never    Tobacco comments:      Enrolled in the [employer] on 6/7/22 ([facility] Member ID # [id number]). Pt is a 0.33 pk/day cigarette smoker x 31 yrs. She states ready to quit smoking. Ambulatory referral to Smoking Cessation clinic following hospital discharge.    Substance Use Topics    Alcohol use: Yes     Comment: occassionally    Drug use: No       Current Evaluation:     Vital Signs:   Vitals:    06/12/24 0827   BP: 129/76   Pulse:    Resp:    Temp:         Neuro Exam    Gen: Alert and oriented to name, city  Language: No dysarthria or aphasia    CN  CN II - Visual fields intact, PERRLA  CN III, IV, VI - EOM intact without nystagmus  CN V1, V2, V3 - Facial sensation preserved bilaterally  CN VII - Patient is able to smile and raise eyebrows symmetrically  CN VIII - No hearing deficit  CN IX and X - Palate rises symmetrically, uvula is midline  CN XI - Motor strength of shoulder shrug is 5/5 bilaterally  CN XII - Tongue protrudes midline without fasciculation    Motor     Left Right   Shoulder abduction 4/5 4/5   Shoulder adduction 5/5 5/5   Elbow flexion 5/5 5/5   Elbow extension 5/5 5/5   Wrist flexion 5/5 5/5   Wrist extension 5/5 5/5    strength 5/5 5/5   Hip flexion 5/5 5/5   Knee flexion 5/5 5/5   Knee extension 5/5 5/5   Ankle plantarflexion 5/5 5/5   Ankle dorsiflexion 5/5 5/5     Normal muscle tone, no significant limitations in range of motion    Patient does not give full effort, she gives staggering effort in almost all muscle groups which is not reflective of true neuromuscular weakness. She does have smooth weakness to resistance of bilateral shoulder abduction.     Sensory: Intact to light touch in BUE and BLE, no neglect    Reflexes           Left       Right   Brachioradialis        3+         3+   Bicep        3+         3+   Tricep        3+         3+   Patellar        3+         3+   Achilles        3+         3+   Babinski   Negative    Negative     Cerebellar: No dysmetria on finger to nose or heel shin  bilaterally    Gait: deferred      LABORATORY STUDIES:  Labs:  Recent Labs   Lab 06/10/24  0239 06/11/24  0817 06/12/24  0439   WBC 5.58 6.10 5.05   HGB 11.6* 12.2 11.6*   HCT 32.9* 35.7* 33.7*    305 309   MCV 87 87 88       Recent Labs   Lab 06/10/24  0239 06/10/24  1522 06/11/24  0817 06/12/24  0439    139 140 140   K 3.6 4.1 4.0 3.7    108 108 112*   CO2 22* 20* 20* 18*   BUN 17 16 19 15   GLU 96 103 91 110   CALCIUM 9.3 9.3 9.6 9.2   PROT 6.4  --  7.3 6.6   ALBUMIN 3.0*  --  3.5 3.1*   BILITOT 0.2  --  0.2 0.1   AST 25  --  27 45*   ALKPHOS 131  --  161* 153*   ALT 19  --  24 30       Recent Labs   Lab 06/07/24 1951   INR 0.9       Recent Labs   Lab 06/09/24  0206 06/10/24  0239 06/10/24  1522 06/11/24  0817 06/12/24 0439   GLU 99 96 103 91 110       Urine:   Lab Results   Component Value Date    LABURIN  06/07/2024     Multiple organisms isolated. None in predominance.  Repeat if    LABURIN clinically necessary. 06/07/2024    SPECGRAV 1.015 06/07/2024    NITRITE Negative 06/07/2024    KETONESU Negative 06/07/2024    UROBILINOGEN Negative 06/07/2024    WBCUA 35 (H) 06/07/2024       Recent Labs   Lab 06/08/24  0233   LDLCALC 127.8       Thyroid:   Recent Labs   Lab 06/07/24 1951   TSH 1.570       FLP:   Recent Labs   Lab 06/08/24 0233   CHOL 241*   HDL 43   LDLCALC 127.8   TRIG 351*   CHOLHDL 17.8*       Cardiac markers:  Recent Labs   Lab 06/07/24 1951   TROPONINI <0.012       RADIOLOGY STUDIES:    MRI Brain 6/8:  1.  No MR evidence of acute infarction.     2.  Postoperative changes in the left frontal and temporal calvarium with encephalomalacia in the left temporal pole.     3.  Nonspecific T2/FLAIR signal hyperintensities within the periventricular and subcortical white matter.      CTA head and neck 6/7:  Resolution of gas from prior procedure the intracranial region. Stable postoperative changes of left MCA aneurysmal clipping. Resolution of the right maxillary sinus opacification.  Otherwise stable exam     MRI full spine 6/8:   Multilevel degenerative changes cervical spine as detailed above     No evidence for cord signal abnormality or abnormal intrathecal enhancement to suggest sequela of cord demyelination     Partially empty sella included in the field of view intracranial hypertension be included in differential in appropriate clinical setting    TTE 6/8/24    Left Ventricle: The left ventricle is normal in size. Mild septal thickening. There is concentric remodeling. There is normal systolic function. Ejection fraction by visual approximation is 55%. There is normal diastolic function.    Right Ventricle: Normal right ventricular cavity size. Wall thickness is normal. Systolic function is normal.    Pulmonary Artery: The estimated pulmonary artery systolic pressure is 17 mmHg.    IVC/SVC: Normal venous pressure at 3 mmHg.    Assessment/Plan:     Gina Jenkins is a 49 y.o. female with past medical history of cerebral aneurysms including Left posterior communicating and left anterior choroidal artery aneurysms requiring a craniotomy for clipping 7/2022 c/b seizures, CHF (congestive heart failure), H/O coronary angioplasty, HLD, HTN, Headache, Tobacco use who presents for facial droop, generalized weakness and headache. Neurology consulted for assistance in workup    Impression:  Subjective generalized weakness, patient does not give full effort on strength exam, she gives staggering effort in almost all muscle groups which is not reflective of true neuromuscular weakness. She did have smooth weakness of bilateral shoulder abduction (4/5). She complains of muscle pain/cramping so would obtain labs to investigate potential myopathy given proximal weakness, this can be completed in the outpatient setting.   Concern that intermittent facial droop is due to functional neurologic disorder  2.  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 Seizure focus potentially from left  temporal lobe anterior pole encephalomalacia from that procedure. It is also possible some of her events are PNES, as pt reports whole body shaking movements without loss of consciousness    #Stroke workup for weaknessn and facial droop   - No new stroke on MRI Brain  - Spine imaging wo cord compression or other acute findings  - On exam she gives staggering effort in all muscle groups. 5/5 strength throughout with encouragement, except for bilateral shoulder abduction 4/5  - Given proximal weakness (bilateral shoulder abduction), would obtain further lab workup for myopathy:   - CK normal   - AST and ALT normal   - Urinalysis with 0 RBCs and no blood (no evidence of myoglobin)   - Aldolase   - LDH   - ESR, CRP    #headache and lightheadedness  - lightheadedness is positional wo focal sensory and motor findings including coordination deficit on FNF exam.   - Spine imaging wo findings that could explain pt's symptoms but radiology noted partially empty sella. At this time pt's clinical symptoms not suggestive of IIH but would monitor clinically (HA description, no vision changes, no photosensitivity, no whooshing sound)  - Would obtain orthostatics  - Amitriptyline 75 mg was tapered off and discontinued during this admission    #Seizure disorder post L craniotomy and L MCA aneurysmal clipping  - Spot EEG with no epileptiform activity, did show diffuse slowing  - S/p depacon load 2,000 mg  - Continue maintenance keppra 1500 mg BID  - Continue maintenance depakote 750 mg BID  - Continue home topamax 50 mg BID  - Ativan 2 mg IV PRN for any seizure lasting >5 minutes, or having back to back seizures with no return to mental baseline in between  - Discuss seizure precautions:    Discussed that it is illegal to drive for 6 months following a seizure.  Also advised to avoid operating heavy machinery, swimming alone, taking baths instead of showers, using front burners on the stove, climbing ladders, or other activities that  would place himself or someone else at risk should he have a seizure.     Case discussed with Dr. Su Fung for discharge from ICU, clinically stable. No further recommendations at this time, will sign off. Please do not hesitate to reach out with any questions or concerns    Electronically signed by:   Marlene Coleman MD   6/12/2024 12:10 PM

## 2024-06-12 NOTE — PROCEDURES
Procedures  ICU EEG/VIDEO MONITORING REPORT    DATE OF SERVICE: 6/11/24-6/12/24  EEG NUMBER: OK -1,2  REQUESTED BY: Cali  LOCATION OF SERVICE: [facility]   Electroencephalographic (EEG) recording is with electrodes placed according to the International 10-20 placement system.  Thirty two (32) channels of digital signal are simultaneously recorded from the scalp and may include EKG, EMG, and/or eye monitors.   Recording band pass was 0.1 to 512 hz.  Digital video recording of the patient is simultaneously recorded with the EEG.  The nursing staff report clinical symptoms and may press an event button when the patient has symptoms of clinical interest to the treating physicians.  EEG and video recording is stored and archived in digital format.  The entire recording is visually reviewed and the times identified by computer analysis as being spikes or seizures are reviewed again.  Activation procedures which include photic stimulation, hyperventilation and instructing patients to perform simple task are done in selected patients.   Compresses spectral analysis (CSA) is also performed on the activity recorded from each individual channel.  This is displayed as a power display of frequencies from 0 to 30 Hz over time.   The CSA analysis is done and displayed continuously.  This is reviewed for asymmetries in power between homologous areas of the scalp and for presence of changes in power which canbe seen when seizures occur.  Sections of suspected abnormalities on the CSA is then compared with the original EEG recording.     [employer] software was also utilized in the review of this study.  This software suite analyzes the EEG recording in multiple domains.  Coherence and rhythmicity is computed to identify EEG sections which may contain organized seizures.  Each channel undergoes analysis to detect presence of spike and sharp waves which have special and morphological characteristic of epileptic  activity.  The routine EEG recording is converted from spacial into frequency domain.  This is then displayed comparing homologous areas to identify areas of significant asymmetry.  Algorithm to identify non-cortically generated artifact is used to separate eye movement, EMG and other artifact from the EEG.      Recording Times  Start on 6/11/24 at 15:46:45  Stop on 6/12/24 at 07:00:03  Start on 6/12/24 at 07:00:48  Stop on 6/12/24 at 15:50:54  A total of 23 hours of EEG was recorded.    EEG FINDINGS  The record shows a good  organization at rest, consisting of a 10 Hz posterior dominant rhythm with fair  reactivity. There is mild bilateral beta activity.  There is frequent diffuse theta range background slowing with focal higher amplitude theta range slowing with overriding faster frequencies over the left hemisphere consistent with a breach pattern.    Drowsiness is characterized by attenuation of the background, vertex waves, and bilateral theta slowing. Stage II sleep is characterized by slowing, vertex waves, and symmetric sleep spindles.     Provocative maneuvers including hyperventilation and photic stimulation were not performed.     EKG recording shows a regular rhythm.    There is no push button or clinical event.    IMPRESSION:  Abnormal study due to mild  diffuse background slowing consistent with diffuse cerebral dysfunction and encephalopathy which may be on the basis of toxic, metabolic, or primary neuronal disorder.  Breech pattern is seen in the region of the patient's known skull defect.    6/12/2024

## 2024-06-12 NOTE — PLAN OF CARE
[facility] - Intensive Care  Discharge Final Note    Primary Care Provider: Clair Johnson NP    Expected Discharge Date: 6/12/2024    Pharmacist will go over home medications and reasons for medications. VN and bedside nurse to reiterate final discharge instructions.     Cleared from CM . Bedside Nurse and VN notified.    DC plan as listed below in CM flowsheet.    Family to  at DC per pt in 2 hrs. Educated to follow-ups pending scheduling at the time of visit. Verbalized an understanding. Contacted Padmini Borges to confirm transportation. Pending confirmed pickup time with family.    Final Discharge Note (most recent)       Final Note - 06/12/24 1433          Final Note    Assessment Type Final Discharge Note (P)      Anticipated Discharge Disposition Home-Health Care Svc (P)      Hospital Resources/Appts/Education Provided Appointments scheduled and added to AVS;Post-Acute resouces added to AVS (P)         Post-Acute Status    Post-Acute Authorization Placement;Home Health (P)      Post-Acute Placement Status Patient declined/refused (P)      Home Health Status Pending Clinical Review (P)      Coverage [facility] Medicaid (P)      Discharge Delays None known at this time (P)                    Future Appointments   Date Time Provider Department Center   6/27/2024 11:00 AM Eun Malloy, DONNA [facility]          Contact Info       Clair Johnson NP   Specialty: Family Medicine   Relationship: PCP - General    [address]  [address]  [facility]  [facility] [place] [address]   Phone: [phone]       Next Steps: Go in 1 week(s)    Instructions: FOLLOW UP WITH PCP    [facility] - Neurology 7th Fl   Specialty: Neurology    [address]  [place] [address]   Phone: [phone]       Next Steps: Go in 2 week(s)    Instructions: FOLLOW UP WITH NEUROLOGY AFTER DISCHARGE    [facility]    [address]  [place] [address]   Phone: [phone]        Next Steps: Call    Instructions: As needed, FOLLOW UP WITH PSYCHIATRY    [facility]    [address] [place] [address]   Phone: [phone]       Next Steps: Call    Instructions: As needed, FOLLOW UP WITH PSYCHIATRY  [facility]  [address] [place] [address]    [phone]    [facility]    Phone: [phone]       Next Steps: Call    Instructions: As needed, TRANSPORTATION TO APPOINTMENTS AS NEEDED          Future Appointments   Date Time Provider Department Center   6/27/2024 11:00 AM Eun Malloy, DONNA [facility]     Orders Placed This Encounter   Procedures    Ambulatory referral/consult to Psychiatry     Standing Status:   Future     Standing Expiration Date:   7/12/2025     Referral Priority:   Routine     Referral Type:   Psychiatric     Referral Reason:   Specialty Services Required     Requested Specialty:   Psychiatry     Number of Visits Requested:   1    Ambulatory referral/consult to Neurology Epilepsy     Standing Status:   Future     Standing Expiration Date:   7/12/2025     Referral Priority:   Routine     Referral Type:   Consultation     Referral Reason:   Specialty Services Required     Requested Specialty:   Neurology     Number of Visits Requested:   1    Ambulatory referral/consult to Smoking Cessation Program     Standing Status:   Future     Standing Expiration Date:   7/12/2025     Referral Priority:   Routine     Referral Type:   Consultation     Referral Reason:   Specialty Services Required     Requested Specialty:   CTTS     Number of Visits Requested:   1     .

## 2024-06-12 NOTE — NURSING
Pt. Lying in bed, easily responds to verbal stimuli. Discussed plan of care for shift. VS WNL, no acute distress noted.Continuous EEG monitoring in progress. Safety measures in place

## 2024-06-12 NOTE — PLAN OF CARE
Pt. AAO X4 Discussed plan of care for the shift. Pt verbalized a understanding. Continuous EEG monitoring in progress. VS WNL. Safety measures in place. Will continue to monitor

## 2024-06-12 NOTE — DISCHARGE SUMMARY
[facility] Hospital Medicine Discharge Summary    Primary Team: [facility] Hospitalist Team B  Attending Physician: Beck Leiva MD  Resident: Luis Manuel  Intern: Zak    Date of Admit: 6/7/2024  Date of Discharge: 6/12/2024     Discharge to: Home  Condition: Stable    Discharge Diagnoses     Patient Active Problem List   Diagnosis    Calculus of gallbladder without cholecystitis without obstruction    Essential hypertension    Thyroid nodule    Chronic diastolic heart failure    Acute ischemic left MCA stroke    Thrombotic stroke involving left middle cerebral artery    Aneurysm of left middle cerebral artery    Tobacco abuse    Gait abnormality    Acute nonintractable headache    Decreased  strength of right hand    Weakness of right upper extremity    Lack of coordination due to stroke    Decreased range of motion of right shoulder    Dysarthria    Cognitive communication deficit    Cerebrovascular accident (CVA)    Weakness    H/O: CVA (cerebrovascular accident)    Mixed hyperlipidemia    Other emphysema    Transient neurological symptoms    New onset type 2 diabetes mellitus    Fluid collection at surgical site    Infection of superficial incisional surgical site after procedure    Focal seizure    Seizure disorder    Brain compression    Fever    Localization-related (focal) (partial) symptomatic epilepsy and epileptic syndromes with complex partial seizures, not intractable, without status epilepticus    History of cerebral aneurysm repair    Right leg pain    Slurred speech       Consultants and Procedures     Consultants:  Neurology  ICU  Psychiatry    Procedures:   Continuous EEG    Imaging:  Imaging Results              CTA Head and Neck (xpd) (Final result)  Result time 06/07/24 21:56:43      Final result by Reno Lawson MD (06/07/24 21:56:43)                   Impression:      Resolution of gas from prior procedure  the intracranial region.  Stable postoperative changes of left MCA aneurysmal clipping.   Resolution of the right maxillary sinus opacification.  Otherwise stable exam    All CT scans at this facility are performed  using dose modulation techniques as appropriate to performed exam including the following:  automated exposure control; adjustment of mA and/or kV according to the patients size (this includes techniques or standardized protocols for targeted exams where dose is matched to indication/reason for exam: i.e. extremities or head);  iterative reconstruction technique.      Electronically signed by: Reno Lawson  Date:    06/07/2024  Time:    21:56               Narrative:    EXAMINATION:  CTA HEAD AND NECK (XPD)    CLINICAL HISTORY:  Stroke/TIA, determine embolic source;    TECHNIQUE:  Standard contrast enhanced CTA of brain with 100ml of Omnipaque 350 with 3D MIP reformations. No previous for comparison.    COMPARISON:  Prior    FINDINGS:  Postsurgical changes of left ICA aneurysmal clipping in the left parasellar region similar to prior exam.  Previously seen gas in the left temporal fossa has resolved.  Previously commented on small aneurysm of the untreated left MCA is not well appreciated.  Near complete resolution of the right maxillary sinus opacification.  Otherwise stable exam                                       CT Head Without Contrast (Final result)  Result time 06/07/24 20:14:00      Final result by Reno Lawson MD (06/07/24 20:14:00)                   Impression:      Mild moderate right maxillary sinus mucosal thickening.  No evidence for hemorrhage mass effect or midline shift.    All CT scans   are performed using dose optimization techniques including the following: automated exposure control; adjustment of the mA and/or kV; use of iterative reconstruction technique.  Dose modulation was employed for ALARA by means of: Automated exposure control; adjustment of the mA and/or kV according to patient size (this includes techniques or standardized protocols for targeted exams  where dose is matched to indication/reason for exam; i.e. extremities or head); and/or use of iterative reconstructive technique.      Electronically signed by: Reno Lawson  Date:    06/07/2024  Time:    20:14               Narrative:    EXAMINATION:  CT HEAD WITHOUT CONTRAST    CLINICAL HISTORY:  Headache, new or worsening, neuro deficit (Age 19-49y);    TECHNIQUE:  Low dose axial CT images obtained throughout the head without intravenous contrast. Sagittal and coronal reconstructions were performed.    COMPARISON:  None.    FINDINGS:  Age-related changes:    Ventricles and sulci are normal in size for age without evidence of hydrocephalus. No extra-axial blood or fluid collections.    No parenchymal mass, hemorrhage, edema or major vascular distribution infarct.    Skull/extracranial contents (limited evaluation): No fracture. Right maxillary sinus mucosal thickening.  A                                       X-Ray Chest AP Portable (Final result)  Result time 06/07/24 20:14:20      Final result by Reno Lawson MD (06/07/24 20:14:20)                   Impression:      No acute abnormality.      Electronically signed by: Reno Lawson  Date:    06/07/2024  Time:    20:14               Narrative:    EXAMINATION:  XR CHEST AP PORTABLE    CLINICAL HISTORY:  weakness;    TECHNIQUE:  Single frontal view of the chest was performed.    COMPARISON:  None    FINDINGS:  The lungs are clear, with normal appearance of pulmonary vasculature and no pleural effusion or pneumothorax.    The cardiac silhouette is normal in size. The hilar and mediastinal contours are unremarkable.    Bones are intact.                                        Brief History of Present Illness      Gina Jenkins is a 49 y.o. female who  has a past medical history of Brain aneurysm, CHF (congestive heart failure), H/O coronary angioplasty, Hypercholesteremia, Hypertension, Malignant hypertension, Migraine headache, and Stroke (10/2017).. The  patient presented to [facility] on 6/7/2024 with a primary complaint of back pain, headache, weakness w/ facial droop and voice change x5 days.      The patient was in their usual state of health until approximately 5 days ago when she noted that she felt diffusely weak, noticed a voice change, and had a worsening headache. She notes that she does not like spending time at the doctor's office or hospital, so attempted to rest on the couch. She noted that her symptoms persisted and required the help of her sons to complete ADLs. She noted that on the day of admission, she attempted to stand on her own, noted that she was increasingly dizzy, and sat back down. She presented to the [facility] ED for evaluation. During this time she denies any focal strength changes in her upper and lower extremities. She notes that these episodes of weakness have presented prior but have never been this severe. She denies any knowledge of a recent viral URI but does live at home with 3 teenage sons. Denies any fevers, chills, rhinorrhea, n/v/d/c, vision changes, falls, chest pain, palpitations.     For the full HPI please refer to the History & Physical from this admission.    Hospital Course By Problem with Pertinent Findings     During this hospitalization, Ms. Jenkins was evaluated for possible new onset facial droop and weakness. Imaging did not show anything acute and that her aneurysms and clips were stable. Neurology was consulted and she was restarted on AEDs. While inpatient, she started having seizures and required ativan rescue one time. Her Keppra dose was increased and she was started on depakote. She was stepped up to the ICU for continuous EEG monitoring. EEG did not show any epileptiform activity. 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 She will follow up with epilepsy clinic and psychiatry upon discharge. She was deemed stable to be discharged from the ICU after  discontinuation of her cEEG.    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  - her imaging has been stable aneurysms  - with most recent episode, patient was stepped up to the ICU for closer monitoring and continuous EEG  - now on Keppra 1.5 g BID and Depakoate loaded and maintenance 750 mg IV BID  - ativan PRN for seizure lasting > 5 minutes  - Neurology on board  - NSGY consulted, signed off  - patient hesitant to work with TelePsych  - tapered amitriptyline and discontinued  - PT/OT recommending moderate intensity      Migraine headaches   -Continue topiramate 50 mg bid  -Taper home amitriptyline as above  -Multimodal pain control with tylenol, oxycodone   -Holding off NSAIDS at this time given aneurysm history   -Consider transition to fioricet, though pt preferring Tylenol currently     HTN  HLD   Hx coronary angioplasty   Cholesterol, TG elevated on lipid panel  -Continue atorvastatin 80 daily   -Continue home amlodipine 10 mg daily   -Continue home carvedilol 37.5 mg bid  -Continue home losartan 50 mg daily       Elevated alk phos   -CTM     Anxiety, depression  C/f functional neuro deficits   -Increased home lexapro to 20 mg daily  -Psych consulted    Discharge Medications        Medication List        START taking these medications      divalproex  MG 24 hr tablet  Commonly known as: DEPAKOTE ER  Take 3 tablets (750 mg total) by mouth every 12 (twelve) hours.            CHANGE how you take these medications      atorvastatin 80 MG tablet  Commonly known as: LIPITOR  Take 1 tablet (80 mg total) by mouth once daily.  Start taking on: June 13, 2024  What changed:   medication strength  how much to take    "  carvediloL 12.5 MG tablet  Commonly known as: COREG  Take 3 tablets (37.5 mg total) by mouth 2 (two) times daily.  What changed: medication strength     EScitalopram oxalate 20 MG tablet  Commonly known as: LEXAPRO  Take 1 tablet (20 mg total) by mouth once daily.  Start taking on: June 13, 2024  What changed:   medication strength  how much to take     levETIRAcetam 750 MG Tab  Commonly known as: KEPPRA  Take 2 tablets (1,500 mg total) by mouth 2 (two) times daily.  What changed:   medication strength  how much to take     losartan 50 MG tablet  Commonly known as: COZAAR  Take 1 tablet (50 mg total) by mouth once daily.  Start taking on: June 13, 2024  What changed: when to take this            CONTINUE taking these medications      amLODIPine 10 MG tablet  Commonly known as: NORVASC  Take 1 tablet (10 mg total) by mouth once daily.     BD ULTRA-FINE MARIELY PEN NEEDLE 32 gauge x 5/32" Ndle  Generic drug: pen needle, diabetic  Use to inject insulin into the skin three times daily .     LEVEMIR FLEXTOUCH U100 INSULIN 100 unit/mL (3 mL) Inpn pen  Generic drug: insulin detemir U-100 (Levemir)  Inject 24 Units into the skin once daily.     metFORMIN 500 MG ER 24hr tablet  Commonly known as: GLUCOPHAGE-XR     methocarbamoL 500 MG Tab  Commonly known as: ROBAXIN     NovoLOG Flexpen U-100 Insulin 100 unit/mL (3 mL) Inpn pen  Generic drug: insulin aspart U-100  Inject 8 Units into the skin 3 (three) times daily with meals.     topiramate 50 MG tablet  Commonly known as: TOPAMAX  Take 1 tablet (50 mg total) by mouth 2 (two) times daily.     TRUE METRIX GLUCOSE METER Misc  Generic drug: blood-glucose meter  Use to check blood glucose 3 times daily.     TRUE METRIX GLUCOSE TEST STRIP Strp  Generic drug: blood sugar diagnostic  Use to check blood glucose 3 times daily.     TRUEPLUS LANCETS 30 gauge Misc  Generic drug: lancets  Use to check blood glucose 3 times daily.            STOP taking these medications      acetaminophen " 500 MG tablet  Commonly known as: TYLENOL     amitriptyline 75 MG tablet  Commonly known as: ELAVIL     fluconazole 150 MG Tab  Commonly known as: DIFLUCAN     ibuprofen 20 mg/mL oral liquid     LIDOcaine 5 %  Commonly known as: LIDODERM     magnesium oxide 400 mg (241.3 mg magnesium) tablet  Commonly known as: MAG-OX     traMADoL 50 mg tablet  Commonly known as: ULTRAM               Where to Get Your Medications        These medications were sent to [facility]  [address], [facility] [address]      Hours: Mon-Fri, 8a-5:30p Phone: [phone]   levETIRAcetam 750 MG Tab       These medications were sent to [id number] - [place], [place] - [address] [facility] [address]  [address] [facility] [address]      Phone: [phone]   atorvastatin 80 MG tablet  carvediloL 12.5 MG tablet  divalproex  MG 24 hr tablet  EScitalopram oxalate 20 MG tablet  losartan 50 MG tablet         Discharge Information:   Diet:  Regular    Physical Activity:  As tolerated             Instructions:  1. Take all medications as prescribed  2. Keep all follow-up appointments  3. Return to the hospital or call your primary care physicians if any worsening symptoms such as fever, chest pain, shortness of breath, return of symptoms, or any other concerns.    Follow-Up Appointments:  Epilepsy  Psychiatry    Nae Crespo MD  [facility] Internal Medicine/Pediatrics HOJeniseI  06/12/2024  1:48 PM

## 2024-06-13 ENCOUNTER — PATIENT MESSAGE (OUTPATIENT)
Dept: NEUROLOGY | Facility: CLINIC | Age: 49
End: 2024-06-13
Payer: MEDICAID

## 2024-06-13 LAB
BACTERIA BLD CULT: NORMAL
BACTERIA BLD CULT: NORMAL

## 2024-06-13 NOTE — SIGNIFICANT EVENT
Pulmonary & Critical Care Medicine Plan of Care/MET Note    MET called overhead for 50 yo with h/o cerebral aneurysms s/p craniotomy (2022) with resultant encephalomalacia and epilepsy, HTN, HLD here with workup for weakness and facial droop. Multiple METs called this admission due to concern for seizures. Pt with full return to baseline after seizures yesterday, today MET called after she worked with PT where pt became unresponsive. During event vitals stable. Pt rolled onto R shoulder with extension of L arm and contraction of R arm. No tonic movements noted. Pt with present pupillary and corneal reflexes and withdrawal to pain throughout episode concerning for PNES. Episode broke without pharmacological intervention and pt became interactive again.     Plan to move to ICU for continuous EEG monitoring. Discussions with neuro ongoing regarding further seizure management. Full note to follow.       Desi Loaiza MD  [employer] Pulmonary and Critical Care Fellow

## 2024-06-14 ENCOUNTER — TELEPHONE (OUTPATIENT)
Dept: NEUROLOGY | Facility: CLINIC | Age: 49
End: 2024-06-14
Payer: MEDICAID

## 2024-06-15 ENCOUNTER — HOSPITAL ENCOUNTER (INPATIENT)
Facility: HOSPITAL | Age: 49
LOS: 2 days | Discharge: ANOTHER HOSPITAL | DRG: 071 | End: 2024-06-17
Attending: STUDENT IN AN ORGANIZED HEALTH CARE EDUCATION/TRAINING PROGRAM | Admitting: INTERNAL MEDICINE
Payer: MEDICAID

## 2024-06-15 DIAGNOSIS — G40.909 SEIZURE DISORDER: ICD-10-CM

## 2024-06-15 DIAGNOSIS — G93.40 ACUTE ENCEPHALOPATHY: Primary | ICD-10-CM

## 2024-06-15 DIAGNOSIS — G40.209 LOCALIZATION-RELATED (FOCAL) (PARTIAL) SYMPTOMATIC EPILEPSY AND EPILEPTIC SYNDROMES WITH COMPLEX PARTIAL SEIZURES, NOT INTRACTABLE, WITHOUT STATUS EPILEPTICUS: ICD-10-CM

## 2024-06-15 PROBLEM — N17.9 AKI (ACUTE KIDNEY INJURY): Status: ACTIVE | Noted: 2024-06-15

## 2024-06-15 LAB
ALBUMIN SERPL BCP-MCNC: 3.6 G/DL (ref 3.5–5.2)
ALP SERPL-CCNC: 162 U/L (ref 55–135)
ALT SERPL W/O P-5'-P-CCNC: 33 U/L (ref 10–44)
AMPHET+METHAMPHET UR QL: NEGATIVE
ANION GAP SERPL CALC-SCNC: 13 MMOL/L (ref 8–16)
AST SERPL-CCNC: 27 U/L (ref 10–40)
B-HCG UR QL: NEGATIVE
BACTERIA #/AREA URNS HPF: ABNORMAL /HPF
BARBITURATES UR QL SCN>200 NG/ML: NEGATIVE
BASOPHILS # BLD AUTO: 0.03 K/UL (ref 0–0.2)
BASOPHILS NFR BLD: 0.4 % (ref 0–1.9)
BENZODIAZ UR QL SCN>200 NG/ML: NEGATIVE
BILIRUB SERPL-MCNC: 0.2 MG/DL (ref 0.1–1)
BILIRUB UR QL STRIP: NEGATIVE
BUN SERPL-MCNC: 25 MG/DL (ref 6–20)
BZE UR QL SCN: NEGATIVE
CALCIUM SERPL-MCNC: 9.8 MG/DL (ref 8.7–10.5)
CANNABINOIDS UR QL SCN: NEGATIVE
CHLORIDE SERPL-SCNC: 110 MMOL/L (ref 95–110)
CK SERPL-CCNC: 117 U/L (ref 20–180)
CLARITY UR: CLEAR
CO2 SERPL-SCNC: 18 MMOL/L (ref 23–29)
COLOR UR: YELLOW
CREAT SERPL-MCNC: 2.1 MG/DL (ref 0.5–1.4)
CREAT UR-MCNC: 185.5 MG/DL (ref 15–325)
CTP QC/QA: YES
DIFFERENTIAL METHOD BLD: NORMAL
EOSINOPHIL # BLD AUTO: 0.3 K/UL (ref 0–0.5)
EOSINOPHIL NFR BLD: 3.5 % (ref 0–8)
ERYTHROCYTE [DISTWIDTH] IN BLOOD BY AUTOMATED COUNT: 13 % (ref 11.5–14.5)
EST. GFR  (NO RACE VARIABLE): 29 ML/MIN/1.73 M^2
FIO2: 21 %
GLUCOSE SERPL-MCNC: 92 MG/DL (ref 70–110)
GLUCOSE UR QL STRIP: NEGATIVE
HCT VFR BLD AUTO: 42.4 % (ref 37–48.5)
HGB BLD-MCNC: 14.5 G/DL (ref 12–16)
HGB UR QL STRIP: NEGATIVE
HYALINE CASTS #/AREA URNS LPF: 2 /LPF
IMM GRANULOCYTES # BLD AUTO: 0.03 K/UL (ref 0–0.04)
IMM GRANULOCYTES NFR BLD AUTO: 0.4 % (ref 0–0.5)
KETONES UR QL STRIP: NEGATIVE
LACTATE SERPL-SCNC: 1.9 MMOL/L (ref 0.5–2.2)
LEUKOCYTE ESTERASE UR QL STRIP: ABNORMAL
LYMPHOCYTES # BLD AUTO: 1.6 K/UL (ref 1–4.8)
LYMPHOCYTES NFR BLD: 21.4 % (ref 18–48)
MCH RBC QN AUTO: 30.1 PG (ref 27–31)
MCHC RBC AUTO-ENTMCNC: 34.2 G/DL (ref 32–36)
MCV RBC AUTO: 88 FL (ref 82–98)
METHADONE UR QL SCN>300 NG/ML: NEGATIVE
MICROSCOPIC COMMENT: ABNORMAL
MONOCYTES # BLD AUTO: 0.5 K/UL (ref 0.3–1)
MONOCYTES NFR BLD: 6.6 % (ref 4–15)
NEUTROPHILS # BLD AUTO: 5 K/UL (ref 1.8–7.7)
NEUTROPHILS NFR BLD: 67.7 % (ref 38–73)
NITRITE UR QL STRIP: NEGATIVE
NRBC BLD-RTO: 0 /100 WBC
OPIATES UR QL SCN: NEGATIVE
PCO2 BLDA: 41.9 MMHG (ref 35–45)
PCP UR QL SCN>25 NG/ML: NEGATIVE
PH SMN: 7.29 [PH] (ref 7.35–7.45)
PH UR STRIP: 7 [PH] (ref 5–8)
PLATELET # BLD AUTO: 348 K/UL (ref 150–450)
PMV BLD AUTO: 9.8 FL (ref 9.2–12.9)
PO2 BLDA: 27.2 MMHG (ref 40–60)
POC BASE DEFICIT: -6.3 MMOL/L (ref -2–2)
POC HCO3: 20 MMOL/L (ref 24–28)
POC PERFORMED BY: ABNORMAL
POC SATURATED O2: 45 % (ref 95–100)
POCT GLUCOSE: 85 MG/DL (ref 70–110)
POTASSIUM SERPL-SCNC: 4.3 MMOL/L (ref 3.5–5.1)
PROT SERPL-MCNC: 7.9 G/DL (ref 6–8.4)
PROT UR QL STRIP: ABNORMAL
RBC # BLD AUTO: 4.82 M/UL (ref 4–5.4)
RBC #/AREA URNS HPF: 1 /HPF (ref 0–4)
SODIUM SERPL-SCNC: 141 MMOL/L (ref 136–145)
SP GR UR STRIP: 1.02 (ref 1–1.03)
SPECIMEN SOURCE: ABNORMAL
SQUAMOUS #/AREA URNS HPF: 0 /HPF
TOXICOLOGY INFORMATION: NORMAL
TROPONIN I SERPL DL<=0.01 NG/ML-MCNC: 0.01 NG/ML (ref 0–0.03)
TSH SERPL DL<=0.005 MIU/L-ACNC: 1.02 UIU/ML (ref 0.4–4)
URN SPEC COLLECT METH UR: ABNORMAL
UROBILINOGEN UR STRIP-ACNC: NEGATIVE EU/DL
VALPROATE SERPL-MCNC: 89.7 UG/ML (ref 50–100)
WBC # BLD AUTO: 7.42 K/UL (ref 3.9–12.7)
WBC #/AREA URNS HPF: 14 /HPF (ref 0–5)

## 2024-06-15 PROCEDURE — 87086 URINE CULTURE/COLONY COUNT: CPT

## 2024-06-15 PROCEDURE — 95718 EEG PHYS/QHP 2-12 HR W/VEEG: CPT | Mod: ,,, | Performed by: PSYCHIATRY & NEUROLOGY

## 2024-06-15 PROCEDURE — 63600175 PHARM REV CODE 636 W HCPCS

## 2024-06-15 PROCEDURE — 99291 CRITICAL CARE FIRST HOUR: CPT

## 2024-06-15 PROCEDURE — G0378 HOSPITAL OBSERVATION PER HR: HCPCS

## 2024-06-15 PROCEDURE — 25000003 PHARM REV CODE 250

## 2024-06-15 PROCEDURE — 85025 COMPLETE CBC W/AUTO DIFF WBC: CPT | Performed by: STUDENT IN AN ORGANIZED HEALTH CARE EDUCATION/TRAINING PROGRAM

## 2024-06-15 PROCEDURE — 25500020 PHARM REV CODE 255: Performed by: STUDENT IN AN ORGANIZED HEALTH CARE EDUCATION/TRAINING PROGRAM

## 2024-06-15 PROCEDURE — 80307 DRUG TEST PRSMV CHEM ANLYZR: CPT

## 2024-06-15 PROCEDURE — 99222 1ST HOSP IP/OBS MODERATE 55: CPT | Mod: ,,, | Performed by: PSYCHIATRY & NEUROLOGY

## 2024-06-15 PROCEDURE — 82550 ASSAY OF CK (CPK): CPT | Performed by: STUDENT IN AN ORGANIZED HEALTH CARE EDUCATION/TRAINING PROGRAM

## 2024-06-15 PROCEDURE — 81000 URINALYSIS NONAUTO W/SCOPE: CPT | Mod: 59

## 2024-06-15 PROCEDURE — 84443 ASSAY THYROID STIM HORMONE: CPT | Performed by: STUDENT IN AN ORGANIZED HEALTH CARE EDUCATION/TRAINING PROGRAM

## 2024-06-15 PROCEDURE — 81025 URINE PREGNANCY TEST: CPT

## 2024-06-15 PROCEDURE — 96374 THER/PROPH/DIAG INJ IV PUSH: CPT

## 2024-06-15 PROCEDURE — 80164 ASSAY DIPROPYLACETIC ACD TOT: CPT | Performed by: STUDENT IN AN ORGANIZED HEALTH CARE EDUCATION/TRAINING PROGRAM

## 2024-06-15 PROCEDURE — 99900035 HC TECH TIME PER 15 MIN (STAT)

## 2024-06-15 PROCEDURE — 84484 ASSAY OF TROPONIN QUANT: CPT | Performed by: STUDENT IN AN ORGANIZED HEALTH CARE EDUCATION/TRAINING PROGRAM

## 2024-06-15 PROCEDURE — 80177 DRUG SCRN QUAN LEVETIRACETAM: CPT | Performed by: STUDENT IN AN ORGANIZED HEALTH CARE EDUCATION/TRAINING PROGRAM

## 2024-06-15 PROCEDURE — 63600175 PHARM REV CODE 636 W HCPCS: Performed by: STUDENT IN AN ORGANIZED HEALTH CARE EDUCATION/TRAINING PROGRAM

## 2024-06-15 PROCEDURE — 83605 ASSAY OF LACTIC ACID: CPT | Performed by: STUDENT IN AN ORGANIZED HEALTH CARE EDUCATION/TRAINING PROGRAM

## 2024-06-15 PROCEDURE — 82803 BLOOD GASES ANY COMBINATION: CPT

## 2024-06-15 PROCEDURE — 80053 COMPREHEN METABOLIC PANEL: CPT | Performed by: STUDENT IN AN ORGANIZED HEALTH CARE EDUCATION/TRAINING PROGRAM

## 2024-06-15 PROCEDURE — 96372 THER/PROPH/DIAG INJ SC/IM: CPT

## 2024-06-15 PROCEDURE — 20000000 HC ICU ROOM

## 2024-06-15 RX ORDER — AMLODIPINE BESYLATE 10 MG/1
10 TABLET ORAL DAILY
COMMUNITY

## 2024-06-15 RX ORDER — GLUCAGON 1 MG
1 KIT INJECTION
Status: DISCONTINUED | OUTPATIENT
Start: 2024-06-15 | End: 2024-06-17 | Stop reason: HOSPADM

## 2024-06-15 RX ORDER — LEVETIRACETAM 15 MG/ML
1500 INJECTION INTRAVASCULAR EVERY 12 HOURS
Status: DISCONTINUED | OUTPATIENT
Start: 2024-06-15 | End: 2024-06-17

## 2024-06-15 RX ORDER — HYDROMORPHONE HYDROCHLORIDE 1 MG/ML
1 INJECTION, SOLUTION INTRAMUSCULAR; INTRAVENOUS; SUBCUTANEOUS EVERY 4 HOURS PRN
Status: DISCONTINUED | OUTPATIENT
Start: 2024-06-15 | End: 2024-06-17 | Stop reason: HOSPADM

## 2024-06-15 RX ORDER — ESCITALOPRAM OXALATE 10 MG/1
20 TABLET ORAL DAILY
Status: DISCONTINUED | OUTPATIENT
Start: 2024-06-15 | End: 2024-06-17 | Stop reason: HOSPADM

## 2024-06-15 RX ORDER — INSULIN ASPART 100 [IU]/ML
0-5 INJECTION, SOLUTION INTRAVENOUS; SUBCUTANEOUS
Status: DISCONTINUED | OUTPATIENT
Start: 2024-06-15 | End: 2024-06-16

## 2024-06-15 RX ORDER — LEVETIRACETAM 500 MG/1
1500 TABLET ORAL 2 TIMES DAILY
Status: DISCONTINUED | OUTPATIENT
Start: 2024-06-15 | End: 2024-06-15

## 2024-06-15 RX ORDER — INSULIN DETEMIR 100 [IU]/ML
24 INJECTION, SOLUTION SUBCUTANEOUS NIGHTLY
Status: ON HOLD | COMMUNITY
End: 2024-06-20 | Stop reason: HOSPADM

## 2024-06-15 RX ORDER — ACETAMINOPHEN 325 MG/1
650 TABLET ORAL EVERY 4 HOURS PRN
Status: DISCONTINUED | OUTPATIENT
Start: 2024-06-15 | End: 2024-06-17 | Stop reason: HOSPADM

## 2024-06-15 RX ORDER — ENOXAPARIN SODIUM 100 MG/ML
40 INJECTION SUBCUTANEOUS EVERY 24 HOURS
Status: DISCONTINUED | OUTPATIENT
Start: 2024-06-15 | End: 2024-06-16

## 2024-06-15 RX ORDER — NALOXONE HCL 0.4 MG/ML
0.02 VIAL (ML) INJECTION
Status: DISCONTINUED | OUTPATIENT
Start: 2024-06-15 | End: 2024-06-17 | Stop reason: HOSPADM

## 2024-06-15 RX ORDER — AMLODIPINE BESYLATE 5 MG/1
10 TABLET ORAL DAILY
Status: DISCONTINUED | OUTPATIENT
Start: 2024-06-15 | End: 2024-06-17 | Stop reason: HOSPADM

## 2024-06-15 RX ORDER — DIVALPROEX SODIUM 250 MG/1
750 TABLET, FILM COATED, EXTENDED RELEASE ORAL EVERY 12 HOURS
Status: DISCONTINUED | OUTPATIENT
Start: 2024-06-15 | End: 2024-06-15

## 2024-06-15 RX ORDER — ATORVASTATIN CALCIUM 40 MG/1
80 TABLET, FILM COATED ORAL DAILY
Status: DISCONTINUED | OUTPATIENT
Start: 2024-06-15 | End: 2024-06-17 | Stop reason: HOSPADM

## 2024-06-15 RX ORDER — IBUPROFEN 200 MG
24 TABLET ORAL
Status: DISCONTINUED | OUTPATIENT
Start: 2024-06-15 | End: 2024-06-17 | Stop reason: HOSPADM

## 2024-06-15 RX ORDER — IBUPROFEN 200 MG
16 TABLET ORAL
Status: DISCONTINUED | OUTPATIENT
Start: 2024-06-15 | End: 2024-06-17 | Stop reason: HOSPADM

## 2024-06-15 RX ORDER — LORAZEPAM 2 MG/ML
1 INJECTION INTRAMUSCULAR
Status: DISCONTINUED | OUTPATIENT
Start: 2024-06-15 | End: 2024-06-17 | Stop reason: HOSPADM

## 2024-06-15 RX ORDER — LORAZEPAM 2 MG/ML
2 INJECTION INTRAMUSCULAR
Status: COMPLETED | OUTPATIENT
Start: 2024-06-15 | End: 2024-06-15

## 2024-06-15 RX ORDER — TOPIRAMATE 25 MG/1
50 TABLET ORAL 2 TIMES DAILY
Status: DISCONTINUED | OUTPATIENT
Start: 2024-06-15 | End: 2024-06-17 | Stop reason: HOSPADM

## 2024-06-15 RX ORDER — INSULIN ASPART 100 [IU]/ML
8 INJECTION, SOLUTION INTRAVENOUS; SUBCUTANEOUS
Status: ON HOLD | COMMUNITY
End: 2024-06-20 | Stop reason: HOSPADM

## 2024-06-15 RX ORDER — POLYETHYLENE GLYCOL 3350 17 G/17G
17 POWDER, FOR SOLUTION ORAL DAILY
Status: DISCONTINUED | OUTPATIENT
Start: 2024-06-15 | End: 2024-06-17 | Stop reason: HOSPADM

## 2024-06-15 RX ORDER — SODIUM CHLORIDE 0.9 % (FLUSH) 0.9 %
10 SYRINGE (ML) INJECTION EVERY 12 HOURS PRN
Status: DISCONTINUED | OUTPATIENT
Start: 2024-06-15 | End: 2024-06-17 | Stop reason: HOSPADM

## 2024-06-15 RX ADMIN — LEVETIRACETAM INJECTION 1500 MG: 15 INJECTION INTRAVENOUS at 09:06

## 2024-06-15 RX ADMIN — ENOXAPARIN SODIUM 40 MG: 40 INJECTION SUBCUTANEOUS at 06:06

## 2024-06-15 RX ADMIN — DEXTROSE MONOHYDRATE 750 MG: 50 INJECTION, SOLUTION INTRAVENOUS at 08:06

## 2024-06-15 RX ADMIN — IOHEXOL 100 ML: 350 INJECTION, SOLUTION INTRAVENOUS at 05:06

## 2024-06-15 RX ADMIN — LORAZEPAM 1 MG: 2 INJECTION INTRAMUSCULAR; INTRAVENOUS at 05:06

## 2024-06-15 NOTE — ED NOTES
2 Rns Attempted to place IV in pt. Unsuccessfully; Charge nurse Krupa notified to placed US line

## 2024-06-15 NOTE — ED NOTES
CT transporting patient back to room from head CT and patient started seizing by nurses station. Dr. Armenta called to the bedside, Pt brought back to room, connected back to monitor, given 1mg of ativan IV as verbally ordered by MD, seizure lasted about 3 minutes long. Pt's VSS, pt is now alert opening eyes spontaneously, responds to voice, does not follow commands, does not answer any questions that are asked to her. Pt is now resting in bed in no acute distress, bolus of fluids initiated, bed is locked and at the lowest position, no further needs at this time.

## 2024-06-15 NOTE — NURSING
Continues to be  arousable to voice with sustained eye opening. Attempts to answer orientation questions, but difficult to understand patient. Safety and seizure precaution remains in place.

## 2024-06-15 NOTE — ED PROVIDER NOTES
ED Provider Note - 6/15/2024    History     Chief Complaint   Patient presents with    Altered Mental Status     Pt arrived at ED via EMS with an altered mental status. Pt was said to have been recently discharged from [facility] and has not been the same since.       ROBBIN Jenkins is a 49 y.o. year old female with past medical and surgical history as seen below, presenting with chief complaint of altered mental status.  Was left at home to take care of a 12-year-old and was found down by family when they returned with decreased mental status and covered in stool and urine.  History is derived from medical records and EMS as patient is noncontributory at this time.        Past Medical History:   Diagnosis Date    Brain aneurysm     CHF (congestive heart failure)     H/O coronary angioplasty     Hypercholesteremia     Hypertension     Malignant hypertension     Migraine headache     Stroke 10/2017     Past Surgical History:   Procedure Laterality Date    CARDIAC CATHETERIZATION      CEREBRAL ANGIOGRAM      CLIP LIGATION OF INTRACRANIAL ANEURYSM BY CRANIOTOMY N/A 7/15/2022    Procedure: CRANIOTOMY, WITH ANEURYSM CLIPPING;  Surgeon: Timothy Diaz MD;  Location: [facility] OR [address] [place] [address];  Service: Neurosurgery;  Laterality: N/A;  PTERIONAL CRANIOTOMY WITH CLIP LIGATION OF L PCOMM, L ANTERIOR CHOROIDAL, L MCA  ANEURYSM, ANESTHESIA: GENERAL, BLOOD: TYPE&CROSS 2 UNITS, NEUROMONITORING: SEP, MEP, EEG, RADIOLOGY: C-ARM, POSITION: SUPINE, ANNA, CO-SURGERON: DR. LEXI DOMÍNGUEZ.    WOUND DEBRIDEMENT  8/27/2022    Procedure: DEBRIDEMENT, WOUND;  Surgeon: Timothy Diaz MD;  Location: [facility] OR [address] [place] [address];  Service: Neurosurgery;;         No family history on file.  Social History     Tobacco Use    Smoking status: Every Day     Current packs/day: 0.33     Average packs/day: 0.3 packs/day for 31.5 years (10.4 ttl pk-yrs)     Types: Cigarettes     Start date: 1993    Smokeless tobacco: Never    Tobacco comments:     Enrolled  in the [place] [employer] on 6/7/22 ([facility] Member ID # [id number]). Pt is a 0.33 pk/day cigarette smoker x 31 yrs. She states ready to quit smoking. Ambulatory referral to Smoking Cessation clinic following hospital discharge.    Substance Use Topics    Alcohol use: Yes     Comment: occassionally    Drug use: No     Social Determinants of Health with Concerns     Tobacco Use: High Risk (6/10/2024)    Patient History     Smoking Tobacco Use: Every Day     Smokeless Tobacco Use: Never     Passive Exposure: Not on file   Alcohol Use: Unknown (6/10/2024)    AUDIT-C     Frequency of Alcohol Consumption: Not on file     Average Number of Drinks: Patient declined     Frequency of Binge Drinking: Patient declined   Financial Resource Strain: Patient Declined (6/10/2024)    Overall Financial Resource Strain (CARDIA)     Difficulty of Paying Living Expenses: Patient declined   Food Insecurity: Patient Declined (6/10/2024)    Hunger Vital Sign     Worried About Running Out of Food in the Last Year: Patient declined     Ran Out of Food in the Last Year: Patient declined   Transportation Needs: Patient Declined (6/10/2024)    TRANSPORTATION NEEDS     Transportation : Patient declined   Physical Activity: Patient Declined (6/10/2024)    Exercise Vital Sign     Days of Exercise per Week: Patient declined     Minutes of Exercise per Session: Patient declined   Stress: Patient Declined (6/10/2024)    [employer] - Occupational Stress Questionnaire     Feeling of Stress : Patient declined   Housing Stability: Patient Declined (6/10/2024)    Housing Stability Vital Sign     Unable to Pay for Housing in the Last Year: Patient declined     Homeless in the Last Year: Patient declined   Utilities: Patient Declined (6/10/2024)    [facility] Utilities     Threatened with loss of utilities: Patient declined   Health Literacy: Patient Declined (6/10/2024)     Health Literacy     Frequency of need for help with medical  "instructions: Patient declines to respond   Social Isolation: Patient Declined (6/10/2024)    Social Isolation     Social Isolation: 7      Review of patient's allergies indicates:   Allergen Reactions    Lisinopril Swelling    Bactrim [sulfamethoxazole-trimethoprim] Rash    Ceftazidime Hives     Rash while on IV ceftazidime for planned 6 weeks.  See ED notes 9/12, 9/14 and ID clinic notes 9/16 and 9/28       Review of Systems     A full Review of Systems (ROS) was performed and was negative unless otherwise stated in the HPI.      Physical Exam     Vitals:    06/15/24 0401   BP: (!) 144/92   BP Location: Right arm   Patient Position: Lying   Pulse: 78   Resp: 18   Temp: 98.6 °F (37 °C)   TempSrc: Oral   SpO2: 98%   Weight: 74.8 kg (165 lb)   Height: 5' 2" (1.575 m)        Physical Exam    Nursing note and vitals reviewed.  Constitutional: She appears well-developed and well-nourished. She appears listless.   HENT:   Head: Normocephalic and atraumatic.   Right Ear: External ear normal.   Left Ear: External ear normal.   Nose: Nose normal.   Eyes: Conjunctivae and EOM are normal. Pupils are equal, round, and reactive to light.   Neck: No tracheal deviation present.   Normal range of motion.  Cardiovascular:  Normal rate, regular rhythm and intact distal pulses.           No murmur heard.  Pulmonary/Chest: Breath sounds normal. No respiratory distress. She has no wheezes.   Abdominal: Abdomen is soft. She exhibits no distension. There is no abdominal tenderness.   Musculoskeletal:         General: No edema. Normal range of motion.      Cervical back: Normal range of motion.     Neurological: She has normal strength. She appears listless. She is disoriented. She displays no tremor. No cranial nerve deficit or sensory deficit. She displays no seizure activity. Gait normal. GCS eye subscore is 3. GCS verbal subscore is 3. GCS motor subscore is 4.   Skin: Skin is warm and dry.   Psychiatric: She has a normal mood and " affect. Her behavior is normal. Thought content normal.         Lab Results- Independently reviewed by myself      Labs Reviewed   COMPREHENSIVE METABOLIC PANEL - Abnormal; Notable for the following components:       Result Value    CO2 18 (*)     BUN 25 (*)     Creatinine 2.1 (*)     Alkaline Phosphatase 162 (*)     eGFR 29 (*)     All other components within normal limits   URINALYSIS, REFLEX TO URINE CULTURE - Abnormal; Notable for the following components:    Protein, UA 1+ (*)     Leukocytes, UA Trace (*)     All other components within normal limits    Narrative:     Specimen Source->Urine   URINALYSIS MICROSCOPIC - Abnormal; Notable for the following components:    WBC, UA 14 (*)     Hyaline Casts, UA 2 (*)     All other components within normal limits    Narrative:     Specimen Source->Urine   CULTURE, URINE   CBC W/ AUTO DIFFERENTIAL   DRUG SCREEN PANEL, URINE EMERGENCY    Narrative:     Specimen Source->Urine   TROPONIN I   TSH   CK   LACTIC ACID, PLASMA   VALPROIC ACID   VALPROIC ACID   LEVETIRACETAM  (KEPPRA) LEVEL   POCT URINE PREGNANCY   POCT GLUCOSE           Imaging     Imaging Results              CTA Brain (Final result)  Result time 06/15/24 07:22:07      Final result by William Putnam MD (06/15/24 07:22:07)                   Impression:      1. No definite acute intracranial findings by CT.  2. No evidence of acute large vessel occlusion or flow-limiting stenosis.  3. Redemonstrated sequela of prior clip in the left ICA aneurysms, associated with posterior communicating artery and anterior choroidal artery origins.  No definite evidence of residual recurrent aneurysm.  4. When compared to prior CTA and MRA of 11/14/2023, as well as CTA performed 06/07/2024, no significant change in appearance of the untreated left M2 MCA aneurysm.  5. Additional details as above.      Electronically signed by: William Putnam  Date:    06/15/2024  Time:    07:22               Narrative:    EXAMINATION:  CTA  HEAD; CTA NECK    CLINICAL HISTORY:  Cerebral aneurysm, follow-up;    TECHNIQUE:  Non contrast low dose axial images were obtained thought the head and neck.  CT angiogram was performed from the level of the bottom of C2 to the top of the head following the IV administration of 100mL of Omnipaque 350.   Sagittal and coronal reconstructions and maximum intensity projection reconstructions were performed.    COMPARISON:  CT head without contrast 06/15/2024.    FINDINGS:  CT HEAD:    Blood: No acute intracranial hemorrhage.    Parenchyma: No definite loss of gray-white differentiation to suggest acute or subacute transcortical infarct.    Ventricles/Extra-axial spaces: Redemonstrated thin extra-axial hyperattenuation due to the left-sided craniotomy site, similar to recent CT of 06/18/2024, 04:21 hours as well as when compared to older study of 11/14/2023, felt likely related to postoperative sequela.  Unchanged size and configuration of the ventricles.    Vessels: See below.    Orbits: Unremarkable.    Scalp: Unremarkable.    Skull: There are no depressed skull fractures or destructive bone lesions.    Sinuses and mastoids: Scattered relatively modest paranasal sinus mucosal thickening.    Other findings: Dental caries.  Periapical lucency about right posterior maxillary molar tooth with dehiscence of the buccal cortex.  Additional periapical lucencies involving the left mandibular molar teeth.  These may represent periapical abscess.    CTA:    NECK:    Imaged aortic arch: Nonaneurysmal.  Left-sided arch.  Conventional 3 vessel arch anatomy.  Subclavian and brachiocephalic arteries appear grossly patent.    Right common and cervical internal carotid arteries: CCA and ECA grossly patent.  Less than 50% atheromatous narrowing at the proximal ICA.  No evidence of carotid dissection or web.    Left common and cervical internal carotid arteries: CCA and ECA grossly patent.  Less than 50% atheromatous narrowing at the  proximal ICA.  No evidence of carotid dissection or web.    Right cervical vertebral artery: There is no hemodynamically significant stenosis or dissection    Left cervical vertebral artery: There is no hemodynamically significant stenosis or dissection. Left vertebral artery appears dominant.    HEAD:    Right anterior circulation:Mild atheromatous irregularity of the ICA.  Right ophthalmic artery is patent.Mild atheromatous irregularity of the M1 MCA.  No definite evidence of acute large vessel occlusion or flow-limiting stenosis.    Left anterior circulation: Mild atheromatous irregularity of the ICA.  No definite evidence of flow-limiting stenosis or large vessel occlusion.Left ophthalmic artery is patent.Redemonstrated postoperative sequela of clipping of left ICA aneurysms associated with the posterior communicating artery and anterior choroidal artery origins.  Noting limitations imposed by clip related artifact, no definite residual recurrent aneurysm is appreciated.  Unknown, un treated left M2 AUGIE aneurysm (axial source series 5, image 332) measures on the order of 2-3 mm from neck to dome appears similar to 11/14/2023.    Posterior circulation: There is no hemodynamically significant vertebrobasilar stenosis. There is no significant PCA stenosis. Posterior inferior cerebellar arteries patent at origin.    Anterior and posterior communicating arteries: Suspect small patent anterior communicating artery.  Posterior communicating arteries are not well seen.    NON-ANGIOGRAPHIC FINDINGS:    Visualized intracranial contents: As above.    Soft tissues of the neck: Unremarkable.    Visualized sinuses: As above.    Dentition: As above.    Spine: Degenerative change.    Visualized lungs: No acute change.  Mild emphysematous.                                       CTA Neck (Final result)  Result time 06/15/24 07:22:07      Final result by William Putnam MD (06/15/24 07:22:07)                   Impression:       1. No definite acute intracranial findings by CT.  2. No evidence of acute large vessel occlusion or flow-limiting stenosis.  3. Redemonstrated sequela of prior clip in the left ICA aneurysms, associated with posterior communicating artery and anterior choroidal artery origins.  No definite evidence of residual recurrent aneurysm.  4. When compared to prior CTA and MRA of 11/14/2023, as well as CTA performed 06/07/2024, no significant change in appearance of the untreated left M2 MCA aneurysm.  5. Additional details as above.      Electronically signed by: William Putnam  Date:    06/15/2024  Time:    07:22               Narrative:    EXAMINATION:  CTA HEAD; CTA NECK    CLINICAL HISTORY:  Cerebral aneurysm, follow-up;    TECHNIQUE:  Non contrast low dose axial images were obtained thought the head and neck.  CT angiogram was performed from the level of the bottom of C2 to the top of the head following the IV administration of 100mL of Omnipaque 350.   Sagittal and coronal reconstructions and maximum intensity projection reconstructions were performed.    COMPARISON:  CT head without contrast 06/15/2024.    FINDINGS:  CT HEAD:    Blood: No acute intracranial hemorrhage.    Parenchyma: No definite loss of gray-white differentiation to suggest acute or subacute transcortical infarct.    Ventricles/Extra-axial spaces: Redemonstrated thin extra-axial hyperattenuation due to the left-sided craniotomy site, similar to recent CT of 06/18/2024, 04:21 hours as well as when compared to older study of 11/14/2023, felt likely related to postoperative sequela.  Unchanged size and configuration of the ventricles.    Vessels: See below.    Orbits: Unremarkable.    Scalp: Unremarkable.    Skull: There are no depressed skull fractures or destructive bone lesions.    Sinuses and mastoids: Scattered relatively modest paranasal sinus mucosal thickening.    Other findings: Dental caries.  Periapical lucency about right posterior  maxillary molar tooth with dehiscence of the buccal cortex.  Additional periapical lucencies involving the left mandibular molar teeth.  These may represent periapical abscess.    CTA:    NECK:    Imaged aortic arch: Nonaneurysmal.  Left-sided arch.  Conventional 3 vessel arch anatomy.  Subclavian and brachiocephalic arteries appear grossly patent.    Right common and cervical internal carotid arteries: CCA and ECA grossly patent.  Less than 50% atheromatous narrowing at the proximal ICA.  No evidence of carotid dissection or web.    Left common and cervical internal carotid arteries: CCA and ECA grossly patent.  Less than 50% atheromatous narrowing at the proximal ICA.  No evidence of carotid dissection or web.    Right cervical vertebral artery: There is no hemodynamically significant stenosis or dissection    Left cervical vertebral artery: There is no hemodynamically significant stenosis or dissection. Left vertebral artery appears dominant.    HEAD:    Right anterior circulation:Mild atheromatous irregularity of the ICA.  Right ophthalmic artery is patent.Mild atheromatous irregularity of the M1 MCA.  No definite evidence of acute large vessel occlusion or flow-limiting stenosis.    Left anterior circulation: Mild atheromatous irregularity of the ICA.  No definite evidence of flow-limiting stenosis or large vessel occlusion.Left ophthalmic artery is patent.Redemonstrated postoperative sequela of clipping of left ICA aneurysms associated with the posterior communicating artery and anterior choroidal artery origins.  Noting limitations imposed by clip related artifact, no definite residual recurrent aneurysm is appreciated.  Unknown, un treated left M2 AUGIE aneurysm (axial source series 5, image 332) measures on the order of 2-3 mm from neck to dome appears similar to 11/14/2023.    Posterior circulation: There is no hemodynamically significant vertebrobasilar stenosis. There is no significant PCA stenosis.  Posterior inferior cerebellar arteries patent at origin.    Anterior and posterior communicating arteries: Suspect small patent anterior communicating artery.  Posterior communicating arteries are not well seen.    NON-ANGIOGRAPHIC FINDINGS:    Visualized intracranial contents: As above.    Soft tissues of the neck: Unremarkable.    Visualized sinuses: As above.    Dentition: As above.    Spine: Degenerative change.    Visualized lungs: No acute change.  Mild emphysematous.                                       CT Head Without Contrast (Final result)  Result time 06/15/24 05:41:55      Final result by Faheem Okeefe MD (06/15/24 05:41:55)                   Impression:      Chronic and postoperative changes are noted, there is no evidence for superimposed acute intracranial process.      Electronically signed by: Faheem Okeefe  Date:    06/15/2024  Time:    05:41               Narrative:    EXAMINATION:  CT HEAD WITHOUT CONTRAST    CLINICAL HISTORY:  Mental status change, unknown cause;    TECHNIQUE:  Low dose axial images were obtained through the head.  Coronal and sagittal reformations were also performed. Contrast was not administered.    COMPARISON:  Prior examinations, most recent of which is June 7, 2024    FINDINGS:  There is artifact present, in addition positioning is less than optimal, these factors diminish the sensitivity of the examination.    Postoperative calvarial change on the left is noted, underlying the postoperative location, there is appearance of minimal thin extra-axial appearing density overlying the left cerebral hemisphere, however this appears stable on prior examinations and is thought likely representative of chronic mild dural thickening.  Intracranial aneurysm clip at the medial aspect of the middle cranial fossa is noted.    There is no additional evidence for intracranial mass, mass effect or midline shift or acute intracranial hemorrhage.  Gray-white differentiation appears  appropriate.  There is no hydrocephalus.  Appropriate CSF spaces are seen at the skull base.    The osseous structures demonstrate chronic and postoperative change.  The mastoid air cells appear well aerated.  There is limited visualization of the paranasal sinuses.  The sphenoid sinus appears appropriate.                                    X-Rays:   Independently Interpreted Readings:   Head CT: No hemorrhage.               ED Course         Critical Care    Date/Time: 6/16/2024 4:22 AM    Performed by: Hipolito Armenta MD  Authorized by: Hipolito Armenta MD  Direct patient critical care time: 14 minutes  Additional history critical care time: 7 minutes  Ordering / reviewing critical care time: 7 minutes  Documentation critical care time: 8 minutes  Consulting other physicians critical care time: 6 minutes  Total critical care time (exclusive of procedural time) : 42 minutes  Critical care time was exclusive of separately billable procedures and treating other patients and teaching time.  Critical care was necessary to treat or prevent imminent or life-threatening deterioration of the following conditions: Encephalopathy/seizures.  Critical care was time spent personally by me on the following activities: blood draw for specimens, discussions with consultants, interpretation of cardiac output measurements, evaluation of patient's response to treatment, examination of patient, obtaining history from patient or surrogate, ordering and review of laboratory studies, ordering and performing treatments and interventions, ordering and review of radiographic studies, pulse oximetry, re-evaluation of patient's condition and review of old charts.               Orders Placed This Encounter    CT Head Without Contrast    CTA Brain    CBC auto differential    Comprehensive metabolic panel    Drug screen panel, emergency    Troponin I    Urinalysis, Reflex to Urine Culture Urine, Clean Catch    TSH    CPK    Lactic acid, plasma     Levetiracetam level    Valproic Acid    Valproic Acid    POCT urine pregnancy    Insert Saline lock IV    Possible Hospitalization    iohexoL (OMNIPAQUE 350) injection 100 mL                      Medical Decision Making       The patient's list of active medical problems, social history, medications, and allergies as documented per RN staff has been reviewed.           Medical Decision Making  49-year-old female presenting with encephalopathy and possible seizure.  Unwitnessed at home but found down covered in urine and stool.  Ongoing encephalopathy throughout stay in emergency department.  Did have 1 episode of possible seizure during returned from CT scan.  It was a short episode with mostly tensing of muscles.  Not tonic-clonic.  Aborted prior to my arrival to room.  Given ongoing encephalopathy, I have discussed case with [facility] resident Dr. Zamora to continue patient's evaluation and management.    Amount and/or Complexity of Data Reviewed  Labs: ordered.  Radiology: ordered and independent interpretation performed.    Risk  Prescription drug management.  Decision regarding hospitalization.  Diagnosis or treatment significantly limited by social determinants of health.                  Clinical Impression         Disposition   ED Disposition Condition    Observation             Diagnosis    ICD-10-CM ICD-9-CM   1. Acute encephalopathy  G93.40 348.30   2. Seizure disorder  G40.909 345.90           Hipolito Armenta MD        06/15/2024          DISCLAIMER: This note was prepared with [employer] voice recognition transcription software. Garbled syntax, mangled pronouns, and other bizarre constructions may be attributed to that software system.       Hipolito Armenta MD  06/16/24 7522

## 2024-06-15 NOTE — ED NOTES
Pt. Soiled herself and bed; pt. Placed in clean gown and linen changed; pt. Placed in brief; pt. Not speaking or following commands; IV not in placed and infiltrated

## 2024-06-15 NOTE — ED NOTES
Admit team notified that patient is still postictal and altered and unable to follow commands to drink water or swallow

## 2024-06-15 NOTE — NURSING
Arrived on the unit via stretcher eyes closed, responds to voice  no skin issues, suction set, pure wick in place, seizure precautions in place: bed rails padded, yanker @ the bedside, bed in the lowest position, locked, bed rails raised 2x, call bell in place, bed alarm set, yellow socks (Non- skid), id bands on. Vital signs per flow sheet

## 2024-06-15 NOTE — NURSING
PROACTIVE ROUNDING NURSING NOTE       Time of Visit: 1550    Admit Date: 6/15/2024  LOS: 0  Code Status: Full Code   Date of Visit: 06/15/2024  : 1975  Age: 49 y.o.  Sex: female  Race: Black or   Bed: K453/K453 A  MRN: [id number]  Was the patient discharged from an ICU this admission? No   Was the patient discharged from a PACU within last 24 hours? No   Did the patient receive conscious sedation/general anesthesia in last 24 hours? No   Was the patient in the ED within the past 24 hours? Yes   Was the patient on NIPPV within the past 24 hours? No   Attending Physician: Beck Leiva MD  Primary Service: Internal Medicine   Time spent in chart/at the bedside: < 15 min    SITUATION    Notified by charge RN via phone call  Reason for alert: S/P Seizure; New admit with previous ICU admission    Diagnosis: Acute encephalopathy   has a past medical history of Brain aneurysm, CHF (congestive heart failure), H/O coronary angioplasty, Hypercholesteremia, Hypertension, Malignant hypertension, Migraine headache, and Stroke.    Last Vitals:  Temp: 98.1 °F (36.7 °C) (06/15 1427)  Pulse: 65 (06/15 1557)  Resp: 20 (06/15 1427)  BP: 146/84 (06/15 1427)  SpO2: 99 % (06/15 1427)    24 Hour Vitals Range:  Temp:  [98.1 °F (36.7 °C)-98.6 °F (37 °C)]   Pulse:  [65-78]   Resp:  [16-35]   BP: (117-151)/(70-96)   SpO2:  [95 %-100 %]     Clinical Issues: Neuro    ASSESSMENT/INTERVENTIONS  - Chart reviewed  - Patient s/p seizure this AM; received Ativan at ~ 6 AM  - Called to bedside shortly after patient arrival to [place] [address].  Patient arousable to voice with sustained eye opening.  Attempts to answer orientation questions, but difficult to understand patient.  - Admitting team notified due to concern patient wasn't able to take PO Depakote and Keppra today given lethargy.  Recommended IV administration.  After MD discussion with Neurology, decision made to upgrade patient to ICU for continuous  EEG.    RECOMMENDATIONS  - Transfer to ICU after 1900 when bed available to setup continuous EEG monitoring    Discussed plan of care with charge RN, [place]    PROVIDER ESCALATION    Physician escalation: Yes    Orders received and case discussed with Dr. Issa & Dr. Crespo .  Plan discussed with MD's including Dr. Freeman (Pulm/CC).    Disposition: Transfer to ICU after 1900    FOLLOW UP    Call back the [facility] at [phone] for additional questions or concerns.

## 2024-06-15 NOTE — PHARMACY MED REC
"  [facility]           Pharmacy  Admission Medication History     The home medication history was taken by Antonietta Ashford.      Medication history obtained from Medications listed below were obtained from: [employer] software- [employer]    Based on information gathered for medication list, you may go to "Admission" then "Reconcile Home Medications" tabs to review and/or act upon those items.     The home medication list has been updated by the Pharmacy department.   Please read ALL comments highlighted in yellow.   Please address this information as you see fit.    Feel free to contact us if you have any questions or require assistance.        No current facility-administered medications on file prior to encounter.     Current Outpatient Medications on File Prior to Encounter   Medication Sig Dispense Refill    amLODIPine (NORVASC) 10 MG tablet Take 10 mg by mouth once daily.      atorvastatin (LIPITOR) 80 MG tablet Take 1 tablet (80 mg total) by mouth once daily. 90 tablet 3    carvediloL (COREG) 12.5 MG tablet Take 3 tablets (37.5 mg total) by mouth 2 (two) times daily. 540 tablet 3    divalproex ER (DEPAKOTE ER) 250 MG 24 hr tablet Take 3 tablets (750 mg total) by mouth every 12 (twelve) hours. 180 tablet 11    EScitalopram oxalate (LEXAPRO) 20 MG tablet Take 1 tablet (20 mg total) by mouth once daily. 90 tablet 3    insulin aspart U-100 (NOVOLOG) 100 unit/mL injection Inject 8 Units into the skin 3 (three) times daily with meals.      insulin detemir U-100, Levemir, (LEVEMIR FLEXTOUCH U100 INSULIN) 100 unit/mL (3 mL) InPn pen Inject 24 Units into the skin every evening.      levETIRAcetam (KEPPRA) 750 MG Tab Take 2 tablets (1,500 mg total) by mouth 2 (two) times daily. 120 tablet 11    losartan (COZAAR) 50 MG tablet Take 1 tablet (50 mg total) by mouth once daily. 90 tablet 3    blood sugar diagnostic Strp Use to check blood glucose 3 times daily. 200 each 11    blood-glucose meter Misc Use to " "check blood glucose 3 times daily. 1 each 1    lancets 30 gauge Misc Use to check blood glucose 3 times daily. 200 each 11    metFORMIN (GLUCOPHAGE-XR) 500 MG ER 24hr tablet Take 500 mg by mouth 2 (two) times daily.      methocarbamoL (ROBAXIN) 500 MG Tab Take 500 mg by mouth 2 (two) times daily.      pen needle, diabetic (BD ULTRA-FINE MARIELY PEN NEEDLE) 32 gauge x 5/32" Ndle Use to inject insulin into the skin three times daily . 200 each 11    topiramate (TOPAMAX) 50 MG tablet Take 1 tablet (50 mg total) by mouth 2 (two) times daily. 180 tablet 3       Please address this information as you see fit.  Feel free to contact us if you have any questions or require assistance.    Antonietta Ashford  [phone]                .          "

## 2024-06-15 NOTE — NURSING
On the bed continue to be aroused by voice then goes back to sleep, safety and seizure precaution maintained. Per Md will be transferred to ICU.

## 2024-06-15 NOTE — ED NOTES
Pt presents to ED via EMS for altered mental status. Pt was discharged from facility 3 days ago for a stroke. Pt is complaining of head pain. Pt is alert and oriented to self. EMS stated that the family left the patient at home with a 12 year old and that when they arrived at the pts residence she was laying in urine and stool. EMS was called for AMS and incontinence.

## 2024-06-15 NOTE — CONSULTS
NEUROLOGY FLOOR CONSULT    Reason for consult: Acute encephalopathy    CC:  Confusion x1 day    HPI:     Per chart, Gina Jenkins is a 49 y.o. female who has a PMHX significant for brain aneurysm, coronary angioplasty, HFpEF (EF 55% 6/2024), HTN, HLD, migraine headaches, CVA. The patient presented to [facility] on 6/15/2024 with a primary complaint of acute encephalopathy x1 day.  Patient was found at home by her family confused and surrounded with her faeces and urine. Was brought to the hospital by EMS for eval. On arrival has an episode of seizure and was given 1mg ativan. CTH normal. MRI brain pending. Patient was recently admitted to ICU due to weakness and seizure like activity. EEG done at that time did not show any seizure. She is on Depakote 750mg BID and Keppra 1500mg BID. She also take Topamax 50mg BID. Her Depakote level is wnl, Keppra level pending. Last MRI brain done on 6/8 with no acute infarct but does show  Postoperative changes in the left frontal and temporal calvarium with encephalomalacia in the left temporal pole.  On my evaluation, she does not have any focal deficit, able to move all extremity but she intermittently follow command. Breathing on RA and satting fine.    ROS: As per HPI    Histories:     Allergies:  Lisinopril, Bactrim [sulfamethoxazole-trimethoprim], and Ceftazidime    Current Medications:    Current Facility-Administered Medications   Medication Dose Route Frequency Provider Last Rate Last Admin    acetaminophen tablet 650 mg  650 mg Oral Q4H PRN Niles Zamora MD        amLODIPine tablet 10 mg  10 mg Oral Daily Niles Zamora MD        atorvastatin tablet 80 mg  80 mg Oral Daily Niles Zamora MD        carvediloL tablet 37.5 mg  37.5 mg Oral BID Niles Zamora MD        dextrose 10% bolus 125 mL 125 mL  12.5 g Intravenous PRN Niles Zamora MD        dextrose 10% bolus 250 mL 250 mL  25 g Intravenous PRN Niles Zamora MD        divalproex ER 24  hr tablet 750 mg  750 mg Oral Q12H Niles Zamora MD        enoxaparin injection 40 mg  40 mg Subcutaneous Daily Niles Zamora MD        EScitalopram oxalate tablet 20 mg  20 mg Oral Daily Niles Zamora MD        glucagon (human recombinant) injection 1 mg  1 mg Intramuscular PRN Niles Zamora MD        glucose chewable tablet 16 g  16 g Oral PRN Niles Zamora MD        glucose chewable tablet 24 g  24 g Oral PRN Niles Zamora MD        HYDROmorphone injection 1 mg  1 mg Intravenous Q4H PRN Niles Zamora MD        insulin aspart U-100 pen 0-5 Units  0-5 Units Subcutaneous QID (AC + HS) PRN Niles Zamora MD        levETIRAcetam tablet 1,500 mg  1,500 mg Oral BID Niles Zamora MD        LORazepam injection 1 mg  1 mg Intravenous Q15 Min PRN Niles Zamora MD        naloxone 0.4 mg/mL injection 0.02 mg  0.02 mg Intravenous PRN Niles Zamora MD        polyethylene glycol packet 17 g  17 g Oral Daily PessNiles mojica MD        sodium chloride 0.9% flush 10 mL  10 mL Intravenous Q12H PRN Niles Zamora MD        topiramate tablet 50 mg  50 mg Oral BID Niles Zamora MD         Current Outpatient Medications   Medication Sig Dispense Refill    amLODIPine (NORVASC) 10 MG tablet Take 10 mg by mouth once daily.      atorvastatin (LIPITOR) 80 MG tablet Take 1 tablet (80 mg total) by mouth once daily. 90 tablet 3    carvediloL (COREG) 12.5 MG tablet Take 3 tablets (37.5 mg total) by mouth 2 (two) times daily. 540 tablet 3    divalproex ER (DEPAKOTE ER) 250 MG 24 hr tablet Take 3 tablets (750 mg total) by mouth every 12 (twelve) hours. 180 tablet 11    EScitalopram oxalate (LEXAPRO) 20 MG tablet Take 1 tablet (20 mg total) by mouth once daily. 90 tablet 3    insulin aspart U-100 (NOVOLOG) 100 unit/mL injection Inject 8 Units into the skin 3 (three) times daily with meals.      insulin detemir U-100, Levemir, (LEVEMIR FLEXTOUCH U100 INSULIN) 100 unit/mL (3 mL) InPn pen Inject 24 Units into the skin every  "evening.      levETIRAcetam (KEPPRA) 750 MG Tab Take 2 tablets (1,500 mg total) by mouth 2 (two) times daily. 120 tablet 11    losartan (COZAAR) 50 MG tablet Take 1 tablet (50 mg total) by mouth once daily. 90 tablet 3    blood sugar diagnostic Strp Use to check blood glucose 3 times daily. 200 each 11    blood-glucose meter Misc Use to check blood glucose 3 times daily. 1 each 1    lancets 30 gauge Misc Use to check blood glucose 3 times daily. 200 each 11    metFORMIN (GLUCOPHAGE-XR) 500 MG ER 24hr tablet Take 500 mg by mouth 2 (two) times daily.      methocarbamoL (ROBAXIN) 500 MG Tab Take 500 mg by mouth 2 (two) times daily.      pen needle, diabetic (BD ULTRA-FINE MARIELY PEN NEEDLE) 32 gauge x 5/32" Ndle Use to inject insulin into the skin three times daily . 200 each 11    topiramate (TOPAMAX) 50 MG tablet Take 1 tablet (50 mg total) by mouth 2 (two) times daily. 180 tablet 3       Past Medical/Surgical/Family History:  Medical:   Past Medical History:   Diagnosis Date    Brain aneurysm     CHF (congestive heart failure)     H/O coronary angioplasty     Hypercholesteremia     Hypertension     Malignant hypertension     Migraine headache     Stroke 10/2017      Surgeries:   Past Surgical History:   Procedure Laterality Date    CARDIAC CATHETERIZATION      CEREBRAL ANGIOGRAM      CLIP LIGATION OF INTRACRANIAL ANEURYSM BY CRANIOTOMY N/A 7/15/2022    Procedure: CRANIOTOMY, WITH ANEURYSM CLIPPING;  Surgeon: Timothy Diaz MD;  Location: [facility] OR [address] [place] [address];  Service: Neurosurgery;  Laterality: N/A;  PTERIONAL CRANIOTOMY WITH CLIP LIGATION OF L PCOMM, L ANTERIOR CHOROIDAL, L MCA  ANEURYSM, ANESTHESIA: GENERAL, BLOOD: TYPE&CROSS 2 UNITS, NEUROMONITORING: SEP, MEP, EEG, RADIOLOGY: C-ARM, POSITION: SUPINE, ANNA CO-SURGERON: DR. LEXI DOMÍNGUEZ.    WOUND DEBRIDEMENT  8/27/2022    Procedure: DEBRIDEMENT, WOUND;  Surgeon: Timothy Diaz MD;  Location: [facility] OR [address] [place] [address];  Service: Neurosurgery;;        Current Evaluation: "     Vital Signs:   Vitals:    06/15/24 1203   BP: 139/84   Pulse: 65   Resp: 16   Temp:         Neurological Examination   Orientation  Alert, awake, oriented to self only    Language  Dysarthric   Cranial Nerves  PERRL, VF intact, EOMI, V1-V3 intact, mild right facial droop , hearing grossly intact.  Motor  Normal Bulk  Normal Tone  5/5 strength in 4 extremities  Sensory  Withdraws to pain   DTR   +2 symmetric  Cerebellar/Gait  Deferred    RADIOLOGY STUDIES:  I have personally reviewed the images performed.     HEAD CT: Normal     BRAIN MRI: Pending      Assessment:  Plan:   49 y.o. female who has a PMHX significant for brain aneurysm, coronary angioplasty, HFpEF (EF 55% 6/2024), HTN, HLD, migraine headaches, CVA. The patient presented to [facility] on 6/15/2024 with a primary complaint of acute encephalopathy x1 day.  Patient was found at home by her family confused and surrounded with her faeces and urine. Was brought to the hospital by EMS for eval. On arrival has an episode of seizure and was given 1mg ativan. 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 No definite evidence of residual recurrent aneurysm. . labs unremarkable so far including normal CPK. AED level pending. Last EEG done on 6/9 with no seizure activity. 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  at this point, patient might benefit from Psych evaluation as well. If in any case patient develop fever, will recommend LP to rule out viral infections    Plan  Acute encephalopathy  -Presented to the hospital altered and covered in feaces and urine  -CTH normal, MRI brain pending   -Depakote level normal, Keppra level pending  -On Topamax 50mg for migraine   -Labs: B.12, folate level pending  -UA pending  -Last EEG with no seizure activity  -Given altered mental status and intermittently following command, suspect patient might still have  subclinical seizures, will recommend cEEG   -Can give 2mg ativan for seizure >5mins or back to back seizure with no return to baseline   -Consider LP if patient spikes fever to r/o meningitis     Case discussed with Dr. Roge Waldrop MD  [employer] [facility] PGY-4  [facility] [facility] Consult Service

## 2024-06-15 NOTE — PLAN OF CARE
06/15/24 1642   Admission   Initial VN Admission Questions Incomplete  (Patient currently arouses to voice and fall back to sleep)   Communication Issues?   (current mental status)   Shift   Virtual Nurse - Rounding Incomplete   Pain Management Interventions quiet environment facilitated;relaxation techniques promoted   Virtual Nurse - Patient Verbalized Approval Of Other (See Comments)  (currently unable)     Patient currently only arouses to voice and falls back to sleep. No family or visitors at bedside. Some admission questions answered from most recent admissions and current chart. Unable to complete all admission questions due to patient's ability to participate.

## 2024-06-15 NOTE — H&P
[facility] Medicine H&P Note     Admitting Team: [facility] Hospitalist Team B  Attending Physician: Hipolito Armenta MD  Resident: Luis Manuel  Intern: Zak     Date of Admit: 6/15/2024    Chief Complaint     Acute encephalopathy for 1 day.     Subjective:      History of Present Illness:  Gina Jenkins is a 49 y.o. female who has a PMHX significant for brain aneurysm, coronary angioplasty, HFpEF (EF 55% 6/2024), HTN, HLD, migraine headaches, CVA. The patient presented to [facility] on 6/15/2024 with a primary complaint of acute encephalopathy x1 day.     The patient was in their usual state of health until earlier today when she was left with her 11 yo son at home. When the family returned home, she was found down in her own urine and unresponsive. She was brought into the ED for further evaluation. She was able to respond to commands and stated her name in the ED. Shortly thereafter she had a seizure and was given 1 g of ativan in the ED. She was unable to provide any history, but noted that she was not in any pain. Called numbers listed in the chart for collateral without any responses.     [place] [facility] [place] for a continuous EEG. 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     Past Medical History:  Past Medical History:   Diagnosis Date    Brain aneurysm     CHF (congestive heart failure)     H/O coronary angioplasty     Hypercholesteremia     Hypertension     Malignant hypertension     Migraine headache     Stroke 10/2017     Past Surgical History:  Past Surgical History:   Procedure Laterality Date    CARDIAC CATHETERIZATION      CEREBRAL ANGIOGRAM      CLIP LIGATION OF INTRACRANIAL ANEURYSM BY CRANIOTOMY N/A 7/15/2022    Procedure: CRANIOTOMY, WITH ANEURYSM CLIPPING;  Surgeon: Timothy Diaz MD;  Location: [facility] OR [address] [place] [address];  Service: Neurosurgery;  Laterality: N/A;  PTERIONAL CRANIOTOMY WITH CLIP LIGATION OF L PCOMM, L ANTERIOR CHOROIDAL, L MCA  ANEURYSM,  ANESTHESIA: GENERAL, BLOOD: TYPE&CROSS 2 UNITS, NEUROMONITORING: SEP, MEP, EEG, RADIOLOGY: C-ARM, POSITION: SUPINE, ANNA, CO-SURGERON: DR. LEXI DOMÍNGUEZ.    WOUND DEBRIDEMENT  8/27/2022    Procedure: DEBRIDEMENT, WOUND;  Surgeon: Timothy Diaz MD;  Location: [facility] OR [address] [place] [address];  Service: Neurosurgery;;     Allergies:  Review of patient's allergies indicates:   Allergen Reactions    Lisinopril Swelling    Bactrim [sulfamethoxazole-trimethoprim] Rash    Ceftazidime Hives     Rash while on IV ceftazidime for planned 6 weeks.  See ED notes 9/12, 9/14 and ID clinic notes 9/16 and 9/28     Home Medications:  Prior to Admission medications    Medication Sig Start Date End Date Taking? Authorizing Provider   amlodipine (NORVASC) 10 MG tablet Take 1 tablet (10 mg total) by mouth once daily. 4/2/17 11/20/23  Yuval Caldwell MD   atorvastatin (LIPITOR) 80 MG tablet Take 1 tablet (80 mg total) by mouth once daily. 6/13/24 6/13/25  Michael Issa MD   blood sugar diagnostic Strp Use to check blood glucose 3 times daily. 9/2/22   Julio Lewis NP   blood-glucose meter Misc Use to check blood glucose 3 times daily. 9/2/22   Julio Lewis NP   carvediloL (COREG) 12.5 MG tablet Take 3 tablets (37.5 mg total) by mouth 2 (two) times daily. 6/12/24 6/12/25  Michael Issa MD   divalproex ER (DEPAKOTE ER) 250 MG 24 hr tablet Take 3 tablets (750 mg total) by mouth every 12 (twelve) hours. 6/12/24 6/12/25  Michael Issa MD   EScitalopram oxalate (LEXAPRO) 20 MG tablet Take 1 tablet (20 mg total) by mouth once daily. 6/13/24 6/13/25  Michael Issa MD   insulin aspart U-100 (NOVOLOG) 100 unit/mL (3 mL) InPn pen Inject 8 Units into the skin 3 (three) times daily with meals. 9/2/22 9/2/23  Julio Lewis NP   insulin detemir U-100 (LEVEMIR FLEXTOUCH) 100 unit/mL (3 mL) SubQ InPn pen Inject 24 Units into the skin once daily. 9/3/22 9/3/23  Julio Lewis, RUSSELL   lancets 30 gauge Misc Use to check blood glucose 3 times daily. 9/2/22    "Julio Lewis NP   levETIRAcetam (KEPPRA) 750 MG Tab Take 2 tablets (1,500 mg total) by mouth 2 (two) times daily. 24  Michael Issa MD   losartan (COZAAR) 50 MG tablet Take 1 tablet (50 mg total) by mouth once daily. 24  Michael Issa MD   metFORMIN (GLUCOPHAGE-XR) 500 MG ER 24hr tablet Take 500 mg by mouth 2 (two) times daily. 2/3/23   Provider, Historical   methocarbamoL (ROBAXIN) 500 MG Tab Take 500 mg by mouth 2 (two) times daily. 23   Provider, Historical   pen needle, diabetic (BD ULTRA-FINE MARIELY PEN NEEDLE) 32 gauge x 532" Ndle Use to inject insulin into the skin three times daily . 22   Julio Lewis NP   topiramate (TOPAMAX) 50 MG tablet Take 1 tablet (50 mg total) by mouth 2 (two) times daily. 23   Kamille Zurita MD PhD   aspirin 81 MG Chew Take 1 tablet (81 mg total) by mouth once daily. 4/10/18 7/20/22  William Rosales MD     Family History:  No family history on file.    Social History:  Social History     Tobacco Use    Smoking status: Every Day     Current packs/day: 0.33     Average packs/day: 0.3 packs/day for 31.5 years (10.4 ttl pk-yrs)     Types: Cigarettes     Start date:     Smokeless tobacco: Never    Tobacco comments:     Enrolled in the [employer] on 22 ([facility] Member ID # [id number]). Pt is a 0.33 pk/day cigarette smoker x 31 yrs. She states ready to quit smoking. Ambulatory referral to Smoking Cessation clinic following hospital discharge.    Substance Use Topics    Alcohol use: Yes     Comment: occassionally    Drug use: No     Review of Systems:  Pertinent items are noted in HPI. All other systems are reviewed and are negative.    Health Maintaince :   Primary Care Physician: Clair Johnson NP    Immunizations:   TDap - not utd   Flu - not utd   Pna - n/a    Cancer Screening:  PAP: not utd  MMG: not utd  Colonoscopy: not utd      Objective:   Last 24 Hour Vital Signs:  BP  Min: 144/92  Max: 144/92  Temp  Av.6 °F (37 " "°C)  Min: 98.6 °F (37 °C)  Max: 98.6 °F (37 °C)  Pulse  Av  Min: 78  Max: 78  Resp  Av  Min: 18  Max: 18  SpO2  Av %  Min: 98 %  Max: 98 %  Height  Av' 2" (157.5 cm)  Min: 5' 2" (157.5 cm)  Max: 5' 2" (157.5 cm)  Weight  Av.8 kg (165 lb)  Min: 74.8 kg (165 lb)  Max: 74.8 kg (165 lb)  Body mass index is 30.18 kg/m².  No intake/output data recorded.    Physical Examination:  General: awake, smacking lips, moving all 4 extremities spontaneously, said she was not in pain but otherwise not speaking   HEENT: normocephalic, atraumatic, PERRL, EOMI  Neck: No thyromegaly or masses   Cardiac: RRR, no murmurs appreciated, no extra heart sounds  Pulmonary/Chest: CTAB, symmetric chest rise, equal breath sounds bilaterally   GI: Soft, non tender, non distended, normoactive bowel sounds  Extremities: no edema or cyanosis  Skin: dry, warm, no wounds noted  Neuro: moving all four extremities spontaneously, pupils pinpoint s/p ativan, smacking lips w/o facial droop, postictal and post ativan     Laboratory:  Most Recent Data:  CBC:   Lab Results   Component Value Date    WBC 7.42 06/15/2024    HGB 14.5 06/15/2024    HCT 42.4 06/15/2024     06/15/2024    MCV 88 06/15/2024    RDW 13.0 06/15/2024     BMP:   Lab Results   Component Value Date     06/15/2024    K 4.3 06/15/2024     06/15/2024    CO2 18 (L) 06/15/2024    BUN 25 (H) 06/15/2024    CREATININE 2.1 (H) 06/15/2024    GLU 92 06/15/2024    CALCIUM 9.8 06/15/2024    MG 2.0 06/10/2024    PHOS 3.4 06/10/2024     LFTs:   Lab Results   Component Value Date    PROT 7.9 06/15/2024    ALBUMIN 3.6 06/15/2024    BILITOT 0.2 06/15/2024    AST 27 06/15/2024    ALKPHOS 162 (H) 06/15/2024    ALT 33 06/15/2024     Coags:   Lab Results   Component Value Date    INR 0.9 2024     FLP:   Lab Results   Component Value Date    CHOL 241 (H) 2024    HDL 43 2024    LDLCALC 127.8 2024    TRIG 351 (H) 2024    CHOLHDL 17.8 (L) " 06/08/2024     DM:   Lab Results   Component Value Date    HGBA1C 8.3 (H) 08/22/2022    HGBA1C 5.8 (H) 07/16/2022    HGBA1C 5.4 06/30/2020    LDLCALC 127.8 06/08/2024    CREATININE 2.1 (H) 06/15/2024     Thyroid:   Lab Results   Component Value Date    TSH 1.570 06/07/2024    FREET4 0.89 06/30/2020    A2HFXUO 7.1 11/02/2017     Anemia:   Lab Results   Component Value Date    IRON 52 06/30/2020    TIBC 314 06/30/2020    FERRITIN 63 06/30/2020    SGVFHCNZ91 440 06/09/2024    FOLATE 12.9 04/09/2018     Cardiac:   Lab Results   Component Value Date    TROPONINI 0.008 06/15/2024     (H) 03/06/2017     Urinalysis:   Lab Results   Component Value Date    LABURIN  06/07/2024     Multiple organisms isolated. None in predominance.  Repeat if    LABURIN clinically necessary. 06/07/2024    COLORU Yellow 06/07/2024    SPECGRAV 1.015 06/07/2024    NITRITE Negative 06/07/2024    KETONESU Negative 06/07/2024    UROBILINOGEN Negative 06/07/2024    WBCUA 35 (H) 06/07/2024       Trended Lab Data:  Recent Labs   Lab 06/11/24  0817 06/12/24  0439 06/15/24  0451   WBC 6.10 5.05 7.42   HGB 12.2 11.6* 14.5   HCT 35.7* 33.7* 42.4    309 348   MCV 87 88 88   RDW 13.2 13.2 13.0    140 141   K 4.0 3.7 4.3    112* 110   CO2 20* 18* 18*   BUN 19 15 25*   CREATININE 1.6* 1.6* 2.1*   GLU 91 110 92   PROT 7.3 6.6 7.9   ALBUMIN 3.5 3.1* 3.6   BILITOT 0.2 0.1 0.2   AST 27 45* 27   ALKPHOS 161* 153* 162*   ALT 24 30 33       Trended Cardiac Data:  Recent Labs   Lab 06/15/24  0451   TROPONINI 0.008       Microbiology Data:  Microbiology Results (last 7 days)       ** No results found for the last 168 hours. **          Other Results:  EKG (my interpretation): none.    Radiology:  Imaging Results              CTA Brain (In process)  Result time 06/15/24 05:20:56                     CT Head Without Contrast (Final result)  Result time 06/15/24 05:41:55      Final result by Faheem Okeefe MD (06/15/24 05:41:55)                    Impression:      Chronic and postoperative changes are noted, there is no evidence for superimposed acute intracranial process.      Electronically signed by: Faheem Okeefe  Date:    06/15/2024  Time:    05:41               Narrative:    EXAMINATION:  CT HEAD WITHOUT CONTRAST    CLINICAL HISTORY:  Mental status change, unknown cause;    TECHNIQUE:  Low dose axial images were obtained through the head.  Coronal and sagittal reformations were also performed. Contrast was not administered.    COMPARISON:  Prior examinations, most recent of which is June 7, 2024    FINDINGS:  There is artifact present, in addition positioning is less than optimal, these factors diminish the sensitivity of the examination.    Postoperative calvarial change on the left is noted, underlying the postoperative location, there is appearance of minimal thin extra-axial appearing density overlying the left cerebral hemisphere, however this appears stable on prior examinations and is thought likely representative of chronic mild dural thickening.  Intracranial aneurysm clip at the medial aspect of the middle cranial fossa is noted.    There is no additional evidence for intracranial mass, mass effect or midline shift or acute intracranial hemorrhage.  Gray-white differentiation appears appropriate.  There is no hydrocephalus.  Appropriate CSF spaces are seen at the skull base.    The osseous structures demonstrate chronic and postoperative change.  The mastoid air cells appear well aerated.  There is limited visualization of the paranasal sinuses.  The sphenoid sinus appears appropriate.                                    Assessment:     Gina Jenkins is a 49 y.o. 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 The patient presented to [facility] on 6/15/2024  with a primary complaint of acute encephalopathy x1 day. Admitted to [facility] for further workup of acute encephalopathy and COOPER.      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 s/p craniotomy for clipping (7/2022)  Seizure disorder  Metabolic acidosis  Pt w/ recent admission for generalized weakness, slurred speech, facial droop, ataxic gate that was more concerning for non-epileptiform seizures   Found down in urine after being left home with 13 yo   CTH unremarkable, CTAH pending but appears largely unchanged on my read    Depakote, keppra levels pending   CPK, lactic unremarkable  Consider keppra, depakote load pending level collected in ED   Continue keppra 1.5 g bid and depakote 750 mg bid   Ativan for seizures lasting > 5 minutes   Spot EEG ordered, consider continuous EEG   Neurology consult in AM   Consulted PT/OT  Trend CMP, Mg, Phos inpatient and replete lytes prn      Migraine headaches   Continue topiramate 50 mg bid  Multimodal pain control with tylenol, oxycodone   Consider transition to fioricet, though pt preferring Tylenol currently     COOPER on CKD3b  Baseline Cr ~1.6 during last hospitalization  Cr increased to 2.1 on admit   Repeat CMP after NS given in ED     HTN  HLD   Hx coronary angioplasty   Cholesterol, TG elevated on 6/8 lipid panel   Continue atorvastatin 80 daily   Continue home amlodipine 10 mg daily   Continue home carvedilol 37.5 mg bid  Continue home losartan 50 mg daily      Elevated alk phos  Trend CMPs while inpatient      Anxiety, depression  C/f functional neuro deficits   Continue home lexapro to 20 mg daily  Psych consulted during last admission, consider reconsult     Code Status:     FULL Code     Niles Zamora MD, MPH  [facility] Medicine-Pediatrics, PGY-2  06/15/2024    [facility] Medicine Hospitalist Pager numbers:   [facility] Hospitalist Medicine Team A (Joshua/Sky): [phone]  [facility] Hospitalist Medicine Team B (Chey/Cali):   [phone]

## 2024-06-16 LAB
ALBUMIN SERPL BCP-MCNC: 3.5 G/DL (ref 3.5–5.2)
ALP SERPL-CCNC: 159 U/L (ref 55–135)
ALT SERPL W/O P-5'-P-CCNC: 25 U/L (ref 10–44)
ANION GAP SERPL CALC-SCNC: 14 MMOL/L (ref 8–16)
AST SERPL-CCNC: 21 U/L (ref 10–40)
BACTERIA UR CULT: NO GROWTH
BASOPHILS # BLD AUTO: 0.05 K/UL (ref 0–0.2)
BASOPHILS NFR BLD: 0.7 % (ref 0–1.9)
BILIRUB SERPL-MCNC: 0.3 MG/DL (ref 0.1–1)
BUN SERPL-MCNC: 28 MG/DL (ref 6–20)
CALCIUM SERPL-MCNC: 9.3 MG/DL (ref 8.7–10.5)
CHLORIDE SERPL-SCNC: 112 MMOL/L (ref 95–110)
CO2 SERPL-SCNC: 16 MMOL/L (ref 23–29)
CREAT SERPL-MCNC: 1.9 MG/DL (ref 0.5–1.4)
DIFFERENTIAL METHOD BLD: NORMAL
EOSINOPHIL # BLD AUTO: 0.2 K/UL (ref 0–0.5)
EOSINOPHIL NFR BLD: 3.3 % (ref 0–8)
ERYTHROCYTE [DISTWIDTH] IN BLOOD BY AUTOMATED COUNT: 13.1 % (ref 11.5–14.5)
EST. GFR  (NO RACE VARIABLE): 32 ML/MIN/1.73 M^2
GLUCOSE SERPL-MCNC: 77 MG/DL (ref 70–110)
HCT VFR BLD AUTO: 37.5 % (ref 37–48.5)
HGB BLD-MCNC: 12.5 G/DL (ref 12–16)
IMM GRANULOCYTES # BLD AUTO: 0.02 K/UL (ref 0–0.04)
IMM GRANULOCYTES NFR BLD AUTO: 0.3 % (ref 0–0.5)
LYMPHOCYTES # BLD AUTO: 1.4 K/UL (ref 1–4.8)
LYMPHOCYTES NFR BLD: 20.8 % (ref 18–48)
MAGNESIUM SERPL-MCNC: 2.2 MG/DL (ref 1.6–2.6)
MCH RBC QN AUTO: 29.7 PG (ref 27–31)
MCHC RBC AUTO-ENTMCNC: 33.3 G/DL (ref 32–36)
MCV RBC AUTO: 89 FL (ref 82–98)
MONOCYTES # BLD AUTO: 0.5 K/UL (ref 0.3–1)
MONOCYTES NFR BLD: 8.1 % (ref 4–15)
NEUTROPHILS # BLD AUTO: 4.5 K/UL (ref 1.8–7.7)
NEUTROPHILS NFR BLD: 66.8 % (ref 38–73)
NRBC BLD-RTO: 0 /100 WBC
PHOSPHATE SERPL-MCNC: 4.3 MG/DL (ref 2.7–4.5)
PLATELET # BLD AUTO: 309 K/UL (ref 150–450)
PMV BLD AUTO: 10.1 FL (ref 9.2–12.9)
POCT GLUCOSE: 123 MG/DL (ref 70–110)
POCT GLUCOSE: 73 MG/DL (ref 70–110)
POCT GLUCOSE: 75 MG/DL (ref 70–110)
POCT GLUCOSE: 77 MG/DL (ref 70–110)
POCT GLUCOSE: 80 MG/DL (ref 70–110)
POCT GLUCOSE: 82 MG/DL (ref 70–110)
POTASSIUM SERPL-SCNC: 3.6 MMOL/L (ref 3.5–5.1)
PROT SERPL-MCNC: 7.3 G/DL (ref 6–8.4)
RBC # BLD AUTO: 4.21 M/UL (ref 4–5.4)
SODIUM SERPL-SCNC: 142 MMOL/L (ref 136–145)
WBC # BLD AUTO: 6.69 K/UL (ref 3.9–12.7)

## 2024-06-16 PROCEDURE — 25000003 PHARM REV CODE 250: Performed by: INTERNAL MEDICINE

## 2024-06-16 PROCEDURE — 36415 COLL VENOUS BLD VENIPUNCTURE: CPT

## 2024-06-16 PROCEDURE — 80053 COMPREHEN METABOLIC PANEL: CPT

## 2024-06-16 PROCEDURE — 94761 N-INVAS EAR/PLS OXIMETRY MLT: CPT

## 2024-06-16 PROCEDURE — 83735 ASSAY OF MAGNESIUM: CPT

## 2024-06-16 PROCEDURE — 63600175 PHARM REV CODE 636 W HCPCS

## 2024-06-16 PROCEDURE — 99900035 HC TECH TIME PER 15 MIN (STAT)

## 2024-06-16 PROCEDURE — 99232 SBSQ HOSP IP/OBS MODERATE 35: CPT | Mod: ,,, | Performed by: PSYCHIATRY & NEUROLOGY

## 2024-06-16 PROCEDURE — 51798 US URINE CAPACITY MEASURE: CPT

## 2024-06-16 PROCEDURE — 25000003 PHARM REV CODE 250

## 2024-06-16 PROCEDURE — 84100 ASSAY OF PHOSPHORUS: CPT

## 2024-06-16 PROCEDURE — 85025 COMPLETE CBC W/AUTO DIFF WBC: CPT

## 2024-06-16 PROCEDURE — 21400001 HC TELEMETRY ROOM

## 2024-06-16 RX ORDER — ENOXAPARIN SODIUM 100 MG/ML
30 INJECTION SUBCUTANEOUS EVERY 24 HOURS
Status: DISCONTINUED | OUTPATIENT
Start: 2024-06-16 | End: 2024-06-17

## 2024-06-16 RX ORDER — SODIUM CHLORIDE 450 MG/100ML
INJECTION, SOLUTION INTRAVENOUS CONTINUOUS
Status: DISCONTINUED | OUTPATIENT
Start: 2024-06-16 | End: 2024-06-16

## 2024-06-16 RX ORDER — SODIUM CHLORIDE, SODIUM LACTATE, POTASSIUM CHLORIDE, CALCIUM CHLORIDE 600; 310; 30; 20 MG/100ML; MG/100ML; MG/100ML; MG/100ML
INJECTION, SOLUTION INTRAVENOUS CONTINUOUS
Status: DISCONTINUED | OUTPATIENT
Start: 2024-06-16 | End: 2024-06-16

## 2024-06-16 RX ORDER — INSULIN ASPART 100 [IU]/ML
0-5 INJECTION, SOLUTION INTRAVENOUS; SUBCUTANEOUS EVERY 4 HOURS
Status: DISCONTINUED | OUTPATIENT
Start: 2024-06-16 | End: 2024-06-16

## 2024-06-16 RX ORDER — INSULIN ASPART 100 [IU]/ML
0-5 INJECTION, SOLUTION INTRAVENOUS; SUBCUTANEOUS EVERY 4 HOURS PRN
Status: DISCONTINUED | OUTPATIENT
Start: 2024-06-16 | End: 2024-06-17 | Stop reason: HOSPADM

## 2024-06-16 RX ORDER — MUPIROCIN 20 MG/G
OINTMENT TOPICAL 2 TIMES DAILY
Status: DISCONTINUED | OUTPATIENT
Start: 2024-06-16 | End: 2024-06-17 | Stop reason: HOSPADM

## 2024-06-16 RX ADMIN — MUPIROCIN: 20 OINTMENT TOPICAL at 11:06

## 2024-06-16 RX ADMIN — MUPIROCIN: 20 OINTMENT TOPICAL at 08:06

## 2024-06-16 RX ADMIN — LEVETIRACETAM INJECTION 1500 MG: 15 INJECTION INTRAVENOUS at 09:06

## 2024-06-16 RX ADMIN — TOPIRAMATE 50 MG: 25 TABLET, FILM COATED ORAL at 08:06

## 2024-06-16 RX ADMIN — DEXTROSE MONOHYDRATE 750 MG: 50 INJECTION, SOLUTION INTRAVENOUS at 06:06

## 2024-06-16 RX ADMIN — CARVEDILOL 37.5 MG: 25 TABLET, FILM COATED ORAL at 08:06

## 2024-06-16 RX ADMIN — ENOXAPARIN SODIUM 30 MG: 30 INJECTION SUBCUTANEOUS at 05:06

## 2024-06-16 RX ADMIN — SODIUM CHLORIDE: 4.5 INJECTION, SOLUTION INTRAVENOUS at 12:06

## 2024-06-16 RX ADMIN — LEVETIRACETAM INJECTION 1500 MG: 15 INJECTION INTRAVENOUS at 08:06

## 2024-06-16 RX ADMIN — SODIUM CHLORIDE, POTASSIUM CHLORIDE, SODIUM LACTATE AND CALCIUM CHLORIDE: 600; 310; 30; 20 INJECTION, SOLUTION INTRAVENOUS at 08:06

## 2024-06-16 NOTE — CONSULTS
[facility] Medicine Note    Primary Attending Physician: Beck Leiva MD  ICU Attending: Federico Forrester MD  ICU Fellow: Marlo Freeman MD    Reason for Consult:     Acute encephalopathy, continuous EEG    Subjective:      History of Present Illness:  Gina Jenkins is a 49 y.o. female with a past medical history of HTN, HLD, stroke, HFpEF, brain aneurysm s/p clipping c/b abscess, and seizure disorder, who presented on 6/15/2024 for acute encephalopathy for one day.     The patient was in their usual state of health until day of presentation, when she was left with her 12 year old son at home. When the rest of her family returned home, she was found down in her own urine and unresponsive. She was brought into the ED for further evaluation. She was able to respond to commands and stated her name in the ED. Spot EEG at that time not consistent with seizure. Shortly thereafter she had a seizure and was given 2mg of ativan and her home seizure meds. She was unable to provide any history, but noted that she was not in any pain. Patient was subsequently stepped up to the ICU for continuous EEG.     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  Seizure disorder  Stroke     Past Surgical History:  Past Surgical History:   Procedure Laterality Date    CARDIAC CATHETERIZATION      CEREBRAL ANGIOGRAM      CLIP LIGATION OF INTRACRANIAL ANEURYSM BY CRANIOTOMY N/A 7/15/2022    Procedure: CRANIOTOMY, WITH ANEURYSM CLIPPING;  Surgeon: Timothy Diaz MD;  Location: [facility] OR [address] [place] [address];  Service: Neurosurgery;  Laterality: N/A;  PTERIONAL CRANIOTOMY WITH CLIP LIGATION OF L PCOMM, L ANTERIOR CHOROIDAL, L MCA  ANEURYSM, ANESTHESIA: GENERAL, BLOOD: TYPE&CROSS 2 UNITS, NEUROMONITORING: SEP, MEP, EEG, RADIOLOGY: C-ARM, POSITION: SUPINE, ANNA CO-SURGERON: DR. LEXI DOMÍNGUEZ.    WOUND DEBRIDEMENT   8/27/2022    Procedure: DEBRIDEMENT, WOUND;  Surgeon: Timothy Diaz MD;  Location: [facility] OR [address] [place] [address];  Service: Neurosurgery;;       Allergies:  Review of patient's allergies indicates:   Allergen Reactions    Lisinopril Swelling    Bactrim [sulfamethoxazole-trimethoprim] Rash    Ceftazidime Hives     Rash while on IV ceftazidime for planned 6 weeks.  See ED notes 9/12, 9/14 and ID clinic notes 9/16 and 9/28       Medications:   In-Hospital Scheduled Medications:   amLODIPine  10 mg Oral Daily    atorvastatin  80 mg Oral Daily    carvediloL  37.5 mg Oral BID    enoxparin  40 mg Subcutaneous Daily    EScitalopram oxalate  20 mg Oral Daily    levETIRAcetam (Keppra) IV (PEDS and ADULTS)  1,500 mg Intravenous Q12H    polyethylene glycol  17 g Oral Daily    topiramate  50 mg Oral BID    valproate sodium (DEPACON) IVPB  750 mg Intravenous Q12H      In-Hospital PRN Medications:    Current Facility-Administered Medications:     acetaminophen, 650 mg, Oral, Q4H PRN    dextrose 10%, 12.5 g, Intravenous, PRN    dextrose 10%, 25 g, Intravenous, PRN    glucagon (human recombinant), 1 mg, Intramuscular, PRN    glucose, 16 g, Oral, PRN    glucose, 24 g, Oral, PRN    HYDROmorphone, 1 mg, Intravenous, Q4H PRN    insulin aspart U-100, 0-5 Units, Subcutaneous, Q4H PRN    lorazepam, 1 mg, Intravenous, Q15 Min PRN    naloxone, 0.02 mg, Intravenous, PRN    sodium chloride 0.9%, 10 mL, Intravenous, Q12H PRN   In-Hospital IV Infusion Medications:   sodium chloride 0.45%   Intravenous Continuous 100 mL/hr at 06/16/24 0700 Rate Verify at 06/16/24 0700      Home Medications:  Prior to Admission medications    Medication Sig Start Date End Date Taking? Authorizing Provider   amLODIPine (NORVASC) 10 MG tablet Take 10 mg by mouth once daily.   Yes Provider, Historical   atorvastatin (LIPITOR) 80 MG tablet Take 1 tablet (80 mg total) by mouth once daily. 6/13/24 6/13/25 Yes Michael Issa MD   carvediloL (COREG) 12.5 MG tablet Take 3  "tablets (37.5 mg total) by mouth 2 (two) times daily. 6/12/24 6/12/25 Yes Michael Issa MD   divalproex ER (DEPAKOTE ER) 250 MG 24 hr tablet Take 3 tablets (750 mg total) by mouth every 12 (twelve) hours. 6/12/24 6/12/25 Yes Michael Issa MD   EScitalopram oxalate (LEXAPRO) 20 MG tablet Take 1 tablet (20 mg total) by mouth once daily. 6/13/24 6/13/25 Yes Michael Issa MD   insulin aspart U-100 (NOVOLOG) 100 unit/mL injection Inject 8 Units into the skin 3 (three) times daily with meals.   Yes Provider, Historical   insulin detemir U-100, Levemir, (LEVEMIR FLEXTOUCH U100 INSULIN) 100 unit/mL (3 mL) InPn pen Inject 24 Units into the skin every evening.   Yes Provider, Historical   levETIRAcetam (KEPPRA) 750 MG Tab Take 2 tablets (1,500 mg total) by mouth 2 (two) times daily. 6/12/24 6/12/25 Yes Michael Issa MD   losartan (COZAAR) 50 MG tablet Take 1 tablet (50 mg total) by mouth once daily. 6/13/24 6/13/25 Yes Michael Issa MD   blood sugar diagnostic Strp Use to check blood glucose 3 times daily. 9/2/22   Julio Lewis NP   blood-glucose meter Misc Use to check blood glucose 3 times daily. 9/2/22   Julio Lewis NP   lancets 30 gauge Misc Use to check blood glucose 3 times daily. 9/2/22   Julio Lewis NP   metFORMIN (GLUCOPHAGE-XR) 500 MG ER 24hr tablet Take 500 mg by mouth 2 (two) times daily. 2/3/23   Provider, Historical   methocarbamoL (ROBAXIN) 500 MG Tab Take 500 mg by mouth 2 (two) times daily. 9/21/23   Provider, Historical   pen needle, diabetic (BD ULTRA-FINE MARIELY PEN NEEDLE) 32 gauge x 5/32" Ndle Use to inject insulin into the skin three times daily . 9/2/22   Julio Lewis NP   topiramate (TOPAMAX) 50 MG tablet Take 1 tablet (50 mg total) by mouth 2 (two) times daily. 2/7/23   Kamille Zurita MD PhD   aspirin 81 MG Chew Take 1 tablet (81 mg total) by mouth once daily. 4/10/18 7/20/22  William Rosales MD       Family History:  No family history on file.    Social History:  Social " "History     Tobacco Use    Smoking status: Every Day     Current packs/day: 0.33     Average packs/day: 0.3 packs/day for 31.5 years (10.4 ttl pk-yrs)     Types: Cigarettes     Start date:     Smokeless tobacco: Never    Tobacco comments:     Enrolled in the [employer] on 22 ([facility] Member ID # [id number]). Pt is a 0.33 pk/day cigarette smoker x 31 yrs. She states ready to quit smoking. Ambulatory referral to Smoking Cessation clinic following hospital discharge.    Substance Use Topics    Alcohol use: Yes     Comment: occassionally    Drug use: No       Review of Systems:  All other systems are reviewed and are negative, except as stated in above HPI.      Objective:   Last 24 Hour Vital Signs:  BP  Min: 117/71  Max: 159/90  Temp  Av.9 °F (36.6 °C)  Min: 97.1 °F (36.2 °C)  Max: 98.6 °F (37 °C)  Pulse  Av.4  Min: 64  Max: 78  Resp  Av.2  Min: 15  Max: 35  SpO2  Av.9 %  Min: 93 %  Max: 100 %  Height  Av' 3" (160 cm)  Min: 5' 3" (160 cm)  Max: 5' 3" (160 cm)  Weight  Av.8 kg (164 lb 14.5 oz)  Min: 74.8 kg (164 lb 14.5 oz)  Max: 74.8 kg (164 lb 14.5 oz)  I/O last 3 completed shifts:  In: 1853.9 [I.V.:593.5; IV Piggyback:1260.5]  Out: -     Physical Examination:  Gen: Lying comfortably on bed in NAD  HEENT: cEEG in place, no trauma noted to head, EOMI, PERRL  Cardio: Regular rate, no murmurs appreciated  Pulm: CTAB, no wheezing appreciated, no increased WOB, satting appropriately on RA  Abdom: Normal bowel sounds, non-tender, non-distended  MSK: Well-perfused and warm, radial pulses intact, no BLE edema  Derm: No open lesions/wounds, dry  Neuro: AAOx0, intermittently follows commands, unable to assess strength or sensation 2/2 acute encephalopathy, moves extremities spontaneously     Laboratory:  Trended Lab Data:  Recent Labs     06/15/24  0451 24  0308   WBC 7.42 6.69   HGB 14.5 12.5   HCT 42.4 37.5    309    142   K 4.3 3.6    112*   CO2 18* 16*   BUN 25* " 28*   CREATININE 2.1* 1.9*   GLU 92 77   BILITOT 0.2 0.3   AST 27 21   ALT 33 25   ALKPHOS 162* 159*   CALCIUM 9.8 9.3   ALBUMIN 3.6 3.5   PROT 7.9 7.3   MG  --  2.2   PHOS  --  4.3       Cardiac:   Recent Labs   Lab 06/15/24  0451   TROPONINI 0.008       Urinalysis:   Lab Results   Component Value Date    LABURIN  06/07/2024     Multiple organisms isolated. None in predominance.  Repeat if    LABURIN clinically necessary. 06/07/2024    COLORU Yellow 06/15/2024    SPECGRAV 1.025 06/15/2024    NITRITE Negative 06/15/2024    KETONESU Negative 06/15/2024    UROBILINOGEN Negative 06/15/2024       Microbiology:  Microbiology Results (last 7 days)       Procedure Component Value Units Date/Time    Urine culture [[id number]] Collected: 06/15/24 0806    Order Status: No result Specimen: Urine Updated: 06/15/24 0822            Radiology:  CTA Brain   Final Result      1. No definite acute intracranial findings by CT.   2. No evidence of acute large vessel occlusion or flow-limiting stenosis.   3. Redemonstrated sequela of prior clip in the left ICA aneurysms, associated with posterior communicating artery and anterior choroidal artery origins.  No definite evidence of residual recurrent aneurysm.   4. When compared to prior CTA and MRA of 11/14/2023, as well as CTA performed 06/07/2024, no significant change in appearance of the untreated left M2 MCA aneurysm.   5. Additional details as above.         Electronically signed by: William Putnam   Date:    06/15/2024   Time:    07:22      CTA Neck   Final Result      1. No definite acute intracranial findings by CT.   2. No evidence of acute large vessel occlusion or flow-limiting stenosis.   3. Redemonstrated sequela of prior clip in the left ICA aneurysms, associated with posterior communicating artery and anterior choroidal artery origins.  No definite evidence of residual recurrent aneurysm.   4. When compared to prior CTA and MRA of 11/14/2023, as well as CTA performed  06/07/2024, no significant change in appearance of the untreated left M2 MCA aneurysm.   5. Additional details as above.         Electronically signed by: William Putnam   Date:    06/15/2024   Time:    07:22      CT Head Without Contrast   Final Result      Chronic and postoperative changes are noted, there is no evidence for superimposed acute intracranial process.         Electronically signed by: Faheem Okeefe   Date:    06/15/2024   Time:    05:41        I have personally reviewed the above labs and imaging.    Current Medications:     Infusions:   sodium chloride 0.45%   Intravenous Continuous 100 mL/hr at 06/16/24 0700 Rate Verify at 06/16/24 0700        Scheduled:   amLODIPine  10 mg Oral Daily    atorvastatin  80 mg Oral Daily    carvediloL  37.5 mg Oral BID    enoxparin  40 mg Subcutaneous Daily    EScitalopram oxalate  20 mg Oral Daily    levETIRAcetam (Keppra) IV (PEDS and ADULTS)  1,500 mg Intravenous Q12H    polyethylene glycol  17 g Oral Daily    topiramate  50 mg Oral BID    valproate sodium (DEPACON) IVPB  750 mg Intravenous Q12H        PRN:    Current Facility-Administered Medications:     acetaminophen, 650 mg, Oral, Q4H PRN    dextrose 10%, 12.5 g, Intravenous, PRN    dextrose 10%, 25 g, Intravenous, PRN    glucagon (human recombinant), 1 mg, Intramuscular, PRN    glucose, 16 g, Oral, PRN    glucose, 24 g, Oral, PRN    HYDROmorphone, 1 mg, Intravenous, Q4H PRN    insulin aspart U-100, 0-5 Units, Subcutaneous, Q4H PRN    lorazepam, 1 mg, Intravenous, Q15 Min PRN    naloxone, 0.02 mg, Intravenous, PRN    sodium chloride 0.9%, 10 mL, Intravenous, Q12H PRN     Assessment:     Gina Jenkins is a 49 y.o. female with a past medical history of HTN, HLD, stroke, HFpEF, brain aneurysm s/p clipping c/b abscess, and seizure disorder, who presented on 6/15/2024 for acute encephalopathy for one day. On initial evaluation in ED patient was found confused but awake, then had seizure-like activity and  received IV Ativan. Continued home AEDs. Spot EEG unrevealing, patient was stepped up to ICU for cEEG.       Plan:     Neuro:  Acute Encephalopathy  Hx P comm and L anterior choroidal artery aneurysms s/p craniotomy for clipping (7/2022) c/b abscess  Seizure disorder  - Pt w/ recent admission for generalized weakness, slurred speech, facial droop, ataxic gate that was more concerning for non-epileptiform seizures   - CTH, CTA head & neck without acute change from previous imaging   - Depakote level therapeutic range, keppra level pending   - Continue keppra 1.5 g bid and depakote 750 mg bid   - cEEG, follow up read  - Neurology consulted, appreciate recs  - Consider psych consult if cEEG remains unrevealing     Migraines  - Continue home Topiramate 50mg BID    Depression  Anxiety  - Continue home Lexapro 20mg daily    Cardiovascular/Hemodynamics:  Hypertension  - On admission /92  - Home meds: Amlodipine 10mg daily, Losartan 50mg daily, Coreg 37.5mg BID  - Continue home antihypertensive regimen     Hyperlipidemia  - Continue home Lipitor 80mg daily     Respiratory:   No active issues    GI/FEN:  F: LR @125 cc/hr   E: K>4, Mg>2  N: NPO    ID:   No active issues    Renal:   COOPER on CKD 3b - improved   - On admission Cr 2.1, downtrended to 1.9   - Baseline Cr ~1.6   - Possibly prerenal 2/2 volume depletion after being found down, improved with IV fluids  - Renally dose all medication, avoid nephrotoxic agents  - Strict I/Os, daily weights    Heme/Onc:   No active issues     Endocrine:  Type 2 diabetes on insulin   - 8/2022 A1c 8.3  - Home meds: Levemir 24 units daily, Novolog 8 units TID w/ meals  - BG since admission   - Recheck A1c   - POCT glucose checks with SSI, hypoglycemia protocol ordered   - Goal glucose 140-180 while in ICU    TSH: 1.017    Rheum/MSK:  No active issues       Critical Care Daily Checklist:    F: Feeding Goal: PO cardiac   Status: NPO     AS: Analgesia/Sedation None   T:  Thromboembolic Prophylaxis Lovenox   H: HOB > 300 Yes   U: Stress Ulcer Prophylaxis (if needed) N/A   G: Glucose Control BG     B: Bowel Function Glycolax    I: Indwelling Catheter (Lines & Rodríguez) Necessity PIV x 2   D: De-escalation of Antimicrobials/Pharmacotherapies N/A    Plan for the day/ETD cEEG    Code Status:  Family/Goals of Care: Full       Yong Perry MD  [facility] HO-II    Pt seen and examined with Pulmonary/Critical Care team and this note reviewed and validated with the following additional comments: This is a difficult case.  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 24 hr EEG monitoring underway. Psych consult.  Step down when monitoring complete.    The complexity of Medical Decision Making (MDM) was complex.  The number of problems addressed was 2.  The risk of complications and/or morbidity/mortality was high.  The tests ordered were EEG.  I communicated with the following providers .  Time spent in the care of this patient was 15 minutes    Rk Forrester MD  Phone [phone]

## 2024-06-16 NOTE — PROGRESS NOTES
[facility] Progress Note    Gina Jenkins  1975  [id number]      Subjective:   49 y.o. female who has a PMHX significant for brain aneurysm, coronary angioplasty, HFpEF (EF 55% 6/2024), HTN, HLD, migraine headaches, CVA. The patient presented to [facility] on 6/15/2024 with a primary complaint of acute encephalopathy x1 day.  Patient was found at home by her family confused and surrounded with her faeces and urine. Was brought to the hospital by EMS for eval. On arrival has an episode of seizure and was given 1mg ativan. 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 No definite evidence of residual recurrent aneurysm. . labs unremarkable so far including normal CPK. AED level pending. Last EEG done on 6/9 with no seizure activity. Etiology likely prolonged postictal vs seizure.  at this point, patient might benefit from Psych evaluation as well. I     Today no acute event overnight, patient in ICU and on cEEG. EEG reading with no seizure activity,however patient still not fully back to her baseline. MRI brain still pending. Will complete cEEG tonight around 9 pm and likely step down to the floor. Given her CKD, will decrease her Keppra to 1000mg BID and Increase Depakote to 750 mg TID, but will recommend obtaining ammonia level before increasing the Depakote.     Objective:  Vitals:    06/16/24 1515   BP:    Pulse: 68   Resp: 17   Temp:      Scheduled Meds:   amLODIPine  10 mg Oral Daily    atorvastatin  80 mg Oral Daily    carvediloL  37.5 mg Oral BID    enoxparin  30 mg Subcutaneous Daily    EScitalopram oxalate  20 mg Oral Daily    levETIRAcetam (Keppra) IV (PEDS and ADULTS)  1,500 mg Intravenous Q12H    mupirocin   Nasal BID    polyethylene glycol  17 g Oral Daily    topiramate  50 mg Oral BID    valproate sodium (DEPACON) IVPB  750 mg Intravenous Q12H       Neurologic Physical  Examination:   Orientation  Alert, awake, oriented to self only     Language  Dysarthric   Cranial Nerves  PERRL, VF intact, EOMI, V1-V3 intact, mild right facial droop , hearing grossly intact.  Motor  Normal Bulk  Normal Tone  5/5 strength in 4 extremities  Sensory  Withdraws to pain   DTR   +2 symmetric  Cerebellar/Gait  Deferred     RADIOLOGY STUDIES:  I have personally reviewed the images performed.      HEAD CT: Normal      BRAIN MRI: Pending.    Laboratory Findings:   Recent Results (from the past 24 hour(s))   POCT Venous Blood Gas    Collection Time: 06/15/24  9:32 PM   Result Value Ref Range    POC PH 7.288 (LL) 7.350 - 7.450    POC PCO2 41.9 35.0 - 45.0 mmHg    POC PO2 27.2 (LL) 40.0 - 60.0 mmHg    POC SATURATED O2 45.0 (LL) 95.0 - 100.0 %    POC HCO3 20.0 (L) 24.0 - 28.0 mmol/l    Base Deficit -6.3 (L) -2.0 - 2.0 mmol/l    Specimen source Venous     Performed By: rico     FiO2 21.0 %   POCT glucose    Collection Time: 06/16/24  1:01 AM   Result Value Ref Range    POCT Glucose 73 70 - 110 mg/dL   Comprehensive Metabolic Panel (CMP)    Collection Time: 06/16/24  3:08 AM   Result Value Ref Range    Sodium 142 136 - 145 mmol/L    Potassium 3.6 3.5 - 5.1 mmol/L    Chloride 112 (H) 95 - 110 mmol/L    CO2 16 (L) 23 - 29 mmol/L    Glucose 77 70 - 110 mg/dL    BUN 28 (H) 6 - 20 mg/dL    Creatinine 1.9 (H) 0.5 - 1.4 mg/dL    Calcium 9.3 8.7 - 10.5 mg/dL    Total Protein 7.3 6.0 - 8.4 g/dL    Albumin 3.5 3.5 - 5.2 g/dL    Total Bilirubin 0.3 0.1 - 1.0 mg/dL    Alkaline Phosphatase 159 (H) 55 - 135 U/L    AST 21 10 - 40 U/L    ALT 25 10 - 44 U/L    eGFR 32 (A) >60 mL/min/1.73 m^2    Anion Gap 14 8 - 16 mmol/L   Magnesium    Collection Time: 06/16/24  3:08 AM   Result Value Ref Range    Magnesium 2.2 1.6 - 2.6 mg/dL   Phosphorus    Collection Time: 06/16/24  3:08 AM   Result Value Ref Range    Phosphorus 4.3 2.7 - 4.5 mg/dL   CBC with Automated Differential    Collection Time: 06/16/24  3:08 AM   Result Value Ref  Range    WBC 6.69 3.90 - 12.70 K/uL    RBC 4.21 4.00 - 5.40 M/uL    Hemoglobin 12.5 12.0 - 16.0 g/dL    Hematocrit 37.5 37.0 - 48.5 %    MCV 89 82 - 98 fL    MCH 29.7 27.0 - 31.0 pg    MCHC 33.3 32.0 - 36.0 g/dL    RDW 13.1 11.5 - 14.5 %    Platelets 309 150 - 450 K/uL    MPV 10.1 9.2 - 12.9 fL    Immature Granulocytes 0.3 0.0 - 0.5 %    Gran # (ANC) 4.5 1.8 - 7.7 K/uL    Immature Grans (Abs) 0.02 0.00 - 0.04 K/uL    Lymph # 1.4 1.0 - 4.8 K/uL    Mono # 0.5 0.3 - 1.0 K/uL    Eos # 0.2 0.0 - 0.5 K/uL    Baso # 0.05 0.00 - 0.20 K/uL    nRBC 0 0 /100 WBC    Gran % 66.8 38.0 - 73.0 %    Lymph % 20.8 18.0 - 48.0 %    Mono % 8.1 4.0 - 15.0 %    Eosinophil % 3.3 0.0 - 8.0 %    Basophil % 0.7 0.0 - 1.9 %    Differential Method Automated    POCT glucose    Collection Time: 06/16/24  4:11 AM   Result Value Ref Range    POCT Glucose 80 70 - 110 mg/dL   POCT glucose    Collection Time: 06/16/24  8:08 AM   Result Value Ref Range    POCT Glucose 75 70 - 110 mg/dL       Neuroimaging:   CTA Brain    Result Date: 6/15/2024  EXAMINATION: CTA HEAD; CTA NECK CLINICAL HISTORY: Cerebral aneurysm, follow-up; TECHNIQUE: Non contrast low dose axial images were obtained thought the head and neck.  CT angiogram was performed from the level of the bottom of C2 to the top of the head following the IV administration of 100mL of Omnipaque 350.   Sagittal and coronal reconstructions and maximum intensity projection reconstructions were performed. COMPARISON: CT head without contrast 06/15/2024. FINDINGS: CT HEAD: Blood: No acute intracranial hemorrhage. Parenchyma: No definite loss of gray-white differentiation to suggest acute or subacute transcortical infarct. Ventricles/Extra-axial spaces: Redemonstrated thin extra-axial hyperattenuation due to the left-sided craniotomy site, similar to recent CT of 06/18/2024, 04:21 hours as well as when compared to older study of 11/14/2023, felt likely related to postoperative sequela.  Unchanged size and  configuration of the ventricles. Vessels: See below. Orbits: Unremarkable. Scalp: Unremarkable. Skull: There are no depressed skull fractures or destructive bone lesions. Sinuses and mastoids: Scattered relatively modest paranasal sinus mucosal thickening. Other findings: Dental caries.  Periapical lucency about right posterior maxillary molar tooth with dehiscence of the buccal cortex.  Additional periapical lucencies involving the left mandibular molar teeth.  These may represent periapical abscess. CTA: NECK: Imaged aortic arch: Nonaneurysmal.  Left-sided arch.  Conventional 3 vessel arch anatomy.  Subclavian and brachiocephalic arteries appear grossly patent. Right common and cervical internal carotid arteries: CCA and ECA grossly patent.  Less than 50% atheromatous narrowing at the proximal ICA.  No evidence of carotid dissection or web. Left common and cervical internal carotid arteries: CCA and ECA grossly patent.  Less than 50% atheromatous narrowing at the proximal ICA.  No evidence of carotid dissection or web. Right cervical vertebral artery: There is no hemodynamically significant stenosis or dissection Left cervical vertebral artery: There is no hemodynamically significant stenosis or dissection. Left vertebral artery appears dominant. HEAD: Right anterior circulation:Mild atheromatous irregularity of the ICA.  Right ophthalmic artery is patent.Mild atheromatous irregularity of the M1 MCA.  No definite evidence of acute large vessel occlusion or flow-limiting stenosis. Left anterior circulation: Mild atheromatous irregularity of the ICA.  No definite evidence of flow-limiting stenosis or large vessel occlusion.Left ophthalmic artery is patent.Redemonstrated postoperative sequela of clipping of left ICA aneurysms associated with the posterior communicating artery and anterior choroidal artery origins.  Noting limitations imposed by clip related artifact, no definite residual recurrent aneurysm is  appreciated.  Unknown, un treated left M2 AUGIE aneurysm (axial source series 5, image 332) measures on the order of 2-3 mm from neck to dome appears similar to 11/14/2023. Posterior circulation: There is no hemodynamically significant vertebrobasilar stenosis. There is no significant PCA stenosis. Posterior inferior cerebellar arteries patent at origin. Anterior and posterior communicating arteries: Suspect small patent anterior communicating artery.  Posterior communicating arteries are not well seen. NON-ANGIOGRAPHIC FINDINGS: Visualized intracranial contents: As above. Soft tissues of the neck: Unremarkable. Visualized sinuses: As above. Dentition: As above. Spine: Degenerative change. Visualized lungs: No acute change.  Mild emphysematous.     1. No definite acute intracranial findings by CT. 2. No evidence of acute large vessel occlusion or flow-limiting stenosis. 3. Redemonstrated sequela of prior clip in the left ICA aneurysms, associated with posterior communicating artery and anterior choroidal artery origins.  No definite evidence of residual recurrent aneurysm. 4. When compared to prior CTA and MRA of 11/14/2023, as well as CTA performed 06/07/2024, no significant change in appearance of the untreated left M2 MCA aneurysm. 5. Additional details as above. Electronically signed by: William Putnam Date:    06/15/2024 Time:    07:22    CTA Neck    Result Date: 6/15/2024  EXAMINATION: CTA HEAD; CTA NECK CLINICAL HISTORY: Cerebral aneurysm, follow-up; TECHNIQUE: Non contrast low dose axial images were obtained thought the head and neck.  CT angiogram was performed from the level of the bottom of C2 to the top of the head following the IV administration of 100mL of Omnipaque 350.   Sagittal and coronal reconstructions and maximum intensity projection reconstructions were performed. COMPARISON: CT head without contrast 06/15/2024. FINDINGS: CT HEAD: Blood: No acute intracranial hemorrhage. Parenchyma: No definite  loss of gray-white differentiation to suggest acute or subacute transcortical infarct. Ventricles/Extra-axial spaces: Redemonstrated thin extra-axial hyperattenuation due to the left-sided craniotomy site, similar to recent CT of 06/18/2024, 04:21 hours as well as when compared to older study of 11/14/2023, felt likely related to postoperative sequela.  Unchanged size and configuration of the ventricles. Vessels: See below. Orbits: Unremarkable. Scalp: Unremarkable. Skull: There are no depressed skull fractures or destructive bone lesions. Sinuses and mastoids: Scattered relatively modest paranasal sinus mucosal thickening. Other findings: Dental caries.  Periapical lucency about right posterior maxillary molar tooth with dehiscence of the buccal cortex.  Additional periapical lucencies involving the left mandibular molar teeth.  These may represent periapical abscess. CTA: NECK: Imaged aortic arch: Nonaneurysmal.  Left-sided arch.  Conventional 3 vessel arch anatomy.  Subclavian and brachiocephalic arteries appear grossly patent. Right common and cervical internal carotid arteries: CCA and ECA grossly patent.  Less than 50% atheromatous narrowing at the proximal ICA.  No evidence of carotid dissection or web. Left common and cervical internal carotid arteries: CCA and ECA grossly patent.  Less than 50% atheromatous narrowing at the proximal ICA.  No evidence of carotid dissection or web. Right cervical vertebral artery: There is no hemodynamically significant stenosis or dissection Left cervical vertebral artery: There is no hemodynamically significant stenosis or dissection. Left vertebral artery appears dominant. HEAD: Right anterior circulation:Mild atheromatous irregularity of the ICA.  Right ophthalmic artery is patent.Mild atheromatous irregularity of the M1 MCA.  No definite evidence of acute large vessel occlusion or flow-limiting stenosis. Left anterior circulation: Mild atheromatous irregularity of the  ICA.  No definite evidence of flow-limiting stenosis or large vessel occlusion.Left ophthalmic artery is patent.Redemonstrated postoperative sequela of clipping of left ICA aneurysms associated with the posterior communicating artery and anterior choroidal artery origins.  Noting limitations imposed by clip related artifact, no definite residual recurrent aneurysm is appreciated.  Unknown, un treated left M2 AUGIE aneurysm (axial source series 5, image 332) measures on the order of 2-3 mm from neck to dome appears similar to 11/14/2023. Posterior circulation: There is no hemodynamically significant vertebrobasilar stenosis. There is no significant PCA stenosis. Posterior inferior cerebellar arteries patent at origin. Anterior and posterior communicating arteries: Suspect small patent anterior communicating artery.  Posterior communicating arteries are not well seen. NON-ANGIOGRAPHIC FINDINGS: Visualized intracranial contents: As above. Soft tissues of the neck: Unremarkable. Visualized sinuses: As above. Dentition: As above. Spine: Degenerative change. Visualized lungs: No acute change.  Mild emphysematous.     1. No definite acute intracranial findings by CT. 2. No evidence of acute large vessel occlusion or flow-limiting stenosis. 3. Redemonstrated sequela of prior clip in the left ICA aneurysms, associated with posterior communicating artery and anterior choroidal artery origins.  No definite evidence of residual recurrent aneurysm. 4. When compared to prior CTA and MRA of 11/14/2023, as well as CTA performed 06/07/2024, no significant change in appearance of the untreated left M2 MCA aneurysm. 5. Additional details as above. Electronically signed by: William Putnam Date:    06/15/2024 Time:    07:22    CT Head Without Contrast    Result Date: 6/15/2024  EXAMINATION: CT HEAD WITHOUT CONTRAST CLINICAL HISTORY: Mental status change, unknown cause; TECHNIQUE: Low dose axial images were obtained through the head.   Coronal and sagittal reformations were also performed. Contrast was not administered. COMPARISON: Prior examinations, most recent of which is June 7, 2024 FINDINGS: There is artifact present, in addition positioning is less than optimal, these factors diminish the sensitivity of the examination. Postoperative calvarial change on the left is noted, underlying the postoperative location, there is appearance of minimal thin extra-axial appearing density overlying the left cerebral hemisphere, however this appears stable on prior examinations and is thought likely representative of chronic mild dural thickening.  Intracranial aneurysm clip at the medial aspect of the middle cranial fossa is noted. There is no additional evidence for intracranial mass, mass effect or midline shift or acute intracranial hemorrhage.  Gray-white differentiation appears appropriate.  There is no hydrocephalus.  Appropriate CSF spaces are seen at the skull base. The osseous structures demonstrate chronic and postoperative change.  The mastoid air cells appear well aerated.  There is limited visualization of the paranasal sinuses.  The sphenoid sinus appears appropriate.     Chronic and postoperative changes are noted, there is no evidence for superimposed acute intracranial process. Electronically signed by: Faheem Okeefe Date:    06/15/2024 Time:    05:41    Echo    Result Date: 6/9/2024    Left Ventricle: The left ventricle is normal in size. Mild septal thickening. There is concentric remodeling. There is normal systolic function. Ejection fraction by visual approximation is 55%. There is normal diastolic function.   Right Ventricle: Normal right ventricular cavity size. Wall thickness is normal. Systolic function is normal.   Pulmonary Artery: The estimated pulmonary artery systolic pressure is 17 mmHg.   IVC/SVC: Normal venous pressure at 3 mmHg.     MRI Spine Cervical-Thoracic-Lumbar W W/O Contrast (XPD)    Result Date:  6/9/2024  EXAMINATION: MRI SPINE CERVICAL-THORACIC-LUMBAR W W/O CONTRAST (XPD) CLINICAL HISTORY: Demyelinating disease;Motor neuron disease suspected;.  Demyelinating disease of central nervous system, unspecified TECHNIQUE: Technique: sagittal T1, T2 and STIR and axial T1 and T2 imaging of the cervical, thoracic and lumbar spine without contrast. Additionally axial T1 and sagittal fat sat T1 post contrast imaging of the cervical, thoracic and lumbar spine.  7.5 ml of Gadavist conrast was infused intravenously. COMPARISON: MRI cervical and thoracic spine 10/05/2017 and pelvic ultrasound 02/20/2020 FINDINGS: Cervical spine:Cervical sagittal alignment is within normal limits.  Cervical vertebral body heights, contour and marrow signal is within normal is allowing for scattered degenerative disc disease with slight disc desiccation and height loss.  Allowing for degenerative change no evidence for acute fracture or subluxation cervical spine The cervical spinal cord is normal in signal and contour no cord signal abnormality to suggest edema..  No abnormal intrathecal enhancement. C2/C3: No significant disc bulge, central canal or neural foraminal stenosis. C3/C4: Small disc bulge with uncovertebral joint hypertrophy and facet arthropathy without significant central canal or neural foraminal stenosis. C4/C5: Small disc bulge with uncovertebral joint hypertrophy and facet arthropathy without significant central canal stenosis with mild left neural foraminal stenosis without significant right neural foraminal stenosis. C5/C6: Posterior disc osteophyte with uncovertebral joint hypertrophy and facet arthropathy mild central canal and bilateral neural foraminal stenosis. C6/C7: Trace disc bulge with uncovertebral joint hypertrophy and facet arthropathy without significant central canal stenosis and mild neural foraminal narrowing. C7/T1: No significant disc bulge, central canal or neural foraminal stenosis. Continued  partially empty sella included in the field of view. Thoracic spine:The thoracic sagittal alignment is within normal limits. The thoracic vertebral body heights, contour and bone marrow signal is within normal limits without evidence for acute fracture or subluxation.  There is small probable T9 vertebral body hemangioma.  Probable lipomas T5 and T8 vertebral bodies Thoracic spinal cord normal in signal and contour without cord signal abnormality to suggest edema.  Tip of the conus approximates the inferior L1 level. No abnormal intrathecal enhancement. No significant disc bulge, central canal or neural foraminal stenosis throughout the thoracic canal. Two small left upper pole with additional but smaller right upper pole renal cyst. Heterogeneous hypoattenuation with lobular configuration within the uterus concerning for degenerative uterine fibroid. Lumbar lordosis. The lumbar vertebral body heights, contours and bone marrow signal is within normal limits and without evidence for acute fracture or subluxation. The distal spinal cord and conus is normal in signal and contour the tip of the conus approximates the inferior [id number]. No significant disc bulge, central canal or neural foraminal stenosis throughout the lumbar canal. There is no abnormal intrathecal enhancement.     Multilevel degenerative changes cervical spine as detailed above No evidence for cord signal abnormality or abnormal intrathecal enhancement to suggest sequela of cord demyelination Partially empty sella included in the field of view intracranial hypertension be included in differential in appropriate clinical setting Please see above for additional details. Electronically signed by: Laureano Venegas DO Date:    06/09/2024 Time:    09:22    MRI Brain Without Contrast    Result Date: 6/8/2024  EXAMINATION: MRI BRAIN WITHOUT CONTRAST CLINICAL HISTORY: Headache, new or worsening, neuro deficit (Age 19-49y); Headache, unspecified TECHNIQUE:  Multiplanar multisequence MR imaging of the brain was performed without contrast. COMPARISON: CT scan of the head dated 06/07/2024. MRI dated 10/04/2017. FINDINGS: The craniocervical junction is intact.  There is partial empty appearance of the sella.  The midline structures are intact.  The intracranial flow voids are intact. No acute diffusion-weighted signal abnormality is present. There are postop changes of prior left frontal and temporal craniotomy for prior left MCA aneurysm clipping.  There is encephalomalacia in the left temporal pole.  There is also minimal degree of T2/FLAIR signal hyperintensity within this region. There are T2/FLAIR signal hyperintensities within the periventricular and subcortical white matter.  There is minimal susceptibility weighted artifact within the left inferior frontal and left temporal pole.  There is susceptibility weighted artifact identified within the left cerebellum.     1.  No MR evidence of acute infarction. 2.  Postoperative changes in the left frontal and temporal calvarium with encephalomalacia in the left temporal pole. 3.  Nonspecific T2/FLAIR signal hyperintensities within the periventricular and subcortical white matter. Electronically signed by: Flaquito Burciaga MD Date:    06/08/2024 Time:    22:16    CTA Head and Neck (xpd)    Result Date: 6/7/2024  EXAMINATION: CTA HEAD AND NECK (XPD) CLINICAL HISTORY: Stroke/TIA, determine embolic source; TECHNIQUE: Standard contrast enhanced CTA of brain with 100ml of Omnipaque 350 with 3D MIP reformations. No previous for comparison. COMPARISON: Prior FINDINGS: Postsurgical changes of left ICA aneurysmal clipping in the left parasellar region similar to prior exam.  Previously seen gas in the left temporal fossa has resolved.  Previously commented on small aneurysm of the untreated left MCA is not well appreciated.  Near complete resolution of the right maxillary sinus opacification.  Otherwise stable exam     Resolution of  gas from prior procedure  the intracranial region.  Stable postoperative changes of left MCA aneurysmal clipping.  Resolution of the right maxillary sinus opacification.  Otherwise stable exam All CT scans at this facility are performed  using dose modulation techniques as appropriate to performed exam including the following:  automated exposure control; adjustment of mA and/or kV according to the patients size (this includes techniques or standardized protocols for targeted exams where dose is matched to indication/reason for exam: i.e. extremities or head);  iterative reconstruction technique. Electronically signed by: Reno Lawson Date:    06/07/2024 Time:    21:56    X-Ray Chest AP Portable    Result Date: 6/7/2024  EXAMINATION: XR CHEST AP PORTABLE CLINICAL HISTORY: weakness; TECHNIQUE: Single frontal view of the chest was performed. COMPARISON: None FINDINGS: The lungs are clear, with normal appearance of pulmonary vasculature and no pleural effusion or pneumothorax. The cardiac silhouette is normal in size. The hilar and mediastinal contours are unremarkable. Bones are intact.     No acute abnormality. Electronically signed by: Reno Lawson Date:    06/07/2024 Time:    20:14    CT Head Without Contrast    Result Date: 6/7/2024  EXAMINATION: CT HEAD WITHOUT CONTRAST CLINICAL HISTORY: Headache, new or worsening, neuro deficit (Age 19-49y); TECHNIQUE: Low dose axial CT images obtained throughout the head without intravenous contrast. Sagittal and coronal reconstructions were performed. COMPARISON: None. FINDINGS: Age-related changes: Ventricles and sulci are normal in size for age without evidence of hydrocephalus. No extra-axial blood or fluid collections. No parenchymal mass, hemorrhage, edema or major vascular distribution infarct. Skull/extracranial contents (limited evaluation): No fracture. Right maxillary sinus mucosal thickening.  A     Mild moderate right maxillary sinus mucosal thickening.  No  evidence for hemorrhage mass effect or midline shift. All CT scans   are performed using dose optimization techniques including the following: automated exposure control; adjustment of the mA and/or kV; use of iterative reconstruction technique.  Dose modulation was employed for ALARA by means of: Automated exposure control; adjustment of the mA and/or kV according to patient size (this includes techniques or standardized protocols for targeted exams where dose is matched to indication/reason for exam; i.e. extremities or head); and/or use of iterative reconstructive technique. Electronically signed by: Reno Lawson Date:    06/07/2024 Time:    20:14    Assessment/Plan:   49 y.o. female who has a PMHX significant for brain aneurysm, coronary angioplasty, HFpEF (EF 55% 6/2024), HTN, HLD, migraine headaches, CVA. The patient presented to [facility] on 6/15/2024 with a primary complaint of acute encephalopathy x1 day.  Patient was found at home by her family confused and surrounded with her faeces and urine. Was brought to the hospital by EMS for eval. On arrival has an episode of seizure and was given 1mg ativan. 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 No definite evidence of residual recurrent aneurysm. . labs unremarkable so far including normal CPK. AED level pending. Last EEG done on 6/9 with no seizure activity. 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  at this point, patient might benefit from Psych evaluation as well. If in any case patient develop fever, will recommend LP to rule out viral infections     Plan  Acute encephalopathy  -Presented to the hospital altered and covered in feaces and urine  -CTH normal, MRI brain pending   -Depakote level normal, Keppra level pending  -On Topamax 50mg for migraine   -Recommend uptitrating Depakote to 750mg TID, obtain ammonia level prior increasing the  dose  -Daily Depakote level  -Recommend Decreasing keppra to 1000mg BID given CKD   -On cEEG, no seizure activity, can discontinue tonight if no activity found  -Can give 2mg ativan for seizure >5mins or back to back seizure with no return to baseline   -Consider LP if patient spikes fever to r/o meningitis      Case discussed with Dr. Roge Waldrop MD  [facility] PGY-4  [facility] Consult Service      Attestation  Personally interviewed and examined the patient. I have reviewed the notes, assessments, and/or procedures performed by Dr. Christopher, I concur with her/his documentation of Gina Jenkins.    The patient is partially improved today. She is more interactive, said a few more words than yesterday and could follow more commands but remains encephalopathic although better than yesterday. Her EEGs show slowing. Unfortunately her EEGs may not be very informative as none of the previous EEGs detected epileptiform activity.   We will continue to follow her.  .

## 2024-06-16 NOTE — PLAN OF CARE
Pt's mentation improving throughout shift. Pt nods appropriately to questions and follows commands. Pt intermittently answers questions; mainly oriented to self. Continuous EEG in place.    Pt afebrile, HR sinus rhythm 60s-70s, SpO2>94% on RA, SBP 130s-150s. 1 large liquid BM and 1 large UO. IVF D/c'ed. Diet advanced to liquid. BG monitored Q4hrs.    Plan of care reviewed with pt and pt's sister Netta who visited at bedside.

## 2024-06-16 NOTE — EICU
eICU Physician Brief Note    Chart reviewed. On camera, the patient is resting in bed, in NAD.  Mrs Gina Jenkins is a 49 year old lady presenting with AMS, found down; became alert, able to state her name in the ED. She had a witnessed seizure in the ED, rec'd Ativan.   PMH: brain aneurysm (one clipped, one not clipped), CAD, HFpEF (55%), HTN, DM2, migraines, CVA. She had seizure-like activity in 6/2022.  Labs and investigations reviewed.  Current medications reviewed.  A/P:  Seizure  Is on Keppra and Depakote.  Continuous EEG.  Neurology following.  COOPER  Not improving.  Continue holding off on ACEinh or ARBs.  Start maintenance fluids.  Metabolic acidosis  Not compensated (likely due to encephalopathy)  Likely related to COOPER, though active seizing can contribute.  Recheck VBG with am labs.  DM2 - changed insulin frequency to q4h given that patient is NPO.  GI/DVT prophylaxis - diet when able; is on Lovenox.    eICU is available should acute issues arise.

## 2024-06-16 NOTE — PROCEDURES
[facility]     PRELIMINARY Cap-EEG REPORT:  Review: 6/16/2024, 07:00 - 17:30    Interpretation of cap-EEG is limited due to artifacts. Interpretable portions suggest:    The study is done without sedation.    Generalized mixed delta-theta (3-6 Hz) activity with a 7 posterior dominant rhythm is seen during the period of review - suggestive of moderate diffuse cerebral dysfunction.    No epileptiform activity or electrographic seizures are seen during the period of review.      Full report to follow.  Will continue to monitor.     A repeat study with proper electrode placement is recommended for definitive interpretation.    Thank you for involving us in the care of this patient.    Susan Jett MD, ARIES(), FACNS, SARA.  Neurology-Epilepsy.  [facility].

## 2024-06-16 NOTE — PLAN OF CARE
"  Care Plan    Pt remains in ICU at this time. No acute events. Continuous EEG initiated overnight. Pt increasing alertness throughout the night, now able to state name and nod appropriately to some questions, follows some commands. 2 liquid BM overnight, 1 large urine occurrence. VSS, HR 60s-70s NSR, SBP 130s-150s, SpO2 90s-100% RA.   POC reviewed with pt. Safety and infection precautions in place. See below and flowsheets for full assessment and VS info.     Neuro:  [place] Coma Scale  Best Eye Response: 4-->(E4) spontaneous  Best Motor Response: 6-->(M6) obeys commands (pt obeys some commands)  Best Verbal Response: 2-->(V2) incomprehensible speech  [place] Coma Scale Score: 12  Assessment Qualifiers: patient not sedated/intubated  Pupil PERRLA: no  24 hr Temp:  [97.1 °F (36.2 °C)-98.6 °F (37 °C)]      CV:  Rhythm: normal sinus rhythm  DVT prophylaxis: VTE Required Core Measure: (SCDs) Sequential compression device initiated/maintained    Resp:          GI/:  GI prophylaxis: yes  Diet/Nutrition Received: NPO  Last Bowel Movement: 06/15/24  Voiding Characteristics: external catheter   Intake/Output Summary (Last 24 hours) at 6/16/2024 0515  Last data filed at 6/16/2024 0315  Gross per 24 hour   Intake 1421.8 ml   Output --   Net 1421.8 ml       Labs/Accuchecks:  Recent Labs   Lab 06/16/24  0308   WBC 6.69   RBC 4.21   HGB 12.5   HCT 37.5         Recent Labs   Lab 06/16/24  0308      K 3.6   CO2 16*   *   BUN 28*   CREATININE 1.9*   ALKPHOS 159*   ALT 25   AST 21   BILITOT 0.3    No results for input(s): "PROTIME", "INR", "APTT", "HEPANTIXA" in the last 168 hours.   Recent Labs   Lab 06/15/24  0451      TROPONINI 0.008       Electrolytes: N/A - electrolytes WDL  Accuchecks: Q4H    Gtts/LDAs:   sodium chloride 0.45%   Intravenous Continuous 100 mL/hr at 06/16/24 0315 Rate Verify at 06/16/24 0315       Lines/Drains/Airways       Drain  Duration             Female External Urinary Catheter " w/ Suction 06/15/24 2045 <1 day              Peripheral Intravenous Line  Duration                  Peripheral IV - Single Lumen 06/15/24 0823 20 G;1 3/4 in Anterior;Left Upper Arm <1 day         Peripheral IV - Single Lumen 06/15/24 2100 20 G Anterior;Left Forearm <1 day                    Skin/Wounds  Bathing/Skin Care: dressed/undressed;incontinence care;linen changed (06/16/24 0240)  Wounds: No  Wound care consulted: No    Consults  Consults (From admission, onward)          Status Ordering Provider     Inpatient consult to [facility]  Once        Provider:  (Not yet assigned)    Completed JUAN ALBERTO ALVARADO

## 2024-06-16 NOTE — PROGRESS NOTES
"[facility] Medicine Progress Note    Primary Team: [facility] Hospitalist Team B  Attending Physician: Beck Leiva MD  Resident: Michael Issa MD  Intern: Zak    Subjective:      Seen at bedside this morning with continuous EEG running. She has mumbled and slowed responses but moves all extremities to voice command. No concerns from  bedside nursing. Holding PO meds at this time.      Objective:     Last 24 Hour Vital Signs:  BP  Min: 117/71  Max: 159/90  Temp  Av.9 °F (36.6 °C)  Min: 97.1 °F (36.2 °C)  Max: 98.6 °F (37 °C)  Pulse  Av.2  Min: 64  Max: 78  Resp  Av.6  Min: 15  Max: 34  SpO2  Av %  Min: 93 %  Max: 100 %  Height  Av' 3" (160 cm)  Min: 5' 3" (160 cm)  Max: 5' 3" (160 cm)  Weight  Av.8 kg (164 lb 14.5 oz)  Min: 74.8 kg (164 lb 14.5 oz)  Max: 74.8 kg (164 lb 14.5 oz)  I/O last 3 completed shifts:  In: 1853.9 [I.V.:593.5; IV Piggyback:1260.5]  Out: -     Physical Examination:  Physical Exam  Vitals and nursing note reviewed.   Constitutional:       Appearance: She is ill-appearing.      Comments: Somnolent but arousable to voice and noxious stimuli.   HENT:      Head: Normocephalic and atraumatic.      Comments: EEG cap on     Mouth/Throat:      Mouth: Mucous membranes are dry.      Pharynx: No oropharyngeal exudate.   Eyes:      Extraocular Movements: Extraocular movements intact.      Pupils: Pupils are equal, round, and reactive to light.   Cardiovascular:      Rate and Rhythm: Normal rate and regular rhythm.      Heart sounds: No murmur heard.  Pulmonary:      Effort: Pulmonary effort is normal.      Breath sounds: Normal breath sounds. No wheezing.   Abdominal:      General: Abdomen is flat.      Palpations: Abdomen is soft.      Tenderness: There is no abdominal tenderness.   Musculoskeletal:         General: No swelling or deformity.   Skin:     General: Skin is warm and dry.      Capillary Refill: Capillary refill takes less than 2 seconds.   Neurological:      " Mental Status: She is alert and oriented to person, place, and time.      Comments: Slowed but appropriate responses to orientation questions. Able to move all extremities on command. Generalized weakness noted. When prompted, will hold UE to gravity but not LE. Withdraws to pain in all extremities. Pupils are equal and reactive.      Laboratory Data: Reviewed.  Trended Lab Data:  Recent Labs   Lab 06/12/24  0439 06/15/24  0451 06/16/24  0308   WBC 5.05 7.42 6.69   HGB 11.6* 14.5 12.5   HCT 33.7* 42.4 37.5    348 309   MCV 88 88 89   RDW 13.2 13.0 13.1    141 142   K 3.7 4.3 3.6   * 110 112*   CO2 18* 18* 16*   BUN 15 25* 28*   CREATININE 1.6* 2.1* 1.9*    92 77   PROT 6.6 7.9 7.3   ALBUMIN 3.1* 3.6 3.5   BILITOT 0.1 0.2 0.3   AST 45* 27 21   ALKPHOS 153* 162* 159*   ALT 30 33 25     Trended Cardiac Data: Reviewed.  Recent Labs   Lab 06/15/24  0451   TROPONINI 0.008       Microbiology Data: Reviewed.    Radiology Data: Reviewed.  Imaging Results              CTA Brain (Final result)  Result time 06/15/24 07:22:07      Final result by William Putnam MD (06/15/24 07:22:07)                   Impression:      1. No definite acute intracranial findings by CT.  2. No evidence of acute large vessel occlusion or flow-limiting stenosis.  3. Redemonstrated sequela of prior clip in the left ICA aneurysms, associated with posterior communicating artery and anterior choroidal artery origins.  No definite evidence of residual recurrent aneurysm.  4. When compared to prior CTA and MRA of 11/14/2023, as well as CTA performed 06/07/2024, no significant change in appearance of the untreated left M2 MCA aneurysm.  5. Additional details as above.      Electronically signed by: William Putnam  Date:    06/15/2024  Time:    07:22               Narrative:    EXAMINATION:  CTA HEAD; CTA NECK    CLINICAL HISTORY:  Cerebral aneurysm, follow-up;    TECHNIQUE:  Non contrast low dose axial images were obtained  thought the head and neck.  CT angiogram was performed from the level of the bottom of C2 to the top of the head following the IV administration of 100mL of Omnipaque 350.   Sagittal and coronal reconstructions and maximum intensity projection reconstructions were performed.    COMPARISON:  CT head without contrast 06/15/2024.    FINDINGS:  CT HEAD:    Blood: No acute intracranial hemorrhage.    Parenchyma: No definite loss of gray-white differentiation to suggest acute or subacute transcortical infarct.    Ventricles/Extra-axial spaces: Redemonstrated thin extra-axial hyperattenuation due to the left-sided craniotomy site, similar to recent CT of 06/18/2024, 04:21 hours as well as when compared to older study of 11/14/2023, felt likely related to postoperative sequela.  Unchanged size and configuration of the ventricles.    Vessels: See below.    Orbits: Unremarkable.    Scalp: Unremarkable.    Skull: There are no depressed skull fractures or destructive bone lesions.    Sinuses and mastoids: Scattered relatively modest paranasal sinus mucosal thickening.    Other findings: Dental caries.  Periapical lucency about right posterior maxillary molar tooth with dehiscence of the buccal cortex.  Additional periapical lucencies involving the left mandibular molar teeth.  These may represent periapical abscess.    CTA:    NECK:    Imaged aortic arch: Nonaneurysmal.  Left-sided arch.  Conventional 3 vessel arch anatomy.  Subclavian and brachiocephalic arteries appear grossly patent.    Right common and cervical internal carotid arteries: CCA and ECA grossly patent.  Less than 50% atheromatous narrowing at the proximal ICA.  No evidence of carotid dissection or web.    Left common and cervical internal carotid arteries: CCA and ECA grossly patent.  Less than 50% atheromatous narrowing at the proximal ICA.  No evidence of carotid dissection or web.    Right cervical vertebral artery: There is no hemodynamically significant  stenosis or dissection    Left cervical vertebral artery: There is no hemodynamically significant stenosis or dissection. Left vertebral artery appears dominant.    HEAD:    Right anterior circulation:Mild atheromatous irregularity of the ICA.  Right ophthalmic artery is patent.Mild atheromatous irregularity of the M1 MCA.  No definite evidence of acute large vessel occlusion or flow-limiting stenosis.    Left anterior circulation: Mild atheromatous irregularity of the ICA.  No definite evidence of flow-limiting stenosis or large vessel occlusion.Left ophthalmic artery is patent.Redemonstrated postoperative sequela of clipping of left ICA aneurysms associated with the posterior communicating artery and anterior choroidal artery origins.  Noting limitations imposed by clip related artifact, no definite residual recurrent aneurysm is appreciated.  Unknown, un treated left M2 AUGIE aneurysm (axial source series 5, image 332) measures on the order of 2-3 mm from neck to dome appears similar to 11/14/2023.    Posterior circulation: There is no hemodynamically significant vertebrobasilar stenosis. There is no significant PCA stenosis. Posterior inferior cerebellar arteries patent at origin.    Anterior and posterior communicating arteries: Suspect small patent anterior communicating artery.  Posterior communicating arteries are not well seen.    NON-ANGIOGRAPHIC FINDINGS:    Visualized intracranial contents: As above.    Soft tissues of the neck: Unremarkable.    Visualized sinuses: As above.    Dentition: As above.    Spine: Degenerative change.    Visualized lungs: No acute change.  Mild emphysematous.                                       CTA Neck (Final result)  Result time 06/15/24 07:22:07      Final result by William Putnam MD (06/15/24 07:22:07)                   Impression:      1. No definite acute intracranial findings by CT.  2. No evidence of acute large vessel occlusion or flow-limiting stenosis.  3.  Redemonstrated sequela of prior clip in the left ICA aneurysms, associated with posterior communicating artery and anterior choroidal artery origins.  No definite evidence of residual recurrent aneurysm.  4. When compared to prior CTA and MRA of 11/14/2023, as well as CTA performed 06/07/2024, no significant change in appearance of the untreated left M2 MCA aneurysm.  5. Additional details as above.      Electronically signed by: William Putnam  Date:    06/15/2024  Time:    07:22               Narrative:    EXAMINATION:  CTA HEAD; CTA NECK    CLINICAL HISTORY:  Cerebral aneurysm, follow-up;    TECHNIQUE:  Non contrast low dose axial images were obtained thought the head and neck.  CT angiogram was performed from the level of the bottom of C2 to the top of the head following the IV administration of 100mL of Omnipaque 350.   Sagittal and coronal reconstructions and maximum intensity projection reconstructions were performed.    COMPARISON:  CT head without contrast 06/15/2024.    FINDINGS:  CT HEAD:    Blood: No acute intracranial hemorrhage.    Parenchyma: No definite loss of gray-white differentiation to suggest acute or subacute transcortical infarct.    Ventricles/Extra-axial spaces: Redemonstrated thin extra-axial hyperattenuation due to the left-sided craniotomy site, similar to recent CT of 06/18/2024, 04:21 hours as well as when compared to older study of 11/14/2023, felt likely related to postoperative sequela.  Unchanged size and configuration of the ventricles.    Vessels: See below.    Orbits: Unremarkable.    Scalp: Unremarkable.    Skull: There are no depressed skull fractures or destructive bone lesions.    Sinuses and mastoids: Scattered relatively modest paranasal sinus mucosal thickening.    Other findings: Dental caries.  Periapical lucency about right posterior maxillary molar tooth with dehiscence of the buccal cortex.  Additional periapical lucencies involving the left mandibular molar teeth.   These may represent periapical abscess.    CTA:    NECK:    Imaged aortic arch: Nonaneurysmal.  Left-sided arch.  Conventional 3 vessel arch anatomy.  Subclavian and brachiocephalic arteries appear grossly patent.    Right common and cervical internal carotid arteries: CCA and ECA grossly patent.  Less than 50% atheromatous narrowing at the proximal ICA.  No evidence of carotid dissection or web.    Left common and cervical internal carotid arteries: CCA and ECA grossly patent.  Less than 50% atheromatous narrowing at the proximal ICA.  No evidence of carotid dissection or web.    Right cervical vertebral artery: There is no hemodynamically significant stenosis or dissection    Left cervical vertebral artery: There is no hemodynamically significant stenosis or dissection. Left vertebral artery appears dominant.    HEAD:    Right anterior circulation:Mild atheromatous irregularity of the ICA.  Right ophthalmic artery is patent.Mild atheromatous irregularity of the M1 MCA.  No definite evidence of acute large vessel occlusion or flow-limiting stenosis.    Left anterior circulation: Mild atheromatous irregularity of the ICA.  No definite evidence of flow-limiting stenosis or large vessel occlusion.Left ophthalmic artery is patent.Redemonstrated postoperative sequela of clipping of left ICA aneurysms associated with the posterior communicating artery and anterior choroidal artery origins.  Noting limitations imposed by clip related artifact, no definite residual recurrent aneurysm is appreciated.  Unknown, un treated left M2 AUGIE aneurysm (axial source series 5, image 332) measures on the order of 2-3 mm from neck to dome appears similar to 11/14/2023.    Posterior circulation: There is no hemodynamically significant vertebrobasilar stenosis. There is no significant PCA stenosis. Posterior inferior cerebellar arteries patent at origin.    Anterior and posterior communicating arteries: Suspect small patent anterior  communicating artery.  Posterior communicating arteries are not well seen.    NON-ANGIOGRAPHIC FINDINGS:    Visualized intracranial contents: As above.    Soft tissues of the neck: Unremarkable.    Visualized sinuses: As above.    Dentition: As above.    Spine: Degenerative change.    Visualized lungs: No acute change.  Mild emphysematous.                                       CT Head Without Contrast (Final result)  Result time 06/15/24 05:41:55      Final result by Faheem Okeefe MD (06/15/24 05:41:55)                   Impression:      Chronic and postoperative changes are noted, there is no evidence for superimposed acute intracranial process.      Electronically signed by: Faheem Okeefe  Date:    06/15/2024  Time:    05:41               Narrative:    EXAMINATION:  CT HEAD WITHOUT CONTRAST    CLINICAL HISTORY:  Mental status change, unknown cause;    TECHNIQUE:  Low dose axial images were obtained through the head.  Coronal and sagittal reformations were also performed. Contrast was not administered.    COMPARISON:  Prior examinations, most recent of which is June 7, 2024    FINDINGS:  There is artifact present, in addition positioning is less than optimal, these factors diminish the sensitivity of the examination.    Postoperative calvarial change on the left is noted, underlying the postoperative location, there is appearance of minimal thin extra-axial appearing density overlying the left cerebral hemisphere, however this appears stable on prior examinations and is thought likely representative of chronic mild dural thickening.  Intracranial aneurysm clip at the medial aspect of the middle cranial fossa is noted.    There is no additional evidence for intracranial mass, mass effect or midline shift or acute intracranial hemorrhage.  Gray-white differentiation appears appropriate.  There is no hydrocephalus.  Appropriate CSF spaces are seen at the skull base.    The osseous structures demonstrate chronic  and postoperative change.  The mastoid air cells appear well aerated.  There is limited visualization of the paranasal sinuses.  The sphenoid sinus appears appropriate.                                        Current Medications:     Infusions:   lactated ringers   Intravenous Continuous 125 mL/hr at 06/16/24 0805 New Bag at 06/16/24 0805        Scheduled:   amLODIPine  10 mg Oral Daily    atorvastatin  80 mg Oral Daily    carvediloL  37.5 mg Oral BID    enoxparin  40 mg Subcutaneous Daily    EScitalopram oxalate  20 mg Oral Daily    levETIRAcetam (Keppra) IV (PEDS and ADULTS)  1,500 mg Intravenous Q12H    polyethylene glycol  17 g Oral Daily    topiramate  50 mg Oral BID    valproate sodium (DEPACON) IVPB  750 mg Intravenous Q12H        PRN:    Current Facility-Administered Medications:     acetaminophen, 650 mg, Oral, Q4H PRN    dextrose 10%, 12.5 g, Intravenous, PRN    dextrose 10%, 25 g, Intravenous, PRN    glucagon (human recombinant), 1 mg, Intramuscular, PRN    glucose, 16 g, Oral, PRN    glucose, 24 g, Oral, PRN    HYDROmorphone, 1 mg, Intravenous, Q4H PRN    insulin aspart U-100, 0-5 Units, Subcutaneous, Q4H PRN    lorazepam, 1 mg, Intravenous, Q15 Min PRN    naloxone, 0.02 mg, Intravenous, PRN    sodium chloride 0.9%, 10 mL, Intravenous, Q12H PRN    Assessment:     Gina Jenkins is a 49 y.o.female with  Active Hospital Problems    Diagnosis  POA    *Acute encephalopathy [G93.40]  Yes    COOPER (acute kidney injury) [N17.9]  Yes    Seizure disorder [G40.909]  Yes    Mixed hyperlipidemia [E78.2]  Yes    Weakness [R53.1]  Yes    Cognitive communication deficit [R41.841]  Yes    Aneurysm of left middle cerebral artery [I67.1]  Yes    Tobacco abuse [Z72.0]  Yes    Thrombotic stroke involving left middle cerebral artery [I63.312]  Yes    Essential hypertension [I10]  Yes      Resolved Hospital Problems   No resolved problems to display.     Plan:     # Acute Encephalopathy  Suspect medication induced vs prolonged  post-ictal phase vs non-convulsive status. No infectious or metabolic etiology seen. Imaging of the brain without reason for acute encephalopathy. Her CK and lactic acid are within normal limits.   - will continue treatment for seizures as below  - contiuous EEG  - neurology consulted  - ativan 2 mg IV PRN for seizures lasting > 5 min or back-to-back seizures without return to baseline  - replete electrolytes PRN    # Seizure Disorder, concern for Psychogenic Non-Epileptiform Seizures  # Prior Cerebral Aneurysms s/p Craniotomy and Clipping  Patient presented acutely altered and likely post-ictal after being found down by family in urine/feces. Another seizure event in the emergency room that she was given Ativan for. Despite appropriate time for ativan to washout, patient never returned to mental baseline.  - given no return to baseline, patient stepped up to the ICU for continuous EEG to rule out non-convulsive status epilepticus   - given unsafe PO intake, transitioned Keppra and Valproic acid to IV  - Neurology consulted, appreciate recommendations  - might need evaluation by Epileptologist at [facility] if no return to baseline     # Migraine Headaches  - continue topiramate 50 mg BID  - multimodal pain control     # Acute Kidney Injury Superimposed on Chronic Kidney Disease  - suspect pre-renal etiology in the setting of decreased PO intake  - monitor with daily renal function, if continued poor PO intake can start maintenance fluids with D5 LR    # Hypertension  - continue home losartan 50 mg qd and Carvedilol 37.5 mg BID as PO intake allows    # Hyperlipidemia  - continue home atorvastatin 80 mg qd as PO intake allows    # Anxiety, Depression   - Continue home lexapro to 20 mg daily    DVT PPx: renally dosed Lovenox   Diet: NPO for now until cleared by speech  Code: FULL    Dispo: ICU for continuous EEG, pending Neuro recs    Michael Issa MD  [facility] Medicine Hospitalist Pager  numbers:   [facility] Hospitalist Medicine Team A (Joshua/Sky): [phone]  [facility] Hospitalist Medicine Team B (Chey/Cali):  [phone]

## 2024-06-16 NOTE — PROCEDURES
[facility]     PRELIMINARY Cap-EEG REPORT:  Review: 6/15/2024, 20:54 (start) - 22:08    Interpretation of cap-EEG is limited due to artifacts. Interpretable portions suggest:    The study is done without sedation.    Generalized sharply contoured delta>theta (3-6 Hz) activity without a posterior dominant rhythm - suggestive of moderate diffuse cerebral dysfunction.  No epileptiform activity or electrographic seizures seen during the period of review.      Full report to follow.  Will continue to monitor.     A repeat study with proper electrode placement is recommended for definitive interpretation.    Thank you for involving us in the care of this patient.    Susan Jett MD, ARIES(), FACNS, FAES.  Neurology-Epilepsy.  [facility].

## 2024-06-16 NOTE — PROCEDURES
ICU EEG/VIDEO MONITORING REPORT    Gina Jenkins  [id number]  1975    DATE OF SERVICE: 6/15-16/2024    DATE OF ADMISSION: 6/15/2024  4:00 AM    ADMITTING PROVIDER: Beck Leiva MD    METHODOLOGY-cap   Electroencephalographic (EEG) recording is with electrodes placed according to the International 10-20 placement system.  Thirty two (32) channels of digital signal are simultaneously recorded from the scalp and may include EKG, EMG, and/or eye monitors.   Recording band pass was 0.1 to 512 hz.  Digital video recording of the patient is simultaneously recorded with the EEG.  The nursing staff report clinical symptoms and may press an event button when the patient has symptoms of clinical interest to the treating physicians.  EEG and video recording is stored and archived in digital format.  The entire recording is visually reviewed and the times identified by computer analysis as being spikes or seizures are reviewed again.  Activation procedures which include photic stimulation, hyperventilation and instructing patients to perform simple task are done in selected patients.   Compresses spectral analysis (CSA) is also performed on the activity recorded from each individual channel.  This is displayed as a power display of frequencies from 0 to 30 Hz over time.   The CSA analysis is done and displayed continuously.  This is reviewed for asymmetries in power between homologous areas of the scalp and for presence of changes in power which canbe seen when seizures occur.  Sections of suspected abnormalities on the CSA is then compared with the original EEG recording.     Save22 software was also utilized in the review of this study.  This software suite analyzes the EEG recording in multiple domains.  Coherence and rhythmicity is computed to identify EEG sections which may contain organized seizures.  Each channel undergoes analysis to detect presence of spike and sharp waves which have special and morphological  characteristic of epileptic activity.  The routine EEG recording is converted from spacial into frequency domain.  This is then displayed comparing homologous areas to identify areas of significant asymmetry.  Algorithm to identify non-cortically generated artifact is used to separate eye movement, EMG and other artifact from the EEG.      Recording Times  Start on 6/15/2024, 20:54  Stop on 6/16/2024, 07:00    A total of 10 hours and 6 minutes of EEG was recorded.    EEG FINDINGS - Interpretation of cap-EEG is limited due to artifacts. Interpretable portions suggest:     The study is done without sedation.   Background activity:   The background rhythm was characterized by delta-theta (3-6 Hz) activity   No posterior dominant rhythm seen.   Symmetry and continuity: the background was continuous and symmetric    Sleep:   No definite sleep transients were seen.    Activation procedures:   Not done    Abnormal activity:   No epileptiform discharges, periodic discharges, lateralized rhythmic delta activity or electrographic seizures were seen.    No push button activations for events of concern were noted during the study.    EKG:   - unable to assess    IMPRESSION: Interpretable portions of the cap-EEG suggest:  This cap-EEG done without sedation is abnormal due to the moderate to severe generalized slowing seen, suggestive of moderate to severe diffuse cerebral dysfunction.  No epileptiform activity or electrographic seizures seen.    CLINICAL CORRELATION IS RECOMMENDED.    Susan Jett MD, ARIES(), SARA MCKOY.  Neurology-Epilepsy.  [facility].

## 2024-06-16 NOTE — NURSING
Pt arrived to room 556 safely. Pt minimally responsive to voice, follows some commands, nods head yes/no to some questions, no verbal response. VSS, /91, HR 73, SpO2 95% RA. Pt belongings include bonnet, shirt, multiple pt medications, phone , cell phone. Bed lowered, wheels locked, bed alarm on, call light in pt reach.

## 2024-06-16 NOTE — EICU
Intervention Initiated From:  Bedside    Gurpreet intervened regarding:  Medication    Nurse Notified:  Yes    Doctor Notified:  Yes    Comments: bedside nurse called to get eicu md-Dr. Asencio to order q 4 hr accu checks with sliding scale. Informed Dr. Asencio.

## 2024-06-17 ENCOUNTER — HOSPITAL ENCOUNTER (INPATIENT)
Facility: HOSPITAL | Age: 49
LOS: 3 days | Discharge: HOME | DRG: 100 | End: 2024-06-20
Attending: INTERNAL MEDICINE | Admitting: INTERNAL MEDICINE
Payer: MEDICAID

## 2024-06-17 VITALS
HEART RATE: 60 BPM | HEIGHT: 63 IN | OXYGEN SATURATION: 100 % | TEMPERATURE: 97 F | SYSTOLIC BLOOD PRESSURE: 162 MMHG | DIASTOLIC BLOOD PRESSURE: 91 MMHG | WEIGHT: 164.88 LBS | BODY MASS INDEX: 29.21 KG/M2 | RESPIRATION RATE: 17 BRPM

## 2024-06-17 DIAGNOSIS — Z86.73 H/O: CVA (CEREBROVASCULAR ACCIDENT): ICD-10-CM

## 2024-06-17 DIAGNOSIS — N30.00 ACUTE CYSTITIS WITHOUT HEMATURIA: ICD-10-CM

## 2024-06-17 DIAGNOSIS — I10 ESSENTIAL HYPERTENSION: ICD-10-CM

## 2024-06-17 DIAGNOSIS — E87.6 HYPOKALEMIA: ICD-10-CM

## 2024-06-17 DIAGNOSIS — I63.9 CEREBROVASCULAR ACCIDENT (CVA), UNSPECIFIED MECHANISM: ICD-10-CM

## 2024-06-17 DIAGNOSIS — R56.9 SEIZURES: ICD-10-CM

## 2024-06-17 DIAGNOSIS — R56.9 SEIZURE: ICD-10-CM

## 2024-06-17 DIAGNOSIS — I50.32 CHRONIC DIASTOLIC HEART FAILURE: Chronic | ICD-10-CM

## 2024-06-17 DIAGNOSIS — E78.2 MIXED HYPERLIPIDEMIA: ICD-10-CM

## 2024-06-17 DIAGNOSIS — G93.40 ACUTE ENCEPHALOPATHY: Primary | ICD-10-CM

## 2024-06-17 DIAGNOSIS — N17.9 AKI (ACUTE KIDNEY INJURY): ICD-10-CM

## 2024-06-17 DIAGNOSIS — G40.909 SEIZURE DISORDER: ICD-10-CM

## 2024-06-17 DIAGNOSIS — F99 PSYCHIATRIC DISORDER: ICD-10-CM

## 2024-06-17 LAB
ALBUMIN SERPL BCP-MCNC: 3.3 G/DL (ref 3.5–5.2)
ALP SERPL-CCNC: 146 U/L (ref 55–135)
ALT SERPL W/O P-5'-P-CCNC: 18 U/L (ref 10–44)
AMMONIA PLAS-SCNC: 80 UMOL/L (ref 10–50)
ANION GAP SERPL CALC-SCNC: 13 MMOL/L (ref 8–16)
AST SERPL-CCNC: 25 U/L (ref 10–40)
BACTERIA #/AREA URNS AUTO: ABNORMAL /HPF
BASOPHILS # BLD AUTO: 0.03 K/UL (ref 0–0.2)
BASOPHILS NFR BLD: 0.6 % (ref 0–1.9)
BILIRUB SERPL-MCNC: 0.2 MG/DL (ref 0.1–1)
BILIRUB UR QL STRIP: NEGATIVE
BUN SERPL-MCNC: 20 MG/DL (ref 6–20)
CALCIUM SERPL-MCNC: 9.3 MG/DL (ref 8.7–10.5)
CHLORIDE SERPL-SCNC: 113 MMOL/L (ref 95–110)
CLARITY UR REFRACT.AUTO: ABNORMAL
CO2 SERPL-SCNC: 16 MMOL/L (ref 23–29)
COLOR UR AUTO: YELLOW
CREAT SERPL-MCNC: 1.4 MG/DL (ref 0.5–1.4)
DIFFERENTIAL METHOD BLD: NORMAL
EOSINOPHIL # BLD AUTO: 0.2 K/UL (ref 0–0.5)
EOSINOPHIL NFR BLD: 3.9 % (ref 0–8)
ERYTHROCYTE [DISTWIDTH] IN BLOOD BY AUTOMATED COUNT: 12.4 % (ref 11.5–14.5)
EST. GFR  (NO RACE VARIABLE): 46 ML/MIN/1.73 M^2
GLUCOSE SERPL-MCNC: 72 MG/DL (ref 70–110)
GLUCOSE UR QL STRIP: NEGATIVE
HCT VFR BLD AUTO: 38.6 % (ref 37–48.5)
HGB BLD-MCNC: 13.6 G/DL (ref 12–16)
HGB UR QL STRIP: ABNORMAL
HYALINE CASTS UR QL AUTO: 0 /LPF
IMM GRANULOCYTES # BLD AUTO: 0.02 K/UL (ref 0–0.04)
IMM GRANULOCYTES NFR BLD AUTO: 0.4 % (ref 0–0.5)
KETONES UR QL STRIP: ABNORMAL
LEUKOCYTE ESTERASE UR QL STRIP: ABNORMAL
LYMPHOCYTES # BLD AUTO: 1.5 K/UL (ref 1–4.8)
LYMPHOCYTES NFR BLD: 28.7 % (ref 18–48)
MAGNESIUM SERPL-MCNC: 1.9 MG/DL (ref 1.6–2.6)
MCH RBC QN AUTO: 30.4 PG (ref 27–31)
MCHC RBC AUTO-ENTMCNC: 35.2 G/DL (ref 32–36)
MCV RBC AUTO: 86 FL (ref 82–98)
MICROSCOPIC COMMENT: ABNORMAL
MONOCYTES # BLD AUTO: 0.4 K/UL (ref 0.3–1)
MONOCYTES NFR BLD: 7.3 % (ref 4–15)
NEUTROPHILS # BLD AUTO: 3.2 K/UL (ref 1.8–7.7)
NEUTROPHILS NFR BLD: 59.1 % (ref 38–73)
NITRITE UR QL STRIP: POSITIVE
NRBC BLD-RTO: 0 /100 WBC
PH UR STRIP: 6 [PH] (ref 5–8)
PHOSPHATE SERPL-MCNC: 3 MG/DL (ref 2.7–4.5)
PLATELET # BLD AUTO: 293 K/UL (ref 150–450)
PMV BLD AUTO: 9.9 FL (ref 9.2–12.9)
POCT GLUCOSE: 101 MG/DL (ref 70–110)
POCT GLUCOSE: 174 MG/DL (ref 70–110)
POCT GLUCOSE: 63 MG/DL (ref 70–110)
POCT GLUCOSE: 89 MG/DL (ref 70–110)
POCT GLUCOSE: 96 MG/DL (ref 70–110)
POTASSIUM SERPL-SCNC: 4 MMOL/L (ref 3.5–5.1)
PROT SERPL-MCNC: 7.1 G/DL (ref 6–8.4)
PROT UR QL STRIP: ABNORMAL
RBC # BLD AUTO: 4.48 M/UL (ref 4–5.4)
RBC #/AREA URNS AUTO: 6 /HPF (ref 0–4)
SODIUM SERPL-SCNC: 142 MMOL/L (ref 136–145)
SP GR UR STRIP: 1.02 (ref 1–1.03)
SQUAMOUS #/AREA URNS AUTO: 1 /HPF
URN SPEC COLLECT METH UR: ABNORMAL
VALPROATE SERPL-MCNC: 77.1 UG/ML (ref 50–100)
WBC # BLD AUTO: 5.37 K/UL (ref 3.9–12.7)
WBC #/AREA URNS AUTO: 60 /HPF (ref 0–5)

## 2024-06-17 PROCEDURE — 27100245 HC EEG CAPS, DISPOSABLE

## 2024-06-17 PROCEDURE — 99223 1ST HOSP IP/OBS HIGH 75: CPT | Mod: ,,, | Performed by: PSYCHIATRY & NEUROLOGY

## 2024-06-17 PROCEDURE — 80164 ASSAY DIPROPYLACETIC ACD TOT: CPT

## 2024-06-17 PROCEDURE — 25000003 PHARM REV CODE 250

## 2024-06-17 PROCEDURE — 36415 COLL VENOUS BLD VENIPUNCTURE: CPT

## 2024-06-17 PROCEDURE — 87088 URINE BACTERIA CULTURE: CPT | Performed by: REGISTERED NURSE

## 2024-06-17 PROCEDURE — 85025 COMPLETE CBC W/AUTO DIFF WBC: CPT | Performed by: INTERNAL MEDICINE

## 2024-06-17 PROCEDURE — 87086 URINE CULTURE/COLONY COUNT: CPT | Performed by: REGISTERED NURSE

## 2024-06-17 PROCEDURE — 84100 ASSAY OF PHOSPHORUS: CPT

## 2024-06-17 PROCEDURE — 83735 ASSAY OF MAGNESIUM: CPT

## 2024-06-17 PROCEDURE — 25000003 PHARM REV CODE 250: Performed by: REGISTERED NURSE

## 2024-06-17 PROCEDURE — 20000000 HC ICU ROOM

## 2024-06-17 PROCEDURE — 36415 COLL VENOUS BLD VENIPUNCTURE: CPT | Mod: XB

## 2024-06-17 PROCEDURE — 94761 N-INVAS EAR/PLS OXIMETRY MLT: CPT

## 2024-06-17 PROCEDURE — 99291 CRITICAL CARE FIRST HOUR: CPT | Mod: ,,, | Performed by: REGISTERED NURSE

## 2024-06-17 PROCEDURE — 87077 CULTURE AEROBIC IDENTIFY: CPT | Performed by: REGISTERED NURSE

## 2024-06-17 PROCEDURE — 63600175 PHARM REV CODE 636 W HCPCS: Performed by: SURGERY

## 2024-06-17 PROCEDURE — 80053 COMPREHEN METABOLIC PANEL: CPT

## 2024-06-17 PROCEDURE — 36415 COLL VENOUS BLD VENIPUNCTURE: CPT | Performed by: INTERNAL MEDICINE

## 2024-06-17 PROCEDURE — 63600175 PHARM REV CODE 636 W HCPCS: Performed by: REGISTERED NURSE

## 2024-06-17 PROCEDURE — 63600175 PHARM REV CODE 636 W HCPCS: Performed by: INTERNAL MEDICINE

## 2024-06-17 PROCEDURE — 87186 SC STD MICRODIL/AGAR DIL: CPT | Performed by: REGISTERED NURSE

## 2024-06-17 PROCEDURE — 63600175 PHARM REV CODE 636 W HCPCS: Mod: JZ,JG

## 2024-06-17 PROCEDURE — 90833 PSYTX W PT W E/M 30 MIN: CPT | Mod: ,,, | Performed by: PSYCHIATRY & NEUROLOGY

## 2024-06-17 PROCEDURE — 81001 URINALYSIS AUTO W/SCOPE: CPT | Performed by: REGISTERED NURSE

## 2024-06-17 PROCEDURE — 82140 ASSAY OF AMMONIA: CPT | Performed by: INTERNAL MEDICINE

## 2024-06-17 RX ORDER — ESCITALOPRAM OXALATE 20 MG/1
20 TABLET ORAL DAILY
Status: DISCONTINUED | OUTPATIENT
Start: 2024-06-18 | End: 2024-06-20 | Stop reason: HOSPADM

## 2024-06-17 RX ORDER — LEVETIRACETAM 500 MG/5ML
1000 INJECTION, SOLUTION, CONCENTRATE INTRAVENOUS EVERY 12 HOURS
Status: DISCONTINUED | OUTPATIENT
Start: 2024-06-17 | End: 2024-06-17 | Stop reason: HOSPADM

## 2024-06-17 RX ORDER — LOSARTAN POTASSIUM 50 MG/1
50 TABLET ORAL DAILY
Status: DISCONTINUED | OUTPATIENT
Start: 2024-06-18 | End: 2024-06-19

## 2024-06-17 RX ORDER — AMLODIPINE BESYLATE 10 MG/1
10 TABLET ORAL DAILY
Status: DISCONTINUED | OUTPATIENT
Start: 2024-06-18 | End: 2024-06-20 | Stop reason: HOSPADM

## 2024-06-17 RX ORDER — ENOXAPARIN SODIUM 100 MG/ML
40 INJECTION SUBCUTANEOUS EVERY 24 HOURS
Status: DISCONTINUED | OUTPATIENT
Start: 2024-06-17 | End: 2024-06-20 | Stop reason: HOSPADM

## 2024-06-17 RX ORDER — ACETAMINOPHEN 325 MG/1
650 TABLET ORAL EVERY 6 HOURS PRN
Status: DISCONTINUED | OUTPATIENT
Start: 2024-06-17 | End: 2024-06-19

## 2024-06-17 RX ORDER — ONDANSETRON HYDROCHLORIDE 2 MG/ML
4 INJECTION, SOLUTION INTRAVENOUS EVERY 8 HOURS PRN
Status: DISCONTINUED | OUTPATIENT
Start: 2024-06-17 | End: 2024-06-20 | Stop reason: HOSPADM

## 2024-06-17 RX ORDER — LABETALOL HCL 20 MG/4 ML
10 SYRINGE (ML) INTRAVENOUS EVERY 4 HOURS PRN
Status: DISCONTINUED | OUTPATIENT
Start: 2024-06-17 | End: 2024-06-18

## 2024-06-17 RX ORDER — ENOXAPARIN SODIUM 100 MG/ML
40 INJECTION SUBCUTANEOUS EVERY 24 HOURS
Status: DISCONTINUED | OUTPATIENT
Start: 2024-06-17 | End: 2024-06-17 | Stop reason: HOSPADM

## 2024-06-17 RX ORDER — MIDAZOLAM HYDROCHLORIDE 1 MG/ML
1 INJECTION, SOLUTION INTRAMUSCULAR; INTRAVENOUS
Status: DISCONTINUED | OUTPATIENT
Start: 2024-06-17 | End: 2024-06-17

## 2024-06-17 RX ORDER — AMOXICILLIN 250 MG
1 CAPSULE ORAL 2 TIMES DAILY
Status: DISCONTINUED | OUTPATIENT
Start: 2024-06-17 | End: 2024-06-20 | Stop reason: HOSPADM

## 2024-06-17 RX ORDER — INSULIN ASPART 100 [IU]/ML
0-5 INJECTION, SOLUTION INTRAVENOUS; SUBCUTANEOUS EVERY 4 HOURS PRN
Status: DISCONTINUED | OUTPATIENT
Start: 2024-06-17 | End: 2024-06-18

## 2024-06-17 RX ORDER — ATORVASTATIN CALCIUM 40 MG/1
80 TABLET, FILM COATED ORAL DAILY
Status: DISCONTINUED | OUTPATIENT
Start: 2024-06-18 | End: 2024-06-20 | Stop reason: HOSPADM

## 2024-06-17 RX ORDER — SODIUM CHLORIDE 0.9 % (FLUSH) 0.9 %
10 SYRINGE (ML) INJECTION
Status: DISCONTINUED | OUTPATIENT
Start: 2024-06-17 | End: 2024-06-18

## 2024-06-17 RX ORDER — OLANZAPINE 10 MG/2ML
2.5 INJECTION, POWDER, FOR SOLUTION INTRAMUSCULAR ONCE AS NEEDED
Status: COMPLETED | OUTPATIENT
Start: 2024-06-17 | End: 2024-06-17

## 2024-06-17 RX ORDER — LOSARTAN POTASSIUM 50 MG/1
50 TABLET ORAL DAILY
Status: DISCONTINUED | OUTPATIENT
Start: 2024-06-17 | End: 2024-06-17 | Stop reason: HOSPADM

## 2024-06-17 RX ORDER — LEVETIRACETAM 500 MG/5ML
1000 INJECTION, SOLUTION, CONCENTRATE INTRAVENOUS EVERY 12 HOURS
Status: DISCONTINUED | OUTPATIENT
Start: 2024-06-17 | End: 2024-06-19

## 2024-06-17 RX ORDER — LACTULOSE 10 G/15ML
20 SOLUTION ORAL 3 TIMES DAILY
Status: DISCONTINUED | OUTPATIENT
Start: 2024-06-17 | End: 2024-06-18

## 2024-06-17 RX ORDER — GLUCAGON 1 MG
1 KIT INJECTION
Status: DISCONTINUED | OUTPATIENT
Start: 2024-06-17 | End: 2024-06-18

## 2024-06-17 RX ADMIN — LOSARTAN POTASSIUM 50 MG: 50 TABLET, FILM COATED ORAL at 11:06

## 2024-06-17 RX ADMIN — CARVEDILOL 37.5 MG: 25 TABLET, FILM COATED ORAL at 11:06

## 2024-06-17 RX ADMIN — LEVETIRACETAM 1000 MG: 100 INJECTION, SOLUTION INTRAVENOUS at 08:06

## 2024-06-17 RX ADMIN — DEXTROSE MONOHYDRATE 750 MG: 50 INJECTION, SOLUTION INTRAVENOUS at 06:06

## 2024-06-17 RX ADMIN — DEXTROSE MONOHYDRATE 750 MG: 50 INJECTION, SOLUTION INTRAVENOUS at 05:06

## 2024-06-17 RX ADMIN — AMLODIPINE BESYLATE 10 MG: 5 TABLET ORAL at 08:06

## 2024-06-17 RX ADMIN — MIDAZOLAM 1 MG: 1 INJECTION INTRAMUSCULAR; INTRAVENOUS at 11:06

## 2024-06-17 RX ADMIN — TOPIRAMATE 50 MG: 25 TABLET, FILM COATED ORAL at 08:06

## 2024-06-17 RX ADMIN — SENNOSIDES AND DOCUSATE SODIUM 1 TABLET: 50; 8.6 TABLET ORAL at 11:06

## 2024-06-17 RX ADMIN — CARVEDILOL 37.5 MG: 25 TABLET, FILM COATED ORAL at 08:06

## 2024-06-17 RX ADMIN — ENOXAPARIN SODIUM 40 MG: 40 INJECTION SUBCUTANEOUS at 04:06

## 2024-06-17 RX ADMIN — ATORVASTATIN CALCIUM 80 MG: 40 TABLET, FILM COATED ORAL at 08:06

## 2024-06-17 RX ADMIN — LACTULOSE 20 G: 20 SOLUTION ORAL at 11:06

## 2024-06-17 RX ADMIN — DEXTROSE MONOHYDRATE 125 ML: 100 INJECTION, SOLUTION INTRAVENOUS at 03:06

## 2024-06-17 RX ADMIN — MIDAZOLAM 1 MG: 1 INJECTION INTRAMUSCULAR; INTRAVENOUS at 06:06

## 2024-06-17 RX ADMIN — LEVETIRACETAM 1000 MG: 100 INJECTION INTRAVENOUS at 11:06

## 2024-06-17 RX ADMIN — POLYETHYLENE GLYCOL 3350 17 G: 17 POWDER, FOR SOLUTION ORAL at 08:06

## 2024-06-17 RX ADMIN — ESCITALOPRAM OXALATE 20 MG: 10 TABLET ORAL at 08:06

## 2024-06-17 RX ADMIN — OLANZAPINE 2.5 MG: 10 INJECTION, POWDER, FOR SOLUTION INTRAMUSCULAR at 06:06

## 2024-06-17 NOTE — NURSING
Ankle restraints discontinued @ 0810, patient verbalized understanding of discontinuation criteria ; wrist restraints discontinued @ 1010, patient verbalized understanding of discontinuation criteria. Oriented x3 and calm    1147: Patient became agitated, attempting to jump out of bed, swinging all extremities. Restless. 4 points reapplied with order in place and PRN Versed administered. Notified MD Yulisa.     UOP via purewick cloudy/concentrated/odorous - urine cx 6/15 ngtd.      Ammonia resulted at 80. Team aware of findings, no new orders at this time. Patient resting comfortably in bed after restraints reapplied and PRN Versed

## 2024-06-17 NOTE — PROGRESS NOTES
[facility] Progress Note    Gina Jenkins  1975  [id number]      Subjective:   Cap EEG with diffuse slowing theta-delta rhythm 3-6 Hz, no active seizures noted.    This AM pt with agitation, getting out of bed, flailing and combative.    Pt unable to participate in interview this afternoon, just received versed for agitation. Pt will open her eyes to voice, but not answering any questions or folllowing any commands at this time.    Objective:  Vitals:    06/17/24 0815   BP: (!) 180/96   Pulse: 71   Resp: 19   Temp:      Scheduled Meds:   amLODIPine  10 mg Oral Daily    atorvastatin  80 mg Oral Daily    carvediloL  37.5 mg Oral BID    enoxparin  40 mg Subcutaneous Daily    EScitalopram oxalate  20 mg Oral Daily    levETIRAcetam (Keppra) IV (PEDS and ADULTS)  1,000 mg Intravenous Q12H    mupirocin   Nasal BID    polyethylene glycol  17 g Oral Daily    topiramate  50 mg Oral BID    valproate sodium (DEPACON) IVPB  750 mg Intravenous Q12H       Neurologic Physical Examination:     [id number]    Cranial Nerves:  CN II: PERRL, blinks to threat bilaterally  CN V1 and VII: Corneal blink reflex intact bilaterally  CN VIII: Oculocephalic reflex intact  CN IX, X: Pt does not follow command to open her mouth    Motor and sensory:  Left UE: Withdraws from pain briskly  Left LE:Withdraws from pain briskly  Right UE:Withdraws from pain briskly  Right LE:Withdraws from pain briskly    Reflexes:         Left       Right   Brachioradialis        2+         2+   Bicep        2+         2+   Tricep        2+         2+   Patellar        2+         2+   Achilles   No clonus    No clonus   Babinski   Negative    Negative      Unable to assess orientation, language, memory, coordination or gait due to the above neurological deficits     Laboratory Findings:   CBC:   Recent Labs   Lab 06/15/24  0451 06/16/24  0308 06/17/24  0435   WBC 7.42 6.69 5.37   HGB 14.5 12.5 13.6   HCT 42.4 37.5 38.6    309 293   MCV 88 89 86   RDW 13.0  "13.1 12.4     BMP:   Recent Labs   Lab 06/10/24  1522 06/11/24  0817 06/15/24  0451 06/16/24  0308 06/17/24  0147      < > 141 142 142   K 4.1   < > 4.3 3.6 4.0      < > 110 112* 113*   CO2 20*   < > 18* 16* 16*   BUN 16   < > 25* 28* 20   CREATININE 1.5*   < > 2.1* 1.9* 1.4      < > 92 77 72   CALCIUM 9.3   < > 9.8 9.3 9.3   MG 2.0  --   --  2.2 1.9   PHOS  --   --   --  4.3 3.0    < > = values in this interval not displayed.     LFTs:   Recent Labs   Lab 06/15/24  0451 06/16/24  0308 06/17/24  0147   PROT 7.9 7.3 7.1   ALBUMIN 3.6 3.5 3.3*   BILITOT 0.2 0.3 0.2   AST 27 21 25   ALKPHOS 162* 159* 146*   ALT 33 25 18     Coags: No results for input(s): "INR", "PROTIME", "PTT" in the last 168 hours.  FLP: No results for input(s): "CHOL", "LDLCALC", "TRIG", "CHOLHDL" in the last 168 hours.  DM:   Recent Labs   Lab 06/15/24  0451 06/16/24  0308 06/17/24  0147   CREATININE 2.1* 1.9* 1.4     Thyroid:   Recent Labs   Lab 06/15/24  0451   TSH 1.017     Anemia: No results for input(s): "IRON", "TIBC", "FERRITIN", "OETVILGQ22", "FOLATE" in the last 168 hours.  Cardiac:   Recent Labs   Lab 06/15/24  0451   TROPONINI 0.008     Urinalysis:   Recent Labs   Lab 06/15/24  0806   COLORU Yellow   APPEARANCEUA Clear   PHUR 7.0   SPECGRAV 1.025   PROTEINUA 1+*   GLUCUA Negative   KETONESU Negative   BILIRUBINUA Negative   OCCULTUA Negative   NITRITE Negative   UROBILINOGEN Negative   LEUKOCYTESUR Trace*     Urine Micro:  Recent Labs   Lab 06/15/24  0806   RBCUA 1   WBCUA 14*   BACTERIA None   SQUAMEPITHEL 0   MICROCMT SEE COMMENT        Neuroimaging:   MRI brain 6/8/24  Impression:     1.  No MR evidence of acute infarction.     2.  Postoperative changes in the left frontal and temporal calvarium with encephalomalacia in the left temporal pole.     3.  Nonspecific T2/FLAIR signal hyperintensities within the periventricular and subcortical white matter.    CTH non con 6/15/24  Impression:     Chronic and " postoperative changes are noted, there is no evidence for superimposed acute intracranial process.    CTA head and neck 6/15/24  Impression:     1. No definite acute intracranial findings by CT.  2. No evidence of acute large vessel occlusion or flow-limiting stenosis.  3. Redemonstrated sequela of prior clip in the left ICA aneurysms, associated with posterior communicating artery and anterior choroidal artery origins.  No definite evidence of residual recurrent aneurysm.  4. When compared to prior CTA and MRA of 11/14/2023, as well as CTA performed 06/07/2024, no significant change in appearance of the untreated left M2 MCA aneurysm.  5. Additional details as above.    Assessment/Plan:   Ms Jenkins is a 49 y.o. female who has a PMH of brain aneurysm, coronary angioplasty, HFpEF (EF 55% 6/2024), HTN, HLD, migraine headaches, CVA. The patient presented to [facility] on 6/15/2024 with a primary complaint of acute encephalopathy x1 day. Patient was found at home by her family confused and surrounded with her faeces and urine. Had one seizure like event in the ED.    Impression:  Acute encephalopathy due to Seizure vs. PNES vs. Hyperammonemia     Recommendations  - Discontinue depakote entirely  - Continue Keppra 1000 mg BID  - Continue home Topamax 50mg for migraine   - PRN ativan 2 mg IV for any seizure >5mins, or back to back seizure with no return to baseline in between  - Recommend psychiatry consult   - MRI brain w and wo contrast  - Recommend transfer to [facility] for cEEG         Case discussed with Dr. Landry Coleman MD  [facility] PGY-3
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
## 2024-06-17 NOTE — PLAN OF CARE
The sw met with the pt in the ICU to complete the assessment. The pt was sleeping and a bit altered so the sw spoke to the pt's mother Miladys Davies([phone])via phone to complete the assessment. The pt lives on [address] along with her 3 dependent sons. The pt's currently unemployed and has been denied several times by Social Security to qualify for Disability. The pt receives SNAP benefits and her family assist with monetary needs. The pt still drives(prior to the last hospital admit) but one of her family members will transport her home at d/c. The pt's family had to assist her with her ADL's(independent prior to the last hospital admit) after her last hospital admit but she doesn't use dme. The pt's current with [facility] and they ordered her a w/c(being delivered to her home today). The pt's niece Padmini Perales [phone] entered the room during the assessment. The pt's family had to carry her after her last hospital admit due to her being so weak and deconditioned. The sw left her name and contact info on the white board in the pt's room. The sw left a d/c brochure at bedside with her contact info on it. The sw encouraged them to call if they have any further questions or concerns. The sw will continue to follow the pt throughout her transitions and will assist with any d/c needs. The pt's mother states the pt will be transferred to [facility] for further medical management after her MRI.     [facility] - Intensive Care  Initial Discharge Assessment       Primary Care Provider: Clair Johnson NP    Admission Diagnosis: Acute encephalopathy [G93.40]  Seizure disorder [G40.909]    Admission Date: 6/15/2024  Expected Discharge Date: 6/21/2024    Transition of Care Barriers: (P) Underinsured    Payor: MEDICAID / Plan: [place] [facility] CONNECT / Product Type: Managed Medicaid /     Extended Emergency Contact Information  Primary Emergency Contact: Miladys Davies  Address: [address] [place] [address] [place]  Mobile Phone: [phone]  Relation: Mother  Secondary Emergency Contact: Netta De Leon  Mobile Phone: [phone]  Relation: Sister  Preferred language: English    Discharge Plan A: (P) Rehab  Discharge Plan B: (P) Home Health      [id number] [place] - [address] [facility] [place] [facility] [address]  [address]  [place] [address]  Phone: [phone] Fax: [phone]      Initial Assessment (most recent)       Adult Discharge Assessment - 06/17/24 0919          Discharge Assessment    Assessment Type Discharge Planning Assessment (P)      Confirmed/corrected address, phone number and insurance Yes (P)      Confirmed Demographics Correct on Facesheet (P)      Source of Information family (P)      If unable to respond/provide information was family/caregiver contacted? Yes (P)      Contact Name/Number Miladys Davies(mother)[phone] (P)      When was your last doctors appointment? -- (P)    mother unsure    Communicated CADY with patient/caregiver Date not available/Unable to determine (P)      Reason For Admission Acute encephalopathy (P)      People in Home child(precious), dependent (P)      Do you expect to return to your current living situation? Other (see comments) (P)    TBD    Do you have help at home or someone to help you manage your care at home? Yes (P)      Who are your caregiver(s) and their phone number(s)? Miladys Davies(mother)[phone]/Mia De Leon(sister)[phone]/Padmini Perales(niece)[phone] (P)      Prior to hospitilization cognitive status: Unable to Assess (P)      Current cognitive status: Not Oriented to Time;Not Oriented to Place (P)      Walking or Climbing Stairs Difficulty yes (P)      Walking or Climbing Stairs ambulation difficulty, dependent;stair climbing difficulty, dependent;transferring difficulty, dependent (P)      Dressing/Bathing Difficulty yes (P)      Dressing/Bathing bathing  difficulty, dependent;dressing difficulty, dependent (P)      Home Accessibility wheelchair accessible (P)      Home Layout Able to live on 1st floor (P)      Equipment Currently Used at Home none (P)    pt has a w/c being delivered to her home today    Readmission within 30 days? Yes (P)      Patient currently being followed by outpatient case management? No (P)      Do you currently have service(s) that help you manage your care at home? -- (P)    the pt's current with [facility]    Do you take prescription medications? Yes (P)      Do you have prescription coverage? Yes (P)      Coverage Medicaid [place] [facility] (P)      Do you have any problems affording any of your prescribed medications? No (P)      Is the patient taking medications as prescribed? yes (P)      Who is going to help you get home at discharge? Miladys Davies(mother)[phone]/Mia Moises(sister)[phone]/Padmini Perales(niece)[phone] (P)      How do you get to doctors appointments? car, drives self;family or friend will provide (P)      Are you on dialysis? No (P)      Do you take coumadin? No (P)      Discharge Plan A Rehab (P)      Discharge Plan B Home Health (P)      DME Needed Upon Discharge  other (see comments) (P)    TBD    Discharge Plan discussed with: Parent(s) (P)      Name(s) and Number(s) Miladys Davies(mother)[phone] (P)      Transition of Care Barriers Underinsured (P)         Physical Activity    On average, how many days per week do you engage in moderate to strenuous exercise (like a brisk walk)? 0 days (P)      On average, how many minutes do you engage in exercise at this level? 0 min (P)         Financial Resource Strain    How hard is it for you to pay for the very basics like food, housing, medical care, and heating? Patient unable to answer (P)         Housing Stability    In the last 12 months, was there a time when you were not able to pay the mortgage or rent on time? Patient unable to answer  (P)      At any time in the past 12 months, were you homeless or living in a shelter (including now)? Patient unable to answer (P)         Transportation Needs    Has the lack of transportation kept you from medical appointments, meetings, work or from getting things needed for daily living? No (P)         Food Insecurity    Within the past 12 months, you worried that your food would run out before you got the money to buy more. Never true (P)      Within the past 12 months, the food you bought just didn't last and you didn't have money to get more. Never true (P)         Stress    Do you feel stress - tense, restless, nervous, or anxious, or unable to sleep at night because your mind is troubled all the time - these days? Very much (P)         Social Isolation    How often do you feel lonely or isolated from those around you?  Patient unable to answer (P)         Alcohol Use    Q1: How often do you have a drink containing alcohol? Patient unable to answer (P)      Q2: How many drinks containing alcohol do you have on a typical day when you are drinking? Patient unable to answer (P)      Q3: How often do you have six or more drinks on one occasion? Patient unable to answer (P)         Utilities    In the past 12 months has the electric, gas, oil, or water company threatened to shut off services in your home? No (P)         Health Literacy    How often do you need to have someone help you when you read instructions, pamphlets, or other written material from your doctor or pharmacy? Sometimes (P)         OTHER    Name(s) of People in Home pt's 3 dependent sons live with her (P)

## 2024-06-17 NOTE — PLAN OF CARE
Problem: Adult Inpatient Plan of Care  Goal: Optimal Comfort and Wellbeing  Outcome: Progressing  Goal: Readiness for Transition of Care  Outcome: Progressing     Problem: Fall Injury Risk  Goal: Absence of Fall and Fall-Related Injury  Outcome: Progressing     Problem: Seizure, Active Management  Goal: Absence of Seizure/Seizure-Related Injury  Outcome: Progressing     Problem: Diabetes Comorbidity  Goal: Blood Glucose Level Within Targeted Range  Outcome: Progressing     Problem: Restraint, Nonviolent  Goal: Absence of Harm or Injury  Outcome: Progressing

## 2024-06-17 NOTE — PROCEDURES
[facility] EEG/VIDEO MONITORING REPORT  Gina Jenkins  [id number]  1975    DATE OF SERVICE: 6/16/246/17/24  DATE OF ADMISSION: 6/15/2024  4:00 AM    ADMITTING/REQUESTING PROVIDER: Beck Leiva MD    REASON FOR CONSULT: 49 y.o. female with a past medical history of HTN, HLD, stroke, HFpEF, brain aneurysm s/p clipping c/b abscess, and seizure disorder, who presented on 6/15/2024 for acute encephalopathy.  EEG obtained to evaluate for epileptiform activity.      METHODOLOGY   Electroencephalographic (EEG) recording is with electrodes placed according to the International 10-20 placement system.  Thirty two (32) channels of digital signal (sampling rate of 512/sec) including T1 and T2 was simultaneously recorded from the scalp and may include  EKG, EMG, and/or eye monitors.  Recording band pass was 0.1 to 512 hz.  Digital video recording of the patient is simultaneously recorded with the EEG.  The patient is instructed report clinical symptoms which may occur during the recording session.  EEG and video recording is stored and archived in digital format.  Activation procedures which include photic stimulation, hyperventilation and instructing patients to perform simple task are done in selected patients.   The EEG is displayed on a monitor screen and can be reviewed using different montages.  Computer assisted analysis is employed to detect spike and electrographic seizure activity.   The entire record is submitted for computer analysis.  The entire recording is visually reviewed and the times identified by computer analysis as being spikes or seizures are reviewed again.  Compresses spectral analysis (CSA) is also performed on the activity recorded from each individual channel.  This is displayed as a power display of frequencies from 0 to 30 Hz over time.   The CSA is reviewed looking for asymmetries in power between homologous areas of the scalp and then compared with the original EEG recording.     Persyst  software is also utilized in the review of this study.  This software suite analyzes the EEG recording in multiple domains.  Coherence and rhythmicity is computed to identify EEG sections which may contain organized seizures.  Each channel undergoes analysis to detect presence of spike and sharp waves which have special and morphological characteristic of epileptic activity.  The routine EEG recording is converted from spacial into frequency domain.  This is then displayed comparing homologous areas to identify areas of significant asymmetry.  Algorithm to identify non-cortically generated artifact is used to separate eye movement, EMG and other artifact from the EEG.      RECORDING TIMES  Start on 6/16/24 at 07:00  Stop on 6/17/24 at 07:00 - 24 hours  Start on 6/17/24 at 07:00  Stop on 6/17/24 at 08:54 - 1 hour 54 mins    EEG FINDINGS  Background activity:   Within the limitation of a significant amount of movement/lead artifact on an electrode cap EEG, the background is composed of intermixed theta and delta activity.  At maximum alertness a 7 hz posterior dominant rhythm is present.  As the record progresses more electrode artifacts are present thus limiting the interpretation of the last two hours.     Sleep:  The patient transitions from wakefulness to sleep with the appearance of sleep spindles, K complexes, and vertex waves.    Activation procedures:   Hyperventilation is not performed  Photic stimulation is not performed    Cardiac Monitor:   Electrode caps do not have EKG capabilities    Impression:   Within the limitation of a significant amount of lead/movement artifact, this is an abnormal electrode cap EEG monitoring study because of generalized background slowing consistent with a mild to moderate diffuse encephalopathy.  There are no pushbutton activations.  There are no apparent epileptiform discharges or electrographic seizures.  Depending on the clinical scenario, consider additional monitoring  with standard scalp electrodes.    Neha Campos MD  [facility]   Department of Neurology

## 2024-06-17 NOTE — PROGRESS NOTES
Pharmacist Renal Dose Adjustment Note    Gina Jenkins is a 49 y.o. female being treated with the medication enoxaparin    Patient Data:    Vital Signs (Most Recent):  Temp: 98.6 °F (37 °C) (06/17/24 0328)  Pulse: 79 (06/17/24 0530)  Resp: 14 (06/17/24 0530)  BP: (!) 150/88 (06/17/24 0530)  SpO2: 95 % (06/17/24 0530) Vital Signs (72h Range):  Temp:  [96.6 °F (35.9 °C)-98.6 °F (37 °C)]   Pulse:  [64-79]   Resp:  [14-35]   BP: (117-165)/()   SpO2:  [93 %-100 %]      Recent Labs   Lab 06/15/24  0451 06/16/24  0308 06/17/24 0147   CREATININE 2.1* 1.9* 1.4     Serum creatinine: 1.4 mg/dL 06/17/24 0147  Estimated creatinine clearance: 47.6 mL/min    Medication:enoxaparin dose: 30 mg frequency q24h will be changed to medication:enoxaparin dose:40 mg frequency:q24h    Pharmacist's Name: Lida Pablo  Pharmacist's Extension: [phone]

## 2024-06-17 NOTE — PLAN OF CARE
The sw faxed the pt's updates via [employer] to her current hh agency [facility] to notify of a hospital admit.        06/17/24 9046   Post-Acute Status   Post-Acute Authorization Home Health   Home Health Status Referrals Sent   Discharge Plan   Discharge Plan A Rehab   Discharge Plan B Home Health

## 2024-06-17 NOTE — PROGRESS NOTES
[facility] Medicine Progress Note    Primary Team: [facility] Hospitalist Team B  Attending Physician: Beck Leiva MD  Resident: Michael Issa MD  Intern: Zak    Subjective:      Patient resting comfortably in bed. Overnight, patient became restless, agitated, thrashing about in the bed. Required four point restraints, Zyprexa 2.5 mg. This morning, she is alert to self, place and situation, not time. She moves all extremities on command. Responses very slowed. No reported seizure activity on continuous EEG.     Objective:     Last 24 Hour Vital Signs:  BP  Min: 130/78  Max: 180/94  Temp  Av °F (36.7 °C)  Min: 96.6 °F (35.9 °C)  Max: 98.6 °F (37 °C)  Pulse  Av  Min: 64  Max: 81  Resp  Av.5  Min: 14  Max: 28  SpO2  Av.4 %  Min: 95 %  Max: 100 %  I/O last 3 completed shifts:  In: 1667.7 [I.V.:881.2; IV Piggyback:786.5]  Out: 700 [Urine:700]    Physical Examination:  Physical Exam  Vitals and nursing note reviewed.   Constitutional:       Appearance: She is ill-appearing.      Comments: Somnolent but arousable to voice and noxious stimuli.   HENT:      Head: Normocephalic and atraumatic.      Comments: EEG cap on     Mouth/Throat:      Mouth: Mucous membranes are dry.      Pharynx: No oropharyngeal exudate.   Eyes:      Extraocular Movements: Extraocular movements intact.      Pupils: Pupils are equal, round, and reactive to light.   Cardiovascular:      Rate and Rhythm: Normal rate and regular rhythm.      Heart sounds: No murmur heard.  Pulmonary:      Effort: Pulmonary effort is normal.      Breath sounds: Normal breath sounds. No wheezing.   Abdominal:      General: Abdomen is flat.      Palpations: Abdomen is soft.      Tenderness: There is no abdominal tenderness.   Musculoskeletal:         General: No swelling or deformity.   Skin:     General: Skin is warm and dry.      Capillary Refill: Capillary refill takes less than 2 seconds.   Neurological:      Mental Status: She is alert and  oriented to person, place, and time.      Comments: Slowed but appropriate responses to orientation questions. Able to move all extremities on command. Generalized weakness noted. When prompted, will hold UE to gravity but not LE. Withdraws to pain in all extremities. Pupils are equal and reactive.      Laboratory Data: Reviewed.  Trended Lab Data:  Recent Labs   Lab 06/15/24  0451 06/16/24  0308 06/17/24  0147 06/17/24  0435   WBC 7.42 6.69  --  5.37   HGB 14.5 12.5  --  13.6   HCT 42.4 37.5  --  38.6    309  --  293   MCV 88 89  --  86   RDW 13.0 13.1  --  12.4    142 142  --    K 4.3 3.6 4.0  --     112* 113*  --    CO2 18* 16* 16*  --    BUN 25* 28* 20  --    CREATININE 2.1* 1.9* 1.4  --    GLU 92 77 72  --    PROT 7.9 7.3 7.1  --    ALBUMIN 3.6 3.5 3.3*  --    BILITOT 0.2 0.3 0.2  --    AST 27 21 25  --    ALKPHOS 162* 159* 146*  --    ALT 33 25 18  --      Trended Cardiac Data: Reviewed.  Recent Labs   Lab 06/15/24  0451   TROPONINI 0.008       Microbiology Data: Reviewed.    Radiology Data: Reviewed.  Imaging Results              CTA Brain (Final result)  Result time 06/15/24 07:22:07      Final result by William Putnam MD (06/15/24 07:22:07)                   Impression:      1. No definite acute intracranial findings by CT.  2. No evidence of acute large vessel occlusion or flow-limiting stenosis.  3. Redemonstrated sequela of prior clip in the left ICA aneurysms, associated with posterior communicating artery and anterior choroidal artery origins.  No definite evidence of residual recurrent aneurysm.  4. When compared to prior CTA and MRA of 11/14/2023, as well as CTA performed 06/07/2024, no significant change in appearance of the untreated left M2 MCA aneurysm.  5. Additional details as above.      Electronically signed by: William Putnam  Date:    06/15/2024  Time:    07:22               Narrative:    EXAMINATION:  CTA HEAD; CTA NECK    CLINICAL HISTORY:  Cerebral aneurysm,  follow-up;    TECHNIQUE:  Non contrast low dose axial images were obtained thought the head and neck.  CT angiogram was performed from the level of the bottom of C2 to the top of the head following the IV administration of 100mL of Omnipaque 350.   Sagittal and coronal reconstructions and maximum intensity projection reconstructions were performed.    COMPARISON:  CT head without contrast 06/15/2024.    FINDINGS:  CT HEAD:    Blood: No acute intracranial hemorrhage.    Parenchyma: No definite loss of gray-white differentiation to suggest acute or subacute transcortical infarct.    Ventricles/Extra-axial spaces: Redemonstrated thin extra-axial hyperattenuation due to the left-sided craniotomy site, similar to recent CT of 06/18/2024, 04:21 hours as well as when compared to older study of 11/14/2023, felt likely related to postoperative sequela.  Unchanged size and configuration of the ventricles.    Vessels: See below.    Orbits: Unremarkable.    Scalp: Unremarkable.    Skull: There are no depressed skull fractures or destructive bone lesions.    Sinuses and mastoids: Scattered relatively modest paranasal sinus mucosal thickening.    Other findings: Dental caries.  Periapical lucency about right posterior maxillary molar tooth with dehiscence of the buccal cortex.  Additional periapical lucencies involving the left mandibular molar teeth.  These may represent periapical abscess.    CTA:    NECK:    Imaged aortic arch: Nonaneurysmal.  Left-sided arch.  Conventional 3 vessel arch anatomy.  Subclavian and brachiocephalic arteries appear grossly patent.    Right common and cervical internal carotid arteries: CCA and ECA grossly patent.  Less than 50% atheromatous narrowing at the proximal ICA.  No evidence of carotid dissection or web.    Left common and cervical internal carotid arteries: CCA and ECA grossly patent.  Less than 50% atheromatous narrowing at the proximal ICA.  No evidence of carotid dissection or  web.    Right cervical vertebral artery: There is no hemodynamically significant stenosis or dissection    Left cervical vertebral artery: There is no hemodynamically significant stenosis or dissection. Left vertebral artery appears dominant.    HEAD:    Right anterior circulation:Mild atheromatous irregularity of the ICA.  Right ophthalmic artery is patent.Mild atheromatous irregularity of the M1 MCA.  No definite evidence of acute large vessel occlusion or flow-limiting stenosis.    Left anterior circulation: Mild atheromatous irregularity of the ICA.  No definite evidence of flow-limiting stenosis or large vessel occlusion.Left ophthalmic artery is patent.Redemonstrated postoperative sequela of clipping of left ICA aneurysms associated with the posterior communicating artery and anterior choroidal artery origins.  Noting limitations imposed by clip related artifact, no definite residual recurrent aneurysm is appreciated.  Unknown, un treated left M2 AUGIE aneurysm (axial source series 5, image 332) measures on the order of 2-3 mm from neck to dome appears similar to 11/14/2023.    Posterior circulation: There is no hemodynamically significant vertebrobasilar stenosis. There is no significant PCA stenosis. Posterior inferior cerebellar arteries patent at origin.    Anterior and posterior communicating arteries: Suspect small patent anterior communicating artery.  Posterior communicating arteries are not well seen.    NON-ANGIOGRAPHIC FINDINGS:    Visualized intracranial contents: As above.    Soft tissues of the neck: Unremarkable.    Visualized sinuses: As above.    Dentition: As above.    Spine: Degenerative change.    Visualized lungs: No acute change.  Mild emphysematous.                                       CTA Neck (Final result)  Result time 06/15/24 07:22:07      Final result by William Putnam MD (06/15/24 07:22:07)                   Impression:      1. No definite acute intracranial findings by  CT.  2. No evidence of acute large vessel occlusion or flow-limiting stenosis.  3. Redemonstrated sequela of prior clip in the left ICA aneurysms, associated with posterior communicating artery and anterior choroidal artery origins.  No definite evidence of residual recurrent aneurysm.  4. When compared to prior CTA and MRA of 11/14/2023, as well as CTA performed 06/07/2024, no significant change in appearance of the untreated left M2 MCA aneurysm.  5. Additional details as above.      Electronically signed by: William Putnam  Date:    06/15/2024  Time:    07:22               Narrative:    EXAMINATION:  CTA HEAD; CTA NECK    CLINICAL HISTORY:  Cerebral aneurysm, follow-up;    TECHNIQUE:  Non contrast low dose axial images were obtained thought the head and neck.  CT angiogram was performed from the level of the bottom of C2 to the top of the head following the IV administration of 100mL of Omnipaque 350.   Sagittal and coronal reconstructions and maximum intensity projection reconstructions were performed.    COMPARISON:  CT head without contrast 06/15/2024.    FINDINGS:  CT HEAD:    Blood: No acute intracranial hemorrhage.    Parenchyma: No definite loss of gray-white differentiation to suggest acute or subacute transcortical infarct.    Ventricles/Extra-axial spaces: Redemonstrated thin extra-axial hyperattenuation due to the left-sided craniotomy site, similar to recent CT of 06/18/2024, 04:21 hours as well as when compared to older study of 11/14/2023, felt likely related to postoperative sequela.  Unchanged size and configuration of the ventricles.    Vessels: See below.    Orbits: Unremarkable.    Scalp: Unremarkable.    Skull: There are no depressed skull fractures or destructive bone lesions.    Sinuses and mastoids: Scattered relatively modest paranasal sinus mucosal thickening.    Other findings: Dental caries.  Periapical lucency about right posterior maxillary molar tooth with dehiscence of the buccal  cortex.  Additional periapical lucencies involving the left mandibular molar teeth.  These may represent periapical abscess.    CTA:    NECK:    Imaged aortic arch: Nonaneurysmal.  Left-sided arch.  Conventional 3 vessel arch anatomy.  Subclavian and brachiocephalic arteries appear grossly patent.    Right common and cervical internal carotid arteries: CCA and ECA grossly patent.  Less than 50% atheromatous narrowing at the proximal ICA.  No evidence of carotid dissection or web.    Left common and cervical internal carotid arteries: CCA and ECA grossly patent.  Less than 50% atheromatous narrowing at the proximal ICA.  No evidence of carotid dissection or web.    Right cervical vertebral artery: There is no hemodynamically significant stenosis or dissection    Left cervical vertebral artery: There is no hemodynamically significant stenosis or dissection. Left vertebral artery appears dominant.    HEAD:    Right anterior circulation:Mild atheromatous irregularity of the ICA.  Right ophthalmic artery is patent.Mild atheromatous irregularity of the M1 MCA.  No definite evidence of acute large vessel occlusion or flow-limiting stenosis.    Left anterior circulation: Mild atheromatous irregularity of the ICA.  No definite evidence of flow-limiting stenosis or large vessel occlusion.Left ophthalmic artery is patent.Redemonstrated postoperative sequela of clipping of left ICA aneurysms associated with the posterior communicating artery and anterior choroidal artery origins.  Noting limitations imposed by clip related artifact, no definite residual recurrent aneurysm is appreciated.  Unknown, un treated left M2 AUGIE aneurysm (axial source series 5, image 332) measures on the order of 2-3 mm from neck to dome appears similar to 11/14/2023.    Posterior circulation: There is no hemodynamically significant vertebrobasilar stenosis. There is no significant PCA stenosis. Posterior inferior cerebellar arteries patent at  origin.    Anterior and posterior communicating arteries: Suspect small patent anterior communicating artery.  Posterior communicating arteries are not well seen.    NON-ANGIOGRAPHIC FINDINGS:    Visualized intracranial contents: As above.    Soft tissues of the neck: Unremarkable.    Visualized sinuses: As above.    Dentition: As above.    Spine: Degenerative change.    Visualized lungs: No acute change.  Mild emphysematous.                                       CT Head Without Contrast (Final result)  Result time 06/15/24 05:41:55      Final result by Faheem Okeefe MD (06/15/24 05:41:55)                   Impression:      Chronic and postoperative changes are noted, there is no evidence for superimposed acute intracranial process.      Electronically signed by: Faheem Okeefe  Date:    06/15/2024  Time:    05:41               Narrative:    EXAMINATION:  CT HEAD WITHOUT CONTRAST    CLINICAL HISTORY:  Mental status change, unknown cause;    TECHNIQUE:  Low dose axial images were obtained through the head.  Coronal and sagittal reformations were also performed. Contrast was not administered.    COMPARISON:  Prior examinations, most recent of which is June 7, 2024    FINDINGS:  There is artifact present, in addition positioning is less than optimal, these factors diminish the sensitivity of the examination.    Postoperative calvarial change on the left is noted, underlying the postoperative location, there is appearance of minimal thin extra-axial appearing density overlying the left cerebral hemisphere, however this appears stable on prior examinations and is thought likely representative of chronic mild dural thickening.  Intracranial aneurysm clip at the medial aspect of the middle cranial fossa is noted.    There is no additional evidence for intracranial mass, mass effect or midline shift or acute intracranial hemorrhage.  Gray-white differentiation appears appropriate.  There is no hydrocephalus.   Appropriate CSF spaces are seen at the skull base.    The osseous structures demonstrate chronic and postoperative change.  The mastoid air cells appear well aerated.  There is limited visualization of the paranasal sinuses.  The sphenoid sinus appears appropriate.                                    Current Medications:     Infusions:       Scheduled:   amLODIPine  10 mg Oral Daily    atorvastatin  80 mg Oral Daily    carvediloL  37.5 mg Oral BID    enoxparin  40 mg Subcutaneous Daily    EScitalopram oxalate  20 mg Oral Daily    levETIRAcetam (Keppra) IV (PEDS and ADULTS)  1,000 mg Intravenous Q12H    mupirocin   Nasal BID    polyethylene glycol  17 g Oral Daily    topiramate  50 mg Oral BID    valproate sodium (DEPACON) IVPB  750 mg Intravenous Q12H        PRN:    Current Facility-Administered Medications:     acetaminophen, 650 mg, Oral, Q4H PRN    dextrose 10%, 12.5 g, Intravenous, PRN    dextrose 10%, 25 g, Intravenous, PRN    glucagon (human recombinant), 1 mg, Intramuscular, PRN    glucose, 16 g, Oral, PRN    glucose, 24 g, Oral, PRN    HYDROmorphone, 1 mg, Intravenous, Q4H PRN    insulin aspart U-100, 0-5 Units, Subcutaneous, Q4H PRN    lorazepam, 1 mg, Intravenous, Q15 Min PRN    midazolam, 1 mg, Intravenous, Q1H PRN    naloxone, 0.02 mg, Intravenous, PRN    sodium chloride 0.9%, 10 mL, Intravenous, Q12H PRN    Assessment:     Gina Jenkins is a 49 y.o.female with  Active Hospital Problems    Diagnosis  POA    *Acute encephalopathy [G93.40]  Yes    COOPER (acute kidney injury) [N17.9]  Yes    Seizure disorder [G40.909]  Yes    Mixed hyperlipidemia [E78.2]  Yes    Weakness [R53.1]  Yes    Cognitive communication deficit [R41.841]  Yes    Aneurysm of left middle cerebral artery [I67.1]  Yes    Tobacco abuse [Z72.0]  Yes    Thrombotic stroke involving left middle cerebral artery [I63.312]  Yes    Essential hypertension [I10]  Yes      Resolved Hospital Problems   No resolved problems to display.     Plan:     #  Acute Encephalopathy  Suspect medication induced vs prolonged post-ictal phase vs non-convulsive status. No infectious or metabolic etiology seen. Imaging of the brain without reason for acute encephalopathy. Her CK and lactic acid are within normal limits.   - will continue treatment for seizures as below  - contiuous EEG  - neurology consulted  - ativan 2 mg IV PRN for seizures lasting > 5 min or back-to-back seizures without return to baseline  - replete electrolytes PRN  - delerium precautions    # Seizure Disorder, concern for Psychogenic Non-Epileptiform Seizures  # Prior Cerebral Aneurysms s/p Craniotomy and Clipping  Patient presented acutely altered and likely post-ictal after being found down by family in urine/feces. Another seizure event in the emergency room that she was given Ativan for. Despite appropriate time for ativan to washout, patient never returned to mental baseline.  - given no return to baseline, patient stepped up to the ICU for continuous EEG to rule out non-convulsive status epilepticus   - no reported seizure acitivity on continous EEG  - decrease Keppra dose to 1000 mg IV BID, increase Valproate to 750 mg IV TID, will transition to PO once safe  - Neurology consulted, appreciate recommendations  - might need evaluation by Epileptologist at [facility] if no return to baseline     # Hospital Acquired Delirium   - Please initiate delirium protocols with frequent reorientation  - Day: shades up, TV on, lights on  - Night: shades down, TV off, lights off  - PRN zyprexa as needed  - Inpatient consult to Psychiatry    # Migraine Headaches  - continue topiramate 50 mg BID  - multimodal pain control     # Acute Kidney Injury Superimposed on Chronic Kidney Disease  - suspect pre-renal etiology in the setting of decreased PO intake  - monitor with daily renal function, if continued poor PO intake can start maintenance fluids with D5 LR    # Hypertension  - continue home losartan 50 mg qd and  Carvedilol 37.5 mg BID as PO intake allows    # Hyperlipidemia  - continue home atorvastatin 80 mg qd as PO intake allows    # Anxiety, Depression   - Continue home lexapro to 20 mg daily    DVT PPx: renally dosed Lovenox   Diet: clear liquid diet  Code: FULL    Dispo: stepped down from the ICU, still no return to mental status baseline, neurology and psychiatry recommendations, not medically stable for discharge    Michael Issa MD  [facility] Internal Medicine HO-II    [facility] Medicine Hospitalist Pager numbers:   [facility] Hospitalist Medicine Team A (Joshua/Sky): [phone]  [facility] Hospitalist Medicine Team B (Chey/Cali):  [phone]

## 2024-06-17 NOTE — NURSING
Patient trying to get our of bed, states she wants to go home. Patient pulling lines and kicking her legs towards staff. Disoriented x3, Unable to reorient patient.  notified.

## 2024-06-17 NOTE — EICU
eICU Physician Virtual/Remote Brief Evaluation Note      Message from bedside RN   Patient becoming agitated, getting out of bed  Chart reviewed, patient observed, discussed with RN   /88, P 79, RR 23, O2 sat 100  Patient flailing, combative  In hospital for continuous EEG monitoring for seizures which is being discontinued this morning  Received Zyprexa 2.5 mg IM  Has orders for lorazepam 1 mg for seizures only  Four point restraints ordered   Midazolam 1 mg IV q.1h p.r.n. x3 ordered      SRINIVASA Corona MD  [facility] Attending  [phone]    This report has been created through the use of [employer] dictation software. Typographical and content errors may occur with this process. While efforts are made to detect and correct such errors, in some cases errors will persist. For this reason, wording in this document should be considered in the proper context and not strictly verbatim

## 2024-06-18 PROBLEM — N30.00 ACUTE CYSTITIS WITHOUT HEMATURIA: Status: ACTIVE | Noted: 2024-06-18

## 2024-06-18 LAB
ALBUMIN SERPL BCP-MCNC: 3.7 G/DL (ref 3.5–5.2)
ALP SERPL-CCNC: 165 U/L (ref 55–135)
ALT SERPL W/O P-5'-P-CCNC: 22 U/L (ref 10–44)
AMMONIA PLAS-SCNC: 35 UMOL/L (ref 10–50)
ANION GAP SERPL CALC-SCNC: 11 MMOL/L (ref 8–16)
AST SERPL-CCNC: 21 U/L (ref 10–40)
BASOPHILS # BLD AUTO: 0.04 K/UL (ref 0–0.2)
BASOPHILS NFR BLD: 0.7 % (ref 0–1.9)
BILIRUB SERPL-MCNC: 0.3 MG/DL (ref 0.1–1)
BUN SERPL-MCNC: 13 MG/DL (ref 6–20)
CALCIUM SERPL-MCNC: 9.8 MG/DL (ref 8.7–10.5)
CHLORIDE SERPL-SCNC: 111 MMOL/L (ref 95–110)
CHOLEST SERPL-MCNC: 216 MG/DL (ref 120–199)
CHOLEST/HDLC SERPL: 5.8 {RATIO} (ref 2–5)
CO2 SERPL-SCNC: 19 MMOL/L (ref 23–29)
CREAT SERPL-MCNC: 1.3 MG/DL (ref 0.5–1.4)
DIFFERENTIAL METHOD BLD: NORMAL
EOSINOPHIL # BLD AUTO: 0.2 K/UL (ref 0–0.5)
EOSINOPHIL NFR BLD: 3.6 % (ref 0–8)
ERYTHROCYTE [DISTWIDTH] IN BLOOD BY AUTOMATED COUNT: 12.6 % (ref 11.5–14.5)
EST. GFR  (NO RACE VARIABLE): 50.7 ML/MIN/1.73 M^2
GLUCOSE SERPL-MCNC: 84 MG/DL (ref 70–110)
HCT VFR BLD AUTO: 41.5 % (ref 37–48.5)
HDLC SERPL-MCNC: 37 MG/DL (ref 40–75)
HDLC SERPL: 17.1 % (ref 20–50)
HGB BLD-MCNC: 13.9 G/DL (ref 12–16)
IMM GRANULOCYTES # BLD AUTO: 0.02 K/UL (ref 0–0.04)
IMM GRANULOCYTES NFR BLD AUTO: 0.3 % (ref 0–0.5)
LDLC SERPL CALC-MCNC: 142 MG/DL (ref 63–159)
LYMPHOCYTES # BLD AUTO: 1.3 K/UL (ref 1–4.8)
LYMPHOCYTES NFR BLD: 22.8 % (ref 18–48)
MAGNESIUM SERPL-MCNC: 1.9 MG/DL (ref 1.6–2.6)
MCH RBC QN AUTO: 29.4 PG (ref 27–31)
MCHC RBC AUTO-ENTMCNC: 33.5 G/DL (ref 32–36)
MCV RBC AUTO: 88 FL (ref 82–98)
MONOCYTES # BLD AUTO: 0.4 K/UL (ref 0.3–1)
MONOCYTES NFR BLD: 6.9 % (ref 4–15)
NEUTROPHILS # BLD AUTO: 3.8 K/UL (ref 1.8–7.7)
NEUTROPHILS NFR BLD: 65.7 % (ref 38–73)
NONHDLC SERPL-MCNC: 179 MG/DL
NRBC BLD-RTO: 0 /100 WBC
OHS QRS DURATION: 90 MS
OHS QRS DURATION: 92 MS
OHS QRS DURATION: 94 MS
OHS QTC CALCULATION: 432 MS
OHS QTC CALCULATION: 437 MS
OHS QTC CALCULATION: 448 MS
PHOSPHATE SERPL-MCNC: 3.3 MG/DL (ref 2.7–4.5)
PLATELET # BLD AUTO: 319 K/UL (ref 150–450)
PMV BLD AUTO: 10 FL (ref 9.2–12.9)
POCT GLUCOSE: 105 MG/DL (ref 70–110)
POCT GLUCOSE: 80 MG/DL (ref 70–110)
POCT GLUCOSE: 94 MG/DL (ref 70–110)
POCT GLUCOSE: 99 MG/DL (ref 70–110)
POTASSIUM SERPL-SCNC: 3.4 MMOL/L (ref 3.5–5.1)
PROT SERPL-MCNC: 7.8 G/DL (ref 6–8.4)
RBC # BLD AUTO: 4.72 M/UL (ref 4–5.4)
SODIUM SERPL-SCNC: 141 MMOL/L (ref 136–145)
T4 FREE SERPL-MCNC: 1.06 NG/DL (ref 0.71–1.51)
TRIGL SERPL-MCNC: 185 MG/DL (ref 30–150)
TSH SERPL DL<=0.005 MIU/L-ACNC: 4.97 UIU/ML (ref 0.4–4)
WBC # BLD AUTO: 5.78 K/UL (ref 3.9–12.7)

## 2024-06-18 PROCEDURE — 80053 COMPREHEN METABOLIC PANEL: CPT | Performed by: REGISTERED NURSE

## 2024-06-18 PROCEDURE — 93005 ELECTROCARDIOGRAM TRACING: CPT

## 2024-06-18 PROCEDURE — 97112 NEUROMUSCULAR REEDUCATION: CPT

## 2024-06-18 PROCEDURE — 97162 PT EVAL MOD COMPLEX 30 MIN: CPT

## 2024-06-18 PROCEDURE — 94761 N-INVAS EAR/PLS OXIMETRY MLT: CPT

## 2024-06-18 PROCEDURE — 25000003 PHARM REV CODE 250: Performed by: PHYSICIAN ASSISTANT

## 2024-06-18 PROCEDURE — 97530 THERAPEUTIC ACTIVITIES: CPT

## 2024-06-18 PROCEDURE — 63600175 PHARM REV CODE 636 W HCPCS

## 2024-06-18 PROCEDURE — 63600175 PHARM REV CODE 636 W HCPCS: Performed by: INTERNAL MEDICINE

## 2024-06-18 PROCEDURE — 63600175 PHARM REV CODE 636 W HCPCS: Performed by: PHYSICIAN ASSISTANT

## 2024-06-18 PROCEDURE — 25000003 PHARM REV CODE 250: Performed by: REGISTERED NURSE

## 2024-06-18 PROCEDURE — 99233 SBSQ HOSP IP/OBS HIGH 50: CPT | Mod: ,,, | Performed by: PHYSICIAN ASSISTANT

## 2024-06-18 PROCEDURE — 63600175 PHARM REV CODE 636 W HCPCS: Performed by: REGISTERED NURSE

## 2024-06-18 PROCEDURE — 51798 US URINE CAPACITY MEASURE: CPT

## 2024-06-18 PROCEDURE — 25000003 PHARM REV CODE 250: Performed by: INTERNAL MEDICINE

## 2024-06-18 PROCEDURE — 83735 ASSAY OF MAGNESIUM: CPT | Performed by: REGISTERED NURSE

## 2024-06-18 PROCEDURE — 97166 OT EVAL MOD COMPLEX 45 MIN: CPT

## 2024-06-18 PROCEDURE — 85025 COMPLETE CBC W/AUTO DIFF WBC: CPT | Performed by: REGISTERED NURSE

## 2024-06-18 PROCEDURE — 20000000 HC ICU ROOM

## 2024-06-18 PROCEDURE — 92610 EVALUATE SWALLOWING FUNCTION: CPT

## 2024-06-18 PROCEDURE — 84443 ASSAY THYROID STIM HORMONE: CPT | Performed by: REGISTERED NURSE

## 2024-06-18 PROCEDURE — 82140 ASSAY OF AMMONIA: CPT | Performed by: REGISTERED NURSE

## 2024-06-18 PROCEDURE — 93010 ELECTROCARDIOGRAM REPORT: CPT | Mod: ,,, | Performed by: INTERNAL MEDICINE

## 2024-06-18 PROCEDURE — 80061 LIPID PANEL: CPT | Performed by: REGISTERED NURSE

## 2024-06-18 PROCEDURE — 95718 EEG PHYS/QHP 2-12 HR W/VEEG: CPT | Mod: ,,, | Performed by: STUDENT IN AN ORGANIZED HEALTH CARE EDUCATION/TRAINING PROGRAM

## 2024-06-18 PROCEDURE — 84439 ASSAY OF FREE THYROXINE: CPT | Performed by: REGISTERED NURSE

## 2024-06-18 PROCEDURE — 84100 ASSAY OF PHOSPHORUS: CPT | Performed by: REGISTERED NURSE

## 2024-06-18 RX ORDER — HYDRALAZINE HYDROCHLORIDE 20 MG/ML
10 INJECTION INTRAMUSCULAR; INTRAVENOUS EVERY 4 HOURS PRN
Status: DISCONTINUED | OUTPATIENT
Start: 2024-06-18 | End: 2024-06-20 | Stop reason: HOSPADM

## 2024-06-18 RX ORDER — IBUPROFEN 200 MG
24 TABLET ORAL
Status: DISCONTINUED | OUTPATIENT
Start: 2024-06-18 | End: 2024-06-19

## 2024-06-18 RX ORDER — MAGNESIUM SULFATE HEPTAHYDRATE 40 MG/ML
4 INJECTION, SOLUTION INTRAVENOUS
Status: CANCELLED | OUTPATIENT
Start: 2024-06-18

## 2024-06-18 RX ORDER — POTASSIUM CHLORIDE 7.45 MG/ML
80 INJECTION INTRAVENOUS
Status: DISCONTINUED | OUTPATIENT
Start: 2024-06-18 | End: 2024-06-18

## 2024-06-18 RX ORDER — HALOPERIDOL 5 MG/ML
5 INJECTION INTRAMUSCULAR EVERY 6 HOURS PRN
Status: DISCONTINUED | OUTPATIENT
Start: 2024-06-18 | End: 2024-06-20 | Stop reason: HOSPADM

## 2024-06-18 RX ORDER — LABETALOL HYDROCHLORIDE 5 MG/ML
10 INJECTION, SOLUTION INTRAVENOUS EVERY 4 HOURS PRN
Status: DISCONTINUED | OUTPATIENT
Start: 2024-06-18 | End: 2024-06-18

## 2024-06-18 RX ORDER — IBUPROFEN 200 MG
16 TABLET ORAL
Status: DISCONTINUED | OUTPATIENT
Start: 2024-06-18 | End: 2024-06-19

## 2024-06-18 RX ORDER — POTASSIUM CHLORIDE 7.45 MG/ML
60 INJECTION INTRAVENOUS
Status: DISCONTINUED | OUTPATIENT
Start: 2024-06-18 | End: 2024-06-18

## 2024-06-18 RX ORDER — MAGNESIUM SULFATE HEPTAHYDRATE 40 MG/ML
2 INJECTION, SOLUTION INTRAVENOUS
Status: CANCELLED | OUTPATIENT
Start: 2024-06-18

## 2024-06-18 RX ORDER — MIDAZOLAM HYDROCHLORIDE 1 MG/ML
0.5 INJECTION, SOLUTION INTRAMUSCULAR; INTRAVENOUS ONCE
Status: COMPLETED | OUTPATIENT
Start: 2024-06-18 | End: 2024-06-18

## 2024-06-18 RX ORDER — POTASSIUM CHLORIDE 7.45 MG/ML
40 INJECTION INTRAVENOUS
Status: CANCELLED | OUTPATIENT
Start: 2024-06-18

## 2024-06-18 RX ORDER — SODIUM CHLORIDE 9 MG/ML
INJECTION, SOLUTION INTRAVENOUS
Status: DISCONTINUED | OUTPATIENT
Start: 2024-06-18 | End: 2024-06-20 | Stop reason: HOSPADM

## 2024-06-18 RX ORDER — GLUCAGON 1 MG
1 KIT INJECTION
Status: DISCONTINUED | OUTPATIENT
Start: 2024-06-18 | End: 2024-06-19

## 2024-06-18 RX ORDER — POTASSIUM CHLORIDE 7.45 MG/ML
80 INJECTION INTRAVENOUS
Status: CANCELLED | OUTPATIENT
Start: 2024-06-18

## 2024-06-18 RX ORDER — INSULIN ASPART 100 [IU]/ML
0-10 INJECTION, SOLUTION INTRAVENOUS; SUBCUTANEOUS
Status: DISCONTINUED | OUTPATIENT
Start: 2024-06-18 | End: 2024-06-19

## 2024-06-18 RX ORDER — POTASSIUM CHLORIDE 7.45 MG/ML
40 INJECTION INTRAVENOUS
Status: DISCONTINUED | OUTPATIENT
Start: 2024-06-18 | End: 2024-06-18

## 2024-06-18 RX ORDER — POTASSIUM CHLORIDE 7.45 MG/ML
60 INJECTION INTRAVENOUS
Status: CANCELLED | OUTPATIENT
Start: 2024-06-18

## 2024-06-18 RX ORDER — LABETALOL HYDROCHLORIDE 5 MG/ML
10 INJECTION, SOLUTION INTRAVENOUS EVERY 4 HOURS PRN
Status: DISCONTINUED | OUTPATIENT
Start: 2024-06-18 | End: 2024-06-20 | Stop reason: HOSPADM

## 2024-06-18 RX ORDER — OLANZAPINE 10 MG/2ML
5 INJECTION, POWDER, FOR SOLUTION INTRAMUSCULAR ONCE AS NEEDED
Status: DISCONTINUED | OUTPATIENT
Start: 2024-06-18 | End: 2024-06-18

## 2024-06-18 RX ORDER — NITROFURANTOIN 25; 75 MG/1; MG/1
100 CAPSULE ORAL EVERY 12 HOURS
Status: DISCONTINUED | OUTPATIENT
Start: 2024-06-18 | End: 2024-06-18

## 2024-06-18 RX ORDER — MIDAZOLAM HYDROCHLORIDE 1 MG/ML
INJECTION, SOLUTION INTRAMUSCULAR; INTRAVENOUS
Status: COMPLETED
Start: 2024-06-18 | End: 2024-06-18

## 2024-06-18 RX ADMIN — POTASSIUM CHLORIDE 10 MEQ: 7.46 INJECTION, SOLUTION INTRAVENOUS at 02:06

## 2024-06-18 RX ADMIN — MIDAZOLAM HYDROCHLORIDE 0.5 MG: 1 INJECTION, SOLUTION INTRAMUSCULAR; INTRAVENOUS at 06:06

## 2024-06-18 RX ADMIN — SODIUM CHLORIDE: 9 INJECTION, SOLUTION INTRAVENOUS at 10:06

## 2024-06-18 RX ADMIN — LEVETIRACETAM 1000 MG: 100 INJECTION INTRAVENOUS at 08:06

## 2024-06-18 RX ADMIN — CEFTRIAXONE 2 G: 2 INJECTION, POWDER, FOR SOLUTION INTRAMUSCULAR; INTRAVENOUS at 01:06

## 2024-06-18 RX ADMIN — HYDRALAZINE HYDROCHLORIDE 10 MG: 20 INJECTION, SOLUTION INTRAMUSCULAR; INTRAVENOUS at 10:06

## 2024-06-18 RX ADMIN — HALOPERIDOL LACTATE 5 MG: 5 INJECTION, SOLUTION INTRAMUSCULAR at 04:06

## 2024-06-18 RX ADMIN — ENOXAPARIN SODIUM 40 MG: 40 INJECTION SUBCUTANEOUS at 04:06

## 2024-06-18 RX ADMIN — HALOPERIDOL LACTATE 5 MG: 5 INJECTION, SOLUTION INTRAMUSCULAR at 10:06

## 2024-06-18 RX ADMIN — LEVETIRACETAM 1000 MG: 100 INJECTION INTRAVENOUS at 09:06

## 2024-06-18 RX ADMIN — POTASSIUM CHLORIDE 10 MEQ: 7.46 INJECTION, SOLUTION INTRAVENOUS at 10:06

## 2024-06-18 RX ADMIN — MIDAZOLAM 0.5 MG: 1 INJECTION INTRAMUSCULAR; INTRAVENOUS at 06:06

## 2024-06-18 RX ADMIN — ESCITALOPRAM OXALATE 20 MG: 20 TABLET ORAL at 08:06

## 2024-06-18 NOTE — PROVIDER TRANSFER
Neuro Critical Care Transfer of Care note    Date of Admit: 6/17/2024  Date of Transfer / Stepdown: 6/18/2024    Brief History of Present Illness:      48 y/o F w/ PMH of multiple brain aneurysms, coronary angioplasty, HFpEF (EF 55% 6/2024), HTN, HLD, migraine headaches, seizures and CVA. She initially presented to [facility] on 6/15 after being found down and unresponsive by her family. In the ED, she was oriented to self and able to follow commands, but then had a witnessed seizure requiring administration of ativan 1mg. 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 Home AEDs include Keppra 750mg BID and Depakote 750mg BID, although compliance is questionable. She was admitted to Hospital Medicine for acute encephalopathy workup and subsequently stepped up to ICU for concern that she had not returned to her baseline following the seizure activity. EEG cap placed at that time and there have been no apparent epileptiform discharges or electrographic seizures seen. Neurology consulted for AED management, she was currently being maintained on Keppra 1g BID. 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 On 6/17, patient had multiple episodes of agitation, was attempting to get out of bed, and became combative requiring multiple doses of IV versed and four-point restraints. Upon Neurology's evaluation later that day, she was unable to participate in their exam, follow commands and answer any questions. Recommendation made to transfer to [facility] for higher level of care and continuous EEG monitoring. She will be admitted to [facility] for further management.     Hospital Course: 06/18/2024: No seizures on EEG. Continue AED regimen, TTF under Hospital Medicine     Hospital Course By Problem with Pertinent Findings:     1. Acute Encephalopathy   -Cap EEG without seizures  -MRI brain, CTA H&N and CTH without  acute processes   -Continue keppra 1g BID  -VPA discontinued due to elevated ammonia levels at OSH   -Ammonia normalized, discontinue lactulose  -Repeat ammonia level in AM  -Treat UTI, 3d course of CTX ordered   -Psych consulted for PNES eval; see consult note 6/17  -Epilepsy consulted, appreciate recommendations   -SBP goal < 180  -Diet advanced per SLP recommendations     2. UTI  - Nitrite and leukocyte + urine   - CTX x3d  - Urine Culture pending         Consultants and Procedures:     Consultants:  Epilepsy     Procedures:    EEG    Transfer Information:     Diet:  Diabetic Level 5    Physical Activity:  Advance per PT recs    To Do / Pending Studies / Follow ups:  - UTI treatment  - Continued neuro monitoring until return to baseline       Lilli Adam  Neuro Crtical Care

## 2024-06-18 NOTE — PLAN OF CARE
Level 5 diet with thin liquids recommended.    Problem: SLP  Goal: SLP Goal  Description: Goals due 6/25  1.  Tolerate level 5 diet with thin liquids with no s/s of aspiration  2.  Participate in ongoing assessment of swallow at bedside  3.  Participate in speech language cognitive eval  Outcome: Progressing

## 2024-06-18 NOTE — PLAN OF CARE
OT luciana completed, appropriate POC established and initiated.    Problem: Occupational Therapy  Goal: Occupational Therapy Goal  Description: Goals to be met by: 7/18/24     Patient will increase functional independence with ADLs by performing:    UE Dressing with Supervision.  LE Dressing with Supervision.  Grooming while standing at sink with Stand-by Assistance.  Toileting from toilet with Supervision for hygiene and clothing management.   Supine to sit with Supervision.  Step transfer with Stand-by Assistance  Toilet transfer to toilet with Stand-by Assistance.    Outcome: Progressing

## 2024-06-18 NOTE — ASSESSMENT & PLAN NOTE
Patient has hypokalemia which is Acute and currently controlled. Most recent potassium levels reviewed-   Lab Results   Component Value Date    K 3.6 06/19/2024   . Will continue potassium replacement per protocol and recheck repeat levels after replacement completed.

## 2024-06-18 NOTE — HOSPITAL COURSE
06/18/2024: No seizures on EEG. Continue AED regimen, TTF under Hospital Medicine   6/19/24: resume home antipsychotic

## 2024-06-18 NOTE — HPI
50 y/o F w/ PMH of multiple brain aneurysms, coronary angioplasty, HFpEF (EF 55% 6/2024), HTN, HLD, migraine headaches, seizures and CVA. She initially presented to [facility] on 6/15 after being found down and unresponsive by her family. In the ED, she was oriented to self and able to follow commands, but then had a witnessed seizure requiring administration of ativan 1mg. 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 Home AEDs include Keppra 750mg BID and Depakote 750mg BID, although compliance is questionable. She was admitted to Hospital Medicine for acute encephalopathy workup and subsequently stepped up to ICU for concern that she had not returned to her baseline following the seizure activity. EEG cap placed at that time and there have been no apparent epileptiform discharges or electrographic seizures seen. Neurology consulted for AED management, she was currently being maintained on Keppra 1g BID. 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 On 6/17, patient had multiple episodes of agitation, was attempting to get out of bed, and became combative requiring multiple doses of IV versed and four-point restraints. Upon Neurology's evaluation later that day, she was unable to participate in their exam, follow commands and answer any questions. Recommendation made to transfer [place] [facility] [place] level of care and continuous EEG monitoring. She will be admitted to [facility] for further management.

## 2024-06-18 NOTE — ASSESSMENT & PLAN NOTE
-Hold home metformin in acute setting  -Hold home insulin d/t episode of hypoglycemia @ OSH; resume once able to tolerate diet  -Accuchecks q4 w/ SSI  -Hgb A1c pending

## 2024-06-18 NOTE — HOSPITAL COURSE
No seizures on EEG. Patient was 6/19 Transfer to hospital medicine . Case was d/w neurology, per their rec discontinue depakote indefinitely given hyperammonemia. Patient was maintained on keppra, suspicion for noncompliance, extensively counseled with niece. Abx switched to macrobid given resistance to rocephin.     Patient has met maximal benefit from hospitalization and is clinically stable for discharge. To f/u with neurology. Family and patient advised to have labs including ammonia level repeated.    No CP, no SOB, no HA. Oriented x 3 with insight intact towards hospitalization.     Clear lungs BL, unlabored breathing, on RA, no cyanosis  Hrt sounds RRR  AA, NAD  EOMI, pupils equal BL  5/5 prox UE and LE strength BL  No LE edema

## 2024-06-18 NOTE — ASSESSMENT & PLAN NOTE
-Hx of  -Most recent ECHO 6/8 w/ normal systolic function, estimated EF 55%, and normal diastolic function  -Continue home BB

## 2024-06-18 NOTE — PT/OT/SLP EVAL
Occupational Therapy   Co-Evaluation and Co-treatment with PT  Co-evaluation/treatment performed due to patient's multiple deficits requiring two skilled therapists to appropriately and safely assess patient's strength, endurance, functional mobility, and ADL performance while facilitating functional tasks in addition to accommodating for patient's activity tolerance and medical acuity.    Name: Gina Jenkins  MRN: [id number]  Admitting Diagnosis: Acute encephalopathy  Recent Surgery: * No surgery found *      Recommendations:     Discharge Recommendations: Moderate Intensity Therapy  Discharge Equipment Recommendations:  none  Barriers to discharge:  Other (Comment) (increased skilled assist required)    Assessment:     Gina Jenkins is a 49 y.o. female with a medical diagnosis of Acute encephalopathy.  She presents with the following performance deficits affecting function: weakness, gait instability, decreased upper extremity function, decreased ROM, decreased lower extremity function, impaired endurance, impaired balance, impaired self care skills, impaired functional mobility, decreased coordination, impaired cognition, decreased safety awareness, visual deficits, impaired coordination, impaired fine motor.  Pt found with HOB elevated, agreeable to therapy eval. Pt highly emotionally labile, following ~10% of commands and laughing hysterically while at EOB. Following stand, patient with buckling knees and increased tone in neck, BUE, and upper trunk, and patient less responsive. Pt returned to supine, facilitated solely by therapists with absent effort from patient, and pt became agitated. RN and EEG tech present in room, attempting to remove EEG cap. Restraints and roll belt tied, bed alarm on, and pt left in care of ALEXANDER Eaton. Patient continues to demonstrate the need for moderate intensity therapy on a daily basis post acute exhibited by decreased independence with self-care and functional mobility  "    Rehab Prognosis: Fair; patient would benefit from acute skilled OT services to address these deficits and reach maximum level of function.       Plan:     Patient to be seen 4 x/week to address the above listed problems via self-care/home management, therapeutic activities, therapeutic exercises, neuromuscular re-education  Plan of Care Expires: 07/18/24  Plan of Care Reviewed with: patient    Subjective     Chief Complaint: none verbalized  Patient/Family Comments/goals: "Are y'all gonna bring me home?"    Occupational Profile: *information obtained from chart 2/2 pt questionable historian  Living Environment: pt lives with her 3 teen-aged boy sin a [facility] with THE to enter - T/S combo for bathing  Previous level of function: independent with mobility and ADLS   Roles and Routines: mother of 3 boys  Equipment Used at Home: none  Assistance upon Discharge: unknown - limited    Pain/Comfort:  Pain Rating 1: 0/10    Patients cultural, spiritual, Tenriism conflicts given the current situation: no    Objective:     Communicated with: RN prior to session.  Patient found HOB elevated with bed alarm, blood pressure cuff, pulse ox (continuous), telemetry, EEG, PureWick, restraints upon OT entry to room.    General Precautions: Standard, fall  Orthopedic Precautions: N/A  Braces: N/A  Respiratory Status: Room air    Occupational Performance:    Bed Mobility:  Verbal cues for sequencing and technique    Patient completed Scooting/Bridging with minimum assistance  Patient completed Supine to Sit with minimum assistance  Patient completed Sit to Supine with total assistance and 2 persons    Functional Mobility/Transfers:  Patient completed Sit <> Stand Transfer with contact guard assistance  with  hand-held assist     Balance:   Sitting static: Mod-Max, posterior lean  Sitting dynamic: Mod-Max, posterior lean  Standing static: mod A of 2      Activities of Daily Living:  Upper Body Dressing: maximal assistance to don gown as " nam  Lower Body Dressing: stand by assistance to don socks  Toileting: dependence purewick in place    Cognitive/Visual Perceptual:  Cognitive/Psychosocial Skills:     -       Oriented to: Person   -       Follows Commands/attention:Easily distracted and following 10% of commands  -       Communication: clear/fluent  -       Safety awareness/insight to disability: impaired   -       Mood/Affect/Coping skills/emotional control: Labile and Lashes out  Visual/Perceptual:      -Intact      Physical Exam:  Dominant hand:    -       Right  Upper Extremity Range of Motion:     -       Right Upper Extremity: WFL  -       Left Upper Extremity: WFL  Upper Extremity Strength:    -       Right Upper Extremity: BRADY 2/2 poor command following but appears WFL  -       Left Upper Extremity: BRADY 2/2 poor command following but appears WFL  Fine Motor Coordination:    -       Intact    AMPAC 6 Click ADL:  AMPAC Total Score: 12    Treatment & Education:  Pt educated on the following:  - role of OT and OT POC  - use of call light to request for assistance with all functional mobility to ensure safety during hospital stay  - importance of continued mobilization  - Safe transfer techniques and proper body mechanics for fall prevention and improved independence with functional transfers   - Importance of OOB activities to increase endurance and tolerance for increased participation in daily ADLs.     - benefits of continued participation in therapy.   - Pt educated on importance of calling for staff assist for functional mobility/transfers.  - All pt questions within OT scope of practice addressed, pt verbalized understanding.      Patient left HOB elevated with all lines intact, call button in reach, bed alarm on, restraints reapplied at end of session, and RN and EEG tech present    GOALS:   Multidisciplinary Problems       Occupational Therapy Goals          Problem: Occupational Therapy    Goal Priority Disciplines Outcome  Interventions   Occupational Therapy Goal     OT, PT/OT Progressing    Description: Goals to be met by: 7/18/24     Patient will increase functional independence with ADLs by performing:    UE Dressing with Supervision.  LE Dressing with Supervision.  Grooming while standing at sink with Stand-by Assistance.  Toileting from toilet with Supervision for hygiene and clothing management.   Supine to sit with Supervision.  Step transfer with Stand-by Assistance  Toilet transfer to toilet with Stand-by Assistance.                         History:     Past Medical History:   Diagnosis Date    Brain aneurysm     CHF (congestive heart failure)     H/O coronary angioplasty     Hypercholesteremia     Hypertension     Malignant hypertension     Migraine headache     Stroke 10/2017         Past Surgical History:   Procedure Laterality Date    CARDIAC CATHETERIZATION      CEREBRAL ANGIOGRAM      CLIP LIGATION OF INTRACRANIAL ANEURYSM BY CRANIOTOMY N/A 7/15/2022    Procedure: CRANIOTOMY, WITH ANEURYSM CLIPPING;  Surgeon: Timothy Diaz MD;  Location: [facility] OR [address] [place] [address];  Service: Neurosurgery;  Laterality: N/A;  PTERIONAL CRANIOTOMY WITH CLIP LIGATION OF L PCOMM, L ANTERIOR CHOROIDAL, L MCA  ANEURYSM, ANESTHESIA: GENERAL, BLOOD: TYPE&CROSS 2 UNITS, NEUROMONITORING: SEP, MEP, EEG, RADIOLOGY: C-ARM, POSITION: SUPINE, ANNA, CO-SURGERON: DR. LEXI DOMÍNGUEZ.    WOUND DEBRIDEMENT  8/27/2022    Procedure: DEBRIDEMENT, WOUND;  Surgeon: Timothy Diaz MD;  Location: [facility] OR [address] [place] [address];  Service: Neurosurgery;;       Time Tracking:     OT Date of Treatment: 06/18/24  OT Start Time: 0805  OT Stop Time: 0834  OT Total Time (min): 29 min    Billable Minutes:Evaluation 10  Neuromuscular Re-education 19    6/18/2024

## 2024-06-18 NOTE — ASSESSMENT & PLAN NOTE
-Home AEDs - Keppra 750mg BID and Depakote 750mg BID; questionable compliance  -AEDs adjusted by Neurology at OSH  -Currently maintained on Keppra 1g BID  -Cap EEG negative for seizures   -Seizure precautions  -Avoid agents that lower seizure threshold

## 2024-06-18 NOTE — ASSESSMENT & PLAN NOTE
-Noted on U/A -- nitrite and leukocyte positive   ceftriaxone  x3 days as patient unreliably taking PO medications       6/19 UC with GNRs. continue ceftriaxone

## 2024-06-18 NOTE — PLAN OF CARE
"[facility] Care Plan    POC reviewed with Gina Jenkins and family at 0300. Pt verbalized understanding. Questions and concerns addressed. No acute events overnight. Pt progressing toward goals. Will continue to monitor. See below and flowsheets for full assessment and VS info.     -cEEG in use, no overt seizure activity noted       Is this a stroke patient? no    Neuro:  Danielle Coma Scale  Best Eye Response: 4-->(E4) spontaneous  Best Motor Response: 6-->(M6) obeys commands  Best Verbal Response: 4-->(V4) confused  [place] Coma Scale Score: 14  Pupil PERRLA: yes     24hr Temp:  [96.5 °F (35.8 °C)-98.4 °F (36.9 °C)]     CV:   Rhythm: sinus bradycardia  BP goals:   SBP < 180  MAP > 65    Resp:           Plan: N/A    GI/:     Diet/Nutrition Received: NPO  Last Bowel Movement: 06/18/24  Voiding Characteristics: external catheter    Intake/Output Summary (Last 24 hours) at 6/18/2024 0541  Last data filed at 6/18/2024 0501  Gross per 24 hour   Intake --   Output 600 ml   Net -600 ml     Unmeasured Output  Stool Occurrence: 1    Labs/Accuchecks:  Recent Labs   Lab 06/18/24  0348   WBC 5.78   RBC 4.72   HGB 13.9   HCT 41.5         Recent Labs   Lab 06/17/24  0147      K 4.0   CO2 16*   *   BUN 20   CREATININE 1.4   ALKPHOS 146*   ALT 18   AST 25   BILITOT 0.2    No results for input(s): "PROTIME", "INR", "APTT", "HEPANTIXA" in the last 168 hours.   Recent Labs   Lab 06/15/24  0451      TROPONINI 0.008       Electrolytes: No replacement orders  Accuchecks: Q4H    Gtts:      LDA/Wounds:    Nurses Note -- 4 Eyes    Is there altered skin present? no       Prevention Measures Documented    Second RN/Staff Member:  ALEXANDER De La Cruz     Restraints:        WCTM       Problem: Diabetes Comorbidity  Goal: Blood Glucose Level Within Targeted Range  Outcome: Met  Intervention: Monitor and Manage Glycemia  Flowsheets (Taken 6/18/2024 0420)  Glycemic Management: blood glucose monitored     Problem: Skin Injury Risk " Increased  Goal: Skin Health and Integrity  Outcome: Met  Intervention: Optimize Skin Protection  Flowsheets (Taken 6/18/2024 0160)  Pressure Reduction Techniques:   frequent weight shift encouraged   weight shift assistance provided  Pressure Reduction Devices: specialty bed utilized  Skin Protection: incontinence pads utilized  Activity Management: Rolling - L1  Head of Bed (HOB) Positioning: HOB at 30-45 degrees

## 2024-06-18 NOTE — SUBJECTIVE & OBJECTIVE
Past Medical History:   Diagnosis Date    Brain aneurysm     CHF (congestive heart failure)     H/O coronary angioplasty     Hypercholesteremia     Hypertension     Malignant hypertension     Migraine headache     Stroke 10/2017     Past Surgical History:   Procedure Laterality Date    CARDIAC CATHETERIZATION      CEREBRAL ANGIOGRAM      CLIP LIGATION OF INTRACRANIAL ANEURYSM BY CRANIOTOMY N/A 7/15/2022    Procedure: CRANIOTOMY, WITH ANEURYSM CLIPPING;  Surgeon: Timothy Diaz MD;  Location: [facility] OR [address] [place] [address];  Service: Neurosurgery;  Laterality: N/A;  PTERIONAL CRANIOTOMY WITH CLIP LIGATION OF L PCOMM, L ANTERIOR CHOROIDAL, L MCA  ANEURYSM, ANESTHESIA: GENERAL, BLOOD: TYPE&CROSS 2 UNITS, NEUROMONITORING: SEP, MEP, EEG, RADIOLOGY: C-ARM, POSITION: SUPINE, ANNA, CO-SURGERON: DR. LEXI DOMÍNGUEZ.    WOUND DEBRIDEMENT  8/27/2022    Procedure: DEBRIDEMENT, WOUND;  Surgeon: Timothy Diaz MD;  Location: [facility] OR [address] [place] [address];  Service: Neurosurgery;;      Current Facility-Administered Medications on File Prior to Encounter   Medication Dose Route Frequency Provider Last Rate Last Admin    [COMPLETED] OLANZapine injection 2.5 mg  2.5 mg Intramuscular Once PRN Lidia Russell MD   2.5 mg at 06/17/24 0605    [DISCONTINUED] acetaminophen tablet 650 mg  650 mg Oral Q4H PRN Niles Zamora MD        [DISCONTINUED] amLODIPine tablet 10 mg  10 mg Oral Daily Niles Zamora MD   10 mg at 06/17/24 0801    [DISCONTINUED] atorvastatin tablet 80 mg  80 mg Oral Daily Niles Zamora MD   80 mg at 06/17/24 0801    [DISCONTINUED] carvediloL tablet 37.5 mg  37.5 mg Oral BID Niles Zamora MD   37.5 mg at 06/17/24 0801    [DISCONTINUED] dextrose 10% bolus 125 mL 125 mL  12.5 g Intravenous PRN Niles Zamora MD   Stopped at 06/17/24 0344    [DISCONTINUED] dextrose 10% bolus 250 mL 250 mL  25 g Intravenous PRN Niles Zamora MD        [DISCONTINUED] enoxaparin injection 30 mg  30 mg Subcutaneous Daily Michael Issa MD   30 mg at  06/16/24 1719    [DISCONTINUED] enoxaparin injection 40 mg  40 mg Subcutaneous Daily Beck Leiva MD   40 mg at 06/17/24 1621    [DISCONTINUED] EScitalopram oxalate tablet 20 mg  20 mg Oral Daily Niles Zamora MD   20 mg at 06/17/24 0801    [DISCONTINUED] glucagon (human recombinant) injection 1 mg  1 mg Intramuscular PRN Niles Zamora MD        [DISCONTINUED] glucose chewable tablet 16 g  16 g Oral PRN Niles Zamora MD        [DISCONTINUED] glucose chewable tablet 24 g  24 g Oral PRN Niles Zamora MD        [DISCONTINUED] HYDROmorphone injection 1 mg  1 mg Intravenous Q4H PRN Niles Zamora MD        [DISCONTINUED] insulin aspart U-100 pen 0-5 Units  0-5 Units Subcutaneous Q4H PRN Trinh Asencio MD        [DISCONTINUED] levETIRAcetam in NaCl (iso-os) IVPB 1,500 mg  1,500 mg Intravenous Q12H Nae Crespo MD   Stopped at 06/16/24 2101    [DISCONTINUED] levETIRAcetam injection 1,000 mg  1,000 mg Intravenous Q12H Lidia Russell MD   1,000 mg at 06/17/24 0801    [DISCONTINUED] LORazepam injection 1 mg  1 mg Intravenous Q15 Min PRN Niles Zamora MD        [DISCONTINUED] losartan tablet 50 mg  50 mg Oral Daily Michael Issa MD   50 mg at 06/17/24 1145    [DISCONTINUED] midazolam injection 1 mg  1 mg Intravenous Q1H PRN Keenan Corona MD   1 mg at 06/17/24 1152    [DISCONTINUED] mupirocin 2 % ointment   Nasal BID Beck Leiva MD   Given at 06/16/24 2029    [DISCONTINUED] naloxone 0.4 mg/mL injection 0.02 mg  0.02 mg Intravenous PRN Niles Zamora MD        [DISCONTINUED] polyethylene glycol packet 17 g  17 g Oral Daily Niles Zamora MD   17 g at 06/17/24 0801    [DISCONTINUED] sodium chloride 0.9% flush 10 mL  10 mL Intravenous Q12H PRN Niles Zamora MD        [DISCONTINUED] topiramate tablet 50 mg  50 mg Oral BID Niles Zamora MD   50 mg at 06/17/24 0801    [DISCONTINUED] valproate (DEPACON) 750 mg in dextrose 5 % (D5W) 100 mL IVPB  750 mg Intravenous Q12H Nae Crespo MD    "Stopped at 06/17/24 1342     Current Outpatient Medications on File Prior to Encounter   Medication Sig Dispense Refill    amLODIPine (NORVASC) 10 MG tablet Take 10 mg by mouth once daily.      atorvastatin (LIPITOR) 80 MG tablet Take 1 tablet (80 mg total) by mouth once daily. 90 tablet 3    blood sugar diagnostic Strp Use to check blood glucose 3 times daily. 200 each 11    blood-glucose meter Misc Use to check blood glucose 3 times daily. 1 each 1    carvediloL (COREG) 12.5 MG tablet Take 3 tablets (37.5 mg total) by mouth 2 (two) times daily. 540 tablet 3    divalproex ER (DEPAKOTE ER) 250 MG 24 hr tablet Take 3 tablets (750 mg total) by mouth every 12 (twelve) hours. 180 tablet 11    EScitalopram oxalate (LEXAPRO) 20 MG tablet Take 1 tablet (20 mg total) by mouth once daily. 90 tablet 3    insulin aspart U-100 (NOVOLOG) 100 unit/mL injection Inject 8 Units into the skin 3 (three) times daily with meals.      insulin detemir U-100, Levemir, (LEVEMIR FLEXTOUCH U100 INSULIN) 100 unit/mL (3 mL) InPn pen Inject 24 Units into the skin every evening.      lancets 30 gauge Misc Use to check blood glucose 3 times daily. 200 each 11    levETIRAcetam (KEPPRA) 750 MG Tab Take 2 tablets (1,500 mg total) by mouth 2 (two) times daily. 120 tablet 11    losartan (COZAAR) 50 MG tablet Take 1 tablet (50 mg total) by mouth once daily. 90 tablet 3    metFORMIN (GLUCOPHAGE-XR) 500 MG ER 24hr tablet Take 500 mg by mouth 2 (two) times daily.      methocarbamoL (ROBAXIN) 500 MG Tab Take 500 mg by mouth 2 (two) times daily.      pen needle, diabetic (BD ULTRA-FINE MARIELY PEN NEEDLE) 32 gauge x 5/32" Ndle Use to inject insulin into the skin three times daily . 200 each 11    topiramate (TOPAMAX) 50 MG tablet Take 1 tablet (50 mg total) by mouth 2 (two) times daily. 180 tablet 3    [DISCONTINUED] aspirin 81 MG Chew Take 1 tablet (81 mg total) by mouth once daily.  0      Allergies: Lisinopril, Bactrim [sulfamethoxazole-trimethoprim], and " Ceftazidime  No family history on file.  Social History     Tobacco Use    Smoking status: Every Day     Current packs/day: 0.33     Average packs/day: 0.3 packs/day for 31.5 years (10.4 ttl pk-yrs)     Types: Cigarettes     Start date: 1993    Smokeless tobacco: Never    Tobacco comments:     Enrolled in the [employer] on 6/7/22 ([facility] Member ID # [id number]). Pt is a 0.33 pk/day cigarette smoker x 31 yrs. She states ready to quit smoking. Ambulatory referral to Smoking Cessation clinic following hospital discharge.    Substance Use Topics    Alcohol use: Yes     Comment: occassionally    Drug use: No     Review of Systems   Respiratory:  Negative for cough and shortness of breath.    Cardiovascular:  Negative for chest pain.   Gastrointestinal:  Negative for abdominal pain, nausea and vomiting.   Neurological:  Positive for seizures and headaches. Negative for weakness.     Objective:     Vitals:    Temp: 97.7 °F (36.5 °C)  Pulse: (!) 55  Rhythm: sinus bradycardia  BP: (!) 155/72  MAP (mmHg): 104  Resp: 16  SpO2: 100 %    Temp  Min: 96.5 °F (35.8 °C)  Max: 98.6 °F (37 °C)  Pulse  Min: 54  Max: 81  BP  Min: 126/73  Max: 196/91  MAP (mmHg)  Min: 88  Max: 131  Resp  Min: 6  Max: 36  SpO2  Min: 92 %  Max: 100 %    06/17 0701 - 06/18 0700  In: -   Out: 500 [Urine:500]   Unmeasured Output  Stool Occurrence: 1        Physical Exam  Vitals and nursing note reviewed.   HENT:      Head: Normocephalic and atraumatic.   Eyes:      Extraocular Movements: Extraocular movements intact.      Pupils: Pupils are equal, round, and reactive to light.   Cardiovascular:      Rate and Rhythm: Normal rate and regular rhythm.      Pulses: Normal pulses.   Pulmonary:      Effort: Pulmonary effort is normal.   Abdominal:      Palpations: Abdomen is soft.   Musculoskeletal:         General: Normal range of motion.      Cervical back: Normal range of motion.   Skin:     General: Skin is warm and dry.      Capillary Refill: Capillary refill  takes less than 2 seconds.   Neurological:      Mental Status: She is alert. She is confused.      GCS: GCS eye subscore is 4. GCS verbal subscore is 4. GCS motor subscore is 6.      Cranial Nerves: Dysarthria present.      Sensory: Sensation is intact.      Motor: Weakness (BLE) present.      Comments:   -E4 V4 M6  -PERRL  -Oriented to person and situation  -Appears to have R facial droop when talking, but smile is symmetric  -Dysarthric  -Follows commands w/ all extremities  -RUE 5/5  -LUE 5/5  -RLE 3/5  -LLE 3/5            Today I personally reviewed pertinent medications, lines/drains/airways, cardiology results, laboratory results, notably: CBC; CMP; ammonia

## 2024-06-18 NOTE — PROCEDURES
[facility] EEG/VIDEO MONITORING REPORT  Gina Jenkins  [id number]  1975    DATE OF SERVICE: 6/18/24  DATE OF ADMISSION: 6/17/2024  9:26 PM    ADMITTING/REQUESTING PROVIDER: Gregory Hoover MD    REASON FOR CONSULT: 49 y.o. female with a past medical history of HTN, HLD, stroke, HFpEF, brain aneurysm s/p clipping c/b abscess, and seizure disorder, who presented on 6/15/2024 for acute encephalopathy.  EEG obtained to evaluate for epileptiform activity.      METHODOLOGY   Electroencephalographic (EEG) recording is with electrodes placed according to the International 10-20 placement system.  Thirty two (32) channels of digital signal (sampling rate of 512/sec) including T1 and T2 was simultaneously recorded from the scalp and may include  EKG, EMG, and/or eye monitors.  Recording band pass was 0.1 to 512 hz.  Digital video recording of the patient is simultaneously recorded with the EEG.  The patient is instructed report clinical symptoms which may occur during the recording session.  EEG and video recording is stored and archived in digital format.  Activation procedures which include photic stimulation, hyperventilation and instructing patients to perform simple task are done in selected patients.   The EEG is displayed on a monitor screen and can be reviewed using different montages.  Computer assisted analysis is employed to detect spike and electrographic seizure activity.   The entire record is submitted for computer analysis.  The entire recording is visually reviewed and the times identified by computer analysis as being spikes or seizures are reviewed again.  Compresses spectral analysis (CSA) is also performed on the activity recorded from each individual channel.  This is displayed as a power display of frequencies from 0 to 30 Hz over time.   The CSA is reviewed looking for asymmetries in power between homologous areas of the scalp and then compared with the original EEG recording.     [employer] software is  also utilized in the review of this study.  This software suite analyzes the EEG recording in multiple domains.  Coherence and rhythmicity is computed to identify EEG sections which may contain organized seizures.  Each channel undergoes analysis to detect presence of spike and sharp waves which have special and morphological characteristic of epileptic activity.  The routine EEG recording is converted from spacial into frequency domain.  This is then displayed comparing homologous areas to identify areas of significant asymmetry.  Algorithm to identify non-cortically generated artifact is used to separate eye movement, EMG and other artifact from the EEG.      RECORDING TIMES  Start 6/17/24 at 21:36  Stop 6/18/24 at 07:00 - 9 hours 20 minutes  Start 6/18/24 at 07:00  Stop 6/18/24 at 08:24 - 1 hour 23 mins    EEG FINDINGS  Background activity:   Within the limitation of a significant amount of movement/lead artifact on an electrode cap EEG, the background is composed of intermixed theta and delta activity.  At maximum alertness a 7 hz posterior dominant rhythm is present.     Sleep:  The patient transitions from wakefulness to sleep with the appearance of sleep spindles, K complexes, and vertex waves.    Activation procedures:   Hyperventilation is not performed  Photic stimulation is not performed    Cardiac Monitor:   Electrode caps do not have EKG capabilities    Impression:   Within the limitation of a significant amount of lead/movement artifact, this is an abnormal electrode cap EEG monitoring study because of generalized background slowing consistent with a mild to moderate diffuse encephalopathy.  There are no pushbutton activations.  There are no apparent epileptiform discharges or electrographic seizures.  Compared to prior EEG cap study, there is no significant change.     Neha Campos MD  [facility]   Department of Neurology

## 2024-06-18 NOTE — HPI
50 y/o F w/ PMH of multiple brain aneurysms, coronary angioplasty, HFpEF (EF 55% 6/2024), HTN, HLD, migraine headaches, seizures and CVA. She initially presented to [facility] on 6/15 after being found down and unresponsive by her family. In the ED, she was oriented to self and able to follow commands, but then had a witnessed seizure requiring administration of ativan 1mg. 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 Home AEDs include Keppra 750mg BID and Depakote 750mg BID, although compliance is questionable. She was admitted to Hospital Medicine for acute encephalopathy workup and subsequently stepped up to ICU for concern that she had not returned to her baseline following the seizure activity. EEG cap placed at that time and there have been no apparent epileptiform discharges or electrographic seizures seen. Neurology consulted for AED management, she was currently being maintained on Keppra 1g BID. 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 On 6/17, patient had multiple episodes of agitation, was attempting to get out of bed, and became combative requiring multiple doses of IV versed and four-point restraints. Upon Neurology's evaluation later that day, she was unable to participate in their exam, follow commands and answer any questions. Recommendation made to transfer to [facility] for higher level of care and continuous EEG monitoring. She will be admitted to [facility] for further management.

## 2024-06-18 NOTE — NURSING
Patient arrived to [facility] from [place] via [address]    Type of stroke/diagnosis:  Acute encephalopathy/Seizures     Current symptoms: Alert to self, place. Moving everything spontaneously.     Skin Assessment done: Yes  Wounds noted: none  *If wounds noted, was Wound Care consulted? no  *If wounds noted, LDA placed? no  Skin Assessment Verified by:  Dorothy Guzmán Completed? Pending    Patient Belongings on Admit: Bonnet, nightgown, cell phone, chargers,     pill bottles   - Keppra (750 mg) 110 tabs  - Depakote ( 250 mg) 75 tabs   - lexapro (20 mg) 89 tabs  - (metformin, coreg, losartan, amlodipine, lipitor)     NCC notified: Neeru Perez NP

## 2024-06-18 NOTE — DISCHARGE INSTRUCTIONS
REFERRAL RECOMMENDATIONS FOR SUBSTANCE ABUSE & MENTAL HEALTH      IN CASE OF SUICIDAL THINKING, call the National Suicide Hotline Number: 988    988 Suicide & Crisis Lifeline: 989 , 9-368-817-GELN (8672)  https://988Yashi.Cardoz       SUBSTANCE ABUSE:     [facility] (formerly known as the ABU)  [x] [phone], Option 2  [x] [facility] [facility] 4[facility], [place] [address]  [x] [email or web address]://[email or web address].org/services/[email or web address]-recovery-program  [x] The [facility] delivers comprehensive and collaborative treatment for alcohol and substance use disorders.  Excellent program for working professionals or anyone else seeking recovery.  [x] Requires insurance approval prior to starting program, call number above for more information.  [x] Intensive Outpatient Rehabilitation Program - M-F 9am-3pm - daily groups with psychologists and social workers, sessions with MDs 3x per week   [x] Ambulatory detox and dual diagnosis available      SUBOXONE:     NOTE: some Suboxone clinics require their clients to participate in a structured program (such as an IOP) in order to be prescribed Suboxone.  Some clinics have a long waiting list.  Most of these clinics do not accept walk-in clients, so call first to to learn what must be done to get started on Suboxone.    [facility] - [phone] (can do Sublocade)  [address] [facility], [place] [address]    [address]  [phone]    [facility] - [phone] (can do Sublocade)  [address] [place] [address]    [facility]  [address] [place]  [phone]     [facility] (very short waiting list, may accept some walk-in's but call first if possible)  [address], [place] [address]  [phone]; [phone]    [facility]   [address] [place]    [phone]    [facility] (can usually be seen within a week but is cash only  for appointment)  [address] [facility] ([facility])  [address]. [place], [place]  [phone]    [facility] ([facility])  [address] [facility] [address]  [phone]    [place], [place]:    [facility] - [address] [place] [place] [address] - Tel: [phone]    Ventura Latrice - [address] [place] [place] [address] - Tel: [phone]    Mike Stevenson - [address] [place] [address] - Tel: [phone]    Richard Lou - [address] [place] [address] - Tel: [phone]    Dorian Dang - [address] [place] [place] [address] - Tel: [phone]    [place], [place]:     Dr. Susan Guardado and Dr. Addi Gutierres - [address] [place], [place] - Tel: [phone]    Dr. Cherelle Duong - [address] [facility] [place], [place] - Tel: [phone]    Dr. Dusty Lopez - Tel: [phone]    Dr. Darshan Srinivasan - [facility] - [phone]      METHADONE:     [facility] (the only methadone clinic in the [facility], has two locations)  [x] [place] - [address] [place] [address], [phone]  [x] [facility] [place] [address], [phone]      12 STEP PROGRAMS (and similar):     Alcoholics Anonymous (local)  [x] [phone]  [x] www.[place].org for schedules for in-person and online meetings  [x] There are AA meetings throughout the day all over [facility]  [x] AA costs nothing to attend; they pass a basket for donations but this is not required    Narcotics Anonymous  [x] [phone]  [x] www.noana.org  [x] There are NA meetings throughout the day all over town  [x] NA costs nothing to attend; they pass a basket for donations but this is not required    Alcoholics Anonymous Online Intergroup (national)  [x] www.aa-intergroup.org  [x] Good resource for large, nation-wide meetings  [x] Can also attend smaller, local meetings in other cities  [x] Countless meetings all day and all night  [x] AA costs nothing to attend; they pass a basket for donations  but this is not required    Flying Sober - 24/7 zoom meetings for women and coed - sign on anytime, anywhere!  https://flyingDouble-Take Software Canadasober.nLife Therapeutics/89-0-nqsljxsf/    Online Intergroup of AA - 121 Open AA [place] Meeting - 24/7 zoom meetings  https://aa-intergroup.org/meetings/    LOOKING FOR AN ALTERNATIVE TO 12 STEP PROGRAMS - check out:  SMART Recovery: https://www.smartrecovery.org/about-us  [facility] Recovery: https://recoverydharma.org      DETOX UNITS (USUALLY 5-7 DAYS):     [facility]: [address], [place]  [phone], call first to ensure bed availability    [facility]: [address] [facility] [address] [facility]  [phone], Option 1, call first to ensure bed availability    [facility] Recovery Center: [facility] N[address] Ambros[address], [facility]  [phone] (intake by appointment only)    [facility]: [address], [facility]  [phone]      INTENSIVE OUTPATIENT PROGRAMS:     [facility] (formerly known as the ABU)  [x] [phone], Option 2  [x] [address] [facility] 4th Floor, [facility] [address]  [x] https://www.[facility].org/services/[facility]-recovery-program  [x] The [facility] delivers comprehensive and collaborative treatment for alcohol and substance use disorders.  Excellent program for working professionals or anyone else seeking recovery.  [x] Requires insurance approval prior to starting program, call number above for more information.  [x] Intensive Outpatient Rehabilitation Program - M-F 9am-3pm - daily groups with psychologists and social workers, sessions with MDs 3x per week   [x] Ambulatory detox and dual diagnosis available    [facility] Int[facility]patient Program  [x] [phone]  [x] [address] [facility] (the clinic not [facility])  [x] Call number above for more info and to check insurance requirements    [facility]  [address] [place] [address] [phone]    [facility]:  [address] [facility]  [address]?, [place], [place] [address]?, [phone]  [address] [facility]?, [place], [place] [address]?, [phone]    RESIDENTIAL REHABS (USUALLY 28 DAYS):     [facility]: [address], [phone]    [facility]: [address] , [facility]  [phone] (intake by appointment only)    [facility] (men only) [address], [place], [phone]    [facility] (Female only) [address] [place], [phone]    [facility]: [address], [place], men's program [phone], women's program [phone]    [facility]: [address], [place], [phone]    [facility]: [address], [place], [place], [phone]    [facility]: Men only, [phone], [address] [facility]: [address], [place], [place], [phone]    [facility]: [facility] [place], [place],  [phone]  New Location: [address] [place] [address] 100, [place] [address], [phone]    [facility]:   ?[address] [facility]?[place], [place] [address]?[phone]    [place]: [address] [place] [address], [phone]    [place]: [place], [place], [phone]     [facility]: [place], [phone]    [facility]: [place], [phone]    [facility]: [place], [place], [phone]    [place]: [address], [phone]    [facility]: [address] [facility] [facility], [place] [address], [phone]    COMMUNITY ADDICTION CLINICS:     [facility]: [address] [facility], [address] [place] -[phone] -or- [address] [place] [address]    [facility] [place]: [address] [facility] [place]  [address]     [facility]: Clinics [phone]; Crisis [phone]    [facility]: [address] [place] [address]    [facility]: [address] [place] [address]    [facility]: [address] [place] [address],    [facility]: 2nd Floor [address] [place] [address]    [facility]: [address], [facility] [place] [address]    [facility], [address] Claude [place], [place] [address]    [facility]: [address] [facility], [place] [phone]  (serves youth 16-23 years old)    [facility]: [facility]/[place] [phone]    [facility]: [address] [facility], [place] [phone]    [facility]: [address] [facility] [phone]    [facility]: [address] [place], [place] [address]10 [facility], [address], [phone]     [facility]: [address] [facility], [phone], [phone]    RESOURCES IN OTHER [facility]:     [facility]: [address] [facility], [phone], [phone]    [facility]: [address] [facility], [address], [phone]    [facility], [address] [place] [address], [phone]    [facility]: [address] [facility] [facility], [phone]    [facility] Health, [address] [facility], [phone] ([facility])    [facility], [address] [facility], [phone] ([facility])    [facility], [address], [place], [phone] ([facility], [facility])    [place] [facility], [address] , [place], [phone] ([facility])    [facility] Hotline [phone], [phone]    [facility], [address] [facility], [facility] Assessment [place], [address] [facility], [address], [facility], [address] [facility]  "K[address] [facility], [address] [facility] [place] [facility], [address], [place]    [facility] Human Services, [address] [facility], [address] [phone]    [facility], [address] [facility] [phone]    [facility], [address] [facility] [phone]    [facility], [address] [facility], [phone]      Frisian SPEAKING (en español):     Información de la reunión de Alcohólicos Anónimos  [facility], 10:00 am  Habla español  Esta reunión está abierta y cualquiera puede asistir.    Yakut speaking Alcoholics anonymous meetings:  El "Omar Graff AA Skype" es un omar on line de Alcohólicos Anónimos en [facility]. El omar es jordan, gratuito y virtual a través de Skype Audio. El omar funciona mediante jaimie llamada grupal de voz, por lo que no se utiliza la videollamada, ni se pueden jessie las imágenes o rostros de los participantes. Hace zaire años y medio abrimos el primer Omar de AA por Skype en [place], chalo actualmente asisten personas desde [place], [place], [place], [place], [place], [place], [place], [place], [place], [place], [place] y [place], entre otros.    El omar es muy útil para los alcohólicos que residen en lugares donde no se celebran reuniones de AA, o residen en lugares donde las reuniones de AA son un número limitado de días a la semana, o para aquellos compañeros que se hayan de viaje o que, por cualquier motivo, se hayan convalecientes y no pueden desplazarse. Todos los días nos reuniones a las 21:00 (hora española)    Podéis obtener más información sobre el omar y nellie sesiones en la página web [email or web address]      MENTAL HEALTH:     [facility] - Outpatient Clinic  [phone]    [facility] - General " Psychiatry Intensive Outpatient Program  [facility] (formerly known as the BMU)  [phone], option 3    [facility]  Department of Psychiatry - Dual Diagnosis Intensive Outpatient Program  [facility] (formerly known as the ABU)  [phone], option 2      COMMUNITY MENTAL HEALTH CENTERS:     [facility]  (aka [facility], [facility])  Serves [facility].  Serves uninsured patients & those with Medicaid.  Main location: [address] [place] [id number] [phone]  Walk-in's available during regular business hours.  24/7 Crisis Line: [phone]    [facility]  (aka [facility])  Serves [facility].  Serves uninsured patients, those with Medicaid and some private plans.  Walk-in's available during regular business hours.  Primary care services available as well.  [facility]: [address] [facility] [place] [address];  [phone]  [place]: [address] [place] [address];  [phone]  24/7 Crisis Line: [phone]    [facility]  Serves uninsured patients & those with Medicaid, call for more info.  Primary care, pediatrics, HIV treatment, and dentistry services available as well.  Three locations.  [phone]    [facility] [place]?Corporate Office  Serves patients with Medicaid, Medicare, and private insurance  [address]  [place],?[place] [address] [phone]    [facility]  Serves uninsured on a sliding scale, as well as Medicaid, Medicare, and private plans.  Eight locations around the [facility] area.  [phone]    [facility]  Serves uninsured patients & those with Medicaid, private insurances.  Primary care available as well.  [phone]  [address] [place]  [address]    [facility] Outpatient Psychiatry  Serves veterans who were honorably discharged.  [address] [place] [address]  [phone]  24/7 Veterans Crisis Line: [phone] (Press 1)    If you have private insurance and need to find a specialist, please contact your insurance network to request a list of providers covered by your benefits.      MENTAL HEALTH/ADDICTIVE DISORDERS:     AA ([phone]), NA ([phone])   National Suicide Prevention Lifeline- Call 1-684.924.5301 Available 24 hours everyday  [facility] [phone]; Crisis Line 712-6283 - Call for options A-F:  Intensive Outpatient Treatment/ Day programs   [facility], please contact   [facility], please contact [phone] or [phone] to speak with an admissions counselor.  [facility] (Methadone Maintenance)   [address] [place] [address], [phone]  [address] [place] [address] [phone]  [facility], [address] [place] [address], [phone]  [place] [facility] [phone]  [place] [facility] please contact [phone]  [facility], [address] [facility], 4th [place] [address] Phone: [phone]   Acer  Jordi Office: [place] [address], [phone]  [place] Office: [address] [facility], [address], [place] [address], [phone]  [place] Office: [address] [facility] [place] [place] [address] [phone]    Outpatient Substance Abuse Treatment   [facility] (Methadone Maintenance)   [address] [place] [address], [phone]  [address] [place] [address] [phone]  [facility], [address] [place] [address], [phone]  Acer  [place] Office: [address], [place] [address], [phone]  [place] Office: [address] [facility] [address], [place] [address],  [phone]  [place] Office: [address] [place] [address] [phone]  [place] [facility], [address] [place] [address] [phone]   [facility], [address] [facility], [address], [phone]  [facility], [address] [place], [place] [phone]    Residential Substance Abuse Treatment   [facility] [address] [facility], [phone]  [facility], 4150 [facility], [phone]  [facility] (men only) [address] [place] [address], [phone]  Women at the [facility] (women only) [address] [place] [address] [phone]  [facility], [address] [place] [address] [phone]  [facility] (women only), intakes at 4[address] [facility], [phone]  [facility] (7-day program, $100, [address] [place], [phone], [phone], [phone])  [facility] (Men only, [phone]), [address], [place] (Vets*/Non-Vets)  [facility] (Men only, $400/month program fee) [address] [facility] [place], [phone]  [facility] (Women over age 39 only), [address] [facility], [phone]    Out of Area:    [place], [address] [facility] [address], [place], [place] ([phone])  [facility] (men only), [address] [facility] [place] [address], [phone]  [facility], [address] [place], [place] ([phone])  [place], [address] [place] [address] Dr. Kruger, [place] ([phone])  [facility] Addiction [facility], [address] [facility], [phone]  [facility] (Women only, has to have mental illness, can be homeless or substance abuser), [phone]        DOMESTIC VIOLENCE RESOURCES:     Advocacy  [place] [facility] ([facility])  [address] [address]    [facility] ? [phone]  Services provided: emergency shelter, individual advocacy,  information and referrals, group support, children's program, medical advocacy, forensic medical exams, primary care, legal assistance, counseling, safety planning, and caregiver support    [facility] HEALING AND EMPOWERMENT [place]  Confidential location  [facility] ? [phone]  Services provided: short term emergency shelter, all services provided are free of charge    [facility] CENTERS FOR COMMUNITY ADVOCACY  Multiple locations in [facility], [facility], [place], and [facility] ([place], [place], and [place])    [place] ? [phone]  Services provided: emergency shelter, individual advocacy, information and referrals, group support, children's program, medical advocacy, legal assistance in obtaining restraining orders, counseling, safety planning, and caregiver support    [facility]   Emergency Shelter   [phone]  Emergency Services ,Legal and Financial Assistance Services ,Housing Services ,Support Services     [place] [facility]   [phone]  Emergency Services ,Counseling Services , Housing Services ,Support Services ,Children's Services     WOMEN WITH A VISION  [address] [place] [address]  WWAV ? [phone]  Services provided: advocacy, health education and supportive services, specializing in free healing services for marginalized groups, including LGBTQ individuals and sex workers    SEXUAL TRAUMA AWARENESS AND RESPONSE (STAR)  [address] [place] [address]    STAR ? [phone] [phone]  Services provided: individual advocacy, information and referrals, group support, medical advocacy, legal assistance, counseling, and safety planning for survivors of sexual assault    [facility] ([facility])  [address] [place] [address]  [facility] Forensic Program ? [phone]  Services provided: free forensic medical exams for sexual assault and domestic violence, which can be performed up to 5 days  after an incident. It is not necessary to make a police report to receive a forensic medical exam    Legal  [facility]  [address], [place] [address]  [facility] ? [phone]  Services provided: free emergency legal representation for survivors of doemstic violence residing in [facility]. Legal services may include temporary restraining orders, temporary child support, custody, and use of property    [facility] LEGAL SERVICES (SLLS)  [address] [facility], [place] [address]  SLLS ? [phone]  Services provided: free legal representation for survivors of domestic violence residing in [facility]. Legal services may include temporary child support, custody, and divorce      HOTLINES:     [facility] DOMESTIC VIOLENCE HOTLINE  [phone]    Services provided: free and confidential hotline for victims and survivors of domestic violence. All calls will be routed to a domestic violence service provided in the victim or survivor's area    NATIONAL HUMAN TRAFFICKING HOTLINE  [phone]    Services provided: national anti-trafficking hotline serving victims and survivors of human trafficking. Provides information about local resources, and access to safe space to report tips, seek services, and ask for help    VIA LINK  211 or [phone]    Service provided: counselors can provide crisis counseling. Counselors can also provide information and referrals to programs which can help with needs such as food, shelter, medical care, financial assistance, mental health services, substance abuse treatment, senior services, childcare, and more      HOMELESS SHELTERS:      Homeless shelters  [facility]  Emergency shelter for individuals and families  [address]  [phone]  [facility]  Emergency shelter for men only  Meals daily 6am, 2pm, & 6pm  Clothing, case management M-F by appointment (ID/job/housing/legal assistance), mail  [address] [place]    [phone]  [place]  Emergency shelter for men  [address] [facility]  [phone]  Emergency shelter for women  [address]   [phone]  Breakfast & lunch daily, dinner M-F  Case management, job counseling services   [facility] [place]  Emergency shelter for teens and young adults up to 20yo  [address]   [phone]  [place] [facility]  Emergency shelter for women over 17yo and their kids  [address] [place] [address]  [phone]  [facility]  Day program, meals M-F 1PM (arrive early)  Showers, laundry, hygiene kits, showers, phones, , notary services, case management, ID assistance  [address]   [phone] M-F 8am-2:30pm  [employer]  Day program  MTW 7:30am-3:30pm,  8:30am-3:30pm  Crisis intervention, employment assistance, food/clothing, hygiene kits, bus tokens, mail  [address] [facility]  [phone]  [facility]  Mobile outreach for homeless persons in [facility]  [phone]  [facility]  Primary healthcare, case management, dental services, TB placement  Call ahead  [address] [facility]  [phone]  [facility]  Connects homeless people with their loved ones in other cities by providing transportation costs   [phone]      [place] RESOURCES:     [place] [facility] Mental Health Crisis Response Team:    Region 12 ([place], [place], [place], and [facility]) ([facility] and [facility])  [phone]      Outpatient Mental Health & Addiction Clinic Resources for both [facility] and [facility]:    [facility] Resources  Website: www.mhr.org  Main Number: 837-377-0827    [facility] ([facility])  [address] ([address] [place] [address]  [phone]    [facility] ([facility])  [address] ([address] [facility]) [facility]  [place] [address]  [phone]    [facility]  [address] ([address]) [place] [address]  [phone]    [facility]  [address] ([address] [place] [address]) [place] [address]  [phone]      Addiction Treatment Resources for both [facility] and [facility]:    [facility] (Detox, Residential, PHP, IOP, and Aftercare Programs)  [address] [place] [address]  [phone]    [facility] (Residential, IOP, Transitional Living, and Aftercare Programs)  [address] [facility] [place] [address]  [phone]    [facility] (Inpatient, Residential, Detox, IOP, Outpatient, and Aftercare Programs)  [address] [place] [address] [place] [address]  [phone] or toll free at [phone]      Outpatient Mental Health Psychotherapy Resources for both [facility] and [facility]:    Grisel Vallejo, [facility]  [address]  [place] [address]  [phone]  Specialties: Depression, Anxiety, and Life Transitions    Lizeth Garrido, PhD  [address] [place] [address] [address]  [address]  [place] [address]  [phone]  Specialties: Testing and Evaluation, Education and Learning Disabilities, and ADHD    Winter Davila, [facility] [address] [place] [address], [place] [address]  [phone]  Specialties: Obsessive-Compulsive (OCD), Depression, and Relationship Issues    Connie Arana [facility] [address] [facility] [address] [place] [address]  [phone]  Specialties: Trauma & PTSD, Mood Disorders, and Anxiety    Connie Davies, PhD, [facility]  [facility] [address] [place] [address]  [phone]  Specialties: Family Conflict, Child, and Relationship Issues    Ainsley Mercado, [facility] [place] [address] [phone]  Specialties: Anxiety, Depression, and Anger Management        IN CASE OF SUICIDAL THINKING,  call the National Suicide Hotline Number: 988    988 Suicide & Crisis Lifeline: 988 , 5-557-791-TALK 8255)  Provides 24/7, free and confidential support for people in distress, prevention and crisis resources for you or your loved ones, and best practices for professionals.    Call, text or chat.  https://[employer].org

## 2024-06-18 NOTE — ASSESSMENT & PLAN NOTE
Patient with acute kidney injury/acute renal failure likely due to pre-renal azotemia due to IVVD COOPER is currently improving. - Labs reviewed- Renal function/electrolytes with Estimated Creatinine Clearance: 50.9 mL/min (based on SCr of 1.3 mg/dL). according to latest data. Monitor urine output and serial BMP and adjust therapy as needed. Avoid nephrotoxins and renally dose meds for GFR listed above.    Recent Labs   Lab 06/17/24  0147 06/18/24  0348 06/19/24  0438   BUN 20 13 12   CREATININE 1.4 1.3 1.3       I & O     Intake/Output Summary (Last 24 hours) at 6/19/2024 1611  Last data filed at 6/19/2024 1500  Gross per 24 hour   Intake 644.7 ml   Output 500 ml   Net 144.7 ml

## 2024-06-18 NOTE — DISCHARGE SUMMARY
[facility] Hospital Medicine Discharge Summary    Primary Team: [facility] Hospitalist Team B  Attending Physician: Beck Leiva MD  Resident: Luis Manuel  Intern: Zak    Date of Admit: 6/15/2024  Date of Discharge: 6/17/2024    Discharge to: [facility]  Condition: Stable but Guarded Prognosis    Discharge Diagnoses     Patient Active Problem List   Diagnosis    Calculus of gallbladder without cholecystitis without obstruction    Essential hypertension    Thyroid nodule    Chronic diastolic heart failure    Acute ischemic left MCA stroke    Thrombotic stroke involving left middle cerebral artery    Aneurysm of left middle cerebral artery    Tobacco abuse    Gait abnormality    Acute nonintractable headache    Decreased  strength of right hand    Weakness of right upper extremity    Lack of coordination due to stroke    Decreased range of motion of right shoulder    Dysarthria    Cognitive communication deficit    Cerebrovascular accident (CVA)    Weakness    H/O: CVA (cerebrovascular accident)    Mixed hyperlipidemia    Other emphysema    Transient neurological symptoms    New onset type 2 diabetes mellitus    Fluid collection at surgical site    Infection of superficial incisional surgical site after procedure    Focal seizure    Seizure disorder    Brain compression    Fever    Localization-related (focal) (partial) symptomatic epilepsy and epileptic syndromes with complex partial seizures, not intractable, without status epilepticus    History of cerebral aneurysm repair    Right leg pain    Slurred speech    Acute encephalopathy    COOPER (acute kidney injury)     Consultants and Procedures     Consultants:  Pulmonary Critical Care  Neurology   Psychiatry    Procedures:   Cap EEG  Within the limitation of a significant amount of lead/movement artifact, this is an abnormal electrode cap EEG monitoring study because of generalized background slowing consistent with a mild to moderate diffuse  encephalopathy.  There are no pushbutton activations.  There are no apparent epileptiform discharges or electrographic seizures.  Depending on the clinical scenario, consider additional monitoring with standard scalp electrodes.     Imaging:  Imaging Results              CTA Brain (Final result)  Result time 06/15/24 07:22:07      Final result by William Putnam MD (06/15/24 07:22:07)                   Impression:      1. No definite acute intracranial findings by CT.  2. No evidence of acute large vessel occlusion or flow-limiting stenosis.  3. Redemonstrated sequela of prior clip in the left ICA aneurysms, associated with posterior communicating artery and anterior choroidal artery origins.  No definite evidence of residual recurrent aneurysm.  4. When compared to prior CTA and MRA of 11/14/2023, as well as CTA performed 06/07/2024, no significant change in appearance of the untreated left M2 MCA aneurysm.  5. Additional details as above.      Electronically signed by: William Putnam  Date:    06/15/2024  Time:    07:22               Narrative:    EXAMINATION:  CTA HEAD; CTA NECK    CLINICAL HISTORY:  Cerebral aneurysm, follow-up;    TECHNIQUE:  Non contrast low dose axial images were obtained thought the head and neck.  CT angiogram was performed from the level of the bottom of C2 to the top of the head following the IV administration of 100mL of Omnipaque 350.   Sagittal and coronal reconstructions and maximum intensity projection reconstructions were performed.    COMPARISON:  CT head without contrast 06/15/2024.    FINDINGS:  CT HEAD:    Blood: No acute intracranial hemorrhage.    Parenchyma: No definite loss of gray-white differentiation to suggest acute or subacute transcortical infarct.    Ventricles/Extra-axial spaces: Redemonstrated thin extra-axial hyperattenuation due to the left-sided craniotomy site, similar to recent CT of 06/18/2024, 04:21 hours as well as when compared to older study of  11/14/2023, felt likely related to postoperative sequela.  Unchanged size and configuration of the ventricles.    Vessels: See below.    Orbits: Unremarkable.    Scalp: Unremarkable.    Skull: There are no depressed skull fractures or destructive bone lesions.    Sinuses and mastoids: Scattered relatively modest paranasal sinus mucosal thickening.    Other findings: Dental caries.  Periapical lucency about right posterior maxillary molar tooth with dehiscence of the buccal cortex.  Additional periapical lucencies involving the left mandibular molar teeth.  These may represent periapical abscess.    CTA:    NECK:    Imaged aortic arch: Nonaneurysmal.  Left-sided arch.  Conventional 3 vessel arch anatomy.  Subclavian and brachiocephalic arteries appear grossly patent.    Right common and cervical internal carotid arteries: CCA and ECA grossly patent.  Less than 50% atheromatous narrowing at the proximal ICA.  No evidence of carotid dissection or web.    Left common and cervical internal carotid arteries: CCA and ECA grossly patent.  Less than 50% atheromatous narrowing at the proximal ICA.  No evidence of carotid dissection or web.    Right cervical vertebral artery: There is no hemodynamically significant stenosis or dissection    Left cervical vertebral artery: There is no hemodynamically significant stenosis or dissection. Left vertebral artery appears dominant.    HEAD:    Right anterior circulation:Mild atheromatous irregularity of the ICA.  Right ophthalmic artery is patent.Mild atheromatous irregularity of the M1 MCA.  No definite evidence of acute large vessel occlusion or flow-limiting stenosis.    Left anterior circulation: Mild atheromatous irregularity of the ICA.  No definite evidence of flow-limiting stenosis or large vessel occlusion.Left ophthalmic artery is patent.Redemonstrated postoperative sequela of clipping of left ICA aneurysms associated with the posterior communicating artery and anterior  choroidal artery origins.  Noting limitations imposed by clip related artifact, no definite residual recurrent aneurysm is appreciated.  Unknown, un treated left M2 AUGIE aneurysm (axial source series 5, image 332) measures on the order of 2-3 mm from neck to dome appears similar to 11/14/2023.    Posterior circulation: There is no hemodynamically significant vertebrobasilar stenosis. There is no significant PCA stenosis. Posterior inferior cerebellar arteries patent at origin.    Anterior and posterior communicating arteries: Suspect small patent anterior communicating artery.  Posterior communicating arteries are not well seen.    NON-ANGIOGRAPHIC FINDINGS:    Visualized intracranial contents: As above.    Soft tissues of the neck: Unremarkable.    Visualized sinuses: As above.    Dentition: As above.    Spine: Degenerative change.    Visualized lungs: No acute change.  Mild emphysematous.                                       CTA Neck (Final result)  Result time 06/15/24 07:22:07      Final result by William Putnam MD (06/15/24 07:22:07)                   Impression:      1. No definite acute intracranial findings by CT.  2. No evidence of acute large vessel occlusion or flow-limiting stenosis.  3. Redemonstrated sequela of prior clip in the left ICA aneurysms, associated with posterior communicating artery and anterior choroidal artery origins.  No definite evidence of residual recurrent aneurysm.  4. When compared to prior CTA and MRA of 11/14/2023, as well as CTA performed 06/07/2024, no significant change in appearance of the untreated left M2 MCA aneurysm.  5. Additional details as above.      Electronically signed by: William Putnam  Date:    06/15/2024  Time:    07:22               Narrative:    EXAMINATION:  CTA HEAD; CTA NECK    CLINICAL HISTORY:  Cerebral aneurysm, follow-up;    TECHNIQUE:  Non contrast low dose axial images were obtained thought the head and neck.  CT angiogram was performed from  the level of the bottom of C2 to the top of the head following the IV administration of 100mL of Omnipaque 350.   Sagittal and coronal reconstructions and maximum intensity projection reconstructions were performed.    COMPARISON:  CT head without contrast 06/15/2024.    FINDINGS:  CT HEAD:    Blood: No acute intracranial hemorrhage.    Parenchyma: No definite loss of gray-white differentiation to suggest acute or subacute transcortical infarct.    Ventricles/Extra-axial spaces: Redemonstrated thin extra-axial hyperattenuation due to the left-sided craniotomy site, similar to recent CT of 06/18/2024, 04:21 hours as well as when compared to older study of 11/14/2023, felt likely related to postoperative sequela.  Unchanged size and configuration of the ventricles.    Vessels: See below.    Orbits: Unremarkable.    Scalp: Unremarkable.    Skull: There are no depressed skull fractures or destructive bone lesions.    Sinuses and mastoids: Scattered relatively modest paranasal sinus mucosal thickening.    Other findings: Dental caries.  Periapical lucency about right posterior maxillary molar tooth with dehiscence of the buccal cortex.  Additional periapical lucencies involving the left mandibular molar teeth.  These may represent periapical abscess.    CTA:    NECK:    Imaged aortic arch: Nonaneurysmal.  Left-sided arch.  Conventional 3 vessel arch anatomy.  Subclavian and brachiocephalic arteries appear grossly patent.    Right common and cervical internal carotid arteries: CCA and ECA grossly patent.  Less than 50% atheromatous narrowing at the proximal ICA.  No evidence of carotid dissection or web.    Left common and cervical internal carotid arteries: CCA and ECA grossly patent.  Less than 50% atheromatous narrowing at the proximal ICA.  No evidence of carotid dissection or web.    Right cervical vertebral artery: There is no hemodynamically significant stenosis or dissection    Left cervical vertebral artery:  There is no hemodynamically significant stenosis or dissection. Left vertebral artery appears dominant.    HEAD:    Right anterior circulation:Mild atheromatous irregularity of the ICA.  Right ophthalmic artery is patent.Mild atheromatous irregularity of the M1 MCA.  No definite evidence of acute large vessel occlusion or flow-limiting stenosis.    Left anterior circulation: Mild atheromatous irregularity of the ICA.  No definite evidence of flow-limiting stenosis or large vessel occlusion.Left ophthalmic artery is patent.Redemonstrated postoperative sequela of clipping of left ICA aneurysms associated with the posterior communicating artery and anterior choroidal artery origins.  Noting limitations imposed by clip related artifact, no definite residual recurrent aneurysm is appreciated.  Unknown, un treated left M2 AUGIE aneurysm (axial source series 5, image 332) measures on the order of 2-3 mm from neck to dome appears similar to 11/14/2023.    Posterior circulation: There is no hemodynamically significant vertebrobasilar stenosis. There is no significant PCA stenosis. Posterior inferior cerebellar arteries patent at origin.    Anterior and posterior communicating arteries: Suspect small patent anterior communicating artery.  Posterior communicating arteries are not well seen.    NON-ANGIOGRAPHIC FINDINGS:    Visualized intracranial contents: As above.    Soft tissues of the neck: Unremarkable.    Visualized sinuses: As above.    Dentition: As above.    Spine: Degenerative change.    Visualized lungs: No acute change.  Mild emphysematous.                                       CT Head Without Contrast (Final result)  Result time 06/15/24 05:41:55      Final result by Faheem Okeefe MD (06/15/24 05:41:55)                   Impression:      Chronic and postoperative changes are noted, there is no evidence for superimposed acute intracranial process.      Electronically signed by: Faheem  Lancaster  Date:    06/15/2024  Time:    05:41               Narrative:    EXAMINATION:  CT HEAD WITHOUT CONTRAST    CLINICAL HISTORY:  Mental status change, unknown cause;    TECHNIQUE:  Low dose axial images were obtained through the head.  Coronal and sagittal reformations were also performed. Contrast was not administered.    COMPARISON:  Prior examinations, most recent of which is June 7, 2024    FINDINGS:  There is artifact present, in addition positioning is less than optimal, these factors diminish the sensitivity of the examination.    Postoperative calvarial change on the left is noted, underlying the postoperative location, there is appearance of minimal thin extra-axial appearing density overlying the left cerebral hemisphere, however this appears stable on prior examinations and is thought likely representative of chronic mild dural thickening.  Intracranial aneurysm clip at the medial aspect of the middle cranial fossa is noted.    There is no additional evidence for intracranial mass, mass effect or midline shift or acute intracranial hemorrhage.  Gray-white differentiation appears appropriate.  There is no hydrocephalus.  Appropriate CSF spaces are seen at the skull base.    The osseous structures demonstrate chronic and postoperative change.  The mastoid air cells appear well aerated.  There is limited visualization of the paranasal sinuses.  The sphenoid sinus appears appropriate.                                    Brief History of Present Illness      The patient was in their usual state of health until earlier today when she was left with her 11 yo son at home. When the family returned home, she was found down in her own urine and unresponsive. She was brought into the ED for further evaluation. She was able to respond to commands and stated her name in the ED. Shortly thereafter she had a seizure and was given 1 g of ativan in the ED. She was unable to provide any history, but noted that she was  not in any pain. Called numbers listed in the chart for collateral without any responses.      She recently was admitted at [facility] with a brief ICU stay for a continuous EEG. 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    For the full HPI please refer to the History & Physical from this admission.    Hospital Course By Problem with Pertinent Findings     Ms. Jenkins was admitted for acute encephalopathy in the setting of prior aneurysmal rupture w/ hemorrhagic stroke and subsequent seizure disorder. She was recently admitted for similar presentation. She had a seizure event in the emergency department that lasted ~ 3 minutes and she was given Ativan 1 mg IV with breaking of the seizure. She was admitted and stepped up to the ICU w/ neurology recommendations for Cap EEG. This did not show any seizure events but it did show mild to moderate diffuse slowing consistent with encephalopathy. Patient never had a full return to mental status baseline although it did improve somewhat. She also experienced ICU delirium and required Zyprexa 2.5 mg IM for agitation as well as four point restraints. Decision was made via Neurology, Critical Care Team, and Primary team to transfer to [facility] [place] for higher level of care and continuous EEG and evaluation by Epileptology.     # Acute Encephalopathy  Suspect medication induced vs prolonged post-ictal phase vs non-convulsive status. No infectious or metabolic etiology seen. Imaging of the brain without reason for acute encephalopathy. Her CK and lactic acid are within normal limits.   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 Another seizure event in the emergency room that she was given Ativan for. Despite appropriate time for ativan to washout, patient never returned to mental baseline.  - given no return to baseline, patient stepped up to the ICU for continuous EEG to rule out non-convulsive status epilepticus   - no reported seizure acitivity on continous EEG  - decrease Keppra dose to 1000 mg IV BID, increase Valproate to 750 mg IV TID, will transition to PO once safe  - Neurology consulted, appreciate recommendations  - might need evaluation by Epileptologist at [facility] if no return to baseline     # Hospital Acquired Delirium   - Please initiate delirium protocols with frequent reorientation  - Day: shades up, TV on, lights on  - Night: shades down, TV off, lights off  - PRN zyprexa as needed  - Inpatient consult to Psychiatry    # Migraine Headaches  - continue topiramate 50 mg BID  - multimodal pain control     # Acute Kidney Injury Superimposed on Chronic Kidney Disease, Resolved  - suspect pre-renal etiology in the setting of decreased PO intake  - monitor with daily renal function, if continued poor PO intake can start maintenance fluids with D5 LR    # Hypertension  - continue home losartan 50 mg qd and Carvedilol 37.5 mg BID as PO intake allows    # Hyperlipidemia  - continue home atorvastatin 80 mg qd as PO intake allows     # Anxiety, Depression   - Continue home lexapro to 20 mg daily    Discharge Medications        Medication List        ASK your doctor about these medications      amLODIPine 10 MG tablet  Commonly known as: NORVASC  Ask about: Which instructions should I use?     atorvastatin 80 MG tablet  Commonly known as: LIPITOR  Take 1 tablet (80 mg total) by mouth once daily.     BD ULTRA-FINE MARIELY PEN NEEDLE 32 gauge x 5/32" Ndle  Generic drug: pen needle, diabetic  Use to inject insulin into the skin three times daily .     carvediloL 12.5 " MG tablet  Commonly known as: COREG  Take 3 tablets (37.5 mg total) by mouth 2 (two) times daily.     divalproex  MG 24 hr tablet  Commonly known as: DEPAKOTE ER  Take 3 tablets (750 mg total) by mouth every 12 (twelve) hours.     EScitalopram oxalate 20 MG tablet  Commonly known as: LEXAPRO  Take 1 tablet (20 mg total) by mouth once daily.     insulin aspart U-100 100 unit/mL injection  Commonly known as: NovoLOG  Ask about: Which instructions should I use?     LEVEMIR FLEXTOUCH U100 INSULIN 100 unit/mL (3 mL) Inpn pen  Generic drug: insulin detemir U-100 (Levemir)  Ask about: Which instructions should I use?     levETIRAcetam 750 MG Tab  Commonly known as: KEPPRA  Take 2 tablets (1,500 mg total) by mouth 2 (two) times daily.     losartan 50 MG tablet  Commonly known as: COZAAR  Take 1 tablet (50 mg total) by mouth once daily.     metFORMIN 500 MG ER 24hr tablet  Commonly known as: GLUCOPHAGE-XR     methocarbamoL 500 MG Tab  Commonly known as: ROBAXIN     topiramate 50 MG tablet  Commonly known as: TOPAMAX  Take 1 tablet (50 mg total) by mouth 2 (two) times daily.     TRUE METRIX GLUCOSE METER Misc  Generic drug: blood-glucose meter  Use to check blood glucose 3 times daily.     TRUE METRIX GLUCOSE TEST STRIP Strp  Generic drug: blood sugar diagnostic  Use to check blood glucose 3 times daily.     TRUEPLUS LANCETS 30 gauge Misc  Generic drug: lancets  Use to check blood glucose 3 times daily.            Discharge Information:   Diet:  Cardiac    Physical Activity:  Per unit protocol at [facility]             Instructions:  1. Take all medications as prescribed  2. Keep all follow-up appointments  3. Return to the hospital or call your primary care physicians if any worsening symptoms such as fever, chest pain, shortness of breath, return of symptoms, or any other concerns.    Follow-Up Appointments:  PCP  Neurology   Epileptology  Neurosurgery    Michael Issa MD  [facility], [facility]

## 2024-06-18 NOTE — SUBJECTIVE & OBJECTIVE
Interval History:  Some agitation overnight, PRN haldol added. Ammonia normalized, UTI being treated with CTX, EEG without seizures, monitor ammonia, TTF under Hospital Medicine for further management.     Review of Systems   Constitutional: Negative.    HENT: Negative.     Eyes: Negative.    Respiratory: Negative.     Gastrointestinal: Negative.    Genitourinary: Negative.    Musculoskeletal: Negative.    Neurological:  Positive for seizures, speech difficulty and weakness.   Psychiatric/Behavioral:  Positive for agitation.        Objective:     Vitals:  Temp: 98.1 °F (36.7 °C)  Pulse: 70  Rhythm: normal sinus rhythm  BP: (!) 178/90  MAP (mmHg): 127  Resp: 14  SpO2: 98 %    Temp  Min: 96.5 °F (35.8 °C)  Max: 98.4 °F (36.9 °C)  Pulse  Min: 54  Max: 88  BP  Min: 135/84  Max: 196/91  MAP (mmHg)  Min: 103  Max: 142  Resp  Min: 6  Max: 39  SpO2  Min: 92 %  Max: 100 %    06/17 0701 - 06/18 0700  In: -   Out: 600 [Urine:600]   Unmeasured Output  Stool Occurrence: 1        Physical Exam    Examination:   Constitutional: Well-nourished and -developed. No apparent distress.   Eyes: Conjunctiva clear, anicteric. Lids no lesions.  Head/Ears/Nose/Mouth/Throat/Neck: Moist mucous membranes. External ears, nose atraumatic.   Cardiovascular: Regular rhythm.   Respiratory: Comfortable respirations. Clear to auscultation.    Neurologic:  -GCS [id number]  -Alert. Oriented to person. Speech dysarthric. Follows commands.  -Cranial nerves : mild right facial droop at rest, not present with facial movements   -Motor antigravity in all four extremities without focal weakness   -Sensation intact to light touch            Medications:  Continuous ScheduledamLODIPine, 10 mg, Daily  atorvastatin, 80 mg, Daily  carvediloL, 37.5 mg, BID  [START ON 6/19/2024] cefTRIAXone (Rocephin) IV (PEDS and ADULTS), 1 g, Q24H  enoxparin, 40 mg, Daily  EScitalopram oxalate, 20 mg, Daily  levETIRAcetam (Keppra) IV (PEDS and ADULTS), 1,000 mg, Q12H  losartan, 50 mg,  Daily  senna-docusate 8.6-50 mg, 1 tablet, BID    PRNsodium chloride 0.9%, , PRN  acetaminophen, 650 mg, Q6H PRN  dextrose 10%, 12.5 g, PRN  dextrose 10%, 12.5 g, PRN  dextrose 10%, 25 g, PRN  dextrose 10%, 25 g, PRN  glucagon (human recombinant), 1 mg, PRN  glucose, 16 g, PRN  glucose, 24 g, PRN  haloperidol lactate, 5 mg, Q6H PRN  insulin aspart U-100, 0-10 Units, QID (AC + HS) PRN  labetaloL, 10 mg, Q4H PRN  ondansetron, 4 mg, Q8H PRN  potassium chloride, 40 mEq, PRN   And  potassium chloride, 60 mEq, PRN   And  potassium chloride, 80 mEq, PRN      Today I personally reviewed pertinent medications, lines/drains/airways, imaging, laboratory results, notably:  CTH, CTA HN, MRI brain all without acute abnormalities.   Ammonia normalized on AM labs     Diet  Diet diabetic Minced & Moist (IDDSI Level 5); 2000 Calorie  Diet diabetic Minced & Moist (IDDSI Level 5); 2000 Calorie

## 2024-06-18 NOTE — ASSESSMENT & PLAN NOTE
-Hold home metformin in acute setting  -Hold home insulin d/t episode of hypoglycemia @ OSH; resume once able to tolerate diet  -Accuchecks qACHS w/ SSI  -Hgb A1c pending

## 2024-06-18 NOTE — PT/OT/SLP EVAL
"Speech Language Pathology Evaluation  Bedside Swallow    Patient Name:  Gina Jenkins   MRN:  [id number]  Admitting Diagnosis: Acute encephalopathy    Recommendations:                 General Recommendations:  Dysphagia therapy and Speech language evaluation  Diet recommendations:  Minced & Moist Diet - IDDSI Level 5, Thin   Aspiration Precautions: Assistance with meals, Avoid talking while eating, Eliminate distractions, Feed only when awake/alert, HOB to 90 degrees, Meds crushed in puree, Small bites/sips, and Standard aspiration precautions   General Precautions: Standard, aspiration, fall, seizure  Communication strategies:  none    Assessment:     Gina Jenkins is a 49 y.o. female with an SLP diagnosis of Dysphagia and Cognitive-Linguistic Impairment. .    History:     Past Medical History:   Diagnosis Date    Brain aneurysm     CHF (congestive heart failure)     H/O coronary angioplasty     Hypercholesteremia     Hypertension     Malignant hypertension     Migraine headache     Stroke 10/2017       Past Surgical History:   Procedure Laterality Date    CARDIAC CATHETERIZATION      CEREBRAL ANGIOGRAM      CLIP LIGATION OF INTRACRANIAL ANEURYSM BY CRANIOTOMY N/A 7/15/2022    Procedure: CRANIOTOMY, WITH ANEURYSM CLIPPING;  Surgeon: Timothy Diaz MD;  Location: [facility] OR [address] [place] [address];  Service: Neurosurgery;  Laterality: N/A;  PTERIONAL CRANIOTOMY WITH CLIP LIGATION OF L PCOMM, L ANTERIOR CHOROIDAL, L MCA  ANEURYSM, ANESTHESIA: GENERAL, BLOOD: TYPE&CROSS 2 UNITS, NEUROMONITORING: SEP, MEP, EEG, RADIOLOGY: C-ARM, POSITION: SUPINE, ANNA, CO-SURGERON: DR. LEXI DOMÍNGUEZ.    WOUND DEBRIDEMENT  8/27/2022    Procedure: DEBRIDEMENT, WOUND;  Surgeon: Timothy Diaz MD;  Location: [facility] OR [address] [place] [address];  Service: Neurosurgery;;       Social History: Patient lives with family.        Prior diet: regular with thin.    Occupation/hobbies/homemaking: n/a.    Subjective     'My name is Gina" per pt  Patient goals: johan "     Pain/Comfort:  Pain Rating 1: 0/10  Pain Rating Post-Intervention 1: 0/10    Respiratory Status: Room air    Objective:     Oral Musculature Evaluation  Dentition: present and adequate  Secretion Management: adequate  Mucosal Quality: good  Mandibular Strength and Mobility: WFL  Oral Labial Strength and Mobility: WFL  Lingual Strength and Mobility: WFL  Velar Elevation: WFL  Buccal Strength and Mobility: WFL  Volitional Cough: strong  Volitional Swallow: not elicited  Voice Prior to PO Intake: wfl    Bedside Swallow Eval:   Consistencies Assessed:  Thin liquids 2 teaspoons water then swallowed 3 ounces of water via edge of cup  Puree 4 teaspoons pudding  Solids 1/4 cracker x2 trials      Oral Phase:   Impaired rotational chew    Pharyngeal Phase:   no overt clinical signs/symptoms of aspiration  no overt clinical signs/symptoms of pharyngeal dysphagia    Compensatory Strategies  None    Treatment: HOB was raised to 90 degree angle.  With cracker trials, verbal cues needed for pt. To chew with tendency to hold cracker in mouth.  Once initiated, mastication was adequate however cognition impaired and likely will need supervision during meals.          Goals:   Multidisciplinary Problems       SLP Goals          Problem: SLP    Goal Priority Disciplines Outcome   SLP Goal     SLP Progressing   Description: Goals due 6/25  1.  Tolerate level 5 diet with thin liquids with no s/s of aspiration  2.  Participate in ongoing assessment of swallow at bedside  3.  Participate in speech language cognitive eval                       Plan:     Patient to be seen:  4 x/week   Plan of Care expires:  07/16/24  Plan of Care reviewed with:  patient   SLP Follow-Up:  Yes       Discharge recommendations:   (tbd)   Barriers to Discharge:  Safety Awareness impaired    Time Tracking:     SLP Treatment Date:   06/18/24  Speech Start Time:  0720  Speech Stop Time:  0730     Speech Total Time (min):  10 min    Billable Minutes: Eval  Swallow and Oral Function 10    06/18/2024

## 2024-06-18 NOTE — PROGRESS NOTES
06/18/24 0910   Vital Signs   BP (!) (S)  195/103  (patient agitated; attempting to climb out of bed.  See note)     SARAVANAN Urbina informed of above. Informed patient is refusing to take PO meds and current order of labetalol IV is on backorder.  States will have pharmacist adjust labetalol order

## 2024-06-18 NOTE — PLAN OF CARE
Hospital Medicine ICU Acceptance Note    Date of Admit: 6/17/2024  Date of Transfer: 6/18/2024  Dolores, C/J, L, Onc (IV chemo w/in 1 month), Gyn/Onc, or other special case?: no   ICU team stepping patient down: NCC  Accepting  team: RAMONA BRIONES    Brief History of Present Illness:      Gina Jenkins is a  50 y/o F w/ PMH of multiple brain aneurysms, coronary angioplasty, HFpEF (EF 55% 6/2024), HTN, HLD, migraine headaches, seizures,  CVA with UTI/encephalopathy      To Do / Pending Studies / Follow ups:    AED adjusted  UC pending     Patient has been accepted by Hospital Medicine Team RAMONA BRIONES  , who will assume care of the patient upon arrival to the floor from the ICU. Please contact ICU team with any concerns prior to arrival. Please contact [facility] at [phone] or [phone] (please do NOT leave a voicemail) when patient arrives to the floor.    Oli Crespo MD  Attending Staff Physician  [facility]  pager- [phone]  [facility] - [phone]

## 2024-06-18 NOTE — ASSESSMENT & PLAN NOTE
-Hx of  -Most recent ECHO 6/8 w/ normal systolic function, estimated EF 55%, and normal diastolic function

## 2024-06-18 NOTE — ASSESSMENT & PLAN NOTE
-Hold home metformin in acute setting  -Hold home insulin d/t episode of hypoglycemia @ OSH; resume once able to tolerate diet  -Accuchecks qACHS w/ SSI  -Hgb A1c pending  Patient's FSGs are controlled on current hypoglycemics.   Last A1c reviewed-   Lab Results   Component Value Date    HGBA1C 5.0 06/19/2024    HGBA1C 8.3 (H) 08/22/2022    HGBA1C 5.8 (H) 07/16/2022     Will hold PO hypoglycemics and will start correctional scale insulin  Most recent fingerstick glucose reviewed-   Recent Labs   Lab 06/18/24  1642 06/18/24  2121 06/19/24  0445 06/19/24  0753   POCTGLUCOSE 99 94 76 74     currently on   Antihyperglycemics (From admission, onward)      None

## 2024-06-18 NOTE — PT/OT/SLP EVAL
Physical Therapy Co-Evaluation and Co-Treatment with OT    Patient Name:  Gina Jenkins   MRN:  [id number]    Recent Surgery: * No surgery found *      *Co-treatment with OT due to patient complexity and need for two skilled therapists to ensure safe mobilization.   Recommendations:     Discharge Recommendations:   Moderate Intensity Therapy  Discharge Equipment Recommendations: none   Barriers to discharge: None    Highest Level of Mobility: STS  Assistance Required: CGA via HHA x2    Assessment:     Gina Jenkins is a 49 y.o. female admitted with a medical diagnosis of Acute encephalopathy. She presents with the following impairments/functional limitations:  weakness, impaired cognition, impaired endurance, impaired balance, impaired cardiopulmonary response to activity, decreased safety awareness, gait instability, impaired functional mobility, decreased lower extremity function, impaired self care skills, decreased upper extremity function    Pt met with HOB elevated and agreeable to PT session. Per chart review the patiet's PLOF is requiring assistance with IADLS and using a SPC for mobility. Currently, she required CGA via HHA x2 when performing a STS transfer from EOB. Pt tolerated therapy fair on this date, but remains limited by poor command following and increasing agitation during therapy. Pt demonstrated emotional lability where she was pleasant and agreeable to therapy upon entry and agitated attempting to climb out of bed at the end. Despite this, the patient is mobilizing well but requires increased time and cueing 2/2 impaired cognition. Pt is functioning below her PLOF and is at high risk for falling at this time.    Pt would benefit from continued skilled acute PT 4/wk to address above listed functional deficits, provide patient/caregiver education, reduce fall risk, and maximize (I) and safety with functional mobility.    Rehab Prognosis: Good; patient would benefit from acute skilled PT  "services to address these deficits and reach maximum level of function.      Plan:     During this hospitalization, patient to be seen 4 x/week to address the identified rehab impairments via gait training, therapeutic activities, therapeutic exercises, neuromuscular re-education and progress toward the following goals:    Plan of Care Expires:  07/18/24    This plan of care has been discussed with the patient/caregiver, who was included in its development and is in agreement with the identified goals and treatment plan.     Subjective     Communicated with RN prior to session.  Patient agreeable to participate.     Chief Complaint: Acute encephalopathy  Patient/Family Comments/goals: "Are you taking me home?"    Pain/Comfort:  Pain Rating 1: 0/10  Pain Rating Post-Intervention 1: 0/10    Patients cultural, spiritual, Nondenominational conflicts given the current situation: no    Patient's living environment is as follows:  Living Environment: Pt lives with her family in a [facility] with 3 MARLENE with a LHR. Bathroom set-up: tub/shower combo  Prior Level of Function: modified (I) for mobility and ADLs using SPC as AD   DME used: walker, rolling, cane, straight  DME owned (not currently used): none  Upon discharge, patient will have assistance from: Family    Objective:     Patient found HOB elevated with telemetry, pulse ox (continuous), blood pressure cuff, EEG, restraints, PureWick, bed alarm  upon PT entry to room.    General Precautions: Standard, fall   Orthopedic Precautions:N/A   Braces: N/A   BP (!) 163/99   Pulse 82   Temp 98.4 °F (36.9 °C) (Oral)   Resp 19   Ht 5' 2" (1.575 m)   Wt 77 kg (169 lb 12.1 oz)   LMP  (LMP Unknown)   SpO2 100%   Breastfeeding No   BMI 31.05 kg/m²   Oxygen Device:  Room air      Exams:    Cognition:  Patient is oriented to Person  Follows ~10% of simple commands  Insight to deficits/safety awareness: Impaired    Gross Motor Coordination: No deficits noted during functional mobility tasks "     Postural examination/scapula alignment: Rounded shoulder    Lower Extremity Range of Motion:  Right Lower Extremity: WFL  Left Lower Extremity: WFL    Lower Extremity Strength  Unable to formally assess strength 2/2 poor command following.  Observed full knee ext ROM against gravity.     Sensation:   Light touch sensation: Unable to formally assess due to impaired cognition     Functional Mobility:    Bed Mobility:  Supine to Sit: Minimal Assistance on L side of bed  Min(A) required for cueing  Sit to Supine: Total Assistance x2 persons  2/2 increased agitation  Scooting anteriorly to EOB to plant feet on floor: Minimal Assistance  Scooting/Bridging in supine to HOB: Total Assistance x2 persons  Via drawsheet    Transfers:   Sit to Stand Transfer: Contact Guard Assistance  from EOB with HHA x2             Gait:  Deferred 2/2 increasing agitation throughout session.    Balance:  Static Sit:   Mod Assist-Max Assist at EOB  Dynamic sit:  Mod Assist-Max Assist at EOB   Static Stand:   Mod Assist x2 persons with Hand-held assist x 2        Therapeutic Activities/Exercises     Patient assisted with functional mobility as noted above  Discussed at length benefits of PT as well as d/c recommendations. Pt agreeable  Patient educated on the importance of early mobility, OOB to prevent functional decline during hospital stay  Patient was instructed to utilize staff assistance for mobility/transfers.  Patient is appropriate to transfer to [facility] via drawsheet and RN/PCT assist  Patient educated on PT POC and role of PT in acute care  White board updated to include patient's safest level of mobility with staff assistance, RN also updated    AM-PAC 6 CLICK MOBILITY  Turning over in bed (including adjusting bedclothes, sheets and blankets)?: 3  Sitting down on and standing up from a chair with arms (e.g., wheelchair, bedside commode, etc.): 3  Moving from lying on back to sitting on the side of the bed?: 3  Moving to and  from a bed to a chair (including a wheelchair)?: 2  Need to walk in hospital room?: 2  Climbing 3-5 steps with a railing?: 1  Basic Mobility Total Score: 14      Patient left HOB elevated with all lines intact, call button in reach, bed alarm on, restraints reapplied at end of session, rn notified, and  present.      History/Goals:     PAST MEDICAL HISTORY:  Past Medical History:   Diagnosis Date    Brain aneurysm     CHF (congestive heart failure)     H/O coronary angioplasty     Hypercholesteremia     Hypertension     Malignant hypertension     Migraine headache     Stroke 10/2017       Past Surgical History:   Procedure Laterality Date    CARDIAC CATHETERIZATION      CEREBRAL ANGIOGRAM      CLIP LIGATION OF INTRACRANIAL ANEURYSM BY CRANIOTOMY N/A 7/15/2022    Procedure: CRANIOTOMY, WITH ANEURYSM CLIPPING;  Surgeon: Timothy Diaz MD;  Location: [facility] OR [address] [place] [address];  Service: Neurosurgery;  Laterality: N/A;  PTERIONAL CRANIOTOMY WITH CLIP LIGATION OF L PCOMM, L ANTERIOR CHOROIDAL, L MCA  ANEURYSM, ANESTHESIA: GENERAL, BLOOD: TYPE&CROSS 2 UNITS, NEUROMONITORING: SEP, MEP, EEG, RADIOLOGY: C-ARM, POSITION: SUPINE, ANNA, CO-SURGERON: DR. LEXI DOMÍNGUEZ.    WOUND DEBRIDEMENT  2022    Procedure: DEBRIDEMENT, WOUND;  Surgeon: Timothy Diaz MD;  Location: [facility] OR [address] [place] [address];  Service: Neurosurgery;;       GOALS:   Multidisciplinary Problems       Physical Therapy Goals          Problem: Physical Therapy    Goal Priority Disciplines Outcome Goal Variances Interventions   Physical Therapy Goal     PT, PT/OT Progressing     Description: Goals to be met by: 24     Patient will increase functional independence with mobility by performin. Supine to sit with Stand-by Assistance  2. Sit to supine with Stand-by Assistance  3. Rolling to Left and Right with Modified [place].  4. Sit to stand transfer with Modified [place]  5. Bed to chair transfer with Supervision using LRAD  6. Gait  x 100 feet with  Supervision using LRAD.   7. Ascend/descend 3 stairs with left Handrails Supervision using LRAD.   8. Lower extremity exercise program x10 reps per handout, with independence                         Time Tracking:     PT Received On: 06/18/24  PT Start Time: 0804     PT Stop Time: 0834  PT Total Time (min): 30 min     Billable Minutes: Evaluation 10 and Therapeutic Activity 20      Mario Gupta, [facility]  06/18/2024  Pager# [phone]

## 2024-06-18 NOTE — ASSESSMENT & PLAN NOTE
-Noted on U/A -- nitrite and leukocyte positive   -CTX x3 days as patient unreliably taking PO medications

## 2024-06-18 NOTE — ASSESSMENT & PLAN NOTE
50 y/o F presenting as transfer from [facility] for higher level of care and continuous EEG monitoring. Hx of seizure disorder w/ questionable home med compliance. Confounded by elevated ammonia level and UTI at OSH.     -Admit to NCC  -VS/Neuro checks q1  -Cap EEG without seizures  -MRI brain, CTA H&N and CTH without acute processes   -Continue keppra 1g BID  -VPA discontinued due to elevated ammonia levels at OSH   -Ammonia normalized, discontinue lactulose  -Repeat ammonia level in AM  -Treat UTI, 3d course of CTX ordered   -Psych consulted for PNES eval; see consult note 6/17  -Epilepsy consulted, appreciate recommendations   -SBP goal < 180  -Diet advanced per SLP recommendations   -Monitor I/O  -CBC, CMP, Mag, Phos daily  -SCDs/Lovenox for DVT PPX  -PT/OT/SLP as appropriate  -TTF under Hospital Medicine service

## 2024-06-18 NOTE — PLAN OF CARE
[facility] - Neuro Critical Care  Initial Discharge Assessment       Primary Care Provider: Clair Johnson NP    Admission Diagnosis: Seizures [R56.9]    Admission Date: 6/17/2024  Expected Discharge Date: 6/21/2024    Transition of Care Barriers: Underinsured, Substance Abuse    Payor: MEDICAID / Plan: [place] [employer] / Product Type: Managed Medicaid /     Extended Emergency Contact Information  Primary Emergency Contact: Netta [place]  Mobile Phone: [phone]  Relation: Sister  Preferred language: English  Secondary Emergency Contact: Padmini Perales   [place]  Mobile Phone: [phone]  Relation: Relative  Mother: Miladys Davies  Address: [address] [place] [address] [place]  Mobile Phone: [phone]    Discharge Plan A: Rehab  Discharge Plan B: Home, Home with family, Home Health      [id number] - [place], [place] - [address] AT [facility] [address]  [address] [address]  Phone: [phone] Fax: [phone]      Initial Assessment (most recent)       Adult Discharge Assessment - 06/18/24 1244          Discharge Assessment    Assessment Type Discharge Planning Assessment     Confirmed/corrected address, phone number and insurance Yes     Confirmed Demographics Correct on Facesheet     Source of Information family;health record     If unable to respond/provide information was family/caregiver contacted? Yes     Contact Name/Number Pt's niecePadmini [phone], at bedside.     When was your last doctors appointment? --   Pt's niece unable to verify pt's last appointment.    Communicated CADY with patient/caregiver Yes     Reason For Admission Acute encephalopathy     People in Home child(precious), dependent;parent(s)     Facility Arrived From: [facility]     Do you expect to return to your current living situation? Other (see comments)   Pt may need placement.    Do you have help at home or  someone to help you manage your care at home? Yes     Who are your caregiver(s) and their phone number(s)? Mother-Miladys Davies [phone], Sister- Mia [place] [phone], Niece (Padmini) [phone]     Prior to hospitilization cognitive status: Unable to Assess     Current cognitive status: Not Oriented to Person;Not Oriented to Place;Not Oriented to Time;Inappropriate Behavior     Walking or Climbing Stairs ambulation difficulty, requires equipment   Pt's karely reported prior to hospitalization, she was independent with mobility and ADLs.    Mobility Management Pt's records indicate patient used rolling walker in the past.  Pt's karely reported over the last few years patient has been able to ambulate without rolling walker.     Dressing/Bathing Difficulty no   Pt's niece reported prior to hospitalization, pt was independent with mobility and ADLs.    Home Accessibility wheelchair accessible     Equipment Currently Used at Home walker, rolling;cane, straight     Readmission within 30 days? Yes     Patient currently being followed by outpatient case management? No     Do you currently have service(s) that help you manage your care at home? No     Do you take prescription medications? Yes     Do you have prescription coverage? Yes     Coverage [place] [employer]-[place]     Do you have any problems affording any of your prescribed medications? No     Is the patient taking medications as prescribed? --   Unknown.    Who is going to help you get home at discharge? Pt's family will provide transportation home.     How do you get to doctors appointments? family or friend will provide     Are you on dialysis? No     Do you take coumadin? No     Discharge Plan A Rehab     Discharge Plan B Home;Home with family;Home Health     DME Needed Upon Discharge  wheelchair;other (see comments)   Family requesting a wheelchair for discharge.    Discharge Plan discussed with: Adult children   Information obtained  and discussed with pt's Janey.    Name(s) and Number(s) Padmini [phone]     Transition of Care Barriers Underinsured;Substance Abuse        Physical Activity    On average, how many days per week do you engage in moderate to strenuous exercise (like a brisk walk)? 0 days     On average, how many minutes do you engage in exercise at this level? 0 min        Financial Resource Strain    How hard is it for you to pay for the very basics like food, housing, medical care, and heating? Patient unable to answer        Housing Stability    In the last 12 months, was there a time when you were not able to pay the mortgage or rent on time? Patient unable to answer     At any time in the past 12 months, were you homeless or living in a shelter (including now)? Patient unable to answer        Transportation Needs    Has the lack of transportation kept you from medical appointments, meetings, work or from getting things needed for daily living? Patient unable to answer        Food Insecurity    Within the past 12 months, you worried that your food would run out before you got the money to buy more. Patient unable to answer     Within the past 12 months, the food you bought just didn't last and you didn't have money to get more. Patient unable to answer        Stress    Do you feel stress - tense, restless, nervous, or anxious, or unable to sleep at night because your mind is troubled all the time - these days? Patient unable to answer        Social Isolation    How often do you feel lonely or isolated from those around you?  Patient unable to answer        Alcohol Use    Q1: How often do you have a drink containing alcohol? Patient unable to answer     Q2: How many drinks containing alcohol do you have on a typical day when you are drinking? Patient unable to answer     Q3: How often do you have six or more drinks on one occasion? Patient unable to answer        Utilities    In the past 12 months has the  electric, gas, oil, or water company threatened to shut off services in your home? Patient unable to answer        Health Literacy    How often do you need to have someone help you when you read instructions, pamphlets, or other written material from your doctor or pharmacy? Patient unable to respond        OTHER    Name(s) of People in Home Per records and report from pt's niece, pt lives with her 3 children and mother.                      SW completed assessment with pt's niece, Padmini, at bedside.  Pt unable to participate in assessment at this time.  Pt transferred from [facility] to [facility].  Pt has hospital readmission on file within the last 30 days.  Pt has [employer].  Pt's family will provide transportation home.      Pt lives with her children and mother in a [facility] w/ 3 steps for entry.  Per niece, pt independent with mobility but records indicate patient is mod I with cane and rolling walker.  Per niece, pt independent with ADLs.  Per niece, pt had a rolling walker and straight cane.  Pt has good family support.      Pt does not receive coumadin, dialysis, or HH services.     Disp:  1. Rehab. 2. Home w/ HH.    Discharge Plan A and Plan B have been determined by review of patient's clinical status, future medical and therapeutic needs, and coverage/benefits for post-acute care in coordination with multidisciplinary team members.    Megha Salamanca LMSW  Part-Time-  [facility]  Ext. [phone]

## 2024-06-18 NOTE — ASSESSMENT & PLAN NOTE
50 y/o F presenting as transfer from [facility] for higher level of care and continuous EEG monitoring. Hx of seizure disorder w/ questionable home med compliance. Also w/ elevated ammonia level at OSH.     -Admit to NCC  -VS/Neuro checks q1  -Formal cEEG hookup in AM  -Continue keppra 1g BID  -Lactulose TID  -Repeat ammonia level in AM  -Psych consulted for PNES eval; see consult note 6/17  -SBP goal < 180  -NPO until passes Philip  -Monitor I/O  -CBC, CMP, Mag, Phos daily  -SCDs/Lovenox for DVT PPX  -PT/OT/SLP as appropriate

## 2024-06-18 NOTE — H&P
M[facility]ael Schmitt - Neuro Critical Care  Neurocritical Care  History & Physical    Admit Date: 6/17/2024  Service Date: 06/18/2024  Length of Stay: 1    Subjective:     Chief Complaint: Acute encephalopathy    History of Present Illness: 50 y/o F w/ PMH of multiple brain aneurysms, coronary angioplasty, HFpEF (EF 55% 6/2024), HTN, HLD, migraine headaches, seizures and CVA. She initially presented to [facility] on 6/15 after being found down and unresponsive by her family. In the ED, she was oriented to self and able to follow commands, but then had a witnessed seizure requiring administration of ativan 1mg. 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 Home AEDs include Keppra 750mg BID and Depakote 750mg BID, although compliance is questionable. She was admitted to Hospital Medicine for acute encephalopathy workup and subsequently stepped up to ICU for concern that she had not returned to her baseline following the seizure activity. EEG cap placed at that time and there have been no apparent epileptiform discharges or electrographic seizures seen. Neurology consulted for AED management, she was currently being maintained on Keppra 1g BID. 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 On 6/17, patient had multiple episodes of agitation, was attempting to get out of bed, and became combative requiring multiple doses of IV versed and four-point restraints. Upon Neurology's evaluation later that day, she was unable to participate in their exam, follow commands and answer any questions. Recommendation made to transfer to [facility] for higher level of care and continuous EEG monitoring. She will be admitted to [facility] for further management.      Past Medical History:   Diagnosis Date    Brain aneurysm     CHF (congestive heart failure)     H/O coronary angioplasty     Hypercholesteremia     Hypertension      Malignant hypertension     Migraine headache     Stroke 10/2017     Past Surgical History:   Procedure Laterality Date    CARDIAC CATHETERIZATION      CEREBRAL ANGIOGRAM      CLIP LIGATION OF INTRACRANIAL ANEURYSM BY CRANIOTOMY N/A 7/15/2022    Procedure: CRANIOTOMY, WITH ANEURYSM CLIPPING;  Surgeon: Timothy Diaz MD;  Location: [facility] OR [address] [place] [address];  Service: Neurosurgery;  Laterality: N/A;  PTERIONAL CRANIOTOMY WITH CLIP LIGATION OF L PCOMM, L ANTERIOR CHOROIDAL, L MCA  ANEURYSM, ANESTHESIA: GENERAL, BLOOD: TYPE&CROSS 2 UNITS, NEUROMONITORING: SEP, MEP, EEG, RADIOLOGY: C-ARM, POSITION: SUPINE, ANNA, CO-SURGERON: DR. LEXI DOMÍNGUEZ.    WOUND DEBRIDEMENT  8/27/2022    Procedure: DEBRIDEMENT, WOUND;  Surgeon: Timothy Diaz MD;  Location: [facility] OR [address] [place] [address];  Service: Neurosurgery;;      Current Facility-Administered Medications on File Prior to Encounter   Medication Dose Route Frequency Provider Last Rate Last Admin    [COMPLETED] OLANZapine injection 2.5 mg  2.5 mg Intramuscular Once PRN Lidia Russell MD   2.5 mg at 06/17/24 0605    [DISCONTINUED] acetaminophen tablet 650 mg  650 mg Oral Q4H PRN Niles Zamora MD        [DISCONTINUED] amLODIPine tablet 10 mg  10 mg Oral Daily Niles Zamora MD   10 mg at 06/17/24 0801    [DISCONTINUED] atorvastatin tablet 80 mg  80 mg Oral Daily Niles Zamora MD   80 mg at 06/17/24 0801    [DISCONTINUED] carvediloL tablet 37.5 mg  37.5 mg Oral BID Niles Zamora MD   37.5 mg at 06/17/24 0801    [DISCONTINUED] dextrose 10% bolus 125 mL 125 mL  12.5 g Intravenous PRN Niles Zamora MD   Stopped at 06/17/24 0344    [DISCONTINUED] dextrose 10% bolus 250 mL 250 mL  25 g Intravenous PRN Niles Zamora MD        [DISCONTINUED] enoxaparin injection 30 mg  30 mg Subcutaneous Daily Michael Issa MD   30 mg at 06/16/24 1719    [DISCONTINUED] enoxaparin injection 40 mg  40 mg Subcutaneous Daily Beck Leiva MD   40 mg at 06/17/24 1621    [DISCONTINUED] EScitalopram  oxalate tablet 20 mg  20 mg Oral Daily Niles Zamora MD   20 mg at 06/17/24 0801    [DISCONTINUED] glucagon (human recombinant) injection 1 mg  1 mg Intramuscular PRN Niles Zamora MD        [DISCONTINUED] glucose chewable tablet 16 g  16 g Oral PRN Niles Zamora MD        [DISCONTINUED] glucose chewable tablet 24 g  24 g Oral PRN Niles Zamora MD        [DISCONTINUED] HYDROmorphone injection 1 mg  1 mg Intravenous Q4H PRN Niles Zamora MD        [DISCONTINUED] insulin aspart U-100 pen 0-5 Units  0-5 Units Subcutaneous Q4H PRN Trinh Asencio MD        [DISCONTINUED] levETIRAcetam in NaCl (iso-os) IVPB 1,500 mg  1,500 mg Intravenous Q12H Nae Crespo MD   Stopped at 06/16/24 2101    [DISCONTINUED] levETIRAcetam injection 1,000 mg  1,000 mg Intravenous Q12H Lidia Russell MD   1,000 mg at 06/17/24 0801    [DISCONTINUED] LORazepam injection 1 mg  1 mg Intravenous Q15 Min PRN Niles Zamora MD        [DISCONTINUED] losartan tablet 50 mg  50 mg Oral Daily Michael Issa MD   50 mg at 06/17/24 1145    [DISCONTINUED] midazolam injection 1 mg  1 mg Intravenous Q1H PRN Keenan Corona MD   1 mg at 06/17/24 1152    [DISCONTINUED] mupirocin 2 % ointment   Nasal BID Beck Leiva MD   Given at 06/16/24 2029    [DISCONTINUED] naloxone 0.4 mg/mL injection 0.02 mg  0.02 mg Intravenous PRN Niles Zamora MD        [DISCONTINUED] polyethylene glycol packet 17 g  17 g Oral Daily Niles Zamora MD   17 g at 06/17/24 0801    [DISCONTINUED] sodium chloride 0.9% flush 10 mL  10 mL Intravenous Q12H PRN Niles Zamora MD        [DISCONTINUED] topiramate tablet 50 mg  50 mg Oral BID Niles Zamora MD   50 mg at 06/17/24 0801    [DISCONTINUED] valproate (DEPACON) 750 mg in dextrose 5 % (D5W) 100 mL IVPB  750 mg Intravenous Q12H Nae Crespo MD   Stopped at 06/17/24 9604     Current Outpatient Medications on File Prior to Encounter   Medication Sig Dispense Refill    amLODIPine (NORVASC) 10 MG tablet Take 10  "mg by mouth once daily.      atorvastatin (LIPITOR) 80 MG tablet Take 1 tablet (80 mg total) by mouth once daily. 90 tablet 3    blood sugar diagnostic Strp Use to check blood glucose 3 times daily. 200 each 11    blood-glucose meter Misc Use to check blood glucose 3 times daily. 1 each 1    carvediloL (COREG) 12.5 MG tablet Take 3 tablets (37.5 mg total) by mouth 2 (two) times daily. 540 tablet 3    divalproex ER (DEPAKOTE ER) 250 MG 24 hr tablet Take 3 tablets (750 mg total) by mouth every 12 (twelve) hours. 180 tablet 11    EScitalopram oxalate (LEXAPRO) 20 MG tablet Take 1 tablet (20 mg total) by mouth once daily. 90 tablet 3    insulin aspart U-100 (NOVOLOG) 100 unit/mL injection Inject 8 Units into the skin 3 (three) times daily with meals.      insulin detemir U-100, Levemir, (LEVEMIR FLEXTOUCH U100 INSULIN) 100 unit/mL (3 mL) InPn pen Inject 24 Units into the skin every evening.      lancets 30 gauge Misc Use to check blood glucose 3 times daily. 200 each 11    levETIRAcetam (KEPPRA) 750 MG Tab Take 2 tablets (1,500 mg total) by mouth 2 (two) times daily. 120 tablet 11    losartan (COZAAR) 50 MG tablet Take 1 tablet (50 mg total) by mouth once daily. 90 tablet 3    metFORMIN (GLUCOPHAGE-XR) 500 MG ER 24hr tablet Take 500 mg by mouth 2 (two) times daily.      methocarbamoL (ROBAXIN) 500 MG Tab Take 500 mg by mouth 2 (two) times daily.      pen needle, diabetic (BD ULTRA-FINE MARIELY PEN NEEDLE) 32 gauge x 5/32" Ndle Use to inject insulin into the skin three times daily . 200 each 11    topiramate (TOPAMAX) 50 MG tablet Take 1 tablet (50 mg total) by mouth 2 (two) times daily. 180 tablet 3    [DISCONTINUED] aspirin 81 MG Chew Take 1 tablet (81 mg total) by mouth once daily.  0      Allergies: Lisinopril, Bactrim [sulfamethoxazole-trimethoprim], and Ceftazidime  No family history on file.  Social History     Tobacco Use    Smoking status: Every Day     Current packs/day: 0.33     Average packs/day: 0.3 packs/day " for 31.5 years (10.4 ttl pk-yrs)     Types: Cigarettes     Start date: 1993    Smokeless tobacco: Never    Tobacco comments:     Enrolled in the [employer] on 6/7/22 ([facility] Member ID # [id number]). Pt is a 0.33 pk/day cigarette smoker x 31 yrs. She states ready to quit smoking. Ambulatory referral to Smoking Cessation clinic following hospital discharge.    Substance Use Topics    Alcohol use: Yes     Comment: occassionally    Drug use: No     Review of Systems   Respiratory:  Negative for cough and shortness of breath.    Cardiovascular:  Negative for chest pain.   Gastrointestinal:  Negative for abdominal pain, nausea and vomiting.   Neurological:  Positive for seizures and headaches. Negative for weakness.     Objective:     Vitals:    Temp: 97.7 °F (36.5 °C)  Pulse: (!) 55  Rhythm: sinus bradycardia  BP: (!) 155/72  MAP (mmHg): 104  Resp: 16  SpO2: 100 %    Temp  Min: 96.5 °F (35.8 °C)  Max: 98.6 °F (37 °C)  Pulse  Min: 54  Max: 81  BP  Min: 126/73  Max: 196/91  MAP (mmHg)  Min: 88  Max: 131  Resp  Min: 6  Max: 36  SpO2  Min: 92 %  Max: 100 %    06/17 0701 - 06/18 0700  In: -   Out: 500 [Urine:500]   Unmeasured Output  Stool Occurrence: 1        Physical Exam  Vitals and nursing note reviewed.   HENT:      Head: Normocephalic and atraumatic.   Eyes:      Extraocular Movements: Extraocular movements intact.      Pupils: Pupils are equal, round, and reactive to light.   Cardiovascular:      Rate and Rhythm: Normal rate and regular rhythm.      Pulses: Normal pulses.   Pulmonary:      Effort: Pulmonary effort is normal.   Abdominal:      Palpations: Abdomen is soft.   Musculoskeletal:         General: Normal range of motion.      Cervical back: Normal range of motion.   Skin:     General: Skin is warm and dry.      Capillary Refill: Capillary refill takes less than 2 seconds.   Neurological:      Mental Status: She is alert. She is confused.      GCS: GCS eye subscore is 4. GCS verbal subscore is 4. GCS motor  subscore is 6.      Cranial Nerves: Dysarthria present.      Sensory: Sensation is intact.      Motor: Weakness (BLE) present.      Comments:   -E4 V4 M6  -PERRL  -Oriented to person and situation  -Appears to have R facial droop when talking, but smile is symmetric  -Dysarthric  -Follows commands w/ all extremities  -RUE 5/5  -LUE 5/5  -RLE 3/5  -LLE 3/5            Today I personally reviewed pertinent medications, lines/drains/airways, cardiology results, laboratory results, notably: CBC; CMP; ammonia    Assessment/Plan:     Neuro  * Acute encephalopathy  48 y/o F presenting as transfer from [facility] for higher level of care and continuous EEG monitoring. Hx of seizure disorder w/ questionable home med compliance. Also w/ elevated ammonia level at OSH.     -Admit to NCC  -VS/Neuro checks q1  -Formal cEEG hookup in AM  -Continue keppra 1g BID  -Lactulose TID  -Repeat ammonia level in AM  -Psych consulted for PNES eval; see consult note 6/17  -SBP goal < 180  -NPO until passes Philip  -Monitor I/O  -CBC, CMP, Mag, Phos daily  -SCDs/Lovenox for DVT PPX  -PT/OT/SLP as appropriate    Seizure disorder  -Home AEDs - Keppra 750mg BID and Depakote 750mg BID; questionable compliance  -AEDs adjusted by Neurology at OSH  -Currently maintained on Keppra 1g BID  -Formal cEEG hookup in AM  -Seizure precautions  -Avoid agents that lower seizure threshold     H/O: CVA (cerebrovascular accident)  -Hx of; 2017    Cardiac/Vascular  Mixed hyperlipidemia  -Atorvastatin daily    Chronic diastolic heart failure  -Hx of  -Most recent ECHO 6/8 w/ normal systolic function, estimated EF 55%, and normal diastolic function    Essential hypertension  -SBP goal < 180  -Amlodipine, losartan daily  -Carvedilol BID  -PRN labetalol    Renal/  Acute cystitis without hematuria  -Noted on U/A  -Macrobid BID x 5 days 2/2 allergy (rash/hives) to bactrim and ceftazidime     Endocrine  Type 2 diabetes mellitus, without long-term current use of  insulin  -Hold home metformin in acute setting  -Hold home insulin d/t episode of hypoglycemia @ OSH; resume once able to tolerate diet  -Accuchecks q4 w/ SSI  -Hgb A1c pending          The patient is being Prophylaxed for:  Venous Thromboembolism with: Mechanical or Chemical  Stress Ulcer with: Not Applicable   Ventilator Pneumonia with: not applicable    Activity Orders            Progressive Mobility Protocol (mobilize patient to their highest level of functioning at least twice daily) starting at 06/18 0800    Turn patient starting at 06/17 2200    Elevate HOB starting at 06/17 2153    Diet NPO: NPO starting at 06/17 2153          Full Code    Critical care time spent on the evaluation and treatment of severe organ dysfunction, review of pertinent labs and imaging studies, discussions with consulting providers and discussions with patient/family: 40 minutes    Neeru Perez NP  Neurocritical Care  [facility]ael [facility]mitt - Neuro Critical Care

## 2024-06-18 NOTE — PLAN OF CARE
Problem: Physical Therapy  Goal: Physical Therapy Goal  Description: Goals to be met by: 24     Patient will increase functional independence with mobility by performin. Supine to sit with Stand-by Assistance  2. Sit to supine with Stand-by Assistance  3. Rolling to Left and Right with Modified [place].  4. Sit to stand transfer with Modified [place]  5. Bed to chair transfer with Supervision using LRAD  6. Gait  x 100 feet with Supervision using LRAD.   7. Ascend/descend 3 stairs with left Handrails Supervision using LRAD.   8. Lower extremity exercise program x10 reps per handout, with independence    Outcome: Progressing

## 2024-06-18 NOTE — PLAN OF CARE
"[facility] Care Plan    POC reviewed with Gina Jenkins and sheyla  at 1400. Pt is confused.  Needs reinforcement.  Family verbalizes understanding. Questions and concerns addressed. Pt not progressing toward goals.  See below and flowsheets for full assessment and VS info.     Prn Haldol admin x 2 for agitation  Step down orders placed      Is this a stroke patient? no    Neuro:  [place] Coma Scale  Best Eye Response: 4-->(E4) spontaneous  Best Motor Response: 6-->(M6) obeys commands (intermittently)  Best Verbal Response: 4-->(V4) confused  Danielle Coma Scale Score: 14  Pupil PERRLA: yes     24 hr Temp:  [96.5 °F (35.8 °C)-98.4 °F (36.9 °C)]     CV:   Rhythm: normal sinus rhythm  BP goals:   SBP < 180  MAP > 65    Resp:           Plan: N/A    GI/:     Diet/Nutrition Received: NPO  Last Bowel Movement: 24  Voiding Characteristics: external catheter    Intake/Output Summary (Last 24 hours) at 2024 1820  Last data filed at 2024 1500  Gross per 24 hour   Intake 368.2 ml   Output 1050 ml   Net -681.8 ml     Unmeasured Output  Stool Occurrence: 1    Labs/Accuchecks:  Recent Labs   Lab 24  0348   WBC 5.78   RBC 4.72   HGB 13.9   HCT 41.5         Recent Labs   Lab 24  0348      K 3.4*   CO2 19*   *   BUN 13   CREATININE 1.3   ALKPHOS 165*   ALT 22   AST 21   BILITOT 0.3    No results for input(s): "PROTIME", "INR", "APTT", "HEPANTIXA" in the last 168 hours.   Recent Labs   Lab 06/15/24  0451      TROPONINI 0.008       Electrolytes: Electrolytes replaced  Accuchecks: ACHS    Gtts:      LDA/Wounds:    Nurses Note -- 4 Eyes    Is there altered skin present? no     Prevention Measures Documented    Second RN/Staff Member:  ALEXANDER Guzmán      Restraints:   Restraint Order  Length of Order: Order good for next 24 hours or when removed.  Date that the current order will : 24  Time that the current order will : 1629  Order Upon Application: Yes  "

## 2024-06-18 NOTE — ASSESSMENT & PLAN NOTE
-Home AEDs - Keppra 750mg BID and Depakote 750mg BID; questionable compliance  -AEDs adjusted by Neurology at OSH  -Currently maintained on Keppra 1g BID  -Formal cEEG hookup in AM  -Seizure precautions  -Avoid agents that lower seizure threshold

## 2024-06-18 NOTE — ASSESSMENT & PLAN NOTE
50 y/o F presenting as transfer from [facility] for higher level of care and continuous EEG monitoring. Hx of seizure disorder w/ questionable home med compliance. Confounded by elevated ammonia level and UTI at OSH.      -Admit to NCC  -VS/Neuro checks q1  -Cap EEG without seizures  -MRI brain, CTA H&N and CTH without acute processes   -Continue keppra 1g BID  -VPA discontinued due to elevated ammonia levels at OSH   -Ammonia normalized, discontinue lactulose  -Repeat ammonia level in AM  -Treat UTI, 3d course of CTX ordered   -Psych consulted for PNES eval; see consult note 6/17  -Epilepsy consulted, appreciate recommendations   -SBP goal < 180  -Diet advanced per SLP recommendations   -Monitor I/O  -CBC, CMP, Mag, Phos daily  -SCDs/Lovenox for DVT PPX  -PT/OT/SLP as appropriate  -TTF under Hospital Medicine service     6/19  Alert and oriented to person, place, month and year. reports noncompliance with home med. couseled regarding medication compliance

## 2024-06-19 PROBLEM — F99 PSYCHIATRIC DISORDER: Status: ACTIVE | Noted: 2024-06-19

## 2024-06-19 LAB
ALBUMIN SERPL BCP-MCNC: 3.6 G/DL (ref 3.5–5.2)
ALP SERPL-CCNC: 155 U/L (ref 55–135)
ALT SERPL W/O P-5'-P-CCNC: 16 U/L (ref 10–44)
AMMONIA PLAS-SCNC: 27 UMOL/L (ref 10–50)
ANION GAP SERPL CALC-SCNC: 12 MMOL/L (ref 8–16)
AST SERPL-CCNC: 22 U/L (ref 10–40)
BACTERIA UR CULT: ABNORMAL
BASOPHILS # BLD AUTO: 0.05 K/UL (ref 0–0.2)
BASOPHILS NFR BLD: 0.9 % (ref 0–1.9)
BILIRUB SERPL-MCNC: 0.2 MG/DL (ref 0.1–1)
BUN SERPL-MCNC: 12 MG/DL (ref 6–20)
CALCIUM SERPL-MCNC: 10.1 MG/DL (ref 8.7–10.5)
CHLORIDE SERPL-SCNC: 112 MMOL/L (ref 95–110)
CO2 SERPL-SCNC: 17 MMOL/L (ref 23–29)
CREAT SERPL-MCNC: 1.3 MG/DL (ref 0.5–1.4)
DIFFERENTIAL METHOD BLD: NORMAL
EOSINOPHIL # BLD AUTO: 0.2 K/UL (ref 0–0.5)
EOSINOPHIL NFR BLD: 4.1 % (ref 0–8)
ERYTHROCYTE [DISTWIDTH] IN BLOOD BY AUTOMATED COUNT: 12.8 % (ref 11.5–14.5)
EST. GFR  (NO RACE VARIABLE): 50.7 ML/MIN/1.73 M^2
ESTIMATED AVG GLUCOSE: 97 MG/DL (ref 68–131)
GLUCOSE SERPL-MCNC: 82 MG/DL (ref 70–110)
HBA1C MFR BLD: 5 % (ref 4–5.6)
HCT VFR BLD AUTO: 39.2 % (ref 37–48.5)
HGB BLD-MCNC: 13.3 G/DL (ref 12–16)
IMM GRANULOCYTES # BLD AUTO: 0.02 K/UL (ref 0–0.04)
IMM GRANULOCYTES NFR BLD AUTO: 0.4 % (ref 0–0.5)
LEVETIRACETAM SERPL-MCNC: 98.3 UG/ML (ref 3–60)
LYMPHOCYTES # BLD AUTO: 1.2 K/UL (ref 1–4.8)
LYMPHOCYTES NFR BLD: 21.7 % (ref 18–48)
MAGNESIUM SERPL-MCNC: 1.9 MG/DL (ref 1.6–2.6)
MCH RBC QN AUTO: 29.9 PG (ref 27–31)
MCHC RBC AUTO-ENTMCNC: 33.9 G/DL (ref 32–36)
MCV RBC AUTO: 88 FL (ref 82–98)
MONOCYTES # BLD AUTO: 0.5 K/UL (ref 0.3–1)
MONOCYTES NFR BLD: 9.5 % (ref 4–15)
NEUTROPHILS # BLD AUTO: 3.4 K/UL (ref 1.8–7.7)
NEUTROPHILS NFR BLD: 63.4 % (ref 38–73)
NRBC BLD-RTO: 0 /100 WBC
PHOSPHATE SERPL-MCNC: 3.1 MG/DL (ref 2.7–4.5)
PLATELET # BLD AUTO: 282 K/UL (ref 150–450)
PMV BLD AUTO: 10.4 FL (ref 9.2–12.9)
POCT GLUCOSE: 74 MG/DL (ref 70–110)
POCT GLUCOSE: 76 MG/DL (ref 70–110)
POTASSIUM SERPL-SCNC: 3.6 MMOL/L (ref 3.5–5.1)
PROT SERPL-MCNC: 7.5 G/DL (ref 6–8.4)
RBC # BLD AUTO: 4.45 M/UL (ref 4–5.4)
SODIUM SERPL-SCNC: 141 MMOL/L (ref 136–145)
WBC # BLD AUTO: 5.39 K/UL (ref 3.9–12.7)

## 2024-06-19 PROCEDURE — 11000001 HC ACUTE MED/SURG PRIVATE ROOM

## 2024-06-19 PROCEDURE — 63600175 PHARM REV CODE 636 W HCPCS: Performed by: PHYSICIAN ASSISTANT

## 2024-06-19 PROCEDURE — 99233 SBSQ HOSP IP/OBS HIGH 50: CPT | Mod: ,,, | Performed by: NURSE PRACTITIONER

## 2024-06-19 PROCEDURE — 51798 US URINE CAPACITY MEASURE: CPT

## 2024-06-19 PROCEDURE — 83735 ASSAY OF MAGNESIUM: CPT | Performed by: PHYSICIAN ASSISTANT

## 2024-06-19 PROCEDURE — 83036 HEMOGLOBIN GLYCOSYLATED A1C: CPT | Performed by: PHYSICIAN ASSISTANT

## 2024-06-19 PROCEDURE — 80053 COMPREHEN METABOLIC PANEL: CPT | Performed by: PHYSICIAN ASSISTANT

## 2024-06-19 PROCEDURE — 97530 THERAPEUTIC ACTIVITIES: CPT

## 2024-06-19 PROCEDURE — 92526 ORAL FUNCTION THERAPY: CPT

## 2024-06-19 PROCEDURE — 84100 ASSAY OF PHOSPHORUS: CPT | Performed by: PHYSICIAN ASSISTANT

## 2024-06-19 PROCEDURE — 25000003 PHARM REV CODE 250: Performed by: PHYSICIAN ASSISTANT

## 2024-06-19 PROCEDURE — 25000003 PHARM REV CODE 250: Performed by: NURSE PRACTITIONER

## 2024-06-19 PROCEDURE — 63600175 PHARM REV CODE 636 W HCPCS: Performed by: NURSE PRACTITIONER

## 2024-06-19 PROCEDURE — 25000003 PHARM REV CODE 250: Performed by: INTERNAL MEDICINE

## 2024-06-19 PROCEDURE — 97116 GAIT TRAINING THERAPY: CPT

## 2024-06-19 PROCEDURE — 85025 COMPLETE CBC W/AUTO DIFF WBC: CPT | Performed by: PHYSICIAN ASSISTANT

## 2024-06-19 PROCEDURE — 82140 ASSAY OF AMMONIA: CPT | Performed by: PHYSICIAN ASSISTANT

## 2024-06-19 PROCEDURE — 97535 SELF CARE MNGMENT TRAINING: CPT | Mod: CO

## 2024-06-19 PROCEDURE — 92523 SPEECH SOUND LANG COMPREHEN: CPT

## 2024-06-19 RX ORDER — CARVEDILOL 25 MG/1
25 TABLET ORAL 2 TIMES DAILY
Status: DISCONTINUED | OUTPATIENT
Start: 2024-06-19 | End: 2024-06-20 | Stop reason: HOSPADM

## 2024-06-19 RX ORDER — LOSARTAN POTASSIUM 50 MG/1
100 TABLET ORAL DAILY
Status: DISCONTINUED | OUTPATIENT
Start: 2024-06-20 | End: 2024-06-20 | Stop reason: HOSPADM

## 2024-06-19 RX ORDER — LEVETIRACETAM 500 MG/1
1000 TABLET ORAL 2 TIMES DAILY
Status: DISCONTINUED | OUTPATIENT
Start: 2024-06-19 | End: 2024-06-19

## 2024-06-19 RX ORDER — TOPIRAMATE 25 MG/1
50 TABLET ORAL 2 TIMES DAILY
Status: DISCONTINUED | OUTPATIENT
Start: 2024-06-19 | End: 2024-06-20 | Stop reason: HOSPADM

## 2024-06-19 RX ORDER — LEVETIRACETAM 750 MG/1
750 TABLET ORAL 2 TIMES DAILY
Status: DISCONTINUED | OUTPATIENT
Start: 2024-06-19 | End: 2024-06-20 | Stop reason: HOSPADM

## 2024-06-19 RX ORDER — ACETAMINOPHEN 325 MG/1
650 TABLET ORAL EVERY 6 HOURS PRN
Status: DISCONTINUED | OUTPATIENT
Start: 2024-06-19 | End: 2024-06-20 | Stop reason: HOSPADM

## 2024-06-19 RX ADMIN — LEVETIRACETAM 1000 MG: 100 INJECTION INTRAVENOUS at 08:06

## 2024-06-19 RX ADMIN — CARVEDILOL 37.5 MG: 25 TABLET, FILM COATED ORAL at 08:06

## 2024-06-19 RX ADMIN — TOPIRAMATE 50 MG: 25 TABLET, FILM COATED ORAL at 11:06

## 2024-06-19 RX ADMIN — ENOXAPARIN SODIUM 40 MG: 40 INJECTION SUBCUTANEOUS at 05:06

## 2024-06-19 RX ADMIN — TOPIRAMATE 50 MG: 25 TABLET, FILM COATED ORAL at 08:06

## 2024-06-19 RX ADMIN — AMLODIPINE BESYLATE 10 MG: 10 TABLET ORAL at 08:06

## 2024-06-19 RX ADMIN — SENNOSIDES AND DOCUSATE SODIUM 1 TABLET: 50; 8.6 TABLET ORAL at 08:06

## 2024-06-19 RX ADMIN — LOSARTAN POTASSIUM 50 MG: 50 TABLET, FILM COATED ORAL at 08:06

## 2024-06-19 RX ADMIN — HALOPERIDOL LACTATE 5 MG: 5 INJECTION, SOLUTION INTRAMUSCULAR at 06:06

## 2024-06-19 RX ADMIN — SODIUM CHLORIDE: 9 INJECTION, SOLUTION INTRAVENOUS at 11:06

## 2024-06-19 RX ADMIN — ACETAMINOPHEN 650 MG: 325 TABLET ORAL at 08:06

## 2024-06-19 RX ADMIN — CARVEDILOL 25 MG: 25 TABLET, FILM COATED ORAL at 08:06

## 2024-06-19 RX ADMIN — LEVETIRACETAM 750 MG: 750 TABLET, FILM COATED ORAL at 08:06

## 2024-06-19 RX ADMIN — CEFTRIAXONE 1 G: 1 INJECTION, POWDER, FOR SOLUTION INTRAMUSCULAR; INTRAVENOUS at 12:06

## 2024-06-19 RX ADMIN — DIVALPROEX SODIUM 750 MG: 500 TABLET, FILM COATED, EXTENDED RELEASE ORAL at 09:06

## 2024-06-19 RX ADMIN — ESCITALOPRAM OXALATE 20 MG: 20 TABLET ORAL at 08:06

## 2024-06-19 RX ADMIN — DIVALPROEX SODIUM 750 MG: 500 TABLET, FILM COATED, EXTENDED RELEASE ORAL at 08:06

## 2024-06-19 RX ADMIN — ATORVASTATIN CALCIUM 80 MG: 40 TABLET, FILM COATED ORAL at 08:06

## 2024-06-19 NOTE — NURSING TRANSFER
Nursing Transfer Note      Reason patient is being transferred: stepdown orders    Transfer To: 929    Transfer via wheelchair    Transfer with cardiac monitoring    Transported by this RN    Transfer Vital Signs:  Blood Pressure:145/91   Heart Rate: 60  O2: 99%  Temperature: 98.5  Respirations:15    Telemetry: Box # [id number]  Order for Tele Monitor? Yes    Medicines sent: na    Any special needs or follow-up needed: na    Patient belongings transferred with patient: Yes    Chart send with patient: Yes    Notified: niece thru text messaging system    Upon arrival to floor: patient oriented to room, call bell in reach, and bed in lowest position

## 2024-06-19 NOTE — PT/OT/SLP EVAL
"Speech Language Pathology Evaluation  Cognitive Communication    Patient Name:  Gina Jenkins   MRN:  [id number]  Admitting Diagnosis: Acute encephalopathy    Recommendations:     Recommendations:                General Recommendations:  Cognitive-linguistic therapy  Diet recommendations:  Regular, Thin   Aspiration Precautions: Assistance with meals, HOB to 90 degrees, Meds whole 1 at a time, and Standard aspiration precautions   General Precautions: Standard, aspiration, fall  Communication strategies:  none    Assessment:     Gina Jenkins is a 49 y.o. female with an SLP diagnosis of Cognitive-Linguistic Impairment.    History:     Past Medical History:   Diagnosis Date    Brain aneurysm     CHF (congestive heart failure)     H/O coronary angioplasty     Hypercholesteremia     Hypertension     Malignant hypertension     Migraine headache     Stroke 10/2017       Past Surgical History:   Procedure Laterality Date    CARDIAC CATHETERIZATION      CEREBRAL ANGIOGRAM      CLIP LIGATION OF INTRACRANIAL ANEURYSM BY CRANIOTOMY N/A 7/15/2022    Procedure: CRANIOTOMY, WITH ANEURYSM CLIPPING;  Surgeon: Timothy Diaz MD;  Location: [facility] OR [address] [place] [address];  Service: Neurosurgery;  Laterality: N/A;  PTERIONAL CRANIOTOMY WITH CLIP LIGATION OF L PCOMM, L ANTERIOR CHOROIDAL, L MCA  ANEURYSM, ANESTHESIA: GENERAL, BLOOD: TYPE&CROSS 2 UNITS, NEUROMONITORING: SEP, MEP, EEG, RADIOLOGY: C-ARM, POSITION: SUPINE, CASTILLO, CO-SURGERON: DR. LEXI DOMÍNGUEZ.    WOUND DEBRIDEMENT  8/27/2022    Procedure: DEBRIDEMENT, WOUND;  Surgeon: Timothy Diaz MD;  Location: [facility] OR [address] [place] [address];  Service: Neurosurgery;;       Social History: Patient lives with family.        Prior diet: regular with thin.    Occupation/hobbies/homemaking: na.      Subjective     "I live with my family" per pt  Patient goals: did not state     Pain/Comfort:  Pain Rating 1: 0/10  Pain Rating Post-Intervention 1: 0/10    Respiratory Status: Room air    Objective: "     Cognitive Status:    Pt. was oriented  to month only with fair recall of recent and remote temporal and general information.  Immediate/delayed verbal recall was impaired with pt. Repeating 5 digits and 3 words without difficulty.  Pt. Recalled 0/3 objects after a 5 minute delay  Responses to hypothetical verbal problem solving tasks were accurate and complete for simple tasks only.  Pt. Compared but did not contrast objects and generated 1/3 solutions to problems.  Thought organization and categorization skills were impaired with pt. Naming 2 animals given one minute when 15-20 are wnl.  Pt. Responded to functional math and time calculations with 0% accuracy        Receptive Language:   Comprehension:   Pt. Followed one step commands and responded to simple yes/no questions with 100% accuracy    Pragmatics:    Delayed responses    Expressive Language:  Verbal:    Pt. Recalled common nouns in response to questions with 100% accuracy and expressed basic wants and needs in conversation      Motor Speech:  Speech was 100% intelligible in conversation with no significant dysarthria    Voice:   wfl    Visual-Spatial:  Pt. Is visually impaired at baseline    Reading:   na     Written Expression:   na    Treatment: Hob was raised to 90 degree angle and pt. Was observed swallowing 4 ounces of water via cup and one karo cracker.  Oral and pharyngeal phases of swallow were wfl with adequate mastication and bolus formation.  NO coughing,t hroat clearing or change in vocal quality was noted following the swallow     Goals:   Multidisciplinary Problems       SLP Goals          Problem: SLP    Goal Priority Disciplines Outcome   SLP Goal     SLP Progressing   Description: Goals due 6/25  1.  Tolerate regular diet with thin liquids with no s/s of aspiration  2.  Participate in ongoing assessment of swallow at bedside/met  3.  Participate in speech language cognitive eval  4.  [place] to month, year and place  5.  Respond to  simple categorization tasks with 80% accuracy                       Plan:   Patient to be seen:  3 x/week   Plan of Care expires:  07/16/24  Plan of Care reviewed with:  patient   SLP Follow-Up:  Yes       Discharge recommendations:  Therapy Intensity Recommendations at Discharge: Low Intensity Therapy   Barriers to Discharge:  None    Time Tracking:     SLP Treatment Date:   06/19/24  Speech Start Time:  1120  Speech Stop Time:  1138     Speech Total Time (min):  18 min    Billable Minutes: Eval 10  and Treatment Swallowing Dysfunction 8    06/19/2024

## 2024-06-19 NOTE — SUBJECTIVE & OBJECTIVE
Interval History:  Resume home dose divalproex,resume home dose topiramate, decrease keppra 750 mg BID    Review of Systems   Reason unable to perform ROS: decrease loc.     Unable to obtain a complete ROS due to level of consciousness.  Objective:     Vitals:  Temp: 98.3 °F (36.8 °C)  Pulse: 65  Rhythm: normal sinus rhythm  BP: (!) 144/79  MAP (mmHg): 107  Resp: 15  SpO2: 98 %    Temp  Min: 96.5 °F (35.8 °C)  Max: 98.3 °F (36.8 °C)  Pulse  Min: 55  Max: 89  BP  Min: 135/77  Max: 188/92  MAP (mmHg)  Min: 98  Max: 132  Resp  Min: 12  Max: 34  SpO2  Min: 96 %  Max: 100 %    06/18 0701 - 06/19 0700  In: 368.2 [I.V.:5]  Out: 800 [Urine:800]   Unmeasured Output  Stool Occurrence: 1        Physical Exam  Vitals and nursing note reviewed.   Constitutional:       Appearance: She is ill-appearing.   HENT:      Head: Normocephalic.      Mouth/Throat:      Mouth: Mucous membranes are moist.      Pharynx: Oropharynx is clear.   Eyes:      Pupils: Pupils are equal, round, and reactive to light.   Cardiovascular:      Rate and Rhythm: Normal rate and regular rhythm.      Pulses: Normal pulses.      Heart sounds: Normal heart sounds.   Pulmonary:      Effort: Pulmonary effort is normal.      Breath sounds: Normal breath sounds.   Abdominal:      General: Bowel sounds are normal.      Palpations: Abdomen is soft.   Musculoskeletal:         General: Normal range of motion.   Skin:     General: Skin is warm and dry.      Capillary Refill: Capillary refill takes 2 to 3 seconds.   Neurological:      Comments: -GCS [id number]  -Alert. Oriented to person. Speech dysarthric. Follows commands.  -Cranial nerves : mild right facial droop at rest, not present with facial movements   -Motor antigravity in all four extremities without focal weakness   -Sensation intact to light touch        Unable to test orientation, language, memory, judgment, insight, fund of knowledge, hearing, shoulder shrug, tongue protrusion, coordination, gait due to level  of consciousness.       Medications:  Continuous ScheduledamLODIPine, 10 mg, Daily  atorvastatin, 80 mg, Daily  carvediloL, 25 mg, BID  cefTRIAXone (Rocephin) IV (PEDS and ADULTS), 1 g, Q24H  divalproex ER, 750 mg, Q12H  enoxparin, 40 mg, Daily  EScitalopram oxalate, 20 mg, Daily  levETIRAcetam, 750 mg, BID  [START ON 6/20/2024] losartan, 100 mg, Daily  senna-docusate 8.6-50 mg, 1 tablet, BID  topiramate, 50 mg, BID    PRNsodium chloride 0.9%, , PRN  acetaminophen, 650 mg, Q6H PRN  haloperidol lactate, 5 mg, Q6H PRN  hydrALAZINE, 10 mg, Q4H PRN  labetaloL, 10 mg, Q4H PRN  ondansetron, 4 mg, Q8H PRN      Today I personally reviewed pertinent medications, lines/drains/airways, imaging, cardiology results, laboratory results, microbiology results, notably:    Diet  Diet diabetic Low Sodium,2gm, Low Chol/Sat Fat; 2000 Calorie; Standard Tray  Diet diabetic Low Sodium,2gm, Low Chol/Sat Fat; 2000 Calorie; Standard Tray

## 2024-06-19 NOTE — ASSESSMENT & PLAN NOTE
48 y/o F presenting as transfer from [facility] for higher level of care and continuous EEG monitoring. Hx of seizure disorder w/ questionable home med compliance. Confounded by elevated ammonia level and UTI at OSH.     -Admit to NCC  -VS/Neuro checks q1  -Cap EEG without seizures  -MRI brain, CTA H&N and CTH without acute processes   -Continue keppra 1g BID  -VPA discontinued due to elevated ammonia levels at OSH   -Ammonia normalized, discontinue lactulose  -Repeat ammonia level in AM  -Treat UTI, 3d course of CTX ordered   -Psych consulted for PNES eval; see consult note 6/17  -Epilepsy consulted, appreciate recommendations   -SBP goal < 180  -Diet advanced per SLP recommendations   -Monitor I/O  -CBC, CMP, Mag, Phos daily  -SCDs/Lovenox for DVT PPX  -PT/OT/SLP as appropriate  -TTF under Hospital Medicine service

## 2024-06-19 NOTE — PT/OT/SLP PROGRESS
"Occupational Therapy   Co-Treatment with PT    Name: Gina Jenkins  MRN: [id number]  Admitting Diagnosis:  Acute encephalopathy       Recommendations:     Discharge Recommendations: Moderate Intensity Therapy  Discharge Equipment Recommendations:  none  Barriers to discharge:   (increased skilled assistance required)    Assessment:     Gina Jenkins is a 49 y.o. female with a medical diagnosis of Acute encephalopathy.  She presents with the following performance deficits affecting function are weakness, impaired endurance, impaired self care skills, impaired functional mobility, gait instability, impaired balance, decreased safety awareness, decreased lower extremity function, decreased upper extremity function, impaired cognition, decreased coordination. Patient limited by decreased attention to task and initial unsteadiness during standing trials and transitional movements but made good progress with functional mobility and standing ADL tasks today. Patient continues to demonstrate the need for moderate intensity therapy on a daily basis post acute exhibited by decreased independence with self-care and functional mobility    Rehab Prognosis:  Good; patient would benefit from acute skilled OT services to address these deficits and reach maximum level of function.       Plan:     Patient to be seen 4 x/week to address the above listed problems via self-care/home management, therapeutic activities, therapeutic exercises, neuromuscular re-education  Plan of Care Expires: 07/18/24  Plan of Care Reviewed with: patient    Subjective     Chief Complaint: "I don't care about fatigue because I don't do nothing in here anyway."  Patient/Family Comments/goals: patient excited to get OOB  Pain/Comfort:  Pain Rating 1: 0/10  Pain Rating Post-Intervention 1: 0/10    Objective:     Communicated with: nurse bautista and OTR prior to session.  Patient found HOB elevated with pulse ox (continuous), telemetry, restraints, PureWick, " bed alarm, blood pressure cuff upon OT entry to room.  A client care conference was completed by the OTR and the MCCURDY prior to treatment by the MCCURDY to discuss the patient's POC and current status.    General Precautions: Standard, aspiration, fall    Orthopedic Precautions:N/A  Braces: N/A  Respiratory Status: Room air     Occupational Performance:     Bed Mobility:    Patient completed Scooting/Bridging with stand by assistance  Patient completed Supine to Sit with stand by assistance  Patient completed Sit to Supine with contact guard assistance     Functional Mobility/Transfers:  Patient completed Sit <> Stand Transfer with contact guard assistance  with  no assistive device   Functional Mobility: within room to sink 10 ft x2 with CGA/Min(A), no AD +chair follow    Activities of Daily Living:  Grooming: contact guard assistance oral and facial hygiene standing sink side  Patient tolerated x4.21 min standing then transitioned into ambulation back to bed  Upper Body Dressing: contact guard assistance don the doff gown as a robe seated EOB   Lower Body Dressing: stand by assistance don B socks via fig 4 technique from EOB      [facility] 6 Click ADL: 16    Treatment & Education:  Patient edu on goals and current progress. Addressed all patient questions/concerns within MCCURDY scope of practice. Co-treatment performed with PT and SPT due to patient's complexity and benefit of 2 skilled therapists to facilitate functional and safe occupational performance, accommodate patient's activity tolerance, and maximize patient's participation in therapy.     Patient left HOB elevated with all lines intact, call button in reach, bed alarm on, restraints reapplied at end of session, and nurse notified    GOALS:   Multidisciplinary Problems       Occupational Therapy Goals          Problem: Occupational Therapy    Goal Priority Disciplines Outcome Interventions   Occupational Therapy Goal     OT, PT/OT Progressing    Description: Goals  to be met by: 7/18/24     Patient will increase functional independence with ADLs by performing:    UE Dressing with Supervision.  LE Dressing with Supervision.  Grooming while standing at sink with Stand-by Assistance.  Toileting from toilet with Supervision for hygiene and clothing management.   Supine to sit with Supervision.  Step transfer with Stand-by Assistance  Toilet transfer to toilet with Stand-by Assistance.                         Time Tracking:     OT Date of Treatment: 06/19/24  OT Start Time: 1048  OT Stop Time: 1111  OT Total Time (min): 23 min    Billable Minutes:Self Care/Home Management 23    OT/LUKE: LUKE     Number of LUKE visits since last OT visit: 1    6/19/2024

## 2024-06-19 NOTE — PLAN OF CARE
SW spoke to therapy team regarding recommendation and was advised low intensity.  Pt has [employer].  Referral was previously sent by [facility]/ EH to [facility].  Will need to send again as patient approach medical readiness.  EH also provided NPU case management team with hand-off via secure chat.      Discharge Plan A and Plan B have been determined by review of patient's clinical status, future medical and therapeutic needs, and coverage/benefits for post-acute care in coordination with multidisciplinary team members.     06/19/24 1554   Post-Acute Status   Post-Acute Authorization Home Health   Home Health Status Referrals Sent   Coverage [facility]   Discharge Delays None known at this time   Discharge Plan   Discharge Plan A Home;Home with family;Home Health   Discharge Plan B Home;Home with family     Megha Salamanca LMSW  Part-Time-  [facility]  Ext. [phone]

## 2024-06-19 NOTE — PROGRESS NOTES
[facility] - Neuro Critical Care  Neurocritical Care  Progress Note    Admit Date: 6/17/2024  Service Date: 06/19/2024  Length of Stay: 2    Subjective:     Chief Complaint: Acute encephalopathy    History of Present Illness: 50 y/o F w/ PMH of multiple brain aneurysms, coronary angioplasty, HFpEF (EF 55% 6/2024), HTN, HLD, migraine headaches, seizures and CVA. She initially presented to [facility] on 6/15 after being found down and unresponsive by her family. In the ED, she was oriented to self and able to follow commands, but then had a witnessed seizure requiring administration of ativan 1mg. 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 Home AEDs include Keppra 750mg BID and Depakote 750mg BID, although compliance is questionable. She was admitted to Hospital Medicine for acute encephalopathy workup and subsequently stepped up to ICU for concern that she had not returned to her baseline following the seizure activity. EEG cap placed at that time and there have been no apparent epileptiform discharges or electrographic seizures seen. Neurology consulted for AED management, she was currently being maintained on Keppra 1g BID. 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 On 6/17, patient had multiple episodes of agitation, was attempting to get out of bed, and became combative requiring multiple doses of IV versed and four-point restraints. Upon Neurology's evaluation later that day, she was unable to participate in their exam, follow commands and answer any questions. Recommendation made to transfer to [facility] for higher level of care and continuous EEG monitoring. She will be admitted to [facility] for further management.    Hospital Course: 06/18/2024: No seizures on EEG. Continue AED regimen, TTF under Hospital Medicine   6/19/24: resume home antipsychotic     Interval History:  Resume home dose  divalproex,resume home dose topiramate, decrease keppra 750 mg BID    Review of Systems   Reason unable to perform ROS: decrease loc.     Unable to obtain a complete ROS due to level of consciousness.  Objective:     Vitals:  Temp: 98.3 °F (36.8 °C)  Pulse: 65  Rhythm: normal sinus rhythm  BP: (!) 144/79  MAP (mmHg): 107  Resp: 15  SpO2: 98 %    Temp  Min: 96.5 °F (35.8 °C)  Max: 98.3 °F (36.8 °C)  Pulse  Min: 55  Max: 89  BP  Min: 135/77  Max: 188/92  MAP (mmHg)  Min: 98  Max: 132  Resp  Min: 12  Max: 34  SpO2  Min: 96 %  Max: 100 %    06/18 0701 - 06/19 0700  In: 368.2 [I.V.:5]  Out: 800 [Urine:800]   Unmeasured Output  Stool Occurrence: 1        Physical Exam  Vitals and nursing note reviewed.   Constitutional:       Appearance: She is ill-appearing.   HENT:      Head: Normocephalic.      Mouth/Throat:      Mouth: Mucous membranes are moist.      Pharynx: Oropharynx is clear.   Eyes:      Pupils: Pupils are equal, round, and reactive to light.   Cardiovascular:      Rate and Rhythm: Normal rate and regular rhythm.      Pulses: Normal pulses.      Heart sounds: Normal heart sounds.   Pulmonary:      Effort: Pulmonary effort is normal.      Breath sounds: Normal breath sounds.   Abdominal:      General: Bowel sounds are normal.      Palpations: Abdomen is soft.   Musculoskeletal:         General: Normal range of motion.   Skin:     General: Skin is warm and dry.      Capillary Refill: Capillary refill takes 2 to 3 seconds.   Neurological:      Comments: -GCS E4V4M6  -Alert. Oriented to person. Speech dysarthric. Follows commands.  -Cranial nerves : mild right facial droop at rest, not present with facial movements   -Motor antigravity in all four extremities without focal weakness   -Sensation intact to light touch        Unable to test orientation, language, memory, judgment, insight, fund of knowledge, hearing, shoulder shrug, tongue protrusion, coordination, gait due to level of consciousness.        Medications:  Continuous ScheduledamLODIPine, 10 mg, Daily  atorvastatin, 80 mg, Daily  carvediloL, 25 mg, BID  cefTRIAXone (Rocephin) IV (PEDS and ADULTS), 1 g, Q24H  divalproex ER, 750 mg, Q12H  enoxparin, 40 mg, Daily  EScitalopram oxalate, 20 mg, Daily  levETIRAcetam, 750 mg, BID  [START ON 6/20/2024] losartan, 100 mg, Daily  senna-docusate 8.6-50 mg, 1 tablet, BID  topiramate, 50 mg, BID    PRNsodium chloride 0.9%, , PRN  acetaminophen, 650 mg, Q6H PRN  haloperidol lactate, 5 mg, Q6H PRN  hydrALAZINE, 10 mg, Q4H PRN  labetaloL, 10 mg, Q4H PRN  ondansetron, 4 mg, Q8H PRN      Today I personally reviewed pertinent medications, lines/drains/airways, imaging, cardiology results, laboratory results, microbiology results, notably:    Diet  Diet diabetic Low Sodium,2gm, Low Chol/Sat Fat; 2000 Calorie; Standard Tray  Diet diabetic Low Sodium,2gm, Low Chol/Sat Fat; 2000 Calorie; Standard Tray        Assessment/Plan:     Neuro  * Acute encephalopathy  48 y/o F presenting as transfer from [facility] for higher level of care and continuous EEG monitoring. Hx of seizure disorder w/ questionable home med compliance. Confounded by elevated ammonia level and UTI at OSH.     -Admit to NCC  -VS/Neuro checks q1  -Cap EEG without seizures  -MRI brain, CTA H&N and CTH without acute processes   -Continue keppra 1g BID  -VPA discontinued due to elevated ammonia levels at OSH   -Ammonia normalized, discontinue lactulose  -Repeat ammonia level in AM  -Treat UTI, 3d course of CTX ordered   -Psych consulted for PNES eval; see consult note 6/17  -Epilepsy consulted, appreciate recommendations   -SBP goal < 180  -Diet advanced per SLP recommendations   -Monitor I/O  -CBC, CMP, Mag, Phos daily  -SCDs/Lovenox for DVT PPX  -PT/OT/SLP as appropriate  -TTF under Hospital Medicine service     Seizure disorder  -Home AEDs - Keppra 750mg BID and Depakote 750mg BID; questionable compliance  -AEDs adjusted by Neurology at OSH  -Currently  maintained on Keppra 750 mg BID  -Cap EEG negative for seizures   -Seizure precautions  -Avoid agents that lower seizure threshold     H/O: CVA (cerebrovascular accident)  -Hx of; 2017    Psychiatric  Psychiatric disorder  - psych consulted appreciate recs    Cardiac/Vascular  Mixed hyperlipidemia  -Atorvastatin daily    Chronic diastolic heart failure  -Hx of  -Most recent ECHO 6/8 w/ normal systolic function, estimated EF 55%, and normal diastolic function  -Continue home BB     Essential hypertension  -SBP goal < 180  -Amlodipine, losartan daily  -Carvedilol BID  -PRN labetalol    Renal/  Acute cystitis without hematuria  -Noted on U/A -- nitrite and leukocyte positive   -CTX x3 days as patient unreliably taking PO medications     Endocrine  Type 2 diabetes mellitus, without long-term current use of insulin  -Hold home metformin in acute setting  -Hold home insulin d/t episode of hypoglycemia @ OSH; resume once able to tolerate diet  -Accuchecks qACHS w/ SSI  -Hgb A1c pending          The patient is being Prophylaxed for:  Venous Thromboembolism with: Mechanical or Chemical  Stress Ulcer with: Not Applicable   Ventilator Pneumonia with: not applicable    Activity Orders            Diet diabetic Low Sodium,2gm, Low Chol/Sat Fat; 2000 Calorie; Standard Tray: Diabetic starting at 06/19 1131    Straight Cath starting at 06/18 1810    Progressive Mobility Protocol (mobilize patient to their highest level of functioning at least twice daily) starting at 06/18 0800    Turn patient starting at 06/17 2200    Elevate HOB starting at 06/17 2153          Full Code  Level 3  Diamond Loera NP  Neurocritical Care  [facility] - Neuro Critical Care

## 2024-06-19 NOTE — NURSING
PRN Versed 1mg administered at 1152, wasted 1mg with Bret Warren RN as witness - undocumented in [facility] d/t patient transfer to [facility]. Notified Pharmacy on 6/19 @ 0900.

## 2024-06-19 NOTE — PROGRESS NOTES
Misael Schmitt - Neurosurgery ([facility])  [facility] Medicine  Progress Note    Patient Name: Gina Jenkins  MRN: [id number]  Patient Class: IP- Inpatient   Admission Date: 6/17/2024  Length of Stay: 2 days  Attending Physician: Oli Crespo MD  Primary Care Provider: Clair Johnson NP        Subjective:     Principal Problem:Acute encephalopathy        HPI:  48 y/o F w/ PMH of multiple brain aneurysms, coronary angioplasty, HFpEF (EF 55% 6/2024), HTN, HLD, migraine headaches, seizures and CVA. She initially presented to [facility] on 6/15 after being found down and unresponsive by her family. In the ED, she was oriented to self and able to follow commands, but then had a witnessed seizure requiring administration of ativan 1mg. 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 Home AEDs include Keppra 750mg BID and Depakote 750mg BID, although compliance is questionable. She was admitted to Hospital Medicine for acute encephalopathy workup and subsequently stepped up to ICU for concern that she had not returned to her baseline following the seizure activity. EEG cap placed at that time and there have been no apparent epileptiform discharges or electrographic seizures seen. Neurology consulted for AED management, she was currently being maintained on Keppra 1g BID. 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 On 6/17, patient had multiple episodes of agitation, was attempting to get out of bed, and became combative requiring multiple doses of IV versed and four-point restraints. Upon Neurology's evaluation later that day, she was unable to participate in their exam, follow commands and answer any questions. Recommendation made to transfer to [facility] for higher level of care and continuous EEG monitoring. She will be admitted to [facility] for further management.         Overview/Hospital  Course:  06/18/2024: No seizures on EEG. Continue AED regimen, TTF under Hospital Medicine        Some agitation overnight, PRN haldol added. Ammonia normalized, UTI being treated with CTX, EEG without seizures, monitor ammonia, TTF under Hospital Medicine for further management.     6/19 Transfer to hospital medicine .Resume home dose divalproex,resume home dose topiramate, decrease keppra 750 mg BID . UC with GNRs. continue ceftriaxone         Review of Systems:   Pain scale:   Constitutional:  fever,  chills, headache, vision loss, hearing loss, weight loss, Generalized weakness, falls, loss of smell, loss of taste, poor appetite,  sore throat  Respiratory: cough, shortness of breath.   Cardiovascular: chest pain, dizziness, palpitations, orthopnea, swelling of feet, syncope  Gastrointestinal: nausea, vomiting, abdominal pain, diarrhea, black stool,  blood in stool, change in bowel habits, constipation  Genitourinary: hematuria, dysuria, urgency, frequency  Integument/Breast: rash,  pruritis  Hematologic/Lymphatic: easy bruising, lymphadenopathy  Musculoskeletal: arthralgias , myalgias, back pain, neck pain, knee pain  Neurological: confusion- improved , seizures, tremors, slurred speech  Behavioral/Psych:  depression, anxiety, auditory or visual hallucinations     OBJECTIVE:     Physical Exam:  Body mass index is 31.05 kg/m².    Constitutional: Appears well-developed and well-nourished. obesity   Head: Normocephalic and atraumatic.   Neck: Normal range of motion. Neck supple.   Cardiovascular: Normal heart rate.  Regular heart rhythm.  Pulmonary/Chest: Effort normal.   Abdominal: No distension.  No tenderness  Musculoskeletal: Normal range of motion. No edema.   Neurological: Alert and oriented to person, place, month and year. able to move bilateral upper and lower extremities without limitation   Skin: Skin is warm and dry.   Psychiatric: Normal mood and affect. Behavior is normal.                  Vital  "Signs  Temp: 98.5 °F (36.9 °C) (06/19/24 1508)  Pulse: 80 (06/19/24 1609)  Resp: 18 (06/19/24 1508)  BP: (!) 145/91 (06/19/24 1508)  SpO2: 100 % (06/19/24 1508)     24 Hour VS Range    Temp:  [96.5 °F (35.8 °C)-98.5 °F (36.9 °C)]   Pulse:  [55-89]   Resp:  [13-34]   BP: (137-188)/(70-97)   SpO2:  [96 %-100 %]     Intake/Output Summary (Last 24 hours) at 6/19/2024 1645  Last data filed at 6/19/2024 1500  Gross per 24 hour   Intake 644.7 ml   Output 500 ml   Net 144.7 ml         I/O This Shift:  I/O this shift:  In: 644.7 [P.O.:610; I.V.:6.6; IV Piggyback:28.1]  Out: 150 [Urine:150]    Wt Readings from Last 3 Encounters:   06/17/24 77 kg (169 lb 12.1 oz)   06/15/24 74.8 kg (164 lb 14.5 oz)   06/08/24 75 kg (165 lb 5.5 oz)       I have personally reviewed the vitals and recorded Intake/Output     Laboratory/Diagnostic Data:    CBC/Anemia Labs: Coags:    Recent Labs   Lab 06/17/24  0435 06/18/24  0348 06/19/24  0438   WBC 5.37 5.78 5.39   HGB 13.6 13.9 13.3   HCT 38.6 41.5 39.2    319 282   MCV 86 88 88   RDW 12.4 12.6 12.8    No results for input(s): "PT", "INR", "APTT" in the last 168 hours.     Chemistries: ABG:   Recent Labs   Lab 06/17/24  0147 06/18/24  0348 06/19/24  0438    141 141   K 4.0 3.4* 3.6   * 111* 112*   CO2 16* 19* 17*   BUN 20 13 12   CREATININE 1.4 1.3 1.3   CALCIUM 9.3 9.8 10.1   PROT 7.1 7.8 7.5   BILITOT 0.2 0.3 0.2   ALKPHOS 146* 165* 155*   ALT 18 22 16   AST 25 21 22   MG 1.9 1.9 1.9   PHOS 3.0 3.3 3.1    Recent Labs   Lab 06/15/24  2132   PH 7.288*   PCO2 41.9   PO2 27.2*   HCO3 20.0*   POCSATURATED 45.0*        POCT Glucose: HbA1c:    Recent Labs   Lab 06/18/24  0356 06/18/24  1311 06/18/24  1642 06/18/24  2121 06/19/24  0445 06/19/24  0753   POCTGLUCOSE 80 105 99 94 76 74    Hemoglobin A1C   Date Value Ref Range Status   06/19/2024 5.0 4.0 - 5.6 % Final     Comment:     ADA Screening Guidelines:  5.7-6.4%  Consistent with prediabetes  >or=6.5%  Consistent with " "diabetes    High levels of fetal hemoglobin interfere with the HbA1C  assay. Heterozygous hemoglobin variants (HbS, HgC, etc)do  not significantly interfere with this assay.   However, presence of multiple variants may affect accuracy.     08/22/2022 8.3 (H) 4.0 - 5.6 % Final     Comment:     ADA Screening Guidelines:  5.7-6.4%  Consistent with prediabetes  >or=6.5%  Consistent with diabetes    High levels of fetal hemoglobin interfere with the HbA1C  assay. Heterozygous hemoglobin variants (HbS, HgC, etc)do  not significantly interfere with this assay.   However, presence of multiple variants may affect accuracy.     07/16/2022 5.8 (H) 4.0 - 5.6 % Final     Comment:     ADA Screening Guidelines:  5.7-6.4%  Consistent with prediabetes  >or=6.5%  Consistent with diabetes    High levels of fetal hemoglobin interfere with the HbA1C  assay. Heterozygous hemoglobin variants (HbS, HgC, etc)do  not significantly interfere with this assay.   However, presence of multiple variants may affect accuracy.          Cardiac Enzymes: Ejection Fractions:    No results for input(s): "CPK", "CPKMB", "MB", "TROPONINI" in the last 72 hours. EF   Date Value Ref Range Status   06/08/2024 55 % Final   07/16/2022 65 % Final          Recent Labs   Lab 06/17/24  2248   COLORU Yellow   APPEARANCEUA Hazy*   PHUR 6.0   SPECGRAV 1.020   PROTEINUA 1+*   GLUCUA Negative   KETONESU Trace*   BILIRUBINUA Negative   OCCULTUA Trace*   NITRITE Positive*   LEUKOCYTESUR 3+*   RBCUA 6*   WBCUA 60*   BACTERIA Rare   SQUAMEPITHEL 1   HYALINECASTS 0       Procalcitonin (ng/mL)   Date Value   09/14/2022 0.53 (H)   08/19/2022 0.03   08/05/2022 0.04     Lactate (Lactic Acid) (mmol/L)   Date Value   06/15/2024 1.9   06/10/2024 2.1   06/07/2024 1.2   09/14/2022 1.5   10/02/2017 2.1     NT-pro-BNP  ([place]) (pg/mL)   Date Value   03/02/2016 1360 (H)     BNP (pg/mL)   Date Value   03/06/2017 189 (H)   03/03/2016 75     CRP   Date Value   09/15/2022 2.74 mg/dL " "(H)   09/12/2022 1.38 mg/dL (H)   09/07/2022 1.36 mg/dL (H)   08/21/2022 26.0 mg/L (H)   08/19/2022 28.5 mg/L (H)   08/05/2022 45.5 mg/L (H)     Sed Rate (mm/Hr)   Date Value   08/21/2022 46 (H)   08/19/2022 30   08/05/2022 73 (H)     D-Dimer (ng/mL)   Date Value   10/01/2017 208     Ferritin (ng/mL)   Date Value   06/30/2020 63     No results found for: "LDH"  Troponin I (ng/mL)   Date Value   06/15/2024 0.008   06/07/2024 <0.012   08/06/2023 <0.012   01/13/2021 <0.012   05/16/2020 <0.012   08/06/2019 <0.012   04/10/2018 0.013   10/03/2017 <0.012     CPK (U/L)   Date Value   06/15/2024 117   06/10/2024 91   09/15/2022 28 (L)   04/10/2018 116     Results for orders placed or performed in visit on 06/30/20   Vitamin D   Result Value Ref Range    Vit D, 25-Hydroxy 24 (L) 30 - 96 ng/mL   Results for orders placed or performed during the hospital encounter of 04/09/18   Vitamin D 25 hydroxy   Result Value Ref Range    Vit D, 25-Hydroxy 12 (L) 30 - 96 ng/mL     SARS-CoV-2 RNA, Amplification, Qual (no units)   Date Value   05/23/2023 Negative   01/29/2023 Positive (A)   09/14/2022 Negative   08/26/2022 Negative   08/22/2022 Negative   08/20/2021 Positive (A)   01/13/2021 Negative   01/07/2021 Negative   05/16/2020 Negative     POC Rapid COVID (no units)   Date Value   08/12/2021 Negative       Microbiology labs for the last week  Microbiology Results (last 7 days)       Procedure Component Value Units Date/Time    Urine culture [[id number]]  (Abnormal) Collected: 06/17/24 2248    Order Status: Completed Specimen: Urine Updated: 06/18/24 1650     Urine Culture, Routine GRAM NEGATIVE PEDRO  >100,000 cfu/ml  Identification and susceptibility pending      Narrative:      Specimen Source->Urine            Reviewed and noted in plan where applicable- Please see chart for full lab data.    Lines/Drains:       Peripheral IV - Single Lumen 06/17/24 2200 22 G Left Antecubital (Active)   Site Assessment Clean;Dry;Intact;No redness;No " swelling;No warmth;No drainage 06/19/24 0701   Extremity Assessment Distal to IV No abnormal discoloration;No redness;No swelling;No warmth 06/19/24 0701   Line Status Saline locked;Flushed 06/19/24 0701   Dressing Status Clean;Dry;Intact 06/19/24 0701   Dressing Intervention Integrity maintained 06/19/24 0701   Dressing Change Due 06/21/24 06/19/24 0701   Site Change Due 06/21/24 06/19/24 0701   Reason Not Rotated Not due 06/19/24 0701   Number of days: 1       Female External Urinary Catheter w/ Suction 06/15/24 2045 (Active)   Skin no redness;no breakdown;perineum cleansed w/ soap and water;female external urine collection device repositioned 06/19/24 0701   Tolerance no signs/symptoms of discomfort 06/19/24 0701   Suction Continuous suction at 70 mmHg 06/19/24 0701   Date of last wick change 06/19/24 06/19/24 0701   Time of last wick change 0701 06/19/24 0701   Output (mL) 150 mL 06/19/24 0701   Number of days: 3       Imaging      Results for orders placed during the hospital encounter of 06/07/24    Echo    Interpretation Summary    Left Ventricle: The left ventricle is normal in size. Mild septal thickening. There is concentric remodeling. There is normal systolic function. Ejection fraction by visual approximation is 55%. There is normal diastolic function.    Right Ventricle: Normal right ventricular cavity size. Wall thickness is normal. Systolic function is normal.    Pulmonary Artery: The estimated pulmonary artery systolic pressure is 17 mmHg.    IVC/SVC: Normal venous pressure at 3 mmHg.      X-Ray Chest AP Single View  Narrative: EXAMINATION:  XR CHEST 1 VIEW    CLINICAL HISTORY:  Eval;    TECHNIQUE:  Single frontal view of the chest was performed.    COMPARISON:  06/07/2024.    FINDINGS:  The heart and mediastinal structures are within normal limits in size and unchanged.  Pulmonary vasculature is unremarkable.  There is a poor depth of inspiration.  The lungs are free of focal consolidations.  There  is no evidence for pneumothorax or large pleural effusions.  Bony structures are grossly intact.  Impression: No acute chest disease identified.    Electronically signed by: Jeffy Kaplan MD  Date:    06/18/2024  Time:    12:01      Labs, Imaging, EKG and Diagnostic results from 6/19/2024 were reviewed.    Medications:  Medication list was reviewed and changes noted under Assessment/Plan.  No current facility-administered medications on file prior to encounter.     Current Outpatient Medications on File Prior to Encounter   Medication Sig Dispense Refill    amLODIPine (NORVASC) 10 MG tablet Take 10 mg by mouth once daily.      atorvastatin (LIPITOR) 80 MG tablet Take 1 tablet (80 mg total) by mouth once daily. 90 tablet 3    blood sugar diagnostic Strp Use to check blood glucose 3 times daily. 200 each 11    blood-glucose meter Misc Use to check blood glucose 3 times daily. 1 each 1    carvediloL (COREG) 12.5 MG tablet Take 3 tablets (37.5 mg total) by mouth 2 (two) times daily. 540 tablet 3    divalproex ER (DEPAKOTE ER) 250 MG 24 hr tablet Take 3 tablets (750 mg total) by mouth every 12 (twelve) hours. 180 tablet 11    EScitalopram oxalate (LEXAPRO) 20 MG tablet Take 1 tablet (20 mg total) by mouth once daily. 90 tablet 3    insulin aspart U-100 (NOVOLOG) 100 unit/mL injection Inject 8 Units into the skin 3 (three) times daily with meals.      insulin detemir U-100, Levemir, (LEVEMIR FLEXTOUCH U100 INSULIN) 100 unit/mL (3 mL) InPn pen Inject 24 Units into the skin every evening.      lancets 30 gauge Misc Use to check blood glucose 3 times daily. 200 each 11    levETIRAcetam (KEPPRA) 750 MG Tab Take 2 tablets (1,500 mg total) by mouth 2 (two) times daily. 120 tablet 11    losartan (COZAAR) 50 MG tablet Take 1 tablet (50 mg total) by mouth once daily. 90 tablet 3    metFORMIN (GLUCOPHAGE-XR) 500 MG ER 24hr tablet Take 500 mg by mouth 2 (two) times daily.      methocarbamoL (ROBAXIN) 500 MG Tab Take 500 mg by mouth  "2 (two) times daily.      pen needle, diabetic (BD ULTRA-FINE MARIELY PEN NEEDLE) 32 gauge x 5/32" Ndle Use to inject insulin into the skin three times daily . 200 each 11    topiramate (TOPAMAX) 50 MG tablet Take 1 tablet (50 mg total) by mouth 2 (two) times daily. 180 tablet 3    [DISCONTINUED] aspirin 81 MG Chew Take 1 tablet (81 mg total) by mouth once daily.  0     Scheduled Medications:  Current Facility-Administered Medications   Medication Dose Route Frequency    amLODIPine  10 mg Oral Daily    atorvastatin  80 mg Oral Daily    carvediloL  25 mg Oral BID    cefTRIAXone (Rocephin) IV (PEDS and ADULTS)  1 g Intravenous Q24H    divalproex ER  750 mg Oral Q12H    enoxparin  40 mg Subcutaneous Daily    EScitalopram oxalate  20 mg Oral Daily    levETIRAcetam  750 mg Oral BID    [START ON 6/20/2024] losartan  100 mg Oral Daily    senna-docusate 8.6-50 mg  1 tablet Oral BID    topiramate  50 mg Oral BID     PRN:   Current Facility-Administered Medications:     sodium chloride 0.9%, , Intravenous, PRN    acetaminophen, 650 mg, Oral, Q6H PRN    haloperidol lactate, 5 mg, Intravenous, Q6H PRN    hydrALAZINE, 10 mg, Intravenous, Q4H PRN    labetaloL, 10 mg, Intravenous, Q4H PRN    ondansetron, 4 mg, Intravenous, Q8H PRN  Infusions:   Estimated Creatinine Clearance: 50.9 mL/min (based on SCr of 1.3 mg/dL).             Assessment/Plan:      * Acute encephalopathy    48 y/o F presenting as transfer from [facility] for higher level of care and continuous EEG monitoring. Hx of seizure disorder w/ questionable home med compliance. Confounded by elevated ammonia level and UTI at OSH.      -Admit to [facility]  -VS/Neuro checks q1  -Cap EEG without seizures  -MRI brain, CTA H&N and CTH without acute processes   -Continue keppra 1g BID  -VPA discontinued due to elevated ammonia levels at OSH   -Ammonia normalized, discontinue lactulose  -Repeat ammonia level in AM  -Treat UTI, 3d course of CTX ordered   -Psych consulted for PNES eval; " see consult note 6/17  -Epilepsy consulted, appreciate recommendations   -SBP goal < 180  -Diet advanced per SLP recommendations   -Monitor I/O  -CBC, CMP, Mag, Phos daily  -SCDs/Lovenox for DVT PPX  -PT/OT/SLP as appropriate  -TTF under Hospital Medicine service     6/19  Alert and oriented to person, place, month and year. reports noncompliance with home med. couseled regarding medication compliance     Psychiatric disorder  s/p psychiatry eval - psych consulted. continue lexapro      Acute cystitis without hematuria     -Noted on U/A -- nitrite and leukocyte positive   ceftriaxone  x3 days as patient unreliably taking PO medications       6/19 UC with GNRs. continue ceftriaxone     COOPER (acute kidney injury)  Patient with acute kidney injury/acute renal failure likely due to pre-renal azotemia due to IVVD COOPER is currently improving. - Labs reviewed- Renal function/electrolytes with Estimated Creatinine Clearance: 50.9 mL/min (based on SCr of 1.3 mg/dL). according to latest data. Monitor urine output and serial BMP and adjust therapy as needed. Avoid nephrotoxins and renally dose meds for GFR listed above.    Recent Labs   Lab 06/17/24  0147 06/18/24  0348 06/19/24  0438   BUN 20 13 12   CREATININE 1.4 1.3 1.3       I & O     Intake/Output Summary (Last 24 hours) at 6/19/2024 1611  Last data filed at 6/19/2024 1500  Gross per 24 hour   Intake 644.7 ml   Output 500 ml   Net 144.7 ml        Seizure disorder    -Home AEDs - Keppra 750mg BID and Depakote 750mg BID; questionable compliance  -AEDs adjusted by Neurology at OSH  -Currently maintained on Keppra 1g BID  -Cap EEG negative for seizures   -Seizure precautions  -Avoid agents that lower seizure threshold          Type 2 diabetes mellitus, without long-term current use of insulin    -Hold home metformin in acute setting  -Hold home insulin d/t episode of hypoglycemia @ OSH; resume once able to tolerate diet  -Accuchecks qACHS w/ SSI  -Hgb A1c pending  Patient's  FSGs are controlled on current hypoglycemics.   Last A1c reviewed-   Lab Results   Component Value Date    HGBA1C 5.0 06/19/2024    HGBA1C 8.3 (H) 08/22/2022    HGBA1C 5.8 (H) 07/16/2022     Will hold PO hypoglycemics and will start correctional scale insulin  Most recent fingerstick glucose reviewed-   Recent Labs   Lab 06/18/24  1642 06/18/24  2121 06/19/24  0445 06/19/24  0753   POCTGLUCOSE 99 94 76 74     currently on   Antihyperglycemics (From admission, onward)      None             Mixed hyperlipidemia    -Atorvastatin daily       H/O: CVA (cerebrovascular accident)    -Hx of; 2017    Chronic diastolic heart failure    -Hx of  -Most recent ECHO 6/8 w/ normal systolic function, estimated EF 55%, and normal diastolic function  -Continue home BB        Hypokalemia  Patient has hypokalemia which is Acute and currently controlled. Most recent potassium levels reviewed-   Lab Results   Component Value Date    K 3.6 06/19/2024   . Will continue potassium replacement per protocol and recheck repeat levels after replacement completed.     Essential hypertension    -SBP goal < 180  -Amlodipine, losartan daily  -Carvedilol BID  -PRN labetalol      VTE Risk Mitigation (From admission, onward)           Ordered     enoxaparin injection 40 mg  Daily         06/17/24 2201     IP VTE HIGH RISK PATIENT  Once         06/17/24 2201     Place sequential compression device  Until discontinued         06/17/24 2201                    Discharge Planning   CADY: 6/21/2024     Code Status: Full Code   Is the patient medically ready for discharge?:     Reason for patient still in hospital (select all that apply): Treatment  Discharge Plan A: Home, Home with family, Home Health   Discharge Delays: None known at this time              Oli Crespo MD  Department of Hospital Medicine   Misael andir - Neurosurgery ([facility]

## 2024-06-19 NOTE — ASSESSMENT & PLAN NOTE
-Home AEDs - Keppra 750mg BID and Depakote 750mg BID; questionable compliance  -AEDs adjusted by Neurology at OSH  -Currently maintained on Keppra 750 mg BID  -Cap EEG negative for seizures   -Seizure precautions  -Avoid agents that lower seizure threshold

## 2024-06-19 NOTE — PLAN OF CARE
"[facility] Care Plan    POC reviewed with Gina Jenkins and family at 0300. Pt verbalized understanding. Questions and concerns addressed. No acute events overnight. Pt progressing toward goals. Will continue to monitor. See below and flowsheets for full assessment and VS info.     -NAEON   -TTF       Is this a stroke patient? no    Neuro:  [place] Coma Scale  Best Eye Response: 4-->(E4) spontaneous  Best Motor Response: 6-->(M6) obeys commands  Best Verbal Response: 4-->(V4) confused  [place] Coma Scale Score: 14  Pupil PERRLA: yes     24hr Temp:  [96.5 °F (35.8 °C)-98.4 °F (36.9 °C)]     CV:   Rhythm: sinus bradycardia  BP goals:   SBP < 180  MAP > 65    Resp:           Plan: N/A    GI/:     Diet/Nutrition Received: NPO  Last Bowel Movement: 24  Voiding Characteristics: external catheter    Intake/Output Summary (Last 24 hours) at 2024 0556  Last data filed at 2024 0301  Gross per 24 hour   Intake 368.2 ml   Output 800 ml   Net -431.8 ml     Unmeasured Output  Stool Occurrence: 1    Labs/Accuchecks:  Recent Labs   Lab 24  0438   WBC 5.39   RBC 4.45   HGB 13.3   HCT 39.2         Recent Labs   Lab 24  0438      K 3.6   CO2 17*   *   BUN 12   CREATININE 1.3   ALKPHOS 155*   ALT 16   AST 22   BILITOT 0.2    No results for input(s): "PROTIME", "INR", "APTT", "HEPANTIXA" in the last 168 hours.   Recent Labs   Lab 06/15/24  0451      TROPONINI 0.008       Electrolytes: No replacement orders  Accuchecks: Q4H    Gtts:      LDA/Wounds:    Nurses Note -- 4 Eyes    Is there altered skin present? no       Prevention Measures Documented    Second RN/Staff Member:  ALEXANDER Reyes     Restraints:   Restraint Order  Length of Order: Order good for next 24 hours or when removed.  Date that the current order will : 24  Time that the current order will : 1629  Order Upon Application: Yes    WCTM       Problem: Infection  Goal: Absence of Infection Signs and " Symptoms  Outcome: Progressing  Intervention: Prevent or Manage Infection  Flowsheets (Taken 6/19/2024 0331)  Fever Reduction/Comfort Measures:   lightweight bedding   lightweight clothing  Infection Management: aseptic technique maintained  Isolation Precautions: precautions maintained

## 2024-06-19 NOTE — PT/OT/SLP PROGRESS
Physical Therapy Treatment    Patient Name:  Gina Jenkins   MRN:  [id number]    Recent Surgery: * No surgery found *      Recommendations:     Discharge Recommendations:   Low Intensity Therapy  Discharge Equipment Recommendations: none   Barriers to discharge: None    Highest Level of Mobility: Gait ~10' x2  Assistance Required: CGA-Min(A) w/ HHA    Assessment:     Gina Jenkins is a 49 y.o. female admitted with a medical diagnosis of Acute encephalopathy.    Pt met with HOB elevated and agreeable to PT treatment. Today's PT treatment focus was on OOB mobility to improve tolerance to activity. Pt tolerated treatment well on this date where she ambulated ~10' x2 trials where she required CGA-Min(A) via HHA x1. Pt presents with improved command following and no signs of agitation on this date. Pt demonstrated improving mobility and stated that this was the most activity she has done in a while. Based on the patient's improving functional status, changing post acute discharge recommendation to low intensity therapy. Per SW, Pt's family states she has ample support at home and will be safe in her home environment.    Pt is progressing towards acute PT goals appropriately and continues to benefit from acute PT sessions.    Rehab Prognosis: Good; patient would benefit from acute skilled PT services to address these deficits and reach maximum level of function.      Patient demonstrates a mobility limitation that significantly impairs their ability to participate in one or more mobility related activities of daily living. Patient's mobility limitation cannot be sufficiently resolved with the use of a cane, but can be sufficiently resolved with the use of a rolling walker.The use of a rolling walker will considerably improve their ability to participate in MRADLs. Patient will use the walker on a regular basis at home.    Plan:     During this hospitalization, patient to be seen 4 x/week to address the identified rehab  "impairments via gait training, therapeutic activities, therapeutic exercises, neuromuscular re-education and progress toward the following goals:    Plan of Care Expires:  07/18/24    This plan of care has been discussed with the patient/caregiver, who was included in its development and is in agreement with the identified goals and treatment plan.     Subjective     Communicated with RN prior to session.  Patient agreeable to participate.     Pain/Comfort:  Pain Rating 1: 0/10  Pain Rating Post-Intervention 1: 0/10    Chief Complaint: Acute encephalopathy  Patient/Family Comments/goals: "It feels good to be out of that bed!"      Objective:     Patient found HOB elevated with telemetry, pulse ox (continuous), blood pressure cuff, PureWick, restraints  upon PT entry to room.    General Precautions: Standard, fall   Orthopedic Precautions:N/A   Braces: N/A         Exams:    Cognition:  Patient is oriented to Person, Place, Situation  Follows one-step commands  Insight to deficits/safety awareness: Impaired    Functional Mobility:    Bed Mobility:  Supine to Sit: Stand-by Assistance on L  Sit to Supine: Contact Guard Assistance  Scooting anteriorly to EOB to plant feet on floor: Stand-by Assistance  Scooting/Bridging in supine to HOB: Stand-by Assistance    Transfers:   Sit to Stand Transfer: Contact Guard Assistance  from EOB with HHA x1             Gait:  Patient received gait training 10 feet x2 trials with Contact Guard Assistance-Min(A) and HHA x1  Gait Assessment: occasional unsteady gait, decreased step length, and narrow base of support  Gait Pattern Observed: Step-to  Comments: All lines remained intact throughout ambulation trial, gait belt utilized, chair follow for patient safety  Pt required cueing to widen her CHERELLE and take larger steps in order to maintain safe gait. She is impulsive at this time so she required cues for safe ambulation.    Balance:  Static Sit:   Stand-By Assist at EOB   Dynamic " sit:  Stand-By Assist   Static Stand:   Contact-Guard Assist-Min Assist with Hand-held assist x 1  Dynamic Stand:  Contact-Guard Assist-Min Assist with Hand-held assist x 1  Pt assisted to sink where she completed self-care ADLs with OT.  Side stepping to L/R at the edge of bed performed 5 steps to each side.      Therapeutic Activities/Exercises     Patient assisted with functional mobility as noted above  Patient educated on the importance of early mobility to prevent functional decline during hospital stay  Patient was instructed to utilize staff assistance for mobility/transfers.  Patient is appropriate to transfer with Min(A) and RN/PCT assist  Patient educated on PT POC and role of PT in acute care  White board updated regarding patient's safest level of mobility with staff assistance, RN also updated.     AM-PAC 6 CLICK MOBILITY  Turning over in bed (including adjusting bedclothes, sheets and blankets)?: 4  Sitting down on and standing up from a chair with arms (e.g., wheelchair, bedside commode, etc.): 3  Moving from lying on back to sitting on the side of the bed?: 3  Moving to and from a bed to a chair (including a wheelchair)?: 3  Need to walk in hospital room?: 3  Climbing 3-5 steps with a railing?: 2  Basic Mobility Total Score: 18     Patient left HOB elevated with all lines intact, call button in reach, bed alarm on, restraints reapplied at end of session, and rn notified.        History/Goals:     PAST MEDICAL HISTORY:  Past Medical History:   Diagnosis Date    Brain aneurysm     CHF (congestive heart failure)     H/O coronary angioplasty     Hypercholesteremia     Hypertension     Malignant hypertension     Migraine headache     Stroke 10/2017       Past Surgical History:   Procedure Laterality Date    CARDIAC CATHETERIZATION      CEREBRAL ANGIOGRAM      CLIP LIGATION OF INTRACRANIAL ANEURYSM BY CRANIOTOMY N/A 7/15/2022    Procedure: CRANIOTOMY, WITH ANEURYSM CLIPPING;  Surgeon: Timothy Diaz,  MD;  [facility];  Service: Neurosurgery;  Laterality: N/A;  PTERIONAL CRANIOTOMY WITH CLIP LIGATION OF L PCOMM, L ANTERIOR CHOROIDAL, L MCA  ANEURYSM, ANESTHESIA: GENERAL, BLOOD: TYPE&CROSS 2 UNITS, NEUROMONITORING: SEP, MEP, EEG, RADIOLOGY: C-ARM, POSITION: SUPINE, TONEY CASTILLO-SURGERON: DR. LEXI DOMÍNGUEZ.    WOUND DEBRIDEMENT  2022    Procedure: DEBRIDEMENT, WOUND;  Surgeon: Timothy Diaz MD;  [facility];  Service: Neurosurgery;;       GOALS:   Multidisciplinary Problems       Physical Therapy Goals          Problem: Physical Therapy    Goal Priority Disciplines Outcome Goal Variances Interventions   Physical Therapy Goal     PT, PT/OT Progressing     Description: Goals to be met by: 24     Patient will increase functional independence with mobility by performin. Supine to sit with Stand-by Assistance  2. Sit to supine with Stand-by Assistance  3. Rolling to Left and Right with Modified [place].  4. Sit to stand transfer with Modified [place]  5. Bed to chair transfer with Supervision using LRAD  6. Gait  x 100 feet with Supervision using LRAD.   7. Ascend/descend 3 stairs with left Handrails Supervision using LRAD.   8. Lower extremity exercise program x10 reps per handout, with independence                         Time Tracking:     PT Received On: 24  PT Start Time: 1048     PT Stop Time: 1112  PT Total Time (min): 24 min     Billable Minutes: Gait Training 12 and Therapeutic Activity 12      Mario Gupta, DAVID  2024  Pager# [phone]

## 2024-06-19 NOTE — PLAN OF CARE
Regular diet with thin liquids recommended.    Problem: SLP  Goal: SLP Goal  Description: Goals due 6/25  1.  Tolerate regular diet with thin liquids with no s/s of aspiration  2.  Participate in ongoing assessment of swallow at bedside/met  3.  Participate in speech language cognitive eval  4.  [place] to month, year and place  5.  Respond to simple categorization tasks with 80% accuracy  Outcome: Progressing

## 2024-06-20 VITALS
RESPIRATION RATE: 18 BRPM | SYSTOLIC BLOOD PRESSURE: 133 MMHG | OXYGEN SATURATION: 100 % | BODY MASS INDEX: 31.24 KG/M2 | TEMPERATURE: 99 F | DIASTOLIC BLOOD PRESSURE: 85 MMHG | HEIGHT: 62 IN | HEART RATE: 73 BPM | WEIGHT: 169.75 LBS

## 2024-06-20 PROBLEM — F17.200 NICOTINE DEPENDENCE: Status: ACTIVE | Noted: 2017-10-04

## 2024-06-20 PROBLEM — G93.41 ENCEPHALOPATHY, METABOLIC: Status: ACTIVE | Noted: 2024-06-15

## 2024-06-20 PROBLEM — Z74.09 REDUCED MOBILITY: Status: ACTIVE | Noted: 2017-11-08

## 2024-06-20 LAB
ALBUMIN SERPL BCP-MCNC: 3.3 G/DL (ref 3.5–5.2)
ALP SERPL-CCNC: 145 U/L (ref 55–135)
ALT SERPL W/O P-5'-P-CCNC: 19 U/L (ref 10–44)
AMMONIA PLAS-SCNC: 46 UMOL/L (ref 10–50)
ANION GAP SERPL CALC-SCNC: 12 MMOL/L (ref 8–16)
AST SERPL-CCNC: 23 U/L (ref 10–40)
BASOPHILS # BLD AUTO: 0.04 K/UL (ref 0–0.2)
BASOPHILS NFR BLD: 0.8 % (ref 0–1.9)
BILIRUB SERPL-MCNC: 0.1 MG/DL (ref 0.1–1)
BUN SERPL-MCNC: 13 MG/DL (ref 6–20)
CALCIUM SERPL-MCNC: 9.2 MG/DL (ref 8.7–10.5)
CHLORIDE SERPL-SCNC: 110 MMOL/L (ref 95–110)
CO2 SERPL-SCNC: 18 MMOL/L (ref 23–29)
CREAT SERPL-MCNC: 1.4 MG/DL (ref 0.5–1.4)
DIFFERENTIAL METHOD BLD: NORMAL
EOSINOPHIL # BLD AUTO: 0.2 K/UL (ref 0–0.5)
EOSINOPHIL NFR BLD: 4.3 % (ref 0–8)
ERYTHROCYTE [DISTWIDTH] IN BLOOD BY AUTOMATED COUNT: 13 % (ref 11.5–14.5)
EST. GFR  (NO RACE VARIABLE): 46.4 ML/MIN/1.73 M^2
GLUCOSE SERPL-MCNC: 96 MG/DL (ref 70–110)
HCT VFR BLD AUTO: 37.6 % (ref 37–48.5)
HGB BLD-MCNC: 13 G/DL (ref 12–16)
IMM GRANULOCYTES # BLD AUTO: 0.02 K/UL (ref 0–0.04)
IMM GRANULOCYTES NFR BLD AUTO: 0.4 % (ref 0–0.5)
LYMPHOCYTES # BLD AUTO: 1.5 K/UL (ref 1–4.8)
LYMPHOCYTES NFR BLD: 30.1 % (ref 18–48)
MAGNESIUM SERPL-MCNC: 2 MG/DL (ref 1.6–2.6)
MCH RBC QN AUTO: 30.2 PG (ref 27–31)
MCHC RBC AUTO-ENTMCNC: 34.6 G/DL (ref 32–36)
MCV RBC AUTO: 87 FL (ref 82–98)
MONOCYTES # BLD AUTO: 0.5 K/UL (ref 0.3–1)
MONOCYTES NFR BLD: 9.3 % (ref 4–15)
NEUTROPHILS # BLD AUTO: 2.7 K/UL (ref 1.8–7.7)
NEUTROPHILS NFR BLD: 55.1 % (ref 38–73)
NRBC BLD-RTO: 0 /100 WBC
PHOSPHATE SERPL-MCNC: 3.3 MG/DL (ref 2.7–4.5)
PLATELET # BLD AUTO: 255 K/UL (ref 150–450)
PMV BLD AUTO: 10.7 FL (ref 9.2–12.9)
POTASSIUM SERPL-SCNC: 3.8 MMOL/L (ref 3.5–5.1)
PROT SERPL-MCNC: 7 G/DL (ref 6–8.4)
RBC # BLD AUTO: 4.3 M/UL (ref 4–5.4)
SODIUM SERPL-SCNC: 140 MMOL/L (ref 136–145)
WBC # BLD AUTO: 4.85 K/UL (ref 3.9–12.7)

## 2024-06-20 PROCEDURE — 80053 COMPREHEN METABOLIC PANEL: CPT | Performed by: PHYSICIAN ASSISTANT

## 2024-06-20 PROCEDURE — 25000003 PHARM REV CODE 250: Performed by: PHYSICIAN ASSISTANT

## 2024-06-20 PROCEDURE — 84100 ASSAY OF PHOSPHORUS: CPT | Performed by: PHYSICIAN ASSISTANT

## 2024-06-20 PROCEDURE — 85025 COMPLETE CBC W/AUTO DIFF WBC: CPT | Performed by: PHYSICIAN ASSISTANT

## 2024-06-20 PROCEDURE — 36415 COLL VENOUS BLD VENIPUNCTURE: CPT | Performed by: HOSPITALIST

## 2024-06-20 PROCEDURE — 25000003 PHARM REV CODE 250: Performed by: NURSE PRACTITIONER

## 2024-06-20 PROCEDURE — 36415 COLL VENOUS BLD VENIPUNCTURE: CPT | Performed by: PHYSICIAN ASSISTANT

## 2024-06-20 PROCEDURE — 25000003 PHARM REV CODE 250: Performed by: HOSPITALIST

## 2024-06-20 PROCEDURE — 97116 GAIT TRAINING THERAPY: CPT

## 2024-06-20 PROCEDURE — 83735 ASSAY OF MAGNESIUM: CPT | Performed by: PHYSICIAN ASSISTANT

## 2024-06-20 PROCEDURE — 63600175 PHARM REV CODE 636 W HCPCS: Performed by: PHYSICIAN ASSISTANT

## 2024-06-20 PROCEDURE — 82140 ASSAY OF AMMONIA: CPT | Performed by: HOSPITALIST

## 2024-06-20 RX ORDER — NITROFURANTOIN 25; 75 MG/1; MG/1
100 CAPSULE ORAL EVERY 12 HOURS
Status: DISCONTINUED | OUTPATIENT
Start: 2024-06-20 | End: 2024-06-20 | Stop reason: HOSPADM

## 2024-06-20 RX ORDER — LEVETIRACETAM 750 MG/1
750 TABLET ORAL 2 TIMES DAILY
Start: 2024-06-20 | End: 2024-06-20

## 2024-06-20 RX ORDER — LEVETIRACETAM 750 MG/1
750 TABLET ORAL 2 TIMES DAILY
Qty: 120 TABLET | Refills: 0 | Status: SHIPPED | OUTPATIENT
Start: 2024-06-20 | End: 2024-08-19

## 2024-06-20 RX ORDER — NITROFURANTOIN 25; 75 MG/1; MG/1
100 CAPSULE ORAL 2 TIMES DAILY
Qty: 12 CAPSULE | Refills: 0 | Status: SHIPPED | OUTPATIENT
Start: 2024-06-20 | End: 2024-06-26

## 2024-06-20 RX ADMIN — ESCITALOPRAM OXALATE 20 MG: 20 TABLET ORAL at 08:06

## 2024-06-20 RX ADMIN — ENOXAPARIN SODIUM 40 MG: 40 INJECTION SUBCUTANEOUS at 05:06

## 2024-06-20 RX ADMIN — LOSARTAN POTASSIUM 100 MG: 50 TABLET, FILM COATED ORAL at 08:06

## 2024-06-20 RX ADMIN — AMLODIPINE BESYLATE 10 MG: 10 TABLET ORAL at 08:06

## 2024-06-20 RX ADMIN — CARVEDILOL 25 MG: 25 TABLET, FILM COATED ORAL at 08:06

## 2024-06-20 RX ADMIN — SENNOSIDES AND DOCUSATE SODIUM 1 TABLET: 50; 8.6 TABLET ORAL at 08:06

## 2024-06-20 RX ADMIN — ATORVASTATIN CALCIUM 80 MG: 40 TABLET, FILM COATED ORAL at 08:06

## 2024-06-20 RX ADMIN — TOPIRAMATE 50 MG: 25 TABLET, FILM COATED ORAL at 08:06

## 2024-06-20 RX ADMIN — DIVALPROEX SODIUM 750 MG: 500 TABLET, FILM COATED, EXTENDED RELEASE ORAL at 08:06

## 2024-06-20 RX ADMIN — LEVETIRACETAM 750 MG: 750 TABLET, FILM COATED ORAL at 08:06

## 2024-06-20 RX ADMIN — NITROFURANTOIN MONOHYDRATE/MACROCRYSTALS 100 MG: 25; 75 CAPSULE ORAL at 08:06

## 2024-06-20 NOTE — PLAN OF CARE
[facility] - Neurosurgery (Hospital)  Discharge Final Note    Primary Care Provider: Clair Johnson NP    Expected Discharge Date: 6/20/2024    Final Discharge Note (most recent)       Final Note - 06/20/24 1554          Final Note    Assessment Type Final Discharge Note     Anticipated Discharge Disposition Home or Self Care (P)    with outpatient PT    What phone number can be called within the next 1-3 days to see how you are doing after discharge? [phone] (P)      Hospital Resources/Appts/Education Provided Provided patient/caregiver with written discharge plan information;Provided education on problems/symptoms using teachback;Appointments scheduled and added to AVS (P)                      Important Message from Medicare             Contact Info       Clair Johnson NP   Specialty: Family Medicine   Relationship: PCP - General    [address] [facility] [address] [address]  [address]  [facility] [address]   Phone: [phone]       Next Steps: Schedule an appointment as soon as possible for a visit in 1 week(s)          Patient will discharge home with outpatient PT referral. CM asked MD to placed referral. Family will transport patient home.

## 2024-06-20 NOTE — PLAN OF CARE
[facility] - Neurosurgery ([facility])      HOME HEALTH ORDERS  FACE TO FACE ENCOUNTER    Patient Name: Gina Jenkins  YOB: 1975    PCP: Clair Johnson NP   PCP Address: [address] [place] [address] [address] [place] [address] PRIMARY CARE / DAIN *  PCP Phone Number: [phone]  PCP Fax: [phone]    Encounter Date: 6/17/24    Admit to Home Health    Diagnoses:  Active Hospital Problems    Diagnosis  POA    *Encephalopathy, metabolic [G93.41]  Yes    Psychiatric disorder [F99]  Yes    Acute cystitis without hematuria [N30.00]  Yes    COOPER (acute kidney injury) [N17.9]  Yes    Seizure disorder [G40.909]  Yes    Type 2 diabetes mellitus, without long-term current use of insulin [E11.9]  Yes    Mixed hyperlipidemia [E78.2]  Yes    H/O: CVA (cerebrovascular accident) [Z86.73]  Not Applicable    Chronic diastolic heart failure [I50.32]  Yes     Chronic    Hypokalemia [E87.6]  Yes    Essential hypertension [I10]  Yes      Resolved Hospital Problems   No resolved problems to display.       Follow Up Appointments:  No future appointments.    Allergies:  Review of patient's allergies indicates:   Allergen Reactions    Lisinopril Swelling    Bactrim [sulfamethoxazole-trimethoprim] Rash    Ceftazidime Hives     Rash while on IV ceftazidime for planned 6 weeks.  See ED notes 9/12, 9/14 and ID clinic notes 9/16 and 9/28       Medications: Review discharge medications with patient and family and provide education.    Current Facility-Administered Medications   Medication Dose Route Frequency Provider Last Rate Last Admin    0.9%  NaCl infusion   Intravenous PRN iLlli Adam PA-C   Stopped at 06/19/24 1244    acetaminophen tablet 650 mg  650 mg Oral Q6H PRN Blaze Gaytan MD   650 mg at 06/19/24 0805    amLODIPine tablet 10 mg  10 mg Oral Daily Lilli dAam PA-C   10 mg at 06/20/24 0853    atorvastatin tablet 80 mg  80 mg Oral Daily Lilli Adam PA-C   80 mg at 06/20/24 0853    carvediloL tablet 25 mg  25 mg Oral BID  Diamond Loera NP   25 mg at 06/20/24 0853    enoxaparin injection 40 mg  40 mg Subcutaneous Daily Lilli Adam PA-C   40 mg at 06/19/24 1716    EScitalopram oxalate tablet 20 mg  20 mg Oral Daily Lilli Adam PA-C   20 mg at 06/20/24 0854    haloperidol lactate injection 5 mg  5 mg Intravenous Q6H PRN Lilli Adam PA-C   5 mg at 06/19/24 0659    hydrALAZINE injection 10 mg  10 mg Intravenous Q4H PRN Neeru Perez, NP   10 mg at 06/18/24 2210    labetaloL injection 10 mg  10 mg Intravenous Q4H PRN Neeru Perez, NP        levETIRAcetam tablet 750 mg  750 mg Oral BID Diamond Loera NP   750 mg at 06/20/24 0853    losartan tablet 100 mg  100 mg Oral Daily Diamond Loera NP   100 mg at 06/20/24 0853    nitrofurantoin (macrocrystal-monohydrate) 100 MG capsule 100 mg  100 mg Oral Q12H Nasim Crisostomo MD   100 mg at 06/20/24 0854    ondansetron injection 4 mg  4 mg Intravenous Q8H PRN Lilli Adam PA-C        senna-docusate 8.6-50 mg per tablet 1 tablet  1 tablet Oral BID Lilli Adam PA-C   1 tablet at 06/20/24 0854    topiramate tablet 50 mg  50 mg Oral BID Diamond Loera NP   50 mg at 06/20/24 0853     Current Discharge Medication List        START taking these medications    Details   nitrofurantoin, macrocrystal-monohydrate, (MACROBID) 100 MG capsule Take 1 capsule (100 mg total) by mouth 2 (two) times daily. for 6 days  Qty: 12 capsule, Refills: 0           CONTINUE these medications which have CHANGED    Details   levETIRAcetam (KEPPRA) 750 MG Tab Take 1 tablet (750 mg total) by mouth 2 (two) times daily.  Qty: 120 tablet, Refills: 0           CONTINUE these medications which have NOT CHANGED    Details   amLODIPine (NORVASC) 10 MG tablet Take 10 mg by mouth once daily.      atorvastatin (LIPITOR) 80 MG tablet Take 1 tablet (80 mg total) by mouth once daily.  Qty: 90 tablet, Refills: 3      blood sugar diagnostic Strp Use to check blood glucose 3 times daily.  Qty: 200 each,  "Refills: 11      blood-glucose meter Misc Use to check blood glucose 3 times daily.  Qty: 1 each, Refills: 1      carvediloL (COREG) 12.5 MG tablet Take 3 tablets (37.5 mg total) by mouth 2 (two) times daily.  Qty: 540 tablet, Refills: 3    Comments: .      EScitalopram oxalate (LEXAPRO) 20 MG tablet Take 1 tablet (20 mg total) by mouth once daily.  Qty: 90 tablet, Refills: 3      lancets 30 gauge Misc Use to check blood glucose 3 times daily.  Qty: 200 each, Refills: 11      losartan (COZAAR) 50 MG tablet Take 1 tablet (50 mg total) by mouth once daily.  Qty: 90 tablet, Refills: 3    Comments: .      metFORMIN (GLUCOPHAGE-XR) 500 MG ER 24hr tablet Take 500 mg by mouth 2 (two) times daily.      pen needle, diabetic (BD ULTRA-FINE MARIELY PEN NEEDLE) 32 gauge x 5/32" Ndle Use to inject insulin into the skin three times daily .  Qty: 200 each, Refills: 11      topiramate (TOPAMAX) 50 MG tablet Take 1 tablet (50 mg total) by mouth 2 (two) times daily.  Qty: 180 tablet, Refills: 3    Associated Diagnoses: Localization-related (focal) (partial) symptomatic epilepsy and epileptic syndromes with complex partial seizures, not intractable, without status epilepticus           STOP taking these medications       divalproex ER (DEPAKOTE ER) 250 MG 24 hr tablet Comments:   Reason for Stopping:         insulin aspart U-100 (NOVOLOG) 100 unit/mL injection Comments:   Reason for Stopping:         insulin detemir U-100, Levemir, (LEVEMIR FLEXTOUCH U100 INSULIN) 100 unit/mL (3 mL) InPn pen Comments:   Reason for Stopping:         methocarbamoL (ROBAXIN) 500 MG Tab Comments:   Reason for Stopping:                 I have seen and examined this patient within the last 30 days. My clinical findings that support the need for the home health skilled services and home bound status are the following:no   Patient with medication mismanagement issues requiring home bound status as evidenced by  Poor adherence to medication regimen/dosage. "     Diet:   diabetic diet 2000 calorie    Labs:  Ammonia, cmp, and cbc weekly for 3 weeks; send result to PCP    Referrals/ Consults  Physical Therapy to evaluate and treat. Evaluate for home safety and equipment needs; Establish/upgrade home exercise program. Perform / instruct on therapeutic exercises, gait training, transfer training, and Range of Motion.  Occupational Therapy to evaluate and treat. Evaluate home environment for safety and equipment needs. Perform/Instruct on transfers, ADL training, ROM, and therapeutic exercises.   to evaluate for community resources/long-range planning.  Aide to provide assistance with personal care, ADLs, and vital signs.    Activities:   Do not drive nor operate any heavy machinery. Do not be present around hot items.     Nursing:   Agency to admit patient within 24 hours of hospital discharge unless specified on physician order or at patient request    SN to complete comprehensive assessment including routine vital signs. Instruct on disease process and s/s of complications to report to MD. Review/verify medication list sent home with the patient at time of discharge  and instruct patient/caregiver as needed. Frequency may be adjusted depending on start of care date.     Skilled nurse to perform up to 3 visits PRN for symptoms related to diagnosis    Notify MD if SBP > 160 or < 90; DBP > 90 or < 50; HR > 120 or < 50; Temp > 101; O2 < 88%;      Ok to schedule additional visits based on staff availability and patient request on consecutive days within the home health episode.    When multiple disciplines ordered:    Start of Care occurs on Sunday - Wednesday schedule remaining discipline evaluations as ordered on separate consecutive days following the start of care.    Thursday SOC -schedule subsequent evaluations Friday and Monday the following week.     Friday - Saturday SOC - schedule subsequent discipline evaluations on consecutive days starting Monday of  the following week.    For all post-discharge communication and subsequent orders please contact patient's primary care physician.      Miscellaneous   Diabetic Care:   SN to perform and educate Diabetic management with blood glucose monitoring:, Fingerstick blood sugar AC and HS, and Report CBG < 60 or > 350 to physician.    Home Health Aide:  Nursing Twice weekly, Physical Therapy Twice weekly, Occupational Therapy Twice weekly,  and Medical Social Work Three times weekly         I certify that this patient is confined to her home and needs intermittent skilled nursing care, physical therapy, and occupational therapy.

## 2024-06-20 NOTE — CARE UPDATE
I have reviewed the chart of Gina Jenkins who is hospitalized for the following:    Active Hospital Problems    Diagnosis    *Encephalopathy, metabolic    Psychiatric disorder    Acute cystitis without hematuria    COOPER (acute kidney injury)    Seizure disorder    Type 2 diabetes mellitus, without long-term current use of insulin    Mixed hyperlipidemia    H/O: CVA (cerebrovascular accident)    Reduced mobility    Nicotine dependence    Chronic diastolic heart failure    Hypokalemia    Essential hypertension        Vaishali Ng NP  Unit Based [facility]

## 2024-06-20 NOTE — DISCHARGE INSTRUCTIONS
Do not drive nor operate any heavy machinery until cleared by neurology.    Make an appointment with neurology and psychiatry.    Have your primary care provider recheck your ammonia, kidney function, and liver function.     Call 911 and/or [phone] and return to the nearest ED if she begins feeling suicidal, homicidal, or gravely disabled (for s/p discharge from this facility).       
stretcher

## 2024-06-20 NOTE — PT/OT/SLP PROGRESS
Physical Therapy Treatment    Patient Name:  Gina Jenkins   MRN:  [id number]    Recent Surgery: * No surgery found *      Recommendations:     Discharge Recommendations:   Low Intensity Therapy  Discharge Equipment Recommendations: none   Barriers to discharge: None    Highest Level of Mobility: Gait 50' x2  Assistance Required: CGA w/ RW    Assessment:     Gina Jenkins is a 49 y.o. female admitted with a medical diagnosis of Encephalopathy, metabolic.    Pt met with HOB elevated and agreeable to PT treatment. Today's PT treatment focus was on gait training to improve independence with functional activity. Pt tolerated therapy well on this date where she ambulated ~50' x2 trials requiring CGA w/ a RW. She demonstrates improving tolerance to mobility today, but still required cueing for safe obstacle navigation as the patient is highly distractible at this time. She continues to remain motivated to participate in mobility and reports that her walking is getting better. She remains a good candidate for low intensity therapy services once medically stable for discharge.    Pt is progressing towards acute PT goals appropriately and continues to benefit from acute PT sessions.     Rehab Prognosis: Good; patient would benefit from acute skilled PT services to address these deficits and reach maximum level of function.      Plan:     During this hospitalization, patient to be seen 4 x/week to address the identified rehab impairments via gait training, therapeutic activities, therapeutic exercises, neuromuscular re-education and progress toward the following goals:    Plan of Care Expires:  07/18/24    This plan of care has been discussed with the patient/caregiver, who was included in its development and is in agreement with the identified goals and treatment plan.     Subjective     Communicated with RN prior to session.  Patient agreeable to participate.     Pain/Comfort:  Pain Rating 1: 0/10  Pain Rating  "Post-Intervention 1: 0/10    Chief Complaint: Encephalopathy  Patient/Family Comments/goals: "I always have a good time with y'all."      Objective:     Patient found HOB elevated with telemetry, PureWick  upon PT entry to room.    General Precautions: Standard, fall   Orthopedic Precautions:N/A   Braces: N/A         Exams:    Cognition:  Patient is oriented to Person, Place  Follows one-step commands but is highly distractible  Insight to deficits/safety awareness: Impaired    Functional Mobility:    Bed Mobility:  Supine to Sit: Stand-by Assistance on L side of bed  Scooting anteriorly to EOB to plant feet on floor: Stand-by Assistance    Transfers:   Sit to Stand Transfer: Contact Guard Assistance  from EOB with a RW             Gait:  Patient received gait training 50 feet x2 trials with Contact Guard Assistance and rolling walker  Gait Assessment: occasional unsteady gait, narrow base of support, and flexed posture  Gait Pattern Observed: Step-to RW  Comments: All lines remained intact throughout ambulation trial, gait belt utilized, chair follow for patient safety  Pt requires increased cueing for safe obstacle navigation as she is highly distractible at this time.    Balance:  Static Sit:   Stand-By Assist at EOB  Dynamic sit:  Stand-By Assist   Static Stand:   Contact-Guard Assist with Rolling walker  Dynamic Stand:  Contact-Guard Assist with Rolling walker      Therapeutic Activities/Exercises     Patient assisted with functional mobility as noted above  Patient educated on the importance of early mobility to prevent functional decline during hospital stay  Patient was instructed to utilize staff assistance for mobility/transfers.  Patient is appropriate to transfer with CGA and RN/PCT assist  Patient educated on PT POC and role of PT in acute care  White board updated regarding patient's safest level of mobility with staff assistance, RN also updated.     AM-PAC 6 CLICK MOBILITY  Turning over in bed " (including adjusting bedclothes, sheets and blankets)?: 4  Sitting down on and standing up from a chair with arms (e.g., wheelchair, bedside commode, etc.): 3  Moving from lying on back to sitting on the side of the bed?: 3  Moving to and from a bed to a chair (including a wheelchair)?: 3  Need to walk in hospital room?: 3  Climbing 3-5 steps with a railing?: 2  Basic Mobility Total Score: 18     Patient left up in chair with all lines intact, call button in reach, chair alarm on, and rn notified.        History/Goals:     PAST MEDICAL HISTORY:  Past Medical History:   Diagnosis Date    Brain aneurysm     CHF (congestive heart failure)     H/O coronary angioplasty     Hypercholesteremia     Hypertension     Malignant hypertension     Migraine headache     Stroke 10/2017       Past Surgical History:   Procedure Laterality Date    CARDIAC CATHETERIZATION      CEREBRAL ANGIOGRAM      CLIP LIGATION OF INTRACRANIAL ANEURYSM BY CRANIOTOMY N/A 7/15/2022    Procedure: CRANIOTOMY, WITH ANEURYSM CLIPPING;  Surgeon: Timothy Diaz MD;  Location: [facility] OR [address] [place] [address];  Service: Neurosurgery;  Laterality: N/A;  PTERIONAL CRANIOTOMY WITH CLIP LIGATION OF L PCOMM, L ANTERIOR CHOROIDAL, L MCA  ANEURYSM, ANESTHESIA: GENERAL, BLOOD: TYPE&CROSS 2 UNITS, NEUROMONITORING: SEP, MEP, EEG, RADIOLOGY: C-ARM, POSITION: SUPINE, ANNA CO-SURGERON: DR. LEXI DOMÍNGUEZ.    WOUND DEBRIDEMENT  2022    Procedure: DEBRIDEMENT, WOUND;  Surgeon: Timothy Diaz MD;  Location: [facility] OR [address] [place] [address];  Service: Neurosurgery;;       GOALS:   Multidisciplinary Problems       Physical Therapy Goals          Problem: Physical Therapy    Goal Priority Disciplines Outcome Goal Variances Interventions   Physical Therapy Goal     PT, PT/OT Progressing     Description: Goals to be met by: 24     Patient will increase functional independence with mobility by performin. Supine to sit with Stand-by Assistance  2. Sit to supine with Stand-by Assistance  3.  Rolling to Left and Right with Modified [place].  4. Sit to stand transfer with Modified [place]  5. Bed to chair transfer with Supervision using LRAD  6. Gait  x 100 feet with Supervision using LRAD.   7. Ascend/descend 3 stairs with left Handrails Supervision using LRAD.   8. Lower extremity exercise program x10 reps per handout, with independence                         Time Tracking:     PT Received On: 06/20/24  PT Start Time: 0756     PT Stop Time: 0819  PT Total Time (min): 23 min     Billable Minutes: Gait Training 23      Mario Gupta, [facility]  06/20/2024  Pager# [phone]

## 2024-06-21 NOTE — DISCHARGE SUMMARY
Misael Schmitt - Neurosurgery ([facility])  [facility] Medicine  Discharge Summary      Patient Name: Gina Jenkins  MRN: [id number]  ABBY: [id number]  Patient Class: IP- Inpatient  Admission Date: 6/17/2024  Hospital Length of Stay: 3 days  Discharge Date and Time: 6/20/2024  5:38 PM  Attending Physician: Vonda att. providers found   Discharging Provider: Nasim Crisostomo MD  Primary Care Provider: Clair Johnson NP  Hospital Medicine Team: [facility] MED N Nasim Crisostomo MD  Primary Care Team: [facility] MED N    HPI:   50 y/o F w/ PMH of multiple brain aneurysms, coronary angioplasty, HFpEF (EF 55% 6/2024), HTN, HLD, migraine headaches, seizures and CVA. She initially presented to [facility] on 6/15 after being found down and unresponsive by her family. In the ED, she was oriented to self and able to follow commands, but then had a witnessed seizure requiring administration of ativan 1mg. 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 Home AEDs include Keppra 750mg BID and Depakote 750mg BID, although compliance is questionable. She was admitted to Hospital Medicine for acute encephalopathy workup and subsequently stepped up to ICU for concern that she had not returned to her baseline following the seizure activity. EEG cap placed at that time and there have been no apparent epileptiform discharges or electrographic seizures seen. Neurology consulted for AED management, she was currently being maintained on Keppra 1g BID. 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 On 6/17, patient had multiple episodes of agitation, was attempting to get out of bed, and became combative requiring multiple doses of IV versed and four-point restraints. Upon Neurology's evaluation later that day, she was unable to participate in their exam, follow commands and answer any questions. Recommendation made to transfer  to [facility] for higher level of care and continuous EEG monitoring. She will be admitted to [facility] for further management.         * No surgery found *      Hospital Course:   No seizures on EEG. Patient was 6/19 Transfer to hospital medicine . Case was d/w neurology, per their rec discontinue depakote indefinitely given hyperammonemia. Patient was maintained on keppra, suspicion for noncompliance, extensively counseled with niece. Abx switched to macrobid given resistance to rocephin.     Patient has met maximal benefit from hospitalization and is clinically stable for discharge. To f/u with neurology. Family and patient advised to have labs including ammonia level repeated.    No CP, no SOB, no HA. Oriented x 3 with insight intact towards hospitalization.     Clear lungs BL, unlabored breathing, on RA, no cyanosis  Hrt sounds RRR  AA, NAD  EOMI, pupils equal BL  5/5 prox UE and LE strength BL  No LE edema     Goals of Care Treatment Preferences:  Code Status: Full Code      Consults:   Consults (From admission, onward)          Status Ordering Provider     IP consult to case management/social work  Once        Provider:  (Not yet assigned)    Completed SINTIA TOM            No new Assessment & Plan notes have been filed under this hospital service since the last note was generated.  Service: Hospital Medicine    Final Active Diagnoses:    Diagnosis Date Noted POA    PRINCIPAL PROBLEM:  Encephalopathy, metabolic [G93.41] 06/15/2024 Yes    Psychiatric disorder [F99] 06/19/2024 Yes    Acute cystitis without hematuria [N30.00] 06/18/2024 Yes    COOPER (acute kidney injury) [N17.9] 06/15/2024 Yes    Seizure disorder [G40.909] 08/30/2022 Yes    Type 2 diabetes mellitus, without long-term current use of insulin [E11.9] 08/23/2022 Yes    Mixed hyperlipidemia [E78.2] 06/07/2022 Yes    H/O: CVA (cerebrovascular accident) [Z86.73]  Not Applicable    Reduced mobility [Z74.09] 11/08/2017 Yes    Nicotine dependence [F17.200]  "10/04/2017 Yes    Chronic diastolic heart failure [I50.32] 10/03/2017 Yes     Chronic    Hypokalemia [E87.6] 10/03/2017 Yes    Essential hypertension [I10] 03/05/2016 Yes      Problems Resolved During this Admission:       Discharged Condition: good    Disposition: Home or Self Care    Follow Up:   Follow-up Information       Clair Johnson, NP. Schedule an appointment as soon as possible for a visit in 1 week(s).    Specialty: Family Medicine  Contact information:  [address]  [address]  [facility]  [place] [address]  [phone]                           Patient Instructions:      WALKER FOR HOME USE     Order Specific Question Answer Comments   Type of Walker: Anurag (4'4"-5'6")    With wheels? Yes    Height: 5' 2" (1.575 m)    Weight: 77 kg (169 lb 12.1 oz)    Length of need (1-99 months): 3    Does patient have medical equipment at home? walker, rolling    Does patient have medical equipment at home? cane, straight    Please check all that apply: Patient's condition impairs ambulation.      Ambulatory referral/consult to Neurology Epilepsy   Standing Status: Future   Referral Priority: Routine Referral Type: Consultation   Referral Reason: Specialty Services Required   Requested Specialty: Neurology   Number of Visits Requested: 1          Pending Diagnostic Studies:       Procedure Component Value Units Date/Time    Hemoglobin A1c [[id number]] Collected: 06/18/24 0348    Order Status: Sent Lab Status: No result     Specimen: Blood            Medications:  Reconciled Home Medications:      Medication List        START taking these medications      nitrofurantoin (macrocrystal-monohydrate) 100 MG capsule  Commonly known as: MACROBID  Take 1 capsule (100 mg total) by mouth 2 (two) times daily. for 6 days            CHANGE how you take these medications      levETIRAcetam 750 MG Tab  Commonly known as: KEPPRA  Take 1 tablet (750 mg total) by mouth 2 (two) times daily.  What changed: how much to " "take            CONTINUE taking these medications      amLODIPine 10 MG tablet  Commonly known as: NORVASC  Take 10 mg by mouth once daily.     atorvastatin 80 MG tablet  Commonly known as: LIPITOR  Take 1 tablet (80 mg total) by mouth once daily.     BD ULTRA-FINE MARIELY PEN NEEDLE 32 gauge x 5/32" Ndle  Generic drug: pen needle, diabetic  Use to inject insulin into the skin three times daily .     carvediloL 12.5 MG tablet  Commonly known as: COREG  Take 3 tablets (37.5 mg total) by mouth 2 (two) times daily.     EScitalopram oxalate 20 MG tablet  Commonly known as: LEXAPRO  Take 1 tablet (20 mg total) by mouth once daily.     losartan 50 MG tablet  Commonly known as: COZAAR  Take 1 tablet (50 mg total) by mouth once daily.     metFORMIN 500 MG ER 24hr tablet  Commonly known as: GLUCOPHAGE-XR  Take 500 mg by mouth 2 (two) times daily.     topiramate 50 MG tablet  Commonly known as: TOPAMAX  Take 1 tablet (50 mg total) by mouth 2 (two) times daily.     TRUE METRIX GLUCOSE METER Misc  Generic drug: blood-glucose meter  Use to check blood glucose 3 times daily.     TRUE METRIX GLUCOSE TEST STRIP Strp  Generic drug: blood sugar diagnostic  Use to check blood glucose 3 times daily.     TRUEPLUS LANCETS 30 gauge Misc  Generic drug: lancets  Use to check blood glucose 3 times daily.            STOP taking these medications      divalproex  MG 24 hr tablet  Commonly known as: DEPAKOTE ER     insulin aspart U-100 100 unit/mL injection  Commonly known as: NovoLOG     LEVEMIR FLEXTOUCH U100 INSULIN 100 unit/mL (3 mL) Inpn pen  Generic drug: insulin detemir U-100 (Levemir)     methocarbamoL 500 MG Tab  Commonly known as: ROBAXIN              Indwelling Lines/Drains at time of discharge:   Lines/Drains/Airways       Drain  Duration             Female External Urinary Catheter w/ Suction 06/15/24 2045 5 days                    Time spent on the discharge of patient: 35 minutes         Nasim Crisostomo MD  Department of " [facility] Medicine  Misael Schmitt - Neurosurgery ([facility])

## 2024-06-25 ENCOUNTER — PATIENT MESSAGE (OUTPATIENT)
Dept: NEUROLOGY | Facility: CLINIC | Age: 49
End: 2024-06-25
Payer: MEDICAID

## 2024-06-25 ENCOUNTER — TELEPHONE (OUTPATIENT)
Dept: NEUROLOGY | Facility: CLINIC | Age: 49
End: 2024-06-25
Payer: MEDICAID

## 2024-06-27 ENCOUNTER — PATIENT MESSAGE (OUTPATIENT)
Dept: PSYCHIATRY | Facility: CLINIC | Age: 49
End: 2024-06-27
Payer: MEDICAID

## 2024-06-30 ENCOUNTER — HOSPITAL ENCOUNTER (EMERGENCY)
Facility: HOSPITAL | Age: 49
Discharge: ANOTHER HOSPITAL | End: 2024-06-30
Attending: EMERGENCY MEDICINE
Payer: MEDICAID

## 2024-06-30 ENCOUNTER — HOSPITAL ENCOUNTER (INPATIENT)
Facility: HOSPITAL | Age: 49
LOS: 2 days | Discharge: HOME | DRG: 101 | End: 2024-07-02
Attending: STUDENT IN AN ORGANIZED HEALTH CARE EDUCATION/TRAINING PROGRAM | Admitting: STUDENT IN AN ORGANIZED HEALTH CARE EDUCATION/TRAINING PROGRAM
Payer: MEDICAID

## 2024-06-30 VITALS
OXYGEN SATURATION: 100 % | DIASTOLIC BLOOD PRESSURE: 91 MMHG | HEART RATE: 68 BPM | RESPIRATION RATE: 18 BRPM | WEIGHT: 159 LBS | TEMPERATURE: 99 F | SYSTOLIC BLOOD PRESSURE: 160 MMHG | HEIGHT: 62 IN | BODY MASS INDEX: 29.26 KG/M2

## 2024-06-30 DIAGNOSIS — R56.9 FOCAL SEIZURE: ICD-10-CM

## 2024-06-30 DIAGNOSIS — R56.9 SEIZURE-LIKE ACTIVITY: Primary | ICD-10-CM

## 2024-06-30 DIAGNOSIS — F44.7 FUNCTIONAL NEUROLOGICAL SYMPTOM DISORDER WITH MIXED SYMPTOMS: ICD-10-CM

## 2024-06-30 DIAGNOSIS — G40.909 SEIZURE DISORDER: Primary | ICD-10-CM

## 2024-06-30 DIAGNOSIS — G93.40 ACUTE ENCEPHALOPATHY: ICD-10-CM

## 2024-06-30 DIAGNOSIS — R07.9 CHEST PAIN: ICD-10-CM

## 2024-06-30 DIAGNOSIS — E83.42 HYPOMAGNESEMIA: ICD-10-CM

## 2024-06-30 DIAGNOSIS — R56.9 SEIZURES: ICD-10-CM

## 2024-06-30 PROBLEM — G43.909 MIGRAINES: Status: ACTIVE | Noted: 2024-06-30

## 2024-06-30 LAB
ALBUMIN SERPL BCP-MCNC: 3.5 G/DL (ref 3.5–5.2)
ALP SERPL-CCNC: 118 U/L (ref 55–135)
ALT SERPL W/O P-5'-P-CCNC: 13 U/L (ref 10–44)
AMMONIA PLAS-SCNC: 34 UMOL/L (ref 10–50)
AMPHET+METHAMPHET UR QL: NEGATIVE
ANION GAP SERPL CALC-SCNC: 13 MMOL/L (ref 8–16)
AST SERPL-CCNC: 16 U/L (ref 10–40)
BARBITURATES UR QL SCN>200 NG/ML: NEGATIVE
BASOPHILS # BLD AUTO: 0.06 K/UL (ref 0–0.2)
BASOPHILS NFR BLD: 0.7 % (ref 0–1.9)
BENZODIAZ UR QL SCN>200 NG/ML: ABNORMAL
BILIRUB SERPL-MCNC: 0.2 MG/DL (ref 0.1–1)
BILIRUB UR QL STRIP: NEGATIVE
BUN SERPL-MCNC: 16 MG/DL (ref 6–20)
BZE UR QL SCN: NEGATIVE
CALCIUM SERPL-MCNC: 9.1 MG/DL (ref 8.7–10.5)
CANNABINOIDS UR QL SCN: NEGATIVE
CHLORIDE SERPL-SCNC: 108 MMOL/L (ref 95–110)
CLARITY UR: CLEAR
CO2 SERPL-SCNC: 18 MMOL/L (ref 23–29)
COLOR UR: YELLOW
CREAT SERPL-MCNC: 1.2 MG/DL (ref 0.5–1.4)
CREAT UR-MCNC: 195.3 MG/DL (ref 15–325)
DIFFERENTIAL METHOD BLD: ABNORMAL
EOSINOPHIL # BLD AUTO: 0.3 K/UL (ref 0–0.5)
EOSINOPHIL NFR BLD: 3.5 % (ref 0–8)
ERYTHROCYTE [DISTWIDTH] IN BLOOD BY AUTOMATED COUNT: 12.8 % (ref 11.5–14.5)
EST. GFR  (NO RACE VARIABLE): 56 ML/MIN/1.73 M^2
ETHANOL SERPL-MCNC: <10 MG/DL
GLUCOSE SERPL-MCNC: 81 MG/DL (ref 70–110)
GLUCOSE UR QL STRIP: NEGATIVE
HCT VFR BLD AUTO: 33.3 % (ref 37–48.5)
HGB BLD-MCNC: 11.5 G/DL (ref 12–16)
HGB UR QL STRIP: NEGATIVE
IMM GRANULOCYTES # BLD AUTO: 0.02 K/UL (ref 0–0.04)
IMM GRANULOCYTES NFR BLD AUTO: 0.2 % (ref 0–0.5)
KETONES UR QL STRIP: NEGATIVE
LEUKOCYTE ESTERASE UR QL STRIP: NEGATIVE
LYMPHOCYTES # BLD AUTO: 2 K/UL (ref 1–4.8)
LYMPHOCYTES NFR BLD: 24.6 % (ref 18–48)
MAGNESIUM SERPL-MCNC: 1.4 MG/DL (ref 1.6–2.6)
MCH RBC QN AUTO: 29.9 PG (ref 27–31)
MCHC RBC AUTO-ENTMCNC: 34.5 G/DL (ref 32–36)
MCV RBC AUTO: 87 FL (ref 82–98)
METHADONE UR QL SCN>300 NG/ML: NEGATIVE
MONOCYTES # BLD AUTO: 0.6 K/UL (ref 0.3–1)
MONOCYTES NFR BLD: 7.1 % (ref 4–15)
NEUTROPHILS # BLD AUTO: 5.2 K/UL (ref 1.8–7.7)
NEUTROPHILS NFR BLD: 63.9 % (ref 38–73)
NITRITE UR QL STRIP: NEGATIVE
NRBC BLD-RTO: 0 /100 WBC
OPIATES UR QL SCN: NEGATIVE
PCP UR QL SCN>25 NG/ML: NEGATIVE
PH UR STRIP: 6 [PH] (ref 5–8)
PLATELET # BLD AUTO: 293 K/UL (ref 150–450)
PMV BLD AUTO: 10.6 FL (ref 9.2–12.9)
POCT GLUCOSE: 112 MG/DL (ref 70–110)
POTASSIUM SERPL-SCNC: 3.7 MMOL/L (ref 3.5–5.1)
PROT SERPL-MCNC: 6.5 G/DL (ref 6–8.4)
PROT UR QL STRIP: ABNORMAL
RBC # BLD AUTO: 3.85 M/UL (ref 4–5.4)
SODIUM SERPL-SCNC: 139 MMOL/L (ref 136–145)
SP GR UR STRIP: 1.02 (ref 1–1.03)
TOXICOLOGY INFORMATION: ABNORMAL
URN SPEC COLLECT METH UR: ABNORMAL
UROBILINOGEN UR STRIP-ACNC: NEGATIVE EU/DL
WBC # BLD AUTO: 8.18 K/UL (ref 3.9–12.7)

## 2024-06-30 PROCEDURE — 80177 DRUG SCRN QUAN LEVETIRACETAM: CPT | Performed by: EMERGENCY MEDICINE

## 2024-06-30 PROCEDURE — 82962 GLUCOSE BLOOD TEST: CPT

## 2024-06-30 PROCEDURE — 82140 ASSAY OF AMMONIA: CPT | Performed by: EMERGENCY MEDICINE

## 2024-06-30 PROCEDURE — 63600175 PHARM REV CODE 636 W HCPCS

## 2024-06-30 PROCEDURE — 63600175 PHARM REV CODE 636 W HCPCS: Performed by: EMERGENCY MEDICINE

## 2024-06-30 PROCEDURE — 96375 TX/PRO/DX INJ NEW DRUG ADDON: CPT

## 2024-06-30 PROCEDURE — 25000003 PHARM REV CODE 250

## 2024-06-30 PROCEDURE — 80307 DRUG TEST PRSMV CHEM ANLYZR: CPT

## 2024-06-30 PROCEDURE — 81003 URINALYSIS AUTO W/O SCOPE: CPT | Mod: 59

## 2024-06-30 PROCEDURE — 95714 VEEG EA 12-26 HR UNMNTR: CPT

## 2024-06-30 PROCEDURE — 83735 ASSAY OF MAGNESIUM: CPT

## 2024-06-30 PROCEDURE — 80185 ASSAY OF PHENYTOIN TOTAL: CPT

## 2024-06-30 PROCEDURE — 82077 ASSAY SPEC XCP UR&BREATH IA: CPT

## 2024-06-30 PROCEDURE — 96366 THER/PROPH/DIAG IV INF ADDON: CPT

## 2024-06-30 PROCEDURE — 95700 EEG CONT REC W/VID EEG TECH: CPT

## 2024-06-30 PROCEDURE — 80186 ASSAY OF PHENYTOIN FREE: CPT

## 2024-06-30 PROCEDURE — 11000001 HC ACUTE MED/SURG PRIVATE ROOM

## 2024-06-30 PROCEDURE — 80053 COMPREHEN METABOLIC PANEL: CPT

## 2024-06-30 PROCEDURE — 85025 COMPLETE CBC W/AUTO DIFF WBC: CPT

## 2024-06-30 PROCEDURE — 99284 EMERGENCY DEPT VISIT MOD MDM: CPT | Mod: 25

## 2024-06-30 PROCEDURE — 96365 THER/PROPH/DIAG IV INF INIT: CPT

## 2024-06-30 RX ORDER — LOSARTAN POTASSIUM 50 MG/1
50 TABLET ORAL DAILY
Status: DISCONTINUED | OUTPATIENT
Start: 2024-06-30 | End: 2024-07-02 | Stop reason: HOSPADM

## 2024-06-30 RX ORDER — ESCITALOPRAM OXALATE 20 MG/1
20 TABLET ORAL DAILY
Status: DISCONTINUED | OUTPATIENT
Start: 2024-07-01 | End: 2024-07-02 | Stop reason: HOSPADM

## 2024-06-30 RX ORDER — AMLODIPINE BESYLATE 10 MG/1
10 TABLET ORAL DAILY
Status: DISCONTINUED | OUTPATIENT
Start: 2024-07-01 | End: 2024-07-02 | Stop reason: HOSPADM

## 2024-06-30 RX ORDER — IBUPROFEN 200 MG
16 TABLET ORAL
Status: DISCONTINUED | OUTPATIENT
Start: 2024-06-30 | End: 2024-07-02 | Stop reason: HOSPADM

## 2024-06-30 RX ORDER — MAGNESIUM SULFATE HEPTAHYDRATE 40 MG/ML
2 INJECTION, SOLUTION INTRAVENOUS ONCE
Status: COMPLETED | OUTPATIENT
Start: 2024-06-30 | End: 2024-06-30

## 2024-06-30 RX ORDER — LORAZEPAM 2 MG/ML
2 INJECTION INTRAMUSCULAR
Status: DISCONTINUED | OUTPATIENT
Start: 2024-06-30 | End: 2024-06-30 | Stop reason: HOSPADM

## 2024-06-30 RX ORDER — SODIUM CHLORIDE 0.9 % (FLUSH) 0.9 %
10 SYRINGE (ML) INJECTION EVERY 12 HOURS PRN
Status: DISCONTINUED | OUTPATIENT
Start: 2024-06-30 | End: 2024-07-02 | Stop reason: HOSPADM

## 2024-06-30 RX ORDER — NAPROXEN SODIUM 220 MG/1
81 TABLET, FILM COATED ORAL DAILY
Status: DISCONTINUED | OUTPATIENT
Start: 2024-07-01 | End: 2024-07-02 | Stop reason: HOSPADM

## 2024-06-30 RX ORDER — TOPIRAMATE 25 MG/1
50 TABLET ORAL 2 TIMES DAILY
Status: DISCONTINUED | OUTPATIENT
Start: 2024-06-30 | End: 2024-07-02 | Stop reason: HOSPADM

## 2024-06-30 RX ORDER — ATORVASTATIN CALCIUM 40 MG/1
80 TABLET, FILM COATED ORAL DAILY
Status: DISCONTINUED | OUTPATIENT
Start: 2024-07-01 | End: 2024-07-02 | Stop reason: HOSPADM

## 2024-06-30 RX ORDER — ACETAMINOPHEN 325 MG/1
650 TABLET ORAL EVERY 4 HOURS PRN
Status: DISCONTINUED | OUTPATIENT
Start: 2024-06-30 | End: 2024-07-02 | Stop reason: HOSPADM

## 2024-06-30 RX ORDER — CARVEDILOL 25 MG/1
25 TABLET ORAL 2 TIMES DAILY WITH MEALS
Status: DISCONTINUED | OUTPATIENT
Start: 2024-06-30 | End: 2024-07-02 | Stop reason: HOSPADM

## 2024-06-30 RX ORDER — LEVETIRACETAM 500 MG/1
1000 TABLET ORAL 2 TIMES DAILY
Status: DISCONTINUED | OUTPATIENT
Start: 2024-06-30 | End: 2024-07-02 | Stop reason: HOSPADM

## 2024-06-30 RX ORDER — LORAZEPAM 2 MG/ML
1 INJECTION INTRAMUSCULAR
Status: COMPLETED | OUTPATIENT
Start: 2024-06-30 | End: 2024-06-30

## 2024-06-30 RX ORDER — GLUCAGON 1 MG
1 KIT INJECTION
Status: DISCONTINUED | OUTPATIENT
Start: 2024-06-30 | End: 2024-07-02 | Stop reason: HOSPADM

## 2024-06-30 RX ORDER — INSULIN ASPART 100 [IU]/ML
0-5 INJECTION, SOLUTION INTRAVENOUS; SUBCUTANEOUS
Status: DISCONTINUED | OUTPATIENT
Start: 2024-06-30 | End: 2024-07-02 | Stop reason: HOSPADM

## 2024-06-30 RX ORDER — NALOXONE HCL 0.4 MG/ML
0.02 VIAL (ML) INJECTION
Status: DISCONTINUED | OUTPATIENT
Start: 2024-06-30 | End: 2024-07-02 | Stop reason: HOSPADM

## 2024-06-30 RX ORDER — ENOXAPARIN SODIUM 100 MG/ML
40 INJECTION SUBCUTANEOUS EVERY 24 HOURS
Status: DISCONTINUED | OUTPATIENT
Start: 2024-06-30 | End: 2024-07-02 | Stop reason: HOSPADM

## 2024-06-30 RX ORDER — TALC
3 POWDER (GRAM) TOPICAL NIGHTLY PRN
Status: DISCONTINUED | OUTPATIENT
Start: 2024-06-30 | End: 2024-07-02 | Stop reason: HOSPADM

## 2024-06-30 RX ORDER — LORAZEPAM 2 MG/ML
2 INJECTION INTRAMUSCULAR
Status: COMPLETED | OUTPATIENT
Start: 2024-06-30 | End: 2024-07-01

## 2024-06-30 RX ORDER — IBUPROFEN 200 MG
24 TABLET ORAL
Status: DISCONTINUED | OUTPATIENT
Start: 2024-06-30 | End: 2024-07-02 | Stop reason: HOSPADM

## 2024-06-30 RX ADMIN — TOPIRAMATE 50 MG: 25 TABLET, FILM COATED ORAL at 10:06

## 2024-06-30 RX ADMIN — ENOXAPARIN SODIUM 40 MG: 40 INJECTION SUBCUTANEOUS at 10:06

## 2024-06-30 RX ADMIN — LOSARTAN POTASSIUM 50 MG: 50 TABLET, FILM COATED ORAL at 10:06

## 2024-06-30 RX ADMIN — MAGNESIUM SULFATE HEPTAHYDRATE 2 G: 40 INJECTION, SOLUTION INTRAVENOUS at 04:06

## 2024-06-30 RX ADMIN — LORAZEPAM 1 MG: 2 INJECTION INTRAMUSCULAR; INTRAVENOUS at 05:06

## 2024-06-30 RX ADMIN — CARVEDILOL 25 MG: 25 TABLET, FILM COATED ORAL at 10:06

## 2024-06-30 RX ADMIN — LEVETIRACETAM 1000 MG: 500 TABLET, FILM COATED ORAL at 10:06

## 2024-06-30 NOTE — DISCHARGE INSTRUCTIONS
Thank you for letting me care for you today! It was nice meeting you, and I hope you feel better soon.   If you would like access to your chart and what was done today please utilize the [facility].   Please don't hesitate to return if your symptoms worsen or you develop any other worrisome symptoms.    Our goal in the emergency department is to always give you outstanding care and exceptional service. You may receive a survey by mail or e-mail in the next week regarding your experience in our ED. We would greatly appreciate you completing and returning the survey. Your feedback provides us with a way to recognize our staff who give very good care and it helps us learn how to improve when your experience was below our aspiration of excellence.     Sincerely,    Jazz Sanchez PA-C  Emergency Department Physician Assistant  [facility], [facility], and [facility]

## 2024-06-30 NOTE — ED NOTES
Introduced self to pt, report received from charge nurse toño, pt placed in gown, given warm blankets x2, seizure padding/precautions enacted, pt placed on continuous bp, pulse ox, and cardiac monitoring,

## 2024-06-30 NOTE — PROVIDER TRANSFER
Outside Transfer Acceptance Note / Regional Referral Center    Referring facility: [facility] ED  [facility] ED  [facility]   Referring provider: CLAIRE CUBA, LOUISE MARCOS, RAGHU FLANAGAN  Accepting facility: [facility]  Accepting provider: Young Milan MD  Admitting provider: Young Milan MD  Reason for transfer:  HLOC  Transfer diagnosis: Breakthrough seizures  Transfer specialty requested: Neurology  Transfer specialty notified: No  Transfer level: NUMBER 1-5: 2  Bed type requested: NPU  Isolation status: No active isolations   Admission class or status: IP- Inpatient  IP- Inpatient      Narrative     Gina Jenkins is a 48yo F with a PMH of seizure disorder, HFpEF, CAD, HTN, HLD, DM2, hx of CVA, and MDD who presented to the [facility] ED on 6/30/2024 after a witnessed seizure at home. Pt reportedly compliant with home AED regimen, and family called 911 after seizure occurred. Per EMS, pt had another seizure en route to the ED, which abated with IV versed 5mg. Upon arrival to the ER, pt was alert and oriented, though reportedly drowsy.    In the ED, vitals significant for /90. Labs showed Mg 1.4 and UDS negative (+benzo iatrogenic), and were otherwise grossly unremarkable. CTH in progress at time of transfer request. ED gave ativan x1. [facility] does not currently have neurology services.    [facility] contacted to facilitate transfer for higher level of care, and Dr. Gaytan with [facility] felt that pt would be stable for a floor bed. Pt deemed appropriate for transfer to  at [facility] for further care and management.      Objective     Vitals: Temp: 98.8 °F (37.1 °C) (06/30/24 1346)  Pulse: 68 (06/30/24 1738)  Resp: 18 (06/30/24 1738)  BP: (!) 173/92 (06/30/24 1738)  SpO2: 97 % (06/30/24 1738)  Recent Labs: All pertinent labs within the past 24 hours have been reviewed.         IV  access:        Peripheral IV - Single Lumen 06/30/24 1349 20 G Posterior;Right Hand (Active)       Allergies:   Review of patient's allergies indicates:   Allergen Reactions    Lisinopril Swelling    Bactrim [sulfamethoxazole-trimethoprim] Rash    Ceftazidime Hives     Rash while on IV ceftazidime for planned 6 weeks.  See ED notes 9/12, 9/14 and ID clinic notes 9/16 and 9/28      NPO: No    Anticoagulation:   Anticoagulants       None             Instructions      Misael Schmitt-  Admit to Hospital Medicine  Upon patient arrival to floor, please send SecureChat to [facility] or call extension [phone] (if no answer, do NOT leave a callback number after the beep, rather please send a SecureChat to [facility]), for Hospital Medicine admit team assignment and for additional admit orders for the patient.  Do not page the attending physician associated with the patient on arrival (this physician may not be on duty at the time of arrival).  Rather, always send a SecureChat to [facility] or call [phone] to reach the triage physician for orders and team assignment.      Young Milan MD  Attending Physician  Medical Director - [facility] Observation Unit  Department of Hospital Medicine  6/30/2024

## 2024-06-30 NOTE — ED PROVIDER NOTES
Encounter Date: 6/30/2024       History     Chief Complaint   Patient presents with    Seizures     + h/o seizures with witnessed seizure at home. No recent change in medication and patient states she is compliant. EMS reports witnessed seizure enroute - Versed 5 mg IVP given. On arrival, awake, oriented, drowsy. C/o headache. No visible trauma.      49 year old female with past medical history of stroke, hypertension, hypokalemia, CHF, migraine headaches, seizure disorder (on Keppra) presents to the ED via EMS for further evaluation of seizure episodes today.Niece at bedside states pt had 3 episodes of seizure like activity today around 11 am. States episodes lasted less than 5 minutes in between, notes stiffness to the left side of her body associated with urinary incontinence/ loss of bladder control. Episode occurred on her bed, denies head trauma. Patient was given Versed 5 mg by EMS. On arrival, pt was was conscious and reported no injures. Per family At bedside states patient has been compliant with her Keppra at home.  She was discontinue from Depakote per neurology d/t elevated  ammonia levels  during last hospital admission on 06/20 for acute encephalopathy. Patient was on EEG monitoring with no evidence of seizures at the time. Patient has been able to follow commands and denies any neuro deficits.   No fever, chills, nausea, vomiting, chest pain, shortness of breath, abdominal pain.  No other acute complaints today.          The history is provided by the patient.     Review of patient's allergies indicates:   Allergen Reactions    Lisinopril Swelling    Bactrim [sulfamethoxazole-trimethoprim] Rash    Ceftazidime Hives     Rash while on IV ceftazidime for planned 6 weeks.  See ED notes 9/12, 9/14 and ID clinic notes 9/16 and 9/28     Past Medical History:   Diagnosis Date    Brain aneurysm     CHF (congestive heart failure)     H/O coronary angioplasty     Hypercholesteremia     Hypertension      Malignant hypertension     Migraine headache     Stroke 10/2017     Past Surgical History:   Procedure Laterality Date    CARDIAC CATHETERIZATION      CEREBRAL ANGIOGRAM      CLIP LIGATION OF INTRACRANIAL ANEURYSM BY CRANIOTOMY N/A 7/15/2022    Procedure: CRANIOTOMY, WITH ANEURYSM CLIPPING;  Surgeon: Timothy Diaz MD;  Location: [facility] OR [address] [place] [address];  Service: Neurosurgery;  Laterality: N/A;  PTERIONAL CRANIOTOMY WITH CLIP LIGATION OF L PCOMM, L ANTERIOR CHOROIDAL, L MCA  ANEURYSM, ANESTHESIA: GENERAL, BLOOD: TYPE&CROSS 2 UNITS, NEUROMONITORING: SEP, MEP, EEG, RADIOLOGY: C-ARM, POSITION: SUPINE, TONEY CASTILLO-SURGERON: DR. LEXI DOMÍNUGEZ.    WOUND DEBRIDEMENT  8/27/2022    Procedure: DEBRIDEMENT, WOUND;  Surgeon: Timothy Diaz MD;  Location: [facility] OR [address] [place] [address];  Service: Neurosurgery;;     No family history on file.  Social History     Tobacco Use    Smoking status: Every Day     Current packs/day: 0.33     Average packs/day: 0.3 packs/day for 31.5 years (10.4 ttl pk-yrs)     Types: Cigarettes     Start date: 1993    Smokeless tobacco: Never    Tobacco comments:     Enrolled in the [employer] on 6/7/22 ([facility] Member ID # [id number]). Pt is a 0.33 pk/day cigarette smoker x 31 yrs. She states ready to quit smoking. Ambulatory referral to Smoking Cessation clinic following hospital discharge.    Substance Use Topics    Alcohol use: Yes     Comment: occassionally    Drug use: No     Review of Systems   Constitutional:  Negative for chills and fever.   Respiratory:  Negative for shortness of breath.    Cardiovascular:  Negative for chest pain.   Gastrointestinal:  Negative for abdominal distention, abdominal pain, nausea and vomiting.   Musculoskeletal:  Negative for neck pain and neck stiffness.   Neurological:  Positive for seizures. Negative for headaches.       Physical Exam     Initial Vitals [06/30/24 1346]   BP Pulse Resp Temp SpO2   (!) 158/95 78 16 98.8 °F (37.1 °C) 97 %      MAP       --         Physical  Exam    Vitals reviewed.  Constitutional: She appears well-developed and well-nourished. She is not diaphoretic. No distress.   HENT:   Head: Normocephalic and atraumatic.   Right Ear: External ear normal.   Left Ear: External ear normal.   Mouth/Throat: Oropharynx is clear and moist.   Eyes: EOM are normal. Pupils are equal, round, and reactive to light.   Neck: Neck supple.   Normal range of motion.  Cardiovascular:  Normal rate and normal heart sounds.           Pulmonary/Chest: Breath sounds normal. No respiratory distress. She has no wheezes.   Abdominal: Abdomen is soft. Bowel sounds are normal. She exhibits no distension. There is no abdominal tenderness.   Musculoskeletal:         General: Normal range of motion.      Cervical back: Normal range of motion and neck supple.     Neurological: She is alert and oriented to person, place, and time. GCS score is 15. GCS eye subscore is 4. GCS verbal subscore is 5. GCS motor subscore is 6.   Moves all extremities and carries on conversation. CN- II: PERRL; III/IV/VI: EOMI w/out evidence of nystagmus; V: no deficits appreciated to light touch bilateral face; VII: no facial weakness, no facial asymmetry. Eyebrow raise symmetric. Smile symmetric; IX/X: palate midline, and raises symmetrically; XI: shoulder shrug 5/5 bilaterally; XII: tongue is midline w/out asymmetry. Strength 5/5 to bilateral upper and lower extremities, sensation intact to light touch,         Skin: Skin is warm. Capillary refill takes less than 2 seconds.   Psychiatric: She has a normal mood and affect. Her behavior is normal. Judgment normal.         ED Course   Procedures  Labs Reviewed   DRUG SCREEN PANEL, URINE EMERGENCY - Abnormal; Notable for the following components:       Result Value    Benzodiazepines Presumptive Positive (*)     All other components within normal limits    Narrative:     Specimen Source->Urine   CBC W/ AUTO DIFFERENTIAL - Abnormal; Notable for the following components:     RBC 3.85 (*)     Hemoglobin 11.5 (*)     Hematocrit 33.3 (*)     All other components within normal limits   COMPREHENSIVE METABOLIC PANEL - Abnormal; Notable for the following components:    CO2 18 (*)     eGFR 56 (*)     All other components within normal limits   MAGNESIUM - Abnormal; Notable for the following components:    Magnesium 1.4 (*)     All other components within normal limits   URINALYSIS, REFLEX TO URINE CULTURE - Abnormal; Notable for the following components:    Protein, UA Trace (*)     All other components within normal limits    Narrative:     Specimen Source->Urine   ALCOHOL,MEDICAL (ETHANOL)   AMMONIA   PHENYTOIN LEVEL, TOTAL AND FREE   LEVETIRACETAM  (KEPPRA) LEVEL          Imaging Results              CT Head Without Contrast (Final result)  Result time 06/30/24 19:01:16      Final result by Patrick Acosta DO (06/30/24 19:01:16)                   Impression:      No acute intracranial abnormality.      Electronically signed by: Patrick Acosta  Date:    06/30/2024  Time:    19:01               Narrative:    EXAMINATION:  CT HEAD WITHOUT CONTRAST    CLINICAL HISTORY:  Mental status change, unknown cause;    TECHNIQUE:  Low dose axial CT images obtained throughout the head without intravenous contrast. Sagittal and coronal reconstructions were performed.    COMPARISON:  CTA head and neck from 06/15/2024.    FINDINGS:  Ventricles and sulci are normal in size for age without evidence of hydrocephalus. No extra-axial blood or fluid collections.  Stable mild encephalomalacia in the left anterior temporal lobe.  Otherwise the brain parenchyma is unremarkable.  No parenchymal mass, hemorrhage, edema or major vascular distribution infarct.  Incidentally noted empty sella configuration.  There is an aneurysm clip.    There are postop changes of remote left frontotemporal craniotomy.  There is minimal chronic dural thickening underneath the craniotomy, stable cross multiple priors.  No calvarial  fracture.  There is some chronic scarring in the left scalp.  The scalp is otherwise unremarkable.  Minimal mucosal thickening of the right maxillary sinus.  Remaining paranasal sinuses and mastoid air cells are clear.                                       Medications   magnesium sulfate 2g in water 50mL IVPB (premix) (0 g Intravenous Stopped 6/30/24 1817)   LORazepam injection 1 mg (1 mg Intravenous Given 6/30/24 1705)     Medical Decision Making  Differential Diagnosis includes, but is not limited to:  Arrhythmia, aortic dissection, MI/unstable angina, PE, cardiogenic shock, CHF, CVA/TIA, intracranial lesion/mass, seizure, perforated viscous, ruptured AAA, orthostatic hypotension, vasovagal episode, anemia, dehydration, medication reaction, intentional overdose      ED management     49 year old female with past medical history of stroke, hypertension, hypokalemia, CHF, migraine headaches, seizure disorder (on Keppra) presents to the ED via EMS for further evaluation of seizure episodes today.  Patient is not toxic appearing, hemodynamically stable and resting comfortably on bed. Patient is well-appearing.  Awake and alert.  Afebrile with vitals WNL. No distress on exam. This patient presents possibly non-epileptic components vs seizure disorder. I considered, but think less likely, secondary etiologies of epileptic seizures to include drug / toxin etiologies (ETOH, stimulants, medication side effects), metabolic disturbances (glucose, Na), acute CNS infections (meningitis, encephalitis, abscess), ICH / tumor / CVA. Presentation not consistent with  syncope, neurologic etiologies (vertebrobasilar insufficiency, movement disorder, migraine), impact seizure related to head trauma. Labs significant for Mg 1.4. Pt provided with IV mag and ativan while in the ED. Plan for transfer to [facility] main campus for EEG and IP neurology evaluation and management. Pt stable through ED course.    I have discussed the specifics of  the workup with the patient and the patient has verbalized understanding of the details of the workup, the diagnosis, the treatment plan, and the need for outpatient follow-up with PCP. ED precautions given. Discussed with pt about returning to the ED, if symptoms fail to improve or worsen.     RESULTS:  Documented in ED course.   Labs/ekg interpreted by myself and Dr. Somers      Voice recognition software utilized in this note. Typographical and content errors may occur with this process. While efforts are made to detect and correct such errors, in some cases errors will persist. For this reason, wording in this document should be considered in the proper context and not strictly verbatim.                      Amount and/or Complexity of Data Reviewed  Labs: ordered. Decision-making details documented in ED Course.  Radiology: ordered.    Risk  Prescription drug management.               ED Course as of 07/01/24 0133   Sun Jun 30, 2024   1604 Benzodiazepines(!): Presumptive Positive [NW]   1604 Magnesium (!): 1.4  Hypomagnesemia   [NW]   1811 Evaluated patient due to family concern for continued seizures. Patient blankly staring, blinking occasionally, nodding answers to family but not responding to provider. No shaking or tremors. Diffuse global weakness on exam, no focal deficits. Face symmetric. No drooling. Respirations non-labored. Suspect non-epileptic episode, but given history will discuss with [facility] for transfer for spot EEG, anticipate patient will need Neuro, Psych, and  for multi-disciplinary management.  [SS]   1816 Discussed with Neuro-critical Care, Dr. Gaytan, who is familiar with patient and agrees with need for transfer for EEG, however, signed off and did not accept for transfer. [facility] will attempt to get in touch with Neurology and HM for transfer.  [SS]      ED Course User Index  [NW] Jazz Sanchez PA-C  [SS] Benjamin Somers MD                           Clinical Impression:  Final  diagnoses:  [R56.9] Seizure-like activity (Primary)  [G93.40] Acute encephalopathy          ED Disposition Condition    Transfer to Another Facility Stable                Jazz Sanchez PA-C  07/01/24 0136       Jazz Sanchez PA-C  07/01/24 0136

## 2024-07-01 PROBLEM — F17.200 TOBACCO DEPENDENCY: Status: ACTIVE | Noted: 2024-07-01

## 2024-07-01 LAB
ALBUMIN SERPL BCP-MCNC: 3.4 G/DL (ref 3.5–5.2)
ALP SERPL-CCNC: 114 U/L (ref 55–135)
ALT SERPL W/O P-5'-P-CCNC: 12 U/L (ref 10–44)
ANION GAP SERPL CALC-SCNC: 8 MMOL/L (ref 8–16)
AST SERPL-CCNC: 17 U/L (ref 10–40)
BASOPHILS # BLD AUTO: 0.06 K/UL (ref 0–0.2)
BASOPHILS NFR BLD: 1 % (ref 0–1.9)
BILIRUB SERPL-MCNC: 0.3 MG/DL (ref 0.1–1)
BUN SERPL-MCNC: 16 MG/DL (ref 6–20)
CALCIUM SERPL-MCNC: 9 MG/DL (ref 8.7–10.5)
CHLORIDE SERPL-SCNC: 108 MMOL/L (ref 95–110)
CO2 SERPL-SCNC: 22 MMOL/L (ref 23–29)
CREAT SERPL-MCNC: 1.2 MG/DL (ref 0.5–1.4)
DIFFERENTIAL METHOD BLD: ABNORMAL
EOSINOPHIL # BLD AUTO: 0.3 K/UL (ref 0–0.5)
EOSINOPHIL NFR BLD: 4.1 % (ref 0–8)
ERYTHROCYTE [DISTWIDTH] IN BLOOD BY AUTOMATED COUNT: 12.7 % (ref 11.5–14.5)
EST. GFR  (NO RACE VARIABLE): 55.8 ML/MIN/1.73 M^2
GLUCOSE SERPL-MCNC: 78 MG/DL (ref 70–110)
HCT VFR BLD AUTO: 32.4 % (ref 37–48.5)
HGB BLD-MCNC: 11.2 G/DL (ref 12–16)
IMM GRANULOCYTES # BLD AUTO: 0.02 K/UL (ref 0–0.04)
IMM GRANULOCYTES NFR BLD AUTO: 0.3 % (ref 0–0.5)
LYMPHOCYTES # BLD AUTO: 2.1 K/UL (ref 1–4.8)
LYMPHOCYTES NFR BLD: 35 % (ref 18–48)
MAGNESIUM SERPL-MCNC: 1.8 MG/DL (ref 1.6–2.6)
MCH RBC QN AUTO: 30.2 PG (ref 27–31)
MCHC RBC AUTO-ENTMCNC: 34.6 G/DL (ref 32–36)
MCV RBC AUTO: 87 FL (ref 82–98)
MONOCYTES # BLD AUTO: 0.5 K/UL (ref 0.3–1)
MONOCYTES NFR BLD: 7.5 % (ref 4–15)
NEUTROPHILS # BLD AUTO: 3.1 K/UL (ref 1.8–7.7)
NEUTROPHILS NFR BLD: 52.1 % (ref 38–73)
NRBC BLD-RTO: 0 /100 WBC
PHOSPHATE SERPL-MCNC: 3 MG/DL (ref 2.7–4.5)
PLATELET # BLD AUTO: 296 K/UL (ref 150–450)
PMV BLD AUTO: 11.1 FL (ref 9.2–12.9)
POCT GLUCOSE: 115 MG/DL (ref 70–110)
POCT GLUCOSE: 148 MG/DL (ref 70–110)
POCT GLUCOSE: 151 MG/DL (ref 70–110)
POCT GLUCOSE: 198 MG/DL (ref 70–110)
POCT GLUCOSE: 78 MG/DL (ref 70–110)
POTASSIUM SERPL-SCNC: 3.4 MMOL/L (ref 3.5–5.1)
PROT SERPL-MCNC: 6.4 G/DL (ref 6–8.4)
RBC # BLD AUTO: 3.71 M/UL (ref 4–5.4)
SODIUM SERPL-SCNC: 138 MMOL/L (ref 136–145)
WBC # BLD AUTO: 6.03 K/UL (ref 3.9–12.7)

## 2024-07-01 PROCEDURE — 63600175 PHARM REV CODE 636 W HCPCS

## 2024-07-01 PROCEDURE — 25000003 PHARM REV CODE 250

## 2024-07-01 PROCEDURE — 83735 ASSAY OF MAGNESIUM: CPT

## 2024-07-01 PROCEDURE — 99223 1ST HOSP IP/OBS HIGH 75: CPT | Mod: ,,, | Performed by: PSYCHIATRY & NEUROLOGY

## 2024-07-01 PROCEDURE — 80053 COMPREHEN METABOLIC PANEL: CPT

## 2024-07-01 PROCEDURE — 85025 COMPLETE CBC W/AUTO DIFF WBC: CPT

## 2024-07-01 PROCEDURE — 36415 COLL VENOUS BLD VENIPUNCTURE: CPT

## 2024-07-01 PROCEDURE — 95720 EEG PHY/QHP EA INCR W/VEEG: CPT | Mod: ,,, | Performed by: PSYCHIATRY & NEUROLOGY

## 2024-07-01 PROCEDURE — 11000001 HC ACUTE MED/SURG PRIVATE ROOM

## 2024-07-01 PROCEDURE — 84100 ASSAY OF PHOSPHORUS: CPT

## 2024-07-01 RX ORDER — LORAZEPAM 2 MG/ML
INJECTION INTRAMUSCULAR
Status: DISCONTINUED
Start: 2024-07-01 | End: 2024-07-01 | Stop reason: WASHOUT

## 2024-07-01 RX ORDER — AMITRIPTYLINE HYDROCHLORIDE 75 MG/1
75 TABLET ORAL NIGHTLY
COMMUNITY

## 2024-07-01 RX ORDER — LORAZEPAM 2 MG/ML
2 INJECTION INTRAMUSCULAR ONCE
Status: DISCONTINUED | OUTPATIENT
Start: 2024-07-01 | End: 2024-07-01

## 2024-07-01 RX ADMIN — ATORVASTATIN CALCIUM 80 MG: 40 TABLET, FILM COATED ORAL at 08:07

## 2024-07-01 RX ADMIN — LOSARTAN POTASSIUM 50 MG: 50 TABLET, FILM COATED ORAL at 08:07

## 2024-07-01 RX ADMIN — LORAZEPAM 2 MG: 2 INJECTION INTRAMUSCULAR; INTRAVENOUS at 08:07

## 2024-07-01 RX ADMIN — AMLODIPINE BESYLATE 10 MG: 10 TABLET ORAL at 08:07

## 2024-07-01 RX ADMIN — CARVEDILOL 25 MG: 25 TABLET, FILM COATED ORAL at 08:07

## 2024-07-01 RX ADMIN — LORAZEPAM 2 MG: 2 INJECTION INTRAMUSCULAR; INTRAVENOUS at 06:07

## 2024-07-01 RX ADMIN — TOPIRAMATE 50 MG: 25 TABLET, FILM COATED ORAL at 09:07

## 2024-07-01 RX ADMIN — CARVEDILOL 25 MG: 25 TABLET, FILM COATED ORAL at 04:07

## 2024-07-01 RX ADMIN — ASPIRIN 81 MG CHEWABLE TABLET 81 MG: 81 TABLET CHEWABLE at 08:07

## 2024-07-01 RX ADMIN — ESCITALOPRAM OXALATE 20 MG: 20 TABLET ORAL at 08:07

## 2024-07-01 RX ADMIN — Medication 3 MG: at 09:07

## 2024-07-01 RX ADMIN — LEVETIRACETAM 1000 MG: 500 TABLET, FILM COATED ORAL at 09:07

## 2024-07-01 RX ADMIN — POTASSIUM BICARBONATE 40 MEQ: 391 TABLET, EFFERVESCENT ORAL at 08:07

## 2024-07-01 RX ADMIN — ENOXAPARIN SODIUM 40 MG: 40 INJECTION SUBCUTANEOUS at 04:07

## 2024-07-01 RX ADMIN — LEVETIRACETAM 1000 MG: 500 TABLET, FILM COATED ORAL at 08:07

## 2024-07-01 RX ADMIN — TOPIRAMATE 50 MG: 25 TABLET, FILM COATED ORAL at 08:07

## 2024-07-01 NOTE — H&P
"[facility] Medicine  History & Physical    Patient Name: Gina Jenkins  MRN: [id number]  Patient Class: IP- Inpatient  Admission Date: 6/30/2024  Attending Physician: Graciela Garcias MD   Primary Care Provider: Clair Johnson NP         Patient information was obtained from patient, past medical records, and ER records.     Subjective:     Principal Problem:Seizure disorder    Chief Complaint: No chief complaint on file.       HPI: Gina Jenkins is a 49 y.o. female who has a PMH of Left ICA aneurysms s/p clipping, HTN, HLD, migraines, seizure disorder (on Keppra) and suspected tPA-averted CVA, who presents on 6/30 as a transfer from [facility] for EEG and Neurology evaluation after seizure-like activity. Patient reports reports having gone to bed the night prior on 6/29 and reports that she felt generally uncomfortable. She is unable to describe exactly her discomfort, but she reports that she woke up and still felt "like something wasn't right"; after eating breakfast she realized her mouth had gone crooked and called her son to come help her. She says she had about three episodes, with each lasting about several minutes. Each episode appears to have been slightly different, with one with left sided tonic-clonic movements, and another with staring. She says she retained awareness throughout each episode and did not ever lose consciousness. She was given Versed 5mg by EMS en route to the ED. Notably, patient was recently hospitalized from 6/15 to 6/20 after an episode where she was found down, unresponsive and also witnessed to have had a seizure. At that time, there was concern for medication non-adherence with her home AEDs, Keppra 750mg BID and Depakote 750mg BID. Given continue AMS and finding of hyperammonemia, Depakote was discontinued entirely and pt was uptitrated to Keppra 1g BID and remained on this on discharge. Patient reports she has been adherent with her " Keppra since discharge. She has been in her usual state of health prior to today, and ROS is largely unremarkable, though she reports her children had recently had viral syndromes and she may have also caught one that was self-limiting.    At presentation at [facility] ED, pt was hypertensive 158/95, but other vitals stable. ED Labs notable for Hgb 11.5 (baseline 13), bicarb 18, Mg 1.4. While in the ED, there was concern from family that pt was still having seizures due to pt staring and not responding to others, but provider felt more likely non-epileptic episode. Received Ativan 1mg and Mag prior to transfer to [facility]. Upon arrival here, patient closer to her baseline, and responding appropriately with no focal weakness detected. Admitted to hospital medicine.    Past Medical History:   Diagnosis Date    Brain aneurysm     CHF (congestive heart failure)     H/O coronary angioplasty     Hypercholesteremia     Hypertension     Malignant hypertension     Migraine headache     Stroke 10/2017       Past Surgical History:   Procedure Laterality Date    CARDIAC CATHETERIZATION      CEREBRAL ANGIOGRAM      CLIP LIGATION OF INTRACRANIAL ANEURYSM BY CRANIOTOMY N/A 7/15/2022    Procedure: CRANIOTOMY, WITH ANEURYSM CLIPPING;  Surgeon: Timothy Diaz MD;  Location: [facility] OR [address] [place] [address];  Service: Neurosurgery;  Laterality: N/A;  PTERIONAL CRANIOTOMY WITH CLIP LIGATION OF L PCOMM, L ANTERIOR CHOROIDAL, L MCA  ANEURYSM, ANESTHESIA: GENERAL, BLOOD: TYPE&CROSS 2 UNITS, NEUROMONITORING: SEP, MEP, EEG, RADIOLOGY: C-ARM, POSITION: SUPINE, CASTILLO, CO-SURGERON: DR. LEXI DOMÍNGUEZ.    WOUND DEBRIDEMENT  8/27/2022    Procedure: DEBRIDEMENT, WOUND;  Surgeon: Timothy Diaz MD;  Location: [facility] OR [address] [place] [address];  Service: Neurosurgery;;       Review of patient's allergies indicates:   Allergen Reactions    Lisinopril Swelling    Bactrim [sulfamethoxazole-trimethoprim] Rash    Ceftazidime Hives     Rash while on IV ceftazidime for planned 6 weeks.  " See ED notes 9/12, 9/14 and ID clinic notes 9/16 and 9/28       Current Facility-Administered Medications on File Prior to Encounter   Medication    [COMPLETED] LORazepam injection 1 mg    [COMPLETED] magnesium sulfate 2g in water 50mL IVPB (premix)    [DISCONTINUED] LORazepam injection 2 mg     Current Outpatient Medications on File Prior to Encounter   Medication Sig    amLODIPine (NORVASC) 10 MG tablet Take 10 mg by mouth once daily.    atorvastatin (LIPITOR) 80 MG tablet Take 1 tablet (80 mg total) by mouth once daily.    blood sugar diagnostic Strp Use to check blood glucose 3 times daily.    blood-glucose meter Misc Use to check blood glucose 3 times daily.    carvediloL (COREG) 12.5 MG tablet Take 3 tablets (37.5 mg total) by mouth 2 (two) times daily.    EScitalopram oxalate (LEXAPRO) 20 MG tablet Take 1 tablet (20 mg total) by mouth once daily.    lancets 30 gauge Misc Use to check blood glucose 3 times daily.    levETIRAcetam (KEPPRA) 750 MG Tab Take 1 tablet (750 mg total) by mouth 2 (two) times daily.    losartan (COZAAR) 50 MG tablet Take 1 tablet (50 mg total) by mouth once daily.    metFORMIN (GLUCOPHAGE-XR) 500 MG ER 24hr tablet Take 500 mg by mouth 2 (two) times daily.    pen needle, diabetic (BD ULTRA-FINE MARIELY PEN NEEDLE) 32 gauge x 5/32" Ndle Use to inject insulin into the skin three times daily .    topiramate (TOPAMAX) 50 MG tablet Take 1 tablet (50 mg total) by mouth 2 (two) times daily.    [DISCONTINUED] aspirin 81 MG Chew Take 1 tablet (81 mg total) by mouth once daily.     Family History    None       Tobacco Use    Smoking status: Every Day     Current packs/day: 0.33     Average packs/day: 0.3 packs/day for 31.5 years (10.4 ttl pk-yrs)     Types: Cigarettes     Start date: 1993    Smokeless tobacco: Never    Tobacco comments:     Enrolled in the [employer] on 6/7/22 ([facility] Member ID # [id number]). Pt is a 0.33 pk/day cigarette smoker x 31 yrs. She states ready to quit smoking. " Ambulatory referral to Smoking Cessation clinic following hospital discharge.    Substance and Sexual Activity    Alcohol use: Yes     Comment: occassionally    Drug use: No    Sexual activity: Not Currently     Partners: Male     Birth control/protection: None     Review of Systems   Constitutional:  Negative for chills and fever.   HENT:  Negative for congestion and sore throat.    Eyes:  Negative for photophobia and visual disturbance.   Respiratory:  Negative for cough and shortness of breath.    Cardiovascular:  Negative for chest pain and palpitations.   Gastrointestinal:  Negative for abdominal pain and diarrhea.   Genitourinary:  Negative for dysuria and hematuria.   Musculoskeletal:  Negative for arthralgias and gait problem.   Skin:  Negative for rash and wound.   Neurological:  Positive for seizures. Negative for tremors, syncope, weakness and headaches.   Psychiatric/Behavioral:  Negative for agitation and behavioral problems. The patient is nervous/anxious.      Objective:     Vital Signs (Most Recent):  Temp: 98.5 °F (36.9 °C) (06/30/24 2038)  Pulse: 67 (06/30/24 2038)  Resp: 16 (06/30/24 2038)  BP: (!) 162/83 (06/30/24 2038)  SpO2: 99 % (06/30/24 2038) Vital Signs (24h Range):  Temp:  [98.5 °F (36.9 °C)-98.8 °F (37.1 °C)] 98.5 °F (36.9 °C)  Pulse:  [65-78] 67  Resp:  [16-18] 16  SpO2:  [97 %-100 %] 99 %  BP: (158-176)/(83-95) 162/83     Weight: 72 kg (158 lb 11.7 oz)  Body mass index is 29.03 kg/m².     Physical Exam  Vitals reviewed.   Constitutional:       General: She is not in acute distress.     Appearance: Normal appearance.   HENT:      Head: Normocephalic and atraumatic.      Mouth/Throat:      Mouth: Mucous membranes are moist.      Pharynx: Oropharynx is clear.   Eyes:      Extraocular Movements: Extraocular movements intact.      Pupils: Pupils are equal, round, and reactive to light.   Cardiovascular:      Rate and Rhythm: Normal rate and regular rhythm.      Pulses: Normal pulses.       Heart sounds: Normal heart sounds.   Pulmonary:      Effort: Pulmonary effort is normal. No respiratory distress.      Breath sounds: Normal breath sounds.   Abdominal:      General: Bowel sounds are normal.      Palpations: Abdomen is soft.      Tenderness: There is no abdominal tenderness.   Musculoskeletal:         General: No swelling or tenderness.   Skin:     Findings: No bruising or rash.   Neurological:      General: No focal deficit present.      Mental Status: She is alert and oriented to person, place, and time.      Motor: No weakness.      Comments: AAOx4 and responding appropriately  No focal neurological deficits appreciated on exam  Full strength and sensation bilaterally, upper and lower extremities  No cranial nerve deficits appreciated   Psychiatric:         Mood and Affect: Mood normal.         Behavior: Behavior normal.              CRANIAL NERVES     CN III, IV, VI   Pupils are equal, round, and reactive to light.       Significant Labs: All pertinent labs within the past 24 hours have been reviewed.    Significant Imaging: I have reviewed all pertinent imaging results/findings within the past 24 hours.  Assessment/Plan:     * Seizure disorder  49F w/hx of cerebral aneurysm s/p clipping, HFpEF, HTN, migraines, and seizure disorder who presents with seizure like activity that started yesterday morning, with 3 separate episodes <5 minutes each. Presentation, while possibly with some non-epileptic components, still remains primarily concerning for seizure disorder. Concern for medication non-adherence on past presentation, but pt reports good adherence since discharge. Possibly recent viral syndrome may have lowered seizure threshold.    Plan:  - Neurology consulted, appreciate recommendations  - Will continue Keppra 1g BID at this time, as well as Topiramate 50mg qd.  - Keppra levels pending  - EEG ordered  - IV ativan 2mg q15min PRN x 2 doses for acute seizure > 5 min, or back to back seizure  without return to baseline in between.  - Seizure precautions ordered    Migraines  Chronic. Not currently having migraines.    Continue home topiramate 50mg qd      History of cerebral aneurysm repair  See aneurysm of ICA      Type 2 diabetes mellitus, without long-term current use of insulin  Previous A1c as high as 8%, but most recent A1c from 6/19 at 5%. On metformin at home    - Hold oral anti-hyperglycemics  - Diabetic diet  - Accuchecks ACHS  - LDSSI in place    Mixed hyperlipidemia  Chronic issue, stable.    - Continue atorvastatin 80mg qd    Aneurysm of internal carotid artery  History of cerebral aneurysm repair     Pt was found to have unruptured aneurysms following a syncope workup after losing consciousness at work. She underwent a left frontal temporal craniotomy for clipping of a left posterior communicating and anterior choroidal aneurysms with Dr Hrebert [place] [facility] [place]. Patient reports having suffered from intermittent seizures following her craniotomy.    Thrombotic stroke involving left middle cerebral artery  In 2017, pt presented with acute right sided numbness and was given tPA and admitted to [facility]. Ultimately, imaging was negative for LVO or for acute infarct. TTE unremarkable for LAE or wall motion abnormalities. Symptoms reported to have improved post tPA. Was thought to be possible tPA-averted stroke or MRI-negative stroke.     - Continue ASA and statin 80mg    Chronic diastolic heart failure  Documented hx of diastolic HF on past echos, however most recent TTEs with normal diastolic and systolic function with normal EF. No signs of peripheral edema. Continue to monitor. Optimize BP control      Essential hypertension  Chronic, stable. Currently uncontrolled. Will resume home medications, though home dosing of Coreg is unusual at 37.5 BID.    - Continue Amlodipine 10 and Losartan 50mg qd  - Will order Coreg as 25 BID as max dose is generally 50 with immediate release  formulation, titrate as needed, but would favor increasing Losartan to 100.      VTE Risk Mitigation (From admission, onward)           Ordered     enoxaparin injection 40 mg  Daily         06/30/24 2122     IP VTE HIGH RISK PATIENT  Once         06/30/24 2122     Place sequential compression device  Until discontinued         06/30/24 2122                        Ke Alex MD  Department of Hospital Medicine  [facility] - Neurosurgery ([facility])

## 2024-07-01 NOTE — NURSING
Patient called and stated she felt like she was going to have seizure, patients mouth was twisted and right jaw clinched up, patient stated it does that before she has SZ, stayed with patient and she had 30 second episode of all over body tensing followed by 2 mins of non verbal,

## 2024-07-01 NOTE — SUBJECTIVE & OBJECTIVE
Past Medical History:   Diagnosis Date    Brain aneurysm     CHF (congestive heart failure)     H/O coronary angioplasty     Hypercholesteremia     Hypertension     Malignant hypertension     Migraine headache     Stroke 10/2017       Past Surgical History:   Procedure Laterality Date    CARDIAC CATHETERIZATION      CEREBRAL ANGIOGRAM      CLIP LIGATION OF INTRACRANIAL ANEURYSM BY CRANIOTOMY N/A 7/15/2022    Procedure: CRANIOTOMY, WITH ANEURYSM CLIPPING;  Surgeon: Timothy Diaz MD;  Location: [facility] OR [address] [place] [address];  Service: Neurosurgery;  Laterality: N/A;  PTERIONAL CRANIOTOMY WITH CLIP LIGATION OF L PCOMM, L ANTERIOR CHOROIDAL, L MCA  ANEURYSM, ANESTHESIA: GENERAL, BLOOD: TYPE&CROSS 2 UNITS, NEUROMONITORING: SEP, MEP, EEG, RADIOLOGY: C-ARM, POSITION: SUPINE, ANNA, CO-SURGERON: DR. LEXI DOMÍNGUEZ.    WOUND DEBRIDEMENT  8/27/2022    Procedure: DEBRIDEMENT, WOUND;  Surgeon: Timothy Diaz MD;  Location: [facility] OR [address] [place] [address];  Service: Neurosurgery;;       Review of patient's allergies indicates:   Allergen Reactions    Lisinopril Swelling    Bactrim [sulfamethoxazole-trimethoprim] Rash    Ceftazidime Hives     Rash while on IV ceftazidime for planned 6 weeks.  See ED notes 9/12, 9/14 and ID clinic notes 9/16 and 9/28       Current Facility-Administered Medications on File Prior to Encounter   Medication    [COMPLETED] LORazepam injection 1 mg    [COMPLETED] magnesium sulfate 2g in water 50mL IVPB (premix)    [DISCONTINUED] LORazepam injection 2 mg     Current Outpatient Medications on File Prior to Encounter   Medication Sig    amLODIPine (NORVASC) 10 MG tablet Take 10 mg by mouth once daily.    atorvastatin (LIPITOR) 80 MG tablet Take 1 tablet (80 mg total) by mouth once daily.    blood sugar diagnostic Strp Use to check blood glucose 3 times daily.    blood-glucose meter Misc Use to check blood glucose 3 times daily.    carvediloL (COREG) 12.5 MG tablet Take 3 tablets (37.5 mg total) by mouth 2 (two) times daily.     "EScitalopram oxalate (LEXAPRO) 20 MG tablet Take 1 tablet (20 mg total) by mouth once daily.    lancets 30 gauge Misc Use to check blood glucose 3 times daily.    levETIRAcetam (KEPPRA) 750 MG Tab Take 1 tablet (750 mg total) by mouth 2 (two) times daily.    losartan (COZAAR) 50 MG tablet Take 1 tablet (50 mg total) by mouth once daily.    metFORMIN (GLUCOPHAGE-XR) 500 MG ER 24hr tablet Take 500 mg by mouth 2 (two) times daily.    pen needle, diabetic (BD ULTRA-FINE MARIELY PEN NEEDLE) 32 gauge x 5/32" Ndle Use to inject insulin into the skin three times daily .    topiramate (TOPAMAX) 50 MG tablet Take 1 tablet (50 mg total) by mouth 2 (two) times daily.    [DISCONTINUED] aspirin 81 MG Chew Take 1 tablet (81 mg total) by mouth once daily.     Family History    None       Tobacco Use    Smoking status: Every Day     Current packs/day: 0.33     Average packs/day: 0.3 packs/day for 31.5 years (10.4 ttl pk-yrs)     Types: Cigarettes     Start date: 1993    Smokeless tobacco: Never    Tobacco comments:     Enrolled in the [employer] on 6/7/22 ([facility] Member ID # [id number]). Pt is a 0.33 pk/day cigarette smoker x 31 yrs. She states ready to quit smoking. Ambulatory referral to Smoking Cessation clinic following hospital discharge.    Substance and Sexual Activity    Alcohol use: Yes     Comment: occassionally    Drug use: No    Sexual activity: Not Currently     Partners: Male     Birth control/protection: None     Review of Systems   Constitutional:  Negative for chills and fever.   HENT:  Negative for congestion and sore throat.    Eyes:  Negative for photophobia and visual disturbance.   Respiratory:  Negative for cough and shortness of breath.    Cardiovascular:  Negative for chest pain and palpitations.   Gastrointestinal:  Negative for abdominal pain and diarrhea.   Genitourinary:  Negative for dysuria and hematuria.   Musculoskeletal:  Negative for arthralgias and gait problem.   Skin:  Negative for rash and wound. "   Neurological:  Positive for seizures. Negative for tremors, syncope, weakness and headaches.   Psychiatric/Behavioral:  Negative for agitation and behavioral problems. The patient is nervous/anxious.      Objective:     Vital Signs (Most Recent):  Temp: 98.5 °F (36.9 °C) (06/30/24 2038)  Pulse: 67 (06/30/24 2038)  Resp: 16 (06/30/24 2038)  BP: (!) 162/83 (06/30/24 2038)  SpO2: 99 % (06/30/24 2038) Vital Signs (24h Range):  Temp:  [98.5 °F (36.9 °C)-98.8 °F (37.1 °C)] 98.5 °F (36.9 °C)  Pulse:  [65-78] 67  Resp:  [16-18] 16  SpO2:  [97 %-100 %] 99 %  BP: (158-176)/(83-95) 162/83     Weight: 72 kg (158 lb 11.7 oz)  Body mass index is 29.03 kg/m².     Physical Exam  Vitals reviewed.   Constitutional:       General: She is not in acute distress.     Appearance: Normal appearance.   HENT:      Head: Normocephalic and atraumatic.      Mouth/Throat:      Mouth: Mucous membranes are moist.      Pharynx: Oropharynx is clear.   Eyes:      Extraocular Movements: Extraocular movements intact.      Pupils: Pupils are equal, round, and reactive to light.   Cardiovascular:      Rate and Rhythm: Normal rate and regular rhythm.      Pulses: Normal pulses.      Heart sounds: Normal heart sounds.   Pulmonary:      Effort: Pulmonary effort is normal. No respiratory distress.      Breath sounds: Normal breath sounds.   Abdominal:      General: Bowel sounds are normal.      Palpations: Abdomen is soft.      Tenderness: There is no abdominal tenderness.   Musculoskeletal:         General: No swelling or tenderness.   Skin:     Findings: No bruising or rash.   Neurological:      General: No focal deficit present.      Mental Status: She is alert and oriented to person, place, and time.      Motor: No weakness.      Comments: AAOx4 and responding appropriately  No focal neurological deficits appreciated on exam  Full strength and sensation bilaterally, upper and lower extremities  No cranial nerve deficits appreciated   Psychiatric:          Mood and Affect: Mood normal.         Behavior: Behavior normal.              CRANIAL NERVES     CN III, IV, VI   Pupils are equal, round, and reactive to light.       Significant Labs: All pertinent labs within the past 24 hours have been reviewed.    Significant Imaging: I have reviewed all pertinent imaging results/findings within the past 24 hours.

## 2024-07-01 NOTE — ASSESSMENT & PLAN NOTE
"Ms. Jenkins is a 49 year old woman with a history of clinical seizure activity following repair of multiple L ICA distribution aneurysms in 2022. EEG since then has demonstrated L hemispheric slowing but without epileptiform activity. She has presented multiple times for seizures of varying semiology including staring spells, shaking of the right limbs, shaking of the left limbs, and now forced deviation of the mandible. During my interview, patient exhibited a "staring spell" accompanied by posturing of the RUE. This patient has a operative focus that could be epileptogenic, but no EEG correlate has been captured for her spells. Concern for epileptic seizures vs PNES.    Recommendation  - Continue home keppra 750mg BID  - Continue home topiramate 50mg BID  - Plan to transfer patient to EMU service for continuous EEG and further evaluation of seizure like activity  - seizure precautions  - aspiration precautions  - Please contact Epilepsy team on call prior to administration of ativan    Neurology will follow until transfer to EMU service. Thank you for your consult. Please reach out with questions or concerns.  "

## 2024-07-01 NOTE — NURSING
Nurses Note -- 4 Eyes      7/1/2024   1:31 AM      Skin assessed during: Admit      [x] No Altered Skin Integrity Present    []Prevention Measures Documented      [] Yes- Altered Skin Integrity Present or Discovered   [] LDA Added if Not in Epic (Describe Wound)   [] New Altered Skin Integrity was Present on Admit and Documented in LDA   [] Wound Image Taken    Wound Care Consulted? No    Attending Nurse:  Glo Evans RN/Staff Member:   Mari MUNOZ

## 2024-07-01 NOTE — ASSESSMENT & PLAN NOTE
In 2017, pt presented with acute right sided numbness and was given tPA and admitted to [facility]. Ultimately, imaging was negative for LVO or for acute infarct. TTE unremarkable for LAE or wall motion abnormalities. Symptoms reported to have improved post tPA. Was thought to be possible tPA-averted stroke or MRI-negative stroke.     - Continue ASA and statin 80mg

## 2024-07-01 NOTE — SUBJECTIVE & OBJECTIVE
Past Medical History:   Diagnosis Date    Brain aneurysm     CHF (congestive heart failure)     H/O coronary angioplasty     Hypercholesteremia     Hypertension     Malignant hypertension     Migraine headache     Stroke 10/2017       Past Surgical History:   Procedure Laterality Date    CARDIAC CATHETERIZATION      CEREBRAL ANGIOGRAM      CLIP LIGATION OF INTRACRANIAL ANEURYSM BY CRANIOTOMY N/A 7/15/2022    Procedure: CRANIOTOMY, WITH ANEURYSM CLIPPING;  Surgeon: Timothy Diaz MD;  Location: [facility] OR [address] [place] [address];  Service: Neurosurgery;  Laterality: N/A;  PTERIONAL CRANIOTOMY WITH CLIP LIGATION OF L PCOMM, L ANTERIOR CHOROIDAL, L MCA  ANEURYSM, ANESTHESIA: GENERAL, BLOOD: TYPE&CROSS 2 UNITS, NEUROMONITORING: SEP, MEP, EEG, RADIOLOGY: C-ARM, POSITION: SUPINE, ANNA, CO-SURGERON: DR. LEXI DOMÍNGUEZ.    WOUND DEBRIDEMENT  8/27/2022    Procedure: DEBRIDEMENT, WOUND;  Surgeon: Timothy Diaz MD;  Location: [facility] OR [address] [place] [address];  Service: Neurosurgery;;       Review of patient's allergies indicates:   Allergen Reactions    Lisinopril Swelling    Bactrim [sulfamethoxazole-trimethoprim] Rash    Ceftazidime Hives     Rash while on IV ceftazidime for planned 6 weeks.  See ED notes 9/12, 9/14 and ID clinic notes 9/16 and 9/28       Current Facility-Administered Medications on File Prior to Encounter   Medication    [COMPLETED] LORazepam injection 1 mg    [COMPLETED] magnesium sulfate 2g in water 50mL IVPB (premix)    [DISCONTINUED] LORazepam injection 2 mg     Current Outpatient Medications on File Prior to Encounter   Medication Sig    amitriptyline (ELAVIL) 75 MG tablet Take 75 mg by mouth every evening.    amLODIPine (NORVASC) 10 MG tablet Take 10 mg by mouth once daily.    atorvastatin (LIPITOR) 80 MG tablet Take 1 tablet (80 mg total) by mouth once daily.    carvediloL (COREG) 12.5 MG tablet Take 3 tablets (37.5 mg total) by mouth 2 (two) times daily.    EScitalopram oxalate (LEXAPRO) 20 MG tablet Take 1 tablet (20 mg  "total) by mouth once daily.    levETIRAcetam (KEPPRA) 750 MG Tab Take 1 tablet (750 mg total) by mouth 2 (two) times daily.    losartan (COZAAR) 50 MG tablet Take 1 tablet (50 mg total) by mouth once daily.    metFORMIN (GLUCOPHAGE-XR) 500 MG ER 24hr tablet Take 500 mg by mouth 2 (two) times daily.    blood sugar diagnostic Strp Use to check blood glucose 3 times daily.    blood-glucose meter Misc Use to check blood glucose 3 times daily.    lancets 30 gauge Misc Use to check blood glucose 3 times daily.    pen needle, diabetic (BD ULTRA-FINE MARIELY PEN NEEDLE) 32 gauge x 5/32" Ndle Use to inject insulin into the skin three times daily .    topiramate (TOPAMAX) 50 MG tablet Take 1 tablet (50 mg total) by mouth 2 (two) times daily.    [DISCONTINUED] aspirin 81 MG Chew Take 1 tablet (81 mg total) by mouth once daily.     Family History    None       Tobacco Use    Smoking status: Every Day     Current packs/day: 0.33     Average packs/day: 0.3 packs/day for 31.5 years (10.4 ttl pk-yrs)     Types: Cigarettes     Start date: 1993    Smokeless tobacco: Never    Tobacco comments:     Enrolled in the [employer] on 6/7/22 ([facility] Member ID # [id number]). Pt is a 0.33 pk/day cigarette smoker x 31 yrs. She states ready to quit smoking. Ambulatory referral to Smoking Cessation clinic following hospital discharge.    Substance and Sexual Activity    Alcohol use: Yes     Comment: occassionally    Drug use: No    Sexual activity: Not Currently     Partners: Male     Birth control/protection: None     Review of Systems   Constitutional:  Negative for chills, fatigue and fever.   HENT:  Negative for sinus pain and sore throat.    Respiratory:  Negative for cough.    Genitourinary:  Negative for dysuria.   Neurological:  Positive for seizures and facial asymmetry. Negative for dizziness, speech difficulty, weakness, numbness and headaches.     Objective:     Vital Signs (Most Recent):  Temp: 98.1 °F (36.7 °C) (07/01/24 0736)  Pulse: " 70 (07/01/24 1512)  Resp: 16 (07/01/24 0736)  BP: (!) 183/94 (07/01/24 0824)  SpO2: 98 % (07/01/24 0736) Vital Signs (24h Range):  Temp:  [98.1 °F (36.7 °C)-98.5 °F (36.9 °C)] 98.1 °F (36.7 °C)  Pulse:  [56-70] 70  Resp:  [16-18] 16  SpO2:  [97 %-100 %] 98 %  BP: (152-183)/(83-94) 183/94     Weight: 72 kg (158 lb 11.7 oz)  Body mass index is 29.03 kg/m².     Physical Exam  Constitutional:       General: She is not in acute distress.     Appearance: Normal appearance. She is not ill-appearing.   HENT:      Head: Normocephalic.      Comments: Well healed L crani site     Mouth/Throat:      Mouth: Mucous membranes are moist.      Pharynx: Oropharynx is clear.   Eyes:      General: No scleral icterus.     Extraocular Movements: Extraocular movements intact and EOM normal.      Pupils: Pupils are equal, round, and reactive to light.   Cardiovascular:      Rate and Rhythm: Regular rhythm.      Pulses: Normal pulses.   Pulmonary:      Effort: Pulmonary effort is normal. No respiratory distress.   Abdominal:      General: Abdomen is flat. There is no distension.      Palpations: Abdomen is soft.   Musculoskeletal:      Right lower leg: No edema.      Left lower leg: No edema.   Skin:     General: Skin is warm and dry.   Neurological:      Mental Status: She is alert and oriented to person, place, and time.      Coordination: Finger-Nose-Finger Test normal.      Deep Tendon Reflexes:      Reflex Scores:       Bicep reflexes are 2+ on the right side and 2+ on the left side.       Patellar reflexes are 2+ on the right side and 2+ on the left side.       Achilles reflexes are 2+ on the right side and 2+ on the left side.  Psychiatric:         Speech: Speech normal.          NEUROLOGICAL EXAMINATION:     MENTAL STATUS   Oriented to person, place, and time.   Attention: normal.   Speech: speech is normal   Level of consciousness: alert  Able to name object. Able to read. Able to repeat. Normal comprehension.     CRANIAL NERVES      CN II   Visual fields full to confrontation.     CN III, IV, VI   Pupils are equal, round, and reactive to light.  Extraocular motions are normal.   Right pupil: Reactivity: brisk.   Left pupil: Reactivity: brisk.     CN V   Facial sensation intact.     CN VII   Right facial weakness: mandible forcefully deviated to R.    CN VIII   CN VIII normal.     CN IX, X   CN IX normal.   CN X normal.     CN XI   CN XI normal.     CN XII   CN XII normal.     MOTOR EXAM   Overall muscle tone: normal    Strength   Right biceps: 4/5  Left biceps: 4/5  Right triceps: 4/5  Left triceps: 4/5  Right iliopsoas: 4/5  Left iliopsoas: 4/5  Right quadriceps: 4/5  Left quadriceps: 4/5  Right hamstrin/5  Left hamstrin/5  Right anterior tibial: 4/5  Left anterior tibial: 4/5  Right posterior tibial: 4/5  Left posterior tibial: 4/5    REFLEXES     Reflexes   Right biceps: 2+  Left biceps: 2+  Right patellar: 2+  Left patellar: 2+  Right achilles: 2+  Left achilles: 2+  Right plantar: equivocal  Left plantar: equivocal  Right ankle clonus: absent  Left ankle clonus: absent    SENSORY EXAM   Light touch normal.     GAIT AND COORDINATION      Coordination   Finger to nose coordination: normal      Significant Labs: All pertinent lab results from the past 24 hours have been reviewed.    Significant Imaging: I have reviewed all pertinent imaging results/findings within the past 24 hours.

## 2024-07-01 NOTE — PROCEDURES
Preliminary EEG Read  EEG reviewed 09:19-14:27    Background:   The background during the alert state shows a normal anterior posterior gradient and a 9-10 hz posterior dominant rhythm.  There is frequent left temporal maximum polymorphic delta slowing.  Transition to sleep with normal symmetric stage II sleep architecture was noted.    Impression:   Study consistent with a left hemispheric structural lesion.  No seizures or epileptiform activity seen in reviewed segment.    Please hit the EEG button with any clinical activity concerning for seizures and describe what you see.    Detailed report to follow.     Neha Campos MD  [facility]   Department of Neurology

## 2024-07-01 NOTE — ASSESSMENT & PLAN NOTE
Chronic, stable. Currently uncontrolled. Will resume home medications, though home dosing of Coreg is unusual at 37.5 BID.    - Continue Amlodipine 10 and Losartan 50mg qd  - Will order Coreg as 25 BID as max dose is generally 50 with immediate release formulation, titrate as needed, but would favor increasing Losartan to 100.

## 2024-07-01 NOTE — ASSESSMENT & PLAN NOTE
Documented hx of diastolic HF on past echos, however most recent TTEs with normal diastolic and systolic function with normal EF. No signs of peripheral edema. Continue to monitor. Optimize BP control

## 2024-07-01 NOTE — PLAN OF CARE
[facility] - Neurosurgery (Hospital)  Initial Discharge Assessment       Primary Care Provider: Clair Johnson NP    Admission Diagnosis: Seizures [R56.9]    Admission Date: 6/30/2024  Expected Discharge Date: 7/2/2024    Transition of Care Barriers: (P) Substance Abuse    Payor: MEDICAID / Plan: [place] [employer] / Product Type: Managed Medicaid /     Extended Emergency Contact Information  Primary Emergency Contact: Netta De Leon  Mobile Phone: [phone]  Relation: Sister  Preferred language: English  Secondary Emergency Contact: Padmini Perales   [place]  Mobile Phone: [phone]  Relation: Relative  Mother: Tiana Daviesleonides  Address: [address] [place] [address] [place]  Mobile Phone: [phone]    Discharge Plan A: (P) Home with family, Home Health  Discharge Plan B: (P) Home Health      [id number] [place] - [address] [facility] [address]  [address] [facility] [address]  Phone: [phone] Fax: [phone]      Initial Assessment (most recent)       Adult Discharge Assessment - 07/01/24 1139          Discharge Assessment    Assessment Type Discharge Planning Assessment     Confirmed/corrected address, phone number and insurance Yes     Confirmed Demographics Correct on Facesheet     Source of Information patient     Communicated CADY with patient/caregiver Date not available/Unable to determine     Reason For Admission seizures     People in Home parent(s);child(precious), dependent     Do you expect to return to your current living situation? Yes     Do you have help at home or someone to help you manage your care at home? Yes     Who are your caregiver(s) and their phone number(s)? Miladys Davies  Mother  [phone] (P)      Prior to hospitilization cognitive status: Unable to Assess (P)      Current cognitive status: Unable to Assess (P)      Walking or Climbing Stairs Difficulty yes (P)       Walking or Climbing Stairs ambulation difficulty, requires equipment (P)      Mobility Management RW, straight cane (P)      Home Layout Able to live on 1st floor (P)      Equipment Currently Used at Home walker, rolling;cane, straight (P)      Readmission within 30 days? Yes (P)      Patient currently being followed by outpatient case management? No (P)      Do you currently have service(s) that help you manage your care at home? No (P)      Do you take prescription medications? Yes (P)      Do you have prescription coverage? Yes (P)      Coverage Medicaid, [place] [employer] (P)      Do you have any problems affording any of your prescribed medications? No (P)      Is the patient taking medications as prescribed? yes (P)      Who is going to help you get home at discharge? Mother Miladys (P)      How do you get to doctors appointments? family or friend will provide (P)      Do you take coumadin? No (P)      Discharge Plan A Home with family;Home Health (P)      Discharge Plan B Home Health (P)      DME Needed Upon Discharge  none (P)      Discharge Plan discussed with: Parent(s) (P)      Transition of Care Barriers Substance Abuse (P)         Physical Activity    On average, how many days per week do you engage in moderate to strenuous exercise (like a brisk walk)? Patient unable to answer (P)      On average, how many minutes do you engage in exercise at this level? Patient unable to answer (P)         Financial Resource Strain    How hard is it for you to pay for the very basics like food, housing, medical care, and heating? Patient unable to answer (P)         Housing Stability    In the last 12 months, was there a time when you were not able to pay the mortgage or rent on time? Patient unable to answer (P)      At any time in the past 12 months, were you homeless or living in a shelter (including now)? Patient unable to answer (P)         Transportation Needs    Has the lack of transportation kept you from  medical appointments, meetings, work or from getting things needed for daily living? Patient unable to answer (P)         Food Insecurity    Within the past 12 months, you worried that your food would run out before you got the money to buy more. Patient unable to answer (P)      Within the past 12 months, the food you bought just didn't last and you didn't have money to get more. Patient unable to answer (P)         Stress    Do you feel stress - tense, restless, nervous, or anxious, or unable to sleep at night because your mind is troubled all the time - these days? Patient unable to answer (P)         Social Isolation    How often do you feel lonely or isolated from those around you?  Patient unable to answer (P)         Alcohol Use    Q1: How often do you have a drink containing alcohol? Patient unable to answer (P)      Q2: How many drinks containing alcohol do you have on a typical day when you are drinking? Patient unable to answer (P)      Q3: How often do you have six or more drinks on one occasion? Patient unable to answer (P)         Utilities    In the past 12 months has the electric, gas, oil, or water company threatened to shut off services in your home? Patient unable to answer (P)         Health Literacy    How often do you need to have someone help you when you read instructions, pamphlets, or other written material from your doctor or pharmacy? Patient unable to respond (P)         OTHER    Name(s) of People in Home Mother Miladys, teenage sons (P)                    Patient admitted for Seizures.  Patient resides with her mother Miladys and her son's [facility] with [facility] steps.  Patient has a RW and straight cane at home, but family reports she doesn't really use them.  Patient does not have any  services, but had [facility] in the past.  Last hospitalization, the patient discharged home with OP therapy.  Patient is not on HD or Coumadin.  Patient will discharge home with family once  cleared.  Her mother Miladys can provide support and tranportation.      Discharge Plan A and Plan B have been determined by review of patient's clinical status, future medical and therapeutic needs, and coverage/benefits for post-acute care in coordination with multidisciplinary team members.    Colleen Cartagena, LEIF  [facility]  [phone]

## 2024-07-01 NOTE — HPI
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 She reports that she went to bed on 6/29 with an uncomfortable epigastric pressure and a general feeling that she was unwell. She awoke the next morning and felt that her "mouth was crooked." She was alone during these events and called her son to her home. After his arrival, she reports having three separate episodes. One episode was described as an unresponsive episode followed by shaking of the left arm and leg that lasted about 5 minutes. Another episode was described as staring episode that lasted several minutes. EMS was called and patient received versed with a reported postictal state afterwards. Patient was taken to [place] ED, was reported to have had another staring episode, and was given ativan 1mg. Patient was transferred [place] [facility] for continuous EEG monitoring. She denies recent infectious symptom, alcohol/illicit substance use, and medication noncompliance.    Concerning her seizure history, patient developed clinical seizure events after the clipping of her L ICA aneurysms in 2022. She has had various semiologies described including left sided shaking, right sided shaking, and staring episodes. She has presented to the ED multiple times for suspected seizure activity without EEG evidence of epileptiform discharges. Notably, at the time of her clipping, EEG demonstrated L hemispheric slowing without discharges. She previously followed with Dr. Zurita for AED management. She was most recently seen in February 2023 and her AED regimen was keppra 1500mg BID and topiramate 50mg BID. Since, her regimen has been altered over the course of multiple presentations, presently being keppra 750mg BID and topiramate 50mg BID.    Neurology was consulted for " management and evaluation of breakthrough seizure.

## 2024-07-01 NOTE — HPI
"Gina Jenkins is a 49 y.o. female who has a PMH of Left ICA aneurysms s/p clipping, HTN, HLD, migraines, seizure disorder (on Keppra) and suspected tPA-averted CVA, who presents on 6/30 as a transfer from [facility] for EEG and Neurology evaluation after seizure-like activity. Patient reports reports having gone to bed the night prior on 6/29 and reports that she felt generally uncomfortable. She is unable to describe exactly her discomfort, but she reports that she woke up and still felt "like something wasn't right"; after eating breakfast she realized her mouth had gone crooked and called her son to come help her. She says she had about three episodes, with each lasting about several minutes. Each episode appears to have been slightly different, with one with left sided tonic-clonic movements, and another with staring. She says she retained awareness throughout each episode and did not ever lose consciousness. She was given Versed 5mg by EMS en route to the ED. Notably, patient was recently hospitalized from 6/15 to 6/20 after an episode where she was found down, unresponsive and also witnessed to have had a seizure. At that time, there was concern for medication non-adherence with her home AEDs, Keppra 750mg BID and Depakote 750mg BID. Given continue AMS and finding of hyperammonemia, Depakote was discontinued entirely and pt was uptitrated to Keppra 1g BID and remained on this on discharge. Patient reports she has been adherent with her Keppra since discharge. She has been in her usual state of health prior to today, and ROS is largely unremarkable, though she reports her children had recently had viral syndromes and she may have also caught one that was self-limiting.    At presentation at [facility] ED, pt was hypertensive 158/95, but other vitals stable. ED Labs notable for Hgb 11.5 (baseline 13), bicarb 18, Mg 1.4. While in the ED, there was concern from family that pt was still having seizures due to pt staring " and not responding to others, but provider felt more likely non-epileptic episode. Received Ativan 1mg and Mag prior to transfer to [facility]. Upon arrival here, patient closer to her baseline, and responding appropriately with no focal weakness detected. Admitted to hospital medicine.

## 2024-07-01 NOTE — ASSESSMENT & PLAN NOTE
Previous A1c as high as 8%, but most recent A1c from 6/19 at 5%. On metformin at home    - Hold oral anti-hyperglycemics  - Diabetic diet  - Accuchecks ACHS  - LDSSI in place

## 2024-07-01 NOTE — NURSING
Pt states she feels off, not oriented to time place or situation, change from being A/Ox4 this AM. Verbalized to hosp med Dr. Garcias at nursing station, no new orders or interventions given.

## 2024-07-01 NOTE — ASSESSMENT & PLAN NOTE
History of cerebral aneurysm repair     Pt was found to have unruptured aneurysms following a syncope workup after losing consciousness at work. She underwent a left frontal temporal craniotomy for clipping of a left posterior communicating and anterior choroidal aneurysms with Dr Herbert at [facility] on 07/15/2022. Patient reports having suffered from intermittent seizures following her craniotomy.

## 2024-07-01 NOTE — CONSULTS
"Misael Schmitt - Neurosurgery ([facility])  Neurology  Consult Note    Patient Name: Gina Jenkins  MRN: [id number]  Admission Date: 6/30/2024  Hospital Length of Stay: 1 days  Code Status: Full Code   Attending Provider: Graciela Garcias MD   Consulting Provider: Tyron Hernández MD  Primary Care Physician: Clair Johnson NP  Principal Problem:Seizure disorder    Inpatient consult to Neurology  Consult performed by: Tyron Hernández MD  Consult ordered by: Ke Alex MD  Reason for consult: seizure         Subjective:     Chief Complaint:  Seizure     HPI:   Ms.Christine Jenkins is a 49 year old female with a past history of Left ICA aneurysms s/p clipping, HTN, HLD, migraines, seizure disorder (on Keppra) and 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 She reports that she went to bed on 6/29 with an uncomfortable epigastric pressure and a general feeling that she was unwell. She awoke the next morning and felt that her "mouth was crooked." She was alone during these events and called her son to her home. After his arrival, she reports having three separate episodes. One episode was described as an unresponsive episode followed by shaking of the left arm and leg that lasted about 5 minutes. Another episode was described as staring episode that lasted several minutes. EMS was called and patient received versed with a reported postictal state afterwards. Patient was taken to [place] ED, was reported to have had another staring episode, and was given ativan 1mg. Patient was transferred to [facility] [place] [place] ED for continuous EEG monitoring. She denies recent infectious symptom, alcohol/illicit substance use, and medication noncompliance.    Concerning her seizure history, patient developed clinical seizure events after the clipping of her L ICA aneurysms in 2022. She has had various semiologies described including left sided shaking, right sided shaking, and staring episodes. " She has presented to the ED multiple times for suspected seizure activity without EEG evidence of epileptiform discharges. Notably, at the time of her clipping, EEG demonstrated L hemispheric slowing without discharges. She previously followed with Dr. Zurita for AED management. She was most recently seen in February 2023 and her AED regimen was keppra 1500mg BID and topiramate 50mg BID. Since, her regimen has been altered over the course of multiple presentations, presently being keppra 750mg BID and topiramate 50mg BID.    Neurology was consulted for management and evaluation of breakthrough seizure.     Past Medical History:   Diagnosis Date    Brain aneurysm     CHF (congestive heart failure)     H/O coronary angioplasty     Hypercholesteremia     Hypertension     Malignant hypertension     Migraine headache     Stroke 10/2017       Past Surgical History:   Procedure Laterality Date    CARDIAC CATHETERIZATION      CEREBRAL ANGIOGRAM      CLIP LIGATION OF INTRACRANIAL ANEURYSM BY CRANIOTOMY N/A 7/15/2022    Procedure: CRANIOTOMY, WITH ANEURYSM CLIPPING;  Surgeon: Timothy Diaz MD;  Location: [facility] OR [address] [place] [address];  Service: Neurosurgery;  Laterality: N/A;  PTERIONAL CRANIOTOMY WITH CLIP LIGATION OF L PCOMM, L ANTERIOR CHOROIDAL, L MCA  ANEURYSM, ANESTHESIA: GENERAL, BLOOD: TYPE&CROSS 2 UNITS, NEUROMONITORING: SEP, MEP, EEG, RADIOLOGY: C-ARM, POSITION: SUPINE, ANNA, CO-SURGERON: DR. LEXI DOMÍNGUEZ.    WOUND DEBRIDEMENT  8/27/2022    Procedure: DEBRIDEMENT, WOUND;  Surgeon: Timothy Diaz MD;  Location: [facility] OR [address] [place] [address];  Service: Neurosurgery;;       Review of patient's allergies indicates:   Allergen Reactions    Lisinopril Swelling    Bactrim [sulfamethoxazole-trimethoprim] Rash    Ceftazidime Hives     Rash while on IV ceftazidime for planned 6 weeks.  See ED notes 9/12, 9/14 and ID clinic notes 9/16 and 9/28       Current Facility-Administered Medications on File Prior to Encounter   Medication    [COMPLETED]  "LORazepam injection 1 mg    [COMPLETED] magnesium sulfate 2g in water 50mL IVPB (premix)    [DISCONTINUED] LORazepam injection 2 mg     Current Outpatient Medications on File Prior to Encounter   Medication Sig    amitriptyline (ELAVIL) 75 MG tablet Take 75 mg by mouth every evening.    amLODIPine (NORVASC) 10 MG tablet Take 10 mg by mouth once daily.    atorvastatin (LIPITOR) 80 MG tablet Take 1 tablet (80 mg total) by mouth once daily.    carvediloL (COREG) 12.5 MG tablet Take 3 tablets (37.5 mg total) by mouth 2 (two) times daily.    EScitalopram oxalate (LEXAPRO) 20 MG tablet Take 1 tablet (20 mg total) by mouth once daily.    levETIRAcetam (KEPPRA) 750 MG Tab Take 1 tablet (750 mg total) by mouth 2 (two) times daily.    losartan (COZAAR) 50 MG tablet Take 1 tablet (50 mg total) by mouth once daily.    metFORMIN (GLUCOPHAGE-XR) 500 MG ER 24hr tablet Take 500 mg by mouth 2 (two) times daily.    blood sugar diagnostic Strp Use to check blood glucose 3 times daily.    blood-glucose meter Misc Use to check blood glucose 3 times daily.    lancets 30 gauge Misc Use to check blood glucose 3 times daily.    pen needle, diabetic (BD ULTRA-FINE MARIELY PEN NEEDLE) 32 gauge x 5/32" Ndle Use to inject insulin into the skin three times daily .    topiramate (TOPAMAX) 50 MG tablet Take 1 tablet (50 mg total) by mouth 2 (two) times daily.    [DISCONTINUED] aspirin 81 MG Chew Take 1 tablet (81 mg total) by mouth once daily.     Family History    None       Tobacco Use    Smoking status: Every Day     Current packs/day: 0.33     Average packs/day: 0.3 packs/day for 31.5 years (10.4 ttl pk-yrs)     Types: Cigarettes     Start date: 1993    Smokeless tobacco: Never    Tobacco comments:     Enrolled in the [employer] on 6/7/22 ([facility] Member ID # [id number]). Pt is a 0.33 pk/day cigarette smoker x 31 yrs. She states ready to quit smoking. Ambulatory referral to Smoking Cessation clinic following hospital discharge.    Substance and " Sexual Activity    Alcohol use: Yes     Comment: occassionally    Drug use: No    Sexual activity: Not Currently     Partners: Male     Birth control/protection: None     Review of Systems   Constitutional:  Negative for chills, fatigue and fever.   HENT:  Negative for sinus pain and sore throat.    Respiratory:  Negative for cough.    Genitourinary:  Negative for dysuria.   Neurological:  Positive for seizures and facial asymmetry. Negative for dizziness, speech difficulty, weakness, numbness and headaches.     Objective:     Vital Signs (Most Recent):  Temp: 98.1 °F (36.7 °C) (07/01/24 0736)  Pulse: 70 (07/01/24 1512)  Resp: 16 (07/01/24 0736)  BP: (!) 183/94 (07/01/24 0824)  SpO2: 98 % (07/01/24 0736) Vital Signs (24h Range):  Temp:  [98.1 °F (36.7 °C)-98.5 °F (36.9 °C)] 98.1 °F (36.7 °C)  Pulse:  [56-70] 70  Resp:  [16-18] 16  SpO2:  [97 %-100 %] 98 %  BP: (152-183)/(83-94) 183/94     Weight: 72 kg (158 lb 11.7 oz)  Body mass index is 29.03 kg/m².     Physical Exam  Constitutional:       General: She is not in acute distress.     Appearance: Normal appearance. She is not ill-appearing.   HENT:      Head: Normocephalic.      Comments: Well healed L crani site     Mouth/Throat:      Mouth: Mucous membranes are moist.      Pharynx: Oropharynx is clear.   Eyes:      General: No scleral icterus.     Extraocular Movements: Extraocular movements intact and EOM normal.      Pupils: Pupils are equal, round, and reactive to light.   Cardiovascular:      Rate and Rhythm: Regular rhythm.      Pulses: Normal pulses.   Pulmonary:      Effort: Pulmonary effort is normal. No respiratory distress.   Abdominal:      General: Abdomen is flat. There is no distension.      Palpations: Abdomen is soft.   Musculoskeletal:      Right lower leg: No edema.      Left lower leg: No edema.   Skin:     General: Skin is warm and dry.   Neurological:      Mental Status: She is alert and oriented to person, place, and time.      Coordination:  Finger-Nose-Finger Test normal.      Deep Tendon Reflexes:      Reflex Scores:       Bicep reflexes are 2+ on the right side and 2+ on the left side.       Patellar reflexes are 2+ on the right side and 2+ on the left side.       Achilles reflexes are 2+ on the right side and 2+ on the left side.  Psychiatric:         Speech: Speech normal.          NEUROLOGICAL EXAMINATION:     MENTAL STATUS   Oriented to person, place, and time.   Attention: normal.   Speech: speech is normal   Level of consciousness: alert  Able to name object. Able to read. Able to repeat. Normal comprehension.     CRANIAL NERVES     CN II   Visual fields full to confrontation.     CN III, IV, VI   Pupils are equal, round, and reactive to light.  Extraocular motions are normal.   Right pupil: Reactivity: brisk.   Left pupil: Reactivity: brisk.     CN V   Facial sensation intact.     CN VII   Right facial weakness: mandible forcefully deviated to R.    CN VIII   CN VIII normal.     CN IX, X   CN IX normal.   CN X normal.     CN XI   CN XI normal.     CN XII   CN XII normal.     MOTOR EXAM   Overall muscle tone: normal    Strength   Right biceps: 4/5  Left biceps: 4/5  Right triceps: 4/5  Left triceps: 4/5  Right iliopsoas: 4/5  Left iliopsoas: 4/5  Right quadriceps: 4/5  Left quadriceps: 4/5  Right hamstrin/5  Left hamstrin/5  Right anterior tibial: 4/5  Left anterior tibial: 4/5  Right posterior tibial: 4/5  Left posterior tibial: 4/5    REFLEXES     Reflexes   Right biceps: 2+  Left biceps: 2+  Right patellar: 2+  Left patellar: 2+  Right achilles: 2+  Left achilles: 2+  Right plantar: equivocal  Left plantar: equivocal  Right ankle clonus: absent  Left ankle clonus: absent    SENSORY EXAM   Light touch normal.     GAIT AND COORDINATION      Coordination   Finger to nose coordination: normal      Significant Labs: All pertinent lab results from the past 24 hours have been reviewed.    Significant Imaging: I have reviewed all pertinent  "imaging results/findings within the past 24 hours.  Assessment and Plan:     * Seizure disorder  Ms. Jenkins is a 49 year old woman with a history of clinical seizure activity following repair of multiple L ICA distribution aneurysms in 2022. EEG since then has demonstrated L hemispheric slowing but without epileptiform activity. She has presented multiple times for seizures of varying semiology including staring spells, shaking of the right limbs, shaking of the left limbs, and now forced deviation of the mandible. During my interview, patient exhibited a "staring spell" accompanied by posturing of the RUE. This patient has a operative focus that could be epileptogenic, but no EEG correlate has been captured for her spells. Concern for epileptic seizures vs PNES.    Recommendation  - Continue home keppra 750mg BID  - Continue home topiramate 50mg BID  - Plan to transfer patient to EMU service for continuous EEG and further evaluation of seizure like activity  - seizure precautions  - aspiration precautions  - Please contact Epilepsy team on call prior to administration of ativan    Neurology will follow until transfer to EMU service. Thank you for your consult. Please reach out with questions or concerns.        VTE Risk Mitigation (From admission, onward)           Ordered     enoxaparin injection 40 mg  Daily         06/30/24 2122     IP VTE HIGH RISK PATIENT  Once         06/30/24 2122     Place sequential compression device  Until discontinued         06/30/24 2122                      Tyron Hernández MD  Neurology  [facility] - Neurosurgery ([facility])  "

## 2024-07-01 NOTE — ASSESSMENT & PLAN NOTE
49F w/hx of cerebral aneurysm s/p clipping, HFpEF, HTN, migraines, and seizure disorder who presents with seizure like activity that started yesterday morning, with 3 separate episodes <5 minutes each. Presentation, while possibly with some non-epileptic components, still remains primarily concerning for seizure disorder. Concern for medication non-adherence on past presentation, but pt reports good adherence since discharge. Possibly recent viral syndrome may have lowered seizure threshold.    Plan:  - Neurology consulted, appreciate recommendations  - Will continue Keppra 1g BID at this time, as well as Topiramate 50mg qd.  - Keppra levels pending  - EEG ordered  - IV ativan 2mg q15min PRN x 2 doses for acute seizure > 5 min, or back to back seizure without return to baseline in between.  - Seizure precautions ordered

## 2024-07-02 VITALS
HEIGHT: 62 IN | TEMPERATURE: 98 F | WEIGHT: 158.75 LBS | BODY MASS INDEX: 29.21 KG/M2 | DIASTOLIC BLOOD PRESSURE: 87 MMHG | RESPIRATION RATE: 18 BRPM | OXYGEN SATURATION: 99 % | HEART RATE: 63 BPM | SYSTOLIC BLOOD PRESSURE: 173 MMHG

## 2024-07-02 PROBLEM — F44.7 FUNCTIONAL NEUROLOGICAL SYMPTOM DISORDER WITH MIXED SYMPTOMS: Status: ACTIVE | Noted: 2024-07-02

## 2024-07-02 LAB
ALBUMIN SERPL BCP-MCNC: 3.4 G/DL (ref 3.5–5.2)
ALP SERPL-CCNC: 127 U/L (ref 55–135)
ALT SERPL W/O P-5'-P-CCNC: 14 U/L (ref 10–44)
ANION GAP SERPL CALC-SCNC: 10 MMOL/L (ref 8–16)
AST SERPL-CCNC: 18 U/L (ref 10–40)
BASOPHILS # BLD AUTO: 0.05 K/UL (ref 0–0.2)
BASOPHILS NFR BLD: 0.8 % (ref 0–1.9)
BILIRUB SERPL-MCNC: 0.1 MG/DL (ref 0.1–1)
BUN SERPL-MCNC: 20 MG/DL (ref 6–20)
CALCIUM SERPL-MCNC: 9.2 MG/DL (ref 8.7–10.5)
CHLORIDE SERPL-SCNC: 109 MMOL/L (ref 95–110)
CO2 SERPL-SCNC: 21 MMOL/L (ref 23–29)
CREAT SERPL-MCNC: 1.3 MG/DL (ref 0.5–1.4)
DIFFERENTIAL METHOD BLD: ABNORMAL
EOSINOPHIL # BLD AUTO: 0.3 K/UL (ref 0–0.5)
EOSINOPHIL NFR BLD: 5.2 % (ref 0–8)
ERYTHROCYTE [DISTWIDTH] IN BLOOD BY AUTOMATED COUNT: 12.8 % (ref 11.5–14.5)
EST. GFR  (NO RACE VARIABLE): 50.7 ML/MIN/1.73 M^2
GLUCOSE SERPL-MCNC: 90 MG/DL (ref 70–110)
HCT VFR BLD AUTO: 32.3 % (ref 37–48.5)
HGB BLD-MCNC: 11.1 G/DL (ref 12–16)
IMM GRANULOCYTES # BLD AUTO: 0.01 K/UL (ref 0–0.04)
IMM GRANULOCYTES NFR BLD AUTO: 0.2 % (ref 0–0.5)
LYMPHOCYTES # BLD AUTO: 2.3 K/UL (ref 1–4.8)
LYMPHOCYTES NFR BLD: 36.5 % (ref 18–48)
MAGNESIUM SERPL-MCNC: 1.8 MG/DL (ref 1.6–2.6)
MCH RBC QN AUTO: 29.6 PG (ref 27–31)
MCHC RBC AUTO-ENTMCNC: 34.4 G/DL (ref 32–36)
MCV RBC AUTO: 86 FL (ref 82–98)
MONOCYTES # BLD AUTO: 0.6 K/UL (ref 0.3–1)
MONOCYTES NFR BLD: 9.3 % (ref 4–15)
NEUTROPHILS # BLD AUTO: 3.1 K/UL (ref 1.8–7.7)
NEUTROPHILS NFR BLD: 48 % (ref 38–73)
NRBC BLD-RTO: 0 /100 WBC
PHENYTOIN FREE SERPL-MCNC: <0.8 MCG/ML (ref 1–2)
PHENYTOIN, TOTAL: <0.8 MCG/ML (ref 10–20)
PHOSPHATE SERPL-MCNC: 3.2 MG/DL (ref 2.7–4.5)
PLATELET # BLD AUTO: 298 K/UL (ref 150–450)
PMV BLD AUTO: 11 FL (ref 9.2–12.9)
POCT GLUCOSE: 116 MG/DL (ref 70–110)
POCT GLUCOSE: 149 MG/DL (ref 70–110)
POTASSIUM SERPL-SCNC: 3.7 MMOL/L (ref 3.5–5.1)
PROT SERPL-MCNC: 6.4 G/DL (ref 6–8.4)
RBC # BLD AUTO: 3.75 M/UL (ref 4–5.4)
SODIUM SERPL-SCNC: 140 MMOL/L (ref 136–145)
WBC # BLD AUTO: 6.36 K/UL (ref 3.9–12.7)

## 2024-07-02 PROCEDURE — 36415 COLL VENOUS BLD VENIPUNCTURE: CPT

## 2024-07-02 PROCEDURE — 99239 HOSP IP/OBS DSCHRG MGMT >30: CPT | Mod: FS,,, | Performed by: PSYCHIATRY & NEUROLOGY

## 2024-07-02 PROCEDURE — 84100 ASSAY OF PHOSPHORUS: CPT

## 2024-07-02 PROCEDURE — 25000003 PHARM REV CODE 250

## 2024-07-02 PROCEDURE — 85025 COMPLETE CBC W/AUTO DIFF WBC: CPT

## 2024-07-02 PROCEDURE — 80053 COMPREHEN METABOLIC PANEL: CPT

## 2024-07-02 PROCEDURE — 83735 ASSAY OF MAGNESIUM: CPT

## 2024-07-02 RX ADMIN — AMLODIPINE BESYLATE 10 MG: 10 TABLET ORAL at 08:07

## 2024-07-02 RX ADMIN — TOPIRAMATE 50 MG: 25 TABLET, FILM COATED ORAL at 08:07

## 2024-07-02 RX ADMIN — ESCITALOPRAM OXALATE 20 MG: 20 TABLET ORAL at 08:07

## 2024-07-02 RX ADMIN — CARVEDILOL 25 MG: 25 TABLET, FILM COATED ORAL at 08:07

## 2024-07-02 RX ADMIN — LOSARTAN POTASSIUM 50 MG: 50 TABLET, FILM COATED ORAL at 08:07

## 2024-07-02 RX ADMIN — LEVETIRACETAM 1000 MG: 500 TABLET, FILM COATED ORAL at 08:07

## 2024-07-02 RX ADMIN — ATORVASTATIN CALCIUM 80 MG: 40 TABLET, FILM COATED ORAL at 08:07

## 2024-07-02 RX ADMIN — ASPIRIN 81 MG CHEWABLE TABLET 81 MG: 81 TABLET CHEWABLE at 08:07

## 2024-07-02 NOTE — ASSESSMENT & PLAN NOTE
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 Patient was transferred to [facility] for higher level of care and admitted to  with General Neurology consult. Patient transferred to EMU 7/2.     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 RHODES on 7/11  - Ambulatory referral to Neuropsychology placed

## 2024-07-02 NOTE — DISCHARGE SUMMARY
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 She reports that she went to bed on 6/29 with an uncomfortable epigastric pressure and a general feeling that she was unwell. She awoke the next morning and felt that her "mouth was crooked." She was alone during these events and called her son to her home. After his arrival, she reports having three separate episodes. One episode was described as an unresponsive episode followed by shaking of the left arm and leg that lasted about 5 minutes. Another episode was described as staring episode that lasted several minutes. EMS was called and patient received versed with a reported postictal state afterwards. Patient was taken to [place] ED, was reported to have had another staring episode, and was given ativan 1mg. Patient was transferred to [facility] ED for continuous EEG monitoring. She denies recent infectious symptom, alcohol/illicit substance use, and medication noncompliance.     Concerning her seizure history, patient developed clinical seizure events after the clipping of her L ICA aneurysms in 2022. She has had various semiologies described including left sided shaking, right sided shaking, and staring episodes. She has presented to the ED multiple times for suspected seizure activity without EEG evidence of epileptiform discharges. Notably, at the time of her clipping, EEG " demonstrated L hemispheric slowing without discharges. She previously followed with Dr. Zurita for AED management. She was most recently seen in February 2023 and her AED regimen was keppra 1500mg BID and topiramate 50mg BID. Since, her regimen has been altered over the course of multiple presentations, presently being keppra 750mg BID and topiramate 50mg BID.     Neurology was consulted for management and evaluation of breakthrough seizure.    * No surgery found *     Indwelling Lines/Drains at time of discharge:   Lines/Drains/Airways       None                 Hospital Course:   7/1>7/2: Transferred to EMU. 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 Patient requesting discharge home. Discharged home in stable condition on resumed regimen of Levetiracetam 750 mg BID and Topiramate 50 mg BID (Rx for migraines). Has scheduled follow up in Epilepsy clinic with Dr. RHODES on 7/11. Ambulatory referral to Neuropsychology placed.    Goals of Care Treatment Preferences:  Code Status: Full Code      Consults:   Consults (From admission, onward)          Status Ordering Provider     Inpatient consult to Neurology  Once        Provider:  (Not yet assigned)    Completed HARLAN BAZAN            Significant Labs: All pertinent lab results from the past 24 hours have been reviewed.    Significant Studies: I have reviewed all pertinent imaging results/findings within the past 24 hours.    Pending Diagnostic Studies:       None          Final Active Diagnoses:    Diagnosis Date Noted POA    PRINCIPAL PROBLEM:  Seizure disorder [G40.909] 08/30/2022 Yes    Functional neurological symptom disorder with mixed symptoms [F44.7] 07/02/2024 Yes    Migraines [G43.909] 06/30/2024 Yes    History of cerebral aneurysm repair [Z98.890, Z86.79] 02/07/2023 Not Applicable    Type 2 diabetes mellitus, without long-term current use  of insulin [E11.9] 08/23/2022 Yes    Mixed hyperlipidemia [E78.2] 06/07/2022 Yes    Aneurysm of internal carotid artery [I67.1] 10/04/2017 Yes    Thrombotic stroke involving left middle cerebral artery [I63.312] 10/03/2017 Yes    Chronic diastolic heart failure [I50.32] 10/03/2017 Yes     Chronic    Essential hypertension [I10] 03/05/2016 Yes      Problems Resolved During this Admission:       Neuro  * Seizure disorder  49F PMHx of HTN, HLD, DM2, anxiety/depression, migraines on Topiramate 50 mg BID, 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 Patient was transferred to [facility] for higher level of care and admitted to  with General Neurology consult. Patient transferred to EMU 7/2.     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 s/p craniotomy for  "clipping 7/2022 c/b subgaleal fluid collection s/p additional craniotomy for revision and washout  - Follows with Neurosurgery as outpatient for continued surveillance of untreated L M1/M2 aneurysm    Cardiac/Vascular  Essential hypertension  - Chronic  - Continue home antiHTN regimen  - PCP follow up as outpatient    Endocrine  Type 2 diabetes mellitus, without long-term current use of insulin  - Chronic, recent A1c of 5  - Metformin held on admit, resumed on discharge  - DM diet, low dose SSI during admission        Discharged Condition: stable    Disposition: Home or Self Care    Follow Up:   Follow-up Information       Clair Johnson, NP Follow up on 7/11/2024.    Specialty: Family Medicine  Why: Follow-up appointment at 8:15 am.  Contact information:  [address]  [address]  [facility]  [place] [address] [phone]                           Patient Instructions:      Ambulatory referral/consult to Adult Neuropsychology   Standing Status: Future   Referral Priority: Routine Referral Type: Psychiatric   Referral Reason: Specialty Services Required   Number of Visits Requested: 1       Medications:  Reconciled Home Medications:      Medication List        CONTINUE taking these medications      amitriptyline 75 MG tablet  Commonly known as: ELAVIL  Take 75 mg by mouth every evening.     amLODIPine 10 MG tablet  Commonly known as: NORVASC  Take 10 mg by mouth once daily.     atorvastatin 80 MG tablet  Commonly known as: LIPITOR  Take 1 tablet (80 mg total) by mouth once daily.     BD ULTRA-FINE MARIELY PEN NEEDLE 32 gauge x 5/32" Ndle  Generic drug: pen needle, diabetic  Use to inject insulin into the skin three times daily .     carvediloL 12.5 MG tablet  Commonly known as: COREG  Take 3 tablets (37.5 mg total) by mouth 2 (two) times daily.     EScitalopram oxalate 20 MG tablet  Commonly known as: LEXAPRO  Take 1 tablet (20 mg total) by mouth once daily.     levETIRAcetam 750 MG Tab  Commonly known " as: KEPPRA  Take 1 tablet (750 mg total) by mouth 2 (two) times daily.     losartan 50 MG tablet  Commonly known as: COZAAR  Take 1 tablet (50 mg total) by mouth once daily.     metFORMIN 500 MG ER 24hr tablet  Commonly known as: GLUCOPHAGE-XR  Take 500 mg by mouth 2 (two) times daily.     topiramate 50 MG tablet  Commonly known as: TOPAMAX  Take 1 tablet (50 mg total) by mouth 2 (two) times daily.     TRUE METRIX GLUCOSE METER Misc  Generic drug: blood-glucose meter  Use to check blood glucose 3 times daily.     TRUE METRIX GLUCOSE TEST STRIP Strp  Generic drug: blood sugar diagnostic  Use to check blood glucose 3 times daily.     TRUEPLUS LANCETS 30 gauge Misc  Generic drug: lancets  Use to check blood glucose 3 times daily.            Time spent on the discharge of patient: 60 minutes    Tricia Thacker PA-C  Neurology-Epilepsy  [facility]  Staff: Dr. Humphrey

## 2024-07-02 NOTE — ASSESSMENT & PLAN NOTE
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

## 2024-07-02 NOTE — ASSESSMENT & PLAN NOTE
- Chronic, recent A1c of 5  - Metformin held on admit, resume on discharge  - DM diet, low dose SSI during admission

## 2024-07-02 NOTE — HOSPITAL COURSE
7/1>7/2: Transferred to EMU. 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 Patient requesting discharge home. Discharged home in stable condition on resumed regimen of Levetiracetam 750 mg BID and Topiramate 50 mg BID (Rx for migraines). Has scheduled follow up in Epilepsy clinic with Dr. RHODES on 7/11. Ambulatory referral to Neuropsychology placed.

## 2024-07-02 NOTE — NURSING
RN removed PIV, visi, and tele monitor from pt. RN reviewed dc paperwork c pt @ BS. All questions, comments, concerns addressed. All belongings sent home c pt. Upon dc DAREN, SERGE, Josefina.     Anne Espinoza

## 2024-07-02 NOTE — PLAN OF CARE
[facility] Neurosurgery (Hospital)  Discharge Final Note    Primary Care Provider: Clair Johnson NP    Expected Discharge Date: 7/2/2024    Final Discharge Note (most recent)       Final Note - 07/02/24 1311          Final Note    Assessment Type Final Discharge Note (P)      Anticipated Discharge Disposition Home or Self Care (P)         Post-Acute Status    Post-Acute Authorization Other (P)      Other Status No Post-Acute Service Needs (P)                    Discharge home with family.  No post acute needs.      Colleen Cartagena LMSW  [facility]  [phone]    Future Appointments   Date Time Provider Department Center   7/11/2024 10:20 AM Radha Campos MD [facility] NEURO [facility]

## 2024-07-02 NOTE — PROCEDURES
EEG REPORT      Gina Jenkins  [id number]  1975    DATE OF SERVICE: 7/1/2024     -1,2    METHODOLOGY      Extended electroencephalographic recording is made while the patient is ambulatory and continuing normal daily activities.  Electrodes are placed according to the International 10-20 placement system and included T1 and T2 electrode placement.  Twenty four (24) channels of digital signal (sampling rate of 512/sec) was simultaneously recorded from the scalp including EKG and eye monitors.  Recording band pass was 0.1 to 100 hz and all data was stored digitally on the recorder.  The patient is instructed to press an event button when clinical symptoms occur and write the symptoms into a diary. Activation procedures which include photic stimulation, hyperventilation and instructing patients to perform simple task are done in selected patients.        The EEG is displayed on a monitor screen and can be reformatted into different montages for evaluation.  The entire recoding is submitted for computer assisted analysis to detect spike and electrographic seizure activity.  The entire recording is visually reviewed and the times identified by computer analysis as being spikes or seizures are reviewed again.  Compresses spectral analysis (CSA) is also performed on the activity recorded from each individual channel.  This is displayed as a power display of frequencies from 0 to 30 Hz over time.   The CSA analysis is done and displayed continuously.  This is reviewed for asymmetries in power between homologous areas of the scalp and for presence of changes in power which canbe seen when seizures occur.  Sections of suspected abnormalities on the CSA is then compared with the original EEG recording.  .     [employer] software was also utilized in the review of this study.  This software suite analyzes the EEG recording in multiple domains.  Coherence and rhythmicity is computed to identify EEG sections which may  contain organized seizures.  Each channel undergoes analysis to detect presence of spike and sharp waves which have special and morphological characteristic of epileptic activity.  The routine EEG recording is converted from spacial into frequency domain.  This is then displayed comparing homologous areas to identify areas of significant asymmetry.  Algorithm to identify non-cortically generated artifact is used to separate eye movement, EMG and other artifact from the EEG     Recording Times  Start on 7/1/2024  Stop on 7/2/2024    A total of 21:29:02 and 4:24:07 hours of EEG was recorded.      EEG FINDINGS:  Background activity:   The background rhythm was characterized by alpha and anterior dominant beta activity with a 10Hz posterior dominant alpha rhythm at 30-70 microvolts.   Symmetry and continuity: intermittent theta at T3>F7 in the waking state.     Sleep:   Normal sleep transients including sleep spindles, K complexes, vertex waves and POSTS were seen.    Activation procedures:   NA    Abnormal activity:   No epileptiform discharges, periodic discharges, lateralized rhythmic delta activity or electrographic seizures were seen.    Event 1 at 19:40  Patient begins moaning and diffuse tremulousness without posturing with eyes closed for about three minutes.  EEG shows normal waking rhythms with overlying movement artifact.    Event 2 at 05:10  Identical to Event 1    IMPRESSION:   Abnormal EEG due to regional cortical or subcortical dysfunction in the left frontotemporal region with no ongoing indications of seizure tendency.  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      Tello Humphrey MD  Neurology-Epilepsy.  [facility].

## 2024-07-02 NOTE — ASSESSMENT & PLAN NOTE
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

## 2024-07-03 ENCOUNTER — HOSPITAL ENCOUNTER (EMERGENCY)
Facility: HOSPITAL | Age: 49
Discharge: HOME | End: 2024-07-03
Attending: STUDENT IN AN ORGANIZED HEALTH CARE EDUCATION/TRAINING PROGRAM
Payer: MEDICAID

## 2024-07-03 VITALS
TEMPERATURE: 98 F | BODY MASS INDEX: 29.03 KG/M2 | SYSTOLIC BLOOD PRESSURE: 148 MMHG | RESPIRATION RATE: 18 BRPM | HEIGHT: 62 IN | HEART RATE: 63 BPM | OXYGEN SATURATION: 97 % | DIASTOLIC BLOOD PRESSURE: 70 MMHG

## 2024-07-03 DIAGNOSIS — R56.9 SEIZURE-LIKE ACTIVITY: Primary | ICD-10-CM

## 2024-07-03 LAB
ALBUMIN SERPL BCP-MCNC: 4.1 G/DL (ref 3.5–5.2)
ALP SERPL-CCNC: 137 U/L (ref 55–135)
ALT SERPL W/O P-5'-P-CCNC: 15 U/L (ref 10–44)
ANION GAP SERPL CALC-SCNC: 10 MMOL/L (ref 8–16)
AST SERPL-CCNC: 20 U/L (ref 10–40)
BASOPHILS # BLD AUTO: 0.06 K/UL (ref 0–0.2)
BASOPHILS NFR BLD: 0.8 % (ref 0–1.9)
BILIRUB SERPL-MCNC: 0.2 MG/DL (ref 0.1–1)
BUN SERPL-MCNC: 21 MG/DL (ref 6–20)
CALCIUM SERPL-MCNC: 9.6 MG/DL (ref 8.7–10.5)
CHLORIDE SERPL-SCNC: 109 MMOL/L (ref 95–110)
CK SERPL-CCNC: 81 U/L (ref 20–180)
CO2 SERPL-SCNC: 23 MMOL/L (ref 23–29)
CREAT SERPL-MCNC: 1.5 MG/DL (ref 0.5–1.4)
DIFFERENTIAL METHOD BLD: ABNORMAL
EOSINOPHIL # BLD AUTO: 0.4 K/UL (ref 0–0.5)
EOSINOPHIL NFR BLD: 4.9 % (ref 0–8)
ERYTHROCYTE [DISTWIDTH] IN BLOOD BY AUTOMATED COUNT: 13.2 % (ref 11.5–14.5)
EST. GFR  (NO RACE VARIABLE): 43 ML/MIN/1.73 M^2
GLUCOSE SERPL-MCNC: 93 MG/DL (ref 70–110)
HCT VFR BLD AUTO: 35.7 % (ref 37–48.5)
HGB BLD-MCNC: 12.6 G/DL (ref 12–16)
IMM GRANULOCYTES # BLD AUTO: 0.01 K/UL (ref 0–0.04)
IMM GRANULOCYTES NFR BLD AUTO: 0.1 % (ref 0–0.5)
LYMPHOCYTES # BLD AUTO: 1.8 K/UL (ref 1–4.8)
LYMPHOCYTES NFR BLD: 24.3 % (ref 18–48)
MCH RBC QN AUTO: 30.2 PG (ref 27–31)
MCHC RBC AUTO-ENTMCNC: 35.3 G/DL (ref 32–36)
MCV RBC AUTO: 86 FL (ref 82–98)
MONOCYTES # BLD AUTO: 0.5 K/UL (ref 0.3–1)
MONOCYTES NFR BLD: 7.3 % (ref 4–15)
NEUTROPHILS # BLD AUTO: 4.6 K/UL (ref 1.8–7.7)
NEUTROPHILS NFR BLD: 62.6 % (ref 38–73)
NRBC BLD-RTO: 0 /100 WBC
PLATELET # BLD AUTO: 323 K/UL (ref 150–450)
PMV BLD AUTO: 10.6 FL (ref 9.2–12.9)
POTASSIUM SERPL-SCNC: 4 MMOL/L (ref 3.5–5.1)
PROLACTIN SERPL IA-MCNC: 14.1 NG/ML (ref 5.2–26.5)
PROT SERPL-MCNC: 7.4 G/DL (ref 6–8.4)
RBC # BLD AUTO: 4.17 M/UL (ref 4–5.4)
SODIUM SERPL-SCNC: 142 MMOL/L (ref 136–145)
TROPONIN I SERPL DL<=0.01 NG/ML-MCNC: 0.01 NG/ML (ref 0–0.03)
WBC # BLD AUTO: 7.28 K/UL (ref 3.9–12.7)

## 2024-07-03 PROCEDURE — 99284 EMERGENCY DEPT VISIT MOD MDM: CPT | Mod: 25

## 2024-07-03 PROCEDURE — 84484 ASSAY OF TROPONIN QUANT: CPT | Performed by: STUDENT IN AN ORGANIZED HEALTH CARE EDUCATION/TRAINING PROGRAM

## 2024-07-03 PROCEDURE — 85025 COMPLETE CBC W/AUTO DIFF WBC: CPT | Performed by: STUDENT IN AN ORGANIZED HEALTH CARE EDUCATION/TRAINING PROGRAM

## 2024-07-03 PROCEDURE — 84146 ASSAY OF PROLACTIN: CPT | Performed by: STUDENT IN AN ORGANIZED HEALTH CARE EDUCATION/TRAINING PROGRAM

## 2024-07-03 PROCEDURE — 96360 HYDRATION IV INFUSION INIT: CPT

## 2024-07-03 PROCEDURE — 82550 ASSAY OF CK (CPK): CPT | Performed by: STUDENT IN AN ORGANIZED HEALTH CARE EDUCATION/TRAINING PROGRAM

## 2024-07-03 PROCEDURE — 80053 COMPREHEN METABOLIC PANEL: CPT | Performed by: STUDENT IN AN ORGANIZED HEALTH CARE EDUCATION/TRAINING PROGRAM

## 2024-07-03 PROCEDURE — 25000003 PHARM REV CODE 250: Performed by: STUDENT IN AN ORGANIZED HEALTH CARE EDUCATION/TRAINING PROGRAM

## 2024-07-03 RX ADMIN — SODIUM CHLORIDE 1000 ML: 9 INJECTION, SOLUTION INTRAVENOUS at 05:07

## 2024-07-03 NOTE — ED PROVIDER NOTES
Who she 2 days ago can prescribe Orajel also can not do anything about the she was ED Provider Note - 7/3/2024    History     Chief Complaint   Patient presents with    Seizures     PT to the Er via EMS [facility] with reports 3 seizures at home. Pt arrived [facility]. Pt discharged from [facility] yesterday where she was being treated for seizures       HPI     Gina Jenkins is a 49 y.o. year old female with past medical and surgical history as seen below, presenting with chief complaint of reported seizure.  On Keppra and Topamax at home.  Discharged from [facility] yesterday for evaluation of seizures.  During stay had continuous EEG which showed two episodes of presumed seizure that appeared to not have epileptic focus.  Concern for PNES.  This morning she reports 3 further seizures.  She was unable to describe the seizure activity to me.  EMS arrived just after completion of the final seizure, and noted no postictal status.        Past Medical History:   Diagnosis Date    Brain aneurysm     CHF (congestive heart failure)     H/O coronary angioplasty     Hypercholesteremia     Hypertension     Malignant hypertension     Migraine headache     Stroke 10/2017     Past Surgical History:   Procedure Laterality Date    CARDIAC CATHETERIZATION      CEREBRAL ANGIOGRAM      CLIP LIGATION OF INTRACRANIAL ANEURYSM BY CRANIOTOMY N/A 7/15/2022    Procedure: CRANIOTOMY, WITH ANEURYSM CLIPPING;  Surgeon: Timothy Diaz MD;  Location: [facility] OR [address] [place] [address];  Service: Neurosurgery;  Laterality: N/A;  PTERIONAL CRANIOTOMY WITH CLIP LIGATION OF L PCOMM, L ANTERIOR CHOROIDAL, L MCA  ANEURYSM, ANESTHESIA: GENERAL, BLOOD: TYPE&CROSS 2 UNITS, NEUROMONITORING: SEP, MEP, EEG, RADIOLOGY: C-ARM, POSITION: SUPINE, CASTILLO, CO-SURGERON: DR. LEXI DOMÍNGUEZ.    WOUND DEBRIDEMENT  8/27/2022    Procedure: DEBRIDEMENT, WOUND;  Surgeon: Timothy Diaz MD;  Location: [facility] OR [address] [place] [address];  Service: Neurosurgery;;         No family history on file.  Social  History     Tobacco Use    Smoking status: Every Day     Current packs/day: 0.33     Average packs/day: 0.3 packs/day for 31.5 years (10.4 ttl pk-yrs)     Types: Cigarettes     Start date: 1993    Smokeless tobacco: Never    Tobacco comments:     Enrolled in the [employer] on 6/7/22 ([facility] Member ID # [id number]). Pt is a 0.33 pk/day cigarette smoker x 31 yrs. She states ready to quit smoking. Ambulatory referral to Smoking Cessation clinic following hospital discharge.    Substance Use Topics    Alcohol use: Yes     Comment: occassionally    Drug use: No     Social Determinants of Health with Concerns     Tobacco Use: High Risk (6/10/2024)    Patient History     Smoking Tobacco Use: Every Day     Smokeless Tobacco Use: Never     Passive Exposure: Not on file   Alcohol Use: Patient Unable To Answer (7/1/2024)    AUDIT-C     Frequency of Alcohol Consumption: Patient unable to answer     Average Number of Drinks: Patient unable to answer     Frequency of Binge Drinking: Patient unable to answer   Financial Resource Strain: Patient Unable To Answer (7/1/2024)    Overall Financial Resource Strain (CARDIA)     Difficulty of Paying Living Expenses: Patient unable to answer   Food Insecurity: Patient Unable To Answer (7/1/2024)    Hunger Vital Sign     Worried About Running Out of Food in the Last Year: Patient unable to answer     Ran Out of Food in the Last Year: Patient unable to answer   Transportation Needs: Patient Unable To Answer (7/1/2024)    TRANSPORTATION NEEDS     Transportation : Patient unable to answer   Physical Activity: Patient Unable To Answer (7/1/2024)    Exercise Vital Sign     Days of Exercise per Week: Patient unable to answer     Minutes of Exercise per Session: Patient unable to answer   Recent Concern: Physical Activity - Inactive (6/18/2024)    Exercise Vital Sign     Days of Exercise per Week: 0 days     Minutes of Exercise per Session: 0 min   Stress: Patient Unable To Answer (7/1/2024)     "[employer] [place] - Occupational Stress Questionnaire     Feeling of Stress : Patient unable to answer   Recent Concern: Stress - Stress Concern Present (6/17/2024)    [employer] [place] [employer] - Occupational Stress Questionnaire     Feeling of Stress : Very much   Housing Stability: Patient Unable To Answer (7/1/2024)    Housing Stability Vital Sign     Unable to Pay for Housing in the Last Year: Patient unable to answer     Homeless in the Last Year: Patient unable to answer   Utilities: Patient Unable To Answer (7/1/2024)    [facility] Utilities     Threatened with loss of utilities: Patient unable to answer   Health Literacy: Patient Unable To Answer (7/1/2024)     Health Literacy     Frequency of need for help with medical instructions: Patient unable to respond   Recent Concern: Health Literacy - Inadequate Health Literacy (6/17/2024)     Health Literacy     Frequency of need for help with medical instructions: Sometimes   Social Isolation: Patient Unable To Answer (7/1/2024)    Social Isolation     Social Isolation: 6      Review of patient's allergies indicates:   Allergen Reactions    Lisinopril Swelling    Bactrim [sulfamethoxazole-trimethoprim] Rash    Ceftazidime Hives     Rash while on IV ceftazidime for planned 6 weeks.  See ED notes 9/12, 9/14 and ID clinic notes 9/16 and 9/28       Review of Systems     A full Review of Systems (ROS) was performed and was negative unless otherwise stated in the HPI.      Physical Exam     Vitals:    07/03/24 0510 07/03/24 0700 07/03/24 0750   BP: 139/87 (!) 148/70    Pulse: 72 70 63   Resp: 18 18    Temp: 98 °F (36.7 °C)     TempSrc: Oral     SpO2: 100% 99% 97%   Height: 5' 2" (1.575 m)          Physical Exam    Nursing note and vitals reviewed.  Constitutional: She appears well-developed and well-nourished. No distress.   HENT:   Head: Normocephalic and atraumatic.   Right Ear: External ear normal.   Left Ear: External ear " normal.   Nose: Nose normal.   Mouth/Throat: Oropharynx is clear and moist.   Eyes: Conjunctivae and EOM are normal. Pupils are equal, round, and reactive to light.   Neck: Neck supple.   Normal range of motion.  Cardiovascular:  Normal rate, regular rhythm, normal heart sounds and intact distal pulses.           Pulmonary/Chest: Breath sounds normal. No stridor. No respiratory distress. She has no wheezes. She has no rhonchi. She has no rales.   Abdominal: Abdomen is soft. Bowel sounds are normal. There is no abdominal tenderness.   Musculoskeletal:         General: No tenderness or edema. Normal range of motion.      Cervical back: Normal range of motion and neck supple.     Neurological: She is alert and oriented to person, place, and time. She has normal strength. No cranial nerve deficit or sensory deficit.   Skin: Skin is warm and dry. No rash noted.   Psychiatric: She has a normal mood and affect. Thought content normal.         Lab Results- Independently reviewed by myself      Labs Reviewed   CBC W/ AUTO DIFFERENTIAL - Abnormal; Notable for the following components:       Result Value    Hematocrit 35.7 (*)     All other components within normal limits   COMPREHENSIVE METABOLIC PANEL - Abnormal; Notable for the following components:    BUN 21 (*)     Creatinine 1.5 (*)     Alkaline Phosphatase 137 (*)     eGFR 43 (*)     All other components within normal limits   TROPONIN I   CK   PROLACTIN           Imaging     Imaging Results    None                    ED Course         Procedures         Orders Placed This Encounter    CBC auto differential    Comprehensive metabolic panel    Troponin I    CPK    Prolactin    Insert Saline lock IV    sodium chloride 0.9% bolus 1,000 mL 1,000 mL          ED Course as of 07/03/24 2329 Wed Jul 03, 2024   0512 EEG FINDINGS:  Background activity:   The background rhythm was characterized by alpha and anterior dominant beta activity with a 10Hz posterior dominant alpha  rhythm at 30-70 microvolts.   Symmetry and continuity: intermittent theta at T3>F7 in the waking state.     Sleep:   Normal sleep transients including sleep spindles, K complexes, vertex waves and POSTS were seen.     Activation procedures:   NA     Abnormal activity:   No epileptiform discharges, periodic discharges, lateralized rhythmic delta activity or electrographic seizures were seen.     Event 1 at 19:40  Patient begins moaning and diffuse tremulousness without posturing with eyes closed for about three minutes.  EEG shows normal waking rhythms with overlying movement artifact.     Event 2 at 05:10  Identical to Event 1     IMPRESSION:   Abnormal EEG due to regional cortical or subcortical dysfunction in the left frontotemporal region with no ongoing indications of seizure tendency.  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        Tello Humphrey MD  Neurology-Epilepsy.  [facility].   [KB]   0634 Troponin I: 0.015 [NP]   0634 CPK: 81 [NP]   0634 WBC: 7.28 [NP]   0634 Hemoglobin: 12.6 [NP]   0634 Hematocrit(!): 35.7 [NP]   0634 Platelet Count: 323 [NP]   0634 Sodium: 142 [NP]   0634 Potassium: 4.0 [NP]   0634 Chloride: 109 [NP]   0634 CO2: 23 [NP]   0634 Glucose: 93 [NP]   0634 BUN(!): 21 [NP]   0634 Creatinine(!): 1.5 [NP]   0741 Workup unremarkable here.  Patient stable without any further seizure-like episodes.  Based on previous history of no postictal period, believe that patient likely had a nonepileptic seizure-like episode.  Explained in depth, however patient may have difficulty in comprehending.  Patient instructed to continue medicine as prescribed, follow-up with PCP and/or neurologist as outpatient. [NP]      ED Course User Index  [KB] Hipolito Armenta MD  [NP] Laureano Rodriguez MD              Medical Decision Making       The patient's list of active medical problems, social history, medications, and allergies as documented  per RN staff has been reviewed.           Medical Decision Making  49-year-old female presents for seizure-like activity at home.  Epileptic seizures versus psychogenic nonepileptic seizures.  No postictal period.  Atypical description of episodes.  Prolactin and CPK unremarkable.  Workup was initiated by myself with transition of care to Dr. Rodriguez pending lab work, reassessment, and ultimate disposition.    Problems Addressed:  Seizure-like activity: acute illness or injury    Amount and/or Complexity of Data Reviewed  Independent Historian: EMS  External Data Reviewed: labs, radiology and notes.  Labs: ordered.                    Clinical Impression       Follow-up Information       Follow up With Specialties Details Why Contact Info    Clair Johnson, NP Family Medicine Schedule an appointment as soon as possible for a visit   [address]  [address]  [facility] [address] [phone]              Referrals:  No orders of the defined types were placed in this encounter.      Disposition   ED Disposition Condition    Discharge Stable            Diagnosis    ICD-10-CM ICD-9-CM   1. Seizure-like activity  R56.9 780.39           Hipolito Armenta MD        07/03/2024          DISCLAIMER: This note was prepared with [employer] voice recognition transcription software. Garbled syntax, mangled pronouns, and other bizarre constructions may be attributed to that software system.       Hipolito Armenta MD  07/03/24 0580

## 2024-07-03 NOTE — DISCHARGE INSTRUCTIONS
Thank you for coming in to see us today! It was nice to meet you, and I hope you feel better soon. Please feel free to return to the ER at any time should your symptoms get worse, or if you have different emergent concerns.    Our goal in the emergency department is to always give you outstanding care and exceptional service. You may receive a survey by mail or e-mail in the next week regarding your experience in our ED. We would greatly appreciate your completing and returning the survey. Your feedback provides us with a way to recognize our staff who give very good care and it helps us learn how to improve when your experience was below our aspiration of excellence.       Sincerely,    Laureano Rodriguez MD  Medical Director, Emergency Department  [facility]

## 2024-07-05 LAB — LEVETIRACETAM SERPL-MCNC: 44.4 UG/ML (ref 3–60)

## 2024-07-11 ENCOUNTER — OFFICE VISIT (OUTPATIENT)
Dept: NEUROLOGY | Facility: CLINIC | Age: 49
End: 2024-07-11
Payer: MEDICAID

## 2024-07-11 VITALS
BODY MASS INDEX: 29.56 KG/M2 | DIASTOLIC BLOOD PRESSURE: 88 MMHG | HEIGHT: 62 IN | SYSTOLIC BLOOD PRESSURE: 138 MMHG | HEART RATE: 66 BPM | WEIGHT: 160.63 LBS

## 2024-07-11 DIAGNOSIS — F44.5 PSYCHOGENIC NONEPILEPTIC SEIZURE: Primary | ICD-10-CM

## 2024-07-11 DIAGNOSIS — G40.209 LOCALIZATION-RELATED (FOCAL) (PARTIAL) SYMPTOMATIC EPILEPSY AND EPILEPTIC SYNDROMES WITH COMPLEX PARTIAL SEIZURES, NOT INTRACTABLE, WITHOUT STATUS EPILEPTICUS: ICD-10-CM

## 2024-07-11 PROCEDURE — 99999 PR PBB SHADOW E&M-EST. PATIENT-LVL IV: CPT | Mod: PBBFAC,,, | Performed by: STUDENT IN AN ORGANIZED HEALTH CARE EDUCATION/TRAINING PROGRAM

## 2024-07-11 PROCEDURE — 3075F SYST BP GE 130 - 139MM HG: CPT | Mod: CPTII,,, | Performed by: STUDENT IN AN ORGANIZED HEALTH CARE EDUCATION/TRAINING PROGRAM

## 2024-07-11 PROCEDURE — 4010F ACE/ARB THERAPY RXD/TAKEN: CPT | Mod: CPTII,,, | Performed by: STUDENT IN AN ORGANIZED HEALTH CARE EDUCATION/TRAINING PROGRAM

## 2024-07-11 PROCEDURE — 1111F DSCHRG MED/CURRENT MED MERGE: CPT | Mod: CPTII,,, | Performed by: STUDENT IN AN ORGANIZED HEALTH CARE EDUCATION/TRAINING PROGRAM

## 2024-07-11 PROCEDURE — 1159F MED LIST DOCD IN RCRD: CPT | Mod: CPTII,,, | Performed by: STUDENT IN AN ORGANIZED HEALTH CARE EDUCATION/TRAINING PROGRAM

## 2024-07-11 PROCEDURE — 3079F DIAST BP 80-89 MM HG: CPT | Mod: CPTII,,, | Performed by: STUDENT IN AN ORGANIZED HEALTH CARE EDUCATION/TRAINING PROGRAM

## 2024-07-11 PROCEDURE — 99212 OFFICE O/P EST SF 10 MIN: CPT | Mod: S$PBB,,, | Performed by: STUDENT IN AN ORGANIZED HEALTH CARE EDUCATION/TRAINING PROGRAM

## 2024-07-11 PROCEDURE — 3044F HG A1C LEVEL LT 7.0%: CPT | Mod: CPTII,,, | Performed by: STUDENT IN AN ORGANIZED HEALTH CARE EDUCATION/TRAINING PROGRAM

## 2024-07-11 PROCEDURE — 99214 OFFICE O/P EST MOD 30 MIN: CPT | Mod: PBBFAC | Performed by: STUDENT IN AN ORGANIZED HEALTH CARE EDUCATION/TRAINING PROGRAM

## 2024-07-11 PROCEDURE — 3008F BODY MASS INDEX DOCD: CPT | Mod: CPTII,,, | Performed by: STUDENT IN AN ORGANIZED HEALTH CARE EDUCATION/TRAINING PROGRAM

## 2024-07-11 RX ORDER — LEVETIRACETAM 750 MG/1
750 TABLET ORAL 2 TIMES DAILY
Qty: 180 TABLET | Refills: 3 | Status: SHIPPED | OUTPATIENT
Start: 2024-07-11

## 2024-07-11 RX ORDER — TOPIRAMATE 50 MG/1
50 TABLET, FILM COATED ORAL 2 TIMES DAILY
Qty: 180 TABLET | Refills: 3 | Status: SHIPPED | OUTPATIENT
Start: 2024-07-11

## 2024-07-11 RX ORDER — TRAMADOL HYDROCHLORIDE 50 MG/1
50 TABLET ORAL 2 TIMES DAILY
COMMUNITY
Start: 2024-07-07

## 2024-07-11 RX ORDER — LEVETIRACETAM 750 MG/1
750 TABLET ORAL 2 TIMES DAILY
Qty: 120 TABLET | Refills: 0 | Status: SHIPPED | OUTPATIENT
Start: 2024-07-11 | End: 2024-07-11

## 2024-07-11 NOTE — PATIENT INSTRUCTIONS
VISIT FOLLOW UP    It was nice to see you today.  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   Continue the Keppra and Topamax as prescribed.  The topamax will also help your migraines.  We will check with neurosurgery whether you need a follow up appointment.   Follow up in 3-4 months.    Dr. RHODES's contact information: office phone [phone], or contact via [email or web address]    Seizure precautions:    For emergencies, please call 911 or proceed directly to the nearest emergency room only if you can safely do so.    Seizures may happen at any time. It is important to take certain precautions to maintain your safety.     You should not drive unless you have been cleared by your physicians as well as the [facility].     When possible, take showers instead of baths, as it is possible to drown in even shallow water during a seizure. Do not swim unsupervised or in open water where rescue could be difficult. Do not climb to heights and do not operate heavy machinery. When cooking, use the back burners of the stove and avoid open flames or hot stove tops. Avoid any activities which could be dangerous in the event of a loss of consciousness.
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
## 2024-07-15 ENCOUNTER — PATIENT MESSAGE (OUTPATIENT)
Dept: NEUROLOGY | Facility: CLINIC | Age: 49
End: 2024-07-15
Payer: MEDICAID

## 2024-07-25 ENCOUNTER — TELEPHONE (OUTPATIENT)
Dept: NEUROSURGERY | Facility: CLINIC | Age: 49
End: 2024-07-25
Payer: MEDICAID

## 2024-07-25 NOTE — TELEPHONE ENCOUNTER
----- Message from Poonam Joshua MA sent at 7/24/2024  4:58 PM CDT -----  Regarding: FW: mutual patient    ----- Message -----  From: Blanca Avila PA-C  Sent: 7/24/2024   4:20 PM CDT  To: Joe Aguirre S Staff  Subject: FW: mutual patient                               Can you get her scheduled for follow-up in clinic with MRA brain to monitor her treated and untreated aneurysms? Shivani ordered it in November.    Thanks!  ----- Message -----  From: Radha Campos MD  Sent: 7/11/2024  11:16 AM CDT  To: Marlene Robison PA-C  Subject: mutual patient                                   Good morning,    I am following this mutual patient for nonepileptic events and suspected concurrent epilepsy.  Just wanted to check whether she needs a follow up appointment with you guys.  She has a history of left ICA aneurysm s/p clipping and has a left M2 MCA aneurysm which was left untreated.  Your last note mentioned repeat imaging in 6 months which she had while inpatient (CTA) and everything was stable.  I told the patient that you would probably recommend continued surveillance but just wanted to check whether you needed to see her again or any other workup at this time.    Neha Martinez

## 2024-08-04 ENCOUNTER — HOSPITAL ENCOUNTER (EMERGENCY)
Facility: HOSPITAL | Age: 49
Discharge: HOME | End: 2024-08-05
Attending: STUDENT IN AN ORGANIZED HEALTH CARE EDUCATION/TRAINING PROGRAM
Payer: MEDICAID

## 2024-08-04 DIAGNOSIS — F44.5 PSYCHOGENIC NONEPILEPTIC SEIZURE: ICD-10-CM

## 2024-08-04 DIAGNOSIS — I62.9 INTRACRANIAL HEMORRHAGE: Primary | ICD-10-CM

## 2024-08-04 LAB
ALBUMIN SERPL BCP-MCNC: 3.4 G/DL (ref 3.5–5.2)
ALP SERPL-CCNC: 120 U/L (ref 55–135)
ALT SERPL W/O P-5'-P-CCNC: 13 U/L (ref 10–44)
ANION GAP SERPL CALC-SCNC: 12 MMOL/L (ref 8–16)
AST SERPL-CCNC: 16 U/L (ref 10–40)
BASOPHILS # BLD AUTO: 0.04 K/UL (ref 0–0.2)
BASOPHILS NFR BLD: 0.7 % (ref 0–1.9)
BILIRUB SERPL-MCNC: 0.2 MG/DL (ref 0.1–1)
BUN SERPL-MCNC: 23 MG/DL (ref 6–20)
CALCIUM SERPL-MCNC: 8.4 MG/DL (ref 8.7–10.5)
CHLORIDE SERPL-SCNC: 118 MMOL/L (ref 95–110)
CO2 SERPL-SCNC: 14 MMOL/L (ref 23–29)
CREAT SERPL-MCNC: 1.8 MG/DL (ref 0.5–1.4)
DIFFERENTIAL METHOD BLD: ABNORMAL
EOSINOPHIL # BLD AUTO: 0.2 K/UL (ref 0–0.5)
EOSINOPHIL NFR BLD: 3.4 % (ref 0–8)
ERYTHROCYTE [DISTWIDTH] IN BLOOD BY AUTOMATED COUNT: 14.2 % (ref 11.5–14.5)
EST. GFR  (NO RACE VARIABLE): 34 ML/MIN/1.73 M^2
GLUCOSE SERPL-MCNC: 79 MG/DL (ref 70–110)
HCG INTACT+B SERPL-ACNC: 5.2 MIU/ML
HCT VFR BLD AUTO: 30.5 % (ref 37–48.5)
HGB BLD-MCNC: 10.1 G/DL (ref 12–16)
IMM GRANULOCYTES # BLD AUTO: 0.01 K/UL (ref 0–0.04)
IMM GRANULOCYTES NFR BLD AUTO: 0.2 % (ref 0–0.5)
LYMPHOCYTES # BLD AUTO: 2.1 K/UL (ref 1–4.8)
LYMPHOCYTES NFR BLD: 37 % (ref 18–48)
MCH RBC QN AUTO: 29.2 PG (ref 27–31)
MCHC RBC AUTO-ENTMCNC: 33.1 G/DL (ref 32–36)
MCV RBC AUTO: 88 FL (ref 82–98)
MONOCYTES # BLD AUTO: 0.5 K/UL (ref 0.3–1)
MONOCYTES NFR BLD: 8.6 % (ref 4–15)
NEUTROPHILS # BLD AUTO: 2.8 K/UL (ref 1.8–7.7)
NEUTROPHILS NFR BLD: 50.1 % (ref 38–73)
NRBC BLD-RTO: 0 /100 WBC
PLATELET # BLD AUTO: 276 K/UL (ref 150–450)
PMV BLD AUTO: 10.6 FL (ref 9.2–12.9)
POCT GLUCOSE: 76 MG/DL (ref 70–110)
POTASSIUM SERPL-SCNC: 3.3 MMOL/L (ref 3.5–5.1)
PROT SERPL-MCNC: 6.2 G/DL (ref 6–8.4)
RBC # BLD AUTO: 3.46 M/UL (ref 4–5.4)
SODIUM SERPL-SCNC: 144 MMOL/L (ref 136–145)
WBC # BLD AUTO: 5.6 K/UL (ref 3.9–12.7)

## 2024-08-04 PROCEDURE — 63600175 PHARM REV CODE 636 W HCPCS

## 2024-08-04 PROCEDURE — 63600175 PHARM REV CODE 636 W HCPCS: Performed by: STUDENT IN AN ORGANIZED HEALTH CARE EDUCATION/TRAINING PROGRAM

## 2024-08-04 PROCEDURE — 96374 THER/PROPH/DIAG INJ IV PUSH: CPT

## 2024-08-04 PROCEDURE — 99285 EMERGENCY DEPT VISIT HI MDM: CPT | Mod: 25

## 2024-08-04 PROCEDURE — 84702 CHORIONIC GONADOTROPIN TEST: CPT | Performed by: STUDENT IN AN ORGANIZED HEALTH CARE EDUCATION/TRAINING PROGRAM

## 2024-08-04 PROCEDURE — 85025 COMPLETE CBC W/AUTO DIFF WBC: CPT | Performed by: STUDENT IN AN ORGANIZED HEALTH CARE EDUCATION/TRAINING PROGRAM

## 2024-08-04 PROCEDURE — 82962 GLUCOSE BLOOD TEST: CPT

## 2024-08-04 PROCEDURE — 80053 COMPREHEN METABOLIC PANEL: CPT | Performed by: STUDENT IN AN ORGANIZED HEALTH CARE EDUCATION/TRAINING PROGRAM

## 2024-08-04 PROCEDURE — 96375 TX/PRO/DX INJ NEW DRUG ADDON: CPT

## 2024-08-04 RX ORDER — LEVETIRACETAM 500 MG/5ML
1000 INJECTION, SOLUTION, CONCENTRATE INTRAVENOUS
Status: COMPLETED | OUTPATIENT
Start: 2024-08-04 | End: 2024-08-04

## 2024-08-04 RX ORDER — LORAZEPAM 2 MG/ML
INJECTION INTRAMUSCULAR
Status: COMPLETED
Start: 2024-08-04 | End: 2024-08-04

## 2024-08-04 RX ORDER — LORAZEPAM 2 MG/ML
1 INJECTION INTRAMUSCULAR
Status: COMPLETED | OUTPATIENT
Start: 2024-08-04 | End: 2024-08-04

## 2024-08-04 RX ADMIN — LEVETIRACETAM 1000 MG: 100 INJECTION, SOLUTION INTRAVENOUS at 09:08

## 2024-08-04 RX ADMIN — LORAZEPAM 1 MG: 2 INJECTION INTRAMUSCULAR at 09:08

## 2024-08-04 RX ADMIN — LORAZEPAM 1 MG: 2 INJECTION INTRAMUSCULAR; INTRAVENOUS at 09:08

## 2024-08-04 RX ADMIN — IOHEXOL 100 ML: 350 INJECTION, SOLUTION INTRAVENOUS at 11:08

## 2024-08-05 VITALS
BODY MASS INDEX: 31.28 KG/M2 | RESPIRATION RATE: 20 BRPM | HEART RATE: 69 BPM | HEIGHT: 62 IN | DIASTOLIC BLOOD PRESSURE: 67 MMHG | OXYGEN SATURATION: 100 % | TEMPERATURE: 98 F | WEIGHT: 170 LBS | SYSTOLIC BLOOD PRESSURE: 102 MMHG

## 2024-08-05 PROCEDURE — 25500020 PHARM REV CODE 255: Performed by: STUDENT IN AN ORGANIZED HEALTH CARE EDUCATION/TRAINING PROGRAM

## 2024-08-08 ENCOUNTER — TELEPHONE (OUTPATIENT)
Dept: NEUROLOGY | Facility: CLINIC | Age: 49
End: 2024-08-08
Payer: MEDICAID

## 2024-08-23 ENCOUNTER — HOSPITAL ENCOUNTER (OUTPATIENT)
Dept: RADIOLOGY | Facility: HOSPITAL | Age: 49
Discharge: HOME | End: 2024-08-23
Attending: PHYSICIAN ASSISTANT
Payer: MEDICAID

## 2024-08-23 ENCOUNTER — HOSPITAL ENCOUNTER (OUTPATIENT)
Facility: HOSPITAL | Age: 49
Discharge: HOME | End: 2024-08-24
Attending: STUDENT IN AN ORGANIZED HEALTH CARE EDUCATION/TRAINING PROGRAM | Admitting: STUDENT IN AN ORGANIZED HEALTH CARE EDUCATION/TRAINING PROGRAM
Payer: MEDICAID

## 2024-08-23 DIAGNOSIS — R56.9 SEIZURE-LIKE ACTIVITY: Primary | ICD-10-CM

## 2024-08-23 DIAGNOSIS — E11.9 TYPE 2 DIABETES MELLITUS WITHOUT COMPLICATION, WITHOUT LONG-TERM CURRENT USE OF INSULIN: ICD-10-CM

## 2024-08-23 DIAGNOSIS — G40.909 SEIZURE DISORDER: ICD-10-CM

## 2024-08-23 DIAGNOSIS — I67.1 ANEURYSM OF MIDDLE CEREBRAL ARTERY: ICD-10-CM

## 2024-08-23 DIAGNOSIS — Z86.73 H/O: CVA (CEREBROVASCULAR ACCIDENT): ICD-10-CM

## 2024-08-23 DIAGNOSIS — R07.9 CHEST PAIN: ICD-10-CM

## 2024-08-23 DIAGNOSIS — N17.9 AKI (ACUTE KIDNEY INJURY): ICD-10-CM

## 2024-08-23 DIAGNOSIS — I67.1 CEREBRAL ANEURYSM, NONRUPTURED: ICD-10-CM

## 2024-08-23 DIAGNOSIS — F44.7 FUNCTIONAL NEUROLOGICAL SYMPTOM DISORDER WITH MIXED SYMPTOMS: ICD-10-CM

## 2024-08-23 LAB
ALBUMIN SERPL BCP-MCNC: 3.3 G/DL (ref 3.5–5.2)
ALLENS TEST: ABNORMAL
ALLENS TEST: NORMAL
ALP SERPL-CCNC: 146 U/L (ref 55–135)
ALT SERPL W/O P-5'-P-CCNC: 21 U/L (ref 10–44)
ANION GAP SERPL CALC-SCNC: 8 MMOL/L (ref 8–16)
AST SERPL-CCNC: 20 U/L (ref 10–40)
B-HCG UR QL: NEGATIVE
BACTERIA #/AREA URNS AUTO: ABNORMAL /HPF
BASOPHILS # BLD AUTO: 0.06 K/UL (ref 0–0.2)
BASOPHILS NFR BLD: 1.1 % (ref 0–1.9)
BILIRUB SERPL-MCNC: 0.2 MG/DL (ref 0.1–1)
BILIRUB UR QL STRIP: NEGATIVE
BUN SERPL-MCNC: 18 MG/DL (ref 6–20)
CALCIUM SERPL-MCNC: 8.7 MG/DL (ref 8.7–10.5)
CAOX CRY UR QL COMP ASSIST: ABNORMAL
CHLORIDE SERPL-SCNC: 112 MMOL/L (ref 95–110)
CLARITY UR REFRACT.AUTO: CLEAR
CO2 SERPL-SCNC: 19 MMOL/L (ref 23–29)
COLOR UR AUTO: YELLOW
CREAT SERPL-MCNC: 1.6 MG/DL (ref 0.5–1.4)
CTP QC/QA: YES
DIFFERENTIAL METHOD BLD: ABNORMAL
EOSINOPHIL # BLD AUTO: 0.3 K/UL (ref 0–0.5)
EOSINOPHIL NFR BLD: 5.4 % (ref 0–8)
ERYTHROCYTE [DISTWIDTH] IN BLOOD BY AUTOMATED COUNT: 14.4 % (ref 11.5–14.5)
EST. GFR  (NO RACE VARIABLE): 39.5 ML/MIN/1.73 M^2
GLUCOSE SERPL-MCNC: 89 MG/DL (ref 70–110)
GLUCOSE UR QL STRIP: NEGATIVE
HCO3 UR-SCNC: 18 MMOL/L (ref 24–28)
HCT VFR BLD AUTO: 32.8 % (ref 37–48.5)
HCT VFR BLD CALC: 31 %PCV (ref 36–54)
HGB BLD-MCNC: 11 G/DL (ref 12–16)
HGB UR QL STRIP: NEGATIVE
HYALINE CASTS UR QL AUTO: 3 /LPF
IMM GRANULOCYTES # BLD AUTO: 0.01 K/UL (ref 0–0.04)
IMM GRANULOCYTES NFR BLD AUTO: 0.2 % (ref 0–0.5)
KETONES UR QL STRIP: NEGATIVE
LDH SERPL L TO P-CCNC: 0.59 MMOL/L (ref 0.5–2.2)
LEUKOCYTE ESTERASE UR QL STRIP: ABNORMAL
LYMPHOCYTES # BLD AUTO: 2.1 K/UL (ref 1–4.8)
LYMPHOCYTES NFR BLD: 38.1 % (ref 18–48)
MCH RBC QN AUTO: 29.7 PG (ref 27–31)
MCHC RBC AUTO-ENTMCNC: 33.5 G/DL (ref 32–36)
MCV RBC AUTO: 89 FL (ref 82–98)
MICROSCOPIC COMMENT: ABNORMAL
MONOCYTES # BLD AUTO: 0.5 K/UL (ref 0.3–1)
MONOCYTES NFR BLD: 9.5 % (ref 4–15)
NEUTROPHILS # BLD AUTO: 2.5 K/UL (ref 1.8–7.7)
NEUTROPHILS NFR BLD: 45.7 % (ref 38–73)
NITRITE UR QL STRIP: NEGATIVE
NRBC BLD-RTO: 0 /100 WBC
PCO2 BLDA: 28.6 MMHG (ref 35–45)
PH SMN: 7.41 [PH] (ref 7.35–7.45)
PH UR STRIP: 6 [PH] (ref 5–8)
PLATELET # BLD AUTO: 224 K/UL (ref 150–450)
PMV BLD AUTO: 11.3 FL (ref 9.2–12.9)
PO2 BLDA: 80 MMHG (ref 40–60)
POC BE: -7 MMOL/L
POC IONIZED CALCIUM: 1.12 MMOL/L (ref 1.06–1.42)
POC SATURATED O2: 96 % (ref 95–100)
POC TCO2: 19 MMOL/L (ref 24–29)
POTASSIUM BLD-SCNC: 3.6 MMOL/L (ref 3.5–5.1)
POTASSIUM SERPL-SCNC: 3.7 MMOL/L (ref 3.5–5.1)
PROT SERPL-MCNC: 6.3 G/DL (ref 6–8.4)
PROT UR QL STRIP: ABNORMAL
RBC # BLD AUTO: 3.7 M/UL (ref 4–5.4)
RBC #/AREA URNS AUTO: 1 /HPF (ref 0–4)
SAMPLE: ABNORMAL
SAMPLE: NORMAL
SITE: ABNORMAL
SITE: NORMAL
SODIUM BLD-SCNC: 140 MMOL/L (ref 136–145)
SODIUM SERPL-SCNC: 139 MMOL/L (ref 136–145)
SP GR UR STRIP: 1.02 (ref 1–1.03)
SQUAMOUS #/AREA URNS AUTO: 4 /HPF
URN SPEC COLLECT METH UR: ABNORMAL
WBC # BLD AUTO: 5.38 K/UL (ref 3.9–12.7)
WBC #/AREA URNS AUTO: 4 /HPF (ref 0–5)

## 2024-08-23 PROCEDURE — 99900035 HC TECH TIME PER 15 MIN (STAT)

## 2024-08-23 PROCEDURE — 63600175 PHARM REV CODE 636 W HCPCS: Performed by: STUDENT IN AN ORGANIZED HEALTH CARE EDUCATION/TRAINING PROGRAM

## 2024-08-23 PROCEDURE — 81025 URINE PREGNANCY TEST: CPT | Performed by: STUDENT IN AN ORGANIZED HEALTH CARE EDUCATION/TRAINING PROGRAM

## 2024-08-23 PROCEDURE — 82962 GLUCOSE BLOOD TEST: CPT

## 2024-08-23 PROCEDURE — 95720 EEG PHY/QHP EA INCR W/VEEG: CPT | Mod: ,,, | Performed by: PSYCHIATRY & NEUROLOGY

## 2024-08-23 PROCEDURE — A9585 GADOBUTROL INJECTION: HCPCS | Performed by: PHYSICIAN ASSISTANT

## 2024-08-23 PROCEDURE — 81001 URINALYSIS AUTO W/SCOPE: CPT

## 2024-08-23 PROCEDURE — 82803 BLOOD GASES ANY COMBINATION: CPT

## 2024-08-23 PROCEDURE — 70546 MR ANGIOGRAPH HEAD W/O&W/DYE: CPT | Mod: 26,,, | Performed by: STUDENT IN AN ORGANIZED HEALTH CARE EDUCATION/TRAINING PROGRAM

## 2024-08-23 PROCEDURE — 25500020 PHARM REV CODE 255: Performed by: PHYSICIAN ASSISTANT

## 2024-08-23 PROCEDURE — 70546 MR ANGIOGRAPH HEAD W/O&W/DYE: CPT | Mod: TC

## 2024-08-23 PROCEDURE — 93010 ELECTROCARDIOGRAM REPORT: CPT | Mod: ,,, | Performed by: INTERNAL MEDICINE

## 2024-08-23 PROCEDURE — 85014 HEMATOCRIT: CPT

## 2024-08-23 PROCEDURE — 80053 COMPREHEN METABOLIC PANEL: CPT

## 2024-08-23 PROCEDURE — 84132 ASSAY OF SERUM POTASSIUM: CPT

## 2024-08-23 PROCEDURE — 99285 EMERGENCY DEPT VISIT HI MDM: CPT | Mod: 25

## 2024-08-23 PROCEDURE — 82800 BLOOD PH: CPT

## 2024-08-23 PROCEDURE — 85025 COMPLETE CBC W/AUTO DIFF WBC: CPT

## 2024-08-23 PROCEDURE — 84295 ASSAY OF SERUM SODIUM: CPT

## 2024-08-23 PROCEDURE — 80201 ASSAY OF TOPIRAMATE: CPT | Performed by: STUDENT IN AN ORGANIZED HEALTH CARE EDUCATION/TRAINING PROGRAM

## 2024-08-23 PROCEDURE — 93005 ELECTROCARDIOGRAM TRACING: CPT

## 2024-08-23 PROCEDURE — 80177 DRUG SCRN QUAN LEVETIRACETAM: CPT | Performed by: STUDENT IN AN ORGANIZED HEALTH CARE EDUCATION/TRAINING PROGRAM

## 2024-08-23 PROCEDURE — 83605 ASSAY OF LACTIC ACID: CPT

## 2024-08-23 PROCEDURE — 96374 THER/PROPH/DIAG INJ IV PUSH: CPT

## 2024-08-23 PROCEDURE — 82330 ASSAY OF CALCIUM: CPT

## 2024-08-23 RX ORDER — LEVETIRACETAM 500 MG/5ML
2000 INJECTION, SOLUTION, CONCENTRATE INTRAVENOUS
Status: COMPLETED | OUTPATIENT
Start: 2024-08-23 | End: 2024-08-23

## 2024-08-23 RX ORDER — LORAZEPAM 2 MG/ML
INJECTION INTRAMUSCULAR
Status: DISCONTINUED
Start: 2024-08-23 | End: 2024-08-23 | Stop reason: WASHOUT

## 2024-08-23 RX ORDER — GADOBUTROL 604.72 MG/ML
8 INJECTION INTRAVENOUS
Status: COMPLETED | OUTPATIENT
Start: 2024-08-23 | End: 2024-08-23

## 2024-08-23 RX ADMIN — GADOBUTROL 8 ML: 604.72 INJECTION INTRAVENOUS at 02:08

## 2024-08-23 RX ADMIN — LEVETIRACETAM 2000 MG: 100 INJECTION, SOLUTION INTRAVENOUS at 08:08

## 2024-08-24 VITALS
HEART RATE: 59 BPM | HEIGHT: 62 IN | TEMPERATURE: 98 F | RESPIRATION RATE: 16 BRPM | WEIGHT: 170 LBS | DIASTOLIC BLOOD PRESSURE: 77 MMHG | SYSTOLIC BLOOD PRESSURE: 153 MMHG | BODY MASS INDEX: 31.28 KG/M2 | OXYGEN SATURATION: 100 %

## 2024-08-24 PROBLEM — F32.A ANXIETY AND DEPRESSION: Status: ACTIVE | Noted: 2024-08-24

## 2024-08-24 PROBLEM — F41.9 ANXIETY AND DEPRESSION: Status: ACTIVE | Noted: 2024-08-24

## 2024-08-24 PROBLEM — Z86.79 HISTORY OF ANEURYSM: Status: ACTIVE | Noted: 2024-08-24

## 2024-08-24 PROBLEM — R56.9 SEIZURE-LIKE ACTIVITY: Status: ACTIVE | Noted: 2024-08-24

## 2024-08-24 LAB
ALBUMIN SERPL BCP-MCNC: 3.2 G/DL (ref 3.5–5.2)
ALBUMIN SERPL BCP-MCNC: 3.2 G/DL (ref 3.5–5.2)
ALP SERPL-CCNC: 141 U/L (ref 55–135)
ALP SERPL-CCNC: 141 U/L (ref 55–135)
ALT SERPL W/O P-5'-P-CCNC: 19 U/L (ref 10–44)
ALT SERPL W/O P-5'-P-CCNC: 19 U/L (ref 10–44)
ANION GAP SERPL CALC-SCNC: 9 MMOL/L (ref 8–16)
ANION GAP SERPL CALC-SCNC: 9 MMOL/L (ref 8–16)
AST SERPL-CCNC: 18 U/L (ref 10–40)
AST SERPL-CCNC: 18 U/L (ref 10–40)
BASOPHILS # BLD AUTO: 0.04 K/UL (ref 0–0.2)
BASOPHILS # BLD AUTO: 0.04 K/UL (ref 0–0.2)
BASOPHILS NFR BLD: 0.7 % (ref 0–1.9)
BASOPHILS NFR BLD: 0.7 % (ref 0–1.9)
BILIRUB SERPL-MCNC: 0.3 MG/DL (ref 0.1–1)
BILIRUB SERPL-MCNC: 0.3 MG/DL (ref 0.1–1)
BUN SERPL-MCNC: 17 MG/DL (ref 6–20)
BUN SERPL-MCNC: 17 MG/DL (ref 6–20)
CALCIUM SERPL-MCNC: 8.9 MG/DL (ref 8.7–10.5)
CALCIUM SERPL-MCNC: 8.9 MG/DL (ref 8.7–10.5)
CHLORIDE SERPL-SCNC: 113 MMOL/L (ref 95–110)
CHLORIDE SERPL-SCNC: 113 MMOL/L (ref 95–110)
CO2 SERPL-SCNC: 20 MMOL/L (ref 23–29)
CO2 SERPL-SCNC: 20 MMOL/L (ref 23–29)
CREAT SERPL-MCNC: 1.5 MG/DL (ref 0.5–1.4)
CREAT SERPL-MCNC: 1.5 MG/DL (ref 0.5–1.4)
CREAT UR-MCNC: 72 MG/DL (ref 15–325)
CREAT UR-MCNC: 72 MG/DL (ref 15–325)
DIFFERENTIAL METHOD BLD: ABNORMAL
DIFFERENTIAL METHOD BLD: ABNORMAL
EOSINOPHIL # BLD AUTO: 0.3 K/UL (ref 0–0.5)
EOSINOPHIL # BLD AUTO: 0.3 K/UL (ref 0–0.5)
EOSINOPHIL NFR BLD: 5.7 % (ref 0–8)
EOSINOPHIL NFR BLD: 5.7 % (ref 0–8)
ERYTHROCYTE [DISTWIDTH] IN BLOOD BY AUTOMATED COUNT: 14.3 % (ref 11.5–14.5)
ERYTHROCYTE [DISTWIDTH] IN BLOOD BY AUTOMATED COUNT: 14.3 % (ref 11.5–14.5)
EST. GFR  (NO RACE VARIABLE): 42.7 ML/MIN/1.73 M^2
EST. GFR  (NO RACE VARIABLE): 42.7 ML/MIN/1.73 M^2
GLUCOSE SERPL-MCNC: 88 MG/DL (ref 70–110)
GLUCOSE SERPL-MCNC: 88 MG/DL (ref 70–110)
HCT VFR BLD AUTO: 33.4 % (ref 37–48.5)
HCT VFR BLD AUTO: 33.4 % (ref 37–48.5)
HGB BLD-MCNC: 11 G/DL (ref 12–16)
HGB BLD-MCNC: 11 G/DL (ref 12–16)
IMM GRANULOCYTES # BLD AUTO: 0 K/UL (ref 0–0.04)
IMM GRANULOCYTES # BLD AUTO: 0 K/UL (ref 0–0.04)
IMM GRANULOCYTES NFR BLD AUTO: 0 % (ref 0–0.5)
IMM GRANULOCYTES NFR BLD AUTO: 0 % (ref 0–0.5)
LYMPHOCYTES # BLD AUTO: 2 K/UL (ref 1–4.8)
LYMPHOCYTES # BLD AUTO: 2 K/UL (ref 1–4.8)
LYMPHOCYTES NFR BLD: 37.3 % (ref 18–48)
LYMPHOCYTES NFR BLD: 37.3 % (ref 18–48)
MAGNESIUM SERPL-MCNC: 2.2 MG/DL (ref 1.6–2.6)
MAGNESIUM SERPL-MCNC: 2.2 MG/DL (ref 1.6–2.6)
MCH RBC QN AUTO: 29.2 PG (ref 27–31)
MCH RBC QN AUTO: 29.2 PG (ref 27–31)
MCHC RBC AUTO-ENTMCNC: 32.9 G/DL (ref 32–36)
MCHC RBC AUTO-ENTMCNC: 32.9 G/DL (ref 32–36)
MCV RBC AUTO: 89 FL (ref 82–98)
MCV RBC AUTO: 89 FL (ref 82–98)
MONOCYTES # BLD AUTO: 0.5 K/UL (ref 0.3–1)
MONOCYTES # BLD AUTO: 0.5 K/UL (ref 0.3–1)
MONOCYTES NFR BLD: 9.7 % (ref 4–15)
MONOCYTES NFR BLD: 9.7 % (ref 4–15)
NEUTROPHILS # BLD AUTO: 2.5 K/UL (ref 1.8–7.7)
NEUTROPHILS # BLD AUTO: 2.5 K/UL (ref 1.8–7.7)
NEUTROPHILS NFR BLD: 46.6 % (ref 38–73)
NEUTROPHILS NFR BLD: 46.6 % (ref 38–73)
NRBC BLD-RTO: 0 /100 WBC
NRBC BLD-RTO: 0 /100 WBC
OHS QRS DURATION: 84 MS
OHS QTC CALCULATION: 431 MS
PHOSPHATE SERPL-MCNC: 3.5 MG/DL (ref 2.7–4.5)
PHOSPHATE SERPL-MCNC: 3.5 MG/DL (ref 2.7–4.5)
PLATELET # BLD AUTO: 225 K/UL (ref 150–450)
PLATELET # BLD AUTO: 225 K/UL (ref 150–450)
PMV BLD AUTO: 11.6 FL (ref 9.2–12.9)
PMV BLD AUTO: 11.6 FL (ref 9.2–12.9)
POCT GLUCOSE: 89 MG/DL (ref 70–110)
POTASSIUM SERPL-SCNC: 3.5 MMOL/L (ref 3.5–5.1)
POTASSIUM SERPL-SCNC: 3.5 MMOL/L (ref 3.5–5.1)
PROT SERPL-MCNC: 6.1 G/DL (ref 6–8.4)
PROT SERPL-MCNC: 6.1 G/DL (ref 6–8.4)
PROT UR-MCNC: <7 MG/DL (ref 0–15)
PROT/CREAT UR: NORMAL MG/G{CREAT} (ref 0–0.2)
RBC # BLD AUTO: 3.77 M/UL (ref 4–5.4)
RBC # BLD AUTO: 3.77 M/UL (ref 4–5.4)
SODIUM SERPL-SCNC: 142 MMOL/L (ref 136–145)
SODIUM SERPL-SCNC: 142 MMOL/L (ref 136–145)
SODIUM UR-SCNC: 94 MMOL/L (ref 20–250)
UUN UR-MCNC: 324 MG/DL (ref 140–1050)
WBC # BLD AUTO: 5.44 K/UL (ref 3.9–12.7)
WBC # BLD AUTO: 5.44 K/UL (ref 3.9–12.7)

## 2024-08-24 PROCEDURE — 83735 ASSAY OF MAGNESIUM: CPT

## 2024-08-24 PROCEDURE — 63600175 PHARM REV CODE 636 W HCPCS

## 2024-08-24 PROCEDURE — 84100 ASSAY OF PHOSPHORUS: CPT

## 2024-08-24 PROCEDURE — 96376 TX/PRO/DX INJ SAME DRUG ADON: CPT

## 2024-08-24 PROCEDURE — G0378 HOSPITAL OBSERVATION PER HR: HCPCS

## 2024-08-24 PROCEDURE — 80053 COMPREHEN METABOLIC PANEL: CPT

## 2024-08-24 PROCEDURE — 84156 ASSAY OF PROTEIN URINE: CPT

## 2024-08-24 PROCEDURE — 84540 ASSAY OF URINE/UREA-N: CPT

## 2024-08-24 PROCEDURE — 25000003 PHARM REV CODE 250

## 2024-08-24 PROCEDURE — 85025 COMPLETE CBC W/AUTO DIFF WBC: CPT

## 2024-08-24 PROCEDURE — 84300 ASSAY OF URINE SODIUM: CPT

## 2024-08-24 PROCEDURE — 96372 THER/PROPH/DIAG INJ SC/IM: CPT

## 2024-08-24 PROCEDURE — 36415 COLL VENOUS BLD VENIPUNCTURE: CPT

## 2024-08-24 RX ORDER — IBUPROFEN 200 MG
24 TABLET ORAL
Status: DISCONTINUED | OUTPATIENT
Start: 2024-08-24 | End: 2024-08-24 | Stop reason: HOSPADM

## 2024-08-24 RX ORDER — HEPARIN SODIUM 5000 [USP'U]/ML
5000 INJECTION, SOLUTION INTRAVENOUS; SUBCUTANEOUS EVERY 8 HOURS
Status: DISCONTINUED | OUTPATIENT
Start: 2024-08-24 | End: 2024-08-24 | Stop reason: HOSPADM

## 2024-08-24 RX ORDER — IBUPROFEN 200 MG
16 TABLET ORAL
Status: DISCONTINUED | OUTPATIENT
Start: 2024-08-24 | End: 2024-08-24 | Stop reason: HOSPADM

## 2024-08-24 RX ORDER — GLUCAGON 1 MG
1 KIT INJECTION
Status: DISCONTINUED | OUTPATIENT
Start: 2024-08-24 | End: 2024-08-24 | Stop reason: HOSPADM

## 2024-08-24 RX ORDER — SODIUM CHLORIDE, SODIUM LACTATE, POTASSIUM CHLORIDE, CALCIUM CHLORIDE 600; 310; 30; 20 MG/100ML; MG/100ML; MG/100ML; MG/100ML
INJECTION, SOLUTION INTRAVENOUS CONTINUOUS
Status: DISCONTINUED | OUTPATIENT
Start: 2024-08-24 | End: 2024-08-24 | Stop reason: HOSPADM

## 2024-08-24 RX ORDER — ESCITALOPRAM OXALATE 20 MG/1
20 TABLET ORAL DAILY
Status: DISCONTINUED | OUTPATIENT
Start: 2024-08-24 | End: 2024-08-24 | Stop reason: HOSPADM

## 2024-08-24 RX ORDER — SODIUM CHLORIDE 0.9 % (FLUSH) 0.9 %
10 SYRINGE (ML) INJECTION EVERY 12 HOURS PRN
Status: DISCONTINUED | OUTPATIENT
Start: 2024-08-24 | End: 2024-08-24 | Stop reason: HOSPADM

## 2024-08-24 RX ORDER — TOPIRAMATE 25 MG/1
50 TABLET ORAL 2 TIMES DAILY
Status: DISCONTINUED | OUTPATIENT
Start: 2024-08-24 | End: 2024-08-24 | Stop reason: HOSPADM

## 2024-08-24 RX ORDER — AMLODIPINE BESYLATE 10 MG/1
10 TABLET ORAL DAILY
Status: DISCONTINUED | OUTPATIENT
Start: 2024-08-24 | End: 2024-08-24 | Stop reason: HOSPADM

## 2024-08-24 RX ORDER — LEVETIRACETAM 500 MG/5ML
750 INJECTION, SOLUTION, CONCENTRATE INTRAVENOUS EVERY 12 HOURS
Status: DISCONTINUED | OUTPATIENT
Start: 2024-08-24 | End: 2024-08-24 | Stop reason: HOSPADM

## 2024-08-24 RX ORDER — NALOXONE HCL 0.4 MG/ML
0.02 VIAL (ML) INJECTION
Status: DISCONTINUED | OUTPATIENT
Start: 2024-08-24 | End: 2024-08-24 | Stop reason: HOSPADM

## 2024-08-24 RX ADMIN — ESCITALOPRAM OXALATE 20 MG: 20 TABLET ORAL at 09:08

## 2024-08-24 RX ADMIN — AMLODIPINE BESYLATE 10 MG: 10 TABLET ORAL at 09:08

## 2024-08-24 RX ADMIN — LEVETIRACETAM 750 MG: 100 INJECTION INTRAVENOUS at 09:08

## 2024-08-24 RX ADMIN — HEPARIN SODIUM 5000 UNITS: 5000 INJECTION INTRAVENOUS; SUBCUTANEOUS at 05:08

## 2024-08-24 RX ADMIN — SODIUM CHLORIDE, SODIUM LACTATE, POTASSIUM CHLORIDE, AND CALCIUM CHLORIDE: 600; 310; 30; 20 INJECTION, SOLUTION INTRAVENOUS at 10:08

## 2024-08-24 RX ADMIN — CARVEDILOL 37.5 MG: 25 TABLET, FILM COATED ORAL at 09:08

## 2024-08-24 RX ADMIN — TOPIRAMATE 50 MG: 25 TABLET, FILM COATED ORAL at 09:08

## 2024-08-24 RX ADMIN — HEPARIN SODIUM 5000 UNITS: 5000 INJECTION INTRAVENOUS; SUBCUTANEOUS at 03:08

## 2024-08-24 NOTE — PROCEDURES
Date of service  8/23/24 - 8/24/24    Introduction  Electroencephalographic (EEG) is recorded with a cap study system.  Thirty two (32) channels of digital signal (sampling rate of 512/sec), including T1 and T2, were simultaneously recorded from the scalp and may include EKG, EMG, and/or eye monitors.  Recording band pass was 0.1 to 512 Hz.  Digital video recording of the patient is simultaneously recorded with the EEG.  The patient is instructed to report clinical symptoms which may occur during the recording session.  EEG and video recording are stored and archived in digital format.  Activation procedures, which include photic stimulation, hyperventilation and instructing patients to perform simple tasks, are done in selected patients.    The EEG is displayed on a monitor screen and can be reviewed using different montages.  Computer-assisted analysis is employed to detect spike and electrographic seizure activity.   The entire record is submitted for computer analysis.  The entire recording is visually reviewed, and the times identified by computer analysis as being spikes or seizures are reviewed again.      Compressed spectral analysis (CSA) is also performed on the activity recorded from each individual channel.  This is displayed as a power display of frequencies from 0 to 30 Hz over time.   The CSA is reviewed looking for asymmetries in power between homologous areas of the scalp, then compared with the original EEG recording.      Phigital software was also utilized in the review of this study. This software suite analyzes the EEG recording in multiple domains. Coherence and rhythmicity are computed to identify EEG sections which may contain organized seizures. Each channel undergoes analysis to detect the presence of spike and sharp waves which have special and morphological characteristics of epileptic activity. The routine EEG recording is converted from special into frequency domain. This is then  displayed comparing homologous areas to identify areas of significant asymmetry. Algorithm to identify non-cortically generated artifact is used to separate artifact from the EEG.    Recording Times  8/23/24 22:12 - 8/24/24 07:00 (8:32)  8/24/24 07:00 - 12:05 (5:04)  A total of 13 hours and 36 minutes of EEG/video telemetry was recorded.    Findings  Within the limitation of a significant amount of movement/lead artifact on an electrode cap EEG, the patient's background consists of a posteriorly dominant 10 Hz alpha rhythm. There is a mixture of beta activity and diffuse theta slowing seen.     During the course of the recording, the patient is noted to be awake, and subsequently becomes drowsy.  Sleep is captured with poorly visualized sleep architecture.     Activation procedures:   Hyperventilation is not performed  Photic stimulation is not performed    EKG is not recorded.    Interpretation  Within the limitation of a significant amount of lead/movement artifact, this EEG shows some beta activity which can be a result of medication effect and diffuse nonspecific slowing. No seizures or epileptiform activity seen. If better interpretation is needed, recommend formal EEG to minimize artifact.

## 2024-08-24 NOTE — ASSESSMENT & PLAN NOTE
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    - Follows with Neurosurgery as outpatient for continued surveillance of untreated L M1/M2 aneurysm.

## 2024-08-24 NOTE — SUBJECTIVE & OBJECTIVE
Past Medical History:   Diagnosis Date    Brain aneurysm     CHF (congestive heart failure)     H/O coronary angioplasty     Hypercholesteremia     Hypertension     Malignant hypertension     Migraine headache     Stroke 10/2017       Past Surgical History:   Procedure Laterality Date    CARDIAC CATHETERIZATION      CEREBRAL ANGIOGRAM      CLIP LIGATION OF INTRACRANIAL ANEURYSM BY CRANIOTOMY N/A 7/15/2022    Procedure: CRANIOTOMY, WITH ANEURYSM CLIPPING;  Surgeon: Timothy Diaz MD;  Location: [facility] OR [address] [place] [address];  Service: Neurosurgery;  Laterality: N/A;  PTERIONAL CRANIOTOMY WITH CLIP LIGATION OF L PCOMM, L ANTERIOR CHOROIDAL, L MCA  ANEURYSM, ANESTHESIA: GENERAL, BLOOD: TYPE&CROSS 2 UNITS, NEUROMONITORING: SEP, MEP, EEG, RADIOLOGY: C-ARM, POSITION: SUPINE, ANNA, CO-SURGERON: DR. LEXI DOMÍNGUEZ.    WOUND DEBRIDEMENT  8/27/2022    Procedure: DEBRIDEMENT, WOUND;  Surgeon: Timothy Diaz MD;  Location: [facility] OR [address] [place] [address];  Service: Neurosurgery;;       Review of patient's allergies indicates:   Allergen Reactions    Lisinopril Swelling    Bactrim [sulfamethoxazole-trimethoprim] Rash    Ceftazidime Hives     Rash while on IV ceftazidime for planned 6 weeks.  See ED notes 9/12, 9/14 and ID clinic notes 9/16 and 9/28       Current Facility-Administered Medications on File Prior to Encounter   Medication    [COMPLETED] gadobutroL (GADAVIST) injection 8 mL     Current Outpatient Medications on File Prior to Encounter   Medication Sig    amitriptyline (ELAVIL) 75 MG tablet Take 75 mg by mouth every evening.    amLODIPine (NORVASC) 10 MG tablet Take 10 mg by mouth once daily.    atorvastatin (LIPITOR) 80 MG tablet Take 1 tablet (80 mg total) by mouth once daily.    blood sugar diagnostic Strp Use to check blood glucose 3 times daily.    blood-glucose meter Misc Use to check blood glucose 3 times daily.    carvediloL (COREG) 12.5 MG tablet Take 3 tablets (37.5 mg total) by mouth 2 (two) times daily.    EScitalopram oxalate  "(LEXAPRO) 20 MG tablet Take 1 tablet (20 mg total) by mouth once daily.    lancets 30 gauge Misc Use to check blood glucose 3 times daily.    levETIRAcetam (KEPPRA) 750 MG Tab Take 1 tablet (750 mg total) by mouth 2 (two) times daily.    losartan (COZAAR) 50 MG tablet Take 1 tablet (50 mg total) by mouth once daily.    metFORMIN (GLUCOPHAGE-XR) 500 MG ER 24hr tablet Take 500 mg by mouth 2 (two) times daily.    pen needle, diabetic (BD ULTRA-FINE MARIELY PEN NEEDLE) 32 gauge x 5/32" Ndle Use to inject insulin into the skin three times daily .    topiramate (TOPAMAX) 50 MG tablet Take 1 tablet (50 mg total) by mouth 2 (two) times daily.    traMADoL (ULTRAM) 50 mg tablet Take 50 mg by mouth 2 (two) times daily.    [DISCONTINUED] aspirin 81 MG Chew Take 1 tablet (81 mg total) by mouth once daily.     Family History    None       Tobacco Use    Smoking status: Every Day     Current packs/day: 0.33     Average packs/day: 0.3 packs/day for 31.6 years (10.4 ttl pk-yrs)     Types: Cigarettes     Start date: 1993    Smokeless tobacco: Never    Tobacco comments:     Enrolled in the [employer] on 6/7/22 ([facility] Member ID # [id number]). Pt is a 0.33 pk/day cigarette smoker x 31 yrs. She states ready to quit smoking. Ambulatory referral to Smoking Cessation clinic following hospital discharge.    Substance and Sexual Activity    Alcohol use: Yes     Comment: occassionally    Drug use: No    Sexual activity: Not Currently     Partners: Male     Birth control/protection: None     Review of Systems   Reason unable to perform ROS: See HPI.     Objective:     Vital Signs (Most Recent):  Temp: 98.7 °F (37.1 °C) (08/24/24 0319)  Pulse: 62 (08/24/24 0319)  Resp: 18 (08/24/24 0319)  BP: 137/67 (08/24/24 0319)  SpO2: 100 % (08/24/24 0319) Vital Signs (24h Range):  Temp:  [97.9 °F (36.6 °C)-98.7 °F (37.1 °C)] 98.7 °F (37.1 °C)  Pulse:  [58-66] 62  Resp:  [17-26] 18  SpO2:  [96 %-100 %] 100 %  BP: (137-162)/(67-97) 137/67     Weight: 77.1 kg " (169 lb 15.6 oz)  Body mass index is 31.09 kg/m².     Physical Exam  Constitutional:       General: She is not in acute distress.     Appearance: Normal appearance. She is obese. She is not ill-appearing, toxic-appearing or diaphoretic.   HENT:      Head: Normocephalic and atraumatic.      Nose: No congestion or rhinorrhea.      Mouth/Throat:      Comments: Mouth crooked appearing on exam  Eyes:      General: No scleral icterus.  Cardiovascular:      Rate and Rhythm: Normal rate and regular rhythm.      Pulses: Normal pulses.      Heart sounds: Normal heart sounds.   Pulmonary:      Effort: Pulmonary effort is normal. No respiratory distress.      Breath sounds: Normal breath sounds. No wheezing.   Chest:      Chest wall: No tenderness.   Abdominal:      General: There is no distension.      Palpations: There is no mass.      Tenderness: There is no abdominal tenderness. There is no guarding.   Musculoskeletal:         General: No swelling or tenderness.   Neurological:      Mental Status: She is alert.                Significant Labs: All pertinent labs within the past 24 hours have been reviewed.    Significant Imaging: I have reviewed all pertinent imaging results/findings within the past 24 hours.

## 2024-08-24 NOTE — PLAN OF CARE
[facility] (Hospital)  Discharge Final Note    Primary Care Provider: Clair Johnson NP    Expected Discharge Date: 8/24/2024    Final Discharge Note (most recent)       Final Note - 08/24/24 1612          Final Note    Assessment Type Final Discharge Note     Anticipated Discharge Disposition Home or Self Care     What phone number can be called within the next 1-3 days to see how you are doing after discharge? [phone]     Hospital Resources/Appts/Education Provided Provided patient/caregiver with written discharge plan information;Appointments scheduled and added to AVS        Post-Acute Status    Coverage Payor: [place]ICA[place] - [place] [facility] [place] -     Patient choice form signed by patient/caregiver List with quality metrics by geographic area provided     Discharge Delays None known at this time                     Important Message from Medicare             Contact Info       Clair Johnson NP   Specialty: Family Medicine   Relationship: PCP - General    [address] [facility] [address]  [address]  [facility]  [place] [address]   Phone: [phone]       Next Steps: Follow up in 1 week(s)          Patient to discharge to home today; no needs.     EKTA Knight  [facility]   Ext: [phone]

## 2024-08-24 NOTE — HOSPITAL COURSE
EEG without seizure or epileptiform activity. Pt would like to be discharged as her children are home alone.

## 2024-08-24 NOTE — HPI
"49F w/ PMHx Left ICA aneurysms (s/p clipping), HTN (on amlodipine, carvedilol), HLD (on statin; held given elevated alk. 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 Initial ED history limited by the patient's condition, as she was altered upon arrival. She states she has been compliant with medications.      On interview, patient endorses no acute symptoms. Notes she arrived following seizures, denies difficulty thinking, difficulty concentrating. Is oriented. Endorses chest pain in the distant past, but denies any active chest pain, shortness of breath, or abdominal pain. Admitted s/p Keppra load; no events on monitor, no post-ictal state to date.    In ED: Vitals s/f tachypnea (high of 26), hypertension (154/84 at last measurement). VBG s/f low CO2, likely secondary to tachypnea, normal pH. CMP s/f NAGMA (Cl- 112, HCO3- 19), COOPER (Cr 1.6, unknown baseline by historically <1.3), albumin 3.3, and mildly elevated alk. phos (146) w/o transaminitis.     Recent Similar Admission s/p 07/02/24 Discharge:    Patient was discharged from [facility] on 7/2/2024 after a similar episode. She reported going to bed on 6/29 with epigastric discomfort and feeling unwell. The next morning, she noticed her "mouth was crooked." Alone at the time, she called her son, who arrived shortly before she experienced three separate episodes: one of unresponsiveness with left arm and leg shaking for 5 minutes, a staring episode lasting several minutes, and another staring episode after EMS administered versed. She was taken to [facility] ED, received 1mg ativan, and was transferred to [facility] ED for continuous EEG monitoring. She denies recent infections, substance use, and medication noncompliance.    Neurology, Per Chart Review:    The patient began experiencing clinical seizures after her left ICA aneurysm " clipping in 2022, with symptoms including left and right-sided shaking and staring episodes. Despite multiple ED visits for suspected seizures, EEGs have not shown epileptiform discharges, though post-clipping EEG showed left hemispheric slowing. She previously followed up with Dr. Zurita for AED management. Her most recent AED regimen in February 2023 was Keppra 1500mg BID and Topiramate 50mg BID, but it has since been adjusted to Keppra 750mg BID and Topiramate 50mg BID after multiple presentations.     Per EMS:  Patient had 3 witnessed seizures earlier.  They gave intranasal Versed 10 mg.    Observation overnight on EEG.

## 2024-08-24 NOTE — ASSESSMENT & PLAN NOTE
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      - EEG on prior admission with left frontotemporal slowing, no epileptiform activity, no seizures; currently pending.   - EEG on current admission without epileptiform events.   - Previous admission c/w PNEE.  - Levetiracetam level pending.  - Topiramate level pending.  - Neurology consult held d/t c/f PNEE.   - At discharge, will require f/u in Epilepsy clinic and additional ambulatory referral to Neuropsychology.

## 2024-08-24 NOTE — PROGRESS NOTES
[facility] med 5 and spoke to MD uRth, about patient removing EEG cap. When asked why she removed the EEG cap, patient stated that her head hurts so she took the cap off. Seizure precautions in place. Charge made aware. MD Ruth, stated that he would pass this on to the morning team to see if they would like to put it back on. No new orders, wctm.

## 2024-08-24 NOTE — ASSESSMENT & PLAN NOTE
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      - EEG on prior admission with left frontotemporal slowing, no epileptiform activity, no seizures; repeat the same this admission   - EEG on current admission without epileptiform events.   - Previous admission c/w PNEE.  - Levetiracetam level pending.  - Topiramate level pending.  - At discharge, will require f/u in Epilepsy clinic and additional ambulatory referral to Neuropsychology.

## 2024-08-24 NOTE — PLAN OF CARE
[facility] - Neurosurgery (Hospital)  Initial Discharge Assessment       Primary Care Provider: Clair Johnson NP    Admission Diagnosis: Seizure disorder [G40.909]  Chest pain [R07.9]  Seizure-like activity [R56.9]  H/O: CVA (cerebrovascular accident) [Z86.73]  Type 2 diabetes mellitus without complication, without long-term current use of insulin [E11.9]    Admission Date: 8/23/2024  Expected Discharge Date:     Transition of Care Barriers: None    Payor: MEDICAID / Plan: [employer] CONNECT / Product Type: Managed Medicaid /     Extended Emergency Contact Information  Primary Emergency Contact: Netta De Leon  Mobile Phone: [phone]  Relation: Sister  Preferred language: English  Secondary Emergency Contact: Padmini De Leon   [place]  Mobile Phone: [phone]  Relation: Relative  Mother: Miladys Davies  Address: [facility] [place] [address] [place]  Mobile Phone: [phone]    Discharge Plan A: Home  Discharge Plan B: Home with family      [id number] - [place], [place] - [address] AT [facility] [address]  [address]  [place] [address]  Phone: [phone] Fax: [phone]      Initial Assessment (most recent)       Adult Discharge Assessment - 08/24/24 1153          Discharge Assessment    Assessment Type Discharge Planning Assessment     Confirmed/corrected address, phone number and insurance Yes     Confirmed Demographics Correct on Facesheet     Source of Information patient     Does patient/caregiver understand observation status Yes     Communicated ACDY with patient/caregiver Date not available/Unable to determine;Yes     Reason For Admission Functional neurological symptom disorder with mixed symptoms     People in Home child(precious), dependent     Do you expect to return to your current living situation? Yes     Do you have help at home or someone to help you manage your care at home? Yes     Prior to  hospitilization cognitive status: Alert/Oriented     Current cognitive status: Alert/Oriented     Walking or Climbing Stairs Difficulty no     Dressing/Bathing Difficulty no     Equipment Currently Used at Home none     Readmission within 30 days? No     Patient currently being followed by outpatient case management? No     Do you currently have service(s) that help you manage your care at home? No     Do you take prescription medications? Yes     Do you have prescription coverage? Yes     Coverage Payor: MEDICAID - [facility] CONNECT -     Do you have any problems affording any of your prescribed medications? No     Is the patient taking medications as prescribed? yes     Who is going to help you get home at discharge? Padmini De Leon [phone]     How do you get to doctors appointments? car, drives self;family or friend will provide     Are you on dialysis? No     Do you take coumadin? No     Discharge Plan A Home     Discharge Plan B Home with family     DME Needed Upon Discharge  other (see comments)   TBD    Discharge Plan discussed with: Patient     Transition of Care Barriers None        Physical Activity    On average, how many days per week do you engage in moderate to strenuous exercise (like a brisk walk)? 0 days     On average, how many minutes do you engage in exercise at this level? 0 min        Financial Resource Strain    How hard is it for you to pay for the very basics like food, housing, medical care, and heating? Not hard at all        Housing Stability    In the last 12 months, was there a time when you were not able to pay the mortgage or rent on time? No     At any time in the past 12 months, were you homeless or living in a shelter (including now)? No        Transportation Needs    Has the lack of transportation kept you from medical appointments, meetings, work or from getting things needed for daily living? No        Food Insecurity    Within the past 12 months, you worried that your  food would run out before you got the money to buy more. Never true     Within the past 12 months, the food you bought just didn't last and you didn't have money to get more. Never true        Stress    Do you feel stress - tense, restless, nervous, or anxious, or unable to sleep at night because your mind is troubled all the time - these days? Rather much        Social Isolation    How often do you feel lonely or isolated from those around you?  Often        Alcohol Use    Q1: How often do you have a drink containing alcohol? Never     Q2: How many drinks containing alcohol do you have on a typical day when you are drinking? Patient does not drink     Q3: How often do you have six or more drinks on one occasion? Never        Utilities    In the past 12 months has the electric, gas, oil, or water company threatened to shut off services in your home? No        Health Literacy    How often do you need to have someone help you when you read instructions, pamphlets, or other written material from your doctor or pharmacy? Never                   Spoke to pt. Pt lives at home with her three dependent children. Post hospital  stay patients family will be pt support person and pt. has transportation at d/c with family. There have been no hospitalizations within the last 30 days per pt.  Verified pt PCP and preferred pharmacy. Pt stated not on Coumadin and is not receiving dialysis. Patient very eager to discharge to home. All questions answered regarding case management/ discharge planning , pt verbalized understanding. Discharge booklet with SW contact information given to pt.     Discharge Plan A and Plan B have been determined by review of patient's clinical status, future medical and therapeutic needs, and coverage/benefits for post-acute care in coordination with multidisciplinary team members.      EKTA Knight  [facility]   Ext: [phone]

## 2024-08-24 NOTE — ED PROVIDER NOTES
Encounter Date: 8/23/2024       History     Chief Complaint   Patient presents with    Seizures     Patient with 3 witnessed seizures with EMS. 10 IN of Versed with EMS. Patient AAO x 4 at this time. States sh ehas been compliant with home medications.     49-year-old female with PMH of brain aneurysm, CHF, hypertension, and seizure-like activity presents with seizure-like activity.  History limited by the patient's condition, as she was altered upon arrival.  She states she has been compliant with medications.  Unable to provide other history at this time.    Per EMS:  Patient had 3 witnessed seizures earlier.  They gave intranasal Versed 10 mg.    The history is provided by the patient, the EMS personnel and medical records.     Review of patient's allergies indicates:   Allergen Reactions    Lisinopril Swelling    Bactrim [sulfamethoxazole-trimethoprim] Rash    Ceftazidime Hives     Rash while on IV ceftazidime for planned 6 weeks.  See ED notes 9/12, 9/14 and ID clinic notes 9/16 and 9/28     Past Medical History:   Diagnosis Date    Brain aneurysm     CHF (congestive heart failure)     H/O coronary angioplasty     Hypercholesteremia     Hypertension     Malignant hypertension     Migraine headache     Stroke 10/2017     Past Surgical History:   Procedure Laterality Date    CARDIAC CATHETERIZATION      CEREBRAL ANGIOGRAM      CLIP LIGATION OF INTRACRANIAL ANEURYSM BY CRANIOTOMY N/A 7/15/2022    Procedure: CRANIOTOMY, WITH ANEURYSM CLIPPING;  Surgeon: Timothy Diaz MD;  Location: [facility] OR [address] [place] [address];  Service: Neurosurgery;  Laterality: N/A;  PTERIONAL CRANIOTOMY WITH CLIP LIGATION OF L PCOMM, L ANTERIOR CHOROIDAL, L MCA  ANEURYSM, ANESTHESIA: GENERAL, BLOOD: TYPE&CROSS 2 UNITS, NEUROMONITORING: SEP, MEP, EEG, RADIOLOGY: C-ARM, POSITION: SUPINE, CASTILLO, CO-SURGERON: DR. LEXI DOMÍNGUEZ.    WOUND DEBRIDEMENT  8/27/2022    Procedure: DEBRIDEMENT, WOUND;  Surgeon: Timothy Diaz MD;  Location: [facility] OR [address] [place] [address];   Service: Neurosurgery;;     No family history on file.  Social History     Tobacco Use    Smoking status: Every Day     Current packs/day: 0.33     Average packs/day: 0.3 packs/day for 31.6 years (10.4 ttl pk-yrs)     Types: Cigarettes     Start date: 1993    Smokeless tobacco: Never    Tobacco comments:     Enrolled in the [employer] on 6/7/22 ([facility] Member ID # [id number]). Pt is a 0.33 pk/day cigarette smoker x 31 yrs. She states ready to quit smoking. Ambulatory referral to Smoking Cessation clinic following hospital discharge.    Substance Use Topics    Alcohol use: Yes     Comment: occassionally    Drug use: No     Review of Systems    Physical Exam     Initial Vitals [08/23/24 1913]   BP Pulse Resp Temp SpO2   (!) 142/87 (!) 58 19 98.1 °F (36.7 °C) 100 %      MAP       --         Physical Exam    Nursing note and vitals reviewed.  Constitutional: She appears well-developed and well-nourished. She is not diaphoretic.   HENT:   Head: Normocephalic and atraumatic.   Eyes: Conjunctivae and EOM are normal. Pupils are equal, round, and reactive to light.   Neck: Neck supple.   Normal range of motion.  Cardiovascular:  Normal rate, regular rhythm, normal heart sounds and intact distal pulses.           No murmur heard.  Pulmonary/Chest: Breath sounds normal. No respiratory distress. She has no wheezes. She has no rhonchi. She has no rales.   Abdominal: Abdomen is soft. She exhibits no distension. There is no abdominal tenderness. There is no rebound and no guarding.   Musculoskeletal:         General: No edema.      Cervical back: Normal range of motion and neck supple.     Neurological:   Alert and oriented but slow to respond to questions.  I was initially called him in the room because the patient's started to have what was described as he would like activity involving the bilateral upper arms kind of extending in front of her.  This subsided as I entered the room.  Patient was able to answer basic questions  for orientation.    Skin: Skin is warm and dry. Capillary refill takes less than 2 seconds.         ED Course   Procedures  Labs Reviewed   CBC W/ AUTO DIFFERENTIAL - Abnormal       Result Value    WBC 5.38      RBC 3.70 (*)     Hemoglobin 11.0 (*)     Hematocrit 32.8 (*)     MCV 89      MCH 29.7      MCHC 33.5      RDW 14.4      Platelets 224      MPV 11.3      Immature Granulocytes 0.2      Gran # (ANC) 2.5      Immature Grans (Abs) 0.01      Lymph # 2.1      Mono # 0.5      Eos # 0.3      Baso # 0.06      nRBC 0      Gran % 45.7      Lymph % 38.1      Mono % 9.5      Eosinophil % 5.4      Basophil % 1.1      Differential Method Automated     ISTAT PROCEDURE - Abnormal    POC PH 7.406      POC PCO2 28.6 (*)     POC PO2 80 (*)     POC HCO3 18.0 (*)     POC BE -7 (*)     POC SATURATED O2 96      POC Sodium 140      POC Potassium 3.6      POC TCO2 19 (*)     POC Ionized Calcium 1.12      POC Hematocrit 31 (*)     Sample VENOUS      Site Other      Allens Test N/A     COMPREHENSIVE METABOLIC PANEL   URINALYSIS   LEVETIRACETAM  (KEPPRA) LEVEL   TOPIRAMATE LEVEL   POCT URINE PREGNANCY   ISTAT LACTATE    POC Lactate 0.59      Sample VENOUS      Site Other      Allens Test N/A     POCT GLUCOSE MONITORING CONTINUOUS          Imaging Results              CT Head Without Contrast (In process)                      X-Ray Chest AP Portable (Final result)  Result time 08/23/24 20:52:28      Final result by Patrick Acosta DO (08/23/24 20:52:28)                   Impression:      No acute abnormality.      Electronically signed by: Patrick Acosta  Date:    08/23/2024  Time:    20:52               Narrative:    EXAMINATION:  XR CHEST AP PORTABLE    CLINICAL HISTORY:  multiple episodes of seizure-like activity;    TECHNIQUE:  Single frontal view of the chest was performed.    COMPARISON:  06/17/2024.    FINDINGS:  The lungs are well expanded and clear. No focal opacities are seen. The pleural spaces are clear. The cardiac  silhouette is unremarkable. The visualized osseous structures are unremarkable.  Hardware overlies the right proximal humerus.                                       Medications   levETIRAcetam injection 2,000 mg (2,000 mg Intravenous Given 8/23/24 2009)     Medical Decision Making  Differential diagnoses considered includes breakthrough seizure, psychogenic nonepileptic seizures, electrolyte abnormality, medication noncompliance, COOPER    See ED course for additional attending MDM      Amount and/or Complexity of Data Reviewed  External Data Reviewed: radiology.     Details: EEG from 07/01/2024:   IMPRESSION:   Abnormal EEG due to regional cortical or subcortical dysfunction in the left frontotemporal region with no ongoing indications of seizure tendency.  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    Labs: ordered. Decision-making details documented in ED Course.  Radiology: ordered and independent interpretation performed. Decision-making details documented in ED Course.  ECG/medicine tests: ordered and independent interpretation performed. Decision-making details documented in ED Course.    Risk  Prescription drug management.  Decision regarding hospitalization.  Diagnosis or treatment significantly limited by social determinants of health.               ED Course as of 08/23/24 2104   Fri Aug 23, 2024   2003 EKG 12-lead  EKG independently interpreted by me shows normal sinus rhythm, rate 63, no STEMI [BD]   2012 X-Ray Chest AP Portable  Chest x-ray independently interpreted by me shows no acute process such as pneumonia, pneumothorax, or pulmonary edema.    [BD]   2012 POC Lactate: 0.59  Seizure unlikely [BD]   2101 CBC auto differential(!)  CBC unremarkable without leukocytosis, significant anemia, or decreased platelets   [BD]   2103 Patient care handed off to oncoming team at 9:00 p.m., pending CT, remainder of labs, UA, and EEG.  If the patient returns to  baseline and workup unremarkable, she may be discharged.  Otherwise, patient will need to be admitted to Medicine.    Procedure: Critical Care  Please put in 30 minutes of critical care due to patient having a high risk of neurological failure.        [BD]      ED Course User Index  [BD] Ran Solares MD                           Clinical Impression:  Final diagnoses:  [R56.9] Seizure-like activity (Primary)  [Z86.73] H/O: CVA (cerebrovascular accident)  [G40.909] Seizure disorder  [E11.9] Type 2 diabetes mellitus without complication, without long-term current use of insulin                 Ran Solares MD  08/23/24 3947

## 2024-08-24 NOTE — PROGRESS NOTES
Patient Transferred to NPU Room 956       Upon arrival to the floor, patient greeted and oriented to room. Complete head to toe assessment completed per protocol. VSS, see flowsheet for details. Neuro assessment completed. Primary team notified of patient's transfer to floor. All current and transfer orders reviewed/reconciled per protocol. All emergency equipment set up in patient's room. Fall/seizure precautions initiated per providers orders. 4 Eyes skin assessment performed, see below for details. Reviewed assessment and rounding frequency with patient and family. Questions were encouraged and addressed. Repositioned patient for comfort with bed locked in lowest position, side rails up x 4, bed alarm set, and call light within reach. Instructed patient to call staff for mobility/assistance, verbalized understanding. No acute signs of distress noted at this time.       Nurses Note -- 4 Eyes      8/24/2024   9106      Skin assessed during: Admit      [x] No Altered Skin Integrity Present    [x]Prevention Measures Documented      [] Yes- Altered Skin Integrity Present or Discovered   [] LDA Added if Not in Epic (Describe Wound)   [] New Altered Skin Integrity was Present on Admit and Documented in LDA   [] Wound Image Taken    Wound Care Consulted? No    Attending Nurse:  Jackeline Evans RN/Staff Member:   Alanna      Portable eeg monitor in place on floor arrival. EEG tower plugged in and connected to ethernet outlet.

## 2024-08-24 NOTE — PROVIDER PROGRESS NOTES - EMERGENCY DEPT.
Signout Note    I received signout from the previous provider.     Chief complaint:  Seizures (Patient with 3 witnessed seizures with EMS. 10 IN of Versed with EMS. Patient AAO x 4 at this time. States  ehas been compliant with home medications.)      Pertinent history &exam:  Gina Jenkins is a 49 y.o. female with pertinent PMH of prior ischemic stroke, diastolic heart failure, hypertension, prior intracranial hemorrhage, type 2 diabetes, partial seizures/epilepsy, psychogenic nonepileptic seizures, who presented to emergency department with complaint of seizure activity today.  No seizure activity was witnessed here by the physician.  She received a Keppra load.  She does appear to be postictal.     She received intranasal Versed by EMS.  Labs here are reassuring.  Urinalysis not consistent with infection.  Urine pregnancy test negative.  She was signed out pending CT head.  EEG was also ordered by prior team.    Vitals:    08/24/24 0015   BP: (!) 154/84   Pulse: 66   Resp: 17   Temp: 98.1 °F (36.7 °C)       Imaging Studies:    CT Head Without Contrast   Final Result      1. No acute intracranial process with no significant change.   2. Stable minimal subdural thickening or small chronic subdural hematoma along the left frontal convexity in the postoperative area.         Electronically signed by: William Castillo   Date:    08/23/2024   Time:    21:50      X-Ray Chest AP Portable   Final Result      No acute abnormality.         Electronically signed by: Patrick Acosta   Date:    08/23/2024   Time:    20:52          Medications Given:  Medications   sodium chloride 0.9% flush 10 mL (has no administration in time range)   naloxone 0.4 mg/mL injection 0.02 mg (has no administration in time range)   glucose chewable tablet 16 g (has no administration in time range)   glucose chewable tablet 24 g (has no administration in time range)   glucagon (human recombinant) injection 1 mg (has no administration in time  range)   heparin (porcine) injection 5,000 Units (has no administration in time range)   dextrose 10% bolus 125 mL 125 mL (has no administration in time range)   dextrose 10% bolus 250 mL 250 mL (has no administration in time range)   levETIRAcetam injection 2,000 mg (2,000 mg Intravenous Given 8/23/24 2009)       Pending Items/ MDM:  49 y.o. female with seizure like activity.  No further seizures here after receiving Keppra load.  CT head with chronic findings.    Patient was re-evaluated around 10:20 PM.      Patient was placed on EEG cap at 10:15 PM..  At 11:15 a.m. I contacted epileptologist.  At that time, they were unable to connect with the device to view the EEG data.  Charge nurse aware, we are attempting work around to transmit the data to the epileptologist.    EEG without evidence of seizure activity.  Epileptologist recommends leaving on EEG overnight.  Will place in observation to Internal Medicine.    Diagnostic Impression:    1. Seizure-like activity    2. H/O: CVA (cerebrovascular accident)    3. Seizure disorder    4. Type 2 diabetes mellitus without complication, without long-term current use of insulin    5. Chest pain         ED Disposition Condition    Observation Stable                 Tricia Morris MD  Emergency Medicine

## 2024-08-24 NOTE — H&P
"  [facility] - Neurosurgery ([facility] Medicine  History & Physical    Patient Name: Gina Jenkins  MRN: [id number]  Patient Class: OP- Observation  Admission Date: 8/23/2024  Attending Physician: Ned Seymour, *   Primary Care Provider: Clair Johnson, NP         Patient information was obtained from patient and ER records.     Subjective:     Principal Problem:Functional neurological symptom disorder with mixed symptoms    Chief Complaint:   Chief Complaint   Patient presents with    Seizures     Patient with 3 witnessed seizures with EMS. 10 IN of Versed with EMS. Patient AAO x 4 at this time. States sh ehas been compliant with home medications.        HPI: 49F w/ PMHx Left ICA aneurysms (s/p clipping), HTN (on amlodipine, carvedilol), HLD (on statin; held given elevated alk. 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 Initial ED history limited by the patient's condition, as she was altered upon arrival. She states she has been compliant with medications.      On interview, patient endorses no acute symptoms. Notes she arrived following seizures, denies difficulty thinking, difficulty concentrating. Is oriented. Endorses chest pain in the distant past, but denies any active chest pain, shortness of breath, or abdominal pain.     Recent Similar Admission s/p 07/02/24 Discharge:    Patient was discharged from [facility] on 7/2/2024 after a similar episode. She reported going to bed on 6/29 with epigastric discomfort and feeling unwell. The next morning, she noticed her "mouth was crooked." Alone at the time, she called her son, who arrived shortly before she experienced three separate episodes: one of unresponsiveness with left arm and leg shaking for 5 minutes, a staring episode lasting several minutes, and another staring episode after EMS " administered versed. She was taken to [place] ED, received 1mg ativan, and was transferred to [facility] ED for continuous EEG monitoring. She denies recent infections, substance use, and medication noncompliance.    Neurology, Per Chart Review:    The patient began experiencing clinical seizures after her left ICA aneurysm clipping in 2022, with symptoms including left and right-sided shaking and staring episodes. Despite multiple ED visits for suspected seizures, EEGs have not shown epileptiform discharges, though post-clipping EEG showed left hemispheric slowing. She previously followed up with Dr. Zurita for AED management. Her most recent AED regimen in February 2023 was Keppra 1500mg BID and Topiramate 50mg BID, but it has since been adjusted to Keppra 750mg BID and Topiramate 50mg BID after multiple presentations.     Per EMS:  Patient had 3 witnessed seizures earlier.  They gave intranasal Versed 10 mg.    Observation overnight on EEG.    Past Medical History:   Diagnosis Date    Brain aneurysm     CHF (congestive heart failure)     H/O coronary angioplasty     Hypercholesteremia     Hypertension     Malignant hypertension     Migraine headache     Stroke 10/2017       Past Surgical History:   Procedure Laterality Date    CARDIAC CATHETERIZATION      CEREBRAL ANGIOGRAM      CLIP LIGATION OF INTRACRANIAL ANEURYSM BY CRANIOTOMY N/A 7/15/2022    Procedure: CRANIOTOMY, WITH ANEURYSM CLIPPING;  Surgeon: Timothy Diaz MD;  Location: [facility] OR [address] [place] [address];  Service: Neurosurgery;  Laterality: N/A;  PTERIONAL CRANIOTOMY WITH CLIP LIGATION OF L PCOMM, L ANTERIOR CHOROIDAL, L MCA  ANEURYSM, ANESTHESIA: GENERAL, BLOOD: TYPE&CROSS 2 UNITS, NEUROMONITORING: SEP, MEP, EEG, RADIOLOGY: C-ARM, POSITION: SUPINE, CASTILLO, CO-SURGERON: DR. LEXI DOMÍNGUEZ.    WOUND DEBRIDEMENT  8/27/2022    Procedure: DEBRIDEMENT, WOUND;  Surgeon: Timothy Diaz MD;  Location: [facility] OR [address] [place] [address];  Service: Neurosurgery;;       Review of patient's allergies  "indicates:   Allergen Reactions    Lisinopril Swelling    Bactrim [sulfamethoxazole-trimethoprim] Rash    Ceftazidime Hives     Rash while on IV ceftazidime for planned 6 weeks.  See ED notes 9/12, 9/14 and ID clinic notes 9/16 and 9/28       Current Facility-Administered Medications on File Prior to Encounter   Medication    [COMPLETED] gadobutroL (GADAVIST) injection 8 mL     Current Outpatient Medications on File Prior to Encounter   Medication Sig    amitriptyline (ELAVIL) 75 MG tablet Take 75 mg by mouth every evening.    amLODIPine (NORVASC) 10 MG tablet Take 10 mg by mouth once daily.    atorvastatin (LIPITOR) 80 MG tablet Take 1 tablet (80 mg total) by mouth once daily.    blood sugar diagnostic Strp Use to check blood glucose 3 times daily.    blood-glucose meter Misc Use to check blood glucose 3 times daily.    carvediloL (COREG) 12.5 MG tablet Take 3 tablets (37.5 mg total) by mouth 2 (two) times daily.    EScitalopram oxalate (LEXAPRO) 20 MG tablet Take 1 tablet (20 mg total) by mouth once daily.    lancets 30 gauge Misc Use to check blood glucose 3 times daily.    levETIRAcetam (KEPPRA) 750 MG Tab Take 1 tablet (750 mg total) by mouth 2 (two) times daily.    losartan (COZAAR) 50 MG tablet Take 1 tablet (50 mg total) by mouth once daily.    metFORMIN (GLUCOPHAGE-XR) 500 MG ER 24hr tablet Take 500 mg by mouth 2 (two) times daily.    pen needle, diabetic (BD ULTRA-FINE MARIELY PEN NEEDLE) 32 gauge x 5/32" Ndle Use to inject insulin into the skin three times daily .    topiramate (TOPAMAX) 50 MG tablet Take 1 tablet (50 mg total) by mouth 2 (two) times daily.    traMADoL (ULTRAM) 50 mg tablet Take 50 mg by mouth 2 (two) times daily.    [DISCONTINUED] aspirin 81 MG Chew Take 1 tablet (81 mg total) by mouth once daily.     Family History    None       Tobacco Use    Smoking status: Every Day     Current packs/day: 0.33     Average packs/day: 0.3 packs/day for 31.6 years (10.4 ttl pk-yrs)     Types: Cigarettes    "  Start date: 1993    Smokeless tobacco: Never    Tobacco comments:     Enrolled in the [employer] on 6/7/22 ([facility] Member ID # [id number]). Pt is a 0.33 pk/day cigarette smoker x 31 yrs. She states ready to quit smoking. Ambulatory referral to Smoking Cessation clinic following hospital discharge.    Substance and Sexual Activity    Alcohol use: Yes     Comment: occassionally    Drug use: No    Sexual activity: Not Currently     Partners: Male     Birth control/protection: None     Review of Systems   Reason unable to perform ROS: See HPI.     Objective:     Vital Signs (Most Recent):  Temp: 98.7 °F (37.1 °C) (08/24/24 0319)  Pulse: 62 (08/24/24 0319)  Resp: 18 (08/24/24 0319)  BP: 137/67 (08/24/24 0319)  SpO2: 100 % (08/24/24 0319) Vital Signs (24h Range):  Temp:  [97.9 °F (36.6 °C)-98.7 °F (37.1 °C)] 98.7 °F (37.1 °C)  Pulse:  [58-66] 62  Resp:  [17-26] 18  SpO2:  [96 %-100 %] 100 %  BP: (137-162)/(67-97) 137/67     Weight: 77.1 kg (169 lb 15.6 oz)  Body mass index is 31.09 kg/m².     Physical Exam  Constitutional:       General: She is not in acute distress.     Appearance: Normal appearance. She is obese. She is not ill-appearing, toxic-appearing or diaphoretic.   HENT:      Head: Normocephalic and atraumatic.      Nose: No congestion or rhinorrhea.      Mouth/Throat:      Comments: Mouth crooked appearing on exam  Eyes:      General: No scleral icterus.  Cardiovascular:      Rate and Rhythm: Normal rate and regular rhythm.      Pulses: Normal pulses.      Heart sounds: Normal heart sounds.   Pulmonary:      Effort: Pulmonary effort is normal. No respiratory distress.      Breath sounds: Normal breath sounds. No wheezing.   Chest:      Chest wall: No tenderness.   Abdominal:      General: There is no distension.      Palpations: There is no mass.      Tenderness: There is no abdominal tenderness. There is no guarding.   Musculoskeletal:         General: No swelling or tenderness.   Neurological:      Mental  Status: She is alert.                Significant Labs: All pertinent labs within the past 24 hours have been reviewed.    Significant Imaging: I have reviewed all pertinent imaging results/findings within the past 24 hours.  Assessment/Plan:     * Functional neurological symptom disorder with mixed symptoms  Chronic condition.     - Will require ambulatory referral to Neuropsychology and outpatient CBT.    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      - EEG on prior admission with left frontotemporal slowing, no epileptiform activity, no seizures; currently pending.   - EEG on current admission without epileptiform events.   - Previous admission c/w PNEE.  - Levetiracetam level pending.  - Topiramate level pending.  - Neurology consult held d/t c/f PNEE.   - At discharge, will require f/u in Epilepsy clinic and additional ambulatory referral to Neuropsychology.      Migraine  Chronic condition.    - Continue home Topiramate 50mg BID.    Anxiety and depression  Chronic condition (?). Patient endorses not knowing why she takes escitalopram.     Continue home escitalopram for now.       Essential (primary) hypertension  Chronic condition.    - Continue home amlodipine 10 qD, carvedilol 37.5 BID.    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    - Follows with Neurosurgery as outpatient for continued surveillance of untreated L M1/M2  aneurysm.        VTE Risk Mitigation (From admission, onward)           Ordered     heparin (porcine) injection 5,000 Units  Every 8 hours         08/24/24 0152     IP VTE HIGH RISK PATIENT  Once         08/24/24 0152     Place sequential compression device  Until discontinued         08/24/24 0152                           On 08/24/2024, patient should be placed in hospital observation services under my care in collaboration with Dr. Ned Seymour.         Fred Lynch MD  Department of Hospital Medicine  [facility] - Neurosurgery ([facility])

## 2024-08-24 NOTE — H&P
Misael Schmitt - Neurosurgery ([facility] Medicine  History & Physical    Patient Name: Gina Jenkins  MRN: [id number]  Patient Class: OP- Observation  Admission Date: 8/23/2024  Attending Physician: Ned Seymour, *   Primary Care Provider: Clair Johnson, NP         Patient information was obtained from patient and ER records.     Subjective:     Principal Problem:Functional neurological symptom disorder with mixed symptoms    Chief Complaint:   Chief Complaint   Patient presents with    Seizures     Patient with 3 witnessed seizures with EMS. 10 IN of Versed with EMS. Patient AAO x 4 at this time. States sh ehas been compliant with home medications.        HPI: 49F w/ PMHx Left ICA aneurysms (s/p clipping), HTN (on amlodipine, carvedilol), HLD (on statin; held given elevated alk. 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 Initial ED history limited by the patient's condition, as she was altered upon arrival. She states she has been compliant with medications.      On interview, patient endorses no acute symptoms. Notes she arrived following seizures, denies difficulty thinking, difficulty concentrating. Is oriented. Endorses chest pain in the distant past, but denies any active chest pain, shortness of breath, or abdominal pain. Admitted s/p Keppra load; no events on monitor, no post-ictal state to date.    In ED: Vitals s/f tachypnea (high of 26), hypertension (154/84 at last measurement). VBG s/f low CO2, likely secondary to tachypnea, normal pH. CMP s/f NAGMA (Cl- 112, HCO3- 19), COOPER (Cr 1.6, unknown baseline by historically <1.3), albumin 3.3, and mildly elevated alk. phos (146) w/o transaminitis.     Recent Similar Admission s/p 07/02/24 Discharge:    Patient was discharged from [place] on 7/2/2024 after a similar episode. She reported going to  "bed on 6/29 with epigastric discomfort and feeling unwell. The next morning, she noticed her "mouth was crooked." Alone at the time, she called her son, who arrived shortly before she experienced three separate episodes: one of unresponsiveness with left arm and leg shaking for 5 minutes, a staring episode lasting several minutes, and another staring episode after EMS administered versed. She was taken to [place] ED, received 1mg ativan, and was [place] [facility] [place]. She denies recent infections, substance use, and medication noncompliance.    Neurology, Per Chart Review:    The patient began experiencing clinical seizures after her left ICA aneurysm clipping in 2022, with symptoms including left and right-sided shaking and staring episodes. Despite multiple ED visits for suspected seizures, EEGs have not shown epileptiform discharges, though post-clipping EEG showed left hemispheric slowing. She previously followed up with Dr. Zurita for AED management. Her most recent AED regimen in February 2023 was Keppra 1500mg BID and Topiramate 50mg BID, but it has since been adjusted to Keppra 750mg BID and Topiramate 50mg BID after multiple presentations.     Per EMS:  Patient had 3 witnessed seizures earlier.  They gave intranasal Versed 10 mg.    Observation overnight on EEG.    Past Medical History:   Diagnosis Date    Brain aneurysm     CHF (congestive heart failure)     H/O coronary angioplasty     Hypercholesteremia     Hypertension     Malignant hypertension     Migraine headache     Stroke 10/2017       Past Surgical History:   Procedure Laterality Date    CARDIAC CATHETERIZATION      CEREBRAL ANGIOGRAM      CLIP LIGATION OF INTRACRANIAL ANEURYSM BY CRANIOTOMY N/A 7/15/2022    Procedure: CRANIOTOMY, WITH ANEURYSM CLIPPING;  Surgeon: Timothy Diaz MD;  Location: [facility] OR [address] [place] [address];  Service: Neurosurgery;  Laterality: N/A;  PTERIONAL CRANIOTOMY WITH CLIP LIGATION OF L PCOMM, L ANTERIOR " "CHOROIDAL, L MCA  ANEURYSM, ANESTHESIA: GENERAL, BLOOD: TYPE&CROSS 2 UNITS, NEUROMONITORING: SEP, MEP, EEG, RADIOLOGY: C-ARM, POSITION: SUPINE, ANNA CO-SURGERON: DR. LEXI DOMÍNGUEZ.    WOUND DEBRIDEMENT  8/27/2022    Procedure: DEBRIDEMENT, WOUND;  Surgeon: Timothy Diaz MD;  Location: [facility] OR [address] [place] [address];  Service: Neurosurgery;;       Review of patient's allergies indicates:   Allergen Reactions    Lisinopril Swelling    Bactrim [sulfamethoxazole-trimethoprim] Rash    Ceftazidime Hives     Rash while on IV ceftazidime for planned 6 weeks.  See ED notes 9/12, 9/14 and ID clinic notes 9/16 and 9/28       Current Facility-Administered Medications on File Prior to Encounter   Medication    [COMPLETED] gadobutroL (GADAVIST) injection 8 mL     Current Outpatient Medications on File Prior to Encounter   Medication Sig    amitriptyline (ELAVIL) 75 MG tablet Take 75 mg by mouth every evening.    amLODIPine (NORVASC) 10 MG tablet Take 10 mg by mouth once daily.    atorvastatin (LIPITOR) 80 MG tablet Take 1 tablet (80 mg total) by mouth once daily.    blood sugar diagnostic Strp Use to check blood glucose 3 times daily.    blood-glucose meter Misc Use to check blood glucose 3 times daily.    carvediloL (COREG) 12.5 MG tablet Take 3 tablets (37.5 mg total) by mouth 2 (two) times daily.    EScitalopram oxalate (LEXAPRO) 20 MG tablet Take 1 tablet (20 mg total) by mouth once daily.    lancets 30 gauge Misc Use to check blood glucose 3 times daily.    levETIRAcetam (KEPPRA) 750 MG Tab Take 1 tablet (750 mg total) by mouth 2 (two) times daily.    losartan (COZAAR) 50 MG tablet Take 1 tablet (50 mg total) by mouth once daily.    metFORMIN (GLUCOPHAGE-XR) 500 MG ER 24hr tablet Take 500 mg by mouth 2 (two) times daily.    pen needle, diabetic (BD ULTRA-FINE MARIELY PEN NEEDLE) 32 gauge x 5/32" Ndle Use to inject insulin into the skin three times daily .    topiramate (TOPAMAX) 50 MG tablet Take 1 tablet (50 mg total) by mouth 2 (two) " times daily.    traMADoL (ULTRAM) 50 mg tablet Take 50 mg by mouth 2 (two) times daily.    [DISCONTINUED] aspirin 81 MG Chew Take 1 tablet (81 mg total) by mouth once daily.     Family History    None       Tobacco Use    Smoking status: Every Day     Current packs/day: 0.33     Average packs/day: 0.3 packs/day for 31.6 years (10.4 ttl pk-yrs)     Types: Cigarettes     Start date: 1993    Smokeless tobacco: Never    Tobacco comments:     Enrolled in the [employer] on 6/7/22 ([facility] Member ID # [id number]). Pt is a 0.33 pk/day cigarette smoker x 31 yrs. She states ready to quit smoking. Ambulatory referral to Smoking Cessation clinic following hospital discharge.    Substance and Sexual Activity    Alcohol use: Yes     Comment: occassionally    Drug use: No    Sexual activity: Not Currently     Partners: Male     Birth control/protection: None     Review of Systems   Reason unable to perform ROS: See HPI.     Objective:     Vital Signs (Most Recent):  Temp: 98.7 °F (37.1 °C) (08/24/24 0319)  Pulse: 62 (08/24/24 0319)  Resp: 18 (08/24/24 0319)  BP: 137/67 (08/24/24 0319)  SpO2: 100 % (08/24/24 0319) Vital Signs (24h Range):  Temp:  [97.9 °F (36.6 °C)-98.7 °F (37.1 °C)] 98.7 °F (37.1 °C)  Pulse:  [58-66] 62  Resp:  [17-26] 18  SpO2:  [96 %-100 %] 100 %  BP: (137-162)/(67-97) 137/67     Weight: 77.1 kg (169 lb 15.6 oz)  Body mass index is 31.09 kg/m².     Physical Exam  Constitutional:       General: She is not in acute distress.     Appearance: Normal appearance. She is obese. She is not ill-appearing, toxic-appearing or diaphoretic.   HENT:      Head: Normocephalic and atraumatic.      Nose: No congestion or rhinorrhea.      Mouth/Throat:      Comments: Mouth crooked appearing on exam  Eyes:      General: No scleral icterus.  Cardiovascular:      Rate and Rhythm: Normal rate and regular rhythm.      Pulses: Normal pulses.      Heart sounds: Normal heart sounds.   Pulmonary:      Effort: Pulmonary effort is normal. No  respiratory distress.      Breath sounds: Normal breath sounds. No wheezing.   Chest:      Chest wall: No tenderness.   Abdominal:      General: There is no distension.      Palpations: There is no mass.      Tenderness: There is no abdominal tenderness. There is no guarding.   Musculoskeletal:         General: No swelling or tenderness.   Neurological:      Mental Status: She is alert.                Significant Labs: All pertinent labs within the past 24 hours have been reviewed.    Significant Imaging: I have reviewed all pertinent imaging results/findings within the past 24 hours.  Assessment/Plan:     * Functional neurological symptom disorder with mixed symptoms  Chronic condition.     - Will require ambulatory referral to Neuropsychology and outpatient CBT.    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      - EEG on prior admission with left frontotemporal slowing, no epileptiform activity, no seizures; currently pending.   - EEG on current admission without epileptiform events.   - Previous admission c/w PNEE.  - Levetiracetam level pending.  - Topiramate level pending.  - Neurology consult held d/t c/f PNEE.   - At discharge, will require f/u in Epilepsy clinic and additional ambulatory referral to Neuropsychology.      Migraine  Chronic condition.    - Continue home Topiramate 50mg BID.    Anxiety and depression  Chronic condition (?). Patient endorses not knowing why she takes escitalopram.     Continue home escitalopram for now.       Essential (primary) hypertension  Chronic condition.    - Continue home amlodipine 10  qD, carvedilol 37.5 BID.    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    - Follows with Neurosurgery as outpatient for continued surveillance of untreated L M1/M2 aneurysm.        VTE Risk Mitigation (From admission, onward)           Ordered     heparin (porcine) injection 5,000 Units  Every 8 hours         08/24/24 0152     IP VTE HIGH RISK PATIENT  Once         08/24/24 0152     Place sequential compression device  Until discontinued         08/24/24 0152                           On 08/24/2024, patient should be placed in hospital observation services under my care in collaboration with Dr. Ned Seymour.         Fred Lynch MD  Department of Hospital Medicine  [facility] - Neurosurgery ([facility])

## 2024-08-24 NOTE — DISCHARGE SUMMARY
"Misael Schmitt - Neurosurgery ([facility])  [facility] Medicine  Discharge Summary      Patient Name: Gina Jenkins  MRN: [id number]  ABBY: [id number]  Patient Class: OP- Observation  Admission Date: 8/23/2024  Hospital Length of Stay: 0 days  Discharge Date and Time:  08/24/2024 4:08 PM  Attending Physician: Clementina Camilo MD   Discharging Provider: John Vizcarra MD  Primary Care Provider: Clair Johnson NP  [facility] Medicine Team: [place] [id number] John Vizcarra MD  Primary Care Team: [place] [id number]    HPI:   49F w/ PMHx Left ICA aneurysms (s/p clipping), HTN (on amlodipine, carvedilol), HLD (on statin; held given elevated alk. 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 Initial ED history limited by the patient's condition, as she was altered upon arrival. She states she has been compliant with medications.      On interview, patient endorses no acute symptoms. Notes she arrived following seizures, denies difficulty thinking, difficulty concentrating. Is oriented. Endorses chest pain in the distant past, but denies any active chest pain, shortness of breath, or abdominal pain. Admitted s/p Keppra load; no events on monitor, no post-ictal state to date.    In ED: Vitals s/f tachypnea (high of 26), hypertension (154/84 at last measurement). VBG s/f low CO2, likely secondary to tachypnea, normal pH. CMP s/f NAGMA (Cl- 112, HCO3- 19), COOPER (Cr 1.6, unknown baseline by historically <1.3), albumin 3.3, and mildly elevated alk. phos (146) w/o transaminitis.     Recent Similar Admission s/p 07/02/24 Discharge:    Patient was discharged from [place] on 7/2/2024 after a similar episode. She reported going to bed on 6/29 with epigastric discomfort and feeling unwell. The next morning, she noticed her "mouth was crooked." Alone at the time, she called her son, who arrived shortly " before she experienced three separate episodes: one of unresponsiveness with left arm and leg shaking for 5 minutes, a staring episode lasting several minutes, and another staring episode after EMS administered versed. She was taken to [place] ED, received 1mg ativan, and was [place] [facility] [place]. She denies recent infections, substance use, and medication noncompliance.    Neurology, Per Chart Review:    The patient began experiencing clinical seizures after her left ICA aneurysm clipping in 2022, with symptoms including left and right-sided shaking and staring episodes. Despite multiple ED visits for suspected seizures, EEGs have not shown epileptiform discharges, though post-clipping EEG showed left hemispheric slowing. She previously followed up with Dr. Zurita for AED management. Her most recent AED regimen in February 2023 was Keppra 1500mg BID and Topiramate 50mg BID, but it has since been adjusted to Keppra 750mg BID and Topiramate 50mg BID after multiple presentations.     Per EMS:  Patient had 3 witnessed seizures earlier.  They gave intranasal Versed 10 mg.    Observation overnight on EEG.    * No surgery found *      Hospital Course:   EEG without seizure or epileptiform activity. Pt would like to be discharged as her children are home alone.      Goals of Care Treatment Preferences:  Code Status: Full Code      SDOH Screening:  The patient was screened for utility difficulties, food insecurity, transport difficulties, housing insecurity, and interpersonal safety and there were no concerns identified this admission.     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      - EEG on prior admission with left frontotemporal slowing, no epileptiform activity, no seizures; repeat the same this admission   - EEG on current admission without epileptiform events.   - Previous admission c/w PNEE.  - Levetiracetam level pending.  - Topiramate level pending.  - At discharge, will require f/u in Epilepsy clinic and additional ambulatory referral to Neuropsychology.      Psychiatric  * Functional neurological symptom disorder with mixed symptoms  Chronic condition.     - Will require ambulatory referral to Neuropsychology and outpatient CBT.      Final Active Diagnoses:    Diagnosis Date Noted POA    PRINCIPAL PROBLEM:  Functional neurological symptom disorder with mixed symptoms [F44.7] 07/02/2024 Yes    Seizure disorder [G40.909] 08/30/2022 Yes    Anxiety and depression [F41.9, F32.A] 08/24/2024 Yes    History of aneurysm [Z86.79] 08/24/2024 Not Applicable    Seizure-like activity [R56.9] 08/24/2024 Yes    Migraine [G43.909] 06/30/2024 Yes    COOPER (acute kidney injury) [N17.9] 06/15/2024 Yes    History of cerebral aneurysm repair [Z98.890, Z86.79] 02/07/2023 Not Applicable    Mixed hyperlipidemia [E78.2] 06/07/2022 Yes    History of CVA (cerebrovascular accident) [Z86.73]  Not Applicable    Nicotine dependence [F17.200] 10/04/2017 Yes    Essential (primary) hypertension [I10] 03/05/2016 Yes      Problems Resolved During this Admission:       Discharged Condition: fair    Disposition: Home or Self Care    Follow Up:   Follow-up Information       Clair Johnson NP Follow up in 1 week(s).    Specialty: Family Medicine  Contact information:  [address]  [address]  [facility] [address] [phone]                           Patient Instructions:      Ambulatory referral/consult to Adult Neuropsychology   Standing Status: Future   Referral Priority: Routine Referral  "Type: Psychiatric   Referral Reason: Specialty Services Required   Number of Visits Requested: 1       Significant Diagnostic Studies: EEG without seizure or epileptiform activity    Pending Diagnostic Studies:       Procedure Component Value Units Date/Time    Basic metabolic panel [[id number]]     Order Status: Sent Lab Status: No result     Specimen: Blood     Levetiracetam level [[id number]] Collected: 08/23/24 1959    Order Status: Sent Lab Status: In process Updated: 08/23/24 2032    Specimen: Blood     Topiramate level [[id number]] Collected: 08/23/24 1959    Order Status: Sent Lab Status: In process Updated: 08/23/24 2032    Specimen: Blood            Medications:  Reconciled Home Medications:      Medication List        CHANGE how you take these medications      losartan 50 MG tablet  Commonly known as: COZAAR  Take 1 tablet (50 mg total) by mouth once daily.            CONTINUE taking these medications      amitriptyline 75 MG tablet  Commonly known as: ELAVIL  Take 75 mg by mouth every evening.     amLODIPine 10 MG tablet  Commonly known as: NORVASC  Take 10 mg by mouth once daily.     atorvastatin 80 MG tablet  Commonly known as: LIPITOR  Take 1 tablet (80 mg total) by mouth once daily.     BD ULTRA-FINE MARIELY PEN NEEDLE 32 gauge x 5/32" Ndle  Generic drug: pen needle, diabetic  Use to inject insulin into the skin three times daily .     carvediloL 12.5 MG tablet  Commonly known as: COREG  Take 3 tablets (37.5 mg total) by mouth 2 (two) times daily.     EScitalopram oxalate 20 MG tablet  Commonly known as: LEXAPRO  Take 1 tablet (20 mg total) by mouth once daily.     levETIRAcetam 750 MG Tab  Commonly known as: KEPPRA  Take 1 tablet (750 mg total) by mouth 2 (two) times daily.     metFORMIN 500 MG ER 24hr tablet  Commonly known as: GLUCOPHAGE-XR  Take 500 mg by mouth 2 (two) times daily.     topiramate 50 MG tablet  Commonly known as: TOPAMAX  Take 1 tablet (50 mg total) by mouth 2 (two) times daily.   "   traMADoL 50 mg tablet  Commonly known as: ULTRAM  Take 50 mg by mouth 2 (two) times daily.     TRUE METRIX GLUCOSE METER Misc  Generic drug: blood-glucose meter  Use to check blood glucose 3 times daily.     TRUE METRIX GLUCOSE TEST STRIP Strp  Generic drug: blood sugar diagnostic  Use to check blood glucose 3 times daily.     TRUEPLUS LANCETS 30 gauge Misc  Generic drug: lancets  Use to check blood glucose 3 times daily.              Indwelling Lines/Drains at time of discharge:   Lines/Drains/Airways       None                   Time spent on the discharge of patient: 45 minutes       John Vizcarra MD  Department of Hospital Medicine  [facility] Neurosurgery ([facility])

## 2024-08-24 NOTE — CARE UPDATE
I have reviewed the chart of Gina Jenkins who is hospitalized for the following:    Active Hospital Problems    Diagnosis    *Functional neurological symptom disorder with mixed symptoms    Anxiety and depression    History of aneurysm    Migraine    COOPER (acute kidney injury)     [place]  Monitor with daily cmp  Urine studies pending      History of cerebral aneurysm repair    Seizure disorder    Mixed hyperlipidemia    History of CVA (cerebrovascular accident)    Nicotine dependence     [place] on cessation      Essential (primary) hypertension        Vaishali Ng NP  Unit Based [facility]

## 2024-08-24 NOTE — ASSESSMENT & PLAN NOTE
Chronic condition (?). Patient endorses not knowing why she takes escitalopram.     Continue home escitalopram for now.

## 2024-08-26 LAB
LEVETIRACETAM SERPL-MCNC: 29.3 UG/ML (ref 3–60)
TOPIRAMATE SERPL-MCNC: 8.2 MCG/ML

## 2024-09-03 ENCOUNTER — PATIENT MESSAGE (OUTPATIENT)
Dept: NEUROLOGY | Facility: CLINIC | Age: 49
End: 2024-09-03
Payer: MEDICAID

## 2024-09-16 PROBLEM — I63.512 ACUTE ISCHEMIC LEFT MCA STROKE: Status: RESOLVED | Noted: 2017-10-03 | Resolved: 2024-09-16

## 2024-09-29 NOTE — PROGRESS NOTES
Continue pantoprazole 40 mg BID  Sucralfate 3x daily with meals   [facility] - Neuro Critical Care  Neurocritical Care  Progress Note    Admit Date: 6/17/2024  Service Date: 06/18/2024  Length of Stay: 1    Subjective:     Chief Complaint: Acute encephalopathy    History of Present Illness: 48 y/o F w/ PMH of multiple brain aneurysms, coronary angioplasty, HFpEF (EF 55% 6/2024), HTN, HLD, migraine headaches, seizures and CVA. She initially presented to [facility] on 6/15 after being found down and unresponsive by her family. In the ED, she was oriented to self and able to follow commands, but then had a witnessed seizure requiring administration of ativan 1mg. 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 Home AEDs include Keppra 750mg BID and Depakote 750mg BID, although compliance is questionable. She was admitted to Hospital Medicine for acute encephalopathy workup and subsequently stepped up to ICU for concern that she had not returned to her baseline following the seizure activity. EEG cap placed at that time and there have been no apparent epileptiform discharges or electrographic seizures seen. Neurology consulted for AED management, she was currently being maintained on Keppra 1g BID. 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 On 6/17, patient had multiple episodes of agitation, was attempting to get out of bed, and became combative requiring multiple doses of IV versed and four-point restraints. Upon Neurology's evaluation later that day, she was unable to participate in their exam, follow commands and answer any questions. Recommendation made to [place] [facility] [place] level of care and continuous EEG monitoring. She will be admitted to [facility] for further management.    Hospital Course: 06/18/2024: No seizures on EEG. Continue AED regimen, TTF under Hospital Medicine     Interval History:  Some agitation overnight, PRN haldol added. Ammonia  normalized, UTI being treated with CTX, EEG without seizures, monitor ammonia, TTF under Hospital Medicine for further management.     Review of Systems   Constitutional: Negative.    HENT: Negative.     Eyes: Negative.    Respiratory: Negative.     Gastrointestinal: Negative.    Genitourinary: Negative.    Musculoskeletal: Negative.    Neurological:  Positive for seizures, speech difficulty and weakness.   Psychiatric/Behavioral:  Positive for agitation.        Objective:     Vitals:  Temp: 98.1 °F (36.7 °C)  Pulse: 70  Rhythm: normal sinus rhythm  BP: (!) 178/90  MAP (mmHg): 127  Resp: 14  SpO2: 98 %    Temp  Min: 96.5 °F (35.8 °C)  Max: 98.4 °F (36.9 °C)  Pulse  Min: 54  Max: 88  BP  Min: 135/84  Max: 196/91  MAP (mmHg)  Min: 103  Max: 142  Resp  Min: 6  Max: 39  SpO2  Min: 92 %  Max: 100 %    06/17 0701 - 06/18 0700  In: -   Out: 600 [Urine:600]   Unmeasured Output  Stool Occurrence: 1        Physical Exam    Examination:   Constitutional: Well-nourished and -developed. No apparent distress.   Eyes: Conjunctiva clear, anicteric. Lids no lesions.  Head/Ears/Nose/Mouth/Throat/Neck: Moist mucous membranes. External ears, nose atraumatic.   Cardiovascular: Regular rhythm.   Respiratory: Comfortable respirations. Clear to auscultation.    Neurologic:  -GCS [id number]  -Alert. Oriented to person. Speech dysarthric. Follows commands.  -Cranial nerves : mild right facial droop at rest, not present with facial movements   -Motor antigravity in all four extremities without focal weakness   -Sensation intact to light touch            Medications:  Continuous ScheduledamLODIPine, 10 mg, Daily  atorvastatin, 80 mg, Daily  carvediloL, 37.5 mg, BID  [START ON 6/19/2024] cefTRIAXone (Rocephin) IV (PEDS and ADULTS), 1 g, Q24H  enoxparin, 40 mg, Daily  EScitalopram oxalate, 20 mg, Daily  levETIRAcetam (Keppra) IV (PEDS and ADULTS), 1,000 mg, Q12H  losartan, 50 mg, Daily  senna-docusate 8.6-50 mg, 1 tablet, BID    PRNsodium chloride  0.9%, , PRN  acetaminophen, 650 mg, Q6H PRN  dextrose 10%, 12.5 g, PRN  dextrose 10%, 12.5 g, PRN  dextrose 10%, 25 g, PRN  dextrose 10%, 25 g, PRN  glucagon (human recombinant), 1 mg, PRN  glucose, 16 g, PRN  glucose, 24 g, PRN  haloperidol lactate, 5 mg, Q6H PRN  insulin aspart U-100, 0-10 Units, QID (AC + HS) PRN  labetaloL, 10 mg, Q4H PRN  ondansetron, 4 mg, Q8H PRN  potassium chloride, 40 mEq, PRN   And  potassium chloride, 60 mEq, PRN   And  potassium chloride, 80 mEq, PRN      Today I personally reviewed pertinent medications, lines/drains/airways, imaging, laboratory results, notably:  CTH, CTA HN, MRI brain all without acute abnormalities.   Ammonia normalized on AM labs     Diet  Diet diabetic Minced & Moist (IDDSI Level 5); 2000 Calorie  Diet diabetic Minced & Moist (IDDSI Level 5); 2000 Calorie        Assessment/Plan:     Neuro  * Acute encephalopathy  50 y/o F presenting as transfer from [facility] for higher level of care and continuous EEG monitoring. Hx of seizure disorder w/ questionable home med compliance. Confounded by elevated ammonia level and UTI at OSH.     -Admit to NCC  -VS/Neuro checks q1  -Cap EEG without seizures  -MRI brain, CTA H&N and CTH without acute processes   -Continue keppra 1g BID  -VPA discontinued due to elevated ammonia levels at OSH   -Ammonia normalized, discontinue lactulose  -Repeat ammonia level in AM  -Treat UTI, 3d course of CTX ordered   -Psych consulted for PNES eval; see consult note 6/17  -Epilepsy consulted, appreciate recommendations   -SBP goal < 180  -Diet advanced per SLP recommendations   -Monitor I/O  -CBC, CMP, Mag, Phos daily  -SCDs/Lovenox for DVT PPX  -PT/OT/SLP as appropriate  -TTF under Hospital Medicine service     Seizure disorder  -Home AEDs - Keppra 750mg BID and Depakote 750mg BID; questionable compliance  -AEDs adjusted by Neurology at OSH  -Currently maintained on Keppra 1g BID  -Cap EEG negative for seizures   -Seizure precautions  -Avoid  agents that lower seizure threshold     H/O: CVA (cerebrovascular accident)  -Hx of; 2017    Cardiac/Vascular  Mixed hyperlipidemia  -Atorvastatin daily    Chronic diastolic heart failure  -Hx of  -Most recent ECHO 6/8 w/ normal systolic function, estimated EF 55%, and normal diastolic function  -Continue home BB     Essential hypertension  -SBP goal < 180  -Amlodipine, losartan daily  -Carvedilol BID  -PRN labetalol    Renal/  Acute cystitis without hematuria  -Noted on U/A -- nitrite and leukocyte positive   -CTX x3 days as patient unreliably taking PO medications     Endocrine  Type 2 diabetes mellitus, without long-term current use of insulin  -Hold home metformin in acute setting  -Hold home insulin d/t episode of hypoglycemia @ OSH; resume once able to tolerate diet  -Accuchecks qACHS w/ SSI  -Hgb A1c pending          The patient is being Prophylaxed for:  Venous Thromboembolism with: Mechanical or Chemical  Stress Ulcer with: Not Applicable   Ventilator Pneumonia with: not applicable    Activity Orders            Diet diabetic Minced & Moist (IDDSI Level 5); 2000 Calorie: Diabetic starting at 06/18 1513    Progressive Mobility Protocol (mobilize patient to their highest level of functioning at least twice daily) starting at 06/18 0800    Turn patient starting at 06/17 2200    Elevate HOB starting at 06/17 2153          Full Code    Level 3: 40 minutes spent with patient care, family meetings, and care coordination     Lilli Adam PA-C  Neurocritical Care  [facility] - Neuro Critical Care

## 2024-09-30 PROBLEM — I63.312 THROMBOTIC STROKE INVOLVING LEFT MIDDLE CEREBRAL ARTERY: Status: RESOLVED | Noted: 2017-10-03 | Resolved: 2024-09-30
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
## 2024-10-22 ENCOUNTER — TELEPHONE (OUTPATIENT)
Dept: NEUROLOGY | Facility: CLINIC | Age: 49
End: 2024-10-22

## 2024-10-22 ENCOUNTER — OFFICE VISIT (OUTPATIENT)
Dept: NEUROLOGY | Facility: CLINIC | Age: 49
End: 2024-10-22
Payer: MEDICAID

## 2024-10-22 VITALS
DIASTOLIC BLOOD PRESSURE: 77 MMHG | WEIGHT: 172.63 LBS | HEART RATE: 66 BPM | SYSTOLIC BLOOD PRESSURE: 117 MMHG | BODY MASS INDEX: 31.57 KG/M2

## 2024-10-22 DIAGNOSIS — F44.5 PSYCHOGENIC NONEPILEPTIC SEIZURE: Primary | ICD-10-CM

## 2024-10-22 PROCEDURE — 99213 OFFICE O/P EST LOW 20 MIN: CPT | Mod: S$PBB,,, | Performed by: STUDENT IN AN ORGANIZED HEALTH CARE EDUCATION/TRAINING PROGRAM

## 2024-10-22 PROCEDURE — 99999 PR PBB SHADOW E&M-EST. PATIENT-LVL III: CPT | Mod: PBBFAC,,, | Performed by: STUDENT IN AN ORGANIZED HEALTH CARE EDUCATION/TRAINING PROGRAM

## 2024-10-22 PROCEDURE — 3008F BODY MASS INDEX DOCD: CPT | Mod: CPTII,,, | Performed by: STUDENT IN AN ORGANIZED HEALTH CARE EDUCATION/TRAINING PROGRAM

## 2024-10-22 PROCEDURE — 3074F SYST BP LT 130 MM HG: CPT | Mod: CPTII,,, | Performed by: STUDENT IN AN ORGANIZED HEALTH CARE EDUCATION/TRAINING PROGRAM

## 2024-10-22 PROCEDURE — 99213 OFFICE O/P EST LOW 20 MIN: CPT | Mod: PBBFAC | Performed by: STUDENT IN AN ORGANIZED HEALTH CARE EDUCATION/TRAINING PROGRAM

## 2024-10-22 PROCEDURE — 3078F DIAST BP <80 MM HG: CPT | Mod: CPTII,,, | Performed by: STUDENT IN AN ORGANIZED HEALTH CARE EDUCATION/TRAINING PROGRAM

## 2024-10-22 PROCEDURE — 3044F HG A1C LEVEL LT 7.0%: CPT | Mod: CPTII,,, | Performed by: STUDENT IN AN ORGANIZED HEALTH CARE EDUCATION/TRAINING PROGRAM

## 2024-10-22 PROCEDURE — 1159F MED LIST DOCD IN RCRD: CPT | Mod: CPTII,,, | Performed by: STUDENT IN AN ORGANIZED HEALTH CARE EDUCATION/TRAINING PROGRAM

## 2024-10-22 PROCEDURE — 4010F ACE/ARB THERAPY RXD/TAKEN: CPT | Mod: CPTII,,, | Performed by: STUDENT IN AN ORGANIZED HEALTH CARE EDUCATION/TRAINING PROGRAM

## 2024-10-22 NOTE — PATIENT INSTRUCTIONS
I would like to schedule you for an admission in the epilepsy monitoring unit (EMU).  The EMU is a specialized unit in the hospital that only takes care of patients who have seizures or seizure like events.  The goal of an EMU admission is to record an event to better understand what changes are occurring in the brain's electrical activity.  This allows us to identify whether a patient's events are epileptic seizures or another type of event.  If the events are seizures, we can also learn what area of the brain the seizures are starting from.      EMU admissions are typically 3-5 days, although they can be as long as 7 days.  During the admission, we will typically stop or lower any anti-seizure medications you are taking in order to increase the chance of recording a seizure.  This is done in a safe environment where you are being monitored 24 hours/day, so it is safer to discontinue seizure medications inpatient rather than at home.  I feel this admission is necessary to better understand and treat your seizures.  Our epilepsy nurse will contact you to schedule this admission.       For your headaches, try magnesium oxide 400 mg daily and riboflavin (vitamin b2).  This is available over the counter.    I'm going to send Billy a message about getting your appointment rescheduled.    Please contact neurosurgery to see about rescheduling your appointment to monitor your aneurysm.      Follow up after the EMU.

## 2024-10-23 ENCOUNTER — PATIENT MESSAGE (OUTPATIENT)
Dept: NEUROLOGY | Facility: CLINIC | Age: 49
End: 2024-10-23
Payer: MEDICAID

## 2024-10-24 ENCOUNTER — TELEPHONE (OUTPATIENT)
Dept: NEUROLOGY | Facility: CLINIC | Age: 49
End: 2024-10-24
Payer: MEDICAID

## 2024-11-12 ENCOUNTER — TELEPHONE (OUTPATIENT)
Dept: NEUROLOGY | Facility: CLINIC | Age: 49
End: 2024-11-12
Payer: MEDICAID

## 2024-11-13 ENCOUNTER — HOSPITAL ENCOUNTER (EMERGENCY)
Facility: HOSPITAL | Age: 49
Discharge: HOME | End: 2024-11-13
Attending: EMERGENCY MEDICINE
Payer: MEDICAID

## 2024-11-13 VITALS
DIASTOLIC BLOOD PRESSURE: 87 MMHG | SYSTOLIC BLOOD PRESSURE: 158 MMHG | OXYGEN SATURATION: 96 % | HEART RATE: 65 BPM | TEMPERATURE: 98 F | RESPIRATION RATE: 11 BRPM

## 2024-11-13 DIAGNOSIS — R51.9 INTRACTABLE HEADACHE, UNSPECIFIED CHRONICITY PATTERN, UNSPECIFIED HEADACHE TYPE: ICD-10-CM

## 2024-11-13 DIAGNOSIS — F44.5 PSYCHOGENIC NONEPILEPTIC SEIZURE: Primary | ICD-10-CM

## 2024-11-13 LAB
ALBUMIN SERPL BCP-MCNC: 3.3 G/DL (ref 3.5–5.2)
ALP SERPL-CCNC: 143 U/L (ref 40–150)
ALT SERPL W/O P-5'-P-CCNC: 18 U/L (ref 10–44)
ANION GAP SERPL CALC-SCNC: 8 MMOL/L (ref 8–16)
AST SERPL-CCNC: 19 U/L (ref 10–40)
BASOPHILS # BLD AUTO: 0.06 K/UL (ref 0–0.2)
BASOPHILS NFR BLD: 0.9 % (ref 0–1.9)
BILIRUB SERPL-MCNC: 0.2 MG/DL (ref 0.1–1)
BUN SERPL-MCNC: 21 MG/DL (ref 6–20)
CALCIUM SERPL-MCNC: 8.8 MG/DL (ref 8.7–10.5)
CHLORIDE SERPL-SCNC: 113 MMOL/L (ref 95–110)
CO2 SERPL-SCNC: 18 MMOL/L (ref 23–29)
CREAT SERPL-MCNC: 1.4 MG/DL (ref 0.5–1.4)
DIFFERENTIAL METHOD BLD: ABNORMAL
EOSINOPHIL # BLD AUTO: 0.3 K/UL (ref 0–0.5)
EOSINOPHIL NFR BLD: 4.3 % (ref 0–8)
ERYTHROCYTE [DISTWIDTH] IN BLOOD BY AUTOMATED COUNT: 13.7 % (ref 11.5–14.5)
EST. GFR  (NO RACE VARIABLE): 46.4 ML/MIN/1.73 M^2
GLUCOSE SERPL-MCNC: 84 MG/DL (ref 70–110)
HCT VFR BLD AUTO: 33.6 % (ref 37–48.5)
HGB BLD-MCNC: 11.4 G/DL (ref 12–16)
IMM GRANULOCYTES # BLD AUTO: 0.02 K/UL (ref 0–0.04)
IMM GRANULOCYTES NFR BLD AUTO: 0.3 % (ref 0–0.5)
LYMPHOCYTES # BLD AUTO: 1.9 K/UL (ref 1–4.8)
LYMPHOCYTES NFR BLD: 28.9 % (ref 18–48)
MCH RBC QN AUTO: 29.8 PG (ref 27–31)
MCHC RBC AUTO-ENTMCNC: 33.9 G/DL (ref 32–36)
MCV RBC AUTO: 88 FL (ref 82–98)
MONOCYTES # BLD AUTO: 0.6 K/UL (ref 0.3–1)
MONOCYTES NFR BLD: 9.5 % (ref 4–15)
NEUTROPHILS # BLD AUTO: 3.6 K/UL (ref 1.8–7.7)
NEUTROPHILS NFR BLD: 56.1 % (ref 38–73)
NRBC BLD-RTO: 0 /100 WBC
PLATELET # BLD AUTO: 257 K/UL (ref 150–450)
PMV BLD AUTO: 10.5 FL (ref 9.2–12.9)
POTASSIUM SERPL-SCNC: 3.3 MMOL/L (ref 3.5–5.1)
PROT SERPL-MCNC: 6.5 G/DL (ref 6–8.4)
RBC # BLD AUTO: 3.82 M/UL (ref 4–5.4)
SODIUM SERPL-SCNC: 139 MMOL/L (ref 136–145)
WBC # BLD AUTO: 6.5 K/UL (ref 3.9–12.7)

## 2024-11-13 PROCEDURE — 85025 COMPLETE CBC W/AUTO DIFF WBC: CPT

## 2024-11-13 PROCEDURE — 63600175 PHARM REV CODE 636 W HCPCS

## 2024-11-13 PROCEDURE — 96375 TX/PRO/DX INJ NEW DRUG ADDON: CPT

## 2024-11-13 PROCEDURE — 80053 COMPREHEN METABOLIC PANEL: CPT

## 2024-11-13 PROCEDURE — 93010 ELECTROCARDIOGRAM REPORT: CPT | Mod: ,,, | Performed by: INTERNAL MEDICINE

## 2024-11-13 PROCEDURE — 96374 THER/PROPH/DIAG INJ IV PUSH: CPT

## 2024-11-13 PROCEDURE — 99285 EMERGENCY DEPT VISIT HI MDM: CPT | Mod: 25

## 2024-11-13 PROCEDURE — 93005 ELECTROCARDIOGRAM TRACING: CPT

## 2024-11-13 RX ORDER — DIPHENHYDRAMINE HYDROCHLORIDE 50 MG/ML
25 INJECTION INTRAMUSCULAR; INTRAVENOUS
Status: COMPLETED | OUTPATIENT
Start: 2024-11-13 | End: 2024-11-13

## 2024-11-13 RX ORDER — PROCHLORPERAZINE EDISYLATE 5 MG/ML
10 INJECTION INTRAMUSCULAR; INTRAVENOUS
Status: COMPLETED | OUTPATIENT
Start: 2024-11-13 | End: 2024-11-13

## 2024-11-13 RX ORDER — LORAZEPAM 2 MG/ML
INJECTION INTRAMUSCULAR
Status: DISCONTINUED
Start: 2024-11-13 | End: 2024-11-13 | Stop reason: WASHOUT

## 2024-11-13 RX ADMIN — PROCHLORPERAZINE EDISYLATE 10 MG: 5 INJECTION INTRAMUSCULAR; INTRAVENOUS at 05:11

## 2024-11-13 RX ADMIN — DIPHENHYDRAMINE HYDROCHLORIDE 25 MG: 50 INJECTION, SOLUTION INTRAMUSCULAR; INTRAVENOUS at 05:11

## 2024-11-13 RX ADMIN — DIPHENHYDRAMINE HYDROCHLORIDE 25 MG: 50 INJECTION, SOLUTION INTRAMUSCULAR; INTRAVENOUS at 06:11

## 2024-11-13 NOTE — ED PROVIDER NOTES
Encounter Date: 11/13/2024       History     Chief Complaint   Patient presents with    Seizures     Had aneurysm surgery for 2 left sided aneurisms, has had seizures since, compliant with meds, multiple seizures today.  More than normal for the last week.  Usually has seizures every day.  PMH, DM, HTN,     49-year-old female with past medical history of Left ICA aneurysms (s/p clipping), HTN (on amlodipine, carvedilol), HLD (on statin; held given elevated alk. 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  Patient allergy appointment which demonstrate epileptiform activity but patient was continued on her antiepileptic medications.  Patient for the past few days she has experienced having multiple seizures daily.  In the emergency department patient had a seizure and.  Patient denies any fevers, numbness, or weakness.    The history is provided by the patient and medical records.     Review of patient's allergies indicates:   Allergen Reactions    Lisinopril Swelling    Bactrim [sulfamethoxazole-trimethoprim] Rash    Ceftazidime Hives     Rash while on IV ceftazidime for planned 6 weeks.  See ED notes 9/12, 9/14 and ID clinic notes 9/16 and 9/28     Past Medical History:   Diagnosis Date    Brain aneurysm     CHF (congestive heart failure)     H/O coronary angioplasty     Hypercholesteremia     Hypertension     Malignant hypertension     Migraine headache     Stroke 10/2017     Past Surgical History:   Procedure Laterality Date    CARDIAC CATHETERIZATION      CEREBRAL ANGIOGRAM      CLIP LIGATION OF INTRACRANIAL ANEURYSM BY CRANIOTOMY N/A 7/15/2022    Procedure: CRANIOTOMY, WITH ANEURYSM CLIPPING;  Surgeon: Timothy Diaz MD;  Location: [facility] OR [address] [place] [address];  Service: Neurosurgery;  Laterality: N/A;  PTERIONAL CRANIOTOMY WITH CLIP LIGATION OF L PCOMM, L ANTERIOR CHOROIDAL, L MCA  ANEURYSM,  ANESTHESIA: GENERAL, BLOOD: TYPE&CROSS 2 UNITS, NEUROMONITORING: SEP, MEP, EEG, RADIOLOGY: C-ARM, POSITION: SUPINE, ANNA, CO-SURGERON: DR. LEXI DOMÍNGUEZ.    WOUND DEBRIDEMENT  8/27/2022    Procedure: DEBRIDEMENT, WOUND;  Surgeon: Timothy Diaz MD;  Location: [facility] OR [address] [place] [address];  Service: Neurosurgery;;     No family history on file.  Social History     Tobacco Use    Smoking status: Every Day     Current packs/day: 0.33     Average packs/day: 0.3 packs/day for 31.9 years (10.5 ttl pk-yrs)     Types: Cigarettes     Start date: 1993    Smokeless tobacco: Never    Tobacco comments:     Enrolled in the [employer] on 6/7/22 ([facility] Member ID # [id number]). Pt is a 0.33 pk/day cigarette smoker x 31 yrs. She states ready to quit smoking. Ambulatory referral to Smoking Cessation clinic following hospital discharge.    Substance Use Topics    Alcohol use: Yes     Comment: occassionally    Drug use: No     Review of Systems  See HPI    Physical Exam     Initial Vitals   BP Pulse Resp Temp SpO2   11/13/24 1605 11/13/24 1605 11/13/24 1605 11/13/24 1749 11/13/24 1605   (!) 152/70 72 20 98.5 °F (36.9 °C) 99 %      MAP       --                Physical Exam    Nursing note and vitals reviewed.      Gen: AxOx3, well nourished, appears stated age, no pallor, no jaundice, appears well hydrated  Eye: EOMI, no scleral icterus, no periorbital edema or ecchymosis  Head: Normocephalic, atraumatic, no lesions, scalp appears normal  ENT: Neck supple, no stridor, no masses, no drooling or voice changes.  Scant blood noted in mouth without any obvious laceration noted  CVS: All distal pulses intact with normal rate and rhythm, no JVD, normal S1/S2, no murmur  Pulm: Normal breath sounds, no wheezes, rales or rhonchi, no increased work of breathing  Abd:  Nondistended, soft, nontender, no organomegaly, no CVAT  Ext: No edema, no lesions, rashes, or deformity  Neuro: GCS15  Cranial nerves intact  Pupils equal and reactive to light  RUE  -  power/tone/sensation intact   RLE  - power/tone/sensation intact   LUE  - power/tone/sensation intact   LLE  - power/tone/sensation intact   Psych: normal affect, cooperative, well groomed, makes good eye contact      ED Course   Procedures  Labs Reviewed   CBC W/ AUTO DIFFERENTIAL - Abnormal       Result Value    WBC 6.50      RBC 3.82 (*)     Hemoglobin 11.4 (*)     Hematocrit 33.6 (*)     MCV 88      MCH 29.8      MCHC 33.9      RDW 13.7      Platelets 257      MPV 10.5      Immature Granulocytes 0.3      Gran # (ANC) 3.6      Immature Grans (Abs) 0.02      Lymph # 1.9      Mono # 0.6      Eos # 0.3      Baso # 0.06      nRBC 0      Gran % 56.1      Lymph % 28.9      Mono % 9.5      Eosinophil % 4.3      Basophil % 0.9      Differential Method Automated     COMPREHENSIVE METABOLIC PANEL - Abnormal    Sodium 139      Potassium 3.3 (*)     Chloride 113 (*)     CO2 18 (*)     Glucose 84      BUN 21 (*)     Creatinine 1.4      Calcium 8.8      Total Protein 6.5      Albumin 3.3 (*)     Total Bilirubin 0.2      Alkaline Phosphatase 143      AST 19      ALT 18      eGFR 46.4 (*)     Anion Gap 8          ECG Results              EKG 12-lead (Final result)        Collection Time Result Time QRS Duration OHS QTC Calculation    11/13/24 16:14:18 11/14/24 08:11:28 88 422                     Final result by Interface, Lab In [facility] (11/14/24 08:11:31)                   Narrative:    Test Reason : R56.9,    Vent. Rate :  62 BPM     Atrial Rate :  62 BPM     P-R Int : 188 ms          QRS Dur :  88 ms      QT Int : 416 ms       P-R-T Axes :  21   2  39 degrees    QTcB Int : 422 ms    Normal sinus rhythm  Minimal voltage criteria for LVH, may be normal variant ( R in aVL )  Anterior infarct ,age undetermined  Abnormal ECG  When compared with ECG of 23-Aug-2024  Voltage criteria for LVH is now present  Confirmed by Young Nuñez ([id number]) on 11/14/2024 8:11:27 AM    Referred By: AAAREFERRAL SELF           Confirmed By: Young  [place]                                  Imaging Results              CT Head Without Contrast (Final result)  Result time 11/13/24 19:25:44      Final result by Kimani Costa MD (11/13/24 19:25:44)                   Impression:      Stable chronic subdural thickening versus trace subdural hemorrhage along the left cerebral convexity in this patient with prior left-sided craniotomy.  No new or worsening hemorrhage.    Stable additional chronic findings as above when compared to 08/23/2024 head CT.  No acute intracranial abnormality.      Electronically signed by: Kimani Costa MD  Date:    11/13/2024  Time:    19:25               Narrative:    EXAMINATION:  CT HEAD WITHOUT CONTRAST    CLINICAL HISTORY:  Headache, chronic, new features or increased frequency;    TECHNIQUE:  Low dose axial CT images obtained throughout the head without intravenous contrast. Sagittal and coronal reconstructions were performed.    COMPARISON:  Head CT most recent 08/23/2024 and MRI brain 06/08/2024    FINDINGS:  Intracranial compartment:    Ventricles and sulci are normal in size for age without evidence of hydrocephalus.  Stable chronic minimal subdural thickening versus chronic subdural hematoma along the left frontal convexity underlying the previous craniotomy site.  No new/acute extra-axial blood or fluid collections.  Unchanged scattered extra-axial calcifications along the interhemispheric fissure.    Stable mild encephalomalacia at the left temporal lobe.  No definite new focal areas of abnormal parenchymal attenuation.  No parenchymal mass, hemorrhage, edema or major vascular distribution infarct.    Skull/extracranial contents (limited evaluation): Similar operative changes in the left calvarium with probable left-sided aneurysm clipping.  No fracture.  Partially imaged retention cyst versus polyp at the floor of the right maxillary sinus.  Mastoid air cells and remaining imaged paranasal sinuses are essentially clear.   Imaged portions of the orbits are within normal limits.                                       Medications   diphenhydrAMINE injection 25 mg (25 mg Intravenous Given 11/13/24 1742)   prochlorperazine injection Soln 10 mg (10 mg Intravenous Given 11/13/24 1742)   diphenhydrAMINE injection 25 mg (25 mg Intravenous Given 11/13/24 1804)     Medical Decision Making  Initial assessment  49-year-old female with past medical history of Left ICA aneurysms (s/p clipping), HTN (on amlodipine, carvedilol), HLD (on statin; held given elevated alk. 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 Patient is able to vocalise, breathing spontaneously, hemodynamically stable, oriented, moving all 4 limbs spontaneously.  Examination consistent with scant blood in patient's mouth.      Differential diagnosis  Seizures in setting of previous brain bleed  Psychogenic seizure  Electrolyte/metabolic derangement      ED management  Upon arriving in the emergency department patient had a seizure which self-reported, however seizure was not typical of tonic-clonic seizure and high suspicion for psychogenic seizure.  Workup was commenced to evaluate for above-mentioned differential including CBC and CMP which were within normal limits for patient's baseline.  CT head did not demonstrate any acute findings.  Patient was given Compazine for headache and had discomfort of her jaw concerning for possible dystonic reaction and was given IV Benadryl with improvement in symptoms.  Given reassuring workup I do not suspect patient as new breakthrough seizures or any other emergent causes requiring admission and patient was encouraged to follow up with the primary care doctor and neurologist.  Patient discharged home.      Amount and/or Complexity of Data Reviewed  Labs: ordered. Decision-making details documented in ED Course.  Radiology:  ordered. Decision-making details documented in ED Course.    Risk  Prescription drug management.              Attending Attestation:   Physician Attestation Statement for Resident:  As the supervising MD   Physician Attestation Statement: I have personally seen and examined this patient.   I agree with the above history.  -:   As the supervising MD I agree with the above PE.     As the supervising MD I agree with the above treatment, course, plan, and disposition.   -: History of PNES, convulsions in ED with recovery to baseline.  Recent neuro visit reiterates no EEG findings of epilepsy.  Observed in ED, rechecked head CT given recurrent headache, no changes.  OK to d/c.                      ED Course as of 11/14/24 1316   Wed Nov 13, 2024   1844 Sodium: 139 [PM]   1844 Potassium(!): 3.3 [PM]   1844 BUN(!): 21 [PM]   1930 CT Head Without Contrast  As per my independent interpretation chronic subdural appearing hemorrhage without new intracranial bleed or masses [PM]   2357 WBC: 6.50 [PM]   2357 Hemoglobin(!): 11.4 [PM]   2357 Platelet Count: 257 [PM]      ED Course User Index  [PM] Joelle Quezada MD                     Critical Care   Performed by: Anthony Tate MD   Authorized by: Anthony Tate MD    Total critical care time (exclusive of procedural time) : 35 minutes  Critical care was necessary to treat or prevent imminent or life-threatening deterioration of the following conditions:  convulsions in ED        Clinical Impression:  Final diagnoses:  [R51.9] Intractable headache, unspecified chronicity pattern, unspecified headache type  [F44.5] Psychogenic nonepileptic seizure (Primary)          ED Disposition Condition    Discharge Stable          ED Prescriptions    None       Follow-up Information       Follow up With Specialties Details Why Contact Info    Clair Johnson FNP Family Medicine Schedule an appointment as soon as possible for a visit   [address]  [address]  [facility]  [address]  [phone]      [facility] - Emergency Dept Emergency Medicine  As needed, If symptoms worsen [address] [id number] [phone]    [facility] NEUROLOGY Neurology   [address] [address]  [phone]             Joelle Quezada MD  Resident  11/13/24 0571       Anthony Tate MD  11/14/24 [id number]

## 2024-11-13 NOTE — ED NOTES
Assumed care. Pt had sx in hallway. Pt postictal at this time but answering questions. Appears lethargic and c/o headache.    LOC: The patient is awake, alert and aware of environment with an appropriate affect, the patient is oriented x 3 and speaking appropriately.  APPEARANCE: Patient resting comfortably and in no acute distress, patient is clean and well groomed, patient's clothing is properly fastened.  SKIN: The skin is warm and dry, color consistent with ethnicity, patient has normal skin turgor and moist mucus membranes, skin intact, no breakdown or bruising noted.  MUSCULOSKELETAL: Patient moving all extremities spontaneously, no obvious swelling or deformities noted.  RESPIRATORY: Airway is open and patent, respirations are spontaneous, patient has a normal effort and rate, no accessory muscle use noted.  CARDIAC: Patient has a normal rate and regular rhythm, no periphreal edema noted, capillary refill < 3 seconds.  ABDOMEN: Soft and non tender to palpation, no distention noted, normoactive bowel sounds present in all four quadrants.  NEUROLOGIC:  facial expression is symmetrical, patient moving all extremities spontaneously, normal sensation in all extremities when touched with a finger.  Follows all commands appropriately.

## 2024-11-13 NOTE — ED NOTES
While lying in ED stretcher, pt. Appeared to be exhibiting seizure-like activity. Dr. Quezada immediately at bedside. Pt. Airway remained patent, pulse ox placed on pt. SPO2 remaining at 100%. Charge nurse aware and pt. Moved to room 30.

## 2024-11-14 LAB
OHS QRS DURATION: 88 MS
OHS QTC CALCULATION: 422 MS

## 2024-11-25 ENCOUNTER — TELEPHONE (OUTPATIENT)
Dept: NEUROSURGERY | Facility: CLINIC | Age: 49
End: 2024-11-25
Payer: MEDICAID

## 2024-11-25 NOTE — TELEPHONE ENCOUNTER
Attempted to call and confirm that patient was still able to make it for her appt tomorrow. No answer. Left VM advising call back if needed to be rescheduled.    Future Appointments   Date Time Provider Department Center   11/26/2024 10:30 AM Marlene Robison PA-C [facility] NEUROS8 Misael Schmitt

## 2024-11-26 ENCOUNTER — TELEPHONE (OUTPATIENT)
Dept: NEUROSURGERY | Facility: CLINIC | Age: 49
End: 2024-11-26
Payer: MEDICAID

## 2024-11-26 NOTE — TELEPHONE ENCOUNTER
Attempted to call and ask patient if she was still able to make it to her appointment today with Marlene Robison PA-C at 10:30 AM. No answer. Left VM advising call back.

## 2024-11-27 ENCOUNTER — TELEPHONE (OUTPATIENT)
Dept: NEUROLOGY | Facility: CLINIC | Age: 49
End: 2024-11-27
Payer: MEDICAID

## 2024-11-27 NOTE — TELEPHONE ENCOUNTER
LCSW placed call to patient and verbally [phone] (Mobile)     Scheduled re-scheduled initial visit for 12/2 at 2:30 pm

## 2024-12-02 ENCOUNTER — TELEPHONE (OUTPATIENT)
Dept: NEUROLOGY | Facility: CLINIC | Age: 49
End: 2024-12-02
Payer: MEDICAID

## 2024-12-02 NOTE — TELEPHONE ENCOUNTER
LCSW logged on to virtual visit and waited for 15 min. Approx.  Patient did not log on.     LCSW placed call to patient and left VM requesting call back.

## 2025-01-22 DIAGNOSIS — G40.209 LOCALIZATION-RELATED (FOCAL) (PARTIAL) SYMPTOMATIC EPILEPSY AND EPILEPTIC SYNDROMES WITH COMPLEX PARTIAL SEIZURES, NOT INTRACTABLE, WITHOUT STATUS EPILEPTICUS: ICD-10-CM

## 2025-01-23 RX ORDER — TOPIRAMATE 50 MG/1
50 TABLET, FILM COATED ORAL 2 TIMES DAILY
Qty: 180 TABLET | Refills: 3 | Status: SHIPPED | OUTPATIENT
Start: 2025-01-23

## 2025-03-13 ENCOUNTER — HOSPITAL ENCOUNTER (EMERGENCY)
Facility: HOSPITAL | Age: 50
Discharge: HOME | End: 2025-03-13
Attending: STUDENT IN AN ORGANIZED HEALTH CARE EDUCATION/TRAINING PROGRAM
Payer: MEDICAID

## 2025-03-13 VITALS
DIASTOLIC BLOOD PRESSURE: 62 MMHG | RESPIRATION RATE: 16 BRPM | HEART RATE: 61 BPM | HEIGHT: 62 IN | SYSTOLIC BLOOD PRESSURE: 130 MMHG | OXYGEN SATURATION: 97 % | WEIGHT: 185 LBS | TEMPERATURE: 98 F | BODY MASS INDEX: 34.04 KG/M2

## 2025-03-13 DIAGNOSIS — Z53.20 REFUSED ASSESSMENT OF PHYSICAL HEALTH: Primary | ICD-10-CM

## 2025-03-13 DIAGNOSIS — Z87.898 HISTORY OF SEIZURE: ICD-10-CM

## 2025-03-13 PROCEDURE — 99283 EMERGENCY DEPT VISIT LOW MDM: CPT | Mod: ER

## 2025-03-14 NOTE — ED PROVIDER NOTES
Encounter Date: 3/13/2025       History     Chief Complaint   Patient presents with    Seizures     PT to the ED via [facility] after two seizures. PT refusing to be seen. PT has GCS of 15. Vitals stable.      HPI  50-year-old female extensive neurologic history, history of seizure disorder, presents brought in by EMS after seizures, seizures how of now resolved and the patient is awake and oriented x4 in his refusing medical screening exam.  Per institutional protocol patient was seen in brief for capacity assessment to refused evaluation and treatment.  Review of patient's allergies indicates:   Allergen Reactions    Lisinopril Swelling    Bactrim [sulfamethoxazole-trimethoprim] Rash    Ceftazidime Hives     Rash while on IV ceftazidime for planned 6 weeks.  See ED notes 9/12, 9/14 and ID clinic notes 9/16 and 9/28     Past Medical History:   Diagnosis Date    Brain aneurysm     CHF (congestive heart failure)     H/O coronary angioplasty     Hypercholesteremia     Hypertension     Malignant hypertension     Migraine headache     Stroke 10/2017     Past Surgical History:   Procedure Laterality Date    CARDIAC CATHETERIZATION      CEREBRAL ANGIOGRAM      CLIP LIGATION OF INTRACRANIAL ANEURYSM BY CRANIOTOMY N/A 7/15/2022    Procedure: CRANIOTOMY, WITH ANEURYSM CLIPPING;  Surgeon: Timothy Diaz MD;  Location: [facility] OR [address] [place] [address];  Service: Neurosurgery;  Laterality: N/A;  PTERIONAL CRANIOTOMY WITH CLIP LIGATION OF L PCOMM, L ANTERIOR CHOROIDAL, L MCA  ANEURYSM, ANESTHESIA: GENERAL, BLOOD: TYPE&CROSS 2 UNITS, NEUROMONITORING: SEP, MEP, EEG, RADIOLOGY: C-ARM, POSITION: SUPINE, CASTILLO, CO-SURGERON: DR. LEXI DOMÍNGUEZ.    WOUND DEBRIDEMENT  8/27/2022    Procedure: DEBRIDEMENT, WOUND;  Surgeon: Timothy Diaz MD;  Location: [facility] OR [address] [place] [address];  Service: Neurosurgery;;     No family history on file.  Social History[1]  Listed history available reviewed.  Review of Systems  ROS refused  Physical Exam     Initial Vitals  "[03/13/25 1912]   BP Pulse Resp Temp SpO2   130/62 61 16 98.2 °F (36.8 °C) 97 %      MAP       --         Physical Exam  Physical:  General: Awake, alert, no acute distress  Head: Normocephalic, atraumatic  Neck: Trachea midline, full range of motion, no meningismus  ENT: Atraumatic, Airway Patent  Cardio: skin perfusion normal  Chest: Atraumatic, No respiratory distress no use of accessory muscles to breath, normal rate  Upper Ext: Atraumatic, Inspection normal, no swelling  Lower Ext: Atraumatic, Inspection normal, no swelling  Neuro: No gross cranial nerve abnormality, no lateralization, no gross sensory or motor deficits speech is clear and intelligible  ED Course   Procedures  Labs Reviewed - No data to display       Imaging Results    None          Medications - No data to display  Medical Decision Making                 I saw patient upon EMS arrival and was asked to perform patient's competency evaluation for refusal of medical screening exam.  Patient is awake and oriented, able to state name, location, year, and understands that they were here because they had seizure.  They likewise demonstrate appropriate goal-directed thinking and decision-making, if they were in a house that was on fire they would get out and if the door was blocked they would go out a window.  If they found a while it on the ground they would pick it up and if it was full of money they would keep it".     They understand that there being offered a medical screening exam and are refusing.    Informed They do not want to check in because it takes too long to do blood work.  They understand that they are refusing exam against our advice, and accept full responsibility for death or pain and suffering that is incurred as a result.  Charge nurses having them fill out standard institutional AMA paperwork.                   Clinical Impression:  Final diagnoses:  [Z53.20] Refused assessment of physical health (Primary)  [Z87.898] " History of seizure          ED Disposition Condition    AMA Fair                  [1]   Social History  Tobacco Use    Smoking status: Every Day     Current packs/day: 0.33     Average packs/day: 0.3 packs/day for 32.2 years (10.6 ttl pk-yrs)     Types: Cigarettes     Start date: 1993    Smokeless tobacco: Never    Tobacco comments:     Enrolled in the [employer] on 6/7/22 ([facility] Member ID # [id number]). Pt is a 0.33 pk/day cigarette smoker x 31 yrs. She states ready to quit smoking. Ambulatory referral to Smoking Cessation clinic following hospital discharge.    Substance Use Topics    Alcohol use: Yes     Comment: occassionally    Drug use: No        Adama Mendieta MD  03/13/25 [id number]

## 2025-03-14 NOTE — DISCHARGE INSTRUCTIONS
You are leaving against medical advice/are refusing medical screening exam.  This carries with it the risk of permanent disability and death after not receiving evaluation and stabilization of medical emergencies and you accept the responsibility for this.  You can return to the emergency department at anytime for any reason.

## 2025-04-14 NOTE — H&P
[facility]ael [facility] - Neuro Critical Care  Neurocritical Care  History & Physical    Admit Date: 8/21/2022  Service Date: 08/28/2022  Length of Stay: 6    Subjective:     Chief Complaint: Post op infection    History of Present Illness: Gina Jenkins is a 46 yo F with PMH of HTN, HLD, smoking and recent L Pcomm and L anterior choroidal artery aneuryms craniotomy for clipping on 7/15/22 with Dr. Diaz who presented with sudden onset of symptoms concerning for focal seizure, aphasia and RSW. Each episode resolved on its own. NSGY was consulted and she was taken for a wound washout and evacuation of fluid collection. Will admit to [facility] post op.    Past Medical History:   Diagnosis Date    Brain aneurysm     CHF (congestive heart failure)     H/O coronary angioplasty     Hypercholesteremia     Hypertension     Malignant hypertension     Migraine headache     Stroke 10/2017     Past Surgical History:   Procedure Laterality Date    CARDIAC CATHETERIZATION      CEREBRAL ANGIOGRAM      CLIP LIGATION OF INTRACRANIAL ANEURYSM BY CRANIOTOMY N/A 7/15/2022    Procedure: CRANIOTOMY, WITH ANEURYSM CLIPPING;  Surgeon: Timothy Diaz MD;  Location: [facility] OR [address] [place] [address];  Service: Neurosurgery;  Laterality: N/A;  PTERIONAL CRANIOTOMY WITH CLIP LIGATION OF L PCOMM, L ANTERIOR CHOROIDAL, L MCA  ANEURYSM, ANESTHESIA: GENERAL, BLOOD: TYPE&CROSS 2 UNITS, NEUROMONITORING: SEP, MEP, EEG, RADIOLOGY: C-ARM, POSITION: SUPINE, CASTILLO, CO-SURGERON: DR. LEXI DOMÍNGUEZ.       No current facility-administered medications on file prior to encounter.     Current Outpatient Medications on File Prior to Encounter   Medication Sig Dispense Refill    carvediloL (COREG) 25 MG tablet Take 37.5 mg by mouth 2 (two) times daily.      amitriptyline (ELAVIL) 75 MG tablet Take 75 mg by mouth every evening.      amlodipine (NORVASC) 10 MG tablet Take 1 tablet (10 mg total) by mouth once daily. 30 tablet 0    atorvastatin (LIPITOR) 40 MG tablet Take 1 tablet  (40 mg total) by mouth once daily. 90 tablet 3    butalbital-acetaminophen-caffeine -40 mg (FIORICET, ESGIC) -40 mg per tablet Take 1 tablet by mouth every 4 (four) hours as needed.      cyanocobalamin (VITAMIN B-12) 1000 MCG tablet Take 1 tablet (1,000 mcg total) by mouth once daily. (Patient not taking: No sig reported)      diphenhydrAMINE (BENADRYL) 25 mg capsule Take 1 each (25 mg total) by mouth every 6 (six) hours as needed for Itching or Allergies. (Patient not taking: No sig reported) 20 capsule 0    docusate sodium (COLACE) 100 MG capsule Take 1 capsule (100 mg total) by mouth 2 (two) times daily as needed for Constipation. 30 capsule 0    EScitalopram oxalate (LEXAPRO) 10 MG tablet Take 10 mg by mouth once daily.      famotidine (PEPCID) 20 MG tablet Take 1 tablet (20 mg total) by mouth 2 (two) times daily. (Patient not taking: No sig reported) 60 tablet 11    hydrochlorothiazide (HYDRODIURIL) 25 MG tablet Take 1 tablet (25 mg total) by mouth once daily. 30 tablet 0    levETIRAcetam (KEPPRA) 500 MG Tab Take 1 tablet (500 mg total) by mouth 2 (two) times daily for 5 days (Patient not taking: Reported on 7/29/2022) 10 tablet 0    magnesium oxide (MAG-OX) 400 mg (241.3 mg magnesium) tablet Take 1 tablet by mouth 2 (two) times daily.      potassium chloride SA (K-DUR,KLOR-CON) 20 MEQ tablet Take 20 mEq by mouth 2 (two) times daily.      [DISCONTINUED] aspirin 81 MG Chew Take 1 tablet (81 mg total) by mouth once daily.  0     Allergies: Lisinopril and Bactrim [sulfamethoxazole-trimethoprim]    No family history on file.    Social History     Tobacco Use    Smoking status: Every Day     Packs/day: 0.50     Types: Cigarettes    Smokeless tobacco: Never   Substance Use Topics    Alcohol use: Yes     Comment: occassionally    Drug use: No      Review of Systems: Review of Systems   Constitutional:  Negative for chills, fever and weight loss.   HENT:  Positive for sore throat. Negative  for congestion, hearing loss and nosebleeds.    Eyes:  Negative for blurred vision, double vision and photophobia.   Respiratory:  Negative for cough, sputum production and shortness of breath.    Cardiovascular:  Negative for chest pain, palpitations and leg swelling.   Gastrointestinal:  Negative for heartburn, nausea and vomiting.   Genitourinary:  Negative for dysuria, frequency and urgency.   Musculoskeletal:  Negative for back pain, joint pain and neck pain.   Skin:  Negative for itching and rash.   Neurological:  Positive for headaches. Negative for tingling, sensory change, speech change, focal weakness, seizures, loss of consciousness and weakness.     Vitals:   Temp: 97.7 °F (36.5 °C)  Pulse: 82  Rhythm: normal sinus rhythm  BP: 128/74  MAP (mmHg): 95  Resp: 18  SpO2: (!) 94 %    Temp  Min: 97.7 °F (36.5 °C)  Max: 98.9 °F (37.2 °C)  Pulse  Min: 69  Max: 85  BP  Min: 125/78  Max: 140/78  MAP (mmHg)  Min: 95  Max: 112  Resp  Min: 16  Max: 20  SpO2  Min: 94 %  Max: 100 %    No intake/output data recorded.   Unmeasured Output  Urine Occurrence: 2  Stool Occurrence: 0     Examination:   Constitutional: Well-nourished and -developed. No apparent distress.   Eyes: Conjunctiva clear, anicteric. Lids no lesions.  Head/Ears/Nose/Mouth/Throat/Neck: Moist mucous membranes. External ears, nose atraumatic. L Scalp dressing.  Cardiovascular: Regular rhythm. No leg edema.  Respiratory: Comfortable respirations. Clear to auscultation.  Gastrointestinal: No hernia. Soft, nondistended, nontender. + bowel sounds.    Neurologic:   -E 4 V 5 M 6  -Alert. Oriented to person, place, and time. Speech fluent. Follows commands.   -Strength 5 and symmetric in arms, legs. Tone normal.   -Sensation intact to touch in arms, legs.    Today I independently reviewed pertinent medications, lines/drains/airways, imaging, cardiology results, laboratory results, microbiology results, notably:     CTH post op      Assessment/Plan:      Neuro  Transient neurological symptoms  Resolved    Cardiac/Vascular  Mixed hyperlipidemia  Continue atorvastatin    Essential hypertension  SBP Goal < 160  Continue home BP meds    ID  * Post op infection  Hx of L crani for aneurysm clipping 7/15/22  Developed transient RSW/aphasia recently, possible seizure activity  Fluid collection and infection of cranial wound site  S/p Washout and fluid collection removal 8/27    -Admit to NCC  -Neuro checks/VS q1hr  -SBP goal < 160  -VTE prophylaxis: Mechanical SCDs, hold subq heparin in the post op period  -NSGY following  -CTH post op pending  -Continue Keppra 1000mg BID  -Post op pain control  -ID following, continue Vanc and cefepime    Endocrine  New onset type 2 diabetes mellitus  Continue scheduled insulin and SSI  Diabetic diet    Other  Fluid collection at surgical site  S/p washout 8/27     The patient is being Prophylaxed for:  Venous Thromboembolism with: Mechanical  Stress Ulcer with: Not Applicable   Ventilator Pneumonia with: not applicable    Activity Orders          Diet diabetic [facility]; 2000 Calorie; Isolation Tray - Regular [place]: Diabetic starting at 08/27 1845        Full Code    Level III    Cherelle Proctor NP  Neurocritical Care  Misael Schmitt - Neuro Critical Care   0 = understands/communicates without difficulty

## 2025-06-14 DIAGNOSIS — G40.209 LOCALIZATION-RELATED (FOCAL) (PARTIAL) SYMPTOMATIC EPILEPSY AND EPILEPTIC SYNDROMES WITH COMPLEX PARTIAL SEIZURES, NOT INTRACTABLE, WITHOUT STATUS EPILEPTICUS: ICD-10-CM

## 2025-06-14 RX ORDER — LEVETIRACETAM 750 MG/1
750 TABLET ORAL 2 TIMES DAILY
Qty: 180 TABLET | Refills: 3 | Status: CANCELLED | OUTPATIENT
Start: 2025-06-14

## 2025-06-16 RX ORDER — LEVETIRACETAM 750 MG/1
750 TABLET ORAL 2 TIMES DAILY
Qty: 180 TABLET | Refills: 3 | Status: SHIPPED | OUTPATIENT
Start: 2025-06-16

## 2025-06-30 NOTE — TELEPHONE ENCOUNTER
Pt last seen on 10/22/24  Upcoming Appts: n/a    Last ordered  topamax: 1/23/25    Confirmed dosage

## 2025-07-01 RX ORDER — TOPIRAMATE 50 MG/1
50 TABLET, FILM COATED ORAL 2 TIMES DAILY
Qty: 180 TABLET | Refills: 3 | Status: SHIPPED | OUTPATIENT
Start: 2025-07-01

## 2025-07-12 NOTE — PT/OT/SLP EVAL
"Speech Language Pathology Evaluation  Cognitive/Bedside Swallow    Patient Name:  Gina Jenkins   MRN:  [id number]  Admitting Diagnosis: Fluid collection at surgical site    Recommendations:                  General Recommendations:  Speech/language therapy  Diet recommendations:  Regular, Thin   Aspiration Precautions: Standard aspiration precautions   General Precautions: Standard, fall  Communication strategies:  none    History:     Past Medical History:   Diagnosis Date    Brain aneurysm     CHF (congestive heart failure)     H/O coronary angioplasty     Hypercholesteremia     Hypertension     Malignant hypertension     Migraine headache     Stroke 10/2017       Past Surgical History:   Procedure Laterality Date    CARDIAC CATHETERIZATION      CEREBRAL ANGIOGRAM      CLIP LIGATION OF INTRACRANIAL ANEURYSM BY CRANIOTOMY N/A 7/15/2022    Procedure: CRANIOTOMY, WITH ANEURYSM CLIPPING;  Surgeon: Timothy Diaz MD;  Location: [facility] OR [address] [place] [address];  Service: Neurosurgery;  Laterality: N/A;  PTERIONAL CRANIOTOMY WITH CLIP LIGATION OF L PCOMM, L ANTERIOR CHOROIDAL, L MCA  ANEURYSM, ANESTHESIA: GENERAL, BLOOD: TYPE&CROSS 2 UNITS, NEUROMONITORING: SEP, MEP, EEG, RADIOLOGY: C-ARM, POSITION: SUPINE, ANNA, CO-SURGERON: DR. LEXI DOMÍNGUEZ.    WOUND DEBRIDEMENT  8/27/2022    Procedure: DEBRIDEMENT, WOUND;  Surgeon: Timothy Diaz MD;  Location: [facility] OR [address] [place] [address];  Service: Neurosurgery;;       Social History: Patient lives with family.    Prior diet: regular with thin.    Occupation/hobbies/homemaking: na.    Subjective     "I want to go home" per pt  Patient goals: home     Pain/Comfort:  Pain Rating 1: 0/10  Pain Rating Post-Intervention 1: 0/10    Respiratory Status: Room air    Objective:     Cognitive Status:    Pt. Alert with good attention to task.  She was oriented to lace only with perseveration noted.  She repeated 3 digits and simple phrases accurately.  Responses to hypothetical verbal problem solving tasks were " accurate for simple tasks however word finding deficits were noted.        Receptive Language:   Comprehension:   Pt. Followed 1 step commands with 100% accuracy and 1/4 2 step commands.  Responses to simple yes/no questions were accurate however she responded to copmlex yes/no questions with 70% accuracy with repetition needed.    Pragmatics:    wfl    Expressive Language:  Verbal:    Pt. Named common objects with 80% accuracy and recalled common nouns in response to questions with 80% accuracy.  She repeated words and simsple phrases with 100% accuracy but did not accurately repeat complex sentences.  On word fluency tasks she named 3 animals with cues .  Perseveration noted and delays in responding.          Motor Speech:  WFL    Voice:   wfl    Visual-Spatial:  tba    Reading:   tba     Written Expression:   tba    Oral Musculature Evaluation  Oral Musculature: [facility]  Dentition: present and adequate  Secretion Management: adequate  Mucosal Quality: good  Mandibular Strength and Mobility: WFL  Oral Labial Strength and Mobility: WFL  Lingual Strength and Mobility: WFL  Velar Elevation: [facility]  Buccal Strength and Mobility: [facility]  Volitional Cough: strong  Volitional Swallow: no delay  Voice Prior to PO Intake: [facility]    Bedside Swallow Eval:   Consistencies Assessed:  Thin liquids 3 ounces of water via cup with a straw  Solids 1 cracker self fed      Oral Phase:   WFL    Pharyngeal Phase:   no overt clinical signs/symptoms of aspiration  no overt clinical signs/symptoms of pharyngeal dysphagia    Compensatory Strategies  None    Treatment: Education provided re role of slp and plan of care    Assessment:     Gina Jenkins is a 47 y.o. female with an SLP diagnosis of Aphasia.    Goals:   Multidisciplinary Problems       SLP Goals          Problem: SLP    Goal Priority Disciplines Outcome   SLP Goal     SLP Ongoing, Progressing   Description: Goals due 9/6  1.  Assess functional reading and writing skills  2.  [place] to  month, year and place  3.  Respond to word finding tasks with 80% accuracy  4.  Respond to verbal problem solving/categorization tasks with 80% accuracy  5.  Follow 2 step commands with 80% accuracy                         Plan:     Patient to be seen:  4 x/week   Plan of Care expires:  09/27/22  Plan of Care reviewed with:  patient   SLP Follow-Up:  Yes       Discharge recommendations:  Discharge Facility/Level of Care Needs: other (see comments) (dep on pt. and ot)   Barriers to Discharge:  None    Time Tracking:     SLP Treatment Date:   08/31/22  Speech Start Time:  0950  Speech Stop Time:  1010     Speech Total Time (min):  20 min    Billable Minutes: Eval 12  and Eval Swallow and Oral Function 8    08/31/2022        Spine appears normal, range of motion is not limited, no muscle or joint tenderness

## 2025-07-24 ENCOUNTER — HOSPITAL ENCOUNTER (EMERGENCY)
Facility: HOSPITAL | Age: 50
Discharge: HOME | End: 2025-07-24
Attending: STUDENT IN AN ORGANIZED HEALTH CARE EDUCATION/TRAINING PROGRAM
Payer: MEDICAID

## 2025-07-24 VITALS
HEIGHT: 62 IN | BODY MASS INDEX: 33.13 KG/M2 | WEIGHT: 180 LBS | DIASTOLIC BLOOD PRESSURE: 89 MMHG | RESPIRATION RATE: 16 BRPM | SYSTOLIC BLOOD PRESSURE: 133 MMHG | HEART RATE: 80 BPM | OXYGEN SATURATION: 100 % | TEMPERATURE: 99 F

## 2025-07-24 DIAGNOSIS — S23.9XXA THORACIC BACK SPRAIN, INITIAL ENCOUNTER: ICD-10-CM

## 2025-07-24 DIAGNOSIS — T14.8XXA MUSCLE STRAIN: Primary | ICD-10-CM

## 2025-07-24 PROCEDURE — 25000003 PHARM REV CODE 250: Mod: ER

## 2025-07-24 PROCEDURE — 99284 EMERGENCY DEPT VISIT MOD MDM: CPT | Mod: ER

## 2025-07-24 RX ORDER — LIDOCAINE 50 MG/G
1 PATCH TOPICAL DAILY
Qty: 14 PATCH | Refills: 0 | Status: SHIPPED | OUTPATIENT
Start: 2025-07-24

## 2025-07-24 RX ORDER — DICLOFENAC SODIUM 10 MG/G
2 GEL TOPICAL 4 TIMES DAILY
Qty: 10 G | Refills: 0 | Status: SHIPPED | OUTPATIENT
Start: 2025-07-24

## 2025-07-24 RX ORDER — LIDOCAINE 50 MG/G
1 PATCH TOPICAL DAILY
Qty: 6 PATCH | Refills: 0 | Status: SHIPPED | OUTPATIENT
Start: 2025-07-24

## 2025-07-24 RX ORDER — LIDOCAINE 50 MG/G
1 PATCH TOPICAL
Status: DISCONTINUED | OUTPATIENT
Start: 2025-07-24 | End: 2025-07-25 | Stop reason: HOSPADM

## 2025-07-24 RX ADMIN — LIDOCAINE 1 PATCH: 50 PATCH CUTANEOUS at 10:07

## 2025-07-25 NOTE — ED PROVIDER NOTES
Encounter Date: 7/24/2025       History     Chief Complaint   Patient presents with    Back Pain     Pt came in reporting lower back pain now 10/10 X 4 days that gradually worsned and that nothing has helped. Denies any recent trauma non radiation no bowel or bladder issues      50-year-old female with past medical history of CHF, hypertension, epilepsy, stroke presents to the ED for further evaluation of right upper back pain times few days.  Patient states while she got up to go to the bathroom one night started endorsing back pain. States symptoms worsens with physical movements of his right arm, bending. Has tried OTC pain medications and had a dose of muscle relaxers from her mother's Rx.  Denies associated nausea, vomiting, chest pain, shortness of breath.  No recent falls or injuries.  No other acute complaints today.    The history is provided by the patient.     Review of patient's allergies indicates:   Allergen Reactions    Lisinopril Swelling    Bactrim [sulfamethoxazole-trimethoprim] Rash    Ceftazidime Hives     Rash while on IV ceftazidime for planned 6 weeks.  See ED notes 9/12, 9/14 and ID clinic notes 9/16 and 9/28     Past Medical History:   Diagnosis Date    Brain aneurysm     CHF (congestive heart failure)     H/O coronary angioplasty     Hypercholesteremia     Hypertension     Malignant hypertension     Migraine headache     Stroke 10/2017     Past Surgical History:   Procedure Laterality Date    CARDIAC CATHETERIZATION      CEREBRAL ANGIOGRAM      CLIP LIGATION OF INTRACRANIAL ANEURYSM BY CRANIOTOMY N/A 7/15/2022    Procedure: CRANIOTOMY, WITH ANEURYSM CLIPPING;  Surgeon: Timothy Diaz MD;  Location: [facility] OR [address] [place] [address];  Service: Neurosurgery;  Laterality: N/A;  PTERIONAL CRANIOTOMY WITH CLIP LIGATION OF L PCOMM, L ANTERIOR CHOROIDAL, L MCA  ANEURYSM, ANESTHESIA: GENERAL, BLOOD: TYPE&CROSS 2 UNITS, NEUROMONITORING: SEP, MEP, EEG, RADIOLOGY: C-ARM, POSITION: SUPINE, ANNA CO-SURGERON:   Results were released along with a note on mychart. He's seen at least some of these already  Ashwin Flannery MD on 10/15/2024 at 1:25 PM     LEXI DOMÍNGUEZ.    WOUND DEBRIDEMENT  8/27/2022    Procedure: DEBRIDEMENT, WOUND;  Surgeon: Timothy Diaz MD;  Location: [facility] OR [address] [place] [address];  Service: Neurosurgery;;     No family history on file.  Social History[1]  Review of Systems   Constitutional:  Negative for chills and fever.   Respiratory:  Negative for shortness of breath.    Cardiovascular:  Negative for chest pain.   Gastrointestinal:  Negative for abdominal distention, abdominal pain, nausea and vomiting.   Musculoskeletal:  Positive for back pain. Negative for neck pain and neck stiffness.       Physical Exam     Initial Vitals [07/24/25 2120]   BP Pulse Resp Temp SpO2   133/89 80 16 98.5 °F (36.9 °C) 100 %      MAP       --         Physical Exam    Vitals reviewed.  Constitutional: She appears well-developed and well-nourished. She is not diaphoretic. No distress.   HENT:   Head: Atraumatic.   Eyes: EOM are normal.   Neck: Neck supple.   Cardiovascular:  Normal rate and normal heart sounds.           Pulmonary/Chest: Breath sounds normal. No respiratory distress. She has no wheezes.   Abdominal: Abdomen is soft. She exhibits no distension. There is no abdominal tenderness.   Musculoskeletal:         General: Tenderness present.        Arms:       Cervical back: Neck supple.     Neurological: She is alert and oriented to person, place, and time. GCS score is 15. GCS eye subscore is 4. GCS verbal subscore is 5. GCS motor subscore is 6.   Skin: Skin is warm.         ED Course   Procedures  Labs Reviewed - No data to display       Imaging Results    None          Medications   LIDOcaine 5 % patch 1 patch (1 patch Transdermal Patch Applied 7/24/25 7270)     Medical Decision Making  Differential Diagnosis includes, but is not limited to:  Fracture, dislocation, compartment syndrome, nerve injury/palsy, vascular injury, DVT, rhabdomyolysis, hemarthrosis, septic joint, cellulitis, bursitis, muscle strain, ligament tear/sprain, laceration, foreign body,  abrasion, soft tissue contusion, osteoarthritis.    ED management     50-year-old female with past medical history of CHF, hypertension, epilepsy, stroke presents to the ED for further evaluation of right upper back pain times few days.  P Patient is not toxic appearing, hemodynamically stable and resting comfortably on bed. Patient is well-appearing.  Awake and alert.  Afebrile with vitals WNL. No distress on exam. Pt denies chest pain or SOB. On exam, there is tenderness with palpation over the right thoracic paraspinal muscles. No overlying skin changes. Pain is reproducible with palpation over the area and movement of the right arm. I suspect symptoms consistent with muscle strain vs sprain. DW pt at length about limited treatment options d/t hx of CKD and epilepsy. We discussed about possible medication interactions between use of muscle relaxers and Keppra. We will defer from oral NSAIDS, muscle relaxers. I will send home with few days worth of topical Voltaren. Advised to only use it sparingly and rely more on supportive care RICE and tylenol for pain. Pt verbalized understanding and agrees with treatment.    I have discussed the specifics of the workup with the patient and the patient has verbalized understanding of the details of the workup, the diagnosis, the treatment plan, and the need for outpatient follow-up with PCP. ED precautions given. Discussed with pt about returning to the ED, if symptoms fail to improve or worsen.     RESULTS:  Documented in ED course.   Labs/ekg interpreted by myself       Voice recognition software utilized in this note. Typographical and content errors may occur with this process. While efforts are made to detect and correct such errors, in some cases errors will persist. For this reason, wording in this document should be considered in the proper context and not strictly verbatim.       Risk  Prescription drug management.               ED Course as of 07/24/25 6138   Thu  Jul 24, 2025 2143 Temp: 98.5 °F (36.9 °C) [NW]   2143 Temp Source: Oral [NW]   2143 Pulse: 80 [NW]   2143 Resp: 16 [NW]   2143 SpO2: 100 % [NW]   2203 Patient has not had her menstrual cycle in 2 years. [NW]      ED Course User Index  [NW] Jazz Sanchez PA-C                               Clinical Impression:  Final diagnoses:  [S23.9XXA] Thoracic back sprain, initial encounter (Primary)          ED Disposition Condition    Discharge           ED Prescriptions       Medication Sig Dispense Start Date End Date Auth. Provider    LIDOcaine (LIDODERM) 5 % Place 1 patch onto the skin once daily. Remove & Discard patch within 12 hours or as directed by MD 6 patch 7/24/2025 -- Jazz Sanchez PA-C    LIDOcaine (LIDODERM) 5 % Place 1 patch onto the skin once daily. Remove & Discard patch within 12 hours or as directed by MD 14 patch 7/24/2025 -- Jazz Sanchez PA-C    diclofenac sodium (VOLTAREN) 1 % Gel Apply 2 g topically 4 (four) times daily. 10 g 7/24/2025 -- Jazz Sanchez PA-C          Follow-up Information       Follow up With Specialties Details Why Contact Info    Clair Johnson, FNP Family Medicine In 3 days As needed, If symptoms worsen [address] [place] [address]  [phone]                     [1]   Social History  Tobacco Use    Smoking status: Every Day     Current packs/day: 0.33     Average packs/day: 0.3 packs/day for 32.6 years (10.7 ttl pk-yrs)     Types: Cigarettes     Start date: 1993    Smokeless tobacco: Never    Tobacco comments:     Enrolled in the [employer] on 6/7/22 ([facility] Member ID # [id number]). Pt is a 0.33 pk/day cigarette smoker x 31 yrs. She states ready to quit smoking. Ambulatory referral to Smoking Cessation clinic following hospital discharge.    Substance Use Topics    Alcohol use: Yes     Comment: occassionally    Drug use: No        Jazz Sanchez PA-C  07/25/25 6012

## 2025-07-25 NOTE — DISCHARGE INSTRUCTIONS
Ms. Jenkins,     Thank you for letting me care for you today! It was nice meeting you, and I hope you feel better soon.   If you would like access to your chart and what was done today please utilize the [facility].   Please don't hesitate to return if your symptoms worsen or you develop any other worrisome symptoms.    Our goal in the emergency department is to always give you outstanding care and exceptional service. You may receive a survey by mail or e-mail in the next week regarding your experience in our ED. We would greatly appreciate you completing and returning the survey. Your feedback provides us with a way to recognize our staff who give very good care and it helps us learn how to improve when your experience was below our aspiration of excellence.     Sincerely,    Jazz Sanchez PA-C  Emergency Department Physician Assistant  [facility]

## (undated) DEVICE — DRESSING SURGICAL 1/2X1/2

## (undated) DEVICE — DURAPREP SURG SCRUB 26ML

## (undated) DEVICE — FORCEP SPETZLER SLM .5 TIP 9IN

## (undated) DEVICE — CARTRIDGE OIL

## (undated) DEVICE — PINS SKULL ADULT MAYFIELD
Type: IMPLANTABLE DEVICE | Site: CRANIAL | Status: NON-FUNCTIONAL
Removed: 2022-07-15

## (undated) DEVICE — SUT D SPECIAL VICRYL 2-0

## (undated) DEVICE — SEE MEDLINE ITEM 157150

## (undated) DEVICE — HOOK LONE STAR BLUNT 12MM

## (undated) DEVICE — TRAY FOLEY 16FR INFECTION CONT

## (undated) DEVICE — TAPE SURG MEDIPORE 6X72IN

## (undated) DEVICE — DRESSING SURGICAL 1X3

## (undated) DEVICE — STAPLER SKIN PROXIMATE WIDE

## (undated) DEVICE — DRESSING TELFA N ADH 3X8

## (undated) DEVICE — DRAPE CRANIOTOMY T SURG STRL

## (undated) DEVICE — DIFFUSER

## (undated) DEVICE — SUT VICRYL PLUS 3-0 SH 18IN

## (undated) DEVICE — SPONGE NEURO 1/4X1/4

## (undated) DEVICE — SUT 4/0 18IN NUROLON BLK B

## (undated) DEVICE — SPRAY MASTISOL

## (undated) DEVICE — SEE MEDLINE ITEM 156905

## (undated) DEVICE — ROUTER TAPERED 2.3MM

## (undated) DEVICE — CLIPS RANEY SCALP FFS/ASSY

## (undated) DEVICE — TRAY CATH FOL SIL URIMTR 16FR

## (undated) DEVICE — TUBING SUC UNIV W/CONN 12FT

## (undated) DEVICE — CLIP YASARGIL ANEURYSM 5X4MM
Type: IMPLANTABLE DEVICE | Site: BRAIN | Status: NON-FUNCTIONAL
Removed: 2022-07-15

## (undated) DEVICE — SPONGE PATTY SURGICAL .5X3IN

## (undated) DEVICE — KIT EVACUATOR 3-SPRING 1/8 DRN

## (undated) DEVICE — DRAPE CORETEMP FLD WRM 56X62IN

## (undated) DEVICE — SUT CTD VICRYL 2-0 CR/CT-2

## (undated) DEVICE — ELECTRODE REM PLYHSV RETURN 9

## (undated) DEVICE — TAPE ADH MEDIPORE 4 X 10YDS

## (undated) DEVICE — SEE MEDLINE ITEM 157103

## (undated) DEVICE — DRAPE STERI-DRAPE 1000 17X11IN

## (undated) DEVICE — MARKER SKIN STND TIP BLUE BARR

## (undated) DEVICE — DRESSING TELFA STRL 4X3 LF

## (undated) DEVICE — RUBBERBAND STERILE 3X1/8IN

## (undated) DEVICE — BIT DRILL WIRE PASS 1.0MM

## (undated) DEVICE — CORD BIPOLAR 12 FOOT

## (undated) DEVICE — DRAPE THREE-QTR REINF 53X77IN

## (undated) DEVICE — HEMOSTAT SURGICEL 4X8IN

## (undated) DEVICE — DRAPE OPMI STERILE

## (undated) DEVICE — DRAPE STERI INSTRUMENT 1018

## (undated) DEVICE — ELECTRODE BLADE INSULATED 1 IN

## (undated) DEVICE — STOCKINET 4INX48

## (undated) DEVICE — KIT SURGIFLO HEMOSTATIC MATRIX

## (undated) DEVICE — SEE MEDLINE ITEM 146292

## (undated) DEVICE — DRAPE C ARM 42 X 120 10/BX

## (undated) DEVICE — DRAPE INCISE IOBAN 2 23X17IN

## (undated) DEVICE — SPONGE DERMACEA 4X4IN 12PLY

## (undated) DEVICE — SYR SLIP TIP 1CC

## (undated) DEVICE — PROBE DOPPLER VTI DISP 8MHZ

## (undated) DEVICE — TUBE FRAZIER 5MM 2FT SOFT TIP

## (undated) DEVICE — PACK SET UP CONVERTORS